# Patient Record
Sex: FEMALE | Race: WHITE | NOT HISPANIC OR LATINO | Employment: OTHER | URBAN - METROPOLITAN AREA
[De-identification: names, ages, dates, MRNs, and addresses within clinical notes are randomized per-mention and may not be internally consistent; named-entity substitution may affect disease eponyms.]

---

## 2017-01-16 DIAGNOSIS — J30.9 ALLERGIC RHINITIS: ICD-10-CM

## 2017-01-16 RX ORDER — MONTELUKAST SODIUM 10 MG/1
TABLET ORAL
Qty: 30 TABLET | Refills: 6 | Status: SHIPPED | OUTPATIENT
Start: 2017-01-16 | End: 2017-08-12 | Stop reason: SDUPTHER

## 2017-01-20 RX ORDER — BACLOFEN 20 MG/1
TABLET ORAL
Qty: 90 TABLET | Refills: 1 | Status: SHIPPED | OUTPATIENT
Start: 2017-01-20 | End: 2017-03-14 | Stop reason: SDUPTHER

## 2017-01-27 DIAGNOSIS — F32.A DEPRESSION, UNSPECIFIED DEPRESSION TYPE: Primary | ICD-10-CM

## 2017-01-27 DIAGNOSIS — G89.4 CHRONIC PAIN DISORDER: ICD-10-CM

## 2017-01-27 DIAGNOSIS — F41.9 ANXIETY: ICD-10-CM

## 2017-01-27 RX ORDER — TRAZODONE HYDROCHLORIDE 100 MG/1
100 TABLET ORAL DAILY
Qty: 90 TABLET | Refills: 0 | Status: SHIPPED | OUTPATIENT
Start: 2017-01-27 | End: 2017-07-03 | Stop reason: SDUPTHER

## 2017-01-27 RX ORDER — PREGABALIN 75 MG/1
75 CAPSULE ORAL 2 TIMES DAILY
Qty: 60 CAPSULE | Refills: 0 | OUTPATIENT
Start: 2017-01-27 | End: 2017-02-24

## 2017-01-27 RX ORDER — LORAZEPAM 1 MG/1
1 TABLET ORAL EVERY 8 HOURS PRN
Qty: 90 TABLET | Refills: 2 | OUTPATIENT
Start: 2017-01-27 | End: 2017-04-25 | Stop reason: SDUPTHER

## 2017-01-27 NOTE — TELEPHONE ENCOUNTER
----- Message from Alphonso Artis sent at 1/26/2017  1:05 PM EST -----  Contact: pt  Pt calling and would like a refill on RX sent into Pharmacy. Please advise.     traZODone (DESYREL) 100 MG tablet    LORazepam (ATIVAN) 1 MG tablet    pregabalin (LYRICA) 75 MG capsule    Send to :  [facility] [address] - [phone] [facility] [phone] FX    Pt# [phone]

## 2017-02-01 NOTE — TELEPHONE ENCOUNTER
Rx was phoned in for both the Ativan 1 mg and Lyrica 75 mg was phoned into Ms. [facility]'s phamracy. The rx refill request was given to pharmacist Lynette

## 2017-02-14 DIAGNOSIS — I10 ESSENTIAL HYPERTENSION: ICD-10-CM

## 2017-02-14 DIAGNOSIS — F39 MOOD DISORDER (HCC): ICD-10-CM

## 2017-02-14 DIAGNOSIS — E03.9 HYPOTHYROIDISM: ICD-10-CM

## 2017-02-15 RX ORDER — LOSARTAN POTASSIUM 100 MG/1
TABLET ORAL
Qty: 30 TABLET | Refills: 5 | Status: SHIPPED | OUTPATIENT
Start: 2017-02-15 | End: 2017-08-12 | Stop reason: SDUPTHER

## 2017-02-15 RX ORDER — LEVOTHYROXINE SODIUM 0.05 MG/1
TABLET ORAL
Qty: 30 TABLET | Refills: 3 | Status: SHIPPED | OUTPATIENT
Start: 2017-02-15 | End: 2017-06-13 | Stop reason: SDUPTHER

## 2017-02-15 RX ORDER — BUPROPION HYDROCHLORIDE 300 MG/1
TABLET ORAL
Qty: 30 TABLET | Refills: 2 | Status: SHIPPED | OUTPATIENT
Start: 2017-02-15 | End: 2017-07-17 | Stop reason: SDUPTHER

## 2017-02-21 ENCOUNTER — TELEPHONE (OUTPATIENT)
Dept: INTERNAL MEDICINE | Facility: CLINIC | Age: 57
End: 2017-02-21

## 2017-02-21 NOTE — TELEPHONE ENCOUNTER
----- Message from Doreen Barone sent at 2/20/2017 11:43 AM EST -----  Contact: Patient  Patient called, and would like to know if Dr. Manning can increase the dosage on     pregabalin (LYRICA) 75 MG capsule    She states she thinks this is beginning to help.     #2:  Would Dr. Manning consider giving her a script for Contrave?  Patient states she has not been on this diet pill before. Please advise.     Patient:  [phone]    Pharmacy:  [facility] [place] [address] [address] [facility] [address] - [phone]  - [phone] FX

## 2017-02-24 DIAGNOSIS — G89.4 CHRONIC PAIN DISORDER: ICD-10-CM

## 2017-02-24 RX ORDER — PREGABALIN 100 MG/1
100 CAPSULE ORAL 2 TIMES DAILY
Qty: 60 CAPSULE | Refills: 4 | OUTPATIENT
Start: 2017-02-24 | End: 2017-08-07 | Stop reason: SDUPTHER

## 2017-02-24 NOTE — TELEPHONE ENCOUNTER
pls call in lyrica 100mg bid.  If needed, then we can increase to 150mg bid in 1-2 mos.  Need appt.  Will consider contrave at next appt.

## 2017-03-14 RX ORDER — BACLOFEN 20 MG/1
TABLET ORAL
Qty: 90 TABLET | Refills: 1 | Status: SHIPPED | OUTPATIENT
Start: 2017-03-14 | End: 2017-05-14 | Stop reason: SDUPTHER

## 2017-03-17 DIAGNOSIS — K58.0 IRRITABLE BOWEL SYNDROME WITH DIARRHEA: ICD-10-CM

## 2017-03-20 RX ORDER — DICYCLOMINE HCL 20 MG
TABLET ORAL
Qty: 120 TABLET | Refills: 1 | Status: SHIPPED | OUTPATIENT
Start: 2017-03-20 | End: 2017-11-14 | Stop reason: SDUPTHER

## 2017-04-03 ENCOUNTER — OFFICE VISIT (OUTPATIENT)
Dept: INTERNAL MEDICINE | Facility: CLINIC | Age: 57
End: 2017-04-03

## 2017-04-03 VITALS
WEIGHT: 204.2 LBS | BODY MASS INDEX: 34.02 KG/M2 | SYSTOLIC BLOOD PRESSURE: 152 MMHG | HEIGHT: 65 IN | DIASTOLIC BLOOD PRESSURE: 86 MMHG

## 2017-04-03 DIAGNOSIS — R53.81 MALAISE AND FATIGUE: ICD-10-CM

## 2017-04-03 DIAGNOSIS — M25.562 ACUTE PAIN OF LEFT KNEE: ICD-10-CM

## 2017-04-03 DIAGNOSIS — F41.9 ANXIETY: ICD-10-CM

## 2017-04-03 DIAGNOSIS — E53.8 VITAMIN B 12 DEFICIENCY: Primary | ICD-10-CM

## 2017-04-03 DIAGNOSIS — M54.50 CHRONIC LOW BACK PAIN WITHOUT SCIATICA, UNSPECIFIED BACK PAIN LATERALITY: ICD-10-CM

## 2017-04-03 DIAGNOSIS — F39 MOOD DISORDER (HCC): ICD-10-CM

## 2017-04-03 DIAGNOSIS — M79.7 FIBROMYALGIA: ICD-10-CM

## 2017-04-03 DIAGNOSIS — I10 ESSENTIAL HYPERTENSION: ICD-10-CM

## 2017-04-03 DIAGNOSIS — Z72.0 TOBACCO ABUSE: ICD-10-CM

## 2017-04-03 DIAGNOSIS — R53.83 MALAISE AND FATIGUE: ICD-10-CM

## 2017-04-03 DIAGNOSIS — G89.29 CHRONIC LOW BACK PAIN WITHOUT SCIATICA, UNSPECIFIED BACK PAIN LATERALITY: ICD-10-CM

## 2017-04-03 DIAGNOSIS — E03.9 ACQUIRED HYPOTHYROIDISM: ICD-10-CM

## 2017-04-03 DIAGNOSIS — G89.4 CHRONIC PAIN DISORDER: ICD-10-CM

## 2017-04-03 PROCEDURE — 96372 THER/PROPH/DIAG INJ SC/IM: CPT | Performed by: INTERNAL MEDICINE

## 2017-04-03 PROCEDURE — 99214 OFFICE O/P EST MOD 30 MIN: CPT | Performed by: INTERNAL MEDICINE

## 2017-04-03 RX ORDER — CYANOCOBALAMIN 1000 UG/ML
1000 INJECTION, SOLUTION INTRAMUSCULAR; SUBCUTANEOUS
Status: DISCONTINUED | OUTPATIENT
Start: 2017-04-03 | End: 2017-04-03

## 2017-04-03 RX ORDER — CYANOCOBALAMIN 1000 UG/ML
1000 INJECTION, SOLUTION INTRAMUSCULAR; SUBCUTANEOUS ONCE
Status: COMPLETED | OUTPATIENT
Start: 2017-04-03 | End: 2017-04-03

## 2017-04-03 RX ORDER — HYDROCODONE BITARTRATE AND ACETAMINOPHEN 10; 325 MG/1; MG/1
1 TABLET ORAL EVERY 6 HOURS PRN
Qty: 90 TABLET | Refills: 0 | Status: SHIPPED | OUTPATIENT
Start: 2017-04-03 | End: 2017-06-01 | Stop reason: SDUPTHER

## 2017-04-03 RX ORDER — LIDOCAINE 50 MG/G
1 PATCH TOPICAL EVERY 24 HOURS
Qty: 90 PATCH | Refills: 5 | Status: SHIPPED | OUTPATIENT
Start: 2017-04-03 | End: 2017-12-28

## 2017-04-03 RX ADMIN — CYANOCOBALAMIN 1000 MCG: 1000 INJECTION, SOLUTION INTRAMUSCULAR; SUBCUTANEOUS at 14:35

## 2017-04-03 NOTE — PROGRESS NOTES
"Subjective   Derek Keller is a 56 y.o. female here for   Chief Complaint   Patient presents with   • Knee Pain     Left knee pain x 1.5 months   • Back Pain   • Hypertension   • Hyperlipidemia   .    Vitals:    04/03/17 1408   BP: 152/86   BP Location: Left arm   Patient Position: Sitting   Cuff Size: Adult   Weight: 204 lb 3.2 oz (92.6 kg)   Height: 65.25\" (165.7 cm)       Knee Pain    The incident occurred more than 1 week ago. The incident occurred at home. The injury mechanism was a fall. The pain is present in the left knee. The quality of the pain is described as aching. The pain is severe. The pain has been intermittent since onset. Associated symptoms include tingling. Pertinent negatives include no inability to bear weight or numbness.   Back Pain   This is a chronic problem. The current episode started more than 1 year ago. The problem occurs constantly. The pain is present in the lumbar spine. The pain is moderate. Associated symptoms include tingling. Pertinent negatives include no chest pain, fever or numbness.   Hypertension   This is a chronic problem. The current episode started more than 1 year ago. The problem is unchanged. The problem is controlled. Associated symptoms include palpitations. Pertinent negatives include no chest pain or shortness of breath.        Encounter Diagnoses   Name Primary?   • Acute pain of left knee Yes   • Chronic low back pain without sciatica, unspecified back pain laterality    • Essential hypertension    • Acquired hypothyroidism    • Chronic pain disorder    • Mood disorder    • Tobacco abuse    • Vitamin B 12 deficiency    • Malaise and fatigue    • Fibromyalgia    • Anxiety        The following portions of the patient's history were reviewed and updated as appropriate: allergies, current medications, past social history and problem list.    Review of Systems   Constitutional: Positive for fatigue. Negative for chills and fever.   Respiratory: Negative for cough, " shortness of breath and wheezing.    Cardiovascular: Positive for palpitations. Negative for chest pain and leg swelling.   Musculoskeletal: Positive for arthralgias and back pain.   Neurological: Positive for tingling. Negative for numbness.   Psychiatric/Behavioral: Positive for dysphoric mood and sleep disturbance (better). The patient is nervous/anxious.        Objective   Physical Exam   Constitutional: She appears well-developed and well-nourished. No distress.   Cardiovascular: Normal rate, regular rhythm and normal heart sounds.    Pulmonary/Chest: No respiratory distress. She has no wheezes. She has no rales. She exhibits no tenderness.   Musculoskeletal: She exhibits no edema.   Psychiatric: She has a normal mood and affect. Her behavior is normal.   Nursing note and vitals reviewed.    Left knee with no deformity or tenderness.  +crepitus bilat.    Assessment/Plan   Problem List Items Addressed This Visit        Unprioritized    Hypertension    Hypothyroidism    Lumbago    Relevant Medications    HYDROcodone-acetaminophen (NORCO)  MG per tablet    lidocaine (LIDODERM) 5 %    Chronic pain disorder    Anxiety    Mood disorder    Malaise and fatigue    Tobacco abuse    Fibromyalgia    Vitamin B 12 deficiency     MMA high - need B12 injections forever         Relevant Medications    cyanocobalamin injection 1,000 mcg (Start on 4/3/2017  3:15 PM)      Other Visit Diagnoses     Acute pain of left knee    -  Primary    Relevant Medications    lidocaine (LIDODERM) 5 %    Other Relevant Orders    Ambulatory Referral to Orthopedic Surgery       HTN - high bp today (from left knee pain?) - need daily bp chk & need to call if bp over 140/90.   Knee pain (medial left knee) after injury (tripped at home 6 wks ago) - refer to ortho.   Chronic low back pain - she requests higher dose hydrocodone - will increase dose from 7.5mg to 10mg - take 1/2 pill bid prn.  No more than 1/day.  She will need to see pain  management again if needing more than 1 pill daily.      Pernicious anemia - Need B12 shots weekly for 1 mo, then monthly.     Tobacco abuse - need to stop!

## 2017-04-10 ENCOUNTER — CLINICAL SUPPORT (OUTPATIENT)
Dept: INTERNAL MEDICINE | Facility: CLINIC | Age: 57
End: 2017-04-10

## 2017-04-10 DIAGNOSIS — E03.9 ACQUIRED HYPOTHYROIDISM: Primary | ICD-10-CM

## 2017-04-10 DIAGNOSIS — E53.8 VITAMIN B12 DEFICIENCY: Primary | ICD-10-CM

## 2017-04-10 PROCEDURE — 96372 THER/PROPH/DIAG INJ SC/IM: CPT | Performed by: INTERNAL MEDICINE

## 2017-04-10 RX ORDER — CYANOCOBALAMIN 1000 UG/ML
1000 INJECTION, SOLUTION INTRAMUSCULAR; SUBCUTANEOUS ONCE
Status: COMPLETED | OUTPATIENT
Start: 2017-04-10 | End: 2017-04-10

## 2017-04-10 RX ADMIN — CYANOCOBALAMIN 1000 MCG: 1000 INJECTION, SOLUTION INTRAMUSCULAR; SUBCUTANEOUS at 15:48

## 2017-04-10 NOTE — PROGRESS NOTES
Patient came in the office for her B12 injection she was administered 1 mL cyanocobalamin in her left deltoid. She tolerated the injection well.     NDC: 6201-4278-29  LOT: [id number]  EXP: 07/31/2018

## 2017-04-19 ENCOUNTER — OFFICE VISIT (OUTPATIENT)
Dept: ORTHOPEDIC SURGERY | Facility: CLINIC | Age: 57
End: 2017-04-19

## 2017-04-19 VITALS — HEIGHT: 65 IN | TEMPERATURE: 98.6 F | BODY MASS INDEX: 33.89 KG/M2 | WEIGHT: 203.4 LBS

## 2017-04-19 DIAGNOSIS — M17.12 PRIMARY OSTEOARTHRITIS OF LEFT KNEE: ICD-10-CM

## 2017-04-19 DIAGNOSIS — M17.11 OSTEOARTHROSIS, LOCALIZED, PRIMARY, KNEE, RIGHT: ICD-10-CM

## 2017-04-19 DIAGNOSIS — M25.562 ACUTE PAIN OF LEFT KNEE: Primary | ICD-10-CM

## 2017-04-19 PROBLEM — M17.10 OSTEOARTHROSIS, LOCALIZED, PRIMARY, KNEE: Status: ACTIVE | Noted: 2017-04-19

## 2017-04-19 PROCEDURE — 99203 OFFICE O/P NEW LOW 30 MIN: CPT | Performed by: ORTHOPAEDIC SURGERY

## 2017-04-19 PROCEDURE — 99406 BEHAV CHNG SMOKING 3-10 MIN: CPT | Performed by: ORTHOPAEDIC SURGERY

## 2017-04-19 PROCEDURE — 20610 DRAIN/INJ JOINT/BURSA W/O US: CPT | Performed by: ORTHOPAEDIC SURGERY

## 2017-04-19 PROCEDURE — 73564 X-RAY EXAM KNEE 4 OR MORE: CPT | Performed by: ORTHOPAEDIC SURGERY

## 2017-04-19 RX ADMIN — LIDOCAINE HYDROCHLORIDE 3 ML: 10 INJECTION, SOLUTION INFILTRATION; PERINEURAL at 15:21

## 2017-04-19 RX ADMIN — METHYLPREDNISOLONE ACETATE 80 MG: 80 INJECTION, SUSPENSION INTRA-ARTICULAR; INTRALESIONAL; INTRAMUSCULAR; SOFT TISSUE at 15:21

## 2017-04-19 RX ADMIN — BUPIVACAINE HYDROCHLORIDE 3 ML: 5 INJECTION, SOLUTION PERINEURAL at 15:21

## 2017-04-19 NOTE — PROGRESS NOTES
Patient:  Derek Keller is a 56 y.o. female    Chief Complaint/ Reason for Visit:    Chief Complaint   Patient presents with   • Left Knee - Establish Care, Pain       HPI:  The patient presents today complaining of pain primarily in her left knee.  It is along the medial aspect.  It is moderate and aching even at rest, and episodically severe with walking and standing.  It is causing her limp, and has gotten worse over the past month to 6 weeks.  She says she slipped and fell about 6 weeks ago and that really made her pain escalate.  Prior to that, she has had a one year history of intermittent, mild to moderate aching pain in the left knee.  She occasionally will feel a burning sensation in the left knee with weightbearing since her fall.  She is not having any instability.  She does have some mild swelling.  She also has had some popping in the left knee.    The patient tells me she is on disability due to multilevel spine disease.    The patient's pain is only alleviated by rest and elevation, but it even aches and sometimes wakes her up at night if she rolls over the wrong way, she notes.    The pain is nonradiating.  She does not have any acute weakness.  She has no planes with her right knee at this time though does note that it bothers her to a mild degree off and on.      PMH:    Past Medical History:   Diagnosis Date   • Allergic rhinitis    • Anemia    • Anxiety    • Arthritis    • Asthma    • Bowen's disease    • Chronic pain disorder    • Depression    • History of colon polyps    • History of IBS    • History of kidney stones    • Hyperlipidemia    • Hypertension    • Hypothyroidism    • Insomnia    • Lumbago    • Malaise and fatigue    • Mood disorder    • Neck pain        PSH:    Past Surgical History:   Procedure Laterality Date   • CHOLECYSTECTOMY     • COLONOSCOPY  11/08/2006   • GANGLION CYST EXCISION     • HYSTERECTOMY  05/2005   • LAPAROSCOPIC GASTRIC BANDING         Social Hx:    Social  History     Social History   • Marital status: Unknown     Spouse name: N/A   • Number of children: N/A   • Years of education: N/A     Occupational History   • Not on file.     Social History Main Topics   • Smoking status: Current Every Day Smoker   • Smokeless tobacco: Never Used   • Alcohol use No   • Drug use: Yes     Special: Hydrocodone, Benzodiazepines   • Sexual activity: Yes     Partners: Male     Other Topics Concern   • Not on file     Social History Narrative       Family Hx:    Family History   Problem Relation Age of Onset   • Hypertension Mother    • Rheum arthritis Mother    • Heart disease Mother    • Diabetes Father    • Diabetes Other    • Fibromyalgia Other        Meds:    Current Outpatient Prescriptions:   •  ADVAIR DISKUS 100-50 MCG/DOSE DISKUS, INHALE 1 PUFF TWICE DAILY *RINSE AND SPIT AFTER EACH USE*, Disp: 3 each, Rfl: 4  •  ARIPiprazole (ABILIFY) 15 MG tablet, TAKE ONE TABLET BY MOUTH EVERY DAY, Disp: 30 tablet, Rfl: 4  •  atorvastatin (LIPITOR) 20 MG tablet, TAKE 1 TABLET BY MOUTH DAILY., Disp: 30 tablet, Rfl: 6  •  baclofen (LIORESAL) 20 MG tablet, TAKE ONE TABLET BY MOUTH THREE TIMES DAILY, Disp: 90 tablet, Rfl: 1  •  buPROPion XL (WELLBUTRIN XL) 300 MG 24 hr tablet, TAKE 1 TABLET BY MOUTH DAILY., Disp: 30 tablet, Rfl: 2  •  Cyanocobalamin 1000 MCG/ML kit, Inject 1 mL as directed Every 30 (Thirty) Days., Disp: 1 kit, Rfl: 12  •  escitalopram (LEXAPRO) 20 MG tablet, TAKE ONE TABLET BY MOUTH EVERY DAY, Disp: 30 tablet, Rfl: 6  •  HYDROcodone-acetaminophen (NORCO)  MG per tablet, Take 1 tablet by mouth Every 6 (Six) Hours As Needed for Moderate Pain (4-6)., Disp: 90 tablet, Rfl: 0  •  ibuprofen (ADVIL,MOTRIN) 600 MG tablet, Take 1 tablet (600 mg total) by mouth every 8 (eight) hours as needed (as needed), Disp: 90 tablet, Rfl: 3  •  levothyroxine (SYNTHROID, LEVOTHROID) 50 MCG tablet, TAKE 1 TABLET BY MOUTH DAILY., Disp: 30 tablet, Rfl: 3  •  lidocaine (LIDODERM) 5 %, Place 1 patch  on the skin Daily. Remove & Discard patch within 12 hours or as directed by MD, Disp: 90 patch, Rfl: 5  •  LORazepam (ATIVAN) 1 MG tablet, Take 1 tablet by mouth Every 8 (Eight) Hours As Needed for anxiety., Disp: 90 tablet, Rfl: 2  •  losartan (COZAAR) 100 MG tablet, TAKE 1/2 TO 1 TABLET BY MOUTH EVERY DAY *STOP LISINOPRIL/HCTZ*, Disp: 30 tablet, Rfl: 5  •  montelukast (SINGULAIR) 10 MG tablet, TAKE ONE TABLET BY MOUTH EVERY DAY, Disp: 30 tablet, Rfl: 6  •  pregabalin (LYRICA) 100 MG capsule, Take 1 capsule by mouth 2 (Two) Times a Day., Disp: 60 capsule, Rfl: 4  •  traZODone (DESYREL) 100 MG tablet, Take 1 tablet by mouth Daily., Disp: 90 tablet, Rfl: 0  •  valACYclovir (VALTREX) 1000 MG tablet, TAKE ONE TABLET BY MOUTH DAILY, Disp: 30 tablet, Rfl: 11  •  dicyclomine (BENTYL) 20 MG tablet, TAKE ONE TABLET BY MOUTH EVERY SIX HOURS, Disp: 120 tablet, Rfl: 1    Allergies:    Allergies   Allergen Reactions   • No Known Drug Allergy        ROS:  Review of Systems   Constitutional: Negative for chills, fever and unexpected weight change.   HENT: Negative for trouble swallowing and voice change.    Eyes: Negative for visual disturbance.   Respiratory: Positive for wheezing. Negative for cough and shortness of breath.    Cardiovascular: Negative for chest pain and leg swelling.   Gastrointestinal: Positive for diarrhea. Negative for abdominal pain, nausea and vomiting.   Endocrine: Negative for cold intolerance and heat intolerance.   Genitourinary: Negative for difficulty urinating, frequency and urgency.   Musculoskeletal: Positive for arthralgias and myalgias.   Skin: Negative for rash and wound.   Allergic/Immunologic: Negative for immunocompromised state.   Neurological: Positive for numbness. Negative for weakness.   Hematological: Does not bruise/bleed easily.   Psychiatric/Behavioral: Negative for dysphoric mood. Sleep disturbance: difficulty. The patient is nervous/anxious.        Vitals:    04/19/17 1436   Temp:  "98.6 °F (37 °C)   TempSrc: Temporal Artery    Weight: 203 lb 6.4 oz (92.3 kg)   Height: 65\" (165.1 cm)   Body mass index is 33.85 kg/(m^2).      Physical Exam    The patient is awake, alert, and oriented ×3.  The patient is in no acute distress.  Breathing is regular and unlabored with a respiratory rate of 12/m.  Extraocular movements and pupillary responses are symmetrically intact. Sclerae are anicteric.   Hearing is within normal limits.  Speech is within normal limits.  There is no jugular venous distention.    The patient has a lumbering gait.  She has antalgia from the left.  Range of motion of the left knee is 3° to 125° flexion.  Range of motion the right knee is 10° to 123° of flexion.  Left knee bounce test is negative.  The patient has fairly exquisite tenderness along the medial joint line.  She does have some mild opening medially on valgus stressing.  She has crepitus on patella grind.  Apprehension test is negative.  She has no acute skin changes.  Her left calf is soft and nontender with no venous cord.  She has no popliteal lymphadenopathy.  She has 1+ pedal pulses distally in the left lower extremity with a current, regular heart rate of about 84 bpm.  Sensory exam is intact light touch.  Motor strength is 5 over 5.        Radiology:X-rays: A 4 view arthritis series of the left knee, with incidental views of the right knee, was ordered and reviewed today to assess the patient's complaints of 6 weeks of left knee pain after a fall.  Comparison images were not available.  These images reveal the patient has mild to moderate degenerative change in both knees, fairly symmetrically, primarily noted in the form of medial joint line narrowing.  However, the patient does have subtle osteophyte formation around all 3 compartments.  I see no evidence of acute fracture.          Assessment:  1. Acute pain of left knee    - XR Knee 4+ View Left  - Ambulatory Referral to Physical Therapy Evaluate and treat, " "Ortho    2. Primary osteoarthritis of left knee    - Large Joint Arthrocentesis  - Ambulatory Referral to Physical Therapy Evaluate and treat, Ortho    3. Osteoarthrosis, localized, primary, knee, right    - Ambulatory Referral to Physical Therapy Evaluate and treat, Ortho          Plan:  The patient and I discussed everything at length.  I personally reviewed her x-rays images with her.  I explained their significance, in correlated to her current history and exam to the findings as well.  We discussed the options.  She requested a \"cortisone shot\" in her left knee for the acute pain.  I recommended physical therapy and soft bracing as well, and she agreed.  She was fitted with an appropriate brace by [facility].  She was given a prescription for physical therapy that she will attend in her home county [facility].  The patient and I discussed when she should wear the brace and when she will should not.  She voiced understanding.    We discussed the importance of physical therapy.    Postinjection precautions and instructions reviewed.    Additionally, the patient was provided with literature regarding viscose supplement injection.    We also discussed other important factors including healthy exercise, weight loss, and smoking cessation.    I counseled the patient on the deleterious effects of chronic smoking on her musculoskeletal health, and on her cardiovascular health and fitness, and her health and healing abilities in general.  She voiced understanding.  I spent more than 3 minutes in this discussion.  We also reviewed the financial impact of continued smoking and the benefits of cessation from that standpoint as well.  Again, she voiced understanding.        Orders Placed This Encounter   Procedures   • Large Joint Arthrocentesis     This order was created via procedure documentation   • XR Knee 4+ View Left     Order Specific Question:   Reason for Exam:     Answer:   left knee pain     Order " Specific Question:   Patient Pregnant     Answer:   No   • Ambulatory Referral to Physical Therapy Evaluate and treat, Ortho     Referral Priority:   Routine     Referral Type:   Therapy     Referral Reason:   Specialty Services Required     Referral Location:   [facility]     Requested Specialty:   Physical Therapy     Number of Visits Requested:   1   Large Joint Arthrocentesis  Date/Time: 4/19/2017 3:21 PM  Consent given by: patient  Timeout: Immediately prior to procedure a time out was called to verify the correct patient, procedure, equipment, support staff and site/side marked as required   Supporting Documentation  Indications: pain   Procedure Details  Location: knee - L knee  Needle size: 22 G  Approach: anteromedial  Medications administered: 80 mg methylPREDNISolone acetate 80 MG/ML; 3 mL bupivacaine; 3 mL lidocaine 1 %  Patient tolerance: patient tolerated the procedure well with no immediate complications

## 2017-04-20 RX ORDER — BUPIVACAINE HYDROCHLORIDE 5 MG/ML
3 INJECTION, SOLUTION PERINEURAL
Status: COMPLETED | OUTPATIENT
Start: 2017-04-19 | End: 2017-04-19

## 2017-04-20 RX ORDER — METHYLPREDNISOLONE ACETATE 80 MG/ML
80 INJECTION, SUSPENSION INTRA-ARTICULAR; INTRALESIONAL; INTRAMUSCULAR; SOFT TISSUE
Status: COMPLETED | OUTPATIENT
Start: 2017-04-19 | End: 2017-04-19

## 2017-04-20 RX ORDER — LIDOCAINE HYDROCHLORIDE 10 MG/ML
3 INJECTION, SOLUTION INFILTRATION; PERINEURAL
Status: COMPLETED | OUTPATIENT
Start: 2017-04-19 | End: 2017-04-19

## 2017-04-25 DIAGNOSIS — F41.9 ANXIETY: ICD-10-CM

## 2017-04-26 RX ORDER — LORAZEPAM 1 MG/1
TABLET ORAL
Qty: 90 TABLET | Refills: 2 | OUTPATIENT
Start: 2017-04-26 | End: 2017-08-31 | Stop reason: SDUPTHER

## 2017-05-14 DIAGNOSIS — F41.9 ANXIETY: ICD-10-CM

## 2017-05-15 RX ORDER — BACLOFEN 20 MG/1
TABLET ORAL
Qty: 90 TABLET | Refills: 1 | Status: SHIPPED | OUTPATIENT
Start: 2017-05-15 | End: 2017-07-17 | Stop reason: SDUPTHER

## 2017-05-15 RX ORDER — ESCITALOPRAM OXALATE 20 MG/1
TABLET ORAL
Qty: 30 TABLET | Refills: 6 | Status: SHIPPED | OUTPATIENT
Start: 2017-05-15 | End: 2017-12-12 | Stop reason: SDUPTHER

## 2017-05-15 RX ORDER — ARIPIPRAZOLE 15 MG/1
TABLET ORAL
Qty: 30 TABLET | Refills: 4 | Status: SHIPPED | OUTPATIENT
Start: 2017-05-15 | End: 2017-10-14 | Stop reason: SDUPTHER

## 2017-05-31 DIAGNOSIS — M54.50 CHRONIC LOW BACK PAIN WITHOUT SCIATICA, UNSPECIFIED BACK PAIN LATERALITY: ICD-10-CM

## 2017-05-31 DIAGNOSIS — G89.29 CHRONIC LOW BACK PAIN WITHOUT SCIATICA, UNSPECIFIED BACK PAIN LATERALITY: ICD-10-CM

## 2017-05-31 RX ORDER — HYDROCODONE BITARTRATE AND ACETAMINOPHEN 10; 325 MG/1; MG/1
TABLET ORAL
Qty: 90 TABLET | Refills: 0 | Status: CANCELLED | OUTPATIENT
Start: 2017-05-31

## 2017-06-01 DIAGNOSIS — M54.50 CHRONIC LOW BACK PAIN WITHOUT SCIATICA, UNSPECIFIED BACK PAIN LATERALITY: ICD-10-CM

## 2017-06-01 DIAGNOSIS — G89.29 CHRONIC LOW BACK PAIN WITHOUT SCIATICA, UNSPECIFIED BACK PAIN LATERALITY: ICD-10-CM

## 2017-06-01 RX ORDER — HYDROCODONE BITARTRATE AND ACETAMINOPHEN 10; 325 MG/1; MG/1
1 TABLET ORAL EVERY 6 HOURS PRN
Qty: 90 TABLET | Refills: 0 | Status: SHIPPED | OUTPATIENT
Start: 2017-06-01 | End: 2017-07-09 | Stop reason: SDUPTHER

## 2017-06-01 NOTE — TELEPHONE ENCOUNTER
Please review refill request:    Last OV: 4/3/17    Last Refill: 4/3/17    ALEXEI: Good until 7/26/17    Thank you,    Von

## 2017-06-01 NOTE — TELEPHONE ENCOUNTER
----- Message from Gretta Harrell sent at 6/1/2017  3:28 PM EDT -----  Contact: pt - Dr Manning's pt - RE: script  Pt calling and would like a refill on Rx      HYDROcodone-acetaminophen (NORCO)  MG per tablet 90 tablet      Sig - Route: Take 1 tablet by mouth Every 6 (Six) Hours As Needed for Moderate Pain (4-6). - Oral      Pt # [phone]

## 2017-06-02 ENCOUNTER — CLINICAL SUPPORT (OUTPATIENT)
Dept: INTERNAL MEDICINE | Facility: CLINIC | Age: 57
End: 2017-06-02

## 2017-06-02 DIAGNOSIS — E53.8 B12 DEFICIENCY: Primary | ICD-10-CM

## 2017-06-02 DIAGNOSIS — E53.8 B12 DEFICIENCY: ICD-10-CM

## 2017-06-02 PROCEDURE — 96372 THER/PROPH/DIAG INJ SC/IM: CPT | Performed by: INTERNAL MEDICINE

## 2017-06-02 RX ORDER — CYANOCOBALAMIN 1000 UG/ML
1000 INJECTION, SOLUTION INTRAMUSCULAR; SUBCUTANEOUS
Status: DISCONTINUED | OUTPATIENT
Start: 2017-06-02 | End: 2018-04-06 | Stop reason: SDUPTHER

## 2017-06-02 RX ADMIN — CYANOCOBALAMIN 1000 MCG: 1000 INJECTION, SOLUTION INTRAMUSCULAR; SUBCUTANEOUS at 12:48

## 2017-06-02 NOTE — PROGRESS NOTES
Pt came in and received Vitamin B 12 injection of 1 ml in left deltoid, pt tolerated well with no complications.

## 2017-06-13 DIAGNOSIS — E03.9 HYPOTHYROIDISM: ICD-10-CM

## 2017-06-13 DIAGNOSIS — M54.50 LOW BACK PAIN WITHOUT SCIATICA: ICD-10-CM

## 2017-06-13 RX ORDER — GABAPENTIN 800 MG/1
TABLET ORAL
Qty: 360 TABLET | Refills: 1 | Status: SHIPPED | OUTPATIENT
Start: 2017-06-13 | End: 2017-12-28

## 2017-06-13 RX ORDER — LEVOTHYROXINE SODIUM 0.05 MG/1
TABLET ORAL
Qty: 30 TABLET | Refills: 3 | Status: SHIPPED | OUTPATIENT
Start: 2017-06-13 | End: 2017-10-14 | Stop reason: SDUPTHER

## 2017-07-03 ENCOUNTER — TELEPHONE (OUTPATIENT)
Dept: INTERNAL MEDICINE | Facility: CLINIC | Age: 57
End: 2017-07-03

## 2017-07-03 DIAGNOSIS — F32.A DEPRESSION, UNSPECIFIED DEPRESSION TYPE: ICD-10-CM

## 2017-07-03 RX ORDER — TRAZODONE HYDROCHLORIDE 150 MG/1
150 TABLET ORAL NIGHTLY
Qty: 90 TABLET | Refills: 2 | Status: SHIPPED | OUTPATIENT
Start: 2017-07-03 | End: 2018-02-15 | Stop reason: SDUPTHER

## 2017-07-03 NOTE — TELEPHONE ENCOUNTER
I sent in the higher dose (150 mg trazodone nightly).  She may use up the old 100 mg tablets up by taking 1.5 pill nightly.

## 2017-07-03 NOTE — TELEPHONE ENCOUNTER
----- Message from rGetta Harrell sent at 7/3/2017  1:10 PM EDT -----  Contact: pt - Dr Manning's pt - RE: Rx refill  Pt calling and would like a new Rx. Pt informs that Rx for sleeping is only keeping pt asleep for 3 hours and would like a higher dose, as pt informs pt's Rx is no longer working for pt. Could you please call in higher dosage. Please advise. Thanks      traZODone (DESYREL) 100 MG tablet 90 tablet       Sig - Route: Take 1 tablet by mouth Daily. - Oral    [facility] - [place], [place] - [address] - [phone] PH - [phone] (Phone)  [phone] (Fax)

## 2017-07-09 DIAGNOSIS — M54.50 CHRONIC LOW BACK PAIN WITHOUT SCIATICA, UNSPECIFIED BACK PAIN LATERALITY: ICD-10-CM

## 2017-07-09 DIAGNOSIS — G89.29 CHRONIC LOW BACK PAIN WITHOUT SCIATICA, UNSPECIFIED BACK PAIN LATERALITY: ICD-10-CM

## 2017-07-10 RX ORDER — HYDROCODONE BITARTRATE AND ACETAMINOPHEN 10; 325 MG/1; MG/1
TABLET ORAL
Qty: 90 TABLET | Refills: 0 | Status: SHIPPED | OUTPATIENT
Start: 2017-07-10 | End: 2017-08-07 | Stop reason: SDUPTHER

## 2017-07-17 DIAGNOSIS — E78.5 HYPERLIPIDEMIA: ICD-10-CM

## 2017-07-17 DIAGNOSIS — F39 MOOD DISORDER (HCC): ICD-10-CM

## 2017-07-17 RX ORDER — BUPROPION HYDROCHLORIDE 300 MG/1
TABLET ORAL
Qty: 30 TABLET | Refills: 2 | Status: SHIPPED | OUTPATIENT
Start: 2017-07-17 | End: 2017-10-14 | Stop reason: SDUPTHER

## 2017-07-17 RX ORDER — BACLOFEN 20 MG/1
TABLET ORAL
Qty: 90 TABLET | Refills: 1 | Status: SHIPPED | OUTPATIENT
Start: 2017-07-17 | End: 2017-09-12 | Stop reason: SDUPTHER

## 2017-07-17 RX ORDER — ATORVASTATIN CALCIUM 20 MG/1
TABLET, FILM COATED ORAL
Qty: 30 TABLET | Refills: 6 | Status: SHIPPED | OUTPATIENT
Start: 2017-07-17 | End: 2018-02-15 | Stop reason: SDUPTHER

## 2017-08-07 DIAGNOSIS — G89.4 CHRONIC PAIN DISORDER: ICD-10-CM

## 2017-08-07 DIAGNOSIS — M54.50 CHRONIC LOW BACK PAIN WITHOUT SCIATICA, UNSPECIFIED BACK PAIN LATERALITY: ICD-10-CM

## 2017-08-07 DIAGNOSIS — G89.29 CHRONIC LOW BACK PAIN WITHOUT SCIATICA, UNSPECIFIED BACK PAIN LATERALITY: ICD-10-CM

## 2017-08-08 RX ORDER — HYDROCODONE BITARTRATE AND ACETAMINOPHEN 10; 325 MG/1; MG/1
TABLET ORAL
Qty: 90 TABLET | Refills: 0 | Status: SHIPPED | OUTPATIENT
Start: 2017-08-08 | End: 2017-08-31 | Stop reason: SDUPTHER

## 2017-08-08 NOTE — TELEPHONE ENCOUNTER
Please review refill request:    Last OV: 4/3/17    Last Prescribed: Lyrica ( 2/24/17) NORCO  (7/10/17)    ALEXEI: Ordered    Thank you

## 2017-08-09 NOTE — TELEPHONE ENCOUNTER
Rx for Lyrica phone into pharmacy.     Patient called and informed Rx for Norco  MG is ready for .

## 2017-08-10 DIAGNOSIS — R52 PAIN: ICD-10-CM

## 2017-08-11 RX ORDER — IBUPROFEN 600 MG/1
TABLET ORAL
Qty: 90 TABLET | Refills: 3 | Status: SHIPPED | OUTPATIENT
Start: 2017-08-11 | End: 2018-12-13 | Stop reason: SDUPTHER

## 2017-08-12 DIAGNOSIS — I10 ESSENTIAL HYPERTENSION: ICD-10-CM

## 2017-08-12 DIAGNOSIS — A60.09 GENITAL HERPES IN WOMEN: ICD-10-CM

## 2017-08-12 DIAGNOSIS — J30.9 ALLERGIC RHINITIS: ICD-10-CM

## 2017-08-14 RX ORDER — VALACYCLOVIR HYDROCHLORIDE 1 G/1
TABLET, FILM COATED ORAL
Qty: 30 TABLET | Refills: 11 | Status: SHIPPED | OUTPATIENT
Start: 2017-08-14 | End: 2018-08-14 | Stop reason: SDUPTHER

## 2017-08-14 RX ORDER — MONTELUKAST SODIUM 10 MG/1
TABLET ORAL
Qty: 30 TABLET | Refills: 11 | Status: SHIPPED | OUTPATIENT
Start: 2017-08-14 | End: 2018-08-14 | Stop reason: SDUPTHER

## 2017-08-14 RX ORDER — LOSARTAN POTASSIUM 100 MG/1
TABLET ORAL
Qty: 30 TABLET | Refills: 5 | Status: SHIPPED | OUTPATIENT
Start: 2017-08-14 | End: 2018-02-15 | Stop reason: SDUPTHER

## 2017-08-25 DIAGNOSIS — F41.9 ANXIETY: ICD-10-CM

## 2017-08-28 ENCOUNTER — TELEPHONE (OUTPATIENT)
Dept: INTERNAL MEDICINE | Facility: CLINIC | Age: 57
End: 2017-08-28

## 2017-08-28 NOTE — TELEPHONE ENCOUNTER
----- Message from Gretta Harrell sent at 8/28/2017 10:59 AM EDT -----  Contact: pt - Dr Manning's pt - RE: Rx / appt  Spoke w/ pt informed that pt has a AWV scheduled for 09/29/2017.

## 2017-08-29 RX ORDER — LORAZEPAM 1 MG/1
TABLET ORAL
Qty: 90 TABLET | Refills: 2 | OUTPATIENT
Start: 2017-08-29

## 2017-08-29 RX ORDER — LORAZEPAM 1 MG/1
1 TABLET ORAL EVERY 8 HOURS PRN
Qty: 90 TABLET | Refills: 2 | OUTPATIENT
Start: 2017-08-29

## 2017-08-31 ENCOUNTER — OFFICE VISIT (OUTPATIENT)
Dept: INTERNAL MEDICINE | Facility: CLINIC | Age: 57
End: 2017-08-31

## 2017-08-31 VITALS
WEIGHT: 192 LBS | OXYGEN SATURATION: 91 % | DIASTOLIC BLOOD PRESSURE: 80 MMHG | HEIGHT: 65 IN | SYSTOLIC BLOOD PRESSURE: 130 MMHG | HEART RATE: 50 BPM | BODY MASS INDEX: 31.99 KG/M2

## 2017-08-31 DIAGNOSIS — G89.29 CHRONIC LOW BACK PAIN WITHOUT SCIATICA, UNSPECIFIED BACK PAIN LATERALITY: ICD-10-CM

## 2017-08-31 DIAGNOSIS — M54.50 CHRONIC LOW BACK PAIN WITHOUT SCIATICA, UNSPECIFIED BACK PAIN LATERALITY: ICD-10-CM

## 2017-08-31 DIAGNOSIS — G89.4 CHRONIC PAIN DISORDER: Primary | ICD-10-CM

## 2017-08-31 DIAGNOSIS — M79.7 FIBROMYALGIA: ICD-10-CM

## 2017-08-31 DIAGNOSIS — F41.9 ANXIETY: ICD-10-CM

## 2017-08-31 DIAGNOSIS — R53.81 MALAISE AND FATIGUE: ICD-10-CM

## 2017-08-31 DIAGNOSIS — R53.83 MALAISE AND FATIGUE: ICD-10-CM

## 2017-08-31 DIAGNOSIS — Z72.0 TOBACCO ABUSE: ICD-10-CM

## 2017-08-31 DIAGNOSIS — Z87.19 HISTORY OF IBS: ICD-10-CM

## 2017-08-31 DIAGNOSIS — M54.2 NECK PAIN: ICD-10-CM

## 2017-08-31 DIAGNOSIS — F39 MOOD DISORDER (HCC): ICD-10-CM

## 2017-08-31 DIAGNOSIS — I10 ESSENTIAL HYPERTENSION: ICD-10-CM

## 2017-08-31 DIAGNOSIS — E78.49 OTHER HYPERLIPIDEMIA: ICD-10-CM

## 2017-08-31 DIAGNOSIS — J45.20 MILD INTERMITTENT ASTHMA WITHOUT COMPLICATION: ICD-10-CM

## 2017-08-31 DIAGNOSIS — M19.90 ARTHRITIS: ICD-10-CM

## 2017-08-31 DIAGNOSIS — E03.9 ACQUIRED HYPOTHYROIDISM: ICD-10-CM

## 2017-08-31 PROCEDURE — 99214 OFFICE O/P EST MOD 30 MIN: CPT | Performed by: INTERNAL MEDICINE

## 2017-08-31 RX ORDER — PREGABALIN 100 MG/1
100 CAPSULE ORAL 3 TIMES DAILY
Qty: 90 CAPSULE | Refills: 3 | OUTPATIENT
Start: 2017-08-31 | End: 2017-10-05 | Stop reason: SDUPTHER

## 2017-08-31 RX ORDER — LORAZEPAM 1 MG/1
1 TABLET ORAL EVERY 8 HOURS PRN
Qty: 90 TABLET | Refills: 2 | OUTPATIENT
Start: 2017-08-31 | End: 2017-11-27 | Stop reason: SDUPTHER

## 2017-08-31 RX ORDER — HYDROCODONE BITARTRATE AND ACETAMINOPHEN 10; 325 MG/1; MG/1
1 TABLET ORAL EVERY 6 HOURS PRN
Qty: 90 TABLET | Refills: 0 | Status: SHIPPED | OUTPATIENT
Start: 2017-08-31 | End: 2017-10-02 | Stop reason: SDUPTHER

## 2017-09-01 RX ORDER — LORAZEPAM 1 MG/1
TABLET ORAL
Qty: 90 TABLET | Refills: 2 | OUTPATIENT
Start: 2017-09-01

## 2017-09-12 RX ORDER — BACLOFEN 20 MG/1
TABLET ORAL
Qty: 90 TABLET | Refills: 1 | Status: SHIPPED | OUTPATIENT
Start: 2017-09-12 | End: 2017-11-14 | Stop reason: SDUPTHER

## 2017-10-02 DIAGNOSIS — G89.29 CHRONIC LOW BACK PAIN WITHOUT SCIATICA, UNSPECIFIED BACK PAIN LATERALITY: ICD-10-CM

## 2017-10-02 DIAGNOSIS — M54.50 CHRONIC LOW BACK PAIN WITHOUT SCIATICA, UNSPECIFIED BACK PAIN LATERALITY: ICD-10-CM

## 2017-10-02 NOTE — TELEPHONE ENCOUNTER
----- Message from Doreen Barone sent at 10/2/2017 11:56 AM EDT -----  Contact: Patient  Patient called requesting refill on     HYDROcodone-acetaminophen (NORCO)  MG per tablet    Please call when ready to be picked up.      Patient:  [phone]

## 2017-10-02 NOTE — TELEPHONE ENCOUNTER
Please review refill request:    Last OV: 8/31/17    Last Prescribed: 8/31/17    ALEXEI: Good until 11/8/17    Thank you,    Von

## 2017-10-03 RX ORDER — HYDROCODONE BITARTRATE AND ACETAMINOPHEN 10; 325 MG/1; MG/1
1 TABLET ORAL EVERY 6 HOURS PRN
Qty: 90 TABLET | Refills: 0 | Status: SHIPPED | OUTPATIENT
Start: 2017-10-03 | End: 2017-10-25 | Stop reason: SDUPTHER

## 2017-10-05 DIAGNOSIS — G89.4 CHRONIC PAIN DISORDER: ICD-10-CM

## 2017-10-05 RX ORDER — PREGABALIN 100 MG/1
100 CAPSULE ORAL 3 TIMES DAILY
Qty: 90 CAPSULE | Refills: 3 | OUTPATIENT
Start: 2017-10-05 | End: 2018-04-16 | Stop reason: SDUPTHER

## 2017-10-14 DIAGNOSIS — F39 MOOD DISORDER (HCC): ICD-10-CM

## 2017-10-14 DIAGNOSIS — F41.9 ANXIETY: ICD-10-CM

## 2017-10-14 DIAGNOSIS — E03.9 HYPOTHYROIDISM: ICD-10-CM

## 2017-10-16 RX ORDER — BUPROPION HYDROCHLORIDE 300 MG/1
TABLET ORAL
Qty: 30 TABLET | Refills: 2 | Status: SHIPPED | OUTPATIENT
Start: 2017-10-16 | End: 2018-01-13 | Stop reason: SDUPTHER

## 2017-10-16 RX ORDER — ARIPIPRAZOLE 15 MG/1
TABLET ORAL
Qty: 30 TABLET | Refills: 4 | Status: SHIPPED | OUTPATIENT
Start: 2017-10-16 | End: 2018-03-14 | Stop reason: SDUPTHER

## 2017-10-16 RX ORDER — LEVOTHYROXINE SODIUM 0.05 MG/1
TABLET ORAL
Qty: 30 TABLET | Refills: 3 | Status: SHIPPED | OUTPATIENT
Start: 2017-10-16 | End: 2018-02-15 | Stop reason: SDUPTHER

## 2017-10-25 DIAGNOSIS — M54.50 CHRONIC LOW BACK PAIN WITHOUT SCIATICA, UNSPECIFIED BACK PAIN LATERALITY: ICD-10-CM

## 2017-10-25 DIAGNOSIS — G89.29 CHRONIC LOW BACK PAIN WITHOUT SCIATICA, UNSPECIFIED BACK PAIN LATERALITY: ICD-10-CM

## 2017-10-25 RX ORDER — HYDROCODONE BITARTRATE AND ACETAMINOPHEN 10; 325 MG/1; MG/1
1 TABLET ORAL EVERY 6 HOURS PRN
Qty: 90 TABLET | Refills: 0 | Status: SHIPPED | OUTPATIENT
Start: 2017-10-25 | End: 2017-11-15 | Stop reason: SDUPTHER

## 2017-10-25 NOTE — TELEPHONE ENCOUNTER
----- Message from Doreen Barone sent at 10/25/2017  8:10 AM EDT -----  Contact: Patient  Patient called requesting refill on     HYDROcodone-acetaminophen (NORCO)  MG per tablet    Please call when ready to be picked up.      Patient:  [phone]

## 2017-10-25 NOTE — TELEPHONE ENCOUNTER
Please review refill request:    Last OV:8/31/17    Last Prescribed: 10/3/17    ALEXEI: Good until 11/8/17    Thank you ,    Von

## 2017-11-14 DIAGNOSIS — K58.0 IRRITABLE BOWEL SYNDROME WITH DIARRHEA: ICD-10-CM

## 2017-11-14 RX ORDER — DICYCLOMINE HCL 20 MG
TABLET ORAL
Qty: 120 TABLET | Refills: 1 | Status: SHIPPED | OUTPATIENT
Start: 2017-11-14 | End: 2018-03-28 | Stop reason: ALTCHOICE

## 2017-11-14 RX ORDER — BACLOFEN 20 MG/1
TABLET ORAL
Qty: 90 TABLET | Refills: 1 | Status: SHIPPED | OUTPATIENT
Start: 2017-11-14 | End: 2018-01-13 | Stop reason: SDUPTHER

## 2017-11-15 DIAGNOSIS — M54.50 CHRONIC LOW BACK PAIN WITHOUT SCIATICA, UNSPECIFIED BACK PAIN LATERALITY: ICD-10-CM

## 2017-11-15 DIAGNOSIS — G89.29 CHRONIC LOW BACK PAIN WITHOUT SCIATICA, UNSPECIFIED BACK PAIN LATERALITY: ICD-10-CM

## 2017-11-15 RX ORDER — HYDROCODONE BITARTRATE AND ACETAMINOPHEN 10; 325 MG/1; MG/1
1 TABLET ORAL EVERY 6 HOURS PRN
Qty: 90 TABLET | Refills: 0 | Status: SHIPPED | OUTPATIENT
Start: 2017-11-15 | End: 2017-12-19 | Stop reason: SDUPTHER

## 2017-11-15 NOTE — TELEPHONE ENCOUNTER
----- Message from Frieda Bajwa sent at 11/15/2017  8:02 AM EST -----  Contact: Patient  Patient called in requesting refill and would like to  today if possible. Patient lives out of town and will be in [place].     HYDROcodone-acetaminophen (NORCO)  MG per tablet    Please call when ready.    Pt# [phone]

## 2017-11-15 NOTE — TELEPHONE ENCOUNTER
Please review refill request:    Last OV: 8/31/17 Next OV: 12/28/17    Last Prescribed: 10/25/17    ALEXEI: Ordered    Thank you,    Von

## 2017-11-27 DIAGNOSIS — F41.9 ANXIETY: ICD-10-CM

## 2017-11-28 RX ORDER — LORAZEPAM 1 MG/1
TABLET ORAL
Qty: 90 TABLET | Refills: 0 | OUTPATIENT
Start: 2017-11-28 | End: 2017-12-29 | Stop reason: SDUPTHER

## 2017-11-28 NOTE — TELEPHONE ENCOUNTER
Please review refill request:    Last OV: 8/31/17 next OV 12/28/17    Last Prescribed: 8/31/17 # 90 w/ 2 refills    ALEXEI: Good until 2/15/18    Thank you,    Von

## 2017-12-12 DIAGNOSIS — F41.9 ANXIETY: ICD-10-CM

## 2017-12-12 RX ORDER — ESCITALOPRAM OXALATE 20 MG/1
TABLET ORAL
Qty: 30 TABLET | Refills: 5 | Status: SHIPPED | OUTPATIENT
Start: 2017-12-12 | End: 2018-03-23 | Stop reason: ALTCHOICE

## 2017-12-19 DIAGNOSIS — M54.50 CHRONIC LOW BACK PAIN WITHOUT SCIATICA, UNSPECIFIED BACK PAIN LATERALITY: ICD-10-CM

## 2017-12-19 DIAGNOSIS — G89.29 CHRONIC LOW BACK PAIN WITHOUT SCIATICA, UNSPECIFIED BACK PAIN LATERALITY: ICD-10-CM

## 2017-12-19 RX ORDER — HYDROCODONE BITARTRATE AND ACETAMINOPHEN 10; 325 MG/1; MG/1
1 TABLET ORAL EVERY 6 HOURS PRN
Qty: 90 TABLET | Refills: 0 | Status: SHIPPED | OUTPATIENT
Start: 2017-12-19 | End: 2018-01-22 | Stop reason: SDUPTHER

## 2017-12-19 NOTE — TELEPHONE ENCOUNTER
Please review refill request:    Last OV: 8/31/17    Last Prescribed: 11/15/17    ALEXEI: Good until 2/15/18    Thank you,    Von

## 2017-12-19 NOTE — TELEPHONE ENCOUNTER
----- Message from Frieda Lorenz MA sent at 12/19/2017  9:56 AM EST -----  Pt calling for refill    Hydrocodone 10/325mg    lov 8/31  Next appt 12/28      Pt [phone]

## 2017-12-28 ENCOUNTER — OFFICE VISIT (OUTPATIENT)
Dept: INTERNAL MEDICINE | Facility: CLINIC | Age: 57
End: 2017-12-28

## 2017-12-28 VITALS
RESPIRATION RATE: 16 BRPM | SYSTOLIC BLOOD PRESSURE: 140 MMHG | DIASTOLIC BLOOD PRESSURE: 90 MMHG | OXYGEN SATURATION: 96 % | HEART RATE: 66 BPM | TEMPERATURE: 98.8 F | HEIGHT: 66 IN | WEIGHT: 194.6 LBS | BODY MASS INDEX: 31.27 KG/M2

## 2017-12-28 DIAGNOSIS — E78.49 OTHER HYPERLIPIDEMIA: ICD-10-CM

## 2017-12-28 DIAGNOSIS — Z00.00 MEDICARE ANNUAL WELLNESS VISIT, SUBSEQUENT: ICD-10-CM

## 2017-12-28 DIAGNOSIS — Z72.0 TOBACCO ABUSE: ICD-10-CM

## 2017-12-28 DIAGNOSIS — Z87.19 HISTORY OF IBS: ICD-10-CM

## 2017-12-28 DIAGNOSIS — Z23 NEED FOR VACCINATION: ICD-10-CM

## 2017-12-28 DIAGNOSIS — F39 MOOD DISORDER (HCC): ICD-10-CM

## 2017-12-28 DIAGNOSIS — G89.4 CHRONIC PAIN DISORDER: ICD-10-CM

## 2017-12-28 DIAGNOSIS — E53.8 VITAMIN B 12 DEFICIENCY: ICD-10-CM

## 2017-12-28 DIAGNOSIS — G89.29 CHRONIC LOW BACK PAIN WITHOUT SCIATICA, UNSPECIFIED BACK PAIN LATERALITY: ICD-10-CM

## 2017-12-28 DIAGNOSIS — Z12.11 COLON CANCER SCREENING: ICD-10-CM

## 2017-12-28 DIAGNOSIS — E03.9 ACQUIRED HYPOTHYROIDISM: ICD-10-CM

## 2017-12-28 DIAGNOSIS — M54.50 CHRONIC LOW BACK PAIN WITHOUT SCIATICA, UNSPECIFIED BACK PAIN LATERALITY: ICD-10-CM

## 2017-12-28 DIAGNOSIS — R53.83 MALAISE AND FATIGUE: ICD-10-CM

## 2017-12-28 DIAGNOSIS — M79.7 FIBROMYALGIA: ICD-10-CM

## 2017-12-28 DIAGNOSIS — M54.2 NECK PAIN: ICD-10-CM

## 2017-12-28 DIAGNOSIS — R53.81 MALAISE AND FATIGUE: ICD-10-CM

## 2017-12-28 DIAGNOSIS — I10 ESSENTIAL HYPERTENSION: Primary | ICD-10-CM

## 2017-12-28 DIAGNOSIS — Z11.59 SCREENING FOR VIRAL DISEASE: ICD-10-CM

## 2017-12-28 PROCEDURE — G0439 PPPS, SUBSEQ VISIT: HCPCS | Performed by: INTERNAL MEDICINE

## 2017-12-28 PROCEDURE — G0008 ADMIN INFLUENZA VIRUS VAC: HCPCS | Performed by: INTERNAL MEDICINE

## 2017-12-28 PROCEDURE — 90686 IIV4 VACC NO PRSV 0.5 ML IM: CPT | Performed by: INTERNAL MEDICINE

## 2017-12-28 PROCEDURE — 99214 OFFICE O/P EST MOD 30 MIN: CPT | Performed by: INTERNAL MEDICINE

## 2017-12-28 PROCEDURE — 96160 PT-FOCUSED HLTH RISK ASSMT: CPT | Performed by: INTERNAL MEDICINE

## 2017-12-28 NOTE — PROGRESS NOTES
QUICK REFERENCE INFORMATION:  The ABCs of the Annual Wellness Visit    Subsequent Medicare Wellness Visit    HEALTH RISK ASSESSMENT    1960    Recent Hospitalizations:  No hospitalization(s) within the last year..        Current Medical Providers:  Patient Care Team:  Yudelka Manning MD as PCP - General (Internal Medicine)  Julien Cardona DO as Consulting Physician (Pain Medicine)  Joel Castillo MD as Consulting Physician (Gastroenterology)  Remington Russell MD as Surgeon (General Surgery)        Smoking Status:  History   Smoking Status   • Current Every Day Smoker   • Types: Cigarettes   Smokeless Tobacco   • Never Used       Alcohol Consumption:  History   Alcohol Use No       Depression Screen:   PHQ-2/PHQ-9 Depression Screening 8/31/2017   Little interest or pleasure in doing things 1   Feeling down, depressed, or hopeless 1   Trouble falling or staying asleep, or sleeping too much 0   Feeling tired or having little energy 3   Poor appetite or overeating 3   Feeling bad about yourself - or that you are a failure or have let yourself or your family down 0   Trouble concentrating on things, such as reading the newspaper or watching television 3   Moving or speaking so slowly that other people could have noticed. Or the opposite - being so fidgety or restless that you have been moving around a lot more than usual 0   Thoughts that you would be better off dead, or of hurting yourself in some way 0   Total Score 11   If you checked off any problems, how difficult have these problems made it for you to do your work, take care of things at home, or get along with other people? Somewhat difficult       Health Habits and Functional and Cognitive Screening:  No flowsheet data found.        Does the patient have evidence of cognitive impairment? No    Aspirin use counseling: Does not need ASA (and currently is not on it)      Recent Lab Results:  CMP:  Lab Results   Component Value Date    BUN 18 10/28/2016     CREATININE 1.06 (H) 10/28/2016    EGFRIFNONA 53 (L) 10/28/2016    BCR 17.0 10/28/2016     10/28/2016    K 4.6 10/28/2016    CO2 27.0 10/28/2016    CALCIUM 9.7 10/28/2016    ALBUMIN 3.85 10/28/2016    LABIL2 1.1 10/28/2016    BILITOT 0.4 10/28/2016    ALKPHOS 106 10/28/2016    AST 40 (H) 10/28/2016    ALT 36 10/28/2016     Lipid Panel:  Lab Results   Component Value Date    CHOL 208 (H) 10/28/2016    TRIG 131 10/28/2016    HDL 77 10/28/2016    VLDL 26.2 10/28/2016    LDLCALC 105 (H) 10/28/2016    LDLHDL 1.36 10/28/2016     HbA1c:       Visual Acuity:  No exam data present    Age-appropriate Screening Schedule:  Refer to the list below for future screening recommendations based on patient's age, sex and/or medical conditions. Orders for these recommended tests are listed in the plan section. The patient has been provided with a written plan.    Health Maintenance   Topic Date Due   • PNEUMOCOCCAL VACCINE (19-64 MEDIUM RISK) (1 of 1 - PPSV23) 09/25/1978   • PAP SMEAR  04/08/2016   • COLONOSCOPY  04/08/2016   • INFLUENZA VACCINE  08/01/2017   • LIPID PANEL  10/28/2017   • TDAP/TD VACCINES (2 - Td) 08/04/2018   • MAMMOGRAM  10/28/2018        Subjective   History of Present Illness    Derek Keller is a 57 y.o. female who presents for an Subsequent Wellness Visit.    The following portions of the patient's history were reviewed and updated as appropriate: allergies, current medications, past family history, past medical history, past social history, past surgical history and problem list.    Outpatient Medications Prior to Visit   Medication Sig Dispense Refill   • ARIPiprazole (ABILIFY) 15 MG tablet TAKE ONE TABLET BY MOUTH EVERY DAY 30 tablet 4   • atorvastatin (LIPITOR) 20 MG tablet TAKE ONE TABLET BY MOUTH EVERY DAY 30 tablet 6   • baclofen (LIORESAL) 20 MG tablet TAKE ONE TABLET BY MOUTH THREE TIMES DAILY 90 tablet 1   • buPROPion XL (WELLBUTRIN XL) 300 MG 24 hr tablet TAKE 1 TABLET BY MOUTH DAILY. 30  tablet 2   • dicyclomine (BENTYL) 20 MG tablet TAKE ONE TABLET BY MOUTH EVERY SIX HOURS 120 tablet 1   • escitalopram (LEXAPRO) 20 MG tablet TAKE ONE TABLET BY MOUTH EVERY DAY 30 tablet 5   • HYDROcodone-acetaminophen (NORCO)  MG per tablet Take 1 tablet by mouth Every 6 (Six) Hours As Needed for Moderate Pain . 90 tablet 0   • ibuprofen (ADVIL,MOTRIN) 600 MG tablet TAKE ONE TABLET BY MOUTH EVERY EIGHT HOURS AS NEEDED 90 tablet 3   • levothyroxine (SYNTHROID, LEVOTHROID) 50 MCG tablet TAKE 1 TABLET BY MOUTH DAILY. 30 tablet 3   • LORazepam (ATIVAN) 1 MG tablet TAKE ONE TABLET BY MOUTH EVERY EIGHT HOURS AS NEEDED FOR ANXIETY 90 tablet 0   • losartan (COZAAR) 100 MG tablet TAKE 1/2 TO 1 TABLET BY MOUTH EVERY DAY *STOP LISINOPRIL/HCTZ* 30 tablet 5   • montelukast (SINGULAIR) 10 MG tablet TAKE ONE TABLET BY MOUTH EVERY DAY 30 tablet 11   • pregabalin (LYRICA) 100 MG capsule Take 1 capsule by mouth 3 (Three) Times a Day. 90 capsule 3   • traZODone (DESYREL) 150 MG tablet Take 1 tablet by mouth Every Night. 90 tablet 2   • valACYclovir (VALTREX) 1000 MG tablet TAKE ONE TABLET BY MOUTH DAILY 30 tablet 11   • Cyanocobalamin 1000 MCG/ML kit Inject 1 mL as directed Every 30 (Thirty) Days. 1 kit 12   • Fluticasone Furoate-Vilanterol (BREO ELLIPTA) 100-25 MCG/INH aerosol powder  Inhale 1 puff Daily. 2 each 0   • gabapentin (NEURONTIN) 800 MG tablet TAKE THREE TABLETS BY MOUTH EVERY  tablet 1   • lidocaine (LIDODERM) 5 % Place 1 patch on the skin Daily. Remove & Discard patch within 12 hours or as directed by MD Zamora patch 5     Facility-Administered Medications Prior to Visit   Medication Dose Route Frequency Provider Last Rate Last Dose   • cyanocobalamin injection 1,000 mcg  1,000 mcg Intramuscular Q28 Days Pari Manning MD   1,000 mcg at 06/02/17 1248       Patient Active Problem List   Diagnosis   • Hypertension   • Hyperlipidemia   • Allergic rhinitis   • Hypothyroidism   • Neck pain   • Lumbago   • Arthritis  "  • Chronic pain disorder   • History of IBS   • Anxiety   • Mood disorder   • Malaise and fatigue   • Tobacco abuse   • Fibromyalgia   • Vitamin B 12 deficiency   • Acute pain of left knee   • Primary osteoarthritis of left knee   • Osteoarthrosis, localized, primary, knee       Advance Care Planning:  has NO advance directive - information provided to the patient today    Identification of Risk Factors:  Risk factors include: weight , unhealthy diet, cardiovascular risk, inactivity, tobacco use, chronic pain and incontinence.    Review of Systems    Compared to one year ago, the patient feels her physical health is better.  Compared to one year ago, the patient feels her mental health is better.    Objective     Physical Exam    Vitals:    12/28/17 1448   BP: 120/90   BP Location: Left arm   Patient Position: Sitting   Cuff Size: Adult   Pulse: 66   Resp: 16   Temp: 98.8 °F (37.1 °C)   TempSrc: Temporal Artery    SpO2: 96%   Weight: 88.3 kg (194 lb 9.6 oz)   Height: 166.4 cm (65.5\")   PainSc:   6   PainLoc: Back       Body mass index is 31.89 kg/(m^2).  Discussed the patient's BMI with her. BMI is above normal parameters. Follow-up plan includes:  exercise counseling and nutrition counseling.    Assessment/Plan   Patient Self-Management and Personalized Health Advice  The patient has been provided with information about: diet, exercise, weight management, prevention of cardiac or vascular disease, the relationship between weight and GERD, tobacco cessation, fall prevention and designing advance directives and preventive services including:   · Advance directive, Colorectal cancer screening, colonoscopy referral placed, Counseling for cardiovascular disease risk reduction, Diabetes screening, see lab orders, Exercise counseling provided, Fall Risk assessment done, Fall Risk plan of care done, Influenza vaccine, Nutrition counseling provided, Pneumococcal vaccine , Screening for AAA, referral for ultrasound placed, " Smoking cessation counseling completed, Urinary Incontinence assessment done, Zostavax vaccine (Herpes Zoster).    Visit Diagnoses:  No diagnosis found.    No orders of the defined types were placed in this encounter.      Outpatient Encounter Prescriptions as of 12/28/2017   Medication Sig Dispense Refill   • ARIPiprazole (ABILIFY) 15 MG tablet TAKE ONE TABLET BY MOUTH EVERY DAY 30 tablet 4   • atorvastatin (LIPITOR) 20 MG tablet TAKE ONE TABLET BY MOUTH EVERY DAY 30 tablet 6   • baclofen (LIORESAL) 20 MG tablet TAKE ONE TABLET BY MOUTH THREE TIMES DAILY 90 tablet 1   • buPROPion XL (WELLBUTRIN XL) 300 MG 24 hr tablet TAKE 1 TABLET BY MOUTH DAILY. 30 tablet 2   • dicyclomine (BENTYL) 20 MG tablet TAKE ONE TABLET BY MOUTH EVERY SIX HOURS 120 tablet 1   • escitalopram (LEXAPRO) 20 MG tablet TAKE ONE TABLET BY MOUTH EVERY DAY 30 tablet 5   • HYDROcodone-acetaminophen (NORCO)  MG per tablet Take 1 tablet by mouth Every 6 (Six) Hours As Needed for Moderate Pain . 90 tablet 0   • ibuprofen (ADVIL,MOTRIN) 600 MG tablet TAKE ONE TABLET BY MOUTH EVERY EIGHT HOURS AS NEEDED 90 tablet 3   • levothyroxine (SYNTHROID, LEVOTHROID) 50 MCG tablet TAKE 1 TABLET BY MOUTH DAILY. 30 tablet 3   • LORazepam (ATIVAN) 1 MG tablet TAKE ONE TABLET BY MOUTH EVERY EIGHT HOURS AS NEEDED FOR ANXIETY 90 tablet 0   • losartan (COZAAR) 100 MG tablet TAKE 1/2 TO 1 TABLET BY MOUTH EVERY DAY *STOP LISINOPRIL/HCTZ* 30 tablet 5   • montelukast (SINGULAIR) 10 MG tablet TAKE ONE TABLET BY MOUTH EVERY DAY 30 tablet 11   • pregabalin (LYRICA) 100 MG capsule Take 1 capsule by mouth 3 (Three) Times a Day. 90 capsule 3   • traZODone (DESYREL) 150 MG tablet Take 1 tablet by mouth Every Night. 90 tablet 2   • valACYclovir (VALTREX) 1000 MG tablet TAKE ONE TABLET BY MOUTH DAILY 30 tablet 11   • [DISCONTINUED] Cyanocobalamin 1000 MCG/ML kit Inject 1 mL as directed Every 30 (Thirty) Days. 1 kit 12   • [DISCONTINUED] Fluticasone Furoate-Vilanterol (BREO  ELLIPTA) 100-25 MCG/INH aerosol powder  Inhale 1 puff Daily. 2 each 0   • [DISCONTINUED] gabapentin (NEURONTIN) 800 MG tablet TAKE THREE TABLETS BY MOUTH EVERY  tablet 1   • [DISCONTINUED] lidocaine (LIDODERM) 5 % Place 1 patch on the skin Daily. Remove & Discard patch within 12 hours or as directed by MD 90 patch 5     Facility-Administered Encounter Medications as of 12/28/2017   Medication Dose Route Frequency Provider Last Rate Last Dose   • cyanocobalamin injection 1,000 mcg  1,000 mcg Intramuscular Q28 Days Pari Manning MD   1,000 mcg at 06/02/17 1248       Reviewed use of high risk medication in the elderly: yes  Reviewed for potential of harmful drug interactions in the elderly: yes    Follow Up:  No Follow-up on file.     An After Visit Summary and PPPS with all of these plans were given to the patient.

## 2017-12-28 NOTE — PATIENT INSTRUCTIONS
Medicare Wellness  Personal Prevention Plan of Service     Date of Office Visit:  2017  Encounter Provider:  Yudelka Manning MD  Place of Service:  [facility]  Patient Name: Derek Keller  :  1960    As part of the Medicare Wellness portion of your visit today, we are providing you with this personalized preventive plan of services (PPPS). This plan is based upon recommendations of the [place] Preventive Services Task Force (USPSTF) and the Advisory Committee on Immunization Practices (ACIP).    This lists the preventive care services that should be considered, and provides dates of when you are due. Items listed as completed are up-to-date and do not require any further intervention.    Health Maintenance   Topic Date Due   • PNEUMOCOCCAL VACCINE (19-64 MEDIUM RISK) (1 of 1 - PPSV23) 1978   • HEPATITIS C SCREENING  2016   • PAP SMEAR  2016   • COLONOSCOPY  2016   • INFLUENZA VACCINE  2017   • LIPID PANEL  10/28/2017   • TDAP/TD VACCINES (2 - Td) 2018   • MAMMOGRAM  10/28/2018   • MEDICARE ANNUAL WELLNESS  2018       No orders of the defined types were placed in this encounter.      No Follow-up on file.

## 2017-12-28 NOTE — PROGRESS NOTES
"Subjective   Derek Keller is a 57 y.o. female here for   Chief Complaint   Patient presents with   • Annual Exam     Subsequent Medicare Wellness   • Hypertension   • Hyperlipidemia   .    Vitals:    12/28/17 1448 12/28/17 1610   BP: 120/90 140/90   BP Location: Left arm    Patient Position: Sitting    Cuff Size: Adult    Pulse: 66    Resp: 16    Temp: 98.8 °F (37.1 °C)    TempSrc: Temporal Artery     SpO2: 96%    Weight: 88.3 kg (194 lb 9.6 oz)    Height: 166.4 cm (65.5\")        Body mass index is 31.89 kg/(m^2).    Hypertension   This is a chronic problem. The current episode started more than 1 year ago. The problem is unchanged. The problem is controlled. Associated symptoms include chest pain (off/on for yrs - not worse), malaise/fatigue, neck pain and shortness of breath. Pertinent negatives include no headaches, palpitations or peripheral edema. There is no history of chronic renal disease.   Hyperlipidemia   This is a chronic problem. The current episode started more than 1 year ago. The problem is controlled. Recent lipid tests were reviewed and are normal. Exacerbating diseases include hypothyroidism. She has no history of chronic renal disease, diabetes or liver disease. Associated symptoms include chest pain (off/on for yrs - not worse) and shortness of breath. Pertinent negatives include no myalgias.        The following portions of the patient's history were reviewed and updated as appropriate: allergies, current medications, past social history and problem list.    Review of Systems   Constitutional: Positive for fatigue and malaise/fatigue. Negative for appetite change, chills, fever and unexpected weight change.   HENT: Positive for congestion, postnasal drip, rhinorrhea, sneezing and voice change (no change). Negative for ear pain, mouth sores, sinus pain, sore throat, tinnitus and trouble swallowing.    Eyes: Negative for pain and visual disturbance.   Respiratory: Positive for cough, shortness " of breath and wheezing. Negative for choking.    Cardiovascular: Positive for chest pain (off/on for yrs - not worse). Negative for palpitations and leg swelling.   Gastrointestinal: Positive for abdominal pain and diarrhea (better with bentyl). Negative for blood in stool, constipation, nausea and vomiting.   Endocrine: Negative for cold intolerance, heat intolerance, polydipsia and polyuria.   Genitourinary: Positive for urgency. Negative for difficulty urinating, dysuria, enuresis, flank pain, frequency, hematuria and vaginal bleeding.   Musculoskeletal: Positive for arthralgias (hips bilat), back pain, neck pain and neck stiffness. Negative for gait problem, joint swelling and myalgias.   Skin: Negative for color change and rash.   Allergic/Immunologic: Positive for environmental allergies. Negative for food allergies and immunocompromised state.   Neurological: Negative for dizziness, tremors, seizures, syncope, speech difficulty, weakness, numbness and headaches.   Hematological: Negative for adenopathy. Does not bruise/bleed easily.   Psychiatric/Behavioral: Positive for dysphoric mood and sleep disturbance. Negative for agitation, confusion, decreased concentration and suicidal ideas. The patient is nervous/anxious.        Objective   Physical Exam   Constitutional: She appears well-developed and well-nourished.   HENT:   Right Ear: Hearing, tympanic membrane, external ear and ear canal normal.   Left Ear: Hearing, tympanic membrane, external ear and ear canal normal.   Nose: Right sinus exhibits no maxillary sinus tenderness and no frontal sinus tenderness. Left sinus exhibits no maxillary sinus tenderness and no frontal sinus tenderness.   Eyes: Conjunctivae, EOM and lids are normal. Pupils are equal, round, and reactive to light.   Neck: Trachea normal. Neck supple. No JVD present. Carotid bruit is not present. No tracheal deviation present. No thyroid mass and no thyromegaly present.   Cardiovascular:  Normal rate, regular rhythm, S1 normal and S2 normal.  Exam reveals no gallop and no friction rub.    No murmur heard.  Pulses:       Carotid pulses are 2+ on the right side, and 2+ on the left side.       Radial pulses are 2+ on the right side, and 2+ on the left side.        Dorsalis pedis pulses are 2+ on the right side, and 2+ on the left side.        Posterior tibial pulses are 2+ on the right side, and 2+ on the left side.   Pulmonary/Chest: Effort normal and breath sounds normal. Chest wall is not dull to percussion. Right breast exhibits no inverted nipple, no mass, no nipple discharge, no skin change and no tenderness. Left breast exhibits no inverted nipple, no mass, no nipple discharge, no skin change and no tenderness.   Abdominal: Soft. Normal aorta and bowel sounds are normal. She exhibits no abdominal bruit. There is no hepatosplenomegaly. There is no tenderness. There is no rebound and no guarding. No hernia.   Musculoskeletal: Normal range of motion. She exhibits no edema.   Lymphadenopathy:     She has no cervical adenopathy.     She has no axillary adenopathy.        Right: No supraclavicular adenopathy present.        Left: No supraclavicular adenopathy present.   Neurological: She is alert. She has normal strength. No cranial nerve deficit or sensory deficit. She displays a negative Romberg sign.   Reflex Scores:       Patellar reflexes are 2+ on the right side and 2+ on the left side.  Skin: Skin is warm and dry.   Nursing note and vitals reviewed.      Assessment/Plan   Diagnoses and all orders for this visit:    Essential hypertension  Comments:  need weekly chk & call if bp over 140/90  Orders:  -     CBC Auto Differential; Future  -     Comprehensive Metabolic Panel; Future  -     Lipid Panel; Future  -     TSH; Future  -     Urinalysis With / Microscopic If Indicated - Urine, Clean Catch; Future    Other hyperlipidemia  Comments:  need diet & exercise    Vitamin B 12  deficiency  Comments:  not taking vit b for now - need to restart    Acquired hypothyroidism  Comments:  need rechk thyroid lab  Orders:  -     T4, Free; Future    Chronic low back pain without sciatica, unspecified back pain laterality  Comments:  for yrs - need daily exercise - call if worse    Neck pain  Comments:  need gentle stretches, heat, massage,correct posture, etc    Chronic pain disorder  Comments:  need to wean off narcotic - this causes depression, does not eliminate pain & is addictive - she states she understands    Fibromyalgia  Comments:  stable - call if worse    History of IBS  Comments:  ok with bentyl    Malaise and fatigue  Comments:  need daily exercise - wean off narcotic    Mood disorder  Comments:  stable - she declines change in med    Tobacco abuse  Comments:  need to stop!!      Medicare annual wellness visit, subsequent    Need for vaccination  -     Flu Vaccine Quad PF >18YR    Colon cancer screening  -     Ambulatory Referral to Gastroenterology    Screening for viral disease  -     Hepatitis C antibody; Future     CPE today - no pap needed b/c hysterectomy yrs ago.  She declined rectal.     Wellness today.   Need screening for AAA & carotid disease.  Information given today.   Need daily strengthening & balance exercises (shown today).      Need c-scope.  Need pneu vaccine at pharmacy.     Mild-moderate stress incontinence - no better with meds.  She declines referral to urologist for now.   She declines medication to quit tobacco.

## 2017-12-29 ENCOUNTER — TELEPHONE (OUTPATIENT)
Dept: INTERNAL MEDICINE | Facility: CLINIC | Age: 57
End: 2017-12-29

## 2017-12-29 DIAGNOSIS — F41.9 ANXIETY: ICD-10-CM

## 2017-12-29 RX ORDER — LORAZEPAM 1 MG/1
1 TABLET ORAL EVERY 8 HOURS PRN
Qty: 90 TABLET | Refills: 1 | OUTPATIENT
Start: 2017-12-29 | End: 2018-03-23 | Stop reason: SDUPTHER

## 2017-12-29 NOTE — TELEPHONE ENCOUNTER
Rx was phoned into pharmacy w/ new sig instructions as indicated per Dr. Manning.    V/M left on Ms. Keller's phone letting her know that per Dr. Manning she will have to decrease this medication to 1/2-1 pill daily ONLY as needed - not to be used routinely.

## 2017-12-29 NOTE — TELEPHONE ENCOUNTER
I called and spoke to [facility] pharmacist at patients pharmacy. She stated that they may taken the wrong Sig instructions down when taking the messages off of their v/m when Rx was phoned in back on 11/29/17 as they have 1 tab po tid prn anxiety in their system.    Ms. Keller will need a new refill on her Lorazepam:    Last OV: 12/28/17    Last Prescribed: 11/28/17    ALEXEI: Good until 2/15/18    Thank you,    VOn

## 2017-12-29 NOTE — TELEPHONE ENCOUNTER
"----- Message from Frieda Lorenz MA sent at 12/29/2017  2:55 PM EST -----  Pt calling and says that pharmacy received a voice msg last month for Lorazepam    1 po QD #90    Chart and pt history says Lorazepam  1 po TID PRN #[id number]    Pt requests refill and clarifiying with pharmacy as not \"too early to fill\"    [facility] # [phone]  "

## 2018-01-04 ENCOUNTER — TELEPHONE (OUTPATIENT)
Dept: INTERNAL MEDICINE | Facility: CLINIC | Age: 58
End: 2018-01-04

## 2018-01-04 NOTE — TELEPHONE ENCOUNTER
Patient was informed of Dr. Manning's message on how to take her Ativan. She voiced she understood and thank you

## 2018-01-04 NOTE — TELEPHONE ENCOUNTER
----- Message from Frieda Lorenz MA sent at 1/4/2018  3:27 PM EST -----  Pt requests a return call to discuss the weaning of Ativan. Pt says to decrease to 1 daily is to drastic but would like for you to consider 2 daily for a short term before going to one daily      Pt#[phone]

## 2018-01-04 NOTE — TELEPHONE ENCOUNTER
pls let her know that she may cut them in half & use 1/2 qid for 1 wk, then 1/2 tid for 2 wks, then 1/2 bid ONLY if needed!

## 2018-01-13 DIAGNOSIS — F39 MOOD DISORDER (HCC): ICD-10-CM

## 2018-01-15 RX ORDER — BACLOFEN 20 MG/1
TABLET ORAL
Qty: 90 TABLET | Refills: 1 | Status: SHIPPED | OUTPATIENT
Start: 2018-01-15 | End: 2018-03-14 | Stop reason: SDUPTHER

## 2018-01-15 RX ORDER — BUPROPION HYDROCHLORIDE 300 MG/1
TABLET ORAL
Qty: 30 TABLET | Refills: 4 | Status: SHIPPED | OUTPATIENT
Start: 2018-01-15 | End: 2018-06-13 | Stop reason: SDUPTHER

## 2018-01-22 DIAGNOSIS — M54.50 CHRONIC LOW BACK PAIN WITHOUT SCIATICA, UNSPECIFIED BACK PAIN LATERALITY: ICD-10-CM

## 2018-01-22 DIAGNOSIS — G89.29 CHRONIC LOW BACK PAIN WITHOUT SCIATICA, UNSPECIFIED BACK PAIN LATERALITY: ICD-10-CM

## 2018-01-22 RX ORDER — HYDROCODONE BITARTRATE AND ACETAMINOPHEN 10; 325 MG/1; MG/1
1 TABLET ORAL EVERY 6 HOURS PRN
Qty: 90 TABLET | Refills: 0 | Status: SHIPPED | OUTPATIENT
Start: 2018-01-22 | End: 2018-02-21 | Stop reason: SDUPTHER

## 2018-01-22 NOTE — TELEPHONE ENCOUNTER
Please review refill request:    Last OV: 12/28/17    Last Prescribed: 12/19/17    ALEXEI: Good until 2/15/18    Thank you,    Von

## 2018-01-22 NOTE — TELEPHONE ENCOUNTER
----- Message from Melanie Rivera sent at 1/22/2018  8:17 AM EST -----  Contact: pt  Pt calling would like refill on     RX: HYDROcodone-acetaminophen (NORCO)  MG per tablet    Please call when ready for pickup      Pt#[phone]    Advised 2-3 days before refill is ready and WE WILL CALL    Last    OV-12/28/17  Next    OV-5/1/18  Last Refill-12/19/17

## 2018-02-15 DIAGNOSIS — E03.9 HYPOTHYROIDISM: ICD-10-CM

## 2018-02-15 DIAGNOSIS — E78.5 HYPERLIPIDEMIA: ICD-10-CM

## 2018-02-15 DIAGNOSIS — F32.A DEPRESSION, UNSPECIFIED DEPRESSION TYPE: ICD-10-CM

## 2018-02-15 DIAGNOSIS — I10 ESSENTIAL HYPERTENSION: ICD-10-CM

## 2018-02-15 RX ORDER — LOSARTAN POTASSIUM 100 MG/1
TABLET ORAL
Qty: 30 TABLET | Refills: 5 | Status: SHIPPED | OUTPATIENT
Start: 2018-02-15 | End: 2018-08-14 | Stop reason: SDUPTHER

## 2018-02-15 RX ORDER — LEVOTHYROXINE SODIUM 0.05 MG/1
TABLET ORAL
Qty: 30 TABLET | Refills: 3 | Status: SHIPPED | OUTPATIENT
Start: 2018-02-15 | End: 2018-06-13 | Stop reason: SDUPTHER

## 2018-02-15 RX ORDER — TRAZODONE HYDROCHLORIDE 150 MG/1
TABLET ORAL
Qty: 90 TABLET | Refills: 2 | Status: SHIPPED | OUTPATIENT
Start: 2018-02-15 | End: 2018-09-20 | Stop reason: SDUPTHER

## 2018-02-15 RX ORDER — ATORVASTATIN CALCIUM 20 MG/1
TABLET, FILM COATED ORAL
Qty: 30 TABLET | Refills: 6 | Status: SHIPPED | OUTPATIENT
Start: 2018-02-15 | End: 2018-09-20 | Stop reason: SDUPTHER

## 2018-02-20 DIAGNOSIS — M54.50 CHRONIC LOW BACK PAIN WITHOUT SCIATICA, UNSPECIFIED BACK PAIN LATERALITY: ICD-10-CM

## 2018-02-20 DIAGNOSIS — G89.29 CHRONIC LOW BACK PAIN WITHOUT SCIATICA, UNSPECIFIED BACK PAIN LATERALITY: ICD-10-CM

## 2018-02-20 RX ORDER — HYDROCODONE BITARTRATE AND ACETAMINOPHEN 10; 325 MG/1; MG/1
TABLET ORAL
Qty: 90 TABLET | Refills: 0 | Status: CANCELLED | OUTPATIENT
Start: 2018-02-20

## 2018-02-21 DIAGNOSIS — M54.50 CHRONIC LOW BACK PAIN WITHOUT SCIATICA, UNSPECIFIED BACK PAIN LATERALITY: ICD-10-CM

## 2018-02-21 DIAGNOSIS — G89.29 CHRONIC LOW BACK PAIN WITHOUT SCIATICA, UNSPECIFIED BACK PAIN LATERALITY: ICD-10-CM

## 2018-02-21 RX ORDER — HYDROCODONE BITARTRATE AND ACETAMINOPHEN 10; 325 MG/1; MG/1
1 TABLET ORAL EVERY 6 HOURS PRN
Qty: 90 TABLET | Refills: 0 | Status: SHIPPED | OUTPATIENT
Start: 2018-02-21 | End: 2018-03-29 | Stop reason: SDUPTHER

## 2018-02-21 NOTE — TELEPHONE ENCOUNTER
Please review refill request:    Last OV: 12/28/17    Last Prescribed: 1/22/18    ALEXEI: Ordered    Thank you,    Von

## 2018-02-21 NOTE — TELEPHONE ENCOUNTER
----- Message from Gretta Harrell sent at 2/21/2018 10:26 AM EST -----  Contact: pt - Dr Manning's pt - RE: script  Pt calling and would like a refill on Rx      HYDROcodone-acetaminophen (NORCO)  MG per tablet 90 tablet     Sig - Route: Take 1 tablet by mouth Every 6 (Six) Hours As Needed for Moderate Pain . - Oral        Pt # [phone]

## 2018-03-14 DIAGNOSIS — F41.9 ANXIETY: ICD-10-CM

## 2018-03-14 RX ORDER — BACLOFEN 20 MG/1
TABLET ORAL
Qty: 90 TABLET | Refills: 1 | Status: SHIPPED | OUTPATIENT
Start: 2018-03-14 | End: 2018-05-15 | Stop reason: SDUPTHER

## 2018-03-14 RX ORDER — ARIPIPRAZOLE 15 MG/1
TABLET ORAL
Qty: 30 TABLET | Refills: 4 | Status: SHIPPED | OUTPATIENT
Start: 2018-03-14 | End: 2018-08-27

## 2018-03-23 ENCOUNTER — APPOINTMENT (OUTPATIENT)
Dept: WOMENS IMAGING | Facility: HOSPITAL | Age: 58
End: 2018-03-23

## 2018-03-23 ENCOUNTER — OFFICE VISIT (OUTPATIENT)
Dept: INTERNAL MEDICINE | Facility: CLINIC | Age: 58
End: 2018-03-23

## 2018-03-23 VITALS
BODY MASS INDEX: 30.53 KG/M2 | DIASTOLIC BLOOD PRESSURE: 88 MMHG | HEART RATE: 65 BPM | TEMPERATURE: 98.7 F | WEIGHT: 190 LBS | OXYGEN SATURATION: 98 % | SYSTOLIC BLOOD PRESSURE: 120 MMHG | HEIGHT: 66 IN

## 2018-03-23 DIAGNOSIS — F39 MOOD DISORDER (HCC): ICD-10-CM

## 2018-03-23 DIAGNOSIS — R53.81 MALAISE AND FATIGUE: ICD-10-CM

## 2018-03-23 DIAGNOSIS — E03.9 ACQUIRED HYPOTHYROIDISM: ICD-10-CM

## 2018-03-23 DIAGNOSIS — Z72.0 TOBACCO ABUSE: ICD-10-CM

## 2018-03-23 DIAGNOSIS — E78.49 OTHER HYPERLIPIDEMIA: ICD-10-CM

## 2018-03-23 DIAGNOSIS — E53.8 VITAMIN B 12 DEFICIENCY: ICD-10-CM

## 2018-03-23 DIAGNOSIS — R53.83 MALAISE AND FATIGUE: ICD-10-CM

## 2018-03-23 DIAGNOSIS — F41.9 ANXIETY: ICD-10-CM

## 2018-03-23 DIAGNOSIS — R07.2 PRECORDIAL PAIN: Primary | ICD-10-CM

## 2018-03-23 DIAGNOSIS — I10 ESSENTIAL HYPERTENSION: ICD-10-CM

## 2018-03-23 DIAGNOSIS — R10.84 GENERALIZED ABDOMINAL PAIN: ICD-10-CM

## 2018-03-23 PROCEDURE — 99214 OFFICE O/P EST MOD 30 MIN: CPT | Performed by: INTERNAL MEDICINE

## 2018-03-23 PROCEDURE — 96372 THER/PROPH/DIAG INJ SC/IM: CPT | Performed by: INTERNAL MEDICINE

## 2018-03-23 PROCEDURE — 93000 ELECTROCARDIOGRAM COMPLETE: CPT | Performed by: INTERNAL MEDICINE

## 2018-03-23 PROCEDURE — 71046 X-RAY EXAM CHEST 2 VIEWS: CPT | Performed by: RADIOLOGY

## 2018-03-23 PROCEDURE — 71046 X-RAY EXAM CHEST 2 VIEWS: CPT | Performed by: INTERNAL MEDICINE

## 2018-03-23 RX ORDER — LORAZEPAM 1 MG/1
1 TABLET ORAL EVERY 8 HOURS PRN
Qty: 90 TABLET | Refills: 0 | OUTPATIENT
Start: 2018-03-23 | End: 2018-03-29 | Stop reason: SDUPTHER

## 2018-03-23 RX ORDER — ESCITALOPRAM OXALATE 20 MG/1
TABLET ORAL
Qty: 135 TABLET | Refills: 1 | Status: SHIPPED | OUTPATIENT
Start: 2018-03-23 | End: 2018-04-24 | Stop reason: SDUPTHER

## 2018-03-23 RX ORDER — CYANOCOBALAMIN 1000 UG/ML
1000 INJECTION, SOLUTION INTRAMUSCULAR; SUBCUTANEOUS ONCE
Status: COMPLETED | OUTPATIENT
Start: 2018-03-23 | End: 2018-03-23

## 2018-03-23 RX ADMIN — CYANOCOBALAMIN 1000 MCG: 1000 INJECTION, SOLUTION INTRAMUSCULAR; SUBCUTANEOUS at 16:25

## 2018-03-23 NOTE — PROGRESS NOTES
Patient was administered 1mL cyanocobalamin in her left deltoid. Sh tolerated the injection well.     NDC: 0143-621-01  LOT: [id number]  Exp: 02/2019

## 2018-03-23 NOTE — PROGRESS NOTES
"Subjective   Derek Keller is a 57 y.o. female here for   Chief Complaint   Patient presents with   • Chest Pain   • Abdominal Pain   .    Vitals:    03/23/18 1501   BP: 120/88   BP Location: Left arm   Patient Position: Sitting   Cuff Size: Adult   Pulse: 65   Temp: 98.7 °F (37.1 °C)   SpO2: 98%   Weight: 86.2 kg (190 lb)   Height: 166.4 cm (65.5\")       Body mass index is 31.14 kg/m².    Chest Pain    This is a new problem. The current episode started more than 1 month ago. The onset quality is sudden. The problem occurs intermittently. The problem has been waxing and waning. The pain is present in the substernal region. The pain is moderate. The quality of the pain is described as dull and heavy. The pain does not radiate. Associated symptoms include abdominal pain (epigastric - shoots to right), a cough, palpitations and shortness of breath. Pertinent negatives include no fever, nausea or vomiting.   Abdominal Pain   This is a recurrent problem. The current episode started more than 1 year ago. The problem occurs intermittently. The problem has been waxing and waning. The pain is located in the generalized abdominal region. The pain is moderate. The quality of the pain is cramping. The abdominal pain does not radiate. Associated symptoms include diarrhea. Pertinent negatives include no constipation, fever, nausea or vomiting.        The following portions of the patient's history were reviewed and updated as appropriate: allergies, current medications, past social history and problem list.    Review of Systems   Constitutional: Positive for fatigue. Negative for chills and fever.   Respiratory: Positive for cough, shortness of breath and wheezing.    Cardiovascular: Positive for chest pain and palpitations. Negative for leg swelling.   Gastrointestinal: Positive for abdominal pain (epigastric - shoots to right) and diarrhea. Negative for blood in stool, constipation, nausea and vomiting. "   Psychiatric/Behavioral: Positive for dysphoric mood and sleep disturbance. The patient is nervous/anxious.        Objective   Physical Exam   Constitutional: She appears well-developed and well-nourished. No distress.   Cardiovascular: Normal rate, regular rhythm and normal heart sounds.    Pulmonary/Chest: No respiratory distress. She has no wheezes. She has no rales. She exhibits tenderness.   Abdominal: Soft. Bowel sounds are normal. She exhibits no distension and no mass. There is no tenderness. There is no rebound and no guarding. No hernia.   Musculoskeletal: She exhibits no edema.   Psychiatric: She has a normal mood and affect. Her behavior is normal.   Nursing note and vitals reviewed.      ECG 12 Lead  Date/Time: 3/23/2018 6:36 PM  Performed by: DAPHNE MUNOZ  Authorized by: DAPHNE MUNOZ   Interpreted by ED physician  Comparison: compared with previous ECG   Comparison to previous ECG: No change since 8/2014.  Rhythm: sinus rhythm  Rate: normal  Conduction: conduction normal  T depression: V3, V4, V5 and V6  T flattening: all  QRS axis: normal  Clinical impression: non-specific ECG            Assessment/Plan   Diagnoses and all orders for this visit:    Chest pain  Comments:  at rest (off/on x6mos)- refer to cardiologist (normal cardiac cath 2014 per chart, but she states she never had this) - need to go to ER if any increase in c/p   Orders:  -     Ambulatory Referral to Cardiology  -     XR Chest 2 View    Generalized abdominal pain  Comments:  off/on x yrs (IBS?) - need re-eval if sx persist    Mood disorder  Comments:  worse b/c boyfriend out of town - trial of increased lexapro (from 20mg to 30mg/d) - ok to take ativan -no more than 1/d    Tobacco abuse  Comments:  discussed incr risk of heart attack, stroke,cancer - need to stop NOW    Essential hypertension  Comments:  stable    Other hyperlipidemia  Comments:  need healthy diet    Acquired hypothyroidism  Comments:  need rechk - she is  very overdue for labs (ordered mos ago) - need to return for fasting labs NEXT WK!    Malaise and fatigue  Comments:  need labs!    Vitamin B 12 deficiency  Comments:  none for 3 mos - I stressed the importance of monthly B12 shots!!!  Orders:  -     cyanocobalamin injection 1,000 mcg; Inject 1 mL into the shoulder, thigh, or buttocks 1 (One) Time.    Anxiety  -     LORazepam (ATIVAN) 1 MG tablet; Take 1 tablet by mouth Every 8 (Eight) Hours As Needed for Anxiety. Need to decrease to 1/2 -1 pill ONLY PRN - not to be used routinely!    Other orders  -     escitalopram (LEXAPRO) 20 MG tablet; 1.5 pill daily  -     ECG 12 Lead     I had advised her to get vascular screening last year, but she has not done this yet.   She has tender ribs -costochondritis?- need warm compresses.

## 2018-03-28 ENCOUNTER — LAB (OUTPATIENT)
Dept: INTERNAL MEDICINE | Facility: CLINIC | Age: 58
End: 2018-03-28

## 2018-03-28 ENCOUNTER — OFFICE VISIT (OUTPATIENT)
Dept: CARDIOLOGY | Facility: CLINIC | Age: 58
End: 2018-03-28

## 2018-03-28 VITALS
WEIGHT: 193 LBS | BODY MASS INDEX: 31.02 KG/M2 | HEART RATE: 60 BPM | SYSTOLIC BLOOD PRESSURE: 140 MMHG | HEIGHT: 66 IN | DIASTOLIC BLOOD PRESSURE: 88 MMHG

## 2018-03-28 DIAGNOSIS — I10 ESSENTIAL HYPERTENSION: ICD-10-CM

## 2018-03-28 DIAGNOSIS — E03.9 ACQUIRED HYPOTHYROIDISM: ICD-10-CM

## 2018-03-28 DIAGNOSIS — I25.119 CORONARY ARTERY DISEASE INVOLVING NATIVE CORONARY ARTERY OF NATIVE HEART WITH ANGINA PECTORIS (HCC): Primary | ICD-10-CM

## 2018-03-28 DIAGNOSIS — Z11.59 SCREENING FOR VIRAL DISEASE: ICD-10-CM

## 2018-03-28 LAB
ALBUMIN SERPL-MCNC: 4.2 G/DL (ref 3.5–5.2)
ALBUMIN/GLOB SERPL: 1.4 G/DL
ALP SERPL-CCNC: 73 U/L (ref 39–117)
ALT SERPL W P-5'-P-CCNC: 10 U/L (ref 1–33)
ANION GAP SERPL CALCULATED.3IONS-SCNC: 12.5 MMOL/L
AST SERPL-CCNC: 11 U/L (ref 1–32)
BASOPHILS # BLD AUTO: 0.02 10*3/MM3 (ref 0–0.2)
BASOPHILS NFR BLD AUTO: 0.2 % (ref 0–2)
BILIRUB SERPL-MCNC: 0.4 MG/DL (ref 0.1–1.2)
BILIRUB UR QL STRIP: NEGATIVE
BUN BLD-MCNC: 11 MG/DL (ref 6–20)
BUN/CREAT SERPL: 12.8 (ref 7–25)
CALCIUM SPEC-SCNC: 9.2 MG/DL (ref 8.6–10.5)
CHLORIDE SERPL-SCNC: 105 MMOL/L (ref 98–107)
CHOLEST SERPL-MCNC: 147 MG/DL (ref 0–200)
CLARITY UR: ABNORMAL
CO2 SERPL-SCNC: 28.5 MMOL/L (ref 22–29)
COLOR UR: YELLOW
CREAT BLD-MCNC: 0.86 MG/DL (ref 0.57–1)
DEPRECATED RDW RBC AUTO: 47.6 FL (ref 37–54)
EOSINOPHIL # BLD AUTO: 0.08 10*3/MM3 (ref 0–0.7)
EOSINOPHIL NFR BLD AUTO: 0.9 % (ref 0–5)
ERYTHROCYTE [DISTWIDTH] IN BLOOD BY AUTOMATED COUNT: 13.8 % (ref 11.5–15)
GFR SERPL CREATININE-BSD FRML MDRD: 68 ML/MIN/1.73
GLOBULIN UR ELPH-MCNC: 2.9 GM/DL
GLUCOSE BLD-MCNC: 89 MG/DL (ref 65–99)
GLUCOSE UR STRIP-MCNC: NEGATIVE MG/DL
HCT VFR BLD AUTO: 43.3 % (ref 34.1–44.9)
HCV AB SER DONR QL: NORMAL
HDLC SERPL-MCNC: 46 MG/DL (ref 40–60)
HGB BLD-MCNC: 13.9 G/DL (ref 11.2–15.7)
HGB UR QL STRIP.AUTO: NEGATIVE
KETONES UR QL STRIP: ABNORMAL
LDLC SERPL CALC-MCNC: 75 MG/DL (ref 0–100)
LDLC/HDLC SERPL: 1.63 {RATIO}
LEUKOCYTE ESTERASE UR QL STRIP.AUTO: NEGATIVE
LYMPHOCYTES # BLD AUTO: 2.83 10*3/MM3 (ref 0.8–7)
LYMPHOCYTES NFR BLD AUTO: 30.4 % (ref 10–60)
MCH RBC QN AUTO: 31 PG (ref 26–34)
MCHC RBC AUTO-ENTMCNC: 32.1 G/DL (ref 31–37)
MCV RBC AUTO: 96.7 FL (ref 80–100)
MONOCYTES # BLD AUTO: 0.55 10*3/MM3 (ref 0–1)
MONOCYTES NFR BLD AUTO: 5.9 % (ref 0–13)
NEUTROPHILS # BLD AUTO: 5.83 10*3/MM3 (ref 1–11)
NEUTROPHILS NFR BLD AUTO: 62.6 % (ref 30–85)
NITRITE UR QL STRIP: NEGATIVE
PH UR STRIP.AUTO: 6.5 [PH] (ref 5–8)
PLATELET # BLD AUTO: 209 10*3/MM3 (ref 150–450)
PMV BLD AUTO: 11.8 FL (ref 6–12)
POTASSIUM BLD-SCNC: 3.7 MMOL/L (ref 3.5–5.2)
PROT SERPL-MCNC: 7.1 G/DL (ref 6–8.5)
PROT UR QL STRIP: NEGATIVE
RBC # BLD AUTO: 4.48 10*6/MM3 (ref 3.93–5.22)
SODIUM BLD-SCNC: 146 MMOL/L (ref 136–145)
SP GR UR STRIP: 1.02 (ref 1–1.03)
T4 FREE SERPL-MCNC: 1.21 NG/DL (ref 0.93–1.7)
TRIGL SERPL-MCNC: 130 MG/DL (ref 0–150)
TSH SERPL DL<=0.05 MIU/L-ACNC: 1.09 MIU/ML (ref 0.27–4.2)
UROBILINOGEN UR QL STRIP: ABNORMAL
VLDLC SERPL-MCNC: 26 MG/DL (ref 5–40)
WBC NRBC COR # BLD: 9.31 10*3/MM3 (ref 5–10)

## 2018-03-28 PROCEDURE — 99204 OFFICE O/P NEW MOD 45 MIN: CPT | Performed by: INTERNAL MEDICINE

## 2018-03-28 PROCEDURE — 36415 COLL VENOUS BLD VENIPUNCTURE: CPT | Performed by: INTERNAL MEDICINE

## 2018-03-28 PROCEDURE — 80061 LIPID PANEL: CPT | Performed by: INTERNAL MEDICINE

## 2018-03-28 PROCEDURE — 84443 ASSAY THYROID STIM HORMONE: CPT | Performed by: INTERNAL MEDICINE

## 2018-03-28 PROCEDURE — 93000 ELECTROCARDIOGRAM COMPLETE: CPT | Performed by: INTERNAL MEDICINE

## 2018-03-28 PROCEDURE — 85025 COMPLETE CBC W/AUTO DIFF WBC: CPT | Performed by: INTERNAL MEDICINE

## 2018-03-28 PROCEDURE — 80053 COMPREHEN METABOLIC PANEL: CPT | Performed by: INTERNAL MEDICINE

## 2018-03-28 PROCEDURE — 86803 HEPATITIS C AB TEST: CPT | Performed by: INTERNAL MEDICINE

## 2018-03-28 PROCEDURE — 81003 URINALYSIS AUTO W/O SCOPE: CPT | Performed by: INTERNAL MEDICINE

## 2018-03-28 PROCEDURE — 84439 ASSAY OF FREE THYROXINE: CPT | Performed by: INTERNAL MEDICINE

## 2018-03-28 NOTE — PROGRESS NOTES
Subjective:     Encounter Date:03/28/2018      Patient ID: Derek Keller is a 57 y.o. female.    Chief Complaint:  Chest Pain    This is a recurrent problem. The current episode started more than 1 month ago. The onset quality is gradual. The problem occurs daily. The problem has been gradually worsening. The pain is present in the substernal region. The pain is at a severity of 8/10. The pain is severe. The quality of the pain is described as pressure. The pain does not radiate. Associated symptoms include malaise/fatigue.       This is a 57-year-old female who I have not seen in several years.  Patient presents today complaining of chest discomfort.  She said it started about 6 weeks ago and occurs intermittently.  She describes it as occurring randomly.  However she does not consistently exert herself for prolonged distances.  She said the pain was about a 7-8 out of 10 can last 20-30 minutes.  She is also noticing more fatigue and shortness of breath.  However the symptoms do not always correlate.  She had a cardiac catheterization about 4 years ago and had a 40% lesion of her LAD.  She continues to smoke a pack a day.  She was seen today for evaluation due to the chest discomfort.    Review of Systems   Constitution: Positive for malaise/fatigue.   Cardiovascular: Positive for chest pain.   Musculoskeletal: Positive for joint pain.   Psychiatric/Behavioral: Positive for depression.   All other systems reviewed and are negative.        ECG 12 Lead  Date/Time: 3/28/2018 1:58 PM  Performed by: DELORES MARES  Authorized by: DELORES MARES   Comparison: compared with previous ECG from 10/14/2014  Similar to previous ECG  Rhythm: sinus rhythm  T depression: V4, V5 and V6                 Objective:     Physical Exam   Constitutional: She is oriented to person, place, and time. She appears well-developed.   HENT:   Head: Normocephalic.   Eyes: Conjunctivae are normal.   Neck: Normal range of motion.    Cardiovascular: Normal rate, regular rhythm and normal heart sounds.    Pulmonary/Chest: She has wheezes.   Abdominal: Soft. Bowel sounds are normal.   Musculoskeletal: Normal range of motion. She exhibits no edema.   Neurological: She is alert and oriented to person, place, and time.   Skin: Skin is warm and dry.   Psychiatric: She has a normal mood and affect. Her behavior is normal.   Vitals reviewed.      Lab Review:       Assessment:          Diagnosis Plan   1. Coronary artery disease involving native coronary artery of native heart with angina pectoris  Case Request Cath Lab: Coronary angiography, Left heart cath, Left ventriculography          Plan:       1.  Recurrent chest discomfort.  This is obviously a concern with her known LAD lesion.  She was wheezing very extensively bilaterally.  After further consideration I think the most appropriate approach is to go directly to heart catheterization.  She really doesn't exert herself very much if we did a stress test it would be a chemical-induced stress test and with her extensive wheezing I am a little bit concerned.  Also if her stress test is normal and not quite sure that would be adequate with the symptoms that she describing aspiration several lasting 20-30 minutes and are really starting to worry her extensively.  I set her up for a heart catheter risk and benefits were explained    Coronary Artery Disease  Assessment  • The patient has CCS class I - angina only during strenuous or prolonged physical activity  • The patient is having symptoms consistent with unstable angina     Plan  • The patient is being referred to interventional cardiology

## 2018-03-29 DIAGNOSIS — M54.50 CHRONIC LOW BACK PAIN WITHOUT SCIATICA, UNSPECIFIED BACK PAIN LATERALITY: ICD-10-CM

## 2018-03-29 DIAGNOSIS — F41.9 ANXIETY: ICD-10-CM

## 2018-03-29 DIAGNOSIS — G89.29 CHRONIC LOW BACK PAIN WITHOUT SCIATICA, UNSPECIFIED BACK PAIN LATERALITY: ICD-10-CM

## 2018-03-29 RX ORDER — LORAZEPAM 1 MG/1
1 TABLET ORAL EVERY 8 HOURS PRN
Qty: 90 TABLET | Refills: 0 | OUTPATIENT
Start: 2018-03-29 | End: 2018-06-25 | Stop reason: SDUPTHER

## 2018-03-29 RX ORDER — HYDROCODONE BITARTRATE AND ACETAMINOPHEN 10; 325 MG/1; MG/1
1 TABLET ORAL EVERY 6 HOURS PRN
Qty: 90 TABLET | Refills: 0 | Status: SHIPPED | OUTPATIENT
Start: 2018-03-29 | End: 2018-04-20 | Stop reason: SDUPTHER

## 2018-03-29 NOTE — TELEPHONE ENCOUNTER
Left message to advise pt script at front for Hydrocodone.  Also informed that Lorazepam has 1 refill left on previous order.

## 2018-03-29 NOTE — TELEPHONE ENCOUNTER
Called [facility].  Pt still has a refill on the Lorazepam.    Will let pt know when calling to tell her Hydrocodone ready.

## 2018-03-29 NOTE — TELEPHONE ENCOUNTER
----- Message from Binta Fay sent at 3/28/2018 12:33 PM EDT -----  Ms. Keller is requesting a refill on her Hydrocodone and her Ativan.      Call back number:  [phone]    Thank you,    Binta

## 2018-03-30 PROBLEM — I25.119 CORONARY ARTERY DISEASE INVOLVING NATIVE CORONARY ARTERY OF NATIVE HEART WITH ANGINA PECTORIS (HCC): Status: ACTIVE | Noted: 2018-03-30

## 2018-04-02 ENCOUNTER — TRANSCRIBE ORDERS (OUTPATIENT)
Dept: LAB | Facility: HOSPITAL | Age: 58
End: 2018-04-02

## 2018-04-02 ENCOUNTER — LAB (OUTPATIENT)
Dept: LAB | Facility: HOSPITAL | Age: 58
End: 2018-04-02
Attending: INTERNAL MEDICINE

## 2018-04-02 DIAGNOSIS — Z01.810 PRE-OPERATIVE CARDIOVASCULAR EXAMINATION: Primary | ICD-10-CM

## 2018-04-02 DIAGNOSIS — Z13.6 SCREENING FOR ISCHEMIC HEART DISEASE: ICD-10-CM

## 2018-04-02 DIAGNOSIS — Z01.810 PRE-OPERATIVE CARDIOVASCULAR EXAMINATION: ICD-10-CM

## 2018-04-02 LAB
ANION GAP SERPL CALCULATED.3IONS-SCNC: 12 MMOL/L
BASOPHILS # BLD AUTO: 0.02 10*3/MM3 (ref 0–0.2)
BASOPHILS NFR BLD AUTO: 0.2 % (ref 0–1.5)
BUN BLD-MCNC: 10 MG/DL (ref 6–20)
BUN/CREAT SERPL: 11.6 (ref 7–25)
CALCIUM SPEC-SCNC: 9.5 MG/DL (ref 8.6–10.5)
CHLORIDE SERPL-SCNC: 105 MMOL/L (ref 98–107)
CO2 SERPL-SCNC: 30 MMOL/L (ref 22–29)
CREAT BLD-MCNC: 0.86 MG/DL (ref 0.57–1)
DEPRECATED RDW RBC AUTO: 49.7 FL (ref 37–54)
EOSINOPHIL # BLD AUTO: 0.04 10*3/MM3 (ref 0–0.7)
EOSINOPHIL NFR BLD AUTO: 0.4 % (ref 0.3–6.2)
ERYTHROCYTE [DISTWIDTH] IN BLOOD BY AUTOMATED COUNT: 14 % (ref 11.7–13)
GFR SERPL CREATININE-BSD FRML MDRD: 68 ML/MIN/1.73
GLUCOSE BLD-MCNC: 91 MG/DL (ref 65–99)
HCT VFR BLD AUTO: 44.3 % (ref 35.6–45.5)
HGB BLD-MCNC: 14.3 G/DL (ref 11.9–15.5)
IMM GRANULOCYTES # BLD: 0.02 10*3/MM3 (ref 0–0.03)
IMM GRANULOCYTES NFR BLD: 0.2 % (ref 0–0.5)
LYMPHOCYTES # BLD AUTO: 3.3 10*3/MM3 (ref 0.9–4.8)
LYMPHOCYTES NFR BLD AUTO: 29.7 % (ref 19.6–45.3)
MCH RBC QN AUTO: 31.3 PG (ref 26.9–32)
MCHC RBC AUTO-ENTMCNC: 32.3 G/DL (ref 32.4–36.3)
MCV RBC AUTO: 96.9 FL (ref 80.5–98.2)
MONOCYTES # BLD AUTO: 0.51 10*3/MM3 (ref 0.2–1.2)
MONOCYTES NFR BLD AUTO: 4.6 % (ref 5–12)
NEUTROPHILS # BLD AUTO: 7.23 10*3/MM3 (ref 1.9–8.1)
NEUTROPHILS NFR BLD AUTO: 64.9 % (ref 42.7–76)
PLATELET # BLD AUTO: 202 10*3/MM3 (ref 140–500)
PMV BLD AUTO: 11.2 FL (ref 6–12)
POTASSIUM BLD-SCNC: 3.4 MMOL/L (ref 3.5–5.2)
RBC # BLD AUTO: 4.57 10*6/MM3 (ref 3.9–5.2)
SODIUM BLD-SCNC: 147 MMOL/L (ref 136–145)
WBC NRBC COR # BLD: 11.12 10*3/MM3 (ref 4.5–10.7)

## 2018-04-02 PROCEDURE — 80048 BASIC METABOLIC PNL TOTAL CA: CPT

## 2018-04-02 PROCEDURE — 85025 COMPLETE CBC W/AUTO DIFF WBC: CPT

## 2018-04-02 PROCEDURE — 36415 COLL VENOUS BLD VENIPUNCTURE: CPT

## 2018-04-06 ENCOUNTER — HOSPITAL ENCOUNTER (OUTPATIENT)
Facility: HOSPITAL | Age: 58
Setting detail: HOSPITAL OUTPATIENT SURGERY
Discharge: HOME | End: 2018-04-06
Attending: INTERNAL MEDICINE | Admitting: INTERNAL MEDICINE

## 2018-04-06 VITALS
DIASTOLIC BLOOD PRESSURE: 77 MMHG | WEIGHT: 190 LBS | TEMPERATURE: 97.1 F | OXYGEN SATURATION: 96 % | RESPIRATION RATE: 16 BRPM | BODY MASS INDEX: 30.53 KG/M2 | SYSTOLIC BLOOD PRESSURE: 158 MMHG | HEIGHT: 66 IN | HEART RATE: 60 BPM

## 2018-04-06 DIAGNOSIS — I25.119 CORONARY ARTERY DISEASE INVOLVING NATIVE CORONARY ARTERY OF NATIVE HEART WITH ANGINA PECTORIS (HCC): ICD-10-CM

## 2018-04-06 PROCEDURE — C1894 INTRO/SHEATH, NON-LASER: HCPCS | Performed by: INTERNAL MEDICINE

## 2018-04-06 PROCEDURE — 93458 L HRT ARTERY/VENTRICLE ANGIO: CPT | Performed by: INTERNAL MEDICINE

## 2018-04-06 PROCEDURE — 0 IOPAMIDOL PER 1 ML: Performed by: INTERNAL MEDICINE

## 2018-04-06 PROCEDURE — 25010000002 MIDAZOLAM PER 1 MG: Performed by: INTERNAL MEDICINE

## 2018-04-06 PROCEDURE — 25010000002 FENTANYL CITRATE (PF) 100 MCG/2ML SOLUTION: Performed by: INTERNAL MEDICINE

## 2018-04-06 PROCEDURE — 99153 MOD SED SAME PHYS/QHP EA: CPT | Performed by: INTERNAL MEDICINE

## 2018-04-06 PROCEDURE — 99152 MOD SED SAME PHYS/QHP 5/>YRS: CPT | Performed by: INTERNAL MEDICINE

## 2018-04-06 PROCEDURE — C1769 GUIDE WIRE: HCPCS | Performed by: INTERNAL MEDICINE

## 2018-04-06 RX ORDER — MIDAZOLAM HYDROCHLORIDE 1 MG/ML
INJECTION INTRAMUSCULAR; INTRAVENOUS AS NEEDED
Status: DISCONTINUED | OUTPATIENT
Start: 2018-04-06 | End: 2018-04-06 | Stop reason: HOSPADM

## 2018-04-06 RX ORDER — SODIUM CHLORIDE 9 MG/ML
250 INJECTION, SOLUTION INTRAVENOUS ONCE AS NEEDED
Status: DISCONTINUED | OUTPATIENT
Start: 2018-04-06 | End: 2018-04-06 | Stop reason: HOSPADM

## 2018-04-06 RX ORDER — LIDOCAINE HYDROCHLORIDE 20 MG/ML
INJECTION, SOLUTION INFILTRATION; PERINEURAL AS NEEDED
Status: DISCONTINUED | OUTPATIENT
Start: 2018-04-06 | End: 2018-04-06 | Stop reason: HOSPADM

## 2018-04-06 RX ORDER — SODIUM CHLORIDE 0.9 % (FLUSH) 0.9 %
1-10 SYRINGE (ML) INJECTION AS NEEDED
Status: DISCONTINUED | OUTPATIENT
Start: 2018-04-06 | End: 2018-04-06 | Stop reason: HOSPADM

## 2018-04-06 RX ORDER — SODIUM CHLORIDE 9 MG/ML
75 INJECTION, SOLUTION INTRAVENOUS CONTINUOUS
Status: DISCONTINUED | OUTPATIENT
Start: 2018-04-06 | End: 2018-04-06 | Stop reason: HOSPADM

## 2018-04-06 RX ORDER — LANOLIN ALCOHOL/MO/W.PET/CERES
1000 CREAM (GRAM) TOPICAL
COMMUNITY
End: 2019-05-07

## 2018-04-06 RX ORDER — FENTANYL CITRATE 50 UG/ML
INJECTION, SOLUTION INTRAMUSCULAR; INTRAVENOUS AS NEEDED
Status: DISCONTINUED | OUTPATIENT
Start: 2018-04-06 | End: 2018-04-06 | Stop reason: HOSPADM

## 2018-04-06 RX ORDER — LIDOCAINE HYDROCHLORIDE 10 MG/ML
0.1 INJECTION, SOLUTION EPIDURAL; INFILTRATION; INTRACAUDAL; PERINEURAL ONCE AS NEEDED
Status: DISCONTINUED | OUTPATIENT
Start: 2018-04-06 | End: 2018-04-06 | Stop reason: HOSPADM

## 2018-04-06 RX ADMIN — SODIUM CHLORIDE 75 ML/HR: 9 INJECTION, SOLUTION INTRAVENOUS at 07:10

## 2018-04-06 NOTE — PERIOPERATIVE NURSING NOTE
Ivelisse Hernandez RN documented under the name of Halle Conteh RN.  All assessments and care were provided by Ivelisse Hernandez RN from patient's arrival at 0911 to hand off report to Madyson Waller RN at 1100.

## 2018-04-06 NOTE — DISCHARGE INSTRUCTIONS
[facility]  [address] [place] [address]      Coronary Angiogram (Femoral  Approach) After Care     Refer to this sheet in the next few weeks. These instructions provide you with information on caring for yourself after your procedure. Your health care provider may also give you more specific instructions. Your treatment has been planned according to current medical practices, but problems sometimes occur. Call your health care provider if you have any problems or questions after your procedure.      What to Expect After the Procedure:  After your procedure, it is typical to have the following sensations:  · Minor discomfort or tenderness and a small bump at the catheter insertion site. The bump should usually decrease in size and tenderness within 1 to 2 weeks.  · Any bruising will usually fade within 2 to 4 weeks.  Home Care Instructions:  · Do not apply powder or lotion to the site.  · Do not take baths, swim, or use a hot tub until your health care provider approves and the site is completely healed.  · Do not bend, squat, or lift anything over 20 lb (9 kg) or as directed by your health care provider. However, we recommend lifting nothing heavier than a gallon of milk.    · You may shower 24 hours after the procedure. Remove the bandage (dressing) and gently wash the site with plain soap and water. Gently pat the site dry. You may apply a band aid daily for 2 days if desired.    · Inspect the site at least twice daily.  · Increase your fluid intake for the next 2 days.    · Limit your activity for the first 48 hours. .    · Avoid strenuous activity for 1 week or as advised by your physician.    · Follow instructions about when you can drive or return to work as directed by your physician.    · Hold direct pressure over the site when you cough, sneeze, laugh or change positions.  Do this for the next 2 days.    · Do not operate machinery or power tools for 24 hours.  · A responsible  adult should be with you for the first 24 hours after you arrive home. Do not make any important legal decisions or sign legal papers for 24 hours.  Do not drink alcohol for 24 hours.  · Metformin or any medications containing Metformin should not be taken for 48 hours after your procedure.    Call Your Doctor If:  · You have drainage (other than a small amount of blood on the dressing).  · You have chills or a fever > 101.  · You have redness, warmth, swelling(larger than a walnut), or pain at the insertion site  · .You have heavy bleeding from the site. If this happens, hold pressure on the site and call 911.  · You develop chest pain or shortness of breath, feel faint, or pass out.  · You develop pain, discoloration, coldness, numbness, tingling, or severe bruising in the leg that held the catheter.  · You develop bleeding from any other place, such as the bowels.    Make Sure You:  · Understand these instructions.  · Will watch your condition.  · Will get help right away if you are not doing well or get worse.      [facility]  [address] [place] [address]    Coronary Angiogram (Radial/Ulnar Approach) After Care    Refer to this sheet in the next few weeks. These instructions provide you with information on caring for yourself after your procedure. Your caregiver may also give you more specific instructions. Your treatment has been planned according to current medical practices, but problems sometimes occur. Call your caregiver if you have any problems or questions after your procedure.    Home Care Instructions:  · You may shower the day after the procedure. Remove the bandage (dressing) and gently wash the site with plain soap and water. Gently pat the site dry. You may apply a band aid daily for 2 days if desired.    · Do not apply powder or lotion to the site.  · Do not submerge the affected site in water for 3 to 5 days or until the site is completely healed.   · Do not lift, push or  pull anything over 10 pounds for 2 days after your procedure.  · Inspect the site at least twice daily. You may notice some bruising at the site and it may be tender for 1 to 2 weeks.     · Increase your fluid intake for the next 2 days.    · Keep arm elevated for 24 hours. For the remainder of the day, keep your arm in “Pledge of Allegiance” position when up and about.     · You may drive 24 hours after the procedure unless otherwise instructed by your caregiver.  · Do not operate machinery or power tools for 24 hours.  · A responsible adult should be with you for the first 24 hours after you arrive home. Do not make any important legal decisions or sign legal papers for 24 hours.  Do not drink alcohol for 24 hours.    · Metformin or any medications containing Metformin should not be taken for 48 hours after your procedure.      Call Your Doctor if:   · You have unusual pain at the radial/ulnar (wrist) site.  · You have redness, warmth, swelling, or pain at the radial/ulnar (wrist) site.  · You have drainage (other than a small amount of blood on the dressing).  · You have chills or a fever > 101.  · Your arm becomes pale or dark, cool, tingly, or numb.  · You have heavy bleeding from the site, hold pressure on the site for 20 minutes.  If the bleeding stops, apply a fresh bandage and call your cardiologist.  However, if you continue to have bleeding, call 911.

## 2018-04-09 ENCOUNTER — TELEPHONE (OUTPATIENT)
Dept: INTERNAL MEDICINE | Facility: CLINIC | Age: 58
End: 2018-04-09

## 2018-04-09 NOTE — TELEPHONE ENCOUNTER
Discussed benign cardiac cath - she still gets palpitations - she states she has increased stress - she agrees to seeing counselor - also need psychiatrist - need change in medications per psychiatrist

## 2018-04-09 NOTE — TELEPHONE ENCOUNTER
----- Message from Kami Pinto sent at 4/9/2018  8:46 AM EDT -----  Contact: Patient  Patient would like a call back on her heart cath on 4/6. Please advise    Patient:[phone]

## 2018-04-16 DIAGNOSIS — G89.4 CHRONIC PAIN DISORDER: ICD-10-CM

## 2018-04-17 NOTE — TELEPHONE ENCOUNTER
Please review refill request:    Last OV: 12/28/17    Last Prescribed: 10/5/17    ALEXEI: Ordered    Thank you,    Von

## 2018-04-20 DIAGNOSIS — G89.29 CHRONIC LOW BACK PAIN WITHOUT SCIATICA, UNSPECIFIED BACK PAIN LATERALITY: ICD-10-CM

## 2018-04-20 DIAGNOSIS — M54.50 CHRONIC LOW BACK PAIN WITHOUT SCIATICA, UNSPECIFIED BACK PAIN LATERALITY: ICD-10-CM

## 2018-04-20 RX ORDER — HYDROCODONE BITARTRATE AND ACETAMINOPHEN 10; 325 MG/1; MG/1
1 TABLET ORAL EVERY 6 HOURS PRN
Qty: 90 TABLET | Refills: 0 | Status: SHIPPED | OUTPATIENT
Start: 2018-04-20 | End: 2018-05-21 | Stop reason: SDUPTHER

## 2018-04-20 NOTE — TELEPHONE ENCOUNTER
----- Message from Gretta Harrell sent at 4/20/2018  1:37 PM EDT -----  Contact: pt - Dr Manning's pt - RE: script  Pt calling and would like a refill on Rx      HYDROcodone-acetaminophen (NORCO)  MG per tablet 90 tablet    Sig - Route: Take 1 tablet by mouth Every 6 (Six) Hours As Needed for Moderate Pain . Need to try 1/2 pills instead of whole pills - need to wean down! - Oral       Pt # [phone]

## 2018-04-24 ENCOUNTER — OFFICE VISIT (OUTPATIENT)
Dept: INTERNAL MEDICINE | Facility: CLINIC | Age: 58
End: 2018-04-24

## 2018-04-24 VITALS
BODY MASS INDEX: 30.05 KG/M2 | WEIGHT: 187 LBS | DIASTOLIC BLOOD PRESSURE: 86 MMHG | HEART RATE: 75 BPM | OXYGEN SATURATION: 96 % | HEIGHT: 66 IN | SYSTOLIC BLOOD PRESSURE: 126 MMHG

## 2018-04-24 DIAGNOSIS — E78.49 OTHER HYPERLIPIDEMIA: ICD-10-CM

## 2018-04-24 DIAGNOSIS — G89.29 CHRONIC LOW BACK PAIN WITHOUT SCIATICA, UNSPECIFIED BACK PAIN LATERALITY: ICD-10-CM

## 2018-04-24 DIAGNOSIS — G89.4 CHRONIC PAIN DISORDER: ICD-10-CM

## 2018-04-24 DIAGNOSIS — Z72.0 TOBACCO ABUSE: ICD-10-CM

## 2018-04-24 DIAGNOSIS — I10 ESSENTIAL HYPERTENSION: Primary | ICD-10-CM

## 2018-04-24 DIAGNOSIS — R53.81 MALAISE AND FATIGUE: ICD-10-CM

## 2018-04-24 DIAGNOSIS — M79.7 FIBROMYALGIA: ICD-10-CM

## 2018-04-24 DIAGNOSIS — R53.83 MALAISE AND FATIGUE: ICD-10-CM

## 2018-04-24 DIAGNOSIS — M54.50 CHRONIC LOW BACK PAIN WITHOUT SCIATICA, UNSPECIFIED BACK PAIN LATERALITY: ICD-10-CM

## 2018-04-24 DIAGNOSIS — F41.9 ANXIETY: ICD-10-CM

## 2018-04-24 DIAGNOSIS — I25.119 CORONARY ARTERY DISEASE INVOLVING NATIVE CORONARY ARTERY OF NATIVE HEART WITH ANGINA PECTORIS (HCC): ICD-10-CM

## 2018-04-24 DIAGNOSIS — F39 MOOD DISORDER (HCC): ICD-10-CM

## 2018-04-24 PROCEDURE — 99213 OFFICE O/P EST LOW 20 MIN: CPT | Performed by: INTERNAL MEDICINE

## 2018-04-24 RX ORDER — ESCITALOPRAM OXALATE 20 MG/1
TABLET ORAL
Qty: 90 TABLET | Refills: 3 | Status: SHIPPED | OUTPATIENT
Start: 2018-04-24 | End: 2018-06-26

## 2018-04-24 NOTE — PROGRESS NOTES
"Subjective   Derek Keller is a 57 y.o. female here for   Chief Complaint   Patient presents with   • Hypertension   • Coronary Artery Disease   • Hypothyroidism   • Back Pain   • Chest Pain   • Panic Attack   .    Vitals:    04/24/18 1325   BP: 126/86   BP Location: Left arm   Patient Position: Sitting   Cuff Size: Adult   Pulse: 75   SpO2: 96%   Weight: 84.8 kg (187 lb)   Height: 167.6 cm (66\")       Body mass index is 30.18 kg/m².    Hypertension   This is a chronic problem. The current episode started more than 1 year ago. The problem is unchanged. The problem is controlled. Associated symptoms include chest pain (but cardiac cath 2 wks ago), malaise/fatigue and palpitations. Pertinent negatives include no peripheral edema or shortness of breath.   Coronary Artery Disease   Presents for follow-up visit. Symptoms include chest pain (but cardiac cath 2 wks ago) and palpitations. Pertinent negatives include no leg swelling or shortness of breath.   Hypothyroidism   This is a chronic problem. The current episode started more than 1 year ago. The problem has been unchanged. Associated symptoms include chest pain (but cardiac cath 2 wks ago) and fatigue. Pertinent negatives include no chills, coughing or fever.   Back Pain   This is a chronic problem. The current episode started more than 1 year ago. The problem occurs constantly. The problem is unchanged. The pain is moderate. Associated symptoms include chest pain (but cardiac cath 2 wks ago). Pertinent negatives include no fever.   Chest Pain    This is a chronic problem. The current episode started more than 1 year ago. The problem occurs constantly. The problem has been unchanged. The pain is moderate. Associated symptoms include back pain, malaise/fatigue and palpitations. Pertinent negatives include no cough, fever or shortness of breath.   Her past medical history is significant for anxiety/panic attacks and CAD.   Panic Attack   This is a recurrent problem. " The current episode started more than 1 year ago. The problem occurs intermittently. The problem has been gradually worsening. Associated symptoms include chest pain (but cardiac cath 2 wks ago) and fatigue. Pertinent negatives include no chills, coughing or fever.        The following portions of the patient's history were reviewed and updated as appropriate: allergies, current medications, past social history and problem list.    Review of Systems   Constitutional: Positive for fatigue and malaise/fatigue. Negative for chills and fever.   Respiratory: Negative for cough, shortness of breath and wheezing.    Cardiovascular: Positive for chest pain (but cardiac cath 2 wks ago) and palpitations. Negative for leg swelling.   Musculoskeletal: Positive for back pain.   Psychiatric/Behavioral: Positive for dysphoric mood and sleep disturbance. The patient is nervous/anxious.      She requests sample of advair for occ wheeze, cough.    Objective   Physical Exam   Constitutional: She appears well-developed and well-nourished. No distress.   Cardiovascular: Normal rate, regular rhythm and normal heart sounds.    Pulmonary/Chest: No respiratory distress. She has no wheezes. She has no rales. She exhibits no tenderness.   Musculoskeletal: She exhibits no edema.   Psychiatric: She has a normal mood and affect. Her behavior is normal.   Nursing note and vitals reviewed.      Assessment/Plan   Diagnoses and all orders for this visit:    Essential hypertension  Comments:  stable - call if bp over 140/90    Other hyperlipidemia  Comments:  need diet/ex    Coronary artery disease involving native coronary artery of native heart with angina pectoris  Comments:  cardiac cath 3/2018 (no incr blockage)    Chronic low back pain without sciatica, unspecified back pain laterality  Comments:  call if worse    Chronic pain disorder  Comments:  ok with hydrocodone prn    Anxiety    Fibromyalgia    Malaise and fatigue  Comments:  call if  worse    Mood disorder  Comments:  continue lexapro, abilify, prn ativan - keep appt with counselor 5/1/2018 -need to see psychiatrist!  Orders:  -     escitalopram (LEXAPRO) 20 MG tablet; 1 pill daily    Tobacco abuse  Comments:  need to stop!! - discussed incr risk of heart attack, stroke,cancer,copd,etc - also discussed that she is spending over $30 monthly    Other orders  -     fluticasone-salmeterol (ADVAIR DISKUS) 100-50 MCG/DOSE DISKUS; Inhale 1 puff 2 (Two) Times a Day.     She states she cannot afford lexapro ($18 monthly) - she will call for less expensive medication.  She also needs lung cancer screen & c-scope.

## 2018-05-15 RX ORDER — BACLOFEN 20 MG/1
TABLET ORAL
Qty: 90 TABLET | Refills: 1 | Status: SHIPPED | OUTPATIENT
Start: 2018-05-15 | End: 2018-07-17 | Stop reason: SDUPTHER

## 2018-05-21 DIAGNOSIS — M54.50 CHRONIC LOW BACK PAIN WITHOUT SCIATICA, UNSPECIFIED BACK PAIN LATERALITY: ICD-10-CM

## 2018-05-21 DIAGNOSIS — G89.29 CHRONIC LOW BACK PAIN WITHOUT SCIATICA, UNSPECIFIED BACK PAIN LATERALITY: ICD-10-CM

## 2018-05-21 RX ORDER — HYDROCODONE BITARTRATE AND ACETAMINOPHEN 10; 325 MG/1; MG/1
1 TABLET ORAL EVERY 6 HOURS PRN
Qty: 90 TABLET | Refills: 0 | Status: SHIPPED | OUTPATIENT
Start: 2018-05-21 | End: 2018-06-26 | Stop reason: SDUPTHER

## 2018-05-21 NOTE — TELEPHONE ENCOUNTER
----- Message from Gretta Harrell sent at 5/21/2018  2:23 PM EDT -----  Contact: pt - Dr Manning's pt - RE: script  Pt calling and would like a refill on Rx      HYDROcodone-acetaminophen (NORCO)  MG per tablet 90 tablet    Sig - Route: Take 1 tablet by mouth Every 6 (Six) Hours As Needed for Moderate Pain . Need to try 1/2 pills instead of whole pills - need to wean down! - Oral     Pt # [phone]      Pt would like to  on Tuesday 05/22/2018

## 2018-06-13 DIAGNOSIS — E03.9 HYPOTHYROIDISM: ICD-10-CM

## 2018-06-13 DIAGNOSIS — F39 MOOD DISORDER (HCC): ICD-10-CM

## 2018-06-13 RX ORDER — BUPROPION HYDROCHLORIDE 300 MG/1
TABLET ORAL
Qty: 30 TABLET | Refills: 4 | Status: SHIPPED | OUTPATIENT
Start: 2018-06-13 | End: 2018-11-27 | Stop reason: SDUPTHER

## 2018-06-13 RX ORDER — LEVOTHYROXINE SODIUM 0.05 MG/1
TABLET ORAL
Qty: 30 TABLET | Refills: 3 | Status: SHIPPED | OUTPATIENT
Start: 2018-06-13 | End: 2018-10-01 | Stop reason: SDUPTHER

## 2018-06-25 DIAGNOSIS — F41.9 ANXIETY: ICD-10-CM

## 2018-06-25 RX ORDER — LORAZEPAM 1 MG/1
TABLET ORAL
Qty: 90 TABLET | Refills: 1 | OUTPATIENT
Start: 2018-06-25 | End: 2018-06-25 | Stop reason: SDUPTHER

## 2018-06-26 DIAGNOSIS — M54.50 CHRONIC LOW BACK PAIN WITHOUT SCIATICA, UNSPECIFIED BACK PAIN LATERALITY: ICD-10-CM

## 2018-06-26 DIAGNOSIS — G89.29 CHRONIC LOW BACK PAIN WITHOUT SCIATICA, UNSPECIFIED BACK PAIN LATERALITY: ICD-10-CM

## 2018-06-26 RX ORDER — HYDROCODONE BITARTRATE AND ACETAMINOPHEN 10; 325 MG/1; MG/1
1 TABLET ORAL EVERY 6 HOURS PRN
Qty: 90 TABLET | Refills: 0 | Status: SHIPPED | OUTPATIENT
Start: 2018-06-26 | End: 2018-07-30 | Stop reason: SDUPTHER

## 2018-06-26 RX ORDER — LORAZEPAM 1 MG/1
TABLET ORAL
Qty: 90 TABLET | Refills: 1 | OUTPATIENT
Start: 2018-06-26 | End: 2018-12-18 | Stop reason: SDUPTHER

## 2018-06-26 NOTE — TELEPHONE ENCOUNTER
----- Message from Doreen Barone sent at 6/25/2018  1:32 PM EDT -----  Contact: Patient   Patient called requesting refill on     HYDROcodone-acetaminophen (NORCO)  MG per tablet    Patient:  [phone]    Pharmacy:  [place] [address] - [phone]  - [phone] FX

## 2018-06-26 NOTE — TELEPHONE ENCOUNTER
Please review refill request:    Last OV: 4/24/18    Last Prescribed:  5/21/18    ALEXEI: Good until 7/20/18    Thank you,    Von

## 2018-06-27 RX ORDER — LAMOTRIGINE 25 MG/1
25 TABLET ORAL 2 TIMES DAILY
COMMUNITY
End: 2018-08-27 | Stop reason: DRUGHIGH

## 2018-07-16 ENCOUNTER — TELEPHONE (OUTPATIENT)
Dept: INTERNAL MEDICINE | Facility: CLINIC | Age: 58
End: 2018-07-16

## 2018-07-16 NOTE — TELEPHONE ENCOUNTER
"----- Message from Gretta Harrell sent at 7/16/2018  9:54 AM EDT -----  Contact: pt - Dr Manning's pt - RE: Rx  Pt calling and would like a refill on Rx. Pt would like to know if dosage could be increased as pt states \"this has became a real problem\". Could you please call Rx to pt's pharmacy? Please advise. Thanks      Vesicare 5 mg -- Pt was given samples -- Pt would like higher dosage    [facility] - [place], [place] - [address] - [phone]  - [phone]     Pt # [phone]  "

## 2018-07-17 RX ORDER — BACLOFEN 20 MG/1
TABLET ORAL
Qty: 90 TABLET | Refills: 1 | Status: SHIPPED | OUTPATIENT
Start: 2018-07-17 | End: 2018-08-23 | Stop reason: SDUPTHER

## 2018-07-17 RX ORDER — SOLIFENACIN SUCCINATE 10 MG/1
10 TABLET, FILM COATED ORAL DAILY
Qty: 30 TABLET | Refills: 12 | Status: SHIPPED | OUTPATIENT
Start: 2018-07-17 | End: 2019-07-19 | Stop reason: SDUPTHER

## 2018-07-26 ENCOUNTER — TELEPHONE (OUTPATIENT)
Dept: INTERNAL MEDICINE | Facility: CLINIC | Age: 58
End: 2018-07-26

## 2018-07-26 PROBLEM — A60.04 HERPES SIMPLEX VULVOVAGINITIS: Status: ACTIVE | Noted: 2018-07-26

## 2018-07-26 RX ORDER — ACYCLOVIR 50 MG/G
OINTMENT TOPICAL
Qty: 30 G | Refills: 1 | Status: SHIPPED | OUTPATIENT
Start: 2018-07-26 | End: 2019-05-07

## 2018-07-26 NOTE — TELEPHONE ENCOUNTER
Ms. Keller stated she is already taking 1000 mg of Valcyclovir daily but it's not working.     Please advise,    Thank you,    Von

## 2018-07-26 NOTE — TELEPHONE ENCOUNTER
----- Message from Gretta Harrell sent at 7/26/2018  9:39 AM EDT -----  Contact: pt - Dr Manning's pt - RE: new Rx  Pt calling and would like a Rx for herpes called to pt's pharmacy. Could you please call Rx to pt's pharmacy? Please advise. Thanks    Pt would like the Cream, NOT the pills.    [facility] - [place] [address] - [phone] [facility] [phone] FX    Pt # [phone]

## 2018-07-26 NOTE — TELEPHONE ENCOUNTER
Cream had been called to her [facility] pharmacy, but oral meds are more effective.

## 2018-07-26 NOTE — TELEPHONE ENCOUNTER
Call made to patient to see what type of herpes outbreak she is having.     She stated she is having a vaginal outbreak and would like to have a cream sent in to her pharmacy rather than pills.    Thank you,    Rodolfo

## 2018-07-30 DIAGNOSIS — M54.50 CHRONIC LOW BACK PAIN WITHOUT SCIATICA, UNSPECIFIED BACK PAIN LATERALITY: ICD-10-CM

## 2018-07-30 DIAGNOSIS — G89.29 CHRONIC LOW BACK PAIN WITHOUT SCIATICA, UNSPECIFIED BACK PAIN LATERALITY: ICD-10-CM

## 2018-07-30 RX ORDER — HYDROCODONE BITARTRATE AND ACETAMINOPHEN 10; 325 MG/1; MG/1
1 TABLET ORAL EVERY 6 HOURS PRN
Qty: 90 TABLET | Refills: 0 | Status: SHIPPED | OUTPATIENT
Start: 2018-07-30 | End: 2018-08-27 | Stop reason: SDUPTHER

## 2018-07-30 RX ORDER — ARIPIPRAZOLE 10 MG/1
10 TABLET ORAL DAILY
Refills: 1 | COMMUNITY
Start: 2018-07-23 | End: 2018-08-27

## 2018-07-30 RX ORDER — LAMOTRIGINE 200 MG/1
200 TABLET ORAL DAILY
Refills: 1 | COMMUNITY
Start: 2018-07-23 | End: 2019-01-10

## 2018-07-30 NOTE — TELEPHONE ENCOUNTER
Please review refill request:    Last OV: 6/26/18    Last Prescribed: 6/26/18    ALEXEI: Good until 9/25/18    Thank you,    Von

## 2018-07-30 NOTE — TELEPHONE ENCOUNTER
----- Message from Kami Pinto sent at 7/30/2018  9:37 AM EDT -----  Contact: Patient  Patient requesting refill on    HYDROcodone-acetaminophen (NORCO)  MG per tablet    Patient:[phone]

## 2018-08-14 DIAGNOSIS — A60.09 GENITAL HERPES IN WOMEN: ICD-10-CM

## 2018-08-14 DIAGNOSIS — J30.9 ALLERGIC RHINITIS: ICD-10-CM

## 2018-08-14 DIAGNOSIS — I10 ESSENTIAL HYPERTENSION: ICD-10-CM

## 2018-08-14 RX ORDER — LOSARTAN POTASSIUM 100 MG/1
TABLET ORAL
Qty: 30 TABLET | Refills: 5 | Status: SHIPPED | OUTPATIENT
Start: 2018-08-14 | End: 2019-01-26 | Stop reason: SDUPTHER

## 2018-08-14 RX ORDER — VALACYCLOVIR HYDROCHLORIDE 1 G/1
TABLET, FILM COATED ORAL
Qty: 30 TABLET | Refills: 11 | Status: SHIPPED | OUTPATIENT
Start: 2018-08-14 | End: 2019-04-12

## 2018-08-14 RX ORDER — MONTELUKAST SODIUM 10 MG/1
TABLET ORAL
Qty: 30 TABLET | Refills: 11 | Status: SHIPPED | OUTPATIENT
Start: 2018-08-14 | End: 2019-09-27 | Stop reason: SDUPTHER

## 2018-08-23 RX ORDER — BACLOFEN 20 MG/1
TABLET ORAL
Qty: 90 TABLET | Refills: 1 | Status: SHIPPED | OUTPATIENT
Start: 2018-08-23 | End: 2018-10-31 | Stop reason: SDUPTHER

## 2018-08-27 ENCOUNTER — OFFICE VISIT (OUTPATIENT)
Dept: INTERNAL MEDICINE | Facility: CLINIC | Age: 58
End: 2018-08-27

## 2018-08-27 ENCOUNTER — APPOINTMENT (OUTPATIENT)
Dept: WOMENS IMAGING | Facility: HOSPITAL | Age: 58
End: 2018-08-27

## 2018-08-27 VITALS
WEIGHT: 182 LBS | DIASTOLIC BLOOD PRESSURE: 100 MMHG | OXYGEN SATURATION: 97 % | SYSTOLIC BLOOD PRESSURE: 140 MMHG | BODY MASS INDEX: 29.25 KG/M2 | HEIGHT: 66 IN | HEART RATE: 66 BPM

## 2018-08-27 DIAGNOSIS — Z12.31 ENCOUNTER FOR SCREENING MAMMOGRAM FOR BREAST CANCER: ICD-10-CM

## 2018-08-27 DIAGNOSIS — I10 ESSENTIAL HYPERTENSION: Primary | ICD-10-CM

## 2018-08-27 DIAGNOSIS — R42 LIGHTHEADEDNESS: ICD-10-CM

## 2018-08-27 DIAGNOSIS — G89.4 CHRONIC PAIN DISORDER: ICD-10-CM

## 2018-08-27 DIAGNOSIS — Z72.0 TOBACCO ABUSE: ICD-10-CM

## 2018-08-27 DIAGNOSIS — G89.29 CHRONIC LOW BACK PAIN WITHOUT SCIATICA, UNSPECIFIED BACK PAIN LATERALITY: ICD-10-CM

## 2018-08-27 DIAGNOSIS — F39 MOOD DISORDER (HCC): ICD-10-CM

## 2018-08-27 DIAGNOSIS — Z12.31 ENCOUNTER FOR SCREENING MAMMOGRAM FOR BREAST CANCER: Primary | ICD-10-CM

## 2018-08-27 DIAGNOSIS — M54.50 CHRONIC LOW BACK PAIN WITHOUT SCIATICA, UNSPECIFIED BACK PAIN LATERALITY: ICD-10-CM

## 2018-08-27 DIAGNOSIS — R53.81 MALAISE AND FATIGUE: ICD-10-CM

## 2018-08-27 DIAGNOSIS — R53.83 MALAISE AND FATIGUE: ICD-10-CM

## 2018-08-27 DIAGNOSIS — E78.49 OTHER HYPERLIPIDEMIA: ICD-10-CM

## 2018-08-27 PROBLEM — I25.119 CORONARY ARTERY DISEASE INVOLVING NATIVE CORONARY ARTERY OF NATIVE HEART WITH ANGINA PECTORIS: Status: RESOLVED | Noted: 2018-03-30 | Resolved: 2018-08-27

## 2018-08-27 PROCEDURE — 99214 OFFICE O/P EST MOD 30 MIN: CPT | Performed by: INTERNAL MEDICINE

## 2018-08-27 PROCEDURE — 77067 SCR MAMMO BI INCL CAD: CPT | Performed by: INTERNAL MEDICINE

## 2018-08-27 PROCEDURE — 77067 SCR MAMMO BI INCL CAD: CPT | Performed by: RADIOLOGY

## 2018-08-27 RX ORDER — AZELASTINE 1 MG/ML
2 SPRAY, METERED NASAL 2 TIMES DAILY
Qty: 1 EACH | Refills: 12 | Status: SHIPPED | OUTPATIENT
Start: 2018-08-27 | End: 2021-08-24

## 2018-08-27 RX ORDER — HYDROCODONE BITARTRATE AND ACETAMINOPHEN 10; 325 MG/1; MG/1
1 TABLET ORAL EVERY 6 HOURS PRN
Qty: 90 TABLET | Refills: 0 | Status: SHIPPED | OUTPATIENT
Start: 2018-08-27 | End: 2018-09-17 | Stop reason: SDUPTHER

## 2018-08-27 RX ORDER — FLUTICASONE PROPIONATE 50 MCG
2 SPRAY, SUSPENSION (ML) NASAL DAILY
Qty: 1 BOTTLE | Refills: 12 | Status: SHIPPED | OUTPATIENT
Start: 2018-08-27 | End: 2018-09-26

## 2018-08-27 NOTE — PROGRESS NOTES
"Subjective   Derek Keller is a 57 y.o. female here for   Chief Complaint   Patient presents with   • Hypertension     follow up    • Hyperlipidemia   • Dizziness   .    Vitals:    08/27/18 1309   BP: 140/100   BP Location: Left arm   Patient Position: Sitting   Pulse: 66   SpO2: 97%   Weight: 82.6 kg (182 lb)   Height: 167.6 cm (65.98\")       Body mass index is 29.39 kg/m².    Hypertension   This is a chronic problem. The current episode started more than 1 year ago. The problem is unchanged. The problem is controlled. Associated symptoms include chest pain and palpitations. Pertinent negatives include no shortness of breath.   Hyperlipidemia   This is a chronic problem. The current episode started more than 1 year ago. The problem is controlled. Recent lipid tests were reviewed and are normal. She has no history of diabetes. Associated symptoms include chest pain. Pertinent negatives include no shortness of breath.   Dizziness   This is a new problem. The current episode started 1 to 4 weeks ago. The problem occurs intermittently. The problem has been gradually worsening. Associated symptoms include chest pain, coughing and fatigue. Pertinent negatives include no chills or fever.        The following portions of the patient's history were reviewed and updated as appropriate: allergies, current medications, past social history and problem list.    Review of Systems   Constitutional: Positive for fatigue. Negative for chills and fever.   Respiratory: Positive for cough. Negative for shortness of breath and wheezing.    Cardiovascular: Positive for chest pain and palpitations. Negative for leg swelling.   Neurological: Positive for dizziness.   Psychiatric/Behavioral: Positive for dysphoric mood and sleep disturbance (off & on). The patient is nervous/anxious.        Objective   Physical Exam   Constitutional: She appears well-developed and well-nourished. No distress.   HENT:   Right Ear: Tympanic membrane is " bulging. Tympanic membrane is not injected.   Left Ear: Tympanic membrane is bulging. Tympanic membrane is not injected.   Cardiovascular: Normal rate, regular rhythm and normal heart sounds.    Pulmonary/Chest: No respiratory distress. She has no wheezes. She has no rales. She exhibits no tenderness.   Musculoskeletal: She exhibits no edema.   Neurological: She is alert. She displays normal reflexes. No cranial nerve deficit or sensory deficit. She exhibits normal muscle tone. Coordination normal.   Skin: Skin is warm and dry.   Psychiatric: She has a normal mood and affect. Her behavior is normal.   Nursing note and vitals reviewed.      Assessment/Plan   Diagnoses and all orders for this visit:    Essential hypertension  Comments:  borderline high today - need diet/ex - call if bp over 130/80 routinely    Other hyperlipidemia  Comments:  need diet/ex    Tobacco abuse  Comments:  need to stop!    Chronic pain disorder  Comments:  stable -needing hydrocodone 2-3/d - call if worse    Mood disorder (CMS/HCC)  Comments:  anxiety better - down to one ativan daily - f/u with therapist & psychiatrist    Malaise and fatigue  Comments:  call if worse    Lightheadedness  Comments:  but normal neuro exam today - trial of nasal spray for head congestion - call if sx persist or worsen  Orders:  -     azelastine (ASTELIN) 0.1 % nasal spray; 2 sprays into the nostril(s) as directed by provider 2 (Two) Times a Day. Use in each nostril as directed  -     fluticasone (FLONASE) 50 MCG/ACT nasal spray; 2 sprays into the nostril(s) as directed by provider Daily for 30 days.    Chronic low back pain without sciatica, unspecified back pain laterality  -     HYDROcodone-acetaminophen (NORCO)  MG per tablet; Take 1 tablet by mouth Every 6 (Six) Hours As Needed for Moderate Pain . Need to try 1/2 pills instead of whole pills - need to wean down!       Finger to nose test ok. Gait normal. No nystagmus.      She was started on lamictal  (& taken off lyrica & abilify) - need to discuss sx with psychiatry if dizziness persists.

## 2018-09-17 DIAGNOSIS — G89.29 CHRONIC LOW BACK PAIN WITHOUT SCIATICA, UNSPECIFIED BACK PAIN LATERALITY: ICD-10-CM

## 2018-09-17 DIAGNOSIS — M54.50 CHRONIC LOW BACK PAIN WITHOUT SCIATICA, UNSPECIFIED BACK PAIN LATERALITY: ICD-10-CM

## 2018-09-17 RX ORDER — HYDROCODONE BITARTRATE AND ACETAMINOPHEN 10; 325 MG/1; MG/1
1 TABLET ORAL EVERY 6 HOURS PRN
Qty: 90 TABLET | Refills: 0 | Status: SHIPPED | OUTPATIENT
Start: 2018-09-17 | End: 2018-10-23 | Stop reason: SDUPTHER

## 2018-09-17 NOTE — TELEPHONE ENCOUNTER
----- Message from Gretta Harrell sent at 9/17/2018 11:46 AM EDT -----  Contact: pt - Dr Manning's pt - RE: script  Pt - Calling and would like a refill on Rx      HYDROcodone-acetaminophen (NORCO)  MG per tablet 90 tablet     Sig - Route: Take 1 tablet by mouth Every 6 (Six) Hours As Needed for Moderate Pain . Need to try 1/2 pills instead of whole pills - need to wean down! - Oral       Pt # [phone]

## 2018-09-17 NOTE — TELEPHONE ENCOUNTER
Please review refill request:    Last OV: 8/27/18    Last Prescribed: 8/27/18    ALEXEI: Good until 9/25/18    Thank you,    Von

## 2018-09-18 ENCOUNTER — TELEPHONE (OUTPATIENT)
Dept: INTERNAL MEDICINE | Facility: CLINIC | Age: 58
End: 2018-09-18

## 2018-09-20 DIAGNOSIS — E78.5 HYPERLIPIDEMIA: ICD-10-CM

## 2018-09-20 DIAGNOSIS — F32.A DEPRESSION, UNSPECIFIED DEPRESSION TYPE: ICD-10-CM

## 2018-09-20 RX ORDER — ATORVASTATIN CALCIUM 20 MG/1
TABLET, FILM COATED ORAL
Qty: 30 TABLET | Refills: 6 | Status: SHIPPED | OUTPATIENT
Start: 2018-09-20 | End: 2019-04-05 | Stop reason: SDUPTHER

## 2018-09-20 RX ORDER — TRAZODONE HYDROCHLORIDE 150 MG/1
TABLET ORAL
Qty: 90 TABLET | Refills: 2 | Status: SHIPPED | OUTPATIENT
Start: 2018-09-20 | End: 2019-07-06 | Stop reason: SDUPTHER

## 2018-10-01 DIAGNOSIS — E03.9 HYPOTHYROIDISM: ICD-10-CM

## 2018-10-01 RX ORDER — LEVOTHYROXINE SODIUM 0.05 MG/1
TABLET ORAL
Qty: 30 TABLET | Refills: 3 | Status: SHIPPED | OUTPATIENT
Start: 2018-10-01 | End: 2019-01-26 | Stop reason: SDUPTHER

## 2018-10-05 ENCOUNTER — TELEPHONE (OUTPATIENT)
Dept: INTERNAL MEDICINE | Facility: CLINIC | Age: 58
End: 2018-10-05

## 2018-10-05 NOTE — TELEPHONE ENCOUNTER
----- Message from Gretta Harrell sent at 10/5/2018  8:27 AM EDT -----  Contact: pt - Dr Manning's pt - RE: Rx increase  Pt calling and would like a return call regarding increasing her Rx - Trazodone. She informs that she is still waking up every 2 (two) hours. Could you please call pt to discuss? Please advise. Thanks      Pt # [phone] -- Pls call pt when Rx call to pharmacy

## 2018-10-23 DIAGNOSIS — G89.29 CHRONIC LOW BACK PAIN WITHOUT SCIATICA, UNSPECIFIED BACK PAIN LATERALITY: ICD-10-CM

## 2018-10-23 DIAGNOSIS — M54.50 CHRONIC LOW BACK PAIN WITHOUT SCIATICA, UNSPECIFIED BACK PAIN LATERALITY: ICD-10-CM

## 2018-10-23 NOTE — TELEPHONE ENCOUNTER
----- Message from Gretta Harrell sent at 10/23/2018 12:44 PM EDT -----  Contact: pt - Dr Manning's pt - RE: script  Pt calling and would like a refill on Rx        HYDROcodone-acetaminophen (NORCO)  MG per tablet 90 tablet    Sig - Route: Take 1 tablet by mouth Every 6 (Six) Hours As Needed for Moderate Pain . Need to try 1/2 pills instead of whole pills - need to wean down! - Oral       Pt # [phone]

## 2018-10-23 NOTE — TELEPHONE ENCOUNTER
Please review refill request:    Last OV: 8/27/18    Last Prescribed: 9/17/18    ALEXEI: Ordered    Thank you,    Von

## 2018-10-24 RX ORDER — HYDROCODONE BITARTRATE AND ACETAMINOPHEN 10; 325 MG/1; MG/1
1 TABLET ORAL EVERY 6 HOURS PRN
Qty: 90 TABLET | Refills: 0 | Status: SHIPPED | OUTPATIENT
Start: 2018-10-24 | End: 2018-11-27 | Stop reason: SDUPTHER

## 2018-10-31 RX ORDER — BACLOFEN 20 MG/1
TABLET ORAL
Qty: 90 TABLET | Refills: 1 | Status: SHIPPED | OUTPATIENT
Start: 2018-10-31 | End: 2018-12-18 | Stop reason: SDUPTHER

## 2018-11-27 DIAGNOSIS — F39 MOOD DISORDER (HCC): ICD-10-CM

## 2018-11-27 DIAGNOSIS — M54.50 CHRONIC LOW BACK PAIN WITHOUT SCIATICA, UNSPECIFIED BACK PAIN LATERALITY: ICD-10-CM

## 2018-11-27 DIAGNOSIS — G89.29 CHRONIC LOW BACK PAIN WITHOUT SCIATICA, UNSPECIFIED BACK PAIN LATERALITY: ICD-10-CM

## 2018-11-27 RX ORDER — HYDROCODONE BITARTRATE AND ACETAMINOPHEN 10; 325 MG/1; MG/1
TABLET ORAL
Qty: 90 TABLET | Refills: 0 | Status: SHIPPED | OUTPATIENT
Start: 2018-11-27 | End: 2019-01-10 | Stop reason: SDUPTHER

## 2018-11-28 RX ORDER — BUPROPION HYDROCHLORIDE 300 MG/1
TABLET ORAL
Qty: 30 TABLET | Refills: 4 | Status: SHIPPED | OUTPATIENT
Start: 2018-11-28 | End: 2019-04-05 | Stop reason: SDUPTHER

## 2018-12-13 DIAGNOSIS — R52 PAIN: ICD-10-CM

## 2018-12-14 RX ORDER — IBUPROFEN 600 MG/1
TABLET ORAL
Qty: 90 TABLET | Refills: 3 | Status: SHIPPED | OUTPATIENT
Start: 2018-12-14 | End: 2019-04-17 | Stop reason: SDUPTHER

## 2018-12-18 DIAGNOSIS — F41.9 ANXIETY: ICD-10-CM

## 2018-12-18 RX ORDER — LORAZEPAM 1 MG/1
TABLET ORAL
Qty: 90 TABLET | Refills: 0 | OUTPATIENT
Start: 2018-12-18 | End: 2019-03-06 | Stop reason: SDUPTHER

## 2018-12-18 RX ORDER — BACLOFEN 20 MG/1
TABLET ORAL
Qty: 90 TABLET | Refills: 1 | Status: SHIPPED | OUTPATIENT
Start: 2018-12-18 | End: 2019-03-07 | Stop reason: SDUPTHER

## 2019-01-10 ENCOUNTER — OFFICE VISIT (OUTPATIENT)
Dept: INTERNAL MEDICINE | Facility: CLINIC | Age: 59
End: 2019-01-10

## 2019-01-10 VITALS
DIASTOLIC BLOOD PRESSURE: 90 MMHG | SYSTOLIC BLOOD PRESSURE: 134 MMHG | WEIGHT: 174.2 LBS | BODY MASS INDEX: 28 KG/M2 | HEIGHT: 66 IN

## 2019-01-10 DIAGNOSIS — Z72.0 TOBACCO ABUSE: ICD-10-CM

## 2019-01-10 DIAGNOSIS — G89.29 CHRONIC LOW BACK PAIN WITHOUT SCIATICA, UNSPECIFIED BACK PAIN LATERALITY: ICD-10-CM

## 2019-01-10 DIAGNOSIS — I25.119 CORONARY ARTERY DISEASE INVOLVING NATIVE CORONARY ARTERY OF NATIVE HEART WITH ANGINA PECTORIS (HCC): ICD-10-CM

## 2019-01-10 DIAGNOSIS — L29.9 ITCHING: ICD-10-CM

## 2019-01-10 DIAGNOSIS — E78.49 OTHER HYPERLIPIDEMIA: ICD-10-CM

## 2019-01-10 DIAGNOSIS — E03.9 ACQUIRED HYPOTHYROIDISM: ICD-10-CM

## 2019-01-10 DIAGNOSIS — I10 ESSENTIAL HYPERTENSION: Primary | ICD-10-CM

## 2019-01-10 DIAGNOSIS — J30.2 SEASONAL ALLERGIC RHINITIS, UNSPECIFIED TRIGGER: ICD-10-CM

## 2019-01-10 DIAGNOSIS — R10.84 GENERALIZED ABDOMINAL PAIN: ICD-10-CM

## 2019-01-10 DIAGNOSIS — M54.50 CHRONIC LOW BACK PAIN WITHOUT SCIATICA, UNSPECIFIED BACK PAIN LATERALITY: ICD-10-CM

## 2019-01-10 DIAGNOSIS — G89.4 CHRONIC PAIN DISORDER: ICD-10-CM

## 2019-01-10 PROCEDURE — 99214 OFFICE O/P EST MOD 30 MIN: CPT | Performed by: INTERNAL MEDICINE

## 2019-01-10 RX ORDER — HYDROCODONE BITARTRATE AND ACETAMINOPHEN 10; 325 MG/1; MG/1
1 TABLET ORAL EVERY 8 HOURS PRN
Qty: 90 TABLET | Refills: 0 | Status: SHIPPED | OUTPATIENT
Start: 2019-01-10 | End: 2019-02-04 | Stop reason: SDUPTHER

## 2019-01-10 RX ORDER — ARIPIPRAZOLE 5 MG/1
5 TABLET ORAL DAILY
COMMUNITY
End: 2019-07-02 | Stop reason: DRUGHIGH

## 2019-01-10 NOTE — PROGRESS NOTES
"Subjective   Derek Keller is a 58 y.o. female here for   Chief Complaint   Patient presents with   • Anxiety     4 month follow-up   • Hyperlipidemia   • Hypertension   • Hypothyroidism   • Pain   • Depression     Increase of depression   • Itching     dry skin   .    Vitals:    01/10/19 1605 01/10/19 1758   BP: 144/90 134/90   BP Location: Left arm    Patient Position: Sitting    Cuff Size: Adult    Weight: 79 kg (174 lb 3.2 oz)    Height: 166.4 cm (65.5\")        Body mass index is 28.55 kg/m².    Anxiety   Presents for follow-up visit. Symptoms include depressed mood and nervous/anxious behavior. Patient reports no chest pain, palpitations, panic or shortness of breath. Symptoms occur constantly. The severity of symptoms is severe.     Her past medical history is significant for depression.   Hyperlipidemia   This is a chronic problem. The current episode started more than 1 year ago. The problem is controlled. Recent lipid tests were reviewed and are normal. Exacerbating diseases include hypothyroidism. Pertinent negatives include no chest pain or shortness of breath.   Hypertension   This is a chronic problem. The current episode started more than 1 year ago. The problem is unchanged. The problem is controlled. Associated symptoms include anxiety. Pertinent negatives include no chest pain, palpitations or shortness of breath.   Hypothyroidism   This is a chronic problem. The current episode started more than 1 year ago. The problem occurs constantly. Associated symptoms include coughing and fatigue. Pertinent negatives include no chest pain, chills, fever or rash.   Pain   This is a chronic problem. The current episode started more than 1 year ago. The problem occurs constantly. The problem has been unchanged. Associated symptoms include coughing and fatigue. Pertinent negatives include no chest pain, chills, fever or rash.   Depression   Visit Type: follow-up  Patient presents with the following symptoms: " depressed mood, fatigue, feelings of hopelessness and nervousness/anxiety.  Patient is not experiencing: palpitations, panic and shortness of breath.  Frequency of symptoms: constantly     Itching   This is a chronic problem. The current episode started more than 1 month ago. The problem occurs constantly. The problem has been unchanged. Associated symptoms include coughing and fatigue. Pertinent negatives include no chest pain, chills, fever or rash.        The following portions of the patient's history were reviewed and updated as appropriate: allergies, current medications, past social history and problem list.    Review of Systems   Constitutional: Positive for fatigue. Negative for chills and fever.   Respiratory: Positive for cough. Negative for shortness of breath and wheezing.    Cardiovascular: Negative for chest pain, palpitations and leg swelling.   Skin: Positive for itching. Negative for rash.   Psychiatric/Behavioral: Positive for dysphoric mood and sleep disturbance. The patient is nervous/anxious.      Lamictal stopped b/c cost - abilify restarted this wk per psychiatrist.  She feels more depressed b/c boyfriend problems.    Objective   Physical Exam   Constitutional: She appears well-developed and well-nourished. No distress.   Cardiovascular: Normal rate, regular rhythm and normal heart sounds.   Pulmonary/Chest: No respiratory distress. She has no wheezes. She has no rales. She exhibits no tenderness.   Musculoskeletal: She exhibits no edema.   Psychiatric: She has a normal mood and affect. Her behavior is normal.   Nursing note and vitals reviewed.      Assessment/Plan   Diagnoses and all orders for this visit:    Essential hypertension  Comments:  controlled - need weekly chk & call if bp over 140/90    Coronary artery disease involving native coronary artery of native heart with angina pectoris (CMS/[facility])  Comments:  need f/u with cardiol    Other hyperlipidemia  Comments:  need healthy diet &  daily ex    Seasonal allergic rhinitis, unspecified trigger    Acquired hypothyroidism  Comments:  stable - need rechk    Generalized abdominal pain  Comments:  for yrs - better with decr fat intake - call if worse    Chronic low back pain without sciatica, unspecified back pain laterality  -     HYDROcodone-acetaminophen (NORCO)  MG per tablet; Take 1 tablet by mouth Every 8 (Eight) Hours As Needed for Moderate Pain .    Chronic pain disorder  Comments:  no change - advised her to wean down hydrocodone -this could be depressing her!!!    Tobacco abuse  Comments:  she smokes 3/4 ppd - she was warned again - need to STOP NOW to decr heart attack/stroke/CA,etc- I offered medication to help & she will consider    Itching  Comments:  b/c dry skin? - will need eval if sx persist (no rash seen today)    Other orders  -     ARIPiprazole (ABILIFY) 5 MG tablet; Take 5 mg by mouth Daily.

## 2019-01-21 ENCOUNTER — TELEPHONE (OUTPATIENT)
Dept: INTERNAL MEDICINE | Facility: CLINIC | Age: 59
End: 2019-01-21

## 2019-01-21 RX ORDER — DICYCLOMINE HCL 20 MG
20 TABLET ORAL EVERY 6 HOURS
Qty: 120 TABLET | Refills: 6 | Status: SHIPPED | OUTPATIENT
Start: 2019-01-21 | End: 2020-01-13

## 2019-01-21 NOTE — TELEPHONE ENCOUNTER
"----- Message from Melanie Sanchezntbeba sent at 1/21/2019 10:24 AM EST -----  Contact: Pt  Left big toe has a fungus, \"white covering over toe nail with pain with pressure.\"  No redness no streaking.      Pt# [phone]     [facility]- [facility] [place], [place] - [address] - [phone] [facility] [phone] FX    "

## 2019-01-21 NOTE — TELEPHONE ENCOUNTER
Ms. Keller was informed that she would have to be seen or referred out to podiatry. She stated she would come in to the acute clinic. I provided her the AM and PM hours.     She also asked for a refill be sent in for an inhaler as she states she in congested and she would would like a new of Bentyl sent in  As she states. stomach has been giving her problems.

## 2019-01-21 NOTE — TELEPHONE ENCOUNTER
----- Message from Gretta Harrell sent at 1/21/2019 11:59 AM EST -----  Contact: pt - Dr Manning's pt - RE: Rx refills          Advair 500 - pt informs usually gets a sample when in office. No Rx was ever sent to pharmacy    dicyclomine (BENTYL) 20 MG tablet [[id number]] DISCONTINUED   Order Details   Dose, Route, Frequency: As Directed          [facility]- [place], [place] - [address] - [phone] [facility] [phone] FX    Pt #[phone]

## 2019-01-26 DIAGNOSIS — I10 ESSENTIAL HYPERTENSION: ICD-10-CM

## 2019-01-26 DIAGNOSIS — E03.9 HYPOTHYROIDISM: ICD-10-CM

## 2019-01-28 RX ORDER — LOSARTAN POTASSIUM 100 MG/1
TABLET ORAL
Qty: 30 TABLET | Refills: 5 | Status: SHIPPED | OUTPATIENT
Start: 2019-01-28 | End: 2019-09-27 | Stop reason: SDUPTHER

## 2019-01-28 RX ORDER — LEVOTHYROXINE SODIUM 0.05 MG/1
TABLET ORAL
Qty: 90 TABLET | Refills: 1 | Status: SHIPPED | OUTPATIENT
Start: 2019-01-28 | End: 2019-07-19 | Stop reason: SDUPTHER

## 2019-02-04 DIAGNOSIS — M54.50 CHRONIC LOW BACK PAIN WITHOUT SCIATICA, UNSPECIFIED BACK PAIN LATERALITY: ICD-10-CM

## 2019-02-04 DIAGNOSIS — G89.29 CHRONIC LOW BACK PAIN WITHOUT SCIATICA, UNSPECIFIED BACK PAIN LATERALITY: ICD-10-CM

## 2019-02-04 RX ORDER — HYDROCODONE BITARTRATE AND ACETAMINOPHEN 10; 325 MG/1; MG/1
1 TABLET ORAL EVERY 8 HOURS PRN
Qty: 90 TABLET | Refills: 0 | Status: SHIPPED | OUTPATIENT
Start: 2019-02-04 | End: 2019-03-11 | Stop reason: SDUPTHER

## 2019-02-04 NOTE — TELEPHONE ENCOUNTER
----- Message from Melanie Rivera sent at 2/4/2019  8:05 AM EST -----  Contact: pt  Pt calling would like refill on     RX: HYDROcodone-acetaminophen (NORCO)  MG per tablet    Please call when ready for pickup      Pt#  [phone]     Advised 2-3 days before refill is ready and WE WILL CALL

## 2019-02-04 NOTE — TELEPHONE ENCOUNTER
Please review refill request:    Last OV: 1/10/19    Last Prescribed: 1/10/19    ALEXEI: Good until  2/27/19    Thank you,    Von

## 2019-02-04 NOTE — TELEPHONE ENCOUNTER
I will ok this, but pls tell her that she needs to use ONLY if in severe pain - it will stop working if she continue TID!!

## 2019-02-20 ENCOUNTER — TELEPHONE (OUTPATIENT)
Dept: INTERNAL MEDICINE | Facility: CLINIC | Age: 59
End: 2019-02-20

## 2019-02-20 DIAGNOSIS — R05.9 COUGH: Primary | ICD-10-CM

## 2019-02-20 RX ORDER — GUAIFENESIN 600 MG/1
1200 TABLET, EXTENDED RELEASE ORAL 2 TIMES DAILY
Qty: 120 TABLET | Refills: 12 | Status: SHIPPED | OUTPATIENT
Start: 2019-02-20 | End: 2019-07-02

## 2019-02-20 RX ORDER — DEXTROMETHORPHAN POLISTIREX 30 MG/5ML
60 SUSPENSION ORAL EVERY 12 HOURS SCHEDULED
Qty: 280 ML | Refills: 2 | Status: SHIPPED | OUTPATIENT
Start: 2019-02-20 | End: 2019-05-07

## 2019-02-20 NOTE — TELEPHONE ENCOUNTER
----- Message from Gretta Harrell sent at 2/20/2019 10:28 AM EST -----  Contact: pt - Dr Manning's pt - RE: new Rx's      Pt calling and would like Rx's called to pharmacy as she has a cold w/ severe cough. Could you please call Rx's to pharmacy? Please advise. Thanks    Muxenex - in Rx as pt can get more for less.    Cough Rx - Would like something to take before bed to keep from coughing all night.      [facility]- [facility] [place], [place] - [address] - [phone] [facility] [phone] FX      Pt # [phone]

## 2019-03-06 DIAGNOSIS — F41.9 ANXIETY: ICD-10-CM

## 2019-03-06 RX ORDER — LORAZEPAM 1 MG/1
1 TABLET ORAL DAILY PRN
Qty: 90 TABLET | Refills: 0 | OUTPATIENT
Start: 2019-03-06 | End: 2019-06-17 | Stop reason: SDUPTHER

## 2019-03-07 RX ORDER — BACLOFEN 20 MG/1
TABLET ORAL
Qty: 90 TABLET | Refills: 1 | Status: SHIPPED | OUTPATIENT
Start: 2019-03-07 | End: 2019-04-21 | Stop reason: SDUPTHER

## 2019-03-11 DIAGNOSIS — G89.29 CHRONIC LOW BACK PAIN WITHOUT SCIATICA, UNSPECIFIED BACK PAIN LATERALITY: ICD-10-CM

## 2019-03-11 DIAGNOSIS — M54.50 CHRONIC LOW BACK PAIN WITHOUT SCIATICA, UNSPECIFIED BACK PAIN LATERALITY: ICD-10-CM

## 2019-03-11 NOTE — TELEPHONE ENCOUNTER
----- Message from Melanie Rivera sent at 3/11/2019  8:14 AM EDT -----  Contact: Pt   Pt calling would like refill on     RX: HYDROcodone-acetaminophen (NORCO)  MG per tablet    Please call when ready for pickup      Pt#[phone]    Advised 2-3 days before refill is ready and WE WILL CALL

## 2019-03-12 RX ORDER — HYDROCODONE BITARTRATE AND ACETAMINOPHEN 10; 325 MG/1; MG/1
1 TABLET ORAL EVERY 8 HOURS PRN
Qty: 90 TABLET | Refills: 0 | Status: SHIPPED | OUTPATIENT
Start: 2019-03-12 | End: 2019-04-12 | Stop reason: SDUPTHER

## 2019-04-05 DIAGNOSIS — F39 MOOD DISORDER (HCC): ICD-10-CM

## 2019-04-05 DIAGNOSIS — E78.5 HYPERLIPIDEMIA: ICD-10-CM

## 2019-04-05 RX ORDER — BUPROPION HYDROCHLORIDE 300 MG/1
TABLET ORAL
Qty: 30 TABLET | Refills: 4 | Status: SHIPPED | OUTPATIENT
Start: 2019-04-05 | End: 2019-09-27 | Stop reason: SDUPTHER

## 2019-04-05 RX ORDER — ATORVASTATIN CALCIUM 20 MG/1
TABLET, FILM COATED ORAL
Qty: 30 TABLET | Refills: 6 | Status: SHIPPED | OUTPATIENT
Start: 2019-04-05 | End: 2019-09-27 | Stop reason: SDUPTHER

## 2019-04-12 ENCOUNTER — OFFICE VISIT (OUTPATIENT)
Dept: INTERNAL MEDICINE | Facility: CLINIC | Age: 59
End: 2019-04-12

## 2019-04-12 ENCOUNTER — APPOINTMENT (OUTPATIENT)
Dept: WOMENS IMAGING | Facility: HOSPITAL | Age: 59
End: 2019-04-12

## 2019-04-12 VITALS
SYSTOLIC BLOOD PRESSURE: 120 MMHG | DIASTOLIC BLOOD PRESSURE: 78 MMHG | OXYGEN SATURATION: 98 % | HEART RATE: 53 BPM | WEIGHT: 175 LBS | HEIGHT: 66 IN | BODY MASS INDEX: 28.12 KG/M2

## 2019-04-12 DIAGNOSIS — G89.29 CHRONIC LOW BACK PAIN WITHOUT SCIATICA, UNSPECIFIED BACK PAIN LATERALITY: ICD-10-CM

## 2019-04-12 DIAGNOSIS — M54.50 CHRONIC LOW BACK PAIN WITHOUT SCIATICA, UNSPECIFIED BACK PAIN LATERALITY: ICD-10-CM

## 2019-04-12 DIAGNOSIS — F33.9 MONOPOLAR DEPRESSION (HCC): ICD-10-CM

## 2019-04-12 DIAGNOSIS — R07.89 OTHER CHEST PAIN: ICD-10-CM

## 2019-04-12 DIAGNOSIS — R07.2 PRECORDIAL PAIN: ICD-10-CM

## 2019-04-12 DIAGNOSIS — F41.9 ANXIETY: ICD-10-CM

## 2019-04-12 DIAGNOSIS — J30.2 SEASONAL ALLERGIC RHINITIS, UNSPECIFIED TRIGGER: ICD-10-CM

## 2019-04-12 DIAGNOSIS — I10 ESSENTIAL HYPERTENSION: Primary | ICD-10-CM

## 2019-04-12 DIAGNOSIS — R09.02 HYPOXEMIA: ICD-10-CM

## 2019-04-12 DIAGNOSIS — E53.8 VITAMIN B 12 DEFICIENCY: ICD-10-CM

## 2019-04-12 DIAGNOSIS — E03.9 ACQUIRED HYPOTHYROIDISM: ICD-10-CM

## 2019-04-12 DIAGNOSIS — R53.81 MALAISE AND FATIGUE: ICD-10-CM

## 2019-04-12 DIAGNOSIS — R05.9 COUGH: ICD-10-CM

## 2019-04-12 DIAGNOSIS — R53.83 MALAISE AND FATIGUE: ICD-10-CM

## 2019-04-12 DIAGNOSIS — E78.49 OTHER HYPERLIPIDEMIA: ICD-10-CM

## 2019-04-12 DIAGNOSIS — Z72.0 TOBACCO ABUSE: ICD-10-CM

## 2019-04-12 LAB
BACTERIA UR QL AUTO: ABNORMAL /HPF
BILIRUB UR QL STRIP: NEGATIVE
CLARITY UR: CLEAR
COLOR UR: YELLOW
GLUCOSE UR STRIP-MCNC: NEGATIVE MG/DL
HGB UR QL STRIP.AUTO: ABNORMAL
HYALINE CASTS UR QL AUTO: ABNORMAL /LPF
KETONES UR QL STRIP: NEGATIVE
LEUKOCYTE ESTERASE UR QL STRIP.AUTO: NEGATIVE
MUCOUS THREADS URNS QL MICRO: ABNORMAL /HPF
NITRITE UR QL STRIP: NEGATIVE
PH UR STRIP.AUTO: 6.5 [PH] (ref 5–8)
PROT UR QL STRIP: NEGATIVE
RBC # UR: ABNORMAL /HPF
REF LAB TEST METHOD: ABNORMAL
SP GR UR STRIP: 1.02 (ref 1–1.03)
SQUAMOUS #/AREA URNS HPF: ABNORMAL /HPF
UROBILINOGEN UR QL STRIP: ABNORMAL
WBC UR QL AUTO: ABNORMAL /HPF

## 2019-04-12 PROCEDURE — 99214 OFFICE O/P EST MOD 30 MIN: CPT | Performed by: INTERNAL MEDICINE

## 2019-04-12 PROCEDURE — 71046 X-RAY EXAM CHEST 2 VIEWS: CPT | Performed by: INTERNAL MEDICINE

## 2019-04-12 PROCEDURE — 71046 X-RAY EXAM CHEST 2 VIEWS: CPT | Performed by: RADIOLOGY

## 2019-04-12 PROCEDURE — 93000 ELECTROCARDIOGRAM COMPLETE: CPT | Performed by: INTERNAL MEDICINE

## 2019-04-12 PROCEDURE — 81001 URINALYSIS AUTO W/SCOPE: CPT | Performed by: INTERNAL MEDICINE

## 2019-04-12 RX ORDER — FLUTICASONE PROPIONATE 50 MCG
2 SPRAY, SUSPENSION (ML) NASAL DAILY
Refills: 12 | COMMUNITY
Start: 2019-04-05 | End: 2022-10-24

## 2019-04-12 RX ORDER — HYDROCODONE BITARTRATE AND ACETAMINOPHEN 10; 325 MG/1; MG/1
1 TABLET ORAL EVERY 8 HOURS PRN
Qty: 90 TABLET | Refills: 0 | Status: SHIPPED | OUTPATIENT
Start: 2019-04-12 | End: 2019-05-17 | Stop reason: DRUGHIGH

## 2019-04-12 NOTE — PROGRESS NOTES
"Subjective   Derek Keller is a 58 y.o. female here for   Chief Complaint   Patient presents with   • Anxiety     3 month follow-up   • Depression   • Hyperlipidemia   • Hypertension   • Hypothyroidism   .    Vitals:    04/12/19 0946   BP: 120/78   BP Location: Left arm   Patient Position: Sitting   Cuff Size: Adult   Pulse: 53   SpO2: 98%   Weight: 79.4 kg (175 lb)   Height: 166.4 cm (65.5\")       Body mass index is 28.68 kg/m².    Anxiety   Presents for follow-up visit. Symptoms include chest pain (off/on for sev mos - stress related?-not with exercise), depressed mood and nervous/anxious behavior. Patient reports no nausea, palpitations or shortness of breath. Symptoms occur constantly. The severity of symptoms is severe.     Her past medical history is significant for depression.   Depression   Visit Type: follow-up  Patient presents with the following symptoms: chest pain, depressed mood, fatigue and nervousness/anxiety.  Patient is not experiencing: palpitations and shortness of breath.  Frequency of symptoms: occasionally     Hyperlipidemia   This is a chronic problem. The current episode started more than 1 year ago. The problem is controlled. Recent lipid tests were reviewed and are normal. Exacerbating diseases include hypothyroidism. She has no history of diabetes. Associated symptoms include chest pain (off/on for sev mos - stress related?-not with exercise). Pertinent negatives include no shortness of breath.   Hypertension   This is a chronic problem. The current episode started more than 1 year ago. The problem is unchanged. The problem is controlled. Associated symptoms include anxiety and chest pain (off/on for sev mos - stress related?-not with exercise). Pertinent negatives include no palpitations or shortness of breath.   Hypothyroidism   This is a chronic problem. The current episode started more than 1 year ago. The problem occurs constantly. The problem has been unchanged. Associated symptoms " include chest pain (off/on for sev mos - stress related?-not with exercise), coughing and fatigue. Pertinent negatives include no chills, fever, nausea or vomiting.   Chest Pain    This is a new problem. The current episode started more than 1 month ago. The onset quality is sudden. The problem occurs intermittently. The problem has been unchanged. The pain is present in the substernal region and lateral region. The pain is moderate. The quality of the pain is described as sharp. The pain does not radiate. Associated symptoms include a cough. Pertinent negatives include no exertional chest pressure, fever, nausea, palpitations, shortness of breath, syncope or vomiting. The cough has no precipitants.   Her past medical history is significant for hyperlipidemia.   Pertinent negatives for past medical history include no diabetes.   Cough   This is a chronic problem. The current episode started more than 1 month ago. The problem has been gradually worsening. The cough is non-productive. Associated symptoms include chest pain (off/on for sev mos - stress related?-not with exercise). Pertinent negatives include no chills, fever, shortness of breath or wheezing. Nothing aggravates the symptoms. She has tried OTC cough suppressant for the symptoms.        The following portions of the patient's history were reviewed and updated as appropriate: allergies, current medications, past social history and problem list.    Review of Systems   Constitutional: Positive for fatigue. Negative for chills and fever.   Respiratory: Positive for cough. Negative for shortness of breath and wheezing.    Cardiovascular: Positive for chest pain (off/on for sev mos - stress related?-not with exercise). Negative for palpitations, leg swelling and syncope.   Gastrointestinal: Negative for nausea and vomiting.   Psychiatric/Behavioral: Positive for dysphoric mood and sleep disturbance. The patient is nervous/anxious.        Objective   Physical  Exam   Constitutional: She appears well-developed and well-nourished. No distress.   Cardiovascular: Normal rate, regular rhythm and normal heart sounds.   Pulmonary/Chest: No respiratory distress. She has wheezes. She has no rales. She exhibits no tenderness.   Musculoskeletal: She exhibits no edema.   Psychiatric: She has a normal mood and affect. Her behavior is normal.   Nursing note and vitals reviewed.      ECG 12 Lead  Date/Time: 4/12/2019 12:05 PM  Performed by: Yudelka Manning MD  Authorized by: Yudelka Manning MD   Comparison: compared with previous ECG from 3/28/2018  Similar to previous ECG  Rhythm: sinus rhythm  Rate: normal  Conduction: conduction normal  ST Segments: ST segments normal  T Waves: T waves normal  QRS axis: normal  Other: no other findings    Clinical impression: normal ECG        PFT -FEV1=88%, FEV1/FVC=97%, PEFR=50%.  Exercise oximetry -oxygen down to 80% with ambulation.  CXR done - sent for over-read.    Assessment/Plan   Diagnoses and all orders for this visit:    Essential hypertension  Comments:  controlled - call if bp over 140/90  Orders:  -     Comprehensive Metabolic Panel; Future  -     Lipid Panel; Future  -     CBC Auto Differential; Future  -     Urinalysis With Microscopic If Indicated (No Culture) - Urine, Clean Catch; Future  -     Comprehensive Metabolic Panel  -     Lipid Panel  -     CBC Auto Differential  -     Urinalysis With Microscopic If Indicated (No Culture) - Urine, Clean Catch  -     Urinalysis, Microscopic Only - Urine, Clean Catch; Future  -     Urinalysis, Microscopic Only - Urine, Clean Catch    Other hyperlipidemia  Comments:  need diet/ex  Orders:  -     Comprehensive Metabolic Panel; Future  -     Lipid Panel; Future  -     Comprehensive Metabolic Panel  -     Lipid Panel    Seasonal allergic rhinitis, unspecified trigger    Vitamin B 12 deficiency  Comments:  need rechk  Orders:  -     Vitamin B12 & Folate; Future  -     Methylmalonic Acid,  Serum; Future  -     Vitamin B12 & Folate  -     Methylmalonic Acid, Serum    Acquired hypothyroidism  Comments:  need rechk  Orders:  -     TSH Rfx On Abnormal To Free T4; Future  -     TSH Rfx On Abnormal To Free T4    Precordial pain  Comments:  off &on for yrs - will see cardiol again if incr sx (but normal cardiac cath x2 in last 5 yrs)    Anxiety  Comments:  ok with 1 lorazepam daily - no driving    Malaise and fatigue    Tobacco abuse  Comments:  1ppd- need to stop NOW - discussed again incr risk of heart attack, stroke, emphysema, cancer,etc    Other chest pain  Comments:  off &on for 4 mos    Cough  Comments:  for 6 mos - CXR sent for over-read - refer to pulmonary  Orders:  -     Spirometry Without Bronchodilator  -     Walking Oximetry; Future  -     XR Chest 2 View    Hypoxemia  Comments:  oxygen down to 80% with walking - refer to pulmonary  Orders:  -     Ambulatory Referral to Pulmonology    Chronic low back pain without sciatica, unspecified back pain laterality  Comments:  she still needs hydrocodone to function- need as low dose as possible! - discussed addictive nature of hydrocodone again  Orders:  -     HYDROcodone-acetaminophen (NORCO)  MG per tablet; Take 1 tablet by mouth Every 8 (Eight) Hours As Needed for Moderate Pain .    Monopolar depression (CMS/HCC)  Comments:  psychiatrist gave lamictal but too expensive - need to see psychiatrist again & f/u with therapist    Other orders  -     fluticasone (FLONASE) 50 MCG/ACT nasal spray; 2 sprays by Each Nare route Daily.  -     Cancel: Inject Trigger Points, > 3  -     ECG 12 Lead     Cardiac cath 2018 & 2014, echo 2014.

## 2019-04-17 DIAGNOSIS — R52 PAIN: ICD-10-CM

## 2019-04-17 RX ORDER — IBUPROFEN 600 MG/1
TABLET ORAL
Qty: 90 TABLET | Refills: 3 | Status: SHIPPED | OUTPATIENT
Start: 2019-04-17 | End: 2019-11-12 | Stop reason: ALTCHOICE

## 2019-04-22 RX ORDER — BACLOFEN 20 MG/1
TABLET ORAL
Qty: 90 TABLET | Refills: 1 | Status: SHIPPED | OUTPATIENT
Start: 2019-04-22 | End: 2019-06-19 | Stop reason: SDUPTHER

## 2019-04-23 LAB
ALBUMIN SERPL-MCNC: 4.7 G/DL (ref 3.5–5.2)
ALBUMIN/GLOB SERPL: 1.5 G/DL
ALP SERPL-CCNC: 82 U/L (ref 39–117)
ALT SERPL W P-5'-P-CCNC: 10 U/L (ref 1–33)
ANION GAP SERPL CALCULATED.3IONS-SCNC: 15 MMOL/L
AST SERPL-CCNC: 10 U/L (ref 1–32)
BASOPHILS # BLD AUTO: 0.02 10*3/MM3 (ref 0–0.2)
BASOPHILS NFR BLD AUTO: 0.2 % (ref 0–1.5)
BILIRUB SERPL-MCNC: 0.4 MG/DL (ref 0.2–1.2)
BUN BLD-MCNC: 16 MG/DL (ref 6–20)
BUN/CREAT SERPL: 17.6 (ref 7–25)
CALCIUM SPEC-SCNC: 9.8 MG/DL (ref 8.6–10.5)
CHLORIDE SERPL-SCNC: 101 MMOL/L (ref 98–107)
CHOLEST SERPL-MCNC: 143 MG/DL (ref 0–200)
CO2 SERPL-SCNC: 26 MMOL/L (ref 22–29)
CREAT BLD-MCNC: 0.91 MG/DL (ref 0.57–1)
DEPRECATED RDW RBC AUTO: 46.1 FL (ref 37–54)
EOSINOPHIL # BLD AUTO: 0.07 10*3/MM3 (ref 0–0.4)
EOSINOPHIL NFR BLD AUTO: 0.6 % (ref 0.3–6.2)
ERYTHROCYTE [DISTWIDTH] IN BLOOD BY AUTOMATED COUNT: 13.1 % (ref 12.3–15.4)
FOLATE SERPL-MCNC: 9.93 NG/ML (ref 4.78–24.2)
GFR SERPL CREATININE-BSD FRML MDRD: 63 ML/MIN/1.73
GLOBULIN UR ELPH-MCNC: 3.2 GM/DL
GLUCOSE BLD-MCNC: 91 MG/DL (ref 65–99)
HCT VFR BLD AUTO: 47.9 % (ref 34–46.6)
HDLC SERPL-MCNC: 43 MG/DL (ref 40–60)
HGB BLD-MCNC: 15.2 G/DL (ref 12–15.9)
LDLC SERPL CALC-MCNC: 72 MG/DL (ref 0–100)
LDLC/HDLC SERPL: 1.68 {RATIO}
LYMPHOCYTES # BLD AUTO: 3.82 10*3/MM3 (ref 0.7–3.1)
LYMPHOCYTES NFR BLD AUTO: 31.4 % (ref 19.6–45.3)
MCH RBC QN AUTO: 31 PG (ref 26.6–33)
MCHC RBC AUTO-ENTMCNC: 31.7 G/DL (ref 31.5–35.7)
MCV RBC AUTO: 97.8 FL (ref 79–97)
MONOCYTES # BLD AUTO: 0.53 10*3/MM3 (ref 0.1–0.9)
MONOCYTES NFR BLD AUTO: 4.4 % (ref 5–12)
NEUTROPHILS # BLD AUTO: 7.72 10*3/MM3 (ref 1.7–7)
NEUTROPHILS NFR BLD AUTO: 63.4 % (ref 42.7–76)
PLATELET # BLD AUTO: 219 10*3/MM3 (ref 140–450)
PMV BLD AUTO: 11.3 FL (ref 6–12)
POTASSIUM BLD-SCNC: 3.8 MMOL/L (ref 3.5–5.2)
PROT SERPL-MCNC: 7.9 G/DL (ref 6–8.5)
RBC # BLD AUTO: 4.9 10*6/MM3 (ref 3.77–5.28)
SODIUM BLD-SCNC: 142 MMOL/L (ref 136–145)
TRIGL SERPL-MCNC: 139 MG/DL (ref 0–150)
TSH SERPL DL<=0.05 MIU/L-ACNC: 2.2 MIU/ML (ref 0.27–4.2)
VIT B12 BLD-MCNC: 390 PG/ML (ref 211–946)
VLDLC SERPL-MCNC: 27.8 MG/DL (ref 5–40)
WBC NRBC COR # BLD: 12.16 10*3/MM3 (ref 3.4–10.8)

## 2019-04-23 PROCEDURE — 80061 LIPID PANEL: CPT | Performed by: INTERNAL MEDICINE

## 2019-04-23 PROCEDURE — 36415 COLL VENOUS BLD VENIPUNCTURE: CPT | Performed by: INTERNAL MEDICINE

## 2019-04-23 PROCEDURE — 84443 ASSAY THYROID STIM HORMONE: CPT | Performed by: INTERNAL MEDICINE

## 2019-04-23 PROCEDURE — 82607 VITAMIN B-12: CPT | Performed by: INTERNAL MEDICINE

## 2019-04-23 PROCEDURE — 80053 COMPREHEN METABOLIC PANEL: CPT | Performed by: INTERNAL MEDICINE

## 2019-04-23 PROCEDURE — 85025 COMPLETE CBC W/AUTO DIFF WBC: CPT | Performed by: INTERNAL MEDICINE

## 2019-04-23 PROCEDURE — 82746 ASSAY OF FOLIC ACID SERUM: CPT | Performed by: INTERNAL MEDICINE

## 2019-04-23 RX ORDER — BACLOFEN 20 MG/1
TABLET ORAL
Qty: 90 TABLET | Refills: 1 | OUTPATIENT
Start: 2019-04-23

## 2019-04-26 LAB
Lab: NORMAL
METHYLMALONATE SERPL-SCNC: 299 NMOL/L (ref 0–378)

## 2019-05-07 ENCOUNTER — OFFICE VISIT (OUTPATIENT)
Dept: INTERNAL MEDICINE | Facility: CLINIC | Age: 59
End: 2019-05-07

## 2019-05-07 VITALS
WEIGHT: 164 LBS | OXYGEN SATURATION: 97 % | HEIGHT: 66 IN | SYSTOLIC BLOOD PRESSURE: 122 MMHG | BODY MASS INDEX: 26.36 KG/M2 | HEART RATE: 90 BPM | DIASTOLIC BLOOD PRESSURE: 80 MMHG

## 2019-05-07 DIAGNOSIS — M54.16 LUMBAR RADICULOPATHY: Primary | ICD-10-CM

## 2019-05-07 PROCEDURE — 99213 OFFICE O/P EST LOW 20 MIN: CPT | Performed by: NURSE PRACTITIONER

## 2019-05-07 RX ORDER — CYCLOBENZAPRINE HCL 10 MG
10 TABLET ORAL 3 TIMES DAILY PRN
COMMUNITY
End: 2019-07-02

## 2019-05-07 RX ORDER — PREDNISONE 10 MG/1
4 TABLET ORAL DAILY
COMMUNITY
End: 2019-07-02

## 2019-05-07 RX ORDER — LIDOCAINE 50 MG/G
1 PATCH TOPICAL EVERY 24 HOURS
COMMUNITY
End: 2019-07-02

## 2019-05-08 NOTE — PROGRESS NOTES
Subjective   Derek Keller is a 58 y.o. female who presents due to low back pain.    She woke up Thursday morning with severe pain in her left lumbar area. She denies acute injury or trauma but was lifting boxes last week. She presented to [facility] due to severity of pain, CT abd/pelvis showed a small stone in the right kidney but no kidney stones in the left. She was treated for a lumbar strain with Diclofenac, Flexeril and Medrol (has not yet started Jonathan). She states pain is worse after prolonged sitting/standing and improves with changing positions. Denies change in bowel/bladder function.      Back Pain   This is a new problem. The current episode started in the past 7 days. The problem occurs daily. The problem has been gradually improving since onset. The pain is present in the lumbar spine. The quality of the pain is described as aching. The pain radiates to the left thigh. The pain is severe. The symptoms are aggravated by position, sitting, standing and lying down. Pertinent negatives include no abdominal pain (no change in bowel function), bladder incontinence, bowel incontinence, chest pain, fever, headaches, numbness, tingling or weakness. She has tried NSAIDs and analgesics for the symptoms. The treatment provided mild relief.        The following portions of the patient's history were reviewed and updated as appropriate: allergies, current medications, past social history and problem list.    Past Medical History:   Diagnosis Date   • Abdominal pain    • Allergic rhinitis    • Anemia    • Anxiety    • Arteriosclerosis     Coronary   • Arthritis    • Asthma    • Bowen's disease    • Chest pain    • Chronic pain disorder    • Cough    • Depression    • Dysphoric mood    • Fatigue    • Frequent urination    • History of colon polyps    • History of IBS    • History of kidney stones    • Hyperlipidemia    • Hypertension    • Hypothyroidism    • Insomnia    • Lumbago    • Malaise and fatigue    •  Mood disorder (CMS/HCC)    • Neck pain    • Palpitations    • Precordial pain    • Sleep disturbance    • SOB (shortness of breath)    • Tricuspid regurgitation     Trace         Current Outpatient Medications:   •  ARIPiprazole (ABILIFY) 5 MG tablet, Take 5 mg by mouth Daily., Disp: , Rfl:   •  atorvastatin (LIPITOR) 20 MG tablet, TAKE ONE TABLET BY MOUTH EVERY DAY, Disp: 30 tablet, Rfl: 6  •  azelastine (ASTELIN) 0.1 % nasal spray, 2 sprays into the nostril(s) as directed by provider 2 (Two) Times a Day. Use in each nostril as directed, Disp: 1 each, Rfl: 12  •  baclofen (LIORESAL) 20 MG tablet, TAKE 1 TABLET BY MOUTH THREE TIMES DAILY, Disp: 90 tablet, Rfl: 1  •  buPROPion XL (WELLBUTRIN XL) 300 MG 24 hr tablet, TAKE 1 TABLET BY MOUTH DAILY., Disp: 30 tablet, Rfl: 4  •  cyclobenzaprine (FLEXERIL) 10 MG tablet, Take 10 mg by mouth 3 (Three) Times a Day As Needed for Muscle Spasms., Disp: , Rfl:   •  diclofenac (VOLTAREN) 50 MG EC tablet, Take 50 mg by mouth 2 (Two) Times a Day As Needed., Disp: , Rfl:   •  dicyclomine (BENTYL) 20 MG tablet, Take 1 tablet by mouth Every 6 (Six) Hours. Prn abd discomfort, Disp: 120 tablet, Rfl: 6  •  fluticasone (FLONASE) 50 MCG/ACT nasal spray, 2 sprays by Each Nare route Daily., Disp: , Rfl: 12  •  fluticasone-salmeterol (ADVAIR DISKUS) 100-50 MCG/DOSE DISKUS, Inhale 1 puff 2 (Two) Times a Day., Disp: 1 each, Rfl: 0  •  guaiFENesin (MUCINEX) 600 MG 12 hr tablet, Take 2 tablets by mouth 2 (Two) Times a Day., Disp: 120 tablet, Rfl: 12  •  HYDROcodone-acetaminophen (NORCO)  MG per tablet, Take 1 tablet by mouth Every 8 (Eight) Hours As Needed for Moderate Pain ., Disp: 90 tablet, Rfl: 0  •  ibuprofen (ADVIL,MOTRIN) 600 MG tablet, TAKE 1 TABLET BY MOUTH EVERY 8 HOURS AS NEEDED, Disp: 90 tablet, Rfl: 3  •  levothyroxine (SYNTHROID, LEVOTHROID) 50 MCG tablet, TAKE ONE TABLET BY MOUTH EVERY DAY, Disp: 90 tablet, Rfl: 1  •  lidocaine (LIDODERM) 5 %, Place 1 patch on the skin as  directed by provider Daily. Remove & Discard patch within 12 hours or as directed by MD, Disp: , Rfl:   •  LORazepam (ATIVAN) 1 MG tablet, Take 1 tablet by mouth Daily As Needed for Anxiety., Disp: 90 tablet, Rfl: 0  •  losartan (COZAAR) 100 MG tablet, TAKE ONE-HALF TO ONE TABLET BY MOUTH EVERY DAY **STOP LISINOPRIL/HCTZ**, Disp: 30 tablet, Rfl: 5  •  montelukast (SINGULAIR) 10 MG tablet, TAKE ONE TABLET BY MOUTH EVERY DAY, Disp: 30 tablet, Rfl: 11  •  predniSONE (DELTASONE) 10 MG (21) tablet pack, Take 4 mL by mouth Daily. 4 mg, Use as directed on package, Disp: , Rfl:   •  solifenacin (VESICARE) 10 MG tablet, Take 1 tablet by mouth Daily., Disp: 30 tablet, Rfl: 12  •  traZODone (DESYREL) 150 MG tablet, TAKE ONE TABLET BY MOUTH ONCE NIGHTLY, Disp: 90 tablet, Rfl: 2    Allergies   Allergen Reactions   • No Known Drug Allergy        Review of Systems   Constitutional: Positive for activity change. Negative for appetite change, chills, fever and unexpected weight change.   HENT: Negative for ear pain, sinus pressure and sore throat.    Eyes: Negative for visual disturbance.   Respiratory: Negative for cough, shortness of breath and wheezing.    Cardiovascular: Negative for chest pain, palpitations and leg swelling.   Gastrointestinal: Negative for abdominal pain (no change in bowel function), blood in stool, bowel incontinence, constipation, diarrhea, nausea and vomiting.   Genitourinary: Negative for bladder incontinence, decreased urine volume, difficulty urinating (no change in bladder function), frequency, genital sores, hematuria and urgency.   Musculoskeletal: Positive for back pain and gait problem.   Skin: Negative for rash.   Neurological: Negative for dizziness, tingling, syncope, weakness, light-headedness, numbness and headaches.   Psychiatric/Behavioral: Negative for dysphoric mood.       Objective   Vitals:    05/07/19 1323   BP: 122/80   BP Location: Left arm   Patient Position: Sitting   Cuff Size:  "Adult   Pulse: 90   SpO2: 97%   Weight: 74.4 kg (164 lb)   Height: 166.4 cm (65.5\")     Physical Exam   Constitutional: She appears well-developed and well-nourished. She is cooperative. She does not have a sickly appearance. She does not appear ill.   HENT:   Head: Normocephalic.   Right Ear: Hearing, tympanic membrane and external ear normal.   Left Ear: Hearing, tympanic membrane and external ear normal.   Nose: Nose normal. No mucosal edema, rhinorrhea, sinus tenderness or nasal deformity. Right sinus exhibits no maxillary sinus tenderness and no frontal sinus tenderness. Left sinus exhibits no maxillary sinus tenderness and no frontal sinus tenderness.   Mouth/Throat: Oropharynx is clear and moist and mucous membranes are normal. Normal dentition.   Eyes: Conjunctivae and lids are normal. Pupils are equal, round, and reactive to light. Right eye exhibits no discharge and no exudate. Left eye exhibits no discharge and no exudate.   Neck: Trachea normal and normal range of motion. Carotid bruit is not present. No edema present. No thyroid mass and no thyromegaly present.   Cardiovascular: Regular rhythm, normal heart sounds and normal pulses.   No murmur heard.  Pulmonary/Chest: Breath sounds normal. No respiratory distress. She has no decreased breath sounds. She has no wheezes. She has no rhonchi. She has no rales.   Musculoskeletal:        Lumbar back: She exhibits tenderness, pain and spasm.   Lymphadenopathy:        Head (right side): No submental, no submandibular, no tonsillar, no preauricular, no posterior auricular and no occipital adenopathy present.        Head (left side): No submental, no submandibular, no tonsillar, no preauricular, no posterior auricular and no occipital adenopathy present.   Neurological: She is alert. She has normal strength. No sensory deficit.   Skin: Skin is warm, dry and intact. No cyanosis. Nails show no clubbing.       Assessment/Plan   Derek was seen today for back " pain.    Diagnoses and all orders for this visit:    Lumbar radiculopathy    Reviewed MRI of lumbar spine from 2014 which showed DDD and bulging discs, sx most likely due to exacerbation of underlying DDD. She is encouraged to start Medrol for increased inflammation and continue Flexeril at night as needed. She has a prescription for Hydrocodone which is given by Dr. Manning but is not due until 5/12/19 (she was also given several days in the ER)-she will return for refill next week.

## 2019-05-13 ENCOUNTER — TELEPHONE (OUTPATIENT)
Dept: INTERNAL MEDICINE | Facility: CLINIC | Age: 59
End: 2019-05-13

## 2019-05-13 DIAGNOSIS — M54.50 CHRONIC LOW BACK PAIN WITHOUT SCIATICA, UNSPECIFIED BACK PAIN LATERALITY: ICD-10-CM

## 2019-05-13 DIAGNOSIS — G89.29 CHRONIC LOW BACK PAIN WITHOUT SCIATICA, UNSPECIFIED BACK PAIN LATERALITY: ICD-10-CM

## 2019-05-13 NOTE — TELEPHONE ENCOUNTER
----- Message from Doreen Barone sent at 5/13/2019  8:30 AM EDT -----  Contact: Patient  Patient called requesting refill on    HYDROcodone-acetaminophen (NORCO)  MG per tablet    Will  Wednesday.    Patient:  [phone]

## 2019-05-13 NOTE — TELEPHONE ENCOUNTER
Please review refill request:    Last OV: 4/12/19    Last Prescribed: 4/12/19    ALEXEI: Ordered    Thank you,    Von

## 2019-05-16 RX ORDER — HYDROCODONE BITARTRATE AND ACETAMINOPHEN 10; 325 MG/1; MG/1
1 TABLET ORAL EVERY 8 HOURS PRN
Qty: 90 TABLET | Refills: 0 | Status: CANCELLED | OUTPATIENT
Start: 2019-05-16

## 2019-05-17 ENCOUNTER — TELEPHONE (OUTPATIENT)
Dept: INTERNAL MEDICINE | Facility: CLINIC | Age: 59
End: 2019-05-17

## 2019-05-17 DIAGNOSIS — G89.29 CHRONIC LOW BACK PAIN WITHOUT SCIATICA, UNSPECIFIED BACK PAIN LATERALITY: ICD-10-CM

## 2019-05-17 DIAGNOSIS — M54.50 CHRONIC LOW BACK PAIN WITHOUT SCIATICA, UNSPECIFIED BACK PAIN LATERALITY: ICD-10-CM

## 2019-05-17 DIAGNOSIS — G89.4 CHRONIC PAIN SYNDROME: Primary | ICD-10-CM

## 2019-05-17 RX ORDER — HYDROCODONE BITARTRATE AND ACETAMINOPHEN 5; 325 MG/1; MG/1
1 TABLET ORAL EVERY 6 HOURS PRN
Qty: 90 TABLET | Refills: 0 | Status: SHIPPED | OUTPATIENT
Start: 2019-05-17 | End: 2019-07-02

## 2019-05-17 NOTE — TELEPHONE ENCOUNTER
Medication refill was sent to Dr. Manning.     And it has been approved for NORCO 5-325 mg # 90 tablets.     Patient has had a dose adjustment and has been referred out to pain management by Dr. Manning.

## 2019-05-17 NOTE — TELEPHONE ENCOUNTER
Patient came in the office this morning to  her rx for NORCO her dosage has willy changed from  mg to 5-325 mg.     Patient was also advised she was being referred out to pain management.     She stated she understood. She stated there is a pain management in [place] close to her home but didn't know the name.     She was going to call back once she knew the name.

## 2019-05-17 NOTE — TELEPHONE ENCOUNTER
----- Message from Johana Arshad sent at 5/17/2019  8:27 AM EDT -----  Pt following up on request from 5/13/2019 for Hydrocodone.  Pt will be in the building today at 10:30 for an appt with Pulmonary and would like to  RX.

## 2019-05-22 RX ORDER — BACLOFEN 20 MG/1
TABLET ORAL
Qty: 90 TABLET | Refills: 1 | Status: SHIPPED | OUTPATIENT
Start: 2019-05-22 | End: 2019-07-02 | Stop reason: SDUPTHER

## 2019-05-29 ENCOUNTER — HOSPITAL ENCOUNTER (OUTPATIENT)
Dept: GENERAL RADIOLOGY | Facility: HOSPITAL | Age: 59
Discharge: HOME | End: 2019-05-29

## 2019-06-17 DIAGNOSIS — F41.9 ANXIETY: ICD-10-CM

## 2019-06-17 RX ORDER — LORAZEPAM 1 MG/1
1 TABLET ORAL DAILY PRN
Qty: 90 TABLET | Refills: 0 | OUTPATIENT
Start: 2019-06-17 | End: 2019-09-09 | Stop reason: SDUPTHER

## 2019-06-17 NOTE — TELEPHONE ENCOUNTER
----- Message from Doreen Barone sent at 6/17/2019 10:03 AM EDT -----  Contact: Patient  Patient called requesting refill on    LORazepam (ATIVAN) 1 MG tablet.  FYI: patient has appt 07/02/2019.    Patient:  [phone]    [facility]- [facility], [place] - [address] - [phone]  - [phone] FX

## 2019-06-17 NOTE — TELEPHONE ENCOUNTER
Please review refill request:    Last OV: 5/7/19    Last Prescribed: 3/6/19    ALEXEI: Good until 8/13/19    Thank you,    Von

## 2019-06-18 DIAGNOSIS — F41.9 ANXIETY: ICD-10-CM

## 2019-06-18 RX ORDER — LORAZEPAM 1 MG/1
TABLET ORAL
Qty: 90 TABLET | OUTPATIENT
Start: 2019-06-18

## 2019-06-20 RX ORDER — BACLOFEN 20 MG/1
TABLET ORAL
Qty: 90 TABLET | Refills: 1 | Status: SHIPPED | OUTPATIENT
Start: 2019-06-20 | End: 2019-10-01

## 2019-07-02 ENCOUNTER — OFFICE VISIT (OUTPATIENT)
Dept: INTERNAL MEDICINE | Facility: CLINIC | Age: 59
End: 2019-07-02

## 2019-07-02 VITALS
RESPIRATION RATE: 15 BRPM | WEIGHT: 164.2 LBS | HEART RATE: 67 BPM | OXYGEN SATURATION: 99 % | DIASTOLIC BLOOD PRESSURE: 86 MMHG | HEIGHT: 66 IN | SYSTOLIC BLOOD PRESSURE: 122 MMHG | TEMPERATURE: 98 F | BODY MASS INDEX: 26.39 KG/M2

## 2019-07-02 DIAGNOSIS — M79.7 FIBROMYALGIA: ICD-10-CM

## 2019-07-02 DIAGNOSIS — E53.8 VITAMIN B 12 DEFICIENCY: ICD-10-CM

## 2019-07-02 DIAGNOSIS — G89.29 CHRONIC LOW BACK PAIN WITHOUT SCIATICA, UNSPECIFIED BACK PAIN LATERALITY: ICD-10-CM

## 2019-07-02 DIAGNOSIS — F41.9 ANXIETY: ICD-10-CM

## 2019-07-02 DIAGNOSIS — E03.9 ACQUIRED HYPOTHYROIDISM: ICD-10-CM

## 2019-07-02 DIAGNOSIS — Z78.0 MENOPAUSE: ICD-10-CM

## 2019-07-02 DIAGNOSIS — Z12.11 COLON CANCER SCREENING: ICD-10-CM

## 2019-07-02 DIAGNOSIS — R42 LIGHTHEADEDNESS: ICD-10-CM

## 2019-07-02 DIAGNOSIS — G89.4 CHRONIC PAIN DISORDER: ICD-10-CM

## 2019-07-02 DIAGNOSIS — Z12.72 SCREENING FOR VAGINAL CANCER: ICD-10-CM

## 2019-07-02 DIAGNOSIS — Z00.00 MEDICARE ANNUAL WELLNESS VISIT, SUBSEQUENT: ICD-10-CM

## 2019-07-02 DIAGNOSIS — Z12.4 CERVICAL CANCER SCREENING: ICD-10-CM

## 2019-07-02 DIAGNOSIS — E78.49 OTHER HYPERLIPIDEMIA: ICD-10-CM

## 2019-07-02 DIAGNOSIS — M54.2 NECK PAIN: ICD-10-CM

## 2019-07-02 DIAGNOSIS — R10.2 PELVIC PAIN: ICD-10-CM

## 2019-07-02 DIAGNOSIS — Z87.19 HISTORY OF IBS: ICD-10-CM

## 2019-07-02 DIAGNOSIS — J30.2 SEASONAL ALLERGIC RHINITIS, UNSPECIFIED TRIGGER: ICD-10-CM

## 2019-07-02 DIAGNOSIS — M54.50 CHRONIC LOW BACK PAIN WITHOUT SCIATICA, UNSPECIFIED BACK PAIN LATERALITY: ICD-10-CM

## 2019-07-02 DIAGNOSIS — I10 ESSENTIAL HYPERTENSION: ICD-10-CM

## 2019-07-02 DIAGNOSIS — Z00.00 ANNUAL PHYSICAL EXAM: Primary | ICD-10-CM

## 2019-07-02 DIAGNOSIS — Z72.0 TOBACCO ABUSE: ICD-10-CM

## 2019-07-02 PROCEDURE — 99396 PREV VISIT EST AGE 40-64: CPT | Performed by: INTERNAL MEDICINE

## 2019-07-02 PROCEDURE — 82274 ASSAY TEST FOR BLOOD FECAL: CPT | Performed by: INTERNAL MEDICINE

## 2019-07-02 PROCEDURE — G0439 PPPS, SUBSEQ VISIT: HCPCS | Performed by: INTERNAL MEDICINE

## 2019-07-02 RX ORDER — ARIPIPRAZOLE 10 MG/1
10 TABLET ORAL DAILY
Refills: 1 | COMMUNITY
Start: 2019-05-21 | End: 2021-08-24

## 2019-07-02 NOTE — PROGRESS NOTES
"Subjective   Derek Keller is a 58 y.o. female here for   Chief Complaint   Patient presents with   • Medicare Wellness-subsequent   • Anxiety   • Depression   • Hyperlipidemia   • Hypertension   • Hypothyroidism   • Hip Pain     right hip x 2 months   • Pelvic Pain     right pelvic with numbness x 2 month   .    Vitals:    07/02/19 0954   BP: 122/86   BP Location: Left arm   Patient Position: Sitting   Cuff Size: Adult   Pulse: 67   Resp: 15   Temp: 98 °F (36.7 °C)   TempSrc: Temporal   SpO2: 99%   Weight: 74.5 kg (164 lb 3.2 oz)   Height: 166.4 cm (65.5\")       Body mass index is 26.91 kg/m².    Anxiety   Presents for follow-up visit. Symptoms include nervous/anxious behavior. Patient reports no chest pain, confusion, decreased concentration, dizziness, nausea, palpitations, shortness of breath or suicidal ideas.     Her past medical history is significant for depression.   Depression   Visit Type: follow-up  Patient presents with the following symptoms: nervousness/anxiety.  Patient is not experiencing: choking sensation, confusion, decreased concentration, palpitations, shortness of breath and suicidal ideas.    Hyperlipidemia   This is a chronic problem. The current episode started more than 1 year ago. The problem is controlled. Recent lipid tests were reviewed and are normal. Exacerbating diseases include hypothyroidism. She has no history of diabetes. Pertinent negatives include no chest pain, myalgias or shortness of breath.   Hypertension   This is a chronic problem. The current episode started more than 1 year ago. The problem is unchanged. The problem is controlled. Associated symptoms include anxiety and neck pain. Pertinent negatives include no chest pain, headaches, palpitations or shortness of breath.   Hypothyroidism   This is a chronic problem. The current episode started more than 1 year ago. The problem occurs constantly. The problem has been unchanged. Associated symptoms include abdominal pain " (occ pain - resolves with BM), arthralgias (right hip pain), fatigue, neck pain and numbness (over right pubic bone for 2 mos). Pertinent negatives include no chest pain, chills, congestion, coughing, fever, headaches, joint swelling, myalgias, nausea, rash, sore throat, vomiting or weakness.   Hip Pain    The incident occurred more than 1 week ago. There was no injury mechanism. The pain is present in the right hip. The pain has been improving since onset. Associated symptoms include numbness (over right pubic bone for 2 mos).   Pelvic Pain   The patient's primary symptoms include pelvic pain. The patient's pertinent negatives include no genital lesions, genital odor, genital rash, vaginal bleeding or vaginal discharge. This is a recurrent problem. The current episode started more than 1 month ago. The problem occurs intermittently. The problem has been unchanged. The pain is mild. Associated symptoms include abdominal pain (occ pain - resolves with BM), back pain, constipation (occ), diarrhea (occ) and urgency. Pertinent negatives include no chills, dysuria, fever, flank pain, frequency, headaches, hematuria, nausea, rash, sore throat or vomiting. Nothing aggravates the symptoms. She has tried heating pads for the symptoms. The treatment provided mild relief. She uses nothing for contraception. She is postmenopausal.        The following portions of the patient's history were reviewed and updated as appropriate: allergies, current medications, past social history and problem list.    Review of Systems   Constitutional: Positive for fatigue. Negative for appetite change, chills, fever and unexpected weight change.   HENT: Negative for congestion, ear pain, mouth sores, sinus pressure, sore throat, tinnitus, trouble swallowing and voice change.    Eyes: Positive for visual disturbance (occ blurriness -to see eye doc). Negative for pain.   Respiratory: Negative for cough, choking, shortness of breath and wheezing.     Cardiovascular: Negative for chest pain, palpitations and leg swelling.   Gastrointestinal: Positive for abdominal pain (occ pain - resolves with BM), constipation (occ) and diarrhea (occ). Negative for blood in stool, nausea and vomiting.   Endocrine: Positive for polydipsia. Negative for cold intolerance, heat intolerance and polyuria.   Genitourinary: Positive for enuresis (2-3x), pelvic pain and urgency. Negative for difficulty urinating, dysuria, flank pain, frequency, hematuria, vaginal bleeding and vaginal discharge.   Musculoskeletal: Positive for arthralgias (right hip pain), back pain, neck pain and neck stiffness. Negative for gait problem, joint swelling and myalgias.   Skin: Negative for color change and rash.   Allergic/Immunologic: Positive for environmental allergies. Negative for food allergies and immunocompromised state.   Neurological: Positive for light-headedness and numbness (over right pubic bone for 2 mos). Negative for dizziness, tremors, seizures, syncope, speech difficulty, weakness and headaches.   Hematological: Negative for adenopathy. Does not bruise/bleed easily.   Psychiatric/Behavioral: Positive for dysphoric mood and sleep disturbance (taking trazodone). Negative for agitation, confusion, decreased concentration and suicidal ideas. The patient is nervous/anxious.        Objective   Physical Exam   Constitutional: She appears well-developed and well-nourished.   HENT:   Right Ear: Hearing, tympanic membrane, external ear and ear canal normal.   Left Ear: Hearing, tympanic membrane, external ear and ear canal normal.   Nose: Right sinus exhibits no maxillary sinus tenderness and no frontal sinus tenderness. Left sinus exhibits no maxillary sinus tenderness and no frontal sinus tenderness.   Eyes: Conjunctivae, EOM and lids are normal. Pupils are equal, round, and reactive to light.   Neck: Trachea normal. Neck supple. No JVD present. Carotid bruit is not present. No tracheal  deviation present. No thyroid mass and no thyromegaly present.   Cardiovascular: Normal rate, regular rhythm, S1 normal and S2 normal. Exam reveals no gallop and no friction rub.   No murmur heard.  Pulses:       Carotid pulses are 2+ on the right side, and 2+ on the left side.       Radial pulses are 2+ on the right side, and 2+ on the left side.        Dorsalis pedis pulses are 2+ on the right side, and 2+ on the left side.        Posterior tibial pulses are 2+ on the right side, and 2+ on the left side.   Pulmonary/Chest: Effort normal and breath sounds normal. Chest wall is not dull to percussion. Right breast exhibits no inverted nipple, no mass, no nipple discharge, no skin change and no tenderness. Left breast exhibits no inverted nipple, no mass, no nipple discharge, no skin change and no tenderness.   Abdominal: Soft. Normal aorta and bowel sounds are normal. She exhibits no abdominal bruit. There is no hepatosplenomegaly. There is no tenderness. There is no rebound and no guarding. No hernia. Hernia confirmed negative in the right inguinal area and confirmed negative in the left inguinal area.   Genitourinary: Rectum normal and vagina normal. Rectal exam shows no mass and guaiac negative stool. There is no rash, tenderness, lesion or injury on the right labia. There is no rash, tenderness, lesion or injury on the left labia. Right adnexum displays no mass, no tenderness and no fullness. Left adnexum displays no mass, no tenderness and no fullness.   Musculoskeletal: Normal range of motion. She exhibits no edema.   Lymphadenopathy:     She has no cervical adenopathy.     She has no axillary adenopathy. No inguinal adenopathy noted on the right or left side.        Right: No supraclavicular adenopathy present.        Left: No supraclavicular adenopathy present.   Neurological: She is alert. She has normal strength. No cranial nerve deficit or sensory deficit. She displays a negative Romberg sign.   Reflex  Scores:       Patellar reflexes are 2+ on the right side and 2+ on the left side.  Skin: Skin is warm and dry.   Nursing note and vitals reviewed.    Vaginal cuff normal (s/p hysterectomy)    Assessment/Plan   Diagnoses and all orders for this visit:    Annual physical exam    Medicare annual wellness visit, subsequent    Essential hypertension  Comments:  controlled - need weekly chk & call if bp over 140/90    Other hyperlipidemia  Comments:  need diet/ex    Seasonal allergic rhinitis, unspecified trigger    Vitamin B 12 deficiency  Comments:  need daily vit B    Acquired hypothyroidism  Comments:  need rechk    Neck pain  Comments:  need daily stretches    Chronic pain disorder  Comments:  not taking narcotics now    Chronic low back pain without sciatica, unspecified back pain laterality  Comments:  need daily exercise - return for LS spine xray - consider PT    Fibromyalgia  Comments:  controlled - call if worse    Lightheadedness  Comments:  off &on o- call if worse    Tobacco abuse  Comments:  still smoking 1 ppd - she had normal exam per pulmonary 2018    Anxiety  Comments:  slt better with abilify  Orders:  -     ARIPiprazole (ABILIFY) 10 MG tablet; Take 10 mg by mouth Daily.    History of IBS  Comments:  need daily fiber    Menopause  -     DEXA Bone Density Axial; Future    Colon cancer screening  -     POC FECAL OCCULT BLOOD BY IMMUNOASSAY    Cervical cancer screening  -     Cancel: Pap IG, HPV-hr; Future  -     Pap IG, HPV-hr    Pelvic pain  Comments:  mild, off & on for 2 mos - normal exam today - need to call if incr sx     Screening for vaginal cancer  -     Liquid-based Pap Smear, Screening; Future    . Wellness today.   Need daily strengthening & balance exercises (shown today).   Need screening for AAA & carotid disease.  Information given today.     Need Tdap, pneu vaccine, c-scope, bone d.   She declines lung cancer screen.

## 2019-07-02 NOTE — PATIENT INSTRUCTIONS
Health Maintenance, Female  Adopting a healthy lifestyle and getting preventive care can go a long way to promote health and wellness. Talk with your health care provider about what schedule of regular examinations is right for you. This is a good chance for you to check in with your provider about disease prevention and staying healthy.  In between checkups, there are plenty of things you can do on your own. Experts have done a lot of research about which lifestyle changes and preventive measures are most likely to keep you healthy. Ask your health care provider for more information.  Weight and diet  Eat a healthy diet  · Be sure to include plenty of vegetables, fruits, low-fat dairy products, and lean protein.  · Do not eat a lot of foods high in solid fats, added sugars, or salt.  · Get regular exercise. This is one of the most important things you can do for your health.  ? Most adults should exercise for at least 150 minutes each week. The exercise should increase your heart rate and make you sweat (moderate-intensity exercise).  ? Most adults should also do strengthening exercises at least twice a week. This is in addition to the moderate-intensity exercise.    Maintain a healthy weight  · Body mass index (BMI) is a measurement that can be used to identify possible weight problems. It estimates body fat based on height and weight. Your health care provider can help determine your BMI and help you achieve or maintain a healthy weight.  · For females 20 years of age and older:  ? A BMI below 18.5 is considered underweight.  ? A BMI of 18.5 to 24.9 is normal.  ? A BMI of 25 to 29.9 is considered overweight.  ? A BMI of 30 and above is considered obese.    Watch levels of cholesterol and blood lipids  · You should start having your blood tested for lipids and cholesterol at 20 years of age, then have this test every 5 years.  · You may need to have your cholesterol levels checked more often if:  ? Your lipid or  cholesterol levels are high.  ? You are older than 50 years of age.  ? You are at high risk for heart disease.    Cancer screening  Lung Cancer  · Lung cancer screening is recommended for adults 55-80 years old who are at high risk for lung cancer because of a history of smoking.  · A yearly low-dose CT scan of the lungs is recommended for people who:  ? Currently smoke.  ? Have quit within the past 15 years.  ? Have at least a 30-pack-year history of smoking. A pack year is smoking an average of one pack of cigarettes a day for 1 year.  · Yearly screening should continue until it has been 15 years since you quit.  · Yearly screening should stop if you develop a health problem that would prevent you from having lung cancer treatment.    Breast Cancer  · Practice breast self-awareness. This means understanding how your breasts normally appear and feel.  · It also means doing regular breast self-exams. Let your health care provider know about any changes, no matter how small.  · If you are in your 20s or 30s, you should have a clinical breast exam (CBE) by a health care provider every 1-3 years as part of a regular health exam.  · If you are 40 or older, have a CBE every year. Also consider having a breast X-ray (mammogram) every year.  · If you have a family history of breast cancer, talk to your health care provider about genetic screening.  · If you are at high risk for breast cancer, talk to your health care provider about having an MRI and a mammogram every year.  · Breast cancer gene (BRCA) assessment is recommended for women who have family members with BRCA-related cancers. BRCA-related cancers include:  ? Breast.  ? Ovarian.  ? Tubal.  ? Peritoneal cancers.  · Results of the assessment will determine the need for genetic counseling and BRCA1 and BRCA2 testing.    Cervical Cancer  Your health care provider may recommend that you be screened regularly for cancer of the pelvic organs (ovaries, uterus, and  vagina). This screening involves a pelvic examination, including checking for microscopic changes to the surface of your cervix (Pap test). You may be encouraged to have this screening done every 3 years, beginning at age 21.  · For women ages 30-65, health care providers may recommend pelvic exams and Pap testing every 3 years, or they may recommend the Pap and pelvic exam, combined with testing for human papilloma virus (HPV), every 5 years. Some types of HPV increase your risk of cervical cancer. Testing for HPV may also be done on women of any age with unclear Pap test results.  · Other health care providers may not recommend any screening for nonpregnant women who are considered low risk for pelvic cancer and who do not have symptoms. Ask your health care provider if a screening pelvic exam is right for you.  · If you have had past treatment for cervical cancer or a condition that could lead to cancer, you need Pap tests and screening for cancer for at least 20 years after your treatment. If Pap tests have been discontinued, your risk factors (such as having a new sexual partner) need to be reassessed to determine if screening should resume. Some women have medical problems that increase the chance of getting cervical cancer. In these cases, your health care provider may recommend more frequent screening and Pap tests.    Colorectal Cancer  · This type of cancer can be detected and often prevented.  · Routine colorectal cancer screening usually begins at 50 years of age and continues through 75 years of age.  · Your health care provider may recommend screening at an earlier age if you have risk factors for colon cancer.  · Your health care provider may also recommend using home test kits to check for hidden blood in the stool.  · A small camera at the end of a tube can be used to examine your colon directly (sigmoidoscopy or colonoscopy). This is done to check for the earliest forms of colorectal  cancer.  · Routine screening usually begins at age 50.  · Direct examination of the colon should be repeated every 5-10 years through 75 years of age. However, you may need to be screened more often if early forms of precancerous polyps or small growths are found.    Skin Cancer  · Check your skin from head to toe regularly.  · Tell your health care provider about any new moles or changes in moles, especially if there is a change in a mole's shape or color.  · Also tell your health care provider if you have a mole that is larger than the size of a pencil eraser.  · Always use sunscreen. Apply sunscreen liberally and repeatedly throughout the day.  · Protect yourself by wearing long sleeves, pants, a wide-brimmed hat, and sunglasses whenever you are outside.    Heart disease, diabetes, and high blood pressure  · High blood pressure causes heart disease and increases the risk of stroke. High blood pressure is more likely to develop in:  ? People who have blood pressure in the high end of the normal range (130-139/85-89 mm Hg).  ? People who are overweight or obese.  ? People who are .  · If you are 18-39 years of age, have your blood pressure checked every 3-5 years. If you are 40 years of age or older, have your blood pressure checked every year. You should have your blood pressure measured twice--once when you are at a hospital or clinic, and once when you are not at a hospital or clinic. Record the average of the two measurements. To check your blood pressure when you are not at a hospital or clinic, you can use:  ? An automated blood pressure machine at a pharmacy.  ? A home blood pressure monitor.  · If you are between 55 years and 79 years old, ask your health care provider if you should take aspirin to prevent strokes.  · Have regular diabetes screenings. This involves taking a blood sample to check your fasting blood sugar level.  ? If you are at a normal weight and have a low risk for  diabetes, have this test once every three years after 45 years of age.  ? If you are overweight and have a high risk for diabetes, consider being tested at a younger age or more often.  Preventing infection  Hepatitis B  · If you have a higher risk for hepatitis B, you should be screened for this virus. You are considered at high risk for hepatitis B if:  ? You were born in a country where hepatitis B is common. Ask your health care provider which countries are considered high risk.  ? Your parents were born in a high-risk country, and you have not been immunized against hepatitis B (hepatitis B vaccine).  ? You have HIV or AIDS.  ? You use needles to inject street drugs.  ? You live with someone who has hepatitis B.  ? You have had sex with someone who has hepatitis B.  ? You get hemodialysis treatment.  ? You take certain medicines for conditions, including cancer, organ transplantation, and autoimmune conditions.    Hepatitis C  · Blood testing is recommended for:  ? Everyone born from 1945 through 1965.  ? Anyone with known risk factors for hepatitis C.    Sexually transmitted infections (STIs)  · You should be screened for sexually transmitted infections (STIs) including gonorrhea and chlamydia if:  ? You are sexually active and are younger than 24 years of age.  ? You are older than 24 years of age and your health care provider tells you that you are at risk for this type of infection.  ? Your sexual activity has changed since you were last screened and you are at an increased risk for chlamydia or gonorrhea. Ask your health care provider if you are at risk.  · If you do not have HIV, but are at risk, it may be recommended that you take a prescription medicine daily to prevent HIV infection. This is called pre-exposure prophylaxis (PrEP). You are considered at risk if:  ? You are sexually active and do not regularly use condoms or know the HIV status of your partner(s).  ? You take drugs by injection.  ? You  are sexually active with a partner who has HIV.    Talk with your health care provider about whether you are at high risk of being infected with HIV. If you choose to begin PrEP, you should first be tested for HIV. You should then be tested every 3 months for as long as you are taking PrEP.  Pregnancy  · If you are premenopausal and you may become pregnant, ask your health care provider about preconception counseling.  · If you may become pregnant, take 400 to 800 micrograms (mcg) of folic acid every day.  · If you want to prevent pregnancy, talk to your health care provider about birth control (contraception).  Osteoporosis and menopause  · Osteoporosis is a disease in which the bones lose minerals and strength with aging. This can result in serious bone fractures. Your risk for osteoporosis can be identified using a bone density scan.  · If you are 65 years of age or older, or if you are at risk for osteoporosis and fractures, ask your health care provider if you should be screened.  · Ask your health care provider whether you should take a calcium or vitamin D supplement to lower your risk for osteoporosis.  · Menopause may have certain physical symptoms and risks.  · Hormone replacement therapy may reduce some of these symptoms and risks.  Talk to your health care provider about whether hormone replacement therapy is right for you.  Follow these instructions at home:  · Schedule regular health, dental, and eye exams.  · Stay current with your immunizations.  · Do not use any tobacco products including cigarettes, chewing tobacco, or electronic cigarettes.  · If you are pregnant, do not drink alcohol.  · If you are breastfeeding, limit how much and how often you drink alcohol.  · Limit alcohol intake to no more than 1 drink per day for nonpregnant women. One drink equals 12 ounces of beer, 5 ounces of wine, or 1½ ounces of hard liquor.  · Do not use street drugs.  · Do not share needles.  · Ask your health care  provider for help if you need support or information about quitting drugs.  · Tell your health care provider if you often feel depressed.  · Tell your health care provider if you have ever been abused or do not feel safe at home.  This information is not intended to replace advice given to you by your health care provider. Make sure you discuss any questions you have with your health care provider.  Document Released: 2012 Document Revised: 2017 Document Reviewed: 2016  [employer] Interactive Patient Education © 2019 [employer]      Medicare Wellness  Personal Prevention Plan of Service     Date of Office Visit:  2019  Encounter Provider:  Yudelka Manning MD  Place of Service:  [facility] INTERNAL MEDICINE  Patient Name: Derek Keller  :  1960    As part of the Medicare Wellness portion of your visit today, we are providing you with this personalized preventive plan of services (PPPS). This plan is based upon recommendations of the [place] Preventive Services Task Force (USPSTF) and the Advisory Committee on Immunization Practices (ACIP).    This lists the preventive care services that should be considered, and provides dates of when you are due. Items listed as completed are up-to-date and do not require any further intervention.    Health Maintenance   Topic Date Due   • PNEUMOCOCCAL VACCINE (19-64 MEDIUM RISK) (1 of 1 - PPSV23) 1978   • COLONOSCOPY  2016   • TDAP/TD VACCINES (2 - Td) 2018   • DXA SCAN  10/28/2018   • LUNG CANCER SCREENING  2020 (Originally 2014)   • ZOSTER VACCINE (1 of 2) 07/10/2020 (Originally 2009)   • INFLUENZA VACCINE  2019   • LIPID PANEL  2020   • MEDICARE ANNUAL WELLNESS  2020   • ANNUAL GYN EXAM  2020   • MAMMOGRAM  2020   • HEPATITIS C SCREENING  Completed   • PAP SMEAR  Discontinued       Orders Placed This Encounter   Procedures   • DEXA Bone Density Axial      Standing Status:   Future     Standing Expiration Date:   7/1/2020     Order Specific Question:   Reason for Exam:     Answer:   menopause       No Follow-up on file.

## 2019-07-02 NOTE — PROGRESS NOTES
QUICK REFERENCE INFORMATION:  The ABCs of the Annual Wellness Visit    Subsequent Medicare Wellness Visit     HEALTH RISK ASSESSMENT    : 1960    Recent Hospitalizations:  No hospitalization(s) within the last year..  ccc      Current Medical Providers:  Patient Care Team:  Yudelka Manning MD as PCP - General (Internal Medicine)  Julien Cardona DO as Consulting Physician (Pain Medicine)  Joel Castillo MD as Consulting Physician (Gastroenterology)  Remington Russell MD as Surgeon (General Surgery)  Ahsan Salas MD as Consulting Physician (Sports Medicine)        Smoking Status:  Social History     Tobacco Use   Smoking Status Current Every Day Smoker   • Packs/day: 1.00   • Years: 40.00   • Pack years: 40.00   • Types: Cigarettes   Smokeless Tobacco Never Used   Tobacco Comment    caffeine use 3 mt dews daily       Alcohol Consumption:  Social History     Substance and Sexual Activity   Alcohol Use No       Depression Screen:   PHQ-2/PHQ-9 Depression Screening 2019   Little interest or pleasure in doing things 0   Feeling down, depressed, or hopeless 0   Trouble falling or staying asleep, or sleeping too much 0   Feeling tired or having little energy 0   Poor appetite or overeating 0   Feeling bad about yourself - or that you are a failure or have let yourself or your family down 0   Trouble concentrating on things, such as reading the newspaper or watching television 0   Moving or speaking so slowly that other people could have noticed. Or the opposite - being so fidgety or restless that you have been moving around a lot more than usual 0   Thoughts that you would be better off dead, or of hurting yourself in some way 0   Total Score 0   If you checked off any problems, how difficult have these problems made it for you to do your work, take care of things at home, or get along with other people? -       Health Habits and Functional and Cognitive Screening:  Functional & Cognitive Status  7/2/2019   Do you have difficulty preparing food and eating? No   Do you have difficulty bathing yourself, getting dressed or grooming yourself? No   Do you have difficulty using the toilet? No   Do you have difficulty moving around from place to place? No   Do you have trouble with steps or getting out of a bed or a chair? No   In the past year have you fallen or experienced a near fall? No   Current Diet Frequent Junk Food   Dental Exam Up to date   Eye Exam Not up to date   Exercise (times per week) 0 times per week   Current Exercise Activities Include None   Do you need help using the phone?  No   Are you deaf or do you have serious difficulty hearing?  No   Do you need help with transportation? No   Do you need help shopping? No   Do you need help preparing meals?  No   Do you need help with housework?  No   Do you need help with laundry? No   Do you need help taking your medications? No   Do you need help managing money? No   Do you ever drive or ride in a car without wearing a seat belt? No   Have you felt unusual stress, anger or loneliness in the last month? Yes   Who do you live with? Other   If you need help, do you have trouble finding someone available to you? No   Have you been bothered in the last four weeks by sexual problems? No   Do you have difficulty concentrating, remembering or making decisions? Yes           Does the patient have evidence of cognitive impairment? No    Asiprin use counseling: Does not need ASA (and currently is not on it)      Recent Lab Results:          Lab Results   Component Value Date    CHOL 143 04/23/2019    TRIG 139 04/23/2019    HDL 43 04/23/2019    VLDL 27.8 04/23/2019    LDLHDL 1.68 04/23/2019           Age-appropriate Screening Schedule:  Refer to the list below for future screening recommendations based on patient's age, sex and/or medical conditions. Orders for these recommended tests are listed in the plan section. The patient has been provided with a written  plan.    Health Maintenance   Topic Date Due   • PNEUMOCOCCAL VACCINE (19-64 MEDIUM RISK) (1 of 1 - PPSV23) 09/25/1978   • COLONOSCOPY  04/08/2016   • TDAP/TD VACCINES (2 - Td) 08/04/2018   • DXA SCAN  10/28/2018   • ZOSTER VACCINE (1 of 2) 07/10/2020 (Originally 9/25/2009)   • INFLUENZA VACCINE  08/01/2019   • LIPID PANEL  04/23/2020   • MAMMOGRAM  08/27/2020   • PAP SMEAR  Discontinued        Subjective   History of Present Illness    Derek Keller is a 58 y.o. female who presents for an Annual Wellness Visit.    The following portions of the patient's history were reviewed and updated as appropriate: allergies, current medications, past family history, past medical history, past social history, past surgical history and problem list.    Outpatient Medications Prior to Visit   Medication Sig Dispense Refill   • ARIPiprazole (ABILIFY) 10 MG tablet Take 10 mg by mouth Daily.  1   • atorvastatin (LIPITOR) 20 MG tablet TAKE ONE TABLET BY MOUTH EVERY DAY 30 tablet 6   • azelastine (ASTELIN) 0.1 % nasal spray 2 sprays into the nostril(s) as directed by provider 2 (Two) Times a Day. Use in each nostril as directed 1 each 12   • baclofen (LIORESAL) 20 MG tablet TAKE 1 TABLET BY MOUTH THREE TIMES DAILY 90 tablet 1   • buPROPion XL (WELLBUTRIN XL) 300 MG 24 hr tablet TAKE 1 TABLET BY MOUTH DAILY. 30 tablet 4   • dicyclomine (BENTYL) 20 MG tablet Take 1 tablet by mouth Every 6 (Six) Hours. Prn abd discomfort 120 tablet 6   • fluticasone (FLONASE) 50 MCG/ACT nasal spray 2 sprays by Each Nare route Daily.  12   • ibuprofen (ADVIL,MOTRIN) 600 MG tablet TAKE 1 TABLET BY MOUTH EVERY 8 HOURS AS NEEDED 90 tablet 3   • levothyroxine (SYNTHROID, LEVOTHROID) 50 MCG tablet TAKE ONE TABLET BY MOUTH EVERY DAY 90 tablet 1   • LORazepam (ATIVAN) 1 MG tablet Take 1 tablet by mouth Daily As Needed for Anxiety. Only if really needed & not on same day as hydrocodone. 90 tablet 0   • losartan (COZAAR) 100 MG tablet TAKE ONE-HALF TO ONE TABLET  BY MOUTH EVERY DAY **STOP LISINOPRIL/HCTZ** 30 tablet 5   • montelukast (SINGULAIR) 10 MG tablet TAKE ONE TABLET BY MOUTH EVERY DAY 30 tablet 11   • solifenacin (VESICARE) 10 MG tablet Take 1 tablet by mouth Daily. 30 tablet 12   • traZODone (DESYREL) 150 MG tablet TAKE ONE TABLET BY MOUTH ONCE NIGHTLY 90 tablet 2   • ARIPiprazole (ABILIFY) 5 MG tablet Take 5 mg by mouth Daily.     • baclofen (LIORESAL) 20 MG tablet TAKE 1 TABLET BY MOUTH THREE TIMES DAILY 90 tablet 1   • cyclobenzaprine (FLEXERIL) 10 MG tablet Take 10 mg by mouth 3 (Three) Times a Day As Needed for Muscle Spasms.     • diclofenac (VOLTAREN) 50 MG EC tablet Take 50 mg by mouth 2 (Two) Times a Day As Needed.     • fluticasone-salmeterol (ADVAIR DISKUS) 100-50 MCG/DOSE DISKUS Inhale 1 puff 2 (Two) Times a Day. 1 each 0   • guaiFENesin (MUCINEX) 600 MG 12 hr tablet Take 2 tablets by mouth 2 (Two) Times a Day. 120 tablet 12   • HYDROcodone-acetaminophen (NORCO) 5-325 MG per tablet Take 1 tablet by mouth Every 6 (Six) Hours As Needed for Moderate Pain . 90 tablet 0   • lidocaine (LIDODERM) 5 % Place 1 patch on the skin as directed by provider Daily. Remove & Discard patch within 12 hours or as directed by MD     • predniSONE (DELTASONE) 10 MG (21) tablet pack Take 4 mL by mouth Daily. 4 mg, Use as directed on package       No facility-administered medications prior to visit.        Patient Active Problem List   Diagnosis   • Other chest pain   • Hypertension   • Hyperlipidemia   • Allergic rhinitis   • Hypothyroidism   • Neck pain   • Lumbago   • Arthritis   • Chronic pain disorder   • History of IBS   • Anxiety   • Malaise and fatigue   • Tobacco abuse   • Fibromyalgia   • Vitamin B 12 deficiency   • Acute pain of left knee   • Primary osteoarthritis of left knee   • Osteoarthrosis, localized, primary, knee   • Generalized abdominal pain   • Herpes simplex vulvovaginitis   • Lightheadedness   • Cough   • Hypoxemia       Advance Care Planning:  Patient  "does not have an advance directive - information provided to the patient today    Identification of Risk Factors:  Risk factors include: weight , unhealthy diet, cardiovascular risk, inactivity, tobacco use, increased fall risk, chronic pain, depression and incontinence.    Review of Systems    Compared to one year ago, the patient feels her physical health is better.  Compared to one year ago, the patient feels her mental health is better.    Objective     Physical Exam    Vitals:    07/02/19 0954   BP: 122/86   BP Location: Left arm   Patient Position: Sitting   Cuff Size: Adult   Pulse: 67   Resp: 15   Temp: 98 °F (36.7 °C)   TempSrc: Temporal   SpO2: 99%   Weight: 74.5 kg (164 lb 3.2 oz)   Height: 166.4 cm (65.5\")   PainSc: 0-No pain       Patient's Body mass index is 26.91 kg/m². BMI is above normal parameters. Recommendations include: exercise counseling and nutrition counseling.      Assessment/Plan   Patient Self-Management and Personalized Health Advice  The patient has been provided with information about: diet, exercise, weight management, prevention of cardiac or vascular disease, the relationship between weight and GERD, tobacco cessation, fall prevention and designing advance directives and preventive services including:   · Advance directive, Bone densitometry screening, Colorectal cancer screening, colonoscopy referral placed, Counseling for cardiovascular disease risk reduction, Diabetes screening, see lab orders, Exercise counseling provided, Fall Risk assessment done, Fall Risk plan of care done, Glaucoma screening recommended, Nutrition counseling provided, Pneumococcal vaccine , Screening for AAA, referral for ultrasound placed, Screening Pap smear and pelvic exam , Smoking cessation counseling completed, TdaP vaccine, Urinary Incontinence assessment done, Zostavax vaccine (Herpes Zoster).    Visit Diagnoses:    ICD-10-CM ICD-9-CM   1. Annual physical exam Z00.00 V70.0   2. Medicare annual " wellness visit, subsequent Z00.00 V70.0   3. Essential hypertension I10 401.9   4. Other hyperlipidemia E78.49 272.4   5. Seasonal allergic rhinitis, unspecified trigger J30.2 477.9   6. Vitamin B 12 deficiency E53.8 266.2   7. Acquired hypothyroidism E03.9 244.9   8. Neck pain M54.2 723.1   9. Chronic pain disorder G89.4 338.4   10. Chronic low back pain without sciatica, unspecified back pain laterality M54.5 724.2    G89.29 338.29   11. Fibromyalgia M79.7 729.1   12. Lightheadedness R42 780.4   13. Tobacco abuse Z72.0 305.1   14. Anxiety F41.9 300.00   15. History of IBS Z87.19 V12.79   16. Menopause Z78.0 627.2   17. Colon cancer screening Z12.11 V76.51   18. Cervical cancer screening Z12.4 V76.2       Orders Placed This Encounter   Procedures   • DEXA Bone Density Axial     Standing Status:   Future     Standing Expiration Date:   7/1/2020     Order Specific Question:   Reason for Exam:     Answer:   menopause   • POC FECAL OCCULT BLOOD BY IMMUNOASSAY       Outpatient Encounter Medications as of 7/2/2019   Medication Sig Dispense Refill   • ARIPiprazole (ABILIFY) 10 MG tablet Take 10 mg by mouth Daily.  1   • atorvastatin (LIPITOR) 20 MG tablet TAKE ONE TABLET BY MOUTH EVERY DAY 30 tablet 6   • azelastine (ASTELIN) 0.1 % nasal spray 2 sprays into the nostril(s) as directed by provider 2 (Two) Times a Day. Use in each nostril as directed 1 each 12   • baclofen (LIORESAL) 20 MG tablet TAKE 1 TABLET BY MOUTH THREE TIMES DAILY 90 tablet 1   • buPROPion XL (WELLBUTRIN XL) 300 MG 24 hr tablet TAKE 1 TABLET BY MOUTH DAILY. 30 tablet 4   • dicyclomine (BENTYL) 20 MG tablet Take 1 tablet by mouth Every 6 (Six) Hours. Prn abd discomfort 120 tablet 6   • fluticasone (FLONASE) 50 MCG/ACT nasal spray 2 sprays by Each Nare route Daily.  12   • ibuprofen (ADVIL,MOTRIN) 600 MG tablet TAKE 1 TABLET BY MOUTH EVERY 8 HOURS AS NEEDED 90 tablet 3   • levothyroxine (SYNTHROID, LEVOTHROID) 50 MCG tablet TAKE ONE TABLET BY MOUTH EVERY  DAY 90 tablet 1   • LORazepam (ATIVAN) 1 MG tablet Take 1 tablet by mouth Daily As Needed for Anxiety. Only if really needed & not on same day as hydrocodone. 90 tablet 0   • losartan (COZAAR) 100 MG tablet TAKE ONE-HALF TO ONE TABLET BY MOUTH EVERY DAY **STOP LISINOPRIL/HCTZ** 30 tablet 5   • montelukast (SINGULAIR) 10 MG tablet TAKE ONE TABLET BY MOUTH EVERY DAY 30 tablet 11   • solifenacin (VESICARE) 10 MG tablet Take 1 tablet by mouth Daily. 30 tablet 12   • traZODone (DESYREL) 150 MG tablet TAKE ONE TABLET BY MOUTH ONCE NIGHTLY 90 tablet 2   • [DISCONTINUED] ARIPiprazole (ABILIFY) 5 MG tablet Take 5 mg by mouth Daily.     • [DISCONTINUED] baclofen (LIORESAL) 20 MG tablet TAKE 1 TABLET BY MOUTH THREE TIMES DAILY 90 tablet 1   • [DISCONTINUED] cyclobenzaprine (FLEXERIL) 10 MG tablet Take 10 mg by mouth 3 (Three) Times a Day As Needed for Muscle Spasms.     • [DISCONTINUED] diclofenac (VOLTAREN) 50 MG EC tablet Take 50 mg by mouth 2 (Two) Times a Day As Needed.     • [DISCONTINUED] fluticasone-salmeterol (ADVAIR DISKUS) 100-50 MCG/DOSE DISKUS Inhale 1 puff 2 (Two) Times a Day. 1 each 0   • [DISCONTINUED] guaiFENesin (MUCINEX) 600 MG 12 hr tablet Take 2 tablets by mouth 2 (Two) Times a Day. 120 tablet 12   • [DISCONTINUED] HYDROcodone-acetaminophen (NORCO) 5-325 MG per tablet Take 1 tablet by mouth Every 6 (Six) Hours As Needed for Moderate Pain . 90 tablet 0   • [DISCONTINUED] lidocaine (LIDODERM) 5 % Place 1 patch on the skin as directed by provider Daily. Remove & Discard patch within 12 hours or as directed by MD     • [DISCONTINUED] predniSONE (DELTASONE) 10 MG (21) tablet pack Take 4 mL by mouth Daily. 4 mg, Use as directed on package       No facility-administered encounter medications on file as of 7/2/2019.        Reviewed use of high risk medication in the elderly: yes  Reviewed for potential of harmful drug interactions in the elderly: yes    Follow Up:  Return in about 4 months (around 11/2/2019) for  Recheck.     An After Visit Summary and PPPS with all of these plans were given to the patient.        Will continue ativan 1/d - may decrease further if able.

## 2019-07-03 LAB — HEMOCCULT STL QL IA: NEGATIVE

## 2019-07-06 DIAGNOSIS — F32.A DEPRESSION, UNSPECIFIED DEPRESSION TYPE: ICD-10-CM

## 2019-07-08 LAB
CHROM ANALY OVERALL INTERP-IMP: NORMAL
CONV .: NORMAL
CONV .: NORMAL
CONV PERFORMED BY:: NORMAL
DX ICD CODE: NORMAL
HIV 1 & 2 AB SER-IMP: NORMAL
REF LAB TEST METHOD: NORMAL
STAT OF ADQ CVX/VAG CYTO-IMP: NORMAL

## 2019-07-08 RX ORDER — TRAZODONE HYDROCHLORIDE 150 MG/1
TABLET ORAL
Qty: 90 TABLET | Refills: 2 | Status: SHIPPED | OUTPATIENT
Start: 2019-07-08 | End: 2019-11-11 | Stop reason: SDUPTHER

## 2019-07-19 DIAGNOSIS — E03.9 HYPOTHYROIDISM: ICD-10-CM

## 2019-07-19 RX ORDER — LEVOTHYROXINE SODIUM 0.05 MG/1
TABLET ORAL
Qty: 90 TABLET | Refills: 1 | Status: SHIPPED | OUTPATIENT
Start: 2019-07-19

## 2019-07-19 RX ORDER — SOLIFENACIN SUCCINATE 10 MG/1
10 TABLET, FILM COATED ORAL DAILY
Qty: 30 TABLET | Refills: 12 | Status: SHIPPED | OUTPATIENT
Start: 2019-07-19 | End: 2022-08-10

## 2019-09-09 DIAGNOSIS — F41.9 ANXIETY: ICD-10-CM

## 2019-09-11 RX ORDER — LORAZEPAM 1 MG/1
TABLET ORAL
Qty: 90 TABLET | Refills: 0 | OUTPATIENT
Start: 2019-09-11 | End: 2019-11-12 | Stop reason: SDUPTHER

## 2019-09-11 NOTE — TELEPHONE ENCOUNTER
Please review refill request:    Last OV: 7/2/19    Last Prescribed: 6/17/19    ALEXEI: Ordered    Thank you,    Von

## 2019-09-16 ENCOUNTER — TELEPHONE (OUTPATIENT)
Dept: INTERNAL MEDICINE | Facility: CLINIC | Age: 59
End: 2019-09-16

## 2019-09-16 NOTE — TELEPHONE ENCOUNTER
----- Message from Johana MART Jeancarlos sent at 9/16/2019  9:57 AM EDT -----  Pt calling to see see if you can  Give her something different for sleep.  She takes    ONE TABLET BY MOUTH ONCE NIGHTLY of  trazodone (DESYREL) 150 MG tablet   She stays asleep for about 2 hours and is then up and down all night.  Please advise.    Pt# [phone]           Pharmacy- [facility] [address] [place] [address] 60 - [phone] [facility] [phone] FX

## 2019-09-27 DIAGNOSIS — M62.838 MUSCLE SPASM: Primary | ICD-10-CM

## 2019-09-27 DIAGNOSIS — F32.A DEPRESSION, UNSPECIFIED DEPRESSION TYPE: ICD-10-CM

## 2019-09-27 DIAGNOSIS — E78.5 HYPERLIPIDEMIA: ICD-10-CM

## 2019-09-27 DIAGNOSIS — I10 ESSENTIAL HYPERTENSION: ICD-10-CM

## 2019-09-27 DIAGNOSIS — F41.9 ANXIETY: ICD-10-CM

## 2019-09-27 DIAGNOSIS — F39 MOOD DISORDER (HCC): ICD-10-CM

## 2019-09-27 DIAGNOSIS — J30.9 ALLERGIC RHINITIS: ICD-10-CM

## 2019-09-30 RX ORDER — LOSARTAN POTASSIUM 100 MG/1
TABLET ORAL
Qty: 30 TABLET | Refills: 5 | Status: SHIPPED | OUTPATIENT
Start: 2019-09-30 | End: 2022-07-13 | Stop reason: SDUPTHER

## 2019-09-30 RX ORDER — BUPROPION HYDROCHLORIDE 300 MG/1
TABLET ORAL
Qty: 30 TABLET | Refills: 5 | Status: SHIPPED | OUTPATIENT
Start: 2019-09-30 | End: 2022-07-13 | Stop reason: SDUPTHER

## 2019-10-01 RX ORDER — MONTELUKAST SODIUM 10 MG/1
TABLET ORAL
Qty: 30 TABLET | Refills: 11 | Status: SHIPPED | OUTPATIENT
Start: 2019-10-01 | End: 2022-07-13 | Stop reason: SDUPTHER

## 2019-10-01 RX ORDER — ATORVASTATIN CALCIUM 20 MG/1
TABLET, FILM COATED ORAL
Qty: 30 TABLET | Refills: 6 | Status: SHIPPED | OUTPATIENT
Start: 2019-10-01

## 2019-10-01 RX ORDER — BACLOFEN 20 MG/1
TABLET ORAL
Qty: 90 TABLET | Refills: 1 | Status: SHIPPED | OUTPATIENT
Start: 2019-10-01 | End: 2019-12-06 | Stop reason: SDUPTHER

## 2019-10-01 RX ORDER — LORAZEPAM 1 MG/1
TABLET ORAL
Qty: 90 TABLET | Refills: 0 | OUTPATIENT
Start: 2019-10-01

## 2019-10-01 RX ORDER — BACLOFEN 20 MG/1
TABLET ORAL
Qty: 90 TABLET | Refills: 1 | OUTPATIENT
Start: 2019-10-01

## 2019-10-01 RX ORDER — TRAZODONE HYDROCHLORIDE 150 MG/1
TABLET ORAL
Qty: 90 TABLET | Refills: 2 | OUTPATIENT
Start: 2019-10-01

## 2019-10-01 NOTE — TELEPHONE ENCOUNTER
Refill for Lorazepam was denied as per the pharmacist Ms. Keller picked this rx up on 9/14/19 for # 90 tablets.

## 2019-10-01 NOTE — TELEPHONE ENCOUNTER
I reviewed the chart and see where Baclofen, Singulair, and Atorvastatin need to be refilled.     Thank you,    Von

## 2019-11-11 DIAGNOSIS — F32.A DEPRESSION, UNSPECIFIED DEPRESSION TYPE: ICD-10-CM

## 2019-11-12 ENCOUNTER — OFFICE VISIT (OUTPATIENT)
Dept: INTERNAL MEDICINE | Facility: CLINIC | Age: 59
End: 2019-11-12

## 2019-11-12 VITALS
DIASTOLIC BLOOD PRESSURE: 80 MMHG | BODY MASS INDEX: 26.81 KG/M2 | SYSTOLIC BLOOD PRESSURE: 122 MMHG | TEMPERATURE: 97.6 F | HEIGHT: 66 IN | WEIGHT: 166.8 LBS

## 2019-11-12 DIAGNOSIS — F51.01 PRIMARY INSOMNIA: ICD-10-CM

## 2019-11-12 DIAGNOSIS — Z23 NEED FOR IMMUNIZATION AGAINST INFLUENZA: ICD-10-CM

## 2019-11-12 DIAGNOSIS — E78.49 OTHER HYPERLIPIDEMIA: ICD-10-CM

## 2019-11-12 DIAGNOSIS — F41.9 ANXIETY: ICD-10-CM

## 2019-11-12 DIAGNOSIS — I10 ESSENTIAL HYPERTENSION: Primary | ICD-10-CM

## 2019-11-12 DIAGNOSIS — R53.82 CHRONIC FATIGUE: ICD-10-CM

## 2019-11-12 DIAGNOSIS — G25.81 RESTLESS LEG SYNDROME: ICD-10-CM

## 2019-11-12 DIAGNOSIS — L29.9 ITCH OF SKIN: ICD-10-CM

## 2019-11-12 DIAGNOSIS — E03.9 ACQUIRED HYPOTHYROIDISM: ICD-10-CM

## 2019-11-12 DIAGNOSIS — F33.9 MONOPOLAR DEPRESSION (HCC): ICD-10-CM

## 2019-11-12 DIAGNOSIS — G89.4 CHRONIC PAIN DISORDER: ICD-10-CM

## 2019-11-12 LAB
ALBUMIN SERPL-MCNC: 4.3 G/DL (ref 3.5–5.2)
ALBUMIN/GLOB SERPL: 1.5 G/DL
ALP SERPL-CCNC: 70 U/L (ref 39–117)
ALT SERPL W P-5'-P-CCNC: 8 U/L (ref 1–33)
ANION GAP SERPL CALCULATED.3IONS-SCNC: 11.9 MMOL/L (ref 5–15)
AST SERPL-CCNC: 7 U/L (ref 1–32)
BASOPHILS # BLD AUTO: 0.02 10*3/MM3 (ref 0–0.2)
BASOPHILS NFR BLD AUTO: 0.2 % (ref 0–1.5)
BILIRUB SERPL-MCNC: 0.3 MG/DL (ref 0.2–1.2)
BUN BLD-MCNC: 12 MG/DL (ref 8–23)
BUN/CREAT SERPL: 15.4 (ref 7–25)
CALCIUM SPEC-SCNC: 9.5 MG/DL (ref 8.6–10.5)
CHLORIDE SERPL-SCNC: 103 MMOL/L (ref 98–107)
CHOLEST SERPL-MCNC: 125 MG/DL (ref 0–200)
CO2 SERPL-SCNC: 31.1 MMOL/L (ref 22–29)
CREAT BLD-MCNC: 0.78 MG/DL (ref 0.57–1)
DEPRECATED RDW RBC AUTO: 45.1 FL (ref 37–54)
EOSINOPHIL # BLD AUTO: 0.04 10*3/MM3 (ref 0–0.4)
EOSINOPHIL NFR BLD AUTO: 0.4 % (ref 0.3–6.2)
ERYTHROCYTE [DISTWIDTH] IN BLOOD BY AUTOMATED COUNT: 13.3 % (ref 12.3–15.4)
GFR SERPL CREATININE-BSD FRML MDRD: 75 ML/MIN/1.73
GLOBULIN UR ELPH-MCNC: 2.9 GM/DL
GLUCOSE BLD-MCNC: 79 MG/DL (ref 65–99)
HCT VFR BLD AUTO: 43.6 % (ref 34–46.6)
HDLC SERPL-MCNC: 49 MG/DL (ref 40–60)
HGB BLD-MCNC: 13.9 G/DL (ref 12–15.9)
LDLC SERPL CALC-MCNC: 62 MG/DL (ref 0–100)
LDLC/HDLC SERPL: 1.26 {RATIO}
LYMPHOCYTES # BLD AUTO: 3.18 10*3/MM3 (ref 0.7–3.1)
LYMPHOCYTES NFR BLD AUTO: 29.5 % (ref 19.6–45.3)
MCH RBC QN AUTO: 30.3 PG (ref 26.6–33)
MCHC RBC AUTO-ENTMCNC: 31.9 G/DL (ref 31.5–35.7)
MCV RBC AUTO: 95 FL (ref 79–97)
MONOCYTES # BLD AUTO: 0.5 10*3/MM3 (ref 0.1–0.9)
MONOCYTES NFR BLD AUTO: 4.6 % (ref 5–12)
NEUTROPHILS # BLD AUTO: 7.05 10*3/MM3 (ref 1.7–7)
NEUTROPHILS NFR BLD AUTO: 65.3 % (ref 42.7–76)
PLATELET # BLD AUTO: 224 10*3/MM3 (ref 140–450)
PMV BLD AUTO: 11.3 FL (ref 6–12)
POTASSIUM BLD-SCNC: 4.1 MMOL/L (ref 3.5–5.2)
PROT SERPL-MCNC: 7.2 G/DL (ref 6–8.5)
RBC # BLD AUTO: 4.59 10*6/MM3 (ref 3.77–5.28)
SODIUM BLD-SCNC: 146 MMOL/L (ref 136–145)
TRIGL SERPL-MCNC: 71 MG/DL (ref 0–150)
VLDLC SERPL-MCNC: 14.2 MG/DL (ref 5–40)
WBC NRBC COR # BLD: 10.79 10*3/MM3 (ref 3.4–10.8)

## 2019-11-12 PROCEDURE — 80053 COMPREHEN METABOLIC PANEL: CPT | Performed by: INTERNAL MEDICINE

## 2019-11-12 PROCEDURE — 80061 LIPID PANEL: CPT | Performed by: INTERNAL MEDICINE

## 2019-11-12 PROCEDURE — 99214 OFFICE O/P EST MOD 30 MIN: CPT | Performed by: INTERNAL MEDICINE

## 2019-11-12 PROCEDURE — 85025 COMPLETE CBC W/AUTO DIFF WBC: CPT | Performed by: INTERNAL MEDICINE

## 2019-11-12 PROCEDURE — G0008 ADMIN INFLUENZA VIRUS VAC: HCPCS | Performed by: INTERNAL MEDICINE

## 2019-11-12 PROCEDURE — 90674 CCIIV4 VAC NO PRSV 0.5 ML IM: CPT | Performed by: INTERNAL MEDICINE

## 2019-11-12 RX ORDER — LORAZEPAM 1 MG/1
1 TABLET ORAL DAILY PRN
Qty: 90 TABLET | Refills: 0 | OUTPATIENT
Start: 2019-11-12 | End: 2022-10-13 | Stop reason: SDUPTHER

## 2019-11-12 RX ORDER — TRAZODONE HYDROCHLORIDE 150 MG/1
TABLET ORAL
Qty: 90 TABLET | Refills: 2 | Status: SHIPPED | OUTPATIENT
Start: 2019-11-12

## 2019-11-12 RX ORDER — GABAPENTIN 600 MG/1
TABLET ORAL
Qty: 90 TABLET | Refills: 1 | OUTPATIENT
Start: 2019-11-12 | End: 2020-07-30

## 2019-11-12 RX ORDER — SERTRALINE HYDROCHLORIDE 100 MG/1
100 TABLET, FILM COATED ORAL DAILY
Qty: 90 TABLET | Refills: 1 | Status: SHIPPED | OUTPATIENT
Start: 2019-11-12 | End: 2022-07-13 | Stop reason: SDUPTHER

## 2019-11-12 NOTE — PROGRESS NOTES
"Subjective   Derek Keller is a 59 y.o. female here for   Chief Complaint   Patient presents with   • Anxiety     4 month follow-up   • Depression   • Hyperlipidemia   • Hypertension   • Hypothyroidism   • Insomnia   .    Vitals:    11/12/19 1315   BP: 122/80   BP Location: Left arm   Patient Position: Sitting   Cuff Size: Adult   Temp: 97.6 °F (36.4 °C)   TempSrc: Temporal   Weight: 75.7 kg (166 lb 12.8 oz)   Height: 166.4 cm (65.5\")       Body mass index is 27.33 kg/m².    Anxiety   Presents for follow-up visit. Symptoms include insomnia and nervous/anxious behavior. Patient reports no chest pain, palpitations or shortness of breath. Symptoms occur most days. The quality of sleep is poor.     Her past medical history is significant for depression.   Depression   Visit Type: follow-up  Patient presents with the following symptoms: insomnia and nervousness/anxiety.  Patient is not experiencing: palpitations and shortness of breath.    Hyperlipidemia   This is a chronic problem. The current episode started more than 1 year ago. The problem is controlled. Exacerbating diseases include hypothyroidism. She has no history of diabetes. Pertinent negatives include no chest pain or shortness of breath.   Hypertension   This is a chronic problem. The current episode started more than 1 year ago. The problem is unchanged. The problem is controlled. Associated symptoms include anxiety. Pertinent negatives include no chest pain, palpitations or shortness of breath.   Hypothyroidism   This is a chronic problem. The current episode started more than 1 year ago. The problem occurs constantly. The problem has been unchanged. Associated symptoms include coughing and fatigue. Pertinent negatives include no chest pain, chills or fever.   Insomnia   This is a chronic problem. The current episode started more than 1 year ago. The problem occurs constantly. The problem has been unchanged. Associated symptoms include coughing and " fatigue. Pertinent negatives include no chest pain, chills or fever.        The following portions of the patient's history were reviewed and updated as appropriate: allergies, current medications, past social history and problem list.    Review of Systems   Constitutional: Positive for fatigue. Negative for chills and fever.   Respiratory: Positive for cough. Negative for shortness of breath and wheezing.    Cardiovascular: Negative for chest pain, palpitations and leg swelling.   Psychiatric/Behavioral: Positive for dysphoric mood and sleep disturbance. The patient is nervous/anxious and has insomnia.      She feels itchy all over, but no rash.  She has increased stress b/c boyfriend problems.    Objective   Physical Exam   Constitutional: She appears well-developed and well-nourished. No distress.   Cardiovascular: Normal rate, regular rhythm and normal heart sounds.   Pulmonary/Chest: No respiratory distress. She has no wheezes. She has no rales. She exhibits no tenderness.   Musculoskeletal: She exhibits no edema.   Psychiatric: She has a normal mood and affect. Her behavior is normal.   Nursing note and vitals reviewed.      Assessment/Plan   Diagnoses and all orders for this visit:    Essential hypertension  Comments:  need diet/ex  Orders:  -     CBC Auto Differential; Future  -     Comprehensive Metabolic Panel; Future  -     Lipid Panel; Future  -     CBC Auto Differential  -     Comprehensive Metabolic Panel  -     Lipid Panel    Need for immunization against influenza  -     Flucelvax Quad=>4Years ([id number])    Other hyperlipidemia  Comments:  need diet/ex  Orders:  -     Comprehensive Metabolic Panel; Future  -     Lipid Panel; Future  -     Comprehensive Metabolic Panel  -     Lipid Panel    Acquired hypothyroidism  -     TSH Rfx On Abnormal To Free T4; Future  -     TSH Rfx On Abnormal To Free T4    Chronic pain disorder  Comments:  low back pain - trial of gabapentin  Orders:  -     gabapentin  (NEURONTIN) 600 MG tablet; 1qhs    Restless leg syndrome  Comments:  trial of gabapentin  Orders:  -     gabapentin (NEURONTIN) 600 MG tablet; 1qhs    Primary insomnia  Comments:  trial of gabapentin - to see [facility]  Orders:  -     gabapentin (NEURONTIN) 600 MG tablet; 1qhs    Itch of skin  Comments:  labs today  Orders:  -     gabapentin (NEURONTIN) 600 MG tablet; 1qhs    Chronic fatigue  -     CBC Auto Differential; Future  -     TSH Rfx On Abnormal To Free T4; Future  -     CBC Auto Differential  -     TSH Rfx On Abnormal To Free T4    Anxiety  Comments:  worse - she wants to restart zoloft (helped in past- she needed high dose) - need to see counselors at [facility]  Orders:  -     sertraline (ZOLOFT) 100 MG tablet; Take 1 tablet by mouth Daily. Start with 1/4 pill daily for 1wk then 1/2/d x1wk, [id number] if tolerated  -     LORazepam (ATIVAN) 1 MG tablet; Take 1 tablet by mouth Daily As Needed for Anxiety.    Monopolar depression (CMS/HCC)  Comments:  she stopped abilify & wants zoloft instead - need to see counselors at [facility]  Orders:  -     sertraline (ZOLOFT) 100 MG tablet; Take 1 tablet by mouth Daily. Start with 1/4 pill daily for 1wk then 1/2/d x1wk, [id number] if tolerated     Need c-scope or cologuard.  Need dexa scan & Tdap.

## 2019-11-13 ENCOUNTER — TELEPHONE (OUTPATIENT)
Dept: INTERNAL MEDICINE | Facility: CLINIC | Age: 59
End: 2019-11-13

## 2019-11-13 LAB — TSH SERPL-ACNC: 1.47 UIU/ML (ref 0.27–4.2)

## 2019-11-13 NOTE — TELEPHONE ENCOUNTER
Pt went to the [facility] to  gabapentin 600 mg but the pharmacy stated that nothing was sent over or called in. Please call pt when prescription has been sent or called into pharmacy.

## 2019-11-19 ENCOUNTER — TELEPHONE (OUTPATIENT)
Dept: INTERNAL MEDICINE | Facility: CLINIC | Age: 59
End: 2019-11-19

## 2019-11-19 NOTE — TELEPHONE ENCOUNTER
Bone Density has been scheduled for 3/13/20 after she see Dr. Manning for her 1 PM appointment.    Ms. Keller stated she didn't want to do the cologuard as her insurance may not cover it.     She prefer to have a colonoscopy. From [employer] it looks like her last colonoscopy was 11/8/2006, and she has family history of colon cancer.    She was requesting to have this done at [facility] and I told her that we would have to know what gastroenterologist go there. She was unsure.    Please further advise.    Thank you

## 2019-11-19 NOTE — TELEPHONE ENCOUNTER
----- Message from Yudelka Manning MD sent at 11/12/2019  2:42 PM EST -----  Regarding: pls call - she is due for bone d & colonoscopy  She may get cologuard instead of c-scope if she prefers

## 2019-11-19 NOTE — TELEPHONE ENCOUNTER
She needs to find GI or general surgeon who does c-scope at this hospital if she wants us to refer

## 2019-11-20 NOTE — TELEPHONE ENCOUNTER
I told Ms. Keller to find a gastroenterologist or general surgeon she can see who performs colonoscopies at [facility]. She stated she would and call our office back.

## 2019-12-06 DIAGNOSIS — M62.838 MUSCLE SPASM: ICD-10-CM

## 2019-12-06 RX ORDER — BACLOFEN 20 MG/1
TABLET ORAL
Qty: 90 TABLET | Refills: 1 | Status: SHIPPED | OUTPATIENT
Start: 2019-12-06

## 2020-01-12 DIAGNOSIS — R52 PAIN: ICD-10-CM

## 2020-01-13 RX ORDER — DICYCLOMINE HCL 20 MG
TABLET ORAL
Qty: 120 TABLET | Refills: 6 | Status: SHIPPED | OUTPATIENT
Start: 2020-01-13 | End: 2022-10-13 | Stop reason: SDUPTHER

## 2020-01-13 RX ORDER — IBUPROFEN 600 MG/1
TABLET ORAL
Qty: 90 TABLET | Refills: 3 | Status: SHIPPED | OUTPATIENT
Start: 2020-01-13 | End: 2022-07-13 | Stop reason: SDUPTHER

## 2020-02-19 ENCOUNTER — OFFICE VISIT CONVERTED (OUTPATIENT)
Dept: NEUROSURGERY | Facility: CLINIC | Age: 60
End: 2020-02-19
Attending: NEUROLOGICAL SURGERY

## 2020-05-07 ENCOUNTER — OFFICE VISIT CONVERTED (OUTPATIENT)
Dept: OTOLARYNGOLOGY | Facility: CLINIC | Age: 60
End: 2020-05-07
Attending: OTOLARYNGOLOGY

## 2020-05-07 ENCOUNTER — CONVERSION ENCOUNTER (OUTPATIENT)
Dept: OTOLARYNGOLOGY | Facility: CLINIC | Age: 60
End: 2020-05-07

## 2020-06-08 ENCOUNTER — CONVERSION ENCOUNTER (OUTPATIENT)
Dept: GASTROENTEROLOGY | Facility: CLINIC | Age: 60
End: 2020-06-08
Attending: INTERNAL MEDICINE

## 2020-07-16 ENCOUNTER — HOSPITAL ENCOUNTER (OUTPATIENT)
Dept: PREADMISSION TESTING | Facility: HOSPITAL | Age: 60
Discharge: HOME | End: 2020-07-16
Attending: INTERNAL MEDICINE

## 2020-07-17 LAB — SARS-COV-2 RNA SPEC QL NAA+PROBE: NOT DETECTED

## 2020-07-20 ENCOUNTER — CONVERSION ENCOUNTER (OUTPATIENT)
Dept: GASTROENTEROLOGY | Facility: HOSPITAL | Age: 60
End: 2020-07-20

## 2020-07-20 ENCOUNTER — HOSPITAL ENCOUNTER (OUTPATIENT)
Dept: GASTROENTEROLOGY | Facility: HOSPITAL | Age: 60
Setting detail: HOSPITAL OUTPATIENT SURGERY
Discharge: HOME | End: 2020-07-20
Attending: INTERNAL MEDICINE

## 2020-07-30 DIAGNOSIS — L29.9 ITCH OF SKIN: ICD-10-CM

## 2020-07-30 DIAGNOSIS — G89.4 CHRONIC PAIN DISORDER: ICD-10-CM

## 2020-07-30 DIAGNOSIS — G25.81 RESTLESS LEG SYNDROME: ICD-10-CM

## 2020-07-30 DIAGNOSIS — F51.01 PRIMARY INSOMNIA: ICD-10-CM

## 2020-07-30 RX ORDER — GABAPENTIN 600 MG/1
TABLET ORAL
Qty: 90 TABLET | Refills: 0 | Status: SHIPPED | OUTPATIENT
Start: 2020-07-30

## 2020-08-26 ENCOUNTER — OFFICE VISIT CONVERTED (OUTPATIENT)
Dept: GASTROENTEROLOGY | Facility: CLINIC | Age: 60
End: 2020-08-26
Attending: NURSE PRACTITIONER

## 2020-10-27 ENCOUNTER — OFFICE VISIT CONVERTED (OUTPATIENT)
Dept: CARDIOLOGY | Facility: CLINIC | Age: 60
End: 2020-10-27
Attending: SPECIALIST

## 2020-10-30 DIAGNOSIS — G25.81 RESTLESS LEG SYNDROME: ICD-10-CM

## 2020-10-30 DIAGNOSIS — F51.01 PRIMARY INSOMNIA: ICD-10-CM

## 2020-10-30 DIAGNOSIS — G89.4 CHRONIC PAIN DISORDER: ICD-10-CM

## 2020-10-30 DIAGNOSIS — L29.9 ITCH OF SKIN: ICD-10-CM

## 2020-10-30 RX ORDER — GABAPENTIN 600 MG/1
TABLET ORAL
Qty: 90 TABLET | Refills: 0 | OUTPATIENT
Start: 2020-10-30

## 2020-11-10 ENCOUNTER — OFFICE VISIT CONVERTED (OUTPATIENT)
Dept: CARDIOLOGY | Facility: CLINIC | Age: 60
End: 2020-11-10
Attending: SPECIALIST

## 2020-11-23 ENCOUNTER — OFFICE VISIT CONVERTED (OUTPATIENT)
Dept: GASTROENTEROLOGY | Facility: CLINIC | Age: 60
End: 2020-11-23
Attending: NURSE PRACTITIONER

## 2020-11-23 ENCOUNTER — CONVERSION ENCOUNTER (OUTPATIENT)
Dept: GASTROENTEROLOGY | Facility: CLINIC | Age: 60
End: 2020-11-23

## 2020-11-25 ENCOUNTER — HOSPITAL ENCOUNTER (OUTPATIENT)
Dept: NUCLEAR MEDICINE | Facility: HOSPITAL | Age: 60
Discharge: HOME | End: 2020-11-25
Attending: SPECIALIST

## 2021-01-12 ENCOUNTER — OFFICE VISIT CONVERTED (OUTPATIENT)
Dept: CARDIOLOGY | Facility: CLINIC | Age: 61
End: 2021-01-12
Attending: SPECIALIST

## 2021-01-15 ENCOUNTER — HOSPITAL ENCOUNTER (OUTPATIENT)
Dept: PREADMISSION TESTING | Facility: HOSPITAL | Age: 61
Discharge: HOME | End: 2021-01-15
Attending: INTERNAL MEDICINE

## 2021-01-16 LAB — SARS-COV-2 RNA SPEC QL NAA+PROBE: NOT DETECTED

## 2021-01-20 ENCOUNTER — HOSPITAL ENCOUNTER (OUTPATIENT)
Dept: GASTROENTEROLOGY | Facility: HOSPITAL | Age: 61
Setting detail: HOSPITAL OUTPATIENT SURGERY
Discharge: HOME | End: 2021-01-20
Attending: INTERNAL MEDICINE

## 2021-01-25 ENCOUNTER — HOSPITAL ENCOUNTER (OUTPATIENT)
Dept: CT IMAGING | Facility: HOSPITAL | Age: 61
Discharge: HOME | End: 2021-01-25
Attending: INTERNAL MEDICINE

## 2021-01-25 LAB
CREAT BLD-MCNC: 0.8 MG/DL (ref 0.6–1.4)
GFR SERPLBLD BASED ON 1.73 SQ M-ARVRAT: >60 ML/MIN/{1.73_M2}

## 2021-02-12 ENCOUNTER — HOSPITAL ENCOUNTER (OUTPATIENT)
Dept: MRI IMAGING | Facility: HOSPITAL | Age: 61
Discharge: HOME | End: 2021-02-12
Attending: NURSE PRACTITIONER

## 2021-05-10 NOTE — H&P
History and Physical      Patient Name: Derek Keller   Patient ID: [id number]   Sex: Female   YOB: 1960    Primary Care Provider: Mikhail Monique MD   Referring Provider: Mikhail Monique MD    Visit Date: October 27, 2020    Provider: Grant Núñez MD   Location: [facility]   Location Address: [address] [place] [address], [facility]   [place], [place]  [id number]   Location Phone: [phone]          Chief Complaint  · Chest pain       History Of Present Illness  Consult requested by: Mikhail Monique MD   Derek Keller is a 60 year old female with a history of chest pain on and off for the last 6 months, substernal, mild in intensity, sharp to aching, lasts for 15 to 30 minutes. No aggravating or relieving factors. No significant nausea or vomiting. No diaphoresis. Relieved spontaneously. She has shortness of breath on exertion.   PAST MEDICAL HISTORY: Hypertension; hyperlipidemia.   FAMILY HISTORY: Positive for diabetes and hypertension. Negative for heart disease.   PSYCHOSOCIAL HISTORY: Positive for mood changes and depression. She never used alcohol. She smokes 1-1/2 pack of cigarettes per day.   CURRENT MEDICATIONS: include Atorvastatin 20 mg daily; Lorazepam 0.5 mg t.i.d.; Levothyroxine 50 mcg daily; Baclofen 20 mg t.i.d.; Naproxen 500 mg daily; Dicyclomine 20 mg t.i.d.; Cetirizine 10 mg daily; Montelukast 10 mg daily; Gabapentin 600 mg b.i.d.; Venlafaxine; Amitiza. . The dosage and frequency of the medications were reviewed with the patient.   ALLERGIES: No known drug allergies.   CHOLESTEROL STATUS: Unknown.       Review of Systems  · Constitutional  o Admits  o : fatigue  o Denies  o : good general health lately, recent weight changes   · Eyes  o Denies  o : double vision  · HENT  o Admits  o : hearing loss or ringing  o Denies  o : chronic sinus problem, swollen glands in neck  · Cardiovascular  o Admits  o : chest pain, shortness of breath while walking or lying  "flat  o Denies  o : palpitations (fast, fluttering, or skipping beats), swelling (feet, ankles, hands)  · Respiratory  o Admits  o : chronic or frequent cough, asthma or wheezing, COPD  · Gastrointestinal  o Denies  o : ulcers, nausea or vomiting  · Neurologic  o Admits  o : lightheaded or dizzy  o Denies  o : stroke, headaches  · Musculoskeletal  o Admits  o : back pain  o Denies  o : joint pain  · Endocrine  o Admits  o : heat or cold intolerance, excessive thirst or urination  o Denies  o : thyroid disease, diabetes  · Heme-Lymph  o Denies  o : bleeding or bruising tendency, anemia      Vitals  Date Time BP Position Site L\R Cuff Size HR RR TEMP (F) WT  HT  BMI kg/m2 BSA m2 O2 Sat FR L/min FiO2 HC       10/27/2020 10:51 /64 Sitting    60 - R   166lbs 16oz 5'  6\" 26.95 1.88       10/27/2020 10:51 /68 Sitting    58 - R                   Physical Examination  · Constitutional  o Appearance  o : Awake, alert, cooperative, pleasant.  · Eyes  o Pupils and Irises  o : Pupils equal and reacting to light and accommodation.  · Ears, Nose, Mouth and Throat  o Ears  o : Tympanic membranes are normal.  o Nose  o : Clear with no maxillary tenderness.  o Oral Cavity  o : Clear.  · Neck  o Inspection/Palpation  o : No JVD, bruits, thyromegaly, or adenopathy. Trachea midline. No axillary or cervical lymphadenopathy.  o Thyroid  o : No thyroid enlargement.   · Respiratory  o Inspection of Chest  o : No chest wall deformities, moving equal.  o Auscultation of Lungs  o : Good air entry with vesicular breath sounds.  o Percussion of Chest  o : Resonant bilaterally.   o Palpation of Chest  o : Equal movements bilaterally.  · Cardiovascular  o Heart  o :   § Auscultation of Heart  § : PMI is in the 5th intercostal space. S1 and S2 regular. No S3. No S4. No murmurs.  o Peripheral Vascular System  o :   § Extremities  § : No pedal edema. Peripheral pulses well felt. No cyanosis.  · Gastrointestinal  o Abdominal " Examination  o : No organomegaly, masses, tenderness, bruits, or abnormal pulsations. Bowel sounds are normal.  · Musculoskeletal  o General  o : Muscle strength is normal with normal tone.  · Skin and Subcutaneous Tissue  o General Inspection  o : No rash, petechiae, or suspicious skin lesions. Skin turgor is normal.     EKG was reviewed, which shows sinus rhythm with no acute changes.           Assessment     ASSESSMENT AND PLAN:    1.  Precordial chest pain, positive risk factors:  Will do a treadmill stress test to rule out any significant        ischemia.  2.  Shortness of breath:  Will do an echocardiogram to evaluate left ventricular systolic function.  3.  Positive for nicotine use:  Smoking-cessation instructions were discussed with the patient.  4.  Hyperlipidemia:  Continue current dose of Lipitor, managed by her PMD.    Grant Núñez MD, [facility]  JEFE/jesu           This note was transcribed by Marija Adams.  jesu/JEFE  The above service was transcribed by Marija Adams, and I attest to the accuracy of the note.  JEFE               Electronically Signed by: Marija Adams-, -Author on October 30, 2020 10:13:32 AM  Electronically Co-signed by: Grant Núñez MD -Reviewer on November 2, 2020 12:47:52 PM   4

## 2021-05-10 NOTE — H&P
"   History and Physical      Patient Name: Derek Keller   Patient ID: [id number]   Sex: Female   YOB: 1960    Primary Care Provider: Mikhail Monique MD   Referring Provider: Haile Monique MD    Visit Date: May 7, 2020    Provider: Tl Sullivan MD   Location: [facility]   [facility] [place] [place]  [id number]          Chief Complaint     \"I am having trouble hearing out of my right ear.\"       History Of Present Illness  Derek Keller is a 59 year old female with past medical history significant for tobacco abuse, hypertension, hyperlipidemia, and anxiety who presents to the office today as a consult from Haile Monique MD for evaluation of her ears. She tells me that she has felt like her hearing is worse in her right ear than her left for quite some time. She was seen by Dr. Monique diagnosed with a cerumen impaction. They attempted to flush the ear multiple times without improvement. She even tried using Debrox but has not noticed any difference. It is not associated with any otalgia, otorrhea, tinnitus, or vertigo. She does use Q-tips occasionally but has not used any since being diagnosed with cerumen impaction. She denies any personal history of noise exposure, family history of hearing loss, history of otologic trauma, or history of otologic surgery. She has never been treated with any potential ototoxic medications. She also mentions some issues with rhinorrhea and nasal congestion for which she is currently on Singulair.       Past Medical History  Anxiety; Arthritis; Cerumen Impaction; Fibromyalgia; Hyperlipidemia; Hypertension; Hypothyroid; Lumbago; Major depressive disorder         Past Surgical History  Carpal Tunnel Release; Cholecystecomy; Colonoscopy; Hysterectomy         Medication List  Ativan 1 mg oral tablet; atorvastatin 20 mg oral tablet; baclofen 10 mg oral tablet; bupropion HCl 300 mg oral tablet extended " "release 24 hr; dicyclomine 20 mg oral tablet; ibuprofen 600 mg oral tablet; levothyroxine 50 mcg oral tablet; losartan 100 mg oral tablet; Neurontin 800 mg oral tablet; Singulair 10 mg oral tablet; trazodone 150 mg oral tablet; Vesicare 10 mg oral tablet         Allergy List  NO KNOWN DRUG ALLERGIES         Family Medical History  Family history of colon cancer; Family history of breast cancer; Family history of Arthritis; Family history of diabetes mellitus         Social History  Alcohol (Never); ; lives with spouse; Retired; Tobacco (Current every day)         Review of Systems  · Constitutional  o Admits  o : night sweats  o Denies  o : fever, weight loss  · Eyes  o Admits  o : discharge from eye  o Denies  o : impaired vision  · HENT  o Admits  o : *See HPI  · Cardiovascular  o Denies  o : chest pain, irregular heart beats  · Respiratory  o Denies  o : shortness of breath, wheezing, coughing up blood  · Gastrointestinal  o Denies  o : heartburn, reflux, vomiting blood  · Genitourinary  o Denies  o : frequency  · Integument  o Denies  o : rash, skin dryness  · Neurologic  o Admits  o : loss of balance, dizziness  o Denies  o : seizures, loss of consciousness  · Endocrine  o Denies  o : cold intolerance, heat intolerance  · Heme-Lymph  o Denies  o : easy bleeding, anemia      Vitals  Date Time BP Position Site L\R Cuff Size HR RR TEMP (F) WT  HT  BMI kg/m2 BSA m2 O2 Sat        05/07/2020 10:10 AM      78 - R 16 98.4 161lbs 4oz 5'  6\" 26.03 1.85 91 %          Physical Examination  · Constitutional  o Appearance  o : well developed, well-nourished, alert and in no acute distress, voice clear and strong  · Head and Face  o Head  o :   § Inspection  § : no deformities or lesions  o Face  o :   § Inspection  § : No facial lesions; House-Brackmann I/VI bilaterally  § Palpation  § : No TMJ crepitus nor  muscle tenderness bilaterally  · Eyes  o Vision  o :   § Visual Fields  § : Extraocular movements " are intact. No spontaneous or gaze-induced nystagmus.  o Conjunctivae  o : clear  o Sclerae  o : clear  o Pupils and Irises  o : pupils equal, round, and reactive to light.   · Ears, Nose, Mouth and Throat  o Ears  o :   § External Ears  § : appearance within normal limits, no lesions present  § Otoscopic Examination  § : Left external auditory canal and tympanic membrane are normal in appearance. Right external auditory canal is completely impacted with cerumen. This was removed using the working otoscope and a curette revealing a normal-appearing external auditory canal and tympanic membrane.  § Hearing  § : intact to conversational voice both ears  o Nose  o :   § External Nose  § : appearance normal  § Intranasal Exam  § : mucosa within normal limits, vestibules normal, no intranasal lesions present, septum midline, sinuses non tender to percussion  o Oral Cavity  o :   § Oral Mucosa  § : oral mucosa normal without pallor or cyanosis  § Lips  § : lip appearance normal  § Teeth  § : Partial, poor dentition for age  § Gums  § : gums pink, non-swollen, no bleeding present  § Tongue  § : tongue appearance normal; normal mobility  § Palate  § : hard palate normal, soft palate appearance normal with symmetric mobility  o Throat  o :   § Oropharynx  § : no inflammation or lesions present, tonsils surgically absent  § Hypopharynx  § : Deferred secondary gag reflex  § Larynx  § : Deferred secondary gag reflex  · Neck  o Inspection/Palpation  o : normal appearance, no masses or tenderness, trachea midline; thyroid size normal, nontender, no nodules or masses present on palpation  · Respiratory  o Respiratory Effort  o : breathing unlabored  o Inspection of Chest  o : normal appearance, no retractions  · Cardiovascular  o Heart  o : regular rate and rhythm  · Lymphatic  o Neck  o : no lymphadenopathy present  o Supraclavicular Nodes  o : no lymphadenopathy present  o Preauricular Nodes  o : no lymphadenopathy  present  · Skin and Subcutaneous Tissue  o General Inspection  o : Regarding face and neck - there are no rashes present, no lesions present, and no areas of discoloration  · Neurologic  o Cranial Nerves  o : cranial nerves II-XII are grossly intact bilaterally  o Gait and Station  o : normal gait, able to stand without diffculty  · Psychiatric  o Judgement and Insight  o : judgment and insight intact  o Mood and Affect  o : mood normal, affect appropriate          Assessment  · Hearing loss     389.9/H91.90  · Impacted cerumen, right ear     380.4/H61.21  · Chronic rhinitis     472.0/J31.0    Problems Reconciled  Plan  · Orders  o Audiometry, comprehensive, threshold evaluation and speech recognition [facility] ([id number]) - 380.4/H61.21, 389.9/H91.90 - 05/07/2020  o Tympanogram (Impedance Testing) [facility] ([id number]) - 380.4/H61.21, 389.9/H91.90 - 05/07/2020  o Cerumen Removal by Provider, Unilateral [facility] ([id number]) - 380.4/H61.21, 389.9/H91.90 - 05/07/2020  · Medications  o cetirizine 10 mg oral tablet   SIG: take 1 tablet (10 mg) by oral route once daily for 30 days   DISP: (30) tablets with 11 refills  Prescribed on 05/07/2020     o Medications have been Reconciled  o Transition of Care or Provider Policy  · Instructions  o Impressions and findings were discussed Mrs. Keller at great length. Currently, she is seen for evaluation of her ears. She reports right greater than left hearing loss which she attributes to a cerumen impaction. Examination today reveals a complete cerumen impaction of the right external auditory canal which was removed. We discussed pursuing further work-up with an audiogram and tympanogram but she would like to see if her hearing has returned to normal first. She will be tried on cetirizine for her chronic rhinitis and will follow-up after audiogram or sooner if needed.  · Correspondence  o ENT Letter to Referring MD (Haile Monique MD) - 05/07/2020            Electronically Signed by: Tl Sullivan MD  -Author on May 7, 2020 10:37:42 AM

## 2021-05-10 NOTE — H&P
History and Physical      Patient Name: Derek Keller   Patient ID: [id number]   Sex: Female   YOB: 1960    Primary Care Provider: Mikhail Monique MD   Referring Provider: Haile Monique MD    Visit Date: June 8, 2020    Provider: Panda Lofton MD   Location: [facility]   Location Address: [address] [place] [address]  [id number]   Location Phone: [phone]          Chief Complaint  · Surgical History and Physical  · Screening Colonoscopy      History Of Present Illness  NON-INPATIENT HISTORY AND PHYSICAL  Allergies: NO KNOWN DRUG ALLERGIES   Chief Complaint/History of Present Illness: Screening Colonoscopy, Family History of Colon Cancer, Personal History of Colon Polyps   Colon Recall: No   Failed Outpatient Treatment/Contraindications: N/A   Current Medications: Ativan 1 mg oral tablet, atorvastatin 20 mg oral tablet, baclofen 10 mg oral tablet, bupropion HCl 300 mg oral tablet extended release 24 hr, cetirizine 10 mg oral tablet, dicyclomine 20 mg oral tablet, ibuprofen 600 mg oral tablet, levothyroxine 50 mcg oral tablet, losartan 100 mg oral tablet, naproxen 500 mg oral tablet, Neurontin 800 mg oral tablet, NuLYTELY with Flavor Packs 420 gram oral recon soln, Singulair 10 mg oral tablet, trazodone 150 mg oral tablet, and Vesicare 10 mg oral tablet   Significant Past Medical History: Anxiety, Arthritis, Cerumen Impaction, Fibromyalgia, Hyperlipidemia, Hypertension, Hypothyroid, Lumbago, and Major depressive disorder   Significant Family Medical History: Family History of Colon Cancer: MGM   Significant Past Surgical History: Carpal Tunnel Release, Cholecystecomy, Colonoscopy, EGD, and Hysterectomy   Previous Colonoscopy: Yes YEAR: Unknown By Whom: Unknown   Previous EGD: Yes YEAR: Unknown By Whom: Unknown   PHYSICAL EXAM:  Heart: Regular Rate and Rhythm   Lungs: Breathing Unlabored           Assessment  · Preoperative examination     V72.84/Z01.818  · Screening for colon  cancer     V76.51/Z12.11  · Family history of colon cancer     V16.0/Z80.0  · History of colon polyps     V12.72/Z86.010      Plan  · Orders  o Consent for Colonoscopy Screening -Possible risk/complications, benefits, and alternatives to surgical or invasive procedure have been explained to patient and/or legal gaurdian. -Patient has been evaluated and can tolerate anethesia and/or sedation. Risk, benefits, and alternatives to anesthesia and sedation have been explained to patient or legal gaurdian. () - V72.84/Z01.818, V76.51/Z12.11, V16.0/Z80.0, V12.72/Z86.010 - 07/20/2020  · Medications  o Medications have been Reconciled  o Transition of Care or Provider Policy  · Instructions  o ****Surgical Orders****  o ***************  o Outpatient  o ***************  o RISK AND BENEFITS:  o Possible risks/complications, benefits and alternatives to surgical or invasive procedure have been explained to the patient and/or legal guardian.  o Patient has been evaluated and can tolerate anesthesia and/or sedation. Risks, benefits, and alternatives to anesthesia and sedation have been explained to the patient and/or legal guardian.  o ***************  o PREP: Per protocol  o IV: Per Anesthesia  o The above History and Physical Examination has been completed within 30 days of admission.  o This note has been transcribed by AURORA Morgan MA. I have read and agree with the findings in this note.  o Electronically Identified Patient Education Materials Provided Electronically  · Disposition  o Call or Return if symptoms worsen or persist.            Electronically Signed by: Jermaine Morgan, -Author on June 8, 2020 09:23:20 AM

## 2021-05-10 NOTE — PROCEDURES
Procedure Note      Patient Name: Derek Keller   Patient ID: [id number]   Sex: Female   YOB: 1960    Primary Care Provider: Mikhail Monique MD   Referring Provider: Mikhail Monique MD    Visit Date: November 10, 2020    Provider: Grant Núñez MD   Location: [facility]   Location Address: [address] [place] [address], [facility]   [place], [place]  [id number]   Location Phone: [phone]          FINAL REPORT   TRANSTHORACIC ECHOCARDIOGRAM REPORT    Diagnosis: Chest pain, precordial   Height: 5'6' Weight: 167 B/P: 144/64 BSA: 1.9   Tech: JTW   MEASUREMENTS:  RVID (Diastole) : RVID. (NORMAL: 0.7 to 2.4 cm max)   LVID (Systole): 3.1 cm (Diastole): 4.5 cm . (NORMAL: 3.7 - 5.4 cm)   Posterior Wall Thickness (Diastole): 0.8 cm. (NORMAL: 0.8 - 1.1 cm)   Septal Thickness (Diastole): 0.8 cm. (NORMAL: 0.7 - 1.2 cm)   LAID (Systole): 3.4 cm. (NORMAL: 1.9 - 3.8 cm)   Aortic Root Diameter (Diastole): 2.5 cm. (NORMAL: 2.0 - 3.7 cm)   DOPPLER:  E/A ratio 1.2 (NORMAL 0.8-2.0)   DT: 236 msec (NORMAL 140-240 msec.)   IVRT 85 m/sec (NORMAL  m/sec.)   E/E': 11 (NORMAL <8 avg.)   COMMENTS:  The patient underwent 2-D, M-Mode, and Doppler examination, including pulse-wave, continuous-wave, and color-flow analysis; the study is technically adequate.   FINDINGS:  AORTIC VALVE: Normal. Tricuspid in appearance with normal central closure.   MITRAL VALVE: Normal. Bicuspid in appearance.   TRICUSPID VALVE: Normal.   PULMONIC VALVE: Not well visualized.   LEFT ATRIUM: Normal. No intracavitary masses or clots seen. LA volume index is 25 mL/m2.   AORTIC ROOT: Normal in size with adequate motion.   LEFT VENTRICLE: Normal left ventricular systolic function. Ejection fraction 64%.   RIGHT ATRIUM: Normal.   RIGHT VENTRICLE: Normal size and function.   PERICARDIUM: Unremarkable. No evidence of effusion.   INFERIOR VENA CAVA: Diameter is 1.6 cm.   DOPPLER: Doppler examination of the aortic, mitral, tricuspid, and  pulmonary valves was performed. Normal pulmonary artery systolic pressure by Doppler. Doppler showed trace mitral regurgitation.   Faxed: 11/11/2020      CONCLUSION:     1.  Normal left ventricular systolic function.    2.  Trace mitral regurgitation.        Grant Núñez MD, FACC  JEFE:vm                 Electronically Signed by: Sarah Clarke-, Other -Author on November 11, 2020 06:18:40 AM  Electronically Co-signed by: Grant Núñez MD -Reviewer on November 11, 2020 09:08:29 AM

## 2021-05-13 NOTE — PROGRESS NOTES
Progress Note      Patient Name: Derek Keller   Patient ID: [id number]   Sex: Female   YOB: 1960    Primary Care Provider: Mikhail Monique MD   Referring Provider: Mikhail Monique MD    Visit Date: August 26, 2020    Provider: HIEU Rios   Location: [facility]   Location Address: [address] [place] [address]  [id number]   Location Phone: [phone]          Chief Complaint  · Follow up of Colonoscopy      History Of Present Illness     59-year-old female returns for follow-up after having a colonoscopy on 07/20/2020 by Dr. Lofton.  Colonoscopy revealed grade 1 internal hemorrhoids and a single sessile 5 mm tubular adenoma polyp in the sigmoid.  She reports she has been constipated for approximately 6 months.  She occasionally will take a Dulcolax which produces a bowel movement approximately 3 days/week.  She reports that if she does not take the Dulcolax, she will go longer than a week between bowel movements.  She reports that she feels like she sits around for a long time on the toilet and will not have a bowel movement.  She has tried Linzess in the past, but it was not effective.  She takes dicyclomine 20 mg as needed for abdominal pain.       Past Medical History  Anxiety; Arthritis; Cerumen Impaction; Chronic rhinitis; Fibromyalgia; Hearing loss; Hyperlipidemia; Hypertension; Hypothyroid; Impacted cerumen of right ear; Lumbago; Major depressive disorder         Past Surgical History  Carpal Tunnel Release; Cholecystecomy; Colonoscopy; EGD; Hysterectomy         Medication List  Ativan 1 mg oral tablet; atorvastatin 20 mg oral tablet; baclofen 10 mg oral tablet; bupropion HCl 300 mg oral tablet extended release 24 hr; cetirizine 10 mg oral tablet; dicyclomine 20 mg oral tablet; ibuprofen 600 mg oral tablet; levothyroxine 50 mcg oral tablet; naproxen 500 mg oral tablet; Neurontin 800 mg oral tablet; Singulair 10 mg oral tablet  "        Allergy List  NO KNOWN DRUG ALLERGIES         Family Medical History  Family history of colon cancer; Family history of breast cancer; Family history of Arthritis; Family history of diabetes mellitus         Social History  Alcohol (Never); ; lives with spouse; Retired; Tobacco (Former)         Review of Systems  · Constitutional  o Denies  o : chills, fever  · Cardiovascular  o Denies  o : chest pain  · Respiratory  o Denies  o : shortness of breath  · Gastrointestinal  o Denies  o : nausea, vomiting, dysphagia  · Endocrine  o Denies  o : weight gain, weight loss      Vitals  Date Time BP Position Site L\R Cuff Size HR RR TEMP (F) WT  HT  BMI kg/m2 BSA m2 O2 Sat HC       08/26/2020 09:29 /71 Sitting    62 - R 16  168lbs 0oz 5'  6\" 27.12 1.88 97 %          Physical Examination  · Constitutional  o Appearance  o : Healthy-appearing, awake and alert in no acute distress  · Head and Face  o Head  o : Normocephalic with no worriesome skin lesions  · Eyes  o Vision  o :   § Visual Fields  § : eyes move symmetrical in all directions  o Sclerae  o : sclerae anicteric  · Neck  o Inspection/Palpation  o : Trachea is midline, no adenopathy  · Respiratory  o Respiratory Effort  o : Breathing is unlabored.  o Inspection of Chest  o : normal appearance  o Auscultation of Lungs  o : Chest is clear to auscultation bilaterally.  · Cardiovascular  o Heart  o :   § Auscultation of Heart  § : no murmurs, rubs, or gallops, RRR  o Peripheral Vascular System  o :   § Extremities  § : no cyanosis, clubbing or edema;   · Gastrointestinal  o Abdominal Examination  o : Abdomen is soft, nontender to palpation, with normal active bowel sounds, no appreciable hepatosplenomegaly.          Assessment  · Colonic Polyps, Personal History of     V12.72  · Constipation     564.00/K59.00  · Hemorrhoids     455.6/K64.9      Plan  · Medications  o Trulance 3 mg oral tablet   SIG: take 1 tablet (3 mg) by oral route once daily for 90 " days   DISP: (90) tablets with 1 refills  Prescribed on 08/26/2020     o Medications have been Reconciled  o Transition of Care or Provider Policy  · Instructions  o 59-year-old female returning after having I colonoscopy on 07/20/2020 by Dr. Lofton. I have discussed the results of her colonoscopy with the patient. The patient reports she has been constipated for approximately 6 months. She has failed Linzess in the past, so I sent in a prescription of Trulance to her pharmacy. We have also discussed using Metamucil to help improve her bowel movements. She will be leaving for [place] soon and will be there for approximately a month. I will have her follow-up in 3 months time to reevaluate her constipation.            Electronically Signed by: HIEU Rios -Author on August 26, 2020 09:43:28 AM  Electronically Co-signed by: Panda Lofton MD -Reviewer on September 4, 2020 11:17:59 AM

## 2021-05-13 NOTE — PROGRESS NOTES
Progress Note      Patient Name: Derek Keller   Patient ID: [id number]   Sex: Female   YOB: 1960    Primary Care Provider: Mikhail Monique MD   Referring Provider: Mikhail Monique MD    Visit Date: November 23, 2020    Provider: HIEU Rios   [place] [facility]   [place] Address[place] [address] [place] [address]  [id number]   Location Phone: [phone]          Chief Complaint  · Constipation      History Of Present Illness     60-year-old female with a history of colon polyps and constipation returns for follow-up.  She was switched to Trulance 3 mg, and was instructed to try MiraLAX daily to see if that would help improve her bowel movements.  The patient was a little confused on what medication to take, so she did not try the Trulance.  She is currently taking Amitiza 24 mcg twice daily, which produces a bowel movement 4-5 times per week.  She takes dicyclomine 3 times daily.  She does report she has upper and lower abdominal pain.  The upper abdominal pain is described as cramping that is intermittent that can last all morning.  Having a bowel movement helps alleviate the pain, but there is nothing that aggravates the pain.  She reports she has been having some chest pain for a while, and she is scheduled to have a stress test done in 2 days.  Review of her colonoscopy 07/2020 by Dr. Lofton revealed grade 1 internal hemorrhoids and a 5 mm tubular adenoma removed from the sigmoid.  Recommend repeat colonoscopy 2025.         Past Medical History  Anxiety; Arthritis; Cerumen Impaction; Chronic rhinitis; Fibromyalgia; Hearing loss; Hyperlipidemia; Hypertension; Hypothyroid; Impacted cerumen of right ear; Lumbago; Major depressive disorder         Past Surgical History  Carpal Tunnel Release; Cholecystecomy; Colonoscopy; EGD; Hysterectomy         Medication List  Amitiza 24 mcg oral capsule; Ativan 1 mg oral tablet; atorvastatin 20 mg oral  "tablet; baclofen 10 mg oral tablet; bupropion HCl 300 mg oral tablet extended release 24 hr; cetirizine 10 mg oral tablet; dicyclomine 20 mg oral tablet; ibuprofen 600 mg oral tablet; levothyroxine 50 mcg oral tablet; losartan 100 mg oral tablet; naproxen 500 mg oral tablet; Neurontin 800 mg oral tablet; Singulair 10 mg oral tablet; trazodone 150 mg oral tablet; venlafaxine 75 mg oral capsule,extended release 24hr         Allergy List  NO KNOWN DRUG ALLERGIES       Allergies Reconciled  Family Medical History  Family history of colon cancer; Family history of breast cancer; Family history of Arthritis; Family history of diabetes mellitus         Social History  Alcohol (Never); ; lives with spouse; Retired; Tobacco (Current some day)         Review of Systems  · Constitutional  o Denies  o : chills, fever  · Cardiovascular  o Admits  o : chest pain  · Respiratory  o Denies  o : shortness of breath  · Gastrointestinal  o Denies  o : nausea, vomiting, dysphagia  · Endocrine  o Denies  o : weight gain, weight loss      Vitals  Date Time BP Position Site L\R Cuff Size HR RR TEMP (F) WT  HT  BMI kg/m2 BSA m2 O2 Sat FR L/min FiO2        11/23/2020 01:03 /70 Sitting    64 - R 16  171lbs 0oz 5'  6\" 27.6 1.9 98 %            Physical Examination  · Constitutional  o Appearance  o : Healthy-appearing, awake and alert in no acute distress  · Head and Face  o Head  o : Normocephalic with no worriesome skin lesions  · Eyes  o Vision  o :   § Visual Fields  § : eyes move symmetrical in all directions  o Sclerae  o : sclerae anicteric  · Neck  o Inspection/Palpation  o : Trachea is midline, no adenopathy  · Respiratory  o Respiratory Effort  o : Breathing is unlabored.  o Inspection of Chest  o : normal appearance  o Auscultation of Lungs  o : Chest is clear to auscultation bilaterally.  · Cardiovascular  o Heart  o :   § Auscultation of Heart  § : no murmurs, rubs, or gallops, RRR  o Peripheral Vascular System  o : "   § Extremities  § : no cyanosis, clubbing or edema;   · Gastrointestinal  o Abdominal Examination  o : Abdomen is soft, nontender to palpation, with normal active bowel sounds, no appreciable hepatosplenomegaly.              Assessment  · Colonic Polyps, Personal History of     V12.72  · Constipation     564.00/K59.00      Plan  · Medications  o Medications have been Reconciled  o Transition of Care or Provider Policy  · Instructions  o 60-year-old female presenting today for follow-up on constipation. She is to continue to take Amitiza 24 mcg twice daily. I recommended that she only take dicyclomine only as needed for abdominal pain, as it can exacerbate constipation. I also recommended that she can supplement MiraLAX if needed. I have instructed the patient that if her cardiac work-up is negative, we can place her on reflux medication and schedule her for an upper scope. I will have her follow-up on an annual basis or sooner for any concerns.             Electronically Signed by: HIEU Rios -Author on November 23, 2020 01:20:39 PM  Electronically Co-signed by: Panda Lofton MD -Reviewer on December 16, 2020 04:28:11 PM

## 2021-05-14 VITALS
WEIGHT: 167 LBS | DIASTOLIC BLOOD PRESSURE: 64 MMHG | SYSTOLIC BLOOD PRESSURE: 144 MMHG | HEIGHT: 66 IN | BODY MASS INDEX: 26.84 KG/M2 | HEART RATE: 60 BPM

## 2021-05-14 VITALS
OXYGEN SATURATION: 98 % | HEART RATE: 64 BPM | WEIGHT: 171 LBS | DIASTOLIC BLOOD PRESSURE: 70 MMHG | SYSTOLIC BLOOD PRESSURE: 147 MMHG | BODY MASS INDEX: 27.48 KG/M2 | RESPIRATION RATE: 16 BRPM | HEIGHT: 66 IN

## 2021-05-14 VITALS
HEART RATE: 62 BPM | HEIGHT: 66 IN | RESPIRATION RATE: 16 BRPM | OXYGEN SATURATION: 97 % | DIASTOLIC BLOOD PRESSURE: 71 MMHG | WEIGHT: 168 LBS | BODY MASS INDEX: 27 KG/M2 | SYSTOLIC BLOOD PRESSURE: 136 MMHG

## 2021-05-14 VITALS
DIASTOLIC BLOOD PRESSURE: 76 MMHG | HEART RATE: 68 BPM | WEIGHT: 171 LBS | HEIGHT: 66 IN | SYSTOLIC BLOOD PRESSURE: 120 MMHG | BODY MASS INDEX: 27.48 KG/M2

## 2021-05-14 NOTE — PROGRESS NOTES
"   Progress Note      Patient Name: Derek Keller   Patient ID: [id number]   Sex: Female   YOB: 1960    Primary Care Provider: Mikhail Monique MD   Referring Provider: Mikhail Monique MD    Visit Date: January 12, 2021    Provider: Grant Núñez MD   Location: [facility]   Location Address: [address] [place] [address], [facility]   [place], [place]  [id number]   Location Phone: [phone]          Chief Complaint     Chronic chest pain.       History Of Present Illness  Derek Keller is a 60 year old female with a history of chest pain on and off, substernal, aching, nonexertional. Continues to smoke. Recent sestamibi stress test negative.   CURRENT MEDICATIONS: Atorvastatin 20 mg daily; baclofen 20 mg t.i.d.; cetirizine 10 mg daily; levothyroxine 50 mcg daily; losartan 100 mg daily; montelukast 10 mg daily; naproxen 500 mg b.i.d.; solifenacin 10 mg daily; gabapentin 600 mg daily; trazodone 150 mg daily; Amitiza 24 mcg b.i.d.; dicyclomine 20 mg t.i.d.; lorazepam 0.5 mg t.i.d.; venlafaxine 37.5 mg daily.   PAST MEDICAL HISTORY: Hyperlipidemia; Hypertension.   PSYCHOSOCIAL HISTORY: Current smoker of 1.5 packs per day. Rarely consumes alcohol.      ALLERGIES:  No known drug allergies.       Review of Systems  · Cardiovascular  o Admits  o : palpitations (fast, fluttering, or skipping beats), shortness of breath while walking or lying flat, chest pain or angina pectoris   o Denies  o : swelling (feet, ankles, hands)  · Respiratory  o Admits  o : chronic or frequent cough      Vitals  Date Time BP Position Site L\R Cuff Size HR RR TEMP (F) WT  HT  BMI kg/m2 BSA m2 O2 Sat FR L/min FiO2 HC       01/12/2021 12:16 /76 Sitting    68 - R   171lbs 0oz 5'  6\" 27.6 1.9             Physical Examination  · Constitutional  o Appearance  o : Awake, alert, cooperative, pleasant.  · Respiratory  o Inspection of Chest  o : No chest wall deformities, moving equal.  o Auscultation of Lungs  o : Good air " entry with vesicular breath sounds.  · Cardiovascular  o Heart  o :   § Auscultation of Heart  § : S1 and S2 regular. No S3. No S4.   o Peripheral Vascular System  o :   § Extremities  § : Peripheral pulses were well felt. No edema. No cyanosis.  · Gastrointestinal  o Abdominal Examination  o : No masses or organomegaly noted.          Assessment     ASSESSMENT & PLAN:    1.  Precordial chronic atypical chest pain.  Negative sestamibi stress test.  Echocardiogram was within normal        limits.  Discussed the results of the echocardiogram with the patient.  She is supposed to get a GI workup        done.  If GI workup is negative, we will consider CT angiography of the coronary arteries.  2.  See me back in 6 months.             Electronically Signed by: Sarah Clarke-, Other -Author on January 24, 2021 07:21:12 PM  Electronically Co-signed by: Grant Núñez MD -Reviewer on January 26, 2021 01:14:41 PM

## 2021-05-15 VITALS
WEIGHT: 161.25 LBS | OXYGEN SATURATION: 91 % | RESPIRATION RATE: 16 BRPM | BODY MASS INDEX: 25.91 KG/M2 | TEMPERATURE: 98.4 F | HEART RATE: 78 BPM | HEIGHT: 66 IN

## 2021-05-15 VITALS
DIASTOLIC BLOOD PRESSURE: 60 MMHG | SYSTOLIC BLOOD PRESSURE: 119 MMHG | BODY MASS INDEX: 26.87 KG/M2 | WEIGHT: 167.19 LBS | HEIGHT: 66 IN

## 2021-08-24 ENCOUNTER — OFFICE VISIT (OUTPATIENT)
Dept: CARDIOLOGY | Facility: CLINIC | Age: 61
End: 2021-08-24

## 2021-08-24 VITALS
HEIGHT: 66 IN | BODY MASS INDEX: 27 KG/M2 | SYSTOLIC BLOOD PRESSURE: 136 MMHG | WEIGHT: 168 LBS | DIASTOLIC BLOOD PRESSURE: 66 MMHG | HEART RATE: 57 BPM

## 2021-08-24 DIAGNOSIS — Z72.0 NICOTINE USE: ICD-10-CM

## 2021-08-24 DIAGNOSIS — R06.02 SHORTNESS OF BREATH: ICD-10-CM

## 2021-08-24 DIAGNOSIS — E78.2 HYPERLIPEMIA, MIXED: ICD-10-CM

## 2021-08-24 DIAGNOSIS — R07.9 CHEST PAIN, UNSPECIFIED TYPE: Primary | ICD-10-CM

## 2021-08-24 PROCEDURE — 99214 OFFICE O/P EST MOD 30 MIN: CPT | Performed by: SPECIALIST

## 2021-08-24 NOTE — PROGRESS NOTES
[facility]  Cardiology progress Note    Patient Name: Derek Keller  : 1960    CHIEF COMPLAINT  Chest pain, shortness of breath.      Subjective   Subjective     HISTORY OF PRESENT ILLNESS    Derek Keller is a 60 y.o. female history of chest pain and shortness of breath.  No further chest pain.  Shortness of breath stable.    Review of Systems:   Constitutional no fever,  no weight loss   Skin no rash   Otolaryngeal no difficulty swallowing   Cardiovascular See HPI   Pulmonary no cough, no sputum production   Gastrointestinal no constipation, no diarrhea   Genitourinary no dysuria, no hematuria   Hematologic no easy bruisability, no abnormal bleeding   Musculoskeletal no muscle pain   Neurologic no dizziness, no falls         Personal History     Social History:  reports that she has been smoking cigarettes. She has a 40.00 pack-year smoking history. She has never used smokeless tobacco. She reports current drug use. Drugs: Hydrocodone and Benzodiazepines. She reports that she does not drink alcohol.    Home Medications:  Current Outpatient Medications on File Prior to Visit   Medication Sig   • ARIPiprazole (ABILIFY) 10 MG tablet Take 10 mg by mouth Daily.   • atorvastatin (LIPITOR) 20 MG tablet TAKE 1 TABLET BY MOUTH EVERY DAY   • azelastine (ASTELIN) 0.1 % nasal spray 2 sprays into the nostril(s) as directed by provider 2 (Two) Times a Day. Use in each nostril as directed   • baclofen (LIORESAL) 20 MG tablet TAKE 1 TABLET BY MOUTH THREE TIMES DAILY   • buPROPion XL (WELLBUTRIN XL) 300 MG 24 hr tablet TAKE 1 TABLET BY MOUTH DAILY.   • dicyclomine (BENTYL) 20 MG tablet TAKE 1 TABLET BY MOUTH EVERY 6hrs AS NEEDED for FOR ABDOMINAL discomfort   • fluticasone (FLONASE) 50 MCG/ACT nasal spray 2 sprays by Each Nare route Daily.   • gabapentin (NEURONTIN) 600 MG tablet TAKE 1 TABLET BY MOUTH AT BEDTIME   • ibuprofen (ADVIL,MOTRIN) 600 MG tablet TAKE 1 TABLET BY MOUTH EVERY 8 HOURS AS NEEDED   •  levothyroxine (SYNTHROID, LEVOTHROID) 50 MCG tablet TAKE 1 TABLET BY MOUTH EVERY DAY   • LORazepam (ATIVAN) 1 MG tablet Take 1 tablet by mouth Daily As Needed for Anxiety.   • losartan (COZAAR) 100 MG tablet TAKE ONE-HALF TO ONE TABLET BY MOUTH EVERY DAY **STOP LISINOPRIL/HCTZ**   • montelukast (SINGULAIR) 10 MG tablet TAKE ONE TABLET BY MOUTH EVERY DAY   • sertraline (ZOLOFT) 100 MG tablet Take 1 tablet by mouth Daily. Start with 1/4 pill daily for 1wk then 1/2/d x1wk, [id number] if tolerated   • solifenacin (VESICARE) 10 MG tablet TAKE 1 TABLET BY MOUTH DAILY.   • traZODone (DESYREL) 150 MG tablet TAKE 1 TABLET BY MOUTH ONCE nightly     No current facility-administered medications on file prior to visit.     Allergies:  Allergies   Allergen Reactions   • No Known Drug Allergy        Objective    Objective       Vitals:   BP: ()/()   Arterial Line BP: ()/()   There is no height or weight on file to calculate BMI.     Physical Exam:   Constitutional: Awake, alert, No acute distress    Eyes: PERRLA, sclerae anicteric, no conjunctival injection   HENT: NCAT, mucous membranes moist   Neck: Supple, no thyromegaly, no lymphadenopathy, trachea midline   Respiratory: Clear to auscultation bilaterally, nonlabored respirations    Cardiovascular: RRR, no murmurs or rubs. Palpable pedal pulses bilaterally   Gastrointestinal: Positive bowel sounds, soft, nontender, nondistended   Musculoskeletal: No bilateral ankle edema, no cyanosis to extremities   Psychiatric: Appropriate affect, cooperative   Neurologic: Oriented x 3, strength symmetric in all extremities, Cranial Nerves grossly intact to confrontation, speech clear   Skin: No rashes.    Result Review    Result Review:  I have personally reviewed the available results from  [x]  Laboratory  [x]  EKG  [x]  Cardiology  [x]  Medications  [x]  Old records  []  Other:   Procedures  Lab Results   Component Value Date    CHOL 125 11/12/2019    CHOL 143 04/23/2019    CHOL 147  03/28/2018     Lab Results   Component Value Date    TRIG 71 11/12/2019    TRIG 139 04/23/2019    TRIG 130 03/28/2018     Lab Results   Component Value Date    HDL 49 11/12/2019    HDL 43 04/23/2019    HDL 46 03/28/2018     Lab Results   Component Value Date    LDL 62 11/12/2019    LDL 72 04/23/2019    LDL 75 03/28/2018     Lab Results   Component Value Date    VLDL 14.2 11/12/2019    VLDL 27.8 04/23/2019    VLDL 26 03/28/2018         Impression/Plan:  1.  Precordial atypical chest pain: Negative stress test.  Normal cardiac cath in 2018.  2.  Shortness of breath stable: Normal left ventricular systolic function.  3.  Hyperlipidemia: Continue Lipitor.  4.  Hypothyroidism: Continue thyroid replacement.  5.  Positive for smoking: Smoking cessation discussed the patient.        Grant Núñez MD   08/23/21   21:16 EDT

## 2022-07-13 ENCOUNTER — OFFICE VISIT (OUTPATIENT)
Dept: UROLOGY | Facility: CLINIC | Age: 62
End: 2022-07-13

## 2022-07-13 VITALS — HEIGHT: 66 IN | WEIGHT: 175.8 LBS | BODY MASS INDEX: 28.25 KG/M2

## 2022-07-13 DIAGNOSIS — N28.89 RENAL MASS: Primary | ICD-10-CM

## 2022-07-13 DIAGNOSIS — R31.1 BENIGN ESSENTIAL MICROSCOPIC HEMATURIA: ICD-10-CM

## 2022-07-13 LAB
BILIRUB BLD-MCNC: NEGATIVE MG/DL
CLARITY, POC: CLEAR
COLOR UR: ABNORMAL
EXPIRATION DATE: 723
GLUCOSE UR STRIP-MCNC: NEGATIVE MG/DL
KETONES UR QL: NEGATIVE
LEUKOCYTE EST, POC: NEGATIVE
Lab: ABNORMAL
NITRITE UR-MCNC: NEGATIVE MG/ML
PH UR: 5.5 [PH] (ref 5–8)
PROT UR STRIP-MCNC: ABNORMAL MG/DL
RBC # UR STRIP: ABNORMAL /UL
SP GR UR: 1.03 (ref 1–1.03)
UROBILINOGEN UR QL: NORMAL

## 2022-07-13 PROCEDURE — 99203 OFFICE O/P NEW LOW 30 MIN: CPT | Performed by: UROLOGY

## 2022-07-13 PROCEDURE — 81003 URINALYSIS AUTO W/O SCOPE: CPT | Performed by: UROLOGY

## 2022-07-13 RX ORDER — NAPROXEN 500 MG/1
500 TABLET ORAL 2 TIMES DAILY WITH MEALS
COMMUNITY

## 2022-07-13 RX ORDER — LISINOPRIL AND HYDROCHLOROTHIAZIDE 12.5; 1 MG/1; MG/1
TABLET ORAL
COMMUNITY
End: 2022-10-29 | Stop reason: ALTCHOICE

## 2022-07-13 RX ORDER — ROPINIROLE 3 MG/1
3 TABLET, FILM COATED ORAL 2 TIMES DAILY
COMMUNITY
Start: 2022-06-29

## 2022-07-13 RX ORDER — ATORVASTATIN CALCIUM 20 MG/1
TABLET, FILM COATED ORAL
COMMUNITY
End: 2022-07-13 | Stop reason: SDUPTHER

## 2022-07-13 RX ORDER — LEVOTHYROXINE SODIUM 0.05 MG/1
TABLET ORAL
COMMUNITY
End: 2022-10-13 | Stop reason: SDUPTHER

## 2022-07-13 RX ORDER — LOSARTAN POTASSIUM 100 MG/1
100 TABLET ORAL DAILY
COMMUNITY

## 2022-07-13 RX ORDER — MONTELUKAST SODIUM 10 MG/1
10 TABLET ORAL DAILY
COMMUNITY
Start: 2022-01-17

## 2022-07-13 RX ORDER — VENLAFAXINE HYDROCHLORIDE 75 MG/1
75 CAPSULE, EXTENDED RELEASE ORAL DAILY
COMMUNITY
Start: 2022-06-29 | End: 2022-11-21 | Stop reason: SDUPTHER

## 2022-07-13 RX ORDER — GABAPENTIN 800 MG/1
TABLET ORAL
COMMUNITY
End: 2022-10-13 | Stop reason: SDUPTHER

## 2022-07-13 RX ORDER — LORAZEPAM 0.5 MG/1
0.5 TABLET ORAL EVERY 6 HOURS PRN
COMMUNITY

## 2022-07-13 RX ORDER — BACLOFEN 10 MG/1
TABLET ORAL
COMMUNITY
End: 2022-07-13 | Stop reason: SDUPTHER

## 2022-07-13 NOTE — PROGRESS NOTES
"Chief Complaint  No chief complaint on file.    Subjective          Derek Keller presents to [facility]  History of Present Illness  Ms. Keller had a CT scan of her abdomen and pelvis with IV contrast [facility] on 06/06/2022 that showed an indeterminate lesion of her lower right kidney, probably a proteinaceous or hemorrhagic cyst, and they recommended further characterization. The patient actually had an MRI of the abdomen in 02/2021. That MRI showed 3 right renal lesions that appeared to reflect simple cysts, the largest one measuring up to 1.2 cm.    The patient reports that she is doing well. She states that she was told that there was a small kidney stone. The patient states that this all began because she had hematuria.     Review of Systems:  A review of systems was completed and positive findings are noted in the HPI.    Objective   Vital Signs:   Ht 167.6 cm (66\")   Wt 79.7 kg (175 lb 12.8 oz)   BMI 28.37 kg/m²       Physical Exam  Vitals and nursing note reviewed.   Constitutional:       Appearance: Normal appearance. She is well-developed.   Pulmonary:      Effort: Pulmonary effort is normal.      Breath sounds: Normal air entry.   Neurological:      Mental Status: She is alert and oriented to person, place, and time.      Motor: Motor function is intact.   Psychiatric:         Mood and Affect: Mood normal.         Behavior: Behavior normal.          Result Review :   The following data was reviewed by: Destinee Myers MD on 07/13/2022:    Results for orders placed or performed in visit on 07/13/22   POC Urinalysis Dipstick, Automated    Specimen: Urine   Result Value Ref Range    Color Dark Yellow Yellow, Straw, Dark Yellow, Melanie    Clarity, UA Clear Clear    Specific Gravity  1.030 1.005 - 1.030    pH, Urine 5.5 5.0 - 8.0    Leukocytes Negative Negative    Nitrite, UA Negative Negative    Protein, POC Trace (A) Negative mg/dL    Glucose, " UA Negative Negative mg/dL    Ketones, UA Negative Negative    Urobilinogen, UA Normal Normal    Bilirubin Negative Negative    Blood, UA Small (A) Negative    Lot Number [id number]     Expiration Date 723        Data reviewed: Radiologic studies Recent CT scan at [facility] and Plan    Diagnoses and all orders for this visit:    1. Renal mass (Primary)  -     POC Urinalysis Dipstick, Automated  -     CT Abdomen Pelvis With & Without Contrast; Future        -     We will complete her work-up for hematuria by getting a CT scan with and without IV contrast that will show us her kidney lesion. We will then do a cystoscopy in the office. I will see her back at that time.    2. Benign essential microscopic hematuria  -     CT Abdomen Pelvis With & Without Contrast; Future  -     We will complete her work-up for hematuria by getting a CT scan with and without IV contrast that will show us her kidney lesion. We will then do a cystoscopy in the office. I will see her back at that time.            Follow Up       No follow-ups on file.  Patient was given instructions and counseling regarding her condition or for health maintenance advice. Please see specific information pulled into the AVS if appropriate.     Transcribed from ambient dictation for Destinee Myers MD by Yulia Walker.  07/13/22   15:27 EDT    Patient verbalized consent to the visit recording.  I have personally performed the services described in this document as transcribed by the above individual, and it is both accurate and complete.  Destinee Myers MD  7/14/2022  23:56 EDT

## 2022-07-14 ENCOUNTER — PATIENT ROUNDING (BHMG ONLY) (OUTPATIENT)
Dept: UROLOGY | Facility: CLINIC | Age: 62
End: 2022-07-14

## 2022-07-14 NOTE — PROGRESS NOTES
July 14, 2022    Hello, may I speak with Derek Keller?    My name is CLARE Minaya      I am  with [facility]  [address]  [place] [address] [phone].    Before we get started may I verify your date of birth? 1960    I am calling to officially welcome you to our practice and ask about your recent visit. Is this a good time to talk? yes    Tell me about your visit with us. What things went well?  Patient stated that her visit with Dr. Myers was fine. She stated everything went well and she had no complaints. She was pleased.         We're always looking for ways to make our patients' experiences even better. Do you have recommendations on ways we may improve?  no    Overall were you satisfied with your first visit to our practice? yes       I appreciate you taking the time to speak with me today. Is there anything else I can do for you? Yes, pt did have a question about being called for her imaging. I explained to her that scheduling will call her to set up the CT scan Dr. Myers ordered but if she didn't hear from them by mid next week to call us back but assured her that the process works well and there should be no problem with her hearing from them. She voiced understanding.       Thank you, and have a great day.

## 2022-08-04 ENCOUNTER — TELEPHONE (OUTPATIENT)
Dept: UROLOGY | Facility: CLINIC | Age: 62
End: 2022-08-04

## 2022-08-04 NOTE — TELEPHONE ENCOUNTER
Patient scheduled for CT abdomen/pelvis with and without contrast on 08/05/22. She said she has another problem, and she isn't sure if the CT will check for it.      She has started wearing Depends.  When she has the first twinge to urinate, by the time she gets up and walks about 15 feet to the bathroom, she has filled the Depends with urine.  She cannot stop the flow.  Last night, she filled 3 Depends.  She said it is an urgency issue. It has been an issue, but is getting worse.     She has f/u appointment with Dr. Myers 08/10/22.  She doesn't know if Dr. Myers wants to add something to imaging order.  She lives in [place], and it will save her a trip if she can have any imaging done together.

## 2022-08-04 NOTE — TELEPHONE ENCOUNTER
Spoke with patient informing her that no other imaging will be needed and that Dr. Myers [facility]

## 2022-08-05 ENCOUNTER — HOSPITAL ENCOUNTER (OUTPATIENT)
Dept: CT IMAGING | Facility: HOSPITAL | Age: 62
Discharge: HOME | End: 2022-08-05
Admitting: UROLOGY

## 2022-08-05 DIAGNOSIS — R31.1 BENIGN ESSENTIAL MICROSCOPIC HEMATURIA: ICD-10-CM

## 2022-08-05 DIAGNOSIS — N28.89 RENAL MASS: ICD-10-CM

## 2022-08-05 LAB
CREAT BLDA-MCNC: 0.8 MG/DL
EGFRCR SERPLBLD CKD-EPI 2021: 83.4 ML/MIN/1.73

## 2022-08-05 PROCEDURE — 0 IOPAMIDOL PER 1 ML: Performed by: UROLOGY

## 2022-08-05 PROCEDURE — 82565 ASSAY OF CREATININE: CPT

## 2022-08-05 PROCEDURE — 74178 CT ABD&PLV WO CNTR FLWD CNTR: CPT

## 2022-08-05 RX ADMIN — IOPAMIDOL 100 ML: 755 INJECTION, SOLUTION INTRAVENOUS at 12:00

## 2022-08-10 ENCOUNTER — PROCEDURE VISIT (OUTPATIENT)
Dept: UROLOGY | Facility: CLINIC | Age: 62
End: 2022-08-10

## 2022-08-10 VITALS — WEIGHT: 176 LBS | BODY MASS INDEX: 28.28 KG/M2 | HEIGHT: 66 IN

## 2022-08-10 DIAGNOSIS — M89.9 LESION OF BONE OF LUMBOSACRAL SPINE: ICD-10-CM

## 2022-08-10 DIAGNOSIS — N32.81 OAB (OVERACTIVE BLADDER): ICD-10-CM

## 2022-08-10 DIAGNOSIS — R31.1 BENIGN ESSENTIAL MICROSCOPIC HEMATURIA: Primary | ICD-10-CM

## 2022-08-10 LAB
BILIRUB BLD-MCNC: NEGATIVE MG/DL
CLARITY, POC: CLEAR
COLOR UR: YELLOW
EXPIRATION DATE: 823
GLUCOSE UR STRIP-MCNC: NEGATIVE MG/DL
KETONES UR QL: NEGATIVE
LEUKOCYTE EST, POC: NEGATIVE
Lab: ABNORMAL
NITRITE UR-MCNC: NEGATIVE MG/ML
PH UR: 6 [PH] (ref 5–8)
PROT UR STRIP-MCNC: NEGATIVE MG/DL
RBC # UR STRIP: ABNORMAL /UL
SP GR UR: 1.01 (ref 1–1.03)
UROBILINOGEN UR QL: NORMAL

## 2022-08-10 PROCEDURE — 81003 URINALYSIS AUTO W/O SCOPE: CPT | Performed by: UROLOGY

## 2022-08-10 PROCEDURE — 52000 CYSTOURETHROSCOPY: CPT | Performed by: UROLOGY

## 2022-08-10 RX ORDER — OXYBUTYNIN CHLORIDE 10 MG/1
10 TABLET, EXTENDED RELEASE ORAL DAILY
Qty: 30 TABLET | Refills: 11 | Status: SHIPPED | OUTPATIENT
Start: 2022-08-10 | End: 2022-09-08 | Stop reason: DRUGHIGH

## 2022-08-10 RX ORDER — DONEPEZIL HYDROCHLORIDE 5 MG/1
TABLET, FILM COATED ORAL
COMMUNITY
End: 2022-11-04

## 2022-08-10 NOTE — PROGRESS NOTES
Cystoscopy    Date/Time: 8/10/2022 2:22 PM  Performed by: Destinee Myers MD  Authorized by: Destinee Myers MD   Preparation: Patient was prepped and draped in the usual sterile fashion.  Local anesthesia used: no    Anesthesia:  Local anesthesia used: no    Sedation:  Patient sedated: no    Patient tolerance: patient tolerated the procedure well with no immediate complications  Comments: Cytoscopy Procedure:     Procedure: Flexible cytoscope was passed per urethra into the bladder without difficulty after proper consent. The bladder was inspected in a systematic meridian fashion. There were no tumors, lesions, stones, or other abnormalities noted within the bladder. Of note, there was no increased vascularity as well. Both ureteral orifices were identified and were normal in appearance. The flexible cytoscope was removed. The patient tolerated the procedure well.     FOLLOW UP OFFICE NOTE: The CT scan of the Abdomen/Pelvis was as follows:    Study Result    Narrative & Impression  PROCEDURE:  CT ABDOMEN PELVIS W WO CONTRAST     COMPARISON:  [facility], MR, ABDOMEN W/WO CONTRAST, 2/12/2021, 8:44.  [facility], CT, ABDOMEN/PELVIS WITH CONTRAST, 1/25/2021, 9:24.  INDICATIONS:  MICROSCOPIC HEMATURIA, URINE URGENCY, GFR 83 CRE 0.8 8/5/22     TECHNIQUE:    After obtaining the patient's consent, CT images of the abdomen were created without and   with non-ionic intravenous contrast material, and CT images of the pelvis were obtained with   non-ionic intravenous contrast material.       PROTOCOL:     Urology imaging protocol performed                 RADIATION:      DLP: 1823.7mGy*cm               Automated exposure control was utilized to minimize radiation dose.   CONTRAST:      100cc Isovue 370 I.V.  LABS:   eGFR: 83ml/min/1.73m2     FINDINGS:          Genitourinary system:    There is a nonobstructing 3 mm stone within the upper pole of the right kidney (axial image 20 of   series  201).  There are no ureteral or bladder stones.  There is normal symmetric enhancement of   the kidneys.  There is no hydronephrosis or hydroureter.  There are small hypodense lesions within   the right kidney which likely represent small cysts.  These were previously evaluated on MRI from   2/12/2021.  There is no abnormal bladder wall thickening.  There is normal opacification of the   bilateral renal collecting system without evidence of urothelial thickening or filling defect.     Remainder of the abdomen and pelvis:    The heart size is normal.  There is no pericardial effusion.  The lung bases are clear.     The liver is normal in size and contour.  There is a small low-density lesion within the right   hepatic lobe adjacent to the gallbladder fossa measuring 8 mm with imaging characteristics of a   benign cyst.  The gallbladder is surgically absent.  There is stable mild dilation of the   extrahepatic bile duct.  The spleen, adrenal glands, and pancreas appear within normal limits.     The uterus is surgically absent.  There are no adnexal masses.  There is a simple 1.6 cm right   ovarian cyst.     The gastrointestinal tract appears normal in caliber without evidence of obstruction or   inflammation.  There is no evidence of acute colitis or diverticulitis.  There is no free fluid or   free air.  There is a small amount of inflammatory stranding within the left anterior abdominal   wall likely related to prior gastric lap band hub removal.  The aorta is normal in caliber without   evidence of aneurysm formation.  There is atherosclerotic calcification of the abdominal aorta.    There is no mesenteric, retroperitoneal, or pelvic lymphadenopathy by size criteria. There is   degenerative disc disease at L4-5 and L5-S1.  There are multiple scattered sclerotic foci within   the vertebral body which appear new from prior CT dated 1/25/2021.     IMPRESSION:                 1. Small 3 mm nonobstructing stone within  the upper pole of the right kidney.  2. No abnormal urothelial thickening or filling defect within the upper collecting systems.  3. Right-sided simple renal cysts.  4. Multiple new sclerotic foci within the vertebral bodies of the lumbar spine and pelvis compared   to CT dated 1/25/2021.  These are nonspecific however given the interval change, recommend further   evaluation with nuclear medicine whole-body bone scan to visualize the distribution.  Correlate   clinically for any known history of malignancy.  5. Additional ancillary findings as detailed above.              ANSELMO DICKERSON MD         Electronically Signed and Approved By: ANSELMO DICKERSON MD on 8/05/2022 at 14:25

## 2022-08-16 ENCOUNTER — TELEPHONE (OUTPATIENT)
Dept: UROLOGY | Facility: CLINIC | Age: 62
End: 2022-08-16

## 2022-08-16 NOTE — TELEPHONE ENCOUNTER
RECEIVED A CALL FROM PT. SHE CALLED BECAUSE SHE NEEDS LAST PROGRESS NOTES AND CT IMAGING RESULTS TO BE FAXED TO  DR. LON VALDOVINOS AND DR. ZACHARY VALDOVINOS  [phone]. FAX# [phone].

## 2022-09-02 ENCOUNTER — HOSPITAL ENCOUNTER (OUTPATIENT)
Dept: NUCLEAR MEDICINE | Facility: HOSPITAL | Age: 62
Discharge: HOME | End: 2022-09-02

## 2022-09-02 DIAGNOSIS — M89.9 LESION OF BONE OF LUMBOSACRAL SPINE: ICD-10-CM

## 2022-09-02 PROCEDURE — A9503 TC99M MEDRONATE: HCPCS | Performed by: UROLOGY

## 2022-09-02 PROCEDURE — 78306 BONE IMAGING WHOLE BODY: CPT

## 2022-09-02 PROCEDURE — 0 TECHNETIUM MEDRONATE KIT: Performed by: UROLOGY

## 2022-09-02 RX ORDER — TC 99M MEDRONATE 20 MG/10ML
21.2 INJECTION, POWDER, LYOPHILIZED, FOR SOLUTION INTRAVENOUS
Status: COMPLETED | OUTPATIENT
Start: 2022-09-02 | End: 2022-09-02

## 2022-09-02 RX ADMIN — TC 99M MEDRONATE 21.2 MILLICURIE: 20 INJECTION, POWDER, LYOPHILIZED, FOR SOLUTION INTRAVENOUS at 13:26

## 2022-09-07 ENCOUNTER — TELEPHONE (OUTPATIENT)
Dept: UROLOGY | Facility: CLINIC | Age: 62
End: 2022-09-07

## 2022-09-08 ENCOUNTER — TELEPHONE (OUTPATIENT)
Dept: UROLOGY | Facility: CLINIC | Age: 62
End: 2022-09-08

## 2022-09-08 DIAGNOSIS — N32.81 OAB (OVERACTIVE BLADDER): Primary | ICD-10-CM

## 2022-09-08 RX ORDER — OXYBUTYNIN CHLORIDE 15 MG/1
15 TABLET, EXTENDED RELEASE ORAL DAILY
Qty: 30 TABLET | Refills: 11 | Status: SHIPPED | OUTPATIENT
Start: 2022-09-08 | End: 2022-10-28

## 2022-09-08 NOTE — TELEPHONE ENCOUNTER
Spoke with PT and told her about the message I had left earlier for her and that Dr. Myers had said to cancel her appointment with us next week since she was following up with her PCP. She asked if her oxybutynin could be increased as she had discussed it with Dr. Myers. I told her I would let Dr. Myers now and get back with her.

## 2022-09-08 NOTE — TELEPHONE ENCOUNTER
Dr. Myers called PT yesterday and discussed results. I called PT and left message this morning stating that we had sent her results from her scan to Dr. Monique in [place] and they had them and are going to call to schedule an appt. I told her that if she has not heard from them by Tuesday to call us back or if she feels the appt is too far off in the future to call us and I would let Dr. Myers know.

## 2022-09-09 NOTE — PROGRESS NOTES
Dr. Monique is seeing patient today.  I spoke to him and he will complete workup for her bone lesions.

## 2022-09-23 ENCOUNTER — TRANSCRIBE ORDERS (OUTPATIENT)
Dept: ADMINISTRATIVE | Facility: HOSPITAL | Age: 62
End: 2022-09-23

## 2022-09-23 DIAGNOSIS — C79.51 SECONDARY MALIGNANT NEOPLASM OF BONE AND BONE MARROW: Primary | ICD-10-CM

## 2022-09-23 DIAGNOSIS — C79.52 SECONDARY MALIGNANT NEOPLASM OF BONE AND BONE MARROW: Primary | ICD-10-CM

## 2022-09-26 ENCOUNTER — HOSPITAL ENCOUNTER (OUTPATIENT)
Dept: PET IMAGING | Facility: HOSPITAL | Age: 62
Discharge: HOME | End: 2022-09-26

## 2022-09-26 DIAGNOSIS — C79.51 SECONDARY MALIGNANT NEOPLASM OF BONE AND BONE MARROW: ICD-10-CM

## 2022-09-26 DIAGNOSIS — C79.52 SECONDARY MALIGNANT NEOPLASM OF BONE AND BONE MARROW: ICD-10-CM

## 2022-09-26 PROCEDURE — 0 FLUDEOXYGLUCOSE F18 SOLUTION: Performed by: FAMILY MEDICINE

## 2022-09-26 PROCEDURE — 78815 PET IMAGE W/CT SKULL-THIGH: CPT

## 2022-09-26 PROCEDURE — A9552 F18 FDG: HCPCS | Performed by: FAMILY MEDICINE

## 2022-09-26 RX ADMIN — FLUDEOXYGLUCOSE F18 1 DOSE: 300 INJECTION INTRAVENOUS at 11:19

## 2022-09-27 ENCOUNTER — TRANSCRIBE ORDERS (OUTPATIENT)
Dept: ADMINISTRATIVE | Facility: HOSPITAL | Age: 62
End: 2022-09-27

## 2022-09-27 DIAGNOSIS — D48.9 NEOPLASM OF UNCERTAIN BEHAVIOR: Primary | ICD-10-CM

## 2022-09-28 ENCOUNTER — HOSPITAL ENCOUNTER (OUTPATIENT)
Dept: ULTRASOUND IMAGING | Facility: HOSPITAL | Age: 62
Discharge: HOME | End: 2022-09-28

## 2022-09-28 ENCOUNTER — TRANSCRIBE ORDERS (OUTPATIENT)
Dept: ADMINISTRATIVE | Facility: HOSPITAL | Age: 62
End: 2022-09-28

## 2022-09-28 ENCOUNTER — HOSPITAL ENCOUNTER (OUTPATIENT)
Dept: MAMMOGRAPHY | Facility: HOSPITAL | Age: 62
Discharge: HOME | End: 2022-09-28

## 2022-09-28 DIAGNOSIS — D48.9 NEOPLASM OF UNCERTAIN BEHAVIOR: ICD-10-CM

## 2022-09-28 DIAGNOSIS — N63.20 MASS OF LEFT BREAST, UNSPECIFIED QUADRANT: Primary | ICD-10-CM

## 2022-09-28 PROCEDURE — 77066 DX MAMMO INCL CAD BI: CPT

## 2022-09-28 PROCEDURE — 76642 ULTRASOUND BREAST LIMITED: CPT

## 2022-09-28 PROCEDURE — G0279 TOMOSYNTHESIS, MAMMO: HCPCS

## 2022-10-06 ENCOUNTER — HOSPITAL ENCOUNTER (OUTPATIENT)
Dept: MAMMOGRAPHY | Facility: HOSPITAL | Age: 62
Discharge: HOME | End: 2022-10-06

## 2022-10-06 ENCOUNTER — DOCUMENTATION (OUTPATIENT)
Dept: MAMMOGRAPHY | Facility: HOSPITAL | Age: 62
End: 2022-10-06

## 2022-10-06 DIAGNOSIS — N63.20 MASS OF LEFT BREAST, UNSPECIFIED QUADRANT: ICD-10-CM

## 2022-10-06 PROCEDURE — 88341 IMHCHEM/IMCYTCHM EA ADD ANTB: CPT | Performed by: FAMILY MEDICINE

## 2022-10-06 PROCEDURE — A4648 IMPLANTABLE TISSUE MARKER: HCPCS

## 2022-10-06 PROCEDURE — 88360 TUMOR IMMUNOHISTOCHEM/MANUAL: CPT | Performed by: FAMILY MEDICINE

## 2022-10-06 PROCEDURE — 88305 TISSUE EXAM BY PATHOLOGIST: CPT | Performed by: FAMILY MEDICINE

## 2022-10-06 PROCEDURE — 0 LIDOCAINE 1 % SOLUTION: Performed by: FAMILY MEDICINE

## 2022-10-06 PROCEDURE — 88342 IMHCHEM/IMCYTCHM 1ST ANTB: CPT | Performed by: FAMILY MEDICINE

## 2022-10-06 RX ORDER — LIDOCAINE HYDROCHLORIDE 10 MG/ML
10 INJECTION, SOLUTION INFILTRATION; PERINEURAL ONCE
Status: COMPLETED | OUTPATIENT
Start: 2022-10-06 | End: 2022-10-06

## 2022-10-06 RX ORDER — LIDOCAINE HYDROCHLORIDE AND EPINEPHRINE 10; 10 MG/ML; UG/ML
10 INJECTION, SOLUTION INFILTRATION; PERINEURAL ONCE
Status: COMPLETED | OUTPATIENT
Start: 2022-10-06 | End: 2022-10-06

## 2022-10-06 RX ADMIN — LIDOCAINE HYDROCHLORIDE 10 ML: 10 INJECTION, SOLUTION INFILTRATION; PERINEURAL at 08:55

## 2022-10-06 RX ADMIN — LIDOCAINE HYDROCHLORIDE 10 ML: 10 INJECTION, SOLUTION INFILTRATION; PERINEURAL at 08:51

## 2022-10-06 RX ADMIN — LIDOCAINE HYDROCHLORIDE,EPINEPHRINE BITARTRATE 10 ML: 10; .01 INJECTION, SOLUTION INFILTRATION; PERINEURAL at 08:51

## 2022-10-06 RX ADMIN — LIDOCAINE HYDROCHLORIDE,EPINEPHRINE BITARTRATE 10 ML: 10; .01 INJECTION, SOLUTION INFILTRATION; PERINEURAL at 08:55

## 2022-10-07 ENCOUNTER — TELEPHONE (OUTPATIENT)
Dept: MAMMOGRAPHY | Facility: HOSPITAL | Age: 62
End: 2022-10-07

## 2022-10-07 NOTE — TELEPHONE ENCOUNTER
RETURNED PT'S CALL AND SHE HAD ALREADY TALKED TO ONE OF THE TECHS AND THANKED ME FOR RETURNING HER CALL

## 2022-10-10 ENCOUNTER — DOCUMENTATION (OUTPATIENT)
Dept: MAMMOGRAPHY | Facility: HOSPITAL | Age: 62
End: 2022-10-10

## 2022-10-10 ENCOUNTER — TELEPHONE (OUTPATIENT)
Dept: SURGERY | Facility: CLINIC | Age: 62
End: 2022-10-10

## 2022-10-10 NOTE — TELEPHONE ENCOUNTER
Provider: DR ROMANO  Caller: CONCHITA DODD  Relationship to Patient: REFERRING PROVIDER    Phone Number: [phone]    Reason for Call: CONCHITA ATKINS/ ARUNA DODD CALLED TO FOLLOW UP ON URGENT REFERRAL. SHE ADVISED THE PATHOLOGY CAME BACK POSITIVE FOR CANCER. THE BIOPSY IS IN THE PATIENT'S CHART.    CONCHITA REQUESTED THE PATIENT BE SEEN AS SOON AS POSSIBLE.

## 2022-10-12 PROBLEM — M51.36 DEGENERATION OF LUMBAR INTERVERTEBRAL DISC: Status: ACTIVE | Noted: 2022-05-05

## 2022-10-12 PROBLEM — H61.21 IMPACTED CERUMEN OF RIGHT EAR: Status: ACTIVE | Noted: 2022-10-12

## 2022-10-12 PROBLEM — M47.812 CERVICAL SPONDYLOSIS: Status: ACTIVE | Noted: 2022-05-05

## 2022-10-12 PROBLEM — N20.0 RENAL STONE: Status: ACTIVE | Noted: 2019-06-04

## 2022-10-12 PROBLEM — M51.369 DEGENERATION OF LUMBAR INTERVERTEBRAL DISC: Status: ACTIVE | Noted: 2022-05-05

## 2022-10-12 PROBLEM — K58.9 IRRITABLE BOWEL SYNDROME: Status: ACTIVE | Noted: 2017-03-13

## 2022-10-12 PROBLEM — Z72.4 INAPPROPRIATE DIET AND EATING HABITS: Status: ACTIVE | Noted: 2017-03-14

## 2022-10-12 PROBLEM — E66.9 OBESITY WITH BODY MASS INDEX 30 OR GREATER: Status: ACTIVE | Noted: 2017-03-14

## 2022-10-12 PROBLEM — B96.89 DISORDER ASSOCIATED WITH HELICOBACTER SPECIES: Status: ACTIVE | Noted: 2022-10-12

## 2022-10-12 PROBLEM — J45.909 ASTHMA: Status: ACTIVE | Noted: 2017-03-13

## 2022-10-12 PROBLEM — R10.30 INGUINAL PAIN: Status: ACTIVE | Noted: 2019-06-29

## 2022-10-12 PROBLEM — H91.90 HEARING LOSS: Status: ACTIVE | Noted: 2022-10-12

## 2022-10-12 PROBLEM — K59.00 CONSTIPATION: Status: ACTIVE | Noted: 2021-10-27

## 2022-10-12 RX ORDER — SUMATRIPTAN 100 MG/1
100 TABLET, FILM COATED ORAL ONCE AS NEEDED
COMMUNITY
Start: 2022-10-07

## 2022-10-13 ENCOUNTER — DOCUMENTATION (OUTPATIENT)
Dept: ONCOLOGY | Facility: HOSPITAL | Age: 62
End: 2022-10-13

## 2022-10-13 ENCOUNTER — LAB (OUTPATIENT)
Dept: ONCOLOGY | Facility: HOSPITAL | Age: 62
End: 2022-10-13

## 2022-10-13 ENCOUNTER — DOCUMENTATION (OUTPATIENT)
Dept: SURGERY | Facility: CLINIC | Age: 62
End: 2022-10-13

## 2022-10-13 ENCOUNTER — CONSULT (OUTPATIENT)
Dept: ONCOLOGY | Facility: HOSPITAL | Age: 62
End: 2022-10-13

## 2022-10-13 VITALS
SYSTOLIC BLOOD PRESSURE: 139 MMHG | WEIGHT: 182.98 LBS | DIASTOLIC BLOOD PRESSURE: 68 MMHG | OXYGEN SATURATION: 100 % | HEIGHT: 66 IN | RESPIRATION RATE: 16 BRPM | HEART RATE: 52 BPM | TEMPERATURE: 98.4 F | BODY MASS INDEX: 29.41 KG/M2

## 2022-10-13 DIAGNOSIS — G89.3 CANCER RELATED PAIN: ICD-10-CM

## 2022-10-13 DIAGNOSIS — Z17.0 MALIGNANT NEOPLASM OF LEFT BREAST IN FEMALE, ESTROGEN RECEPTOR POSITIVE, UNSPECIFIED SITE OF BREAST: Primary | ICD-10-CM

## 2022-10-13 DIAGNOSIS — C79.51 BONE METASTASES: ICD-10-CM

## 2022-10-13 DIAGNOSIS — C50.912 MALIGNANT NEOPLASM OF LEFT BREAST IN FEMALE, ESTROGEN RECEPTOR POSITIVE, UNSPECIFIED SITE OF BREAST: Primary | ICD-10-CM

## 2022-10-13 DIAGNOSIS — Z17.0 MALIGNANT NEOPLASM OF LEFT BREAST IN FEMALE, ESTROGEN RECEPTOR POSITIVE, UNSPECIFIED SITE OF BREAST: ICD-10-CM

## 2022-10-13 DIAGNOSIS — C50.912 MALIGNANT NEOPLASM OF LEFT BREAST IN FEMALE, ESTROGEN RECEPTOR POSITIVE, UNSPECIFIED SITE OF BREAST: ICD-10-CM

## 2022-10-13 LAB
ALBUMIN SERPL-MCNC: 4.5 G/DL (ref 3.5–5.2)
ALBUMIN/GLOB SERPL: 1.7 G/DL
ALP SERPL-CCNC: 90 U/L (ref 39–117)
ALT SERPL W P-5'-P-CCNC: 10 U/L (ref 1–33)
ANION GAP SERPL CALCULATED.3IONS-SCNC: 8.2 MMOL/L (ref 5–15)
AST SERPL-CCNC: 14 U/L (ref 1–32)
BASOPHILS # BLD AUTO: 0.07 10*3/MM3 (ref 0–0.2)
BASOPHILS NFR BLD AUTO: 0.8 % (ref 0–1.5)
BILIRUB SERPL-MCNC: 0.4 MG/DL (ref 0–1.2)
BUN SERPL-MCNC: 13 MG/DL (ref 8–23)
BUN/CREAT SERPL: 15.5 (ref 7–25)
CALCIUM SPEC-SCNC: 9.7 MG/DL (ref 8.6–10.5)
CHLORIDE SERPL-SCNC: 105 MMOL/L (ref 98–107)
CO2 SERPL-SCNC: 30.8 MMOL/L (ref 22–29)
CREAT SERPL-MCNC: 0.84 MG/DL (ref 0.57–1)
DEPRECATED RDW RBC AUTO: 45.1 FL (ref 37–54)
EGFRCR SERPLBLD CKD-EPI 2021: 78.2 ML/MIN/1.73
EOSINOPHIL # BLD AUTO: 0.2 10*3/MM3 (ref 0–0.4)
EOSINOPHIL NFR BLD AUTO: 2.4 % (ref 0.3–6.2)
ERYTHROCYTE [DISTWIDTH] IN BLOOD BY AUTOMATED COUNT: 13.2 % (ref 12.3–15.4)
GLOBULIN UR ELPH-MCNC: 2.6 GM/DL
GLUCOSE SERPL-MCNC: 83 MG/DL (ref 65–99)
HCT VFR BLD AUTO: 39.4 % (ref 34–46.6)
HGB BLD-MCNC: 12.5 G/DL (ref 12–15.9)
IMM GRANULOCYTES # BLD AUTO: 0.02 10*3/MM3 (ref 0–0.05)
IMM GRANULOCYTES NFR BLD AUTO: 0.2 % (ref 0–0.5)
LYMPHOCYTES # BLD AUTO: 3.24 10*3/MM3 (ref 0.7–3.1)
LYMPHOCYTES NFR BLD AUTO: 38.5 % (ref 19.6–45.3)
MAGNESIUM SERPL-MCNC: 2 MG/DL (ref 1.6–2.4)
MCH RBC QN AUTO: 29.6 PG (ref 26.6–33)
MCHC RBC AUTO-ENTMCNC: 31.7 G/DL (ref 31.5–35.7)
MCV RBC AUTO: 93.4 FL (ref 79–97)
MONOCYTES # BLD AUTO: 0.49 10*3/MM3 (ref 0.1–0.9)
MONOCYTES NFR BLD AUTO: 5.8 % (ref 5–12)
NEUTROPHILS NFR BLD AUTO: 4.39 10*3/MM3 (ref 1.7–7)
NEUTROPHILS NFR BLD AUTO: 52.3 % (ref 42.7–76)
NRBC BLD AUTO-RTO: 0 /100 WBC (ref 0–0.2)
PLATELET # BLD AUTO: 225 10*3/MM3 (ref 140–450)
PMV BLD AUTO: 10.9 FL (ref 6–12)
POTASSIUM SERPL-SCNC: 3.9 MMOL/L (ref 3.5–5.2)
PROT SERPL-MCNC: 7.1 G/DL (ref 6–8.5)
RBC # BLD AUTO: 4.22 10*6/MM3 (ref 3.77–5.28)
SODIUM SERPL-SCNC: 144 MMOL/L (ref 136–145)
WBC NRBC COR # BLD: 8.41 10*3/MM3 (ref 3.4–10.8)

## 2022-10-13 PROCEDURE — 80053 COMPREHEN METABOLIC PANEL: CPT

## 2022-10-13 PROCEDURE — 99205 OFFICE O/P NEW HI 60 MIN: CPT | Performed by: INTERNAL MEDICINE

## 2022-10-13 PROCEDURE — 85025 COMPLETE CBC W/AUTO DIFF WBC: CPT

## 2022-10-13 PROCEDURE — G0463 HOSPITAL OUTPT CLINIC VISIT: HCPCS

## 2022-10-13 PROCEDURE — 83735 ASSAY OF MAGNESIUM: CPT

## 2022-10-13 PROCEDURE — 36415 COLL VENOUS BLD VENIPUNCTURE: CPT

## 2022-10-13 PROCEDURE — G0463 HOSPITAL OUTPT CLINIC VISIT: HCPCS | Performed by: INTERNAL MEDICINE

## 2022-10-13 RX ORDER — LETROZOLE 2.5 MG/1
2.5 TABLET, FILM COATED ORAL DAILY
Qty: 30 TABLET | Refills: 11 | Status: SHIPPED | OUTPATIENT
Start: 2022-10-13 | End: 2022-11-30 | Stop reason: SDUPTHER

## 2022-10-13 RX ORDER — ONDANSETRON HYDROCHLORIDE 8 MG/1
8 TABLET, FILM COATED ORAL 3 TIMES DAILY PRN
Qty: 30 TABLET | Refills: 5 | Status: SHIPPED | OUTPATIENT
Start: 2022-10-13 | End: 2023-03-08

## 2022-10-13 NOTE — PROGRESS NOTES
Chief Complaint  Breast Cancer    Miles Monique MD Patel, Saagar, MD    Subjective          Derek Barrazaeder presents to [facility] for evaluation of recently diagnosed breast cancer.  Patient states that this initially began when she had gross hematuria earlier in the summer.  This prompted evaluation with urology and included CT scan of the pelvis.  The CT scan was notable for lesions in the spine concerning for metastatic disease.  A search for primary was then initiated.  This included nuclear medicine bone scan those images reviewed.  She does have some arthritic changes but there are multiple lesions suggestive of metastatic disease including in spine and pelvis.  PET scan was obtained that demonstrated abnormality in the left breast and axilla as well as redemonstrating the bony involvement.  Biopsy of the left breast and axilla both positive for lobular carcinoma ER positive, WA positive, HER2 negative.  Patient reports chronic arthritic pain especially in the back.  She sees pain management and is in the midst of injection therapy.  She notes that the low back and pelvis pain has been worse the last couple months.  She is taking over-the-counter Tylenol with little relief.  She notes she is having more pain from the right occipital area of her head but denies an obvious mass.  She is due for an injection there with her pain management doctors tomorrow.  She reports good appetite.  She denies any other obvious masses or adenopathy.  She presents today for discussion of treatment options.    Oncology/Hematology History   Malignant neoplasm of left breast in female, estrogen receptor positive (HCC)   10/13/2022 Initial Diagnosis    Malignant neoplasm of left breast in female, estrogen receptor positive (HCC)     10/14/2022 -  Chemotherapy    OP BREAST Letrozole / Ribociclib     10/14/2022 Cancer Staged    Staging form: Breast, AJCC 8th Edition  - Clinical: Stage IV  (cT1c, cN1, cM1, ER+, WI+, HER2-) - Signed by Donald Barker MD on 10/14/2022         Review of Systems   Constitutional: Positive for fatigue. Negative for appetite change, diaphoresis, fever, unexpected weight gain and unexpected weight loss.   HENT: Negative for hearing loss, mouth sores, sore throat, swollen glands, trouble swallowing and voice change.    Eyes: Negative for blurred vision.   Respiratory: Negative for cough, shortness of breath and wheezing.    Cardiovascular: Negative for chest pain and palpitations.   Gastrointestinal: Negative for abdominal pain, blood in stool, constipation, diarrhea, nausea and vomiting.   Endocrine: Negative for cold intolerance and heat intolerance.   Genitourinary: Negative for difficulty urinating, dysuria, frequency, hematuria and urinary incontinence.   Musculoskeletal: Positive for back pain. Negative for arthralgias and myalgias.   Skin: Negative for rash, skin lesions and wound.   Neurological: Positive for dizziness and headache. Negative for seizures, weakness and numbness.   Hematological: Does not bruise/bleed easily.   Psychiatric/Behavioral: Positive for depressed mood. The patient is nervous/anxious.      Current Outpatient Medications on File Prior to Visit   Medication Sig Dispense Refill   • atorvastatin (LIPITOR) 20 MG tablet TAKE 1 TABLET BY MOUTH EVERY DAY 30 tablet 6   • baclofen (LIORESAL) 20 MG tablet TAKE 1 TABLET BY MOUTH THREE TIMES DAILY 90 tablet 1   • donepezil (ARICEPT) 5 MG tablet donepezil 5 mg tablet   TAKE 1 TABLET BY MOUTH DAILY     • gabapentin (NEURONTIN) 600 MG tablet TAKE 1 TABLET BY MOUTH AT BEDTIME 90 tablet 0   • levothyroxine (SYNTHROID, LEVOTHROID) 50 MCG tablet TAKE 1 TABLET BY MOUTH EVERY DAY 90 tablet 1   • lisinopril-hydrochlorothiazide (PRINZIDE,ZESTORETIC) 10-12.5 MG per tablet lisinopril-hydrochlorothiazide 10-12.5 mg oral tablet take 1 tablet by oral route once daily   Suspended     • LORazepam (ATIVAN) 0.5 MG tablet  lorazepam 0.5 mg tablet   TAKE 1 TABLET BY MOUTH FOUR TIMES DAILY AS NEEDED FOR ANXIETY     • losartan (COZAAR) 100 MG tablet losartan 100 mg oral tablet take 1 tablet (100 mg) by oral route once daily   Active     • montelukast (SINGULAIR) 10 MG tablet Take 10 mg by mouth Daily.     • naproxen (NAPROSYN) 500 MG tablet naproxen 500 mg tablet   TAKE 1 TABLET BY MOUTH TWICE DAILY     • oxybutynin XL (DITROPAN XL) 15 MG 24 hr tablet Take 1 tablet by mouth Daily. 30 tablet 11   • rOPINIRole (REQUIP) 3 MG tablet Take 3 mg by mouth Every Evening.     • SUMAtriptan (IMITREX) 100 MG tablet TAKE 1 TABLET BY MOUTH AT ONSET OF HEADACHE. IF NO RESPONSE IN 2 HOURS MAY REPEAT DOSE FOR 1 DOSE. MAX 2/24 HRS     • traZODone (DESYREL) 150 MG tablet TAKE 1 TABLET BY MOUTH ONCE nightly 90 tablet 2   • venlafaxine XR (EFFEXOR-XR) 75 MG 24 hr capsule Take 75 mg by mouth Daily.     • fluticasone (FLONASE) 50 MCG/ACT nasal spray 2 sprays by Each Nare route Daily.  12     No current facility-administered medications on file prior to visit.       Allergies   Allergen Reactions   • Azithromycin Itching     Past Medical History:   Diagnosis Date   • Abdominal pain    • Allergic rhinitis    • Anemia    • Anxiety    • Arteriosclerosis     Coronary   • Arthritis    • Bone metastases (HCC) 10/14/2022   • Bowen's disease    • Cancer related pain 10/13/2022   • Chest pain    • Chronic pain disorder    • Cough    • Depression    • Dysphoric mood    • Fatigue    • Frequent urination    • History of colon polyps    • History of IBS    • History of kidney stones    • Hyperlipidemia    • Hypertension    • Hypothyroidism    • Insomnia    • Lumbago    • Malaise and fatigue    • Mood disorder (HCC)    • Neck pain    • Palpitations    • Precordial pain    • Sleep disturbance    • SOB (shortness of breath)      Past Surgical History:   Procedure Laterality Date   • CARDIAC CATHETERIZATION Left 1959   • CARDIAC CATHETERIZATION N/A 4/6/2018    Procedure:  Coronary angiography;  Surgeon: Art Licea MD;  Location:  [facility];  Service: Cardiovascular   • CARDIAC CATHETERIZATION N/A 4/6/2018    Procedure: Left heart cath;  Surgeon: Art Licea MD;  Location:  [facility];  Service: Cardiovascular   • CARDIAC CATHETERIZATION N/A 4/6/2018    Procedure: Left ventriculography;  Surgeon: Art Licea MD;  Location:  [facility];  Service: Cardiovascular   • CHOLECYSTECTOMY     • COLONOSCOPY  11/08/2006   • GANGLION CYST EXCISION     • HYSTERECTOMY  05/2005   • LAPAROSCOPIC GASTRIC BANDING       Social History     Socioeconomic History   • Marital status:    Tobacco Use   • Smoking status: Every Day     Packs/day: 1.00     Years: 40.00     Pack years: 40.00     Types: Cigarettes   • Smokeless tobacco: Never   • Tobacco comments:     caffeine use 3 mt dews daily   Vaping Use   • Vaping Use: Never used   Substance and Sexual Activity   • Alcohol use: No   • Drug use: Never     Comment: Prescribed Lorazepam   • Sexual activity: Yes     Partners: Male     Birth control/protection: None     Family History   Problem Relation Age of Onset   • Hypertension Mother    • Rheum arthritis Mother    • Heart disease Mother    • Diabetes Father    • Diabetes Other    • Fibromyalgia Other    • Cancer Maternal Grandmother         colon   • Aneurysm Paternal Grandfather        Objective   Physical Exam  Vitals reviewed. Exam conducted with a chaperone present.   Constitutional:       General: She is not in acute distress.     Appearance: Normal appearance.   HENT:      Head: Normocephalic.      Comments: Tenderness to palpation along the right occipital ridge without obvious mass  Eyes:      Conjunctiva/sclera: Conjunctivae normal.      Pupils: Pupils are equal, round, and reactive to light.   Cardiovascular:      Rate and Rhythm: Normal rate and regular rhythm.      Heart sounds: No murmur heard.    No gallop.  "  Pulmonary:      Effort: Pulmonary effort is normal.      Breath sounds: Normal breath sounds.   Abdominal:      General: Abdomen is flat. Bowel sounds are normal.      Palpations: Abdomen is soft.   Musculoskeletal:      Cervical back: Neck supple.      Right lower leg: No edema.      Left lower leg: No edema.   Lymphadenopathy:      Cervical: No cervical adenopathy.   Neurological:      Mental Status: She is alert and oriented to person, place, and time.   Psychiatric:         Mood and Affect: Mood normal.         Behavior: Behavior normal.         Vitals:    10/13/22 1329   BP: 139/68   Pulse: 52   Resp: 16   Temp: 98.4 °F (36.9 °C)   SpO2: 100%   Weight: 83 kg (182 lb 15.7 oz)   Height: 167.6 cm (65.98\")   PainSc:   8   PainLoc: Back     ECOG score: 0         PHQ-9 Total Score:                    Result Review :   The following data was reviewed by: Donald Barker MD on 10/13/2022:  Lab Results   Component Value Date    HGB 12.5 10/13/2022    HCT 39.4 10/13/2022    MCV 93.4 10/13/2022     10/13/2022    WBC 8.41 10/13/2022    NEUTROABS 4.39 10/13/2022    LYMPHSABS 3.24 (H) 10/13/2022    MONOSABS 0.49 10/13/2022    EOSABS 0.20 10/13/2022    BASOSABS 0.07 10/13/2022     Lab Results   Component Value Date    GLUCOSE 83 10/13/2022    BUN 13 10/13/2022    CREATININE 0.84 10/13/2022     10/13/2022    K 3.9 10/13/2022     10/13/2022    CO2 30.8 (H) 10/13/2022    CALCIUM 9.7 10/13/2022    PROTEINTOT 7.1 10/13/2022    ALBUMIN 4.50 10/13/2022    BILITOT 0.4 10/13/2022    ALKPHOS 90 10/13/2022    AST 14 10/13/2022    ALT 10 10/13/2022     Lab Results   Component Value Date    MG 2.0 10/13/2022    FREET4 1.01 01/17/2022    TSH 1.470 11/12/2019       Data reviewed: Palpation primary care records and urology records reviewed.  CT scan, bone scan, PET scan images and report reviewed demonstrating multiple areas of lucency consistent with bone involvement by metastatic disease.  Abnormality in the left " breast and left axilla.      Assessment and Plan    Diagnoses and all orders for this visit:    1. Malignant neoplasm of left breast in female, estrogen receptor positive, unspecified site of breast (HCC) (Primary)  Assessment & Plan:  Patient has biopsy-proven invasive lobular carcinoma involving the breast and axilla.  Bony metastatic disease identified on CT, PET, bone scan.  We discussed that this is unfortunately not curable but there are multiple treatment options for hormone receptor positive breast cancer.  I would recommend starting with antihormone therapy in the form of aromatase inhibitor with CK4/6 inhibitor.  Specifically letrozole 2.5 mg daily and ribociclib days 1-20 1 out of 28 taken until progression or intolerance.  Side effects discussed including vasomotor symptoms, arthralgias, myalgias, decreases in bone density, nausea, vomiting, fatigue, liver dysfunction, kidney dysfunction, low blood counts, diarrhea, irregular heartbeat.  She will have an EKG ordered today.  Baseline lab work including CBC and CMP to ensure adequate endorgan functions and blood counts.  Patient agreeable to therapy as outlined.  Written teaching information provided.  Prescription for Zofran as needed for nausea.  I will have her start as soon as her medications come in.  She will need blood work every 2 weeks as she begins her ribociclib therapy.  I will see her back in a month after starting for cycle 2-day 1 with lab work prior to monitor for toxicities.    Orders:  -     CBC & Differential; Future  -     Comprehensive Metabolic Panel; Future  -     MRI Brain With & Without Contrast; Future  -     Magnesium; Future  -     CBC & Differential; Future  -     Comprehensive Metabolic Panel; Future  -     ECG 12 Lead; Future  -     letrozole (FEMARA) 2.5 MG tablet; Take 1 tablet by mouth Daily.  Dispense: 30 tablet; Refill: 11  -     ondansetron (ZOFRAN) 8 MG tablet; Take 1 tablet by mouth 3 (Three) Times a Day As Needed for  Nausea or Vomiting.  Dispense: 30 tablet; Refill: 5  -     Provider Communication  -     Provider Communication  -     Provider Communication    2. Cancer related pain  Assessment & Plan:  Patient reports continued pain in the low back and posterior skull likely related to disease.  I will give her a small number of Norco to use as needed for more severe pain.  She will follow-up with pain management for injection as scheduled.    Orders:  -     HYDROcodone-acetaminophen (NORCO) 5-325 MG per tablet; Take 1 tablet by mouth Every 6 (Six) Hours As Needed (pain).  Dispense: 60 tablet; Refill: 0    3. Bone metastases (HCC)  Assessment & Plan:  Patient has multiple areas of bony involvement including the spine and pelvis.  This is demonstrated on CT, bone scan and PET scan.  We discussed the use of Xgeva 120 mg subcu monthly as an adjunct to cancer treatment to help minimize the risk of skeletal events such as fractures.  Side effects discussed including injection site reactions, allergic reactions, osteonecrosis of the jaw, low blood electrolytes including calcium, phosphorus, magnesium.  Patient agreeable to therapy as outlined.  Written teaching information provided.  She will begin with her initial cycle.        I spent 67 minutes caring for Derek on this date of service. This time includes time spent by me in the following activities:preparing for the visit, reviewing tests, obtaining and/or reviewing a separately obtained history, performing a medically appropriate examination and/or evaluation , counseling and educating the patient/family/caregiver, ordering medications, tests, or procedures, referring and communicating with other health care professionals , documenting information in the medical record, independently interpreting results and communicating that information with the patient/family/caregiver and care coordination    Patient Follow Up: 4 to 6 weeks    Patient was given instructions and counseling  regarding her condition or for health maintenance advice. Please see specific information pulled into the AVS if appropriate.     Donald Barker MD    10/14/2022

## 2022-10-13 NOTE — PROGRESS NOTES
S/W PATIENT DURING BREAST CLINIC AND PROVIDED EDUCATIONAL MATERIALS AND ENCOURAGED PT TO CALL IF NEEDED AND SHE V/U

## 2022-10-13 NOTE — PROGRESS NOTES
Distress Screening Follow-Up    Date of Distress Screening: Breast cancer    Location of Distress Screening: Med onc    Distress Level: 9    Problems Indicated: Pain, fatigue, tobacco use, memory or concentration, changes in eating, worry or anxiety, sadness or depression, fear, anger, tx decisions    Diagnosis: Metastatic breast cancer    Current Tx Plan: Oral chemotherapy, letrozole and Xgeva injections    Most Recent PHQ -2/PHQ-9 Score: 0    C-SSRS: Denies SI and screened negative today    Mood: Pt presented in a very pleasant mood and was easily engaged in conversation with OSW today. Pt reports she feels she is coping well with her dx and is headstrong like her mother was. Pt was forward thinking and was not interested in seeking support services at this time.    Current or Past Mental Health Tx: Pt reports she is prescribed Ativan by PCP for anxiety.    Support System: Pt receives support from her sister that is present with her today. Pt also has two brothers, one that resides close by and the other near [place]. Pt has some very good friends and a sister-in-law that is a breast cancer survivor. Pt reports her  mother underwent breast cancer treatment about 15 years ago. Pt's mother passed away from other health conditions.    Hobbies: Pt has a one and half year old puppy that keeps her busy throughout the day. Pt also enjoys going out to eat and go to yard sales.    Residential status/Who lives in the home: Pt resides independently in [place] and pays rent for her residence.    Home Care Needs: None identified    Insurance: [employer] Medicare    Finances: Pt receives social security disability income. Pt pays rent and utilities. OSW assisted pt in completing application through [employer] today and contacted [employer] to provide referral, per her permission, to aid with basic financial needs. Spoke with Panda on 10/17/22 and confirmed [employer] will mail pt a check  today.    Transportation: Pt provides her own transportation, however, sister is available to assist if needed. Pt expressed interest in getting connected to resources to aid with travel/gas expenses.    Resources/Referrals: Transportation and financial resources - [employer], [employer]; Emotional support    Additional Comment: OSW met with pt and sister in med onc multi-D breast clinic to f/u in regards to high distress, introduce OSW role and further assess psychosocial needs. Assisted pt in getting connected to financial resources today to aid with travel expenses and other basic financial needs. Provided pt with my business card, encouraging OSW support remains available. Pt expressed gratitude.

## 2022-10-14 ENCOUNTER — TELEPHONE (OUTPATIENT)
Dept: ONCOLOGY | Facility: HOSPITAL | Age: 62
End: 2022-10-14

## 2022-10-14 ENCOUNTER — LAB (OUTPATIENT)
Dept: LAB | Facility: HOSPITAL | Age: 62
End: 2022-10-14

## 2022-10-14 ENCOUNTER — HOSPITAL ENCOUNTER (OUTPATIENT)
Dept: MRI IMAGING | Facility: HOSPITAL | Age: 62
Discharge: HOME | End: 2022-10-14

## 2022-10-14 ENCOUNTER — HOSPITAL ENCOUNTER (OUTPATIENT)
Dept: CARDIOLOGY | Facility: HOSPITAL | Age: 62
Discharge: HOME | End: 2022-10-14

## 2022-10-14 DIAGNOSIS — Z17.0 MALIGNANT NEOPLASM OF LEFT BREAST IN FEMALE, ESTROGEN RECEPTOR POSITIVE, UNSPECIFIED SITE OF BREAST: ICD-10-CM

## 2022-10-14 DIAGNOSIS — C50.912 MALIGNANT NEOPLASM OF LEFT BREAST IN FEMALE, ESTROGEN RECEPTOR POSITIVE, UNSPECIFIED SITE OF BREAST: ICD-10-CM

## 2022-10-14 PROBLEM — C79.51 BONE METASTASES: Status: ACTIVE | Noted: 2022-10-14

## 2022-10-14 LAB
ALBUMIN SERPL-MCNC: 4.1 G/DL (ref 3.5–5.2)
ALBUMIN/GLOB SERPL: 1.9 G/DL
ALP SERPL-CCNC: 83 U/L (ref 39–117)
ALT SERPL W P-5'-P-CCNC: 10 U/L (ref 1–33)
ANION GAP SERPL CALCULATED.3IONS-SCNC: 6.4 MMOL/L (ref 5–15)
AST SERPL-CCNC: 14 U/L (ref 1–32)
BILIRUB SERPL-MCNC: 0.3 MG/DL (ref 0–1.2)
BUN SERPL-MCNC: 11 MG/DL (ref 8–23)
BUN/CREAT SERPL: 13.1 (ref 7–25)
CALCIUM SPEC-SCNC: 9.4 MG/DL (ref 8.6–10.5)
CHLORIDE SERPL-SCNC: 106 MMOL/L (ref 98–107)
CO2 SERPL-SCNC: 32.6 MMOL/L (ref 22–29)
CREAT SERPL-MCNC: 0.84 MG/DL (ref 0.57–1)
EGFRCR SERPLBLD CKD-EPI 2021: 78.2 ML/MIN/1.73
GLOBULIN UR ELPH-MCNC: 2.2 GM/DL
GLUCOSE SERPL-MCNC: 106 MG/DL (ref 65–99)
POTASSIUM SERPL-SCNC: 3.4 MMOL/L (ref 3.5–5.2)
PROT SERPL-MCNC: 6.3 G/DL (ref 6–8.5)
SODIUM SERPL-SCNC: 145 MMOL/L (ref 136–145)

## 2022-10-14 PROCEDURE — 93005 ELECTROCARDIOGRAM TRACING: CPT | Performed by: INTERNAL MEDICINE

## 2022-10-14 PROCEDURE — 0 GADOBENATE DIMEGLUMINE 529 MG/ML SOLUTION: Performed by: INTERNAL MEDICINE

## 2022-10-14 PROCEDURE — 85025 COMPLETE CBC W/AUTO DIFF WBC: CPT

## 2022-10-14 PROCEDURE — 80053 COMPREHEN METABOLIC PANEL: CPT | Performed by: INTERNAL MEDICINE

## 2022-10-14 PROCEDURE — A9577 INJ MULTIHANCE: HCPCS | Performed by: INTERNAL MEDICINE

## 2022-10-14 PROCEDURE — 36415 COLL VENOUS BLD VENIPUNCTURE: CPT

## 2022-10-14 PROCEDURE — 70553 MRI BRAIN STEM W/O & W/DYE: CPT

## 2022-10-14 PROCEDURE — 93010 ELECTROCARDIOGRAM REPORT: CPT | Performed by: INTERNAL MEDICINE

## 2022-10-14 RX ORDER — HYDROCODONE BITARTRATE AND ACETAMINOPHEN 5; 325 MG/1; MG/1
1 TABLET ORAL EVERY 6 HOURS PRN
Qty: 60 TABLET | Refills: 0 | Status: SHIPPED | OUTPATIENT
Start: 2022-10-14 | End: 2022-10-28 | Stop reason: SDUPTHER

## 2022-10-14 RX ADMIN — GADOBENATE DIMEGLUMINE 15 ML: 529 INJECTION, SOLUTION INTRAVENOUS at 15:29

## 2022-10-14 NOTE — ASSESSMENT & PLAN NOTE
Patient has multiple areas of bony involvement including the spine and pelvis.  This is demonstrated on CT, bone scan and PET scan.  We discussed the use of Xgeva 120 mg subcu monthly as an adjunct to cancer treatment to help minimize the risk of skeletal events such as fractures.  Side effects discussed including injection site reactions, allergic reactions, osteonecrosis of the jaw, low blood electrolytes including calcium, phosphorus, magnesium.  Patient agreeable to therapy as outlined.  Written teaching information provided.  She will begin with her initial cycle.

## 2022-10-14 NOTE — ASSESSMENT & PLAN NOTE
Patient has biopsy-proven invasive lobular carcinoma involving the breast and axilla.  Bony metastatic disease identified on CT, PET, bone scan.  We discussed that this is unfortunately not curable but there are multiple treatment options for hormone receptor positive breast cancer.  I would recommend starting with antihormone therapy in the form of aromatase inhibitor with CK4/6 inhibitor.  Specifically letrozole 2.5 mg daily and ribociclib days 1-20 1 out of 28 taken until progression or intolerance.  Side effects discussed including vasomotor symptoms, arthralgias, myalgias, decreases in bone density, nausea, vomiting, fatigue, liver dysfunction, kidney dysfunction, low blood counts, diarrhea, irregular heartbeat.  She will have an EKG ordered today.  Baseline lab work including CBC and CMP to ensure adequate endorgan functions and blood counts.  Patient agreeable to therapy as outlined.  Written teaching information provided.  Prescription for Zofran as needed for nausea.  I will have her start as soon as her medications come in.  She will need blood work every 2 weeks as she begins her ribociclib therapy.  I will see her back in a month after starting for cycle 2-day 1 with lab work prior to monitor for toxicities.

## 2022-10-14 NOTE — TELEPHONE ENCOUNTER
Diagnosis: Breast cancer    Reason for Referral: Care coordination    Content of Visit: OSW received phone call from pt this morning regarding her MRI appointment this afternoon and prescription medications. Addressed pt's questions today and educated she will receive a phone call from our pharmacist, Yakelin, regarding oral chemo medication once PA is approved. Pt v/u. Pt reports it was her understanding that the medical oncologist was to also prescribe her a pain medication. OSW relayed this to oncologist to please advise. OSW support remains available.    Resources/Referrals Provided: Care coordination    Update 10/14/22: Medical oncologist sent prescription for [place] to pt's pharmacy this afternoon. Left pt a VM relaying this information and encouraging OSW support remains available.

## 2022-10-14 NOTE — ASSESSMENT & PLAN NOTE
Patient reports continued pain in the low back and posterior skull likely related to disease.  I will give her a small number of Norco to use as needed for more severe pain.  She will follow-up with pain management for injection as scheduled.

## 2022-10-15 LAB
BASOPHILS # BLD AUTO: 0.06 10*3/MM3 (ref 0–0.2)
BASOPHILS NFR BLD AUTO: 0.8 % (ref 0–1.5)
DEPRECATED RDW RBC AUTO: 40.2 FL (ref 37–54)
EOSINOPHIL # BLD AUTO: 0.16 10*3/MM3 (ref 0–0.4)
EOSINOPHIL NFR BLD AUTO: 2.1 % (ref 0.3–6.2)
ERYTHROCYTE [DISTWIDTH] IN BLOOD BY AUTOMATED COUNT: 12.2 % (ref 12.3–15.4)
HCT VFR BLD AUTO: 33.3 % (ref 34–46.6)
HGB BLD-MCNC: 11 G/DL (ref 12–15.9)
IMM GRANULOCYTES # BLD AUTO: 0.03 10*3/MM3 (ref 0–0.05)
IMM GRANULOCYTES NFR BLD AUTO: 0.4 % (ref 0–0.5)
LYMPHOCYTES # BLD AUTO: 2.84 10*3/MM3 (ref 0.7–3.1)
LYMPHOCYTES NFR BLD AUTO: 37.3 % (ref 19.6–45.3)
MCH RBC QN AUTO: 29.3 PG (ref 26.6–33)
MCHC RBC AUTO-ENTMCNC: 33 G/DL (ref 31.5–35.7)
MCV RBC AUTO: 88.6 FL (ref 79–97)
MONOCYTES # BLD AUTO: 0.43 10*3/MM3 (ref 0.1–0.9)
MONOCYTES NFR BLD AUTO: 5.7 % (ref 5–12)
NEUTROPHILS NFR BLD AUTO: 4.09 10*3/MM3 (ref 1.7–7)
NEUTROPHILS NFR BLD AUTO: 53.7 % (ref 42.7–76)
NRBC BLD AUTO-RTO: 0 /100 WBC (ref 0–0.2)
PLATELET # BLD AUTO: 220 10*3/MM3 (ref 140–450)
PMV BLD AUTO: 11.1 FL (ref 6–12)
RBC # BLD AUTO: 3.76 10*6/MM3 (ref 3.77–5.28)
WBC NRBC COR # BLD: 7.61 10*3/MM3 (ref 3.4–10.8)

## 2022-10-17 ENCOUNTER — TELEPHONE (OUTPATIENT)
Dept: ONCOLOGY | Facility: HOSPITAL | Age: 62
End: 2022-10-17

## 2022-10-17 NOTE — TELEPHONE ENCOUNTER
Caller: Derek Keller    Relationship: Self    Best call back number: [phone]      What was the call regarding: PATIENT WANTED TO DISCUSS HER MRI RESULTS    Do you require a callback: YES

## 2022-10-17 NOTE — PROGRESS NOTES
S/W PATIENT AFTER HER BIOPSY ON 10/6/22 AND DISCUSSED DISCHARGE INSTRUCTIONS AND PT V/U. PT REPORTED THAT SHE JUST HAD A US AND THEN THEY DID OTHER IMAGES. PT WAS 24 WITH FIRST CHILD AND 12 WITH FIRST PERIOD AND SHE HAD A BENIGN PARTIAL HYSTERECTOMY WITH NO HORMONE THERAPY. MOTHER HAD BREAST CANCER IN HER 60S, MATERNAL GRANDMOTHER AHD COLON CANCER AND PATERNAL AUNT HAD LUNG CA. WE DISCUSSED GENETICS. I GAVE PT MY CARD AND ENCOURAGED HER TO CALL IF NEEDED AND SHE V/U.

## 2022-10-18 LAB — QT INTERVAL: 459 MS

## 2022-10-19 ENCOUNTER — SPECIALTY PHARMACY (OUTPATIENT)
Dept: PHARMACY | Facility: HOSPITAL | Age: 62
End: 2022-10-19

## 2022-10-19 DIAGNOSIS — C50.912 MALIGNANT NEOPLASM OF LEFT BREAST IN FEMALE, ESTROGEN RECEPTOR POSITIVE, UNSPECIFIED SITE OF BREAST: Primary | ICD-10-CM

## 2022-10-19 DIAGNOSIS — Z17.0 MALIGNANT NEOPLASM OF LEFT BREAST IN FEMALE, ESTROGEN RECEPTOR POSITIVE, UNSPECIFIED SITE OF BREAST: Primary | ICD-10-CM

## 2022-10-19 NOTE — PROGRESS NOTES
Oral Chemotherapy - New Referral    Received a referral from Dr. Barker    Treatment Plan: Kisqali (ribociclib)  Start date of treatment planned for: As soon as oral specialty medication is available.  Indication: Breast cancer  Relevant past treatments: newly diagnosed  Is the therapy appropriate based on treatment guidelines and FDA labeling?: yes  Therapeutic Goals: Continue treatment until progression or intolerable toxicity  Patient can self-administer oral medications: Yes    • Drug-Drug Interactions: The current medication list was reviewed and there are some relevant drug-drug interactions with the oral specialty medication, including possibly increasing the concentration of atorvastatin, Norco, and trazodone.  This will be discussed during education.  • Medication Allergies: The patient has no relevant allergies as it relates to their oral specialty medication  • Review of Labs/Dose Adjustments: The patient's most recent labs were reviewed and are appropriate to start treatment at this dose    A prescription was released to [facility] specialty pharmacy for   Drug: Kisqali (ribociclib)  Strength: 200 mg  Directions: Take 3 tablets by mouth daily ON days 1-21, OFF for 7 days of each 28 day cycle  Quantity: 63  Refills: 5    Pharmacy education is scheduled for 10/21/22. and CCA and consent will be signed at that time.    Yakelin Bajwa, PharmD, [facility]  Oncology Clinical Pharmacist  10/19/2022  13:24 EDT

## 2022-10-21 ENCOUNTER — SPECIALTY PHARMACY (OUTPATIENT)
Dept: PHARMACY | Facility: HOSPITAL | Age: 62
End: 2022-10-21

## 2022-10-21 ENCOUNTER — APPOINTMENT (OUTPATIENT)
Dept: PHARMACY | Facility: HOSPITAL | Age: 62
End: 2022-10-21

## 2022-10-24 ENCOUNTER — TELEPHONE (OUTPATIENT)
Dept: UROLOGY | Facility: CLINIC | Age: 62
End: 2022-10-24

## 2022-10-24 NOTE — PROGRESS NOTES
Drug: ribociclib  Strength: 200mg  Directions: Take 3 tablets PO daily for 21 days then off 7 days on a 28-day cycle  QTY: 63  RF:5    Released to pharmacy: [facility]    Patient will be taking ribociclib + letrozole.    Completed independent double check on medication order/RX.

## 2022-10-24 NOTE — PROGRESS NOTES
Specialty Pharmacy Patient Management Program  Oncology Initial Assessment       Derek Keller is a 62 y.o. female with breast cancer seen by an Oncology provider and enrolled in the Oncology Patient Management program offered by [facility].  An initial outreach was conducted, including assessment of therapy appropriateness and specialty medication education for Kisqali (ribociclib). The patient was introduced to services offered by [facility], including: regular assessments, refill coordination, curbside pick-up or mail order delivery options, prior authorization maintenance, and financial assistance programs as applicable. The patient was also provided with contact information for the pharmacy team.     Regimen: Kisqali (ribociclib) 600mg daily on days 1-21, then off for 7 days on 28 day cycle    Start date of oral specialty medication: As soon as oral specialty medication is available.    Relevant Past Medical History, Comorbidities, and Vaccines  Relevant medical history and concomitant health conditions were discussed with the patient. The patient's chart has been reviewed for relevant past medical history and comorbid health conditions and updated as necessary.  Vaccines are coordinated by the patient's oncologist and primary care provider.  Past Medical History:   Diagnosis Date   • Abdominal pain    • Allergic rhinitis    • Anemia    • Anxiety    • Arteriosclerosis     Coronary   • Arthritis    • Bone metastases (HCC) 10/14/2022   • Bowen's disease    • Cancer related pain 10/13/2022   • Chest pain    • Chronic pain disorder    • Cough    • Depression    • Dysphoric mood    • Fatigue    • Frequent urination    • History of colon polyps    • History of IBS    • History of kidney stones    • Hyperlipidemia    • Hypertension    • Hypothyroidism    • Insomnia    • Lumbago    • Malaise and fatigue    • Mood disorder (HCC)    • Neck pain    • Palpitations    •  Precordial pain    • Sleep disturbance    • SOB (shortness of breath)      Social History     Socioeconomic History   • Marital status:    Tobacco Use   • Smoking status: Every Day     Packs/day: 1.00     Years: 40.00     Pack years: 40.00     Types: Cigarettes   • Smokeless tobacco: Never   • Tobacco comments:     caffeine use 3 mt dews daily   Vaping Use   • Vaping Use: Never used   Substance and Sexual Activity   • Alcohol use: No   • Drug use: Never     Comment: Prescribed Lorazepam   • Sexual activity: Yes     Partners: Male     Birth control/protection: None       Allergies  Known allergies and reactions were discussed with the patient. The patient's chart has been reviewed for allergy information and updated as necessary.   Azithromycin    Current Medication List  This medication list has been reviewed with the patient and evaluated for any interactions or necessary modifications/recommendations, and updated to include all prescription medications, OTC medications, and supplements the patient is currently taking.  This list reflects what is contained in the patient's profile, which has also been marked as reviewed to communicate to other providers it is the most up to date version of the patient's current medication therapy.   Prior to Admission medications    Medication Sig Start Date End Date Taking? Authorizing Provider   atorvastatin (LIPITOR) 20 MG tablet TAKE 1 TABLET BY MOUTH EVERY DAY 10/1/19   Yudelka Manning MD   baclofen (LIORESAL) 20 MG tablet TAKE 1 TABLET BY MOUTH THREE TIMES DAILY 12/6/19   Yudelka Manning MD   donepezil (ARICEPT) 5 MG tablet donepezil 5 mg tablet   TAKE 1 TABLET BY MOUTH DAILY    Provider, MD Bessie   gabapentin (NEURONTIN) 600 MG tablet TAKE 1 TABLET BY MOUTH AT BEDTIME 7/30/20   Yudelka Manning MD   HYDROcodone-acetaminophen (NORCO) 5-325 MG per tablet Take 1 tablet by mouth Every 6 (Six) Hours As Needed (pain). 10/14/22   Donald Barker MD    letrozole (FEMARA) 2.5 MG tablet Take 1 tablet by mouth Daily. 10/13/22   Donald Barker MD   levothyroxine (SYNTHROID, LEVOTHROID) 50 MCG tablet TAKE 1 TABLET BY MOUTH EVERY DAY 7/19/19   Yudelka Manning MD   lisinopril-hydrochlorothiazide (PRINZIDE,ZESTORETIC) 10-12.5 MG per tablet lisinopril-hydrochlorothiazide 10-12.5 mg oral tablet take 1 tablet by oral route once daily   Suspended    Bessie Lopez MD   LORazepam (ATIVAN) 0.5 MG tablet lorazepam 0.5 mg tablet   TAKE 1 TABLET BY MOUTH FOUR TIMES DAILY AS NEEDED FOR ANXIETY    Bessie Lopez MD   losartan (COZAAR) 100 MG tablet losartan 100 mg oral tablet take 1 tablet (100 mg) by oral route once daily   Active    Bessie Lopez MD   montelukast (SINGULAIR) 10 MG tablet Take 10 mg by mouth Daily. 1/17/22   Bessie Lopez MD   naproxen (NAPROSYN) 500 MG tablet naproxen 500 mg tablet   TAKE 1 TABLET BY MOUTH TWICE DAILY    Bessie Lopez MD   ondansetron (ZOFRAN) 8 MG tablet Take 1 tablet by mouth 3 (Three) Times a Day As Needed for Nausea or Vomiting. 10/13/22   Donald Barker MD   oxybutynin XL (DITROPAN XL) 15 MG 24 hr tablet Take 1 tablet by mouth Daily. 9/8/22   Destinee Myers MD   ribociclib succinate 200 MG tablet therapy pack tablet Take 3 tablets by mouth Take As Directed. Take 3 tablets by mouth daily for 21 days then off 7 days on a 28 day cycle. 10/19/22   Donald Barker MD   rOPINIRole (REQUIP) 3 MG tablet Take 3 mg by mouth Every Evening. 6/29/22   Bessie Lopez MD   SUMAtriptan (IMITREX) 100 MG tablet TAKE 1 TABLET BY MOUTH AT ONSET OF HEADACHE. IF NO RESPONSE IN 2 HOURS MAY REPEAT DOSE FOR 1 DOSE. MAX 2/24 HRS 10/7/22   Bessie Lopez MD   traZODone (DESYREL) 150 MG tablet TAKE 1 TABLET BY MOUTH ONCE nightly 11/12/19   Yudelka Manning MD   venlafaxine XR (EFFEXOR-XR) 75 MG 24 hr capsule Take 75 mg by mouth Daily. 6/29/22   Provider, MD Bessie   dicyclomine (BENTYL) 20 MG  tablet TAKE 1 TABLET BY MOUTH EVERY 6hrs AS NEEDED for FOR ABDOMINAL discomfort 1/13/20 10/13/22  Yudelka Manning MD   fluticasone (FLONASE) 50 MCG/ACT nasal spray 2 sprays by Each Nare route Daily. 4/5/19 10/24/22  Yudelka Manning MD   gabapentin (NEURONTIN) 800 MG tablet Neurontin 800 mg oral tablet take 1 tablet (800 mg) by oral route 3 times per day   Active  10/13/22  Bessie Lopez MD   levothyroxine (SYNTHROID, LEVOTHROID) 50 MCG tablet levothyroxine 50 mcg oral tablet take 1 tablet (50 mcg) by oral route once daily   Suspended  10/13/22  Bessie Lopez MD   LORazepam (ATIVAN) 1 MG tablet Take 1 tablet by mouth Daily As Needed for Anxiety. 11/12/19 10/13/22  Yudelka Manning MD   ribociclib succinate 200 MG tablet therapy pack tablet Take 3 tablets by mouth Take As Directed. Take 3 tablets by mouth daily for 21 days then off 7 days on a 28 day cycle. 10/19/22 10/19/22  Donald Barker MD       Drug Interactions  • Reviewed concomitant medications, allergies, labs, comorbidities/medical history, quality of life, and immunization history.   • Drug-drug interactions noted and discussed during education: no significant drug interactions noted. . Reminded the patient to let us know before making any changes or starting any new prescription or OTC medications so we can first assess drug interactions.  • Drug-food interactions noted and discussed during education: Patient was instructed to avoid eating grapefruit and drinking grapefruit juice and eating pomegranate and drinking pomegranate juice    Recommended Medications Assessment  • Bone Health (such as calcium/vitamin D, bisphosphonate, RANKL inhibitor) - Currently Taking The patient was started/is already on Prolia or Xgeva (denosumab) every 1 months    Relevant Laboratory Values  Lab Results   Component Value Date    GLUCOSE 106 (H) 10/14/2022    CALCIUM 9.4 10/14/2022     10/14/2022    K 3.4 (L) 10/14/2022    CO2 32.6 (H)  10/14/2022     10/14/2022    BUN 11 10/14/2022    CREATININE 0.84 10/14/2022    EGFRIFNONA 75 11/12/2019    BCR 13.1 10/14/2022    ANIONGAP 6.4 10/14/2022     Lab Results   Component Value Date    WBC 7.61 10/14/2022    RBC 3.76 (L) 10/14/2022    HGB 11.0 (L) 10/14/2022    HCT 33.3 (L) 10/14/2022    MCV 88.6 10/14/2022    MCH 29.3 10/14/2022    MCHC 33.0 10/14/2022    RDW 12.2 (L) 10/14/2022    RDWSD 40.2 10/14/2022    MPV 11.1 10/14/2022     10/14/2022    NEUTRORELPCT 53.7 10/14/2022    LYMPHORELPCT 37.3 10/14/2022    MONORELPCT 5.7 10/14/2022    EOSRELPCT 2.1 10/14/2022    BASORELPCT 0.8 10/14/2022    AUTOIGPER 0.4 10/14/2022    NEUTROABS 4.09 10/14/2022    LYMPHSABS 2.84 10/14/2022    MONOSABS 0.43 10/14/2022    EOSABS 0.16 10/14/2022    BASOSABS 0.06 10/14/2022    AUTOIGNUM 0.03 10/14/2022    NRBC 0.0 10/14/2022       Initial Education Provided for Specialty Medication  The patient has been provided with the following education. All questions and concerns have been addressed prior to the patient receiving the medication, and the patient has verbalized understanding of the education and any materials provided.  Additional patient education shall be provided and documented upon request by the patient, provider or payer.      Provided patient with:   Chemo calendar to help improve adherence., Education sheets about the medication, 24-hour clinic phone number and my contact information and instructions to call should additional questions arise.     Medication Education Sheets Provided: (select all that apply)  • Oral Specialty Medication: Kisqali (ribociclib)    Other Education Sheets Provided: (select all that apply)  CINV and Diarrhea    TOPICS COMMENTS   Storage and Handling of Oral Specialty Medication Store in the original container, in a dry location out of direct sunlight, and out of reach of children or pets. and Store at room temperature.  Discussed safe handling and what to do with any unused  medication.   Administration of Oral Specialty Medication Take with or without food at the same time(s) each day. and Do not crush or chew tablets.   Adherence to Oral Specialty Regimen and Handling Missed Doses Patient is likely to have good treatment adherence; reinforced the importance of adherence. Reviewed how to address missed doses and to let us know of any missed doses.   Anemia: role of RBC, cause, s/s, ways to manage, role of transfusion Reviewed the role of RBC and the use of transfusions if hemoglobin decreases too much.  Patient to notify us if they experience shortness of breath, dizziness, or palpitations.  Also let patient know they could feel more tired than usual and to try to stay active, but rest if they need to.    Thrombocytopenia: role of platelet, cause, s/s, ways to prevent bleeding, things to avoid, when to seek help Reviewed the role of platelets in blood clotting and when to call clinic (bloody nose that bleeds for 5 mins despite pressure, a cut that won't stop bleeding despite pressure, gums that bleed excessively with brushing or flossing). Recommended using an electric razor, soft bristle toothbrush, and blowing your nose gently.    Neutropenia: role of WBC, cause, infection precautions, s/s of infection, when to call MD Reviewed the role of WBC, good infection prevention practices, and when to call the clinic (temperature 100.4F, sore throat, burning urination, etc)  • COVID Vaccines: 2 doses plus 1 booster  • Flu Vaccine: Planning to receive 2022 flu vaccine   Nutrition and Appetite Changes:  importance of maintaining healthy diet & weight, ways to manage to improve intake, dietary consult, exercise regimen Discussed risk of decreased appetite, reviewed plan for small more frequent meals. Discussed risk of decreased appetite. Recommended eating smaller, more frequent meals. Instructed the patient to contact clinic if they were losing weight or having difficulty eating enough to  maintain their energy level. and Informed patient of potential metallic taste and smell. Recommended flavoring water if it tastes metallic, using plastic utensils instead of metal utensils, and avoiding drinking right out of a metal can or cup to help mitigate this adverse effect.   Diarrhea: causes, s/s of dehydration, ways to manage, dietary changes, when to call MD Chemotherapy - Discussed risk of diarrhea. Instructed patient that they can use OTC loperamide at first presentation of diarrhea, but call MD if 4-6 episodes in 24 hours not relieved by OTC loperamide.   Constipation: causes, ways to manage, dietary changes, when to call MD Provided supplementary handout with instructions for use of docusate and other OTC therapies to manage constipation.  Instructed to call us if medications aren't working.   Nausea/Vomiting: cause, use of antiemetics, dietary changes, when to call MD • Emetic risk: Low-Minimal  • PRN home meds: Ondansetron  • Pharmacy home meds sent to: [facility]    Instructed the patient to take a dose of the PRN medication at the first onset of nausea and if it's not working to call us for additional medications.  Also provided non-drug measures to mitigate nausea.   Mouth Sores: causes, oral care, ways to manage Mouth sores can be prevented by making a mouth wash mixture of salt, baking soda, and water. The patient was instructed to swish and spit four times daily after meals and before bedtime.  Use of a soft bristle toothbrush was recommended.  The patient was instructed to avoid alcohol-containing OTC mouthwashes.    Alopecia: cause, ways to manage, resources Discussed the possibility of hair loss with the patient. Informed patient that they could request a prescription for a wig if desired and most of the cost is usually covered by insurance.   Infertility and Sexuality:  causes, fertility preservation options, sexuality changes, ways to manage, importance of birth control The patient  is not of childbearing potential.   Pain: causes, ways to manage Chemo - Discussed muscle and joint aches/pains with chemotherapy, and recommended the use of OTC pain relief with acetaminophen if needed.   Organ Toxicities: cause, s/s, need for diagnostic tests, labs, when to notify MD Discussed potential effects on organ systems, monitoring, diagnostic tests, labs, and when to notify their MD. Discussed the signs/symptoms of the following: cardiotoxicity, lung changes and skin changes   Home Care: how to manage bodily fluids Counseled on management of soiled linens and proper flush technique.  Discussed how to manage all the side effects at home and advised when to contact the MD office     Adherence and Self-Administration  • Patient demonstrates ability to self-administer medication. No barriers to adherence identified. .  • Methods for Supporting Patient Adherence and/or Self-Administration: dosing calendar  • Expected duration of therapy: Until disease progression or intolerable toxicity    Goals of Therapy  • Patient Goals of Therapy:   o Consistently take medications as prescribed  o Patient will adhere to medication regimen  o Patient will report any medication side effects to healthcare provider  • Clinical Goals:   o Support patient understanding of medication regimen  o Ensure patient knows the pharmacy contact information  o Schedule regular follow-up to monitor the treatment serious adverse events  o Schedule regular follow-up to confirm medication adherence  o Schedule regular follow-up to monitor disease progression or stabilty    Quality of Life Assessment   The patient's current (pre-therapy) quality of life was discussed with the patient. The QOL segment of this outreach has been reviewed and updated.   • Quality of Life Score: 5/10    Reassessment Plan & Follow-Up  1. Pharmacist to perform regular reassessments no more than (6) months from the previous assessment.  2. Care Coordinator to set up  future refill outreaches, coordinate prescription delivery, and escalate clinical questions to pharmacist.   3. Discussed aforementioned material with patient in person, face-to-face, in clinic.   4. Medication availability: patient already has medication on hand  5. Chemo consents/CCA were signed at today's visit.     Additional Plans, Therapy Recommendations or Therapy Problems to Be Addressed: none at this time     Attestation  I attest that the initiated specialty medication(s) are appropriate for the patient based on my assessment.  If the prescribed therapy is at any point deemed not appropriate based on the current or future assessments, a consultation will be initiated with the patient's specialty care provider to determine the best course of action. The revised plan of therapy will be documented along with any additional patient education provided.       Yakelin aBjwa, PharmD, [facility]  Oncology Clinical Pharmacist  10/24/2022  11:35 EDT

## 2022-10-24 NOTE — TELEPHONE ENCOUNTER
Patient said the oxybutyin is not helping, and she asked if Dr. Myers will change the prescription, and she said it needs to be something strong.

## 2022-10-25 ENCOUNTER — SPECIALTY PHARMACY (OUTPATIENT)
Dept: PHARMACY | Facility: HOSPITAL | Age: 62
End: 2022-10-25

## 2022-10-25 ENCOUNTER — TELEPHONE (OUTPATIENT)
Dept: ONCOLOGY | Facility: HOSPITAL | Age: 62
End: 2022-10-25

## 2022-10-25 DIAGNOSIS — N32.81 OAB (OVERACTIVE BLADDER): Primary | ICD-10-CM

## 2022-10-25 LAB
CYTO UR: NORMAL
LAB AP CASE REPORT: NORMAL
LAB AP CLINICAL INFORMATION: NORMAL
LAB AP SPECIAL STAINS: NORMAL
PATH REPORT.ADDENDUM SPEC: NORMAL
PATH REPORT.FINAL DX SPEC: NORMAL
PATH REPORT.GROSS SPEC: NORMAL

## 2022-10-25 RX ORDER — SOLIFENACIN SUCCINATE 10 MG/1
10 TABLET, FILM COATED ORAL DAILY
Qty: 90 TABLET | Refills: 3 | Status: SHIPPED | OUTPATIENT
Start: 2022-10-25 | End: 2023-10-20

## 2022-10-25 NOTE — TELEPHONE ENCOUNTER
Caller: Derek Keller    Relationship: Self    Best call back number: [phone]    Who are you requesting to speak with (clinical staff, provider,  specific staff member):CLINICAL    What was the call regarding: PATIENT UNDERSTOOD THAT SHE WAS TO BEGIN AN  INJECTION BUT HAS NOT HEARD BACK REGARDING INFORMATION    Do you require a callback: YES

## 2022-10-25 NOTE — TELEPHONE ENCOUNTER
She is on the strongest dose we have of oxybutynin.  I will send in a different one but she needs to stop the oxybutynin before taking the new one.

## 2022-10-25 NOTE — TELEPHONE ENCOUNTER
Spoke with patient informing her to stop the oxybutynin and then start the vesicare. She said that she has already taken her meds today and will stop the oxybutynin and start the vesicare tomorrow.

## 2022-10-25 NOTE — TELEPHONE ENCOUNTER
SPOKE TO PATIENT, SHE IS SCHEDULED FOR 10/27/2022 AT 1:15PM, ASK HER TO ARRIVE AT 1:00PM FOR REGISTRATION. SHE IS AWARE OF APPOINTMENT DATE/TIME.

## 2022-10-27 ENCOUNTER — LAB (OUTPATIENT)
Dept: ONCOLOGY | Facility: HOSPITAL | Age: 62
End: 2022-10-27

## 2022-10-27 ENCOUNTER — HOSPITAL ENCOUNTER (OUTPATIENT)
Dept: ONCOLOGY | Facility: HOSPITAL | Age: 62
Setting detail: INFUSION SERIES
End: 2022-10-27

## 2022-10-27 ENCOUNTER — LAB (OUTPATIENT)
Dept: LAB | Facility: HOSPITAL | Age: 62
End: 2022-10-27

## 2022-10-27 DIAGNOSIS — Z17.0 MALIGNANT NEOPLASM OF LEFT BREAST IN FEMALE, ESTROGEN RECEPTOR POSITIVE, UNSPECIFIED SITE OF BREAST: ICD-10-CM

## 2022-10-27 DIAGNOSIS — C79.51 BONE METASTASES: Primary | ICD-10-CM

## 2022-10-27 DIAGNOSIS — C50.912 MALIGNANT NEOPLASM OF LEFT BREAST IN FEMALE, ESTROGEN RECEPTOR POSITIVE, UNSPECIFIED SITE OF BREAST: ICD-10-CM

## 2022-10-27 LAB
ALBUMIN SERPL-MCNC: 4.2 G/DL (ref 3.5–5.2)
ALBUMIN/GLOB SERPL: 1.8 G/DL
ALP SERPL-CCNC: 82 U/L (ref 39–117)
ALT SERPL W P-5'-P-CCNC: 6 U/L (ref 1–33)
ANION GAP SERPL CALCULATED.3IONS-SCNC: 7.6 MMOL/L (ref 5–15)
AST SERPL-CCNC: 14 U/L (ref 1–32)
BASOPHILS # BLD AUTO: 0.04 10*3/MM3 (ref 0–0.2)
BASOPHILS NFR BLD AUTO: 0.7 % (ref 0–1.5)
BILIRUB SERPL-MCNC: 0.4 MG/DL (ref 0–1.2)
BUN SERPL-MCNC: 20 MG/DL (ref 8–23)
BUN/CREAT SERPL: 22.5 (ref 7–25)
CALCIUM SPEC-SCNC: 9.3 MG/DL (ref 8.6–10.5)
CHLORIDE SERPL-SCNC: 105 MMOL/L (ref 98–107)
CO2 SERPL-SCNC: 31.4 MMOL/L (ref 22–29)
CREAT SERPL-MCNC: 0.89 MG/DL (ref 0.57–1)
DEPRECATED RDW RBC AUTO: 47.9 FL (ref 37–54)
EGFRCR SERPLBLD CKD-EPI 2021: 73 ML/MIN/1.73
EOSINOPHIL # BLD AUTO: 0.11 10*3/MM3 (ref 0–0.4)
EOSINOPHIL NFR BLD AUTO: 2 % (ref 0.3–6.2)
ERYTHROCYTE [DISTWIDTH] IN BLOOD BY AUTOMATED COUNT: 13.6 % (ref 12.3–15.4)
GLOBULIN UR ELPH-MCNC: 2.3 GM/DL
GLUCOSE SERPL-MCNC: 86 MG/DL (ref 65–99)
HCT VFR BLD AUTO: 35.3 % (ref 34–46.6)
HGB BLD-MCNC: 11.1 G/DL (ref 12–15.9)
IMM GRANULOCYTES # BLD AUTO: 0.01 10*3/MM3 (ref 0–0.05)
IMM GRANULOCYTES NFR BLD AUTO: 0.2 % (ref 0–0.5)
LYMPHOCYTES # BLD AUTO: 1.69 10*3/MM3 (ref 0.7–3.1)
LYMPHOCYTES NFR BLD AUTO: 30 % (ref 19.6–45.3)
MAGNESIUM SERPL-MCNC: 1.8 MG/DL (ref 1.6–2.4)
MCH RBC QN AUTO: 30.2 PG (ref 26.6–33)
MCHC RBC AUTO-ENTMCNC: 31.4 G/DL (ref 31.5–35.7)
MCV RBC AUTO: 95.9 FL (ref 79–97)
MONOCYTES # BLD AUTO: 0.16 10*3/MM3 (ref 0.1–0.9)
MONOCYTES NFR BLD AUTO: 2.8 % (ref 5–12)
NEUTROPHILS NFR BLD AUTO: 3.62 10*3/MM3 (ref 1.7–7)
NEUTROPHILS NFR BLD AUTO: 64.3 % (ref 42.7–76)
NRBC BLD AUTO-RTO: 0 /100 WBC (ref 0–0.2)
PHOSPHATE SERPL-MCNC: 4.2 MG/DL (ref 2.5–4.5)
PLATELET # BLD AUTO: 187 10*3/MM3 (ref 140–450)
PMV BLD AUTO: 10.9 FL (ref 6–12)
POTASSIUM SERPL-SCNC: 4.7 MMOL/L (ref 3.5–5.2)
PROT SERPL-MCNC: 6.5 G/DL (ref 6–8.5)
RBC # BLD AUTO: 3.68 10*6/MM3 (ref 3.77–5.28)
SODIUM SERPL-SCNC: 144 MMOL/L (ref 136–145)
WBC NRBC COR # BLD: 5.63 10*3/MM3 (ref 3.4–10.8)

## 2022-10-27 PROCEDURE — 80053 COMPREHEN METABOLIC PANEL: CPT

## 2022-10-27 PROCEDURE — 83735 ASSAY OF MAGNESIUM: CPT

## 2022-10-27 PROCEDURE — 85025 COMPLETE CBC W/AUTO DIFF WBC: CPT

## 2022-10-27 PROCEDURE — 84100 ASSAY OF PHOSPHORUS: CPT

## 2022-10-27 PROCEDURE — 36415 COLL VENOUS BLD VENIPUNCTURE: CPT

## 2022-10-28 ENCOUNTER — HOSPITAL ENCOUNTER (OUTPATIENT)
Dept: ONCOLOGY | Facility: HOSPITAL | Age: 62
Setting detail: INFUSION SERIES
Discharge: HOME | End: 2022-10-28

## 2022-10-28 VITALS
SYSTOLIC BLOOD PRESSURE: 136 MMHG | RESPIRATION RATE: 17 BRPM | OXYGEN SATURATION: 100 % | TEMPERATURE: 98.4 F | HEART RATE: 62 BPM | DIASTOLIC BLOOD PRESSURE: 62 MMHG

## 2022-10-28 DIAGNOSIS — C50.912 MALIGNANT NEOPLASM OF LEFT BREAST IN FEMALE, ESTROGEN RECEPTOR POSITIVE, UNSPECIFIED SITE OF BREAST: ICD-10-CM

## 2022-10-28 DIAGNOSIS — G89.3 CANCER RELATED PAIN: ICD-10-CM

## 2022-10-28 DIAGNOSIS — C79.51 BONE METASTASES: Primary | ICD-10-CM

## 2022-10-28 DIAGNOSIS — Z17.0 MALIGNANT NEOPLASM OF LEFT BREAST IN FEMALE, ESTROGEN RECEPTOR POSITIVE, UNSPECIFIED SITE OF BREAST: ICD-10-CM

## 2022-10-28 PROCEDURE — 96372 THER/PROPH/DIAG INJ SC/IM: CPT

## 2022-10-28 PROCEDURE — 25010000002 DENOSUMAB 120 MG/1.7ML SOLUTION: Performed by: NURSE PRACTITIONER

## 2022-10-28 RX ADMIN — DENOSUMAB 120 MG: 120 INJECTION SUBCUTANEOUS at 12:48

## 2022-10-28 NOTE — TELEPHONE ENCOUNTER
Caller: Derek Keller    Relationship: Self    Best call back number: [phone]    Requested Prescriptions:   Requested Prescriptions     Pending Prescriptions Disp Refills   • HYDROcodone-acetaminophen (NORCO) 5-325 MG per tablet 60 tablet 0     Sig: Take 1 tablet by mouth Every 6 (Six) Hours As Needed (pain).        Pharmacy where request should be sent: [facility], [facility], & [facility] [place] [facility] [address] - [phone] [facility] [phone] FX     Does the patient have less than a 3 day supply:  [x] Yes  [] No    Abby Fu Rep   10/28/22 13:21 EDT

## 2022-10-29 ENCOUNTER — DOCUMENTATION (OUTPATIENT)
Dept: ONCOLOGY | Facility: HOSPITAL | Age: 62
End: 2022-10-29

## 2022-10-29 RX ORDER — HYDROCHLOROTHIAZIDE 12.5 MG/1
12.5 TABLET ORAL DAILY
Qty: 30 TABLET | Refills: 0 | Status: SHIPPED | OUTPATIENT
Start: 2022-10-29 | End: 2022-11-21 | Stop reason: SDUPTHER

## 2022-10-29 RX ORDER — HYDROCODONE BITARTRATE AND ACETAMINOPHEN 5; 325 MG/1; MG/1
1 TABLET ORAL EVERY 6 HOURS PRN
Qty: 60 TABLET | Refills: 0 | Status: SHIPPED | OUTPATIENT
Start: 2022-10-29 | End: 2022-11-10 | Stop reason: SDUPTHER

## 2022-10-31 ENCOUNTER — SPECIALTY PHARMACY (OUTPATIENT)
Dept: PHARMACY | Facility: HOSPITAL | Age: 62
End: 2022-10-31

## 2022-10-31 NOTE — PROGRESS NOTES
Specialty Pharmacy Note: 1 Week Toxicity Check     Derek FINN Krishna reports starting Kisqali (ribociclib) on 10/22/21. Thus far, patient is experiencing side effects significant fatigue, peripheral edema, muscle and general aches and pains. Dr Barker started the patient on HCTZ prn for swelling, patient was educated to drink plenty of fluids and to take as needed for swelling. Informed patient that provider will follow up on swelling at next appointment. Pt also reports having constipation despite taking docusate, patient was educated to make sure she is drinking 8-10 ounces of fluid each day and that she can take Miralax in addition to docusate. Thus far, no missed doses and has started taking Turmeric and HCTZ. Advised pt to call office if any changes. Patient expressed understanding and had no additional questions at this time.      Yakelin Bajwa, PharmD, [facility]  Oncology Clinical Pharmacist  10/31/2022  11:55 EDT

## 2022-11-01 ENCOUNTER — TELEPHONE (OUTPATIENT)
Dept: ONCOLOGY | Facility: HOSPITAL | Age: 62
End: 2022-11-01

## 2022-11-01 NOTE — TELEPHONE ENCOUNTER
Caller: JERONIMO    Relationship to patient: SELF    Best call back number: [phone]    Patient is needing: TO REQUEST CALL FROM RN. PT HAS QUESTION: CAN SHE GO HAVE HER NAILS DONE?

## 2022-11-02 ENCOUNTER — TELEPHONE (OUTPATIENT)
Dept: ONCOLOGY | Facility: HOSPITAL | Age: 62
End: 2022-11-02

## 2022-11-02 DIAGNOSIS — Z17.0 MALIGNANT NEOPLASM OF LEFT BREAST IN FEMALE, ESTROGEN RECEPTOR POSITIVE, UNSPECIFIED SITE OF BREAST: Primary | ICD-10-CM

## 2022-11-02 DIAGNOSIS — C50.912 MALIGNANT NEOPLASM OF LEFT BREAST IN FEMALE, ESTROGEN RECEPTOR POSITIVE, UNSPECIFIED SITE OF BREAST: Primary | ICD-10-CM

## 2022-11-02 NOTE — TELEPHONE ENCOUNTER
Caller: Derek Keller    Relationship: Self    Best call back number: [phone]    What medications are you currently taking:   Current Outpatient Medications on File Prior to Visit   Medication Sig Dispense Refill   • atorvastatin (LIPITOR) 20 MG tablet TAKE 1 TABLET BY MOUTH EVERY DAY 30 tablet 6   • baclofen (LIORESAL) 20 MG tablet TAKE 1 TABLET BY MOUTH THREE TIMES DAILY 90 tablet 1   • donepezil (ARICEPT) 5 MG tablet donepezil 5 mg tablet   TAKE 1 TABLET BY MOUTH DAILY     • gabapentin (NEURONTIN) 600 MG tablet TAKE 1 TABLET BY MOUTH AT BEDTIME 90 tablet 0   • hydroCHLOROthiazide (HYDRODIURIL) 12.5 MG tablet Take 1 tablet by mouth Daily for 30 days. As needed for swelling 30 tablet 0   • HYDROcodone-acetaminophen (NORCO) 5-325 MG per tablet Take 1 tablet by mouth Every 6 (Six) Hours As Needed (pain). 60 tablet 0   • letrozole (FEMARA) 2.5 MG tablet Take 1 tablet by mouth Daily. 30 tablet 11   • levothyroxine (SYNTHROID, LEVOTHROID) 50 MCG tablet TAKE 1 TABLET BY MOUTH EVERY DAY 90 tablet 1   • LORazepam (ATIVAN) 0.5 MG tablet lorazepam 0.5 mg tablet   TAKE 1 TABLET BY MOUTH FOUR TIMES DAILY AS NEEDED FOR ANXIETY     • losartan (COZAAR) 100 MG tablet losartan 100 mg oral tablet take 1 tablet (100 mg) by oral route once daily   Active     • montelukast (SINGULAIR) 10 MG tablet Take 10 mg by mouth Daily.     • naproxen (NAPROSYN) 500 MG tablet naproxen 500 mg tablet   TAKE 1 TABLET BY MOUTH TWICE DAILY     • ondansetron (ZOFRAN) 8 MG tablet Take 1 tablet by mouth 3 (Three) Times a Day As Needed for Nausea or Vomiting. 30 tablet 5   • ribociclib succinate 200 MG tablet therapy pack tablet Take 3 tablets by mouth Take As Directed. Take 3 tablets by mouth daily for 21 days then off 7 days on a 28 day cycle. 63 tablet 5   • rOPINIRole (REQUIP) 3 MG tablet Take 3 mg by mouth Every Evening.     • solifenacin (VESICARE) 10 MG tablet Take 1 tablet by mouth Daily for 360 days. 90 tablet 3   • SUMAtriptan (IMITREX) 100  MG tablet TAKE 1 TABLET BY MOUTH AT ONSET OF HEADACHE. IF NO RESPONSE IN 2 HOURS MAY REPEAT DOSE FOR 1 DOSE. MAX 2/24 HRS     • traZODone (DESYREL) 150 MG tablet TAKE 1 TABLET BY MOUTH ONCE nightly 90 tablet 2   • venlafaxine XR (EFFEXOR-XR) 75 MG 24 hr capsule Take 75 mg by mouth Daily.       No current facility-administered medications on file prior to visit.        Which medication are you concerned about: MARY    Who prescribed you this medication: DR DIANE'S NURSE    What are your concerns: INQUIRING WHY THIS MEDICATION WAS DENIED & OTHER QUESTIONS THAT SHE STATED SHE WOULD LIKE TO DISCUSS WITH YOU.

## 2022-11-02 NOTE — TELEPHONE ENCOUNTER
"pt called and states that she has several questions and a couple complaints. pt states that she is taking her Hydrocodone every 6 hours as directed and her pain is still an 8/10. When questioned wher her pain is the pt voices her back, neck, shoulders, left arm, bilateral legs, and then states \"just all over\".pt also states that her feet are still swollen and she has not seen much improvement, pt states that there may be a minuet amount of improvement. pt reports that she is drinking plenty of fluids. pt states that she is drinking 2 pitchers of Koll Aid a day plus some other fluids as well. pt is also asking if it is ok for her to cataract sx next week. pt also states that she wants to know what Dr Barker's prognosis is for her.   "

## 2022-11-04 ENCOUNTER — CONSULT (OUTPATIENT)
Dept: RADIATION ONCOLOGY | Facility: HOSPITAL | Age: 62
End: 2022-11-04

## 2022-11-04 ENCOUNTER — DOCUMENTATION (OUTPATIENT)
Dept: ONCOLOGY | Facility: HOSPITAL | Age: 62
End: 2022-11-04

## 2022-11-04 VITALS
TEMPERATURE: 97.8 F | RESPIRATION RATE: 16 BRPM | DIASTOLIC BLOOD PRESSURE: 63 MMHG | WEIGHT: 187.61 LBS | SYSTOLIC BLOOD PRESSURE: 131 MMHG | HEART RATE: 53 BPM | BODY MASS INDEX: 30.3 KG/M2 | OXYGEN SATURATION: 100 %

## 2022-11-04 DIAGNOSIS — Z17.0 MALIGNANT NEOPLASM OF UPPER-OUTER QUADRANT OF LEFT BREAST IN FEMALE, ESTROGEN RECEPTOR POSITIVE: Primary | ICD-10-CM

## 2022-11-04 DIAGNOSIS — C50.412 MALIGNANT NEOPLASM OF UPPER-OUTER QUADRANT OF LEFT BREAST IN FEMALE, ESTROGEN RECEPTOR POSITIVE: Primary | ICD-10-CM

## 2022-11-04 DIAGNOSIS — C50.919 MALIGNANT NEOPLASM OF FEMALE BREAST, UNSPECIFIED ESTROGEN RECEPTOR STATUS, UNSPECIFIED LATERALITY, UNSPECIFIED SITE OF BREAST: Primary | ICD-10-CM

## 2022-11-04 PROCEDURE — G0463 HOSPITAL OUTPT CLINIC VISIT: HCPCS | Performed by: RADIOLOGY

## 2022-11-04 PROCEDURE — 99204 OFFICE O/P NEW MOD 45 MIN: CPT | Performed by: RADIOLOGY

## 2022-11-04 RX ORDER — DONEPEZIL HYDROCHLORIDE 10 MG/1
10 TABLET, FILM COATED ORAL DAILY
COMMUNITY
Start: 2022-10-28

## 2022-11-04 RX ORDER — OXYBUTYNIN CHLORIDE 15 MG/1
TABLET, EXTENDED RELEASE ORAL
COMMUNITY
Start: 2022-10-28 | End: 2022-11-04

## 2022-11-04 NOTE — PROGRESS NOTES
New Patient Office Visit      Encounter Date: 11/04/2022   Patient Name: Derek Keller  YOB: 1960   Medical Record Number: [id number]   Primary Diagnosis: Malignant neoplasm of upper-outer quadrant of left breast in female, estrogen receptor positive (HCC) [C50.412, Z17.0]   Cancer Staging: Cancer Staging   Malignant neoplasm of left breast in female, estrogen receptor positive (HCC)  Staging form: Breast, AJCC 8th Edition  - Clinical: Stage IV (cT1c, cN1, cM1, ER+, TN+, HER2-) - Signed by oDnald Barker MD on 10/14/2022      Chief Complaint:    Chief Complaint   Patient presents with   • Consult   • Breast Cancer       History of Present Illness: Derek Keller is a 62-year-old female with metastatic breast cancer who is seen in consultation regarding radiotherapy as palliation for painful bony metastatic disease.  She developed gross hematuria in the summer 2022.  This prompted an evaluation with urology and included CT scan of the pelvis revealing multiple lesions throughout the spine concerning for metastatic disease.  Subsequent PET scan was performed revealing multiple sclerotic lesions with some hypermetabolic, consistent with metastatic disease.  Biopsy of the left breast and axilla revealed lobular carcinoma, ER positive/TN positive, HER2/joe negative.  Ms. Keller reports severe lower back pain as well as occipital pain.  She is now taking Norco with incomplete relief.  She denies focal weakness, incontinence or perineal anesthesia.      Subjective          Review of Systems: Review of Systems   Constitutional: Positive for fatigue (7-8/10). Negative for appetite change and unexpected weight change.   HENT: Positive for voice change (Deeper  , hoarseness  ). Negative for hearing loss, sore throat, tinnitus and trouble swallowing.    Eyes: Positive for visual disturbance (Has cataracts, ongoing).   Respiratory: Positive for cough (Ongoing, non productive) and shortness of breath  "(Occasionally, ongoing).    Cardiovascular: Positive for chest pain (Upper mid chest, ongoing x1 year, occasional ache), palpitations (r/t anxiety, ongoing) and leg swelling (x1week, on hctz.).   Gastrointestinal: Positive for constipation (Takes stool softener without relief, eductated about miralax.). Negative for blood in stool, diarrhea, nausea and vomiting.   Endocrine: Positive for cold intolerance (Has chills, randomly). Negative for heat intolerance.   Genitourinary: Positive for frequency (ongoing), hematuria (per \"test\", no visable blood to patient) and urgency (ongoing). Negative for difficulty urinating and dysuria.   Musculoskeletal: Positive for arthralgias (Left arm/shoulder pain noted after bx.) and back pain (9/10, primarily lower back and neck. Sees the pain center.). Negative for gait problem and joint swelling.        Leg cramps     Skin: Negative for color change and rash.   Neurological: Positive for dizziness (Started approximately July/August), weakness (Legs feel weak.), numbness (Fingertips) and headaches (x1 year, almost daily now, has imitrex as needed.). Negative for tremors and speech difficulty.   Psychiatric/Behavioral: Positive for sleep disturbance (Wakes throughout the night due to nocturia). Negative for confusion, self-injury and suicidal ideas. The patient is nervous/anxious.        Past Medical History:   Past Medical History:   Diagnosis Date   • Abdominal pain    • Allergic rhinitis    • Anemia    • Anxiety    • Arteriosclerosis     Coronary   • Arthritis    • Bone metastases (HCC) 10/14/2022   • Bowen's disease    • Breast cancer (HCC)    • Cancer related pain 10/13/2022   • Chest pain    • Chronic pain disorder    • Cough    • Depression    • Dysphoric mood    • Fatigue    • Frequent urination    • History of colon polyps    • History of IBS    • History of kidney stones    • Hyperlipidemia    • Hypertension    • Hypothyroidism    • Insomnia    • Lumbago    • Malaise and " fatigue    • Mood disorder (HCC)    • Neck pain    • Palpitations    • Precordial pain    • Sleep disturbance    • SOB (shortness of breath)        Past Surgical History:   Past Surgical History:   Procedure Laterality Date   • BREAST BIOPSY     • CARDIAC CATHETERIZATION Left 1960   • CARDIAC CATHETERIZATION N/A 04/06/2018    Procedure: Coronary angiography;  Surgeon: Art Licea MD;  Location:  [facility];  Service: Cardiovascular   • CARDIAC CATHETERIZATION N/A 04/06/2018    Procedure: Left heart cath;  Surgeon: Art Licea MD;  Location:  [facility];  Service: Cardiovascular   • CARDIAC CATHETERIZATION N/A 04/06/2018    Procedure: Left ventriculography;  Surgeon: Art Licea MD;  Location:  [facility];  Service: Cardiovascular   • CHOLECYSTECTOMY     • COLONOSCOPY  11/08/2006   • GANGLION CYST EXCISION     • HYSTERECTOMY  05/2005   • LAPAROSCOPIC GASTRIC BANDING     • TONSILLECTOMY         Family History:   Family History   Problem Relation Age of Onset   • Hypertension Mother    • Rheum arthritis Mother    • Heart disease Mother    • Breast cancer Mother    • Diabetes Father    • Cancer Maternal Grandmother         colon   • Colon cancer Maternal Grandmother    • Aneurysm Paternal Grandfather    • Diabetes Other    • Fibromyalgia Other        Social History:   Social History     Socioeconomic History   • Marital status:    Tobacco Use   • Smoking status: Every Day     Packs/day: 1.00     Years: 40.00     Pack years: 40.00     Types: Cigarettes     Start date: 1974   • Smokeless tobacco: Never   • Tobacco comments:     caffeine use 3 mt dews daily   Vaping Use   • Vaping Use: Never used   Substance and Sexual Activity   • Alcohol use: No   • Drug use: Never     Comment: Prescribed Lorazepam   • Sexual activity: Yes     Partners: Male     Birth control/protection: None       Medications:     Current Outpatient Medications:    •  atorvastatin (LIPITOR) 20 MG tablet, TAKE 1 TABLET BY MOUTH EVERY DAY, Disp: 30 tablet, Rfl: 6  •  baclofen (LIORESAL) 20 MG tablet, TAKE 1 TABLET BY MOUTH THREE TIMES DAILY, Disp: 90 tablet, Rfl: 1  •  donepezil (ARICEPT) 10 MG tablet, TAKE 1 TABLET BY MOUTH 1 time DAILY FOR MEMORY, Disp: , Rfl:   •  gabapentin (NEURONTIN) 600 MG tablet, TAKE 1 TABLET BY MOUTH AT BEDTIME, Disp: 90 tablet, Rfl: 0  •  hydroCHLOROthiazide (HYDRODIURIL) 12.5 MG tablet, Take 1 tablet by mouth Daily for 30 days. As needed for swelling, Disp: 30 tablet, Rfl: 0  •  HYDROcodone-acetaminophen (NORCO) 5-325 MG per tablet, Take 1 tablet by mouth Every 6 (Six) Hours As Needed (pain)., Disp: 60 tablet, Rfl: 0  •  letrozole (FEMARA) 2.5 MG tablet, Take 1 tablet by mouth Daily., Disp: 30 tablet, Rfl: 11  •  levothyroxine (SYNTHROID, LEVOTHROID) 50 MCG tablet, TAKE 1 TABLET BY MOUTH EVERY DAY, Disp: 90 tablet, Rfl: 1  •  LORazepam (ATIVAN) 0.5 MG tablet, lorazepam 0.5 mg tablet  TAKE 1 TABLET BY MOUTH FOUR TIMES DAILY AS NEEDED FOR ANXIETY, Disp: , Rfl:   •  losartan (COZAAR) 100 MG tablet, losartan 100 mg oral tablet take 1 tablet (100 mg) by oral route once daily   Active, Disp: , Rfl:   •  montelukast (SINGULAIR) 10 MG tablet, Take 10 mg by mouth Daily., Disp: , Rfl:   •  naproxen (NAPROSYN) 500 MG tablet, naproxen 500 mg tablet  TAKE 1 TABLET BY MOUTH TWICE DAILY, Disp: , Rfl:   •  ondansetron (ZOFRAN) 8 MG tablet, Take 1 tablet by mouth 3 (Three) Times a Day As Needed for Nausea or Vomiting., Disp: 30 tablet, Rfl: 5  •  ribociclib succinate 200 MG tablet therapy pack tablet, Take 3 tablets by mouth Take As Directed. Take 3 tablets by mouth daily for 21 days then off 7 days on a 28 day cycle., Disp: 63 tablet, Rfl: 5  •  rOPINIRole (REQUIP) 3 MG tablet, Take 3 mg by mouth Every Evening., Disp: , Rfl:   •  solifenacin (VESICARE) 10 MG tablet, Take 1 tablet by mouth Daily for 360 days., Disp: 90 tablet, Rfl: 3  •  SUMAtriptan (IMITREX) 100  MG tablet, TAKE 1 TABLET BY MOUTH AT ONSET OF HEADACHE. IF NO RESPONSE IN 2 HOURS MAY REPEAT DOSE FOR 1 DOSE. MAX 2/24 HRS, Disp: , Rfl:   •  traZODone (DESYREL) 150 MG tablet, TAKE 1 TABLET BY MOUTH ONCE nightly, Disp: 90 tablet, Rfl: 2  •  venlafaxine XR (EFFEXOR-XR) 75 MG 24 hr capsule, Take 75 mg by mouth Daily., Disp: , Rfl:     Allergies:   Allergies   Allergen Reactions   • Azithromycin Itching       Pain: (on a scale of 0-10)   Pain Score    11/04/22 0924   PainSc:   8   PainLoc: Back       Advanced Care Plan: N   Quality of Life: 80 - Restricted Physical Activity    Objective     Physical Exam:   Vital Signs:   Vitals:    11/04/22 0924   BP: 131/63   Pulse: 53   Resp: 16   Temp: 97.8 °F (36.6 °C)   TempSrc: Temporal   SpO2: 100%   Weight: 85.1 kg (187 lb 9.8 oz)   PainSc:   8   PainLoc: Back     Body mass index is 30.3 kg/m².     Physical Exam  Constitutional:       General: She is not in acute distress.     Appearance: She is normal weight. She is not ill-appearing, toxic-appearing or diaphoretic.   HENT:      Head: Normocephalic and atraumatic.   Pulmonary:      Effort: Pulmonary effort is normal. No respiratory distress.   Abdominal:      General: Abdomen is flat. There is no distension.   Skin:     General: Skin is warm and dry.   Neurological:      General: No focal deficit present.      Mental Status: She is alert and oriented to person, place, and time.      Cranial Nerves: No cranial nerve deficit.      Gait: Gait normal.   Psychiatric:         Mood and Affect: Mood normal.         Behavior: Behavior normal.         Judgment: Judgment normal.         Results:   Radiographs: MRI Brain With & Without Contrast    Result Date: 10/14/2022    1. No acute intracranial abnormality.  No evidence of intracranial metastatic disease. 2. Enhancing sclerotic lesions present in the upper cervical spine consistent with metastatic disease.  3. Mild paranasal sinus mucosal disease and small mastoid effusions.       ADRIANA VALLES MD       Electronically Signed and Approved By: ADRIANA VALLES MD on 10/14/2022 at 20:02             Mammo Post Clip Placement Left    Addendum Date: 10/11/2022    ADDENDUM:  Final pathology results for ultrasound-guided core biopsy of the left breast 0100 hours 3 cm from the nipple performed by Dr. Dee consistent with invasive lobular carcinoma and atypical lobular hyperplasia.  Concordant.  Final pathology for left axillary lymph node biopsy performed by Dr. Dee compatible with macro metastatic carcinoma.  Concordant.  The above positive malignant diagnosis was called to Emani Monique is a office on 10/10/2022 at 1246 hours by pathology staff.  Recommend surgical and oncologic consultation.    Mary Virk M.D.       Electronically Signed and Approved By: Mary Virk M.D. on 10/11/2022 at 12:33             Result Date: 10/11/2022   Successful ultrasound guided core biopsy of the subtle left breast mass and left axillary lymph node. The patient tolerated the procedure well without immediate complication. Specimens have been sent to pathology for further analysis.  Addendum will be dictated upon receiving final pathology for concordance and recommendations.     STEPHANIE STORY MD       Electronically Signed and Approved By: STEPHANIE STORY MD on 10/06/2022 at 9:34             Mammo Lymph Node Biopsy    Addendum Date: 10/11/2022    ADDENDUM:  Final pathology results for ultrasound-guided core biopsy of the left breast 0100 hours 3 cm from the nipple performed by Dr. Dee consistent with invasive lobular carcinoma and atypical lobular hyperplasia.  Concordant.  Final pathology for left axillary lymph node biopsy performed by Dr. Dee compatible with macro metastatic carcinoma.  Concordant.  The above positive malignant diagnosis was called to Emani Monique is a office on 10/10/2022 at 1246 hours by pathology staff.  Recommend surgical and oncologic  consultation.    Mary Virk M.D.       Electronically Signed and Approved By: Mary Virk M.D. on 10/11/2022 at 12:33             Result Date: 10/11/2022   Successful ultrasound guided core biopsy of the subtle left breast mass and left axillary lymph node. The patient tolerated the procedure well without immediate complication. Specimens have been sent to pathology for further analysis.  Addendum will be dictated upon receiving final pathology for concordance and recommendations.     STEPHANIE STORY MD       Electronically Signed and Approved By: STEPHANIE STORY MD on 10/06/2022 at 9:34             US Guided Breast Biopsy With & Without Device initial    Addendum Date: 10/11/2022    ADDENDUM:  Final pathology results for ultrasound-guided core biopsy of the left breast 0100 hours 3 cm from the nipple performed by Dr. Dee consistent with invasive lobular carcinoma and atypical lobular hyperplasia.  Concordant.  Final pathology for left axillary lymph node biopsy performed by Dr. Dee compatible with macro metastatic carcinoma.  Concordant.  The above positive malignant diagnosis was called to Emani in Dr. Monique is a office on 10/10/2022 at 1246 hours by pathology staff.  Recommend surgical and oncologic consultation.    Mary Virk M.D.       Electronically Signed and Approved By: Mary Virk M.D. on 10/11/2022 at 12:33             Result Date: 10/11/2022   Successful ultrasound guided core biopsy of the subtle left breast mass and left axillary lymph node. The patient tolerated the procedure well without immediate complication. Specimens have been sent to pathology for further analysis.  Addendum will be dictated upon receiving final pathology for concordance and recommendations.     STEPHANIE STORY MD       Electronically Signed and Approved By: STEPHANIE STORY MD on 10/06/2022 at 9:34           I personally reviewed the MRI of the brain with and without contrast performed on  10/14/2022.  The pertinent findings are as above.  I personally reviewed the PET/CT performed on 9/26/2022.  This reveals sclerotic osseous lesions some of which are hypermetabolic and suspicious for metastatic disease.    Pathology: I personally reviewed the pathology report from the procedure performed on 10/6/2022.  This reveals invasive lobular carcinoma, grade 2, ER positive/AL positive, HER2/joe negative with Ki-67 of 15% involving the left breast biopsy.  Additionally, there was a macro metastatic lymph node that was biopsied.    Labs:   WBC   Date Value Ref Range Status   10/27/2022 5.63 3.40 - 10.80 10*3/mm3 Final     Hemoglobin   Date Value Ref Range Status   10/27/2022 11.1 (L) 12.0 - 15.9 g/dL Final     Hematocrit   Date Value Ref Range Status   10/27/2022 35.3 34.0 - 46.6 % Final     Platelets   Date Value Ref Range Status   10/27/2022 187 140 - 450 10*3/mm3 Final       Assessment / Plan        Assessment/Plan:   Derek Keller is a 62-year-old female with metastatic lobular carcinoma of the breast.  She has multiple osseous metastatic lesions including the lumbar spine.  She has no focal neurologic deficits.  ECOG 2    I discussed the clinical, radiographic and pathologic findings today with Ms. Keller.  I explained the role of radiotherapy in the palliation of painful bony metastatic disease.  I recommended external beam radiotherapy directed to the lumbar spine, outlining the risks, benefits and alternatives of this approach.  Ms. Keller is agreeable to my recommendation and is scheduled for CT simulation for treatment planning purposes.        aRudel Grande MD  Radiation Oncology  [facility]    This document has been signed by Raudel Grande MD on November 4, 2022 13:57 EDT

## 2022-11-04 NOTE — PROGRESS NOTES
Patient seen for consult today, requested some information on finical assistance and transportation needs.  Patient sent to see , Kenzie.  While in Kenzie's office, patient started complaining of chest pain.  When I assessed patient, she was complaining of pain under her left axilla/side area.  When speaking to patient, she stated that she felt like it was anxiety related.  She denied any soa, dizziness or radiation down that arm or up jaw.  Vitals were 176/63, hr 55, o2 97% and respiratory rate of 20/minute.  I had patient take some good deep breaths and just spoke to patient to help calm her nerves.  After a couple of minutes, the discomfort had subsided.  I checked vitals again on patient before she left, she denied any chest pain or soa.  Vitals were 153/62, hr 52 and o2 98%.

## 2022-11-04 NOTE — PROGRESS NOTES
Diagnosis: Breast Cancer    Reason for Referral: Rounding with patients with distress    Content of Visit: OSW was requested by clinic nurse to see patient due to needing resources for financial assistance. Patient reported she had received one check from a local [employer] and had not heard from any grants. OSW requested permission and was granted by the patient to apply for [employer]. OSW offered a gas card but patient declined due to her father stating he could provides today's transportation cost. Patient was encouraged that if she arrived for treatment and short on gas there were gas cards available for occasional use due to her traveling 60 miles round trip to radiation therapy. Patient was very anxious today and was experiencing some chest pain under her left arm. Nurse was consulted to check her vitals and evaluate for further assistance. OSW provided the web link to apply at noon on Nov. 16th for [employer] application. OSW instructed patient to let her know when completed so she could complete the 's referral link. Patient expressed appreciation today for OSW taking time to meet with the patient and review additional resources.    Resources/Referrals Provided: [employer], made aware of gas cards, and [employer] application process.

## 2022-11-08 PROCEDURE — 77263 THER RADIOLOGY TX PLNG CPLX: CPT | Performed by: RADIOLOGY

## 2022-11-10 DIAGNOSIS — G89.3 CANCER RELATED PAIN: ICD-10-CM

## 2022-11-10 RX ORDER — HYDROCODONE BITARTRATE AND ACETAMINOPHEN 5; 325 MG/1; MG/1
1 TABLET ORAL EVERY 6 HOURS PRN
Qty: 60 TABLET | Refills: 0 | Status: SHIPPED | OUTPATIENT
Start: 2022-11-10 | End: 2022-11-28

## 2022-11-10 NOTE — TELEPHONE ENCOUNTER
Caller: Derek Keller    Relationship: Self    Best call back number: [phone]    Requested Prescriptions:   Requested Prescriptions     Pending Prescriptions Disp Refills   • HYDROcodone-acetaminophen (NORCO) 5-325 MG per tablet 60 tablet 0     Sig: Take 1 tablet by mouth Every 6 (Six) Hours As Needed (pain).        Pharmacy where request should be sent: [facility], [facility], [facility]  [place], [place] - [address] - [phone] [facility] [phone] FX     Additional details provided by patient:     Does the patient have less than a 3 day supply:  [x] Yes  [] No

## 2022-11-11 ENCOUNTER — TELEPHONE (OUTPATIENT)
Dept: ONCOLOGY | Facility: HOSPITAL | Age: 62
End: 2022-11-11

## 2022-11-11 NOTE — TELEPHONE ENCOUNTER
Caller: Derek Keller    Relationship: Self    Best call back number:   [phone]      What is the best time to reach you: NOT  3-4     Who are you requesting to speak with (clinical staff, provider,  specific staff member): CLINICAL    What was the call regarding: WOULD LIKE TO DISCUSS THE NEEDLES THAT ARE BEING USED ON HER.     SHE STATED THAT SHE WAS TOLD THERE ARE NO BUTTERFLY NEEDLES AT THE CANCER CENTER.    SHE IS A HARD STICK AND NEEDS A BUTTERFLY NEEDLE.    SHE WAS TOLD SHE NEEDED TO GO SOMEWHERE ELSE TO GET HER LABS AND SHE DOESN'T UNDERSTAND WHY THEY CAN NOT BE ORDERED FOR HER INSTEAD OF HAVING TO GO TO ANOTHER FACILITY      Do you require a callback: YES

## 2022-11-14 ENCOUNTER — SPECIALTY PHARMACY (OUTPATIENT)
Dept: PHARMACY | Facility: HOSPITAL | Age: 62
End: 2022-11-14

## 2022-11-14 ENCOUNTER — HOSPITAL ENCOUNTER (OUTPATIENT)
Dept: RADIATION ONCOLOGY | Facility: HOSPITAL | Age: 62
Discharge: HOME | End: 2022-11-14

## 2022-11-14 ENCOUNTER — HOSPITAL ENCOUNTER (OUTPATIENT)
Dept: RADIATION ONCOLOGY | Facility: HOSPITAL | Age: 62
Setting detail: RADIATION/ONCOLOGY SERIES
End: 2022-11-14

## 2022-11-14 DIAGNOSIS — C79.51 SECONDARY MALIGNANT NEOPLASM OF BONE: ICD-10-CM

## 2022-11-14 PROCEDURE — 77290 THER RAD SIMULAJ FIELD CPLX: CPT | Performed by: RADIOLOGY

## 2022-11-14 NOTE — PROGRESS NOTES
Specialty Pharmacy Patient Management Program  Oncology Refill Outreach      Derek is a 62 y.o. female contacted today regarding refills of her medication(s).    Specialty medication(s) and dose(s) confirmed: ribociclib succinate 200 MG tablet therapy pack. Sent to [facility]. Patient stated she will  today.     Other medications being refilled: N/A    Refill Questions    Flowsheet Row Most Recent Value   Changes to allergies? No   Changes to medications? No   New conditions since last clinic visit No   Unplanned office visit, urgent care, ED, or hospital admission in the last 4 weeks  No   How does patient/caregiver feel medication is working? Very good   Financial problems or insurance changes  No   Since the previous refill, were any specialty medication doses or scheduled injections missed or delayed?  No   Does this patient require a clinical escalation to a pharmacist? No          Delivery Questions    Flowsheet Row Most Recent Value   Delivery method  at Pharmacy   Delivery address correct? Yes   Delivery phone number [phone]   Number of medications in delivery 1   Medication being filled and delivered ribociclib succinate 200 MG tablet therapy pack   Doses left of specialty medications This is her off week   Is there any medication that is due not being filled? No   Supplies needed? No supplies needed   Cooler needed? No   Do any medications need mixed or dated? No   Copay form of payment Pay at pickup   Additional comments Zero copay   Questions or concerns for the pharmacist? No   Explain any questions or concerns for the pharmacist N/A   Are any medications first time fills? No                 Follow-up: 21 days.     Maria Luz Frazier  Care Coordinator, [facility]  11/14/2022  13:36 EST

## 2022-11-16 PROCEDURE — 77334 RADIATION TREATMENT AID(S): CPT | Performed by: RADIOLOGY

## 2022-11-16 PROCEDURE — 77300 RADIATION THERAPY DOSE PLAN: CPT | Performed by: RADIOLOGY

## 2022-11-16 PROCEDURE — 77295 3-D RADIOTHERAPY PLAN: CPT | Performed by: RADIOLOGY

## 2022-11-17 ENCOUNTER — TELEPHONE (OUTPATIENT)
Dept: ONCOLOGY | Facility: HOSPITAL | Age: 62
End: 2022-11-17

## 2022-11-17 NOTE — TELEPHONE ENCOUNTER
Instructed to increase her effexor starting tomorrow, she repeated back the name of the medication and instructions. She declined a referral for psychiatry. She reported she would see how she is feeling on Monday at her clinic visit after she increases the medication. She reported she had enough of the medication to get her through to Monday. Instructed if her mood has improved dr morton would send in a new rx. Verbalized understanding.

## 2022-11-17 NOTE — TELEPHONE ENCOUNTER
Patient called and is very tearful and stating that she is having a really bad day. She reported she recently had cataract surgery and has some sores in her nose that are painful. She asked if she should see her pcp or let dr morton evaluate at her clinic visit on Monday. Instructed that she can see her pcp if she can get in prior to Monday but dr morton would assess her nose at her clinic visit. She continued to cry, inquired if she is taking her Effexor ER 75 mg and she reported she was. Is there anything we can add to the effexor or can we increase her effexor for her mood?

## 2022-11-21 ENCOUNTER — HOSPITAL ENCOUNTER (OUTPATIENT)
Dept: RADIATION ONCOLOGY | Facility: HOSPITAL | Age: 62
Discharge: HOME | End: 2022-11-21

## 2022-11-21 ENCOUNTER — DOCUMENTATION (OUTPATIENT)
Dept: RADIATION ONCOLOGY | Facility: HOSPITAL | Age: 62
End: 2022-11-21

## 2022-11-21 ENCOUNTER — OFFICE VISIT (OUTPATIENT)
Dept: ONCOLOGY | Facility: HOSPITAL | Age: 62
End: 2022-11-21

## 2022-11-21 ENCOUNTER — HOSPITAL ENCOUNTER (OUTPATIENT)
Dept: ONCOLOGY | Facility: HOSPITAL | Age: 62
Setting detail: INFUSION SERIES
Discharge: HOME | End: 2022-11-21

## 2022-11-21 VITALS
SYSTOLIC BLOOD PRESSURE: 141 MMHG | HEIGHT: 66 IN | DIASTOLIC BLOOD PRESSURE: 50 MMHG | RESPIRATION RATE: 16 BRPM | WEIGHT: 182.54 LBS | BODY MASS INDEX: 29.34 KG/M2 | TEMPERATURE: 97.6 F | OXYGEN SATURATION: 100 % | HEART RATE: 54 BPM

## 2022-11-21 DIAGNOSIS — Z17.0 MALIGNANT NEOPLASM OF UPPER-OUTER QUADRANT OF LEFT BREAST IN FEMALE, ESTROGEN RECEPTOR POSITIVE: Primary | ICD-10-CM

## 2022-11-21 DIAGNOSIS — C79.51 BONE METASTASES: ICD-10-CM

## 2022-11-21 DIAGNOSIS — F41.9 ANXIETY: ICD-10-CM

## 2022-11-21 DIAGNOSIS — R60.9 PERIPHERAL EDEMA: ICD-10-CM

## 2022-11-21 DIAGNOSIS — C79.51 BONE METASTASES: Primary | ICD-10-CM

## 2022-11-21 DIAGNOSIS — C50.912 MALIGNANT NEOPLASM OF LEFT BREAST IN FEMALE, ESTROGEN RECEPTOR POSITIVE, UNSPECIFIED SITE OF BREAST: ICD-10-CM

## 2022-11-21 DIAGNOSIS — C50.412 MALIGNANT NEOPLASM OF UPPER-OUTER QUADRANT OF LEFT BREAST IN FEMALE, ESTROGEN RECEPTOR POSITIVE: Primary | ICD-10-CM

## 2022-11-21 DIAGNOSIS — Z17.0 MALIGNANT NEOPLASM OF LEFT BREAST IN FEMALE, ESTROGEN RECEPTOR POSITIVE, UNSPECIFIED SITE OF BREAST: ICD-10-CM

## 2022-11-21 PROBLEM — R60.0 PERIPHERAL EDEMA: Status: ACTIVE | Noted: 2022-11-21

## 2022-11-21 LAB
ALBUMIN SERPL-MCNC: 4.81 G/DL (ref 3.5–5.2)
ALBUMIN/GLOB SERPL: 1.9 G/DL
ALP SERPL-CCNC: 84 U/L (ref 39–117)
ALT SERPL W P-5'-P-CCNC: 12 U/L (ref 1–33)
ANION GAP SERPL CALCULATED.3IONS-SCNC: 8.2 MMOL/L (ref 5–15)
AST SERPL-CCNC: 14 U/L (ref 1–32)
BASOPHILS # BLD AUTO: 0.04 10*3/MM3 (ref 0–0.2)
BASOPHILS NFR BLD AUTO: 0.5 % (ref 0–1.5)
BILIRUB SERPL-MCNC: 0.4 MG/DL (ref 0–1.2)
BUN SERPL-MCNC: 20 MG/DL (ref 8–23)
BUN/CREAT SERPL: 22.2 (ref 7–25)
CALCIUM SPEC-SCNC: 9.5 MG/DL (ref 8.6–10.5)
CHLORIDE SERPL-SCNC: 101 MMOL/L (ref 98–107)
CO2 SERPL-SCNC: 29.8 MMOL/L (ref 22–29)
CREAT SERPL-MCNC: 0.9 MG/DL (ref 0.57–1)
DEPRECATED RDW RBC AUTO: 56.7 FL (ref 37–54)
EGFRCR SERPLBLD CKD-EPI 2021: 72 ML/MIN/1.73
EOSINOPHIL # BLD AUTO: 0.03 10*3/MM3 (ref 0–0.4)
EOSINOPHIL NFR BLD AUTO: 0.4 % (ref 0.3–6.2)
ERYTHROCYTE [DISTWIDTH] IN BLOOD BY AUTOMATED COUNT: 16.3 % (ref 12.3–15.4)
GLOBULIN UR ELPH-MCNC: 2.5 GM/DL
GLUCOSE SERPL-MCNC: 92 MG/DL (ref 65–99)
HCT VFR BLD AUTO: 37.9 % (ref 34–46.6)
HGB BLD-MCNC: 12 G/DL (ref 12–15.9)
IMM GRANULOCYTES # BLD AUTO: 0.01 10*3/MM3 (ref 0–0.05)
IMM GRANULOCYTES NFR BLD AUTO: 0.1 % (ref 0–0.5)
LYMPHOCYTES # BLD AUTO: 2.98 10*3/MM3 (ref 0.7–3.1)
LYMPHOCYTES NFR BLD AUTO: 40.5 % (ref 19.6–45.3)
MAGNESIUM SERPL-MCNC: 2.2 MG/DL (ref 1.6–2.4)
MCH RBC QN AUTO: 30.4 PG (ref 26.6–33)
MCHC RBC AUTO-ENTMCNC: 31.7 G/DL (ref 31.5–35.7)
MCV RBC AUTO: 95.9 FL (ref 79–97)
MONOCYTES # BLD AUTO: 0.48 10*3/MM3 (ref 0.1–0.9)
MONOCYTES NFR BLD AUTO: 6.5 % (ref 5–12)
NEUTROPHILS NFR BLD AUTO: 3.81 10*3/MM3 (ref 1.7–7)
NEUTROPHILS NFR BLD AUTO: 52 % (ref 42.7–76)
PHOSPHATE SERPL-MCNC: 3.5 MG/DL (ref 2.5–4.5)
PLATELET # BLD AUTO: 302 10*3/MM3 (ref 140–450)
PMV BLD AUTO: 8.9 FL (ref 6–12)
POTASSIUM SERPL-SCNC: 3.9 MMOL/L (ref 3.5–5.2)
PROT SERPL-MCNC: 7.3 G/DL (ref 6–8.5)
RAD ONC ARIA COURSE ID: NORMAL
RAD ONC ARIA COURSE INTENT: NORMAL
RAD ONC ARIA COURSE LAST TREATMENT DATE: NORMAL
RAD ONC ARIA COURSE START DATE: NORMAL
RAD ONC ARIA COURSE TREATMENT ELAPSED DAYS: 0
RAD ONC ARIA FIRST TREATMENT DATE: NORMAL
RAD ONC ARIA PLAN FRACTIONS TREATED TO DATE: 1
RAD ONC ARIA PLAN ID: NORMAL
RAD ONC ARIA PLAN PRESCRIBED DOSE PER FRACTION: 3 GY
RAD ONC ARIA PLAN PRIMARY REFERENCE POINT: NORMAL
RAD ONC ARIA PLAN TOTAL FRACTIONS PRESCRIBED: 10
RAD ONC ARIA PLAN TOTAL PRESCRIBED DOSE: 3000 CGY
RAD ONC ARIA REFERENCE POINT DOSAGE GIVEN TO DATE: 3 GY
RAD ONC ARIA REFERENCE POINT ID: NORMAL
RAD ONC ARIA REFERENCE POINT SESSION DOSAGE GIVEN: 3 GY
RBC # BLD AUTO: 3.95 10*6/MM3 (ref 3.77–5.28)
SODIUM SERPL-SCNC: 139 MMOL/L (ref 136–145)
WBC NRBC COR # BLD: 7.35 10*3/MM3 (ref 3.4–10.8)

## 2022-11-21 PROCEDURE — 80053 COMPREHEN METABOLIC PANEL: CPT | Performed by: INTERNAL MEDICINE

## 2022-11-21 PROCEDURE — 99214 OFFICE O/P EST MOD 30 MIN: CPT | Performed by: INTERNAL MEDICINE

## 2022-11-21 PROCEDURE — 77387 GUIDANCE FOR RADJ TX DLVR: CPT | Performed by: RADIOLOGY

## 2022-11-21 PROCEDURE — 36415 COLL VENOUS BLD VENIPUNCTURE: CPT

## 2022-11-21 PROCEDURE — 77280 THER RAD SIMULAJ FIELD SMPL: CPT | Performed by: RADIOLOGY

## 2022-11-21 PROCEDURE — 83735 ASSAY OF MAGNESIUM: CPT | Performed by: INTERNAL MEDICINE

## 2022-11-21 PROCEDURE — G0463 HOSPITAL OUTPT CLINIC VISIT: HCPCS

## 2022-11-21 PROCEDURE — 85025 COMPLETE CBC W/AUTO DIFF WBC: CPT | Performed by: INTERNAL MEDICINE

## 2022-11-21 PROCEDURE — 77412 RADIATION TX DELIVERY LVL 3: CPT | Performed by: RADIOLOGY

## 2022-11-21 PROCEDURE — 84100 ASSAY OF PHOSPHORUS: CPT | Performed by: INTERNAL MEDICINE

## 2022-11-21 RX ORDER — VENLAFAXINE HYDROCHLORIDE 150 MG/1
150 CAPSULE, EXTENDED RELEASE ORAL DAILY
Qty: 90 CAPSULE | Refills: 1 | Status: SHIPPED | OUTPATIENT
Start: 2022-11-21

## 2022-11-21 RX ORDER — HYDROCHLOROTHIAZIDE 12.5 MG/1
12.5 TABLET ORAL DAILY
Qty: 30 TABLET | Refills: 0 | Status: SHIPPED | OUTPATIENT
Start: 2022-11-21 | End: 2022-12-16 | Stop reason: HOSPADM

## 2022-11-21 RX ORDER — HYDROCHLOROTHIAZIDE 12.5 MG/1
TABLET ORAL
Qty: 30 TABLET | Refills: 0 | OUTPATIENT
Start: 2022-11-21

## 2022-11-21 NOTE — ASSESSMENT & PLAN NOTE
Patient is on monthly Xgeva.  Tolerating well.  No dental or jaw pain.  Electrolytes have been normal.  Xgeva today monthly.

## 2022-11-21 NOTE — ASSESSMENT & PLAN NOTE
Metastatic.  Patient recently started on therapy with letrozole plus ribociclib.  She is tolerating both well.  Lab work today looks good including blood counts.  Continue letrozole daily.  Continue ribociclib on days 1-21 of 28.  I will see her back in 1 month for continued treatment.  I will plan restaging scans after 3 months of therapy.

## 2022-11-21 NOTE — PROGRESS NOTES
Diagnosis: Metastatic breast cancer    Reason for Referral: Gas assistance    Content of Visit: Pt and brother presented to OSW office this afternoon to request gas assistance. Pt's brother is in town from [place] assisting with her care and transportation. Provided pt with a $15 gas card today. Pt confirms she has received assistance from both [facility] [employer] and [employer]. Pt also received and depleted the $25 gas card from [employer]. OSW will re-refer pt to [employer] to receive a second card. Educated pt she may receive up to two gas cards per calendar year while undergoing cancer tx. Pt v/u. Discussed additional resources to aid with travel and financial expenses, including [employer] and [employer] Breast Cancer Assistance [employer]. Reviewed the required documents to complete these applications and provided pt with a list for her review. Pt plans to provide these documents to OSW next Monday. Provided pt and brother with OSWs business cards again today, encouraging OSW support remains available. Pt expressed gratitude.    Update 11/21: Received confirmation from the [employer] that they will mail pt an additional gas card.    Resources/Referrals Provided: Transportation and financial resources - $15 gas card, [employer], [employer], and [employer] Breast Cancer Assistance Fund

## 2022-11-21 NOTE — ASSESSMENT & PLAN NOTE
None on today's examination but she notes it is fairly frequent.  She does take hydrochlorothiazide as needed.  This appears to adequate control her symptoms.  We discussed the need to make sure that she is hydrating well if she is taking a diuretic.  Refill of hydrochlorothiazide today.

## 2022-11-21 NOTE — ASSESSMENT & PLAN NOTE
Patient reports ongoing symptoms.  Effexor increased to 150 mg daily.  New prescription sent to pharmacy.  Reassess next visit.

## 2022-11-21 NOTE — PROGRESS NOTES
Chief Complaint  Breast Cancer    Haile Monique MD    Subjective          Derek Keller presents to [facility] for ongoing treatment of her metastatic breast cancer.  She is on letrozole plus ribociclib along with Xgeva for bony involvement.  She states she is tolerating her treatments well.  She does note increased anxiety and depressive symptoms since her diagnosis.  She is taking Effexor which helps some but incomplete relief.  She denies any masses or adenopathy.  She was evaluated by radiation oncology recently-those records reviewed.  She will began palliative radiation to the lumbar spine later this afternoon.  She denies any dental or jaw pain.  She feels like she is eating and drinking adequately.  She does note some swelling on her feet which happens most days.  She takes hydrochlorothiazide which helps.    Oncology/Hematology History   Malignant neoplasm of left breast in female, estrogen receptor positive (HCC)   10/13/2022 Initial Diagnosis    Malignant neoplasm of left breast in female, estrogen receptor positive (HCC)     10/14/2022 -  Chemotherapy    OP BREAST Letrozole / Ribociclib     10/14/2022 Cancer Staged    Staging form: Breast, AJCC 8th Edition  - Clinical: Stage IV (cT1c, cN1, cM1, ER+, MI+, HER2-) - Signed by Donald Barker MD on 10/14/2022     10/28/2022 -  Chemotherapy    OP SUPPORTIVE Denosumab (Xgeva) Q28D     Bone metastases (HCC)   10/14/2022 Initial Diagnosis    Bone metastases (HCC)     10/28/2022 -  Chemotherapy    OP SUPPORTIVE Denosumab (Xgeva) Q28D     Secondary malignant neoplasm of bone (HCC)   11/14/2022 Initial Diagnosis    Secondary malignant neoplasm of bone (HCC)     11/17/2022 -  Radiation    RADIATION THERAPY Treatment Details (Noted on 11/14/2022)  Site: Spine - Lumbar  Technique: 3D CRT  Goal: No goal specified  Planned Treatment Start Date: 11/17/2022         Review of Systems   Constitutional: Positive for fatigue (7/10).  Negative for appetite change, diaphoresis, fever, unexpected weight gain and unexpected weight loss.   HENT: Negative for hearing loss, mouth sores, sore throat, swollen glands, trouble swallowing and voice change.    Eyes: Negative for blurred vision.   Respiratory: Negative for cough, shortness of breath and wheezing.    Cardiovascular: Negative for chest pain and palpitations.   Gastrointestinal: Negative for abdominal pain, blood in stool, constipation, diarrhea, nausea and vomiting.   Endocrine: Negative for cold intolerance and heat intolerance.   Genitourinary: Negative for difficulty urinating, dysuria, frequency, hematuria and urinary incontinence.   Musculoskeletal: Positive for back pain (8/10) and myalgias (ROSITA thighs). Negative for arthralgias.   Skin: Negative for rash, skin lesions and wound.   Neurological: Negative for dizziness, seizures, weakness, numbness and headache.   Hematological: Does not bruise/bleed easily.   Psychiatric/Behavioral: Negative for depressed mood. The patient is not nervous/anxious.      Current Outpatient Medications on File Prior to Visit   Medication Sig Dispense Refill   • atorvastatin (LIPITOR) 20 MG tablet TAKE 1 TABLET BY MOUTH EVERY DAY 30 tablet 6   • baclofen (LIORESAL) 20 MG tablet TAKE 1 TABLET BY MOUTH THREE TIMES DAILY 90 tablet 1   • donepezil (ARICEPT) 10 MG tablet TAKE 1 TABLET BY MOUTH 1 time DAILY FOR MEMORY     • gabapentin (NEURONTIN) 600 MG tablet TAKE 1 TABLET BY MOUTH AT BEDTIME 90 tablet 0   • HYDROcodone-acetaminophen (NORCO) 5-325 MG per tablet Take 1 tablet by mouth Every 6 (Six) Hours As Needed (pain). 60 tablet 0   • letrozole (FEMARA) 2.5 MG tablet Take 1 tablet by mouth Daily. 30 tablet 11   • levothyroxine (SYNTHROID, LEVOTHROID) 50 MCG tablet TAKE 1 TABLET BY MOUTH EVERY DAY 90 tablet 1   • LORazepam (ATIVAN) 0.5 MG tablet lorazepam 0.5 mg tablet   TAKE 1 TABLET BY MOUTH FOUR TIMES DAILY AS NEEDED FOR ANXIETY     • losartan (COZAAR) 100 MG  tablet losartan 100 mg oral tablet take 1 tablet (100 mg) by oral route once daily   Active     • montelukast (SINGULAIR) 10 MG tablet Take 10 mg by mouth Daily.     • naproxen (NAPROSYN) 500 MG tablet naproxen 500 mg tablet   TAKE 1 TABLET BY MOUTH TWICE DAILY     • ondansetron (ZOFRAN) 8 MG tablet Take 1 tablet by mouth 3 (Three) Times a Day As Needed for Nausea or Vomiting. 30 tablet 5   • ribociclib succinate 200 MG tablet therapy pack tablet Take 3 tablets by mouth Take As Directed. Take 3 tablets by mouth daily for 21 days then off 7 days on a 28 day cycle. 63 tablet 5   • rOPINIRole (REQUIP) 3 MG tablet Take 3 mg by mouth Every Evening.     • solifenacin (VESICARE) 10 MG tablet Take 1 tablet by mouth Daily for 360 days. 90 tablet 3   • SUMAtriptan (IMITREX) 100 MG tablet TAKE 1 TABLET BY MOUTH AT ONSET OF HEADACHE. IF NO RESPONSE IN 2 HOURS MAY REPEAT DOSE FOR 1 DOSE. MAX 2/24 HRS     • traZODone (DESYREL) 150 MG tablet TAKE 1 TABLET BY MOUTH ONCE nightly 90 tablet 2   • [DISCONTINUED] hydroCHLOROthiazide (HYDRODIURIL) 12.5 MG tablet Take 1 tablet by mouth Daily for 30 days. As needed for swelling 30 tablet 0   • [DISCONTINUED] venlafaxine XR (EFFEXOR-XR) 75 MG 24 hr capsule Take 75 mg by mouth Daily.       No current facility-administered medications on file prior to visit.       Allergies   Allergen Reactions   • Azithromycin Itching     Past Medical History:   Diagnosis Date   • Abdominal pain    • Allergic rhinitis    • Anemia    • Anxiety    • Arteriosclerosis     Coronary   • Arthritis    • Bone metastases (HCC) 10/14/2022   • Bowen's disease    • Breast cancer (HCC)    • Cancer related pain 10/13/2022   • Chest pain    • Chronic pain disorder    • Cough    • Depression    • Dysphoric mood    • Fatigue    • Frequent urination    • History of colon polyps    • History of IBS    • History of kidney stones    • Hyperlipidemia    • Hypertension    • Hypothyroidism    • Insomnia    • Lumbago    • Malaise and  fatigue    • Mood disorder (HCC)    • Neck pain    • Palpitations    • Peripheral edema 11/21/2022   • Precordial pain    • Sleep disturbance    • SOB (shortness of breath)      Past Surgical History:   Procedure Laterality Date   • BREAST BIOPSY     • CARDIAC CATHETERIZATION Left 1959   • CARDIAC CATHETERIZATION N/A 04/06/2018    Procedure: Coronary angiography;  Surgeon: Art Licea MD;  Location:  [facility];  Service: Cardiovascular   • CARDIAC CATHETERIZATION N/A 04/06/2018    Procedure: Left heart cath;  Surgeon: Art Licea MD;  Location:  [facility];  Service: Cardiovascular   • CARDIAC CATHETERIZATION N/A 04/06/2018    Procedure: Left ventriculography;  Surgeon: Art Licea MD;  [facility];  Service: Cardiovascular   • CHOLECYSTECTOMY     • COLONOSCOPY  11/08/2006   • GANGLION CYST EXCISION     • HYSTERECTOMY  05/2005   • LAPAROSCOPIC GASTRIC BANDING     • TONSILLECTOMY       Social History     Socioeconomic History   • Marital status:    Tobacco Use   • Smoking status: Every Day     Packs/day: 1.00     Years: 40.00     Pack years: 40.00     Types: Cigarettes     Start date: 1974   • Smokeless tobacco: Never   • Tobacco comments:     caffeine use 3 [place] [address] daily   Vaping Use   • Vaping Use: Never used   Substance and Sexual Activity   • Alcohol use: No   • Drug use: Never     Comment: Prescribed Lorazepam   • Sexual activity: Yes     Partners: Male     Birth control/protection: None     Family History   Problem Relation Age of Onset   • Hypertension Mother    • Rheum arthritis Mother    • Heart disease Mother    • Breast cancer Mother    • Diabetes Father    • Cancer Maternal Grandmother         colon   • Colon cancer Maternal Grandmother    • Aneurysm Paternal Grandfather    • Diabetes Other    • Fibromyalgia Other        Objective   Physical Exam  Vitals reviewed. Exam conducted with a chaperone present.  "  Constitutional:       General: She is not in acute distress.     Appearance: Normal appearance.   Cardiovascular:      Rate and Rhythm: Normal rate and regular rhythm.      Heart sounds: Normal heart sounds. No murmur heard.    No gallop.   Pulmonary:      Effort: Pulmonary effort is normal.      Breath sounds: Normal breath sounds.   Abdominal:      General: Abdomen is flat. Bowel sounds are normal.      Palpations: Abdomen is soft.   Musculoskeletal:      Cervical back: Neck supple.      Right lower leg: No edema.      Left lower leg: No edema.      Comments: No tenderness to palpation of the spine   Lymphadenopathy:      Cervical: No cervical adenopathy.   Neurological:      Mental Status: She is alert and oriented to person, place, and time.   Psychiatric:         Mood and Affect: Mood normal.         Behavior: Behavior normal.         Vitals:    11/21/22 1052   BP: 141/50   Pulse: 54   Resp: 16   Temp: 97.6 °F (36.4 °C)   SpO2: 100%   Weight: 82.8 kg (182 lb 8.7 oz)   Height: 167.6 cm (65.98\")   PainSc:   8   PainLoc: Back     ECOG score: 0         PHQ-9 Total Score:                    Result Review :   The following data was reviewed by: Donald Barker MD on 11/21/2022:  Lab Results   Component Value Date    HGB 12.0 11/21/2022    HCT 37.9 11/21/2022    MCV 95.9 11/21/2022     11/21/2022    WBC 7.35 11/21/2022    NEUTROABS 3.81 11/21/2022    LYMPHSABS 2.98 11/21/2022    MONOSABS 0.48 11/21/2022    EOSABS 0.03 11/21/2022    BASOSABS 0.04 11/21/2022     Lab Results   Component Value Date    GLUCOSE 92 11/21/2022    BUN 20 11/21/2022    CREATININE 0.90 11/21/2022     11/21/2022    K 3.9 11/21/2022     11/21/2022    CO2 29.8 (H) 11/21/2022    CALCIUM 9.5 11/21/2022    PROTEINTOT 7.3 11/21/2022    ALBUMIN 4.81 11/21/2022    BILITOT 0.4 11/21/2022    ALKPHOS 84 11/21/2022    AST 14 11/21/2022    ALT 12 11/21/2022     Lab Results   Component Value Date    MG 2.2 11/21/2022    PHOS 3.5 " 11/21/2022    FREET4 1.01 01/17/2022    TSH 1.470 11/12/2019             Assessment and Plan    Diagnoses and all orders for this visit:    1. Malignant neoplasm of upper-outer quadrant of left breast in female, estrogen receptor positive (HCC) (Primary)  Assessment & Plan:  Metastatic.  Patient recently started on therapy with letrozole plus ribociclib.  She is tolerating both well.  Lab work today looks good including blood counts.  Continue letrozole daily.  Continue ribociclib on days 1-21 of 28.  I will see her back in 1 month for continued treatment.  I will plan restaging scans after 3 months of therapy.      2. Bone metastases (HCC)  Assessment & Plan:  Patient is on monthly Xgeva.  Tolerating well.  No dental or jaw pain.  Electrolytes have been normal.  Xgeva today monthly.      3. Anxiety  Assessment & Plan:  Patient reports ongoing symptoms.  Effexor increased to 150 mg daily.  New prescription sent to pharmacy.  Reassess next visit.    Orders:  -     venlafaxine XR (EFFEXOR-XR) 150 MG 24 hr capsule; Take 1 capsule by mouth Daily.  Dispense: 90 capsule; Refill: 1    4. Peripheral edema  Assessment & Plan:  None on today's examination but she notes it is fairly frequent.  She does take hydrochlorothiazide as needed.  This appears to adequate control her symptoms.  We discussed the need to make sure that she is hydrating well if she is taking a diuretic.  Refill of hydrochlorothiazide today.      Other orders  -     hydroCHLOROthiazide (HYDRODIURIL) 12.5 MG tablet; Take 1 tablet by mouth Daily for 30 days. As needed for swelling  Dispense: 30 tablet; Refill: 0          Patient Follow Up: 1 month    Patient was given instructions and counseling regarding her condition or for health maintenance advice. Please see specific information pulled into the AVS if appropriate.     Donald Barker MD    11/21/2022

## 2022-11-22 ENCOUNTER — HOSPITAL ENCOUNTER (OUTPATIENT)
Dept: RADIATION ONCOLOGY | Facility: HOSPITAL | Age: 62
Discharge: HOME | End: 2022-11-22

## 2022-11-22 ENCOUNTER — SPECIALTY PHARMACY (OUTPATIENT)
Dept: PHARMACY | Facility: HOSPITAL | Age: 62
End: 2022-11-22

## 2022-11-22 VITALS
SYSTOLIC BLOOD PRESSURE: 155 MMHG | WEIGHT: 184.3 LBS | DIASTOLIC BLOOD PRESSURE: 68 MMHG | TEMPERATURE: 98.6 F | OXYGEN SATURATION: 99 % | BODY MASS INDEX: 29.76 KG/M2 | RESPIRATION RATE: 16 BRPM | HEART RATE: 58 BPM

## 2022-11-22 DIAGNOSIS — Z17.0 MALIGNANT NEOPLASM OF UPPER-OUTER QUADRANT OF LEFT BREAST IN FEMALE, ESTROGEN RECEPTOR POSITIVE: Primary | ICD-10-CM

## 2022-11-22 DIAGNOSIS — C50.412 MALIGNANT NEOPLASM OF UPPER-OUTER QUADRANT OF LEFT BREAST IN FEMALE, ESTROGEN RECEPTOR POSITIVE: Primary | ICD-10-CM

## 2022-11-22 LAB
RAD ONC ARIA COURSE ID: NORMAL
RAD ONC ARIA COURSE INTENT: NORMAL
RAD ONC ARIA COURSE LAST TREATMENT DATE: NORMAL
RAD ONC ARIA COURSE START DATE: NORMAL
RAD ONC ARIA COURSE TREATMENT ELAPSED DAYS: 1
RAD ONC ARIA FIRST TREATMENT DATE: NORMAL
RAD ONC ARIA PLAN FRACTIONS TREATED TO DATE: 2
RAD ONC ARIA PLAN ID: NORMAL
RAD ONC ARIA PLAN PRESCRIBED DOSE PER FRACTION: 3 GY
RAD ONC ARIA PLAN PRIMARY REFERENCE POINT: NORMAL
RAD ONC ARIA PLAN TOTAL FRACTIONS PRESCRIBED: 10
RAD ONC ARIA PLAN TOTAL PRESCRIBED DOSE: 3000 CGY
RAD ONC ARIA REFERENCE POINT DOSAGE GIVEN TO DATE: 6 GY
RAD ONC ARIA REFERENCE POINT ID: NORMAL
RAD ONC ARIA REFERENCE POINT SESSION DOSAGE GIVEN: 3 GY

## 2022-11-22 PROCEDURE — 77412 RADIATION TX DELIVERY LVL 3: CPT | Performed by: RADIOLOGY

## 2022-11-22 PROCEDURE — 77427 RADIATION TX MANAGEMENT X5: CPT | Performed by: RADIOLOGY

## 2022-11-22 PROCEDURE — 77387 GUIDANCE FOR RADJ TX DLVR: CPT | Performed by: RADIOLOGY

## 2022-11-22 NOTE — PROGRESS NOTES
On Treatment Visit       Patient: Derek Keller   YOB: 1960   Medical Record Number: [id number]     Date of Visit  November 22, 2022   Primary Diagnosis:Malignant neoplasm of upper-outer quadrant of left breast in female, estrogen receptor positive (HCC) [C50.412, Z17.0]  Cancer Staging: Cancer Staging   Malignant neoplasm of left breast in female, estrogen receptor positive (HCC)  Staging form: Breast, AJCC 8th Edition  - Clinical: Stage IV (cT1c, cN1, cM1, ER+, TX+, HER2-) - Signed by Donald Barker MD on 10/14/2022         was seen today for an on treatment visit.  She is receiving radiation therapy to the lumbar spine. She  has received 600 cGy in 2 fractions out of a planned dose of 3000 cGy in 10 fractions.     No improvement in pain and some worsening lower back pain with radiation of pain at posterior aspect of thighs bilaterally.  Heat sensation at lower back after radiotherapy                                        Review of Systems:   Review of Systems   Constitutional: Positive for fatigue (9/10). Negative for appetite change.   Respiratory: Negative for cough and shortness of breath.    Cardiovascular: Positive for chest pain (generalized feels like a heaviness in the chest- ongoing for the last few weeks).   Gastrointestinal: Positive for constipation (taking Miralax with little relief). Negative for diarrhea and nausea.   Genitourinary: Negative for difficulty urinating, dysuria, frequency and urgency.   Musculoskeletal: Positive for back pain. Negative for arthralgias.        Pain from top of thigh to knee    Skin: Negative for rash.   Neurological: Positive for dizziness (with postition changes) and weakness (BLE ). Negative for headaches.   Psychiatric/Behavioral: Positive for sleep disturbance (wakes throughout the night ).       Vitals:     Vitals:    11/22/22 0957   BP: 155/68   Pulse: 58   Resp: 16   Temp: 98.6 °F (37 °C)   SpO2: 99%       Weight:   Wt Readings from  Last 3 Encounters:   11/22/22 83.6 kg (184 lb 4.9 oz)   11/21/22 82.8 kg (182 lb 8.7 oz)   11/04/22 85.1 kg (187 lb 9.8 oz)      Pain:    Pain Score    11/22/22 0957   PainSc:   9   PainLoc: Leg  Comment: back of legs         Physical Exam:  Gen: WD/WN; NAD  HEENT: MMM  Trachea: midline  Chest: symmetric  Resp: normal respiratory effort  Extr: warm, well-perfused  Neuro: awake and alert; no aphasia or neglect    Plan: I have reviewed treatment setup notes, checked and approved the daily guidance images.  I reviewed dose delivery, treatment parameters and deemed them appropriate. We plan to continue radiation therapy as prescribed.    Counseled regarding expectations of radiotherapy      Radiation Oncology    Electronically signed by Raudel Grande MD 11/22/2022  11:44 EST

## 2022-11-23 ENCOUNTER — HOSPITAL ENCOUNTER (OUTPATIENT)
Dept: RADIATION ONCOLOGY | Facility: HOSPITAL | Age: 62
Discharge: HOME | End: 2022-11-23

## 2022-11-23 ENCOUNTER — HOSPITAL ENCOUNTER (EMERGENCY)
Facility: HOSPITAL | Age: 62
Discharge: HOME | End: 2022-11-23
Attending: STUDENT IN AN ORGANIZED HEALTH CARE EDUCATION/TRAINING PROGRAM | Admitting: STUDENT IN AN ORGANIZED HEALTH CARE EDUCATION/TRAINING PROGRAM

## 2022-11-23 VITALS
BODY MASS INDEX: 29.9 KG/M2 | DIASTOLIC BLOOD PRESSURE: 51 MMHG | SYSTOLIC BLOOD PRESSURE: 131 MMHG | TEMPERATURE: 97.7 F | HEIGHT: 66 IN | RESPIRATION RATE: 16 BRPM | HEART RATE: 60 BPM | WEIGHT: 186.07 LBS | OXYGEN SATURATION: 99 %

## 2022-11-23 DIAGNOSIS — R20.0 BACK PAIN ASSOCIATED WITH PERIPHERAL NUMBNESS: Primary | ICD-10-CM

## 2022-11-23 DIAGNOSIS — M54.9 BACK PAIN ASSOCIATED WITH PERIPHERAL NUMBNESS: Primary | ICD-10-CM

## 2022-11-23 DIAGNOSIS — R51.9 NONINTRACTABLE HEADACHE, UNSPECIFIED CHRONICITY PATTERN, UNSPECIFIED HEADACHE TYPE: ICD-10-CM

## 2022-11-23 LAB
ALBUMIN SERPL-MCNC: 4.5 G/DL (ref 3.5–5.2)
ALBUMIN/GLOB SERPL: 1.9 G/DL
ALP SERPL-CCNC: 71 U/L (ref 39–117)
ALT SERPL W P-5'-P-CCNC: 12 U/L (ref 1–33)
ANION GAP SERPL CALCULATED.3IONS-SCNC: 7.7 MMOL/L (ref 5–15)
AST SERPL-CCNC: 14 U/L (ref 1–32)
BASOPHILS # BLD AUTO: 0.05 10*3/MM3 (ref 0–0.2)
BASOPHILS NFR BLD AUTO: 0.7 % (ref 0–1.5)
BILIRUB SERPL-MCNC: 0.3 MG/DL (ref 0–1.2)
BUN SERPL-MCNC: 26 MG/DL (ref 8–23)
BUN/CREAT SERPL: 29.2 (ref 7–25)
CALCIUM SPEC-SCNC: 9.2 MG/DL (ref 8.6–10.5)
CHLORIDE SERPL-SCNC: 101 MMOL/L (ref 98–107)
CO2 SERPL-SCNC: 31.3 MMOL/L (ref 22–29)
CREAT SERPL-MCNC: 0.89 MG/DL (ref 0.57–1)
DEPRECATED RDW RBC AUTO: 57.6 FL (ref 37–54)
EGFRCR SERPLBLD CKD-EPI 2021: 73 ML/MIN/1.73
EOSINOPHIL # BLD AUTO: 0.03 10*3/MM3 (ref 0–0.4)
EOSINOPHIL NFR BLD AUTO: 0.4 % (ref 0.3–6.2)
ERYTHROCYTE [DISTWIDTH] IN BLOOD BY AUTOMATED COUNT: 16.6 % (ref 12.3–15.4)
GLOBULIN UR ELPH-MCNC: 2.4 GM/DL
GLUCOSE SERPL-MCNC: 87 MG/DL (ref 65–99)
HCT VFR BLD AUTO: 34.8 % (ref 34–46.6)
HGB BLD-MCNC: 11.2 G/DL (ref 12–15.9)
IMM GRANULOCYTES # BLD AUTO: 0.02 10*3/MM3 (ref 0–0.05)
IMM GRANULOCYTES NFR BLD AUTO: 0.3 % (ref 0–0.5)
LYMPHOCYTES # BLD AUTO: 2.16 10*3/MM3 (ref 0.7–3.1)
LYMPHOCYTES NFR BLD AUTO: 31.5 % (ref 19.6–45.3)
MCH RBC QN AUTO: 30.9 PG (ref 26.6–33)
MCHC RBC AUTO-ENTMCNC: 32.2 G/DL (ref 31.5–35.7)
MCV RBC AUTO: 96.1 FL (ref 79–97)
MONOCYTES # BLD AUTO: 0.23 10*3/MM3 (ref 0.1–0.9)
MONOCYTES NFR BLD AUTO: 3.4 % (ref 5–12)
NEUTROPHILS NFR BLD AUTO: 4.37 10*3/MM3 (ref 1.7–7)
NEUTROPHILS NFR BLD AUTO: 63.7 % (ref 42.7–76)
NRBC BLD AUTO-RTO: 0 /100 WBC (ref 0–0.2)
PLATELET # BLD AUTO: 311 10*3/MM3 (ref 140–450)
PMV BLD AUTO: 9.3 FL (ref 6–12)
POTASSIUM SERPL-SCNC: 4.1 MMOL/L (ref 3.5–5.2)
PROT SERPL-MCNC: 6.9 G/DL (ref 6–8.5)
RAD ONC ARIA COURSE ID: NORMAL
RAD ONC ARIA COURSE INTENT: NORMAL
RAD ONC ARIA COURSE LAST TREATMENT DATE: NORMAL
RAD ONC ARIA COURSE START DATE: NORMAL
RAD ONC ARIA COURSE TREATMENT ELAPSED DAYS: 2
RAD ONC ARIA FIRST TREATMENT DATE: NORMAL
RAD ONC ARIA PLAN FRACTIONS TREATED TO DATE: 3
RAD ONC ARIA PLAN ID: NORMAL
RAD ONC ARIA PLAN PRESCRIBED DOSE PER FRACTION: 3 GY
RAD ONC ARIA PLAN PRIMARY REFERENCE POINT: NORMAL
RAD ONC ARIA PLAN TOTAL FRACTIONS PRESCRIBED: 10
RAD ONC ARIA PLAN TOTAL PRESCRIBED DOSE: 3000 CGY
RAD ONC ARIA REFERENCE POINT DOSAGE GIVEN TO DATE: 9 GY
RAD ONC ARIA REFERENCE POINT ID: NORMAL
RAD ONC ARIA REFERENCE POINT SESSION DOSAGE GIVEN: 3 GY
RBC # BLD AUTO: 3.62 10*6/MM3 (ref 3.77–5.28)
SODIUM SERPL-SCNC: 140 MMOL/L (ref 136–145)
WBC NRBC COR # BLD: 6.86 10*3/MM3 (ref 3.4–10.8)

## 2022-11-23 PROCEDURE — 77412 RADIATION TX DELIVERY LVL 3: CPT | Performed by: RADIOLOGY

## 2022-11-23 PROCEDURE — 85025 COMPLETE CBC W/AUTO DIFF WBC: CPT | Performed by: STUDENT IN AN ORGANIZED HEALTH CARE EDUCATION/TRAINING PROGRAM

## 2022-11-23 PROCEDURE — 77387 GUIDANCE FOR RADJ TX DLVR: CPT | Performed by: RADIOLOGY

## 2022-11-23 PROCEDURE — 80053 COMPREHEN METABOLIC PANEL: CPT | Performed by: STUDENT IN AN ORGANIZED HEALTH CARE EDUCATION/TRAINING PROGRAM

## 2022-11-23 PROCEDURE — 99282 EMERGENCY DEPT VISIT SF MDM: CPT

## 2022-11-23 PROCEDURE — 36415 COLL VENOUS BLD VENIPUNCTURE: CPT | Performed by: STUDENT IN AN ORGANIZED HEALTH CARE EDUCATION/TRAINING PROGRAM

## 2022-11-23 NOTE — ED PROVIDER NOTES
"Time: 3:38 PM EST    Chief Complaint: \" knot\" \"numbness\"  History provided by: patient  History is limited by: N/A     History of Present Illness:  Patient is a 62 y.o. year old female who presents to the emergency department with concerns.  Patient was getting her radiation in her lumbar spine due to her metastatic breast cancer.  Patient noted to the physician on there that she had a \"head bump\" and \"numbness\".  On my discussion with the patient, she states that the numbness is over the thoracic and lumbar spine and over the skin area.  She has no weakness or numbness in her arms or legs.  She states the \"bump\" on her head comes and goes and is at the low occipital area of her head.  Patient sees Dr. Barker.  Patient has had MRIs for head \"bump\".  MRI last done was 10/13 which showed no intracranial abnormality no metastatic disease in the brain. She does have sclerotic lesions in the upper cervical spine  The note from radiation states that the nurse made the physician aware that the patient was having \"numbness down her spine\".    Patient Care Team  Primary Care Provider: Haile Monique MD    Past Medical History:     Allergies   Allergen Reactions   • Azithromycin Itching     Past Medical History:   Diagnosis Date   • Abdominal pain    • Allergic rhinitis    • Anemia    • Anxiety    • Arteriosclerosis     Coronary   • Arthritis    • Bone metastases (HCC) 10/14/2022   • Bowen's disease    • Breast cancer (HCC)    • Cancer related pain 10/13/2022   • Chest pain    • Chronic pain disorder    • Cough    • Depression    • Dysphoric mood    • Fatigue    • Frequent urination    • History of colon polyps    • History of IBS    • History of kidney stones    • Hyperlipidemia    • Hypertension    • Hypothyroidism    • Insomnia    • Lumbago    • Malaise and fatigue    • Mood disorder (HCC)    • Neck pain    • Palpitations    • Peripheral edema 11/21/2022   • Precordial pain    • Sleep disturbance    • SOB (shortness of " breath)      Past Surgical History:   Procedure Laterality Date   • BREAST BIOPSY     • CARDIAC CATHETERIZATION Left 1959   • CARDIAC CATHETERIZATION N/A 04/06/2018    Procedure: Coronary angiography;  Surgeon: Art Licea MD;  Location:  [facility];  Service: Cardiovascular   • CARDIAC CATHETERIZATION N/A 04/06/2018    Procedure: Left heart cath;  Surgeon: Art Licea MD;  Location:  [facility];  Service: Cardiovascular   • CARDIAC CATHETERIZATION N/A 04/06/2018    Procedure: Left ventriculography;  Surgeon: Art Licea MD;  Location:  [facility];  Service: Cardiovascular   • CHOLECYSTECTOMY     • COLONOSCOPY  11/08/2006   • GANGLION CYST EXCISION     • HYSTERECTOMY  05/2005   • LAPAROSCOPIC GASTRIC BANDING     • TONSILLECTOMY       Family History   Problem Relation Age of Onset   • Hypertension Mother    • Rheum arthritis Mother    • Heart disease Mother    • Breast cancer Mother    • Diabetes Father    • Cancer Maternal Grandmother         colon   • Colon cancer Maternal Grandmother    • Aneurysm Paternal Grandfather    • Diabetes Other    • Fibromyalgia Other        Home Medications:  Prior to Admission medications    Medication Sig Start Date End Date Taking? Authorizing Provider   atorvastatin (LIPITOR) 20 MG tablet TAKE 1 TABLET BY MOUTH EVERY DAY 10/1/19   Yudelka Manning MD   baclofen (LIORESAL) 20 MG tablet TAKE 1 TABLET BY MOUTH THREE TIMES DAILY 12/6/19   Yudelka Manning MD   donepezil (ARICEPT) 10 MG tablet TAKE 1 TABLET BY MOUTH 1 time DAILY FOR MEMORY 10/28/22   Provider, MD Bessie   gabapentin (NEURONTIN) 600 MG tablet TAKE 1 TABLET BY MOUTH AT BEDTIME 7/30/20   Yudelka Manning MD   hydroCHLOROthiazide (HYDRODIURIL) 12.5 MG tablet Take 1 tablet by mouth Daily for 30 days. As needed for swelling 11/21/22 12/21/22  Donald Barker MD   HYDROcodone-acetaminophen (NORCO) 5-325 MG per tablet Take 1  tablet by mouth Every 6 (Six) Hours As Needed (pain). 11/10/22   Donald Barker MD   letrozole (FEMARA) 2.5 MG tablet Take 1 tablet by mouth Daily. 10/13/22   Donald Barker MD   levothyroxine (SYNTHROID, LEVOTHROID) 50 MCG tablet TAKE 1 TABLET BY MOUTH EVERY DAY 7/19/19   Yudelka Manning MD   LORazepam (ATIVAN) 0.5 MG tablet lorazepam 0.5 mg tablet   TAKE 1 TABLET BY MOUTH FOUR TIMES DAILY AS NEEDED FOR ANXIETY    ProviderBessie MD   losartan (COZAAR) 100 MG tablet losartan 100 mg oral tablet take 1 tablet (100 mg) by oral route once daily   Active    ProviderBessie MD   montelukast (SINGULAIR) 10 MG tablet Take 10 mg by mouth Daily. 1/17/22   Bessie Lopez MD   naproxen (NAPROSYN) 500 MG tablet naproxen 500 mg tablet   TAKE 1 TABLET BY MOUTH TWICE DAILY    Bessie Lopez MD   ondansetron (ZOFRAN) 8 MG tablet Take 1 tablet by mouth 3 (Three) Times a Day As Needed for Nausea or Vomiting. 10/13/22   Donald Barker MD   ribociclib succinate 200 MG tablet therapy pack tablet Take 3 tablets by mouth Take As Directed. Take 3 tablets by mouth daily for 21 days then off 7 days on a 28 day cycle. 10/19/22   Donald Barker MD   rOPINIRole (REQUIP) 3 MG tablet Take 3 mg by mouth Every Evening. 6/29/22   Bessie Lopez MD   solifenacin (VESICARE) 10 MG tablet Take 1 tablet by mouth Daily for 360 days. 10/25/22 10/20/23  Destinee Myers MD   SUMAtriptan (IMITREX) 100 MG tablet TAKE 1 TABLET BY MOUTH AT ONSET OF HEADACHE. IF NO RESPONSE IN 2 HOURS MAY REPEAT DOSE FOR 1 DOSE. MAX 2/24 HRS 10/7/22   Bessie Lopez MD   traZODone (DESYREL) 150 MG tablet TAKE 1 TABLET BY MOUTH ONCE nightly 11/12/19   Yudelka Manning MD   venlafaxine XR (EFFEXOR-XR) 150 MG 24 hr capsule Take 1 capsule by mouth Daily. 11/21/22   Donald Barker MD        Social History:   Social History     Tobacco Use   • Smoking status: Every Day     Packs/day: 1.00     Years: 40.00     Pack years:  "40.00     Types: Cigarettes     Start date: 1974   • Smokeless tobacco: Never   • Tobacco comments:     caffeine use 3 mt dews daily   Vaping Use   • Vaping Use: Never used   Substance Use Topics   • Alcohol use: No   • Drug use: Never     Comment: Prescribed Lorazepam         Review of Systems:  Review of Systems   Constitutional: Negative for chills and fever.   HENT: Negative for congestion, rhinorrhea and sore throat.    Eyes: Negative for pain and visual disturbance.   Respiratory: Negative for apnea, cough, chest tightness and shortness of breath.    Cardiovascular: Negative for chest pain and palpitations.   Gastrointestinal: Negative for abdominal pain, diarrhea, nausea and vomiting.   Genitourinary: Negative for difficulty urinating and dysuria.   Musculoskeletal: Negative for joint swelling and myalgias.   Skin: Negative for color change.   Neurological: Positive for numbness. Negative for seizures and headaches.   Psychiatric/Behavioral: Negative.    All other systems reviewed and are negative.       Physical Exam:  /51 (BP Location: Left arm, Patient Position: Sitting)   Pulse 60   Temp 97.7 °F (36.5 °C) (Oral)   Resp 16   Ht 167.6 cm (66\")   Wt 84.4 kg (186 lb 1.1 oz)   LMP  (LMP Unknown)   SpO2 99%   BMI 30.03 kg/m²     Physical Exam  HENT:      Head: Normocephalic.   Eyes:      Extraocular Movements: Extraocular movements intact.      Pupils: Pupils are equal, round, and reactive to light.   Cardiovascular:      Rate and Rhythm: Normal rate.      Heart sounds: No murmur heard.  Pulmonary:      Effort: Pulmonary effort is normal. No respiratory distress.      Breath sounds: No stridor. No wheezing.   Abdominal:      General: Abdomen is flat. There is no distension.      Palpations: There is no mass.      Tenderness: There is no abdominal tenderness.   Musculoskeletal:         General: Normal range of motion.   Skin:     General: Skin is warm.   Neurological:      General: No focal deficit " "present.      Mental Status: She is alert and oriented to person, place, and time. Mental status is at baseline.      Cranial Nerves: No cranial nerve deficit.      Sensory: Sensory deficit present.      Motor: No weakness.      Coordination: Coordination normal.      Gait: Gait normal.      Comments: Patient's numbness is in a focal area to her skin near her lumbar/ thoracic spine   Psychiatric:         Mood and Affect: Mood normal.                Medications in the Emergency Department:  Medications - No data to display     Labs  Lab Results (last 24 hours)     ** No results found for the last 24 hours. **           Imaging:  No Radiology Exams Resulted Within Past 24 Hours    Procedures:  Procedures    Progress  ED Course as of 11/26/22 2251   Wed Nov 23, 2022   1517   --- PROVIDER IN TRIAGE NOTE ---    Patient was seen and evaluated in triage by me HIEU Martini.  Orders were written and the patient is currently awaiting disposition.   [MS]   1525 Dr. Jacobson to assume care of patient. [MS]   1538 I spoke with Dr. Barker discussed the case.  The \"bump on the patient's head has been well evaluated with a recent MRI.  She has issues with chronic pain which Dr. Porras has been addressing.  On exam patient is ambulatory she has no loss of bowel or bladder.  She has no numbness in her extremities nor weakness.  Nothing at this time to indicate the need for acute imaging for cord compression.  The \"numbness\" that she feels over the skin on her back would not correlate with cord compression. [LQ]      ED Course User Index  [LQ] Yoko Jacobson MD  [MS] Carri Smiley APRN                            Medical Decision Making:  MDM  Number of Diagnoses or Management Options  Back pain associated with peripheral numbness  Nonintractable headache, unspecified chronicity pattern, unspecified headache type  Diagnosis management comments: My evaluation of the patient's \"bump on her head feels more like a right " occipital protuberance.  Previous MRIs have not identified a lesion there.  She is ambulatory and neuro intact on examination.  The numbness that she is describing is over the skin of her back likely where or near she is receiving radiation.  She has no symptoms to be concerned for cord compression at this time.  I spoke with her oncologist as well who agrees with the plan of care with continued follow-up.  I did inform her of symptoms to be aware of for cord compression.  Patient voiced understanding.       Amount and/or Complexity of Data Reviewed  Clinical lab tests: reviewed  Tests in the radiology section of CPT®: reviewed  Tests in the medicine section of CPT®: reviewed  Review and summarize past medical records: yes  Discuss the patient with other providers: yes         Final diagnoses:   Back pain associated with peripheral numbness   Nonintractable headache, unspecified chronicity pattern, unspecified headache type        Disposition:  ED Disposition     ED Disposition   Discharge    Condition   Stable    Comment   --             This medical record created using voice recognition software.           Yoko Jacobson MD  11/23/22 5317       Yoko Jacobson MD  11/26/22 1686

## 2022-11-23 NOTE — DISCHARGE INSTRUCTIONS
Watch for signs of compression of your spinal cord which would include urinating or stooling on yourself, inability to walk, weakness or numbness in the extremities.  Also monitor the area on your back where you are feeling numbness to see if you develop a rash over the next few days which could indicate shingles.  Please follow-up with Dr. Barker

## 2022-11-23 NOTE — ED PROVIDER NOTES
"Time: 3:17 PM EST  Chief Complaint:   Chief Complaint   Patient presents with   • Numbness   • Back Pain           History of Present Illness:  Patient is a 62 y.o. year old female who presents to the emergency department after being referred here form [facility]'s radiology department where she was seen radiation for metastatic breast cancer.  She expressed concerns to staff in that department over pain in her head related to a \" head bump\" and numbness to her thoracic and lumbar spine area, specifically to her skin.  She reports she has had the head bump for 6 months but the numbness and tingling to her mid back is new.  She has had multiple MRIs with the last one being 10/13 2022.  There are no obvious neurodeficits at this time.  ER attending Dr. Jacobson was consulted about MRI imaging and obtaining general lab. Pt was told that she needed to report to the ER \"now\" to have the scan completed. The possibility of out pt scans were discussed with PT and ER physician. ER attending Dr. Jacobson to triage area to evaluate pt. Please refer to her notes for additional findings, evaluation, disposition, and plan of care for patient.  (Jagdeep, HIEU - PIT Provider).      [facility]         Patient Care Team  Primary Care Provider: Haile Monique MD    Past Medical History:     Allergies   Allergen Reactions   • Azithromycin Itching     Past Medical History:   Diagnosis Date   • Abdominal pain    • Allergic rhinitis    • Anemia    • Anxiety    • Arteriosclerosis     Coronary   • Arthritis    • Bone metastases (HCC) 10/14/2022   • Bowen's disease    • Breast cancer (HCC)    • Cancer related pain 10/13/2022   • Chest pain    • Chronic pain disorder    • Cough    • Depression    • Dysphoric mood    • Fatigue    • Frequent urination    • History of colon polyps    • History of IBS    • History of kidney stones    • Hyperlipidemia    • Hypertension    • Hypothyroidism    • Insomnia    • Lumbago    • Malaise and fatigue    • Mood disorder " ([facility])    • Neck pain    • Palpitations    • Peripheral edema 11/21/2022   • Precordial pain    • Sleep disturbance    • SOB (shortness of breath)      Past Surgical History:   Procedure Laterality Date   • BREAST BIOPSY     • CARDIAC CATHETERIZATION Left 1959   • CARDIAC CATHETERIZATION N/A 04/06/2018    Procedure: Coronary angiography;  Surgeon: Art Licea MD;  Location:  [facility];  Service: Cardiovascular   • CARDIAC CATHETERIZATION N/A 04/06/2018    Procedure: Left heart cath;  Surgeon: Art Licea MD;  Location:  [facility];  Service: Cardiovascular   • CARDIAC CATHETERIZATION N/A 04/06/2018    Procedure: Left ventriculography;  Surgeon: Art Licea MD;  Location:  [facility];  Service: Cardiovascular   • CHOLECYSTECTOMY     • COLONOSCOPY  11/08/2006   • GANGLION CYST EXCISION     • HYSTERECTOMY  05/2005   • LAPAROSCOPIC GASTRIC BANDING     • TONSILLECTOMY       Family History   Problem Relation Age of Onset   • Hypertension Mother    • Rheum arthritis Mother    • Heart disease Mother    • Breast cancer Mother    • Diabetes Father    • Cancer Maternal Grandmother         colon   • Colon cancer Maternal Grandmother    • Aneurysm Paternal Grandfather    • Diabetes Other    • Fibromyalgia Other        Home Medications:  Prior to Admission medications    Medication Sig Start Date End Date Taking? Authorizing Provider   atorvastatin (LIPITOR) 20 MG tablet TAKE 1 TABLET BY MOUTH EVERY DAY 10/1/19   Yudelka Manning MD   baclofen (LIORESAL) 20 MG tablet TAKE 1 TABLET BY MOUTH THREE TIMES DAILY 12/6/19   Yudelka Manning MD   donepezil (ARICEPT) 10 MG tablet TAKE 1 TABLET BY MOUTH 1 time DAILY FOR MEMORY 10/28/22   Provider, MD Bessie   gabapentin (NEURONTIN) 600 MG tablet TAKE 1 TABLET BY MOUTH AT BEDTIME 7/30/20   Yudelka Manning MD   hydroCHLOROthiazide (HYDRODIURIL) 12.5 MG tablet Take 1 tablet by mouth Daily for 30  days. As needed for swelling 11/21/22 12/21/22  Donald Barker MD   HYDROcodone-acetaminophen (NORCO) 5-325 MG per tablet Take 1 tablet by mouth Every 6 (Six) Hours As Needed (pain). 11/10/22   Donald Barker MD   letrozole (FEMARA) 2.5 MG tablet Take 1 tablet by mouth Daily. 10/13/22   Donald Barker MD   levothyroxine (SYNTHROID, LEVOTHROID) 50 MCG tablet TAKE 1 TABLET BY MOUTH EVERY DAY 7/19/19   Yudelka Manning MD   LORazepam (ATIVAN) 0.5 MG tablet lorazepam 0.5 mg tablet   TAKE 1 TABLET BY MOUTH FOUR TIMES DAILY AS NEEDED FOR ANXIETY    Bessie Lopez MD   losartan (COZAAR) 100 MG tablet losartan 100 mg oral tablet take 1 tablet (100 mg) by oral route once daily   Active    Bessie Lopez MD   montelukast (SINGULAIR) 10 MG tablet Take 10 mg by mouth Daily. 1/17/22   Bessie Lopez MD   naproxen (NAPROSYN) 500 MG tablet naproxen 500 mg tablet   TAKE 1 TABLET BY MOUTH TWICE DAILY    Bessie Lopez MD   ondansetron (ZOFRAN) 8 MG tablet Take 1 tablet by mouth 3 (Three) Times a Day As Needed for Nausea or Vomiting. 10/13/22   Donald Barker MD   ribociclib succinate 200 MG tablet therapy pack tablet Take 3 tablets by mouth Take As Directed. Take 3 tablets by mouth daily for 21 days then off 7 days on a 28 day cycle. 10/19/22   Donald Barker MD   rOPINIRole (REQUIP) 3 MG tablet Take 3 mg by mouth Every Evening. 6/29/22   Bessie Lopez MD   solifenacin (VESICARE) 10 MG tablet Take 1 tablet by mouth Daily for 360 days. 10/25/22 10/20/23  Destinee Myers MD   SUMAtriptan (IMITREX) 100 MG tablet TAKE 1 TABLET BY MOUTH AT ONSET OF HEADACHE. IF NO RESPONSE IN 2 HOURS MAY REPEAT DOSE FOR 1 DOSE. MAX 2/24 HRS 10/7/22   Bessie Lopez MD   traZODone (DESYREL) 150 MG tablet TAKE 1 TABLET BY MOUTH ONCE nightly 11/12/19   Yudelka Manning MD   venlafaxine XR (EFFEXOR-XR) 150 MG 24 hr capsule Take 1 capsule by mouth Daily. 11/21/22   Donald Barker MD     "    Social History:   Social History     Tobacco Use   • Smoking status: Every Day     Packs/day: 1.00     Years: 40.00     Pack years: 40.00     Types: Cigarettes     Start date: 1974   • Smokeless tobacco: Never   • Tobacco comments:     caffeine use 3 mt dews daily   Vaping Use   • Vaping Use: Never used   Substance Use Topics   • Alcohol use: No   • Drug use: Never     Comment: Prescribed Lorazepam         Review of Systems:  Review of Systems   Constitutional: Negative for chills and fever.   HENT: Negative for congestion, ear pain and sore throat.    Eyes: Negative for pain.   Respiratory: Negative for cough, chest tightness and shortness of breath.    Cardiovascular: Negative for chest pain.   Gastrointestinal: Negative for abdominal pain, diarrhea, nausea and vomiting.   Genitourinary: Negative for flank pain and hematuria.   Musculoskeletal: Negative for joint swelling.   Skin: Negative for pallor.   Neurological: Negative for tremors, seizures, facial asymmetry, speech difficulty and headaches (head pain to base of skull related to \"head bump\").   All other systems reviewed and are negative.       Physical Exam:  /51 (BP Location: Left arm, Patient Position: Sitting)   Pulse 60   Temp 97.7 °F (36.5 °C) (Oral)   Resp 16   Ht 167.6 cm (66\")   Wt 84.4 kg (186 lb 1.1 oz)   LMP  (LMP Unknown)   SpO2 99%   BMI 30.03 kg/m²     Physical Exam  Vitals and nursing note reviewed.   Constitutional:       General: She is not in acute distress.     Appearance: Normal appearance. She is not toxic-appearing.   HENT:      Head: Normocephalic and atraumatic.      Mouth/Throat:      Mouth: Mucous membranes are moist.   Eyes:      General: No scleral icterus.     Pupils: Pupils are equal, round, and reactive to light.   Cardiovascular:      Rate and Rhythm: Normal rate and regular rhythm.      Pulses: Normal pulses.      Heart sounds: Normal heart sounds.   Pulmonary:      Effort: Pulmonary effort is normal. No " respiratory distress.      Breath sounds: Normal breath sounds.   Abdominal:      General: Abdomen is flat.      Palpations: Abdomen is soft.      Tenderness: There is no abdominal tenderness.   Musculoskeletal:         General: Normal range of motion.      Cervical back: Normal range of motion and neck supple.   Skin:     General: Skin is warm and dry.      Capillary Refill: Capillary refill takes less than 2 seconds.   Neurological:      Mental Status: She is alert and oriented to person, place, and time. Mental status is at baseline.      Sensory: No sensory deficit.   Psychiatric:         Mood and Affect: Mood normal.         Thought Content: Thought content normal.         Judgment: Judgment normal.                Medications in the Emergency Department:  Medications - No data to display     Labs  Lab Results (last 24 hours)     Procedure Component Value Units Date/Time    CBC & Differential [[id number]]  (Abnormal) Collected: 11/23/22 1545    Specimen: Blood Updated: 11/23/22 1559    Narrative:      The following orders were created for panel order CBC & Differential.  Procedure                               Abnormality         Status                     ---------                               -----------         ------                     CBC Auto Differential[[id number]]        Abnormal            Final result                 Please view results for these tests on the individual orders.    Comprehensive Metabolic Panel [[id number]]  (Abnormal) Collected: 11/23/22 1545    Specimen: Blood Updated: 11/23/22 1617     Glucose 87 mg/dL      BUN 26 mg/dL      Creatinine 0.89 mg/dL      Sodium 140 mmol/L      Potassium 4.1 mmol/L      Comment: Slight hemolysis detected by analyzer. Results may be affected.        Chloride 101 mmol/L      CO2 31.3 mmol/L      Calcium 9.2 mg/dL      Total Protein 6.9 g/dL      Albumin 4.50 g/dL      ALT (SGPT) 12 U/L      AST (SGOT) 14 U/L      Alkaline Phosphatase 71 U/L      Total  "Bilirubin 0.3 mg/dL      Globulin 2.4 gm/dL      A/G Ratio 1.9 g/dL      BUN/Creatinine Ratio 29.2     Anion Gap 7.7 mmol/L      eGFR 73.0 mL/min/1.73      Comment: National Kidney Foundation and American Society of Nephrology (ASN) Task Force recommended calculation based on the Chronic Kidney Disease Epidemiology Collaboration (CKD-EPI) equation refit without adjustment for race.       Narrative:      GFR Normal >60  Chronic Kidney Disease <60  Kidney Failure <15      CBC Auto Differential [[id number]]  (Abnormal) Collected: 11/23/22 1545    Specimen: Blood Updated: 11/23/22 1559     WBC 6.86 10*3/mm3      RBC 3.62 10*6/mm3      Hemoglobin 11.2 g/dL      Hematocrit 34.8 %      MCV 96.1 fL      MCH 30.9 pg      MCHC 32.2 g/dL      RDW 16.6 %      RDW-SD 57.6 fl      MPV 9.3 fL      Platelets 311 10*3/mm3      Neutrophil % 63.7 %      Lymphocyte % 31.5 %      Monocyte % 3.4 %      Eosinophil % 0.4 %      Basophil % 0.7 %      Immature Grans % 0.3 %      Neutrophils, Absolute 4.37 10*3/mm3      Lymphocytes, Absolute 2.16 10*3/mm3      Monocytes, Absolute 0.23 10*3/mm3      Eosinophils, Absolute 0.03 10*3/mm3      Basophils, Absolute 0.05 10*3/mm3      Immature Grans, Absolute 0.02 10*3/mm3      nRBC 0.0 /100 WBC            Imaging:  No Radiology Exams Resulted Within Past 24 Hours    Procedures:  Procedures    Progress  ED Course as of 11/24/22 1516   Wed Nov 23, 2022   1517   --- PROVIDER IN TRIAGE NOTE ---    Patient was seen and evaluated in triage by me HIEU Martini.  Orders were written and the patient is currently awaiting disposition.   [MS]   1525 Dr. Jacobson to assume care of patient. [MS]   1538 I spoke with Dr. Barker discussed the case.  The \"bump on the patient's head has been well evaluated with a recent MRI.  She has issues with chronic pain which Dr. Porras has been addressing.  On exam patient is ambulatory she has no loss of bowel or bladder.  She has no numbness in her extremities nor " "weakness.  Nothing at this time to indicate the need for acute imaging for cord compression.  The \"numbness\" that she feels over the skin on her back would not correlate with cord compression. [LQ]      ED Course User Index  [LQ] Yoko Jacobson MD  [MS] Carri Smiley APRN                            The patient was initially evaluated in the triage area where orders were placed. The patient was later dispositioned by HIEU Woodward.      The patient was advised to stay for completion of workup which includes but is not limited to communication of labs and radiological results, reassessment and plan. The patient was advised that leaving prior to disposition by a provider could result in critical findings that are not communicated to the patient.     Medical Decision Making:  MDM  Number of Diagnoses or Management Options  Back pain associated with peripheral numbness: new and does not require workup  Nonintractable headache, unspecified chronicity pattern, unspecified headache type: new and does not require workup     Amount and/or Complexity of Data Reviewed  Clinical lab tests: ordered  Review and summarize past medical records: yes (I have personally reviewed patient's previous medical encounters.  )  Discuss the patient with other providers: yes (I consulted with ER attending Dr. Jacobson who assumed care of patient.)    Risk of Complications, Morbidity, and/or Mortality  Presenting problems: low  Diagnostic procedures: low  Management options: low    Patient Progress  Patient progress: stable           The following orders were placed after triage and evaluation:  Orders Placed This Encounter   Procedures   • Comprehensive Metabolic Panel   • CBC Auto Differential   • CBC & Differential       Final diagnoses:   Back pain associated with peripheral numbness   Nonintractable headache, unspecified chronicity pattern, unspecified headache type          Disposition:  ED Disposition     ED Disposition "   Discharge    Condition   Stable    Comment   --             This medical record created using voice recognition software.           Carri Smiley, APRN  11/24/22 [id number]

## 2022-11-23 NOTE — PROGRESS NOTES
Patient requested to be seen today after radiation treatment with some concerns.  She stated that she is having pain at the base of her head on the right side, states that this has been going on for 6 months.  States she is having pain in her left arm and the back of legs, she stated that she made Dr. Grande aware of this yesterday.  She also states that she is having numbness that starts at her neck that goes all the way down her spine.  She states that this started this morning.  Her vitals were  b/p 115/50's, heart rate was 59 and temporal temp of 99.9.  I updated Dr. Ortiz with vitals and patients concerns and she suggested patient to go to ER for further evaluation.  I transported patient to ER per wheelchair and report was given to triage nurse, Génesis.

## 2022-11-25 DIAGNOSIS — G89.3 CANCER RELATED PAIN: ICD-10-CM

## 2022-11-28 ENCOUNTER — DOCUMENTATION (OUTPATIENT)
Dept: RADIATION ONCOLOGY | Facility: HOSPITAL | Age: 62
End: 2022-11-28

## 2022-11-28 ENCOUNTER — HOSPITAL ENCOUNTER (OUTPATIENT)
Dept: ONCOLOGY | Facility: HOSPITAL | Age: 62
Setting detail: INFUSION SERIES
Discharge: HOME | End: 2022-11-28

## 2022-11-28 ENCOUNTER — APPOINTMENT (OUTPATIENT)
Dept: ONCOLOGY | Facility: HOSPITAL | Age: 62
End: 2022-11-28

## 2022-11-28 ENCOUNTER — HOSPITAL ENCOUNTER (OUTPATIENT)
Dept: RADIATION ONCOLOGY | Facility: HOSPITAL | Age: 62
Discharge: HOME | End: 2022-11-28

## 2022-11-28 DIAGNOSIS — C79.51 BONE METASTASES: Primary | ICD-10-CM

## 2022-11-28 DIAGNOSIS — C50.912 MALIGNANT NEOPLASM OF LEFT BREAST IN FEMALE, ESTROGEN RECEPTOR POSITIVE, UNSPECIFIED SITE OF BREAST: ICD-10-CM

## 2022-11-28 DIAGNOSIS — Z17.0 MALIGNANT NEOPLASM OF LEFT BREAST IN FEMALE, ESTROGEN RECEPTOR POSITIVE, UNSPECIFIED SITE OF BREAST: ICD-10-CM

## 2022-11-28 LAB
RAD ONC ARIA COURSE ID: NORMAL
RAD ONC ARIA COURSE INTENT: NORMAL
RAD ONC ARIA COURSE LAST TREATMENT DATE: NORMAL
RAD ONC ARIA COURSE START DATE: NORMAL
RAD ONC ARIA COURSE TREATMENT ELAPSED DAYS: 7
RAD ONC ARIA FIRST TREATMENT DATE: NORMAL
RAD ONC ARIA PLAN FRACTIONS TREATED TO DATE: 4
RAD ONC ARIA PLAN ID: NORMAL
RAD ONC ARIA PLAN PRESCRIBED DOSE PER FRACTION: 3 GY
RAD ONC ARIA PLAN PRIMARY REFERENCE POINT: NORMAL
RAD ONC ARIA PLAN TOTAL FRACTIONS PRESCRIBED: 10
RAD ONC ARIA PLAN TOTAL PRESCRIBED DOSE: 3000 CGY
RAD ONC ARIA REFERENCE POINT DOSAGE GIVEN TO DATE: 12 GY
RAD ONC ARIA REFERENCE POINT ID: NORMAL
RAD ONC ARIA REFERENCE POINT SESSION DOSAGE GIVEN: 3 GY

## 2022-11-28 PROCEDURE — 25010000002 DENOSUMAB 120 MG/1.7ML SOLUTION: Performed by: INTERNAL MEDICINE

## 2022-11-28 PROCEDURE — 96372 THER/PROPH/DIAG INJ SC/IM: CPT

## 2022-11-28 PROCEDURE — 77412 RADIATION TX DELIVERY LVL 3: CPT | Performed by: RADIOLOGY

## 2022-11-28 RX ORDER — HYDROCODONE BITARTRATE AND ACETAMINOPHEN 5; 325 MG/1; MG/1
TABLET ORAL
Qty: 60 TABLET | Refills: 0 | Status: SHIPPED | OUTPATIENT
Start: 2022-11-28 | End: 2022-12-19

## 2022-11-28 RX ORDER — HYDROCHLOROTHIAZIDE 12.5 MG/1
TABLET ORAL
Qty: 30 TABLET | Refills: 0 | OUTPATIENT
Start: 2022-11-28

## 2022-11-28 RX ADMIN — DENOSUMAB 120 MG: 120 INJECTION SUBCUTANEOUS at 14:28

## 2022-11-28 NOTE — PROGRESS NOTES
Diagnosis: Breast cancer    Reason for Referral: Financial assistance    Content of Visit: OSW met with pt in rad onc to f/u in regards to previous conversation regarding applying for financial and  through the Breast Cancer Assistance Fund and [facility] [employer]. Pt reports she has not obtained her bank statements and proof of income yet due to not feeling well since last week. Pt plans to provide these documents along with copy of bills she'd like assistance with to OSW next week. Will assist pt in completing these applications once required documents are obtained. Pt has received a medical bill from [facility]. Discussed applying for [facility]'s financial assistance program (FAP) as well to aid with medical expenses. Will assist pt in completing this application once required documents are received. Provided pt with a $15 gas card today per her request. Encouraged OSW support remains available.    Resources/Referrals Provided: $15 gas card

## 2022-11-29 ENCOUNTER — HOSPITAL ENCOUNTER (OUTPATIENT)
Dept: RADIATION ONCOLOGY | Facility: HOSPITAL | Age: 62
Discharge: HOME | End: 2022-11-29

## 2022-11-29 VITALS
RESPIRATION RATE: 16 BRPM | OXYGEN SATURATION: 92 % | HEART RATE: 70 BPM | DIASTOLIC BLOOD PRESSURE: 68 MMHG | BODY MASS INDEX: 29.96 KG/M2 | WEIGHT: 185.63 LBS | TEMPERATURE: 97.5 F | SYSTOLIC BLOOD PRESSURE: 116 MMHG

## 2022-11-29 DIAGNOSIS — C50.412 MALIGNANT NEOPLASM OF UPPER-OUTER QUADRANT OF LEFT BREAST IN FEMALE, ESTROGEN RECEPTOR POSITIVE: Primary | ICD-10-CM

## 2022-11-29 DIAGNOSIS — C79.51 SECONDARY MALIGNANT NEOPLASM OF BONE: Primary | ICD-10-CM

## 2022-11-29 DIAGNOSIS — Z17.0 MALIGNANT NEOPLASM OF UPPER-OUTER QUADRANT OF LEFT BREAST IN FEMALE, ESTROGEN RECEPTOR POSITIVE: Primary | ICD-10-CM

## 2022-11-29 LAB
RAD ONC ARIA COURSE ID: NORMAL
RAD ONC ARIA COURSE INTENT: NORMAL
RAD ONC ARIA COURSE LAST TREATMENT DATE: NORMAL
RAD ONC ARIA COURSE START DATE: NORMAL
RAD ONC ARIA COURSE TREATMENT ELAPSED DAYS: 8
RAD ONC ARIA FIRST TREATMENT DATE: NORMAL
RAD ONC ARIA PLAN FRACTIONS TREATED TO DATE: 5
RAD ONC ARIA PLAN ID: NORMAL
RAD ONC ARIA PLAN PRESCRIBED DOSE PER FRACTION: 3 GY
RAD ONC ARIA PLAN PRIMARY REFERENCE POINT: NORMAL
RAD ONC ARIA PLAN TOTAL FRACTIONS PRESCRIBED: 10
RAD ONC ARIA PLAN TOTAL PRESCRIBED DOSE: 3000 CGY
RAD ONC ARIA REFERENCE POINT DOSAGE GIVEN TO DATE: 15 GY
RAD ONC ARIA REFERENCE POINT ID: NORMAL
RAD ONC ARIA REFERENCE POINT SESSION DOSAGE GIVEN: 3 GY

## 2022-11-29 PROCEDURE — 77336 RADIATION PHYSICS CONSULT: CPT | Performed by: RADIOLOGY

## 2022-11-29 PROCEDURE — 77412 RADIATION TX DELIVERY LVL 3: CPT | Performed by: RADIOLOGY

## 2022-11-30 DIAGNOSIS — Z17.0 MALIGNANT NEOPLASM OF LEFT BREAST IN FEMALE, ESTROGEN RECEPTOR POSITIVE, UNSPECIFIED SITE OF BREAST: ICD-10-CM

## 2022-11-30 DIAGNOSIS — C50.912 MALIGNANT NEOPLASM OF LEFT BREAST IN FEMALE, ESTROGEN RECEPTOR POSITIVE, UNSPECIFIED SITE OF BREAST: ICD-10-CM

## 2022-11-30 RX ORDER — LETROZOLE 2.5 MG/1
2.5 TABLET, FILM COATED ORAL DAILY
Qty: 90 TABLET | Refills: 1 | Status: SHIPPED | OUTPATIENT
Start: 2022-11-30

## 2022-12-01 ENCOUNTER — TELEPHONE (OUTPATIENT)
Dept: ONCOLOGY | Facility: HOSPITAL | Age: 62
End: 2022-12-01

## 2022-12-01 ENCOUNTER — HOSPITAL ENCOUNTER (OUTPATIENT)
Dept: RADIATION ONCOLOGY | Facility: HOSPITAL | Age: 62
Discharge: HOME | End: 2022-12-01

## 2022-12-01 ENCOUNTER — HOSPITAL ENCOUNTER (OUTPATIENT)
Dept: MRI IMAGING | Facility: HOSPITAL | Age: 62
Discharge: HOME | End: 2022-12-01

## 2022-12-01 ENCOUNTER — HOSPITAL ENCOUNTER (OUTPATIENT)
Dept: RADIATION ONCOLOGY | Facility: HOSPITAL | Age: 62
Setting detail: RADIATION/ONCOLOGY SERIES
End: 2022-12-01
Payer: MEDICARE

## 2022-12-01 DIAGNOSIS — C50.412 MALIGNANT NEOPLASM OF UPPER-OUTER QUADRANT OF LEFT BREAST IN FEMALE, ESTROGEN RECEPTOR POSITIVE: ICD-10-CM

## 2022-12-01 DIAGNOSIS — Z17.0 MALIGNANT NEOPLASM OF UPPER-OUTER QUADRANT OF LEFT BREAST IN FEMALE, ESTROGEN RECEPTOR POSITIVE: ICD-10-CM

## 2022-12-01 LAB
RAD ONC ARIA COURSE ID: NORMAL
RAD ONC ARIA COURSE INTENT: NORMAL
RAD ONC ARIA COURSE LAST TREATMENT DATE: NORMAL
RAD ONC ARIA COURSE START DATE: NORMAL
RAD ONC ARIA COURSE TREATMENT ELAPSED DAYS: 10
RAD ONC ARIA FIRST TREATMENT DATE: NORMAL
RAD ONC ARIA PLAN FRACTIONS TREATED TO DATE: 6
RAD ONC ARIA PLAN ID: NORMAL
RAD ONC ARIA PLAN PRESCRIBED DOSE PER FRACTION: 3 GY
RAD ONC ARIA PLAN PRIMARY REFERENCE POINT: NORMAL
RAD ONC ARIA PLAN TOTAL FRACTIONS PRESCRIBED: 10
RAD ONC ARIA PLAN TOTAL PRESCRIBED DOSE: 3000 CGY
RAD ONC ARIA REFERENCE POINT DOSAGE GIVEN TO DATE: 18 GY
RAD ONC ARIA REFERENCE POINT ID: NORMAL
RAD ONC ARIA REFERENCE POINT SESSION DOSAGE GIVEN: 3 GY

## 2022-12-01 PROCEDURE — 72158 MRI LUMBAR SPINE W/O & W/DYE: CPT

## 2022-12-01 PROCEDURE — 77412 RADIATION TX DELIVERY LVL 3: CPT | Performed by: RADIOLOGY

## 2022-12-01 PROCEDURE — 72197 MRI PELVIS W/O & W/DYE: CPT

## 2022-12-01 PROCEDURE — 0 GADOBENATE DIMEGLUMINE 529 MG/ML SOLUTION: Performed by: RADIOLOGY

## 2022-12-01 PROCEDURE — A9577 INJ MULTIHANCE: HCPCS | Performed by: RADIOLOGY

## 2022-12-01 PROCEDURE — 77417 THER RADIOLOGY PORT IMAGE(S): CPT | Performed by: RADIOLOGY

## 2022-12-01 RX ADMIN — GADOBENATE DIMEGLUMINE 15 ML: 529 INJECTION, SOLUTION INTRAVENOUS at 13:26

## 2022-12-01 NOTE — TELEPHONE ENCOUNTER
Diagnosis: Metastatic breast cancer    Content of Visit: OSW contacted pt via telephone to educate her on a new free, local resource in [place] that OSW was made aware of called the [facility] Oncology Collaborative Cancer Support Services Program partnered through [facility] Cancer Support [facility]. Pt in agreement to being referred to this program to receive additional practical and emotional support. Submitted referral per pt's request this afternoon. OSW support remains available.    Update 12/2: Pt is scheduled for orientation to the cancer support program with SHAVONNE Pritchard on 12/8/22 at 0900 CST.    Resources/Referrals Provided: [facility] Oncology Collaborative Cancer Support Services Program

## 2022-12-02 ENCOUNTER — HOSPITAL ENCOUNTER (OUTPATIENT)
Dept: RADIATION ONCOLOGY | Facility: HOSPITAL | Age: 62
Discharge: HOME | End: 2022-12-02

## 2022-12-02 ENCOUNTER — DOCUMENTATION (OUTPATIENT)
Dept: RADIATION ONCOLOGY | Facility: HOSPITAL | Age: 62
End: 2022-12-02

## 2022-12-02 DIAGNOSIS — C79.51 SECONDARY MALIGNANT NEOPLASM OF BONE: Primary | ICD-10-CM

## 2022-12-02 LAB
RAD ONC ARIA COURSE ID: NORMAL
RAD ONC ARIA COURSE INTENT: NORMAL
RAD ONC ARIA COURSE LAST TREATMENT DATE: NORMAL
RAD ONC ARIA COURSE START DATE: NORMAL
RAD ONC ARIA COURSE TREATMENT ELAPSED DAYS: 11
RAD ONC ARIA FIRST TREATMENT DATE: NORMAL
RAD ONC ARIA PLAN FRACTIONS TREATED TO DATE: 7
RAD ONC ARIA PLAN ID: NORMAL
RAD ONC ARIA PLAN PRESCRIBED DOSE PER FRACTION: 3 GY
RAD ONC ARIA PLAN PRIMARY REFERENCE POINT: NORMAL
RAD ONC ARIA PLAN TOTAL FRACTIONS PRESCRIBED: 10
RAD ONC ARIA PLAN TOTAL PRESCRIBED DOSE: 3000 CGY
RAD ONC ARIA REFERENCE POINT DOSAGE GIVEN TO DATE: 21 GY
RAD ONC ARIA REFERENCE POINT ID: NORMAL
RAD ONC ARIA REFERENCE POINT SESSION DOSAGE GIVEN: 3 GY

## 2022-12-02 PROCEDURE — 77412 RADIATION TX DELIVERY LVL 3: CPT | Performed by: RADIOLOGY

## 2022-12-02 RX ORDER — MORPHINE SULFATE 15 MG/1
15 TABLET ORAL EVERY 4 HOURS PRN
Qty: 60 TABLET | Refills: 0 | Status: ON HOLD | OUTPATIENT
Start: 2022-12-02 | End: 2022-12-13

## 2022-12-02 NOTE — PROGRESS NOTES
Diagnosis:Breast Cancer with Bone Mestastases    Reason for Referral: Patient request    Content of Visit: Patient came in to see OSW to request assistance with transportation. OSW provided patient with a gas card. Patient stated she did not received good news today. When asked to explain, she stated she had a scan completed and the results were showing numerous lesions on her spine. OSW reviewed how she was coping. Patient stated she is using prayer and accepting that the outcome is based on her doing the best she knows to do and then it is out of her control. Patient had reviewed MyChart before seeing the provider so she was prepared to have this discussion. She stated they were changing one of her pain meds but was staying the course of treatment with additional 5 radiation treatments to the base of her neck due to her pain.    Resources/Referrals Provided: OSW provided a gas card.

## 2022-12-05 ENCOUNTER — DOCUMENTATION (OUTPATIENT)
Dept: RADIATION ONCOLOGY | Facility: HOSPITAL | Age: 62
End: 2022-12-05

## 2022-12-05 ENCOUNTER — HOSPITAL ENCOUNTER (OUTPATIENT)
Dept: RADIATION ONCOLOGY | Facility: HOSPITAL | Age: 62
Discharge: HOME | End: 2022-12-05

## 2022-12-05 LAB
RAD ONC ARIA COURSE ID: NORMAL
RAD ONC ARIA COURSE INTENT: NORMAL
RAD ONC ARIA COURSE LAST TREATMENT DATE: NORMAL
RAD ONC ARIA COURSE START DATE: NORMAL
RAD ONC ARIA COURSE TREATMENT ELAPSED DAYS: 14
RAD ONC ARIA FIRST TREATMENT DATE: NORMAL
RAD ONC ARIA PLAN FRACTIONS TREATED TO DATE: 8
RAD ONC ARIA PLAN ID: NORMAL
RAD ONC ARIA PLAN PRESCRIBED DOSE PER FRACTION: 3 GY
RAD ONC ARIA PLAN PRIMARY REFERENCE POINT: NORMAL
RAD ONC ARIA PLAN TOTAL FRACTIONS PRESCRIBED: 10
RAD ONC ARIA PLAN TOTAL PRESCRIBED DOSE: 3000 CGY
RAD ONC ARIA REFERENCE POINT DOSAGE GIVEN TO DATE: 24 GY
RAD ONC ARIA REFERENCE POINT ID: NORMAL
RAD ONC ARIA REFERENCE POINT SESSION DOSAGE GIVEN: 3 GY

## 2022-12-05 PROCEDURE — 77412 RADIATION TX DELIVERY LVL 3: CPT | Performed by: RADIOLOGY

## 2022-12-05 PROCEDURE — 77387 GUIDANCE FOR RADJ TX DLVR: CPT | Performed by: RADIOLOGY

## 2022-12-05 PROCEDURE — 77336 RADIATION PHYSICS CONSULT: CPT | Performed by: RADIOLOGY

## 2022-12-05 NOTE — PROGRESS NOTES
Diagnosis: Breast cancer    Reason for Referral: Financial assistance    Content of Visit: OSW met with pt and brother in rad onc during tx this afternoon to assist pt in completing and submitting applications for financial assistance (medical expenses, basic needs, and travel). Pt provided documents required to finalize these applications. Submitted pt's [facility] financial assistance program application to the financial counselors to review. Financial counselor confirmed receipt. Also provided pt with the contact information to the financial counselors department for future reference. Pt reports she saw a $1600 medical debt on [employer] and inquired if she can be provided with an itemized statement. OSW relayed this to the financial counselors department and requested they mail the requested itemized statement(s) to her home. Financial counselor confirmed they will complete this request. Also assisted pt in completing an application through the [employer] Breast Cancer Assistance Fund and submitted via fax this afternoon. Fax was successful. Submitted pt's application to [employer] online for gas assistance - reference #[id number]. Pt requested an additional gas card today and was provided with a $15 card to assist with completing radiation treatment this week. Pt confirms she received a phone call from the [facility] Oncology Collaborative Cancer Support Services Program and has a one-on-one appointment with them on Thursday. No other needs identified at this time. Encouraged OSW support remains available.    Resources/Referrals Provided: Financial and gas assistance - [facility] financial assistance program/financial counselor, [employer] Breast Cancer Assistance Fund, [employer], $15 gas card

## 2022-12-06 ENCOUNTER — TELEPHONE (OUTPATIENT)
Dept: ONCOLOGY | Facility: HOSPITAL | Age: 62
End: 2022-12-06

## 2022-12-06 ENCOUNTER — APPOINTMENT (OUTPATIENT)
Dept: CT IMAGING | Facility: HOSPITAL | Age: 62
End: 2022-12-06

## 2022-12-06 ENCOUNTER — ANESTHESIA EVENT (OUTPATIENT)
Dept: PERIOP | Facility: HOSPITAL | Age: 62
End: 2022-12-06

## 2022-12-06 ENCOUNTER — ANESTHESIA (OUTPATIENT)
Dept: PERIOP | Facility: HOSPITAL | Age: 62
End: 2022-12-06

## 2022-12-06 ENCOUNTER — HOSPITAL ENCOUNTER (INPATIENT)
Facility: HOSPITAL | Age: 62
LOS: 7 days | Discharge: HOME HEALTH | End: 2022-12-13
Attending: EMERGENCY MEDICINE | Admitting: INTERNAL MEDICINE

## 2022-12-06 DIAGNOSIS — C79.51 BONE METASTASES: ICD-10-CM

## 2022-12-06 DIAGNOSIS — Z98.890 S/P EXPLORATORY LAPAROTOMY: ICD-10-CM

## 2022-12-06 DIAGNOSIS — C79.51 SECONDARY MALIGNANT NEOPLASM OF BONE: ICD-10-CM

## 2022-12-06 DIAGNOSIS — R26.2 DIFFICULTY WALKING: ICD-10-CM

## 2022-12-06 DIAGNOSIS — Z78.9 DECREASED ACTIVITIES OF DAILY LIVING (ADL): ICD-10-CM

## 2022-12-06 DIAGNOSIS — R09.02 HYPOXEMIA: ICD-10-CM

## 2022-12-06 DIAGNOSIS — Z17.0 MALIGNANT NEOPLASM OF UPPER-OUTER QUADRANT OF LEFT BREAST IN FEMALE, ESTROGEN RECEPTOR POSITIVE: ICD-10-CM

## 2022-12-06 DIAGNOSIS — K65.9 PERITONITIS: Primary | ICD-10-CM

## 2022-12-06 DIAGNOSIS — C50.412 MALIGNANT NEOPLASM OF UPPER-OUTER QUADRANT OF LEFT BREAST IN FEMALE, ESTROGEN RECEPTOR POSITIVE: ICD-10-CM

## 2022-12-06 DIAGNOSIS — R53.82 CHRONIC FATIGUE: ICD-10-CM

## 2022-12-06 LAB
ALBUMIN SERPL-MCNC: 3.9 G/DL (ref 3.5–5.2)
ALBUMIN/GLOB SERPL: 1.4 G/DL
ALP SERPL-CCNC: 96 U/L (ref 39–117)
ALT SERPL W P-5'-P-CCNC: 36 U/L (ref 1–33)
ANION GAP SERPL CALCULATED.3IONS-SCNC: 8.2 MMOL/L (ref 5–15)
AST SERPL-CCNC: 29 U/L (ref 1–32)
BACTERIA UR QL AUTO: ABNORMAL /HPF
BASOPHILS # BLD MANUAL: 0.02 10*3/MM3 (ref 0–0.2)
BASOPHILS NFR BLD MANUAL: 2 % (ref 0–1.5)
BILIRUB SERPL-MCNC: 0.4 MG/DL (ref 0–1.2)
BILIRUB UR QL STRIP: NEGATIVE
BUN SERPL-MCNC: 17 MG/DL (ref 8–23)
BUN/CREAT SERPL: 25.8 (ref 7–25)
CALCIUM SPEC-SCNC: 8.7 MG/DL (ref 8.6–10.5)
CHLORIDE SERPL-SCNC: 104 MMOL/L (ref 98–107)
CLARITY UR: ABNORMAL
CO2 SERPL-SCNC: 24.8 MMOL/L (ref 22–29)
COLOR UR: YELLOW
CREAT SERPL-MCNC: 0.66 MG/DL (ref 0.57–1)
D-LACTATE SERPL-SCNC: 1 MMOL/L (ref 0.5–2)
DEPRECATED RDW RBC AUTO: 61.1 FL (ref 37–54)
EGFRCR SERPLBLD CKD-EPI 2021: 98.7 ML/MIN/1.73
ERYTHROCYTE [DISTWIDTH] IN BLOOD BY AUTOMATED COUNT: 17.3 % (ref 12.3–15.4)
GLOBULIN UR ELPH-MCNC: 2.8 GM/DL
GLUCOSE SERPL-MCNC: 127 MG/DL (ref 65–99)
GLUCOSE UR STRIP-MCNC: NEGATIVE MG/DL
HCT VFR BLD AUTO: 37.8 % (ref 34–46.6)
HGB BLD-MCNC: 12.2 G/DL (ref 12–15.9)
HGB UR QL STRIP.AUTO: ABNORMAL
HOLD SPECIMEN: NORMAL
HOLD SPECIMEN: NORMAL
HYALINE CASTS UR QL AUTO: ABNORMAL /LPF
HYPOCHROMIA BLD QL: ABNORMAL
KETONES UR QL STRIP: NEGATIVE
LEUKOCYTE ESTERASE UR QL STRIP.AUTO: NEGATIVE
LIPASE SERPL-CCNC: 32 U/L (ref 13–60)
LYMPHOCYTES # BLD MANUAL: 0.24 10*3/MM3 (ref 0.7–3.1)
LYMPHOCYTES NFR BLD MANUAL: 1 % (ref 5–12)
MCH RBC QN AUTO: 31.4 PG (ref 26.6–33)
MCHC RBC AUTO-ENTMCNC: 32.3 G/DL (ref 31.5–35.7)
MCV RBC AUTO: 97.4 FL (ref 79–97)
MONOCYTES # BLD: 0.01 10*3/MM3 (ref 0.1–0.9)
NEUTROPHILS # BLD AUTO: 0.85 10*3/MM3 (ref 1.7–7)
NEUTROPHILS NFR BLD MANUAL: 68 % (ref 42.7–76)
NEUTS BAND NFR BLD MANUAL: 8 % (ref 0–5)
NITRITE UR QL STRIP: NEGATIVE
PH UR STRIP.AUTO: 5.5 [PH] (ref 5–8)
PLATELET # BLD AUTO: 160 10*3/MM3 (ref 140–450)
PMV BLD AUTO: 9.9 FL (ref 6–12)
POTASSIUM SERPL-SCNC: 4.8 MMOL/L (ref 3.5–5.2)
PROT SERPL-MCNC: 6.7 G/DL (ref 6–8.5)
PROT UR QL STRIP: ABNORMAL
RBC # BLD AUTO: 3.88 10*6/MM3 (ref 3.77–5.28)
RBC # UR STRIP: ABNORMAL /HPF
REF LAB TEST METHOD: ABNORMAL
SCAN SLIDE: NORMAL
SMALL PLATELETS BLD QL SMEAR: ABNORMAL
SODIUM SERPL-SCNC: 137 MMOL/L (ref 136–145)
SP GR UR STRIP: 1.02 (ref 1–1.03)
SQUAMOUS #/AREA URNS HPF: ABNORMAL /HPF
UROBILINOGEN UR QL STRIP: ABNORMAL
VARIANT LYMPHS NFR BLD MANUAL: 21 % (ref 19.6–45.3)
WBC # UR STRIP: ABNORMAL /HPF
WBC MORPH BLD: NORMAL
WBC NRBC COR # BLD: 1.12 10*3/MM3 (ref 3.4–10.8)
WHOLE BLOOD HOLD COAG: NORMAL
WHOLE BLOOD HOLD SPECIMEN: NORMAL

## 2022-12-06 PROCEDURE — 85007 BL SMEAR W/DIFF WBC COUNT: CPT | Performed by: EMERGENCY MEDICINE

## 2022-12-06 PROCEDURE — 0D1N0Z4 BYPASS SIGMOID COLON TO CUTANEOUS, OPEN APPROACH: ICD-10-PCS | Performed by: SURGERY

## 2022-12-06 PROCEDURE — 25010000002 DEXAMETHASONE SODIUM PHOSPHATE 100 MG/10ML SOLUTION: Performed by: SURGERY

## 2022-12-06 PROCEDURE — 0DTN0ZZ RESECTION OF SIGMOID COLON, OPEN APPROACH: ICD-10-PCS | Performed by: SURGERY

## 2022-12-06 PROCEDURE — P9041 ALBUMIN (HUMAN),5%, 50ML: HCPCS | Performed by: NURSE ANESTHETIST, CERTIFIED REGISTERED

## 2022-12-06 PROCEDURE — 99284 EMERGENCY DEPT VISIT MOD MDM: CPT

## 2022-12-06 PROCEDURE — 0 IOPAMIDOL PER 1 ML: Performed by: EMERGENCY MEDICINE

## 2022-12-06 PROCEDURE — 88360 TUMOR IMMUNOHISTOCHEM/MANUAL: CPT | Performed by: SURGERY

## 2022-12-06 PROCEDURE — 87075 CULTR BACTERIA EXCEPT BLOOD: CPT | Performed by: SURGERY

## 2022-12-06 PROCEDURE — 25010000002 ONDANSETRON PER 1 MG: Performed by: EMERGENCY MEDICINE

## 2022-12-06 PROCEDURE — 85025 COMPLETE CBC W/AUTO DIFF WBC: CPT | Performed by: EMERGENCY MEDICINE

## 2022-12-06 PROCEDURE — 74177 CT ABD & PELVIS W/CONTRAST: CPT

## 2022-12-06 PROCEDURE — 93005 ELECTROCARDIOGRAM TRACING: CPT

## 2022-12-06 PROCEDURE — 25010000002 HYDROMORPHONE 1 MG/ML SOLUTION: Performed by: EMERGENCY MEDICINE

## 2022-12-06 PROCEDURE — 25010000002 DEXAMETHASONE PER 1 MG: Performed by: NURSE ANESTHETIST, CERTIFIED REGISTERED

## 2022-12-06 PROCEDURE — 88341 IMHCHEM/IMCYTCHM EA ADD ANTB: CPT | Performed by: SURGERY

## 2022-12-06 PROCEDURE — 44143 PARTIAL REMOVAL OF COLON: CPT | Performed by: SURGERY

## 2022-12-06 PROCEDURE — 87015 SPECIMEN INFECT AGNT CONCNTJ: CPT | Performed by: SURGERY

## 2022-12-06 PROCEDURE — 25010000002 ONDANSETRON PER 1 MG: Performed by: NURSE ANESTHETIST, CERTIFIED REGISTERED

## 2022-12-06 PROCEDURE — 88307 TISSUE EXAM BY PATHOLOGIST: CPT | Performed by: SURGERY

## 2022-12-06 PROCEDURE — 88342 IMHCHEM/IMCYTCHM 1ST ANTB: CPT | Performed by: SURGERY

## 2022-12-06 PROCEDURE — 25010000002 PIPERACILLIN SOD-TAZOBACTAM PER 1 G: Performed by: EMERGENCY MEDICINE

## 2022-12-06 PROCEDURE — 25010000002 PROPOFOL 10 MG/ML EMULSION: Performed by: NURSE ANESTHETIST, CERTIFIED REGISTERED

## 2022-12-06 PROCEDURE — 93005 ELECTROCARDIOGRAM TRACING: CPT | Performed by: EMERGENCY MEDICINE

## 2022-12-06 PROCEDURE — 36415 COLL VENOUS BLD VENIPUNCTURE: CPT

## 2022-12-06 PROCEDURE — 99223 1ST HOSP IP/OBS HIGH 75: CPT | Performed by: INTERNAL MEDICINE

## 2022-12-06 PROCEDURE — 25010000002 PIPERACILLIN SOD-TAZOBACTAM PER 1 G: Performed by: NURSE ANESTHETIST, CERTIFIED REGISTERED

## 2022-12-06 PROCEDURE — 83690 ASSAY OF LIPASE: CPT | Performed by: EMERGENCY MEDICINE

## 2022-12-06 PROCEDURE — 94799 UNLISTED PULMONARY SVC/PX: CPT

## 2022-12-06 PROCEDURE — 25010000002 BUPRENORPHINE PER 0.1 MG: Performed by: SURGERY

## 2022-12-06 PROCEDURE — 93010 ELECTROCARDIOGRAM REPORT: CPT | Performed by: INTERNAL MEDICINE

## 2022-12-06 PROCEDURE — 44143 PARTIAL REMOVAL OF COLON: CPT

## 2022-12-06 PROCEDURE — 83605 ASSAY OF LACTIC ACID: CPT | Performed by: EMERGENCY MEDICINE

## 2022-12-06 PROCEDURE — 99222 1ST HOSP IP/OBS MODERATE 55: CPT | Performed by: SURGERY

## 2022-12-06 PROCEDURE — 25010000002 FENTANYL CITRATE (PF) 50 MCG/ML SOLUTION: Performed by: NURSE ANESTHETIST, CERTIFIED REGISTERED

## 2022-12-06 PROCEDURE — 25010000002 ALBUMIN HUMAN 5% PER 50 ML: Performed by: NURSE ANESTHETIST, CERTIFIED REGISTERED

## 2022-12-06 PROCEDURE — 81001 URINALYSIS AUTO W/SCOPE: CPT | Performed by: EMERGENCY MEDICINE

## 2022-12-06 PROCEDURE — 87070 CULTURE OTHR SPECIMN AEROBIC: CPT | Performed by: SURGERY

## 2022-12-06 PROCEDURE — 87205 SMEAR GRAM STAIN: CPT | Performed by: SURGERY

## 2022-12-06 PROCEDURE — 80053 COMPREHEN METABOLIC PANEL: CPT | Performed by: EMERGENCY MEDICINE

## 2022-12-06 DEVICE — PROXIMATE LINEAR CUTTER RELOAD, BLUE, 75MM
Type: IMPLANTABLE DEVICE | Site: ABDOMEN | Status: FUNCTIONAL
Brand: PROXIMATE

## 2022-12-06 DEVICE — PROXIMATE RELOADABLE LINEAR CUTTER WITH SAFETY LOCK-OUT.  75MM LINEAR CUTTER.
Type: IMPLANTABLE DEVICE | Site: ABDOMEN | Status: FUNCTIONAL
Brand: PROXIMATE

## 2022-12-06 DEVICE — ECHELON CONTOUR W/ GREEN RELOAD
Type: IMPLANTABLE DEVICE | Site: ABDOMEN | Status: FUNCTIONAL
Brand: ECHELON

## 2022-12-06 RX ORDER — ALBUMIN, HUMAN INJ 5% 5 %
SOLUTION INTRAVENOUS CONTINUOUS PRN
Status: DISCONTINUED | OUTPATIENT
Start: 2022-12-06 | End: 2022-12-06 | Stop reason: SURG

## 2022-12-06 RX ORDER — NALOXONE HCL 0.4 MG/ML
0.4 VIAL (ML) INJECTION
Status: DISCONTINUED | OUTPATIENT
Start: 2022-12-06 | End: 2022-12-07

## 2022-12-06 RX ORDER — ACETAMINOPHEN 325 MG/1
650 TABLET ORAL EVERY 4 HOURS PRN
Status: DISCONTINUED | OUTPATIENT
Start: 2022-12-06 | End: 2022-12-07

## 2022-12-06 RX ORDER — ALBUTEROL SULFATE 2.5 MG/3ML
SOLUTION RESPIRATORY (INHALATION) AS NEEDED
Status: DISCONTINUED | OUTPATIENT
Start: 2022-12-06 | End: 2022-12-06 | Stop reason: SURG

## 2022-12-06 RX ORDER — PROMETHAZINE HYDROCHLORIDE 25 MG/1
25 SUPPOSITORY RECTAL ONCE AS NEEDED
Status: DISCONTINUED | OUTPATIENT
Start: 2022-12-06 | End: 2022-12-06 | Stop reason: HOSPADM

## 2022-12-06 RX ORDER — PHENYLEPHRINE HCL IN 0.9% NACL 1 MG/10 ML
SYRINGE (ML) INTRAVENOUS AS NEEDED
Status: DISCONTINUED | OUTPATIENT
Start: 2022-12-06 | End: 2022-12-06 | Stop reason: SURG

## 2022-12-06 RX ORDER — MAGNESIUM HYDROXIDE 1200 MG/15ML
LIQUID ORAL AS NEEDED
Status: DISCONTINUED | OUTPATIENT
Start: 2022-12-06 | End: 2022-12-06 | Stop reason: HOSPADM

## 2022-12-06 RX ORDER — FENTANYL CITRATE 50 UG/ML
INJECTION, SOLUTION INTRAMUSCULAR; INTRAVENOUS AS NEEDED
Status: DISCONTINUED | OUTPATIENT
Start: 2022-12-06 | End: 2022-12-06 | Stop reason: SURG

## 2022-12-06 RX ORDER — SUCCINYLCHOLINE/SOD CL,ISO/PF 100 MG/5ML
SYRINGE (ML) INTRAVENOUS AS NEEDED
Status: DISCONTINUED | OUTPATIENT
Start: 2022-12-06 | End: 2022-12-06 | Stop reason: SURG

## 2022-12-06 RX ORDER — PROPOFOL 10 MG/ML
VIAL (ML) INTRAVENOUS AS NEEDED
Status: DISCONTINUED | OUTPATIENT
Start: 2022-12-06 | End: 2022-12-06 | Stop reason: SURG

## 2022-12-06 RX ORDER — ROCURONIUM BROMIDE 10 MG/ML
INJECTION, SOLUTION INTRAVENOUS AS NEEDED
Status: DISCONTINUED | OUTPATIENT
Start: 2022-12-06 | End: 2022-12-06 | Stop reason: SURG

## 2022-12-06 RX ORDER — ONDANSETRON 2 MG/ML
4 INJECTION INTRAMUSCULAR; INTRAVENOUS ONCE
Status: COMPLETED | OUTPATIENT
Start: 2022-12-06 | End: 2022-12-06

## 2022-12-06 RX ORDER — NITROGLYCERIN 0.4 MG/1
0.4 TABLET SUBLINGUAL
Status: DISCONTINUED | OUTPATIENT
Start: 2022-12-06 | End: 2022-12-13 | Stop reason: HOSPADM

## 2022-12-06 RX ORDER — LIDOCAINE HYDROCHLORIDE 20 MG/ML
INJECTION, SOLUTION EPIDURAL; INFILTRATION; INTRACAUDAL; PERINEURAL AS NEEDED
Status: DISCONTINUED | OUTPATIENT
Start: 2022-12-06 | End: 2022-12-06 | Stop reason: SURG

## 2022-12-06 RX ORDER — OXYCODONE HYDROCHLORIDE 5 MG/1
5 TABLET ORAL
Status: DISCONTINUED | OUTPATIENT
Start: 2022-12-06 | End: 2022-12-06 | Stop reason: HOSPADM

## 2022-12-06 RX ORDER — ONDANSETRON 2 MG/ML
INJECTION INTRAMUSCULAR; INTRAVENOUS AS NEEDED
Status: DISCONTINUED | OUTPATIENT
Start: 2022-12-06 | End: 2022-12-06 | Stop reason: SURG

## 2022-12-06 RX ORDER — SODIUM CHLORIDE 0.9 % (FLUSH) 0.9 %
10 SYRINGE (ML) INJECTION EVERY 12 HOURS SCHEDULED
Status: DISCONTINUED | OUTPATIENT
Start: 2022-12-06 | End: 2022-12-13 | Stop reason: HOSPADM

## 2022-12-06 RX ORDER — MEPERIDINE HYDROCHLORIDE 25 MG/ML
12.5 INJECTION INTRAMUSCULAR; INTRAVENOUS; SUBCUTANEOUS
Status: DISCONTINUED | OUTPATIENT
Start: 2022-12-06 | End: 2022-12-06 | Stop reason: HOSPADM

## 2022-12-06 RX ORDER — SODIUM CHLORIDE 0.9 % (FLUSH) 0.9 %
10 SYRINGE (ML) INJECTION AS NEEDED
Status: DISCONTINUED | OUTPATIENT
Start: 2022-12-06 | End: 2022-12-13 | Stop reason: HOSPADM

## 2022-12-06 RX ORDER — ONDANSETRON 2 MG/ML
4 INJECTION INTRAMUSCULAR; INTRAVENOUS ONCE AS NEEDED
Status: DISCONTINUED | OUTPATIENT
Start: 2022-12-06 | End: 2022-12-06 | Stop reason: HOSPADM

## 2022-12-06 RX ORDER — KETAMINE HCL IN NACL, ISO-OSM 100MG/10ML
SYRINGE (ML) INJECTION AS NEEDED
Status: DISCONTINUED | OUTPATIENT
Start: 2022-12-06 | End: 2022-12-06 | Stop reason: SURG

## 2022-12-06 RX ORDER — SODIUM CHLORIDE, SODIUM LACTATE, POTASSIUM CHLORIDE, CALCIUM CHLORIDE 600; 310; 30; 20 MG/100ML; MG/100ML; MG/100ML; MG/100ML
50 INJECTION, SOLUTION INTRAVENOUS CONTINUOUS
Status: DISCONTINUED | OUTPATIENT
Start: 2022-12-06 | End: 2022-12-12

## 2022-12-06 RX ORDER — SODIUM CHLORIDE 9 MG/ML
40 INJECTION, SOLUTION INTRAVENOUS AS NEEDED
Status: DISCONTINUED | OUTPATIENT
Start: 2022-12-06 | End: 2022-12-13 | Stop reason: HOSPADM

## 2022-12-06 RX ORDER — ACETAMINOPHEN 10 MG/ML
1000 INJECTION, SOLUTION INTRAVENOUS ONCE
Status: COMPLETED | OUTPATIENT
Start: 2022-12-06 | End: 2022-12-06

## 2022-12-06 RX ORDER — ACETAMINOPHEN 160 MG/5ML
650 SOLUTION ORAL EVERY 4 HOURS PRN
Status: DISCONTINUED | OUTPATIENT
Start: 2022-12-06 | End: 2022-12-07

## 2022-12-06 RX ORDER — ACETAMINOPHEN 650 MG/1
650 SUPPOSITORY RECTAL EVERY 4 HOURS PRN
Status: DISCONTINUED | OUTPATIENT
Start: 2022-12-06 | End: 2022-12-07

## 2022-12-06 RX ORDER — ONDANSETRON 2 MG/ML
4 INJECTION INTRAMUSCULAR; INTRAVENOUS EVERY 6 HOURS PRN
Status: DISCONTINUED | OUTPATIENT
Start: 2022-12-06 | End: 2022-12-13 | Stop reason: HOSPADM

## 2022-12-06 RX ORDER — PROMETHAZINE HYDROCHLORIDE 12.5 MG/1
25 TABLET ORAL ONCE AS NEEDED
Status: DISCONTINUED | OUTPATIENT
Start: 2022-12-06 | End: 2022-12-06 | Stop reason: HOSPADM

## 2022-12-06 RX ORDER — DEXAMETHASONE SODIUM PHOSPHATE 4 MG/ML
INJECTION, SOLUTION INTRA-ARTICULAR; INTRALESIONAL; INTRAMUSCULAR; INTRAVENOUS; SOFT TISSUE AS NEEDED
Status: DISCONTINUED | OUTPATIENT
Start: 2022-12-06 | End: 2022-12-06 | Stop reason: SURG

## 2022-12-06 RX ADMIN — Medication 100 MG: at 17:46

## 2022-12-06 RX ADMIN — DEXAMETHASONE SODIUM PHOSPHATE 8 MG: 4 INJECTION, SOLUTION INTRA-ARTICULAR; INTRALESIONAL; INTRAMUSCULAR; INTRAVENOUS; SOFT TISSUE at 17:47

## 2022-12-06 RX ADMIN — Medication 100 MCG: at 17:57

## 2022-12-06 RX ADMIN — LIDOCAINE HYDROCHLORIDE 50 MG: 20 INJECTION, SOLUTION EPIDURAL; INFILTRATION; INTRACAUDAL; PERINEURAL at 17:43

## 2022-12-06 RX ADMIN — Medication 200 MCG: at 18:59

## 2022-12-06 RX ADMIN — Medication 100 MCG: at 18:00

## 2022-12-06 RX ADMIN — Medication 200 MCG: at 19:07

## 2022-12-06 RX ADMIN — Medication 200 MCG: at 18:12

## 2022-12-06 RX ADMIN — ROCURONIUM BROMIDE 20 MG: 10 INJECTION INTRAVENOUS at 18:27

## 2022-12-06 RX ADMIN — FENTANYL CITRATE 50 MCG: 50 INJECTION, SOLUTION INTRAMUSCULAR; INTRAVENOUS at 17:42

## 2022-12-06 RX ADMIN — PROPOFOL 150 MG: 10 INJECTION, EMULSION INTRAVENOUS at 17:45

## 2022-12-06 RX ADMIN — ACETAMINOPHEN 1000 MG: 10 INJECTION INTRAVENOUS at 18:23

## 2022-12-06 RX ADMIN — LIDOCAINE HYDROCHLORIDE 50 MG: 20 INJECTION, SOLUTION EPIDURAL; INFILTRATION; INTRACAUDAL; PERINEURAL at 17:44

## 2022-12-06 RX ADMIN — Medication 200 MCG: at 19:10

## 2022-12-06 RX ADMIN — Medication 100 MCG: at 18:30

## 2022-12-06 RX ADMIN — ONDANSETRON 4 MG: 2 INJECTION INTRAMUSCULAR; INTRAVENOUS at 19:02

## 2022-12-06 RX ADMIN — Medication 100 MCG: at 17:54

## 2022-12-06 RX ADMIN — IOPAMIDOL 100 ML: 755 INJECTION, SOLUTION INTRAVENOUS at 15:26

## 2022-12-06 RX ADMIN — HYDROMORPHONE HYDROCHLORIDE 1 MG: 1 INJECTION, SOLUTION INTRAMUSCULAR; INTRAVENOUS; SUBCUTANEOUS at 16:36

## 2022-12-06 RX ADMIN — SODIUM CHLORIDE, POTASSIUM CHLORIDE, SODIUM LACTATE AND CALCIUM CHLORIDE 50 ML/HR: 600; 310; 30; 20 INJECTION, SOLUTION INTRAVENOUS at 17:12

## 2022-12-06 RX ADMIN — Medication 10 MG: at 18:24

## 2022-12-06 RX ADMIN — Medication 5 MG: at 18:52

## 2022-12-06 RX ADMIN — ROCURONIUM BROMIDE 5 MG: 10 INJECTION INTRAVENOUS at 18:53

## 2022-12-06 RX ADMIN — Medication 200 MCG: at 19:18

## 2022-12-06 RX ADMIN — ALBUTEROL SULFATE 2.5 MG: 2.5 SOLUTION RESPIRATORY (INHALATION) at 18:36

## 2022-12-06 RX ADMIN — PROPOFOL 20 MG: 10 INJECTION, EMULSION INTRAVENOUS at 18:05

## 2022-12-06 RX ADMIN — PHENOL 1 SPRAY: 1.5 LIQUID ORAL at 21:49

## 2022-12-06 RX ADMIN — HYDROMORPHONE HYDROCHLORIDE 1 MG: 1 INJECTION, SOLUTION INTRAMUSCULAR; INTRAVENOUS; SUBCUTANEOUS at 14:48

## 2022-12-06 RX ADMIN — SODIUM CHLORIDE 1000 ML: 9 INJECTION, SOLUTION INTRAVENOUS at 14:47

## 2022-12-06 RX ADMIN — TAZOBACTAM SODIUM AND PIPERACILLIN SODIUM 3.38 G: 375; 3 INJECTION, SOLUTION INTRAVENOUS at 16:36

## 2022-12-06 RX ADMIN — ALBUMIN (HUMAN): 12.5 INJECTION, SOLUTION INTRAVENOUS at 18:13

## 2022-12-06 RX ADMIN — ROCURONIUM BROMIDE 45 MG: 10 INJECTION INTRAVENOUS at 18:10

## 2022-12-06 RX ADMIN — ROCURONIUM BROMIDE 5 MG: 10 INJECTION INTRAVENOUS at 17:45

## 2022-12-06 RX ADMIN — SODIUM CHLORIDE, POTASSIUM CHLORIDE, SODIUM LACTATE AND CALCIUM CHLORIDE: 600; 310; 30; 20 INJECTION, SOLUTION INTRAVENOUS at 18:38

## 2022-12-06 RX ADMIN — SUGAMMADEX 200 MG: 100 INJECTION, SOLUTION INTRAVENOUS at 19:20

## 2022-12-06 RX ADMIN — TAZOBACTAM SODIUM AND PIPERACILLIN SODIUM 3.38 G: 375; 3 INJECTION, SOLUTION INTRAVENOUS at 18:36

## 2022-12-06 RX ADMIN — PROPOFOL 50 MG: 10 INJECTION, EMULSION INTRAVENOUS at 17:46

## 2022-12-06 RX ADMIN — Medication 100 MCG: at 18:35

## 2022-12-06 RX ADMIN — Medication 10 ML: at 21:22

## 2022-12-06 RX ADMIN — ONDANSETRON 4 MG: 2 INJECTION INTRAMUSCULAR; INTRAVENOUS at 14:48

## 2022-12-06 NOTE — H&P
[facility] HISTORY AND PHYSICAL  Date: 2022   Patient Name: Derek Keller  : 1960  MRN: [id number]  Primary Care Physician:  Haile Monique MD  Date of admission: 2022    Subjective   Subjective     Chief Complaint: Abdominal    HPI:    Derek Keller is a 62 y.o. female past medical history of metastatic breast cancer that presents to the emergency department earlier today for evaluation of sudden onset abdominal pain.  She described as sharp, left lower quadrant rating to the right lower quadrant, constant, no known aggravating alleviating factors.  She had associated nausea but no vomiting.  She denies any fevers, chills, sweats, chest pain or shortness of breath, palpitations, diarrhea constipation, dysuria, weakness, rash.  In the emergency department patient underwent CT scan which showed enteritis versus possible bowel ischemia and some intraperitoneal free air.  Surgery was called and patient was taken to the operating room.  She underwent sigmoid colon resection and colostomy formation.  She has been placed on Zosyn and admitted for ongoing monitoring and management.      Personal History     Past Medical History:  Past Medical History:   Diagnosis Date   • Abdominal pain    • Allergic rhinitis    • Anemia    • Anxiety    • Arteriosclerosis     Coronary   • Arthritis    • Bone metastases (HCC) 10/14/2022   • Bowen's disease    • Breast cancer (HCC)    • Cancer related pain 10/13/2022   • Chest pain    • Chronic pain disorder    • Cough    • Depression    • Dysphoric mood    • Fatigue    • Frequent urination    • History of colon polyps    • History of IBS    • History of kidney stones    • Hyperlipidemia    • Hypertension    • Hypothyroidism    • Insomnia    • Lumbago    • Malaise and fatigue    • Mood disorder (HCC)    • Neck pain    • Palpitations    • Peripheral edema 2022   • Precordial pain    • Sleep disturbance    • SOB (shortness of breath)          Past  Surgical History:  Past Surgical History:   Procedure Laterality Date   • BREAST BIOPSY     • CARDIAC CATHETERIZATION Left 1960   • CARDIAC CATHETERIZATION N/A 04/06/2018    Procedure: Coronary angiography;  Surgeon: Art Licea MD;  Location:  [facility];  Service: Cardiovascular   • CARDIAC CATHETERIZATION N/A 04/06/2018    Procedure: Left heart cath;  Surgeon: Art Licea MD;  Location:  [facility];  Service: Cardiovascular   • CARDIAC CATHETERIZATION N/A 04/06/2018    Procedure: Left ventriculography;  Surgeon: Art Licea MD;  Location:  [facility];  Service: Cardiovascular   • CHOLECYSTECTOMY     • COLONOSCOPY  11/08/2006   • GANGLION CYST EXCISION     • HYSTERECTOMY  05/2005   • LAPAROSCOPIC GASTRIC BANDING     • TONSILLECTOMY           Family History:   Family History   Problem Relation Age of Onset   • Hypertension Mother    • Rheum arthritis Mother    • Heart disease Mother    • Breast cancer Mother    • Diabetes Father    • Cancer Maternal Grandmother         colon   • Colon cancer Maternal Grandmother    • Aneurysm Paternal Grandfather    • Diabetes Other    • Fibromyalgia Other          Social History:   Social History     Tobacco Use   • Smoking status: Every Day     Packs/day: 1.00     Years: 40.00     Pack years: 40.00     Types: Cigarettes     Start date: 1974   • Smokeless tobacco: Never   • Tobacco comments:     caffeine use 3 [place] daily   Vaping Use   • Vaping Use: Never used   Substance Use Topics   • Alcohol use: No   • Drug use: Never     Comment: Prescribed Lorazepam         Home Medications:  HYDROcodone-acetaminophen, LORazepam, Morphine, SUMAtriptan, atorvastatin, baclofen, donepezil, gabapentin, hydroCHLOROthiazide, letrozole, levothyroxine, losartan, montelukast, naproxen, ondansetron, rOPINIRole, ribociclib succinate, solifenacin, traZODone, and venlafaxine XR    Allergies:  Allergies   Allergen  Reactions   • Azithromycin Itching       Review of Systems   All systems were reviewed and negative except for: Abdominal pain, nausea    Objective   Objective     Vitals:   Temp:  [98.1 °F (36.7 °C)] 98.1 °F (36.7 °C)  Heart Rate:  [70] 70  Resp:  [16] 16  BP: (125)/(52) 125/52    Physical Exam    Constitutional: Awake, alert, no acute distress   Eyes: Pupils equal, sclerae anicteric, no conjunctival injection   HENT: NCAT, mucous membranes moist   Neck: Supple, no thyromegaly, no lymphadenopathy, trachea midline   Respiratory: Clear to auscultation bilaterally, nonlabored respirations    Cardiovascular: RRR, no murmurs, rubs, or gallops, palpable pedal pulses bilaterally   Gastrointestinal: Soft, tender to palpation diffusely   Musculoskeletal: No bilateral ankle edema, no clubbing or cyanosis to extremities   Psychiatric: Appropriate affect, cooperative   Neurologic: Oriented x 3, strength symmetric in all extremities, Cranial Nerves grossly intact to confrontation, speech clear   Skin: No rashes     Result Review    Result Review:  I have personally reviewed the results from the time of this admission to 12/6/2022 17:25 EST and agree with these findings:  [x]  Laboratory  [x]  Microbiology  [x]  Radiology  []  EKG/Telemetry   []  Cardiology/Vascular   []  Pathology  []  Old records  []  Other:      Assessment & Plan   Assessment / Plan     Assessment/Plan:   Peritonitis/stercoral perforation: Status post exploratory laparotomy with sigmoid colon resection and colostomy formation: Appreciate surgery recommendations moving forward.  NG tube in place to suction.  Supportive care and pain control.  Close monitoring.  Continue antibiotics with Zosyn.  Follow cultures.  Metastatic breast cancer: Supportive care, outpatient follow-up.  Will resume home medications as appropriate  Leukopenia: Suspect secondary to chemotherapy.  We will monitor serial labs.  If continues to decrease would consider hematology  consultation.  Hypertension: We will add back home medications as appropriate  Hypothyroidism: Add back home medications when taking p.o.      DVT prophylaxis:  Mechanical DVT prophylaxis orders are present.    CODE STATUS:    Code Status (Patient has no pulse and is not breathing): CPR (Attempt to Resuscitate)  Medical Interventions (Patient has pulse or is breathing): Full Support      Admission Status:  I believe this patient meets inpatient status.    Electronically signed by Danny Reina Jr, MD, 12/06/22, 5:25 PM EST.

## 2022-12-06 NOTE — ANESTHESIA PREPROCEDURE EVALUATION
Anesthesia Evaluation     NPO Solid Status: > 8 hours  NPO Liquid Status: > 2 hours           Airway   Mallampati: II  TM distance: >3 FB  Neck ROM: full  No difficulty expected  Dental    (+) lower dentures and upper dentures    Pulmonary     breath sounds clear to auscultation  (+) asthma,shortness of breath,   Cardiovascular     Rhythm: regular  Rate: normal    (+) hypertension, hyperlipidemia,       Neuro/Psych  (+) psychiatric history Anxiety and Depression,    GI/Hepatic/Renal/Endo    (+)   renal disease stones, thyroid problem hypothyroidism    Musculoskeletal     (+) back pain, chronic pain, neck pain,   Abdominal    Substance History      OB/GYN          Other   arthritis,    history of cancer active                    Anesthesia Plan    ASA 4 - emergent     general   Rapid sequence  intravenous induction     Anesthetic plan, risks, benefits, and alternatives have been provided, discussed and informed consent has been obtained with: patient.    Use of blood products discussed with patient  Consented to blood products.   Plan discussed with CRNA and attending.        CODE STATUS:    Code Status (Patient has no pulse and is not breathing): CPR (Attempt to Resuscitate)  Medical Interventions (Patient has pulse or is breathing): Full Support

## 2022-12-06 NOTE — CONSULTS
General Surgery/Colorectal Surgery Note    Patient Name:  Derek Keller  YOB: 1960  [id number]    Referring Provider: No ref. provider found      Patient Care Team:  Haile Monique MD as PCP - General (Family Medicine)  Julien Cardona DO as Consulting Physician (Pain Medicine)  Joel Castillo MD as Consulting Physician (Gastroenterology)  Remington Russell MD as Surgeon (General Surgery)  Ahsan Salas MD as Consulting Physician (Sports Medicine)  Donald Barker MD as Consulting Physician (Hematology and Oncology)  Sandy Ricci MD as Consulting Physician (General Surgery)    Chief complaint abdominal pain    Subjective .     History of present illness:    History of metastatic breast cancer to her spine undergoing chemotherapy by Dr. Porras with letrozole plus ribociclib along with Xgeva.  Also recent radiation therapy by Dr. Grande to her spine.  Previous laparoscopic gastric band, total abdominal hysterectomy, laparoscopic cholecystectomy.  History of peptic ulcer disease.  No blood thinner use.  Started with abdominal pain this morning throughout her abdomen.  No history of the same.  Pain progressively worsened and she came to emergency department for further evaluation.  Pain worse with movement.  No alleviating factors.  No nausea vomiting.  No fever.  Work-up in emergency department with WBC 1.1, hemoglobin 12, platelets 160, 8% bands, neutrophils 68% CT abdomen pelvis with wall thickening enhancement of distal small bowel in the right lower quadrant with small amount of interloop fluid, small amount of free fluid, nonspecific enteritis, possible bowel ischemia, few foci of free intraperitoneal air adjacent to distal small bowel loops.  Pain is failed to improve since arrival.      History:  Past Medical History:   Diagnosis Date   • Abdominal pain    • Allergic rhinitis    • Anemia    • Anxiety    • Arteriosclerosis     Coronary   • Arthritis    • Bone metastases (HCC)  10/14/2022   • Bowen's disease    • Breast cancer (HCC)    • Cancer related pain 10/13/2022   • Chest pain    • Chronic pain disorder    • Cough    • Depression    • Dysphoric mood    • Fatigue    • Frequent urination    • History of colon polyps    • History of IBS    • History of kidney stones    • Hyperlipidemia    • Hypertension    • Hypothyroidism    • Insomnia    • Lumbago    • Malaise and fatigue    • Mood disorder (HCC)    • Neck pain    • Palpitations    • Peripheral edema 11/21/2022   • Precordial pain    • Sleep disturbance    • SOB (shortness of breath)        Past Surgical History:   Procedure Laterality Date   • BREAST BIOPSY     • CARDIAC CATHETERIZATION Left 1960   • CARDIAC CATHETERIZATION N/A 04/06/2018    Procedure: Coronary angiography;  Surgeon: Art Licea MD;  Location:  [facility];  Service: Cardiovascular   • CARDIAC CATHETERIZATION N/A 04/06/2018    Procedure: Left heart cath;  Surgeon: Art Licea MD;  Location:  [facility];  Service: Cardiovascular   • CARDIAC CATHETERIZATION N/A 04/06/2018    Procedure: Left ventriculography;  Surgeon: Art Licea MD;  Location:  [facility];  Service: Cardiovascular   • CHOLECYSTECTOMY     • COLONOSCOPY  11/08/2006   • GANGLION CYST EXCISION     • HYSTERECTOMY  05/2005   • LAPAROSCOPIC GASTRIC BANDING     • TONSILLECTOMY         Family History   Problem Relation Age of Onset   • Hypertension Mother    • Rheum arthritis Mother    • Heart disease Mother    • Breast cancer Mother    • Diabetes Father    • Cancer Maternal Grandmother         colon   • Colon cancer Maternal Grandmother    • Aneurysm Paternal Grandfather    • Diabetes Other    • Fibromyalgia Other        Social History     Tobacco Use   • Smoking status: Every Day     Packs/day: 1.00     Years: 40.00     Pack years: 40.00     Types: Cigarettes     Start date: 1974   • Smokeless tobacco: Never   • Tobacco  comments:     caffeine use 3 mt dews daily   Vaping Use   • Vaping Use: Never used   Substance Use Topics   • Alcohol use: No   • Drug use: Never     Comment: Prescribed Lorazepam       Review of Systems  All systems were reviewed and negative except for:   Review of Systems   Constitutional: Negative for chills, fever and unexpected weight loss.   HENT: Negative for congestion, nosebleeds and voice change.    Eyes: Negative for blurred vision, double vision and discharge.   Respiratory: Negative for apnea, chest tightness and shortness of breath.    Cardiovascular: Negative for chest pain and leg swelling.   Gastrointestinal:        See HPI   Endocrine: Negative for cold intolerance and heat intolerance.   Genitourinary: Negative for dysuria, hematuria and urgency.   Musculoskeletal: Negative for back pain, joint swelling and neck pain.   Skin: Negative for color change and dry skin.   Neurological: Negative for dizziness and confusion.   Hematological: Negative for adenopathy.   Psychiatric/Behavioral: Negative for agitation and behavioral problems.     MEDS:  Prior to Admission medications    Medication Sig Start Date End Date Taking? Authorizing Provider   HYDROcodone-acetaminophen (NORCO) 5-325 MG per tablet TAKE 1 TABLET BY MOUTH EVERY 6 HOURS AS NEEDED FOR PAIN 11/28/22   Donald Barker MD   atorvastatin (LIPITOR) 20 MG tablet TAKE 1 TABLET BY MOUTH EVERY DAY 10/1/19   Yudelka Manning MD   baclofen (LIORESAL) 20 MG tablet TAKE 1 TABLET BY MOUTH THREE TIMES DAILY 12/6/19   Yudelka Manning MD   donepezil (ARICEPT) 10 MG tablet Take 10 mg by mouth Daily. 10/28/22   ProviderBessie MD   gabapentin (NEURONTIN) 600 MG tablet TAKE 1 TABLET BY MOUTH AT BEDTIME 7/30/20   Yudelka Manning MD   hydroCHLOROthiazide (HYDRODIURIL) 12.5 MG tablet Take 1 tablet by mouth Daily for 30 days. As needed for swelling 11/21/22 12/21/22  Donald Barker MD   letrozole (FEMARA) 2.5 MG tablet Take 1 tablet by  mouth Daily. 11/30/22   Donald Barker MD   levothyroxine (SYNTHROID, LEVOTHROID) 50 MCG tablet TAKE 1 TABLET BY MOUTH EVERY DAY 7/19/19   Yudelka Manning MD   LORazepam (ATIVAN) 0.5 MG tablet Take 0.5 mg by mouth Every 6 (Six) Hours As Needed.    Bessie Lopez MD   losartan (COZAAR) 100 MG tablet losartan 100 mg oral tablet take 1 tablet (100 mg) by oral route once daily   Active    Bessie Lopez MD   montelukast (SINGULAIR) 10 MG tablet Take 10 mg by mouth Daily. 1/17/22   Bessie Lopez MD   Morphine (MSIR) 15 MG tablet Take 1 tablet by mouth Every 4 (Four) Hours As Needed for Severe Pain. 12/2/22   Raudel Grande MD   naproxen (NAPROSYN) 500 MG tablet Take 500 mg by mouth 2 (Two) Times a Day With Meals.    Bessie Lopez MD   ondansetron (ZOFRAN) 8 MG tablet Take 1 tablet by mouth 3 (Three) Times a Day As Needed for Nausea or Vomiting. 10/13/22   Donald Barker MD   ribociclib succinate 200 MG tablet therapy pack tablet Take 3 tablets by mouth Take As Directed. Take 3 tablets by mouth daily for 21 days then off 7 days on a 28 day cycle. 10/19/22   Donald Barker MD   rOPINIRole (REQUIP) 3 MG tablet Take 3 mg by mouth Every Evening. 6/29/22   Bessie Lopez MD   solifenacin (VESICARE) 10 MG tablet Take 1 tablet by mouth Daily for 360 days. 10/25/22 10/20/23  Destinee Myers MD   SUMAtriptan (IMITREX) 100 MG tablet Take 100 mg by mouth 1 (One) Time As Needed. 10/7/22   Bessie Lopez MD   traZODone (DESYREL) 150 MG tablet TAKE 1 TABLET BY MOUTH ONCE nightly 11/12/19   Yudelka Manning MD   venlafaxine XR (EFFEXOR-XR) 150 MG 24 hr capsule Take 1 capsule by mouth Daily. 11/21/22   Donald Barker MD        HYDROmorphone, 1 mg, Intravenous, Once  piperacillin-tazobactam, 3.375 g, Intravenous, Once               Allergies:  Azithromycin    Objective     Vital Signs   Temp:  [98.1 °F (36.7 °C)] 98.1 °F (36.7 °C)  Heart Rate:  [70] 70  Resp:  [16]  16  BP: (125)/(52) 125/52    Physical Exam:     General Appearance:    Alert, cooperative, in no acute distress   Head:    Normocephalic, without obvious abnormality, atraumatic   Eyes:          Conjunctivae and sclerae normal, no icterus,     Ears:    Ears appear intact with no abnormalities noted   Throat:   No oral lesions, no thrush, oral mucosa moist   Neck:   No adenopathy, supple, trachea midline, no thyromegaly   Back:     No kyphosis present, no scoliosis present, no skin lesions,      erythema or scars, no tenderness to percussion or                   palpation,   range of motion normal   Lungs:     Clear to auscultation,respirations regular, even and                  unlabored    Heart:    Regular rhythm and normal rate, normal S1 and S2, no            murmur, no gallop, no rub, no click   Chest Wall:    No abnormalities observed   Abdomen:     Normal bowel sounds, no masses, no organomegaly, soft        mild distention with severe generalized tenderness to palpation with rebound and guarding consistent with peritonitis   Rectal:        Extremities:   Moves all extremities well, no edema, no cyanosis, no             redness   Pulses:   Pulses palpable and equal bilaterally   Skin:   No bleeding, bruising or rash   Lymph nodes:   No palpable adenopathy   Neurologic:   A/o x 4 with no deficits       Results Review: I have reviewed the patient's labs and imaging    LABS/IMAGING:    Imaging Results (Last 72 Hours)     Procedure Component Value Units Date/Time    CT Abdomen Pelvis With Contrast [[id number]] Collected: 12/06/22 1613     Updated: 12/06/22 1616    Narrative:      PROCEDURE: CT ABDOMEN PELVIS W CONTRAST     COMPARISON: [facility], CT, CT ABDOMEN PELVIS W WO CONTRAST, 8/05/2022,   11:48.     INDICATIONS: abdominal pain, metastatic breast cancer     TECHNIQUE: After obtaining the patient's consent, CT images were created with non-ionic intravenous   contrast material.        PROTOCOL:   Standard imaging protocol performed      RADIATION:   DLP: 979.8mGy*cm    Automated exposure control was utilized to minimize radiation dose.   CONTRAST: 100cc Isovue 370 I.V.  LABS:   eGFR: 93ml/min/1.73m2     FINDINGS:   Mostly linear opacities in the dependent lower lobes are likely due to subsegmental atelectasis.    Gallbladder is surgically absent.  Intrahepatic/extrahepatic biliary ductal dilatation is mildly   increased compared to 8/5/2022 CT.  Extrahepatic common bile duct measures up to 1.5 cm, previously   1.2 cm.  No definite cause of biliary obstruction is visualized on CT.  There is mild dilatation of   the main pancreatic duct.  Bilateral renal cysts are stable.  Adrenal glands and spleen appear   unremarkable.     No abnormal bowel distention is seen, but there is mild wall thickening of distal small bowel in   the right lower quadrant with small amount of interloop fluid noted.  There is a small amount of   free pelvic fluid, as well as fluid in the right paracolic gutter and perihepatic fluid.  There are   few foci of free intraperitoneal air (anterior to liver on axial image 18 and 20, anterior abdomen   on axial image 46), as well as foci of extraluminal air adjacent to distal small bowel loops in the   pelvis.  No pneumatosis or mesenteric/portal venous gas is seen.  Appendix is nondilated, and there   is no significant periappendiceal inflammation.  There is stool throughout the colon.     No urinary bladder wall thickening is seen.  No adenopathy is identified in the abdomen or pelvis.    There are atherosclerotic vascular calcifications.  Numerous sclerotic lesions are redemonstrated,   consistent with osseous metastatic disease.  No pathologic fractures are seen.       Impression:         1. Wall thickening/enhancement of distal small bowel in the right lower quadrant with a small   amount of interloop fluid, as well as a small amount of free pelvic fluid, right paracolic  gutter   fluid, and perihepatic fluid, concerning for nonspecific enteritis or possible bowel ischemia.  2. Few foci of free intraperitoneal air, which may be from perforation of abnormal distal small   bowel, given the location of several foci of extraluminal air adjacent to distal small bowel loops.    Recommend surgical consultation.  3. Mildly increased intrahepatic/extrahepatic biliary ductal dilatation without obvious cause of   obstruction.  4. Diffuse osseous metastatic disease.        This report was communicated by telephone to Dr. Lozada at the dictation time shown below.        STEPHANIE STORY MD         Electronically Signed and Approved By: STEPHANIE STORY MD on 12/06/2022 at 16:13                            Lab Results (last 72 hours)     Procedure Component Value Units Date/Time    Manual Differential [[id number]]  (Abnormal) Collected: 12/06/22 1448    Specimen: Blood Updated: 12/06/22 1539     Neutrophil % 68.0 %      Lymphocyte % 21.0 %      Monocyte % 1.0 %      Basophil % 2.0 %      Bands %  8.0 %      Neutrophils Absolute 0.85 10*3/mm3      Lymphocytes Absolute 0.24 10*3/mm3      Monocytes Absolute 0.01 10*3/mm3      Basophils Absolute 0.02 10*3/mm3      Hypochromia Slight/1+     WBC Morphology Normal     Platelet Estimate Decreased    CBC & Differential [[id number]]  (Abnormal) Collected: 12/06/22 1448    Specimen: Blood Updated: 12/06/22 1539    Narrative:      The following orders were created for panel order CBC & Differential.  Procedure                               Abnormality         Status                     ---------                               -----------         ------                     CBC Auto Differential[[id number]]        Abnormal            Final result               Scan Slide[[id number]]                                       Final result                 Please view results for these tests on the individual orders.    CBC Auto Differential [[id number]]  (Abnormal)  Collected: 12/06/22 1448    Specimen: Blood Updated: 12/06/22 1539     WBC 1.12 10*3/mm3      RBC 3.88 10*6/mm3      Hemoglobin 12.2 g/dL      Hematocrit 37.8 %      MCV 97.4 fL      MCH 31.4 pg      MCHC 32.3 g/dL      RDW 17.3 %      RDW-SD 61.1 fl      MPV 9.9 fL      Platelets 160 10*3/mm3     Narrative:      Modified report. Previous result was Hemogram on 12/6/2022 at 1518 EST.  The previously reported component NRBC is no longer being reported. Previous result was 0.0 /100 WBC (Reference Range: 0.0-0.2 /100 WBC) on 12/6/2022 at 1518 EST.    Scan Slide [[id number]] Collected: 12/06/22 1448    Specimen: Blood Updated: 12/06/22 1539     Scan Slide --     Comment: See Manual Differential Results       [place] Draw [[id number]] Collected: 12/06/22 1313    Specimen: Blood Updated: 12/06/22 1508    Narrative:      The following orders were created for panel order [place] Draw.  Procedure                               Abnormality         Status                     ---------                               -----------         ------                     Green Top (Gel)[[id number]]                                  Final result               Lavender Top[[id number]]                                     Final result               Gold Top - SST[[id number]]                                   Final result               Light Blue Top[[id number]]                                   Final result                 Please view results for these tests on the individual orders.    Light Blue Top [[id number]] Collected: 12/06/22 1448    Specimen: Blood Updated: 12/06/22 1508     Extra Tube Hold for add-ons.     Comment: Auto resulted       Urinalysis With Microscopic If Indicated (No Culture) - Urine, Clean Catch [[id number]]  (Abnormal) Collected: 12/06/22 1330    Specimen: Urine, Clean Catch Updated: 12/06/22 1348     Color, UA Yellow     Appearance, UA Cloudy     pH, UA 5.5     Specific Gravity, UA 1.020     Glucose, UA Negative     Ketones,  UA Negative     Bilirubin, UA Negative     Blood, UA Small (1+)     Protein, UA Trace     Leuk Esterase, UA Negative     Nitrite, UA Negative     Urobilinogen, UA 1.0 E.U./dL    Urinalysis, Microscopic Only - Urine, Clean Catch [[id number]]  (Abnormal) Collected: 12/06/22 1330    Specimen: Urine, Clean Catch Updated: 12/06/22 1348     RBC, UA 6-12 /HPF      WBC, UA 3-5 /HPF      Bacteria, UA 1+ /HPF      Squamous Epithelial Cells, UA 3-6 /HPF      Hyaline Casts, UA 7-12 /LPF      Methodology Automated Microscopy    Comprehensive Metabolic Panel [[id number]]  (Abnormal) Collected: 12/06/22 1313    Specimen: Blood Updated: 12/06/22 1340     Glucose 127 mg/dL      BUN 17 mg/dL      Creatinine 0.66 mg/dL      Sodium 137 mmol/L      Potassium 4.8 mmol/L      Comment: Slight hemolysis detected by analyzer. Results may be affected.        Chloride 104 mmol/L      CO2 24.8 mmol/L      Calcium 8.7 mg/dL      Total Protein 6.7 g/dL      Albumin 3.90 g/dL      ALT (SGPT) 36 U/L      AST (SGOT) 29 U/L      Alkaline Phosphatase 96 U/L      Total Bilirubin 0.4 mg/dL      Globulin 2.8 gm/dL      A/G Ratio 1.4 g/dL      BUN/Creatinine Ratio 25.8     Anion Gap 8.2 mmol/L      eGFR 98.7 mL/min/1.73      Comment: National Kidney Foundation and American Society of Nephrology (ASN) Task Force recommended calculation based on the Chronic Kidney Disease Epidemiology Collaboration (CKD-EPI) equation refit without adjustment for race.       Narrative:      GFR Normal >60  Chronic Kidney Disease <60  Kidney Failure <15      Lipase [[id number]]  (Normal) Collected: 12/06/22 1313    Specimen: Blood Updated: 12/06/22 1340     Lipase 32 U/L     Lactic Acid, Plasma [[id number]]  (Normal) Collected: 12/06/22 1313    Specimen: Blood Updated: 12/06/22 1336     Lactate 1.0 mmol/L     Lavender Top [[id number]] Collected: 12/06/22 1313    Specimen: Blood Updated: 12/06/22 1324     Extra Tube hold for add-on     Comment: Auto resulted       Gold Top - SST  [[id number]] Collected: 12/06/22 1313    Specimen: Blood Updated: 12/06/22 1324     Extra Tube Hold for add-ons.     Comment: Auto resulted.       Green Top (Gel) [[id number]] Collected: 12/06/22 1313    Specimen: Blood Updated: 12/06/22 1324     Extra Tube Hold for add-ons.     Comment: Auto resulted.                Result Review :     Assessment & Plan     * No active hospital problems. *     Peritonitis  Leukopenia with history of current chemotherapy  History of metastatic breast cancer undergoing radiation treatments to spine    I have reviewed the patient's work-up with results mentioned above.  With her peritonitis on exam along with pain, I recommended exploratory laparotomy with any indicated procedures, possible ileostomy, colostomy.  I explained the procedure and recovery.  Benefits and alternatives discussed.  Risk procedure including risk of anesthesia, bleeding, infection, worsening of her condition, need for more surgery, anastomotic leak, heart attack, stroke, blood clot, pneumonia discussed.  Discussed case with Dr. Barker as well.  All questions answered.  She agrees with the plan.  Zosyn.  Admission to hospitalist.  NPO.          Alfred Castañeda MD  12/06/22 16:36 EST

## 2022-12-06 NOTE — ED PROVIDER NOTES
Time: 1:39 PM EST    Chief Complaint: abdominal pain  History provided by: pt  History is limited by: N/A     History of Present Illness:  Patient is a 62 y.o. year old female who presents to the emergency department with abdominal pain.    Pt has been having severe, predominantly Right-sided, abdominal pain that radiates to her chest since this morning. She reports the pain feels like cramping and feels sharp. She notes nausea without emesis. She denies any dysuria. She reports she had a loose BM this morning.      Pt has metastatic disease to her bone, spine, lymphnodes, and breast. She note having treatments of radiation recently. She notes she was diagnosed with breast cancer on October 1st.       History provided by:  Patient   used: No        Similar Symptoms Previously: No  Recently seen: No      Patient Care Team  Primary Care Provider: Haile Monique MD    Past Medical History:     Allergies   Allergen Reactions   • Azithromycin Itching     Past Medical History:   Diagnosis Date   • Abdominal pain    • Allergic rhinitis    • Anemia    • Anxiety    • Arteriosclerosis     Coronary   • Arthritis    • Bone metastases (HCC) 10/14/2022   • Bowen's disease    • Breast cancer (HCC)    • Cancer related pain 10/13/2022   • Chest pain    • Chronic pain disorder    • Cough    • Depression    • Dysphoric mood    • Fatigue    • Frequent urination    • History of colon polyps    • History of IBS    • History of kidney stones    • Hyperlipidemia    • Hypertension    • Hypothyroidism    • Insomnia    • Lumbago    • Malaise and fatigue    • Mood disorder (HCC)    • Neck pain    • Palpitations    • Peripheral edema 11/21/2022   • Precordial pain    • Sleep disturbance    • SOB (shortness of breath)      Past Surgical History:   Procedure Laterality Date   • BREAST BIOPSY     • CARDIAC CATHETERIZATION Left 1959   • CARDIAC CATHETERIZATION N/A 04/06/2018    Procedure: Coronary angiography;  Surgeon:  Art Licea MD;  Location:  [facility];  Service: Cardiovascular   • CARDIAC CATHETERIZATION N/A 04/06/2018    Procedure: Left heart cath;  Surgeon: Art Licea MD;  Location:  [facility];  Service: Cardiovascular   • CARDIAC CATHETERIZATION N/A 04/06/2018    Procedure: Left ventriculography;  Surgeon: Art Licea MD;  Location:  [facility];  Service: Cardiovascular   • CHOLECYSTECTOMY     • COLONOSCOPY  11/08/2006   • GANGLION CYST EXCISION     • HYSTERECTOMY  05/2005   • LAPAROSCOPIC GASTRIC BANDING     • TONSILLECTOMY       Family History   Problem Relation Age of Onset   • Hypertension Mother    • Rheum arthritis Mother    • Heart disease Mother    • Breast cancer Mother    • Diabetes Father    • Cancer Maternal Grandmother         colon   • Colon cancer Maternal Grandmother    • Aneurysm Paternal Grandfather    • Diabetes Other    • Fibromyalgia Other        Home Medications:  Prior to Admission medications    Medication Sig Start Date End Date Taking? Authorizing Provider   HYDROcodone-acetaminophen (NORCO) 5-325 MG per tablet TAKE 1 TABLET BY MOUTH EVERY 6 HOURS AS NEEDED FOR PAIN 11/28/22   Donald Barker MD   atorvastatin (LIPITOR) 20 MG tablet TAKE 1 TABLET BY MOUTH EVERY DAY 10/1/19   Yudelka Manning MD   baclofen (LIORESAL) 20 MG tablet TAKE 1 TABLET BY MOUTH THREE TIMES DAILY 12/6/19   Yudelka Manning MD   donepezil (ARICEPT) 10 MG tablet TAKE 1 TABLET BY MOUTH 1 time DAILY FOR MEMORY 10/28/22   Provider, MD Bessie   gabapentin (NEURONTIN) 600 MG tablet TAKE 1 TABLET BY MOUTH AT BEDTIME 7/30/20   Yudelka Manning MD   hydroCHLOROthiazide (HYDRODIURIL) 12.5 MG tablet Take 1 tablet by mouth Daily for 30 days. As needed for swelling 11/21/22 12/21/22  Donald Barker MD   letrozole (FEMARA) 2.5 MG tablet Take 1 tablet by mouth Daily. 11/30/22   Donald Barker MD   levothyroxine (SYNTHROID, LEVOTHROID) 50  MCG tablet TAKE 1 TABLET BY MOUTH EVERY DAY 7/19/19   Yudelka Manning MD   LORazepam (ATIVAN) 0.5 MG tablet lorazepam 0.5 mg tablet   TAKE 1 TABLET BY MOUTH FOUR TIMES DAILY AS NEEDED FOR ANXIETY    Bessie Lopez MD   losartan (COZAAR) 100 MG tablet losartan 100 mg oral tablet take 1 tablet (100 mg) by oral route once daily   Active    Bessie Lopez MD   montelukast (SINGULAIR) 10 MG tablet Take 10 mg by mouth Daily. 1/17/22   Bessie Lopez MD   Morphine (MSIR) 15 MG tablet Take 1 tablet by mouth Every 4 (Four) Hours As Needed for Severe Pain. 12/2/22   Raudel Grande MD   naproxen (NAPROSYN) 500 MG tablet naproxen 500 mg tablet   TAKE 1 TABLET BY MOUTH TWICE DAILY    Bessie Lopez MD   ondansetron (ZOFRAN) 8 MG tablet Take 1 tablet by mouth 3 (Three) Times a Day As Needed for Nausea or Vomiting. 10/13/22   Donald Barker MD   ribociclib succinate 200 MG tablet therapy pack tablet Take 3 tablets by mouth Take As Directed. Take 3 tablets by mouth daily for 21 days then off 7 days on a 28 day cycle. 10/19/22   Donald Barker MD   rOPINIRole (REQUIP) 3 MG tablet Take 3 mg by mouth Every Evening. 6/29/22   Bessie Lopez MD   solifenacin (VESICARE) 10 MG tablet Take 1 tablet by mouth Daily for 360 days. 10/25/22 10/20/23  Destinee Myers MD   SUMAtriptan (IMITREX) 100 MG tablet TAKE 1 TABLET BY MOUTH AT ONSET OF HEADACHE. IF NO RESPONSE IN 2 HOURS MAY REPEAT DOSE FOR 1 DOSE. MAX 2/24 HRS 10/7/22   Bessie Lopez MD   traZODone (DESYREL) 150 MG tablet TAKE 1 TABLET BY MOUTH ONCE nightly 11/12/19   Yudelka Manning MD   venlafaxine XR (EFFEXOR-XR) 150 MG 24 hr capsule Take 1 capsule by mouth Daily. 11/21/22   Donald Barker MD        Social History:   Social History     Tobacco Use   • Smoking status: Every Day     Packs/day: 1.00     Years: 40.00     Pack years: 40.00     Types: Cigarettes     Start date: 1974   • Smokeless tobacco: Never   • Tobacco  "comments:     caffeine use 3 mt dews daily   Vaping Use   • Vaping Use: Never used   Substance Use Topics   • Alcohol use: No   • Drug use: Never     Comment: Prescribed Lorazepam         Review of Systems:  Review of Systems   Constitutional: Negative for chills and fever.   HENT: Negative for sore throat.    Eyes: Negative for photophobia.   Respiratory: Negative for shortness of breath.    Cardiovascular: Negative for chest pain.   Gastrointestinal: Positive for abdominal pain, diarrhea and nausea. Negative for vomiting.   Genitourinary: Negative for dysuria.   Musculoskeletal: Negative for neck pain.   Skin: Negative for wound.   Neurological: Negative for headaches.   All other systems reviewed and are negative.       Physical Exam:  /52 (BP Location: Left arm, Patient Position: Lying)   Pulse 70   Temp 98.1 °F (36.7 °C) (Oral)   Resp 16   Ht 167.6 cm (66\")   Wt 84.2 kg (185 lb 10 oz)   LMP  (LMP Unknown)   SpO2 93%   BMI 29.96 kg/m²     Physical Exam  Vitals and nursing note reviewed.   Constitutional:       General: She is in acute distress.   HENT:      Head: Normocephalic and atraumatic.   Eyes:      Extraocular Movements: Extraocular movements intact.   Cardiovascular:      Rate and Rhythm: Normal rate and regular rhythm.   Pulmonary:      Effort: Pulmonary effort is normal. No respiratory distress.      Breath sounds: Normal breath sounds.   Abdominal:      General: Abdomen is flat. Bowel sounds are decreased.      Palpations: Abdomen is soft.      Tenderness: There is generalized abdominal tenderness and tenderness in the right upper quadrant and right lower quadrant.      Comments: Diffuse abdominal pain, more so on the Right abdominal area   Musculoskeletal:         General: Normal range of motion.      Cervical back: Normal range of motion and neck supple.   Skin:     General: Skin is warm and dry.      Capillary Refill: Capillary refill takes less than 2 seconds.   Neurological:      " Mental Status: She is alert and oriented to person, place, and time. Mental status is at baseline.                Medications in the Emergency Department:  Medications   sodium chloride 0.9 % flush 10 mL (has no administration in time range)   piperacillin-tazobactam (ZOSYN) 3.375 g in iso-osmotic dextrose 50 ml (premix) (3.375 g Intravenous New Bag 12/6/22 1636)   lactated ringers infusion (has no administration in time range)   Nerve Block solution 30.7 mL (has no administration in time range)   ondansetron (ZOFRAN) injection 4 mg (4 mg Intravenous Given 12/6/22 1448)   sodium chloride 0.9 % bolus 1,000 mL (0 mL Intravenous Stopped 12/6/22 1515)   HYDROmorphone (DILAUDID) injection 1 mg (1 mg Intravenous Given 12/6/22 1448)   iopamidol (ISOVUE-370) 76 % injection 100 mL (100 mL Intravenous Given 12/6/22 1526)   HYDROmorphone (DILAUDID) injection 1 mg (1 mg Intravenous Given 12/6/22 1636)        Labs  Lab Results (last 24 hours)     Procedure Component Value Units Date/Time    Comprehensive Metabolic Panel [[id number]]  (Abnormal) Collected: 12/06/22 1313    Specimen: Blood Updated: 12/06/22 1340     Glucose 127 mg/dL      BUN 17 mg/dL      Creatinine 0.66 mg/dL      Sodium 137 mmol/L      Potassium 4.8 mmol/L      Comment: Slight hemolysis detected by analyzer. Results may be affected.        Chloride 104 mmol/L      CO2 24.8 mmol/L      Calcium 8.7 mg/dL      Total Protein 6.7 g/dL      Albumin 3.90 g/dL      ALT (SGPT) 36 U/L      AST (SGOT) 29 U/L      Alkaline Phosphatase 96 U/L      Total Bilirubin 0.4 mg/dL      Globulin 2.8 gm/dL      A/G Ratio 1.4 g/dL      BUN/Creatinine Ratio 25.8     Anion Gap 8.2 mmol/L      eGFR 98.7 mL/min/1.73      Comment: National Kidney Foundation and American Society of Nephrology (ASN) Task Force recommended calculation based on the Chronic Kidney Disease Epidemiology Collaboration (CKD-EPI) equation refit without adjustment for race.       Narrative:      GFR Normal  >60  Chronic Kidney Disease <60  Kidney Failure <15      Lipase [[id number]]  (Normal) Collected: 12/06/22 1313    Specimen: Blood Updated: 12/06/22 1340     Lipase 32 U/L     Lactic Acid, Plasma [[id number]]  (Normal) Collected: 12/06/22 1313    Specimen: Blood Updated: 12/06/22 1336     Lactate 1.0 mmol/L     Urinalysis With Microscopic If Indicated (No Culture) - Urine, Clean Catch [[id number]]  (Abnormal) Collected: 12/06/22 1330    Specimen: Urine, Clean Catch Updated: 12/06/22 1348     Color, UA Yellow     Appearance, UA Cloudy     pH, UA 5.5     Specific Gravity, UA 1.020     Glucose, UA Negative     Ketones, UA Negative     Bilirubin, UA Negative     Blood, UA Small (1+)     Protein, UA Trace     Leuk Esterase, UA Negative     Nitrite, UA Negative     Urobilinogen, UA 1.0 E.U./dL    Urinalysis, Microscopic Only - Urine, Clean Catch [[id number]]  (Abnormal) Collected: 12/06/22 1330    Specimen: Urine, Clean Catch Updated: 12/06/22 1348     RBC, UA 6-12 /HPF      WBC, UA 3-5 /HPF      Bacteria, UA 1+ /HPF      Squamous Epithelial Cells, UA 3-6 /HPF      Hyaline Casts, UA 7-12 /LPF      Methodology Automated Microscopy    CBC & Differential [[id number]]  (Abnormal) Collected: 12/06/22 1448    Specimen: Blood Updated: 12/06/22 1539    Narrative:      The following orders were created for panel order CBC & Differential.  Procedure                               Abnormality         Status                     ---------                               -----------         ------                     CBC Auto Differential[[id number]]        Abnormal            Final result               Scan Slide[[id number]]                                       Final result                 Please view results for these tests on the individual orders.    CBC Auto Differential [[id number]]  (Abnormal) Collected: 12/06/22 1448    Specimen: Blood Updated: 12/06/22 1539     WBC 1.12 10*3/mm3      RBC 3.88 10*6/mm3      Hemoglobin 12.2 g/dL       Hematocrit 37.8 %      MCV 97.4 fL      MCH 31.4 pg      MCHC 32.3 g/dL      RDW 17.3 %      RDW-SD 61.1 fl      MPV 9.9 fL      Platelets 160 10*3/mm3     Narrative:      Modified report. Previous result was Hemogram on 12/6/2022 at 1518 EST.  The previously reported component NRBC is no longer being reported. Previous result was 0.0 /100 WBC (Reference Range: 0.0-0.2 /100 WBC) on 12/6/2022 at 1518 EST.    Scan Slide [[id number]] Collected: 12/06/22 1448    Specimen: Blood Updated: 12/06/22 1539     Scan Slide --     Comment: See Manual Differential Results       Manual Differential [[id number]]  (Abnormal) Collected: 12/06/22 1448    Specimen: Blood Updated: 12/06/22 1539     Neutrophil % 68.0 %      Lymphocyte % 21.0 %      Monocyte % 1.0 %      Basophil % 2.0 %      Bands %  8.0 %      Neutrophils Absolute 0.85 10*3/mm3      Lymphocytes Absolute 0.24 10*3/mm3      Monocytes Absolute 0.01 10*3/mm3      Basophils Absolute 0.02 10*3/mm3      Hypochromia Slight/1+     WBC Morphology Normal     Platelet Estimate Decreased           Imaging:  CT Abdomen Pelvis With Contrast    Result Date: 12/6/2022  PROCEDURE: CT ABDOMEN PELVIS W CONTRAST  COMPARISON: [facility], CT, CT ABDOMEN PELVIS W WO CONTRAST, 8/05/2022, 11:48.  INDICATIONS: abdominal pain, metastatic breast cancer  TECHNIQUE: After obtaining the patient's consent, CT images were created with non-ionic intravenous contrast material.   PROTOCOL:   Standard imaging protocol performed    RADIATION:   DLP: 979.8mGy*cm   Automated exposure control was utilized to minimize radiation dose. CONTRAST: 100cc Isovue 370 I.V. LABS:   eGFR: 93ml/min/1.73m2  FINDINGS:  Mostly linear opacities in the dependent lower lobes are likely due to subsegmental atelectasis.  Gallbladder is surgically absent.  Intrahepatic/extrahepatic biliary ductal dilatation is mildly increased compared to 8/5/2022 CT.  Extrahepatic common bile duct measures up to 1.5 cm,  previously 1.2 cm.  No definite cause of biliary obstruction is visualized on CT.  There is mild dilatation of the main pancreatic duct.  Bilateral renal cysts are stable.  Adrenal glands and spleen appear unremarkable.  No abnormal bowel distention is seen, but there is mild wall thickening of distal small bowel in the right lower quadrant with small amount of interloop fluid noted.  There is a small amount of free pelvic fluid, as well as fluid in the right paracolic gutter and perihepatic fluid.  There are few foci of free intraperitoneal air (anterior to liver on axial image 18 and 20, anterior abdomen on axial image 46), as well as foci of extraluminal air adjacent to distal small bowel loops in the pelvis.  No pneumatosis or mesenteric/portal venous gas is seen.  Appendix is nondilated, and there is no significant periappendiceal inflammation.  There is stool throughout the colon.  No urinary bladder wall thickening is seen.  No adenopathy is identified in the abdomen or pelvis.  There are atherosclerotic vascular calcifications.  Numerous sclerotic lesions are redemonstrated, consistent with osseous metastatic disease.  No pathologic fractures are seen.        1. Wall thickening/enhancement of distal small bowel in the right lower quadrant with a small amount of interloop fluid, as well as a small amount of free pelvic fluid, right paracolic gutter fluid, and perihepatic fluid, concerning for nonspecific enteritis or possible bowel ischemia. 2. Few foci of free intraperitoneal air, which may be from perforation of abnormal distal small bowel, given the location of several foci of extraluminal air adjacent to distal small bowel loops.  Recommend surgical consultation. 3. Mildly increased intrahepatic/extrahepatic biliary ductal dilatation without obvious cause of obstruction. 4. Diffuse osseous metastatic disease.   This report was communicated by telephone to Dr. Lozada at the dictation time shown below.     STEPHANIE STORY MD       Electronically Signed and Approved By: STEPHANIE STORY MD on 12/06/2022 at 16:13               Procedures:  Procedures    Progress                            Medical Decision Making:  MDM  Number of Diagnoses or Management Options  Diagnosis management comments: 16:12 EST Updated patient on results and plan for potential surgery. Patient expressed understanding and agreement. All questions answered at this time.      62-year-old female patient with metastatic breast cancer that is currently being treated with radiation presented with new onset abdominal pain that started this morning.  Patient on CT evaluation has evidence for a bowel perforation, likely a small bowel perforation per radiology.  Patient was seen by Dr. Castañeda, general surgery, in the emergency department and he will take her to the OR.  Patient received IV antibiotics and her pain was improved with pain medication.  Patient will be admitted to the hospital service.          Final diagnoses:   Peritonitis (HCC)        Disposition:  ED Disposition     ED Disposition   Decision to Admit    Condition   --    Comment   Level of Care: Progressive Care [20]   Diagnosis: Peritonitis (HCC) [987311]   Certification: I Certify That Inpatient Hospital Services Are Medically Necessary For Greater Than 2 Midnights               This medical record created using voice recognition software.      Documentation assistance provided by Jhonathan Kohler acting as scribe for Yeison Lozada DO. Information recorded by the scribe was done at my direction and has been verified and validated by me.        Jhonathan Kohler  12/06/22 1407       Jhonathan Kohler  12/06/22 1618       Yeison Lozada DO  12/06/22 1701

## 2022-12-06 NOTE — H&P (VIEW-ONLY)
General Surgery/Colorectal Surgery Note    Patient Name:  Derek Keller  YOB: 1960  [id number]    Referring Provider: No ref. provider found      Patient Care Team:  Haile Monique MD as PCP - General (Family Medicine)  Julien Cardona DO as Consulting Physician (Pain Medicine)  Joel Castillo MD as Consulting Physician (Gastroenterology)  Remington Russell MD as Surgeon (General Surgery)  hAsan Salas MD as Consulting Physician (Sports Medicine)  Donald Barker MD as Consulting Physician (Hematology and Oncology)  Sandy Ricci MD as Consulting Physician (General Surgery)    Chief complaint abdominal pain    Subjective .     History of present illness:    History of metastatic breast cancer to her spine undergoing chemotherapy by Dr. Porras with letrozole plus ribociclib along with Xgeva.  Also recent radiation therapy by Dr. Grande to her spine.  Previous laparoscopic gastric band, total abdominal hysterectomy, laparoscopic cholecystectomy.  History of peptic ulcer disease.  No blood thinner use.  Started with abdominal pain this morning throughout her abdomen.  No history of the same.  Pain progressively worsened and she came to emergency department for further evaluation.  Pain worse with movement.  No alleviating factors.  No nausea vomiting.  No fever.  Work-up in emergency department with WBC 1.1, hemoglobin 12, platelets 160, 8% bands, neutrophils 68% CT abdomen pelvis with wall thickening enhancement of distal small bowel in the right lower quadrant with small amount of interloop fluid, small amount of free fluid, nonspecific enteritis, possible bowel ischemia, few foci of free intraperitoneal air adjacent to distal small bowel loops.  Pain is failed to improve since arrival.      History:  Past Medical History:   Diagnosis Date   • Abdominal pain    • Allergic rhinitis    • Anemia    • Anxiety    • Arteriosclerosis     Coronary   • Arthritis    • Bone metastases (HCC)  10/14/2022   • Bowen's disease    • Breast cancer (HCC)    • Cancer related pain 10/13/2022   • Chest pain    • Chronic pain disorder    • Cough    • Depression    • Dysphoric mood    • Fatigue    • Frequent urination    • History of colon polyps    • History of IBS    • History of kidney stones    • Hyperlipidemia    • Hypertension    • Hypothyroidism    • Insomnia    • Lumbago    • Malaise and fatigue    • Mood disorder (HCC)    • Neck pain    • Palpitations    • Peripheral edema 11/21/2022   • Precordial pain    • Sleep disturbance    • SOB (shortness of breath)        Past Surgical History:   Procedure Laterality Date   • BREAST BIOPSY     • CARDIAC CATHETERIZATION Left 1960   • CARDIAC CATHETERIZATION N/A 04/06/2018    Procedure: Coronary angiography;  Surgeon: Art Licea MD;  Location:  [facility];  Service: Cardiovascular   • CARDIAC CATHETERIZATION N/A 04/06/2018    Procedure: Left heart cath;  Surgeon: Art Licea MD;  Location:  [facility];  Service: Cardiovascular   • CARDIAC CATHETERIZATION N/A 04/06/2018    Procedure: Left ventriculography;  Surgeon: Art Licea MD;  Location:  [facility];  Service: Cardiovascular   • CHOLECYSTECTOMY     • COLONOSCOPY  11/08/2006   • GANGLION CYST EXCISION     • HYSTERECTOMY  05/2005   • LAPAROSCOPIC GASTRIC BANDING     • TONSILLECTOMY         Family History   Problem Relation Age of Onset   • Hypertension Mother    • Rheum arthritis Mother    • Heart disease Mother    • Breast cancer Mother    • Diabetes Father    • Cancer Maternal Grandmother         colon   • Colon cancer Maternal Grandmother    • Aneurysm Paternal Grandfather    • Diabetes Other    • Fibromyalgia Other        Social History     Tobacco Use   • Smoking status: Every Day     Packs/day: 1.00     Years: 40.00     Pack years: 40.00     Types: Cigarettes     Start date: 1974   • Smokeless tobacco: Never   • Tobacco  comments:     caffeine use 3 [facility] [place] daily   Vaping Use   • Vaping Use: Never used   Substance Use Topics   • Alcohol use: No   • Drug use: Never     Comment: Prescribed Lorazepam       Review of Systems  All systems were reviewed and negative except for:   Review of Systems   Constitutional: Negative for chills, fever and unexpected weight loss.   HENT: Negative for congestion, nosebleeds and voice change.    Eyes: Negative for blurred vision, double vision and discharge.   Respiratory: Negative for apnea, chest tightness and shortness of breath.    Cardiovascular: Negative for chest pain and leg swelling.   Gastrointestinal:        See HPI   Endocrine: Negative for cold intolerance and heat intolerance.   Genitourinary: Negative for dysuria, hematuria and urgency.   Musculoskeletal: Negative for back pain, joint swelling and neck pain.   Skin: Negative for color change and dry skin.   Neurological: Negative for dizziness and confusion.   Hematological: Negative for adenopathy.   Psychiatric/Behavioral: Negative for agitation and behavioral problems.     MEDS:  Prior to Admission medications    Medication Sig Start Date End Date Taking? Authorizing Provider   HYDROcodone-acetaminophen (NORCO) 5-325 MG per tablet TAKE 1 TABLET BY MOUTH EVERY 6 HOURS AS NEEDED FOR PAIN 11/28/22   Donald Barker MD   atorvastatin (LIPITOR) 20 MG tablet TAKE 1 TABLET BY MOUTH EVERY DAY 10/1/19   Yudelka Manning MD   baclofen (LIORESAL) 20 MG tablet TAKE 1 TABLET BY MOUTH THREE TIMES DAILY 12/6/19   Yudelka Manning MD   donepezil (ARICEPT) 10 MG tablet Take 10 mg by mouth Daily. 10/28/22   ProviderBessie MD   gabapentin (NEURONTIN) 600 MG tablet TAKE 1 TABLET BY MOUTH AT BEDTIME 7/30/20   Yudelka Manning MD   hydroCHLOROthiazide (HYDRODIURIL) 12.5 MG tablet Take 1 tablet by mouth Daily for 30 days. As needed for swelling 11/21/22 12/21/22  Donald Barker MD   letrozole (FEMARA) 2.5 MG tablet Take 1 tablet by  mouth Daily. 11/30/22   Donald Barker MD   levothyroxine (SYNTHROID, LEVOTHROID) 50 MCG tablet TAKE 1 TABLET BY MOUTH EVERY DAY 7/19/19   Yudelka Manning MD   LORazepam (ATIVAN) 0.5 MG tablet Take 0.5 mg by mouth Every 6 (Six) Hours As Needed.    Bessie Lopez MD   losartan (COZAAR) 100 MG tablet losartan 100 mg oral tablet take 1 tablet (100 mg) by oral route once daily   Active    Bessie Lopez MD   montelukast (SINGULAIR) 10 MG tablet Take 10 mg by mouth Daily. 1/17/22   Bessie Lopez MD   Morphine (MSIR) 15 MG tablet Take 1 tablet by mouth Every 4 (Four) Hours As Needed for Severe Pain. 12/2/22   Raudel Grande MD   naproxen (NAPROSYN) 500 MG tablet Take 500 mg by mouth 2 (Two) Times a Day With Meals.    Bessie Lopez MD   ondansetron (ZOFRAN) 8 MG tablet Take 1 tablet by mouth 3 (Three) Times a Day As Needed for Nausea or Vomiting. 10/13/22   Donald Barker MD   ribociclib succinate 200 MG tablet therapy pack tablet Take 3 tablets by mouth Take As Directed. Take 3 tablets by mouth daily for 21 days then off 7 days on a 28 day cycle. 10/19/22   Donald Barker MD   rOPINIRole (REQUIP) 3 MG tablet Take 3 mg by mouth Every Evening. 6/29/22   Bessie Lopez MD   solifenacin (VESICARE) 10 MG tablet Take 1 tablet by mouth Daily for 360 days. 10/25/22 10/20/23  Destinee Myers MD   SUMAtriptan (IMITREX) 100 MG tablet Take 100 mg by mouth 1 (One) Time As Needed. 10/7/22   Bessie Lopez MD   traZODone (DESYREL) 150 MG tablet TAKE 1 TABLET BY MOUTH ONCE nightly 11/12/19   Yudelka Manning MD   venlafaxine XR (EFFEXOR-XR) 150 MG 24 hr capsule Take 1 capsule by mouth Daily. 11/21/22   Donald Barker MD        HYDROmorphone, 1 mg, Intravenous, Once  piperacillin-tazobactam, 3.375 g, Intravenous, Once               Allergies:  Azithromycin    Objective     Vital Signs   Temp:  [98.1 °F (36.7 °C)] 98.1 °F (36.7 °C)  Heart Rate:  [70] 70  Resp:  [16]  16  BP: (125)/(52) 125/52    Physical Exam:     General Appearance:    Alert, cooperative, in no acute distress   Head:    Normocephalic, without obvious abnormality, atraumatic   Eyes:          Conjunctivae and sclerae normal, no icterus,     Ears:    Ears appear intact with no abnormalities noted   Throat:   No oral lesions, no thrush, oral mucosa moist   Neck:   No adenopathy, supple, trachea midline, no thyromegaly   Back:     No kyphosis present, no scoliosis present, no skin lesions,      erythema or scars, no tenderness to percussion or                   palpation,   range of motion normal   Lungs:     Clear to auscultation,respirations regular, even and                  unlabored    Heart:    Regular rhythm and normal rate, normal S1 and S2, no            murmur, no gallop, no rub, no click   Chest Wall:    No abnormalities observed   Abdomen:     Normal bowel sounds, no masses, no organomegaly, soft        mild distention with severe generalized tenderness to palpation with rebound and guarding consistent with peritonitis   Rectal:        Extremities:   Moves all extremities well, no edema, no cyanosis, no             redness   Pulses:   Pulses palpable and equal bilaterally   Skin:   No bleeding, bruising or rash   Lymph nodes:   No palpable adenopathy   Neurologic:   A/o x 4 with no deficits       Results Review: I have reviewed the patient's labs and imaging    LABS/IMAGING:    Imaging Results (Last 72 Hours)     Procedure Component Value Units Date/Time    CT Abdomen Pelvis With Contrast [[id number]] Collected: 12/06/22 1613     Updated: 12/06/22 1616    Narrative:      PROCEDURE: CT ABDOMEN PELVIS W CONTRAST     COMPARISON: [facility], CT, CT ABDOMEN PELVIS W WO CONTRAST, 8/05/2022,   11:48.     INDICATIONS: abdominal pain, metastatic breast cancer     TECHNIQUE: After obtaining the patient's consent, CT images were created with non-ionic intravenous   contrast material.        PROTOCOL:   Standard imaging protocol performed      RADIATION:   DLP: 979.8mGy*cm    Automated exposure control was utilized to minimize radiation dose.   CONTRAST: 100cc Isovue 370 I.V.  LABS:   eGFR: 93ml/min/1.73m2     FINDINGS:   Mostly linear opacities in the dependent lower lobes are likely due to subsegmental atelectasis.    Gallbladder is surgically absent.  Intrahepatic/extrahepatic biliary ductal dilatation is mildly   increased compared to 8/5/2022 CT.  Extrahepatic common bile duct measures up to 1.5 cm, previously   1.2 cm.  No definite cause of biliary obstruction is visualized on CT.  There is mild dilatation of   the main pancreatic duct.  Bilateral renal cysts are stable.  Adrenal glands and spleen appear   unremarkable.     No abnormal bowel distention is seen, but there is mild wall thickening of distal small bowel in   the right lower quadrant with small amount of interloop fluid noted.  There is a small amount of   free pelvic fluid, as well as fluid in the right paracolic gutter and perihepatic fluid.  There are   few foci of free intraperitoneal air (anterior to liver on axial image 18 and 20, anterior abdomen   on axial image 46), as well as foci of extraluminal air adjacent to distal small bowel loops in the   pelvis.  No pneumatosis or mesenteric/portal venous gas is seen.  Appendix is nondilated, and there   is no significant periappendiceal inflammation.  There is stool throughout the colon.     No urinary bladder wall thickening is seen.  No adenopathy is identified in the abdomen or pelvis.    There are atherosclerotic vascular calcifications.  Numerous sclerotic lesions are redemonstrated,   consistent with osseous metastatic disease.  No pathologic fractures are seen.       Impression:         1. Wall thickening/enhancement of distal small bowel in the right lower quadrant with a small   amount of interloop fluid, as well as a small amount of free pelvic fluid, right paracolic  gutter   fluid, and perihepatic fluid, concerning for nonspecific enteritis or possible bowel ischemia.  2. Few foci of free intraperitoneal air, which may be from perforation of abnormal distal small   bowel, given the location of several foci of extraluminal air adjacent to distal small bowel loops.    Recommend surgical consultation.  3. Mildly increased intrahepatic/extrahepatic biliary ductal dilatation without obvious cause of   obstruction.  4. Diffuse osseous metastatic disease.        This report was communicated by telephone to Dr. Lozada at the dictation time shown below.        STEPHANIE STORY MD         Electronically Signed and Approved By: STEPHANIE STORY MD on 12/06/2022 at 16:13                            Lab Results (last 72 hours)     Procedure Component Value Units Date/Time    Manual Differential [[id number]]  (Abnormal) Collected: 12/06/22 1448    Specimen: Blood Updated: 12/06/22 1539     Neutrophil % 68.0 %      Lymphocyte % 21.0 %      Monocyte % 1.0 %      Basophil % 2.0 %      Bands %  8.0 %      Neutrophils Absolute 0.85 10*3/mm3      Lymphocytes Absolute 0.24 10*3/mm3      Monocytes Absolute 0.01 10*3/mm3      Basophils Absolute 0.02 10*3/mm3      Hypochromia Slight/1+     WBC Morphology Normal     Platelet Estimate Decreased    CBC & Differential [[id number]]  (Abnormal) Collected: 12/06/22 1448    Specimen: Blood Updated: 12/06/22 1539    Narrative:      The following orders were created for panel order CBC & Differential.  Procedure                               Abnormality         Status                     ---------                               -----------         ------                     CBC Auto Differential[[id number]]        Abnormal            Final result               Scan Slide[[id number]]                                       Final result                 Please view results for these tests on the individual orders.    CBC Auto Differential [[id number]]  (Abnormal)  Collected: 12/06/22 1448    Specimen: Blood Updated: 12/06/22 1539     WBC 1.12 10*3/mm3      RBC 3.88 10*6/mm3      Hemoglobin 12.2 g/dL      Hematocrit 37.8 %      MCV 97.4 fL      MCH 31.4 pg      MCHC 32.3 g/dL      RDW 17.3 %      RDW-SD 61.1 fl      MPV 9.9 fL      Platelets 160 10*3/mm3     Narrative:      Modified report. Previous result was Hemogram on 12/6/2022 at 1518 EST.  The previously reported component NRBC is no longer being reported. Previous result was 0.0 /100 WBC (Reference Range: 0.0-0.2 /100 WBC) on 12/6/2022 at 1518 EST.    Scan Slide [[id number]] Collected: 12/06/22 1448    Specimen: Blood Updated: 12/06/22 1539     Scan Slide --     Comment: See Manual Differential Results       [facility] [[id number]] Collected: 12/06/22 1313    Specimen: Blood Updated: 12/06/22 1508    Narrative:      The following orders were created for panel order [facility].  Procedure                               Abnormality         Status                     ---------                               -----------         ------                     Green Top (Gel)[[id number]]                                  Final result               Lavender Top[[id number]]                                     Final result               Gold Top - SST[[id number]]                                   Final result               Light Blue Top[[id number]]                                   Final result                 Please view results for these tests on the individual orders.    Light Blue Top [[id number]] Collected: 12/06/22 1448    Specimen: Blood Updated: 12/06/22 1508     Extra Tube Hold for add-ons.     Comment: Auto resulted       Urinalysis With Microscopic If Indicated (No Culture) - Urine, Clean Catch [[id number]]  (Abnormal) Collected: 12/06/22 1330    Specimen: Urine, Clean Catch Updated: 12/06/22 1348     Color, UA Yellow     Appearance, UA Cloudy     pH, UA 5.5     Specific Gravity, UA 1.020     Glucose, UA Negative     Ketones,  UA Negative     Bilirubin, UA Negative     Blood, UA Small (1+)     Protein, UA Trace     Leuk Esterase, UA Negative     Nitrite, UA Negative     Urobilinogen, UA 1.0 E.U./dL    Urinalysis, Microscopic Only - Urine, Clean Catch [[id number]]  (Abnormal) Collected: 12/06/22 1330    Specimen: Urine, Clean Catch Updated: 12/06/22 1348     RBC, UA 6-12 /HPF      WBC, UA 3-5 /HPF      Bacteria, UA 1+ /HPF      Squamous Epithelial Cells, UA 3-6 /HPF      Hyaline Casts, UA 7-12 /LPF      Methodology Automated Microscopy    Comprehensive Metabolic Panel [[id number]]  (Abnormal) Collected: 12/06/22 1313    Specimen: Blood Updated: 12/06/22 1340     Glucose 127 mg/dL      BUN 17 mg/dL      Creatinine 0.66 mg/dL      Sodium 137 mmol/L      Potassium 4.8 mmol/L      Comment: Slight hemolysis detected by analyzer. Results may be affected.        Chloride 104 mmol/L      CO2 24.8 mmol/L      Calcium 8.7 mg/dL      Total Protein 6.7 g/dL      Albumin 3.90 g/dL      ALT (SGPT) 36 U/L      AST (SGOT) 29 U/L      Alkaline Phosphatase 96 U/L      Total Bilirubin 0.4 mg/dL      Globulin 2.8 gm/dL      A/G Ratio 1.4 g/dL      BUN/Creatinine Ratio 25.8     Anion Gap 8.2 mmol/L      eGFR 98.7 mL/min/1.73      Comment: National Kidney Foundation and American Society of Nephrology (ASN) Task Force recommended calculation based on the Chronic Kidney Disease Epidemiology Collaboration (CKD-EPI) equation refit without adjustment for race.       Narrative:      GFR Normal >60  Chronic Kidney Disease <60  Kidney Failure <15      Lipase [[id number]]  (Normal) Collected: 12/06/22 1313    Specimen: Blood Updated: 12/06/22 1340     Lipase 32 U/L     Lactic Acid, Plasma [[id number]]  (Normal) Collected: 12/06/22 1313    Specimen: Blood Updated: 12/06/22 1336     Lactate 1.0 mmol/L     Lavender Top [[id number]] Collected: 12/06/22 1313    Specimen: Blood Updated: 12/06/22 1324     Extra Tube hold for add-on     Comment: Auto resulted       Gold Top - SST  [[id number]] Collected: 12/06/22 1313    Specimen: Blood Updated: 12/06/22 1324     Extra Tube Hold for add-ons.     Comment: Auto resulted.       Green Top (Gel) [[id number]] Collected: 12/06/22 1313    Specimen: Blood Updated: 12/06/22 1324     Extra Tube Hold for add-ons.     Comment: Auto resulted.                Result Review :     Assessment & Plan     * No active hospital problems. *     Peritonitis  Leukopenia with history of current chemotherapy  History of metastatic breast cancer undergoing radiation treatments to spine    I have reviewed the patient's work-up with results mentioned above.  With her peritonitis on exam along with pain, I recommended exploratory laparotomy with any indicated procedures, possible ileostomy, colostomy.  I explained the procedure and recovery.  Benefits and alternatives discussed.  Risk procedure including risk of anesthesia, bleeding, infection, worsening of her condition, need for more surgery, anastomotic leak, heart attack, stroke, blood clot, pneumonia discussed.  Discussed case with Dr. Barker as well.  All questions answered.  She agrees with the plan.  Zosyn.  Admission to hospitalist.  NPO.          Alfred Castañeda MD  12/06/22 16:36 EST

## 2022-12-06 NOTE — TELEPHONE ENCOUNTER
"The pt called and c/o \"all three of my holes are burning\". When questioned the pt c/o vaginal pain/burning, rectal pain/burning, oliguria, and constipation. This nurse instructed the pt to start taking stool softener BID, Miralax QD PRN, and to add fiber to her daily diet to help with constipation. This nurse instructed the pt to call Rad ONC about s/s due to pt currently undergoing Rad TX to her lower back which possibly be causing her s/s. pt voices understanding.   "

## 2022-12-07 LAB
ALBUMIN SERPL-MCNC: 3.5 G/DL (ref 3.5–5.2)
ALBUMIN/GLOB SERPL: 1.5 G/DL
ALP SERPL-CCNC: 65 U/L (ref 39–117)
ALT SERPL W P-5'-P-CCNC: 59 U/L (ref 1–33)
ANION GAP SERPL CALCULATED.3IONS-SCNC: 10.8 MMOL/L (ref 5–15)
AST SERPL-CCNC: 42 U/L (ref 1–32)
BASOPHILS # BLD AUTO: 0.02 10*3/MM3 (ref 0–0.2)
BASOPHILS NFR BLD AUTO: 1.3 % (ref 0–1.5)
BILIRUB SERPL-MCNC: 0.6 MG/DL (ref 0–1.2)
BUN SERPL-MCNC: 14 MG/DL (ref 8–23)
BUN/CREAT SERPL: 20.6 (ref 7–25)
CALCIUM SPEC-SCNC: 7.6 MG/DL (ref 8.6–10.5)
CHLORIDE SERPL-SCNC: 105 MMOL/L (ref 98–107)
CO2 SERPL-SCNC: 23.2 MMOL/L (ref 22–29)
CREAT SERPL-MCNC: 0.68 MG/DL (ref 0.57–1)
DEPRECATED RDW RBC AUTO: 61.5 FL (ref 37–54)
EGFRCR SERPLBLD CKD-EPI 2021: 98 ML/MIN/1.73
EOSINOPHIL # BLD AUTO: 0 10*3/MM3 (ref 0–0.4)
EOSINOPHIL NFR BLD AUTO: 0 % (ref 0.3–6.2)
ERYTHROCYTE [DISTWIDTH] IN BLOOD BY AUTOMATED COUNT: 17.4 % (ref 12.3–15.4)
GLOBULIN UR ELPH-MCNC: 2.4 GM/DL
GLUCOSE SERPL-MCNC: 129 MG/DL (ref 65–99)
HCT VFR BLD AUTO: 32.7 % (ref 34–46.6)
HGB BLD-MCNC: 10.5 G/DL (ref 12–15.9)
IMM GRANULOCYTES # BLD AUTO: 0 10*3/MM3 (ref 0–0.05)
IMM GRANULOCYTES NFR BLD AUTO: 0 % (ref 0–0.5)
LYMPHOCYTES # BLD AUTO: 0.14 10*3/MM3 (ref 0.7–3.1)
LYMPHOCYTES NFR BLD AUTO: 9.1 % (ref 19.6–45.3)
MCH RBC QN AUTO: 31 PG (ref 26.6–33)
MCHC RBC AUTO-ENTMCNC: 32.1 G/DL (ref 31.5–35.7)
MCV RBC AUTO: 96.5 FL (ref 79–97)
MONOCYTES # BLD AUTO: 0.07 10*3/MM3 (ref 0.1–0.9)
MONOCYTES NFR BLD AUTO: 4.5 % (ref 5–12)
NEUTROPHILS NFR BLD AUTO: 1.31 10*3/MM3 (ref 1.7–7)
NEUTROPHILS NFR BLD AUTO: 85.1 % (ref 42.7–76)
NRBC BLD AUTO-RTO: 0 /100 WBC (ref 0–0.2)
PLATELET # BLD AUTO: 140 10*3/MM3 (ref 140–450)
PMV BLD AUTO: 10 FL (ref 6–12)
POTASSIUM SERPL-SCNC: 3.9 MMOL/L (ref 3.5–5.2)
PROT SERPL-MCNC: 5.9 G/DL (ref 6–8.5)
RBC # BLD AUTO: 3.39 10*6/MM3 (ref 3.77–5.28)
SODIUM SERPL-SCNC: 139 MMOL/L (ref 136–145)
WBC NRBC COR # BLD: 1.54 10*3/MM3 (ref 3.4–10.8)

## 2022-12-07 PROCEDURE — 25010000002 HYDROMORPHONE 1 MG/ML SOLUTION: Performed by: INTERNAL MEDICINE

## 2022-12-07 PROCEDURE — 94799 UNLISTED PULMONARY SVC/PX: CPT

## 2022-12-07 PROCEDURE — 97165 OT EVAL LOW COMPLEX 30 MIN: CPT

## 2022-12-07 PROCEDURE — 80053 COMPREHEN METABOLIC PANEL: CPT | Performed by: INTERNAL MEDICINE

## 2022-12-07 PROCEDURE — 85025 COMPLETE CBC W/AUTO DIFF WBC: CPT | Performed by: INTERNAL MEDICINE

## 2022-12-07 PROCEDURE — 99024 POSTOP FOLLOW-UP VISIT: CPT | Performed by: SURGERY

## 2022-12-07 PROCEDURE — 25010000002 PIPERACILLIN SOD-TAZOBACTAM PER 1 G: Performed by: INTERNAL MEDICINE

## 2022-12-07 PROCEDURE — 99233 SBSQ HOSP IP/OBS HIGH 50: CPT | Performed by: INTERNAL MEDICINE

## 2022-12-07 PROCEDURE — 25010000002 LORAZEPAM PER 2 MG: Performed by: INTERNAL MEDICINE

## 2022-12-07 PROCEDURE — 97161 PT EVAL LOW COMPLEX 20 MIN: CPT

## 2022-12-07 PROCEDURE — 94761 N-INVAS EAR/PLS OXIMETRY MLT: CPT

## 2022-12-07 RX ORDER — NICOTINE 21 MG/24HR
1 PATCH, TRANSDERMAL 24 HOURS TRANSDERMAL
Status: DISCONTINUED | OUTPATIENT
Start: 2022-12-07 | End: 2022-12-13 | Stop reason: HOSPADM

## 2022-12-07 RX ORDER — LORAZEPAM 2 MG/ML
0.5 INJECTION INTRAMUSCULAR EVERY 6 HOURS
Status: DISCONTINUED | OUTPATIENT
Start: 2022-12-07 | End: 2022-12-08

## 2022-12-07 RX ORDER — ACETAMINOPHEN 325 MG/1
650 TABLET ORAL EVERY 4 HOURS PRN
Status: DISCONTINUED | OUTPATIENT
Start: 2022-12-07 | End: 2022-12-12

## 2022-12-07 RX ORDER — LORAZEPAM 2 MG/ML
0.5 INJECTION INTRAMUSCULAR ONCE
Status: COMPLETED | OUTPATIENT
Start: 2022-12-07 | End: 2022-12-08

## 2022-12-07 RX ORDER — ACETAMINOPHEN 160 MG/5ML
650 SOLUTION ORAL EVERY 4 HOURS PRN
Status: DISCONTINUED | OUTPATIENT
Start: 2022-12-07 | End: 2022-12-12

## 2022-12-07 RX ORDER — ACETAMINOPHEN 650 MG/1
650 SUPPOSITORY RECTAL EVERY 4 HOURS PRN
Status: DISCONTINUED | OUTPATIENT
Start: 2022-12-07 | End: 2022-12-12

## 2022-12-07 RX ORDER — NALOXONE HCL 0.4 MG/ML
0.4 VIAL (ML) INJECTION
Status: DISCONTINUED | OUTPATIENT
Start: 2022-12-07 | End: 2022-12-08

## 2022-12-07 RX ORDER — FAMOTIDINE 10 MG/ML
20 INJECTION, SOLUTION INTRAVENOUS EVERY 12 HOURS SCHEDULED
Status: DISCONTINUED | OUTPATIENT
Start: 2022-12-07 | End: 2022-12-13 | Stop reason: HOSPADM

## 2022-12-07 RX ADMIN — HYDROMORPHONE HYDROCHLORIDE 1 MG: 1 INJECTION, SOLUTION INTRAMUSCULAR; INTRAVENOUS; SUBCUTANEOUS at 17:27

## 2022-12-07 RX ADMIN — HYDROMORPHONE HYDROCHLORIDE 0.5 MG: 1 INJECTION, SOLUTION INTRAMUSCULAR; INTRAVENOUS; SUBCUTANEOUS at 04:24

## 2022-12-07 RX ADMIN — TAZOBACTAM SODIUM AND PIPERACILLIN SODIUM 3.38 G: 375; 3 INJECTION, SOLUTION INTRAVENOUS at 01:44

## 2022-12-07 RX ADMIN — SODIUM CHLORIDE, POTASSIUM CHLORIDE, SODIUM LACTATE AND CALCIUM CHLORIDE 50 ML/HR: 600; 310; 30; 20 INJECTION, SOLUTION INTRAVENOUS at 16:39

## 2022-12-07 RX ADMIN — HYDROMORPHONE HYDROCHLORIDE 0.5 MG: 1 INJECTION, SOLUTION INTRAMUSCULAR; INTRAVENOUS; SUBCUTANEOUS at 11:12

## 2022-12-07 RX ADMIN — HYDROMORPHONE HYDROCHLORIDE 1 MG: 1 INJECTION, SOLUTION INTRAMUSCULAR; INTRAVENOUS; SUBCUTANEOUS at 20:54

## 2022-12-07 RX ADMIN — NICOTINE 1 PATCH: 21 PATCH, EXTENDED RELEASE TRANSDERMAL at 14:10

## 2022-12-07 RX ADMIN — HYDROMORPHONE HYDROCHLORIDE 1 MG: 1 INJECTION, SOLUTION INTRAMUSCULAR; INTRAVENOUS; SUBCUTANEOUS at 23:25

## 2022-12-07 RX ADMIN — HYDROMORPHONE HYDROCHLORIDE 0.5 MG: 1 INJECTION, SOLUTION INTRAMUSCULAR; INTRAVENOUS; SUBCUTANEOUS at 02:23

## 2022-12-07 RX ADMIN — FAMOTIDINE 20 MG: 10 INJECTION INTRAVENOUS at 08:46

## 2022-12-07 RX ADMIN — TAZOBACTAM SODIUM AND PIPERACILLIN SODIUM 3.38 G: 375; 3 INJECTION, SOLUTION INTRAVENOUS at 20:54

## 2022-12-07 RX ADMIN — HYDROMORPHONE HYDROCHLORIDE 1 MG: 1 INJECTION, SOLUTION INTRAMUSCULAR; INTRAVENOUS; SUBCUTANEOUS at 13:20

## 2022-12-07 RX ADMIN — TAZOBACTAM SODIUM AND PIPERACILLIN SODIUM 3.38 G: 375; 3 INJECTION, SOLUTION INTRAVENOUS at 13:23

## 2022-12-07 RX ADMIN — HYDROMORPHONE HYDROCHLORIDE 0.5 MG: 1 INJECTION, SOLUTION INTRAMUSCULAR; INTRAVENOUS; SUBCUTANEOUS at 00:16

## 2022-12-07 RX ADMIN — FAMOTIDINE 20 MG: 10 INJECTION INTRAVENOUS at 20:48

## 2022-12-07 RX ADMIN — Medication 10 ML: at 20:55

## 2022-12-07 RX ADMIN — LORAZEPAM 0.5 MG: 2 INJECTION INTRAMUSCULAR; INTRAVENOUS at 18:29

## 2022-12-07 RX ADMIN — HYDROMORPHONE HYDROCHLORIDE 1 MG: 1 INJECTION, SOLUTION INTRAMUSCULAR; INTRAVENOUS; SUBCUTANEOUS at 15:24

## 2022-12-07 RX ADMIN — HYDROMORPHONE HYDROCHLORIDE 0.5 MG: 1 INJECTION, SOLUTION INTRAMUSCULAR; INTRAVENOUS; SUBCUTANEOUS at 09:09

## 2022-12-07 RX ADMIN — TAZOBACTAM SODIUM AND PIPERACILLIN SODIUM 3.38 G: 375; 3 INJECTION, SOLUTION INTRAVENOUS at 08:46

## 2022-12-07 RX ADMIN — Medication 10 ML: at 08:46

## 2022-12-07 RX ADMIN — LORAZEPAM 0.5 MG: 2 INJECTION INTRAMUSCULAR; INTRAVENOUS at 12:13

## 2022-12-07 RX ADMIN — HYDROMORPHONE HYDROCHLORIDE 0.5 MG: 1 INJECTION, SOLUTION INTRAMUSCULAR; INTRAVENOUS; SUBCUTANEOUS at 07:09

## 2022-12-07 NOTE — PLAN OF CARE
Goal Outcome Evaluation:  Plan of Care Reviewed With: patient, sibling        Progress: no change  Outcome Evaluation: Patient presents with deficits in balance, endurance, transfers, and ambulation. Patient will benefit from skilled PT services to address these mobility deficits and decrease risk of falls. Skilled nursing facility is the primary discharge recommendation at this time. Will continue to monitor progress and patient may be able to discharge home with home health and assist.

## 2022-12-07 NOTE — THERAPY EVALUATION
Patient Name: Derek Keller  : 1960    MRN: [id number]                              Today's Date: 2022       Admit Date: 2022    Visit Dx:     ICD-10-CM ICD-9-CM   1. Peritonitis (HCC)  K65.9 567.9   2. Decreased activities of daily living (ADL)  Z78.9 V49.89   3. Bone metastases (HCC)  C79.51 198.5   4. Secondary malignant neoplasm of bone (HCC)  C79.51 198.5   5. Malignant neoplasm of upper-outer quadrant of left breast in female, estrogen receptor positive (HCC)  C50.412 174.4    Z17.0 V86.0   6. Chronic fatigue  R53.82 780.79   7. Hypoxemia  R09.02 799.02     Patient Active Problem List   Diagnosis   • Other chest pain   • Hypertension   • Hyperlipidemia   • Allergic rhinitis   • Hypothyroidism   • Neck pain   • Lumbago   • Arthritis   • Chronic pain disorder   • History of IBS   • Anxiety   • Monopolar depression (HCC)   • Malaise and fatigue   • Tobacco abuse   • Fibromyalgia   • Vitamin B 12 deficiency   • Acute pain of left knee   • Primary osteoarthritis of left knee   • Osteoarthrosis, localized, primary, knee   • Generalized abdominal pain   • Herpes simplex vulvovaginitis   • Lightheadedness   • Cough   • Hypoxemia   • Restless leg syndrome   • Itch of skin   • Primary insomnia   • Chronic fatigue   • Asthma   • Body mass index (BMI) 26.0-26.9, adult   • Constipation   • Degeneration of lumbar intervertebral disc   • Disorder associated with Helicobacter species   • Hearing loss   • Impacted cerumen of right ear   • Inappropriate diet and eating habits   • Inguinal pain   • Irritable bowel syndrome   • Cervical spondylosis   • Obesity with body mass index 30 or greater   • Renal stone   • Malignant neoplasm of left breast in female, estrogen receptor positive (HCC)   • Cancer related pain   • Bone metastases (HCC)   • Secondary malignant neoplasm of bone (HCC)   • Peripheral edema   • Peritonitis (HCC)     Past Medical History:   Diagnosis Date   • Abdominal pain    • Allergic rhinitis     • Anemia    • Anxiety    • Arteriosclerosis     Coronary   • Arthritis    • Bone metastases (HCC) 10/14/2022   • Bowen's disease    • Breast cancer (HCC)    • Cancer related pain 10/13/2022   • Chest pain    • Chronic pain disorder    • Cough    • Depression    • Dysphoric mood    • Fatigue    • Frequent urination    • History of colon polyps    • History of IBS    • History of kidney stones    • Hyperlipidemia    • Hypertension    • Hypothyroidism    • Insomnia    • Lumbago    • Malaise and fatigue    • Mood disorder (HCC)    • Neck pain    • Palpitations    • Peripheral edema 11/21/2022   • Precordial pain    • Sleep disturbance    • SOB (shortness of breath)      Past Surgical History:   Procedure Laterality Date   • BREAST BIOPSY     • CARDIAC CATHETERIZATION Left 1960   • CARDIAC CATHETERIZATION N/A 04/06/2018    Procedure: Coronary angiography;  Surgeon: Art Licea MD;  Location:  INVASIVE LOCATION;  Service: Cardiovascular   • CARDIAC CATHETERIZATION N/A 04/06/2018    Procedure: Left heart cath;  Surgeon: Art Licea MD;  Location:  INVASIVE LOCATION;  Service: Cardiovascular   • CARDIAC CATHETERIZATION N/A 04/06/2018    Procedure: Left ventriculography;  Surgeon: Art Licea MD;  Location:  INVASIVE LOCATION;  Service: Cardiovascular   • CHOLECYSTECTOMY     • COLONOSCOPY  11/08/2006   • EXPLORATORY LAPAROTOMY N/A 12/6/2022    Procedure: LAPAROTOMY EXPLORATORY sigmoid resection hartmans procedure colostomy;  Surgeon: Alfred Castañeda MD;  Location: [facility] OR;  Service: General;  Laterality: N/A;   • GANGLION CYST EXCISION     • HYSTERECTOMY  05/2005   • LAPAROSCOPIC GASTRIC BANDING     • TONSILLECTOMY        General Information     Row Name 12/07/22 1319          OT Time and Intention    Document Type evaluation  -LF     Mode of Treatment individual therapy;occupational therapy  -LF     Row Name 12/07/22 1319          General  Information    Patient Profile Reviewed yes  -     Prior Level of Function --  (I) with ADLs, ambulated with a rollator and also has a RW, has a step over tub where he stands to showr with shower chair if needed, has a standard commode, stands to groom, drives, and no home O2.  -     Existing Precautions/Restrictions no known precautions/restrictions  -     Barriers to Rehab none identified  -     Row Name 12/07/22 1319          Occupational Profile    Reason for Services/Referral (Occupational Profile) Patient is a 62 year old female admitted to [facility] for abdominal pain and is currently status post exploratory laparotomy with sigmoid colon resection, Lynn's closure of rectal stump, and end colostomy on December 6th, 2022. Occupational therapy consulted due to recent decline in ADLs/functional transfers. No previous occupational therapy services for current condition.  -     Row Name 12/07/22 1319          Living Environment    People in Home alone  Patient reports that her sister assists as needed  -[facility] Name 12/07/22 1319          Home Main Entrance    Number of Stairs, Main Entrance two  -     Stair Railings, Main Entrance railings safe and in good condition  -     Row Name 12/07/22 1319          Cognition    Orientation Status (Cognition) oriented x 4  -     Row Name 12/07/22 1319          Safety Issues, Functional Mobility    Impairments Affecting Function (Mobility) balance;endurance/activity tolerance;pain;strength  -           User Key  (r) = Recorded By, (t) = Taken By, (c) = Cosigned By    Initials Name Provider Type     Raquel Charles OT Occupational Therapist                 Mobility/ADL's     Row Name 12/07/22 1322          Bed Mobility    Bed Mobility supine-sit  -     Supine-Sit [place] (Bed Mobility) minimum assist (75% patient effort)  -     Row Name 12/07/22 1322          Transfers    Transfers sit-stand transfer;stand-sit transfer  -      Row Name 12/07/22 [facility]          Sit-Stand Transfer    Sit-Stand [place] (Transfers) contact guard;minimum assist (75% patient effort)  -     Assistive Device (Sit-Stand Transfers) walker, front-wheeled  -LF     Row Name 12/07/22 1322          Stand-Sit Transfer    Stand-Sit [place] (Transfers) contact guard;minimum assist (75% patient effort)  -     Assistive Device (Stand-Sit Transfers) walker, front-wheeled  -LF     Row Name 12/07/22 1322          Functional Mobility    Functional Mobility- Ind. Level contact guard assist;minimum assist (75% patient effort)  -     Functional Mobility- Device walker, front-wheeled  -LF     Row Name 12/07/22 [facility]          Activities of Daily Living    BADL Assessment/Intervention bathing;upper body dressing;lower body dressing;grooming;feeding;toileting  -     Row Name 12/07/22 [facility]          Bathing Assessment/Intervention    [place] Level (Bathing) bathing skills;upper body;standby assist;lower body;maximum assist (25% patient effort);dependent (less than 25% patient effort)  -     Row Name 12/07/22 [facility]          Upper Body Dressing Assessment/Training    [place] Level (Upper Body Dressing) upper body dressing skills;standby assist  -     Row Name 12/07/22 [facility]          Lower Body Dressing Assessment/Training    [place] Level (Lower Body Dressing) lower body dressing skills;maximum assist (25% patient effort);dependent (less than 25% patient effort)  -     Row Name 12/07/22 [facility]          Grooming Assessment/Training    [place] Level (Grooming) grooming skills;set up  -     Row Name 12/07/22 [facility]          Self-Feeding Assessment/Training    [place] Level (Feeding) feeding skills;set up  -     Row Name 12/07/22 [facility]          Toileting Assessment/Training    [place] Level (Toileting) toileting skills;dependent (less than 25% patient effort)  -     Comment, (Toileting) Rojas and colostomy currently in place.  -            User Key  (r) = Recorded By, (t) = Taken By, (c) = Cosigned By    Initials Name Provider Type     Raquel Charles OT Occupational Therapist               Obj/Interventions     Row Name 12/07/22 1323          Sensory Assessment (Somatosensory)    Sensory Assessment (Somatosensory) UE sensation intact  -LF     Row Name 12/07/22 1323          Vision Assessment/Intervention    Visual Impairment/Limitations WFL  -LF     Row Name 12/07/22 1323          Range of Motion Comprehensive    General Range of Motion bilateral upper extremity ROM WFL  -LF     Row Name 12/07/22 1323          Strength Comprehensive (MMT)    Comment, General Manual Muscle Testing (MMT) Assessment 4/5 BUEs  -LF     Row Name 12/07/22 1323          Motor Skills    Motor Skills coordination;functional endurance  -LF     Coordination WFL  -     Functional Endurance Fair-/fair  -LF     Row Name 12/07/22 1323          Balance    Balance Assessment sitting dynamic balance;standing dynamic balance  -LF     Dynamic Sitting Balance supervision  -LF     Position, Sitting Balance unsupported;sitting edge of bed  -LF     Dynamic Standing Balance contact guard;minimal assist  -LF     Position/Device Used, Standing Balance supported;walker, front-wheeled  -LF           User Key  (r) = Recorded By, (t) = Taken By, (c) = Cosigned By    Initials Name Provider Type    LF Raquel Charles OT Occupational Therapist               Goals/Plan     Row Name 12/07/22 1328          Bed Mobility Goal 1 (OT)    Activity/Assistive Device (Bed Mobility Goal 1, OT) bed mobility activities, all  -LF     [place] Level/Cues Needed (Bed Mobility Goal 1, OT) modified independence  -LF     Time Frame (Bed Mobility Goal 1, OT) long term goal (LTG);10 days  -LF     Row Name 12/07/22 1328          Transfer Goal 1 (OT)    Activity/Assistive Device (Transfer Goal 1, OT) transfers, all;walker, rolling  -LF     [place] Level/Cues Needed (Transfer Goal 1, OT) modified independence   -LF     Time Frame (Transfer Goal 1, OT) long term goal (LTG);10 days  -     Row Name 12/07/22 1328          Bathing Goal 1 (OT)    Activity/Device (Bathing Goal 1, OT) bathing skills, all  -LF     [place] Level/Cues Needed (Bathing Goal 1, OT) modified independence  -LF     Time Frame (Bathing Goal 1, OT) long term goal (LTG);10 days  -     Row Name 12/07/22 1328          Dressing Goal 1 (OT)    Activity/Device (Dressing Goal 1, OT) dressing skills, all  -LF     [place]/Cues Needed (Dressing Goal 1, OT) modified independence  -LF     Time Frame (Dressing Goal 1, OT) long term goal (LTG);10 days  -     Row Name 12/07/22 1328          Toileting Goal 1 (OT)    Activity/Device (Toileting Goal 1, OT) toileting skills, all  -LF     [place] Level/Cues Needed (Toileting Goal 1, OT) modified independence  -LF     Time Frame (Toileting Goal 1, OT) long term goal (LTG);10 days  -     Row Name 12/07/22 1328          Problem Specific Goal 1 (OT)    Problem Specific Goal 1 (OT) Patient will demonstrate fair+ endurance to support ADLs/functional transfers.  -LF     Time Frame (Problem Specific Goal 1, OT) long term goal (LTG);10 days  -     Row Name 12/07/22 1328          Therapy Assessment/Plan (OT)    Planned Therapy Interventions (OT) activity tolerance training;patient/caregiver education/training;BADL retraining;functional balance retraining;occupation/activity based interventions;transfer/mobility retraining  -           User Key  (r) = Recorded By, (t) = Taken By, (c) = Cosigned By    Initials Name Provider Type     Raquel Charles OT Occupational Therapist               Clinical Impression     Row Name 12/07/22 1324          Pain Assessment    Pain Intervention(s) Nursing Notified  Pain medication administered prior to functional transfers.  -     Additional Documentation Pain Scale: FACES Pre/Post-Treatment (Group)  -     Row Name 12/07/22 1324          Pain Scale: FACES  Pre/Post-Treatment    Pain: FACES Scale, Pretreatment 4-->hurts little more  -LF     Posttreatment Pain Rating 10-->hurts worst  -LF     Pain Location - abdomen  -LF     Row Name 12/07/22 1324          Plan of Care Review    Plan of Care Reviewed With patient;sibling  -LF     Progress no change  -LF     Outcome Evaluation Patient presents with limitations in self-care, functional transfers, balance, and endurance. She would benefit from continued skilled occupational therapy services to maximize independence with ADLs/functional transfers. Skilled nursing facility and 3 in 1 commode recommended upon discharge from hospital. May consider home with home health therapy services and assist as she progresses.  -     Row Name 12/07/22 1324          Therapy Assessment/Plan (OT)    Patient/Family Therapy Goal Statement (OT) None reported  -     Rehab Potential (OT) good, to achieve stated therapy goals  -     Criteria for Skilled Therapeutic Interventions Met (OT) yes;meets criteria;skilled treatment is necessary  -     Therapy Frequency (OT) 5 times/wk  -     Row Name 12/07/22 1324          Therapy Plan Review/Discharge Plan (OT)    Equipment Needs Upon Discharge (OT) commode chair  -     Anticipated Discharge Disposition (OT) skilled nursing facility;home with home health;home with assist  -     Row Name 12/07/22 1326          Vital Signs    O2 Delivery Pre Treatment nasal cannula  -LF     O2 Delivery Intra Treatment nasal cannula  -LF     O2 Delivery Post Treatment nasal cannula  -LF           User Key  (r) = Recorded By, (t) = Taken By, (c) = Cosigned By    Initials Name Provider Type     Raquel Charles, OT Occupational Therapist               Outcome Measures     Row Name 12/07/22 1302          How much help from another is currently needed...    Putting on and taking off regular lower body clothing? 2  -LF     Bathing (including washing, rinsing, and drying) 2  -LF     Toileting (which includes using  toilet bed pan or urinal) 1  -LF     Putting on and taking off regular upper body clothing 3  -LF     Taking care of personal grooming (such as brushing teeth) 3  -LF     Eating meals 4  -LF     AM-PAC 6 Clicks Score (OT) 15  -LF     Row Name 12/07/22 1329          Functional Assessment    Outcome Measure Options AM-PAC 6 Clicks Daily Activity (OT);Optimal Instrument  -LF     Row Name 12/07/22 1329          Optimal Instrument    Optimal Instrument Optimal - 3  -LF     Bending/Stooping 5  -LF     Standing 2  -LF     Reaching 1  -LF     From the list, choose the 3 activities you would most like to be able to do without any difficulty Bending/stooping;Standing;Reaching  -LF     Total Score Optimal - 3 8  -LF           User Key  (r) = Recorded By, (t) = Taken By, (c) = Cosigned By    Initials Name Provider Type    Raquel Goodrich OT Occupational Therapist                Occupational Therapy Education     Title: PT OT SLP Therapies (Done)     Topic: Occupational Therapy (Done)     Point: ADL training (Done)     Description:   Instruct learner(s) on proper safety adaptation and remediation techniques during self care or transfers.   Instruct in proper use of assistive devices.              Learning Progress Summary           Patient Acceptance, E,TB, VU by  at 12/7/2022 1338                   Point: Precautions (Done)     Description:   Instruct learner(s) on prescribed precautions during self-care and functional transfers.              Learning Progress Summary           Patient Acceptance, E,TB, VU by  at 12/7/2022 1338                   Point: Body mechanics (Done)     Description:   Instruct learner(s) on proper positioning and spine alignment during self-care, functional mobility activities and/or exercises.              Learning Progress Summary           Patient Acceptance, E,TB, VU by  at 12/7/2022 1338                               User Key     Initials Effective Dates Name Provider Type Discipline     LF 06/16/21 -  Raquel Charles OT Occupational Therapist OT              OT Recommendation and Plan  Planned Therapy Interventions (OT): activity tolerance training, patient/caregiver education/training, BADL retraining, functional balance retraining, occupation/activity based interventions, transfer/mobility retraining  Therapy Frequency (OT): 5 times/wk  Plan of Care Review  Plan of Care Reviewed With: patient, sibling  Progress: no change  Outcome Evaluation: Patient presents with limitations in self-care, functional transfers, balance, and endurance. She would benefit from continued skilled occupational therapy services to maximize independence with ADLs/functional transfers. Skilled nursing facility and 3 in 1 commode recommended upon discharge from hospital. May consider home with home health therapy services and assist as she progresses.     Time Calculation:    Time Calculation- OT     Row Name 12/07/22 1337             Time Calculation- OT    OT Received On 12/07/22  -LF      OT Goal Re-Cert Due Date 12/16/22  -         Untimed Charges    OT Eval/Re-eval Minutes 50  -LF         Total Minutes    Untimed Charges Total Minutes 50  -LF       Total Minutes 50  -LF            User Key  (r) = Recorded By, (t) = Taken By, (c) = Cosigned By    Initials Name Provider Type     Raquel Charles OT Occupational Therapist              Therapy Charges for Today     Code Description Service Date Service Provider Modifiers Qty    [id number] HC OT EVAL LOW COMPLEXITY 4 12/7/2022 Raquel Charles OT GO 1               Raquel Charles OT  12/7/2022

## 2022-12-07 NOTE — CONSULTS
Reason for consult: Request made by patient on admission.    Assessment: Pt is on 5STU post-op, her brother Saji visiting at bedside. Introductions made and role explained. Pt made request for prayer. She shared about her metastatic cancer. She has solid family support. Prayer provided according to her tradition. She agreed to follow up visit for spiritual/moral support during admission.

## 2022-12-07 NOTE — PLAN OF CARE
Goal Outcome Evaluation:  Plan of Care Reviewed With: patient, sibling        Progress: no change  Outcome Evaluation: Patient presents with limitations in self-care, functional transfers, balance, and endurance. She would benefit from continued skilled occupational therapy services to maximize independence with ADLs/functional transfers. Skilled nursing facility and 3 in 1 commode recommended upon discharge from hospital. May consider home with home health therapy services and assist as she progresses.

## 2022-12-07 NOTE — PLAN OF CARE
Goal Outcome Evaluation:   Patient reports abdominal discomfort throughout shift. PRN medication given as appropriate. NG tube to ILWS, with approx. 300 mL of output noted. Rojas in place with plans to remove tomorrow. Ostomy site appears WNL. Dressing to midline abdominal incision changed per order. BANDAR drain with serosanguinous fluid. Nicotine patch ordered per patient request. Patient was up in chair this AM.

## 2022-12-07 NOTE — SIGNIFICANT NOTE
12/07/22 1030   Plan   Plan Met with patient and sister, Jamila at bedside.  Patient has a new colostomy during this admission.  NG tube remains in place.  Patient reports lives alone but has good family support.  Family member will stay with patient when she returns home.  Patient is able to provide ADL's at this time.  Patient request hospital bed at discharge.  Order completed and [facility] to [facility].  Patient is unsure of all discharge needs at this time.  Facesheet verified.  Will continue to follow.

## 2022-12-07 NOTE — SIGNIFICANT NOTE
Wound Eval / Progress Noted    JOSE Langston     Patient Name: Derek Keller  : 1960  MRN: [id number]  Today's Date: 2022                 Admit Date: 2022    Visit Dx:    ICD-10-CM ICD-9-CM   1. Peritonitis (HCC)  K65.9 567.9   2. Decreased activities of daily living (ADL)  Z78.9 V49.89   3. Bone metastases (HCC)  C79.51 198.5   4. Secondary malignant neoplasm of bone (HCC)  C79.51 198.5   5. Malignant neoplasm of upper-outer quadrant of left breast in female, estrogen receptor positive (HCC)  C50.412 174.4    Z17.0 V86.0   6. Chronic fatigue  R53.82 780.79   7. Hypoxemia  R09.02 799.02   8. Difficulty walking  R26.2 719.7       Patient Active Problem List   Diagnosis   • Other chest pain   • Hypertension   • Hyperlipidemia   • Allergic rhinitis   • Hypothyroidism   • Neck pain   • Lumbago   • Arthritis   • Chronic pain disorder   • History of IBS   • Anxiety   • Monopolar depression (HCC)   • Malaise and fatigue   • Tobacco abuse   • Fibromyalgia   • Vitamin B 12 deficiency   • Acute pain of left knee   • Primary osteoarthritis of left knee   • Osteoarthrosis, localized, primary, knee   • Generalized abdominal pain   • Herpes simplex vulvovaginitis   • Lightheadedness   • Cough   • Hypoxemia   • Restless leg syndrome   • Itch of skin   • Primary insomnia   • Chronic fatigue   • Asthma   • Body mass index (BMI) 26.0-26.9, adult   • Constipation   • Degeneration of lumbar intervertebral disc   • Disorder associated with Helicobacter species   • Hearing loss   • Impacted cerumen of right ear   • Inappropriate diet and eating habits   • Inguinal pain   • Irritable bowel syndrome   • Cervical spondylosis   • Obesity with body mass index 30 or greater   • Renal stone   • Malignant neoplasm of left breast in female, estrogen receptor positive (HCC)   • Cancer related pain   • Bone metastases (HCC)   • Secondary malignant neoplasm of bone (HCC)   • Peripheral edema   • Peritonitis (HCC)        Past Medical  History:   Diagnosis Date   • Abdominal pain    • Allergic rhinitis    • Anemia    • Anxiety    • Arteriosclerosis     Coronary   • Arthritis    • Bone metastases (HCC) 10/14/2022   • Bowen's disease    • Breast cancer (HCC)    • Cancer related pain 10/13/2022   • Chest pain    • Chronic pain disorder    • Cough    • Depression    • Dysphoric mood    • Fatigue    • Frequent urination    • History of colon polyps    • History of IBS    • History of kidney stones    • Hyperlipidemia    • Hypertension    • Hypothyroidism    • Insomnia    • Lumbago    • Malaise and fatigue    • Mood disorder (HCC)    • Neck pain    • Palpitations    • Peripheral edema 11/21/2022   • Precordial pain    • Sleep disturbance    • SOB (shortness of breath)         Past Surgical History:   Procedure Laterality Date   • BREAST BIOPSY     • CARDIAC CATHETERIZATION Left 1960   • CARDIAC CATHETERIZATION N/A 04/06/2018    Procedure: Coronary angiography;  Surgeon: Art Licea MD;  Location: [facility] INVASIVE LOCATION;  Service: Cardiovascular   • CARDIAC CATHETERIZATION N/A 04/06/2018    Procedure: Left heart cath;  Surgeon: Art Lieca MD;  Location: [facility] INVASIVE LOCATION;  Service: Cardiovascular   • CARDIAC CATHETERIZATION N/A 04/06/2018    Procedure: Left ventriculography;  Surgeon: Art Licea MD;  Location[facility] CATH INVASIVE LOCATION;  Service: Cardiovascular   • CHOLECYSTECTOMY     • COLONOSCOPY  11/08/2006   • EXPLORATORY LAPAROTOMY N/A 12/6/2022    Procedure: LAPAROTOMY EXPLORATORY sigmoid resection hartmans procedure colostomy;  Surgeon: Alfred Castañeda MD;  Location: [facility];  Service: General;  Laterality: N/A;   • GANGLION CYST EXCISION     • HYSTERECTOMY  05/2005   • LAPAROSCOPIC GASTRIC BANDING     • TONSILLECTOMY        12/07/22 1109   Colostomy LLQ   Placement Date/Time: 12/06/22 1900   Inserted by: DR. CASTAÑEDA  Hand Hygiene Completed: Yes  Colostomy Type:  Descending/sigmoid;End  Location: LLQ   Wound Image    Stomal Appliance Clean;Dry;Intact;1 piece;Changed   Stoma Appearance pink;round;moist;red   Peristomal Assessment Clean;Intact   Accessories/Skin Care cleansed with water;skin sealant   Stoma Function serosanguineous;no stool   Treatment Bag change;Site care     Wound Check / Follow-up:  Patient seen today for consult regarding new colostomy. Patient underwent surgery 12/6/22 and had new colostomy placed. Patient up in chair upon entering room. NG tube in place to low wall suction. Rojas catheter in place for bladder management. Assisted patient back to bed with use of walker. Patient required minimal assistance. Explained to patient the role of wound/ostomy nurse in the care team and that nurse will follow up with her 5x per week for pouch changes and ongoing ostomy education. Patient verbalized understanding. Patient reports that she does not have a support system at home and will be managing her ostomy independently. No stool or flatus noted to pouch. Minimal amount of serosanguineous drainage noted. Removed pouch, cleansed stoma and peristomal tissue with water and washcloth. Prepared peristomal tissue with skin barrier wipe and placed new pouch using a one piece flat pouch. Seal obtained, no signs of lifting or leaking. Explained to patient each step of ostomy pouch change and rationale behind those steps. Discussed with patient that educational reading materials will be left in her room and wound/ostomy nurse will return tomorrow for ongoing education. Patient verbalized understanding. Patient expresses complaints of pain at this time. Primary RN notified for PRN pain medication administration. Patient verbalizes no additional needs or concerns at this time.      Impression: New colostomy.      Short term goals:  Colostomy management, skin protection.      Analisa Alvarado RN    12/7/2022    15:49 EST

## 2022-12-07 NOTE — PROGRESS NOTES
[facility]   Hospitalist Progress Note  Date: 2022  Patient Name: Derek Keller  : 1960  MRN: [id number]  Date of admission: 2022    Subjective   Subjective     Chief Complaint:   Abdominal pain    Summary:   Derek Keller is a 62 y.o. female past medical history of metastatic breast cancer that presents to the emergency department earlier today for evaluation of sudden onset abdominal pain.  She described as sharp, left lower quadrant rating to the right lower quadrant, constant, no known aggravating alleviating factors.  She had associated nausea but no vomiting.  She denies any fevers, chills, sweats, chest pain or shortness of breath, palpitations, diarrhea constipation, dysuria, weakness, rash.  In the emergency department patient underwent CT scan which showed enteritis versus possible bowel ischemia and some intraperitoneal free air.  Surgery was called and patient was taken to the operating room.  She underwent sigmoid colon resection and colostomy formation.  She has been placed on Zosyn and admitted for ongoing monitoring and management.    Interval Followup:   Overnight patient underwent bowel resection, ostomy formation.  Patient complains of abdominal pain this a.m., she has an NG tube to low wall suction, she complains of abdominal distention, nausea    Review of Systems  Positive nausea, no vomiting  Positive abdominal pain  No fever no chills    Objective   Objective     Vitals:   Temp:  [98 °F (36.7 °C)-99.9 °F (37.7 °C)] 98 °F (36.7 °C)  Heart Rate:  [62-86] 62  Resp:  [14-18] 18  BP: (105-145)/(52-68) 118/53  Flow (L/min):  [2-4] 2    Physical Exam   General appearance: NAD, conversant   Eyes: anicteric sclerae, moist conjunctivae; no lid-lag; PERRLA  HENT: Atraumatic; oropharynx clear with moist mucous membranes and no mucosal ulcerations; normal hard and soft palate  Neck: Trachea midline; FROM, supple, no thyromegaly or lymphadenopathy  Lungs: CTA, with normal  respiratory effort and no intercostal retractions  CV: Regular Rate and Rhythm, no Murmurs, Rubs, or Gallops   Abdomen: Soft, non-tender; no masses or hepatosplenomegaly, postsurgical changes of the abdomen noted  Extremities: No peripheral edema or extremity lymphadenopathy  Skin: Normal temperature, turgor and texture; no rash, ulcers or subcutaneous nodules  Psych: Appropriate affect, alert and oriented to person, place and time  Neuro: CN II - XII intact no motor deficits, no sensory deficits    Result Review    Result Review:  I have personally reviewed the results as below  [x]  Laboratory  CBC    CBC 11/23/22 12/6/22 12/7/22   WBC 6.86 1.12 (A) 1.54 (A)   RBC 3.62 (A) 3.88 3.39 (A)   Hemoglobin 11.2 (A) 12.2 10.5 (A)   Hematocrit 34.8 37.8 32.7 (A)   MCV 96.1 97.4 (A) 96.5   MCH 30.9 31.4 31.0   MCHC 32.2 32.3 32.1   RDW 16.6 (A) 17.3 (A) 17.4 (A)   Platelets 311 160 140   (A) Abnormal value            CMP    CMP 11/23/22 12/6/22 12/7/22   Glucose 87 127 (A) 129 (A)   BUN 26 (A) 17 14   Creatinine 0.89 0.66 0.68   Sodium 140 137 139   Potassium 4.1 4.8 3.9   Chloride 101 104 105   Calcium 9.2 8.7 7.6 (A)   Albumin 4.50 3.90 3.50   Total Bilirubin 0.3 0.4 0.6   Alkaline Phosphatase 71 96 65   AST (SGOT) 14 29 42 (A)   ALT (SGPT) 12 36 (A) 59 (A)   (A) Abnormal value       Comments are available for some flowsheets but are not being displayed.           []  Microbiology  []  Radiology  []  EKG/Telemetry   []  Cardiology/Vascular   []  Pathology  []  Old records  []  Other:    Assessment & Plan   Assessment / Plan     Assessment:  Peritonitis, stercoral perforation  Metastatic breast cancer  Leukopenia  Hypertension  Hypothyroidism  Abdominal pain  Ileus    Plan:  Patient admitted to the hospital for further work-up and management of above  Patient postop day 1  Continue broad-spectrum antibiotics with Zosyn  Resume breast cancer medication once tolerating p.o.  Continue n.p.o. status for now  Change home Ativan  to IV  Increase Dilaudid to 1 mg better pain control  Hold home antihypertensives, resume as needed  Resume home hypothyroid medications once taking p.o.  Up to chair  Clinical course will dictate further management     Reviewed patients labs and imaging, and discussed with patient and nurse at bedside.    DVT prophylaxis:  Mechanical DVT prophylaxis orders are present.    CODE STATUS:   Code Status (Patient has no pulse and is not breathing): CPR (Attempt to Resuscitate)  Medical Interventions (Patient has pulse or is breathing): Full Support        Electronically signed by Augusto Ladd MD, 12/07/22, 11:41 AM EST.

## 2022-12-07 NOTE — THERAPY EVALUATION
Acute Care - Physical Therapy Initial Evaluation   [facility]     Patient Name: Derek Keller  : 1960  MRN: [id number]  Today's Date: 2022      Visit Dx:     ICD-10-CM ICD-9-CM   1. Peritonitis (HCC)  K65.9 567.9   2. Decreased activities of daily living (ADL)  Z78.9 V49.89   3. Bone metastases (HCC)  C79.51 198.5   4. Secondary malignant neoplasm of bone (HCC)  C79.51 198.5   5. Malignant neoplasm of upper-outer quadrant of left breast in female, estrogen receptor positive (HCC)  C50.412 174.4    Z17.0 V86.0   6. Chronic fatigue  R53.82 780.79   7. Hypoxemia  R09.02 799.02   8. Difficulty walking  R26.2 719.7     Patient Active Problem List   Diagnosis   • Other chest pain   • Hypertension   • Hyperlipidemia   • Allergic rhinitis   • Hypothyroidism   • Neck pain   • Lumbago   • Arthritis   • Chronic pain disorder   • History of IBS   • Anxiety   • Monopolar depression (HCC)   • Malaise and fatigue   • Tobacco abuse   • Fibromyalgia   • Vitamin B 12 deficiency   • Acute pain of left knee   • Primary osteoarthritis of left knee   • Osteoarthrosis, localized, primary, knee   • Generalized abdominal pain   • Herpes simplex vulvovaginitis   • Lightheadedness   • Cough   • Hypoxemia   • Restless leg syndrome   • Itch of skin   • Primary insomnia   • Chronic fatigue   • Asthma   • Body mass index (BMI) 26.0-26.9, adult   • Constipation   • Degeneration of lumbar intervertebral disc   • Disorder associated with Helicobacter species   • Hearing loss   • Impacted cerumen of right ear   • Inappropriate diet and eating habits   • Inguinal pain   • Irritable bowel syndrome   • Cervical spondylosis   • Obesity with body mass index 30 or greater   • Renal stone   • Malignant neoplasm of left breast in female, estrogen receptor positive (HCC)   • Cancer related pain   • Bone metastases (HCC)   • Secondary malignant neoplasm of bone (HCC)   • Peripheral edema   • Peritonitis (HCC)     Past Medical History:   Diagnosis  Date   • Abdominal pain    • Allergic rhinitis    • Anemia    • Anxiety    • Arteriosclerosis     Coronary   • Arthritis    • Bone metastases (HCC) 10/14/2022   • Bowen's disease    • Breast cancer (HCC)    • Cancer related pain 10/13/2022   • Chest pain    • Chronic pain disorder    • Cough    • Depression    • Dysphoric mood    • Fatigue    • Frequent urination    • History of colon polyps    • History of IBS    • History of kidney stones    • Hyperlipidemia    • Hypertension    • Hypothyroidism    • Insomnia    • Lumbago    • Malaise and fatigue    • Mood disorder (HCC)    • Neck pain    • Palpitations    • Peripheral edema 11/21/2022   • Precordial pain    • Sleep disturbance    • SOB (shortness of breath)      Past Surgical History:   Procedure Laterality Date   • BREAST BIOPSY     • CARDIAC CATHETERIZATION Left 1960   • CARDIAC CATHETERIZATION N/A 04/06/2018    Procedure: Coronary angiography;  Surgeon: Art Licea MD;  Location: [facility] INVASIVE LOCATION;  Service: Cardiovascular   • CARDIAC CATHETERIZATION N/A 04/06/2018    Procedure: Left heart cath;  Surgeon: Art Licea MD;  Location: [facility] INVASIVE LOCATION;  Service: Cardiovascular   • CARDIAC CATHETERIZATION N/A 04/06/2018    Procedure: Left ventriculography;  Surgeon: Art Licea MD;  Location: [facility] CATH INVASIVE LOCATION;  Service: Cardiovascular   • CHOLECYSTECTOMY     • COLONOSCOPY  11/08/2006   • EXPLORATORY LAPAROTOMY N/A 12/6/2022    Procedure: LAPAROTOMY EXPLORATORY sigmoid resection hartmans procedure colostomy;  Surgeon: Alfred Castañeda MD;  Location: [facility];  Service: General;  Laterality: N/A;   • GANGLION CYST EXCISION     • HYSTERECTOMY  05/2005   • LAPAROSCOPIC GASTRIC BANDING     • TONSILLECTOMY       PT Assessment (last 12 hours)     PT Evaluation and Treatment     Row Name 12/07/22 1324          Physical Therapy Time and Intention    Document Type evaluation  -AV     Mode of  Treatment individual therapy;physical therapy  -AV     Comment Patient educated on bracing technique when coughing/sneezing.  -AV     Row Name 12/07/22 1324          General Information    Patient Profile Reviewed yes  -AV     Prior Level of Function independent:;all household mobility;gait;transfer;ADL's  Ambulated with rollator. No home O2.  -AV     Existing Precautions/Restrictions fall;other (see comments)  BANDAR drain  -AV     Row Name 12/07/22 1324          Living Environment    Current Living Arrangements home  -AV     Home Accessibility stairs to enter home  -AV     People in Home alone  Reports sister can assist if needed  -AV     Row Name 12/07/22 1324          Home Main Entrance    Number of Stairs, Main Entrance two  -AV     Stair Railings, Main Entrance railings on both sides of stairs  -AV     Row Name 12/07/22 1324          Range of Motion (ROM)    Range of Motion bilateral lower extremities;ROM is WFL  -AV     Row Name 12/07/22 1324          Strength (Manual Muscle Testing)    Strength (Manual Muscle Testing) bilateral lower extremities;strength is WFL  -AV     Row Name 12/07/22 1324          Bed Mobility    Bed Mobility supine-sit  -AV     Supine-Sit [place] (Bed Mobility) minimum assist (75% patient effort)  -AV     Comment, (Bed Mobility) Patient educated on log roll technique for supine-sit transfer to minimize abdominal pain and stress on incision  -AV     Row Name 12/07/22 1324          Transfers    Transfers sit-stand transfer;stand-sit transfer  -AV     Row Name 12/07/22 1324          Sit-Stand Transfer    Sit-Stand [place] (Transfers) contact guard;minimum assist (75% patient effort)  -AV     Assistive Device (Sit-Stand Transfers) walker, front-wheeled  -AV     Row Name 12/07/22 1324          Stand-Sit Transfer    Stand-Sit [place] (Transfers) contact guard;minimum assist (75% patient effort)  -AV     Assistive Device (Stand-Sit Transfers) walker, front-wheeled  -AV     Row Name  12/07/22 1324          Gait/Stairs (Locomotion)    Gait/Stairs Locomotion gait/ambulation independence;gait/ambulation assistive device;distance ambulated  -AV     [place] Level (Gait) contact guard;minimum assist (75% patient effort)  -AV     Assistive Device (Gait) walker, front-wheeled  -AV     Distance in Feet (Gait) 3  bed to recliner  -AV     Row Name 12/07/22 1324          Safety Issues, Functional Mobility    Impairments Affecting Function (Mobility) balance;endurance/activity tolerance;pain  -AV     Row Name 12/07/22 1324          Balance    Balance Assessment standing dynamic balance  -AV     Dynamic Standing Balance contact guard;minimal assist  -AV     Position/Device Used, Standing Balance supported;walker, front-wheeled  -AV     Row Name             Wound 12/06/22 1906 midline abdomen Incision    Wound - Properties Group Placement Date: 12/06/22  -CK Placement Time: 1906  -CK Present on Hospital Admission: N  -CK Orientation: midline  -CK Location: abdomen  -CK Primary Wound Type: Incision  -CK    Retired Wound - Properties Group Placement Date: 12/06/22  -CK Placement Time: 1906  -CK Present on Hospital Admission: N  -CK Orientation: midline  -CK Location: abdomen  -CK Primary Wound Type: Incision  -CK    Retired Wound - Properties Group Date first assessed: 12/06/22  -CK Time first assessed: 1906  -CK Present on Hospital Admission: N  -CK Location: abdomen  -CK Primary Wound Type: Incision  -CK    Row Name 12/07/22 1345          Plan of Care Review    Plan of Care Reviewed With patient;sibling  -AV     Progress no change  -AV     Outcome Evaluation Patient presents with deficits in balance, endurance, transfers, and ambulation. Patient will benefit from skilled PT services to address these mobility deficits and decrease risk of falls. Skilled nursing facility is the primary discharge recommendation at this time. Will continue to monitor progress and patient may be able to discharge home with  home health and assist.  -AV     Row Name 12/07/22 1346          Therapy Assessment/Plan (PT)    Rehab Potential (PT) good, to achieve stated therapy goals  -AV     Criteria for Skilled Interventions Met (PT) yes;skilled treatment is necessary  -AV     Therapy Frequency (PT) daily  -AV     Problem List (PT) problems related to;balance;mobility;pain  -AV     Activity Limitations Related to Problem List (PT) unable to transfer safely;unable to ambulate safely  -AV     Row Name 12/07/22 1342          PT Evaluation Complexity    History, PT Evaluation Complexity no personal factors and/or comorbidities  -AV     Examination of Body Systems (PT Eval Complexity) total of 4 or more elements  -AV     Clinical Presentation (PT Evaluation Complexity) stable  -AV     Clinical Decision Making (PT Evaluation Complexity) low complexity  -AV     Overall Complexity (PT Evaluation Complexity) low complexity  -AV     Row Name 12/07/22 6163          Therapy Plan Review/Discharge Plan (PT)    Therapy Plan Review (PT) evaluation/treatment results reviewed;patient  -AV     Row Name 12/07/22 9679          Physical Therapy Goals    Bed Mobility Goal Selection (PT) bed mobility, PT goal 1  -AV     Transfer Goal Selection (PT) transfer, PT goal 1  -AV     Gait Training Goal Selection (PT) gait training, PT goal 1  -AV     Row Name 12/07/22 8947          Bed Mobility Goal 1 (PT)    Activity/Assistive Device (Bed Mobility Goal 1, PT) bed mobility activities, all  -AV     [place] Level/Cues Needed (Bed Mobility Goal 1, PT) modified independence  -AV     Time Frame (Bed Mobility Goal 1, PT) 10 days  -AV     Row Name 12/07/22 0030          Transfer Goal 1 (PT)    Activity/Assistive Device (Transfer Goal 1, PT) transfers, all;walker, rolling  -AV     [place] Level/Cues Needed (Transfer Goal 1, PT) modified independence  -AV     Time Frame (Transfer Goal 1, PT) 10 days  -AV     Row Name 12/07/22 2685          Gait Training Goal 1 (PT)     Activity/Assistive Device (Gait Training Goal 1, PT) gait (walking locomotion);assistive device use;walker, rolling  -AV     [place] Level (Gait Training Goal 1, PT) modified independence  -AV     Distance (Gait Training Goal 1, PT) 200  -AV     Time Frame (Gait Training Goal 1, PT) 10 days  -AV           User Key  (r) = Recorded By, (t) = Taken By, (c) = Cosigned By    Initials Name Provider Type    CK Odette Cruz, RN Registered Nurse    AV Jose Flores, PT Physical Therapist                Physical Therapy Education     Title: PT OT SLP Therapies (In Progress)     Topic: Physical Therapy (In Progress)     Point: Mobility training (Done)     Learning Progress Summary           Patient Acceptance, E,TB, VU by AV at 12/7/2022 1352                   Point: Home exercise program (Not Started)     Learner Progress:  Not documented in this visit.          Point: Body mechanics (Done)     Learning Progress Summary           Patient Acceptance, E,TB, VU by AV at 12/7/2022 1352                   Point: Precautions (Done)     Learning Progress Summary           Patient Acceptance, E,TB, VU by AV at 12/7/2022 1352                               User Key     Initials Effective Dates Name Provider Type Discipline    AV 06/11/21 -  Jose Flores, RAS Physical Therapist PT              PT Recommendation and Plan  Anticipated Discharge Disposition (PT): skilled nursing facility, home with home health  Planned Therapy Interventions (PT): balance training, bed mobility training, gait training, neuromuscular re-education, strengthening, transfer training  Therapy Frequency (PT): daily  Plan of Care Reviewed With: patient, sibling  Progress: no change  Outcome Evaluation: Patient presents with deficits in balance, endurance, transfers, and ambulation. Patient will benefit from skilled PT services to address these mobility deficits and decrease risk of falls. Skilled nursing facility is the primary discharge  recommendation at this time. Will continue to monitor progress and patient may be able to discharge home with home health and assist.   Outcome Measures     Row Name 12/07/22 1350             How much help from another person do you currently need...    Turning from your back to your side while in flat bed without using bedrails? 3  -AV      Moving from lying on back to sitting on the side of a flat bed without bedrails? 3  -AV      Moving to and from a bed to a chair (including a wheelchair)? 3  -AV      Standing up from a chair using your arms (e.g., wheelchair, bedside chair)? 3  -AV      Climbing 3-5 steps with a railing? 2  -AV      To walk in hospital room? 3  -AV      AM-PAC 6 Clicks Score (PT) 17  -AV         Functional Assessment    Outcome Measure Options AM-PAC 6 Clicks Basic Mobility (PT)  -AV            User Key  (r) = Recorded By, (t) = Taken By, (c) = Cosigned By    Initials Name Provider Type    AV Jose Flores, PT Physical Therapist                 Time Calculation:    PT Charges     Row Name 12/07/22 1350             Time Calculation    PT Received On 12/07/22  -AV      PT Goal Re-Cert Due Date 12/16/22  -AV         Untimed Charges    PT Eval/Re-eval Minutes 40  -AV         Total Minutes    Untimed Charges Total Minutes 40  -AV       Total Minutes 40  -AV            User Key  (r) = Recorded By, (t) = Taken By, (c) = Cosigned By    Initials Name Provider Type    Jose Montalvo, PT Physical Therapist              Therapy Charges for Today     Code Description Service Date Service Provider Modifiers Qty    [id number] HC PT EVAL LOW COMPLEXITY 3 12/7/2022 Jose Flores, PT GP 1          PT G-Codes  Outcome Measure Options: AM-PAC 6 Clicks Basic Mobility (PT)  AM-PAC 6 Clicks Score (PT): 17  AM-PAC 6 Clicks Score (OT): 15    Jose Flores PT  12/7/2022

## 2022-12-07 NOTE — OP NOTE
OP NOTE  LAPAROTOMY EXPLORATORY  Procedure Report    Patient Name:  Derek Keller  YOB: 1960  [id number]    Date of Surgery:  12/6/2022     Indications: See last clinic note for indications, discussion of risk benefits alternative    Pre-op Diagnosis:   Peritonitis (HCC) [K65.9]       Post-Op Diagnosis Codes:     * Peritonitis (HCC) [K65.9]    Procedure/CPT® Codes:      Procedure(s): Exploratory laparotomy with sigmoid colon resection, Lynn's closure of rectal stump, end colostomy    Staff:  Surgeon(s):  Alfred Castañeda MD    Assistant: Jada Koroma CSA    Anesthesia: General, Local    Estimated Blood Loss: 25 mL    Implants:    Implant Name Type Inv. Item Serial No.  Lot No. LRB No. Used Action   STPLR CUT ECHELONCONTOUR CRV 40MM GRN - [id number] Implant STPLR CUT ECHELONCONTOUR CRV 40MM GRN  [employer]  DIV OF J AND J [id number] N/A 1 Implanted   RELOAD STPLR LNR CUT PROX 75MM RAYA TCR75 - [id number] Implant RELOAD STPLR LNR CUT PROX 75MM RAYA TCR75  [employer]  DIV OF J AND J [id number] N/A 1 Implanted   STPLR LNR CUT PROX THK 75MM GRN TCT75 - [id number] Implant STPLR LNR CUT PROX THK 75MM GRN TCT75  [employer]  DIV OF J AND J [id number] N/A 1 Implanted       Specimen:          Specimens     ID Source Type Tests Collected By Collected At Frozen?    1 Abdominal Wall Body Fluid · ANAEROBIC CULTURE  · BODY FLUID CULTURE   Alfred Castañeda MD 12/6/22 1816     Description: ASCITES CULTURE AND SENSITIVITY    A Large Intestine, Sigmoid Colon Tissue · TISSUE PATHOLOGY EXAM   Alfred Castañeda MD 12/6/22 1844     Description: sigmoid colon            Findings: Stercoral perforation rectosigmoid.  Sigmoid resection with Lynn's closure of rectal stump with end colostomy.  BANDAR drain left in the pelvis.    Complications: None    Description of Procedure:   After all questions were answered, consent was verified.  She was brought to the operating room  per stretcher placed in supine position arms out all extremities padded.  Bilateral lower extremity SCDs placed.  General tracheal anesthesia induced.  Preoperative IV antibiotics given.  Rojas catheter placed.  Patient's abdomen prepped with ChloraPrep.  We waited 3 minutes.  Draped in usual sterile fashion.  Ioban applied.  Critical timeout taken.  Began the procedure with a midline incision.  I entered the abdomen sharply under direct vision without injury to viscera below.  Feculent ascites noted in the right lower quadrant.  A culture was taken.  I then examined the stomach with no evidence of perforation.  I then ran the small bowel from the ligament of Treitz to the cecum with no abnormality noted.  I then ran the colon from the cecum to the rectum.  A stercoral perforation was noted rectosigmoid.  Loose stool was in the pelvis.  This was removed.  I then made an opening under the lumen of the sigmoid colon proximal to the perforation and divided this with a LAI stapler blue load.  I then divided distal to the perforation with a contour stapler.  I then divided the mesentery under the lumen of the bowel with the LigaSure device.  Division hemostatic.  I oversewed the rectal staple line with interrupted Vicryl sutures.  I then mobilized the sigmoid colon lateral to medial to obtain length for the end colostomy.  Left ureter seen and preserved.  I then irrigated the pelvis with sterile water.  I then placed a BANDAR drain in the pelvis bringing this out through the right lower quadrant.  It was secured with a nylon suture.  He was placed to  bulb suction at the end of the case.  I then made a opening for the colostomy in the left mid abdomen using muscle-splitting technique through the rectus muscle dilated 2 finger breaths.  The sigmoid colon was brought out without twisting the mesentery.  We then changed gloves and handed off dirty instruments.  I infiltrated with local anesthesia bilateral upper quadrants in  a tap block fashion.  I closed the midline fascia with looped PDS x2.  I irrigated the subcu space with sterile water.  I closed the skin incision with staples leaving several openings packed with Nu Gauze to allow drainage.  We covered the midline incision.  I then matured the colostomy in a Leanne fashion with interrupted Vicryl sutures.  Colostomy appliance placed.  At the end the procedure all counts were correct.  I was present for the procedure.    Assistant: Jada Koroma CSA was responsible for performing the following activities: Retraction, Suction, Irrigation, Closing and Placing Dressing and their skilled assistance was necessary for the success of this case.    Alfred Castañeda MD     Date: 12/6/2022  Time: 19:37 EST

## 2022-12-07 NOTE — PROGRESS NOTES
"POST OP PROGRESS NOTE     Patient Name:  Derek Keller  YOB: 1960  [id number]   LOS: 1 day   1 Day Post-Op  Patient Care Team:  Haile Monique MD as PCP - General (Family Medicine)  Julien Cardona DO as Consulting Physician (Pain Medicine)  Joel Castillo MD as Consulting Physician (Gastroenterology)  Remington Russell MD as Surgeon (General Surgery)  Ahsan Salas MD as Consulting Physician (Sports Medicine)  Donald Barker MD as Consulting Physician (Hematology and Oncology)  Sandy Ricci MD as Consulting Physician (General Surgery)          Subjective     Interval History:  VSS, afebrile, sitting up in chair at bedside, reports still having a lot of abd pain, no output from colostomy      Review of Systems:    All complete review of systems was performed and all are negative except what is documented in the HPI.       Objective     Constitutuional:  well nourished, no acute distress, appear stated age /52   Pulse 76   Temp 99.8 °F (37.7 °C)   Resp 18   Ht 167.6 cm (65.98\")   Wt 80.5 kg (177 lb 7.5 oz)   LMP  (LMP Unknown)   SpO2 97%   BMI 28.66 kg/m²    Eyes:  anicteric sclerae, moist conjunctivae, no lid lag, PERRLA  ENMT:  oropharynx clear, moist mucous membranes, good dentition  Neck:   full ROM, trachea midline, no thyromegaly  Cardiovascular: RRR, S1 and S2 present, no MRG's, heart rate 76, no pedal edema  Respiratory: lungs CTA, respirations even and unlabored  GI:  Abdomen soft, appropriately TTP, incision with dressing intact,  Colostomy in place, nondistended, no HSM     Skin:  warm and dry, normal turgor, no rashes  Psychiatric:  alert and oriented x3, intact judgement and insight, cooperative          Results Review:       I reviewed the patient's new clinical results including  CBC and BMp.     WBC   Date Value Ref Range Status   12/07/2022 1.54 (C) 3.40 - 10.80 10*3/mm3 Final     RBC   Date Value Ref Range Status   12/07/2022 3.39 (L) 3.77 - 5.28 " 10*6/mm3 Final     Hemoglobin   Date Value Ref Range Status   12/07/2022 10.5 (L) 12.0 - 15.9 g/dL Final     Hematocrit   Date Value Ref Range Status   12/07/2022 32.7 (L) 34.0 - 46.6 % Final     MCV   Date Value Ref Range Status   12/07/2022 96.5 79.0 - 97.0 fL Final     MCH   Date Value Ref Range Status   12/07/2022 31.0 26.6 - 33.0 pg Final     MCHC   Date Value Ref Range Status   12/07/2022 32.1 31.5 - 35.7 g/dL Final     RDW   Date Value Ref Range Status   12/07/2022 17.4 (H) 12.3 - 15.4 % Final     RDW-SD   Date Value Ref Range Status   12/07/2022 61.5 (H) 37.0 - 54.0 fl Final     MPV   Date Value Ref Range Status   12/07/2022 10.0 6.0 - 12.0 fL Final     Platelets   Date Value Ref Range Status   12/07/2022 140 140 - 450 10*3/mm3 Final     Neutrophil %   Date Value Ref Range Status   12/07/2022 85.1 (H) 42.7 - 76.0 % Final     Lymphocyte %   Date Value Ref Range Status   12/07/2022 9.1 (L) 19.6 - 45.3 % Final     Monocyte %   Date Value Ref Range Status   12/07/2022 4.5 (L) 5.0 - 12.0 % Final     Eosinophil %   Date Value Ref Range Status   12/07/2022 0.0 (L) 0.3 - 6.2 % Final     Basophil %   Date Value Ref Range Status   12/07/2022 1.3 0.0 - 1.5 % Final     Immature Grans %   Date Value Ref Range Status   12/07/2022 0.0 0.0 - 0.5 % Final     Neutrophils, Absolute   Date Value Ref Range Status   12/07/2022 1.31 (L) 1.70 - 7.00 10*3/mm3 Final     Lymphocytes, Absolute   Date Value Ref Range Status   12/07/2022 0.14 (L) 0.70 - 3.10 10*3/mm3 Final     Monocytes, Absolute   Date Value Ref Range Status   12/07/2022 0.07 (L) 0.10 - 0.90 10*3/mm3 Final     Eosinophils, Absolute   Date Value Ref Range Status   12/07/2022 0.00 0.00 - 0.40 10*3/mm3 Final     Basophils, Absolute   Date Value Ref Range Status   12/07/2022 0.02 0.00 - 0.20 10*3/mm3 Final     Immature Grans, Absolute   Date Value Ref Range Status   12/07/2022 0.00 0.00 - 0.05 10*3/mm3 Final     nRBC   Date Value Ref Range Status   12/07/2022 0.0 0.0 - 0.2  /100 WBC Final         Basic Metabolic Panel    Sodium Sodium   Date Value Ref Range Status   12/07/2022 139 136 - 145 mmol/L Final   12/06/2022 137 136 - 145 mmol/L Final      Potassium Potassium   Date Value Ref Range Status   12/07/2022 3.9 3.5 - 5.2 mmol/L Final   12/06/2022 4.8 3.5 - 5.2 mmol/L Final     Comment:     Slight hemolysis detected by analyzer. Results may be affected.      Chloride Chloride   Date Value Ref Range Status   12/07/2022 105 98 - 107 mmol/L Final   12/06/2022 104 98 - 107 mmol/L Final      Bicarbonate No results found for: PLASMABICARB   BUN BUN   Date Value Ref Range Status   12/07/2022 14 8 - 23 mg/dL Final   12/06/2022 17 8 - 23 mg/dL Final      Creatinine Creatinine   Date Value Ref Range Status   12/07/2022 0.68 0.57 - 1.00 mg/dL Final   12/06/2022 0.66 0.57 - 1.00 mg/dL Final      Calcium Calcium   Date Value Ref Range Status   12/07/2022 7.6 (L) 8.6 - 10.5 mg/dL Final   12/06/2022 8.7 8.6 - 10.5 mg/dL Final      Glucose      No components found for: GLUCOSE.*       Lab Results   Component Value Date    GLUCOSE 129 (H) 12/07/2022    BUN 14 12/07/2022    CREATININE 0.68 12/07/2022    EGFRIFNONA 75 11/12/2019    BCR 20.6 12/07/2022    K 3.9 12/07/2022    CO2 23.2 12/07/2022    CALCIUM 7.6 (L) 12/07/2022    ALBUMIN 3.50 12/07/2022    AST 42 (H) 12/07/2022    ALT 59 (H) 12/07/2022       IMAGING:  Imaging Results (Last 72 Hours)     Procedure Component Value Units Date/Time    CT Abdomen Pelvis With Contrast [[id number]] Collected: 12/06/22 1613     Updated: 12/06/22 1616    Narrative:      PROCEDURE: CT ABDOMEN PELVIS W CONTRAST     COMPARISON: [facility], CT, CT ABDOMEN PELVIS W WO CONTRAST, 8/05/2022,   11:48.     INDICATIONS: abdominal pain, metastatic breast cancer     TECHNIQUE: After obtaining the patient's consent, CT images were created with non-ionic intravenous   contrast material.       PROTOCOL:   Standard imaging protocol performed      RADIATION:    DLP: 979.8mGy*cm    Automated exposure control was utilized to minimize radiation dose.   CONTRAST: 100cc Isovue 370 I.V.  LABS:   eGFR: 93ml/min/1.73m2     FINDINGS:   Mostly linear opacities in the dependent lower lobes are likely due to subsegmental atelectasis.    Gallbladder is surgically absent.  Intrahepatic/extrahepatic biliary ductal dilatation is mildly   increased compared to 8/5/2022 CT.  Extrahepatic common bile duct measures up to 1.5 cm, previously   1.2 cm.  No definite cause of biliary obstruction is visualized on CT.  There is mild dilatation of   the main pancreatic duct.  Bilateral renal cysts are stable.  Adrenal glands and spleen appear   unremarkable.     No abnormal bowel distention is seen, but there is mild wall thickening of distal small bowel in   the right lower quadrant with small amount of interloop fluid noted.  There is a small amount of   free pelvic fluid, as well as fluid in the right paracolic gutter and perihepatic fluid.  There are   few foci of free intraperitoneal air (anterior to liver on axial image 18 and 20, anterior abdomen   on axial image 46), as well as foci of extraluminal air adjacent to distal small bowel loops in the   pelvis.  No pneumatosis or mesenteric/portal venous gas is seen.  Appendix is nondilated, and there   is no significant periappendiceal inflammation.  There is stool throughout the colon.     No urinary bladder wall thickening is seen.  No adenopathy is identified in the abdomen or pelvis.    There are atherosclerotic vascular calcifications.  Numerous sclerotic lesions are redemonstrated,   consistent with osseous metastatic disease.  No pathologic fractures are seen.       Impression:         1. Wall thickening/enhancement of distal small bowel in the right lower quadrant with a small   amount of interloop fluid, as well as a small amount of free pelvic fluid, right paracolic gutter   fluid, and perihepatic fluid, concerning for nonspecific  enteritis or possible bowel ischemia.  2. Few foci of free intraperitoneal air, which may be from perforation of abnormal distal small   bowel, given the location of several foci of extraluminal air adjacent to distal small bowel loops.    Recommend surgical consultation.  3. Mildly increased intrahepatic/extrahepatic biliary ductal dilatation without obvious cause of   obstruction.  4. Diffuse osseous metastatic disease.        This report was communicated by telephone to Dr. Lozada at the dictation time shown below.        STEPHANIE STORY MD         Electronically Signed and Approved By: STEPHANIE STORY MD on 12/06/2022 at 16:13                           Medications:    Current Facility-Administered Medications:   •  acetaminophen (TYLENOL) tablet 650 mg, 650 mg, Nasogastric, Q4H PRN **OR** acetaminophen (TYLENOL) 160 MG/5ML solution 650 mg, 650 mg, Nasogastric, Q4H PRN **OR** acetaminophen (TYLENOL) suppository 650 mg, 650 mg, Rectal, Q4H PRN, Augusto Ladd MD  •  famotidine (PEPCID) injection 20 mg, 20 mg, Intravenous, Q12H, Alfred Castañeda MD, 20 mg at 12/07/22 0846  •  HYDROmorphone (DILAUDID) injection 0.5 mg, 0.5 mg, Intravenous, Q2H PRN, 0.5 mg at 12/07/22 0909 **AND** naloxone (NARCAN) injection 0.4 mg, 0.4 mg, Intravenous, Q5 Min PRN, Danny Reina Jr., MD  •  lactated ringers infusion, 50 mL/hr, Intravenous, Continuous, Danny Reina Jr., MD, Last Rate: 50 mL/hr at 12/06/22 1712, New Bag at 12/06/22 1838  •  nitroglycerin (NITROSTAT) SL tablet 0.4 mg, 0.4 mg, Sublingual, Q5 Min PRN, Danny Reina Jr., MD  •  ondansetron (ZOFRAN) injection 4 mg, 4 mg, Intravenous, Q6H PRN, Danny Reina Jr., MD  •  Pharmacy to Dose Zosyn, , Does not apply, Continuous PRN, Danny Reina Jr., MD  •  phenol (CHLORASEPTIC) 1.4 % liquid 1 spray, 1 spray, Mouth/Throat, Q2H PRN, Danny Reina Jr., MD, 1 spray at 12/06/22 0532  •  piperacillin-tazobactam (ZOSYN) 3.375 g in iso-osmotic dextrose 50 ml  (premix), 3.375 g, Intravenous, Q6H, Danny Reina Jr., MD, 3.375 g at 12/07/22 0846  •  sodium chloride 0.9 % flush 10 mL, 10 mL, Intravenous, PRN, Danny Reina Jr., MD  •  sodium chloride 0.9 % flush 10 mL, 10 mL, Intravenous, PRN, Danny Reina Jr., MD  •  sodium chloride 0.9 % flush 10 mL, 10 mL, Intravenous, Q12H, Danny Reina Jr., MD, 10 mL at 12/07/22 0846  •  sodium chloride 0.9 % infusion 40 mL, 40 mL, Intravenous, PRMATEUS, Danny Reina Jr., MD    Assessment & Plan       Peritonitis (HCC)    S/P Exploratory laparotomy with sigmoid colon resection, Lynn's closure of rectal stump, end colostomy   -ambulate   -CBC and BMP in AM   -keep NG tube until bowel function returns          Electronically signed by Christa Harrell, HIEU, 12/07/22, 10:02 AM EST.

## 2022-12-07 NOTE — ANESTHESIA POSTPROCEDURE EVALUATION
Patient: Derek Keller    Procedure Summary     Date: 12/06/22 Room / Location: [facility] OR 08 / [facility] MAIN OR    Anesthesia Start: 1740 Anesthesia Stop: 1945    Procedure: LAPAROTOMY EXPLORATORY sigmoid resection hartmans procedure colostomy (Abdomen) Diagnosis:       Peritonitis (HCC)      (Peritonitis (HCC) [K65.9])    Surgeons: Alferd Castañeda MD Provider: Frieda Zuñiga CRNA    Anesthesia Type: general ASA Status: 4 - Emergent          Anesthesia Type: general    Vitals  Vitals Value Taken Time   /58 12/06/22 2039   Temp 37.1 °C (98.8 °F) 12/06/22 2018   Pulse 78 12/06/22 2047   Resp 18 12/06/22 2037   SpO2 98 % 12/06/22 2047           Post Anesthesia Care and Evaluation    Patient location during evaluation: bedside  Patient participation: complete - patient participated  Level of consciousness: awake    Airway patency: patent  PONV Status: none  Cardiovascular status: acceptable  Respiratory status: acceptable  Hydration status: acceptable

## 2022-12-07 NOTE — CASE MANAGEMENT/SOCIAL WORK
Patient requires frequent changes in body positioning in order to alleviate pain. This cannot be accomplished with ordinary bed. Therefore, patient will need hospital bed for home use as she has an immediate need for frequent changes in body positioning

## 2022-12-08 ENCOUNTER — APPOINTMENT (OUTPATIENT)
Dept: RADIATION ONCOLOGY | Facility: HOSPITAL | Age: 62
End: 2022-12-08
Payer: MEDICARE

## 2022-12-08 ENCOUNTER — SPECIALTY PHARMACY (OUTPATIENT)
Dept: PHARMACY | Facility: HOSPITAL | Age: 62
End: 2022-12-08

## 2022-12-08 ENCOUNTER — DOCUMENTATION (OUTPATIENT)
Dept: RADIATION ONCOLOGY | Facility: HOSPITAL | Age: 62
End: 2022-12-08

## 2022-12-08 PROBLEM — Z98.890 S/P EXPLORATORY LAPAROTOMY: Status: ACTIVE | Noted: 2022-12-08

## 2022-12-08 PROBLEM — D72.819 LEUKOPENIA: Status: ACTIVE | Noted: 2022-12-08

## 2022-12-08 PROBLEM — C50.919 METASTATIC BREAST CANCER: Status: ACTIVE | Noted: 2022-12-08

## 2022-12-08 LAB
ALBUMIN SERPL-MCNC: 3.8 G/DL (ref 3.5–5.2)
ALBUMIN/GLOB SERPL: 1.4 G/DL
ALP SERPL-CCNC: 71 U/L (ref 39–117)
ALT SERPL W P-5'-P-CCNC: 36 U/L (ref 1–33)
ANION GAP SERPL CALCULATED.3IONS-SCNC: 10.4 MMOL/L (ref 5–15)
ANISOCYTOSIS BLD QL: NORMAL
AST SERPL-CCNC: 23 U/L (ref 1–32)
BASOPHILS # BLD AUTO: 0.04 10*3/MM3 (ref 0–0.2)
BASOPHILS NFR BLD AUTO: 1 % (ref 0–1.5)
BILIRUB SERPL-MCNC: 0.5 MG/DL (ref 0–1.2)
BUN SERPL-MCNC: 14 MG/DL (ref 8–23)
BUN/CREAT SERPL: 19.4 (ref 7–25)
CALCIUM SPEC-SCNC: 8.3 MG/DL (ref 8.6–10.5)
CHLORIDE SERPL-SCNC: 104 MMOL/L (ref 98–107)
CO2 SERPL-SCNC: 23.6 MMOL/L (ref 22–29)
CREAT SERPL-MCNC: 0.72 MG/DL (ref 0.57–1)
D-LACTATE SERPL-SCNC: 1.3 MMOL/L (ref 0.5–2)
DEPRECATED RDW RBC AUTO: 62.1 FL (ref 37–54)
EGFRCR SERPLBLD CKD-EPI 2021: 94.1 ML/MIN/1.73
EOSINOPHIL # BLD AUTO: 0.01 10*3/MM3 (ref 0–0.4)
EOSINOPHIL NFR BLD AUTO: 0.2 % (ref 0.3–6.2)
ERYTHROCYTE [DISTWIDTH] IN BLOOD BY AUTOMATED COUNT: 17.9 % (ref 12.3–15.4)
GLOBULIN UR ELPH-MCNC: 2.8 GM/DL
GLUCOSE SERPL-MCNC: 96 MG/DL (ref 65–99)
HCT VFR BLD AUTO: 33.3 % (ref 34–46.6)
HGB BLD-MCNC: 10.9 G/DL (ref 12–15.9)
IMM GRANULOCYTES # BLD AUTO: 0.03 10*3/MM3 (ref 0–0.05)
IMM GRANULOCYTES NFR BLD AUTO: 0.7 % (ref 0–0.5)
LYMPHOCYTES # BLD AUTO: 0.53 10*3/MM3 (ref 0.7–3.1)
LYMPHOCYTES NFR BLD AUTO: 12.7 % (ref 19.6–45.3)
MAGNESIUM SERPL-MCNC: 2.2 MG/DL (ref 1.6–2.4)
MCH RBC QN AUTO: 31.3 PG (ref 26.6–33)
MCHC RBC AUTO-ENTMCNC: 32.7 G/DL (ref 31.5–35.7)
MCV RBC AUTO: 95.7 FL (ref 79–97)
MONOCYTES # BLD AUTO: 0.13 10*3/MM3 (ref 0.1–0.9)
MONOCYTES NFR BLD AUTO: 3.1 % (ref 5–12)
NEUTROPHILS NFR BLD AUTO: 3.42 10*3/MM3 (ref 1.7–7)
NEUTROPHILS NFR BLD AUTO: 82.3 % (ref 42.7–76)
NRBC BLD AUTO-RTO: 0 /100 WBC (ref 0–0.2)
PHOSPHATE SERPL-MCNC: 1.2 MG/DL (ref 2.5–4.5)
PLATELET # BLD AUTO: 151 10*3/MM3 (ref 140–450)
PMV BLD AUTO: 10.2 FL (ref 6–12)
POTASSIUM SERPL-SCNC: 3.6 MMOL/L (ref 3.5–5.2)
PROT SERPL-MCNC: 6.6 G/DL (ref 6–8.5)
QT INTERVAL: 428 MS
RBC # BLD AUTO: 3.48 10*6/MM3 (ref 3.77–5.28)
SMALL PLATELETS BLD QL SMEAR: NORMAL
SODIUM SERPL-SCNC: 138 MMOL/L (ref 136–145)
WBC MORPH BLD: NORMAL
WBC NRBC COR # BLD: 4.16 10*3/MM3 (ref 3.4–10.8)

## 2022-12-08 PROCEDURE — 25010000002 PIPERACILLIN SOD-TAZOBACTAM PER 1 G: Performed by: INTERNAL MEDICINE

## 2022-12-08 PROCEDURE — 25010000002 LORAZEPAM PER 2 MG: Performed by: INTERNAL MEDICINE

## 2022-12-08 PROCEDURE — 85025 COMPLETE CBC W/AUTO DIFF WBC: CPT | Performed by: PHYSICIAN ASSISTANT

## 2022-12-08 PROCEDURE — 99233 SBSQ HOSP IP/OBS HIGH 50: CPT | Performed by: INTERNAL MEDICINE

## 2022-12-08 PROCEDURE — 83735 ASSAY OF MAGNESIUM: CPT | Performed by: INTERNAL MEDICINE

## 2022-12-08 PROCEDURE — 99024 POSTOP FOLLOW-UP VISIT: CPT | Performed by: SURGERY

## 2022-12-08 PROCEDURE — 94761 N-INVAS EAR/PLS OXIMETRY MLT: CPT

## 2022-12-08 PROCEDURE — 83605 ASSAY OF LACTIC ACID: CPT | Performed by: PHYSICIAN ASSISTANT

## 2022-12-08 PROCEDURE — 25010000002 HALOPERIDOL LACTATE PER 5 MG: Performed by: PHYSICIAN ASSISTANT

## 2022-12-08 PROCEDURE — 25010000002 HYDROMORPHONE 1 MG/ML SOLUTION: Performed by: NURSE PRACTITIONER

## 2022-12-08 PROCEDURE — 97110 THERAPEUTIC EXERCISES: CPT

## 2022-12-08 PROCEDURE — 94799 UNLISTED PULMONARY SVC/PX: CPT

## 2022-12-08 PROCEDURE — 84100 ASSAY OF PHOSPHORUS: CPT | Performed by: INTERNAL MEDICINE

## 2022-12-08 PROCEDURE — 25010000002 HYDROMORPHONE 1 MG/ML SOLUTION: Performed by: INTERNAL MEDICINE

## 2022-12-08 PROCEDURE — 80053 COMPREHEN METABOLIC PANEL: CPT | Performed by: PHYSICIAN ASSISTANT

## 2022-12-08 PROCEDURE — 85007 BL SMEAR W/DIFF WBC COUNT: CPT | Performed by: PHYSICIAN ASSISTANT

## 2022-12-08 PROCEDURE — 25010000002 LORAZEPAM PER 2 MG: Performed by: HOSPITALIST

## 2022-12-08 RX ORDER — SODIUM PHOSPHATE IN D5W 15MMOL/250
15 PLASTIC BAG, INJECTION (ML) INTRAVENOUS ONCE
Status: COMPLETED | OUTPATIENT
Start: 2022-12-08 | End: 2022-12-08

## 2022-12-08 RX ORDER — NALOXONE HCL 0.4 MG/ML
0.4 VIAL (ML) INJECTION
Status: DISCONTINUED | OUTPATIENT
Start: 2022-12-08 | End: 2022-12-12

## 2022-12-08 RX ORDER — HALOPERIDOL 5 MG/ML
1 INJECTION INTRAMUSCULAR ONCE
Status: COMPLETED | OUTPATIENT
Start: 2022-12-08 | End: 2022-12-08

## 2022-12-08 RX ORDER — LORAZEPAM 2 MG/ML
1 INJECTION INTRAMUSCULAR EVERY 6 HOURS
Status: DISCONTINUED | OUTPATIENT
Start: 2022-12-08 | End: 2022-12-12

## 2022-12-08 RX ADMIN — LORAZEPAM 0.5 MG: 2 INJECTION INTRAMUSCULAR; INTRAVENOUS at 00:44

## 2022-12-08 RX ADMIN — HYDROMORPHONE HYDROCHLORIDE 1 MG: 1 INJECTION, SOLUTION INTRAMUSCULAR; INTRAVENOUS; SUBCUTANEOUS at 07:05

## 2022-12-08 RX ADMIN — SODIUM PHOSPHATE, MONOBASIC, MONOHYDRATE AND SODIUM PHOSPHATE, DIBASIC, ANHYDROUS 15 MMOL: 276; 142 INJECTION, SOLUTION INTRAVENOUS at 17:16

## 2022-12-08 RX ADMIN — FAMOTIDINE 20 MG: 10 INJECTION INTRAVENOUS at 21:18

## 2022-12-08 RX ADMIN — LORAZEPAM 0.5 MG: 2 INJECTION INTRAMUSCULAR; INTRAVENOUS at 07:39

## 2022-12-08 RX ADMIN — TAZOBACTAM SODIUM AND PIPERACILLIN SODIUM 3.38 G: 375; 3 INJECTION, SOLUTION INTRAVENOUS at 07:39

## 2022-12-08 RX ADMIN — LORAZEPAM 1 MG: 2 INJECTION INTRAMUSCULAR; INTRAVENOUS at 19:19

## 2022-12-08 RX ADMIN — TAZOBACTAM SODIUM AND PIPERACILLIN SODIUM 3.38 G: 375; 3 INJECTION, SOLUTION INTRAVENOUS at 03:11

## 2022-12-08 RX ADMIN — NICOTINE 1 PATCH: 21 PATCH, EXTENDED RELEASE TRANSDERMAL at 08:03

## 2022-12-08 RX ADMIN — TAZOBACTAM SODIUM AND PIPERACILLIN SODIUM 3.38 G: 375; 3 INJECTION, SOLUTION INTRAVENOUS at 21:18

## 2022-12-08 RX ADMIN — Medication 10 ML: at 08:04

## 2022-12-08 RX ADMIN — HYDROMORPHONE HYDROCHLORIDE 1 MG: 1 INJECTION, SOLUTION INTRAMUSCULAR; INTRAVENOUS; SUBCUTANEOUS at 03:10

## 2022-12-08 RX ADMIN — HYDROMORPHONE HYDROCHLORIDE 1 MG: 1 INJECTION, SOLUTION INTRAMUSCULAR; INTRAVENOUS; SUBCUTANEOUS at 20:53

## 2022-12-08 RX ADMIN — LORAZEPAM 1 MG: 2 INJECTION INTRAMUSCULAR; INTRAVENOUS at 12:44

## 2022-12-08 RX ADMIN — HALOPERIDOL LACTATE 1 MG: 5 INJECTION, SOLUTION INTRAMUSCULAR at 04:24

## 2022-12-08 RX ADMIN — HYDROMORPHONE HYDROCHLORIDE 1 MG: 1 INJECTION, SOLUTION INTRAMUSCULAR; INTRAVENOUS; SUBCUTANEOUS at 18:20

## 2022-12-08 RX ADMIN — HYDROMORPHONE HYDROCHLORIDE 1 MG: 1 INJECTION, SOLUTION INTRAMUSCULAR; INTRAVENOUS; SUBCUTANEOUS at 14:24

## 2022-12-08 RX ADMIN — FAMOTIDINE 20 MG: 10 INJECTION INTRAVENOUS at 08:04

## 2022-12-08 RX ADMIN — HYDROMORPHONE HYDROCHLORIDE 1 MG: 1 INJECTION, SOLUTION INTRAMUSCULAR; INTRAVENOUS; SUBCUTANEOUS at 12:00

## 2022-12-08 RX ADMIN — LORAZEPAM 0.5 MG: 2 INJECTION INTRAMUSCULAR; INTRAVENOUS at 01:37

## 2022-12-08 RX ADMIN — Medication 10 ML: at 21:18

## 2022-12-08 RX ADMIN — TAZOBACTAM SODIUM AND PIPERACILLIN SODIUM 3.38 G: 375; 3 INJECTION, SOLUTION INTRAVENOUS at 13:46

## 2022-12-08 NOTE — PROGRESS NOTES
Specialty Pharmacy Patient Management Program  Oncology Refill Outreach      Derek is a 62 y.o. female contacted today regarding refills of her medication(s).    Specialty medication(s) and dose(s) confirmed: Kisqali 600mg. Sent to [facility].Patient stated she will .    Other medications being refilled: N/A    Refill Questions    Flowsheet Row Most Recent Value   Changes to allergies? No   Changes to medications? No   New conditions since last clinic visit Yes   Unplanned office visit, urgent care, ED, or hospital admission in the last 4 weeks  Yes   How does patient/caregiver feel medication is working? Good   Financial problems or insurance changes  No   Since the previous refill, were any specialty medication doses or scheduled injections missed or delayed?  Yes   If yes, please provide the amount 4 days   Why were doses missed? She is inpatient   Does this patient require a clinical escalation to a pharmacist? Yes          Delivery Questions    Flowsheet Row Most Recent Value   Delivery method  at Pharmacy   Delivery address correct? Yes   Delivery phone number [phone]   Number of medications in delivery 1   Medication being filled and delivered Kisqali (600 MG Dose)   Doses left of specialty medications 4 days   Is there any medication that is due not being filled? No   Supplies needed? No supplies needed   Cooler needed? No   Do any medications need mixed or dated? No   Copay form of payment Pay at pickup   Additional comments Zero copay   Questions or concerns for the pharmacist? Yes   Explain any questions or concerns for the pharmacist Review inpatient stay   Are any medications first time fills? No                 Follow-up: 21 days     Maria Luz Frazier  Care Coordinator, [facility]  12/8/2022  15:38 EST

## 2022-12-08 NOTE — PROGRESS NOTES
[facility]   Hospitalist Progress Note  Date: 2022  Patient Name: Derek Keller  : 1960  MRN: [id number]  Date of admission: 2022    Subjective   Subjective     Chief Complaint:   Abdominal pain    Summary:   Derek Keller is a 62 y.o. female past medical history of metastatic breast cancer that presents to the emergency department earlier today for evaluation of sudden onset abdominal pain.  She described as sharp, left lower quadrant rating to the right lower quadrant, constant, no known aggravating alleviating factors.  She had associated nausea but no vomiting.  She denies any fevers, chills, sweats, chest pain or shortness of breath, palpitations, diarrhea constipation, dysuria, weakness, rash.  In the emergency department patient underwent CT scan which showed enteritis versus possible bowel ischemia and some intraperitoneal free air.  Surgery was called and patient was taken to the operating room.  She underwent sigmoid colon resection and colostomy formation.  She has been placed on Zosyn and admitted for ongoing monitoring and management.    Interval Followup:   No acute events overnight, patient still complains of abdominal pain, patient states that pain medicine does take the edge off at times.  Patient states that she usually takes 1.5 mg of Ativan at home in the morning and 0.5 mg at night, she states that she is still anxious and the current dose that we have her on is not helping much.    Review of Systems  Positive nausea, no vomiting  Positive abdominal pain  No fever no chills    Objective   Objective     Vitals:   Temp:  [98 °F (36.7 °C)-99.7 °F (37.6 °C)] 99 °F (37.2 °C)  Heart Rate:  [62-78] 76  Resp:  [16-18] 16  BP: (118-157)/(53-91) 151/63  Flow (L/min):  [2] 2    Physical Exam   General appearance: NAD, conversant   Eyes: anicteric sclerae, moist conjunctivae; no lid-lag; PERRLA  HENT: Atraumatic; oropharynx clear with moist mucous membranes and no mucosal  ulcerations; normal hard and soft palate  Neck: Trachea midline; FROM, supple, no thyromegaly or lymphadenopathy  Lungs: CTA, with normal respiratory effort and no intercostal retractions  CV: Regular Rate and Rhythm, no Murmurs, Rubs, or Gallops   Abdomen: Soft, non-tender; no masses or hepatosplenomegaly, postsurgical changes of the abdomen noted  Extremities: No peripheral edema or extremity lymphadenopathy  Skin: Normal temperature, turgor and texture; no rash, ulcers or subcutaneous nodules  Psych: Appropriate affect, alert and oriented to person, place and time  Neuro: CN II - XII intact no motor deficits, no sensory deficits    Result Review    Result Review:  I have personally reviewed the results as below  [x]  Laboratory  CBC    CBC 11/23/22 12/6/22 12/7/22   WBC 6.86 1.12 (A) 1.54 (A)   RBC 3.62 (A) 3.88 3.39 (A)   Hemoglobin 11.2 (A) 12.2 10.5 (A)   Hematocrit 34.8 37.8 32.7 (A)   MCV 96.1 97.4 (A) 96.5   MCH 30.9 31.4 31.0   MCHC 32.2 32.3 32.1   RDW 16.6 (A) 17.3 (A) 17.4 (A)   Platelets 311 160 140   (A) Abnormal value            CMP    CMP 11/23/22 12/6/22 12/7/22   Glucose 87 127 (A) 129 (A)   BUN 26 (A) 17 14   Creatinine 0.89 0.66 0.68   Sodium 140 137 139   Potassium 4.1 4.8 3.9   Chloride 101 104 105   Calcium 9.2 8.7 7.6 (A)   Albumin 4.50 3.90 3.50   Total Bilirubin 0.3 0.4 0.6   Alkaline Phosphatase 71 96 65   AST (SGOT) 14 29 42 (A)   ALT (SGPT) 12 36 (A) 59 (A)   (A) Abnormal value       Comments are available for some flowsheets but are not being displayed.           []  Microbiology  []  Radiology  []  EKG/Telemetry   []  Cardiology/Vascular   []  Pathology  []  Old records  []  Other:    Assessment & Plan   Assessment / Plan     Assessment:  Peritonitis, stercoral perforation  Metastatic breast cancer  Leukopenia  Hypertension  Hypothyroidism  Abdominal pain  Ileus    Plan:  • Patient admitted to the hospital for further work-up and management of above  • Patient postop day 2  • Continue  broad-spectrum antibiotics with Zosyn, follow-up cultures resume breast cancer medication once tolerating p.o.  • Continue n.p.o. status for now,  • Increase Ativan to 1 mg every 6 hours  • Continue Dilaudid to 1 mg better pain control  • Continue to hold home antihypertensives, resume as needed  • Resume home hypothyroid medications once taking p.o.  • Up to chair as able  • Clinical course will dictate further management     Reviewed patients labs and imaging, and discussed with patient and nurse at bedside.    DVT prophylaxis:  Mechanical DVT prophylaxis orders are present.    CODE STATUS:   Code Status (Patient has no pulse and is not breathing): CPR (Attempt to Resuscitate)  Medical Interventions (Patient has pulse or is breathing): Full Support        Electronically signed by Augusto Ladd MD, 12/08/22, 10:50 AM EST.

## 2022-12-08 NOTE — THERAPY TREATMENT NOTE
Acute Care - Physical Therapy Treatment Note   [place]     Patient Name: Derek Keller  : 1960  MRN: [id number]  Today's Date: 2022      Visit Dx:     ICD-10-CM ICD-9-CM   1. Peritonitis (HCC)  K65.9 567.9   2. Decreased activities of daily living (ADL)  Z78.9 V49.89   3. Bone metastases (HCC)  C79.51 198.5   4. Secondary malignant neoplasm of bone (HCC)  C79.51 198.5   5. Malignant neoplasm of upper-outer quadrant of left breast in female, estrogen receptor positive (HCC)  C50.412 174.4    Z17.0 V86.0   6. Chronic fatigue  R53.82 780.79   7. Hypoxemia  R09.02 799.02   8. Difficulty walking  R26.2 719.7     Patient Active Problem List   Diagnosis   • Other chest pain   • Hypertension   • Hyperlipidemia   • Allergic rhinitis   • Hypothyroidism   • Neck pain   • Lumbago   • Arthritis   • Chronic pain disorder   • History of IBS   • Anxiety   • Monopolar depression (HCC)   • Malaise and fatigue   • Tobacco abuse   • Fibromyalgia   • Vitamin B 12 deficiency   • Acute pain of left knee   • Primary osteoarthritis of left knee   • Osteoarthrosis, localized, primary, knee   • Generalized abdominal pain   • Herpes simplex vulvovaginitis   • Lightheadedness   • Cough   • Hypoxemia   • Restless leg syndrome   • Itch of skin   • Primary insomnia   • Chronic fatigue   • Asthma   • Body mass index (BMI) 26.0-26.9, adult   • Constipation   • Degeneration of lumbar intervertebral disc   • Disorder associated with Helicobacter species   • Hearing loss   • Impacted cerumen of right ear   • Inappropriate diet and eating habits   • Inguinal pain   • Irritable bowel syndrome   • Cervical spondylosis   • Obesity with body mass index 30 or greater   • Renal stone   • Malignant neoplasm of left breast in female, estrogen receptor positive (HCC)   • Cancer related pain   • Bone metastases (HCC)   • Secondary malignant neoplasm of bone (HCC)   • Peripheral edema   • Peritonitis (HCC)   • Leukopenia   • Metastatic breast cancer  (HCC)   • S/P ex lap with sigmoid resection, Lynn's procedure for stercoral perforation     Past Medical History:   Diagnosis Date   • Abdominal pain    • Allergic rhinitis    • Anemia    • Anxiety    • Arteriosclerosis     Coronary   • Arthritis    • Bone metastases (HCC) 10/14/2022   • Bowen's disease    • Breast cancer (HCC)    • Cancer related pain 10/13/2022   • Chest pain    • Chronic pain disorder    • Cough    • Depression    • Dysphoric mood    • Fatigue    • Frequent urination    • History of colon polyps    • History of IBS    • History of kidney stones    • Hyperlipidemia    • Hypertension    • Hypothyroidism    • Insomnia    • Lumbago    • Malaise and fatigue    • Mood disorder (HCC)    • Neck pain    • Palpitations    • Peripheral edema 11/21/2022   • Precordial pain    • Sleep disturbance    • SOB (shortness of breath)      Past Surgical History:   Procedure Laterality Date   • BREAST BIOPSY     • CARDIAC CATHETERIZATION Left 1960   • CARDIAC CATHETERIZATION N/A 04/06/2018    Procedure: Coronary angiography;  Surgeon: Art Licea MD;  Location: [facility] INVASIVE LOCATION;  Service: Cardiovascular   • CARDIAC CATHETERIZATION N/A 04/06/2018    Procedure: Left heart cath;  Surgeon: Art Licea MD;  Location: [facility] CATH INVASIVE LOCATION;  Service: Cardiovascular   • CARDIAC CATHETERIZATION N/A 04/06/2018    Procedure: Left ventriculography;  Surgeon: Art Licea MD;  [facility];  Service: Cardiovascular   • CHOLECYSTECTOMY     • COLONOSCOPY  11/08/2006   • EXPLORATORY LAPAROTOMY N/A 12/6/2022    Procedure: LAPAROTOMY EXPLORATORY sigmoid resection hartmans procedure colostomy;  Surgeon: Alfred Castañeda MD;  Location: [facility] OR;  Service: General;  Laterality: N/A;   • GANGLION CYST EXCISION     • HYSTERECTOMY  05/2005   • LAPAROSCOPIC GASTRIC BANDING     • TONSILLECTOMY       PT Assessment (last 12 hours)     PT Evaluation and  Treatment     Row Name 12/08/22 1200          Physical Therapy Time and Intention    Subjective Information complains of;fatigue;pain (P)   stomach pain  -AD     Document Type therapy note (daily note) (P)   -AD     Mode of Treatment individual therapy;physical therapy (P)   -AD     Patient Effort good (P)   -AD     Row Name 12/08/22 1200          Bed Mobility    Bed Mobility rolling right;supine-sit-supine (P)   -AD     Rolling Right [place] (Bed Mobility) minimum assist (75% patient effort) (P)   -AD     Supine-Sit-Supine [place] (Bed Mobility) minimum assist (75% patient effort) (P)   -AD     Assistive Device (Bed Mobility) head of bed elevated (P)   -AD     Row Name 12/08/22 1200          Transfers    Transfers sit-stand transfer;stand-sit transfer (P)   -AD     Row Name 12/08/22 1200          Sit-Stand Transfer    Sit-Stand [place] (Transfers) contact guard;minimum assist (75% patient effort) (P)   -AD     Assistive Device (Sit-Stand Transfers) walker, front-wheeled (P)   -AD     Row Name 12/08/22 1200          Stand-Sit Transfer    Stand-Sit [place] (Transfers) contact guard;minimum assist (75% patient effort) (P)   -AD     Assistive Device (Stand-Sit Transfers) walker, front-wheeled (P)   -AD     Row Name 12/08/22 1200          Gait/Stairs (Locomotion)    Gait/Stairs Locomotion gait/ambulation independence;gait/ambulation assistive device (P)   -AD     [place] Level (Gait) contact guard;minimum assist (75% patient effort) (P)   -AD     Distance in Feet (Gait) 3x10 standing marches (P)   -AD     Row Name 12/08/22 1200          Balance    Balance Assessment standing dynamic balance;sitting dynamic balance (P)   -AD     Dynamic Sitting Balance standby assist (P)   -AD     Position, Sitting Balance unsupported;sitting edge of bed (P)   -AD     Dynamic Standing Balance contact guard;minimal assist (P)   -AD     Position/Device Used, Standing Balance walker, front-wheeled (P)   -AD     Row  Name 12/08/22 1200          Motor Skills    Therapeutic Exercise ankle (P)   -AD     Row Name 12/08/22 1200          Ankle (Therapeutic Exercise)    Ankle (Therapeutic Exercise) AROM (active range of motion) (P)   -AD     Ankle AROM (Therapeutic Exercise) bilateral;dorsiflexion;plantarflexion (P)   -AD     Row Name             Wound 12/06/22 1906 midline abdomen Incision    Wound - Properties Group Placement Date: 12/06/22  -CK Placement Time: 1906  -CK Present on Hospital Admission: N  -CK Orientation: midline  -CK Location: abdomen  -CK Primary Wound Type: Incision  -CK    Retired Wound - Properties Group Placement Date: 12/06/22  -CK Placement Time: 1906  -CK Present on Hospital Admission: N  -CK Orientation: midline  -CK Location: abdomen  -CK Primary Wound Type: Incision  -CK    Retired Wound - Properties Group Date first assessed: 12/06/22  -CK Time first assessed: 1906  -CK Present on Hospital Admission: N  -CK Location: abdomen  -CK Primary Wound Type: Incision  -CK    Row Name 12/08/22 1200          Progress Summary (PT)    Progress Toward Functional Goals (PT) progress toward functional goals is good (P)   -AD     Daily Progress Summary (PT) Pt completed standing marches and theres during todays treatment with minimal increase in symptoms. Pt reported significant stomach pain at the beginning of treatment but symptom did not increase or change with standing. Pt still continues to present with ambulation and transfer deficits but is progressing well and very willing/motivated to work with PT to continue to improve. Continue current POC increasing ambulation training. (P)   -AD           User Key  (r) = Recorded By, (t) = Taken By, (c) = Cosigned By    Initials Name Provider Type    Odette Das, RN Registered Nurse    Adrian Moreno, PT Student PT Student                Physical Therapy Education     Title: PT OT SLP Therapies (In Progress)     Topic: Physical Therapy (In Progress)     Point:  Mobility training (Done)     Learning Progress Summary           Patient Acceptance, E,TB, VU by AV at 12/7/2022 1352                   Point: Home exercise program (Not Started)     Learner Progress:  Not documented in this visit.          Point: Body mechanics (Done)     Learning Progress Summary           Patient Acceptance, E,TB, VU by AV at 12/7/2022 1352                   Point: Precautions (Done)     Learning Progress Summary           Patient Acceptance, E,TB, VU by AV at 12/7/2022 1352                               User Key     Initials Effective Dates Name Provider Type Discipline     06/11/21 -  Jose Flores, PT Physical Therapist PT              PT Recommendation and Plan     Progress Summary (PT)  Progress Toward Functional Goals (PT): (P) progress toward functional goals is good  Daily Progress Summary (PT): (P) Pt completed standing marches and theres during todays treatment with minimal increase in symptoms. Pt reported significant stomach pain at the beginning of treatment but symptom did not increase or change with standing. Pt still continues to present with ambulation and transfer deficits but is progressing well and very willing/motivated to work with PT to continue to improve. Continue current POC increasing ambulation training.   Outcome Measures     Row Name 12/08/22 1200 12/07/22 1350          How much help from another person do you currently need...    Turning from your back to your side while in flat bed without using bedrails? 3 (P)   -AD 3  -AV     Moving from lying on back to sitting on the side of a flat bed without bedrails? 3 (P)   -AD 3  -AV     Moving to and from a bed to a chair (including a wheelchair)? 3 (P)   -AD 3  -AV     Standing up from a chair using your arms (e.g., wheelchair, bedside chair)? 3 (P)   -AD 3  -AV     Climbing 3-5 steps with a railing? 2 (P)   -AD 2  -AV     To walk in hospital room? 3 (P)   -AD 3  -AV     AM-PAC 6 Clicks Score (PT) 17 (P)   -AD 17   -AV        Functional Assessment    Outcome Measure Options AM-PAC 6 Clicks Basic Mobility (PT) (P)   -AD AM-PAC 6 Clicks Basic Mobility (PT)  -AV           User Key  (r) = Recorded By, (t) = Taken By, (c) = Cosigned By    Initials Name Provider Type    AV Jose Flores, PT Physical Therapist    Adrian Moreno, PT Student PT Student                 Time Calculation:    PT Charges     Row Name 12/08/22 1300             Time Calculation    PT Received On 12/08/22 (P)   -AD         Timed Charges    21023 - PT Therapeutic Exercise Minutes 10 (P)   -AD      52959 - PT Therapeutic Activity Minutes 5 (P)   -AD         Total Minutes    Timed Charges Total Minutes 15 (P)   -AD       Total Minutes 15 (P)   -AD            User Key  (r) = Recorded By, (t) = Taken By, (c) = Cosigned By    Initials Name Provider Type    Adrian Moreno, PT Student PT Student              Therapy Charges for Today     Code Description Service Date Service Provider Modifiers Qty    [id number] HC PT THER PROC EA 15 MIN 12/8/2022 Adrina Orta PT Student GP 1          PT G-Codes  Outcome Measure Options: (P) AM-PAC 6 Clicks Basic Mobility (PT)  AM-PAC 6 Clicks Score (PT): (P) 17  AM-PAC 6 Clicks Score (OT): 15    Adrian Orta PT Student  12/8/2022

## 2022-12-08 NOTE — PROGRESS NOTES
Patient with breast cancer metastatic to bone, has been undergoing palliative radiation to the lumbar spine.  She had received 2400 cGy in 8 fractions of a planned 3000 cGy in 10 fractions.  She had acute onset of pelvic pain on 12/6/2022 and presented to the ED.  She underwent emergency laparotomy and bowel resection for perforated sigmoid colon, secondary to fecal impaction.  The timing of the incident and the apparent location of the perforation indicate that this was unrelated to radiation.  Today the patient complains of diffuse pain and weakness, but otherwise appears stable.  She will be on indefinite hold from treatment as she recovers.

## 2022-12-08 NOTE — PROGRESS NOTES
"POST OP PROGRESS NOTE     Patient Name:  Derek Keller  YOB: 1960  [id number]   LOS: 2 days   2 Days Post-Op  Patient Care Team:  Haile Monique MD as PCP - General (Family Medicine)  Julien Cardona DO as Consulting Physician (Pain Medicine)  Joel Castillo MD as Consulting Physician (Gastroenterology)  Remington Russell MD as Surgeon (General Surgery)  Ahsan Salas MD as Consulting Physician (Sports Medicine)  Donald Barker MD as Consulting Physician (Hematology and Oncology)  Sadny Ricci MD as Consulting Physician (General Surgery)          Subjective     Interval History:  VSS, afebrile.  WBC 1.5.  No output from colostomy yet. Pain better controlled today.       Review of Systems:    All complete review of systems was performed and all are negative except what is documented in the HPI.       Objective     Constitutuional:  well nourished, no acute distress, appear stated age /62   Pulse 76   Temp 99 °F (37.2 °C)   Resp 16   Ht 167.6 cm (65.98\")   Wt 80.5 kg (177 lb 7.5 oz)   LMP  (LMP Unknown)   SpO2 95%   BMI 28.66 kg/m²    Eyes:  anicteric sclerae, moist conjunctivae, no lid lag, PERRLA  ENMT:  oropharynx clear, moist mucous membranes, good dentition  Neck:   full ROM, trachea midline, no thyromegaly  Cardiovascular: RRR, S1 and S2 present, no MRG's, heart rate 76, no pedal edema  Respiratory: lungs CTA, respirations even and unlabored  GI:  Abdomen soft, appropriately tender, nondistended, incision with dressing intact, colostomy with pink stoma,  no HSM     Skin:  warm and dry, normal turgor, no rashes  Psychiatric:  alert and oriented x3, intact judgement and insight, cooperative          Results Review:       I reviewed the patient's new clinical results including  CBC and BMP.     WBC   Date Value Ref Range Status   12/07/2022 1.54 (C) 3.40 - 10.80 10*3/mm3 Final     RBC   Date Value Ref Range Status   12/07/2022 3.39 (L) 3.77 - 5.28 10*6/mm3 Final "     Hemoglobin   Date Value Ref Range Status   12/07/2022 10.5 (L) 12.0 - 15.9 g/dL Final     Hematocrit   Date Value Ref Range Status   12/07/2022 32.7 (L) 34.0 - 46.6 % Final     MCV   Date Value Ref Range Status   12/07/2022 96.5 79.0 - 97.0 fL Final     MCH   Date Value Ref Range Status   12/07/2022 31.0 26.6 - 33.0 pg Final     MCHC   Date Value Ref Range Status   12/07/2022 32.1 31.5 - 35.7 g/dL Final     RDW   Date Value Ref Range Status   12/07/2022 17.4 (H) 12.3 - 15.4 % Final     RDW-SD   Date Value Ref Range Status   12/07/2022 61.5 (H) 37.0 - 54.0 fl Final     MPV   Date Value Ref Range Status   12/07/2022 10.0 6.0 - 12.0 fL Final     Platelets   Date Value Ref Range Status   12/07/2022 140 140 - 450 10*3/mm3 Final     Neutrophil %   Date Value Ref Range Status   12/07/2022 85.1 (H) 42.7 - 76.0 % Final     Lymphocyte %   Date Value Ref Range Status   12/07/2022 9.1 (L) 19.6 - 45.3 % Final     Monocyte %   Date Value Ref Range Status   12/07/2022 4.5 (L) 5.0 - 12.0 % Final     Eosinophil %   Date Value Ref Range Status   12/07/2022 0.0 (L) 0.3 - 6.2 % Final     Basophil %   Date Value Ref Range Status   12/07/2022 1.3 0.0 - 1.5 % Final     Immature Grans %   Date Value Ref Range Status   12/07/2022 0.0 0.0 - 0.5 % Final     Neutrophils, Absolute   Date Value Ref Range Status   12/07/2022 1.31 (L) 1.70 - 7.00 10*3/mm3 Final     Lymphocytes, Absolute   Date Value Ref Range Status   12/07/2022 0.14 (L) 0.70 - 3.10 10*3/mm3 Final     Monocytes, Absolute   Date Value Ref Range Status   12/07/2022 0.07 (L) 0.10 - 0.90 10*3/mm3 Final     Eosinophils, Absolute   Date Value Ref Range Status   12/07/2022 0.00 0.00 - 0.40 10*3/mm3 Final     Basophils, Absolute   Date Value Ref Range Status   12/07/2022 0.02 0.00 - 0.20 10*3/mm3 Final     Immature Grans, Absolute   Date Value Ref Range Status   12/07/2022 0.00 0.00 - 0.05 10*3/mm3 Final     nRBC   Date Value Ref Range Status   12/07/2022 0.0 0.0 - 0.2 /100 WBC Final          Basic Metabolic Panel    Sodium Sodium   Date Value Ref Range Status   12/07/2022 139 136 - 145 mmol/L Final   12/06/2022 137 136 - 145 mmol/L Final      Potassium Potassium   Date Value Ref Range Status   12/07/2022 3.9 3.5 - 5.2 mmol/L Final   12/06/2022 4.8 3.5 - 5.2 mmol/L Final     Comment:     Slight hemolysis detected by analyzer. Results may be affected.      Chloride Chloride   Date Value Ref Range Status   12/07/2022 105 98 - 107 mmol/L Final   12/06/2022 104 98 - 107 mmol/L Final      Bicarbonate No results found for: PLASMABICARB   BUN BUN   Date Value Ref Range Status   12/07/2022 14 8 - 23 mg/dL Final   12/06/2022 17 8 - 23 mg/dL Final      Creatinine Creatinine   Date Value Ref Range Status   12/07/2022 0.68 0.57 - 1.00 mg/dL Final   12/06/2022 0.66 0.57 - 1.00 mg/dL Final      Calcium Calcium   Date Value Ref Range Status   12/07/2022 7.6 (L) 8.6 - 10.5 mg/dL Final   12/06/2022 8.7 8.6 - 10.5 mg/dL Final      Glucose      No components found for: GLUCOSE.*       Lab Results   Component Value Date    GLUCOSE 129 (H) 12/07/2022    BUN 14 12/07/2022    CREATININE 0.68 12/07/2022    EGFRIFNONA 75 11/12/2019    BCR 20.6 12/07/2022    K 3.9 12/07/2022    CO2 23.2 12/07/2022    CALCIUM 7.6 (L) 12/07/2022    ALBUMIN 3.50 12/07/2022    AST 42 (H) 12/07/2022    ALT 59 (H) 12/07/2022       IMAGING:  Imaging Results (Last 72 Hours)     Procedure Component Value Units Date/Time    CT Abdomen Pelvis With Contrast [[id number]] Collected: 12/06/22 1613     Updated: 12/06/22 1616    Narrative:      PROCEDURE: CT ABDOMEN PELVIS W CONTRAST     COMPARISON: [facility], CT, CT ABDOMEN PELVIS W WO CONTRAST, 8/05/2022,   11:48.     INDICATIONS: abdominal pain, metastatic breast cancer     TECHNIQUE: After obtaining the patient's consent, CT images were created with non-ionic intravenous   contrast material.       PROTOCOL:   Standard imaging protocol performed      RADIATION:   DLP:  979.8mGy*cm    Automated exposure control was utilized to minimize radiation dose.   CONTRAST: 100cc Isovue 370 I.V.  LABS:   eGFR: 93ml/min/1.73m2     FINDINGS:   Mostly linear opacities in the dependent lower lobes are likely due to subsegmental atelectasis.    Gallbladder is surgically absent.  Intrahepatic/extrahepatic biliary ductal dilatation is mildly   increased compared to 8/5/2022 CT.  Extrahepatic common bile duct measures up to 1.5 cm, previously   1.2 cm.  No definite cause of biliary obstruction is visualized on CT.  There is mild dilatation of   the main pancreatic duct.  Bilateral renal cysts are stable.  Adrenal glands and spleen appear   unremarkable.     No abnormal bowel distention is seen, but there is mild wall thickening of distal small bowel in   the right lower quadrant with small amount of interloop fluid noted.  There is a small amount of   free pelvic fluid, as well as fluid in the right paracolic gutter and perihepatic fluid.  There are   few foci of free intraperitoneal air (anterior to liver on axial image 18 and 20, anterior abdomen   on axial image 46), as well as foci of extraluminal air adjacent to distal small bowel loops in the   pelvis.  No pneumatosis or mesenteric/portal venous gas is seen.  Appendix is nondilated, and there   is no significant periappendiceal inflammation.  There is stool throughout the colon.     No urinary bladder wall thickening is seen.  No adenopathy is identified in the abdomen or pelvis.    There are atherosclerotic vascular calcifications.  Numerous sclerotic lesions are redemonstrated,   consistent with osseous metastatic disease.  No pathologic fractures are seen.       Impression:         1. Wall thickening/enhancement of distal small bowel in the right lower quadrant with a small   amount of interloop fluid, as well as a small amount of free pelvic fluid, right paracolic gutter   fluid, and perihepatic fluid, concerning for nonspecific enteritis  or possible bowel ischemia.  2. Few foci of free intraperitoneal air, which may be from perforation of abnormal distal small   bowel, given the location of several foci of extraluminal air adjacent to distal small bowel loops.    Recommend surgical consultation.  3. Mildly increased intrahepatic/extrahepatic biliary ductal dilatation without obvious cause of   obstruction.  4. Diffuse osseous metastatic disease.        This report was communicated by telephone to Dr. Lozada at the dictation time shown below.        STEPHANIE STORY MD         Electronically Signed and Approved By: STEPHANIE STORY MD on 12/06/2022 at 16:13                           Medications:    Current Facility-Administered Medications:   •  acetaminophen (TYLENOL) tablet 650 mg, 650 mg, Nasogastric, Q4H PRN **OR** acetaminophen (TYLENOL) 160 MG/5ML solution 650 mg, 650 mg, Nasogastric, Q4H PRN **OR** acetaminophen (TYLENOL) suppository 650 mg, 650 mg, Rectal, Q4H PRN, Augusto Ladd MD  •  famotidine (PEPCID) injection 20 mg, 20 mg, Intravenous, Q12H, Alfred Castañeda MD, 20 mg at 12/08/22 0804  •  HYDROmorphone (DILAUDID) injection 1 mg, 1 mg, Intravenous, Q2H PRN, 1 mg at 12/08/22 0705 **AND** naloxone (NARCAN) injection 0.4 mg, 0.4 mg, Intravenous, Q5 Min PRN, Augusto Ladd MD  •  lactated ringers infusion, 50 mL/hr, Intravenous, Continuous, Danny Reina Jr., MD, Last Rate: 50 mL/hr at 12/07/22 1639, 50 mL/hr at 12/07/22 1639  •  LORazepam (ATIVAN) injection 0.5 mg, 0.5 mg, Intravenous, Q6H, Augusto Ladd MD, 0.5 mg at 12/08/22 0739  •  nicotine (NICODERM CQ) 21 MG/24HR patch 1 patch, 1 patch, Transdermal, Q24H, Augusto Ladd MD, 1 patch at 12/08/22 0803  •  nitroglycerin (NITROSTAT) SL tablet 0.4 mg, 0.4 mg, Sublingual, Q5 Min PRN, Danny Reina Jr., MD  •  ondansetron (ZOFRAN) injection 4 mg, 4 mg, Intravenous, Q6H PRNLatosha Joseph R Jr., MD  •  Pharmacy to Dose Zosyn, , Does not apply, Continuous PRNLatosha,  Danny HWANG Jr., MD  •  phenol (CHLORASEPTIC) 1.4 % liquid 1 spray, 1 spray, Mouth/Throat, Q2H PRN, Danny Reina Jr., MD, 1 spray at 12/06/22 2149  •  piperacillin-tazobactam (ZOSYN) 3.375 g in iso-osmotic dextrose 50 ml (premix), 3.375 g, Intravenous, Q6H, Danny Reina Jr., MD, 3.375 g at 12/08/22 0739  •  sodium chloride 0.9 % flush 10 mL, 10 mL, Intravenous, PRN, Danny Reina Jr., MD  •  sodium chloride 0.9 % flush 10 mL, 10 mL, Intravenous, PRN, Danny Reina Jr., MD  •  sodium chloride 0.9 % flush 10 mL, 10 mL, Intravenous, Q12H, Danny Reina Jr., MD, 10 mL at 12/08/22 0804  •  sodium chloride 0.9 % infusion 40 mL, 40 mL, Intravenous, PRN, Danny Reina Jr., MD    Assessment & Plan       Peritonitis (HCC)  S/P ex lap with sigmoid resection, Lynn's procedure for stercoral perforation  Leukopenia  Metastatic breast cancer   -cont daily dsg change   -DC smith   -cont NG until bowel function returns   -up to chair and ambulate in hallway        Electronically signed by Christa Harrell, HIEU, 12/08/22, 9:03 AM EST.

## 2022-12-08 NOTE — PLAN OF CARE
Goal Outcome Evaluation:   Rojas removed and patient has voided without difficulty. NG tube removed. No nausea reported at this time. Surgical incision appears WNL. BANDAR drain with serosanguinous fluid. Pain medication has been given as requested/appropriate. Pt has sat up in chair this shift. No complications noted.               Resident

## 2022-12-08 NOTE — SIGNIFICANT NOTE
Wound Eval / Progress Noted    JOSE Langston     Patient Name: Derek Keller  : 1960  MRN: [id number]  Today's Date: 2022                 Admit Date: 2022    Visit Dx:    ICD-10-CM ICD-9-CM   1. Peritonitis (HCC)  K65.9 567.9   2. Decreased activities of daily living (ADL)  Z78.9 V49.89   3. Bone metastases (HCC)  C79.51 198.5   4. Secondary malignant neoplasm of bone (HCC)  C79.51 198.5   5. Malignant neoplasm of upper-outer quadrant of left breast in female, estrogen receptor positive (HCC)  C50.412 174.4    Z17.0 V86.0   6. Chronic fatigue  R53.82 780.79   7. Hypoxemia  R09.02 799.02   8. Difficulty walking  R26.2 719.7       Patient Active Problem List   Diagnosis   • Other chest pain   • Hypertension   • Hyperlipidemia   • Allergic rhinitis   • Hypothyroidism   • Neck pain   • Lumbago   • Arthritis   • Chronic pain disorder   • History of IBS   • Anxiety   • Monopolar depression (HCC)   • Malaise and fatigue   • Tobacco abuse   • Fibromyalgia   • Vitamin B 12 deficiency   • Acute pain of left knee   • Primary osteoarthritis of left knee   • Osteoarthrosis, localized, primary, knee   • Generalized abdominal pain   • Herpes simplex vulvovaginitis   • Lightheadedness   • Cough   • Hypoxemia   • Restless leg syndrome   • Itch of skin   • Primary insomnia   • Chronic fatigue   • Asthma   • Body mass index (BMI) 26.0-26.9, adult   • Constipation   • Degeneration of lumbar intervertebral disc   • Disorder associated with Helicobacter species   • Hearing loss   • Impacted cerumen of right ear   • Inappropriate diet and eating habits   • Inguinal pain   • Irritable bowel syndrome   • Cervical spondylosis   • Obesity with body mass index 30 or greater   • Renal stone   • Malignant neoplasm of left breast in female, estrogen receptor positive (HCC)   • Cancer related pain   • Bone metastases (HCC)   • Secondary malignant neoplasm of bone (HCC)   • Peripheral edema   • Peritonitis (HCC)   • Leukopenia   •  Metastatic breast cancer (HCC)   • S/P ex lap with sigmoid resection, Lynn's procedure for stercoral perforation        Past Medical History:   Diagnosis Date   • Abdominal pain    • Allergic rhinitis    • Anemia    • Anxiety    • Arteriosclerosis     Coronary   • Arthritis    • Bone metastases (HCC) 10/14/2022   • Bowen's disease    • Breast cancer (HCC)    • Cancer related pain 10/13/2022   • Chest pain    • Chronic pain disorder    • Cough    • Depression    • Dysphoric mood    • Fatigue    • Frequent urination    • History of colon polyps    • History of IBS    • History of kidney stones    • Hyperlipidemia    • Hypertension    • Hypothyroidism    • Insomnia    • Lumbago    • Malaise and fatigue    • Mood disorder (HCC)    • Neck pain    • Palpitations    • Peripheral edema 11/21/2022   • Precordial pain    • Sleep disturbance    • SOB (shortness of breath)         Past Surgical History:   Procedure Laterality Date   • BREAST BIOPSY     • CARDIAC CATHETERIZATION Left 1960   • CARDIAC CATHETERIZATION N/A 04/06/2018    Procedure: Coronary angiography;  Surgeon: Art Licea MD;  Location: [facility] INVASIVE LOCATION;  Service: Cardiovascular   • CARDIAC CATHETERIZATION N/A 04/06/2018    Procedure: Left heart cath;  Surgeon: Art Licea MD;  Location: [facility] INVASIVE LOCATION;  Service: Cardiovascular   • CARDIAC CATHETERIZATION N/A 04/06/2018    Procedure: Left ventriculography;  Surgeon: Art Licea MD;  Location: [facility] CATH INVASIVE LOCATION;  Service: Cardiovascular   • CHOLECYSTECTOMY     • COLONOSCOPY  11/08/2006   • EXPLORATORY LAPAROTOMY N/A 12/6/2022    Procedure: LAPAROTOMY EXPLORATORY sigmoid resection hartmans procedure colostomy;  Surgeon: Alfred Castañeda MD;  Location: [facility] OR;  Service: General;  Laterality: N/A;   • GANGLION CYST EXCISION     • HYSTERECTOMY  05/2005   • LAPAROSCOPIC GASTRIC BANDING     • TONSILLECTOMY       Wound Check /  Follow-up:  Patient seen today for ostomy follow-up and continued ostomy teaching. Patient remains with NG tube to low wall suction. Rojas catheter has been removed, now has female external catheter in place for bladder management. Patient awake and alert, engaged in education session. Discussed ordering supplies, instructing patient on phone number to contact for supply ordering. Instructed patient that wound/ostomy nurse will asha all pages/products for recommended supplies. Discussed educational materials present in room. Also discussed dietary changes in the first 4-6 week postoperative including information found in written educational materials. Patient verbalized understanding. Plan for pouch change tomorrow 12/9/22 and continued education. Patient expresses no additional needs or concerns at this time.      Impression: New colostomy.      Short term goals:  Colostomy management, skin protection.      Analisa Alvarado RN    12/8/2022    14:18 EST

## 2022-12-09 LAB
ALBUMIN SERPL-MCNC: 3.5 G/DL (ref 3.5–5.2)
ALBUMIN/GLOB SERPL: 1.2 G/DL
ALP SERPL-CCNC: 65 U/L (ref 39–117)
ALT SERPL W P-5'-P-CCNC: 26 U/L (ref 1–33)
ANION GAP SERPL CALCULATED.3IONS-SCNC: 8.2 MMOL/L (ref 5–15)
AST SERPL-CCNC: 17 U/L (ref 1–32)
BACTERIA FLD CULT: NORMAL
BASOPHILS # BLD AUTO: 0.03 10*3/MM3 (ref 0–0.2)
BASOPHILS NFR BLD AUTO: 0.7 % (ref 0–1.5)
BILIRUB SERPL-MCNC: 0.6 MG/DL (ref 0–1.2)
BUN SERPL-MCNC: 13 MG/DL (ref 8–23)
BUN/CREAT SERPL: 20.3 (ref 7–25)
CALCIUM SPEC-SCNC: 8.1 MG/DL (ref 8.6–10.5)
CHLORIDE SERPL-SCNC: 107 MMOL/L (ref 98–107)
CO2 SERPL-SCNC: 24.8 MMOL/L (ref 22–29)
CREAT SERPL-MCNC: 0.64 MG/DL (ref 0.57–1)
DEPRECATED RDW RBC AUTO: 61.6 FL (ref 37–54)
EGFRCR SERPLBLD CKD-EPI 2021: 99.4 ML/MIN/1.73
EOSINOPHIL # BLD AUTO: 0.02 10*3/MM3 (ref 0–0.4)
EOSINOPHIL NFR BLD AUTO: 0.5 % (ref 0.3–6.2)
ERYTHROCYTE [DISTWIDTH] IN BLOOD BY AUTOMATED COUNT: 17.8 % (ref 12.3–15.4)
GLOBULIN UR ELPH-MCNC: 2.9 GM/DL
GLUCOSE SERPL-MCNC: 85 MG/DL (ref 65–99)
GRAM STN SPEC: NORMAL
GRAM STN SPEC: NORMAL
HCT VFR BLD AUTO: 33.5 % (ref 34–46.6)
HGB BLD-MCNC: 11.2 G/DL (ref 12–15.9)
IMM GRANULOCYTES # BLD AUTO: 0.02 10*3/MM3 (ref 0–0.05)
IMM GRANULOCYTES NFR BLD AUTO: 0.5 % (ref 0–0.5)
LYMPHOCYTES # BLD AUTO: 0.46 10*3/MM3 (ref 0.7–3.1)
LYMPHOCYTES NFR BLD AUTO: 10.6 % (ref 19.6–45.3)
MAGNESIUM SERPL-MCNC: 2.1 MG/DL (ref 1.6–2.4)
MCH RBC QN AUTO: 31.8 PG (ref 26.6–33)
MCHC RBC AUTO-ENTMCNC: 33.4 G/DL (ref 31.5–35.7)
MCV RBC AUTO: 95.2 FL (ref 79–97)
MONOCYTES # BLD AUTO: 0.18 10*3/MM3 (ref 0.1–0.9)
MONOCYTES NFR BLD AUTO: 4.1 % (ref 5–12)
NEUTROPHILS NFR BLD AUTO: 3.64 10*3/MM3 (ref 1.7–7)
NEUTROPHILS NFR BLD AUTO: 83.6 % (ref 42.7–76)
NRBC BLD AUTO-RTO: 0 /100 WBC (ref 0–0.2)
PHOSPHATE SERPL-MCNC: 1.6 MG/DL (ref 2.5–4.5)
PLATELET # BLD AUTO: 148 10*3/MM3 (ref 140–450)
PMV BLD AUTO: 9.4 FL (ref 6–12)
POTASSIUM SERPL-SCNC: 3.5 MMOL/L (ref 3.5–5.2)
PROT SERPL-MCNC: 6.4 G/DL (ref 6–8.5)
RBC # BLD AUTO: 3.52 10*6/MM3 (ref 3.77–5.28)
SODIUM SERPL-SCNC: 140 MMOL/L (ref 136–145)
WBC NRBC COR # BLD: 4.35 10*3/MM3 (ref 3.4–10.8)

## 2022-12-09 PROCEDURE — 83735 ASSAY OF MAGNESIUM: CPT | Performed by: INTERNAL MEDICINE

## 2022-12-09 PROCEDURE — 99232 SBSQ HOSP IP/OBS MODERATE 35: CPT | Performed by: INTERNAL MEDICINE

## 2022-12-09 PROCEDURE — 97110 THERAPEUTIC EXERCISES: CPT

## 2022-12-09 PROCEDURE — 97530 THERAPEUTIC ACTIVITIES: CPT

## 2022-12-09 PROCEDURE — 80053 COMPREHEN METABOLIC PANEL: CPT | Performed by: INTERNAL MEDICINE

## 2022-12-09 PROCEDURE — 25010000002 PIPERACILLIN SOD-TAZOBACTAM PER 1 G: Performed by: INTERNAL MEDICINE

## 2022-12-09 PROCEDURE — 94799 UNLISTED PULMONARY SVC/PX: CPT

## 2022-12-09 PROCEDURE — 25010000002 LORAZEPAM PER 2 MG: Performed by: INTERNAL MEDICINE

## 2022-12-09 PROCEDURE — 25010000002 HYDROMORPHONE 1 MG/ML SOLUTION: Performed by: NURSE PRACTITIONER

## 2022-12-09 PROCEDURE — 99024 POSTOP FOLLOW-UP VISIT: CPT | Performed by: SURGERY

## 2022-12-09 PROCEDURE — 84100 ASSAY OF PHOSPHORUS: CPT | Performed by: INTERNAL MEDICINE

## 2022-12-09 PROCEDURE — 85025 COMPLETE CBC W/AUTO DIFF WBC: CPT | Performed by: INTERNAL MEDICINE

## 2022-12-09 PROCEDURE — 25010000002 ONDANSETRON PER 1 MG: Performed by: INTERNAL MEDICINE

## 2022-12-09 RX ORDER — FENTANYL 50 UG/H
1 PATCH TRANSDERMAL
Status: DISCONTINUED | OUTPATIENT
Start: 2022-12-09 | End: 2022-12-13 | Stop reason: HOSPADM

## 2022-12-09 RX ORDER — POLYETHYLENE GLYCOL 3350 17 G/17G
17 POWDER, FOR SOLUTION ORAL DAILY
Status: DISCONTINUED | OUTPATIENT
Start: 2022-12-10 | End: 2022-12-13 | Stop reason: HOSPADM

## 2022-12-09 RX ORDER — POLYETHYLENE GLYCOL 3350 17 G/17G
17 POWDER, FOR SOLUTION ORAL DAILY
Status: DISCONTINUED | OUTPATIENT
Start: 2022-12-09 | End: 2022-12-09

## 2022-12-09 RX ADMIN — HYDROMORPHONE HYDROCHLORIDE 1 MG: 1 INJECTION, SOLUTION INTRAMUSCULAR; INTRAVENOUS; SUBCUTANEOUS at 14:07

## 2022-12-09 RX ADMIN — TAZOBACTAM SODIUM AND PIPERACILLIN SODIUM 3.38 G: 375; 3 INJECTION, SOLUTION INTRAVENOUS at 20:57

## 2022-12-09 RX ADMIN — FAMOTIDINE 20 MG: 10 INJECTION INTRAVENOUS at 08:38

## 2022-12-09 RX ADMIN — LORAZEPAM 1 MG: 2 INJECTION INTRAMUSCULAR; INTRAVENOUS at 02:55

## 2022-12-09 RX ADMIN — HYDROMORPHONE HYDROCHLORIDE 1 MG: 1 INJECTION, SOLUTION INTRAMUSCULAR; INTRAVENOUS; SUBCUTANEOUS at 21:02

## 2022-12-09 RX ADMIN — HYDROMORPHONE HYDROCHLORIDE 1 MG: 1 INJECTION, SOLUTION INTRAMUSCULAR; INTRAVENOUS; SUBCUTANEOUS at 16:09

## 2022-12-09 RX ADMIN — HYDROMORPHONE HYDROCHLORIDE 1 MG: 1 INJECTION, SOLUTION INTRAMUSCULAR; INTRAVENOUS; SUBCUTANEOUS at 22:00

## 2022-12-09 RX ADMIN — TAZOBACTAM SODIUM AND PIPERACILLIN SODIUM 3.38 G: 375; 3 INJECTION, SOLUTION INTRAVENOUS at 08:34

## 2022-12-09 RX ADMIN — LORAZEPAM 1 MG: 2 INJECTION INTRAMUSCULAR; INTRAVENOUS at 18:01

## 2022-12-09 RX ADMIN — ONDANSETRON 4 MG: 2 INJECTION INTRAMUSCULAR; INTRAVENOUS at 03:02

## 2022-12-09 RX ADMIN — HYDROMORPHONE HYDROCHLORIDE 1 MG: 1 INJECTION, SOLUTION INTRAMUSCULAR; INTRAVENOUS; SUBCUTANEOUS at 19:54

## 2022-12-09 RX ADMIN — HYDROMORPHONE HYDROCHLORIDE 1 MG: 1 INJECTION, SOLUTION INTRAMUSCULAR; INTRAVENOUS; SUBCUTANEOUS at 00:26

## 2022-12-09 RX ADMIN — FENTANYL TRANSDERMAL 1 PATCH: 50 PATCH, EXTENDED RELEASE TRANSDERMAL at 10:28

## 2022-12-09 RX ADMIN — HYDROMORPHONE HYDROCHLORIDE 1 MG: 1 INJECTION, SOLUTION INTRAMUSCULAR; INTRAVENOUS; SUBCUTANEOUS at 10:37

## 2022-12-09 RX ADMIN — PHENOL 1 SPRAY: 1.5 LIQUID ORAL at 03:01

## 2022-12-09 RX ADMIN — NICOTINE 1 PATCH: 21 PATCH, EXTENDED RELEASE TRANSDERMAL at 08:33

## 2022-12-09 RX ADMIN — POLYETHYLENE GLYCOL 3350 17 G: 17 POWDER, FOR SOLUTION ORAL at 10:29

## 2022-12-09 RX ADMIN — SODIUM CHLORIDE, POTASSIUM CHLORIDE, SODIUM LACTATE AND CALCIUM CHLORIDE 50 ML/HR: 600; 310; 30; 20 INJECTION, SOLUTION INTRAVENOUS at 20:58

## 2022-12-09 RX ADMIN — TAZOBACTAM SODIUM AND PIPERACILLIN SODIUM 3.38 G: 375; 3 INJECTION, SOLUTION INTRAVENOUS at 02:55

## 2022-12-09 RX ADMIN — Medication 10 ML: at 08:36

## 2022-12-09 RX ADMIN — LORAZEPAM 1 MG: 2 INJECTION INTRAMUSCULAR; INTRAVENOUS at 23:07

## 2022-12-09 RX ADMIN — TAZOBACTAM SODIUM AND PIPERACILLIN SODIUM 3.38 G: 375; 3 INJECTION, SOLUTION INTRAVENOUS at 14:07

## 2022-12-09 RX ADMIN — HYDROMORPHONE HYDROCHLORIDE 1 MG: 1 INJECTION, SOLUTION INTRAMUSCULAR; INTRAVENOUS; SUBCUTANEOUS at 08:33

## 2022-12-09 RX ADMIN — FAMOTIDINE 20 MG: 10 INJECTION INTRAVENOUS at 20:57

## 2022-12-09 RX ADMIN — HYDROMORPHONE HYDROCHLORIDE 1 MG: 1 INJECTION, SOLUTION INTRAMUSCULAR; INTRAVENOUS; SUBCUTANEOUS at 03:02

## 2022-12-09 RX ADMIN — Medication 500 MG: at 08:59

## 2022-12-09 RX ADMIN — LORAZEPAM 1 MG: 2 INJECTION INTRAMUSCULAR; INTRAVENOUS at 11:19

## 2022-12-09 RX ADMIN — Medication 500 MG: at 20:57

## 2022-12-09 RX ADMIN — Medication 10 ML: at 20:58

## 2022-12-09 NOTE — PLAN OF CARE
Goal Outcome Evaluation:           Progress: no change  Outcome Evaluation: No output from ostomy yet but she is having more bowel sounds and states she has gas pain. She was medicated frequently for pain and also received ativan for anxiety. She was resting this morning. Emani Huynh RN

## 2022-12-09 NOTE — PROGRESS NOTES
[facility] ER Progress Note      Patient Name:  Derek Keller   MRN:  [id number]   :  1960   Date of Admission:  2022   Date of Service:  2022   PMD:  Haile Monique MD     Hospital Course:    62 y.o. female past medical history of metastatic breast cancer that presents to the emergency department earlier today for evaluation of sudden onset abdominal pain.  She described as sharp, left lower quadrant rating to the right lower quadrant, constant, no known aggravating alleviating factors.  She had associated nausea but no vomiting.  She denies any fevers, chills, sweats, chest pain or shortness of breath, palpitations, diarrhea constipation, dysuria, weakness, rash.  In the emergency department patient underwent CT scan which showed enteritis versus possible bowel ischemia and some intraperitoneal free air.  Surgery was called and patient was taken to the operating room.  She underwent sigmoid colon resection and colostomy formation.  She has been placed on Zosyn and admitted for ongoing monitoring and management    Consultants:    Surgery surgery    Procedures:  CT abdomen    Anti-infectives:    Zosyn    2022    Chief Complaint:  Follow-up of patient status post peritonitis and surgery and laparoscopic sigmoid resection for Lynn's procedure for stercoral perforation    Subjective:   No flatus in the ostomy no nausea vomiting, still complains of abdominal pain, on IV Zosyn added fentanyl patch by surgery for pain control    Review Of Systems:  GENERAL:  No weakness , no fatigue, no time is denies any weight loss appetite is normal.  HEENT:  Denies any rhinitis, no sore throat, no diplopia , hearing is normal  Respiratory:  Denies any shortness of breath , sweating d, yspnea on exertion , cough or sputum.  CVS:  Denies any chest pain , palpitation or syncope.  Gi:  Complains of abdominal pain, no nausea, no vomiting, no diarrhea, no hematemesis , no melena , no rectal  bleeding  :  No dysuria frequency , hematuria, no retention:  Musculoskeletal:  Denies any arthritis, or calf pain    Hematological:  No bleeding , no petechiae.  Skin:  Denies rash , pruritus , jaundice  Endocrine:  No polyuria , polydipsia , polyphagia.  CNS:  Denies any confusion , headache , or seizure disorder  Psychiatry:  No anxiety , or depression, no suicide ideation      Objective:  Vitals:    12/09/22 0256 12/09/22 0700 12/09/22 0800 12/09/22 1202   BP: 146/65 150/68  138/66   BP Location: Right arm Left arm  Left arm   Patient Position: Lying Lying  Lying   Pulse: 78 83  79   Resp: 18 20  20   Temp: 98.2 °F (36.8 °C) 98.8 °F (37.1 °C)  98.9 °F (37.2 °C)   TempSrc: Oral Oral  Oral   SpO2: 97% 97% 98% 96%   Weight:       Height:              Physical Exam:  GENERAL APPEARANCE: Alert and oriented.  Appears comfortable.  No acute distress  HEENT: Atraumatic, normocephalic,  PERRLA, mucous membranes moist  NECK: supple; no JVD or thyromegaly noted  CARDIOVASCULAR: Regular rate and rhythm, no murmurs appreciated; no edema present in BLE   RESPIRATORY: good air entry bilaterally.  No wheezes, rhonchi, or rales appreciated  GASTROINTESTINAL:  Abdomen  soft; bowel sounds present, non-tender, non-distended, no organomegaly, no CVA tenderness  ostomy intact  EXTREMITIES: Pulses equal bilaterally   MUSCULOSKELETAL:  Good muscle strength noted no obvious deformity appreciat  PSYCH: Appropriate mood and affect  Neurologic:  Alert & oriented x 3.  Normal Mental status.  Normal Cranial Nervies.  EOMI.  XU.  Normal 5/5 muscular strength in both upper and lower extremities.  Normal sensation.  Normal and symmetric reflexes. Normal cerbellar function.  Normal Gait. Negative Babinski.    Labs Reviewed  CBC:    Lab Results   Component Value Date    WBC 4.35 12/09/2022    HGB 11.2 (L) 12/09/2022    HCT 33.5 (L) 12/09/2022    MCV 95.2 12/09/2022     CMP:    Lab Results   Component Value Date     12/09/2022    CO2  24.8 12/09/2022    GLUCOSE 85 12/09/2022    BUN 13 12/09/2022    CREATININE 0.64 12/09/2022    ALBUMIN 3.50 12/09/2022    CALCIUM 8.1 (L) 12/09/2022    AST 17 12/09/2022    ALT 26 12/09/2022     Lipis Panel:   Lab Results   Component Value Date    CHOL 125 11/12/2019    HDL 49 11/12/2019      INR:  No results found for: INR  Cardiac:  No results found for: CKMB, TROPONIN  No results found for: BNP  Lactate:    Lab Results   Component Value Date    LACTATE 1.3 12/08/2022    LACTATE 1.0 12/06/2022     Blood Culture:  @lastlabx(cult:2)@    Imaging:  MRI Pelvis With & Without Contrast    Result Date: 12/2/2022  Narrative: PROCEDURE: MRI PELVIS W WO CONTRAST  COMPARISON: [facility], PET, NM PET/CT SKULL BASE TO MID THIGH, 9/26/2022, 12:08.  [facility], CT, CT ABDOMEN PELVIS W WO CONTRAST, 8/05/2022, 11:48.  [facility], MR, MRI LUMBAR SPINE W WO CONTRAST, 12/01/2022, 12:35.  INDICATIONS: MRI SACRUM; METASTATIC BREAST CA. LOWER BACK PAIN INTO TAILBONE.      TECHNIQUE: Multiplanar multisequence images of the brain with and without intravenous gadolinium.  FINDINGS: There are innumerable osseous metastatic lesions throughout the lower lumbar spine, pelvis and sacrum.  The lesions have decreased T1 signal intensity and increased T2 signal intensity.  There is enhancement following the administration of gadolinium.  No discrete soft tissue masses are identified.  There is soft tissue edema and enhancement along dorsal aspect of the sacrococcygeal junction.  Prior hysterectomy.  No evidence of adenopathy.  IMPRESSION:  Widespread osseous metastatic disease.  TRINIDAD HERNANDEZ MD       Electronically Signed and Approved By: TRINIDAD HERNANDEZ MD on 12/02/2022 at 8:07             CT Abdomen Pelvis With Contrast    Result Date: 12/6/2022  Narrative: PROCEDURE: CT ABDOMEN PELVIS W CONTRAST  COMPARISON: [facility], CT, CT ABDOMEN PELVIS W WO CONTRAST, 8/05/2022,  11:48.  INDICATIONS: abdominal pain, metastatic breast cancer  TECHNIQUE: After obtaining the patient's consent, CT images were created with non-ionic intravenous contrast material.   PROTOCOL:   Standard imaging protocol performed    RADIATION:   DLP: 979.8mGy*cm   Automated exposure control was utilized to minimize radiation dose. CONTRAST: 100cc Isovue 370 I.V. LABS:   eGFR: 93ml/min/1.73m2  FINDINGS:  Mostly linear opacities in the dependent lower lobes are likely due to subsegmental atelectasis.  Gallbladder is surgically absent.  Intrahepatic/extrahepatic biliary ductal dilatation is mildly increased compared to 8/5/2022 CT.  Extrahepatic common bile duct measures up to 1.5 cm, previously 1.2 cm.  No definite cause of biliary obstruction is visualized on CT.  There is mild dilatation of the main pancreatic duct.  Bilateral renal cysts are stable.  Adrenal glands and spleen appear unremarkable.  No abnormal bowel distention is seen, but there is mild wall thickening of distal small bowel in the right lower quadrant with small amount of interloop fluid noted.  There is a small amount of free pelvic fluid, as well as fluid in the right paracolic gutter and perihepatic fluid.  There are few foci of free intraperitoneal air (anterior to liver on axial image 18 and 20, anterior abdomen on axial image 46), as well as foci of extraluminal air adjacent to distal small bowel loops in the pelvis.  No pneumatosis or mesenteric/portal venous gas is seen.  Appendix is nondilated, and there is no significant periappendiceal inflammation.  There is stool throughout the colon.  No urinary bladder wall thickening is seen.  No adenopathy is identified in the abdomen or pelvis.  There are atherosclerotic vascular calcifications.  Numerous sclerotic lesions are redemonstrated, consistent with osseous metastatic disease.  No pathologic fractures are seen.      Impression:   1. Wall thickening/enhancement of distal small bowel in  the right lower quadrant with a small amount of interloop fluid, as well as a small amount of free pelvic fluid, right paracolic gutter fluid, and perihepatic fluid, concerning for nonspecific enteritis or possible bowel ischemia. 2. Few foci of free intraperitoneal air, which may be from perforation of abnormal distal small bowel, given the location of several foci of extraluminal air adjacent to distal small bowel loops.  Recommend surgical consultation. 3. Mildly increased intrahepatic/extrahepatic biliary ductal dilatation without obvious cause of obstruction. 4. Diffuse osseous metastatic disease.   This report was communicated by telephone to Dr. Lozada at the dictation time shown below.    STEPHANIE STORY MD       Electronically Signed and Approved By: STEPHANIE STORY MD on 12/06/2022 at 16:13             CT Radiation Therapy Planning    Result Date: 11/14/2022  Narrative: This scan was performed as part of a plan for Radiation Therapy    MRI Lumbar Spine With & Without Contrast    Result Date: 12/1/2022  Narrative: PROCEDURE: MRI LUMBAR SPINE W WO CONTRAST  COMPARISON: Other, MR, LUMBAR SPINE WITHOUT CONTRAST, 2/06/2020, 14:12.  INDICATIONS: METASTATIC BREAST CA. LOWER BACK PAIN RADIATING INTO BOTH LEGS AND TAILBONE.  CONTRAST: 15ML  Multihance I.V.  TECHNIQUE: A comprehensive examination was performed utilizing a variety of imaging planes and imaging parameters to optimize visualization of suspected pathology.  Images were performed before and after the administration of intravenous gadolinium contrast.  FINDINGS:  Five lumbar vertebral bodies are identified and appear in anatomic alignment.  There is mild to moderate narrowing of the L1-L4 discs and mild narrowing of the L4-L5 and L5-S1 discs.  There is diffuse disc desiccation.  Vertebral bodies maintain normal height.  There are too numerous to count low T1 high T2 rounded lesions scattered throughout all of the visible bones of the thoracolumbar  spine, sacrum and pelvis.  There are more than 50 lesions.  One of the larger lesions occurs at the T11 vertebral body and measures up to 25 mm diameter.  A 2nd large lesion involves the right-side of the L5 vertebral body and measures up to 29 mm diameter.  There is no evidence of pathologic fracture.  The distal spinal cord and conus medullaris appear unremarkable terminating at the L1-L2 level. Limited view of the retroperitoneal structures is unremarkable. Paraspinal musculature is well preserved. No evidence of leptomeningeal or intramedullary/intradural metastatic disease.  L1-L2:  There is concentric disc bulge with a tiny central disc protrusion.  There is mild facet and ligamentum flavum hypertrophy.  There is moderate right and mild left neural foraminal narrowing without spinal canal stenosis.  L2-L3:  There is concentric disc bulge and marginal osteophyte formation with mild facet and ligamentum flavum hypertrophy.  There is mild bilateral neural foraminal narrowing and mild narrowing of the spinal canal.  L3-L4:  There is concentric disc bulge and marginal osteophyte formation with mild facet and ligamentum flavum hypertrophy.  There is moderate bilateral neural foraminal narrowing and moderate narrowing of the spinal canal.  L4-L5:  There is concentric disc bulge and marginal osteophyte formation with moderate bilateral facet and ligamentum flavum hypertrophy.  There is moderate to severe bilateral neural foraminal narrowing.  L5-S1:  There is concentric disc bulge and marginal osteophyte formation.  There is moderate to severe right and moderate left facet hypertrophy.  There is moderate to severe right and moderate left neural foraminal narrowing without spinal canal compromise.       Impression:   1. Too numerous to count osseous metastasis throughout the lumbar spine and pelvis.  No pathologic fracture or evidence of intradural metastasis. 2. Moderate lumbar spondylosis with varying degrees of  neural foraminal and spinal canal narrowing as described in detail above, progressive since 2020.      ADRIANA VALLES MD       Electronically Signed and Approved By: ADRIANA VALLES MD on 12/01/2022 at 16:38                Medications Reviewed:  famotidine, 20 mg, Intravenous, Q12H  fentaNYL, 1 patch, Transdermal, Q72H  LORazepam, 1 mg, Intravenous, Q6H  nicotine, 1 patch, Transdermal, Q24H  piperacillin-tazobactam, 3.375 g, Intravenous, Q6H  [START ON 12/10/2022] polyethylene glycol, 17 g, Nasogastric, Daily  potassium phosphate (monobasic), 500 mg, Nasogastric, BID  sodium chloride, 10 mL, Intravenous, Q12H         Assessment/Plan:    Patient Active Problem List    Diagnosis    • *Peritonitis (HCC) [K65.9]    • Leukopenia [D72.819]    • Metastatic breast cancer (HCC) [C50.919]    • S/P ex lap with sigmoid resection, Lynn's procedure for stercoral perforation [Z98.890]    • Peripheral edema [R60.9]    • Secondary malignant neoplasm of bone (HCC) [C79.51]    • Bone metastases (HCC) [C79.51]    • Malignant neoplasm of left breast in female, estrogen receptor positive (HCC) [C50.912, Z17.0]    • Cancer related pain [G89.3]    • Disorder associated with Helicobacter species [B96.89]    • Hearing loss [H91.90]    • Impacted cerumen of right ear [H61.21]    • Degeneration of lumbar intervertebral disc [M51.36]    • Cervical spondylosis [M47.812]    • Body mass index (BMI) 26.0-26.9, adult [Z68.26]    • Constipation [K59.00]    • Restless leg syndrome [G25.81]    • Itch of skin [L29.9]    • Primary insomnia [F51.01]    • Chronic fatigue [R53.82]    • Inguinal pain [R10.30]    • Renal stone [N20.0]    • Cough [R05.9]    • Hypoxemia [R09.02]    • Lightheadedness [R42]    • Herpes simplex vulvovaginitis [A60.04]    • Generalized abdominal pain [R10.84]    • Acute pain of left knee [M25.562]    • Primary osteoarthritis of left knee [M17.12]    • Osteoarthrosis, localized, primary, knee [M17.10]    • Vitamin B 12 deficiency  [E53.8]    • Inappropriate diet and eating habits [Z72.4]    • Obesity with body mass index 30 or greater [E66.9]    • Asthma [J45.909]    • Irritable bowel syndrome [K58.9]    • Fibromyalgia [M79.7]    • Tobacco abuse [Z72.0]    • Other chest pain [R07.89]    • Hypertension [I10]    • Hyperlipidemia [E78.5]    • Allergic rhinitis [J30.9]    • Hypothyroidism [E03.9]    • Neck pain [M54.2]    • Lumbago [M54.50]    • Arthritis [M19.90]    • Chronic pain disorder [G89.4]    • History of IBS [Z87.19]    • Anxiety [F41.9]    • Monopolar depression (HCC) [F33.9]    • Malaise and fatigue [R53.81, R53.83]       Medical decision making:    Peritonitis with status post sigmoid resection with Vale procedure for stercoral perforation:  Surgery input appreciated  On IV Zosyn and added fentanyl patch for pain control    Ca of breast metastatic:  Continue to monitor  Hematology oncology consult as outpatient    Leukopenia:  Continue monitor WBC  Improved to 4.35    Severe phosphorus deficiency:  Replaced    Hypertension:  Monitor blood pressure closely  Continue home antihypertensives    Major depression:  Continue SSRI    Hypothyroidism  Continue to place    DVT Prophylaxis:    SCUDs    CODE STATUS:  Full code  Reason for continued hospitalization  and medical necessity :  Patient with peritonitis and perforation requiring further management    Orders:  CBC  CMP   replace phosphorus  IV Zosyn    Time spent:  30+ minute not only  including face-to-face rounding putting in the orders writing the note and all the conversation reviewing the records    This transcription was electronically signed.         Disposition:  To be determined    Diet: NPO ice chips      Discussed with: Patient and RN in detail      This has been electronically signed by:  _______________________________    Siva Sutton MD.VEDA.CPE.FACP.[facility]     At [facility], we believe that sharing information builds trust and better relationships. You are receiving  this note because you recently visited [facility]. It is possible you will see health information before a provider has talked with you about it. This kind of information can be easy to misunderstand. To help you fully understand what it means for your health, we urge you to discuss this note with your provider.           Part of this note may be an electronic transcription/translation of spoken language to printed ,text using the Dragon Dictation System.

## 2022-12-09 NOTE — PROGRESS NOTES
SURGERY PROGRESS NOTE     Patient Name:  Derek Keller  YOB: 1960  [id number]   LOS: 3 days   3 Days Post-Op  Patient Care Team:  Haile Monique MD as PCP - General (Family Medicine)  Julien Cardona DO as Consulting Physician (Pain Medicine)  Joel Castillo MD as Consulting Physician (Gastroenterology)  Remington Russell MD as Surgeon (General Surgery)  Ahsan Salas MD as Consulting Physician (Sports Medicine)  Donald Barker MD as Consulting Physician (Hematology and Oncology)  Sandy Ricci MD as Consulting Physician (General Surgery)      Subjective     Interval History: Positive out of bed.  No flatus in her ostomy.  No nausea vomiting.  Her pain is still not controlled.  Afebrile vital signs stable.  WBC 4, hemoglobin 11, neutrophils 83%, creatinine 0.6, phosphorus 1.6      Objective     Vital Signs  Temp:  [98.2 °F (36.8 °C)-99 °F (37.2 °C)] 98.8 °F (37.1 °C)  Heart Rate:  [72-83] 83  Resp:  [18-20] 20  BP: (146-159)/(60-71) 150/68    Physical Exam:     General Appearance:   Alert, cooperative, in no acute distress   Head:   Normocephalic, without obvious abnormality, atraumatic   Eyes:            Lids and lashes normal, conjunctivae and sclerae normal, no      Icterus    Ears:   Ears appear intact with no abnormalities noted   Throat:  No oral lesions, no thrush, oral mucosa moist   Neck:  No adenopathy, supple, trachea midline, no thyromegaly, no     carotid bruit, no JVD   Back:    no tenderness to percussion or palpation,   range of motion       normal   Lungs:    Clear to auscultation,respirations regular, even and                     unlabored    Heart:   Regular rhythm and normal rate, no murmur, no gallop, no rub   Chest Wall:   No abnormalities observed   Abdomen:    Normal bowel sounds, no masses, no organomegaly, soft          non-tender, non-distended, no guarding, no rebound                  tenderness   Rectal:      Extremities:  Moves all extremities well, no  edema, no cyanosis, no                redness   Skin:  No bleeding, bruising or rash   Lymph nodes:  No palpable adenopathy   Neurologic:  A/Ox4 with no deficits        Results Review:     I reviewed the patient's new clinical results.    LABS:  Lab Results (last 72 hours)     Procedure Component Value Units Date/Time    Body Fluid Culture - Body Fluid, Abdominal Wall [[id number]] Collected: 12/06/22 1816    Specimen: Body Fluid from Abdominal Wall Updated: 12/09/22 0853     Body Fluid Culture No growth at 3 days     Gram Stain Rare (1+) Gram negative bacilli      Occasional Gram positive cocci    Anaerobic Culture - Body Fluid, Abdominal Wall [[id number]]  (Normal) Collected: 12/06/22 1816    Specimen: Body Fluid from Abdominal Wall Updated: 12/09/22 0750     Anaerobic Culture Screening for Anaerobes    Phosphorus [[id number]]  (Abnormal) Collected: 12/09/22 0622    Specimen: Blood Updated: 12/09/22 0705     Phosphorus 1.6 mg/dL     Comprehensive Metabolic Panel [[id number]]  (Abnormal) Collected: 12/09/22 0622    Specimen: Blood Updated: 12/09/22 0652     Glucose 85 mg/dL      BUN 13 mg/dL      Creatinine 0.64 mg/dL      Sodium 140 mmol/L      Potassium 3.5 mmol/L      Chloride 107 mmol/L      CO2 24.8 mmol/L      Calcium 8.1 mg/dL      Total Protein 6.4 g/dL      Albumin 3.50 g/dL      ALT (SGPT) 26 U/L      AST (SGOT) 17 U/L      Alkaline Phosphatase 65 U/L      Total Bilirubin 0.6 mg/dL      Globulin 2.9 gm/dL      A/G Ratio 1.2 g/dL      BUN/Creatinine Ratio 20.3     Anion Gap 8.2 mmol/L      eGFR 99.4 mL/min/1.73      Comment: National Kidney Foundation and American Society of Nephrology (ASN) Task Force recommended calculation based on the Chronic Kidney Disease Epidemiology Collaboration (CKD-EPI) equation refit without adjustment for race.       Narrative:      GFR Normal >60  Chronic Kidney Disease <60  Kidney Failure <15      Magnesium [[id number]]  (Normal) Collected: 12/09/22 0622    Specimen: Blood  Updated: 12/09/22 0652     Magnesium 2.1 mg/dL     CBC & Differential [[id number]]  (Abnormal) Collected: 12/09/22 0622    Specimen: Blood Updated: 12/09/22 0632    Narrative:      The following orders were created for panel order CBC & Differential.  Procedure                               Abnormality         Status                     ---------                               -----------         ------                     CBC Auto Differential[[id number]]        Abnormal            Final result               Scan Slide[[id number]]                                                                    Please view results for these tests on the individual orders.    CBC Auto Differential [[id number]]  (Abnormal) Collected: 12/09/22 0622    Specimen: Blood Updated: 12/09/22 0632     WBC 4.35 10*3/mm3      RBC 3.52 10*6/mm3      Hemoglobin 11.2 g/dL      Hematocrit 33.5 %      MCV 95.2 fL      MCH 31.8 pg      MCHC 33.4 g/dL      RDW 17.8 %      RDW-SD 61.6 fl      MPV 9.4 fL      Platelets 148 10*3/mm3      Neutrophil % 83.6 %      Lymphocyte % 10.6 %      Monocyte % 4.1 %      Eosinophil % 0.5 %      Basophil % 0.7 %      Immature Grans % 0.5 %      Neutrophils, Absolute 3.64 10*3/mm3      Lymphocytes, Absolute 0.46 10*3/mm3      Monocytes, Absolute 0.18 10*3/mm3      Eosinophils, Absolute 0.02 10*3/mm3      Basophils, Absolute 0.03 10*3/mm3      Immature Grans, Absolute 0.02 10*3/mm3      nRBC 0.0 /100 WBC     Phosphorus [[id number]]  (Abnormal) Collected: 12/08/22 1300    Specimen: Blood Updated: 12/08/22 1536     Phosphorus 1.2 mg/dL      Comment: Verified by repeat analysis.       CBC & Differential [[id number]]  (Abnormal) Collected: 12/08/22 1300    Specimen: Blood Updated: 12/08/22 1457    Narrative:      The following orders were created for panel order CBC & Differential.  Procedure                               Abnormality         Status                     ---------                               -----------          ------                     CBC Auto Differential[[id number]]        Abnormal            Final result               Scan Slide[[id number]]                                       Final result                 Please view results for these tests on the individual orders.    Scan Slide [[id number]] Collected: 12/08/22 1300    Specimen: Blood Updated: 12/08/22 1457     Anisocytosis Slight/1+     WBC Morphology Normal     Platelet Estimate Decreased    CBC Auto Differential [[id number]]  (Abnormal) Collected: 12/08/22 1300    Specimen: Blood Updated: 12/08/22 1457     WBC 4.16 10*3/mm3      RBC 3.48 10*6/mm3      Hemoglobin 10.9 g/dL      Hematocrit 33.3 %      MCV 95.7 fL      MCH 31.3 pg      MCHC 32.7 g/dL      RDW 17.9 %      RDW-SD 62.1 fl      MPV 10.2 fL      Platelets 151 10*3/mm3      Neutrophil % 82.3 %      Lymphocyte % 12.7 %      Monocyte % 3.1 %      Eosinophil % 0.2 %      Basophil % 1.0 %      Immature Grans % 0.7 %      Neutrophils, Absolute 3.42 10*3/mm3      Lymphocytes, Absolute 0.53 10*3/mm3      Monocytes, Absolute 0.13 10*3/mm3      Eosinophils, Absolute 0.01 10*3/mm3      Basophils, Absolute 0.04 10*3/mm3      Immature Grans, Absolute 0.03 10*3/mm3      nRBC 0.0 /100 WBC     Comprehensive Metabolic Panel [[id number]]  (Abnormal) Collected: 12/08/22 1300    Specimen: Blood Updated: 12/08/22 1454     Glucose 96 mg/dL      BUN 14 mg/dL      Creatinine 0.72 mg/dL      Sodium 138 mmol/L      Potassium 3.6 mmol/L      Chloride 104 mmol/L      CO2 23.6 mmol/L      Calcium 8.3 mg/dL      Total Protein 6.6 g/dL      Albumin 3.80 g/dL      ALT (SGPT) 36 U/L      AST (SGOT) 23 U/L      Alkaline Phosphatase 71 U/L      Total Bilirubin 0.5 mg/dL      Globulin 2.8 gm/dL      A/G Ratio 1.4 g/dL      BUN/Creatinine Ratio 19.4     Anion Gap 10.4 mmol/L      eGFR 94.1 mL/min/1.73      Comment: National Kidney Foundation and American Society of Nephrology (ASN) Task Force recommended calculation based on the Chronic Kidney  Disease Epidemiology Collaboration (CKD-EPI) equation refit without adjustment for race.       Narrative:      GFR Normal >60  Chronic Kidney Disease <60  Kidney Failure <15      Magnesium [[id number]]  (Normal) Collected: 12/08/22 1300    Specimen: Blood Updated: 12/08/22 1454     Magnesium 2.2 mg/dL     Lactic Acid, Plasma [[id number]]  (Normal) Collected: 12/08/22 1300    Specimen: Blood Updated: 12/08/22 1452     Lactate 1.3 mmol/L     Tissue Pathology Exam [[id number]] Collected: 12/06/22 1844    Specimen: Tissue from Large Intestine, Sigmoid Colon Updated: 12/08/22 0826    CBC Auto Differential [[id number]]  (Abnormal) Collected: 12/07/22 0523    Specimen: Blood Updated: 12/07/22 0612     WBC 1.54 10*3/mm3      RBC 3.39 10*6/mm3      Hemoglobin 10.5 g/dL      Hematocrit 32.7 %      MCV 96.5 fL      MCH 31.0 pg      MCHC 32.1 g/dL      RDW 17.4 %      RDW-SD 61.5 fl      MPV 10.0 fL      Platelets 140 10*3/mm3      Neutrophil % 85.1 %      Lymphocyte % 9.1 %      Monocyte % 4.5 %      Eosinophil % 0.0 %      Basophil % 1.3 %      Immature Grans % 0.0 %      Neutrophils, Absolute 1.31 10*3/mm3      Lymphocytes, Absolute 0.14 10*3/mm3      Monocytes, Absolute 0.07 10*3/mm3      Eosinophils, Absolute 0.00 10*3/mm3      Basophils, Absolute 0.02 10*3/mm3      Immature Grans, Absolute 0.00 10*3/mm3      nRBC 0.0 /100 WBC     Comprehensive Metabolic Panel [[id number]]  (Abnormal) Collected: 12/07/22 0523    Specimen: Blood Updated: 12/07/22 0601     Glucose 129 mg/dL      BUN 14 mg/dL      Creatinine 0.68 mg/dL      Sodium 139 mmol/L      Potassium 3.9 mmol/L      Chloride 105 mmol/L      CO2 23.2 mmol/L      Calcium 7.6 mg/dL      Total Protein 5.9 g/dL      Albumin 3.50 g/dL      ALT (SGPT) 59 U/L      AST (SGOT) 42 U/L      Alkaline Phosphatase 65 U/L      Total Bilirubin 0.6 mg/dL      Globulin 2.4 gm/dL      A/G Ratio 1.5 g/dL      BUN/Creatinine Ratio 20.6     Anion Gap 10.8 mmol/L      eGFR 98.0 mL/min/1.73       Comment: National Kidney Foundation and American Society of Nephrology (ASN) Task Force recommended calculation based on the Chronic Kidney Disease Epidemiology Collaboration (CKD-EPI) equation refit without adjustment for race.       Narrative:      GFR Normal >60  Chronic Kidney Disease <60  Kidney Failure <15      Manual Differential [[id number]]  (Abnormal) Collected: 12/06/22 1448    Specimen: Blood Updated: 12/06/22 1539     Neutrophil % 68.0 %      Lymphocyte % 21.0 %      Monocyte % 1.0 %      Basophil % 2.0 %      Bands %  8.0 %      Neutrophils Absolute 0.85 10*3/mm3      Lymphocytes Absolute 0.24 10*3/mm3      Monocytes Absolute 0.01 10*3/mm3      Basophils Absolute 0.02 10*3/mm3      Hypochromia Slight/1+     WBC Morphology Normal     Platelet Estimate Decreased    CBC & Differential [[id number]]  (Abnormal) Collected: 12/06/22 1448    Specimen: Blood Updated: 12/06/22 1539    Narrative:      The following orders were created for panel order CBC & Differential.  Procedure                               Abnormality         Status                     ---------                               -----------         ------                     CBC Auto Differential[[id number]]        Abnormal            Final result               Scan Slide[[id number]]                                       Final result                 Please view results for these tests on the individual orders.    CBC Auto Differential [[id number]]  (Abnormal) Collected: 12/06/22 1448    Specimen: Blood Updated: 12/06/22 1539     WBC 1.12 10*3/mm3      RBC 3.88 10*6/mm3      Hemoglobin 12.2 g/dL      Hematocrit 37.8 %      MCV 97.4 fL      MCH 31.4 pg      MCHC 32.3 g/dL      RDW 17.3 %      RDW-SD 61.1 fl      MPV 9.9 fL      Platelets 160 10*3/mm3     Narrative:      Modified report. Previous result was Hemogram on 12/6/2022 at 1518 EST.  The previously reported component NRBC is no longer being reported. Previous result was 0.0 /100 WBC (Reference  Range: 0.0-0.2 /100 WBC) on 12/6/2022 at 1518 EST.    Scan Slide [[id number]] Collected: 12/06/22 1448    Specimen: Blood Updated: 12/06/22 1539     Scan Slide --     Comment: See Manual Differential Results       [place] Draw [[id number]] Collected: 12/06/22 1313    Specimen: Blood Updated: 12/06/22 1508    Narrative:      The following orders were created for panel order [place] Draw.  Procedure                               Abnormality         Status                     ---------                               -----------         ------                     Green Top (Gel)[[id number]]                                  Final result               Lavender Top[[id number]]                                     Final result               Gold Top - SST[[id number]]                                   Final result               Light Blue Top[[id number]]                                   Final result                 Please view results for these tests on the individual orders.    Light Blue Top [[id number]] Collected: 12/06/22 1448    Specimen: Blood Updated: 12/06/22 1508     Extra Tube Hold for add-ons.     Comment: Auto resulted       Urinalysis With Microscopic If Indicated (No Culture) - Urine, Clean Catch [[id number]]  (Abnormal) Collected: 12/06/22 1330    Specimen: Urine, Clean Catch Updated: 12/06/22 1348     Color, UA Yellow     Appearance, UA Cloudy     pH, UA 5.5     Specific Gravity, UA 1.020     Glucose, UA Negative     Ketones, UA Negative     Bilirubin, UA Negative     Blood, UA Small (1+)     Protein, UA Trace     Leuk Esterase, UA Negative     Nitrite, UA Negative     Urobilinogen, UA 1.0 E.U./dL    Urinalysis, Microscopic Only - Urine, Clean Catch [[id number]]  (Abnormal) Collected: 12/06/22 1330    Specimen: Urine, Clean Catch Updated: 12/06/22 1348     RBC, UA 6-12 /HPF      WBC, UA 3-5 /HPF      Bacteria, UA 1+ /HPF      Squamous Epithelial Cells, UA 3-6 /HPF      Hyaline Casts, UA 7-12 /LPF      Methodology  Automated Microscopy    Comprehensive Metabolic Panel [[id number]]  (Abnormal) Collected: 12/06/22 1313    Specimen: Blood Updated: 12/06/22 1340     Glucose 127 mg/dL      BUN 17 mg/dL      Creatinine 0.66 mg/dL      Sodium 137 mmol/L      Potassium 4.8 mmol/L      Comment: Slight hemolysis detected by analyzer. Results may be affected.        Chloride 104 mmol/L      CO2 24.8 mmol/L      Calcium 8.7 mg/dL      Total Protein 6.7 g/dL      Albumin 3.90 g/dL      ALT (SGPT) 36 U/L      AST (SGOT) 29 U/L      Alkaline Phosphatase 96 U/L      Total Bilirubin 0.4 mg/dL      Globulin 2.8 gm/dL      A/G Ratio 1.4 g/dL      BUN/Creatinine Ratio 25.8     Anion Gap 8.2 mmol/L      eGFR 98.7 mL/min/1.73      Comment: National Kidney Foundation and American Society of Nephrology (ASN) Task Force recommended calculation based on the Chronic Kidney Disease Epidemiology Collaboration (CKD-EPI) equation refit without adjustment for race.       Narrative:      GFR Normal >60  Chronic Kidney Disease <60  Kidney Failure <15      Lipase [[id number]]  (Normal) Collected: 12/06/22 1313    Specimen: Blood Updated: 12/06/22 1340     Lipase 32 U/L     Lactic Acid, Plasma [[id number]]  (Normal) Collected: 12/06/22 1313    Specimen: Blood Updated: 12/06/22 1336     Lactate 1.0 mmol/L     Lavender Top [[id number]] Collected: 12/06/22 1313    Specimen: Blood Updated: 12/06/22 1324     Extra Tube hold for add-on     Comment: Auto resulted       Gold Top - SST [[id number]] Collected: 12/06/22 1313    Specimen: Blood Updated: 12/06/22 1324     Extra Tube Hold for add-ons.     Comment: Auto resulted.       Green Top (Gel) [[id number]] Collected: 12/06/22 1313    Specimen: Blood Updated: 12/06/22 1324     Extra Tube Hold for add-ons.     Comment: Auto resulted.             IMAGING:  Imaging Results (Last 72 Hours)     Procedure Component Value Units Date/Time    CT Abdomen Pelvis With Contrast [[id number]] Collected: 12/06/22 1613     Updated: 12/06/22  1616    Narrative:      PROCEDURE: CT ABDOMEN PELVIS W CONTRAST     COMPARISON: [facility], CT, CT ABDOMEN PELVIS W WO CONTRAST, 8/05/2022,   11:48.     INDICATIONS: abdominal pain, metastatic breast cancer     TECHNIQUE: After obtaining the patient's consent, CT images were created with non-ionic intravenous   contrast material.       PROTOCOL:   Standard imaging protocol performed      RADIATION:   DLP: 979.8mGy*cm    Automated exposure control was utilized to minimize radiation dose.   CONTRAST: 100cc Isovue 370 I.V.  LABS:   eGFR: 93ml/min/1.73m2     FINDINGS:   Mostly linear opacities in the dependent lower lobes are likely due to subsegmental atelectasis.    Gallbladder is surgically absent.  Intrahepatic/extrahepatic biliary ductal dilatation is mildly   increased compared to 8/5/2022 CT.  Extrahepatic common bile duct measures up to 1.5 cm, previously   1.2 cm.  No definite cause of biliary obstruction is visualized on CT.  There is mild dilatation of   the main pancreatic duct.  Bilateral renal cysts are stable.  Adrenal glands and spleen appear   unremarkable.     No abnormal bowel distention is seen, but there is mild wall thickening of distal small bowel in   the right lower quadrant with small amount of interloop fluid noted.  There is a small amount of   free pelvic fluid, as well as fluid in the right paracolic gutter and perihepatic fluid.  There are   few foci of free intraperitoneal air (anterior to liver on axial image 18 and 20, anterior abdomen   on axial image 46), as well as foci of extraluminal air adjacent to distal small bowel loops in the   pelvis.  No pneumatosis or mesenteric/portal venous gas is seen.  Appendix is nondilated, and there   is no significant periappendiceal inflammation.  There is stool throughout the colon.     No urinary bladder wall thickening is seen.  No adenopathy is identified in the abdomen or pelvis.    There are atherosclerotic vascular  calcifications.  Numerous sclerotic lesions are redemonstrated,   consistent with osseous metastatic disease.  No pathologic fractures are seen.       Impression:         1. Wall thickening/enhancement of distal small bowel in the right lower quadrant with a small   amount of interloop fluid, as well as a small amount of free pelvic fluid, right paracolic gutter   fluid, and perihepatic fluid, concerning for nonspecific enteritis or possible bowel ischemia.  2. Few foci of free intraperitoneal air, which may be from perforation of abnormal distal small   bowel, given the location of several foci of extraluminal air adjacent to distal small bowel loops.    Recommend surgical consultation.  3. Mildly increased intrahepatic/extrahepatic biliary ductal dilatation without obvious cause of   obstruction.  4. Diffuse osseous metastatic disease.        This report was communicated by telephone to Dr. Lozada at the dictation time shown below.        STEPHANIE STORY MD         Electronically Signed and Approved By: STEPHANIE STORY MD on 12/06/2022 at 16:13                           Medications:    Current Facility-Administered Medications:   •  acetaminophen (TYLENOL) tablet 650 mg, 650 mg, Nasogastric, Q4H PRN **OR** acetaminophen (TYLENOL) 160 MG/5ML solution 650 mg, 650 mg, Nasogastric, Q4H PRN **OR** acetaminophen (TYLENOL) suppository 650 mg, 650 mg, Rectal, Q4H PRN, Augusto Ladd MD  •  famotidine (PEPCID) injection 20 mg, 20 mg, Intravenous, Q12H, Alfred Castañeda MD, 20 mg at 12/09/22 0838  •  HYDROmorphone (DILAUDID) injection 1 mg, 1 mg, Intravenous, Q1H PRN, 1 mg at 12/09/22 0833 **AND** naloxone (NARCAN) injection 0.4 mg, 0.4 mg, Intravenous, Q5 Min PRN, Christa Harrell, HIEU  •  lactated ringers infusion, 50 mL/hr, Intravenous, Continuous, Danny Reina Jr., MD, Last Rate: 50 mL/hr at 12/07/22 1639, 50 mL/hr at 12/07/22 1639  •  LORazepam (ATIVAN) injection 1 mg, 1 mg, Intravenous, Q6H, Wilberto  Augusto LIVINGSTON MD, 1 mg at 12/09/22 0255  •  nicotine (NICODERM CQ) 21 MG/24HR patch 1 patch, 1 patch, Transdermal, Q24H, Augusto Ladd MD, 1 patch at 12/09/22 0833  •  nitroglycerin (NITROSTAT) SL tablet 0.4 mg, 0.4 mg, Sublingual, Q5 Min PRN, Danny Reina Jr., MD  •  ondansetron (ZOFRAN) injection 4 mg, 4 mg, Intravenous, Q6H PRN, Danny Reina Jr., MD, 4 mg at 12/09/22 0302  •  Pharmacy to Dose Zosyn, , Does not apply, Continuous PRN, Danny Reina Jr., MD  •  phenol (CHLORASEPTIC) 1.4 % liquid 1 spray, 1 spray, Mouth/Throat, Q2H PRN, Danny Reina Jr., MD, 1 spray at 12/09/22 0301  •  piperacillin-tazobactam (ZOSYN) 3.375 g in iso-osmotic dextrose 50 ml (premix), 3.375 g, Intravenous, Q6H, Danny Reina Jr., MD, 3.375 g at 12/09/22 0834  •  potassium phosphate (monobasic) (K-PHOS) tablet 500 mg, 500 mg, Oral, BID, Siva Park MD, 500 mg at 12/09/22 0859  •  sodium chloride 0.9 % flush 10 mL, 10 mL, Intravenous, PRN, Danny Reina Jr., MD  •  sodium chloride 0.9 % flush 10 mL, 10 mL, Intravenous, PRN, Danny Reina Jr., MD  •  sodium chloride 0.9 % flush 10 mL, 10 mL, Intravenous, Q12H, Danny Reina Jr., MD, 10 mL at 12/09/22 0836  •  sodium chloride 0.9 % infusion 40 mL, 40 mL, Intravenous, PRN, Danny Reina Jr., MD    Assessment & Plan       Peritonitis (HCC)    Leukopenia    Metastatic breast cancer (HCC)    S/P ex lap with sigmoid resection, Lynn's procedure for stercoral perforation    Status post Lynn's procedure for stercoral perforation    Recommend Zosyn for at least 4 days from the time of surgery.  Daily labs.  Out of bed.  Change Nu Gauze dry daily.  Ostomy teaching.  Okay for ice chips, popsicles, chewing gum.  I stressed the importance of out of bed and walking with the patient.  Discussed with the nurse as well.  Continue physical therapy.  Add fentanyl patch.  Phosphorus replacement per hospitalist.    Alfred Castañeda MD  12/09/22  09:49 EST

## 2022-12-09 NOTE — SIGNIFICANT NOTE
Wound Eval / Progress Noted    JOSE Langston     Patient Name: Derek Keller  : 1960  MRN: [id number]  Today's Date: 2022                 Admit Date: 2022    Visit Dx:    ICD-10-CM ICD-9-CM   1. Peritonitis (HCC)  K65.9 567.9   2. Decreased activities of daily living (ADL)  Z78.9 V49.89   3. Bone metastases (HCC)  C79.51 198.5   4. Secondary malignant neoplasm of bone (HCC)  C79.51 198.5   5. Malignant neoplasm of upper-outer quadrant of left breast in female, estrogen receptor positive (HCC)  C50.412 174.4    Z17.0 V86.0   6. Chronic fatigue  R53.82 780.79   7. Hypoxemia  R09.02 799.02   8. Difficulty walking  R26.2 719.7       Patient Active Problem List   Diagnosis    Other chest pain    Hypertension    Hyperlipidemia    Allergic rhinitis    Hypothyroidism    Neck pain    Lumbago    Arthritis    Chronic pain disorder    History of IBS    Anxiety    Monopolar depression (HCC)    Malaise and fatigue    Tobacco abuse    Fibromyalgia    Vitamin B 12 deficiency    Acute pain of left knee    Primary osteoarthritis of left knee    Osteoarthrosis, localized, primary, knee    Generalized abdominal pain    Herpes simplex vulvovaginitis    Lightheadedness    Cough    Hypoxemia    Restless leg syndrome    Itch of skin    Primary insomnia    Chronic fatigue    Asthma    Body mass index (BMI) 26.0-26.9, adult    Constipation    Degeneration of lumbar intervertebral disc    Disorder associated with Helicobacter species    Hearing loss    Impacted cerumen of right ear    Inappropriate diet and eating habits    Inguinal pain    Irritable bowel syndrome    Cervical spondylosis    Obesity with body mass index 30 or greater    Renal stone    Malignant neoplasm of left breast in female, estrogen receptor positive (HCC)    Cancer related pain    Bone metastases (HCC)    Secondary malignant neoplasm of bone (HCC)    Peripheral edema    Peritonitis (HCC)    Leukopenia    Metastatic breast cancer (HCC)    S/P ex lap with  sigmoid resection, Lynn's procedure for stercoral perforation        Past Medical History:   Diagnosis Date    Abdominal pain     Allergic rhinitis     Anemia     Anxiety     Arteriosclerosis     Coronary    Arthritis     Bone metastases (HCC) 10/14/2022    Bowen's disease     Breast cancer (HCC)     Cancer related pain 10/13/2022    Chest pain     Chronic pain disorder     Cough     Depression     Dysphoric mood     Fatigue     Frequent urination     History of colon polyps     History of IBS     History of kidney stones     Hyperlipidemia     Hypertension     Hypothyroidism     Insomnia     Lumbago     Malaise and fatigue     Mood disorder (HCC)     Neck pain     Palpitations     Peripheral edema 11/21/2022    Precordial pain     Sleep disturbance     SOB (shortness of breath)         Past Surgical History:   Procedure Laterality Date    BREAST BIOPSY      CARDIAC CATHETERIZATION Left 1960    CARDIAC CATHETERIZATION N/A 04/06/2018    Procedure: Coronary angiography;  Surgeon: Art Licea MD;  Location: [facility] INVASIVE LOCATION;  Service: Cardiovascular    CARDIAC CATHETERIZATION N/A 04/06/2018    Procedure: Left heart cath;  Surgeon: Art Licea MD;  Location: [facility] INVASIVE LOCATION;  Service: Cardiovascular    CARDIAC CATHETERIZATION N/A 04/06/2018    Procedure: Left ventriculography;  Surgeon: Art Licea MD;  Location: [facility] INVASIVE LOCATION;  Service: Cardiovascular    CHOLECYSTECTOMY      COLONOSCOPY  11/08/2006    EXPLORATORY LAPAROTOMY N/A 12/6/2022    Procedure: LAPAROTOMY EXPLORATORY sigmoid resection hartmans procedure colostomy;  Surgeon: Alfred Castañeda MD;  Location: [facility];  Service: General;  Laterality: N/A;    GANGLION CYST EXCISION      HYSTERECTOMY  05/2005    LAPAROSCOPIC GASTRIC BANDING      TONSILLECTOMY           Physical Assessment:     12/09/22 1445   Colostomy LLQ   Placement Date/Time: 12/06/22 1900   Inserted by:   JIMENA  Hand Hygiene Completed: Yes  Colostomy Type: Descending/sigmoid;End  Location: LLQ   Wound Image    Stomal Appliance Clean;Dry;Intact;1 piece;Changed   Stoma Appearance pink;red;moist   Peristomal Assessment Clean;Intact   Accessories/Skin Care cleansed with water;skin sealant   Stoma Function no flatus;no stool   Treatment Bag change;Site care       Wound Check / Follow-up:  Patient seen today for ostomy management and pouch change. Patient tearful during pouch change; unable to remember steps to changing ostomy pouch. No stool noted to pouch at this time. Stoma is red and moist, protruding above skin level. Applied 1 piece pouch. Discussed steps to changing the ostomy pouch with the patient. Demonstrated how to open and close pouch. Asked patient to review steps to changing the ostomy pouch and how to open and close the pouch over the weekend. Patient receptive to discussion and voiced no needs at this time.     Impression: colostomy     Short term goals:  colostomy management/education    Amanda Almaguer RN    12/9/2022    14:46 EST

## 2022-12-09 NOTE — NURSING NOTE
No output from ostomy yet. VSS. Pt ambulated in maxwell with walker and 2 person assist. Pain medication given frequently throughout shift along with ativan. Pt tolerating popsickles and ice chips well. Mary Jarrett RN.

## 2022-12-09 NOTE — THERAPY TREATMENT NOTE
Acute Care - Physical Therapy Progress Note  JOSE Langston     Patient Name: Derek Keller  : 1960  MRN: [id number]  Today's Date: 2022      Visit Dx:     ICD-10-CM ICD-9-CM   1. Peritonitis (HCC)  K65.9 567.9   2. Decreased activities of daily living (ADL)  Z78.9 V49.89   3. Bone metastases (HCC)  C79.51 198.5   4. Secondary malignant neoplasm of bone (HCC)  C79.51 198.5   5. Malignant neoplasm of upper-outer quadrant of left breast in female, estrogen receptor positive (HCC)  C50.412 174.4    Z17.0 V86.0   6. Chronic fatigue  R53.82 780.79   7. Hypoxemia  R09.02 799.02   8. Difficulty walking  R26.2 719.7     Patient Active Problem List   Diagnosis   • Other chest pain   • Hypertension   • Hyperlipidemia   • Allergic rhinitis   • Hypothyroidism   • Neck pain   • Lumbago   • Arthritis   • Chronic pain disorder   • History of IBS   • Anxiety   • Monopolar depression (HCC)   • Malaise and fatigue   • Tobacco abuse   • Fibromyalgia   • Vitamin B 12 deficiency   • Acute pain of left knee   • Primary osteoarthritis of left knee   • Osteoarthrosis, localized, primary, knee   • Generalized abdominal pain   • Herpes simplex vulvovaginitis   • Lightheadedness   • Cough   • Hypoxemia   • Restless leg syndrome   • Itch of skin   • Primary insomnia   • Chronic fatigue   • Asthma   • Body mass index (BMI) 26.0-26.9, adult   • Constipation   • Degeneration of lumbar intervertebral disc   • Disorder associated with Helicobacter species   • Hearing loss   • Impacted cerumen of right ear   • Inappropriate diet and eating habits   • Inguinal pain   • Irritable bowel syndrome   • Cervical spondylosis   • Obesity with body mass index 30 or greater   • Renal stone   • Malignant neoplasm of left breast in female, estrogen receptor positive (HCC)   • Cancer related pain   • Bone metastases (HCC)   • Secondary malignant neoplasm of bone (HCC)   • Peripheral edema   • Peritonitis (HCC)   • Leukopenia   • Metastatic breast cancer  (HCC)   • S/P ex lap with sigmoid resection, Lynn's procedure for stercoral perforation     Past Medical History:   Diagnosis Date   • Abdominal pain    • Allergic rhinitis    • Anemia    • Anxiety    • Arteriosclerosis     Coronary   • Arthritis    • Bone metastases (HCC) 10/14/2022   • Bowen's disease    • Breast cancer (HCC)    • Cancer related pain 10/13/2022   • Chest pain    • Chronic pain disorder    • Cough    • Depression    • Dysphoric mood    • Fatigue    • Frequent urination    • History of colon polyps    • History of IBS    • History of kidney stones    • Hyperlipidemia    • Hypertension    • Hypothyroidism    • Insomnia    • Lumbago    • Malaise and fatigue    • Mood disorder (HCC)    • Neck pain    • Palpitations    • Peripheral edema 11/21/2022   • Precordial pain    • Sleep disturbance    • SOB (shortness of breath)      Past Surgical History:   Procedure Laterality Date   • BREAST BIOPSY     • CARDIAC CATHETERIZATION Left 1959   • CARDIAC CATHETERIZATION N/A 04/06/2018    Procedure: Coronary angiography;  Surgeon: Art Licea MD;  Location: [facility] INVASIVE LOCATION;  Service: Cardiovascular   • CARDIAC CATHETERIZATION N/A 04/06/2018    Procedure: Left heart cath;  Surgeon: Art Licea MD;  Location: [facility] CATH INVASIVE LOCATION;  Service: Cardiovascular   • CARDIAC CATHETERIZATION N/A 04/06/2018    Procedure: Left ventriculography;  Surgeon: Art Licea MD;  Location: [facility] CATH INVASIVE LOCATION;  Service: Cardiovascular   • CHOLECYSTECTOMY     • COLONOSCOPY  11/08/2006   • EXPLORATORY LAPAROTOMY N/A 12/6/2022    Procedure: LAPAROTOMY EXPLORATORY sigmoid resection hartmans procedure colostomy;  Surgeon: Alfred Castañeda MD;  Location: [facility] OR;  Service: General;  Laterality: N/A;   • GANGLION CYST EXCISION     • HYSTERECTOMY  05/2005   • LAPAROSCOPIC GASTRIC BANDING     • TONSILLECTOMY       PT Assessment (last 12 hours)     PT Evaluation and  Treatment     Row Name 12/09/22 1300          Physical Therapy Time and Intention    Subjective Information no complaints  -CS     Document Type therapy note (daily note)  -CS     Mode of Treatment individual therapy;physical therapy  -CS     Patient Effort good  -CS     Symptoms Noted During/After Treatment none  -CS     Row Name 12/09/22 1300          Bed Mobility    Bed Mobility sit-supine  -CS     Sit-Supine [place] (Bed Mobility) minimum assist (75% patient effort);1 person assist;verbal cues  -CS     Row Name 12/09/22 1300          Stand-Sit Transfer    Stand-Sit [place] (Transfers) contact guard;1 person assist  -CS     Assistive Device (Stand-Sit Transfers) walker, front-wheeled  -CS     Row Name 12/09/22 1300          Gait/Stairs (Locomotion)    [place] Level (Gait) contact guard;1 person assist  -CS     Assistive Device (Gait) walker, front-wheeled  -CS     Distance in Feet (Gait) 225  -CS     Pattern (Gait) 4-point;step-through  -CS     Deviations/Abnormal Patterns (Gait) gait speed decreased;stride length decreased  -CS     Bilateral Gait Deviations forward flexed posture  -CS     Row Name             Wound 12/06/22 1906 midline abdomen Incision    Wound - Properties Group Placement Date: 12/06/22  -CK Placement Time: 1906  -CK Present on Hospital Admission: N  -CK Orientation: midline  -CK Location: abdomen  -CK Primary Wound Type: Incision  -CK    Retired Wound - Properties Group Placement Date: 12/06/22  -CK Placement Time: 1906  -CK Present on Hospital Admission: N  -CK Orientation: midline  -CK Location: abdomen  -CK Primary Wound Type: Incision  -CK    Retired Wound - Properties Group Date first assessed: 12/06/22  -CK Time first assessed: 1906  -CK Present on Hospital Admission: N  -CK Location: abdomen  -CK Primary Wound Type: Incision  -CK    Row Name 12/09/22 1300          Progress Summary (PT)    Progress Toward Functional Goals (PT) progress toward functional goals is good  -CS            User Key  (r) = Recorded By, (t) = Taken By, (c) = Cosigned By    Initials Name Provider Type    CK Odette Cruz, RN Registered Nurse    Mikahil Simmons PTA Physical Therapist Assistant                Physical Therapy Education     Title: PT OT SLP Therapies (In Progress)     Topic: Physical Therapy (In Progress)     Point: Mobility training (Done)     Learning Progress Summary           Patient Acceptance, E,TB, VU by AV at 12/7/2022 1352                   Point: Home exercise program (Not Started)     Learner Progress:  Not documented in this visit.          Point: Body mechanics (Done)     Learning Progress Summary           Patient Acceptance, E,TB, VU by AV at 12/7/2022 1352                   Point: Precautions (Done)     Learning Progress Summary           Patient Acceptance, E,TB, VU by AV at 12/7/2022 1352                               User Key     Initials Effective Dates Name Provider Type Discipline    AV 06/11/21 -  Jose Flores, PT Physical Therapist PT              PT Recommendation and Plan     Progress Summary (PT)  Progress Toward Functional Goals (PT): progress toward functional goals is good   Outcome Measures     Row Name 12/09/22 1300 12/08/22 1200 12/07/22 1350       How much help from another person do you currently need...    Turning from your back to your side while in flat bed without using bedrails? 3  -CS 3  -AV (r) AD (t) AV (c) 3  -AV    Moving from lying on back to sitting on the side of a flat bed without bedrails? 3  -CS 3  -AV (r) AD (t) AV (c) 3  -AV    Moving to and from a bed to a chair (including a wheelchair)? 3  -CS 3  -AV (r) AD (t) AV (c) 3  -AV    Standing up from a chair using your arms (e.g., wheelchair, bedside chair)? 3  -CS 3  -AV (r) AD (t) AV (c) 3  -AV    Climbing 3-5 steps with a railing? 2  -CS 2  -AV (r) AD (t) AV (c) 2  -AV    To walk in hospital room? 3  -CS 3  -AV (r) AD (t) AV (c) 3  -AV    AM-PAC 6 Clicks Score (PT) 17  -CS 17  -AV  (r) AD (t) 17  -AV       Functional Assessment    Outcome Measure Options AM-PAC 6 Clicks Basic Mobility (PT)  -CS AM-PAC 6 Clicks Basic Mobility (PT)  -AV (r) AD (t) AV (c) AM-PAC 6 Clicks Basic Mobility (PT)  -AV          User Key  (r) = Recorded By, (t) = Taken By, (c) = Cosigned By    Initials Name Provider Type    AV Jose Flores, PT Physical Therapist    CS Mikhail Perry PTA Physical Therapist Assistant    AD Adrian Orta, PT Student PT Student                 Time Calculation:    PT Charges     Row Name 12/09/22 1305             Time Calculation    Start Time 1140  -CS      PT Received On 12/09/22  -CS         Timed Charges    93975 - PT Therapeutic Exercise Minutes 18  -CS      22558 - PT Therapeutic Activity Minutes 5  -CS         Total Minutes    Timed Charges Total Minutes 23  -CS       Total Minutes 23  -CS            User Key  (r) = Recorded By, (t) = Taken By, (c) = Cosigned By    Initials Name Provider Type     Mikhail Perry PTA Physical Therapist Assistant              Therapy Charges for Today     Code Description Service Date Service Provider Modifiers Qty    37087084669 HC PT THER PROC EA 15 MIN 12/9/2022 Mikhail Perry PTA GP 1    20382676125 HC PT THERAPEUTIC ACT EA 15 MIN 12/9/2022 Mikhail Perry PTA GP 1          PT G-Codes  Outcome Measure Options: AM-PAC 6 Clicks Basic Mobility (PT)  AM-PAC 6 Clicks Score (PT): 17  AM-PAC 6 Clicks Score (OT): 15    Mikhail Perry PTA  12/9/2022

## 2022-12-10 LAB
ANION GAP SERPL CALCULATED.3IONS-SCNC: 14.9 MMOL/L (ref 5–15)
BASOPHILS # BLD AUTO: 0.07 10*3/MM3 (ref 0–0.2)
BASOPHILS NFR BLD AUTO: 1.5 % (ref 0–1.5)
BUN SERPL-MCNC: 13 MG/DL (ref 8–23)
BUN/CREAT SERPL: 21.7 (ref 7–25)
CALCIUM SPEC-SCNC: 8.2 MG/DL (ref 8.6–10.5)
CHLORIDE SERPL-SCNC: 103 MMOL/L (ref 98–107)
CO2 SERPL-SCNC: 22.1 MMOL/L (ref 22–29)
CREAT SERPL-MCNC: 0.6 MG/DL (ref 0.57–1)
DEPRECATED RDW RBC AUTO: 64.7 FL (ref 37–54)
EGFRCR SERPLBLD CKD-EPI 2021: 101 ML/MIN/1.73
EOSINOPHIL # BLD AUTO: 0.05 10*3/MM3 (ref 0–0.4)
EOSINOPHIL NFR BLD AUTO: 1 % (ref 0.3–6.2)
ERYTHROCYTE [DISTWIDTH] IN BLOOD BY AUTOMATED COUNT: 17.8 % (ref 12.3–15.4)
GLUCOSE SERPL-MCNC: 109 MG/DL (ref 65–99)
HCT VFR BLD AUTO: 44.7 % (ref 34–46.6)
HGB BLD-MCNC: 14.3 G/DL (ref 12–15.9)
IMM GRANULOCYTES # BLD AUTO: 0.04 10*3/MM3 (ref 0–0.05)
IMM GRANULOCYTES NFR BLD AUTO: 0.8 % (ref 0–0.5)
LYMPHOCYTES # BLD AUTO: 0.59 10*3/MM3 (ref 0.7–3.1)
LYMPHOCYTES NFR BLD AUTO: 12.2 % (ref 19.6–45.3)
MCH RBC QN AUTO: 31.6 PG (ref 26.6–33)
MCHC RBC AUTO-ENTMCNC: 32 G/DL (ref 31.5–35.7)
MCV RBC AUTO: 98.7 FL (ref 79–97)
MONOCYTES # BLD AUTO: 0.57 10*3/MM3 (ref 0.1–0.9)
MONOCYTES NFR BLD AUTO: 11.8 % (ref 5–12)
NEUTROPHILS NFR BLD AUTO: 3.5 10*3/MM3 (ref 1.7–7)
NEUTROPHILS NFR BLD AUTO: 72.7 % (ref 42.7–76)
NRBC BLD AUTO-RTO: 0 /100 WBC (ref 0–0.2)
PLATELET # BLD AUTO: 146 10*3/MM3 (ref 140–450)
PMV BLD AUTO: 10.4 FL (ref 6–12)
POTASSIUM SERPL-SCNC: 3.5 MMOL/L (ref 3.5–5.2)
RBC # BLD AUTO: 4.53 10*6/MM3 (ref 3.77–5.28)
SODIUM SERPL-SCNC: 140 MMOL/L (ref 136–145)
WBC NRBC COR # BLD: 4.82 10*3/MM3 (ref 3.4–10.8)

## 2022-12-10 PROCEDURE — 94761 N-INVAS EAR/PLS OXIMETRY MLT: CPT

## 2022-12-10 PROCEDURE — 94799 UNLISTED PULMONARY SVC/PX: CPT

## 2022-12-10 PROCEDURE — 99232 SBSQ HOSP IP/OBS MODERATE 35: CPT | Performed by: INTERNAL MEDICINE

## 2022-12-10 PROCEDURE — 99024 POSTOP FOLLOW-UP VISIT: CPT | Performed by: SURGERY

## 2022-12-10 PROCEDURE — 25010000002 PIPERACILLIN SOD-TAZOBACTAM PER 1 G: Performed by: INTERNAL MEDICINE

## 2022-12-10 PROCEDURE — 25010000002 HYDROMORPHONE 1 MG/ML SOLUTION: Performed by: NURSE PRACTITIONER

## 2022-12-10 PROCEDURE — 25010000002 LORAZEPAM PER 2 MG: Performed by: INTERNAL MEDICINE

## 2022-12-10 PROCEDURE — 80048 BASIC METABOLIC PNL TOTAL CA: CPT | Performed by: SURGERY

## 2022-12-10 PROCEDURE — 85025 COMPLETE CBC W/AUTO DIFF WBC: CPT | Performed by: SURGERY

## 2022-12-10 RX ADMIN — HYDROMORPHONE HYDROCHLORIDE 1 MG: 1 INJECTION, SOLUTION INTRAMUSCULAR; INTRAVENOUS; SUBCUTANEOUS at 01:58

## 2022-12-10 RX ADMIN — LORAZEPAM 1 MG: 2 INJECTION INTRAMUSCULAR; INTRAVENOUS at 13:58

## 2022-12-10 RX ADMIN — TAZOBACTAM SODIUM AND PIPERACILLIN SODIUM 3.38 G: 375; 3 INJECTION, SOLUTION INTRAVENOUS at 09:10

## 2022-12-10 RX ADMIN — LORAZEPAM 1 MG: 2 INJECTION INTRAMUSCULAR; INTRAVENOUS at 18:19

## 2022-12-10 RX ADMIN — NICOTINE 1 PATCH: 21 PATCH, EXTENDED RELEASE TRANSDERMAL at 09:11

## 2022-12-10 RX ADMIN — LORAZEPAM 1 MG: 2 INJECTION INTRAMUSCULAR; INTRAVENOUS at 23:28

## 2022-12-10 RX ADMIN — SODIUM CHLORIDE, POTASSIUM CHLORIDE, SODIUM LACTATE AND CALCIUM CHLORIDE 50 ML/HR: 600; 310; 30; 20 INJECTION, SOLUTION INTRAVENOUS at 18:01

## 2022-12-10 RX ADMIN — HYDROMORPHONE HYDROCHLORIDE 1 MG: 1 INJECTION, SOLUTION INTRAMUSCULAR; INTRAVENOUS; SUBCUTANEOUS at 00:16

## 2022-12-10 RX ADMIN — POLYETHYLENE GLYCOL 3350 17 G: 17 POWDER, FOR SOLUTION ORAL at 09:11

## 2022-12-10 RX ADMIN — Medication 10 ML: at 20:21

## 2022-12-10 RX ADMIN — FAMOTIDINE 20 MG: 10 INJECTION INTRAVENOUS at 20:21

## 2022-12-10 RX ADMIN — TAZOBACTAM SODIUM AND PIPERACILLIN SODIUM 3.38 G: 375; 3 INJECTION, SOLUTION INTRAVENOUS at 21:50

## 2022-12-10 RX ADMIN — Medication 10 ML: at 09:10

## 2022-12-10 RX ADMIN — HYDROMORPHONE HYDROCHLORIDE 1 MG: 1 INJECTION, SOLUTION INTRAMUSCULAR; INTRAVENOUS; SUBCUTANEOUS at 13:58

## 2022-12-10 RX ADMIN — TAZOBACTAM SODIUM AND PIPERACILLIN SODIUM 3.38 G: 375; 3 INJECTION, SOLUTION INTRAVENOUS at 01:58

## 2022-12-10 RX ADMIN — TAZOBACTAM SODIUM AND PIPERACILLIN SODIUM 3.38 G: 375; 3 INJECTION, SOLUTION INTRAVENOUS at 17:12

## 2022-12-10 RX ADMIN — Medication 500 MG: at 20:21

## 2022-12-10 RX ADMIN — HYDROMORPHONE HYDROCHLORIDE 1 MG: 1 INJECTION, SOLUTION INTRAMUSCULAR; INTRAVENOUS; SUBCUTANEOUS at 18:18

## 2022-12-10 RX ADMIN — HYDROMORPHONE HYDROCHLORIDE 1 MG: 1 INJECTION, SOLUTION INTRAMUSCULAR; INTRAVENOUS; SUBCUTANEOUS at 07:10

## 2022-12-10 RX ADMIN — Medication 500 MG: at 09:10

## 2022-12-10 RX ADMIN — HYDROMORPHONE HYDROCHLORIDE 1 MG: 1 INJECTION, SOLUTION INTRAMUSCULAR; INTRAVENOUS; SUBCUTANEOUS at 03:01

## 2022-12-10 RX ADMIN — LORAZEPAM 1 MG: 2 INJECTION INTRAMUSCULAR; INTRAVENOUS at 05:31

## 2022-12-10 RX ADMIN — FAMOTIDINE 20 MG: 10 INJECTION INTRAVENOUS at 09:10

## 2022-12-10 RX ADMIN — HYDROMORPHONE HYDROCHLORIDE 1 MG: 1 INJECTION, SOLUTION INTRAMUSCULAR; INTRAVENOUS; SUBCUTANEOUS at 20:21

## 2022-12-10 RX ADMIN — HYDROMORPHONE HYDROCHLORIDE 1 MG: 1 INJECTION, SOLUTION INTRAMUSCULAR; INTRAVENOUS; SUBCUTANEOUS at 11:15

## 2022-12-10 NOTE — PLAN OF CARE
Problem: Adult Inpatient Plan of Care  Goal: Plan of Care Review  Outcome: Ongoing, Progressing  Goal: Patient-Specific Goal (Individualized)  Outcome: Ongoing, Progressing  Goal: Absence of Hospital-Acquired Illness or Injury  Outcome: Ongoing, Progressing  Intervention: Identify and Manage Fall Risk  Recent Flowsheet Documentation  Taken 12/10/2022 1142 by Remington Nice RN  Safety Promotion/Fall Prevention: safety round/check completed  Intervention: Prevent Skin Injury  Recent Flowsheet Documentation  Taken 12/10/2022 1142 by Remington Nice RN  Body Position: position changed independently  Skin Protection: adhesive use limited  Taken 12/10/2022 0900 by Remington Nice RN  Skin Protection: adhesive use limited  Intervention: Prevent and Manage VTE (Venous Thromboembolism) Risk  Recent Flowsheet Documentation  Taken 12/10/2022 1142 by Remington Nice RN  Activity Management: up in chair  VTE Prevention/Management: sequential compression devices on  Range of Motion: active ROM (range of motion) encouraged  Goal: Optimal Comfort and Wellbeing  Outcome: Ongoing, Progressing  Intervention: Monitor Pain and Promote Comfort  Recent Flowsheet Documentation  Taken 12/10/2022 1142 by Remington Nice RN  Pain Management Interventions: see MAR  Intervention: Provide Person-Centered Care  Recent Flowsheet Documentation  Taken 12/10/2022 1142 by Remington Nice RN  Trust Relationship/Rapport:   care explained   choices provided  Taken 12/10/2022 0900 by Remington Nice RN  Trust Relationship/Rapport:   care explained   choices provided  Goal: Readiness for Transition of Care  Outcome: Ongoing, Progressing     Problem: Skin Injury Risk Increased  Goal: Skin Health and Integrity  Outcome: Ongoing, Progressing  Intervention: Optimize Skin Protection  Recent Flowsheet Documentation  Taken 12/10/2022 1142 by Remington Nice RN  Skin Protection: adhesive use limited  Taken 12/10/2022 0900 by  Kuchowicz, Remington, RN  Skin Protection: adhesive use limited     Problem: Fall Injury Risk  Goal: Absence of Fall and Fall-Related Injury  Outcome: Ongoing, Progressing  Intervention: Promote Injury-Free Environment  Recent Flowsheet Documentation  Taken 12/10/2022 1142 by Remington Nice RN  Safety Promotion/Fall Prevention: safety round/check completed     Problem: Hypertension Comorbidity  Goal: Blood Pressure in Desired Range  Outcome: Ongoing, Progressing     Problem: Pain Chronic (Persistent) (Comorbidity Management)  Goal: Acceptable Pain Control and Functional Ability  Outcome: Ongoing, Progressing  Intervention: Manage Persistent Pain  Recent Flowsheet Documentation  Taken 12/10/2022 1142 by Remington Nice RN  Sleep/Rest Enhancement: noise level reduced  Intervention: Develop Pain Management Plan  Recent Flowsheet Documentation  Taken 12/10/2022 1142 by Remington Nice RN  Pain Management Interventions: see MAR  Intervention: Optimize Psychosocial Wellbeing  Recent Flowsheet Documentation  Taken 12/10/2022 1142 by Remington Nice RN  Supportive Measures: active listening utilized  Diversional Activities: television  Family/Support System Care: support provided  Taken 12/10/2022 0900 by Remington Nice RN  Supportive Measures: active listening utilized   Goal Outcome Evaluation:

## 2022-12-10 NOTE — PLAN OF CARE
Goal Outcome Evaluation: Pt rested off and on overnight. Pt received pain medication every one to two hours overnight per MAR. Pt's VSS. Pt's BANDAR drain having small amount of serosanguinous output. Pt's midline dressing clean, dry, and intact. Pt's stoma pink and moist. Pt has had no additional changes so far. Halle Orozco RN

## 2022-12-10 NOTE — PROGRESS NOTES
LOS: 4 days   Patient Care Team:  Haile Monique MD as PCP - General (Family Medicine)  Julien Cardona DO as Consulting Physician (Pain Medicine)  Joel Castillo MD as Consulting Physician (Gastroenterology)  Remington Russell MD as Surgeon (General Surgery)  Ahsan Salas MD as Consulting Physician (Sports Medicine)  Donald Barker MD as Consulting Physician (Hematology and Oncology)  Sandy Ricci MD as Consulting Physician (General Surgery)      Subjective     Interval History:   Doing well but still in pain, no gi function yet, ostomy ok, no new issues    Objective     Vital Signs  Temp:  [97.8 °F (36.6 °C)-99.3 °F (37.4 °C)] 99.3 °F (37.4 °C)  Heart Rate:  [] 87  Resp:  [16-20] 20  BP: (149-178)/(70-93) 178/77    Physical Exam:  NAD  Abd soft, nd, inc ok, ostomy ok     Results Review:     I reviewed the patient's new clinical results.      Assessment & Plan       Peritonitis (HCC)    Leukopenia    Metastatic breast cancer (HCC)    S/P ex lap with sigmoid resection, Lynn's procedure for stercoral perforation        Plan:  Await gi function  Ok for popsicles  Pain control

## 2022-12-10 NOTE — PROGRESS NOTES
[facility] Progress Note      Patient Name:  Derek Keller   MRN:  [id number]   :  1960   Date of Admission:  2022   Date of Service:  12/10/2022   PMD:  Haile Monique MD     Hospital Course:    62 y.o. female past medical history of metastatic breast cancer that presents to the emergency department earlier today for evaluation of sudden onset abdominal pain.  She described as sharp, left lower quadrant rating to the right lower quadrant, constant, no known aggravating alleviating factors.  She had associated nausea but no vomiting.  She denies any fevers, chills, sweats, chest pain or shortness of breath, palpitations, diarrhea constipation, dysuria, weakness, rash.  In the emergency department patient underwent CT scan which showed enteritis versus possible bowel ischemia and some intraperitoneal free air.  Surgery was called and patient was taken to the operating room.  She underwent sigmoid colon resection and colostomy formation.  She has been placed on Zosyn and admitted for ongoing monitoring and management    Consultants:    Surgery     Procedures:  CT abdomen    Anti-infectives:    Zosyn    December 10, 2022:  CC: Follow-up of patient status post peritonitis and surgery and later was scopic sigmoid resection, procedure for stercoral perforation    Subjective: Feeling better on IV Zosyn sitting up in the chair denies any chills or fever    2022    Chief Complaint:  Follow-up of patient status post peritonitis and surgery and laparoscopic sigmoid resection for Lynn's procedure for stercoral perforation    Subjective:   No flatus in the ostomy no nausea vomiting, still complains of abdominal pain, on IV Zosyn added fentanyl patch by surgery for pain control    Review Of Systems:  GENERAL:  Complains of improving weakness , and fatigue, no time is denies any weight loss appetite is normal.  HEENT:  Denies any rhinitis, no sore throat, no diplopia , hearing is  normal  Respiratory:  Denies any shortness of breath , sweating d, yspnea on exertion , cough or sputum.  CVS:  Denies any chest pain , palpitation or syncope.  Gi:  Complains of improving abdominal pain, no nausea, no vomiting, no diarrhea, no hematemesis , no melena , no rectal bleeding  :  No dysuria frequency , hematuria, no retention:  Musculoskeletal:  Denies any arthritis, or calf pain    Hematological:  No bleeding , no petechiae.  Skin:  Denies rash , pruritus , jaundice  Endocrine:  No polyuria , polydipsia , polyphagia.  CNS:  Denies any confusion , headache , or seizure disorder  Psychiatry:  No anxiety , or depression, no suicide ideation      Objective:  Vitals:    12/10/22 0702 12/10/22 0826 12/10/22 1109 12/10/22 1402   BP:  154/75 159/93    BP Location:  Left arm Right arm    Patient Position:  Lying Lying    Pulse:  84 81    Resp:  16 18    Temp:  98.2 °F (36.8 °C) 97.9 °F (36.6 °C)    TempSrc:  Oral Oral Oral   SpO2: 97% 97% 98%    Weight:       Height:            Physical Exam:  GENERAL APPEARANCE: Alert and oriented.  Appears comfortable.  No acute distress  HEENT: Atraumatic, normocephalic,  PERRLA, mucous membranes moist  NECK: supple; no JVD or thyromegaly noted  CARDIOVASCULAR: Regular rate and rhythm, no murmurs appreciated; no edema present in BLE   RESPIRATORY: good air entry bilaterally.  No wheezes, rhonchi, or rales appreciated  GASTROINTESTINAL:  Abdomen  soft; bowel sounds present, non-tender, non-distended, no organomegaly, no CVA tenderness  ostomy intact  EXTREMITIES: Pulses equal bilaterally   MUSCULOSKELETAL:  Good muscle strength noted no obvious deformity appreciat  PSYCH: Appropriate mood and affect  Neurologic:  Alert & oriented x 3.  Normal Mental status.  Normal Cranial Nervies.  EOMI.  XU.  Normal 5/5 muscular strength in both upper and lower extremities.  Normal sensation.  Normal and symmetric reflexes. Normal cerbellar function.  Normal Gait. Negative  Babinski.    Labs Reviewed  CBC:    Lab Results   Component Value Date    WBC 4.82 12/10/2022    HGB 14.3 12/10/2022    HCT 44.7 12/10/2022    MCV 98.7 (H) 12/10/2022     CMP:    Lab Results   Component Value Date     12/10/2022    CO2 22.1 12/10/2022    GLUCOSE 109 (H) 12/10/2022    BUN 13 12/10/2022    CREATININE 0.60 12/10/2022    ALBUMIN 3.50 12/09/2022    CALCIUM 8.2 (L) 12/10/2022    AST 17 12/09/2022    ALT 26 12/09/2022     Lipis Panel:   Lab Results   Component Value Date    CHOL 125 11/12/2019    HDL 49 11/12/2019      INR:  No results found for: INR  Cardiac:  No results found for: CKMB, TROPONIN  No results found for: BNP  Lactate:    Lab Results   Component Value Date    LACTATE 1.3 12/08/2022    LACTATE 1.0 12/06/2022     Blood Culture:  @lastlabx(cult:2)@    Imaging:  MRI Pelvis With & Without Contrast    Result Date: 12/2/2022  Narrative: PROCEDURE: MRI PELVIS W WO CONTRAST  COMPARISON: [facility], PET, NM PET/CT SKULL BASE TO MID THIGH, 9/26/2022, 12:08.  [facility], CT, CT ABDOMEN PELVIS W WO CONTRAST, 8/05/2022, 11:48.  [facility], MR, MRI LUMBAR SPINE W WO CONTRAST, 12/01/2022, 12:35.  INDICATIONS: MRI SACRUM; METASTATIC BREAST CA. LOWER BACK PAIN INTO TAILBONE.      TECHNIQUE: Multiplanar multisequence images of the brain with and without intravenous gadolinium.  FINDINGS: There are innumerable osseous metastatic lesions throughout the lower lumbar spine, pelvis and sacrum.  The lesions have decreased T1 signal intensity and increased T2 signal intensity.  There is enhancement following the administration of gadolinium.  No discrete soft tissue masses are identified.  There is soft tissue edema and enhancement along dorsal aspect of the sacrococcygeal junction.  Prior hysterectomy.  No evidence of adenopathy.  IMPRESSION:  Widespread osseous metastatic disease.  TRINIDAD HERNANDEZ MD       Electronically Signed and Approved By: TRINIDAD ROGERS  MD DAVID on 12/02/2022 at 8:07             CT Abdomen Pelvis With Contrast    Result Date: 12/6/2022  Narrative: PROCEDURE: CT ABDOMEN PELVIS W CONTRAST  COMPARISON: [facility], CT, CT ABDOMEN PELVIS W WO CONTRAST, 8/05/2022, 11:48.  INDICATIONS: abdominal pain, metastatic breast cancer  TECHNIQUE: After obtaining the patient's consent, CT images were created with non-ionic intravenous contrast material.   PROTOCOL:   Standard imaging protocol performed    RADIATION:   DLP: 979.8mGy*cm   Automated exposure control was utilized to minimize radiation dose. CONTRAST: 100cc Isovue 370 I.V. LABS:   eGFR: 93ml/min/1.73m2  FINDINGS:  Mostly linear opacities in the dependent lower lobes are likely due to subsegmental atelectasis.  Gallbladder is surgically absent.  Intrahepatic/extrahepatic biliary ductal dilatation is mildly increased compared to 8/5/2022 CT.  Extrahepatic common bile duct measures up to 1.5 cm, previously 1.2 cm.  No definite cause of biliary obstruction is visualized on CT.  There is mild dilatation of the main pancreatic duct.  Bilateral renal cysts are stable.  Adrenal glands and spleen appear unremarkable.  No abnormal bowel distention is seen, but there is mild wall thickening of distal small bowel in the right lower quadrant with small amount of interloop fluid noted.  There is a small amount of free pelvic fluid, as well as fluid in the right paracolic gutter and perihepatic fluid.  There are few foci of free intraperitoneal air (anterior to liver on axial image 18 and 20, anterior abdomen on axial image 46), as well as foci of extraluminal air adjacent to distal small bowel loops in the pelvis.  No pneumatosis or mesenteric/portal venous gas is seen.  Appendix is nondilated, and there is no significant periappendiceal inflammation.  There is stool throughout the colon.  No urinary bladder wall thickening is seen.  No adenopathy is identified in the abdomen or pelvis.  There  are atherosclerotic vascular calcifications.  Numerous sclerotic lesions are redemonstrated, consistent with osseous metastatic disease.  No pathologic fractures are seen.      Impression:   1. Wall thickening/enhancement of distal small bowel in the right lower quadrant with a small amount of interloop fluid, as well as a small amount of free pelvic fluid, right paracolic gutter fluid, and perihepatic fluid, concerning for nonspecific enteritis or possible bowel ischemia. 2. Few foci of free intraperitoneal air, which may be from perforation of abnormal distal small bowel, given the location of several foci of extraluminal air adjacent to distal small bowel loops.  Recommend surgical consultation. 3. Mildly increased intrahepatic/extrahepatic biliary ductal dilatation without obvious cause of obstruction. 4. Diffuse osseous metastatic disease.   This report was communicated by telephone to Dr. Lozada at the dictation time shown below.    STEPHANIE STORY MD       Electronically Signed and Approved By: STEPHNAIE STORY MD on 12/06/2022 at 16:13             CT Radiation Therapy Planning    Result Date: 11/14/2022  Narrative: This scan was performed as part of a plan for Radiation Therapy    MRI Lumbar Spine With & Without Contrast    Result Date: 12/1/2022  Narrative: PROCEDURE: MRI LUMBAR SPINE W WO CONTRAST  COMPARISON: Other, MR, LUMBAR SPINE WITHOUT CONTRAST, 2/06/2020, 14:12.  INDICATIONS: METASTATIC BREAST CA. LOWER BACK PAIN RADIATING INTO BOTH LEGS AND TAILBONE.  CONTRAST: 15ML  Multihance I.V.  TECHNIQUE: A comprehensive examination was performed utilizing a variety of imaging planes and imaging parameters to optimize visualization of suspected pathology.  Images were performed before and after the administration of intravenous gadolinium contrast.  FINDINGS:  Five lumbar vertebral bodies are identified and appear in anatomic alignment.  There is mild to moderate narrowing of the L1-L4 discs and mild  narrowing of the L4-L5 and L5-S1 discs.  There is diffuse disc desiccation.  Vertebral bodies maintain normal height.  There are too numerous to count low T1 high T2 rounded lesions scattered throughout all of the visible bones of the thoracolumbar spine, sacrum and pelvis.  There are more than 50 lesions.  One of the larger lesions occurs at the T11 vertebral body and measures up to 25 mm diameter.  A 2nd large lesion involves the right-side of the L5 vertebral body and measures up to 29 mm diameter.  There is no evidence of pathologic fracture.  The distal spinal cord and conus medullaris appear unremarkable terminating at the L1-L2 level. Limited view of the retroperitoneal structures is unremarkable. Paraspinal musculature is well preserved. No evidence of leptomeningeal or intramedullary/intradural metastatic disease.  L1-L2:  There is concentric disc bulge with a tiny central disc protrusion.  There is mild facet and ligamentum flavum hypertrophy.  There is moderate right and mild left neural foraminal narrowing without spinal canal stenosis.  L2-L3:  There is concentric disc bulge and marginal osteophyte formation with mild facet and ligamentum flavum hypertrophy.  There is mild bilateral neural foraminal narrowing and mild narrowing of the spinal canal.  L3-L4:  There is concentric disc bulge and marginal osteophyte formation with mild facet and ligamentum flavum hypertrophy.  There is moderate bilateral neural foraminal narrowing and moderate narrowing of the spinal canal.  L4-L5:  There is concentric disc bulge and marginal osteophyte formation with moderate bilateral facet and ligamentum flavum hypertrophy.  There is moderate to severe bilateral neural foraminal narrowing.  L5-S1:  There is concentric disc bulge and marginal osteophyte formation.  There is moderate to severe right and moderate left facet hypertrophy.  There is moderate to severe right and moderate left neural foraminal narrowing without  spinal canal compromise.       Impression:   1. Too numerous to count osseous metastasis throughout the lumbar spine and pelvis.  No pathologic fracture or evidence of intradural metastasis. 2. Moderate lumbar spondylosis with varying degrees of neural foraminal and spinal canal narrowing as described in detail above, progressive since 2020.      ADRIANA VALLES MD       Electronically Signed and Approved By: ADRIANA VALLES MD on 12/01/2022 at 16:38                Medications Reviewed:  famotidine, 20 mg, Intravenous, Q12H  fentaNYL, 1 patch, Transdermal, Q72H  LORazepam, 1 mg, Intravenous, Q6H  nicotine, 1 patch, Transdermal, Q24H  piperacillin-tazobactam, 3.375 g, Intravenous, Q6H  polyethylene glycol, 17 g, Nasogastric, Daily  potassium phosphate (monobasic), 500 mg, Nasogastric, BID  sodium chloride, 10 mL, Intravenous, Q12H         Assessment/Plan:    Patient Active Problem List    Diagnosis    • *Peritonitis (HCC) [K65.9]    • Leukopenia [D72.819]    • Metastatic breast cancer (HCC) [C50.919]    • S/P ex lap with sigmoid resection, Lynn's procedure for stercoral perforation [Z98.890]    • Peripheral edema [R60.9]    • Secondary malignant neoplasm of bone (HCC) [C79.51]    • Bone metastases (HCC) [C79.51]    • Malignant neoplasm of left breast in female, estrogen receptor positive (HCC) [C50.912, Z17.0]    • Cancer related pain [G89.3]    • Disorder associated with Helicobacter species [B96.89]    • Hearing loss [H91.90]    • Impacted cerumen of right ear [H61.21]    • Degeneration of lumbar intervertebral disc [M51.36]    • Cervical spondylosis [M47.812]    • Body mass index (BMI) 26.0-26.9, adult [Z68.26]    • Constipation [K59.00]    • Restless leg syndrome [G25.81]    • Itch of skin [L29.9]    • Primary insomnia [F51.01]    • Chronic fatigue [R53.82]    • Inguinal pain [R10.30]    • Renal stone [N20.0]    • Cough [R05.9]    • Hypoxemia [R09.02]    • Lightheadedness [R42]    • Herpes simplex vulvovaginitis  [A60.04]    • Generalized abdominal pain [R10.84]    • Acute pain of left knee [M25.562]    • Primary osteoarthritis of left knee [M17.12]    • Osteoarthrosis, localized, primary, knee [M17.10]    • Vitamin B 12 deficiency [E53.8]    • Inappropriate diet and eating habits [Z72.4]    • Obesity with body mass index 30 or greater [E66.9]    • Asthma [J45.909]    • Irritable bowel syndrome [K58.9]    • Fibromyalgia [M79.7]    • Tobacco abuse [Z72.0]    • Other chest pain [R07.89]    • Hypertension [I10]    • Hyperlipidemia [E78.5]    • Allergic rhinitis [J30.9]    • Hypothyroidism [E03.9]    • Neck pain [M54.2]    • Lumbago [M54.50]    • Arthritis [M19.90]    • Chronic pain disorder [G89.4]    • History of IBS [Z87.19]    • Anxiety [F41.9]    • Monopolar depression (HCC) [F33.9]    • Malaise and fatigue [R53.81, R53.83]       Medical decision making:    Peritonitis with status post sigmoid resection with Vale procedure for stercoral perforation:  Surgery input appreciated  Continue on IV Zosyn and added fentanyl patch for pain control    Ca of breast metastatic:  Continue to monitor  Hematology oncology consult as outpatient.    Leukopenia:  Continue monitor WBC  Improved to 4.35/4.82    Severe phosphorus deficiency:  Replaced    Hypertension:  Monitor blood pressure closely  Continue home antihypertensives    Major depression:  Continue SSRI    Hypothyroidism  Continue to place    DVT Prophylaxis:    SCUDs    CODE STATUS:  Full code  Reason for continued hospitalization  and medical necessity :  Patient with peritonitis and perforation requiring further management    Orders:  CBC  CMP   replace phosphorus  IV Zosyn    Time spent:  25+ minute not only  including face-to-face rounding putting in the orders writing the note and all the conversation reviewing the records    This transcription was electronically signed.         Disposition:  To be determined    Diet: NPO ice chips      Discussed with: Patient and RN in  detail      This has been electronically signed by:  _______________________________    Siva Sutton [email or web address]     At [facility], we believe that sharing information builds trust and better relationships. You are receiving this note because you recently visited [facility]. It is possible you will see health information before a provider has talked with you about it. This kind of information can be easy to misunderstand. To help you fully understand what it means for your health, we urge you to discuss this note with your provider.           Part of this note may be an electronic transcription/translation of spoken language to printed ,text using the Dragon Dictation System.

## 2022-12-11 LAB
BACTERIA SPEC ANAEROBE CULT: ABNORMAL
PHOSPHATE SERPL-MCNC: 1.6 MG/DL (ref 2.5–4.5)

## 2022-12-11 PROCEDURE — 99232 SBSQ HOSP IP/OBS MODERATE 35: CPT | Performed by: INTERNAL MEDICINE

## 2022-12-11 PROCEDURE — 25010000002 LORAZEPAM PER 2 MG: Performed by: INTERNAL MEDICINE

## 2022-12-11 PROCEDURE — 97530 THERAPEUTIC ACTIVITIES: CPT

## 2022-12-11 PROCEDURE — 97116 GAIT TRAINING THERAPY: CPT

## 2022-12-11 PROCEDURE — 94761 N-INVAS EAR/PLS OXIMETRY MLT: CPT

## 2022-12-11 PROCEDURE — 25010000002 HYDROMORPHONE 1 MG/ML SOLUTION: Performed by: NURSE PRACTITIONER

## 2022-12-11 PROCEDURE — 94799 UNLISTED PULMONARY SVC/PX: CPT

## 2022-12-11 PROCEDURE — 25010000002 ONDANSETRON PER 1 MG: Performed by: INTERNAL MEDICINE

## 2022-12-11 PROCEDURE — 99024 POSTOP FOLLOW-UP VISIT: CPT | Performed by: SURGERY

## 2022-12-11 PROCEDURE — 84100 ASSAY OF PHOSPHORUS: CPT | Performed by: INTERNAL MEDICINE

## 2022-12-11 PROCEDURE — 25010000002 PIPERACILLIN SOD-TAZOBACTAM PER 1 G: Performed by: INTERNAL MEDICINE

## 2022-12-11 RX ORDER — FENTANYL/ROPIVACAINE/NS/PF 2-625MCG/1
15 PLASTIC BAG, INJECTION (ML) EPIDURAL EVERY 4 HOURS
Status: COMPLETED | OUTPATIENT
Start: 2022-12-11 | End: 2022-12-11

## 2022-12-11 RX ADMIN — HYDROMORPHONE HYDROCHLORIDE 1 MG: 1 INJECTION, SOLUTION INTRAMUSCULAR; INTRAVENOUS; SUBCUTANEOUS at 14:45

## 2022-12-11 RX ADMIN — LORAZEPAM 1 MG: 2 INJECTION INTRAMUSCULAR; INTRAVENOUS at 17:37

## 2022-12-11 RX ADMIN — NICOTINE 1 PATCH: 21 PATCH, EXTENDED RELEASE TRANSDERMAL at 10:23

## 2022-12-11 RX ADMIN — HYDROMORPHONE HYDROCHLORIDE 1 MG: 1 INJECTION, SOLUTION INTRAMUSCULAR; INTRAVENOUS; SUBCUTANEOUS at 02:07

## 2022-12-11 RX ADMIN — POTASSIUM PHOSPHATE, MONOBASIC AND POTASSIUM PHOSPHATE, DIBASIC 15 MMOL: 224; 236 INJECTION, SOLUTION, CONCENTRATE INTRAVENOUS at 17:48

## 2022-12-11 RX ADMIN — TAZOBACTAM SODIUM AND PIPERACILLIN SODIUM 3.38 G: 375; 3 INJECTION, SOLUTION INTRAVENOUS at 17:37

## 2022-12-11 RX ADMIN — Medication 500 MG: at 21:15

## 2022-12-11 RX ADMIN — HYDROMORPHONE HYDROCHLORIDE 1 MG: 1 INJECTION, SOLUTION INTRAMUSCULAR; INTRAVENOUS; SUBCUTANEOUS at 17:38

## 2022-12-11 RX ADMIN — POLYETHYLENE GLYCOL 3350 17 G: 17 POWDER, FOR SOLUTION ORAL at 10:24

## 2022-12-11 RX ADMIN — FAMOTIDINE 20 MG: 10 INJECTION INTRAVENOUS at 10:24

## 2022-12-11 RX ADMIN — Medication 500 MG: at 10:22

## 2022-12-11 RX ADMIN — LORAZEPAM 1 MG: 2 INJECTION INTRAMUSCULAR; INTRAVENOUS at 05:22

## 2022-12-11 RX ADMIN — LORAZEPAM 1 MG: 2 INJECTION INTRAMUSCULAR; INTRAVENOUS at 12:25

## 2022-12-11 RX ADMIN — Medication 10 ML: at 10:25

## 2022-12-11 RX ADMIN — ONDANSETRON 4 MG: 2 INJECTION INTRAMUSCULAR; INTRAVENOUS at 23:19

## 2022-12-11 RX ADMIN — TAZOBACTAM SODIUM AND PIPERACILLIN SODIUM 3.38 G: 375; 3 INJECTION, SOLUTION INTRAVENOUS at 12:25

## 2022-12-11 RX ADMIN — HYDROMORPHONE HYDROCHLORIDE 1 MG: 1 INJECTION, SOLUTION INTRAMUSCULAR; INTRAVENOUS; SUBCUTANEOUS at 07:48

## 2022-12-11 RX ADMIN — SODIUM CHLORIDE, POTASSIUM CHLORIDE, SODIUM LACTATE AND CALCIUM CHLORIDE 50 ML/HR: 600; 310; 30; 20 INJECTION, SOLUTION INTRAVENOUS at 12:25

## 2022-12-11 RX ADMIN — TAZOBACTAM SODIUM AND PIPERACILLIN SODIUM 3.38 G: 375; 3 INJECTION, SOLUTION INTRAVENOUS at 23:24

## 2022-12-11 RX ADMIN — HYDROMORPHONE HYDROCHLORIDE 1 MG: 1 INJECTION, SOLUTION INTRAMUSCULAR; INTRAVENOUS; SUBCUTANEOUS at 12:25

## 2022-12-11 RX ADMIN — POTASSIUM PHOSPHATE, MONOBASIC AND POTASSIUM PHOSPHATE, DIBASIC 15 MMOL: 224; 236 INJECTION, SOLUTION, CONCENTRATE INTRAVENOUS at 21:15

## 2022-12-11 RX ADMIN — HYDROMORPHONE HYDROCHLORIDE 1 MG: 1 INJECTION, SOLUTION INTRAMUSCULAR; INTRAVENOUS; SUBCUTANEOUS at 22:58

## 2022-12-11 RX ADMIN — Medication 10 ML: at 21:15

## 2022-12-11 RX ADMIN — FAMOTIDINE 20 MG: 10 INJECTION INTRAVENOUS at 21:15

## 2022-12-11 RX ADMIN — HYDROMORPHONE HYDROCHLORIDE 1 MG: 1 INJECTION, SOLUTION INTRAMUSCULAR; INTRAVENOUS; SUBCUTANEOUS at 10:25

## 2022-12-11 RX ADMIN — LORAZEPAM 1 MG: 2 INJECTION INTRAMUSCULAR; INTRAVENOUS at 23:19

## 2022-12-11 NOTE — PLAN OF CARE
Goal Outcome Evaluation: Pt compliant with care. Pt rested off and on overnight. Pain treated per MAR. Pt's VSS. Pt has had no additional changes or complaints so far. Halle Orozco RN

## 2022-12-11 NOTE — PROGRESS NOTES
[facility] Progress Note      Patient Name:  Derek Keller   MRN:  [id number]   :  1960   Date of Admission:  2022   Date of Service:  2022   PMD:  Haile Monique MD     Hospital Course:    62 y.o. female past medical history of metastatic breast cancer that presents to the emergency department earlier today for evaluation of sudden onset abdominal pain.  She described as sharp, left lower quadrant rating to the right lower quadrant, constant, no known aggravating alleviating factors.  She had associated nausea but no vomiting.  She denies any fevers, chills, sweats, chest pain or shortness of breath, palpitations, diarrhea constipation, dysuria, weakness, rash.  In the emergency department patient underwent CT scan which showed enteritis versus possible bowel ischemia and some intraperitoneal free air.  Surgery was called and patient was taken to the operating room.  She underwent sigmoid colon resection and colostomy formation.  She has been placed on Zosyn and admitted for ongoing monitoring and management    Consultants:    Surgery     Procedures:  CT abdomen  Exploratory laparotomy with sigmoid colon resection, Lynn's closure of rectal stump, end colostomy-    Anti-infectives:    Zosyn    2022:  CC: Follow-up of patient with peritonitis status post surgery    Patient reports that she is feeling better.  She states her abdominal pain is improved with current pain management regimen.  She denies any nausea or vomiting.  States she is tolerating ice chips and is being transitioned to clears on today per surgery.    Review Of Systems:  GENERAL:  Reports improving weakness , and fatigue, no time is denies any weight loss appetite is normal.  HEENT:  Denies any rhinitis, no sore throat, no diplopia , hearing is normal  Respiratory:  Denies any shortness of breath , sweating d, yspnea on exertion , cough or sputum.  CVS:  Denies any chest pain , palpitation  or syncope.  Gi:  Reports improving abdominal pain, no nausea, no vomiting, no diarrhea, no hematemesis , no melena , no rectal bleeding  :  No dysuria frequency , hematuria, no retention:  Musculoskeletal:  Denies any arthritis, or calf pain    Hematological:  No bleeding , no petechiae.  Skin:  Denies rash , pruritus , jaundice  Endocrine:  No polyuria , polydipsia , polyphagia.  CNS:  Denies any confusion , headache , or seizure disorder  Psychiatry:  No anxiety , or depression, no suicide ideation      Objective:  Vitals:    12/10/22 2253 12/11/22 0236 12/11/22 0732 12/11/22 0918   BP: 161/93 151/66 154/100    BP Location: Right arm Right arm Left arm    Patient Position: Lying Lying Lying    Pulse: 95 88 86    Resp: 20 18 16    Temp: 98.2 °F (36.8 °C) 99.1 °F (37.3 °C) 98.8 °F (37.1 °C)    TempSrc: Oral Oral Oral    SpO2: 97% 96% 96% 97%   Weight:       Height:            Physical Exam:  GENERAL APPEARANCE: Alert and oriented.  Appears comfortable.  No acute distress  HEENT: Atraumatic, normocephalic,  PER, mucous membranes moist  NECK: supple; no JVD or thyromegaly noted  CARDIOVASCULAR: Regular rate and rhythm, no murmurs appreciated; no edema present in BLE   RESPIRATORY: good air entry bilaterally.  No wheezes, rhonchi, or rales appreciated  GASTROINTESTINAL:  Abdomen  soft; bowel sounds present, appropriately tender to palpation.  Stool is noted in the ostomy bag, ostomy intact  EXTREMITIES: Pulses equal bilaterally   MUSCULOSKELETAL:  Muscle bulk and tone consistent with patient age  PSYCH: Appropriate mood and affect  Neurologic:  Alert & oriented x 3.  Normal Mental status.   Renal nerves II through XII grossly intact.  Moving all extremities equally no focal weakness appreciated.    Labs Reviewed  CBC:    Lab Results   Component Value Date    WBC 4.82 12/10/2022    HGB 14.3 12/10/2022    HCT 44.7 12/10/2022    MCV 98.7 (H) 12/10/2022     CMP:    Lab Results   Component Value Date     12/10/2022     CO2 22.1 12/10/2022    GLUCOSE 109 (H) 12/10/2022    BUN 13 12/10/2022    CREATININE 0.60 12/10/2022    ALBUMIN 3.50 12/09/2022    CALCIUM 8.2 (L) 12/10/2022    AST 17 12/09/2022    ALT 26 12/09/2022     Lipis Panel:   Lab Results   Component Value Date    CHOL 125 11/12/2019    HDL 49 11/12/2019      INR:  No results found for: INR  Cardiac:  No results found for: CKMB, TROPONIN  No results found for: BNP  Lactate:    Lab Results   Component Value Date    LACTATE 1.3 12/08/2022    LACTATE 1.0 12/06/2022       Imaging:  MRI Pelvis With & Without Contrast    Result Date: 12/2/2022  Narrative: PROCEDURE: MRI PELVIS W WO CONTRAST  COMPARISON: [facility], PET, NM PET/CT SKULL BASE TO MID THIGH, 9/26/2022, 12:08.  [facility], CT, CT ABDOMEN PELVIS W WO CONTRAST, 8/05/2022, 11:48.  [facility], MR, MRI LUMBAR SPINE W WO CONTRAST, 12/01/2022, 12:35.  INDICATIONS: MRI SACRUM; METASTATIC BREAST CA. LOWER BACK PAIN INTO TAILBONE.      TECHNIQUE: Multiplanar multisequence images of the brain with and without intravenous gadolinium.  FINDINGS: There are innumerable osseous metastatic lesions throughout the lower lumbar spine, pelvis and sacrum.  The lesions have decreased T1 signal intensity and increased T2 signal intensity.  There is enhancement following the administration of gadolinium.  No discrete soft tissue masses are identified.  There is soft tissue edema and enhancement along dorsal aspect of the sacrococcygeal junction.  Prior hysterectomy.  No evidence of adenopathy.  IMPRESSION:  Widespread osseous metastatic disease.  TRINIDAD HERNANDEZ MD       Electronically Signed and Approved By: TRINIDAD HERNANDEZ MD on 12/02/2022 at 8:07             CT Abdomen Pelvis With Contrast    Result Date: 12/6/2022  Narrative: PROCEDURE: CT ABDOMEN PELVIS W CONTRAST  COMPARISON: [facility], CT, CT ABDOMEN PELVIS W WO CONTRAST, 8/05/2022, 11:48.   INDICATIONS: abdominal pain, metastatic breast cancer  TECHNIQUE: After obtaining the patient's consent, CT images were created with non-ionic intravenous contrast material.   PROTOCOL:   Standard imaging protocol performed    RADIATION:   DLP: 979.8mGy*cm   Automated exposure control was utilized to minimize radiation dose. CONTRAST: 100cc Isovue 370 I.V. LABS:   eGFR: 93ml/min/1.73m2  FINDINGS:  Mostly linear opacities in the dependent lower lobes are likely due to subsegmental atelectasis.  Gallbladder is surgically absent.  Intrahepatic/extrahepatic biliary ductal dilatation is mildly increased compared to 8/5/2022 CT.  Extrahepatic common bile duct measures up to 1.5 cm, previously 1.2 cm.  No definite cause of biliary obstruction is visualized on CT.  There is mild dilatation of the main pancreatic duct.  Bilateral renal cysts are stable.  Adrenal glands and spleen appear unremarkable.  No abnormal bowel distention is seen, but there is mild wall thickening of distal small bowel in the right lower quadrant with small amount of interloop fluid noted.  There is a small amount of free pelvic fluid, as well as fluid in the right paracolic gutter and perihepatic fluid.  There are few foci of free intraperitoneal air (anterior to liver on axial image 18 and 20, anterior abdomen on axial image 46), as well as foci of extraluminal air adjacent to distal small bowel loops in the pelvis.  No pneumatosis or mesenteric/portal venous gas is seen.  Appendix is nondilated, and there is no significant periappendiceal inflammation.  There is stool throughout the colon.  No urinary bladder wall thickening is seen.  No adenopathy is identified in the abdomen or pelvis.  There are atherosclerotic vascular calcifications.  Numerous sclerotic lesions are redemonstrated, consistent with osseous metastatic disease.  No pathologic fractures are seen.      Impression:   1. Wall thickening/enhancement of distal small bowel in the right  lower quadrant with a small amount of interloop fluid, as well as a small amount of free pelvic fluid, right paracolic gutter fluid, and perihepatic fluid, concerning for nonspecific enteritis or possible bowel ischemia. 2. Few foci of free intraperitoneal air, which may be from perforation of abnormal distal small bowel, given the location of several foci of extraluminal air adjacent to distal small bowel loops.  Recommend surgical consultation. 3. Mildly increased intrahepatic/extrahepatic biliary ductal dilatation without obvious cause of obstruction. 4. Diffuse osseous metastatic disease.   This report was communicated by telephone to Dr. Lozada at the dictation time shown below.    STEPHANIE STORY MD       Electronically Signed and Approved By: STEPHANIE STORY MD on 12/06/2022 at 16:13             CT Radiation Therapy Planning    Result Date: 11/14/2022  Narrative: This scan was performed as part of a plan for Radiation Therapy    MRI Lumbar Spine With & Without Contrast    Result Date: 12/1/2022  Narrative: PROCEDURE: MRI LUMBAR SPINE W WO CONTRAST  COMPARISON: Other, MR, LUMBAR SPINE WITHOUT CONTRAST, 2/06/2020, 14:12.  INDICATIONS: METASTATIC BREAST CA. LOWER BACK PAIN RADIATING INTO BOTH LEGS AND TAILBONE.  CONTRAST: 15ML  Multihance I.V.  TECHNIQUE: A comprehensive examination was performed utilizing a variety of imaging planes and imaging parameters to optimize visualization of suspected pathology.  Images were performed before and after the administration of intravenous gadolinium contrast.  FINDINGS:  Five lumbar vertebral bodies are identified and appear in anatomic alignment.  There is mild to moderate narrowing of the L1-L4 discs and mild narrowing of the L4-L5 and L5-S1 discs.  There is diffuse disc desiccation.  Vertebral bodies maintain normal height.  There are too numerous to count low T1 high T2 rounded lesions scattered throughout all of the visible bones of the thoracolumbar spine, sacrum  and pelvis.  There are more than 50 lesions.  One of the larger lesions occurs at the T11 vertebral body and measures up to 25 mm diameter.  A 2nd large lesion involves the right-side of the L5 vertebral body and measures up to 29 mm diameter.  There is no evidence of pathologic fracture.  The distal spinal cord and conus medullaris appear unremarkable terminating at the L1-L2 level. Limited view of the retroperitoneal structures is unremarkable. Paraspinal musculature is well preserved. No evidence of leptomeningeal or intramedullary/intradural metastatic disease.  L1-L2:  There is concentric disc bulge with a tiny central disc protrusion.  There is mild facet and ligamentum flavum hypertrophy.  There is moderate right and mild left neural foraminal narrowing without spinal canal stenosis.  L2-L3:  There is concentric disc bulge and marginal osteophyte formation with mild facet and ligamentum flavum hypertrophy.  There is mild bilateral neural foraminal narrowing and mild narrowing of the spinal canal.  L3-L4:  There is concentric disc bulge and marginal osteophyte formation with mild facet and ligamentum flavum hypertrophy.  There is moderate bilateral neural foraminal narrowing and moderate narrowing of the spinal canal.  L4-L5:  There is concentric disc bulge and marginal osteophyte formation with moderate bilateral facet and ligamentum flavum hypertrophy.  There is moderate to severe bilateral neural foraminal narrowing.  L5-S1:  There is concentric disc bulge and marginal osteophyte formation.  There is moderate to severe right and moderate left facet hypertrophy.  There is moderate to severe right and moderate left neural foraminal narrowing without spinal canal compromise.       Impression:   1. Too numerous to count osseous metastasis throughout the lumbar spine and pelvis.  No pathologic fracture or evidence of intradural metastasis. 2. Moderate lumbar spondylosis with varying degrees of neural foraminal  and spinal canal narrowing as described in detail above, progressive since 2020.      ADRIANA VALLES MD       Electronically Signed and Approved By: ADRIANA VALLES MD on 12/01/2022 at 16:38                Medications Reviewed:  famotidine, 20 mg, Intravenous, Q12H  fentaNYL, 1 patch, Transdermal, Q72H  LORazepam, 1 mg, Intravenous, Q6H  nicotine, 1 patch, Transdermal, Q24H  polyethylene glycol, 17 g, Nasogastric, Daily  potassium phosphate (monobasic), 500 mg, Nasogastric, BID  sodium chloride, 10 mL, Intravenous, Q12H         Assessment/Plan:    Patient Active Problem List    Diagnosis    • *Peritonitis (HCC) [K65.9]    • Leukopenia [D72.819]    • Metastatic breast cancer (HCC) [C50.919]    • S/P ex lap with sigmoid resection, Lynn's procedure for stercoral perforation [Z98.890]    • Peripheral edema [R60.9]    • Secondary malignant neoplasm of bone (HCC) [C79.51]    • Bone metastases (HCC) [C79.51]    • Malignant neoplasm of left breast in female, estrogen receptor positive (HCC) [C50.912, Z17.0]    • Cancer related pain [G89.3]    • Disorder associated with Helicobacter species [B96.89]    • Hearing loss [H91.90]    • Impacted cerumen of right ear [H61.21]    • Degeneration of lumbar intervertebral disc [M51.36]    • Cervical spondylosis [M47.812]    • Body mass index (BMI) 26.0-26.9, adult [Z68.26]    • Constipation [K59.00]    • Restless leg syndrome [G25.81]    • Itch of skin [L29.9]    • Primary insomnia [F51.01]    • Chronic fatigue [R53.82]    • Inguinal pain [R10.30]    • Renal stone [N20.0]    • Cough [R05.9]    • Hypoxemia [R09.02]    • Lightheadedness [R42]    • Herpes simplex vulvovaginitis [A60.04]    • Generalized abdominal pain [R10.84]    • Acute pain of left knee [M25.562]    • Primary osteoarthritis of left knee [M17.12]    • Osteoarthrosis, localized, primary, knee [M17.10]    • Vitamin B 12 deficiency [E53.8]    • Inappropriate diet and eating habits [Z72.4]    • Obesity with body mass index  30 or greater [E66.9]    • Asthma [J45.909]    • Irritable bowel syndrome [K58.9]    • Fibromyalgia [M79.7]    • Tobacco abuse [Z72.0]    • Other chest pain [R07.89]    • Hypertension [I10]    • Hyperlipidemia [E78.5]    • Allergic rhinitis [J30.9]    • Hypothyroidism [E03.9]    • Neck pain [M54.2]    • Lumbago [M54.50]    • Arthritis [M19.90]    • Chronic pain disorder [G89.4]    • History of IBS [Z87.19]    • Anxiety [F41.9]    • Monopolar depression (HCC) [F33.9]    • Malaise and fatigue [R53.81, R53.83]       Medical decision making:    Peritonitis with status post sigmoid resection with Vale procedure for stercoral perforation:  Surgery input appreciated  Continue on IV Zosyn and added fentanyl patch for pain control    Ca of breast metastatic:  Continue to monitor  Hematology oncology consult as outpatient.    Leukopenia:  Resolved.    Severe phosphorus deficiency:  Continue to replete via oral and IV supplementation.    Hypertension:  Monitor blood pressure closely  Continue home antihypertensives    Major depression:  Continue SSRI    Hypothyroidism  Continue to place    DVT Prophylaxis:    SCDs    CODE STATUS:  Full code  Reason for continued hospitalization  and medical necessity :  Patient with peritonitis and perforation requiring further management      Time spent:  25+ minute not only  including face-to-face rounding putting in the orders writing the note and all the conversation reviewing the records      Disposition:  To be determined    Diet: Clear liquid diet      Discussed with: Patient      Electronically signed by Sal Madden MD, 12/11/22, 10:06 AM EST.      At [facility], we believe that sharing information builds trust and better relationships. You are receiving this note because you recently visited [facility]. It is possible you will see health information before a provider has talked with you about it. This kind of information can be easy to misunderstand. To help you  fully understand what it means for your health, we urge you to discuss this note with your provider.           Part of this note may be an electronic transcription/translation of spoken language to printed ,text using the Dragon Dictation System.

## 2022-12-11 NOTE — THERAPY TREATMENT NOTE
Acute Care - Physical Therapy Treatment Note   [facility]     Patient Name: Derek Keller  : 1960  MRN: [id number]  Today's Date: 2022      Visit Dx:     ICD-10-CM ICD-9-CM   1. Peritonitis (HCC)  K65.9 567.9   2. Decreased activities of daily living (ADL)  Z78.9 V49.89   3. Bone metastases (HCC)  C79.51 198.5   4. Secondary malignant neoplasm of bone (HCC)  C79.51 198.5   5. Malignant neoplasm of upper-outer quadrant of left breast in female, estrogen receptor positive (HCC)  C50.412 174.4    Z17.0 V86.0   6. Chronic fatigue  R53.82 780.79   7. Hypoxemia  R09.02 799.02   8. Difficulty walking  R26.2 719.7     Patient Active Problem List   Diagnosis   • Other chest pain   • Hypertension   • Hyperlipidemia   • Allergic rhinitis   • Hypothyroidism   • Neck pain   • Lumbago   • Arthritis   • Chronic pain disorder   • History of IBS   • Anxiety   • Monopolar depression (HCC)   • Malaise and fatigue   • Tobacco abuse   • Fibromyalgia   • Vitamin B 12 deficiency   • Acute pain of left knee   • Primary osteoarthritis of left knee   • Osteoarthrosis, localized, primary, knee   • Generalized abdominal pain   • Herpes simplex vulvovaginitis   • Lightheadedness   • Cough   • Hypoxemia   • Restless leg syndrome   • Itch of skin   • Primary insomnia   • Chronic fatigue   • Asthma   • Body mass index (BMI) 26.0-26.9, adult   • Constipation   • Degeneration of lumbar intervertebral disc   • Disorder associated with Helicobacter species   • Hearing loss   • Impacted cerumen of right ear   • Inappropriate diet and eating habits   • Inguinal pain   • Irritable bowel syndrome   • Cervical spondylosis   • Obesity with body mass index 30 or greater   • Renal stone   • Malignant neoplasm of left breast in female, estrogen receptor positive (HCC)   • Cancer related pain   • Bone metastases (HCC)   • Secondary malignant neoplasm of bone (HCC)   • Peripheral edema   • Peritonitis (HCC)   • Leukopenia   • Metastatic breast  cancer (HCC)   • S/P ex lap with sigmoid resection, Lynn's procedure for stercoral perforation     Past Medical History:   Diagnosis Date   • Abdominal pain    • Allergic rhinitis    • Anemia    • Anxiety    • Arteriosclerosis     Coronary   • Arthritis    • Bone metastases (HCC) 10/14/2022   • Bowen's disease    • Breast cancer (HCC)    • Cancer related pain 10/13/2022   • Chest pain    • Chronic pain disorder    • Cough    • Depression    • Dysphoric mood    • Fatigue    • Frequent urination    • History of colon polyps    • History of IBS    • History of kidney stones    • Hyperlipidemia    • Hypertension    • Hypothyroidism    • Insomnia    • Lumbago    • Malaise and fatigue    • Mood disorder (HCC)    • Neck pain    • Palpitations    • Peripheral edema 11/21/2022   • Precordial pain    • Sleep disturbance    • SOB (shortness of breath)      Past Surgical History:   Procedure Laterality Date   • BREAST BIOPSY     • CARDIAC CATHETERIZATION Left 1959   • CARDIAC CATHETERIZATION N/A 04/06/2018    Procedure: Coronary angiography;  Surgeon: Art Licea MD;  Location: [facility] INVASIVE LOCATION;  Service: Cardiovascular   • CARDIAC CATHETERIZATION N/A 04/06/2018    Procedure: Left heart cath;  Surgeon: Art Licea MD;  Location: [facility] INVASIVE LOCATION;  Service: Cardiovascular   • CARDIAC CATHETERIZATION N/A 04/06/2018    Procedure: Left ventriculography;  Surgeon: Art Licea MD;  Location: [facility] CATH INVASIVE LOCATION;  Service: Cardiovascular   • CHOLECYSTECTOMY     • COLONOSCOPY  11/08/2006   • EXPLORATORY LAPAROTOMY N/A 12/6/2022    Procedure: LAPAROTOMY EXPLORATORY sigmoid resection hartmans procedure colostomy;  Surgeon: Alfred Castañeda MD;  Location: [facility] OR;  Service: General;  Laterality: N/A;   • GANGLION CYST EXCISION     • HYSTERECTOMY  05/2005   • LAPAROSCOPIC GASTRIC BANDING     • TONSILLECTOMY       PT Assessment (last 12 hours)     PT  Evaluation and Treatment     Row Name 12/11/22 1056          Physical Therapy Time and Intention    Subjective Information complains of;weakness;fatigue;pain  -DK     Document Type therapy note (daily note)  -DK     Mode of Treatment individual therapy;physical therapy  -DK     Patient Effort good  -DK     Symptoms Noted During/After Treatment fatigue;increased pain  -DK     Comment Pt moves extremely slowly.  Cues given to ambulate with a more normal gait.  Pt did experience one minor LOB toward the right during gait.  -     Row Name 12/11/22 1056          Pain    Pretreatment Pain Rating 5/10  -DK     Posttreatment Pain Rating 5/10  -DK     Pain Location generalized  -DK     Pain Location - abdomen  -DK     Pain Intervention(s) Repositioned;Ambulation/increased activity;Distraction;Therapeutic presence  -     Row Name 12/11/22 1056          Cognition    Affect/Mental Status (Cognition) WFL  -DK     Orientation Status (Cognition) oriented x 4  -DK     Follows Commands (Cognition) WFL  -DK     Cognitive Function WFL  -DK     Personal Safety Interventions gait belt;nonskid shoes/slippers when out of bed;supervised activity  -DK     Row Name 12/11/22 1056          Bed Mobility    Bed Mobility supine-sit-supine;scooting/bridging  -DK     Rolling Right [place] (Bed Mobility) standby assist  -DK     Scooting/Bridging [place] (Bed Mobility) standby assist  -DK     Supine-Sit [place] (Bed Mobility) standby assist  -DK     Sit-Supine [place] (Bed Mobility) standby assist  -DK     Supine-Sit-Supine [place] (Bed Mobility) standby assist  -DK     Assistive Device (Bed Mobility) bed rails  -DK     Row Name 12/11/22 1056          Transfers    Transfers sit-stand transfer;stand-sit transfer  -     Row Name 12/11/22 1056          Sit-Stand Transfer    Sit-Stand [place] (Transfers) standby assist;contact guard;1 person assist  -DK     Assistive Device (Sit-Stand Transfers) walker, front-wheeled   -DK     Row Name 12/11/22 1056          Stand-Sit Transfer    Stand-Sit [place] (Transfers) standby assist;contact guard;1 person assist  -DK     Assistive Device (Stand-Sit Transfers) walker, front-wheeled  -DK     Row Name 12/11/22 1056          Gait/Stairs (Locomotion)    Gait/Stairs Locomotion gait/ambulation independence;gait/ambulation assistive device;distance ambulated;gait pattern  -DK     [place] Level (Gait) standby assist;contact guard;1 person assist  -DK     Assistive Device (Gait) walker, front-wheeled  -DK     Distance in Feet (Gait) 300  -DK     Pattern (Gait) step-through  -DK     Deviations/Abnormal Patterns (Gait) anant decreased;gait speed decreased;festinating/shuffling;stride length decreased  -DK     Bilateral Gait Deviations forward flexed posture  -DK     Comment, (Gait/Stairs) Pt ambulated on room air with a rolling walker.  She did experience one minor LOB episoode toward the right during gait.  She was given cues to relax and ambulate at her normal pace, but continued to ambulate very slowly.  She returned to bed on alert post treatment.  -     Row Name 12/11/22 1056          Safety Issues, Functional Mobility    Safety Issues Affecting Function (Mobility) awareness of need for assistance;judgment;safety precaution awareness  -DK     Impairments Affecting Function (Mobility) balance;endurance/activity tolerance;pain;strength  -     Row Name 12/11/22 1056          Balance    Balance Assessment sitting static balance;sitting dynamic balance;standing static balance;standing dynamic balance  -DK     Static Sitting Balance standby assist  -DK     Dynamic Sitting Balance standby assist  -DK     Position, Sitting Balance unsupported;sitting edge of bed  -DK     Static Standing Balance standby assist;contact guard;1-person assist  -DK     Dynamic Standing Balance standby assist;contact guard;1-person assist  -DK     Position/Device Used, Standing Balance walker, front-wheeled   -DK     Balance Interventions standing;dynamic;tandem gait  -DK     Row Name             Wound 12/06/22 1906 midline abdomen Incision    Wound - Properties Group Placement Date: 12/06/22  -CK Placement Time: 1906  -CK Present on Hospital Admission: N  -CK Orientation: midline  -CK Location: abdomen  -CK Primary Wound Type: Incision  -CK    Retired Wound - Properties Group Placement Date: 12/06/22  -CK Placement Time: 1906  -CK Present on Hospital Admission: N  -CK Orientation: midline  -CK Location: abdomen  -CK Primary Wound Type: Incision  -CK    Retired Wound - Properties Group Date first assessed: 12/06/22  -CK Time first assessed: 1906  -CK Present on Hospital Admission: N  -CK Location: abdomen  -CK Primary Wound Type: Incision  -CK    Row Name 12/11/22 1056          Plan of Care Review    Plan of Care Reviewed With patient  -DK     Progress improving  -DK     Row Name 12/11/22 1056          Positioning and Restraints    Pre-Treatment Position in bed  -DK     Post Treatment Position bed  -DK     In Bed supine;call light within reach;encouraged to call for assist;exit alarm on;side rails up x2;legs elevated;heels elevated  -DK     Row Name 12/11/22 1056          Therapy Assessment/Plan (PT)    Rehab Potential (PT) good, to achieve stated therapy goals  -DK     Criteria for Skilled Interventions Met (PT) skilled treatment is necessary  -DK     Therapy Frequency (PT) daily  -DK     Problem List (PT) problems related to;balance;mobility;strength;pain;hearing  -DK     Activity Limitations Related to Problem List (PT) unable to ambulate safely;unable to transfer safely  -DK     Row Name 12/11/22 1056          Progress Summary (PT)    Progress Toward Functional Goals (PT) progress toward functional goals is good  -DK           User Key  (r) = Recorded By, (t) = Taken By, (c) = Cosigned By    Initials Name Provider Type    CK Odette Cruz, RN Registered Nurse    Trina Rodrigues PTA Physical Therapist Assistant                 Physical Therapy Education     Title: PT OT SLP Therapies (Done)     Topic: Physical Therapy (Done)     Point: Mobility training (Done)     Learning Progress Summary           Patient Acceptance, E, VU by  at 12/10/2022 1834    Acceptance, E,TB, VU by CHAPO at 12/7/2022 1352                   Point: Home exercise program (Done)     Learning Progress Summary           Patient Acceptance, E, VU by BREN at 12/10/2022 1834                   Point: Body mechanics (Done)     Learning Progress Summary           Patient Acceptance, E, VU by RK at 12/10/2022 1834    Acceptance, E,TB, VU by CHAPO at 12/7/2022 1352                   Point: Precautions (Done)     Learning Progress Summary           Patient Acceptance, E, VU by RK at 12/10/2022 1834    Acceptance, E,TB, VU by CHAPO at 12/7/2022 1352                               User Key     Initials Effective Dates Name Provider Type Discipline    BREN 06/16/21 -  Remington Nice RN Registered Nurse Nurse    CHAPO 06/11/21 -  Jose Flores, RAS Physical Therapist PT              PT Recommendation and Plan  Planned Therapy Interventions (PT): balance training, bed mobility training, gait training, strengthening, transfer training  Therapy Frequency (PT): daily  Progress Summary (PT)  Progress Toward Functional Goals (PT): progress toward functional goals is good  Plan of Care Reviewed With: patient  Progress: improving   Outcome Measures     Row Name 12/11/22 1056 12/09/22 1300 12/08/22 1200       How much help from another person do you currently need...    Turning from your back to your side while in flat bed without using bedrails? 3  -DK 3  -CS 3  -AV (r) AD (t) AV (c)    Moving from lying on back to sitting on the side of a flat bed without bedrails? 3  -DK 3  -CS 3  -AV (r) AD (t) AV (c)    Moving to and from a bed to a chair (including a wheelchair)? 3  -DK 3  -CS 3  -AV (r) AD (t) AV (c)    Standing up from a chair using your arms (e.g., wheelchair, bedside  chair)? 3  -DK 3  -CS 3  -AV (r) AD (t) AV (c)    Climbing 3-5 steps with a railing? 3  -DK 2  -CS 2  -AV (r) AD (t) AV (c)    To walk in hospital room? 3  -DK 3  -CS 3  -AV (r) AD (t) AV (c)    AM-PAC 6 Clicks Score (PT) 18  -DK 17  -CS 17  -AV (r) AD (t)       Functional Assessment    Outcome Measure Options AM-PAC 6 Clicks Basic Mobility (PT)  -DK AM-PAC 6 Clicks Basic Mobility (PT)  -CS AM-PAC 6 Clicks Basic Mobility (PT)  -AV (r) AD (t) AV (c)          User Key  (r) = Recorded By, (t) = Taken By, (c) = Cosigned By    Initials Name Provider Type    Trina Rodrigues PTA Physical Therapist Assistant    Jose Montalvo, PT Physical Therapist    CS Mikhail Perry, PTA Physical Therapist Assistant    AD Adrian Orta, PT Student PT Student                 Time Calculation:    PT Charges     Row Name 12/11/22 1102             Time Calculation    PT Received On 12/11/22  -DK      PT Goal Re-Cert Due Date 12/16/22  -DK         Timed Charges    31083 - Gait Training Minutes  20  -DK      97857 - PT Therapeutic Activity Minutes 18  -DK         Total Minutes    Timed Charges Total Minutes 38  -DK       Total Minutes 38  -DK            User Key  (r) = Recorded By, (t) = Taken By, (c) = Cosigned By    Initials Name Provider Type    Trina Rodrigues PTA Physical Therapist Assistant              Therapy Charges for Today     Code Description Service Date Service Provider Modifiers Qty    53335996725 HC GAIT TRAINING EA 15 MIN 12/11/2022 Trina Coon PTA GP 2    39138018103 HC PT THERAPEUTIC ACT EA 15 MIN 12/11/2022 Trina Coon PTA GP 1          PT G-Codes  Outcome Measure Options: AM-PAC 6 Clicks Basic Mobility (PT)  AM-PAC 6 Clicks Score (PT): 18  AM-PAC 6 Clicks Score (OT): 15    Trina Coon PTA  12/11/2022

## 2022-12-11 NOTE — PROGRESS NOTES
LOS: 5 days   Patient Care Team:  Haile Monique MD as PCP - General (Family Medicine)  Julien Cardona DO as Consulting Physician (Pain Medicine)  Joel Castillo MD as Consulting Physician (Gastroenterology)  Remington Russell MD as Surgeon (General Surgery)  Ahsan Salas MD as Consulting Physician (Sports Medicine)  Donald Barker MD as Consulting Physician (Hematology and Oncology)  Sandy Ricci MD as Consulting Physician (General Surgery)      Subjective     Interval History:   Doing well, no new issues, started having ostomy output, wants to try some clears    Objective     Vital Signs  Temp:  [98.2 °F (36.8 °C)-99.1 °F (37.3 °C)] 99 °F (37.2 °C)  Heart Rate:  [81-95] 91  Resp:  [16-20] 18  BP: (125-161)/() 147/65    Physical Exam:  NAD  Abd soft, nd, inc ok, ostomy ok     Results Review:     I reviewed the patient's new clinical results.      Assessment & Plan       Peritonitis (HCC)    Leukopenia    Metastatic breast cancer (HCC)    S/P ex lap with sigmoid resection, Lynn's procedure for stercoral perforation        Plan:  Will start clears  Increase activity

## 2022-12-12 LAB
ANION GAP SERPL CALCULATED.3IONS-SCNC: 9.2 MMOL/L (ref 5–15)
BUN SERPL-MCNC: 9 MG/DL (ref 8–23)
BUN/CREAT SERPL: 16.7 (ref 7–25)
CALCIUM SPEC-SCNC: 7.3 MG/DL (ref 8.6–10.5)
CHLORIDE SERPL-SCNC: 105 MMOL/L (ref 98–107)
CO2 SERPL-SCNC: 21.8 MMOL/L (ref 22–29)
CREAT SERPL-MCNC: 0.54 MG/DL (ref 0.57–1)
EGFRCR SERPLBLD CKD-EPI 2021: 103.6 ML/MIN/1.73
GLUCOSE SERPL-MCNC: 95 MG/DL (ref 65–99)
MAGNESIUM SERPL-MCNC: 2.2 MG/DL (ref 1.6–2.4)
PHOSPHATE SERPL-MCNC: 2 MG/DL (ref 2.5–4.5)
POTASSIUM SERPL-SCNC: 4.2 MMOL/L (ref 3.5–5.2)
SODIUM SERPL-SCNC: 136 MMOL/L (ref 136–145)

## 2022-12-12 PROCEDURE — 25010000002 HYDROMORPHONE 1 MG/ML SOLUTION: Performed by: NURSE PRACTITIONER

## 2022-12-12 PROCEDURE — 94799 UNLISTED PULMONARY SVC/PX: CPT

## 2022-12-12 PROCEDURE — 25010000002 PIPERACILLIN SOD-TAZOBACTAM PER 1 G: Performed by: INTERNAL MEDICINE

## 2022-12-12 PROCEDURE — 94761 N-INVAS EAR/PLS OXIMETRY MLT: CPT

## 2022-12-12 PROCEDURE — 80048 BASIC METABOLIC PNL TOTAL CA: CPT | Performed by: INTERNAL MEDICINE

## 2022-12-12 PROCEDURE — 99232 SBSQ HOSP IP/OBS MODERATE 35: CPT | Performed by: INTERNAL MEDICINE

## 2022-12-12 PROCEDURE — 83735 ASSAY OF MAGNESIUM: CPT | Performed by: INTERNAL MEDICINE

## 2022-12-12 PROCEDURE — 84100 ASSAY OF PHOSPHORUS: CPT | Performed by: INTERNAL MEDICINE

## 2022-12-12 PROCEDURE — 25010000002 LORAZEPAM PER 2 MG: Performed by: INTERNAL MEDICINE

## 2022-12-12 PROCEDURE — 99024 POSTOP FOLLOW-UP VISIT: CPT | Performed by: SURGERY

## 2022-12-12 RX ORDER — VENLAFAXINE HYDROCHLORIDE 150 MG/1
150 CAPSULE, EXTENDED RELEASE ORAL DAILY
Status: DISCONTINUED | OUTPATIENT
Start: 2022-12-12 | End: 2022-12-13 | Stop reason: HOSPADM

## 2022-12-12 RX ORDER — TRAZODONE HYDROCHLORIDE 50 MG/1
150 TABLET ORAL NIGHTLY
Status: DISCONTINUED | OUTPATIENT
Start: 2022-12-12 | End: 2022-12-13 | Stop reason: HOSPADM

## 2022-12-12 RX ORDER — ROPINIROLE 1 MG/1
3 TABLET, FILM COATED ORAL EVERY EVENING
Status: DISCONTINUED | OUTPATIENT
Start: 2022-12-12 | End: 2022-12-13 | Stop reason: HOSPADM

## 2022-12-12 RX ORDER — FENTANYL/ROPIVACAINE/NS/PF 2-625MCG/1
15 PLASTIC BAG, INJECTION (ML) EPIDURAL ONCE
Status: COMPLETED | OUTPATIENT
Start: 2022-12-12 | End: 2022-12-12

## 2022-12-12 RX ORDER — GABAPENTIN 300 MG/1
600 CAPSULE ORAL NIGHTLY
Status: DISCONTINUED | OUTPATIENT
Start: 2022-12-12 | End: 2022-12-13 | Stop reason: HOSPADM

## 2022-12-12 RX ORDER — LOSARTAN POTASSIUM 50 MG/1
100 TABLET ORAL DAILY
Status: DISCONTINUED | OUTPATIENT
Start: 2022-12-12 | End: 2022-12-13 | Stop reason: HOSPADM

## 2022-12-12 RX ORDER — OXYCODONE AND ACETAMINOPHEN 10; 325 MG/1; MG/1
1 TABLET ORAL EVERY 6 HOURS PRN
Status: DISCONTINUED | OUTPATIENT
Start: 2022-12-12 | End: 2022-12-13 | Stop reason: HOSPADM

## 2022-12-12 RX ORDER — LORAZEPAM 0.5 MG/1
0.5 TABLET ORAL EVERY 6 HOURS PRN
Status: DISCONTINUED | OUTPATIENT
Start: 2022-12-12 | End: 2022-12-13 | Stop reason: HOSPADM

## 2022-12-12 RX ORDER — DONEPEZIL HYDROCHLORIDE 10 MG/1
10 TABLET, FILM COATED ORAL DAILY
Status: DISCONTINUED | OUTPATIENT
Start: 2022-12-12 | End: 2022-12-13 | Stop reason: HOSPADM

## 2022-12-12 RX ORDER — MONTELUKAST SODIUM 10 MG/1
10 TABLET ORAL DAILY
Status: DISCONTINUED | OUTPATIENT
Start: 2022-12-12 | End: 2022-12-13 | Stop reason: HOSPADM

## 2022-12-12 RX ORDER — LETROZOLE 2.5 MG/1
2.5 TABLET, FILM COATED ORAL DAILY
Status: DISCONTINUED | OUTPATIENT
Start: 2022-12-12 | End: 2022-12-13 | Stop reason: HOSPADM

## 2022-12-12 RX ORDER — LEVOTHYROXINE SODIUM 0.05 MG/1
50 TABLET ORAL EVERY MORNING
Status: DISCONTINUED | OUTPATIENT
Start: 2022-12-12 | End: 2022-12-13 | Stop reason: HOSPADM

## 2022-12-12 RX ADMIN — LETROZOLE 2.5 MG: 2.5 TABLET ORAL at 12:59

## 2022-12-12 RX ADMIN — POTASSIUM PHOSPHATE, MONOBASIC 500 MG: 500 TABLET, SOLUBLE ORAL at 20:49

## 2022-12-12 RX ADMIN — DONEPEZIL HYDROCHLORIDE 10 MG: 10 TABLET, FILM COATED ORAL at 12:58

## 2022-12-12 RX ADMIN — TAZOBACTAM SODIUM AND PIPERACILLIN SODIUM 3.38 G: 375; 3 INJECTION, SOLUTION INTRAVENOUS at 11:11

## 2022-12-12 RX ADMIN — Medication 500 MG: at 08:25

## 2022-12-12 RX ADMIN — POLYETHYLENE GLYCOL 3350 17 G: 17 POWDER, FOR SOLUTION ORAL at 08:25

## 2022-12-12 RX ADMIN — OXYCODONE AND ACETAMINOPHEN 1 TABLET: 10; 325 TABLET ORAL at 13:07

## 2022-12-12 RX ADMIN — MONTELUKAST 10 MG: 10 TABLET, FILM COATED ORAL at 12:58

## 2022-12-12 RX ADMIN — HYDROMORPHONE HYDROCHLORIDE 1 MG: 1 INJECTION, SOLUTION INTRAMUSCULAR; INTRAVENOUS; SUBCUTANEOUS at 08:35

## 2022-12-12 RX ADMIN — VENLAFAXINE HYDROCHLORIDE 150 MG: 150 CAPSULE, EXTENDED RELEASE ORAL at 12:58

## 2022-12-12 RX ADMIN — FENTANYL TRANSDERMAL 1 PATCH: 50 PATCH, EXTENDED RELEASE TRANSDERMAL at 11:13

## 2022-12-12 RX ADMIN — LOSARTAN POTASSIUM 100 MG: 50 TABLET, FILM COATED ORAL at 12:57

## 2022-12-12 RX ADMIN — LORAZEPAM 1 MG: 2 INJECTION INTRAMUSCULAR; INTRAVENOUS at 08:25

## 2022-12-12 RX ADMIN — FAMOTIDINE 20 MG: 10 INJECTION INTRAVENOUS at 08:25

## 2022-12-12 RX ADMIN — HYDROMORPHONE HYDROCHLORIDE 1 MG: 1 INJECTION, SOLUTION INTRAMUSCULAR; INTRAVENOUS; SUBCUTANEOUS at 01:01

## 2022-12-12 RX ADMIN — ROPINIROLE HYDROCHLORIDE 3 MG: 1 TABLET, FILM COATED ORAL at 17:48

## 2022-12-12 RX ADMIN — TAZOBACTAM SODIUM AND PIPERACILLIN SODIUM 3.38 G: 375; 3 INJECTION, SOLUTION INTRAVENOUS at 04:57

## 2022-12-12 RX ADMIN — NICOTINE 1 PATCH: 21 PATCH, EXTENDED RELEASE TRANSDERMAL at 08:26

## 2022-12-12 RX ADMIN — OXYCODONE AND ACETAMINOPHEN 1 TABLET: 10; 325 TABLET ORAL at 20:51

## 2022-12-12 RX ADMIN — Medication 10 ML: at 20:49

## 2022-12-12 RX ADMIN — TAZOBACTAM SODIUM AND PIPERACILLIN SODIUM 3.38 G: 375; 3 INJECTION, SOLUTION INTRAVENOUS at 23:25

## 2022-12-12 RX ADMIN — HYDROMORPHONE HYDROCHLORIDE 1 MG: 1 INJECTION, SOLUTION INTRAMUSCULAR; INTRAVENOUS; SUBCUTANEOUS at 02:28

## 2022-12-12 RX ADMIN — GABAPENTIN 600 MG: 300 CAPSULE ORAL at 20:51

## 2022-12-12 RX ADMIN — TRAZODONE HYDROCHLORIDE 150 MG: 50 TABLET ORAL at 20:50

## 2022-12-12 RX ADMIN — Medication 10 ML: at 08:26

## 2022-12-12 RX ADMIN — LEVOTHYROXINE SODIUM 50 MCG: 50 TABLET ORAL at 12:58

## 2022-12-12 RX ADMIN — POTASSIUM PHOSPHATE, MONOBASIC AND POTASSIUM PHOSPHATE, DIBASIC 15 MMOL: 224; 236 INJECTION, SOLUTION, CONCENTRATE INTRAVENOUS at 11:11

## 2022-12-12 RX ADMIN — FAMOTIDINE 20 MG: 10 INJECTION INTRAVENOUS at 20:49

## 2022-12-12 RX ADMIN — TAZOBACTAM SODIUM AND PIPERACILLIN SODIUM 3.38 G: 375; 3 INJECTION, SOLUTION INTRAVENOUS at 17:47

## 2022-12-12 NOTE — SIGNIFICANT NOTE
Wound Eval / Progress Noted    JOSE Langston     Patient Name: Derek Keller  : 1960  MRN: [id number]  Today's Date: 2022                 Admit Date: 2022    Visit Dx:    ICD-10-CM ICD-9-CM   1. Peritonitis (HCC)  K65.9 567.9   2. Decreased activities of daily living (ADL)  Z78.9 V49.89   3. Bone metastases (HCC)  C79.51 198.5   4. Secondary malignant neoplasm of bone (HCC)  C79.51 198.5   5. Malignant neoplasm of upper-outer quadrant of left breast in female, estrogen receptor positive (HCC)  C50.412 174.4    Z17.0 V86.0   6. Chronic fatigue  R53.82 780.79   7. Hypoxemia  R09.02 799.02   8. Difficulty walking  R26.2 719.7       Patient Active Problem List   Diagnosis    Other chest pain    Hypertension    Hyperlipidemia    Allergic rhinitis    Hypothyroidism    Neck pain    Lumbago    Arthritis    Chronic pain disorder    History of IBS    Anxiety    Monopolar depression (HCC)    Malaise and fatigue    Tobacco abuse    Fibromyalgia    Vitamin B 12 deficiency    Acute pain of left knee    Primary osteoarthritis of left knee    Osteoarthrosis, localized, primary, knee    Generalized abdominal pain    Herpes simplex vulvovaginitis    Lightheadedness    Cough    Hypoxemia    Restless leg syndrome    Itch of skin    Primary insomnia    Chronic fatigue    Asthma    Body mass index (BMI) 26.0-26.9, adult    Constipation    Degeneration of lumbar intervertebral disc    Disorder associated with Helicobacter species    Hearing loss    Impacted cerumen of right ear    Inappropriate diet and eating habits    Inguinal pain    Irritable bowel syndrome    Cervical spondylosis    Obesity with body mass index 30 or greater    Renal stone    Malignant neoplasm of left breast in female, estrogen receptor positive (HCC)    Cancer related pain    Bone metastases (HCC)    Secondary malignant neoplasm of bone (HCC)    Peripheral edema    Peritonitis (HCC)    Leukopenia    Metastatic breast cancer (HCC)    S/P ex lap with  sigmoid resection, Lynn's procedure for stercoral perforation        Past Medical History:   Diagnosis Date    Abdominal pain     Allergic rhinitis     Anemia     Anxiety     Arteriosclerosis     Coronary    Arthritis     Bone metastases (HCC) 10/14/2022    Bowen's disease     Breast cancer (HCC)     Cancer related pain 10/13/2022    Chest pain     Chronic pain disorder     Cough     Depression     Dysphoric mood     Fatigue     Frequent urination     History of colon polyps     History of IBS     History of kidney stones     Hyperlipidemia     Hypertension     Hypothyroidism     Insomnia     Lumbago     Malaise and fatigue     Mood disorder (HCC)     Neck pain     Palpitations     Peripheral edema 11/21/2022    Precordial pain     Sleep disturbance     SOB (shortness of breath)         Past Surgical History:   Procedure Laterality Date    BREAST BIOPSY      CARDIAC CATHETERIZATION Left 1959    CARDIAC CATHETERIZATION N/A 04/06/2018    Procedure: Coronary angiography;  Surgeon: Art Licea MD;  Location: [facility] INVASIVE LOCATION;  Service: Cardiovascular    CARDIAC CATHETERIZATION N/A 04/06/2018    Procedure: Left heart cath;  Surgeon: Art Licea MD;  Location: [facility] INVASIVE LOCATION;  Service: Cardiovascular    CARDIAC CATHETERIZATION N/A 04/06/2018    Procedure: Left ventriculography;  Surgeon: Art Licea MD;  Location: [facility] INVASIVE LOCATION;  Service: Cardiovascular    CHOLECYSTECTOMY      COLONOSCOPY  11/08/2006    EXPLORATORY LAPAROTOMY N/A 12/6/2022    Procedure: LAPAROTOMY EXPLORATORY sigmoid resection hartmans procedure colostomy;  Surgeon: Alfred Castañeda MD;  Location: [facility];  Service: General;  Laterality: N/A;    GANGLION CYST EXCISION      HYSTERECTOMY  05/2005    LAPAROSCOPIC GASTRIC BANDING      TONSILLECTOMY           Physical Assessment:   12/12/22 1107   Wound 12/06/22 1906 midline abdomen Incision   Placement Date/Time:  12/06/22 1906   Present on Hospital Admission: No  Orientation: midline  Location: abdomen  Primary Wound Type: Incision   Wound Image    Dressing Appearance dry;intact   Closure Staples;None   Base red;moist;pink   Periwound dry;intact;pink   Periwound Temperature warm   Periwound Skin Turgor soft   Edges open;rolled/closed   Drainage Characteristics/Odor serosanguineous   Drainage Amount small   Care, Wound cleansed with;sterile normal saline   Dressing Care dressing changed;abdominal pad   Periwound Care absorptive dressing applied   Colostomy LLQ   Placement Date/Time: 12/06/22 1900   Inserted by: DR. HESS  Hand Hygiene Completed: Yes  Colostomy Type: Descending/sigmoid;End  Location: LLQ   Wound Image    Stomal Appliance Clean;Dry;Intact;Changed   Stoma Appearance pink;round;moist;red   Peristomal Assessment Clean;Intact   Accessories/Skin Care cleansed with water;skin sealant   Stoma Function stool   Stool Color brown, light   Stool Consistency loose;soft   Treatment Bag change;Site care     Wound Check / Follow-up:  Patient seen today for ostomy pouch change and continued teaching. Loose, light brown stool present in pouch. Pouch removed. Peristomal tissue intact with no signs of breakdown. Cleansed stoma and peristomal tissue with warm water and washcloth. Patient held mirror during visit for better visualization. Patient alert but drowsy during visit, she reports she recently received pain medication. Patient able to verbally recall a few of the steps of the pouch change process. Pouch changed with one piece flat pouch. Surgeon came to bedside during visit. Midline abdominal incision dressing removed. MD states packing is no longer needed, to cleanse and cover with dry dressing. Cleansed with normal saline and gauze. Covered with abdominal pad. Advised patient to review written materials in room and to use stoma model for practice pouch change. Patient verbalized understanding. Spoke with primary RN who  states she will encourage patient to do both of these. Spoke with case management regarding home health.  RN placing referrals at this time for home health. Patient tentatively discharging home today or tomorrow. Continued ostomy education to resume tomorrow if patient still in hospital. Spoke with attending physician as well.      Impression: New colostomy, midline abdominal surgical incision.      Short term goals:  Regain skin integrity, colostomy management, skin protection.      Analisa Alvarado RN    12/12/2022    14:07 EST

## 2022-12-12 NOTE — PROGRESS NOTES
[facility] Progress Note      Patient Name:  Derek Keller   MRN:  [id number]   :  1960   Date of Admission:  2022   Date of Service:  2022   PMD:  Haile Monique MD     Hospital Course:    62 y.o. female past medical history of metastatic breast cancer that presents to the emergency department earlier today for evaluation of sudden onset abdominal pain.  She described as sharp, left lower quadrant rating to the right lower quadrant, constant, no known aggravating alleviating factors.  She had associated nausea but no vomiting.  She denies any fevers, chills, sweats, chest pain or shortness of breath, palpitations, diarrhea constipation, dysuria, weakness, rash.  In the emergency department patient underwent CT scan which showed enteritis versus possible bowel ischemia and some intraperitoneal free air.  Surgery was called and patient was taken to the operating room.  She underwent sigmoid colon resection and colostomy formation.  She has been placed on Zosyn and admitted for ongoing monitoring and management    Consultants:    Surgery     Procedures:  CT abdomen  Exploratory laparotomy with sigmoid colon resection, Lynn's closure of rectal stump, end colostomy-    Anti-infectives:    Zosyn    2022:  CC: Follow-up of patient with peritonitis status post surgery    Patient reports that she is feeling better.  She states her abdominal pain is improved with current pain management regimen.  She denies any nausea or vomiting.  States she is tolerating clear liquid diet.  Staff reports patient has been drowsy and are concerned that she has not been alert enough to grasp the full concept of care of her ostomy    Review Of Systems:  GENERAL:  Reports improving weakness , and fatigue, no time is denies any weight loss appetite is normal.  HEENT:  Denies any rhinitis, no sore throat, no diplopia , hearing is normal  Respiratory:  Denies any shortness of breath ,  sweating d, yspnea on exertion , cough or sputum.  CVS:  Denies any chest pain , palpitation or syncope.  Gi:  Reports improving abdominal pain, no nausea, no vomiting, no diarrhea, no hematemesis , no melena , no rectal bleeding  :  No dysuria frequency , hematuria, no retention:  Musculoskeletal:  Denies any arthritis, or calf pain    Hematological:  No bleeding , no petechiae.  Skin:  Denies rash , pruritus , jaundice  Endocrine:  No polyuria , polydipsia , polyphagia.  CNS:  Denies any confusion , headache , or seizure disorder  Psychiatry:  No anxiety , or depression, no suicide ideation      Objective:  Vitals:    12/11/22 2233 12/12/22 0248 12/12/22 0703 12/12/22 0816   BP: 141/66 120/56 116/61    BP Location:  Right arm Right arm    Patient Position:  Lying Lying    Pulse: 97 86 86 95   Resp:  16  16   Temp: 99 °F (37.2 °C) 97.8 °F (36.6 °C) 98.8 °F (37.1 °C)    TempSrc:  Oral Oral    SpO2: 96% 98% 96% 95%   Weight:       Height:            Physical Exam:  GENERAL APPEARANCE: Drowsy but propria in her conversation.  Appears comfortable.  No acute distress  HEENT: Atraumatic, normocephalic,  PER, mucous membranes moist  NECK: supple; no JVD or thyromegaly noted  CARDIOVASCULAR: Regular rate and rhythm, no murmurs appreciated; no edema present in BLE   RESPIRATORY: good air entry bilaterally.  No wheezes, rhonchi, or rales appreciated  GASTROINTESTINAL:  Abdomen  soft; bowel sounds present, appropriately tender to palpation.  Stool is noted in the ostomy bag, ostomy intact  EXTREMITIES: Pulses equal bilaterally   MUSCULOSKELETAL:  Muscle bulk and tone consistent with patient age  PSYCH: Appropriate mood and affect  Neurologic:  Alert & oriented x 3.  Normal Mental status.   Cranial nerves II through XII grossly intact.  Moving all extremities equally no focal weakness appreciated.    Labs Reviewed  CBC:    Lab Results   Component Value Date    WBC 4.82 12/10/2022    HGB 14.3 12/10/2022    HCT 44.7 12/10/2022     MCV 98.7 (H) 12/10/2022     CMP:    Lab Results   Component Value Date     12/12/2022    CO2 21.8 (L) 12/12/2022    GLUCOSE 95 12/12/2022    BUN 9 12/12/2022    CREATININE 0.54 (L) 12/12/2022    ALBUMIN 3.50 12/09/2022    CALCIUM 7.3 (L) 12/12/2022    AST 17 12/09/2022    ALT 26 12/09/2022     Lipis Panel:   Lab Results   Component Value Date    CHOL 125 11/12/2019    HDL 49 11/12/2019      INR:  No results found for: INR  Cardiac:  No results found for: CKMB, TROPONIN  No results found for: BNP  Lactate:    Lab Results   Component Value Date    LACTATE 1.3 12/08/2022    LACTATE 1.0 12/06/2022       Imaging:  MRI Pelvis With & Without Contrast    Result Date: 12/2/2022  Narrative: PROCEDURE: MRI PELVIS W WO CONTRAST  COMPARISON: [facility], PET, NM PET/CT SKULL BASE TO MID THIGH, 9/26/2022, 12:08.  [facility], CT, CT ABDOMEN PELVIS W WO CONTRAST, 8/05/2022, 11:48.  [facility], MR, MRI LUMBAR SPINE W WO CONTRAST, 12/01/2022, 12:35.  INDICATIONS: MRI SACRUM; METASTATIC BREAST CA. LOWER BACK PAIN INTO TAILBONE.      TECHNIQUE: Multiplanar multisequence images of the brain with and without intravenous gadolinium.  FINDINGS: There are innumerable osseous metastatic lesions throughout the lower lumbar spine, pelvis and sacrum.  The lesions have decreased T1 signal intensity and increased T2 signal intensity.  There is enhancement following the administration of gadolinium.  No discrete soft tissue masses are identified.  There is soft tissue edema and enhancement along dorsal aspect of the sacrococcygeal junction.  Prior hysterectomy.  No evidence of adenopathy.  IMPRESSION:  Widespread osseous metastatic disease.  TRINIDAD HERNANDEZ MD       Electronically Signed and Approved By: TRINIDAD HERNANDEZ MD on 12/02/2022 at 8:07             CT Abdomen Pelvis With Contrast    Result Date: 12/6/2022  Narrative: PROCEDURE: CT ABDOMEN PELVIS W CONTRAST   COMPARISON: [facility], CT, CT ABDOMEN PELVIS W WO CONTRAST, 8/05/2022, 11:48.  INDICATIONS: abdominal pain, metastatic breast cancer  TECHNIQUE: After obtaining the patient's consent, CT images were created with non-ionic intravenous contrast material.   PROTOCOL:   Standard imaging protocol performed    RADIATION:   DLP: 979.8mGy*cm   Automated exposure control was utilized to minimize radiation dose. CONTRAST: 100cc Isovue 370 I.V. LABS:   eGFR: 93ml/min/1.73m2  FINDINGS:  Mostly linear opacities in the dependent lower lobes are likely due to subsegmental atelectasis.  Gallbladder is surgically absent.  Intrahepatic/extrahepatic biliary ductal dilatation is mildly increased compared to 8/5/2022 CT.  Extrahepatic common bile duct measures up to 1.5 cm, previously 1.2 cm.  No definite cause of biliary obstruction is visualized on CT.  There is mild dilatation of the main pancreatic duct.  Bilateral renal cysts are stable.  Adrenal glands and spleen appear unremarkable.  No abnormal bowel distention is seen, but there is mild wall thickening of distal small bowel in the right lower quadrant with small amount of interloop fluid noted.  There is a small amount of free pelvic fluid, as well as fluid in the right paracolic gutter and perihepatic fluid.  There are few foci of free intraperitoneal air (anterior to liver on axial image 18 and 20, anterior abdomen on axial image 46), as well as foci of extraluminal air adjacent to distal small bowel loops in the pelvis.  No pneumatosis or mesenteric/portal venous gas is seen.  Appendix is nondilated, and there is no significant periappendiceal inflammation.  There is stool throughout the colon.  No urinary bladder wall thickening is seen.  No adenopathy is identified in the abdomen or pelvis.  There are atherosclerotic vascular calcifications.  Numerous sclerotic lesions are redemonstrated, consistent with osseous metastatic disease.  No pathologic  fractures are seen.      Impression:   1. Wall thickening/enhancement of distal small bowel in the right lower quadrant with a small amount of interloop fluid, as well as a small amount of free pelvic fluid, right paracolic gutter fluid, and perihepatic fluid, concerning for nonspecific enteritis or possible bowel ischemia. 2. Few foci of free intraperitoneal air, which may be from perforation of abnormal distal small bowel, given the location of several foci of extraluminal air adjacent to distal small bowel loops.  Recommend surgical consultation. 3. Mildly increased intrahepatic/extrahepatic biliary ductal dilatation without obvious cause of obstruction. 4. Diffuse osseous metastatic disease.   This report was communicated by telephone to Dr. Lozada at the dictation time shown below.    STEPHANIE STORY MD       Electronically Signed and Approved By: STEPHANIE STORY MD on 12/06/2022 at 16:13             CT Radiation Therapy Planning    Result Date: 11/14/2022  Narrative: This scan was performed as part of a plan for Radiation Therapy    MRI Lumbar Spine With & Without Contrast    Result Date: 12/1/2022  Narrative: PROCEDURE: MRI LUMBAR SPINE W WO CONTRAST  COMPARISON: Other, MR, LUMBAR SPINE WITHOUT CONTRAST, 2/06/2020, 14:12.  INDICATIONS: METASTATIC BREAST CA. LOWER BACK PAIN RADIATING INTO BOTH LEGS AND TAILBONE.  CONTRAST: 15ML  Multihance I.V.  TECHNIQUE: A comprehensive examination was performed utilizing a variety of imaging planes and imaging parameters to optimize visualization of suspected pathology.  Images were performed before and after the administration of intravenous gadolinium contrast.  FINDINGS:  Five lumbar vertebral bodies are identified and appear in anatomic alignment.  There is mild to moderate narrowing of the L1-L4 discs and mild narrowing of the L4-L5 and L5-S1 discs.  There is diffuse disc desiccation.  Vertebral bodies maintain normal height.  There are too numerous to count low T1  high T2 rounded lesions scattered throughout all of the visible bones of the thoracolumbar spine, sacrum and pelvis.  There are more than 50 lesions.  One of the larger lesions occurs at the T11 vertebral body and measures up to 25 mm diameter.  A 2nd large lesion involves the right-side of the L5 vertebral body and measures up to 29 mm diameter.  There is no evidence of pathologic fracture.  The distal spinal cord and conus medullaris appear unremarkable terminating at the L1-L2 level. Limited view of the retroperitoneal structures is unremarkable. Paraspinal musculature is well preserved. No evidence of leptomeningeal or intramedullary/intradural metastatic disease.  L1-L2:  There is concentric disc bulge with a tiny central disc protrusion.  There is mild facet and ligamentum flavum hypertrophy.  There is moderate right and mild left neural foraminal narrowing without spinal canal stenosis.  L2-L3:  There is concentric disc bulge and marginal osteophyte formation with mild facet and ligamentum flavum hypertrophy.  There is mild bilateral neural foraminal narrowing and mild narrowing of the spinal canal.  L3-L4:  There is concentric disc bulge and marginal osteophyte formation with mild facet and ligamentum flavum hypertrophy.  There is moderate bilateral neural foraminal narrowing and moderate narrowing of the spinal canal.  L4-L5:  There is concentric disc bulge and marginal osteophyte formation with moderate bilateral facet and ligamentum flavum hypertrophy.  There is moderate to severe bilateral neural foraminal narrowing.  L5-S1:  There is concentric disc bulge and marginal osteophyte formation.  There is moderate to severe right and moderate left facet hypertrophy.  There is moderate to severe right and moderate left neural foraminal narrowing without spinal canal compromise.       Impression:   1. Too numerous to count osseous metastasis throughout the lumbar spine and pelvis.  No pathologic fracture or  evidence of intradural metastasis. 2. Moderate lumbar spondylosis with varying degrees of neural foraminal and spinal canal narrowing as described in detail above, progressive since 2020.      ADRIANA VALLES MD       Electronically Signed and Approved By: ADRIANA VALLES MD on 12/01/2022 at 16:38                Medications Reviewed:  famotidine, 20 mg, Intravenous, Q12H  fentaNYL, 1 patch, Transdermal, Q72H  LORazepam, 1 mg, Intravenous, Q6H  nicotine, 1 patch, Transdermal, Q24H  piperacillin-tazobactam, 3.375 g, Intravenous, Q6H  polyethylene glycol, 17 g, Nasogastric, Daily  potassium phosphate (monobasic), 500 mg, Nasogastric, BID  sodium chloride, 10 mL, Intravenous, Q12H         Assessment/Plan:    Patient Active Problem List    Diagnosis    • *Peritonitis (HCC) [K65.9]    • Leukopenia [D72.819]    • Metastatic breast cancer (HCC) [C50.919]    • S/P ex lap with sigmoid resection, Lynn's procedure for stercoral perforation [Z98.890]    • Peripheral edema [R60.9]    • Secondary malignant neoplasm of bone (HCC) [C79.51]    • Bone metastases (HCC) [C79.51]    • Malignant neoplasm of left breast in female, estrogen receptor positive (HCC) [C50.912, Z17.0]    • Cancer related pain [G89.3]    • Disorder associated with Helicobacter species [B96.89]    • Hearing loss [H91.90]    • Impacted cerumen of right ear [H61.21]    • Degeneration of lumbar intervertebral disc [M51.36]    • Cervical spondylosis [M47.812]    • Body mass index (BMI) 26.0-26.9, adult [Z68.26]    • Constipation [K59.00]    • Restless leg syndrome [G25.81]    • Itch of skin [L29.9]    • Primary insomnia [F51.01]    • Chronic fatigue [R53.82]    • Inguinal pain [R10.30]    • Renal stone [N20.0]    • Cough [R05.9]    • Hypoxemia [R09.02]    • Lightheadedness [R42]    • Herpes simplex vulvovaginitis [A60.04]    • Generalized abdominal pain [R10.84]    • Acute pain of left knee [M25.562]    • Primary osteoarthritis of left knee [M17.12]    •  Osteoarthrosis, localized, primary, knee [M17.10]    • Vitamin B 12 deficiency [E53.8]    • Inappropriate diet and eating habits [Z72.4]    • Obesity with body mass index 30 or greater [E66.9]    • Asthma [J45.909]    • Irritable bowel syndrome [K58.9]    • Fibromyalgia [M79.7]    • Tobacco abuse [Z72.0]    • Other chest pain [R07.89]    • Hypertension [I10]    • Hyperlipidemia [E78.5]    • Allergic rhinitis [J30.9]    • Hypothyroidism [E03.9]    • Neck pain [M54.2]    • Lumbago [M54.50]    • Arthritis [M19.90]    • Chronic pain disorder [G89.4]    • History of IBS [Z87.19]    • Anxiety [F41.9]    • Monopolar depression (HCC) [F33.9]    • Malaise and fatigue [R53.81, R53.83]       Medical decision making:    Peritonitis with status post sigmoid resection with Vale procedure for stercoral perforation:  Surgery input appreciated  Continue on IV Zosyn and added fentanyl patch for pain control    Ca of breast metastatic:  Continue to monitor  Hematology oncology consult as outpatient.    Leukopenia:  Resolved.    Severe phosphorus deficiency:  Continue to replete via oral and IV supplementation.    Hypertension:  Monitor blood pressure closely  Continue home antihypertensives    Major depression:  Continue SSRI    Hypothyroidism  Continue to place    DVT Prophylaxis:    SCDs    CODE STATUS:  Full code  Reason for continued hospitalization  and medical necessity :  Patient with peritonitis and perforation requiring further management      Disposition:  Discharge home most likely in the a.m. once home health arrangements are made.    Diet: GI diet    Discussed with: Patient, nursing staff, wound care nurse, case management      Electronically signed by Sal Madden MD, 12/12/22, 9:45 AM EST.        At [facility], we believe that sharing information builds trust and better relationships. You are receiving this note because you recently visited [facility]. It is possible you will see health information  before a provider has talked with you about it. This kind of information can be easy to misunderstand. To help you fully understand what it means for your health, we urge you to discuss this note with your provider.           Part of this note may be an electronic transcription/translation of spoken language to printed ,text using the Dragon Dictation System.

## 2022-12-12 NOTE — CONSULTS
"Nutrition Services    Patient Name: Derek Keller  YOB: 1960  MRN: [id number]  Admission date: 12/6/2022      CLINICAL NUTRITION ASSESSMENT      Reason for Assessment  Identified at risk by screening criteria, NPO/Clear liquid     H&P:    Past Medical History:   Diagnosis Date   • Abdominal pain    • Allergic rhinitis    • Anemia    • Anxiety    • Arteriosclerosis     Coronary   • Arthritis    • Bone metastases (HCC) 10/14/2022   • Bowen's disease    • Breast cancer (HCC)    • Cancer related pain 10/13/2022   • Chest pain    • Chronic pain disorder    • Cough    • Depression    • Dysphoric mood    • Fatigue    • Frequent urination    • History of colon polyps    • History of IBS    • History of kidney stones    • Hyperlipidemia    • Hypertension    • Hypothyroidism    • Insomnia    • Lumbago    • Malaise and fatigue    • Mood disorder (HCC)    • Neck pain    • Palpitations    • Peripheral edema 11/21/2022   • Precordial pain    • Sleep disturbance    • SOB (shortness of breath)         Current Problems:   Active Hospital Problems    Diagnosis    • **Peritonitis (HCC)    • Leukopenia    • Metastatic breast cancer (HCC)    • S/P ex lap with sigmoid resection, Lynn's procedure for stercoral perforation         Nutrition/Diet History         Narrative     RD following r/t pt NPO/CLD x 6 days. Pt presented w/sudden onset abd pn; s/p ex lap w/Lynn procedure d/t stercoral perforation - pt had sigmoid colon resection and end colostomy formation. Pt noted w/colostomy output. Phos labs have been low for 4 days and being replaced. Currently w/LR @ 50 mL/hr which provides 75 g dextrose and 255 kcal/d. PO intake noted w/one meal at 25%. Pt sleeping when visited. Will CTM per policy or sooner.        Anthropometrics        Current Height, Weight Height: 167.6 cm (65.98\")  Weight: 80.5 kg (177 lb 7.5 oz)   Current BMI Body mass index is 28.66 kg/m².       Weight Hx  Wt Readings from Last 30 Encounters: "   12/06/22 2140 80.5 kg (177 lb 7.5 oz)   12/06/22 1327 84.2 kg (185 lb 10 oz)   11/29/22 1320 84.2 kg (185 lb 10 oz)   11/23/22 1430 84.4 kg (186 lb 1.1 oz)   11/22/22 0957 83.6 kg (184 lb 4.9 oz)   11/21/22 1052 82.8 kg (182 lb 8.7 oz)   11/04/22 0924 85.1 kg (187 lb 9.8 oz)   10/13/22 1329 83 kg (182 lb 15.7 oz)   10/13/22 1338 82.6 kg (182 lb)   08/10/22 1421 79.8 kg (176 lb)   07/13/22 1419 79.7 kg (175 lb 12.8 oz)   08/24/21 1207 76.2 kg (168 lb)   01/12/21 0000 77.6 kg (171 lb)   11/23/20 0000 77.6 kg (171 lb)   10/27/20 0000 75.7 kg (167 lb)   08/26/20 0000 76.2 kg (168 lb)   05/07/20 0000 73.1 kg (161 lb 4 oz)   02/19/20 0000 75.8 kg (167 lb 3 oz)   11/12/19 1315 75.7 kg (166 lb 12.8 oz)   07/02/19 0954 74.5 kg (164 lb 3.2 oz)   05/07/19 1323 74.4 kg (164 lb)   04/12/19 0946 79.4 kg (175 lb)   01/10/19 1605 79 kg (174 lb 3.2 oz)   08/27/18 1309 82.6 kg (182 lb)   04/24/18 1325 84.8 kg (187 lb)   04/06/18 0646 86.2 kg (190 lb)   03/28/18 1138 87.5 kg (193 lb)   03/23/18 1501 86.2 kg (190 lb)   12/28/17 1448 88.3 kg (194 lb 9.6 oz)   08/31/17 0932 87.1 kg (192 lb)   04/19/17 1436 92.3 kg (203 lb 6.4 oz)            Wt Change Observation Difficult to determine r/t significant wt variance on 12/6. No new wts since admission -requested w/nursing staff.      Estimated/Assessed Needs       Energy Requirements 25 - 35 kcal/kg IBW   EST Needs (kcal/day) 1483 - 2076       Protein Requirements 1.25 - 1.5 g/kg IBW   EST Daily Needs (g/day) 74 - 89       Fluid Requirements 30 - 35 mL/kg IBW    Estimated Needs (mL/day) 1779 - 2076     Labs/Medications         Pertinent Labs Reviewed.   Results from last 7 days   Lab Units 12/12/22  0525 12/10/22  1022 12/09/22  0622 12/08/22  1300 12/07/22  0523   SODIUM mmol/L 136 140 140 138 139   POTASSIUM mmol/L 4.2 3.5 3.5 3.6 3.9   CHLORIDE mmol/L 105 103 107 104 105   CO2 mmol/L 21.8* 22.1 24.8 23.6 23.2   BUN mg/dL 9 13 13 14 14   CREATININE mg/dL 0.54* 0.60 0.64 0.72 0.68    CALCIUM mg/dL 7.3* 8.2* 8.1* 8.3* 7.6*   BILIRUBIN mg/dL  --   --  0.6 0.5 0.6   ALK PHOS U/L  --   --  65 71 65   ALT (SGPT) U/L  --   --  26 36* 59*   AST (SGOT) U/L  --   --  17 23 42*   GLUCOSE mg/dL 95 109* 85 96 129*     Results from last 7 days   Lab Units 12/12/22  0525 12/11/22  1155 12/10/22  0435 12/09/22  0622 12/08/22  1300   MAGNESIUM mg/dL 2.2  --   --  2.1 2.2   PHOSPHORUS mg/dL 2.0*   < >  --  1.6* 1.2*   HEMOGLOBIN g/dL  --   --  14.3 11.2* 10.9*   HEMATOCRIT %  --   --  44.7 33.5* 33.3*    < > = values in this interval not displayed.     SARS-CoV-2, SAMARIA   Date Value Ref Range Status   01/17/2022 NEGATIVE Negative Final     Comment:     The 2019-CoV rRT-PCR Assay is only for use under a Food and Drug Administration Emergency Use Authorization. The performance characteristics of the assay were verified by the Clinical Laboratory at [facility]. Results should be used in   conjunction with the patient's clinical symptoms, medical history, and other clinical/laboratory findings to determine an overall clinical diagnosis. Negative results do not preclude infection with SARS-CoV-2 (COVID-19).    Test parameters have not been validated for screening asymptomatic patients.     No results found for: HGBA1C      Pertinent Medications Reviewed.     Current Nutrition Orders & Evaluation of Intake       Oral Nutrition     Current PO Diet Diet: Liquid Diets; Clear Liquid; Texture: Regular Texture (IDDSI 7); Fluid Consistency: Thin (IDDSI 0)   Supplement No active supplement orders       Malnutrition Severity Assessment                Nutrition Diagnosis         Nutrition Dx Problem 1 Inadequate energy Intake related to GI dysfunction as evidenced by NPO and clear liquid diet.       Nutrition Intervention         *Advance diet as medically appropriate - if unable to advance, recommend to consider nutrition support    *Recommend monitoring wts a minimum of weekly and confirming current wt r/t  discrepancy on admission    *Consider adding Thiamine daily r/t continued low phos and need for replacement. Monitor for s/s of RFS.     Medical Nutrition Therapy/Nutrition Education          Learner     Readiness N/A  Education not appropriate at this time     Method     Response N/A  N/A     Monitor/Evaluation        Monitor I&O, PO intake, Supplement intake, Pertinent labs, Weight, Skin status, GI status, Symptoms, RFS, Diet advancement       Nutrition Discharge Plan         To be determined       Electronically signed by:  Breana Ring RD  12/12/22 07:33 EST

## 2022-12-12 NOTE — PROGRESS NOTES
SURGERY PROGRESS NOTE     Patient Name:  Derek Keller  YOB: 1960  [id number]   LOS: 6 days   6 Days Post-Op  Patient Care Team:  Haile Monique MD as PCP - General (Family Medicine)  Julien Cardona DO as Consulting Physician (Pain Medicine)  Joel Castillo MD as Consulting Physician (Gastroenterology)  Remington Russell MD as Surgeon (General Surgery)  Ahsan Salas MD as Consulting Physician (Sports Medicine)  Donald Barker MD as Consulting Physician (Hematology and Oncology)  Sandy Ricci MD as Consulting Physician (General Surgery)      Subjective     Interval History: Afebrile, vital signs stable.  Phosphorus 2.0.  Tolerating diet without nausea vomiting.  Ostomy teaching performed.  Positive out of bed.  Pain controlled      Objective     Vital Signs  Temp:  [97.8 °F (36.6 °C)-99 °F (37.2 °C)] 98.8 °F (37.1 °C)  Heart Rate:  [81-97] 86  Resp:  [16-18] 16  BP: (116-157)/(56-75) 116/61    Physical Exam: Incision without evidence of infection, BANDAR drain serosanguineous, ostomy viable, abdomen soft appropriately tender, no hernia         Results Review:     I reviewed the patient's new clinical results.    LABS:  Lab Results (last 72 hours)     Procedure Component Value Units Date/Time    Basic Metabolic Panel [[id number]]  (Abnormal) Collected: 12/12/22 0525    Specimen: Blood Updated: 12/12/22 0703     Glucose 95 mg/dL      BUN 9 mg/dL      Creatinine 0.54 mg/dL      Sodium 136 mmol/L      Potassium 4.2 mmol/L      Comment: Specimen hemolyzed.  Results may be affected.        Chloride 105 mmol/L      CO2 21.8 mmol/L      Calcium 7.3 mg/dL      BUN/Creatinine Ratio 16.7     Anion Gap 9.2 mmol/L      eGFR 103.6 mL/min/1.73      Comment: National Kidney Foundation and American Society of Nephrology (ASN) Task Force recommended calculation based on the Chronic Kidney Disease Epidemiology Collaboration (CKD-EPI) equation refit without adjustment for race.       Narrative:      GFR  Normal >60  Chronic Kidney Disease <60  Kidney Failure <15      Phosphorus [[id number]]  (Abnormal) Collected: 12/12/22 0525    Specimen: Blood Updated: 12/12/22 0703     Phosphorus 2.0 mg/dL     Magnesium [[id number]]  (Normal) Collected: 12/12/22 0525    Specimen: Blood Updated: 12/12/22 0652     Magnesium 2.2 mg/dL     Phosphorus [[id number]]  (Abnormal) Collected: 12/11/22 1155    Specimen: Blood Updated: 12/11/22 1243     Phosphorus 1.6 mg/dL     Anaerobic Culture - Body Fluid, Abdominal Wall [[id number]]  (Abnormal) Collected: 12/06/22 1816    Specimen: Body Fluid from Abdominal Wall Updated: 12/11/22 0615     Anaerobic Culture Anaerobe (Organism type)     Comment: Mixed anaerobes isolated.       Basic Metabolic Panel [[id number]]  (Abnormal) Collected: 12/10/22 1022    Specimen: Blood Updated: 12/10/22 1203     Glucose 109 mg/dL      BUN 13 mg/dL      Creatinine 0.60 mg/dL      Sodium 140 mmol/L      Potassium 3.5 mmol/L      Chloride 103 mmol/L      CO2 22.1 mmol/L      Calcium 8.2 mg/dL      BUN/Creatinine Ratio 21.7     Anion Gap 14.9 mmol/L      eGFR 101.0 mL/min/1.73      Comment: National Kidney Foundation and American Society of Nephrology (ASN) Task Force recommended calculation based on the Chronic Kidney Disease Epidemiology Collaboration (CKD-EPI) equation refit without adjustment for race.       Narrative:      GFR Normal >60  Chronic Kidney Disease <60  Kidney Failure <15      CBC & Differential [[id number]]  (Abnormal) Collected: 12/10/22 0435    Specimen: Blood Updated: 12/10/22 0905    Narrative:      The following orders were created for panel order CBC & Differential.  Procedure                               Abnormality         Status                     ---------                               -----------         ------                     CBC Auto Differential[[id number]]        Abnormal            Final result                 Please view results for these tests on the individual orders.    CBC  Auto Differential [[id number]]  (Abnormal) Collected: 12/10/22 0435    Specimen: Blood Updated: 12/10/22 0905     WBC 4.82 10*3/mm3      RBC 4.53 10*6/mm3      Hemoglobin 14.3 g/dL      Hematocrit 44.7 %      MCV 98.7 fL      MCH 31.6 pg      MCHC 32.0 g/dL      RDW 17.8 %      RDW-SD 64.7 fl      MPV 10.4 fL      Platelets 146 10*3/mm3      Neutrophil % 72.7 %      Lymphocyte % 12.2 %      Monocyte % 11.8 %      Eosinophil % 1.0 %      Basophil % 1.5 %      Immature Grans % 0.8 %      Neutrophils, Absolute 3.50 10*3/mm3      Lymphocytes, Absolute 0.59 10*3/mm3      Monocytes, Absolute 0.57 10*3/mm3      Eosinophils, Absolute 0.05 10*3/mm3      Basophils, Absolute 0.07 10*3/mm3      Immature Grans, Absolute 0.04 10*3/mm3      nRBC 0.0 /100 WBC     Body Fluid Culture - Body Fluid, Abdominal Wall [[id number]] Collected: 12/06/22 1816    Specimen: Body Fluid from Abdominal Wall Updated: 12/09/22 0853     Body Fluid Culture No growth at 3 days     Gram Stain Rare (1+) Gram negative bacilli      Occasional Gram positive cocci          IMAGING:  Imaging Results (Last 72 Hours)     ** No results found for the last 72 hours. **          Medications:    Current Facility-Administered Medications:   •  acetaminophen (TYLENOL) tablet 650 mg, 650 mg, Nasogastric, Q4H PRN **OR** acetaminophen (TYLENOL) 160 MG/5ML solution 650 mg, 650 mg, Nasogastric, Q4H PRN **OR** acetaminophen (TYLENOL) suppository 650 mg, 650 mg, Rectal, Q4H PRN, Augusto Ladd MD  •  famotidine (PEPCID) injection 20 mg, 20 mg, Intravenous, Q12H, Alfred Castañeda MD, 20 mg at 12/11/22 2115  •  fentaNYL (DURAGESIC) 50 MCG/HR patch 1 patch, 1 patch, Transdermal, Q72H, Alfred Castañeda MD, 1 patch at 12/09/22 1028  •  HYDROmorphone (DILAUDID) injection 1 mg, 1 mg, Intravenous, Q1H PRN, 1 mg at 12/12/22 0228 **AND** naloxone (NARCAN) injection 0.4 mg, 0.4 mg, Intravenous, Q5 Min PRN, Christa Harrell, HIEU  •  lactated ringers infusion, 50 mL/hr,  Intravenous, Continuous, Danny Reina Jr., MD, Last Rate: 50 mL/hr at 12/11/22 1225, 50 mL/hr at 12/11/22 1225  •  LORazepam (ATIVAN) injection 1 mg, 1 mg, Intravenous, Q6H, Augusto Ladd MD, 1 mg at 12/11/22 2319  •  nicotine (NICODERM CQ) 21 MG/24HR patch 1 patch, 1 patch, Transdermal, Q24H, Augusto Ladd MD, 1 patch at 12/11/22 1023  •  nitroglycerin (NITROSTAT) SL tablet 0.4 mg, 0.4 mg, Sublingual, Q5 Min PRN, Danny Reina Jr., MD  •  ondansetron (ZOFRAN) injection 4 mg, 4 mg, Intravenous, Q6H PRN, Danny Reina Jr., MD, 4 mg at 12/11/22 2319  •  Pharmacy to Dose Zosyn, , Does not apply, Continuous PRN, Danny Reina Jr., MD  •  phenol (CHLORASEPTIC) 1.4 % liquid 1 spray, 1 spray, Mouth/Throat, Q2H PRN, Danny Reina Jr., MD, 1 spray at 12/09/22 0301  •  piperacillin-tazobactam (ZOSYN) 3.375 g in iso-osmotic dextrose 50 ml (premix), 3.375 g, Intravenous, Q6H, Danny Reina Jr., MD, 3.375 g at 12/12/22 0457  •  polyethylene glycol (MIRALAX) packet 17 g, 17 g, Nasogastric, Daily, Chad Tracey, 17 g at 12/11/22 1024  •  potassium phosphate (monobasic) (K-PHOS) tablet 500 mg, 500 mg, Nasogastric, BID, Chad, Tracey, 500 mg at 12/11/22 2115  •  sodium chloride 0.9 % flush 10 mL, 10 mL, Intravenous, PRN, Danny Reina Jr., MD  •  sodium chloride 0.9 % flush 10 mL, 10 mL, Intravenous, PRN, Danny Reina Jr., MD  •  sodium chloride 0.9 % flush 10 mL, 10 mL, Intravenous, Q12H, Danny Reina Jr., MD, 10 mL at 12/11/22 2115  •  sodium chloride 0.9 % infusion 40 mL, 40 mL, Intravenous, PRN, Danny Reina Jr., MD    Assessment & Plan       Peritonitis (HCC)    Leukopenia    Metastatic breast cancer (HCC)    S/P ex lap with sigmoid resection, Lynn's procedure for stercoral perforation    Diet as tolerated.  Out of bed.  Ostomy teaching done.  Okay for discharge home.  Discharge instructions given.  Follow-up with me in 2 weeks.  No packing of midline incision needed at home.  Expect  some drainage.  Call for fever or worsening redness.  No heavy lifting for 6 weeks from the time of surgery.  No working or driving on pain medication.  We will DC all IV pain medication and start patient on Percocet 10.  Continue MiraLAX.  Ostomy teaching performed.  Patient will need to follow-up with ostomy nurse 1 week after discharge.  Patient will need home health for her colostomy.  All of the patient's questions were answered.      Alfred Castañdea MD  12/12/22  07:51 EST

## 2022-12-13 ENCOUNTER — DOCUMENTATION (OUTPATIENT)
Dept: RADIATION ONCOLOGY | Facility: HOSPITAL | Age: 62
End: 2022-12-13

## 2022-12-13 ENCOUNTER — READMISSION MANAGEMENT (OUTPATIENT)
Dept: CALL CENTER | Facility: HOSPITAL | Age: 62
End: 2022-12-13

## 2022-12-13 VITALS
TEMPERATURE: 98 F | WEIGHT: 177.47 LBS | HEIGHT: 66 IN | BODY MASS INDEX: 28.52 KG/M2 | HEART RATE: 79 BPM | DIASTOLIC BLOOD PRESSURE: 45 MMHG | SYSTOLIC BLOOD PRESSURE: 90 MMHG | OXYGEN SATURATION: 98 % | RESPIRATION RATE: 16 BRPM

## 2022-12-13 DIAGNOSIS — C79.51 SECONDARY MALIGNANT NEOPLASM OF BONE: Primary | ICD-10-CM

## 2022-12-13 LAB
RAD ONC ARIA COURSE ID: NORMAL
RAD ONC ARIA COURSE INTENT: NORMAL
RAD ONC ARIA COURSE LAST TREATMENT DATE: NORMAL
RAD ONC ARIA COURSE START DATE: NORMAL
RAD ONC ARIA COURSE TREATMENT ELAPSED DAYS: 22
RAD ONC ARIA FIRST TREATMENT DATE: NORMAL
RAD ONC ARIA PLAN FRACTIONS TREATED TO DATE: 9
RAD ONC ARIA PLAN ID: NORMAL
RAD ONC ARIA PLAN PRESCRIBED DOSE PER FRACTION: 3 GY
RAD ONC ARIA PLAN PRIMARY REFERENCE POINT: NORMAL
RAD ONC ARIA PLAN TOTAL FRACTIONS PRESCRIBED: 10
RAD ONC ARIA PLAN TOTAL PRESCRIBED DOSE: 3000 CGY
RAD ONC ARIA REFERENCE POINT DOSAGE GIVEN TO DATE: 27 GY
RAD ONC ARIA REFERENCE POINT ID: NORMAL
RAD ONC ARIA REFERENCE POINT SESSION DOSAGE GIVEN: 3 GY

## 2022-12-13 PROCEDURE — 99239 HOSP IP/OBS DSCHRG MGMT >30: CPT | Performed by: INTERNAL MEDICINE

## 2022-12-13 PROCEDURE — 25010000002 PIPERACILLIN SOD-TAZOBACTAM PER 1 G: Performed by: INTERNAL MEDICINE

## 2022-12-13 PROCEDURE — 77412 RADIATION TX DELIVERY LVL 3: CPT | Performed by: RADIOLOGY

## 2022-12-13 RX ORDER — OXYCODONE AND ACETAMINOPHEN 10; 325 MG/1; MG/1
1 TABLET ORAL EVERY 6 HOURS PRN
Qty: 20 TABLET | Refills: 0 | Status: SHIPPED | OUTPATIENT
Start: 2022-12-13 | End: 2022-12-27

## 2022-12-13 RX ORDER — NICOTINE 21 MG/24HR
1 PATCH, TRANSDERMAL 24 HOURS TRANSDERMAL
Qty: 7 PATCH | Refills: 0 | Status: SHIPPED | OUTPATIENT
Start: 2022-12-14 | End: 2022-12-21

## 2022-12-13 RX ORDER — POLYETHYLENE GLYCOL 3350 17 G/17G
17 POWDER, FOR SOLUTION ORAL DAILY
Qty: 5 PACKET | Refills: 0 | Status: SHIPPED | OUTPATIENT
Start: 2022-12-13 | End: 2023-02-01

## 2022-12-13 RX ORDER — MORPHINE SULFATE 15 MG/1
15 TABLET, FILM COATED, EXTENDED RELEASE ORAL 2 TIMES DAILY
Qty: 60 TABLET | Refills: 0 | Status: SHIPPED | OUTPATIENT
Start: 2022-12-13 | End: 2022-12-27

## 2022-12-13 RX ADMIN — OXYCODONE AND ACETAMINOPHEN 1 TABLET: 10; 325 TABLET ORAL at 16:28

## 2022-12-13 RX ADMIN — MONTELUKAST 10 MG: 10 TABLET, FILM COATED ORAL at 08:27

## 2022-12-13 RX ADMIN — POTASSIUM PHOSPHATE, MONOBASIC 500 MG: 500 TABLET, SOLUBLE ORAL at 08:27

## 2022-12-13 RX ADMIN — POLYETHYLENE GLYCOL 3350 17 G: 17 POWDER, FOR SOLUTION ORAL at 08:28

## 2022-12-13 RX ADMIN — FAMOTIDINE 20 MG: 10 INJECTION INTRAVENOUS at 08:29

## 2022-12-13 RX ADMIN — LOSARTAN POTASSIUM 100 MG: 50 TABLET, FILM COATED ORAL at 08:27

## 2022-12-13 RX ADMIN — Medication 10 ML: at 08:29

## 2022-12-13 RX ADMIN — NICOTINE 1 PATCH: 21 PATCH, EXTENDED RELEASE TRANSDERMAL at 08:30

## 2022-12-13 RX ADMIN — TAZOBACTAM SODIUM AND PIPERACILLIN SODIUM 3.38 G: 375; 3 INJECTION, SOLUTION INTRAVENOUS at 06:01

## 2022-12-13 RX ADMIN — LORAZEPAM 0.5 MG: 0.5 TABLET ORAL at 16:30

## 2022-12-13 RX ADMIN — VENLAFAXINE HYDROCHLORIDE 150 MG: 150 CAPSULE, EXTENDED RELEASE ORAL at 08:27

## 2022-12-13 RX ADMIN — OXYCODONE AND ACETAMINOPHEN 1 TABLET: 10; 325 TABLET ORAL at 09:48

## 2022-12-13 RX ADMIN — LEVOTHYROXINE SODIUM 50 MCG: 50 TABLET ORAL at 06:01

## 2022-12-13 RX ADMIN — OXYCODONE AND ACETAMINOPHEN 1 TABLET: 10; 325 TABLET ORAL at 03:40

## 2022-12-13 RX ADMIN — ROPINIROLE HYDROCHLORIDE 3 MG: 1 TABLET, FILM COATED ORAL at 16:28

## 2022-12-13 RX ADMIN — LORAZEPAM 0.5 MG: 0.5 TABLET ORAL at 08:28

## 2022-12-13 RX ADMIN — DONEPEZIL HYDROCHLORIDE 10 MG: 10 TABLET, FILM COATED ORAL at 08:27

## 2022-12-13 RX ADMIN — LETROZOLE 2.5 MG: 2.5 TABLET ORAL at 08:27

## 2022-12-13 NOTE — PROGRESS NOTES
Home pain medications changed per Dr. Grande.  Typed out instructions and verbal instructions given to patient and brother, Saji.  Both patient and Saji verbalized understanding.  Our main office number and nursing office number given to family.  Instructed to call with any questions or concerns.  Again, family verbalized understanding.    (Copy of typed instructions to be scanned in under media tab.)    *Noted that Dr. Castañeda has put in a new script for pain medication, Percocet 10/325.  Spoke to Dr. Grande and patients discharge nurse, Figueroa.  Figueroa is aware that patient was already been prescribed hydrocodone and has already informed patient that she cannot be taking the norco and percocets together.  She stated that she will go over this issue thoroughly with the patient and her family prior to discharge and will give written documents as well.

## 2022-12-13 NOTE — DISCHARGE INSTR - ACTIVITY
Call the office or go to the ER if:   Fever over 101  Flu-like symptoms  Redness, swelling, foul odor, or pus-like drainage from incision  Uncontrolled pain or bleeding  Not having stool in ostomy

## 2022-12-13 NOTE — PLAN OF CARE
Goal Outcome Evaluation:      Patient's pain managed with PO medications throughout the evening. Patient's home medications were restarted and patient is resting comfortably. IV antibiotics continuing. No significant changes throughout the evening.

## 2022-12-13 NOTE — PROGRESS NOTES
SURGERY PROGRESS NOTE     Patient Name:  Derek Keller  YOB: 1960  [id number]   LOS: 7 days   7 Days Post-Op  Patient Care Team:  Haile Monique MD as PCP - General (Family Medicine)  Julien Cardona DO as Consulting Physician (Pain Medicine)  Joel Castillo MD as Consulting Physician (Gastroenterology)  Remington Russell MD as Surgeon (General Surgery)  Ahsan Salas MD as Consulting Physician (Sports Medicine)  Donald Barker MD as Consulting Physician (Hematology and Oncology)  Sandy Ricci MD as Consulting Physician (General Surgery)      Subjective     Interval History: Afebrile, vital signs stable.  Patient currently in radiation treatment      Objective     Vital Signs  Temp:  [97.3 °F (36.3 °C)-98.2 °F (36.8 °C)] 98.2 °F (36.8 °C)  Heart Rate:  [72-86] 73  Resp:  [16] 16  BP: (106-122)/(51-61) 108/54    Physical Exam: Patient not seen         Results Review:     I reviewed the patient's new clinical results.    LABS:  Lab Results (last 72 hours)     Procedure Component Value Units Date/Time    Basic Metabolic Panel [[id number]]  (Abnormal) Collected: 12/12/22 0525    Specimen: Blood Updated: 12/12/22 0703     Glucose 95 mg/dL      BUN 9 mg/dL      Creatinine 0.54 mg/dL      Sodium 136 mmol/L      Potassium 4.2 mmol/L      Comment: Specimen hemolyzed.  Results may be affected.        Chloride 105 mmol/L      CO2 21.8 mmol/L      Calcium 7.3 mg/dL      BUN/Creatinine Ratio 16.7     Anion Gap 9.2 mmol/L      eGFR 103.6 mL/min/1.73      Comment: National Kidney Foundation and American Society of Nephrology (ASN) Task Force recommended calculation based on the Chronic Kidney Disease Epidemiology Collaboration (CKD-EPI) equation refit without adjustment for race.       Narrative:      GFR Normal >60  Chronic Kidney Disease <60  Kidney Failure <15      Phosphorus [[id number]]  (Abnormal) Collected: 12/12/22 0525    Specimen: Blood Updated: 12/12/22 0703     Phosphorus 2.0 mg/dL      Magnesium [[id number]]  (Normal) Collected: 12/12/22 0525    Specimen: Blood Updated: 12/12/22 0652     Magnesium 2.2 mg/dL     Phosphorus [[id number]]  (Abnormal) Collected: 12/11/22 1155    Specimen: Blood Updated: 12/11/22 1243     Phosphorus 1.6 mg/dL     Anaerobic Culture - Body Fluid, Abdominal Wall [[id number]]  (Abnormal) Collected: 12/06/22 1816    Specimen: Body Fluid from Abdominal Wall Updated: 12/11/22 0615     Anaerobic Culture Anaerobe (Organism type)     Comment: Mixed anaerobes isolated.       Basic Metabolic Panel [[id number]]  (Abnormal) Collected: 12/10/22 1022    Specimen: Blood Updated: 12/10/22 1203     Glucose 109 mg/dL      BUN 13 mg/dL      Creatinine 0.60 mg/dL      Sodium 140 mmol/L      Potassium 3.5 mmol/L      Chloride 103 mmol/L      CO2 22.1 mmol/L      Calcium 8.2 mg/dL      BUN/Creatinine Ratio 21.7     Anion Gap 14.9 mmol/L      eGFR 101.0 mL/min/1.73      Comment: National Kidney Foundation and American Society of Nephrology (ASN) Task Force recommended calculation based on the Chronic Kidney Disease Epidemiology Collaboration (CKD-EPI) equation refit without adjustment for race.       Narrative:      GFR Normal >60  Chronic Kidney Disease <60  Kidney Failure <15      CBC & Differential [[id number]]  (Abnormal) Collected: 12/10/22 0435    Specimen: Blood Updated: 12/10/22 0905    Narrative:      The following orders were created for panel order CBC & Differential.  Procedure                               Abnormality         Status                     ---------                               -----------         ------                     CBC Auto Differential[[id number]]        Abnormal            Final result                 Please view results for these tests on the individual orders.    CBC Auto Differential [[id number]]  (Abnormal) Collected: 12/10/22 0435    Specimen: Blood Updated: 12/10/22 0905     WBC 4.82 10*3/mm3      RBC 4.53 10*6/mm3      Hemoglobin 14.3 g/dL      Hematocrit  44.7 %      MCV 98.7 fL      MCH 31.6 pg      MCHC 32.0 g/dL      RDW 17.8 %      RDW-SD 64.7 fl      MPV 10.4 fL      Platelets 146 10*3/mm3      Neutrophil % 72.7 %      Lymphocyte % 12.2 %      Monocyte % 11.8 %      Eosinophil % 1.0 %      Basophil % 1.5 %      Immature Grans % 0.8 %      Neutrophils, Absolute 3.50 10*3/mm3      Lymphocytes, Absolute 0.59 10*3/mm3      Monocytes, Absolute 0.57 10*3/mm3      Eosinophils, Absolute 0.05 10*3/mm3      Basophils, Absolute 0.07 10*3/mm3      Immature Grans, Absolute 0.04 10*3/mm3      nRBC 0.0 /100 WBC           IMAGING:  Imaging Results (Last 72 Hours)     ** No results found for the last 72 hours. **          Medications:    Current Facility-Administered Medications:   •  donepezil (ARICEPT) tablet 10 mg, 10 mg, Oral, Daily, Sal aMdden MD, 10 mg at 12/12/22 1258  •  famotidine (PEPCID) injection 20 mg, 20 mg, Intravenous, Q12H, Alfred Castañeda MD, 20 mg at 12/12/22 2049  •  fentaNYL (DURAGESIC) 50 MCG/HR patch 1 patch, 1 patch, Transdermal, Q72H, Alfred Castañeda MD, 1 patch at 12/12/22 1113  •  gabapentin (NEURONTIN) capsule 600 mg, 600 mg, Oral, Nightly, Sal Madden MD, 600 mg at 12/12/22 2051  •  letrozole (FEMARA) tablet 2.5 mg, 2.5 mg, Oral, Daily, Sal Madden MD, 2.5 mg at 12/12/22 1259  •  levothyroxine (SYNTHROID, LEVOTHROID) tablet 50 mcg, 50 mcg, Oral, QAM, Sal Madden MD, 50 mcg at 12/13/22 0601  •  LORazepam (ATIVAN) tablet 0.5 mg, 0.5 mg, Oral, Q6H PRN, Sal Madden MD  •  losartan (COZAAR) tablet 100 mg, 100 mg, Oral, Daily, Sal Madden MD, 100 mg at 12/12/22 1257  •  montelukast (SINGULAIR) tablet 10 mg, 10 mg, Oral, Daily, Sal Madden MD, 10 mg at 12/12/22 1258  •  nicotine (NICODERM CQ) 21 MG/24HR patch 1 patch, 1 patch, Transdermal, Q24H, Augusto Ladd MD, 1 patch at 12/12/22 0845  •  nitroglycerin (NITROSTAT) SL tablet 0.4 mg, 0.4 mg, Sublingual,  Q5 Min PRN, Danny Reina Jr., MD  •  ondansetron (ZOFRAN) injection 4 mg, 4 mg, Intravenous, Q6H PRN, Danny Reina Jr., MD, 4 mg at 12/11/22 2319  •  oxyCODONE-acetaminophen (PERCOCET)  MG per tablet 1 tablet, 1 tablet, Oral, Q6H PRN, Alfred Castañeda MD, 1 tablet at 12/13/22 0340  •  Pharmacy to Dose Zosyn, , Does not apply, Continuous PRN, Danny Reina Jr., MD  •  phenol (CHLORASEPTIC) 1.4 % liquid 1 spray, 1 spray, Mouth/Throat, Q2H PRN, Danny Reina Jr., MD, 1 spray at 12/09/22 0301  •  polyethylene glycol (MIRALAX) packet 17 g, 17 g, Nasogastric, Daily, Tracey Bonilla, 17 g at 12/12/22 0825  •  potassium phosphate (monobasic) (K-PHOS) tablet 500 mg, 500 mg, Oral, BID, Sal Madden MD, 500 mg at 12/12/22 2049  •  rOPINIRole (REQUIP) tablet 3 mg, 3 mg, Oral, Q PM, Sal Madden MD, 3 mg at 12/12/22 1748  •  sodium chloride 0.9 % flush 10 mL, 10 mL, Intravenous, PRN, Danny Reina Jr., MD  •  sodium chloride 0.9 % flush 10 mL, 10 mL, Intravenous, PRN, Danny Reina Jr., MD  •  sodium chloride 0.9 % flush 10 mL, 10 mL, Intravenous, Q12H, Danny Reina Jr., MD, 10 mL at 12/12/22 2049  •  sodium chloride 0.9 % infusion 40 mL, 40 mL, Intravenous, PRN, Danny Reina Jr., MD  •  traZODone (DESYREL) tablet 150 mg, 150 mg, Oral, Nightly, Sal Madden MD, 150 mg at 12/12/22 2050  •  venlafaxine XR (EFFEXOR-XR) 24 hr capsule 150 mg, 150 mg, Oral, Daily, Sal Madden MD, 150 mg at 12/12/22 1258    Assessment & Plan       Peritonitis (HCC)    Leukopenia    Metastatic breast cancer (HCC)    S/P ex lap with sigmoid resection, Lynn's procedure for stercoral perforation    Patient not seen.  Discharge instructions reviewed with the patient yesterday with all of her questions answered.  Okay for discharge home.  Follow-up with me in 2 weeks from time of.  Follow-up with ostomy nurse in 1 week.  Okay to shower.  Home health for ostomy help and supplies.   No heavy lifting for 6 weeks.  No working or driving on pain medication.  Call for fever or concern for infection      Alfred Castañeda MD  12/13/22  08:23 EST

## 2022-12-13 NOTE — PROGRESS NOTES
On Treatment Visit       Patient: Derek Keller   YOB: 1960   Medical Record Number: [id number]     Date of Visit  December 13, 2022   Primary Diagnosis: Secondary malignant neoplasm of bone  Cancer Staging: Cancer Staging   Malignant neoplasm of left breast in female, estrogen receptor positive (HCC)  Staging form: Breast, AJCC 8th Edition  - Clinical: Stage IV (cT1c, cN1, cM1, ER+, MT+, HER2-) - Signed by Donald Barker MD on 10/14/2022         was seen today for an on treatment visit.  She is receiving radiation therapy to the lumbar spine. She  has received 2700 cGy in 9 fractions out of a planned dose of 3000 cGy in 10 fractions.    Currently admitted for bowel perforation status post sigmoid resection and Lynn's procedure.  Has experienced some interval improvement in lumbar spine pain                                         Review of Systems:   Review of Systems  As above    Vitals:     Vitals:    12/13/22 1101   BP: 90/45   Pulse: 79   Resp: 16   Temp: 98 °F (36.7 °C)   SpO2: 98%       Weight:   Wt Readings from Last 3 Encounters:   12/06/22 80.5 kg (177 lb 7.5 oz)   11/29/22 84.2 kg (185 lb 10 oz)   11/23/22 84.4 kg (186 lb 1.1 oz)      Pain: 8 out of 10 in intensity    Physical Exam:  Gen: WD/WN; NAD  HEENT: MMM  Trachea: midline  Chest: symmetric  Resp: normal respiratory effort  Extr: warm, well-perfused  Neuro: awake and alert; no aphasia or neglect    Plan: I have reviewed treatment setup notes, checked and approved the daily guidance images.  I reviewed dose delivery, treatment parameters and deemed them appropriate. We plan to continue radiation therapy as prescribed.    Discussion regarding her opioid analgesic medication dosage and timing.  Written instructions provided today.  Will evaluate 2 weeks after completion of external beam radiotherapy.  Plans to be discharged today from inpatient      Radiation Oncology    Electronically signed by Raudel Grande MD  12/6/2022  16:32 EST

## 2022-12-13 NOTE — PLAN OF CARE
Goal Outcome Evaluation:      VSS. UOP. Pain controlled per patient; see MAR. Up with x1 assist. Discharge home today with home health. Ghazala Elder, RNA        Progress: improving

## 2022-12-13 NOTE — SIGNIFICANT NOTE
Wound Eval / Progress Noted    [facility]     Patient Name: Derek Keller  : 1960  MRN: [id number]  Today's Date: 2022                 Admit Date: 2022    Visit Dx:    ICD-10-CM ICD-9-CM   1. Peritonitis (HCC)  K65.9 567.9   2. Decreased activities of daily living (ADL)  Z78.9 V49.89   3. Bone metastases (HCC)  C79.51 198.5   4. Secondary malignant neoplasm of bone (HCC)  C79.51 198.5   5. Malignant neoplasm of upper-outer quadrant of left breast in female, estrogen receptor positive (HCC)  C50.412 174.4    Z17.0 V86.0   6. Chronic fatigue  R53.82 780.79   7. Hypoxemia  R09.02 799.02   8. Difficulty walking  R26.2 719.7   9. S/P ex lap with sigmoid resection, Lynn's procedure for stercoral perforation  Z98.890 V45.89       Patient Active Problem List   Diagnosis    Other chest pain    Hypertension    Hyperlipidemia    Allergic rhinitis    Hypothyroidism    Neck pain    Lumbago    Arthritis    Chronic pain disorder    History of IBS    Anxiety    Monopolar depression (HCC)    Malaise and fatigue    Tobacco abuse    Fibromyalgia    Vitamin B 12 deficiency    Acute pain of left knee    Primary osteoarthritis of left knee    Osteoarthrosis, localized, primary, knee    Generalized abdominal pain    Herpes simplex vulvovaginitis    Lightheadedness    Cough    Hypoxemia    Restless leg syndrome    Itch of skin    Primary insomnia    Chronic fatigue    Asthma    Body mass index (BMI) 26.0-26.9, adult    Constipation    Degeneration of lumbar intervertebral disc    Disorder associated with Helicobacter species    Hearing loss    Impacted cerumen of right ear    Inappropriate diet and eating habits    Inguinal pain    Irritable bowel syndrome    Cervical spondylosis    Obesity with body mass index 30 or greater    Renal stone    Malignant neoplasm of left breast in female, estrogen receptor positive (HCC)    Cancer related pain    Bone metastases (HCC)    Secondary malignant neoplasm of bone (HCC)     Peripheral edema    Peritonitis (HCC)    Leukopenia    Metastatic breast cancer (HCC)    S/P ex lap with sigmoid resection, Lynn's procedure for stercoral perforation        Past Medical History:   Diagnosis Date    Abdominal pain     Allergic rhinitis     Anemia     Anxiety     Arteriosclerosis     Coronary    Arthritis     Bone metastases (HCC) 10/14/2022    Bowen's disease     Breast cancer (HCC)     Cancer related pain 10/13/2022    Chest pain     Chronic pain disorder     Cough     Depression     Dysphoric mood     Fatigue     Frequent urination     History of colon polyps     History of IBS     History of kidney stones     Hyperlipidemia     Hypertension     Hypothyroidism     Insomnia     Lumbago     Malaise and fatigue     Mood disorder (HCC)     Neck pain     Palpitations     Peripheral edema 11/21/2022    Precordial pain     Sleep disturbance     SOB (shortness of breath)         Past Surgical History:   Procedure Laterality Date    BREAST BIOPSY      CARDIAC CATHETERIZATION Left 1960    CARDIAC CATHETERIZATION N/A 04/06/2018    Procedure: Coronary angiography;  Surgeon: Art Licea MD;  Location: [facility] INVASIVE LOCATION;  Service: Cardiovascular    CARDIAC CATHETERIZATION N/A 04/06/2018    Procedure: Left heart cath;  Surgeon: Art Licea MD;  Location: [facility] INVASIVE LOCATION;  Service: Cardiovascular    CARDIAC CATHETERIZATION N/A 04/06/2018    Procedure: Left ventriculography;  Surgeon: Art Licea MD;  Location: [facility] INVASIVE LOCATION;  Service: Cardiovascular    CHOLECYSTECTOMY      COLONOSCOPY  11/08/2006    EXPLORATORY LAPAROTOMY N/A 12/6/2022    Procedure: LAPAROTOMY EXPLORATORY sigmoid resection hartmans procedure colostomy;  Surgeon: Alfred Castañeda MD;  Location: [facility];  Service: General;  Laterality: N/A;    GANGLION CYST EXCISION      HYSTERECTOMY  05/2005    LAPAROSCOPIC GASTRIC BANDING      TONSILLECTOMY           Physical  Assessment:     12/13/22 1630   Colostomy LLQ   Placement Date/Time: 12/06/22 1900   Inserted by: DR. HESS  Hand Hygiene Completed: Yes  Colostomy Type: Descending/sigmoid;End  Location: LLQ   Stomal Appliance Clean;Dry;Intact;1 piece   Stoma Appearance pink;round;moist;red   Peristomal Assessment JULITO   Stoma Function stool   Stool Color brown, light;green     Wound Check / Follow-up:  Patient seen today for continued education. Patient performed practice pouch change on stoma model with little direction. Patient has written materials for her assistance and will also be assisted by home health. Discussed ordering supplies and when to change ostomy pouch. No other needs at this time.     Impression: colostomy    Short term goals:  provide education and support with transition home    Amanda Almaguer RN    12/13/2022    16:38 EST

## 2022-12-13 NOTE — DISCHARGE SUMMARY
[facility]         HOSPITALIST  DISCHARGE SUMMARY    Patient Name: Derek Keller  : 1960  MRN: [id number]    Date of Admission: 2022  Date of Discharge:  2022  Primary Care Physician: Haile Monique MD    Consults     Date and Time Order Name Status Description    2022  4:42 PM Inpatient Hospitalist Consult      2022  4:21 PM Inpatient Hospitalist Consult            Active and Resolved Hospital Problems:  Peritonitis with status post sigmoid resection with Lynn procedure for stercoral perforation  Metastatic breast cancer  Leukopenia  Phosphorus deficiency  Hypertension  Major depression  Hypothyroidism  Cancer-related pain    Hospital Course     Hospital Course:  62 y.o. female past medical history of metastatic breast cancer that presents to the emergency department earlier today for evaluation of sudden onset abdominal pain.  She described as sharp, left lower quadrant rating to the right lower quadrant, constant, no known aggravating alleviating factors.  She had associated nausea but no vomiting.  She denies any fevers, chills, sweats, chest pain or shortness of breath, palpitations, diarrhea constipation, dysuria, weakness, rash.  In the emergency department patient underwent CT scan which showed enteritis versus possible bowel ischemia and some intraperitoneal free air.  Surgery was called and patient was taken to the operating room.  She underwent sigmoid colon resection and colostomy formation.  She has been placed on Zosyn and admitted for ongoing monitoring and management.  Patient tolerated treatment, her pain was controlled on day of discharge.  Patient completed antibiotics.  Patient was cleared for discharge by general surgery.  Patient to follow-up with oncology, general surgery as scheduled.  Patient is being discharged with home health services.  Patient should hold her Kisqali for 2 weeks after surgery this was discussed with the cancer care  center, she is okay to continue her letrozole.  She is discharged home today in stable condition.    Day of Discharge     Vital Signs:  Temp:  [97.3 °F (36.3 °C)-98.2 °F (36.8 °C)] 98 °F (36.7 °C)  Heart Rate:  [72-82] 79  Resp:  [16] 16  BP: ()/(45-60) 90/45    Physical Exam:   General appearance: NAD, conversant   Eyes: anicteric sclerae, moist conjunctivae; no lid-lag; PERRLA  HENT: Atraumatic; oropharynx clear with moist mucous membranes and no mucosal ulcerations; normal hard and soft palate  Neck: Trachea midline; FROM, supple, no thyromegaly or lymphadenopathy  Lungs: CTA, with normal respiratory effort and no intercostal retractions  CV: Regular Rate and Rhythm, no Murmurs, Rubs, or Gallops   Abdomen: Soft, non-tender; no masses or hepatosplenomegaly, postsurgical changes of the abdomen noted  Extremities: No peripheral edema or extremity lymphadenopathy  Skin: Normal temperature, turgor and texture; no rash, ulcers or subcutaneous nodules  Psych: Appropriate affect, alert and oriented to person, place and time  Neuro: CN II - XII intact no motor deficits, no sensory deficits    Discharge Details        Discharge Medications      New Medications      Instructions Start Date   nicotine 21 MG/24HR patch  Commonly known as: NICODERM CQ   1 patch, Transdermal, Every 24 Hours Scheduled   Start Date: December 14, 2022        Continue These Medications      Instructions Start Date   atorvastatin 20 MG tablet  Commonly known as: LIPITOR   TAKE 1 TABLET BY MOUTH EVERY DAY      baclofen 20 MG tablet  Commonly known as: LIORESAL   TAKE 1 TABLET BY MOUTH THREE TIMES DAILY      donepezil 10 MG tablet  Commonly known as: ARICEPT   10 mg, Oral, Daily      gabapentin 600 MG tablet  Commonly known as: NEURONTIN   TAKE 1 TABLET BY MOUTH AT BEDTIME      hydroCHLOROthiazide 12.5 MG tablet  Commonly known as: HYDRODIURIL   12.5 mg, Oral, Daily, As needed for swelling      HYDROcodone-acetaminophen 5-325 MG per  tablet  Commonly known as: NORCO   TAKE 1 TABLET BY MOUTH EVERY 6 HOURS AS NEEDED FOR PAIN      Kisqali (600 MG Dose) 200 MG tablet therapy pack tablet  Generic drug: ribociclib succinate   600 mg, Oral, Take As Directed, Take 3 tablets by mouth daily for 21 days then off 7 days on a 28 day cycle.      letrozole 2.5 MG tablet  Commonly known as: FEMARA   2.5 mg, Oral, Daily      levothyroxine 50 MCG tablet  Commonly known as: SYNTHROID, LEVOTHROID   TAKE 1 TABLET BY MOUTH EVERY DAY      LORazepam 0.5 MG tablet  Commonly known as: ATIVAN   0.5 mg, Oral, Every 6 Hours PRN      losartan 100 MG tablet  Commonly known as: COZAAR   100 mg, Oral, Daily      montelukast 10 MG tablet  Commonly known as: SINGULAIR   10 mg, Oral, Daily      Morphine 15 MG tablet  Commonly known as: MSIR   15 mg, Oral, Every 4 Hours PRN      naproxen 500 MG tablet  Commonly known as: NAPROSYN   500 mg, Oral, 2 Times Daily With Meals      ondansetron 8 MG tablet  Commonly known as: ZOFRAN   8 mg, Oral, 3 Times Daily PRN      rOPINIRole 3 MG tablet  Commonly known as: REQUIP   3 mg, Oral, Every Evening      solifenacin 10 MG tablet  Commonly known as: VESICARE   10 mg, Oral, Daily      SUMAtriptan 100 MG tablet  Commonly known as: IMITREX   100 mg, Oral, Once As Needed      traZODone 150 MG tablet  Commonly known as: DESYREL   TAKE 1 TABLET BY MOUTH ONCE nightly      venlafaxine  MG 24 hr capsule  Commonly known as: EFFEXOR-XR   150 mg, Oral, Daily             Allergies   Allergen Reactions   • Azithromycin Itching       Discharge Disposition:  Home-Health Care [facility]    Diet:  Hospital:  Diet Order   Procedures   • Diet: Gastrointestinal Diets; Fiber-Restricted; Texture: Soft to Chew (NDD 3); Soft to Chew: Chopped Meat; Fluid Consistency: Thin (IDDSI 0)       Discharge Activity:       CODE STATUS:  Code Status and Medical Interventions:   Ordered at: 12/06/22 5686     Code Status (Patient has no pulse and is not breathing):    CPR (Attempt to  Resuscitate)     Medical Interventions (Patient has pulse or is breathing):    Full Support       Future Appointments   Date Time Provider Department Center   12/13/2022  3:30 PM Raudel Grande MD [facility]   12/27/2022 12:45 PM NURSE/MA ONC [facility] ONC [address]   12/27/2022  1:00 PM Donald Barker MD [facility] [address]   12/27/2022  1:15 PM INJ ROOM 01 [facility] OP INFU [facility] OPIF [facility]       Additional Instructions for the Follow-ups that You Need to Schedule     Discharge Follow-up with PCP   As directed       Currently Documented PCP:    Haile Monique MD    PCP Phone Number:    [phone]     Follow Up Details: 3 to 7 days         Discharge Follow-up with Specified Provider: oncology   As directed      To: oncology         Discharge Follow-up with Specified Provider: surgery; 2 Weeks   As directed      To: surgery    Follow Up: 2 Weeks               Pertinent  and/or Most Recent Results     IMAGING:  MRI Pelvis With & Without Contrast    Result Date: 12/2/2022  PROCEDURE: MRI PELVIS W WO CONTRAST  COMPARISON: [facility], PET, NM PET/CT SKULL BASE TO MID THIGH, 9/26/2022, 12:08.  [facility], CT, CT ABDOMEN PELVIS W WO CONTRAST, 8/05/2022, 11:48.  [facility], MR, MRI LUMBAR SPINE W WO CONTRAST, 12/01/2022, 12:35.  INDICATIONS: MRI SACRUM; METASTATIC BREAST CA. LOWER BACK PAIN INTO TAILBONE.      TECHNIQUE: Multiplanar multisequence images of the brain with and without intravenous gadolinium.  FINDINGS: There are innumerable osseous metastatic lesions throughout the lower lumbar spine, pelvis and sacrum.  The lesions have decreased T1 signal intensity and increased T2 signal intensity.  There is enhancement following the administration of gadolinium.  No discrete soft tissue masses are identified.  There is soft tissue edema and enhancement along dorsal aspect of the sacrococcygeal junction.  Prior hysterectomy.  No evidence of adenopathy.  IMPRESSION:   Widespread osseous metastatic disease.  TRINIDAD HERNANDEZ MD       Electronically Signed and Approved By: TRINIDAD HERNANDEZ MD on 12/02/2022 at 8:07             CT Abdomen Pelvis With Contrast    Result Date: 12/6/2022  PROCEDURE: CT ABDOMEN PELVIS W CONTRAST  COMPARISON: [facility], CT, CT ABDOMEN PELVIS W WO CONTRAST, 8/05/2022, 11:48.  INDICATIONS: abdominal pain, metastatic breast cancer  TECHNIQUE: After obtaining the patient's consent, CT images were created with non-ionic intravenous contrast material.   PROTOCOL:   Standard imaging protocol performed    RADIATION:   DLP: 979.8mGy*cm   Automated exposure control was utilized to minimize radiation dose. CONTRAST: 100cc Isovue 370 I.V. LABS:   eGFR: 93ml/min/1.73m2  FINDINGS:  Mostly linear opacities in the dependent lower lobes are likely due to subsegmental atelectasis.  Gallbladder is surgically absent.  Intrahepatic/extrahepatic biliary ductal dilatation is mildly increased compared to 8/5/2022 CT.  Extrahepatic common bile duct measures up to 1.5 cm, previously 1.2 cm.  No definite cause of biliary obstruction is visualized on CT.  There is mild dilatation of the main pancreatic duct.  Bilateral renal cysts are stable.  Adrenal glands and spleen appear unremarkable.  No abnormal bowel distention is seen, but there is mild wall thickening of distal small bowel in the right lower quadrant with small amount of interloop fluid noted.  There is a small amount of free pelvic fluid, as well as fluid in the right paracolic gutter and perihepatic fluid.  There are few foci of free intraperitoneal air (anterior to liver on axial image 18 and 20, anterior abdomen on axial image 46), as well as foci of extraluminal air adjacent to distal small bowel loops in the pelvis.  No pneumatosis or mesenteric/portal venous gas is seen.  Appendix is nondilated, and there is no significant periappendiceal inflammation.  There is stool throughout the colon.  No  urinary bladder wall thickening is seen.  No adenopathy is identified in the abdomen or pelvis.  There are atherosclerotic vascular calcifications.  Numerous sclerotic lesions are redemonstrated, consistent with osseous metastatic disease.  No pathologic fractures are seen.        1. Wall thickening/enhancement of distal small bowel in the right lower quadrant with a small amount of interloop fluid, as well as a small amount of free pelvic fluid, right paracolic gutter fluid, and perihepatic fluid, concerning for nonspecific enteritis or possible bowel ischemia. 2. Few foci of free intraperitoneal air, which may be from perforation of abnormal distal small bowel, given the location of several foci of extraluminal air adjacent to distal small bowel loops.  Recommend surgical consultation. 3. Mildly increased intrahepatic/extrahepatic biliary ductal dilatation without obvious cause of obstruction. 4. Diffuse osseous metastatic disease.   This report was communicated by telephone to Dr. Lozada at the dictation time shown below.    STEPHANIE STORY MD       Electronically Signed and Approved By: STEPHANIE STORY MD on 12/06/2022 at 16:13             CT Radiation Therapy Planning    Result Date: 11/14/2022  This scan was performed as part of a plan for Radiation Therapy    MRI Lumbar Spine With & Without Contrast    Result Date: 12/1/2022  PROCEDURE: MRI LUMBAR SPINE W WO CONTRAST  COMPARISON: Other, MR, LUMBAR SPINE WITHOUT CONTRAST, 2/06/2020, 14:12.  INDICATIONS: METASTATIC BREAST CA. LOWER BACK PAIN RADIATING INTO BOTH LEGS AND TAILBONE.  CONTRAST: 15ML  Multihance I.V.  TECHNIQUE: A comprehensive examination was performed utilizing a variety of imaging planes and imaging parameters to optimize visualization of suspected pathology.  Images were performed before and after the administration of intravenous gadolinium contrast.  FINDINGS:  Five lumbar vertebral bodies are identified and appear in anatomic alignment.   There is mild to moderate narrowing of the L1-L4 discs and mild narrowing of the L4-L5 and L5-S1 discs.  There is diffuse disc desiccation.  Vertebral bodies maintain normal height.  There are too numerous to count low T1 high T2 rounded lesions scattered throughout all of the visible bones of the thoracolumbar spine, sacrum and pelvis.  There are more than 50 lesions.  One of the larger lesions occurs at the T11 vertebral body and measures up to 25 mm diameter.  A 2nd large lesion involves the right-side of the L5 vertebral body and measures up to 29 mm diameter.  There is no evidence of pathologic fracture.  The distal spinal cord and conus medullaris appear unremarkable terminating at the L1-L2 level. Limited view of the retroperitoneal structures is unremarkable. Paraspinal musculature is well preserved. No evidence of leptomeningeal or intramedullary/intradural metastatic disease.  L1-L2:  There is concentric disc bulge with a tiny central disc protrusion.  There is mild facet and ligamentum flavum hypertrophy.  There is moderate right and mild left neural foraminal narrowing without spinal canal stenosis.  L2-L3:  There is concentric disc bulge and marginal osteophyte formation with mild facet and ligamentum flavum hypertrophy.  There is mild bilateral neural foraminal narrowing and mild narrowing of the spinal canal.  L3-L4:  There is concentric disc bulge and marginal osteophyte formation with mild facet and ligamentum flavum hypertrophy.  There is moderate bilateral neural foraminal narrowing and moderate narrowing of the spinal canal.  L4-L5:  There is concentric disc bulge and marginal osteophyte formation with moderate bilateral facet and ligamentum flavum hypertrophy.  There is moderate to severe bilateral neural foraminal narrowing.  L5-S1:  There is concentric disc bulge and marginal osteophyte formation.  There is moderate to severe right and moderate left facet hypertrophy.  There is moderate to  severe right and moderate left neural foraminal narrowing without spinal canal compromise.         1. Too numerous to count osseous metastasis throughout the lumbar spine and pelvis.  No pathologic fracture or evidence of intradural metastasis. 2. Moderate lumbar spondylosis with varying degrees of neural foraminal and spinal canal narrowing as described in detail above, progressive since 2020.      ADRIANA VALLES MD       Electronically Signed and Approved By: ADRIANA VALLES MD on 12/01/2022 at 16:38               LAB RESULTS:      Lab 12/10/22  0435 12/09/22  0622 12/08/22  1300 12/07/22  0523 12/06/22  1448 12/06/22  1313   WBC 4.82 4.35 4.16 1.54* 1.12*  --    HEMOGLOBIN 14.3 11.2* 10.9* 10.5* 12.2  --    HEMATOCRIT 44.7 33.5* 33.3* 32.7* 37.8  --    PLATELETS 146 148 151 140 160  --    NEUTROS ABS 3.50 3.64 3.42 1.31* 0.85*  --    IMMATURE GRANS (ABS) 0.04 0.02 0.03 0.00  --   --    LYMPHS ABS 0.59* 0.46* 0.53* 0.14*  --   --    MONOS ABS 0.57 0.18 0.13 0.07*  --   --    EOS ABS 0.05 0.02 0.01 0.00  --   --    MCV 98.7* 95.2 95.7 96.5 97.4*  --    LACTATE  --   --  1.3  --   --  1.0         Lab 12/12/22  0525 12/11/22  1155 12/10/22  1022 12/09/22  0622 12/08/22  1300 12/07/22  0523   SODIUM 136  --  140 140 138 139   POTASSIUM 4.2  --  3.5 3.5 3.6 3.9   CHLORIDE 105  --  103 107 104 105   CO2 21.8*  --  22.1 24.8 23.6 23.2   ANION GAP 9.2  --  14.9 8.2 10.4 10.8   BUN 9  --  13 13 14 14   CREATININE 0.54*  --  0.60 0.64 0.72 0.68   EGFR 103.6  --  101.0 99.4 94.1 98.0   GLUCOSE 95  --  109* 85 96 129*   CALCIUM 7.3*  --  8.2* 8.1* 8.3* 7.6*   MAGNESIUM 2.2  --   --  2.1 2.2  --    PHOSPHORUS 2.0* 1.6*  --  1.6* 1.2*  --          Lab 12/09/22  0622 12/08/22  1300 12/07/22  0523 12/06/22  1313   TOTAL PROTEIN 6.4 6.6 5.9* 6.7   ALBUMIN 3.50 3.80 3.50 3.90   GLOBULIN 2.9 2.8 2.4 2.8   ALT (SGPT) 26 36* 59* 36*   AST (SGOT) 17 23 42* 29   BILIRUBIN 0.6 0.5 0.6 0.4   ALK PHOS 65 71 65 96   LIPASE  --   --   --  32                      Brief Urine Lab Results  (Last result in the past 365 days)      Color   Clarity   Blood   Leuk Est   Nitrite   Protein   CREAT   Urine HCG        12/06/22 1330 Yellow   Cloudy   Small (1+)   Negative   Negative   Trace               Microbiology Results (last 10 days)     Procedure Component Value - Date/Time    Anaerobic Culture - Body Fluid, Abdominal Wall [[id number]]  (Abnormal) Collected: 12/06/22 1816    Lab Status: Final result Specimen: Body Fluid from Abdominal Wall Updated: 12/11/22 0615     Anaerobic Culture Anaerobe (Organism type)     Comment: Mixed anaerobes isolated.       Body Fluid Culture - Body Fluid, Abdominal Wall [[id number]] Collected: 12/06/22 1816    Lab Status: Final result Specimen: Body Fluid from Abdominal Wall Updated: 12/09/22 0853     Body Fluid Culture No growth at 3 days     Gram Stain Rare (1+) Gram negative bacilli      Occasional Gram positive cocci            Results for orders placed in visit on 08/28/14    SCANNED - ECHOCARDIOGRAM      Time spent on Discharge including face to face service: Greater than 30 minutes      Electronically signed by Augusto Ladd MD, 12/13/22, 12:01 PM EST.

## 2022-12-14 ENCOUNTER — TELEPHONE (OUTPATIENT)
Dept: SURGERY | Facility: CLINIC | Age: 62
End: 2022-12-14

## 2022-12-14 LAB
CYTO UR: NORMAL
LAB AP CASE REPORT: NORMAL
LAB AP CLINICAL INFORMATION: NORMAL
LAB AP SPECIAL STAINS: NORMAL
PATH REPORT.FINAL DX SPEC: NORMAL
PATH REPORT.GROSS SPEC: NORMAL

## 2022-12-14 NOTE — TELEPHONE ENCOUNTER
Betty called from pathology.  Sigmoid colon resection:  Metastatic lobular breast carcinoma with perforation and acute peritonitis.

## 2022-12-14 NOTE — OUTREACH NOTE
Prep Survey    Flowsheet Row Responses   [facility]? [facility]   Is LACE score < 7 ? No   Eligibility Readm Mgmt   Discharge diagnosis Exploratory laparotomy with sigmoid colon resection, Lynn's closure of rectal stump, end colostomy   Does the patient have one of the following disease processes/diagnoses(primary or secondary)? General Surgery   Does the patient have Home health ordered? Yes   What is the Home health agency?   [facility]    Is there a DME ordered? Yes   What DME was ordered?  [employer]  for hospital bed   Prep survey completed? Yes          DAI LOWERY - Registered Nurse

## 2022-12-15 ENCOUNTER — OFFICE VISIT (OUTPATIENT)
Dept: SURGERY | Facility: CLINIC | Age: 62
End: 2022-12-15

## 2022-12-15 ENCOUNTER — HOSPITAL ENCOUNTER (OUTPATIENT)
Facility: HOSPITAL | Age: 62
Setting detail: OBSERVATION
Discharge: HOME | End: 2022-12-16
Attending: EMERGENCY MEDICINE | Admitting: FAMILY MEDICINE

## 2022-12-15 VITALS — BODY MASS INDEX: 28.12 KG/M2 | WEIGHT: 175 LBS | RESPIRATION RATE: 14 BRPM | HEIGHT: 66 IN

## 2022-12-15 DIAGNOSIS — G89.18 POST-OPERATIVE PAIN: Primary | ICD-10-CM

## 2022-12-15 DIAGNOSIS — L30.9 STOMA DERMATITIS: ICD-10-CM

## 2022-12-15 DIAGNOSIS — R10.9 ABDOMINAL PAIN, UNSPECIFIED ABDOMINAL LOCATION: Primary | ICD-10-CM

## 2022-12-15 LAB
ALBUMIN SERPL-MCNC: 3.4 G/DL (ref 3.5–5.2)
ALBUMIN/GLOB SERPL: 0.9 G/DL
ALP SERPL-CCNC: 101 U/L (ref 39–117)
ALT SERPL W P-5'-P-CCNC: 17 U/L (ref 1–33)
ANION GAP SERPL CALCULATED.3IONS-SCNC: 9.5 MMOL/L (ref 5–15)
AST SERPL-CCNC: 18 U/L (ref 1–32)
BACTERIA UR QL AUTO: ABNORMAL /HPF
BASOPHILS # BLD AUTO: 0.03 10*3/MM3 (ref 0–0.2)
BASOPHILS NFR BLD AUTO: 0.3 % (ref 0–1.5)
BILIRUB SERPL-MCNC: 0.3 MG/DL (ref 0–1.2)
BILIRUB UR QL STRIP: NEGATIVE
BUN SERPL-MCNC: 9 MG/DL (ref 8–23)
BUN/CREAT SERPL: 13.4 (ref 7–25)
CALCIUM SPEC-SCNC: 8.3 MG/DL (ref 8.6–10.5)
CHLORIDE SERPL-SCNC: 101 MMOL/L (ref 98–107)
CLARITY UR: CLEAR
CO2 SERPL-SCNC: 29.5 MMOL/L (ref 22–29)
COLOR UR: YELLOW
CREAT SERPL-MCNC: 0.67 MG/DL (ref 0.57–1)
D-LACTATE SERPL-SCNC: 1.1 MMOL/L (ref 0.5–2)
DEPRECATED RDW RBC AUTO: 62.2 FL (ref 37–54)
EGFRCR SERPLBLD CKD-EPI 2021: 98.4 ML/MIN/1.73
EOSINOPHIL # BLD AUTO: 0.11 10*3/MM3 (ref 0–0.4)
EOSINOPHIL NFR BLD AUTO: 1 % (ref 0.3–6.2)
ERYTHROCYTE [DISTWIDTH] IN BLOOD BY AUTOMATED COUNT: 17.6 % (ref 12.3–15.4)
GLOBULIN UR ELPH-MCNC: 3.6 GM/DL
GLUCOSE SERPL-MCNC: 122 MG/DL (ref 65–99)
GLUCOSE UR STRIP-MCNC: NEGATIVE MG/DL
HCT VFR BLD AUTO: 33.1 % (ref 34–46.6)
HGB BLD-MCNC: 10.7 G/DL (ref 12–15.9)
HGB UR QL STRIP.AUTO: ABNORMAL
HOLD SPECIMEN: NORMAL
HOLD SPECIMEN: NORMAL
HYALINE CASTS UR QL AUTO: ABNORMAL /LPF
IMM GRANULOCYTES # BLD AUTO: 0.08 10*3/MM3 (ref 0–0.05)
IMM GRANULOCYTES NFR BLD AUTO: 0.8 % (ref 0–0.5)
KETONES UR QL STRIP: NEGATIVE
LEUKOCYTE ESTERASE UR QL STRIP.AUTO: ABNORMAL
LIPASE SERPL-CCNC: 24 U/L (ref 13–60)
LYMPHOCYTES # BLD AUTO: 1.03 10*3/MM3 (ref 0.7–3.1)
LYMPHOCYTES NFR BLD AUTO: 9.7 % (ref 19.6–45.3)
MCH RBC QN AUTO: 31.1 PG (ref 26.6–33)
MCHC RBC AUTO-ENTMCNC: 32.3 G/DL (ref 31.5–35.7)
MCV RBC AUTO: 96.2 FL (ref 79–97)
MONOCYTES # BLD AUTO: 0.71 10*3/MM3 (ref 0.1–0.9)
MONOCYTES NFR BLD AUTO: 6.7 % (ref 5–12)
NEUTROPHILS NFR BLD AUTO: 8.62 10*3/MM3 (ref 1.7–7)
NEUTROPHILS NFR BLD AUTO: 81.5 % (ref 42.7–76)
NITRITE UR QL STRIP: NEGATIVE
NRBC BLD AUTO-RTO: 0 /100 WBC (ref 0–0.2)
PH UR STRIP.AUTO: 6.5 [PH] (ref 5–8)
PLATELET # BLD AUTO: 363 10*3/MM3 (ref 140–450)
PMV BLD AUTO: 10.4 FL (ref 6–12)
POTASSIUM SERPL-SCNC: 3.5 MMOL/L (ref 3.5–5.2)
PROT SERPL-MCNC: 7 G/DL (ref 6–8.5)
PROT UR QL STRIP: NEGATIVE
RBC # BLD AUTO: 3.44 10*6/MM3 (ref 3.77–5.28)
RBC # UR STRIP: ABNORMAL /HPF
REF LAB TEST METHOD: ABNORMAL
SODIUM SERPL-SCNC: 140 MMOL/L (ref 136–145)
SP GR UR STRIP: 1.01 (ref 1–1.03)
SQUAMOUS #/AREA URNS HPF: ABNORMAL /HPF
UROBILINOGEN UR QL STRIP: ABNORMAL
WBC # UR STRIP: ABNORMAL /HPF
WBC NRBC COR # BLD: 10.58 10*3/MM3 (ref 3.4–10.8)
WHOLE BLOOD HOLD COAG: NORMAL
WHOLE BLOOD HOLD SPECIMEN: NORMAL
YEAST URNS QL MICRO: ABNORMAL /HPF

## 2022-12-15 PROCEDURE — 36415 COLL VENOUS BLD VENIPUNCTURE: CPT

## 2022-12-15 PROCEDURE — 99220 PR INITIAL OBSERVATION CARE/DAY 70 MINUTES: CPT | Performed by: INTERNAL MEDICINE

## 2022-12-15 PROCEDURE — G0378 HOSPITAL OBSERVATION PER HR: HCPCS

## 2022-12-15 PROCEDURE — 99284 EMERGENCY DEPT VISIT MOD MDM: CPT

## 2022-12-15 PROCEDURE — 25010000002 HYDROMORPHONE 1 MG/ML SOLUTION: Performed by: INTERNAL MEDICINE

## 2022-12-15 PROCEDURE — 96374 THER/PROPH/DIAG INJ IV PUSH: CPT

## 2022-12-15 PROCEDURE — 99024 POSTOP FOLLOW-UP VISIT: CPT | Performed by: SURGERY

## 2022-12-15 PROCEDURE — 80053 COMPREHEN METABOLIC PANEL: CPT | Performed by: EMERGENCY MEDICINE

## 2022-12-15 PROCEDURE — 25010000002 HYDROMORPHONE 1 MG/ML SOLUTION: Performed by: EMERGENCY MEDICINE

## 2022-12-15 PROCEDURE — 85025 COMPLETE CBC W/AUTO DIFF WBC: CPT | Performed by: EMERGENCY MEDICINE

## 2022-12-15 PROCEDURE — 83690 ASSAY OF LIPASE: CPT | Performed by: EMERGENCY MEDICINE

## 2022-12-15 PROCEDURE — 83605 ASSAY OF LACTIC ACID: CPT | Performed by: EMERGENCY MEDICINE

## 2022-12-15 PROCEDURE — 81001 URINALYSIS AUTO W/SCOPE: CPT | Performed by: EMERGENCY MEDICINE

## 2022-12-15 PROCEDURE — 96376 TX/PRO/DX INJ SAME DRUG ADON: CPT

## 2022-12-15 RX ORDER — NALOXONE HCL 0.4 MG/ML
0.4 VIAL (ML) INJECTION
Status: DISCONTINUED | OUTPATIENT
Start: 2022-12-15 | End: 2022-12-16

## 2022-12-15 RX ORDER — SODIUM CHLORIDE 0.9 % (FLUSH) 0.9 %
10 SYRINGE (ML) INJECTION AS NEEDED
Status: DISCONTINUED | OUTPATIENT
Start: 2022-12-15 | End: 2022-12-16 | Stop reason: HOSPADM

## 2022-12-15 RX ADMIN — SODIUM CHLORIDE, POTASSIUM CHLORIDE, SODIUM LACTATE AND CALCIUM CHLORIDE 1000 ML: 600; 310; 30; 20 INJECTION, SOLUTION INTRAVENOUS at 18:28

## 2022-12-15 RX ADMIN — HYDROMORPHONE HYDROCHLORIDE 1 MG: 1 INJECTION, SOLUTION INTRAMUSCULAR; INTRAVENOUS; SUBCUTANEOUS at 18:07

## 2022-12-15 RX ADMIN — HYDROMORPHONE HYDROCHLORIDE 1 MG: 1 INJECTION, SOLUTION INTRAMUSCULAR; INTRAVENOUS; SUBCUTANEOUS at 21:44

## 2022-12-15 NOTE — ED PROVIDER NOTES
Time: 4:22 PM EST  Chief Complaint:   Chief Complaint   Patient presents with   • Post-op Problem     Colostomy done by Maddy on 12/6, pt seen in office today, sent to ER by Dr. Castañeda for observation and fluids.           History of Present Illness:  Patient is a 62 y.o. year old female who presents to the emergency department with abnormal stoma color.  Patient had surgery 10 days ago with colostomy placement.  Patient and spouse report that they talk to Dr. Castañeda and were advised to present to ED for further evaluation and treatment, to receive fluids and to be admitted for observation.  Patient denies nausea/vomiting, fever/chills.  Complains of worsening weakness.            History provided by:  Patient          Patient Care Team  Primary Care Provider: Haile Monique MD    Past Medical History:     Allergies   Allergen Reactions   • Azithromycin Itching     Past Medical History:   Diagnosis Date   • Abdominal pain    • Allergic rhinitis    • Anemia    • Anxiety    • Arteriosclerosis     Coronary   • Arthritis    • Bone metastases (HCC) 10/14/2022   • Bowen's disease    • Breast cancer (HCC)    • Cancer related pain 10/13/2022   • Chest pain    • Chronic pain disorder    • Cough    • Depression    • Dysphoric mood    • Fatigue    • Frequent urination    • History of colon polyps    • History of IBS    • History of kidney stones    • Hyperlipidemia    • Hypertension    • Hypothyroidism    • Insomnia    • Lumbago    • Malaise and fatigue    • Mood disorder (HCC)    • Neck pain    • Palpitations    • Peripheral edema 11/21/2022   • Precordial pain    • Sleep disturbance    • SOB (shortness of breath)      Past Surgical History:   Procedure Laterality Date   • BREAST BIOPSY     • CARDIAC CATHETERIZATION Left 1960   • CARDIAC CATHETERIZATION N/A 04/06/2018    Procedure: Coronary angiography;  Surgeon: Art Licea MD;  Location: [facility] INVASIVE LOCATION;  Service: Cardiovascular   • CARDIAC  CATHETERIZATION N/A 04/06/2018    Procedure: Left heart cath;  Surgeon: Art Licea MD;  Location:  [facility] CATH INVASIVE LOCATION;  Service: Cardiovascular   • CARDIAC CATHETERIZATION N/A 04/06/2018    Procedure: Left ventriculography;  Surgeon: Art Licea MD;  Location:  [facility] CATH INVASIVE LOCATION;  Service: Cardiovascular   • CHOLECYSTECTOMY     • COLONOSCOPY  11/08/2006   • EXPLORATORY LAPAROTOMY N/A 12/6/2022    Procedure: LAPAROTOMY EXPLORATORY sigmoid resection hartmans procedure colostomy;  Surgeon: Alfred Castañeda MD;  Location: [facility] MAIN OR;  Service: General;  Laterality: N/A;   • GANGLION CYST EXCISION     • HYSTERECTOMY  05/2005   • LAPAROSCOPIC GASTRIC BANDING     • TONSILLECTOMY       Family History   Problem Relation Age of Onset   • Hypertension Mother    • Rheum arthritis Mother    • Heart disease Mother    • Breast cancer Mother    • Diabetes Father    • Cancer Maternal Grandmother         colon   • Colon cancer Maternal Grandmother    • Aneurysm Paternal Grandfather    • Diabetes Other    • Fibromyalgia Other        Home Medications:  Prior to Admission medications    Medication Sig Start Date End Date Taking? Authorizing Provider   atorvastatin (LIPITOR) 20 MG tablet TAKE 1 TABLET BY MOUTH EVERY DAY 10/1/19   Yudelka Manning MD   baclofen (LIORESAL) 20 MG tablet TAKE 1 TABLET BY MOUTH THREE TIMES DAILY 12/6/19   Yudelka Manning MD   donepezil (ARICEPT) 10 MG tablet Take 10 mg by mouth Daily. 10/28/22   Provider, MD Bessie   gabapentin (NEURONTIN) 600 MG tablet TAKE 1 TABLET BY MOUTH AT BEDTIME 7/30/20   Yudelka Manning MD   hydroCHLOROthiazide (HYDRODIURIL) 12.5 MG tablet Take 1 tablet by mouth Daily for 30 days. As needed for swelling 11/21/22 12/21/22  Donald Barker MD   HYDROcodone-acetaminophen (NORCO) 5-325 MG per tablet TAKE 1 TABLET BY MOUTH EVERY 6 HOURS AS NEEDED FOR PAIN 11/28/22   Donald Barker MD   letrozole (FEMARA) 2.5 MG tablet  Take 1 tablet by mouth Daily. 11/30/22   Donald Barker MD   levothyroxine (SYNTHROID, LEVOTHROID) 50 MCG tablet TAKE 1 TABLET BY MOUTH EVERY DAY 7/19/19   Yudelka Manning MD   LORazepam (ATIVAN) 0.5 MG tablet Take 0.5 mg by mouth Every 6 (Six) Hours As Needed.    Bessie Lopez MD   losartan (COZAAR) 100 MG tablet Take 100 mg by mouth Daily.    Bessie Lopez MD   montelukast (SINGULAIR) 10 MG tablet Take 10 mg by mouth Daily. 1/17/22   Bessie Lopez MD   Morphine (MS CONTIN) 15 MG 12 hr tablet Take 1 tablet by mouth 2 (Two) Times a Day. 12/13/22   Raudel Grande MD   naproxen (NAPROSYN) 500 MG tablet Take 500 mg by mouth 2 (Two) Times a Day With Meals.    Bessie Lopez MD   nicotine (NICODERM CQ) 21 MG/24HR patch Place 1 patch on the skin as directed by provider Daily for 7 days. 12/14/22 12/21/22  Augusto Ladd MD   ondansetron (ZOFRAN) 8 MG tablet Take 1 tablet by mouth 3 (Three) Times a Day As Needed for Nausea or Vomiting. 10/13/22   Donald Barker MD   oxyCODONE-acetaminophen (Percocet)  MG per tablet Take 1 tablet by mouth Every 6 (Six) Hours As Needed for Moderate Pain. 12/13/22 12/13/23  Alfred Castañeda MD   polyethylene glycol (MIRALAX) 17 g packet Take 17 g by mouth Daily. 12/13/22   Alfred Castañeda MD   ribociclib succinate 200 MG tablet therapy pack tablet Take 3 tablets by mouth Take As Directed. Take 3 tablets by mouth daily for 21 days then off 7 days on a 28 day cycle. 10/19/22   Donald Barker MD   rOPINIRole (REQUIP) 3 MG tablet Take 3 mg by mouth Every Evening. 6/29/22   Bessie Lopez MD   solifenacin (VESICARE) 10 MG tablet Take 1 tablet by mouth Daily for 360 days. 10/25/22 10/20/23  Destinee Myers MD   SUMAtriptan (IMITREX) 100 MG tablet Take 100 mg by mouth 1 (One) Time As Needed. 10/7/22   Provider, MD Bessie   traZODone (DESYREL) 150 MG tablet TAKE 1 TABLET BY MOUTH ONCE nightly 11/12/19   Nigel  "Yudelka BURCH MD   venlafaxine XR (EFFEXOR-XR) 150 MG 24 hr capsule Take 1 capsule by mouth Daily. 11/21/22   Donald Barker MD        Social History:   Social History     Tobacco Use   • Smoking status: Every Day     Packs/day: 1.00     Years: 40.00     Pack years: 40.00     Types: Cigarettes     Start date: 1974   • Smokeless tobacco: Never   • Tobacco comments:     caffeine use 3 mt dews daily   Vaping Use   • Vaping Use: Never used   Substance Use Topics   • Alcohol use: No   • Drug use: Never     Comment: Prescribed Lorazepam         Review of Systems:  Review of Systems   Constitutional: Negative for chills and fever.   HENT: Negative for congestion, ear pain and sore throat.    Eyes: Negative for pain.   Respiratory: Negative for cough, chest tightness and shortness of breath.    Cardiovascular: Negative for chest pain.   Gastrointestinal: Negative for abdominal pain, diarrhea, nausea and vomiting.        Abnormal stoma color   Genitourinary: Negative for flank pain and hematuria.   Musculoskeletal: Negative for joint swelling.   Skin: Positive for color change. Negative for pallor.   Neurological: Positive for weakness. Negative for seizures and headaches.   All other systems reviewed and are negative.       Physical Exam:  /64   Pulse 80   Temp 98 °F (36.7 °C)   Resp 18   Ht 167.6 cm (66\")   Wt 79 kg (174 lb 2.6 oz)   LMP  (LMP Unknown)   SpO2 100%   BMI 28.11 kg/m²     Physical Exam  Vitals and nursing note reviewed.   Constitutional:       General: She is not in acute distress.     Appearance: Normal appearance. She is ill-appearing. She is not toxic-appearing.   HENT:      Head: Normocephalic and atraumatic.      Mouth/Throat:      Mouth: Mucous membranes are moist.   Eyes:      General: No scleral icterus.     Pupils: Pupils are equal, round, and reactive to light.   Cardiovascular:      Rate and Rhythm: Normal rate and regular rhythm.      Pulses: Normal pulses.      Heart sounds: Normal " heart sounds.   Pulmonary:      Effort: Pulmonary effort is normal. No respiratory distress.      Breath sounds: Normal breath sounds.   Abdominal:      General: Abdomen is flat. There is no distension.      Palpations: Abdomen is soft.      Tenderness: There is no abdominal tenderness.       Musculoskeletal:         General: Normal range of motion.      Cervical back: Normal range of motion and neck supple.   Skin:     General: Skin is warm and dry.   Neurological:      General: No focal deficit present.      Mental Status: She is alert and oriented to person, place, and time. Mental status is at baseline.   Psychiatric:         Mood and Affect: Mood normal.         Behavior: Behavior normal.                Medications in the Emergency Department:  Medications   sodium chloride 0.9 % flush 10 mL (has no administration in time range)   HYDROmorphone (DILAUDID) injection 1 mg (1 mg Intravenous Given 12/15/22 2144)     And   naloxone (NARCAN) injection 0.4 mg (has no administration in time range)   HYDROmorphone (DILAUDID) injection 1 mg (1 mg Intravenous Given 12/15/22 1807)   lactated ringers bolus 1,000 mL (0 mL Intravenous Stopped 12/15/22 2135)        Labs  Lab Results (last 24 hours)     Procedure Component Value Units Date/Time    CBC & Differential [[id number]]  (Abnormal) Collected: 12/15/22 1741    Specimen: Blood Updated: 12/15/22 1752    Narrative:      The following orders were created for panel order CBC & Differential.  Procedure                               Abnormality         Status                     ---------                               -----------         ------                     CBC Auto Differential[[id number]]        Abnormal            Final result                 Please view results for these tests on the individual orders.    Comprehensive Metabolic Panel [[id number]]  (Abnormal) Collected: 12/15/22 1741    Specimen: Blood Updated: 12/15/22 1815     Glucose 122 mg/dL      BUN 9 mg/dL       Creatinine 0.67 mg/dL      Sodium 140 mmol/L      Potassium 3.5 mmol/L      Chloride 101 mmol/L      CO2 29.5 mmol/L      Calcium 8.3 mg/dL      Total Protein 7.0 g/dL      Albumin 3.40 g/dL      ALT (SGPT) 17 U/L      AST (SGOT) 18 U/L      Alkaline Phosphatase 101 U/L      Total Bilirubin 0.3 mg/dL      Globulin 3.6 gm/dL      A/G Ratio 0.9 g/dL      BUN/Creatinine Ratio 13.4     Anion Gap 9.5 mmol/L      eGFR 98.4 mL/min/1.73      Comment: National Kidney Foundation and American Society of Nephrology (ASN) Task Force recommended calculation based on the Chronic Kidney Disease Epidemiology Collaboration (CKD-EPI) equation refit without adjustment for race.       Narrative:      GFR Normal >60  Chronic Kidney Disease <60  Kidney Failure <15      Lipase [[id number]]  (Normal) Collected: 12/15/22 1741    Specimen: Blood Updated: 12/15/22 1814     Lipase 24 U/L     Lactic Acid, Plasma [[id number]]  (Normal) Collected: 12/15/22 1741    Specimen: Blood Updated: 12/15/22 1812     Lactate 1.1 mmol/L     CBC Auto Differential [[id number]]  (Abnormal) Collected: 12/15/22 1741    Specimen: Blood Updated: 12/15/22 1752     WBC 10.58 10*3/mm3      RBC 3.44 10*6/mm3      Hemoglobin 10.7 g/dL      Hematocrit 33.1 %      MCV 96.2 fL      MCH 31.1 pg      MCHC 32.3 g/dL      RDW 17.6 %      RDW-SD 62.2 fl      MPV 10.4 fL      Platelets 363 10*3/mm3      Neutrophil % 81.5 %      Lymphocyte % 9.7 %      Monocyte % 6.7 %      Eosinophil % 1.0 %      Basophil % 0.3 %      Immature Grans % 0.8 %      Neutrophils, Absolute 8.62 10*3/mm3      Lymphocytes, Absolute 1.03 10*3/mm3      Monocytes, Absolute 0.71 10*3/mm3      Eosinophils, Absolute 0.11 10*3/mm3      Basophils, Absolute 0.03 10*3/mm3      Immature Grans, Absolute 0.08 10*3/mm3      nRBC 0.0 /100 WBC     Urinalysis With Microscopic If Indicated (No Culture) - Urine, Clean Catch [[id number]]  (Abnormal) Collected: 12/15/22 1834    Specimen: Urine, Clean Catch Updated: 12/15/22 1915      Color, UA Yellow     Appearance, UA Clear     pH, UA 6.5     Specific Gravity, UA 1.007     Glucose, UA Negative     Ketones, UA Negative     Bilirubin, UA Negative     Blood, UA Trace     Protein, UA Negative     Leuk Esterase, UA Small (1+)     Nitrite, UA Negative     Urobilinogen, UA 0.2 E.U./dL    Urinalysis, Microscopic Only - Urine, Clean Catch [[id number]]  (Abnormal) Collected: 12/15/22 1834    Specimen: Urine, Clean Catch Updated: 12/15/22 2018     RBC, UA None Seen /HPF      WBC, UA 3-5 /HPF      Bacteria, UA None Seen /HPF      Squamous Epithelial Cells, UA 7-12 /HPF      Yeast, UA Small/1+ Budding Yeast /HPF      Hyaline Casts, UA 0-2 /LPF      Methodology Manual Light Microscopy           Imaging:  No Radiology Exams Resulted Within Past 24 Hours    Procedures:  Procedures    Progress                            The patient was initially evaluated in the triage area where orders were placed. The patient was later dispositioned by HIEU Easton.      The patient was advised to stay for completion of workup which includes but is not limited to communication of labs and radiological results, reassessment and plan. The patient was advised that leaving prior to disposition by a provider could result in critical findings that are not communicated to the patient.     Medical Decision Making:  MDM  Number of Diagnoses or Management Options  Post-operative pain: new and requires workup     Amount and/or Complexity of Data Reviewed  Clinical lab tests: reviewed  Decide to obtain previous medical records or to obtain history from someone other than the patient: yes  Discuss the patient with other providers: yes (I discussed the patient with Dr. Castañeda, the patient's surgeon.  He request the patient be rehydrated and admitted to medicine for observation.  He will consult and evaluate stoma tomorrow.  Request patient remain n.p.o. after midnight.  FRANCI Reina-hospitalist-will admit)    Risk of  Complications, Morbidity, and/or Mortality  Presenting problems: moderate  Diagnostic procedures: low  Management options: moderate    Patient Progress  Patient progress: stable           The following orders were placed after triage and evaluation:  Orders Placed This Encounter   Procedures   • [place] Draw   • Comprehensive Metabolic Panel   • Lipase   • Urinalysis With Microscopic If Indicated (No Culture) - Urine, Clean Catch   • Lactic Acid, Plasma   • CBC Auto Differential   • Urinalysis, Microscopic Only - Urine, Clean Catch   • NPO Diet NPO Type: Strict NPO   • Undress & Gown   • Cardiac Monitoring   • Code Status and Medical Interventions:   • Surgery (on-call MD unless specified)   • Hospitalist (on-call MD unless specified)   • Insert Peripheral IV   • Initiate Observation Status   • CBC & Differential   • Green Top (Gel)   • Lavender Top   • Gold Top - SST   • Light Blue Top       Final diagnoses:   Post-operative pain   Stoma dermatitis          Disposition:  ED Disposition     ED Disposition   Decision to Admit    Condition   --    Comment   Level of Care: Telemetry [5]   Diagnosis: Abdominal pain [[id number]]               This medical record created using voice recognition software.           Grzegorz John, APRN  12/15/22 2109

## 2022-12-15 NOTE — H&P
[facility] HISTORY AND PHYSICAL  Date: 12/15/2022   Patient Name: Derek Keller  : 1960  MRN: [id number]  Primary Care Physician:  Haile Monique MD  Date of admission: 12/15/2022    Subjective   Subjective     Chief Complaint: Abdominal pain, ostomy discoloration and bleeding    HPI:    Derek Keller is a 62 y.o. female past medical history of breast cancer with metastatic disease the presents to the emergency department for evaluation of abdominal pain and ostomy discoloration with bleeding.  Patient underwent sigmoid resection with ostomy placement on .  At that time diagnosed with peritonitis and completed a course of IV antibiotics.  Patient states she was seen by home health nursing today who saw that and her ostomy appeared discolored and had her call her surgeon.  She was then evaluated at the surgical office and told to go to the emergency room for admission.  Patient was discharged on .  She does endorse abdominal pain but states that that has been an ongoing issue for months.  Today the home health nurse noticed the discoloration but apparently the blood in the ostomy bag developed afterwards.  She denies any other fevers, chills, sweats, nausea, vomiting, chest pain or shortness of breath, palpitations, dysuria, weakness, rash.  Work-up in the emergency department was otherwise unrevealing.  Surgery was called from the emergency department recommend admission IV hydration and they will evaluate in a.m.      Personal History     Past Medical History:  Past Medical History:   Diagnosis Date   • Abdominal pain    • Allergic rhinitis    • Anemia    • Anxiety    • Arteriosclerosis     Coronary   • Arthritis    • Bone metastases (HCC) 10/14/2022   • Bowen's disease    • Breast cancer (HCC)    • Cancer related pain 10/13/2022   • Chest pain    • Chronic pain disorder    • Cough    • Depression    • Dysphoric mood    • Fatigue    • Frequent urination    • History  of colon polyps    • History of IBS    • History of kidney stones    • Hyperlipidemia    • Hypertension    • Hypothyroidism    • Insomnia    • Lumbago    • Malaise and fatigue    • Mood disorder (HCC)    • Neck pain    • Palpitations    • Peripheral edema 11/21/2022   • Precordial pain    • Sleep disturbance    • SOB (shortness of breath)          Past Surgical History:  Past Surgical History:   Procedure Laterality Date   • BREAST BIOPSY     • CARDIAC CATHETERIZATION Left 1960   • CARDIAC CATHETERIZATION N/A 04/06/2018    Procedure: Coronary angiography;  Surgeon: Art Licea MD;  Location: [facility] CATH INVASIVE LOCATION;  Service: Cardiovascular   • CARDIAC CATHETERIZATION N/A 04/06/2018    Procedure: Left heart cath;  Surgeon: Art Licea MD;  Location: [facility] CATH INVASIVE LOCATION;  Service: Cardiovascular   • CARDIAC CATHETERIZATION N/A 04/06/2018    Procedure: Left ventriculography;  Surgeon: Art Licea MD;  [facility];  Service: Cardiovascular   • CHOLECYSTECTOMY     • COLONOSCOPY  11/08/2006   • EXPLORATORY LAPAROTOMY N/A 12/6/2022    Procedure: LAPAROTOMY EXPLORATORY sigmoid resection hartmans procedure colostomy;  Surgeon: Alfred Castañeda MD;  Location: [facility] OR;  Service: General;  Laterality: N/A;   • GANGLION CYST EXCISION     • HYSTERECTOMY  05/2005   • LAPAROSCOPIC GASTRIC BANDING     • TONSILLECTOMY           Family History:   Family History   Problem Relation Age of Onset   • Hypertension Mother    • Rheum arthritis Mother    • Heart disease Mother    • Breast cancer Mother    • Diabetes Father    • Cancer Maternal Grandmother         colon   • Colon cancer Maternal Grandmother    • Aneurysm Paternal Grandfather    • Diabetes Other    • Fibromyalgia Other          Social History:   Social History     Tobacco Use   • Smoking status: Every Day     Packs/day: 1.00     Years: 40.00     Pack years: 40.00     Types: Cigarettes      Start date: 1974   • Smokeless tobacco: Never   • Tobacco comments:     caffeine use 3 mt dews daily   Vaping Use   • Vaping Use: Never used   Substance Use Topics   • Alcohol use: No   • Drug use: Never     Comment: Prescribed Lorazepam         Home Medications:  HYDROcodone-acetaminophen, LORazepam, Morphine, SUMAtriptan, atorvastatin, baclofen, donepezil, gabapentin, hydroCHLOROthiazide, letrozole, levothyroxine, losartan, montelukast, naproxen, nicotine, ondansetron, oxyCODONE-acetaminophen, polyethylene glycol, rOPINIRole, ribociclib succinate, solifenacin, traZODone, and venlafaxine XR    Allergies:  Allergies   Allergen Reactions   • Azithromycin Itching       Review of Systems   All systems were reviewed and negative except for: Abdominal pain, ostomy bleeding    Objective   Objective     Vitals:   Temp:  [98 °F (36.7 °C)] 98 °F (36.7 °C)  Heart Rate:  [70-80] 80  Resp:  [14-18] 18  BP: (109)/(64) 109/64    Physical Exam    Constitutional: Awake, alert, no acute distress   Eyes: Pupils equal, sclerae anicteric, no conjunctival injection   HENT: NCAT, mucous membranes moist   Neck: Supple, no thyromegaly, no lymphadenopathy, trachea midline   Respiratory: Clear to auscultation bilaterally, nonlabored respirations    Cardiovascular: RRR, no murmurs, rubs, or gallops, palpable pedal pulses bilaterally   Gastrointestinal: Positive bowel sounds, soft, diffusely tender to light palpation, moderately distended, ostomy noted with blood in ostomy bag   Musculoskeletal: No bilateral ankle edema, no clubbing or cyanosis to extremities   Psychiatric: Appropriate affect, cooperative   Neurologic: Oriented x 3, strength symmetric in all extremities, Cranial Nerves grossly intact to confrontation, speech clear   Skin: No rashes     Result Review    Result Review:  I have personally reviewed the results from the time of this admission to 12/15/2022 19:00 EST and agree with these findings:  []  Laboratory  []   Microbiology  []  Radiology  []  EKG/Telemetry   []  Cardiology/Vascular   []  Pathology  []  Old records  []  Other:      Assessment & Plan   Assessment / Plan     Assessment/Plan:   • Postoperative bleeding: Patient noted to have blood in her ostomy at this time.  Previous Sunday noted to have discoloration of her ostomy.  Surgery recommended admission IV hydration.  We will also place on Protonix.  Type and screen.  Monitor H&H and transfuse for hemoglobin less than 7 or active signs of bleeding.  We will monitor on telemetry overnight.  Patient be kept n.p.o. midnight and surgery to evaluate in a.m., appreciate recommendations.  • Metastatic breast cancer: Supportive care, outpatient follow-up  • Hypertension: Add back home medications as appropriate  • Hypothyroidism: Add back on medications when taking p.o.      DVT prophylaxis:  SCD    CODE STATUS:    Code Status (Patient has no pulse and is not breathing): CPR (Attempt to Resuscitate)  Medical Interventions (Patient has pulse or is breathing): Full Support      Admission Status:  I believe this patient meets observation status.    Electronically signed by Danny Reina Jr, MD, 12/15/22, 7:00 PM EST.

## 2022-12-16 ENCOUNTER — READMISSION MANAGEMENT (OUTPATIENT)
Dept: CALL CENTER | Facility: HOSPITAL | Age: 62
End: 2022-12-16

## 2022-12-16 VITALS
DIASTOLIC BLOOD PRESSURE: 50 MMHG | SYSTOLIC BLOOD PRESSURE: 111 MMHG | HEIGHT: 66 IN | RESPIRATION RATE: 20 BRPM | WEIGHT: 174.16 LBS | TEMPERATURE: 99.1 F | OXYGEN SATURATION: 96 % | HEART RATE: 73 BPM | BODY MASS INDEX: 27.99 KG/M2

## 2022-12-16 PROBLEM — G89.18 POST-OPERATIVE PAIN: Status: RESOLVED | Noted: 2022-12-15 | Resolved: 2022-12-16

## 2022-12-16 PROBLEM — R10.9 ABDOMINAL PAIN: Status: RESOLVED | Noted: 2022-12-15 | Resolved: 2022-12-16

## 2022-12-16 LAB
ABO GROUP BLD: NORMAL
ABO GROUP BLD: NORMAL
ALBUMIN SERPL-MCNC: 2.7 G/DL (ref 3.5–5.2)
ALBUMIN/GLOB SERPL: 0.9 G/DL
ALP SERPL-CCNC: 110 U/L (ref 39–117)
ALT SERPL W P-5'-P-CCNC: 15 U/L (ref 1–33)
ANION GAP SERPL CALCULATED.3IONS-SCNC: 8 MMOL/L (ref 5–15)
AST SERPL-CCNC: 20 U/L (ref 1–32)
BASOPHILS # BLD AUTO: 0.05 10*3/MM3 (ref 0–0.2)
BASOPHILS NFR BLD AUTO: 0.6 % (ref 0–1.5)
BILIRUB SERPL-MCNC: 0.2 MG/DL (ref 0–1.2)
BLD GP AB SCN SERPL QL: NEGATIVE
BUN SERPL-MCNC: 6 MG/DL (ref 8–23)
BUN/CREAT SERPL: 11.5 (ref 7–25)
CALCIUM SPEC-SCNC: 7.7 MG/DL (ref 8.6–10.5)
CHLORIDE SERPL-SCNC: 106 MMOL/L (ref 98–107)
CO2 SERPL-SCNC: 28 MMOL/L (ref 22–29)
CREAT SERPL-MCNC: 0.52 MG/DL (ref 0.57–1)
DEPRECATED RDW RBC AUTO: 62.6 FL (ref 37–54)
EGFRCR SERPLBLD CKD-EPI 2021: 104.5 ML/MIN/1.73
EOSINOPHIL # BLD AUTO: 0.15 10*3/MM3 (ref 0–0.4)
EOSINOPHIL NFR BLD AUTO: 1.7 % (ref 0.3–6.2)
ERYTHROCYTE [DISTWIDTH] IN BLOOD BY AUTOMATED COUNT: 17.5 % (ref 12.3–15.4)
GLOBULIN UR ELPH-MCNC: 3.1 GM/DL
GLUCOSE SERPL-MCNC: 83 MG/DL (ref 65–99)
HCT VFR BLD AUTO: 30.8 % (ref 34–46.6)
HCT VFR BLD AUTO: 31.6 % (ref 34–46.6)
HGB BLD-MCNC: 9.9 G/DL (ref 12–15.9)
HGB BLD-MCNC: 9.9 G/DL (ref 12–15.9)
IMM GRANULOCYTES # BLD AUTO: 0.04 10*3/MM3 (ref 0–0.05)
IMM GRANULOCYTES NFR BLD AUTO: 0.5 % (ref 0–0.5)
LYMPHOCYTES # BLD AUTO: 0.94 10*3/MM3 (ref 0.7–3.1)
LYMPHOCYTES NFR BLD AUTO: 10.6 % (ref 19.6–45.3)
MCH RBC QN AUTO: 30.7 PG (ref 26.6–33)
MCHC RBC AUTO-ENTMCNC: 31.3 G/DL (ref 31.5–35.7)
MCV RBC AUTO: 97.8 FL (ref 79–97)
MONOCYTES # BLD AUTO: 0.72 10*3/MM3 (ref 0.1–0.9)
MONOCYTES NFR BLD AUTO: 8.2 % (ref 5–12)
NEUTROPHILS NFR BLD AUTO: 6.93 10*3/MM3 (ref 1.7–7)
NEUTROPHILS NFR BLD AUTO: 78.4 % (ref 42.7–76)
NRBC BLD AUTO-RTO: 0 /100 WBC (ref 0–0.2)
PLATELET # BLD AUTO: 350 10*3/MM3 (ref 140–450)
PMV BLD AUTO: 10.3 FL (ref 6–12)
POTASSIUM SERPL-SCNC: 3.7 MMOL/L (ref 3.5–5.2)
PROT SERPL-MCNC: 5.8 G/DL (ref 6–8.5)
RBC # BLD AUTO: 3.23 10*6/MM3 (ref 3.77–5.28)
RH BLD: NEGATIVE
RH BLD: NEGATIVE
SODIUM SERPL-SCNC: 142 MMOL/L (ref 136–145)
T&S EXPIRATION DATE: NORMAL
WBC NRBC COR # BLD: 8.83 10*3/MM3 (ref 3.4–10.8)

## 2022-12-16 PROCEDURE — 96375 TX/PRO/DX INJ NEW DRUG ADDON: CPT

## 2022-12-16 PROCEDURE — 86900 BLOOD TYPING SEROLOGIC ABO: CPT | Performed by: INTERNAL MEDICINE

## 2022-12-16 PROCEDURE — 99024 POSTOP FOLLOW-UP VISIT: CPT | Performed by: SURGERY

## 2022-12-16 PROCEDURE — 96361 HYDRATE IV INFUSION ADD-ON: CPT

## 2022-12-16 PROCEDURE — 86901 BLOOD TYPING SEROLOGIC RH(D): CPT | Performed by: INTERNAL MEDICINE

## 2022-12-16 PROCEDURE — 96376 TX/PRO/DX INJ SAME DRUG ADON: CPT

## 2022-12-16 PROCEDURE — 86901 BLOOD TYPING SEROLOGIC RH(D): CPT

## 2022-12-16 PROCEDURE — 25010000002 ONDANSETRON PER 1 MG: Performed by: INTERNAL MEDICINE

## 2022-12-16 PROCEDURE — 80053 COMPREHEN METABOLIC PANEL: CPT | Performed by: INTERNAL MEDICINE

## 2022-12-16 PROCEDURE — 86850 RBC ANTIBODY SCREEN: CPT | Performed by: INTERNAL MEDICINE

## 2022-12-16 PROCEDURE — 25010000002 HYDROMORPHONE 1 MG/ML SOLUTION: Performed by: INTERNAL MEDICINE

## 2022-12-16 PROCEDURE — 86900 BLOOD TYPING SEROLOGIC ABO: CPT

## 2022-12-16 PROCEDURE — 99217 PR OBSERVATION CARE DISCHARGE MANAGEMENT: CPT | Performed by: FAMILY MEDICINE

## 2022-12-16 PROCEDURE — G0378 HOSPITAL OBSERVATION PER HR: HCPCS

## 2022-12-16 PROCEDURE — 85025 COMPLETE CBC W/AUTO DIFF WBC: CPT | Performed by: INTERNAL MEDICINE

## 2022-12-16 PROCEDURE — 94799 UNLISTED PULMONARY SVC/PX: CPT

## 2022-12-16 PROCEDURE — 85014 HEMATOCRIT: CPT | Performed by: INTERNAL MEDICINE

## 2022-12-16 PROCEDURE — 85018 HEMOGLOBIN: CPT | Performed by: INTERNAL MEDICINE

## 2022-12-16 RX ORDER — GABAPENTIN 300 MG/1
600 CAPSULE ORAL NIGHTLY
Status: DISCONTINUED | OUTPATIENT
Start: 2022-12-16 | End: 2022-12-16 | Stop reason: HOSPADM

## 2022-12-16 RX ORDER — CHOLECALCIFEROL (VITAMIN D3) 125 MCG
5 CAPSULE ORAL NIGHTLY PRN
Status: DISCONTINUED | OUTPATIENT
Start: 2022-12-16 | End: 2022-12-16 | Stop reason: HOSPADM

## 2022-12-16 RX ORDER — ACETAMINOPHEN 650 MG/1
650 SUPPOSITORY RECTAL EVERY 4 HOURS PRN
Status: DISCONTINUED | OUTPATIENT
Start: 2022-12-16 | End: 2022-12-16 | Stop reason: HOSPADM

## 2022-12-16 RX ORDER — ROPINIROLE 1 MG/1
3 TABLET, FILM COATED ORAL EVERY EVENING
Status: DISCONTINUED | OUTPATIENT
Start: 2022-12-16 | End: 2022-12-16 | Stop reason: HOSPADM

## 2022-12-16 RX ORDER — ONDANSETRON 2 MG/ML
4 INJECTION INTRAMUSCULAR; INTRAVENOUS EVERY 6 HOURS PRN
Status: DISCONTINUED | OUTPATIENT
Start: 2022-12-16 | End: 2022-12-16 | Stop reason: HOSPADM

## 2022-12-16 RX ORDER — VENLAFAXINE HYDROCHLORIDE 150 MG/1
150 CAPSULE, EXTENDED RELEASE ORAL DAILY
Status: DISCONTINUED | OUTPATIENT
Start: 2022-12-16 | End: 2022-12-16 | Stop reason: HOSPADM

## 2022-12-16 RX ORDER — LORAZEPAM 0.5 MG/1
0.5 TABLET ORAL EVERY 6 HOURS PRN
Status: DISCONTINUED | OUTPATIENT
Start: 2022-12-16 | End: 2022-12-16 | Stop reason: HOSPADM

## 2022-12-16 RX ORDER — MORPHINE SULFATE 15 MG/1
15 TABLET, FILM COATED, EXTENDED RELEASE ORAL 2 TIMES DAILY
Status: DISCONTINUED | OUTPATIENT
Start: 2022-12-16 | End: 2022-12-16 | Stop reason: HOSPADM

## 2022-12-16 RX ORDER — BACLOFEN 10 MG/1
20 TABLET ORAL 3 TIMES DAILY
Status: DISCONTINUED | OUTPATIENT
Start: 2022-12-16 | End: 2022-12-16 | Stop reason: HOSPADM

## 2022-12-16 RX ORDER — MONTELUKAST SODIUM 10 MG/1
10 TABLET ORAL DAILY
Status: DISCONTINUED | OUTPATIENT
Start: 2022-12-16 | End: 2022-12-16 | Stop reason: HOSPADM

## 2022-12-16 RX ORDER — LETROZOLE 2.5 MG/1
2.5 TABLET, FILM COATED ORAL DAILY
Status: DISCONTINUED | OUTPATIENT
Start: 2022-12-16 | End: 2022-12-16 | Stop reason: HOSPADM

## 2022-12-16 RX ORDER — SODIUM CHLORIDE, SODIUM LACTATE, POTASSIUM CHLORIDE, CALCIUM CHLORIDE 600; 310; 30; 20 MG/100ML; MG/100ML; MG/100ML; MG/100ML
100 INJECTION, SOLUTION INTRAVENOUS CONTINUOUS
Status: DISCONTINUED | OUTPATIENT
Start: 2022-12-16 | End: 2022-12-16 | Stop reason: HOSPADM

## 2022-12-16 RX ORDER — NALOXONE HCL 0.4 MG/ML
0.4 VIAL (ML) INJECTION
Status: DISCONTINUED | OUTPATIENT
Start: 2022-12-16 | End: 2022-12-16

## 2022-12-16 RX ORDER — ACETAMINOPHEN 325 MG/1
650 TABLET ORAL EVERY 4 HOURS PRN
Status: DISCONTINUED | OUTPATIENT
Start: 2022-12-16 | End: 2022-12-16 | Stop reason: HOSPADM

## 2022-12-16 RX ORDER — SODIUM CHLORIDE 0.9 % (FLUSH) 0.9 %
10 SYRINGE (ML) INJECTION EVERY 12 HOURS SCHEDULED
Status: DISCONTINUED | OUTPATIENT
Start: 2022-12-16 | End: 2022-12-16 | Stop reason: HOSPADM

## 2022-12-16 RX ORDER — ACETAMINOPHEN 160 MG/5ML
650 SOLUTION ORAL EVERY 4 HOURS PRN
Status: DISCONTINUED | OUTPATIENT
Start: 2022-12-16 | End: 2022-12-16 | Stop reason: HOSPADM

## 2022-12-16 RX ORDER — PANTOPRAZOLE SODIUM 40 MG/10ML
40 INJECTION, POWDER, LYOPHILIZED, FOR SOLUTION INTRAVENOUS EVERY 12 HOURS SCHEDULED
Status: DISCONTINUED | OUTPATIENT
Start: 2022-12-16 | End: 2022-12-16 | Stop reason: HOSPADM

## 2022-12-16 RX ORDER — OXYCODONE AND ACETAMINOPHEN 10; 325 MG/1; MG/1
1 TABLET ORAL EVERY 6 HOURS PRN
Status: DISCONTINUED | OUTPATIENT
Start: 2022-12-16 | End: 2022-12-16 | Stop reason: HOSPADM

## 2022-12-16 RX ORDER — OXYCODONE HYDROCHLORIDE AND ACETAMINOPHEN 5; 325 MG/1; MG/1
1 TABLET ORAL EVERY 6 HOURS PRN
Status: DISCONTINUED | OUTPATIENT
Start: 2022-12-16 | End: 2022-12-16 | Stop reason: HOSPADM

## 2022-12-16 RX ORDER — ATORVASTATIN CALCIUM 20 MG/1
20 TABLET, FILM COATED ORAL NIGHTLY
Status: DISCONTINUED | OUTPATIENT
Start: 2022-12-16 | End: 2022-12-16 | Stop reason: HOSPADM

## 2022-12-16 RX ORDER — NICOTINE 21 MG/24HR
1 PATCH, TRANSDERMAL 24 HOURS TRANSDERMAL
Status: DISCONTINUED | OUTPATIENT
Start: 2022-12-16 | End: 2022-12-16 | Stop reason: HOSPADM

## 2022-12-16 RX ORDER — LEVOTHYROXINE SODIUM 0.05 MG/1
50 TABLET ORAL
Status: DISCONTINUED | OUTPATIENT
Start: 2022-12-16 | End: 2022-12-16 | Stop reason: HOSPADM

## 2022-12-16 RX ORDER — DONEPEZIL HYDROCHLORIDE 10 MG/1
10 TABLET, FILM COATED ORAL DAILY
Status: DISCONTINUED | OUTPATIENT
Start: 2022-12-16 | End: 2022-12-16 | Stop reason: HOSPADM

## 2022-12-16 RX ORDER — SODIUM CHLORIDE 0.9 % (FLUSH) 0.9 %
10 SYRINGE (ML) INJECTION AS NEEDED
Status: DISCONTINUED | OUTPATIENT
Start: 2022-12-16 | End: 2022-12-16 | Stop reason: HOSPADM

## 2022-12-16 RX ORDER — SODIUM CHLORIDE 9 MG/ML
40 INJECTION, SOLUTION INTRAVENOUS AS NEEDED
Status: DISCONTINUED | OUTPATIENT
Start: 2022-12-16 | End: 2022-12-16 | Stop reason: HOSPADM

## 2022-12-16 RX ADMIN — SODIUM CHLORIDE, POTASSIUM CHLORIDE, SODIUM LACTATE AND CALCIUM CHLORIDE 100 ML/HR: 600; 310; 30; 20 INJECTION, SOLUTION INTRAVENOUS at 06:22

## 2022-12-16 RX ADMIN — ONDANSETRON 4 MG: 2 INJECTION INTRAMUSCULAR; INTRAVENOUS at 11:40

## 2022-12-16 RX ADMIN — DONEPEZIL HYDROCHLORIDE 10 MG: 10 TABLET, FILM COATED ORAL at 11:41

## 2022-12-16 RX ADMIN — HYDROMORPHONE HYDROCHLORIDE 1 MG: 1 INJECTION, SOLUTION INTRAMUSCULAR; INTRAVENOUS; SUBCUTANEOUS at 06:21

## 2022-12-16 RX ADMIN — LORAZEPAM 0.5 MG: 0.5 TABLET ORAL at 08:34

## 2022-12-16 RX ADMIN — VENLAFAXINE HYDROCHLORIDE 150 MG: 150 CAPSULE, EXTENDED RELEASE ORAL at 11:40

## 2022-12-16 RX ADMIN — OXYCODONE AND ACETAMINOPHEN 1 TABLET: 10; 325 TABLET ORAL at 11:39

## 2022-12-16 RX ADMIN — MONTELUKAST 10 MG: 10 TABLET, FILM COATED ORAL at 11:39

## 2022-12-16 RX ADMIN — BACLOFEN 20 MG: 10 TABLET ORAL at 11:41

## 2022-12-16 RX ADMIN — NICOTINE 1 PATCH: 21 PATCH, EXTENDED RELEASE TRANSDERMAL at 03:48

## 2022-12-16 RX ADMIN — MORPHINE SULFATE 15 MG: 15 TABLET, FILM COATED, EXTENDED RELEASE ORAL at 08:34

## 2022-12-16 RX ADMIN — LEVOTHYROXINE SODIUM 50 MCG: 50 TABLET ORAL at 06:21

## 2022-12-16 RX ADMIN — HYDROMORPHONE HYDROCHLORIDE 1 MG: 1 INJECTION, SOLUTION INTRAMUSCULAR; INTRAVENOUS; SUBCUTANEOUS at 03:47

## 2022-12-16 RX ADMIN — LETROZOLE 2.5 MG: 2.5 TABLET ORAL at 11:40

## 2022-12-16 RX ADMIN — Medication 10 ML: at 02:19

## 2022-12-16 RX ADMIN — PANTOPRAZOLE SODIUM 40 MG: 40 INJECTION, POWDER, FOR SOLUTION INTRAVENOUS at 08:34

## 2022-12-16 RX ADMIN — PANTOPRAZOLE SODIUM 40 MG: 40 INJECTION, POWDER, FOR SOLUTION INTRAVENOUS at 02:18

## 2022-12-16 RX ADMIN — Medication 10 ML: at 03:48

## 2022-12-16 RX ADMIN — SODIUM CHLORIDE, POTASSIUM CHLORIDE, SODIUM LACTATE AND CALCIUM CHLORIDE 100 ML/HR: 600; 310; 30; 20 INJECTION, SOLUTION INTRAVENOUS at 02:19

## 2022-12-16 NOTE — CONSULTS
General Surgery/Colorectal Surgery Note    Patient Name:  Derek Keller  YOB: 1960  [id number]    Referring Provider: No ref. provider found      Patient Care Team:  Haile Monique MD as PCP - General (Family Medicine)  Julien Cardona DO as Consulting Physician (Pain Medicine)  Joel Castillo MD as Consulting Physician (Gastroenterology)  Remington Russell MD as Surgeon (General Surgery)  Ahsan Salas MD as Consulting Physician (Sports Medicine)  Donald Barker MD as Consulting Physician (Hematology and Oncology)  Sandy Ricci MD as Consulting Physician (General Surgery)    Chief complaint abdominal pain    Subjective .     History of present illness:    Status post open Lynn's procedure 12/6/2022 for perforated sigmoid colon.  Pathology with metastatic lobular breast carcinoma.    She came in for follow-up to the clinic yesterday.  She said that she acutely felt worse over the past day or so with increased abdominal pain, nausea.  Decreased p.o. intake.  She also had noted that her stoma has become more dusky over the past day.    Sent to the emergency department for further evaluation.  Afebrile.  Work-up with WBC 10, hemoglobin 10, platelets 363, lactic acid 1.1, lipase 24, total bilirubin 0.3, creatinine 0.6    Feels better since arrival.      History:  Past Medical History:   Diagnosis Date   • Abdominal pain    • Allergic rhinitis    • Anemia    • Anxiety    • Arteriosclerosis     Coronary   • Arthritis    • Bone metastases (HCC) 10/14/2022   • Bowen's disease    • Breast cancer (HCC)    • Cancer related pain 10/13/2022   • Chest pain    • Chronic pain disorder    • Cough    • Depression    • Dysphoric mood    • Fatigue    • Frequent urination    • History of colon polyps    • History of IBS    • History of kidney stones    • Hyperlipidemia    • Hypertension    • Hypothyroidism    • Insomnia    • Lumbago    • Malaise and fatigue    • Mood disorder (HCC)    • Neck pain     • Palpitations    • Peripheral edema 11/21/2022   • Precordial pain    • Sleep disturbance    • SOB (shortness of breath)        Past Surgical History:   Procedure Laterality Date   • BREAST BIOPSY     • CARDIAC CATHETERIZATION Left 1960   • CARDIAC CATHETERIZATION N/A 04/06/2018    Procedure: Coronary angiography;  Surgeon: Art Licea MD;  Location:  [facility];  Service: Cardiovascular   • CARDIAC CATHETERIZATION N/A 04/06/2018    Procedure: Left heart cath;  Surgeon: Art Licea MD;  Location:  [facility];  Service: Cardiovascular   • CARDIAC CATHETERIZATION N/A 04/06/2018    Procedure: Left ventriculography;  Surgeon: Art Licea MD;  Location:  [facility];  Service: Cardiovascular   • CHOLECYSTECTOMY     • COLONOSCOPY  11/08/2006   • EXPLORATORY LAPAROTOMY N/A 12/6/2022    Procedure: LAPAROTOMY EXPLORATORY sigmoid resection hartmans procedure colostomy;  Surgeon: Alfred Castañeda MD;  Location: [facility] OR;  Service: General;  Laterality: N/A;   • GANGLION CYST EXCISION     • HYSTERECTOMY  05/2005   • LAPAROSCOPIC GASTRIC BANDING     • TONSILLECTOMY         Family History   Problem Relation Age of Onset   • Hypertension Mother    • Rheum arthritis Mother    • Heart disease Mother    • Breast cancer Mother    • Diabetes Father    • Cancer Maternal Grandmother         colon   • Colon cancer Maternal Grandmother    • Aneurysm Paternal Grandfather    • Diabetes Other    • Fibromyalgia Other        Social History     Tobacco Use   • Smoking status: Every Day     Packs/day: 1.50     Years: 40.00     Pack years: 60.00     Types: Cigarettes     Start date: 1974   • Smokeless tobacco: Never   • Tobacco comments:     caffeine use 3 mt dews daily   Vaping Use   • Vaping Use: Never used   Substance Use Topics   • Alcohol use: No   • Drug use: Yes     Types: Marijuana     Comment: Prescribed Lorazepam       Review of Systems  All  systems were reviewed and negative except for:   Review of Systems   Constitutional: Negative for chills, fever and unexpected weight loss.   HENT: Negative for congestion, nosebleeds and voice change.    Eyes: Negative for blurred vision, double vision and discharge.   Respiratory: Negative for apnea, chest tightness and shortness of breath.    Cardiovascular: Negative for chest pain and leg swelling.   Gastrointestinal:        See HPI   Endocrine: Negative for cold intolerance and heat intolerance.   Genitourinary: Negative for dysuria, hematuria and urgency.   Musculoskeletal: Negative for back pain, joint swelling and neck pain.   Skin: Negative for color change and dry skin.   Neurological: Negative for dizziness and confusion.   Hematological: Negative for adenopathy.   Psychiatric/Behavioral: Negative for agitation and behavioral problems.     MEDS:  Prior to Admission medications    Medication Sig Start Date End Date Taking? Authorizing Provider   atorvastatin (LIPITOR) 20 MG tablet TAKE 1 TABLET BY MOUTH EVERY DAY 10/1/19  Yes Yudelka Manning MD   baclofen (LIORESAL) 20 MG tablet TAKE 1 TABLET BY MOUTH THREE TIMES DAILY 12/6/19  Yes Yudelka Manning MD   donepezil (ARICEPT) 10 MG tablet Take 10 mg by mouth Daily. 10/28/22  Yes ProviderBessie MD   gabapentin (NEURONTIN) 600 MG tablet TAKE 1 TABLET BY MOUTH AT BEDTIME 7/30/20  Yes Yudelka Manning MD   hydroCHLOROthiazide (HYDRODIURIL) 12.5 MG tablet Take 1 tablet by mouth Daily for 30 days. As needed for swelling 11/21/22 12/21/22 Yes Donald Barker MD   HYDROcodone-acetaminophen (NORCO) 5-325 MG per tablet TAKE 1 TABLET BY MOUTH EVERY 6 HOURS AS NEEDED FOR PAIN 11/28/22  Yes Donald Barker MD   letrozole (FEMARA) 2.5 MG tablet Take 1 tablet by mouth Daily. 11/30/22  Yes Donald Barker MD   levothyroxine (SYNTHROID, LEVOTHROID) 50 MCG tablet TAKE 1 TABLET BY MOUTH EVERY DAY 7/19/19  Yes Yudelka Manning MD   LORazepam (ATIVAN)  0.5 MG tablet Take 0.5 mg by mouth Every 6 (Six) Hours As Needed.   Yes Bessie Lopez MD   losartan (COZAAR) 100 MG tablet Take 100 mg by mouth Daily.   Yes Bessie Lopez MD   montelukast (SINGULAIR) 10 MG tablet Take 10 mg by mouth Daily. 1/17/22  Yes Bessie Lopez MD   Morphine (MS CONTIN) 15 MG 12 hr tablet Take 1 tablet by mouth 2 (Two) Times a Day. 12/13/22  Yes Raudel Grande MD   naproxen (NAPROSYN) 500 MG tablet Take 500 mg by mouth 2 (Two) Times a Day With Meals.   Yes Bessie Lopez MD   nicotine (NICODERM CQ) 21 MG/24HR patch Place 1 patch on the skin as directed by provider Daily for 7 days. 12/14/22 12/21/22 Yes Augusto Ladd MD   ondansetron (ZOFRAN) 8 MG tablet Take 1 tablet by mouth 3 (Three) Times a Day As Needed for Nausea or Vomiting. 10/13/22  Yes Donald Barker MD   oxyCODONE-acetaminophen (Percocet)  MG per tablet Take 1 tablet by mouth Every 6 (Six) Hours As Needed for Moderate Pain. 12/13/22 12/13/23 Yes Alfred Castañeda MD   polyethylene glycol (MIRALAX) 17 g packet Take 17 g by mouth Daily. 12/13/22  Yes Alfred Castañeda MD   rOPINIRole (REQUIP) 3 MG tablet Take 3 mg by mouth Every Evening. 6/29/22  Yes Bessie Lopez MD   solifenacin (VESICARE) 10 MG tablet Take 1 tablet by mouth Daily for 360 days. 10/25/22 10/20/23 Yes Destinee Myers MD   traZODone (DESYREL) 150 MG tablet TAKE 1 TABLET BY MOUTH ONCE nightly 11/12/19  Yes Yudelka Manning MD   venlafaxine XR (EFFEXOR-XR) 150 MG 24 hr capsule Take 1 capsule by mouth Daily. 11/21/22  Yes Donald Barker MD   ribociclib succinate 200 MG tablet therapy pack tablet Take 3 tablets by mouth Take As Directed. Take 3 tablets by mouth daily for 21 days then off 7 days on a 28 day cycle. 10/19/22   Donald Barker MD   SUMAtriptan (IMITREX) 100 MG tablet Take 100 mg by mouth 1 (One) Time As Needed. 10/7/22   Provider, MD Bessie        atorvastatin, 20 mg, Oral,  Nightly  baclofen, 20 mg, Oral, TID  donepezil, 10 mg, Oral, Daily  gabapentin, 600 mg, Oral, Nightly  letrozole, 2.5 mg, Oral, Daily  levothyroxine, 50 mcg, Oral, Q AM  montelukast, 10 mg, Oral, Daily  Morphine, 15 mg, Oral, BID  nicotine, 1 patch, Transdermal, Q24H  pantoprazole, 40 mg, Intravenous, Q12H  rOPINIRole, 3 mg, Oral, Q PM  sodium chloride, 10 mL, Intravenous, Q12H  venlafaxine XR, 150 mg, Oral, Daily         lactated ringers, 100 mL/hr, Last Rate: 100 mL/hr (12/16/22 0622)         Allergies:  Azithromycin    Objective     Vital Signs   Temp:  [98 °F (36.7 °C)-98.8 °F (37.1 °C)] 98.8 °F (37.1 °C)  Heart Rate:  [65-80] 74  Resp:  [14-18] 16  BP: (102-126)/(48-64) 102/48    Physical Exam:     General Appearance:    Alert, cooperative, in no acute distress   Head:    Normocephalic, without obvious abnormality, atraumatic   Eyes:          Conjunctivae and sclerae normal, no icterus,     Ears:    Ears appear intact with no abnormalities noted   Throat:   No oral lesions, no thrush, oral mucosa moist   Neck:   No adenopathy, supple, trachea midline, no thyromegaly   Back:     No kyphosis present, no scoliosis present, no skin lesions,      erythema or scars, no tenderness to percussion or                   palpation,   range of motion normal   Lungs:     Clear to auscultation,respirations regular, even and                  unlabored    Heart:    Regular rhythm and normal rate, normal S1 and S2, no            murmur, no gallop, no rub, no click   Chest Wall:    No abnormalities observed   Abdomen:     Normal bowel sounds, no masses, no organomegaly, soft        non-tender, non-distended, no guarding, no rebound                tenderness, midline incision without evidence of infection, some ischemic changes to the stoma with no evidence of necrosis below the fascia   Rectal:        Extremities:   Moves all extremities well, no edema, no cyanosis, no             redness   Pulses:   Pulses palpable and equal  bilaterally   Skin:   No bleeding, bruising or rash   Lymph nodes:   No palpable adenopathy   Neurologic:   A/o x 4 with no deficits       Results Review: I have reviewed the patient's labs and imaging    LABS/IMAGING:    Imaging Results (Last 72 Hours)     ** No results found for the last 72 hours. **           Lab Results (last 72 hours)     Procedure Component Value Units Date/Time    Comprehensive Metabolic Panel [[id number]]  (Abnormal) Collected: 12/16/22 0434    Specimen: Blood Updated: 12/16/22 0557     Glucose 83 mg/dL      BUN 6 mg/dL      Creatinine 0.52 mg/dL      Sodium 142 mmol/L      Potassium 3.7 mmol/L      Chloride 106 mmol/L      CO2 28.0 mmol/L      Calcium 7.7 mg/dL      Total Protein 5.8 g/dL      Albumin 2.70 g/dL      ALT (SGPT) 15 U/L      AST (SGOT) 20 U/L      Alkaline Phosphatase 110 U/L      Total Bilirubin 0.2 mg/dL      Globulin 3.1 gm/dL      A/G Ratio 0.9 g/dL      BUN/Creatinine Ratio 11.5     Anion Gap 8.0 mmol/L      eGFR 104.5 mL/min/1.73      Comment: National Kidney Foundation and American Society of Nephrology (ASN) Task Force recommended calculation based on the Chronic Kidney Disease Epidemiology Collaboration (CKD-EPI) equation refit without adjustment for race.       Narrative:      GFR Normal >60  Chronic Kidney Disease <60  Kidney Failure <15      CBC Auto Differential [[id number]]  (Abnormal) Collected: 12/16/22 0434    Specimen: Blood Updated: 12/16/22 0533     WBC 8.83 10*3/mm3      RBC 3.23 10*6/mm3      Hemoglobin 9.9 g/dL      Hematocrit 31.6 %      MCV 97.8 fL      MCH 30.7 pg      MCHC 31.3 g/dL      RDW 17.5 %      RDW-SD 62.6 fl      MPV 10.3 fL      Platelets 350 10*3/mm3      Neutrophil % 78.4 %      Lymphocyte % 10.6 %      Monocyte % 8.2 %      Eosinophil % 1.7 %      Basophil % 0.6 %      Immature Grans % 0.5 %      Neutrophils, Absolute 6.93 10*3/mm3      Lymphocytes, Absolute 0.94 10*3/mm3      Monocytes, Absolute 0.72 10*3/mm3      Eosinophils, Absolute  0.15 10*3/mm3      Basophils, Absolute 0.05 10*3/mm3      Immature Grans, Absolute 0.04 10*3/mm3      nRBC 0.0 /100 WBC     Urinalysis, Microscopic Only - Urine, Clean Catch [[id number]]  (Abnormal) Collected: 12/15/22 1834    Specimen: Urine, Clean Catch Updated: 12/15/22 2018     RBC, UA None Seen /HPF      WBC, UA 3-5 /HPF      Bacteria, UA None Seen /HPF      Squamous Epithelial Cells, UA 7-12 /HPF      Yeast, UA Small/1+ Budding Yeast /HPF      Hyaline Casts, UA 0-2 /LPF      Methodology Manual Light Microscopy    Urinalysis With Microscopic If Indicated (No Culture) - Urine, Clean Catch [[id number]]  (Abnormal) Collected: 12/15/22 1834    Specimen: Urine, Clean Catch Updated: 12/15/22 1915     Color, UA Yellow     Appearance, UA Clear     pH, UA 6.5     Specific Gravity, UA 1.007     Glucose, UA Negative     Ketones, UA Negative     Bilirubin, UA Negative     Blood, UA Trace     Protein, UA Negative     Leuk Esterase, UA Small (1+)     Nitrite, UA Negative     Urobilinogen, UA 0.2 E.U./dL    Comprehensive Metabolic Panel [[id number]]  (Abnormal) Collected: 12/15/22 1741    Specimen: Blood Updated: 12/15/22 1815     Glucose 122 mg/dL      BUN 9 mg/dL      Creatinine 0.67 mg/dL      Sodium 140 mmol/L      Potassium 3.5 mmol/L      Chloride 101 mmol/L      CO2 29.5 mmol/L      Calcium 8.3 mg/dL      Total Protein 7.0 g/dL      Albumin 3.40 g/dL      ALT (SGPT) 17 U/L      AST (SGOT) 18 U/L      Alkaline Phosphatase 101 U/L      Total Bilirubin 0.3 mg/dL      Globulin 3.6 gm/dL      A/G Ratio 0.9 g/dL      BUN/Creatinine Ratio 13.4     Anion Gap 9.5 mmol/L      eGFR 98.4 mL/min/1.73      Comment: National Kidney Foundation and American Society of Nephrology (ASN) Task Force recommended calculation based on the Chronic Kidney Disease Epidemiology Collaboration (CKD-EPI) equation refit without adjustment for race.       Narrative:      GFR Normal >60  Chronic Kidney Disease <60  Kidney Failure <15      [place] Draw  [[id number]] Collected: 12/15/22 1741    Specimen: Blood Updated: 12/15/22 1814    Narrative:      The following orders were created for panel order [place] Draw.  Procedure                               Abnormality         Status                     ---------                               -----------         ------                     Green Top (Gel)[[id number]]                                  Final result               Lavender Top[[id number]]                                     Final result               Gold Top - SST[[id number]]                                   Final result               Light Blue Top[[id number]]                                   Final result                 Please view results for these tests on the individual orders.    Green Top (Gel) [[id number]] Collected: 12/15/22 1741    Specimen: Blood Updated: 12/15/22 1814     Extra Tube Hold for add-ons.     Comment: Auto resulted.       Lipase [[id number]]  (Normal) Collected: 12/15/22 1741    Specimen: Blood Updated: 12/15/22 1814     Lipase 24 U/L     Lactic Acid, Plasma [[id number]]  (Normal) Collected: 12/15/22 1741    Specimen: Blood Updated: 12/15/22 1812     Lactate 1.1 mmol/L     CBC & Differential [[id number]]  (Abnormal) Collected: 12/15/22 1741    Specimen: Blood Updated: 12/15/22 1752    Narrative:      The following orders were created for panel order CBC & Differential.  Procedure                               Abnormality         Status                     ---------                               -----------         ------                     CBC Auto Differential[[id number]]        Abnormal            Final result                 Please view results for these tests on the individual orders.    CBC Auto Differential [[id number]]  (Abnormal) Collected: 12/15/22 1741    Specimen: Blood Updated: 12/15/22 1752     WBC 10.58 10*3/mm3      RBC 3.44 10*6/mm3      Hemoglobin 10.7 g/dL      Hematocrit 33.1 %      MCV 96.2 fL      MCH 31.1 pg      MCHC  32.3 g/dL      RDW 17.6 %      RDW-SD 62.2 fl      MPV 10.4 fL      Platelets 363 10*3/mm3      Neutrophil % 81.5 %      Lymphocyte % 9.7 %      Monocyte % 6.7 %      Eosinophil % 1.0 %      Basophil % 0.3 %      Immature Grans % 0.8 %      Neutrophils, Absolute 8.62 10*3/mm3      Lymphocytes, Absolute 1.03 10*3/mm3      Monocytes, Absolute 0.71 10*3/mm3      Eosinophils, Absolute 0.11 10*3/mm3      Basophils, Absolute 0.03 10*3/mm3      Immature Grans, Absolute 0.08 10*3/mm3      nRBC 0.0 /100 WBC     Lavender Top [[id number]] Collected: 12/15/22 1741    Specimen: Blood Updated: 12/15/22 1749     Extra Tube hold for add-on     Comment: Auto resulted       Gold Top - SST [[id number]] Collected: 12/15/22 1741    Specimen: Blood Updated: 12/15/22 1749     Extra Tube Hold for add-ons.     Comment: Auto resulted.       Light Blue Top [[id number]] Collected: 12/15/22 1741    Specimen: Blood Updated: 12/15/22 1749     Extra Tube Hold for add-ons.     Comment: Auto resulted                Result Review :     Assessment & Plan     Post-operative pain    Abdominal pain  Ischemia to stoma    I have reviewed her work-up.  I discussed the findings with the patient.  I examined her stoma at the bedside with no evidence of necrosis extending below the fascia.  I explained to her some of the stoma lining will slough off over the next week or so.  I reviewed stoma care.  Follow-up with me next Tuesday.  Okay for diet as tolerated.  Okay for home meds.  Okay for discharge today.  All questions answered.  She agrees with the plan.          Alfred Castañeda MD  12/16/22 08:42 EST

## 2022-12-16 NOTE — DISCHARGE SUMMARY
[facility]         HOSPITALIST  DISCHARGE SUMMARY    Patient Name: Derek Keller  : 1960  MRN: [id number]    Date of Admission: 12/15/2022  Date of Discharge:  2022    Primary Care Physician: Haile Monique MD    Consults     Date and Time Order Name Status Description    2022 12:42 AM Inpatient General Surgery Consult Completed     12/15/2022  6:34 PM Hospitalist (on-call MD unless specified)      12/15/2022  4:30 PM Surgery (on-call MD unless specified) Completed           Active and Resolved Hospital Problems:  • Postoperative bleeding  Patient Active Problem List   Diagnosis       • Hypertension   • Hyperlipidemia   • Allergic rhinitis   • Hypothyroidism       • Lumbago       • Chronic pain disorder   • History of IBS   • Anxiety   • Monopolar depression (HCC)       • Tobacco abuse   • Fibromyalgia   • Vitamin B 12 deficiency       • Primary osteoarthritis of left knee   • Osteoarthrosis, localized, primary, knee   • Generalized abdominal pain   •        • Cough       • Restless leg syndrome       • Primary insomnia   • Chronic fatigue   • Asthma   •    • Constipation   • Degeneration of lumbar intervertebral disc   •    • Hearing loss           • Inguinal pain   • Irritable bowel syndrome   • Cervical spondylosis           • Malignant neoplasm of left breast in female, estrogen receptor positive (HCC)   • Cancer related pain   • Bone metastases (HCC)   • Secondary malignant neoplasm of bone (HCC)   • Peripheral edema   • Peritonitis (HCC)   • Leukopenia   • Metastatic breast cancer (HCC)   • S/P ex lap with sigmoid resection, Lynn's procedure for stercoral perforation       Active Hospital Problems   No active problems to display.      Resolved Hospital Problems    Diagnosis POA   • **Post-operative pain [G89.18] Yes   • Abdominal pain [R10.9] Yes       Hospital Course     Hospital Course:  62 y.o. female past medical history of breast cancer with metastatic disease  the presents to the emergency department for evaluation of abdominal pain and ostomy discoloration with bleeding.  She underwent sigmoid resection with ostomy placement on December 6 2022.  At that time diagnosed with peritonitis and completed a course of IV antibiotics. Patient was discharged on December 13 2022.   Patient stated she was seen by home health nursing on 12/15/2022 who saw that and her ostomy appeared discolored and had her call her surgeon.  She was then evaluated at the surgical office and told to go to the emergency room for admission.  She did endorse abdominal pain but states that that has been an ongoing issue for months.  On 12/15/2022 the home health nurse noticed the discoloration but apparently the blood in the ostomy bag developed afterwards.  She denied any other fevers, chills, sweats, nausea, vomiting, chest pain or shortness of breath, palpitations, dysuria, weakness, rash.  Work-up in the emergency department was otherwise unrevealing.  Surgery was called from the emergency department recommend admission IV hydration and subsequent evaluation the following morning.  The patient was evaluated the following morning and stoma's lining was noted as sloughing off, and there is no signs of necrosis extending below the fascia.  Cleared by surgery for discharge and follow-up as outpatient.  Patient seen and examined on day of discharge, no acute distress, no acute major night events, patient stoma had mild blood-tinged output but appeared to be better perfused.  Patient denied fevers, chills, sweats.  Discussed with general surgery disposition Home plan.  Plans to follow-up next week in general surgery's office on 12/20/2022.      Day of Discharge     Vital Signs:  Temp:  [98 °F (36.7 °C)-98.8 °F (37.1 °C)] 98.8 °F (37.1 °C)  Heart Rate:  [65-80] 74  Resp:  [14-18] 16  BP: (102-126)/(48-64) 102/48  Review of systems:  All systems reviewed and negative except for generalized fatigue    Physical  exam:     Constitutional: Awake, alert, no acute distress   Eyes: Pupils equal, sclerae anicteric, no conjunctival injection   HENT: NCAT, mucous membranes moist   Neck: Supple, no thyromegaly, no lymphadenopathy, trachea midline   Respiratory: Clear to auscultation bilaterally, nonlabored respirations    Cardiovascular: RRR, no murmurs, rubs, or gallops, palpable pedal pulses bilaterally   Gastrointestinal: Positive bowel sounds, soft, nontender, nondistended, midline staples intact, stoma pink with mild sloughing of the tissue with blood in bag but no active bleeding   Musculoskeletal: No bilateral ankle edema, no clubbing or cyanosis to extremities   Psychiatric: Appropriate affect, cooperative   Neurologic: Oriented x 3, strength symmetric in all extremities, Cranial Nerves grossly intact to confrontation, speech clear   Skin: No rashes visible on exposed skin      Discharge Details        Discharge Medications      Continue These Medications      Instructions Start Date   atorvastatin 20 MG tablet  Commonly known as: LIPITOR   TAKE 1 TABLET BY MOUTH EVERY DAY      baclofen 20 MG tablet  Commonly known as: LIORESAL   TAKE 1 TABLET BY MOUTH THREE TIMES DAILY      donepezil 10 MG tablet  Commonly known as: ARICEPT   10 mg, Oral, Daily      gabapentin 600 MG tablet  Commonly known as: NEURONTIN   TAKE 1 TABLET BY MOUTH AT BEDTIME      HYDROcodone-acetaminophen 5-325 MG per tablet  Commonly known as: NORCO   TAKE 1 TABLET BY MOUTH EVERY 6 HOURS AS NEEDED FOR PAIN      Kisqali (600 MG Dose) 200 MG tablet therapy pack tablet  Generic drug: ribociclib succinate   600 mg, Oral, Take As Directed, Take 3 tablets by mouth daily for 21 days then off 7 days on a 28 day cycle.      letrozole 2.5 MG tablet  Commonly known as: FEMARA   2.5 mg, Oral, Daily      levothyroxine 50 MCG tablet  Commonly known as: SYNTHROID, LEVOTHROID   TAKE 1 TABLET BY MOUTH EVERY DAY      LORazepam 0.5 MG tablet  Commonly known as: ATIVAN   0.5 mg,  Oral, Every 6 Hours PRN      losartan 100 MG tablet  Commonly known as: COZAAR   100 mg, Oral, Daily      montelukast 10 MG tablet  Commonly known as: SINGULAIR   10 mg, Oral, Daily      Morphine 15 MG 12 hr tablet  Commonly known as: MS CONTIN   15 mg, Oral, 2 Times Daily      naproxen 500 MG tablet  Commonly known as: NAPROSYN   500 mg, Oral, 2 Times Daily With Meals      nicotine 21 MG/24HR patch  Commonly known as: NICODERM CQ   1 patch, Transdermal, Every 24 Hours Scheduled      ondansetron 8 MG tablet  Commonly known as: ZOFRAN   8 mg, Oral, 3 Times Daily PRN      oxyCODONE-acetaminophen  MG per tablet  Commonly known as: Percocet   1 tablet, Oral, Every 6 Hours PRN      polyethylene glycol 17 g packet  Commonly known as: MIRALAX   17 g, Oral, Daily      rOPINIRole 3 MG tablet  Commonly known as: REQUIP   3 mg, Oral, Every Evening      solifenacin 10 MG tablet  Commonly known as: VESICARE   10 mg, Oral, Daily      SUMAtriptan 100 MG tablet  Commonly known as: IMITREX   100 mg, Oral, Once As Needed      traZODone 150 MG tablet  Commonly known as: DESYREL   TAKE 1 TABLET BY MOUTH ONCE nightly      venlafaxine  MG 24 hr capsule  Commonly known as: EFFEXOR-XR   150 mg, Oral, Daily         Stop These Medications    hydroCHLOROthiazide 12.5 MG tablet  Commonly known as: HYDRODIURIL            Allergies   Allergen Reactions   • Azithromycin Itching       Discharge Disposition:  Home or Self Care    Diet:  Hospital:  Diet Order   Procedures   • NPO Diet NPO Type: Strict NPO       Discharge Activity: as tolerates      CODE STATUS:  Code Status and Medical Interventions:   Ordered at: 12/15/22 1844     Code Status (Patient has no pulse and is not breathing):    CPR (Attempt to Resuscitate)     Medical Interventions (Patient has pulse or is breathing):    Full Support         Future Appointments   Date Time Provider Department Center   12/21/2022  1:40 PM [facility]    12/27/2022 12:45 PM [place] [facility] [place] [facility]   12/27/2022  1:00 PM [place] [facility] [place] [facility]   12/27/2022  1:15 PM INJ ROOM 01 [facility] OP INFU [facility] OPIF [facility]       Additional Instructions for the Follow-ups that You Need to Schedule     Discharge Follow-up with PCP   As directed       Currently Documented PCP:    Haile Monique MD    PCP Phone Number:    [phone]     Follow Up Details: 3 to 7 days         Discharge Follow-up with Specified Provider: Dr Castañeda in 1 week   As directed      To: Dr Castañeda in 1 week               Pertinent  and/or Most Recent Results     PROCEDURES:   MRI Pelvis With & Without Contrast    Result Date: 12/2/2022  PROCEDURE: MRI PELVIS W WO CONTRAST  COMPARISON: [facility], PET, NM PET/CT SKULL BASE TO MID THIGH, 9/26/2022, 12:08.  [facility], CT, CT ABDOMEN PELVIS W WO CONTRAST, 8/05/2022, 11:48.  [facility], MR, MRI LUMBAR SPINE W WO CONTRAST, 12/01/2022, 12:35.  INDICATIONS: MRI SACRUM; METASTATIC BREAST CA. LOWER BACK PAIN INTO TAILBONE.      TECHNIQUE: Multiplanar multisequence images of the brain with and without intravenous gadolinium.  FINDINGS: There are innumerable osseous metastatic lesions throughout the lower lumbar spine, pelvis and sacrum.  The lesions have decreased T1 signal intensity and increased T2 signal intensity.  There is enhancement following the administration of gadolinium.  No discrete soft tissue masses are identified.  There is soft tissue edema and enhancement along dorsal aspect of the sacrococcygeal junction.  Prior hysterectomy.  No evidence of adenopathy.  IMPRESSION:  Widespread osseous metastatic disease.  TRINIDAD HERNANDEZ MD       Electronically Signed and Approved By: TRINIDAD HERNANDEZ MD on 12/02/2022 at 8:07             CT Abdomen Pelvis With Contrast    Result Date: 12/6/2022  PROCEDURE: CT ABDOMEN PELVIS W CONTRAST  COMPARISON: [facility], CT, CT  ABDOMEN PELVIS W WO CONTRAST, 8/05/2022, 11:48.  INDICATIONS: abdominal pain, metastatic breast cancer  TECHNIQUE: After obtaining the patient's consent, CT images were created with non-ionic intravenous contrast material.   PROTOCOL:   Standard imaging protocol performed    RADIATION:   DLP: 979.8mGy*cm   Automated exposure control was utilized to minimize radiation dose. CONTRAST: 100cc Isovue 370 I.V. LABS:   eGFR: 93ml/min/1.73m2  FINDINGS:  Mostly linear opacities in the dependent lower lobes are likely due to subsegmental atelectasis.  Gallbladder is surgically absent.  Intrahepatic/extrahepatic biliary ductal dilatation is mildly increased compared to 8/5/2022 CT.  Extrahepatic common bile duct measures up to 1.5 cm, previously 1.2 cm.  No definite cause of biliary obstruction is visualized on CT.  There is mild dilatation of the main pancreatic duct.  Bilateral renal cysts are stable.  Adrenal glands and spleen appear unremarkable.  No abnormal bowel distention is seen, but there is mild wall thickening of distal small bowel in the right lower quadrant with small amount of interloop fluid noted.  There is a small amount of free pelvic fluid, as well as fluid in the right paracolic gutter and perihepatic fluid.  There are few foci of free intraperitoneal air (anterior to liver on axial image 18 and 20, anterior abdomen on axial image 46), as well as foci of extraluminal air adjacent to distal small bowel loops in the pelvis.  No pneumatosis or mesenteric/portal venous gas is seen.  Appendix is nondilated, and there is no significant periappendiceal inflammation.  There is stool throughout the colon.  No urinary bladder wall thickening is seen.  No adenopathy is identified in the abdomen or pelvis.  There are atherosclerotic vascular calcifications.  Numerous sclerotic lesions are redemonstrated, consistent with osseous metastatic disease.  No pathologic fractures are seen.        1. Wall  thickening/enhancement of distal small bowel in the right lower quadrant with a small amount of interloop fluid, as well as a small amount of free pelvic fluid, right paracolic gutter fluid, and perihepatic fluid, concerning for nonspecific enteritis or possible bowel ischemia. 2. Few foci of free intraperitoneal air, which may be from perforation of abnormal distal small bowel, given the location of several foci of extraluminal air adjacent to distal small bowel loops.  Recommend surgical consultation. 3. Mildly increased intrahepatic/extrahepatic biliary ductal dilatation without obvious cause of obstruction. 4. Diffuse osseous metastatic disease.   This report was communicated by telephone to Dr. Lozada at the dictation time shown below.    STEPHANIE STORY MD       Electronically Signed and Approved By: STEPHANIE STORY MD on 12/06/2022 at 16:13             MRI Lumbar Spine With & Without Contrast    Result Date: 12/1/2022  PROCEDURE: MRI LUMBAR SPINE W WO CONTRAST  COMPARISON: Other, MR, LUMBAR SPINE WITHOUT CONTRAST, 2/06/2020, 14:12.  INDICATIONS: METASTATIC BREAST CA. LOWER BACK PAIN RADIATING INTO BOTH LEGS AND TAILBONE.  CONTRAST: 15ML  Multihance I.V.  TECHNIQUE: A comprehensive examination was performed utilizing a variety of imaging planes and imaging parameters to optimize visualization of suspected pathology.  Images were performed before and after the administration of intravenous gadolinium contrast.  FINDINGS:  Five lumbar vertebral bodies are identified and appear in anatomic alignment.  There is mild to moderate narrowing of the L1-L4 discs and mild narrowing of the L4-L5 and L5-S1 discs.  There is diffuse disc desiccation.  Vertebral bodies maintain normal height.  There are too numerous to count low T1 high T2 rounded lesions scattered throughout all of the visible bones of the thoracolumbar spine, sacrum and pelvis.  There are more than 50 lesions.  One of the larger lesions occurs at the T11  vertebral body and measures up to 25 mm diameter.  A 2nd large lesion involves the right-side of the L5 vertebral body and measures up to 29 mm diameter.  There is no evidence of pathologic fracture.  The distal spinal cord and conus medullaris appear unremarkable terminating at the L1-L2 level. Limited view of the retroperitoneal structures is unremarkable. Paraspinal musculature is well preserved. No evidence of leptomeningeal or intramedullary/intradural metastatic disease.  L1-L2:  There is concentric disc bulge with a tiny central disc protrusion.  There is mild facet and ligamentum flavum hypertrophy.  There is moderate right and mild left neural foraminal narrowing without spinal canal stenosis.  L2-L3:  There is concentric disc bulge and marginal osteophyte formation with mild facet and ligamentum flavum hypertrophy.  There is mild bilateral neural foraminal narrowing and mild narrowing of the spinal canal.  L3-L4:  There is concentric disc bulge and marginal osteophyte formation with mild facet and ligamentum flavum hypertrophy.  There is moderate bilateral neural foraminal narrowing and moderate narrowing of the spinal canal.  L4-L5:  There is concentric disc bulge and marginal osteophyte formation with moderate bilateral facet and ligamentum flavum hypertrophy.  There is moderate to severe bilateral neural foraminal narrowing.  L5-S1:  There is concentric disc bulge and marginal osteophyte formation.  There is moderate to severe right and moderate left facet hypertrophy.  There is moderate to severe right and moderate left neural foraminal narrowing without spinal canal compromise.         1. Too numerous to count osseous metastasis throughout the lumbar spine and pelvis.  No pathologic fracture or evidence of intradural metastasis. 2. Moderate lumbar spondylosis with varying degrees of neural foraminal and spinal canal narrowing as described in detail above, progressive since 2020.      ADRIANA VALLES MD        Electronically Signed and Approved By: ADRIANA VALLES MD on 12/01/2022 at 16:38               LAB RESULTS:      Lab 12/16/22  0915 12/16/22  0434 12/15/22  1741 12/10/22  0435   WBC  --  8.83 10.58 4.82   HEMOGLOBIN 9.9* 9.9* 10.7* 14.3   HEMATOCRIT 30.8* 31.6* 33.1* 44.7   PLATELETS  --  350 363 146   NEUTROS ABS  --  6.93 8.62* 3.50   IMMATURE GRANS (ABS)  --  0.04 0.08* 0.04   LYMPHS ABS  --  0.94 1.03 0.59*   MONOS ABS  --  0.72 0.71 0.57   EOS ABS  --  0.15 0.11 0.05   MCV  --  97.8* 96.2 98.7*   LACTATE  --   --  1.1  --          Lab 12/16/22  0434 12/15/22  1741 12/12/22  0525 12/11/22  1155 12/10/22  1022   SODIUM 142 140 136  --  140   POTASSIUM 3.7 3.5 4.2  --  3.5   CHLORIDE 106 101 105  --  103   CO2 28.0 29.5* 21.8*  --  22.1   ANION GAP 8.0 9.5 9.2  --  14.9   BUN 6* 9 9  --  13   CREATININE 0.52* 0.67 0.54*  --  0.60   EGFR 104.5 98.4 103.6  --  101.0   GLUCOSE 83 122* 95  --  109*   CALCIUM 7.7* 8.3* 7.3*  --  8.2*   MAGNESIUM  --   --  2.2  --   --    PHOSPHORUS  --   --  2.0* 1.6*  --          Lab 12/16/22 0434 12/15/22  1741   TOTAL PROTEIN 5.8* 7.0   ALBUMIN 2.70* 3.40*   GLOBULIN 3.1 3.6   ALT (SGPT) 15 17   AST (SGOT) 20 18   BILIRUBIN 0.2 0.3   ALK PHOS 110 101   LIPASE  --  24                 Lab 12/16/22  0123   ABO TYPING O   RH TYPING Negative   ANTIBODY SCREEN Negative         Brief Urine Lab Results  (Last result in the past 365 days)      Color   Clarity   Blood   Leuk Est   Nitrite   Protein   CREAT   Urine HCG        12/15/22 1834 Yellow   Clear   Trace   Small (1+)   Negative   Negative               Microbiology Results (last 10 days)     Procedure Component Value - Date/Time    Anaerobic Culture - Body Fluid, Abdominal Wall [[id number]]  (Abnormal) Collected: 12/06/22 1816    Lab Status: Final result Specimen: Body Fluid from Abdominal Wall Updated: 12/11/22 0615     Anaerobic Culture Anaerobe (Organism type)     Comment: Mixed anaerobes isolated.       Body Fluid Culture -  Body Fluid, Abdominal Wall [[id number]] Collected: 12/06/22 1816    Lab Status: Final result Specimen: Body Fluid from Abdominal Wall Updated: 12/09/22 0853     Body Fluid Culture No growth at 3 days     Gram Stain Rare (1+) Gram negative bacilli      Occasional Gram positive cocci          CT Abdomen Pelvis With Contrast    Result Date: 12/6/2022  Impression:   1. Wall thickening/enhancement of distal small bowel in the right lower quadrant with a small amount of interloop fluid, as well as a small amount of free pelvic fluid, right paracolic gutter fluid, and perihepatic fluid, concerning for nonspecific enteritis or possible bowel ischemia. 2. Few foci of free intraperitoneal air, which may be from perforation of abnormal distal small bowel, given the location of several foci of extraluminal air adjacent to distal small bowel loops.  Recommend surgical consultation. 3. Mildly increased intrahepatic/extrahepatic biliary ductal dilatation without obvious cause of obstruction. 4. Diffuse osseous metastatic disease.   This report was communicated by telephone to Dr. Lozada at the dictation time shown below.    STEPHANIE STORY MD       Electronically Signed and Approved By: STEPHANIE STORY MD on 12/06/2022 at 16:13             MRI Lumbar Spine With & Without Contrast    Result Date: 12/1/2022  Impression:   1. Too numerous to count osseous metastasis throughout the lumbar spine and pelvis.  No pathologic fracture or evidence of intradural metastasis. 2. Moderate lumbar spondylosis with varying degrees of neural foraminal and spinal canal narrowing as described in detail above, progressive since 2020.      ADRIANA VALLES MD       Electronically Signed and Approved By: ADRIANA VALLES MD on 12/01/2022 at 16:38               Results for orders placed in visit on 08/28/14    SCANNED - ECHOCARDIOGRAM      Labs Pending at Discharge: None        Time spent on Discharge including face to face service:  35  minutes    Electronically signed by Fabrice Alan MD, 12/16/22, 11:37 AM EST.    Portions of this documentation were transcribed electronically from a voice recognition software.  I confirm all data accurately represents the service(s) I performed at today's visit.

## 2022-12-16 NOTE — PROGRESS NOTES
General Surgery/Colorectal Surgery Note    Patient Name:  Derek Keller  YOB: 1960  [id number]    Referring Provider: No ref. provider found      Patient Care Team:  Haile Monique MD as PCP - General (Family Medicine)  Julien Cardona DO as Consulting Physician (Pain Medicine)  Joel Castillo MD as Consulting Physician (Gastroenterology)  Remington Russell MD as Surgeon (General Surgery)  Ahsan Salas MD as Consulting Physician (Sports Medicine)  Donald Barker MD as Consulting Physician (Hematology and Oncology)  Sandy Ricci MD as Consulting Physician (General Surgery)    Chief complaint follow-up for    Subjective .     History of present illness:    History of metastatic breast cancer  Status post ex lap with Lynn's procedure for perforated sigmoid colon 12/6/2022.  Pathology with metastatic lobular breast carcinoma    She comes in for follow-up.  She was doing well up until the past few days where she has had increasing abdominal pain, nausea.  She is also noted that her stoma has become more dusky.  Decreased p.o. intake.  No fever      History:  Past Medical History:   Diagnosis Date   • Abdominal pain    • Allergic rhinitis    • Anemia    • Anxiety    • Arteriosclerosis     Coronary   • Arthritis    • Bone metastases (HCC) 10/14/2022   • Bowen's disease    • Breast cancer (HCC)    • Cancer related pain 10/13/2022   • Chest pain    • Chronic pain disorder    • Cough    • Depression    • Dysphoric mood    • Fatigue    • Frequent urination    • History of colon polyps    • History of IBS    • History of kidney stones    • Hyperlipidemia    • Hypertension    • Hypothyroidism    • Insomnia    • Lumbago    • Malaise and fatigue    • Mood disorder (HCC)    • Neck pain    • Palpitations    • Peripheral edema 11/21/2022   • Precordial pain    • Sleep disturbance    • SOB (shortness of breath)        Past Surgical History:   Procedure Laterality Date   • BREAST BIOPSY     •  CARDIAC CATHETERIZATION Left 1960   • CARDIAC CATHETERIZATION N/A 04/06/2018    Procedure: Coronary angiography;  Surgeon: Art Licea MD;  Location:  [facility];  Service: Cardiovascular   • CARDIAC CATHETERIZATION N/A 04/06/2018    Procedure: Left heart cath;  Surgeon: Art Licea MD;  Location:  [facility];  Service: Cardiovascular   • CARDIAC CATHETERIZATION N/A 04/06/2018    Procedure: Left ventriculography;  Surgeon: Art Licea MD;  Location:  [facility];  Service: Cardiovascular   • CHOLECYSTECTOMY     • COLONOSCOPY  11/08/2006   • EXPLORATORY LAPAROTOMY N/A 12/6/2022    Procedure: LAPAROTOMY EXPLORATORY sigmoid resection hartmans procedure colostomy;  Surgeon: Alfred Castañeda MD;  Location: [facility] MAIN OR;  Service: General;  Laterality: N/A;   • GANGLION CYST EXCISION     • HYSTERECTOMY  05/2005   • LAPAROSCOPIC GASTRIC BANDING     • TONSILLECTOMY         Family History   Problem Relation Age of Onset   • Hypertension Mother    • Rheum arthritis Mother    • Heart disease Mother    • Breast cancer Mother    • Diabetes Father    • Cancer Maternal Grandmother         colon   • Colon cancer Maternal Grandmother    • Aneurysm Paternal Grandfather    • Diabetes Other    • Fibromyalgia Other        Social History     Tobacco Use   • Smoking status: Every Day     Packs/day: 1.50     Years: 40.00     Pack years: 60.00     Types: Cigarettes     Start date: 1974   • Smokeless tobacco: Never   • Tobacco comments:     caffeine use 3 mt dews daily   Vaping Use   • Vaping Use: Never used   Substance Use Topics   • Alcohol use: No   • Drug use: Yes     Types: Marijuana     Comment: Prescribed Lorazepam       Review of Systems  All systems were reviewed and negative except for:   Review of Systems   Constitutional: Negative for chills, fever and unexpected weight loss.   HENT: Negative for congestion, nosebleeds and voice change.     Eyes: Negative for blurred vision, double vision and discharge.   Respiratory: Negative for apnea, chest tightness and shortness of breath.    Cardiovascular: Negative for chest pain and leg swelling.   Gastrointestinal:        See HPI   Endocrine: Negative for cold intolerance and heat intolerance.   Genitourinary: Negative for dysuria, hematuria and urgency.   Musculoskeletal: Negative for back pain, joint swelling and neck pain.   Skin: Negative for color change and dry skin.   Neurological: Negative for dizziness and confusion.   Hematological: Negative for adenopathy.   Psychiatric/Behavioral: Negative for agitation and behavioral problems.     MEDS:  Prior to Admission medications    Medication Sig Start Date End Date Taking? Authorizing Provider   atorvastatin (LIPITOR) 20 MG tablet TAKE 1 TABLET BY MOUTH EVERY DAY 10/1/19  Yes Yudelka Manning MD   baclofen (LIORESAL) 20 MG tablet TAKE 1 TABLET BY MOUTH THREE TIMES DAILY 12/6/19  Yes Yudelka Manning MD   donepezil (ARICEPT) 10 MG tablet Take 10 mg by mouth Daily. 10/28/22  Yes ProviderBessie MD   gabapentin (NEURONTIN) 600 MG tablet TAKE 1 TABLET BY MOUTH AT BEDTIME 7/30/20  Yes Yudelka Manning MD   hydroCHLOROthiazide (HYDRODIURIL) 12.5 MG tablet Take 1 tablet by mouth Daily for 30 days. As needed for swelling 11/21/22 12/21/22 Yes Donald Barker MD   HYDROcodone-acetaminophen (NORCO) 5-325 MG per tablet TAKE 1 TABLET BY MOUTH EVERY 6 HOURS AS NEEDED FOR PAIN 11/28/22  Yes Donald Barker MD   letrozole (FEMARA) 2.5 MG tablet Take 1 tablet by mouth Daily. 11/30/22  Yes Donald Barker MD   levothyroxine (SYNTHROID, LEVOTHROID) 50 MCG tablet TAKE 1 TABLET BY MOUTH EVERY DAY 7/19/19  Yes Yudelka Manning MD   LORazepam (ATIVAN) 0.5 MG tablet Take 0.5 mg by mouth Every 6 (Six) Hours As Needed.   Yes ProviderBessie MD   losartan (COZAAR) 100 MG tablet Take 100 mg by mouth Daily.   Yes Bessie Lopez MD   montelukast  (SINGULAIR) 10 MG tablet Take 10 mg by mouth Daily. 1/17/22  Yes Bessie Lopez MD   Morphine (MS CONTIN) 15 MG 12 hr tablet Take 1 tablet by mouth 2 (Two) Times a Day. 12/13/22  Yes Raudel Grande MD   naproxen (NAPROSYN) 500 MG tablet Take 500 mg by mouth 2 (Two) Times a Day With Meals.   Yes Bessie Lopez MD   ondansetron (ZOFRAN) 8 MG tablet Take 1 tablet by mouth 3 (Three) Times a Day As Needed for Nausea or Vomiting. 10/13/22  Yes Donald Barker MD   oxyCODONE-acetaminophen (Percocet)  MG per tablet Take 1 tablet by mouth Every 6 (Six) Hours As Needed for Moderate Pain. 12/13/22 12/13/23 Yes Alfred Castañeda MD   polyethylene glycol (MIRALAX) 17 g packet Take 17 g by mouth Daily. 12/13/22  Yes Alfred Castañeda MD   ribociclib succinate 200 MG tablet therapy pack tablet Take 3 tablets by mouth Take As Directed. Take 3 tablets by mouth daily for 21 days then off 7 days on a 28 day cycle. 10/19/22  Yes Donald Barker MD   rOPINIRole (REQUIP) 3 MG tablet Take 3 mg by mouth Every Evening. 6/29/22  Yes Bessie Lopez MD   solifenacin (VESICARE) 10 MG tablet Take 1 tablet by mouth Daily for 360 days. 10/25/22 10/20/23 Yes Destinee Myers MD   SUMAtriptan (IMITREX) 100 MG tablet Take 100 mg by mouth 1 (One) Time As Needed. 10/7/22  Yes Bessie Lopez MD   traZODone (DESYREL) 150 MG tablet TAKE 1 TABLET BY MOUTH ONCE nightly 11/12/19  Yes Yudelka Manning MD   venlafaxine XR (EFFEXOR-XR) 150 MG 24 hr capsule Take 1 capsule by mouth Daily. 11/21/22  Yes Donald Barker MD   nicotine (NICODERM CQ) 21 MG/24HR patch Place 1 patch on the skin as directed by provider Daily for 7 days. 12/14/22 12/21/22  Augusot Ladd MD        Allergies:  Azithromycin    Objective     Vital Signs   Temp:  [98 °F (36.7 °C)-98.8 °F (37.1 °C)] 98.8 °F (37.1 °C)  Heart Rate:  [65-80] 74  Resp:  [14-18] 16  BP: (102-126)/(48-64) 102/48    Physical Exam: Incision without  "evidence of infection, abdomen soft, mild generalized tenderness to palpation, no hernia, stoma dusky      Results Review:   {Results Review:34616::\"I reviewed the patient's new clinical results.\"    LABS/IMAGING:  Results for orders placed or performed during the hospital encounter of 12/06/22   Anaerobic Culture - Body Fluid, Abdominal Wall    Specimen: Abdominal Wall; Body Fluid   Result Value Ref Range    Anaerobic Culture Anaerobe (Organism type) (A)    Body Fluid Culture - Body Fluid, Abdominal Wall    Specimen: Abdominal Wall; Body Fluid   Result Value Ref Range    Body Fluid Culture No growth at 3 days     Gram Stain Rare (1+) Gram negative bacilli     Gram Stain Occasional Gram positive cocci    Comprehensive Metabolic Panel    Specimen: Blood   Result Value Ref Range    Glucose 127 (H) 65 - 99 mg/dL    BUN 17 8 - 23 mg/dL    Creatinine 0.66 0.57 - 1.00 mg/dL    Sodium 137 136 - 145 mmol/L    Potassium 4.8 3.5 - 5.2 mmol/L    Chloride 104 98 - 107 mmol/L    CO2 24.8 22.0 - 29.0 mmol/L    Calcium 8.7 8.6 - 10.5 mg/dL    Total Protein 6.7 6.0 - 8.5 g/dL    Albumin 3.90 3.50 - 5.20 g/dL    ALT (SGPT) 36 (H) 1 - 33 U/L    AST (SGOT) 29 1 - 32 U/L    Alkaline Phosphatase 96 39 - 117 U/L    Total Bilirubin 0.4 0.0 - 1.2 mg/dL    Globulin 2.8 gm/dL    A/G Ratio 1.4 g/dL    BUN/Creatinine Ratio 25.8 (H) 7.0 - 25.0    Anion Gap 8.2 5.0 - 15.0 mmol/L    eGFR 98.7 >60.0 mL/min/1.73   Lipase    Specimen: Blood   Result Value Ref Range    Lipase 32 13 - 60 U/L   Urinalysis With Microscopic If Indicated (No Culture) - Urine, Clean Catch    Specimen: Urine, Clean Catch   Result Value Ref Range    Color, UA Yellow Yellow, Straw    Appearance, UA Cloudy (A) Clear    pH, UA 5.5 5.0 - 8.0    Specific Gravity, UA 1.020 1.005 - 1.030    Glucose, UA Negative Negative    Ketones, UA Negative Negative    Bilirubin, UA Negative Negative    Blood, UA Small (1+) (A) Negative    Protein, UA Trace (A) Negative    Leuk Esterase, UA " Negative Negative    Nitrite, UA Negative Negative    Urobilinogen, UA 1.0 E.U./dL 0.2 - 1.0 E.U./dL   Lactic Acid, Plasma    Specimen: Blood   Result Value Ref Range    Lactate 1.0 0.5 - 2.0 mmol/L   CBC Auto Differential    Specimen: Blood   Result Value Ref Range    WBC 1.12 (C) 3.40 - 10.80 10*3/mm3    RBC 3.88 3.77 - 5.28 10*6/mm3    Hemoglobin 12.2 12.0 - 15.9 g/dL    Hematocrit 37.8 34.0 - 46.6 %    MCV 97.4 (H) 79.0 - 97.0 fL    MCH 31.4 26.6 - 33.0 pg    MCHC 32.3 31.5 - 35.7 g/dL    RDW 17.3 (H) 12.3 - 15.4 %    RDW-SD 61.1 (H) 37.0 - 54.0 fl    MPV 9.9 6.0 - 12.0 fL    Platelets 160 140 - 450 10*3/mm3   Scan Slide    Specimen: Blood   Result Value Ref Range    Scan Slide     Urinalysis, Microscopic Only - Urine, Clean Catch    Specimen: Urine, Clean Catch   Result Value Ref Range    RBC, UA 6-12 (A) None Seen /HPF    WBC, UA 3-5 (A) None Seen /HPF    Bacteria, UA 1+ (A) None Seen /HPF    Squamous Epithelial Cells, UA 3-6 (A) None Seen, 0-2 /HPF    Hyaline Casts, UA 7-12 None Seen /LPF    Methodology Automated Microscopy    Manual Differential    Specimen: Blood   Result Value Ref Range    Neutrophil % 68.0 42.7 - 76.0 %    Lymphocyte % 21.0 19.6 - 45.3 %    Monocyte % 1.0 (L) 5.0 - 12.0 %    Basophil % 2.0 (H) 0.0 - 1.5 %    Bands %  8.0 (H) 0.0 - 5.0 %    Neutrophils Absolute 0.85 (L) 1.70 - 7.00 10*3/mm3    Lymphocytes Absolute 0.24 (L) 0.70 - 3.10 10*3/mm3    Monocytes Absolute 0.01 (L) 0.10 - 0.90 10*3/mm3    Basophils Absolute 0.02 0.00 - 0.20 10*3/mm3    Hypochromia Slight/1+ None Seen    WBC Morphology Normal Normal    Platelet Estimate Decreased Normal   Comprehensive Metabolic Panel    Specimen: Blood   Result Value Ref Range    Glucose 129 (H) 65 - 99 mg/dL    BUN 14 8 - 23 mg/dL    Creatinine 0.68 0.57 - 1.00 mg/dL    Sodium 139 136 - 145 mmol/L    Potassium 3.9 3.5 - 5.2 mmol/L    Chloride 105 98 - 107 mmol/L    CO2 23.2 22.0 - 29.0 mmol/L    Calcium 7.6 (L) 8.6 - 10.5 mg/dL    Total Protein  5.9 (L) 6.0 - 8.5 g/dL    Albumin 3.50 3.50 - 5.20 g/dL    ALT (SGPT) 59 (H) 1 - 33 U/L    AST (SGOT) 42 (H) 1 - 32 U/L    Alkaline Phosphatase 65 39 - 117 U/L    Total Bilirubin 0.6 0.0 - 1.2 mg/dL    Globulin 2.4 gm/dL    A/G Ratio 1.5 g/dL    BUN/Creatinine Ratio 20.6 7.0 - 25.0    Anion Gap 10.8 5.0 - 15.0 mmol/L    eGFR 98.0 >60.0 mL/min/1.73   CBC Auto Differential    Specimen: Blood   Result Value Ref Range    WBC 1.54 (C) 3.40 - 10.80 10*3/mm3    RBC 3.39 (L) 3.77 - 5.28 10*6/mm3    Hemoglobin 10.5 (L) 12.0 - 15.9 g/dL    Hematocrit 32.7 (L) 34.0 - 46.6 %    MCV 96.5 79.0 - 97.0 fL    MCH 31.0 26.6 - 33.0 pg    MCHC 32.1 31.5 - 35.7 g/dL    RDW 17.4 (H) 12.3 - 15.4 %    RDW-SD 61.5 (H) 37.0 - 54.0 fl    MPV 10.0 6.0 - 12.0 fL    Platelets 140 140 - 450 10*3/mm3    Neutrophil % 85.1 (H) 42.7 - 76.0 %    Lymphocyte % 9.1 (L) 19.6 - 45.3 %    Monocyte % 4.5 (L) 5.0 - 12.0 %    Eosinophil % 0.0 (L) 0.3 - 6.2 %    Basophil % 1.3 0.0 - 1.5 %    Immature Grans % 0.0 0.0 - 0.5 %    Neutrophils, Absolute 1.31 (L) 1.70 - 7.00 10*3/mm3    Lymphocytes, Absolute 0.14 (L) 0.70 - 3.10 10*3/mm3    Monocytes, Absolute 0.07 (L) 0.10 - 0.90 10*3/mm3    Eosinophils, Absolute 0.00 0.00 - 0.40 10*3/mm3    Basophils, Absolute 0.02 0.00 - 0.20 10*3/mm3    Immature Grans, Absolute 0.00 0.00 - 0.05 10*3/mm3    nRBC 0.0 0.0 - 0.2 /100 WBC   Phosphorus    Specimen: Blood   Result Value Ref Range    Phosphorus 1.2 (C) 2.5 - 4.5 mg/dL   Magnesium    Specimen: Blood   Result Value Ref Range    Magnesium 2.2 1.6 - 2.4 mg/dL   Lactic Acid, Plasma    Specimen: Blood   Result Value Ref Range    Lactate 1.3 0.5 - 2.0 mmol/L   Comprehensive Metabolic Panel    Specimen: Blood   Result Value Ref Range    Glucose 96 65 - 99 mg/dL    BUN 14 8 - 23 mg/dL    Creatinine 0.72 0.57 - 1.00 mg/dL    Sodium 138 136 - 145 mmol/L    Potassium 3.6 3.5 - 5.2 mmol/L    Chloride 104 98 - 107 mmol/L    CO2 23.6 22.0 - 29.0 mmol/L    Calcium 8.3 (L) 8.6 - 10.5  mg/dL    Total Protein 6.6 6.0 - 8.5 g/dL    Albumin 3.80 3.50 - 5.20 g/dL    ALT (SGPT) 36 (H) 1 - 33 U/L    AST (SGOT) 23 1 - 32 U/L    Alkaline Phosphatase 71 39 - 117 U/L    Total Bilirubin 0.5 0.0 - 1.2 mg/dL    Globulin 2.8 gm/dL    A/G Ratio 1.4 g/dL    BUN/Creatinine Ratio 19.4 7.0 - 25.0    Anion Gap 10.4 5.0 - 15.0 mmol/L    eGFR 94.1 >60.0 mL/min/1.73   CBC Auto Differential    Specimen: Blood   Result Value Ref Range    WBC 4.16 3.40 - 10.80 10*3/mm3    RBC 3.48 (L) 3.77 - 5.28 10*6/mm3    Hemoglobin 10.9 (L) 12.0 - 15.9 g/dL    Hematocrit 33.3 (L) 34.0 - 46.6 %    MCV 95.7 79.0 - 97.0 fL    MCH 31.3 26.6 - 33.0 pg    MCHC 32.7 31.5 - 35.7 g/dL    RDW 17.9 (H) 12.3 - 15.4 %    RDW-SD 62.1 (H) 37.0 - 54.0 fl    MPV 10.2 6.0 - 12.0 fL    Platelets 151 140 - 450 10*3/mm3    Neutrophil % 82.3 (H) 42.7 - 76.0 %    Lymphocyte % 12.7 (L) 19.6 - 45.3 %    Monocyte % 3.1 (L) 5.0 - 12.0 %    Eosinophil % 0.2 (L) 0.3 - 6.2 %    Basophil % 1.0 0.0 - 1.5 %    Immature Grans % 0.7 (H) 0.0 - 0.5 %    Neutrophils, Absolute 3.42 1.70 - 7.00 10*3/mm3    Lymphocytes, Absolute 0.53 (L) 0.70 - 3.10 10*3/mm3    Monocytes, Absolute 0.13 0.10 - 0.90 10*3/mm3    Eosinophils, Absolute 0.01 0.00 - 0.40 10*3/mm3    Basophils, Absolute 0.04 0.00 - 0.20 10*3/mm3    Immature Grans, Absolute 0.03 0.00 - 0.05 10*3/mm3    nRBC 0.0 0.0 - 0.2 /100 WBC   Scan Slide    Specimen: Blood   Result Value Ref Range    Anisocytosis Slight/1+ None Seen    WBC Morphology Normal Normal    Platelet Estimate Decreased Normal   Phosphorus    Specimen: Blood   Result Value Ref Range    Phosphorus 1.6 (C) 2.5 - 4.5 mg/dL   Comprehensive Metabolic Panel    Specimen: Blood   Result Value Ref Range    Glucose 85 65 - 99 mg/dL    BUN 13 8 - 23 mg/dL    Creatinine 0.64 0.57 - 1.00 mg/dL    Sodium 140 136 - 145 mmol/L    Potassium 3.5 3.5 - 5.2 mmol/L    Chloride 107 98 - 107 mmol/L    CO2 24.8 22.0 - 29.0 mmol/L    Calcium 8.1 (L) 8.6 - 10.5 mg/dL    Total  Protein 6.4 6.0 - 8.5 g/dL    Albumin 3.50 3.50 - 5.20 g/dL    ALT (SGPT) 26 1 - 33 U/L    AST (SGOT) 17 1 - 32 U/L    Alkaline Phosphatase 65 39 - 117 U/L    Total Bilirubin 0.6 0.0 - 1.2 mg/dL    Globulin 2.9 gm/dL    A/G Ratio 1.2 g/dL    BUN/Creatinine Ratio 20.3 7.0 - 25.0    Anion Gap 8.2 5.0 - 15.0 mmol/L    eGFR 99.4 >60.0 mL/min/1.73   Magnesium    Specimen: Blood   Result Value Ref Range    Magnesium 2.1 1.6 - 2.4 mg/dL   CBC Auto Differential    Specimen: Blood   Result Value Ref Range    WBC 4.35 3.40 - 10.80 10*3/mm3    RBC 3.52 (L) 3.77 - 5.28 10*6/mm3    Hemoglobin 11.2 (L) 12.0 - 15.9 g/dL    Hematocrit 33.5 (L) 34.0 - 46.6 %    MCV 95.2 79.0 - 97.0 fL    MCH 31.8 26.6 - 33.0 pg    MCHC 33.4 31.5 - 35.7 g/dL    RDW 17.8 (H) 12.3 - 15.4 %    RDW-SD 61.6 (H) 37.0 - 54.0 fl    MPV 9.4 6.0 - 12.0 fL    Platelets 148 140 - 450 10*3/mm3    Neutrophil % 83.6 (H) 42.7 - 76.0 %    Lymphocyte % 10.6 (L) 19.6 - 45.3 %    Monocyte % 4.1 (L) 5.0 - 12.0 %    Eosinophil % 0.5 0.3 - 6.2 %    Basophil % 0.7 0.0 - 1.5 %    Immature Grans % 0.5 0.0 - 0.5 %    Neutrophils, Absolute 3.64 1.70 - 7.00 10*3/mm3    Lymphocytes, Absolute 0.46 (L) 0.70 - 3.10 10*3/mm3    Monocytes, Absolute 0.18 0.10 - 0.90 10*3/mm3    Eosinophils, Absolute 0.02 0.00 - 0.40 10*3/mm3    Basophils, Absolute 0.03 0.00 - 0.20 10*3/mm3    Immature Grans, Absolute 0.02 0.00 - 0.05 10*3/mm3    nRBC 0.0 0.0 - 0.2 /100 WBC   Basic Metabolic Panel    Specimen: Blood   Result Value Ref Range    Glucose 109 (H) 65 - 99 mg/dL    BUN 13 8 - 23 mg/dL    Creatinine 0.60 0.57 - 1.00 mg/dL    Sodium 140 136 - 145 mmol/L    Potassium 3.5 3.5 - 5.2 mmol/L    Chloride 103 98 - 107 mmol/L    CO2 22.1 22.0 - 29.0 mmol/L    Calcium 8.2 (L) 8.6 - 10.5 mg/dL    BUN/Creatinine Ratio 21.7 7.0 - 25.0    Anion Gap 14.9 5.0 - 15.0 mmol/L    eGFR 101.0 >60.0 mL/min/1.73   CBC Auto Differential    Specimen: Blood   Result Value Ref Range    WBC 4.82 3.40 - 10.80 10*3/mm3     RBC 4.53 3.77 - 5.28 10*6/mm3    Hemoglobin 14.3 12.0 - 15.9 g/dL    Hematocrit 44.7 34.0 - 46.6 %    MCV 98.7 (H) 79.0 - 97.0 fL    MCH 31.6 26.6 - 33.0 pg    MCHC 32.0 31.5 - 35.7 g/dL    RDW 17.8 (H) 12.3 - 15.4 %    RDW-SD 64.7 (H) 37.0 - 54.0 fl    MPV 10.4 6.0 - 12.0 fL    Platelets 146 140 - 450 10*3/mm3    Neutrophil % 72.7 42.7 - 76.0 %    Lymphocyte % 12.2 (L) 19.6 - 45.3 %    Monocyte % 11.8 5.0 - 12.0 %    Eosinophil % 1.0 0.3 - 6.2 %    Basophil % 1.5 0.0 - 1.5 %    Immature Grans % 0.8 (H) 0.0 - 0.5 %    Neutrophils, Absolute 3.50 1.70 - 7.00 10*3/mm3    Lymphocytes, Absolute 0.59 (L) 0.70 - 3.10 10*3/mm3    Monocytes, Absolute 0.57 0.10 - 0.90 10*3/mm3    Eosinophils, Absolute 0.05 0.00 - 0.40 10*3/mm3    Basophils, Absolute 0.07 0.00 - 0.20 10*3/mm3    Immature Grans, Absolute 0.04 0.00 - 0.05 10*3/mm3    nRBC 0.0 0.0 - 0.2 /100 WBC   Phosphorus    Specimen: Blood   Result Value Ref Range    Phosphorus 1.6 (C) 2.5 - 4.5 mg/dL   Phosphorus    Specimen: Blood   Result Value Ref Range    Phosphorus 2.0 (L) 2.5 - 4.5 mg/dL   Magnesium    Specimen: Blood   Result Value Ref Range    Magnesium 2.2 1.6 - 2.4 mg/dL   Basic Metabolic Panel    Specimen: Blood   Result Value Ref Range    Glucose 95 65 - 99 mg/dL    BUN 9 8 - 23 mg/dL    Creatinine 0.54 (L) 0.57 - 1.00 mg/dL    Sodium 136 136 - 145 mmol/L    Potassium 4.2 3.5 - 5.2 mmol/L    Chloride 105 98 - 107 mmol/L    CO2 21.8 (L) 22.0 - 29.0 mmol/L    Calcium 7.3 (L) 8.6 - 10.5 mg/dL    BUN/Creatinine Ratio 16.7 7.0 - 25.0    Anion Gap 9.2 5.0 - 15.0 mmol/L    eGFR 103.6 >60.0 mL/min/1.73   ECG 12 Lead Chest Pain   Result Value Ref Range    QT Interval 428 ms   Tissue Pathology Exam    Specimen: Large Intestine, Sigmoid Colon; Tissue   Result Value Ref Range    Case Report       Surgical Pathology Report                         Case: [id number]                                  Authorizing Provider:  Alfred Castañeda MD  Collected:           12/06/2022  "06:44 PM          Ordering Location:     [facility] MAIN Received:            12/08/2022 08:26 AM                                 OR                                                                           Pathologist:           Rob Pedroza MD                                                            Specimen:    Large Intestine, Sigmoid Colon, sigmoid colon                                              Clinical Information       Peritonitis (HCC)      Final Diagnosis       Sigmoid colon, resection:   - Metastatic lobular breast carcinoma with perforation and acute peritonitis   - Breast biomarker testing:    - Estrogen Receptor (ER):  Positive (95%, strong)    - Progesterone Receptor (PgR):  Positive (10%, weak)    - HER2 (by immunohistochemistry):  Negative (Score 1+)   - One lymph node negative for carcinoma    REMARKS: The above positive (malignant) diagnosis was called to Amy in Dr. Castañeda's office at 12:26 EST on 12/14/2022 by meredith.         Gross Description       1. Large Intestine, Sigmoid Colon.  The specimen is received in 1 formalin filled container labeled \"sigmoid colon\" consists of a 5.5 cm in length by 3.0 cm in average diameter unoriented segment of sigmoid colon received with opposing stapled margins and up to 3.0 cm of attached pericolic adipose tissue.  The tan-pink, rubbery serosa displays a 2.0 x 1.0 cm full-thickness perforation located 2.0 cm from the nearest stapled margin.  Opening reveals the bowel is packed with solid fecal material.  The tan, diffusely folded mucosa is slightly dusky around the perforation and displays multiple diverticula packed with fecaliths.  The luminal circumference averages 6.0 cm, and the average wall thickness is 0.7 cm.  Sectioning the attached adipose tissue reveals a lobulated cut surface with 2 possible lymph nodes, averaging less than 0.2 cm.  Representative sections are submitted in 2 cassettes, to include sections of the opposing margins, " en face, in 1A and the perforation in 1B.  Additional sections of the perforation are submitted in cassettes 1C-1F, to include 2 possible lymph nodes in 1C.  12/12/2022 NARESH        Special Stains       An immunohistochemical stain for CK7 is performed to further characterize atypical cells which are positive, consistent with the above diagnosis. All immunohistochemical/cytochemical stains (IHC) are performed on separate slides per different antibody unless otherwise specified in the documentation that a cocktail (multiple stain) was performed.  Controls are appropriate.        Microscopic Description     Green Top (Gel)   Result Value Ref Range    Extra Tube Hold for add-ons.    Lavender Top   Result Value Ref Range    Extra Tube hold for add-on    Gold Top - SST   Result Value Ref Range    Extra Tube Hold for add-ons.    Light Blue Top   Result Value Ref Range    Extra Tube Hold for add-ons.         Result Review :     Assessment & Plan     History of metastatic breast cancer  Status post ex lap with Lynn's procedure for perforated sigmoid colon 12/6/2022.  Pathology with metastatic lobular breast carcinoma    I reviewed the pathology with the patient.  I recommended her go to the emergency department for evaluation, hydration, admission.  We will evaluate her stoma as an inpatient.  I explained her there is a possibility of need for more surgery if her stoma has become necrotic.  All questions answered.  She agrees with the plan.  Thank you for the consult.              This document has been electronically signed by Alfred Castañeda MD  December 16, 2022 09:03 EST

## 2022-12-16 NOTE — OUTREACH NOTE
General Surgery Week 1 Survey    Flowsheet Row Responses   [facility] facility patient discharged from? [facility]   Does the patient have one of the following disease processes/diagnoses(primary or secondary)? General Surgery   Week 1 attempt successful? No   Unsuccessful attempts Attempt 1   Revoke Readmitted          CARLA BURCH - Registered Nurse

## 2022-12-17 DIAGNOSIS — G89.3 CANCER RELATED PAIN: ICD-10-CM

## 2022-12-17 NOTE — OUTREACH NOTE
Prep Survey    Flowsheet Row Responses   [facility] facility patient discharged from? [facility]   Is LACE score < 7 ? No   Eligibility Readm Mgmt   Discharge diagnosis Postoperative bleeding   Does the patient have one of the following disease processes/diagnoses(primary or secondary)? Other   Does the patient have Home health ordered? Yes   What is the Home health agency?  [facility]    Is there a DME ordered? No   Prep survey completed? Yes          HELLEN MAJOR - Registered Nurse

## 2022-12-19 ENCOUNTER — TELEPHONE (OUTPATIENT)
Dept: SURGERY | Facility: CLINIC | Age: 62
End: 2022-12-19

## 2022-12-19 ENCOUNTER — HOSPITAL ENCOUNTER (EMERGENCY)
Facility: HOSPITAL | Age: 62
Discharge: HOME | End: 2022-12-19
Attending: EMERGENCY MEDICINE | Admitting: EMERGENCY MEDICINE

## 2022-12-19 VITALS
BODY MASS INDEX: 29.23 KG/M2 | RESPIRATION RATE: 20 BRPM | WEIGHT: 181.88 LBS | SYSTOLIC BLOOD PRESSURE: 152 MMHG | HEART RATE: 70 BPM | OXYGEN SATURATION: 99 % | DIASTOLIC BLOOD PRESSURE: 61 MMHG | TEMPERATURE: 99 F | HEIGHT: 66 IN

## 2022-12-19 DIAGNOSIS — K94.00 COLOSTOMY COMPLICATION, UNSPECIFIED: Primary | ICD-10-CM

## 2022-12-19 LAB
ALBUMIN SERPL-MCNC: 3.4 G/DL (ref 3.5–5.2)
ALBUMIN/GLOB SERPL: 0.9 G/DL
ALP SERPL-CCNC: 110 U/L (ref 39–117)
ALT SERPL W P-5'-P-CCNC: 15 U/L (ref 1–33)
ANION GAP SERPL CALCULATED.3IONS-SCNC: 11.1 MMOL/L (ref 5–15)
AST SERPL-CCNC: 16 U/L (ref 1–32)
BASOPHILS # BLD AUTO: 0.07 10*3/MM3 (ref 0–0.2)
BASOPHILS NFR BLD AUTO: 0.8 % (ref 0–1.5)
BILIRUB SERPL-MCNC: 0.2 MG/DL (ref 0–1.2)
BILIRUB UR QL STRIP: NEGATIVE
BUN SERPL-MCNC: 12 MG/DL (ref 8–23)
BUN/CREAT SERPL: 17.9 (ref 7–25)
CALCIUM SPEC-SCNC: 9 MG/DL (ref 8.6–10.5)
CHLORIDE SERPL-SCNC: 105 MMOL/L (ref 98–107)
CLARITY UR: CLEAR
CO2 SERPL-SCNC: 25.9 MMOL/L (ref 22–29)
COLOR UR: YELLOW
CREAT SERPL-MCNC: 0.67 MG/DL (ref 0.57–1)
D-LACTATE SERPL-SCNC: 1 MMOL/L (ref 0.5–2)
DEPRECATED RDW RBC AUTO: 63.1 FL (ref 37–54)
EGFRCR SERPLBLD CKD-EPI 2021: 98.4 ML/MIN/1.73
EOSINOPHIL # BLD AUTO: 0.2 10*3/MM3 (ref 0–0.4)
EOSINOPHIL NFR BLD AUTO: 2.2 % (ref 0.3–6.2)
ERYTHROCYTE [DISTWIDTH] IN BLOOD BY AUTOMATED COUNT: 17.8 % (ref 12.3–15.4)
GLOBULIN UR ELPH-MCNC: 3.7 GM/DL
GLUCOSE SERPL-MCNC: 75 MG/DL (ref 65–99)
GLUCOSE UR STRIP-MCNC: NEGATIVE MG/DL
HCT VFR BLD AUTO: 31.8 % (ref 34–46.6)
HGB BLD-MCNC: 10.1 G/DL (ref 12–15.9)
HGB UR QL STRIP.AUTO: NEGATIVE
HOLD SPECIMEN: NORMAL
HOLD SPECIMEN: NORMAL
IMM GRANULOCYTES # BLD AUTO: 0.05 10*3/MM3 (ref 0–0.05)
IMM GRANULOCYTES NFR BLD AUTO: 0.5 % (ref 0–0.5)
KETONES UR QL STRIP: NEGATIVE
LEUKOCYTE ESTERASE UR QL STRIP.AUTO: NEGATIVE
LIPASE SERPL-CCNC: 29 U/L (ref 13–60)
LYMPHOCYTES # BLD AUTO: 1.28 10*3/MM3 (ref 0.7–3.1)
LYMPHOCYTES NFR BLD AUTO: 13.9 % (ref 19.6–45.3)
MCH RBC QN AUTO: 30.9 PG (ref 26.6–33)
MCHC RBC AUTO-ENTMCNC: 31.8 G/DL (ref 31.5–35.7)
MCV RBC AUTO: 97.2 FL (ref 79–97)
MONOCYTES # BLD AUTO: 0.67 10*3/MM3 (ref 0.1–0.9)
MONOCYTES NFR BLD AUTO: 7.3 % (ref 5–12)
NEUTROPHILS NFR BLD AUTO: 6.91 10*3/MM3 (ref 1.7–7)
NEUTROPHILS NFR BLD AUTO: 75.3 % (ref 42.7–76)
NITRITE UR QL STRIP: NEGATIVE
NRBC BLD AUTO-RTO: 0 /100 WBC (ref 0–0.2)
PH UR STRIP.AUTO: 6.5 [PH] (ref 5–8)
PLATELET # BLD AUTO: 525 10*3/MM3 (ref 140–450)
PMV BLD AUTO: 9.6 FL (ref 6–12)
POTASSIUM SERPL-SCNC: 3.6 MMOL/L (ref 3.5–5.2)
PROT SERPL-MCNC: 7.1 G/DL (ref 6–8.5)
PROT UR QL STRIP: NEGATIVE
RBC # BLD AUTO: 3.27 10*6/MM3 (ref 3.77–5.28)
SODIUM SERPL-SCNC: 142 MMOL/L (ref 136–145)
SP GR UR STRIP: 1.01 (ref 1–1.03)
UROBILINOGEN UR QL STRIP: NORMAL
WBC NRBC COR # BLD: 9.18 10*3/MM3 (ref 3.4–10.8)
WHOLE BLOOD HOLD COAG: NORMAL
WHOLE BLOOD HOLD SPECIMEN: NORMAL

## 2022-12-19 PROCEDURE — 80053 COMPREHEN METABOLIC PANEL: CPT | Performed by: EMERGENCY MEDICINE

## 2022-12-19 PROCEDURE — 36415 COLL VENOUS BLD VENIPUNCTURE: CPT | Performed by: EMERGENCY MEDICINE

## 2022-12-19 PROCEDURE — 83690 ASSAY OF LIPASE: CPT | Performed by: EMERGENCY MEDICINE

## 2022-12-19 PROCEDURE — 81003 URINALYSIS AUTO W/O SCOPE: CPT | Performed by: EMERGENCY MEDICINE

## 2022-12-19 PROCEDURE — 83605 ASSAY OF LACTIC ACID: CPT | Performed by: EMERGENCY MEDICINE

## 2022-12-19 PROCEDURE — 99283 EMERGENCY DEPT VISIT LOW MDM: CPT | Performed by: SURGERY

## 2022-12-19 PROCEDURE — 99283 EMERGENCY DEPT VISIT LOW MDM: CPT

## 2022-12-19 PROCEDURE — 85025 COMPLETE CBC W/AUTO DIFF WBC: CPT | Performed by: EMERGENCY MEDICINE

## 2022-12-19 RX ORDER — HYDROCODONE BITARTRATE AND ACETAMINOPHEN 5; 325 MG/1; MG/1
TABLET ORAL
Qty: 60 TABLET | Refills: 0 | Status: SHIPPED | OUTPATIENT
Start: 2022-12-19 | End: 2022-12-27 | Stop reason: ALTCHOICE

## 2022-12-19 RX ORDER — OXYCODONE HYDROCHLORIDE AND ACETAMINOPHEN 5; 325 MG/1; MG/1
1 TABLET ORAL ONCE
Status: COMPLETED | OUTPATIENT
Start: 2022-12-19 | End: 2022-12-19

## 2022-12-19 RX ORDER — SODIUM CHLORIDE 0.9 % (FLUSH) 0.9 %
10 SYRINGE (ML) INJECTION AS NEEDED
Status: DISCONTINUED | OUTPATIENT
Start: 2022-12-19 | End: 2022-12-19 | Stop reason: HOSPADM

## 2022-12-19 RX ADMIN — OXYCODONE AND ACETAMINOPHEN 1 TABLET: 5; 325 TABLET ORAL at 15:03

## 2022-12-19 NOTE — TELEPHONE ENCOUNTER
"Procedure(s): Exploratory laparotomy with sigmoid colon resection, Lynn's closure of rectal stump, end colostomy on 12/06/22.    Patient said she has horrible cramping at the bottom her incision, which is about 12\" long.  Any time she moves, it is shooting pains through her.  She said she is on a lot of pain medication, so she isn't asking for pain medication.  Pain medication is not helping her pain.  She takes morphine twice a day, and hydrocodone 5 or percocet 10 mg.    Should she put anything on the incision?    She wants to know if something is wrong.      "

## 2022-12-19 NOTE — CONSULTS
General Surgery/Colorectal Surgery Note    Patient Name:  Derek Keller  YOB: 1960  [id number]    Referring Provider: No ref. provider found      Patient Care Team:  Haile Monique MD as PCP - General (Family Medicine)  Julien Cardona DO as Consulting Physician (Pain Medicine)  Joel Castillo MD as Consulting Physician (Gastroenterology)  Remington Russell MD as Surgeon (General Surgery)  Ahsan Salas MD as Consulting Physician (Sports Medicine)  Donald Barker MD as Consulting Physician (Hematology and Oncology)  Sandy Ricci MD as Consulting Physician (General Surgery)    Chief complaint ischemic stoma    Subjective .     History of present illness:    Status post ex lap with Lynn's procedure for perforated sigmoid.  Pathology with metastatic breast cancer.    Recently developed some ischemia to her stoma.    Sent in by home health for evaluation of ischemic changes to her stoma.  No fever.  She is having flatus and output from her stoma.  Tolerating diet.  I was asked to see the patient by the ER physician.      History:  Past Medical History:   Diagnosis Date   • Abdominal pain    • Allergic rhinitis    • Anemia    • Anxiety    • Arteriosclerosis     Coronary   • Arthritis    • Bone metastases (HCC) 10/14/2022   • Bowen's disease    • Breast cancer (HCC)    • Cancer related pain 10/13/2022   • Chest pain    • Chronic pain disorder    • Cough    • Depression    • Dysphoric mood    • Fatigue    • Frequent urination    • History of colon polyps    • History of IBS    • History of kidney stones    • Hyperlipidemia    • Hypertension    • Hypothyroidism    • Insomnia    • Lumbago    • Malaise and fatigue    • Mood disorder (HCC)    • Neck pain    • Palpitations    • Peripheral edema 11/21/2022   • Precordial pain    • Sleep disturbance    • SOB (shortness of breath)        Past Surgical History:   Procedure Laterality Date   • BREAST BIOPSY     • CARDIAC CATHETERIZATION Left  1960   • CARDIAC CATHETERIZATION N/A 04/06/2018    Procedure: Coronary angiography;  Surgeon: Art Licea MD;  Location:  [facility];  Service: Cardiovascular   • CARDIAC CATHETERIZATION N/A 04/06/2018    Procedure: Left heart cath;  Surgeon: Art Licea MD;  Location:  [facility];  Service: Cardiovascular   • CARDIAC CATHETERIZATION N/A 04/06/2018    Procedure: Left ventriculography;  Surgeon: Art Licea MD;  Location:  [facility];  Service: Cardiovascular   • CHOLECYSTECTOMY     • COLONOSCOPY  11/08/2006   • EXPLORATORY LAPAROTOMY N/A 12/6/2022    Procedure: LAPAROTOMY EXPLORATORY sigmoid resection hartmans procedure colostomy;  Surgeon: Alfred Castañeda MD;  Location: [facility] MAIN OR;  Service: General;  Laterality: N/A;   • GANGLION CYST EXCISION     • HYSTERECTOMY  05/2005   • LAPAROSCOPIC GASTRIC BANDING     • TONSILLECTOMY         Family History   Problem Relation Age of Onset   • Hypertension Mother    • Rheum arthritis Mother    • Heart disease Mother    • Breast cancer Mother    • Diabetes Father    • Cancer Maternal Grandmother         colon   • Colon cancer Maternal Grandmother    • Aneurysm Paternal Grandfather    • Diabetes Other    • Fibromyalgia Other        Social History     Tobacco Use   • Smoking status: Every Day     Packs/day: 1.50     Years: 40.00     Pack years: 60.00     Types: Cigarettes     Start date: 1974   • Smokeless tobacco: Never   • Tobacco comments:     caffeine use 3 mt dews daily   Vaping Use   • Vaping Use: Never used   Substance Use Topics   • Alcohol use: No   • Drug use: Yes     Types: Marijuana     Comment: Prescribed Lorazepam       Review of Systems  All systems were reviewed and negative except for:   Review of Systems   Constitutional: Negative for chills, fever and unexpected weight loss.   HENT: Negative for congestion, nosebleeds and voice change.    Eyes: Negative for blurred  vision, double vision and discharge.   Respiratory: Negative for apnea, chest tightness and shortness of breath.    Cardiovascular: Negative for chest pain and leg swelling.   Gastrointestinal:        See HPI   Endocrine: Negative for cold intolerance and heat intolerance.   Genitourinary: Negative for dysuria, hematuria and urgency.   Musculoskeletal: Negative for back pain, joint swelling and neck pain.   Skin: Negative for color change and dry skin.   Neurological: Negative for dizziness and confusion.   Hematological: Negative for adenopathy.   Psychiatric/Behavioral: Negative for agitation and behavioral problems.     MEDS:  Prior to Admission medications    Medication Sig Start Date End Date Taking? Authorizing Provider   atorvastatin (LIPITOR) 20 MG tablet TAKE 1 TABLET BY MOUTH EVERY DAY 10/1/19   Yudelka Manning MD   baclofen (LIORESAL) 20 MG tablet TAKE 1 TABLET BY MOUTH THREE TIMES DAILY 12/6/19   Yudelka Manning MD   donepezil (ARICEPT) 10 MG tablet Take 10 mg by mouth Daily. 10/28/22   Bessie Lopez MD   gabapentin (NEURONTIN) 600 MG tablet TAKE 1 TABLET BY MOUTH AT BEDTIME 7/30/20   Yudelka Manning MD   HYDROcodone-acetaminophen (NORCO) 5-325 MG per tablet TAKE 1 TABLET BY MOUTH EVERY 6 HOURS AS NEEDED FOR PAIN 12/19/22   Donald Barker MD   letrozole (FEMARA) 2.5 MG tablet Take 1 tablet by mouth Daily. 11/30/22   Donald Barker MD   levothyroxine (SYNTHROID, LEVOTHROID) 50 MCG tablet TAKE 1 TABLET BY MOUTH EVERY DAY 7/19/19   Yudelka Manning MD   LORazepam (ATIVAN) 0.5 MG tablet Take 0.5 mg by mouth Every 6 (Six) Hours As Needed.    Bessie Lopez MD   losartan (COZAAR) 100 MG tablet Take 100 mg by mouth Daily.    Bessie Lopez MD   montelukast (SINGULAIR) 10 MG tablet Take 10 mg by mouth Daily. 1/17/22   Bessie Lopez MD   Morphine (MS CONTIN) 15 MG 12 hr tablet Take 1 tablet by mouth 2 (Two) Times a Day. 12/13/22   Raudel Grande MD   naproxen  (NAPROSYN) 500 MG tablet Take 500 mg by mouth 2 (Two) Times a Day With Meals.    Bessie Lopez MD   nicotine (NICODERM CQ) 21 MG/24HR patch Place 1 patch on the skin as directed by provider Daily for 7 days. 12/14/22 12/21/22  Augusto Ladd MD   ondansetron (ZOFRAN) 8 MG tablet Take 1 tablet by mouth 3 (Three) Times a Day As Needed for Nausea or Vomiting. 10/13/22   Donald Barker MD   oxyCODONE-acetaminophen (Percocet)  MG per tablet Take 1 tablet by mouth Every 6 (Six) Hours As Needed for Moderate Pain. 12/13/22 12/13/23  Alfred Castañeda MD   polyethylene glycol (MIRALAX) 17 g packet Take 17 g by mouth Daily. 12/13/22   Alfred Castañeda MD   ribociclib succinate 200 MG tablet therapy pack tablet Take 3 tablets by mouth Take As Directed. Take 3 tablets by mouth daily for 21 days then off 7 days on a 28 day cycle. 10/19/22   Donald Barker MD   rOPINIRole (REQUIP) 3 MG tablet Take 3 mg by mouth Every Evening. 6/29/22   Bessie Lopez MD   solifenacin (VESICARE) 10 MG tablet Take 1 tablet by mouth Daily for 360 days. 10/25/22 10/20/23  Destinee Myers MD   SUMAtriptan (IMITREX) 100 MG tablet Take 100 mg by mouth 1 (One) Time As Needed. 10/7/22   Bessie Lopez MD   traZODone (DESYREL) 150 MG tablet TAKE 1 TABLET BY MOUTH ONCE nightly 11/12/19   Yudelka Manning MD   venlafaxine XR (EFFEXOR-XR) 150 MG 24 hr capsule Take 1 capsule by mouth Daily. 11/21/22   Donald Barker MD   HYDROcodone-acetaminophen (NORCO) 5-325 MG per tablet TAKE 1 TABLET BY MOUTH EVERY 6 HOURS AS NEEDED FOR PAIN 11/28/22 12/19/22  Donald Barker MD                    Allergies:  Azithromycin    Objective     Vital Signs   Temp:  [99 °F (37.2 °C)] 99 °F (37.2 °C)  Heart Rate:  [75] 75  Resp:  [20] 20  BP: (149)/(68) 149/68    Physical Exam:     General Appearance:    Alert, cooperative, in no acute distress   Head:    Normocephalic, without obvious abnormality, atraumatic   Eyes:           Conjunctivae and sclerae normal, no icterus,     Ears:    Ears appear intact with no abnormalities noted   Throat:   No oral lesions, no thrush, oral mucosa moist   Neck:   No adenopathy, supple, trachea midline, no thyromegaly   Back:     No kyphosis present, no scoliosis present, no skin lesions,      erythema or scars, no tenderness to percussion or                   palpation,   range of motion normal   Lungs:     Clear to auscultation,respirations regular, even and                  unlabored    Heart:    Regular rhythm and normal rate, normal S1 and S2, no            murmur, no gallop, no rub, no click   Chest Wall:    No abnormalities observed   Abdomen:     Normal bowel sounds, no masses, no organomegaly, soft        non-tender, non-distended, no guarding, no rebound                tenderness, midline incision without evidence of infection, ostomy with evidence of ischemia although viable anterior to the fascia   Rectal:        Extremities:   Moves all extremities well, no edema, no cyanosis, no             redness   Pulses:   Pulses palpable and equal bilaterally   Skin:   No bleeding, bruising or rash   Lymph nodes:   No palpable adenopathy   Neurologic:   A/o x 4 with no deficits       Results Review: I have reviewed the patient's labs and imaging    LABS/IMAGING:    Imaging Results (Last 72 Hours)     ** No results found for the last 72 hours. **           Lab Results (last 72 hours)     Procedure Component Value Units Date/Time    [place] Draw [[id number]] Collected: 12/19/22 1332    Specimen: Blood Updated: 12/19/22 1426    Narrative:      The following orders were created for panel order [place] Draw.  Procedure                               Abnormality         Status                     ---------                               -----------         ------                     Green Top (Gel)[[id number]]                                                             Lavender Top[[id number]]                                      Final result               Gold Top - SST[[id number]]                                   Final result               Light Blue Top[[id number]]                                   Final result                 Please view results for these tests on the individual orders.    Comprehensive Metabolic Panel [[id number]] Collected: 12/19/22 1426    Specimen: Blood Updated: 12/19/22 1426    Lipase [[id number]] Collected: 12/19/22 1426    Specimen: Blood Updated: 12/19/22 1426    Green Top (Gel) [[id number]] Collected: 12/19/22 1426    Specimen: Blood Updated: 12/19/22 1426    Light Blue Top [[id number]] Collected: 12/19/22 1332    Specimen: Blood Updated: 12/19/22 1404     Extra Tube Hold for add-ons.     Comment: Auto resulted       Lavender Top [[id number]] Collected: 12/19/22 1332    Specimen: Blood Updated: 12/19/22 1403     Extra Tube hold for add-on     Comment: Auto resulted       Gold Top - SST [[id number]] Collected: 12/19/22 1332    Specimen: Blood Updated: 12/19/22 1403     Extra Tube Hold for add-ons.     Comment: Auto resulted.       CBC & Differential [[id number]]  (Abnormal) Collected: 12/19/22 1332    Specimen: Blood Updated: 12/19/22 1340    Narrative:      The following orders were created for panel order CBC & Differential.  Procedure                               Abnormality         Status                     ---------                               -----------         ------                     CBC Auto Differential[[id number]]        Abnormal            Final result                 Please view results for these tests on the individual orders.    CBC Auto Differential [[id number]]  (Abnormal) Collected: 12/19/22 1332    Specimen: Blood Updated: 12/19/22 1340     WBC 9.18 10*3/mm3      RBC 3.27 10*6/mm3      Hemoglobin 10.1 g/dL      Hematocrit 31.8 %      MCV 97.2 fL      MCH 30.9 pg      MCHC 31.8 g/dL      RDW 17.8 %      RDW-SD 63.1 fl      MPV 9.6 fL      Platelets 525 10*3/mm3      Neutrophil %  75.3 %      Lymphocyte % 13.9 %      Monocyte % 7.3 %      Eosinophil % 2.2 %      Basophil % 0.8 %      Immature Grans % 0.5 %      Neutrophils, Absolute 6.91 10*3/mm3      Lymphocytes, Absolute 1.28 10*3/mm3      Monocytes, Absolute 0.67 10*3/mm3      Eosinophils, Absolute 0.20 10*3/mm3      Basophils, Absolute 0.07 10*3/mm3      Immature Grans, Absolute 0.05 10*3/mm3      nRBC 0.0 /100 WBC              Result Review :     Assessment & Plan     * No active hospital problems. *     Ischemia to colostomy    I examined the colostomy at the bedside.  There is ischemia although it is viable above the fascia.  No further intervention at this time.  Ostomy care discussed with patient.  She is following up with me Wednesday.  She was instructed to bring in next bag with her so I can examine her ostomy.  Discussed with the ER physician.  Okay to discharge home.          Alfred Castañeda MD  12/19/22 14:29 EST

## 2022-12-21 ENCOUNTER — OFFICE VISIT (OUTPATIENT)
Dept: SURGERY | Facility: CLINIC | Age: 62
End: 2022-12-21

## 2022-12-21 ENCOUNTER — READMISSION MANAGEMENT (OUTPATIENT)
Dept: CALL CENTER | Facility: HOSPITAL | Age: 62
End: 2022-12-21

## 2022-12-21 VITALS — WEIGHT: 181 LBS | BODY MASS INDEX: 29.09 KG/M2 | RESPIRATION RATE: 12 BRPM | HEIGHT: 66 IN

## 2022-12-21 DIAGNOSIS — R07.9 CHEST PAIN, UNSPECIFIED TYPE: ICD-10-CM

## 2022-12-21 DIAGNOSIS — R10.9 ABDOMINAL PAIN, UNSPECIFIED ABDOMINAL LOCATION: ICD-10-CM

## 2022-12-21 DIAGNOSIS — Z93.3 COLOSTOMY IN PLACE: Primary | ICD-10-CM

## 2022-12-21 PROCEDURE — 99024 POSTOP FOLLOW-UP VISIT: CPT | Performed by: SURGERY

## 2022-12-21 RX ORDER — OXYCODONE AND ACETAMINOPHEN 10; 325 MG/1; MG/1
1 TABLET ORAL EVERY 6 HOURS PRN
Qty: 20 TABLET | Refills: 0 | Status: SHIPPED | OUTPATIENT
Start: 2022-12-21 | End: 2022-12-27

## 2022-12-21 NOTE — OUTREACH NOTE
Medical Week 1 Survey    Flowsheet Row Responses   [facility] facility patient discharged from? [facility]   Does the patient have one of the following disease processes/diagnoses(primary or secondary)? Other   Week 1 attempt successful? No   Unsuccessful attempts Attempt 1          YAW ATKINS - Registered Nurse

## 2022-12-22 ENCOUNTER — TELEPHONE (OUTPATIENT)
Dept: SURGERY | Facility: CLINIC | Age: 62
End: 2022-12-22

## 2022-12-22 ENCOUNTER — TELEPHONE (OUTPATIENT)
Dept: ONCOLOGY | Facility: HOSPITAL | Age: 62
End: 2022-12-22

## 2022-12-22 DIAGNOSIS — Z71.89 OSTOMY NURSE CONSULTATION: Primary | ICD-10-CM

## 2022-12-22 NOTE — TELEPHONE ENCOUNTER
pt called and states that her surgeon instructed her to call Dr Barker before her apt on Tuesday to make him aware of some changes so that Dr Barker will have a heads up before hand. The pt states that her colon ruptured and she has been dx with colon cancer now. The pt was seen by Dr Castañeda. The pt states that Dr Castañeda stopped the Hydrocodone and filled enough Percocet to do the pt until she see's Dr Barker again. When questioned the pt states that she is still taking Morphine. When questioned on her pain level the pt voices that her abd pain is 8-9/10 when taking her pain meds as prescribed. This nurse reviewed and verified the pt's apt for Tuesday with the pt. The pt states that she will be at her apt.

## 2022-12-22 NOTE — TELEPHONE ENCOUNTER
Patient is scheduled to see Ostomy nurse 12/27/22. Referral is in but they also need a Amb Referral to Wound Ostomy. I have pended that to you. Please sign order. Patient is aware of her appointment.

## 2022-12-22 NOTE — ADDENDUM NOTE
"Chief Complaint   Patient presents with     Musculoskeletal Problem       Initial /80  Ht 5' 8\" (1.727 m)  Wt 198 lb (89.8 kg)  BMI 30.11 kg/m2 Estimated body mass index is 30.11 kg/(m^2) as calculated from the following:    Height as of this encounter: 5' 8\" (1.727 m).    Weight as of this encounter: 198 lb (89.8 kg).  Medication Reconciliation: complete     Jorge Galindo ATC    " Addended by: EVERARDO CHEN on: 12/22/2022 04:53 PM     Modules accepted: Orders

## 2022-12-22 NOTE — TELEPHONE ENCOUNTER
Patient called today requesting antibiotic for her stoma. She states that it is very red and inflamed. I called and spoke with Dr. Castañeda regarding this patient's concerns. He states that the patient does not need an antibiotic but she does need to see the Ostomy nurse either tomorrow or next week and to keep her stoma clean.

## 2022-12-27 ENCOUNTER — LAB (OUTPATIENT)
Dept: ONCOLOGY | Facility: HOSPITAL | Age: 62
End: 2022-12-27

## 2022-12-27 ENCOUNTER — HOSPITAL ENCOUNTER (OUTPATIENT)
Dept: ONCOLOGY | Facility: HOSPITAL | Age: 62
Setting detail: INFUSION SERIES
Discharge: HOME | End: 2022-12-27

## 2022-12-27 ENCOUNTER — OFFICE VISIT (OUTPATIENT)
Dept: ONCOLOGY | Facility: HOSPITAL | Age: 62
End: 2022-12-27

## 2022-12-27 ENCOUNTER — HOSPITAL ENCOUNTER (OUTPATIENT)
Dept: INFUSION THERAPY | Facility: HOSPITAL | Age: 62
Setting detail: INFUSION SERIES
Discharge: HOME | End: 2022-12-27

## 2022-12-27 VITALS
HEIGHT: 66 IN | HEART RATE: 86 BPM | SYSTOLIC BLOOD PRESSURE: 146 MMHG | BODY MASS INDEX: 27.64 KG/M2 | OXYGEN SATURATION: 98 % | WEIGHT: 171.96 LBS | RESPIRATION RATE: 18 BRPM | TEMPERATURE: 98 F | DIASTOLIC BLOOD PRESSURE: 70 MMHG

## 2022-12-27 VITALS
OXYGEN SATURATION: 98 % | SYSTOLIC BLOOD PRESSURE: 146 MMHG | DIASTOLIC BLOOD PRESSURE: 70 MMHG | BODY MASS INDEX: 27.75 KG/M2 | WEIGHT: 171.96 LBS | RESPIRATION RATE: 16 BRPM | TEMPERATURE: 98 F | HEART RATE: 86 BPM

## 2022-12-27 DIAGNOSIS — C79.51 BONE METASTASES: ICD-10-CM

## 2022-12-27 DIAGNOSIS — Z17.0 MALIGNANT NEOPLASM OF UPPER-OUTER QUADRANT OF LEFT BREAST IN FEMALE, ESTROGEN RECEPTOR POSITIVE: Primary | ICD-10-CM

## 2022-12-27 DIAGNOSIS — Z17.0 MALIGNANT NEOPLASM OF LEFT BREAST IN FEMALE, ESTROGEN RECEPTOR POSITIVE, UNSPECIFIED SITE OF BREAST: ICD-10-CM

## 2022-12-27 DIAGNOSIS — C50.412 MALIGNANT NEOPLASM OF UPPER-OUTER QUADRANT OF LEFT BREAST IN FEMALE, ESTROGEN RECEPTOR POSITIVE: Primary | ICD-10-CM

## 2022-12-27 DIAGNOSIS — C79.51 BONE METASTASES: Primary | ICD-10-CM

## 2022-12-27 DIAGNOSIS — G89.3 CANCER RELATED PAIN: ICD-10-CM

## 2022-12-27 DIAGNOSIS — Z98.890 S/P EXPLORATORY LAPAROTOMY: Primary | ICD-10-CM

## 2022-12-27 DIAGNOSIS — C50.912 MALIGNANT NEOPLASM OF LEFT BREAST IN FEMALE, ESTROGEN RECEPTOR POSITIVE, UNSPECIFIED SITE OF BREAST: ICD-10-CM

## 2022-12-27 LAB
ALBUMIN SERPL-MCNC: 4 G/DL (ref 3.5–5.2)
ALBUMIN/GLOB SERPL: 1.1 G/DL
ALP SERPL-CCNC: 111 U/L (ref 39–117)
ALT SERPL W P-5'-P-CCNC: 10 U/L (ref 1–33)
ANION GAP SERPL CALCULATED.3IONS-SCNC: 9.3 MMOL/L (ref 5–15)
AST SERPL-CCNC: 12 U/L (ref 1–32)
BASOPHILS # BLD AUTO: 0.06 10*3/MM3 (ref 0–0.2)
BASOPHILS NFR BLD AUTO: 0.6 % (ref 0–1.5)
BILIRUB SERPL-MCNC: 0.2 MG/DL (ref 0–1.2)
BUN SERPL-MCNC: 15 MG/DL (ref 8–23)
BUN/CREAT SERPL: 20 (ref 7–25)
CALCIUM SPEC-SCNC: 9.7 MG/DL (ref 8.6–10.5)
CHLORIDE SERPL-SCNC: 99 MMOL/L (ref 98–107)
CO2 SERPL-SCNC: 27.7 MMOL/L (ref 22–29)
CREAT SERPL-MCNC: 0.75 MG/DL (ref 0.57–1)
DEPRECATED RDW RBC AUTO: 61.3 FL (ref 37–54)
EGFRCR SERPLBLD CKD-EPI 2021: 89.6 ML/MIN/1.73
EOSINOPHIL # BLD AUTO: 0.19 10*3/MM3 (ref 0–0.4)
EOSINOPHIL NFR BLD AUTO: 2 % (ref 0.3–6.2)
ERYTHROCYTE [DISTWIDTH] IN BLOOD BY AUTOMATED COUNT: 16.9 % (ref 12.3–15.4)
GLOBULIN UR ELPH-MCNC: 3.6 GM/DL
GLUCOSE SERPL-MCNC: 108 MG/DL (ref 65–99)
HCT VFR BLD AUTO: 37.2 % (ref 34–46.6)
HGB BLD-MCNC: 11.5 G/DL (ref 12–15.9)
IMM GRANULOCYTES # BLD AUTO: 0.02 10*3/MM3 (ref 0–0.05)
IMM GRANULOCYTES NFR BLD AUTO: 0.2 % (ref 0–0.5)
LYMPHOCYTES # BLD AUTO: 1.22 10*3/MM3 (ref 0.7–3.1)
LYMPHOCYTES NFR BLD AUTO: 12.8 % (ref 19.6–45.3)
MAGNESIUM SERPL-MCNC: 1.9 MG/DL (ref 1.6–2.4)
MCH RBC QN AUTO: 30 PG (ref 26.6–33)
MCHC RBC AUTO-ENTMCNC: 30.9 G/DL (ref 31.5–35.7)
MCV RBC AUTO: 97.1 FL (ref 79–97)
MONOCYTES # BLD AUTO: 0.33 10*3/MM3 (ref 0.1–0.9)
MONOCYTES NFR BLD AUTO: 3.5 % (ref 5–12)
NEUTROPHILS NFR BLD AUTO: 7.71 10*3/MM3 (ref 1.7–7)
NEUTROPHILS NFR BLD AUTO: 80.9 % (ref 42.7–76)
PHOSPHATE SERPL-MCNC: 3.9 MG/DL (ref 2.5–4.5)
PLATELET # BLD AUTO: 512 10*3/MM3 (ref 140–450)
PMV BLD AUTO: 9.6 FL (ref 6–12)
POTASSIUM SERPL-SCNC: 3.9 MMOL/L (ref 3.5–5.2)
PROT SERPL-MCNC: 7.6 G/DL (ref 6–8.5)
RBC # BLD AUTO: 3.83 10*6/MM3 (ref 3.77–5.28)
SODIUM SERPL-SCNC: 136 MMOL/L (ref 136–145)
WBC NRBC COR # BLD: 9.53 10*3/MM3 (ref 3.4–10.8)

## 2022-12-27 PROCEDURE — 85025 COMPLETE CBC W/AUTO DIFF WBC: CPT

## 2022-12-27 PROCEDURE — 84100 ASSAY OF PHOSPHORUS: CPT

## 2022-12-27 PROCEDURE — G0463 HOSPITAL OUTPT CLINIC VISIT: HCPCS | Performed by: INTERNAL MEDICINE

## 2022-12-27 PROCEDURE — 96372 THER/PROPH/DIAG INJ SC/IM: CPT

## 2022-12-27 PROCEDURE — 99214 OFFICE O/P EST MOD 30 MIN: CPT | Performed by: INTERNAL MEDICINE

## 2022-12-27 PROCEDURE — 25010000002 DENOSUMAB 120 MG/1.7ML SOLUTION: Performed by: INTERNAL MEDICINE

## 2022-12-27 PROCEDURE — G0463 HOSPITAL OUTPT CLINIC VISIT: HCPCS

## 2022-12-27 PROCEDURE — 80053 COMPREHEN METABOLIC PANEL: CPT

## 2022-12-27 PROCEDURE — 36415 COLL VENOUS BLD VENIPUNCTURE: CPT

## 2022-12-27 PROCEDURE — 83735 ASSAY OF MAGNESIUM: CPT

## 2022-12-27 RX ORDER — OXYCODONE AND ACETAMINOPHEN 10; 325 MG/1; MG/1
1 TABLET ORAL EVERY 4 HOURS PRN
Qty: 60 TABLET | Refills: 0 | Status: SHIPPED | OUTPATIENT
Start: 2022-12-27 | End: 2023-01-23 | Stop reason: SDUPTHER

## 2022-12-27 RX ORDER — HYDROCHLOROTHIAZIDE 12.5 MG/1
12.5 TABLET ORAL DAILY PRN
COMMUNITY
Start: 2022-12-22 | End: 2023-02-01

## 2022-12-27 RX ORDER — MORPHINE SULFATE 30 MG/1
30 TABLET, FILM COATED, EXTENDED RELEASE ORAL 2 TIMES DAILY
Qty: 60 TABLET | Refills: 0 | Status: SHIPPED | OUTPATIENT
Start: 2022-12-27 | End: 2023-02-21 | Stop reason: SDUPTHER

## 2022-12-27 RX ADMIN — DENOSUMAB 120 MG: 120 INJECTION SUBCUTANEOUS at 13:53

## 2022-12-27 NOTE — SIGNIFICANT NOTE
Wound Eval / Progress Noted    [facility]     Patient Name: Derek Keller  : 1960  MRN: [id number]  Today's Date: 2022                 Admit Date: 2022    Visit Dx:    ICD-10-CM ICD-9-CM   1. S/P ex lap with sigmoid resection, Lynn's procedure for stercoral perforation  Z98.890 V45.89       Patient Active Problem List   Diagnosis    Other chest pain    Hypertension    Hyperlipidemia    Allergic rhinitis    Hypothyroidism    Neck pain    Lumbago    Arthritis    Chronic pain disorder    History of IBS    Anxiety    Monopolar depression (HCC)    Malaise and fatigue    Tobacco abuse    Fibromyalgia    Vitamin B 12 deficiency    Acute pain of left knee    Primary osteoarthritis of left knee    Osteoarthrosis, localized, primary, knee    Generalized abdominal pain    Herpes simplex vulvovaginitis    Lightheadedness    Cough    Hypoxemia    Restless leg syndrome    Itch of skin    Primary insomnia    Chronic fatigue    Asthma    Body mass index (BMI) 26.0-26.9, adult    Constipation    Degeneration of lumbar intervertebral disc    Disorder associated with Helicobacter species    Hearing loss    Impacted cerumen of right ear    Inappropriate diet and eating habits    Inguinal pain    Irritable bowel syndrome    Cervical spondylosis    Obesity with body mass index 30 or greater    Renal stone    Malignant neoplasm of left breast in female, estrogen receptor positive (HCC)    Cancer related pain    Bone metastases (HCC)    Secondary malignant neoplasm of bone (HCC)    Peripheral edema    Peritonitis (HCC)    Leukopenia    Metastatic breast cancer (HCC)    S/P ex lap with sigmoid resection, Lynn's procedure for stercoral perforation        Past Medical History:   Diagnosis Date    Abdominal pain     Allergic rhinitis     Anemia     Anxiety     Arteriosclerosis     Coronary    Arthritis     Bone metastases (HCC) 10/14/2022    Bowen's disease     Breast cancer (HCC)     Cancer related pain 10/13/2022     Chest pain     Chronic pain disorder     Cough     Depression     Dysphoric mood     Fatigue     Frequent urination     History of colon polyps     History of IBS     History of kidney stones     Hyperlipidemia     Hypertension     Hypothyroidism     Insomnia     Lumbago     Malaise and fatigue     Mood disorder (HCC)     Neck pain     Palpitations     Peripheral edema 11/21/2022    Precordial pain     Sleep disturbance     SOB (shortness of breath)         Past Surgical History:   Procedure Laterality Date    BREAST BIOPSY      CARDIAC CATHETERIZATION Left 1960    CARDIAC CATHETERIZATION N/A 04/06/2018    Procedure: Coronary angiography;  Surgeon: Art Licea MD;  Location: [facility] CATH INVASIVE LOCATION;  Service: Cardiovascular    CARDIAC CATHETERIZATION N/A 04/06/2018    Procedure: Left heart cath;  Surgeon: Art Licea MD;  Location[facility] CATH INVASIVE LOC[facility];  Service: Cardiovascular    CARDIAC CATHETERIZATION N/A 04/06/2018    Procedure: Left ventriculography;  Surgeon: Art Licea MD;  Location: [facility] CATH INVASIVE LOCATION;  Service: Cardiovascular    CHOLECYSTECTOMY      COLONOSCOPY  11/08/2006    EXPLORATORY LAPAROTOMY N/A 12/6/2022    Procedure: LAPAROTOMY EXPLORATORY sigmoid resection hartmans procedure colostomy;  Surgeon: Alfred Castañeda MD;  Location: [facility] MAIN OR;  Service: General;  Laterality: N/A;    GANGLION CYST EXCISION      HYSTERECTOMY  05/2005    LAPAROSCOPIC GASTRIC BANDING      TONSILLECTOMY        12/27/22 1140   Wound 12/06/22 1906 midline abdomen Incision   Placement Date/Time: 12/06/22 1906   Present on Hospital Admission: No  Orientation: midline  Location: abdomen  Primary Wound Type: Incision   Wound Image     Dressing Appearance open to air;moist drainage   Closure None   Base yellow;pink;moist;red   Periwound redness;pink;intact   Periwound Temperature warm   Periwound Skin Turgor soft   Edges open   Drainage Characteristics/Odor  purulent;serous   Drainage Amount small   Care, Wound cleansed with;sterile normal saline   Dressing Care dressing applied;hydrofiber;silver impregnated;hydrocolloid   Periwound Care hydrocolloid barrier applied;absorptive dressing applied   Colostomy LLQ   Placement Date/Time: 12/06/22 1900   Inserted by: DR. HESS  Hand Hygiene Completed: Yes  Colostomy Type: Descending/sigmoid;End  Location: LLQ   Wound Image    Stomal Appliance 1 piece;Leaking;Changed   Stoma Appearance flush with skin;retracted below skin level;red;pink;moist   Peristomal Assessment Excoriation;Red;Pink   Accessories/Skin Care cleansed with water;skin sealant;barrier substance over peristomal skin   Stoma Function stool   Stool Color brown, light   Stool Consistency loose   Treatment Bag change;Site care     Wound Check / Follow-up:  Patient seen today in outpatient nursing services after being referred by general surgeon. Patient with colostomy to left lower quadrant. Patient reports she is having issues with leaking and pouch not adhering to skin. Upon questioning, patient reports she uses no rinse cleansing foam and adhesive remover wipe to clean stoma and peristomal tissue. Educated patient on stoma and peristomal tissue cleansing using only warm water and washcloth. Education provided regarding films left behind by soaps and adhesive remover wipes interfering with pouch adherence. Also instructed patient on how to cut pouch to fit stoma size and shape and that if pouch is cut too large, peristomal tissue will be exposed to stool and increase risk of skin irritation and breakdown. Peristomal tissue noted to be excoriated, red, and moist at this time. Cleansed stoma and peristomal tissue with warm water and washcloth. Applied marathon skin barrier to open, moist tissue and instructed patient to leave this barrier in place until it comes off naturally. Patient reports concerns regarding midline abdominal incision. Three small open areas  noted to lower portion of midline abdominal incision. Upper open area noted to have purulent drainage and upon palpation to indurated tissue surrounding open area, purulent drainage expressed into wound bed. Cleansed with normal saline and gauze. Applied silver impregnated hydrofiber and secured with hydrocolloid dressing. Instructed patient to leave this dressing in place until Friday when home health nurse comes to assist with colostomy pouch change. Instructed patient to then remove dressing, cleanse, and apply dry dressing to absorb drainage unless surgeon has other recommendations. Patient and patient's brother at bedside verbalized understanding to all education. Patient expresses no additional needs or concerns at this time.     Message sent to surgeon regarding assessment findings and interventions today. Surgeon agrees with treatment plan at this time.    Called and spoke with AMANDA Chavez with [facility] regarding assessment findings and interventions today. RN states she is to see patient this Friday.      Impression: Colostomy, peristomal irritation, midline abdominal incision with delayed healing and drainage.      Short term goals:  Regain skin integrity, colostomy management, skin protection.      Analisa Alvarado RN    12/27/2022    12:48 EST

## 2022-12-27 NOTE — PROGRESS NOTES
Chief Complaint  Breast Cancer    Haile Monique MD Patel, Saagar, MD    Subjective          Derek Keller presents to [facility] for ongoing treatment of her metastatic breast cancer.  Since her last visit, she was hospitalized for a bowel perforation requiring exploratory laparotomy and colostomy.  Pathology specimen from that surgery demonstrated metastatic breast cancer involving the bowel.  She notes that the colostomy is functioning normally and she saw wound care earlier today for ostomy teaching.  Those records reviewed.  She notes a little bit of breakdown of her midline incision but will be seeing her surgeon soon.  She is taking her letrozole daily.  She is taking her ribociclib as prescribed.  She is due for Xgeva today.  She has dental or jaw pain.  She continues to have significant pain in her abdomen and back.  She had radiation to the back but has not seen improvement yet in her symptoms.  She is taking MS Contin with oxycodone as needed for breakthrough.  She does not feel that this is controlling her pain.    Oncology/Hematology History   Malignant neoplasm of left breast in female, estrogen receptor positive (HCC)   10/13/2022 Initial Diagnosis    Malignant neoplasm of left breast in female, estrogen receptor positive (HCC)     10/14/2022 -  Chemotherapy    OP BREAST Letrozole / Ribociclib     10/14/2022 Cancer Staged    Staging form: Breast, AJCC 8th Edition  - Clinical: Stage IV (cT1c, cN1, cM1, ER+, TN+, HER2-) - Signed by Donald Barker MD on 10/14/2022     10/28/2022 -  Chemotherapy    OP SUPPORTIVE Denosumab (Xgeva) Q28D     Bone metastases (HCC)   10/14/2022 Initial Diagnosis    Bone metastases (HCC)     10/28/2022 -  Chemotherapy    OP SUPPORTIVE Denosumab (Xgeva) Q28D     Secondary malignant neoplasm of bone (HCC)   11/14/2022 Initial Diagnosis    Secondary malignant neoplasm of bone (HCC)     11/17/2022 -  Radiation    RADIATION THERAPY  Treatment Details (Noted on 11/14/2022)  Site: Spine - Lumbar  Technique: 3D CRT  Goal: No goal specified  Planned Treatment Start Date: 11/17/2022         Review of Systems   Constitutional: Positive for fatigue. Negative for appetite change, diaphoresis, fever, unexpected weight gain and unexpected weight loss.   HENT: Negative for hearing loss, mouth sores, sore throat, swollen glands, trouble swallowing and voice change.    Eyes: Negative for blurred vision.   Respiratory: Negative for cough, shortness of breath and wheezing.    Cardiovascular: Negative for chest pain and palpitations.   Gastrointestinal: Positive for abdominal pain (post colon surgery). Negative for blood in stool, constipation, diarrhea, nausea and vomiting.   Endocrine: Negative for cold intolerance and heat intolerance.   Genitourinary: Negative for difficulty urinating, dysuria, frequency, hematuria and urinary incontinence.   Musculoskeletal: Negative for arthralgias, back pain and myalgias.   Skin: Negative for rash, skin lesions and wound.   Neurological: Negative for dizziness, seizures, weakness, numbness and headache.   Hematological: Does not bruise/bleed easily.   Psychiatric/Behavioral: Negative for depressed mood. The patient is not nervous/anxious.      Current Outpatient Medications on File Prior to Visit   Medication Sig Dispense Refill   • atorvastatin (LIPITOR) 20 MG tablet TAKE 1 TABLET BY MOUTH EVERY DAY 30 tablet 6   • baclofen (LIORESAL) 20 MG tablet TAKE 1 TABLET BY MOUTH THREE TIMES DAILY 90 tablet 1   • donepezil (ARICEPT) 10 MG tablet Take 10 mg by mouth Daily.     • gabapentin (NEURONTIN) 600 MG tablet TAKE 1 TABLET BY MOUTH AT BEDTIME 90 tablet 0   • hydroCHLOROthiazide (HYDRODIURIL) 12.5 MG tablet      • letrozole (FEMARA) 2.5 MG tablet Take 1 tablet by mouth Daily. 90 tablet 1   • levothyroxine (SYNTHROID, LEVOTHROID) 50 MCG tablet TAKE 1 TABLET BY MOUTH EVERY DAY 90 tablet 1   • LORazepam (ATIVAN) 0.5 MG tablet  Take 0.5 mg by mouth Every 6 (Six) Hours As Needed.     • losartan (COZAAR) 100 MG tablet Take 100 mg by mouth Daily.     • montelukast (SINGULAIR) 10 MG tablet Take 10 mg by mouth Daily.     • naproxen (NAPROSYN) 500 MG tablet Take 500 mg by mouth 2 (Two) Times a Day With Meals.     • ondansetron (ZOFRAN) 8 MG tablet Take 1 tablet by mouth 3 (Three) Times a Day As Needed for Nausea or Vomiting. 30 tablet 5   • polyethylene glycol (MIRALAX) 17 g packet Take 17 g by mouth Daily. 5 packet 0   • ribociclib succinate 200 MG tablet therapy pack tablet Take 3 tablets by mouth Take As Directed. Take 3 tablets by mouth daily for 21 days then off 7 days on a 28 day cycle. 63 tablet 5   • rOPINIRole (REQUIP) 3 MG tablet Take 3 mg by mouth Every Evening.     • solifenacin (VESICARE) 10 MG tablet Take 1 tablet by mouth Daily for 360 days. 90 tablet 3   • SUMAtriptan (IMITREX) 100 MG tablet Take 100 mg by mouth 1 (One) Time As Needed.     • traZODone (DESYREL) 150 MG tablet TAKE 1 TABLET BY MOUTH ONCE nightly 90 tablet 2   • venlafaxine XR (EFFEXOR-XR) 150 MG 24 hr capsule Take 1 capsule by mouth Daily. 90 capsule 1     Current Facility-Administered Medications on File Prior to Visit   Medication Dose Route Frequency Provider Last Rate Last Admin   • [COMPLETED] denosumab (XGEVA) injection 120 mg  120 mg Subcutaneous Once Donald Barker MD   120 mg at 12/27/22 1353       Allergies   Allergen Reactions   • Azithromycin Itching     Past Medical History:   Diagnosis Date   • Abdominal pain    • Allergic rhinitis    • Anemia    • Anxiety    • Arteriosclerosis     Coronary   • Arthritis    • Bone metastases (HCC) 10/14/2022   • Bowen's disease    • Breast cancer (HCC)    • Cancer related pain 10/13/2022   • Chest pain    • Chronic pain disorder    • Cough    • Depression    • Dysphoric mood    • Fatigue    • Frequent urination    • History of colon polyps    • History of IBS    • History of kidney stones    • Hyperlipidemia    •  Hypertension    • Hypothyroidism    • Insomnia    • Lumbago    • Malaise and fatigue    • Mood disorder (HCC)    • Neck pain    • Palpitations    • Peripheral edema 11/21/2022   • Precordial pain    • Sleep disturbance    • SOB (shortness of breath)      Past Surgical History:   Procedure Laterality Date   • BREAST BIOPSY     • CARDIAC CATHETERIZATION Left 1959   • CARDIAC CATHETERIZATION N/A 04/06/2018    Procedure: Coronary angiography;  Surgeon: Art Licea MD;  Location: [facility] CATH INVASIVE LOCATION;  Service: Cardiovascular   • CARDIAC CATHETERIZATION N/A 04/06/2018    Procedure: Left heart cath;  Surgeon: Art Licea MD;  Location:  [facility] CATH INVASIVE LOCATION;  Service: Cardiovascular   • CARDIAC CATHETERIZATION N/A 04/06/2018    Procedure: Left ventriculography;  Surgeon: Art Licea MD;  [facility];  Service: Cardiovascular   • CHOLECYSTECTOMY     • COLON SURGERY      colostomy bag   • COLONOSCOPY  11/08/2006   • EXPLORATORY LAPAROTOMY N/A 12/06/2022    Procedure: LAPAROTOMY EXPLORATORY sigmoid resection hartmans procedure colostomy;  Surgeon: Alfred Castañeda MD;  Location: [facility] OR;  Service: General;  Laterality: N/A;   • GANGLION CYST EXCISION     • HYSTERECTOMY  05/2005   • LAPAROSCOPIC GASTRIC BANDING     • TONSILLECTOMY       Social History     Socioeconomic History   • Marital status:    Tobacco Use   • Smoking status: Every Day     Packs/day: 1.50     Years: 40.00     Pack years: 60.00     Types: Cigarettes     Start date: 1974   • Smokeless tobacco: Never   • Tobacco comments:     caffeine use 3 mt dews daily   Vaping Use   • Vaping Use: Never used   Substance and Sexual Activity   • Alcohol use: No   • Drug use: Yes     Types: Marijuana     Comment: Prescribed Lorazepam   • Sexual activity: Yes     Partners: Male     Birth control/protection: None     Family History   Problem Relation Age of Onset   • Hypertension  Mother    • Rheum arthritis Mother    • Heart disease Mother    • Breast cancer Mother    • Diabetes Father    • Cancer Maternal Grandmother         colon   • Colon cancer Maternal Grandmother    • Aneurysm Paternal Grandfather    • Diabetes Other    • Fibromyalgia Other        Objective   Physical Exam  Vitals reviewed. Exam conducted with a chaperone present.   Constitutional:       General: She is not in acute distress.     Appearance: Normal appearance.   Cardiovascular:      Rate and Rhythm: Normal rate and regular rhythm.      Heart sounds: Normal heart sounds. No murmur heard.    No gallop.   Pulmonary:      Effort: Pulmonary effort is normal.      Breath sounds: Normal breath sounds.   Abdominal:      General: Abdomen is flat. Bowel sounds are normal.      Palpations: Abdomen is soft.      Comments: Well-healed midline incision.  Left lower quadrant ostomy intact.   Musculoskeletal:      Cervical back: Neck supple.      Right lower leg: No edema.      Left lower leg: No edema.   Lymphadenopathy:      Cervical: No cervical adenopathy.   Neurological:      Mental Status: She is alert and oriented to person, place, and time.   Psychiatric:         Mood and Affect: Mood normal.         Behavior: Behavior normal.         Vitals:    12/27/22 1253   BP: 146/70   Pulse: 86   Resp: 16   Temp: 98 °F (36.7 °C)   SpO2: 98%   Weight: 78 kg (171 lb 15.3 oz)   PainSc:   8   PainLoc: Abdomen     ECOG score: 0         PHQ-9 Total Score:                    Result Review :   The following data was reviewed by: Donald Barker MD on 12/27/2022:  Lab Results   Component Value Date    HGB 11.5 (L) 12/27/2022    HCT 37.2 12/27/2022    MCV 97.1 (H) 12/27/2022     (H) 12/27/2022    WBC 9.53 12/27/2022    NEUTROABS 7.71 (H) 12/27/2022    LYMPHSABS 1.22 12/27/2022    MONOSABS 0.33 12/27/2022    EOSABS 0.19 12/27/2022    BASOSABS 0.06 12/27/2022     Lab Results   Component Value Date    GLUCOSE 108 (H) 12/27/2022    BUN 15  12/27/2022    CREATININE 0.75 12/27/2022     12/27/2022    K 3.9 12/27/2022    CL 99 12/27/2022    CO2 27.7 12/27/2022    CALCIUM 9.7 12/27/2022    PROTEINTOT 7.6 12/27/2022    ALBUMIN 4.0 12/27/2022    BILITOT 0.2 12/27/2022    ALKPHOS 111 12/27/2022    AST 12 12/27/2022    ALT 10 12/27/2022     Lab Results   Component Value Date    MG 1.9 12/27/2022    PHOS 3.9 12/27/2022    FREET4 1.01 01/17/2022    TSH 1.470 11/12/2019             Assessment and Plan    Diagnoses and all orders for this visit:    1. Malignant neoplasm of upper-outer quadrant of left breast in female, estrogen receptor positive (HCC) (Primary)  Assessment & Plan:  Metastatic.  Patient is on treatment with letrozole plus ribociclib along with Xgeva for bony involvement.  Ribociclib held secondary to recent exploratory laparotomy but she is now healing well.  Continue letrozole daily.  Continue ribociclib 3 weeks out of 4.  Continue monthly Xgeva.  I will see her back in 1 month for ongoing treatment lab work prior to monitor for toxicities with restaging scans to assess response to therapy    Orders:  -     Morphine (MS CONTIN) 30 MG 12 hr tablet; Take 1 tablet by mouth 2 (Two) Times a Day.  Dispense: 60 tablet; Refill: 0  -     oxyCODONE-acetaminophen (Percocet)  MG per tablet; Take 1 tablet by mouth Every 4 (Four) Hours As Needed for Moderate Pain.  Dispense: 60 tablet; Refill: 0  -     CT Chest With Contrast; Future  -     CT Abdomen Pelvis With Contrast; Future    2. Bone metastases (HCC)  Assessment & Plan:  Patient is on monthly Xgeva.  Tolerating well.  No dental or jaw pain.  Electrolytes are adequate.  Xgeva today monthly.    Orders:  -     Cancel: denosumab (XGEVA) injection 120 mg    3. Cancer related pain  Assessment & Plan:  Inadequate pain control with her current regimen.  I will increase MS Contin to 30 mg twice daily.  Refill oxycodone every 4 hours as needed for breakthrough.  Wyatt reviewed and no discrepancies.   Reassess next visit.    Orders:  -     Morphine (MS CONTIN) 30 MG 12 hr tablet; Take 1 tablet by mouth 2 (Two) Times a Day.  Dispense: 60 tablet; Refill: 0  -     oxyCODONE-acetaminophen (Percocet)  MG per tablet; Take 1 tablet by mouth Every 4 (Four) Hours As Needed for Moderate Pain.  Dispense: 60 tablet; Refill: 0          Patient Follow Up: 1 month    Patient was given instructions and counseling regarding her condition or for health maintenance advice. Please see specific information pulled into the AVS if appropriate.     Donald Barker MD    12/28/2022

## 2022-12-27 NOTE — PROGRESS NOTES
General Surgery/Colorectal Surgery Note    Patient Name:  Derek Keller  YOB: 1960  [id number]    Referring Provider: No ref. provider found      Patient Care Team:  Haile Monique MD as PCP - General (Family Medicine)  Julien Cardona DO as Consulting Physician (Pain Medicine)  Joel Castillo MD as Consulting Physician (Gastroenterology)  Remington Russell MD as Surgeon (General Surgery)  Ahsan Salas MD as Consulting Physician (Sports Medicine)  Donald Barker MD as Consulting Physician (Hematology and Oncology)  Sandy Ricci MD as Consulting Physician (General Surgery)    Chief complaint follow-up surgery    Subjective .     History of present illness:    History of metastatic breast cancer  Status post ex lap with Lynn's procedure for perforated sigmoid colon 12/6/2022.  Pathology with metastatic lobular breast carcinoma    She comes in for follow-up.  No leakage from her ostomy.  Some recent chest pain.  No recent cardiac evaluation.  Still complains of abdominal pain although improved.      History:  Past Medical History:   Diagnosis Date   • Abdominal pain    • Allergic rhinitis    • Anemia    • Anxiety    • Arteriosclerosis     Coronary   • Arthritis    • Bone metastases (HCC) 10/14/2022   • Bowen's disease    • Breast cancer (HCC)    • Cancer related pain 10/13/2022   • Chest pain    • Chronic pain disorder    • Cough    • Depression    • Dysphoric mood    • Fatigue    • Frequent urination    • History of colon polyps    • History of IBS    • History of kidney stones    • Hyperlipidemia    • Hypertension    • Hypothyroidism    • Insomnia    • Lumbago    • Malaise and fatigue    • Mood disorder (HCC)    • Neck pain    • Palpitations    • Peripheral edema 11/21/2022   • Precordial pain    • Sleep disturbance    • SOB (shortness of breath)        Past Surgical History:   Procedure Laterality Date   • BREAST BIOPSY     • CARDIAC CATHETERIZATION Left 1959   • CARDIAC  CATHETERIZATION N/A 04/06/2018    Procedure: Coronary angiography;  Surgeon: Art Licea MD;  Location:  [facility] CATH INVASIVE LOCATION;  Service: Cardiovascular   • CARDIAC CATHETERIZATION N/A 04/06/2018    Procedure: Left heart cath;  Surgeon: Art Licea MD;  Location:  [facility] CATH INVASIVE LOCATION;  Service: Cardiovascular   • CARDIAC CATHETERIZATION N/A 04/06/2018    Procedure: Left ventriculography;  Surgeon: Art Licea MD;  Location:  [facility] CATH INVASIVE LOCATION;  Service: Cardiovascular   • CHOLECYSTECTOMY     • COLON SURGERY      colostomy bag   • COLONOSCOPY  11/08/2006   • EXPLORATORY LAPAROTOMY N/A 12/06/2022    Procedure: LAPAROTOMY EXPLORATORY sigmoid resection hartmans procedure colostomy;  Surgeon: Alfred Castañeda MD;  Location: [facility] OR;  Service: General;  Laterality: N/A;   • GANGLION CYST EXCISION     • HYSTERECTOMY  05/2005   • LAPAROSCOPIC GASTRIC BANDING     • TONSILLECTOMY         Family History   Problem Relation Age of Onset   • Hypertension Mother    • Rheum arthritis Mother    • Heart disease Mother    • Breast cancer Mother    • Diabetes Father    • Cancer Maternal Grandmother         colon   • Colon cancer Maternal Grandmother    • Aneurysm Paternal Grandfather    • Diabetes Other    • Fibromyalgia Other        Social History     Tobacco Use   • Smoking status: Every Day     Packs/day: 1.50     Years: 40.00     Pack years: 60.00     Types: Cigarettes     Start date: 1974   • Smokeless tobacco: Never   • Tobacco comments:     caffeine use 3 mt dews daily   Vaping Use   • Vaping Use: Never used   Substance Use Topics   • Alcohol use: No   • Drug use: Yes     Types: Marijuana     Comment: Prescribed Lorazepam       Review of Systems  All systems were reviewed and negative except for:   Review of Systems   Constitutional: Negative for chills, fever and unexpected weight loss.   HENT: Negative for congestion, nosebleeds and voice change.    Eyes: Negative  for blurred vision, double vision and discharge.   Respiratory: Negative for apnea, chest tightness and shortness of breath.    Cardiovascular: Negative for chest pain and leg swelling.   Gastrointestinal:        See HPI   Endocrine: Negative for cold intolerance and heat intolerance.   Genitourinary: Negative for dysuria, hematuria and urgency.   Musculoskeletal: Negative for back pain, joint swelling and neck pain.   Skin: Negative for color change and dry skin.   Neurological: Negative for dizziness and confusion.   Hematological: Negative for adenopathy.   Psychiatric/Behavioral: Negative for agitation and behavioral problems.     MEDS:  Prior to Admission medications    Medication Sig Start Date End Date Taking? Authorizing Provider   atorvastatin (LIPITOR) 20 MG tablet TAKE 1 TABLET BY MOUTH EVERY DAY 10/1/19  Yes Yudelka Manning MD   baclofen (LIORESAL) 20 MG tablet TAKE 1 TABLET BY MOUTH THREE TIMES DAILY 12/6/19  Yes Yudelka Manning MD   donepezil (ARICEPT) 10 MG tablet Take 10 mg by mouth Daily. 10/28/22  Yes ProviderBessie MD   gabapentin (NEURONTIN) 600 MG tablet TAKE 1 TABLET BY MOUTH AT BEDTIME 7/30/20  Yes Yudelka Manning MD   HYDROcodone-acetaminophen (NORCO) 5-325 MG per tablet TAKE 1 TABLET BY MOUTH EVERY 6 HOURS AS NEEDED FOR PAIN 12/19/22  Yes Donald Barker MD   letrozole (FEMARA) 2.5 MG tablet Take 1 tablet by mouth Daily. 11/30/22  Yes Donald Barker MD   levothyroxine (SYNTHROID, LEVOTHROID) 50 MCG tablet TAKE 1 TABLET BY MOUTH EVERY DAY 7/19/19  Yes Yudelka Manning MD   LORazepam (ATIVAN) 0.5 MG tablet Take 0.5 mg by mouth Every 6 (Six) Hours As Needed.   Yes Provider, MD Bessie   losartan (COZAAR) 100 MG tablet Take 100 mg by mouth Daily.   Yes Provider, MD Bessie   montelukast (SINGULAIR) 10 MG tablet Take 10 mg by mouth Daily. 1/17/22  Yes ProviderBessie MD   Morphine (MS CONTIN) 15 MG 12 hr tablet Take 1 tablet by mouth 2 (Two) Times a Day.  12/13/22  Yes Raudel Grande MD   naproxen (NAPROSYN) 500 MG tablet Take 500 mg by mouth 2 (Two) Times a Day With Meals.   Yes Bessie Lopez MD   ondansetron (ZOFRAN) 8 MG tablet Take 1 tablet by mouth 3 (Three) Times a Day As Needed for Nausea or Vomiting. 10/13/22  Yes Donald Barker MD   oxyCODONE-acetaminophen (Percocet)  MG per tablet Take 1 tablet by mouth Every 6 (Six) Hours As Needed for Moderate Pain. 12/13/22 12/13/23 Yes Alfred Castañeda MD   polyethylene glycol (MIRALAX) 17 g packet Take 17 g by mouth Daily. 12/13/22  Yes Alfred Castañeda MD   ribociclib succinate 200 MG tablet therapy pack tablet Take 3 tablets by mouth Take As Directed. Take 3 tablets by mouth daily for 21 days then off 7 days on a 28 day cycle. 10/19/22  Yes Donald Barker MD   rOPINIRole (REQUIP) 3 MG tablet Take 3 mg by mouth Every Evening. 6/29/22  Yes Bessie Lopez MD   solifenacin (VESICARE) 10 MG tablet Take 1 tablet by mouth Daily for 360 days. 10/25/22 10/20/23 Yes Destinee Myers MD   SUMAtriptan (IMITREX) 100 MG tablet Take 100 mg by mouth 1 (One) Time As Needed. 10/7/22  Yes Bessie Lopez MD   traZODone (DESYREL) 150 MG tablet TAKE 1 TABLET BY MOUTH ONCE nightly 11/12/19  Yes Yudelka Manning MD   venlafaxine XR (EFFEXOR-XR) 150 MG 24 hr capsule Take 1 capsule by mouth Daily. 11/21/22  Yes Donald Barker MD   hydroCHLOROthiazide (HYDRODIURIL) 12.5 MG tablet  12/22/22   Bessie Lopez MD   oxyCODONE-acetaminophen (Percocet)  MG per tablet Take 1 tablet by mouth Every 6 (Six) Hours As Needed for Moderate Pain. 12/21/22 12/21/23  Alfred Castañeda MD        Allergies:  Azithromycin    Objective     Vital Signs   Temp:  [98 °F (36.7 °C)] 98 °F (36.7 °C)  Heart Rate:  [86] 86  Resp:  [16-18] 16  BP: (146)/(70) 146/70    Physical Exam: Some irritation around the stoma opening, abdomen soft and appropriately tender, no hernia        Results  "Review:   {Results Review:61745::\"I reviewed the patient's new clinical results.\"    LABS/IMAGING:  Results for orders placed or performed during the hospital encounter of 12/19/22   Comprehensive Metabolic Panel    Specimen: Blood   Result Value Ref Range    Glucose 75 65 - 99 mg/dL    BUN 12 8 - 23 mg/dL    Creatinine 0.67 0.57 - 1.00 mg/dL    Sodium 142 136 - 145 mmol/L    Potassium 3.6 3.5 - 5.2 mmol/L    Chloride 105 98 - 107 mmol/L    CO2 25.9 22.0 - 29.0 mmol/L    Calcium 9.0 8.6 - 10.5 mg/dL    Total Protein 7.1 6.0 - 8.5 g/dL    Albumin 3.40 (L) 3.50 - 5.20 g/dL    ALT (SGPT) 15 1 - 33 U/L    AST (SGOT) 16 1 - 32 U/L    Alkaline Phosphatase 110 39 - 117 U/L    Total Bilirubin 0.2 0.0 - 1.2 mg/dL    Globulin 3.7 gm/dL    A/G Ratio 0.9 g/dL    BUN/Creatinine Ratio 17.9 7.0 - 25.0    Anion Gap 11.1 5.0 - 15.0 mmol/L    eGFR 98.4 >60.0 mL/min/1.73   Lipase    Specimen: Blood   Result Value Ref Range    Lipase 29 13 - 60 U/L   Urinalysis With Microscopic If Indicated (No Culture) - Urine, Clean Catch    Specimen: Urine, Clean Catch   Result Value Ref Range    Color, UA Yellow Yellow, Straw    Appearance, UA Clear Clear    pH, UA 6.5 5.0 - 8.0    Specific Gravity, UA 1.008 1.005 - 1.030    Glucose, UA Negative Negative    Ketones, UA Negative Negative    Bilirubin, UA Negative Negative    Blood, UA Negative Negative    Protein, UA Negative Negative    Leuk Esterase, UA Negative Negative    Nitrite, UA Negative Negative    Urobilinogen, UA 0.2 E.U./dL 0.2 - 1.0 E.U./dL   Lactic Acid, Plasma    Specimen: Blood   Result Value Ref Range    Lactate 1.0 0.5 - 2.0 mmol/L   CBC Auto Differential    Specimen: Blood   Result Value Ref Range    WBC 9.18 3.40 - 10.80 10*3/mm3    RBC 3.27 (L) 3.77 - 5.28 10*6/mm3    Hemoglobin 10.1 (L) 12.0 - 15.9 g/dL    Hematocrit 31.8 (L) 34.0 - 46.6 %    MCV 97.2 (H) 79.0 - 97.0 fL    MCH 30.9 26.6 - 33.0 pg    MCHC 31.8 31.5 - 35.7 g/dL    RDW 17.8 (H) 12.3 - 15.4 %    RDW-SD 63.1 (H) " 37.0 - 54.0 fl    MPV 9.6 6.0 - 12.0 fL    Platelets 525 (H) 140 - 450 10*3/mm3    Neutrophil % 75.3 42.7 - 76.0 %    Lymphocyte % 13.9 (L) 19.6 - 45.3 %    Monocyte % 7.3 5.0 - 12.0 %    Eosinophil % 2.2 0.3 - 6.2 %    Basophil % 0.8 0.0 - 1.5 %    Immature Grans % 0.5 0.0 - 0.5 %    Neutrophils, Absolute 6.91 1.70 - 7.00 10*3/mm3    Lymphocytes, Absolute 1.28 0.70 - 3.10 10*3/mm3    Monocytes, Absolute 0.67 0.10 - 0.90 10*3/mm3    Eosinophils, Absolute 0.20 0.00 - 0.40 10*3/mm3    Basophils, Absolute 0.07 0.00 - 0.20 10*3/mm3    Immature Grans, Absolute 0.05 0.00 - 0.05 10*3/mm3    nRBC 0.0 0.0 - 0.2 /100 WBC   Green Top (Gel)   Result Value Ref Range    Extra Tube Hold for add-ons.    Lavender Top   Result Value Ref Range    Extra Tube hold for add-on    Gold Top - SST   Result Value Ref Range    Extra Tube Hold for add-ons.    Light Blue Top   Result Value Ref Range    Extra Tube Hold for add-ons.         Result Review :     Assessment & Plan     History of metastatic breast cancer  Status post ex lap with Lynn's procedure for perforated sigmoid colon 12/6/2022.  Pathology with metastatic lobular breast carcinoma  Recent chest pain    I had a discussion with the patient.  She was given a prescription for Percocet 10 dispense 20.  I explained her oncology or pain management will need to manage her pain from here on out.  Referral made to pain management.  Referral made to ostomy nurse for evaluation and treatment.  She was instructed to cut the opening the smallest possible to fit over the stoma.  Referral made to cardiology for evaluation of her chest pain.  Follow-up with me in 2 to 3 weeks.  All questions answered.  She agrees with the plan.  Thank for the consult.              This document has been electronically signed by Alfred Castañeda MD  December 27, 2022 13:17 EST   needs to be updated done

## 2022-12-27 NOTE — ADDENDUM NOTE
Encounter addended by: Analisa Alvarado RN on: 12/27/2022 1:02 PM   Actions taken: Clinical Note Signed, Flowsheet accepted

## 2022-12-28 ENCOUNTER — READMISSION MANAGEMENT (OUTPATIENT)
Dept: CALL CENTER | Facility: HOSPITAL | Age: 62
End: 2022-12-28

## 2022-12-28 ENCOUNTER — PATIENT ROUNDING (BHMG ONLY) (OUTPATIENT)
Dept: RADIATION ONCOLOGY | Facility: HOSPITAL | Age: 62
End: 2022-12-28

## 2022-12-28 PROBLEM — C50.919 METASTATIC BREAST CANCER: Status: RESOLVED | Noted: 2022-12-08 | Resolved: 2022-12-28

## 2022-12-28 NOTE — PROGRESS NOTES
"Patient completed radiation treatment for metastatic breast cancer on 12/13/2022. Following up with patient regarding any concerns the patient may have at this time and receiving feedback in regards to patient over all care while under treatment.     Patient asked about symptoms and side effects that continue to be bothersome. Patient states she's had several complications since her last treatment. Patient never fully completed treatment. Patient states she now has a colostomy. Patient declines any side effects from radiation. Patient states that she did not have any amount of pain relief after having radiation to her spine.     Patient states that Pain is at 6 on scale of 0/10. Patient states medications have changed. Morphine increased to 30 mg Q 12 hours and Percocet  mg for breakthrough pain ordered for every 4 hours PRN. Patient states even with the increase pain never fully goes away. Patient states Dr. Barker is in charge of pain medication.    Patient was asked if there was anything that could've made their experience better at [facility] while they were under treatment. Patient states: \"I have never felt more comfortable with a group of people then I did when I had my radiation treatments.\"    Patient encouraged to call the office if any concerns arise. Patient does not have follow up appointment at this time. Will contact patient with appointment when scheduled.   "

## 2022-12-28 NOTE — OUTREACH NOTE
Medical Week 2 Survey    Flowsheet Row Responses   [facility] patient discharged from? [facility]   Does the patient have one of the following disease processes/diagnoses(primary or secondary)? Other   Week 2 attempt successful? Yes   Discharge diagnosis Postoperative bleeding   Call end time 0847   Person spoke with today (if not patient) and relationship Patient   Meds reviewed with patient/caregiver? Yes   Prescription comments Patient pain is getting better.   Is the patient taking all medications as directed (includes completed medication regime)? Yes   Comments regarding PCP Following up with Dr. Castañeda and Cancer MD   Has the patient kept scheduled appointments due by today? Yes   What is the Home health agency?  [facility]    Has home health visited the patient within 72 hours of discharge? Yes   Home health comments Will be at the home Friday   What DME was ordered?  [employer]  for hospital bed   Has all DME been delivered? Yes   Psychosocial issues? No   Did the patient receive a copy of their discharge instructions? Yes   Nursing interventions Reviewed instructions with patient   What is the patient's perception of their health status since discharge? Improving   Is the patient/caregiver able to teach back signs and symptoms related to disease process for when to call PCP? Yes   Is the patient/caregiver able to teach back signs and symptoms related to disease process for when to call 911? Yes   Is the patient/caregiver able to teach back the hierarchy of who to call/visit for symptoms/problems? PCP, Specialist, Home health nurse, Urgent Care, ED, 911 Yes   Additional teach back comments Stoma is pink. Had some gaping where stitches were removed. ostomy nurse packed it and said it will heal. discussed what to monitor for and when to report to Dr. Castañeda   Week 2 Call Completed? Yes   Wrap up additional comments At this time patient is doing okay she is being followed by several  specialties and HH is coming to the home, and following with wound care nurse. No concerns or questions at this time.          JARED BURCH - Registered Nurse

## 2022-12-28 NOTE — ASSESSMENT & PLAN NOTE
Metastatic.  Patient is on treatment with letrozole plus ribociclib along with Xgeva for bony involvement.  Ribociclib held secondary to recent exploratory laparotomy but she is now healing well.  Continue letrozole daily.  Continue ribociclib 3 weeks out of 4.  Continue monthly Xgeva.  I will see her back in 1 month for ongoing treatment lab work prior to monitor for toxicities with restaging scans to assess response to therapy

## 2022-12-28 NOTE — ASSESSMENT & PLAN NOTE
Patient is on monthly Xgeva.  Tolerating well.  No dental or jaw pain.  Electrolytes are adequate.  Xgeva today monthly.

## 2022-12-28 NOTE — ASSESSMENT & PLAN NOTE
Inadequate pain control with her current regimen.  I will increase MS Contin to 30 mg twice daily.  Refill oxycodone every 4 hours as needed for breakthrough.  Wyatt reviewed and no discrepancies.  Reassess next visit.

## 2022-12-29 ENCOUNTER — SPECIALTY PHARMACY (OUTPATIENT)
Dept: PHARMACY | Facility: HOSPITAL | Age: 62
End: 2022-12-29

## 2023-01-04 ENCOUNTER — PREP FOR SURGERY (OUTPATIENT)
Dept: OTHER | Facility: HOSPITAL | Age: 63
End: 2023-01-04
Payer: MEDICARE

## 2023-01-04 ENCOUNTER — OFFICE VISIT (OUTPATIENT)
Dept: SURGERY | Facility: CLINIC | Age: 63
End: 2023-01-04
Payer: MEDICARE

## 2023-01-04 VITALS — HEIGHT: 66 IN | WEIGHT: 171 LBS | RESPIRATION RATE: 16 BRPM | BODY MASS INDEX: 27.48 KG/M2

## 2023-01-04 DIAGNOSIS — Z43.3 COLOSTOMY CARE: Primary | ICD-10-CM

## 2023-01-04 DIAGNOSIS — C50.919 MALIGNANT NEOPLASM OF FEMALE BREAST, UNSPECIFIED ESTROGEN RECEPTOR STATUS, UNSPECIFIED LATERALITY, UNSPECIFIED SITE OF BREAST: Primary | ICD-10-CM

## 2023-01-04 DIAGNOSIS — Z87.898 HISTORY OF DIFFICULT VENOUS ACCESS: ICD-10-CM

## 2023-01-04 PROCEDURE — 99212 OFFICE O/P EST SF 10 MIN: CPT | Performed by: SURGERY

## 2023-01-04 RX ORDER — CEFAZOLIN SODIUM 2 G/100ML
2 INJECTION, SOLUTION INTRAVENOUS ONCE
Status: CANCELLED | OUTPATIENT
Start: 2023-01-04 | End: 2023-01-04

## 2023-01-04 RX ORDER — SODIUM CHLORIDE, SODIUM LACTATE, POTASSIUM CHLORIDE, CALCIUM CHLORIDE 600; 310; 30; 20 MG/100ML; MG/100ML; MG/100ML; MG/100ML
50 INJECTION, SOLUTION INTRAVENOUS CONTINUOUS
Status: CANCELLED | OUTPATIENT
Start: 2023-01-04

## 2023-01-04 NOTE — LETTER
January 6, 2023     Haile Monique MD  [address]  [place] [address]    Patient: Derek Keller   YOB: 1960   Date of Visit: 1/4/2023       Dear Dr. Kayden MD:    Thank you for referring Derek Keller to me for evaluation. Below are the relevant portions of my assessment and plan of care.    If you have questions, please do not hesitate to call me. I look forward to following Derek along with you.         Sincerely,        Alfred Castañeda MD        CC: MD Maddy Quezada, Alfred Vazquez MD  01/06/23 0852  Signed  General Surgery/Colorectal Surgery Note    Patient Name:  Derek Keller  YOB: 1960  [id number]    Referring Provider: No ref. provider found      Patient Care Team:  Haile Monique MD as PCP - General (Family Medicine)  Julien Cardona DO as Consulting Physician (Pain Medicine)  Joel Castillo MD as Consulting Physician (Gastroenterology)  Remington Russell MD as Surgeon (General Surgery)  Ahsan Salas MD as Consulting Physician (Sports Medicine)  Donald Barker MD as Consulting Physician (Hematology and Oncology)  Sandy Ricci MD as Consulting Physician (General Surgery)    Chief complaint follow-up, new concern with difficult venous access    Subjective .     History of present illness:    History of metastatic breast cancer  Status post ex lap with Lynn's procedure for perforated sigmoid colon 12/6/2022.  Pathology with metastatic lobular breast carcinoma    She comes in for further evaluation of her colostomy.  She also comes in to discuss a new concern.  History of difficult venous access.  Sent in to discuss port placement.  She has been seen by ostomy nurses still having leakage from her colostomy.  She has home health at home.  No fever.  Overall she thinks she is improving.      History:  Past Medical History:   Diagnosis Date   • Abdominal pain     OSTOMY SITE   • Allergic rhinitis    • Anemia     NO S/S   • Anxiety    •  Arteriosclerosis     Coronary   • Arthritis    • Bone metastases (HCC) 10/14/2022   • Bowen's disease     SKIN CANCER   • Breast cancer (HCC)     NO SURGERY WAS DONE DUE TO METS TO BONE/COLON/LYMPHNODE   • Cancer related pain 10/13/2022   • Depression    • Dysphoric mood    • Fatigue    • Frequent urination     NO S/S INFECTION   • History of colon polyps    • History of IBS    • History of kidney stones    • Hyperlipidemia    • Hypertension    • Hypothyroidism    • Insomnia    • Lumbago    • Mood disorder (HCC)    • Neck pain     R/T CANCER   • Palpitations     ASYMPTOMATIC,  NO CARDIOLOGIST   • Precordial pain     R/T SPINE CANCER   • Sleep disturbance        Past Surgical History:   Procedure Laterality Date   • BREAST BIOPSY Left    • CARDIAC CATHETERIZATION Left 1960   • CARDIAC CATHETERIZATION N/A 04/06/2018    Procedure: Coronary angiography;  Surgeon: Art Licea MD;  Location: [facility] INVASIVE LOCATION;  Service: Cardiovascular   • CARDIAC CATHETERIZATION N/A 04/06/2018    Procedure: Left heart cath;  Surgeon: Art Licea MD;  Location: [facility] INVASIVE LOCATION;  Service: Cardiovascular   • CARDIAC CATHETERIZATION N/A 04/06/2018    Procedure: Left ventriculography;  Surgeon: Art Licea MD;  Location: [facility] INVASIVE LOCATION;  Service: Cardiovascular   • CHOLECYSTECTOMY     • COLON SURGERY      colostomy bag   • COLONOSCOPY  11/08/2006   • EXPLORATORY LAPAROTOMY N/A 12/06/2022    Procedure: LAPAROTOMY EXPLORATORY sigmoid resection hartmans procedure colostomy;  Surgeon: Alfred Castañeda MD;  Location: [facility];  Service: General;  Laterality: N/A;   • GANGLION CYST EXCISION     • HYSTERECTOMY  05/2005   • LAPAROSCOPIC GASTRIC BANDING      BAND REMOVED 2020   • TONSILLECTOMY         Family History   Problem Relation Age of Onset   • Hypertension Mother    • Rheum arthritis Mother    • Heart disease Mother    • Breast cancer Mother    • Diabetes Father     • Cancer Maternal Grandmother         colon   • Colon cancer Maternal Grandmother    • Aneurysm Paternal Grandfather    • Diabetes Other    • Fibromyalgia Other    • Malig Hyperthermia Neg Hx        Social History     Tobacco Use   • Smoking status: Every Day     Packs/day: 1.50     Years: 40.00     Pack years: 60.00     Types: Cigarettes     Start date: 1974   • Smokeless tobacco: Never   • Tobacco comments:     caffeine use 3 mt dews daily     INST PER ANESTHESIA PROTOCOL   Vaping Use   • Vaping Use: Never used   Substance Use Topics   • Alcohol use: No   • Drug use: Yes     Types: Marijuana     Comment: Prescribed Lorazepam/OCC MARIJUANA       Review of Systems  All systems were reviewed and negative except for:   Review of Systems   Constitutional: Negative for chills, fever and unexpected weight loss.   HENT: Negative for congestion, nosebleeds and voice change.    Eyes: Negative for blurred vision, double vision and discharge.   Respiratory: Negative for apnea, chest tightness and shortness of breath.    Cardiovascular: Negative for chest pain and leg swelling.   Gastrointestinal:        See HPI   Endocrine: Negative for cold intolerance and heat intolerance.   Genitourinary: Negative for dysuria, hematuria and urgency.   Musculoskeletal: Negative for back pain, joint swelling and neck pain.   Skin: Negative for color change and dry skin.   Neurological: Negative for dizziness and confusion.   Hematological: Negative for adenopathy.   Psychiatric/Behavioral: Negative for agitation and behavioral problems.     MEDS:  Prior to Admission medications    Medication Sig Start Date End Date Taking? Authorizing Provider   atorvastatin (LIPITOR) 20 MG tablet TAKE 1 TABLET BY MOUTH EVERY DAY  Patient taking differently: Take 20 mg by mouth Daily. 10/1/19  Yes Yudelka Manning MD   baclofen (LIORESAL) 20 MG tablet TAKE 1 TABLET BY MOUTH THREE TIMES DAILY  Patient taking differently: Take 10 mg by mouth 3 (Three)  Times a Day As Needed. 12/6/19  Yes Yudelka Manning MD   donepezil (ARICEPT) 10 MG tablet Take 10 mg by mouth Daily. 10/28/22  Yes Bessie Lopez MD   gabapentin (NEURONTIN) 600 MG tablet TAKE 1 TABLET BY MOUTH AT BEDTIME  Patient taking differently: 300 mg 2 (Two) Times a Day As Needed. TAKES1/2  MG TABLET 7/30/20  Yes Yudelka Manning MD   hydroCHLOROthiazide (HYDRODIURIL) 12.5 MG tablet Take 12.5 mg by mouth Daily As Needed (FOR SWELLING). 12/22/22  Yes Bessie Lopez MD   letrozole (FEMARA) 2.5 MG tablet Take 1 tablet by mouth Daily. 11/30/22  Yes Donald Barker MD   levothyroxine (SYNTHROID, LEVOTHROID) 50 MCG tablet TAKE 1 TABLET BY MOUTH EVERY DAY 7/19/19  Yes Yudelka Manning MD   LORazepam (ATIVAN) 0.5 MG tablet Take 0.5 mg by mouth Every 6 (Six) Hours As Needed.   Yes ProviderBessie MD   losartan (COZAAR) 100 MG tablet Take 100 mg by mouth Daily. INST PER ANESTHESIA PROTOCOL   Yes Bessie Lopez MD   montelukast (SINGULAIR) 10 MG tablet Take 10 mg by mouth Daily. 1/17/22  Yes Bessie Lopez MD   Morphine (MS CONTIN) 30 MG 12 hr tablet Take 1 tablet by mouth 2 (Two) Times a Day. 12/27/22  Yes Donald Barker MD   naproxen (NAPROSYN) 500 MG tablet Take 500 mg by mouth 2 (Two) Times a Day With Meals. LAST DOSE 1/5/23   Yes Bessie Lopez MD   ondansetron (ZOFRAN) 8 MG tablet Take 1 tablet by mouth 3 (Three) Times a Day As Needed for Nausea or Vomiting. 10/13/22  Yes Donald Barker MD   oxyCODONE-acetaminophen (Percocet)  MG per tablet Take 1 tablet by mouth Every 4 (Four) Hours As Needed for Moderate Pain. 12/27/22 12/27/23 Yes Donald Barker MD   polyethylene glycol (MIRALAX) 17 g packet Take 17 g by mouth Daily. 12/13/22  Yes Alfred Castañeda MD   ribociclib succinate 200 MG tablet therapy pack tablet Take 3 tablets by mouth Take As Directed. Take 3 tablets by mouth daily for 21 days then off 7 days on a 28 day cycle. 10/19/22   Yes Donald Barker MD   rOPINIRole (REQUIP) 3 MG tablet Take 3 mg by mouth 2 (Two) Times a Day. 6/29/22  Yes Bessie Lopez MD   solifenacin (VESICARE) 10 MG tablet Take 1 tablet by mouth Daily for 360 days. 10/25/22 10/20/23 Yes Destinee Myers MD   SUMAtriptan (IMITREX) 100 MG tablet Take 100 mg by mouth 1 (One) Time As Needed. 10/7/22  Yes Bessie Lopez MD   traZODone (DESYREL) 150 MG tablet TAKE 1 TABLET BY MOUTH ONCE nightly 11/12/19  Yes Yudelka Manning MD   venlafaxine XR (EFFEXOR-XR) 150 MG 24 hr capsule Take 1 capsule by mouth Daily. 11/21/22  Yes Donald Barker MD        Allergies:  Azithromycin    Objective     Vital Signs        Physical Exam:     General Appearance:    Alert, cooperative, in no acute distress   Head:    Normocephalic, without obvious abnormality, atraumatic   Eyes:          Conjunctivae and sclerae normal, no icterus,     Ears:    Ears appear intact with no abnormalities noted   Throat:   No oral lesions, no thrush, oral mucosa moist   Neck:   No adenopathy, supple, trachea midline, no thyromegaly   Back:     No kyphosis present, no scoliosis present, no skin lesions,      erythema or scars, no tenderness to percussion or                   palpation,   range of motion normal   Lungs:     Clear to auscultation,respirations regular, even and                  unlabored    Heart:    Regular rhythm and normal rate, normal S1 and S2, no            murmur, no gallop, no rub, no click   Chest Wall:    No abnormalities observed   Abdomen:     Normal bowel sounds, no masses, no organomegaly, soft        non-tender, non-distended, no guarding, no rebound                tenderness, midline openings with no evidence of infection, these were left open at the time of surgery and packed with Nu Gauze to allow drainage to prevent infection.  She has an excoriation of the skin around the ostomy.  Her ostomy has an improved appearance from previously seen ischemic changes.    Rectal:        Extremities:   Moves all extremities well, no edema, no cyanosis, no             redness   Pulses:   Pulses palpable and equal bilaterally   Skin:   No bleeding, bruising or rash   Lymph nodes:   No palpable adenopathy   Neurologic:   A/o x 4 with no deficits       Results Review:   {Results Review:[id number]::\"I reviewed the patient's new clinical results.\"    LABS/IMAGING:  Results for orders placed or performed in visit on 12/27/22   Comprehensive metabolic panel    Specimen: Arm, Left; Blood   Result Value Ref Range    Glucose 108 (H) 65 - 99 mg/dL    BUN 15 8 - 23 mg/dL    Creatinine 0.75 0.57 - 1.00 mg/dL    Sodium 136 136 - 145 mmol/L    Potassium 3.9 3.5 - 5.2 mmol/L    Chloride 99 98 - 107 mmol/L    CO2 27.7 22.0 - 29.0 mmol/L    Calcium 9.7 8.6 - 10.5 mg/dL    Total Protein 7.6 6.0 - 8.5 g/dL    Albumin 4.0 3.5 - 5.2 g/dL    ALT (SGPT) 10 1 - 33 U/L    AST (SGOT) 12 1 - 32 U/L    Alkaline Phosphatase 111 39 - 117 U/L    Total Bilirubin 0.2 0.0 - 1.2 mg/dL    Globulin 3.6 gm/dL    A/G Ratio 1.1 g/dL    BUN/Creatinine Ratio 20.0 7.0 - 25.0    Anion Gap 9.3 5.0 - 15.0 mmol/L    eGFR 89.6 >60.0 mL/min/1.73   Magnesium    Specimen: Arm, Left; Blood   Result Value Ref Range    Magnesium 1.9 1.6 - 2.4 mg/dL   Phosphorus    Specimen: Arm, Left; Blood   Result Value Ref Range    Phosphorus 3.9 2.5 - 4.5 mg/dL   CBC Auto Differential    Specimen: Arm, Left; Blood   Result Value Ref Range    WBC 9.53 3.40 - 10.80 10*3/mm3    RBC 3.83 3.77 - 5.28 10*6/mm3    Hemoglobin 11.5 (L) 12.0 - 15.9 g/dL    Hematocrit 37.2 34.0 - 46.6 %    MCV 97.1 (H) 79.0 - 97.0 fL    MCH 30.0 26.6 - 33.0 pg    MCHC 30.9 (L) 31.5 - 35.7 g/dL    RDW 16.9 (H) 12.3 - 15.4 %    RDW-SD 61.3 (H) 37.0 - 54.0 fl    MPV 9.6 6.0 - 12.0 fL    Platelets 512 (H) 140 - 450 10*3/mm3    Neutrophil % 80.9 (H) 42.7 - 76.0 %    Lymphocyte % 12.8 (L) 19.6 - 45.3 %    Monocyte % 3.5 (L) 5.0 - 12.0 %    Eosinophil % 2.0 0.3 - 6.2 %    Basophil % 0.6  0.0 - 1.5 %    Immature Grans % 0.2 0.0 - 0.5 %    Neutrophils, Absolute 7.71 (H) 1.70 - 7.00 10*3/mm3    Lymphocytes, Absolute 1.22 0.70 - 3.10 10*3/mm3    Monocytes, Absolute 0.33 0.10 - 0.90 10*3/mm3    Eosinophils, Absolute 0.19 0.00 - 0.40 10*3/mm3    Basophils, Absolute 0.06 0.00 - 0.20 10*3/mm3    Immature Grans, Absolute 0.02 0.00 - 0.05 10*3/mm3        Result Review :     Assessment & Plan     History of metastatic breast cancer  Status post ex lap with Lynn's procedure for perforated sigmoid colon 12/6/2022.  Pathology with metastatic lobular breast carcinoma    Colostomy leakage    Difficult venous access    I changed her colostomy bag and instructed her how to perform this.  I will cancel her home health so that she can see the ostomy nurses regularly.  I think she would benefit from stoma paste as well as a convex appliance with downward pressure appliance.  All of her questions were answered.  Consult for ostomy nurse placed.  Cancel home health.  I recommended port placement.  I explained the procedure and recovery.  Benefits and alternatives discussed.  Risk procedure including risk of anesthesia, bleeding, infection, pneumothorax, port failure, heart attack, stroke, blood clot, pneumonia discussed.  All questions answered.  She agrees with the plan.  Thank you for the consult.  Orders placed.  She was instructed to use chlorhexidine the night before surgery.              This document has been electronically signed by Alfred Castañeda MD  January 6, 2023 08:48 EST

## 2023-01-05 NOTE — PRE-PROCEDURE INSTRUCTIONS
PRE-OP INSTRUCTIONS REVIEWED WITH PATIENT: FASTING/BATHING/ARRIVAL PROCEDURES.  INSTRUCTED TO TAKE A.M. DAY OF SURGERY: MS CONTIN, MONTELUKAST, ATIVIAN ( PRN), LEVOTHYROXINE, LETROZOLE, GABAPENTIN, ATORVASTATIN, DONEPEZIL, RIBOCICLIB REQUIP, VESICARE, EFFEXOR.  UNDERSTANDING VERBALIZED.

## 2023-01-06 ENCOUNTER — READMISSION MANAGEMENT (OUTPATIENT)
Dept: CALL CENTER | Facility: HOSPITAL | Age: 63
End: 2023-01-06
Payer: MEDICAID

## 2023-01-06 ENCOUNTER — PREP FOR SURGERY (OUTPATIENT)
Dept: OTHER | Facility: HOSPITAL | Age: 63
End: 2023-01-06
Payer: MEDICAID

## 2023-01-06 NOTE — OUTREACH NOTE
Medical Week 3 Survey    Flowsheet Row Responses   [facility] patient discharged from? [facility]   Does the patient have one of the following disease processes/diagnoses(primary or secondary)? Other   Week 3 attempt successful? Yes   Call start time 0824   Call end time 0833   Meds reviewed with patient/caregiver? Yes   Is the patient having any side effects they believe may be caused by any medication additions or changes? No   Does the patient have all medications ordered at discharge? N/A   Is the patient taking all medications as directed (includes completed medication regime)? Yes   Comments regarding appointments Has seen her PCP and oncologist in past week.   Does the patient have a primary care provider?  Yes   Has the patient kept scheduled appointments due by today? Yes   Has home health visited the patient within 72 hours of discharge? N/A   Home health comments  has discharged patient-will be seeing ostomy nurse outpatient.   Has all DME been delivered? Yes   Psychosocial issues? No   Did the patient receive a copy of their discharge instructions? Yes   Nursing interventions Reviewed instructions with patient   What is the patient's perception of their health status since discharge? Improving   Is the patient/caregiver able to teach back signs and symptoms related to disease process for when to call PCP? Yes   Is the patient/caregiver able to teach back signs and symptoms related to disease process for when to call 911? Yes   Is the patient/caregiver able to teach back the hierarchy of who to call/visit for symptoms/problems? PCP, Specialist, Home health nurse, Urgent Care, ED, 911 Yes   Week 3 Call Completed? Yes   Is the patient interested in additional calls from an ambulatory ?  NOTE:  applies to high risk patients requiring additional follow-up. No   Wrap up additional comments Patient states is stable, stoma is improving. States will be f/u with a stoma nurse outpatient. Denies any  blood in ostomy bag. Denies any needs today.          KORY INFANTE - Registered Nurse

## 2023-01-06 NOTE — H&P (VIEW-ONLY)
General Surgery/Colorectal Surgery Note    Patient Name:  Derek Keller  YOB: 1960  [id number]    Referring Provider: No ref. provider found      Patient Care Team:  Haile Monique MD as PCP - General (Family Medicine)  Julien Cardona DO as Consulting Physician (Pain Medicine)  Joel Castillo MD as Consulting Physician (Gastroenterology)  Remington Russell MD as Surgeon (General Surgery)  Ahsan Salas MD as Consulting Physician (Sports Medicine)  Donald Barker MD as Consulting Physician (Hematology and Oncology)  Sandy Ricci MD as Consulting Physician (General Surgery)    Chief complaint follow-up, new concern with difficult venous access    Subjective .     History of present illness:    History of metastatic breast cancer  Status post ex lap with Lynn's procedure for perforated sigmoid colon 12/6/2022.  Pathology with metastatic lobular breast carcinoma    She comes in for further evaluation of her colostomy.  She also comes in to discuss a new concern.  History of difficult venous access.  Sent in to discuss port placement.  She has been seen by ostomy nurses still having leakage from her colostomy.  She has home health at home.  No fever.  Overall she thinks she is improving.      History:  Past Medical History:   Diagnosis Date   • Abdominal pain     OSTOMY SITE   • Allergic rhinitis    • Anemia     NO S/S   • Anxiety    • Arteriosclerosis     Coronary   • Arthritis    • Bone metastases (HCC) 10/14/2022   • Bowen's disease     SKIN CANCER   • Breast cancer (HCC)     NO SURGERY WAS DONE DUE TO METS TO BONE/COLON/LYMPHNODE   • Cancer related pain 10/13/2022   • Depression    • Dysphoric mood    • Fatigue    • Frequent urination     NO S/S INFECTION   • History of colon polyps    • History of IBS    • History of kidney stones    • Hyperlipidemia    • Hypertension    • Hypothyroidism    • Insomnia    • Lumbago    • Mood disorder (HCC)    • Neck pain     R/T CANCER   •  Palpitations     ASYMPTOMATIC,  NO CARDIOLOGIST   • Precordial pain     R/T SPINE CANCER   • Sleep disturbance        Past Surgical History:   Procedure Laterality Date   • BREAST BIOPSY Left    • CARDIAC CATHETERIZATION Left 1960   • CARDIAC CATHETERIZATION N/A 04/06/2018    Procedure: Coronary angiography;  Surgeon: Art Licea MD;  Location:  [facility];  Service: Cardiovascular   • CARDIAC CATHETERIZATION N/A 04/06/2018    Procedure: Left heart cath;  Surgeon: Art Licea MD;  Location:  [facility];  Service: Cardiovascular   • CARDIAC CATHETERIZATION N/A 04/06/2018    Procedure: Left ventriculography;  Surgeon: Art Licea MD;  Location:  [facility];  Service: Cardiovascular   • CHOLECYSTECTOMY     • COLON SURGERY      colostomy bag   • COLONOSCOPY  11/08/2006   • EXPLORATORY LAPAROTOMY N/A 12/06/2022    Procedure: LAPAROTOMY EXPLORATORY sigmoid resection hartmans procedure colostomy;  Surgeon: Alfred Castañeda MD;  [facility];  Service: General;  Laterality: N/A;   • GANGLION CYST EXCISION     • HYSTERECTOMY  05/2005   • LAPAROSCOPIC GASTRIC BANDING      BAND REMOVED 2020   • TONSILLECTOMY         Family History   Problem Relation Age of Onset   • Hypertension Mother    • Rheum arthritis Mother    • Heart disease Mother    • Breast cancer Mother    • Diabetes Father    • Cancer Maternal Grandmother         colon   • Colon cancer Maternal Grandmother    • Aneurysm Paternal Grandfather    • Diabetes Other    • Fibromyalgia Other    • Malig Hyperthermia Neg Hx        Social History     Tobacco Use   • Smoking status: Every Day     Packs/day: 1.50     Years: 40.00     Pack years: 60.00     Types: Cigarettes     Start date: 1974   • Smokeless tobacco: Never   • Tobacco comments:     caffeine use 3 mt dews daily     INST PER ANESTHESIA PROTOCOL   Vaping Use   • Vaping Use: Never used   Substance Use Topics   •  Alcohol use: No   • Drug use: Yes     Types: Marijuana     Comment: Prescribed Lorazepam/OCC MARIJUANA       Review of Systems  All systems were reviewed and negative except for:   Review of Systems   Constitutional: Negative for chills, fever and unexpected weight loss.   HENT: Negative for congestion, nosebleeds and voice change.    Eyes: Negative for blurred vision, double vision and discharge.   Respiratory: Negative for apnea, chest tightness and shortness of breath.    Cardiovascular: Negative for chest pain and leg swelling.   Gastrointestinal:        See HPI   Endocrine: Negative for cold intolerance and heat intolerance.   Genitourinary: Negative for dysuria, hematuria and urgency.   Musculoskeletal: Negative for back pain, joint swelling and neck pain.   Skin: Negative for color change and dry skin.   Neurological: Negative for dizziness and confusion.   Hematological: Negative for adenopathy.   Psychiatric/Behavioral: Negative for agitation and behavioral problems.     MEDS:  Prior to Admission medications    Medication Sig Start Date End Date Taking? Authorizing Provider   atorvastatin (LIPITOR) 20 MG tablet TAKE 1 TABLET BY MOUTH EVERY DAY  Patient taking differently: Take 20 mg by mouth Daily. 10/1/19  Yes Yudelka Manning MD   baclofen (LIORESAL) 20 MG tablet TAKE 1 TABLET BY MOUTH THREE TIMES DAILY  Patient taking differently: Take 10 mg by mouth 3 (Three) Times a Day As Needed. 12/6/19  Yes Yudelka Manning MD   donepezil (ARICEPT) 10 MG tablet Take 10 mg by mouth Daily. 10/28/22  Yes ProviderBessie MD   gabapentin (NEURONTIN) 600 MG tablet TAKE 1 TABLET BY MOUTH AT BEDTIME  Patient taking differently: 300 mg 2 (Two) Times a Day As Needed. TAKES1/2  MG TABLET 7/30/20  Yes Yudelka Manning MD   hydroCHLOROthiazide (HYDRODIURIL) 12.5 MG tablet Take 12.5 mg by mouth Daily As Needed (FOR SWELLING). 12/22/22  Yes ProviderBessie MD   letrozole (FEMARA) 2.5 MG tablet Take 1  tablet by mouth Daily. 11/30/22  Yes Donald Barker MD   levothyroxine (SYNTHROID, LEVOTHROID) 50 MCG tablet TAKE 1 TABLET BY MOUTH EVERY DAY 7/19/19  Yes Yudelka Manning MD   LORazepam (ATIVAN) 0.5 MG tablet Take 0.5 mg by mouth Every 6 (Six) Hours As Needed.   Yes Bessie Lopez MD   losartan (COZAAR) 100 MG tablet Take 100 mg by mouth Daily. INST PER ANESTHESIA PROTOCOL   Yes Bessie Lopez MD   montelukast (SINGULAIR) 10 MG tablet Take 10 mg by mouth Daily. 1/17/22  Yes Bessie Lopez MD   Morphine (MS CONTIN) 30 MG 12 hr tablet Take 1 tablet by mouth 2 (Two) Times a Day. 12/27/22  Yes Donald Barker MD   naproxen (NAPROSYN) 500 MG tablet Take 500 mg by mouth 2 (Two) Times a Day With Meals. LAST DOSE 1/5/23   Yes Bessie Lopez MD   ondansetron (ZOFRAN) 8 MG tablet Take 1 tablet by mouth 3 (Three) Times a Day As Needed for Nausea or Vomiting. 10/13/22  Yes Donald Barker MD   oxyCODONE-acetaminophen (Percocet)  MG per tablet Take 1 tablet by mouth Every 4 (Four) Hours As Needed for Moderate Pain. 12/27/22 12/27/23 Yes Donald Barker MD   polyethylene glycol (MIRALAX) 17 g packet Take 17 g by mouth Daily. 12/13/22  Yes Alfred Castañeda MD   ribociclib succinate 200 MG tablet therapy pack tablet Take 3 tablets by mouth Take As Directed. Take 3 tablets by mouth daily for 21 days then off 7 days on a 28 day cycle. 10/19/22  Yes Donald Barker MD   rOPINIRole (REQUIP) 3 MG tablet Take 3 mg by mouth 2 (Two) Times a Day. 6/29/22  Yes Bessie Lopez MD   solifenacin (VESICARE) 10 MG tablet Take 1 tablet by mouth Daily for 360 days. 10/25/22 10/20/23 Yes Destinee Myers MD   SUMAtriptan (IMITREX) 100 MG tablet Take 100 mg by mouth 1 (One) Time As Needed. 10/7/22  Yes Bessie Lopez, MD   traZODone (DESYREL) 150 MG tablet TAKE 1 TABLET BY MOUTH ONCE nightly 11/12/19  Yes Yudelka Manning MD   venlafaxine XR (EFFEXOR-XR) 150 MG 24 hr capsule Take  "1 capsule by mouth Daily. 11/21/22  Yes Donald Barker MD        Allergies:  Azithromycin    Objective     Vital Signs        Physical Exam:     General Appearance:    Alert, cooperative, in no acute distress   Head:    Normocephalic, without obvious abnormality, atraumatic   Eyes:          Conjunctivae and sclerae normal, no icterus,     Ears:    Ears appear intact with no abnormalities noted   Throat:   No oral lesions, no thrush, oral mucosa moist   Neck:   No adenopathy, supple, trachea midline, no thyromegaly   Back:     No kyphosis present, no scoliosis present, no skin lesions,      erythema or scars, no tenderness to percussion or                   palpation,   range of motion normal   Lungs:     Clear to auscultation,respirations regular, even and                  unlabored    Heart:    Regular rhythm and normal rate, normal S1 and S2, no            murmur, no gallop, no rub, no click   Chest Wall:    No abnormalities observed   Abdomen:     Normal bowel sounds, no masses, no organomegaly, soft        non-tender, non-distended, no guarding, no rebound                tenderness, midline openings with no evidence of infection, these were left open at the time of surgery and packed with Nu Gauze to allow drainage to prevent infection.  She has an excoriation of the skin around the ostomy.  Her ostomy has an improved appearance from previously seen ischemic changes.   Rectal:        Extremities:   Moves all extremities well, no edema, no cyanosis, no             redness   Pulses:   Pulses palpable and equal bilaterally   Skin:   No bleeding, bruising or rash   Lymph nodes:   No palpable adenopathy   Neurologic:   A/o x 4 with no deficits       Results Review:   {Results Review:57732::\"I reviewed the patient's new clinical results.\"    LABS/IMAGING:  Results for orders placed or performed in visit on 12/27/22   Comprehensive metabolic panel    Specimen: Arm, Left; Blood   Result Value Ref Range    Glucose 108 " (H) 65 - 99 mg/dL    BUN 15 8 - 23 mg/dL    Creatinine 0.75 0.57 - 1.00 mg/dL    Sodium 136 136 - 145 mmol/L    Potassium 3.9 3.5 - 5.2 mmol/L    Chloride 99 98 - 107 mmol/L    CO2 27.7 22.0 - 29.0 mmol/L    Calcium 9.7 8.6 - 10.5 mg/dL    Total Protein 7.6 6.0 - 8.5 g/dL    Albumin 4.0 3.5 - 5.2 g/dL    ALT (SGPT) 10 1 - 33 U/L    AST (SGOT) 12 1 - 32 U/L    Alkaline Phosphatase 111 39 - 117 U/L    Total Bilirubin 0.2 0.0 - 1.2 mg/dL    Globulin 3.6 gm/dL    A/G Ratio 1.1 g/dL    BUN/Creatinine Ratio 20.0 7.0 - 25.0    Anion Gap 9.3 5.0 - 15.0 mmol/L    eGFR 89.6 >60.0 mL/min/1.73   Magnesium    Specimen: Arm, Left; Blood   Result Value Ref Range    Magnesium 1.9 1.6 - 2.4 mg/dL   Phosphorus    Specimen: Arm, Left; Blood   Result Value Ref Range    Phosphorus 3.9 2.5 - 4.5 mg/dL   CBC Auto Differential    Specimen: Arm, Left; Blood   Result Value Ref Range    WBC 9.53 3.40 - 10.80 10*3/mm3    RBC 3.83 3.77 - 5.28 10*6/mm3    Hemoglobin 11.5 (L) 12.0 - 15.9 g/dL    Hematocrit 37.2 34.0 - 46.6 %    MCV 97.1 (H) 79.0 - 97.0 fL    MCH 30.0 26.6 - 33.0 pg    MCHC 30.9 (L) 31.5 - 35.7 g/dL    RDW 16.9 (H) 12.3 - 15.4 %    RDW-SD 61.3 (H) 37.0 - 54.0 fl    MPV 9.6 6.0 - 12.0 fL    Platelets 512 (H) 140 - 450 10*3/mm3    Neutrophil % 80.9 (H) 42.7 - 76.0 %    Lymphocyte % 12.8 (L) 19.6 - 45.3 %    Monocyte % 3.5 (L) 5.0 - 12.0 %    Eosinophil % 2.0 0.3 - 6.2 %    Basophil % 0.6 0.0 - 1.5 %    Immature Grans % 0.2 0.0 - 0.5 %    Neutrophils, Absolute 7.71 (H) 1.70 - 7.00 10*3/mm3    Lymphocytes, Absolute 1.22 0.70 - 3.10 10*3/mm3    Monocytes, Absolute 0.33 0.10 - 0.90 10*3/mm3    Eosinophils, Absolute 0.19 0.00 - 0.40 10*3/mm3    Basophils, Absolute 0.06 0.00 - 0.20 10*3/mm3    Immature Grans, Absolute 0.02 0.00 - 0.05 10*3/mm3        Result Review :     Assessment & Plan     History of metastatic breast cancer  Status post ex lap with Lynn's procedure for perforated sigmoid colon 12/6/2022.  Pathology with metastatic  lobular breast carcinoma    Colostomy leakage    Difficult venous access    I changed her colostomy bag and instructed her how to perform this.  I will cancel her home health so that she can see the ostomy nurses regularly.  I think she would benefit from stoma paste as well as a convex appliance with downward pressure appliance.  All of her questions were answered.  Consult for ostomy nurse placed.  Cancel home health.  I recommended port placement.  I explained the procedure and recovery.  Benefits and alternatives discussed.  Risk procedure including risk of anesthesia, bleeding, infection, pneumothorax, port failure, heart attack, stroke, blood clot, pneumonia discussed.  All questions answered.  She agrees with the plan.  Thank you for the consult.  Orders placed.  She was instructed to use chlorhexidine the night before surgery.              This document has been electronically signed by Alfred Castañeda MD  January 6, 2023 08:48 EST

## 2023-01-06 NOTE — PROGRESS NOTES
General Surgery/Colorectal Surgery Note    Patient Name:  Derek Keller  YOB: 1960  [id number]    Referring Provider: No ref. provider found      Patient Care Team:  Haile Monique MD as PCP - General (Family Medicine)  Julien Cardona DO as Consulting Physician (Pain Medicine)  Joel Castillo MD as Consulting Physician (Gastroenterology)  Remington Russell MD as Surgeon (General Surgery)  Ahsan Salas MD as Consulting Physician (Sports Medicine)  Donald Barker MD as Consulting Physician (Hematology and Oncology)  Sandy Ricci MD as Consulting Physician (General Surgery)    Chief complaint follow-up, new concern with difficult venous access    Subjective .     History of present illness:    History of metastatic breast cancer  Status post ex lap with Lynn's procedure for perforated sigmoid colon 12/6/2022.  Pathology with metastatic lobular breast carcinoma    She comes in for further evaluation of her colostomy.  She also comes in to discuss a new concern.  History of difficult venous access.  Sent in to discuss port placement.  She has been seen by ostomy nurses still having leakage from her colostomy.  She has home health at home.  No fever.  Overall she thinks she is improving.      History:  Past Medical History:   Diagnosis Date   • Abdominal pain     OSTOMY SITE   • Allergic rhinitis    • Anemia     NO S/S   • Anxiety    • Arteriosclerosis     Coronary   • Arthritis    • Bone metastases (HCC) 10/14/2022   • Bowen's disease     SKIN CANCER   • Breast cancer (HCC)     NO SURGERY WAS DONE DUE TO METS TO BONE/COLON/LYMPHNODE   • Cancer related pain 10/13/2022   • Depression    • Dysphoric mood    • Fatigue    • Frequent urination     NO S/S INFECTION   • History of colon polyps    • History of IBS    • History of kidney stones    • Hyperlipidemia    • Hypertension    • Hypothyroidism    • Insomnia    • Lumbago    • Mood disorder (HCC)    • Neck pain     R/T CANCER   •  Palpitations     ASYMPTOMATIC,  NO CARDIOLOGIST   • Precordial pain     R/T SPINE CANCER   • Sleep disturbance        Past Surgical History:   Procedure Laterality Date   • BREAST BIOPSY Left    • CARDIAC CATHETERIZATION Left 1960   • CARDIAC CATHETERIZATION N/A 04/06/2018    Procedure: Coronary angiography;  Surgeon: Art Licea MD;  Location:  [facility];  Service: Cardiovascular   • CARDIAC CATHETERIZATION N/A 04/06/2018    Procedure: Left heart cath;  Surgeon: Art Licea MD;  Location:  [facility];  Service: Cardiovascular   • CARDIAC CATHETERIZATION N/A 04/06/2018    Procedure: Left ventriculography;  Surgeon: Art Licea MD;  Location:  [facility];  Service: Cardiovascular   • CHOLECYSTECTOMY     • COLON SURGERY      colostomy bag   • COLONOSCOPY  11/08/2006   • EXPLORATORY LAPAROTOMY N/A 12/06/2022    Procedure: LAPAROTOMY EXPLORATORY sigmoid resection hartmans procedure colostomy;  Surgeon: Alfred Castañeda MD;  Location: [facility] OR;  Service: General;  Laterality: N/A;   • GANGLION CYST EXCISION     • HYSTERECTOMY  05/2005   • LAPAROSCOPIC GASTRIC BANDING      BAND REMOVED 2020   • TONSILLECTOMY         Family History   Problem Relation Age of Onset   • Hypertension Mother    • Rheum arthritis Mother    • Heart disease Mother    • Breast cancer Mother    • Diabetes Father    • Cancer Maternal Grandmother         colon   • Colon cancer Maternal Grandmother    • Aneurysm Paternal Grandfather    • Diabetes Other    • Fibromyalgia Other    • Malig Hyperthermia Neg Hx        Social History     Tobacco Use   • Smoking status: Every Day     Packs/day: 1.50     Years: 40.00     Pack years: 60.00     Types: Cigarettes     Start date: 1974   • Smokeless tobacco: Never   • Tobacco comments:     caffeine use 3 mt dews daily     INST PER ANESTHESIA PROTOCOL   Vaping Use   • Vaping Use: Never used   Substance Use Topics   •  Alcohol use: No   • Drug use: Yes     Types: Marijuana     Comment: Prescribed Lorazepam/OCC MARIJUANA       Review of Systems  All systems were reviewed and negative except for:   Review of Systems   Constitutional: Negative for chills, fever and unexpected weight loss.   HENT: Negative for congestion, nosebleeds and voice change.    Eyes: Negative for blurred vision, double vision and discharge.   Respiratory: Negative for apnea, chest tightness and shortness of breath.    Cardiovascular: Negative for chest pain and leg swelling.   Gastrointestinal:        See HPI   Endocrine: Negative for cold intolerance and heat intolerance.   Genitourinary: Negative for dysuria, hematuria and urgency.   Musculoskeletal: Negative for back pain, joint swelling and neck pain.   Skin: Negative for color change and dry skin.   Neurological: Negative for dizziness and confusion.   Hematological: Negative for adenopathy.   Psychiatric/Behavioral: Negative for agitation and behavioral problems.     MEDS:  Prior to Admission medications    Medication Sig Start Date End Date Taking? Authorizing Provider   atorvastatin (LIPITOR) 20 MG tablet TAKE 1 TABLET BY MOUTH EVERY DAY  Patient taking differently: Take 20 mg by mouth Daily. 10/1/19  Yes Yudelka Manning MD   baclofen (LIORESAL) 20 MG tablet TAKE 1 TABLET BY MOUTH THREE TIMES DAILY  Patient taking differently: Take 10 mg by mouth 3 (Three) Times a Day As Needed. 12/6/19  Yes Yudelka Manning MD   donepezil (ARICEPT) 10 MG tablet Take 10 mg by mouth Daily. 10/28/22  Yes ProviderBessie MD   gabapentin (NEURONTIN) 600 MG tablet TAKE 1 TABLET BY MOUTH AT BEDTIME  Patient taking differently: 300 mg 2 (Two) Times a Day As Needed. TAKES1/2  MG TABLET 7/30/20  Yes Yudelka Manning MD   hydroCHLOROthiazide (HYDRODIURIL) 12.5 MG tablet Take 12.5 mg by mouth Daily As Needed (FOR SWELLING). 12/22/22  Yes ProviderBessie MD   letrozole (FEMARA) 2.5 MG tablet Take 1  tablet by mouth Daily. 11/30/22  Yes Donald Barker MD   levothyroxine (SYNTHROID, LEVOTHROID) 50 MCG tablet TAKE 1 TABLET BY MOUTH EVERY DAY 7/19/19  Yes Yudelka Manning MD   LORazepam (ATIVAN) 0.5 MG tablet Take 0.5 mg by mouth Every 6 (Six) Hours As Needed.   Yes Bessie Lopez MD   losartan (COZAAR) 100 MG tablet Take 100 mg by mouth Daily. INST PER ANESTHESIA PROTOCOL   Yes Bessie Lopez MD   montelukast (SINGULAIR) 10 MG tablet Take 10 mg by mouth Daily. 1/17/22  Yes Bessie Lopez MD   Morphine (MS CONTIN) 30 MG 12 hr tablet Take 1 tablet by mouth 2 (Two) Times a Day. 12/27/22  Yes Donald Barker MD   naproxen (NAPROSYN) 500 MG tablet Take 500 mg by mouth 2 (Two) Times a Day With Meals. LAST DOSE 1/5/23   Yes Bessie Lopez MD   ondansetron (ZOFRAN) 8 MG tablet Take 1 tablet by mouth 3 (Three) Times a Day As Needed for Nausea or Vomiting. 10/13/22  Yes Donald Barker MD   oxyCODONE-acetaminophen (Percocet)  MG per tablet Take 1 tablet by mouth Every 4 (Four) Hours As Needed for Moderate Pain. 12/27/22 12/27/23 Yes Donald Barker MD   polyethylene glycol (MIRALAX) 17 g packet Take 17 g by mouth Daily. 12/13/22  Yes Alfred Castañeda MD   ribociclib succinate 200 MG tablet therapy pack tablet Take 3 tablets by mouth Take As Directed. Take 3 tablets by mouth daily for 21 days then off 7 days on a 28 day cycle. 10/19/22  Yes Donald Barker MD   rOPINIRole (REQUIP) 3 MG tablet Take 3 mg by mouth 2 (Two) Times a Day. 6/29/22  Yes Bessie Lopez MD   solifenacin (VESICARE) 10 MG tablet Take 1 tablet by mouth Daily for 360 days. 10/25/22 10/20/23 Yes Destinee Myers MD   SUMAtriptan (IMITREX) 100 MG tablet Take 100 mg by mouth 1 (One) Time As Needed. 10/7/22  Yes Bessie Lopez, MD   traZODone (DESYREL) 150 MG tablet TAKE 1 TABLET BY MOUTH ONCE nightly 11/12/19  Yes Yudelka Manning MD   venlafaxine XR (EFFEXOR-XR) 150 MG 24 hr capsule Take  1 capsule by mouth Daily. 11/21/22  Yes Donald Barker MD        Allergies:  Azithromycin    Objective     Vital Signs        Physical Exam:     General Appearance:    Alert, cooperative, in no acute distress   Head:    Normocephalic, without obvious abnormality, atraumatic   Eyes:          Conjunctivae and sclerae normal, no icterus,     Ears:    Ears appear intact with no abnormalities noted   Throat:   No oral lesions, no thrush, oral mucosa moist   Neck:   No adenopathy, supple, trachea midline, no thyromegaly   Back:     No kyphosis present, no scoliosis present, no skin lesions,      erythema or scars, no tenderness to percussion or                   palpation,   range of motion normal   Lungs:     Clear to auscultation,respirations regular, even and                  unlabored    Heart:    Regular rhythm and normal rate, normal S1 and S2, no            murmur, no gallop, no rub, no click   Chest Wall:    No abnormalities observed   Abdomen:     Normal bowel sounds, no masses, no organomegaly, soft        non-tender, non-distended, no guarding, no rebound                tenderness, midline openings with no evidence of infection, these were left open at the time of surgery and packed with Nu Gauze to allow drainage to prevent infection.  She has an excoriation of the skin around the ostomy.  Her ostomy has an improved appearance from previously seen ischemic changes.   Rectal:        Extremities:   Moves all extremities well, no edema, no cyanosis, no             redness   Pulses:   Pulses palpable and equal bilaterally   Skin:   No bleeding, bruising or rash   Lymph nodes:   No palpable adenopathy   Neurologic:   A/o x 4 with no deficits       Results Review:   {Results Review:38817::\"I reviewed the patient's new clinical results.\"    LABS/IMAGING:  Results for orders placed or performed in visit on 12/27/22   Comprehensive metabolic panel    Specimen: Arm, Left; Blood   Result Value Ref Range    Glucose 108  (H) 65 - 99 mg/dL    BUN 15 8 - 23 mg/dL    Creatinine 0.75 0.57 - 1.00 mg/dL    Sodium 136 136 - 145 mmol/L    Potassium 3.9 3.5 - 5.2 mmol/L    Chloride 99 98 - 107 mmol/L    CO2 27.7 22.0 - 29.0 mmol/L    Calcium 9.7 8.6 - 10.5 mg/dL    Total Protein 7.6 6.0 - 8.5 g/dL    Albumin 4.0 3.5 - 5.2 g/dL    ALT (SGPT) 10 1 - 33 U/L    AST (SGOT) 12 1 - 32 U/L    Alkaline Phosphatase 111 39 - 117 U/L    Total Bilirubin 0.2 0.0 - 1.2 mg/dL    Globulin 3.6 gm/dL    A/G Ratio 1.1 g/dL    BUN/Creatinine Ratio 20.0 7.0 - 25.0    Anion Gap 9.3 5.0 - 15.0 mmol/L    eGFR 89.6 >60.0 mL/min/1.73   Magnesium    Specimen: Arm, Left; Blood   Result Value Ref Range    Magnesium 1.9 1.6 - 2.4 mg/dL   Phosphorus    Specimen: Arm, Left; Blood   Result Value Ref Range    Phosphorus 3.9 2.5 - 4.5 mg/dL   CBC Auto Differential    Specimen: Arm, Left; Blood   Result Value Ref Range    WBC 9.53 3.40 - 10.80 10*3/mm3    RBC 3.83 3.77 - 5.28 10*6/mm3    Hemoglobin 11.5 (L) 12.0 - 15.9 g/dL    Hematocrit 37.2 34.0 - 46.6 %    MCV 97.1 (H) 79.0 - 97.0 fL    MCH 30.0 26.6 - 33.0 pg    MCHC 30.9 (L) 31.5 - 35.7 g/dL    RDW 16.9 (H) 12.3 - 15.4 %    RDW-SD 61.3 (H) 37.0 - 54.0 fl    MPV 9.6 6.0 - 12.0 fL    Platelets 512 (H) 140 - 450 10*3/mm3    Neutrophil % 80.9 (H) 42.7 - 76.0 %    Lymphocyte % 12.8 (L) 19.6 - 45.3 %    Monocyte % 3.5 (L) 5.0 - 12.0 %    Eosinophil % 2.0 0.3 - 6.2 %    Basophil % 0.6 0.0 - 1.5 %    Immature Grans % 0.2 0.0 - 0.5 %    Neutrophils, Absolute 7.71 (H) 1.70 - 7.00 10*3/mm3    Lymphocytes, Absolute 1.22 0.70 - 3.10 10*3/mm3    Monocytes, Absolute 0.33 0.10 - 0.90 10*3/mm3    Eosinophils, Absolute 0.19 0.00 - 0.40 10*3/mm3    Basophils, Absolute 0.06 0.00 - 0.20 10*3/mm3    Immature Grans, Absolute 0.02 0.00 - 0.05 10*3/mm3        Result Review :     Assessment & Plan     History of metastatic breast cancer  Status post ex lap with Lynn's procedure for perforated sigmoid colon 12/6/2022.  Pathology with metastatic  lobular breast carcinoma    Colostomy leakage    Difficult venous access    I changed her colostomy bag and instructed her how to perform this.  I will cancel her home health so that she can see the ostomy nurses regularly.  I think she would benefit from stoma paste as well as a convex appliance with downward pressure appliance.  All of her questions were answered.  Consult for ostomy nurse placed.  Cancel home health.  I recommended port placement.  I explained the procedure and recovery.  Benefits and alternatives discussed.  Risk procedure including risk of anesthesia, bleeding, infection, pneumothorax, port failure, heart attack, stroke, blood clot, pneumonia discussed.  All questions answered.  She agrees with the plan.  Thank you for the consult.  Orders placed.  She was instructed to use chlorhexidine the night before surgery.              This document has been electronically signed by Alfred Castañeda MD  January 6, 2023 08:48 EST

## 2023-01-09 ENCOUNTER — ANESTHESIA EVENT (OUTPATIENT)
Dept: PERIOP | Facility: HOSPITAL | Age: 63
End: 2023-01-09
Payer: MEDICAID

## 2023-01-09 ENCOUNTER — APPOINTMENT (OUTPATIENT)
Dept: GENERAL RADIOLOGY | Facility: HOSPITAL | Age: 63
End: 2023-01-09
Payer: MEDICAID

## 2023-01-09 ENCOUNTER — READMISSION MANAGEMENT (OUTPATIENT)
Dept: CALL CENTER | Facility: HOSPITAL | Age: 63
End: 2023-01-09
Payer: MEDICAID

## 2023-01-09 ENCOUNTER — ANESTHESIA (OUTPATIENT)
Dept: PERIOP | Facility: HOSPITAL | Age: 63
End: 2023-01-09
Payer: MEDICAID

## 2023-01-09 ENCOUNTER — HOSPITAL ENCOUNTER (OUTPATIENT)
Facility: HOSPITAL | Age: 63
Setting detail: HOSPITAL OUTPATIENT SURGERY
Discharge: HOME | End: 2023-01-09
Attending: SURGERY | Admitting: SURGERY
Payer: MEDICAID

## 2023-01-09 VITALS
BODY MASS INDEX: 27.46 KG/M2 | SYSTOLIC BLOOD PRESSURE: 132 MMHG | OXYGEN SATURATION: 95 % | HEART RATE: 66 BPM | DIASTOLIC BLOOD PRESSURE: 64 MMHG | HEIGHT: 66 IN | WEIGHT: 170.86 LBS | RESPIRATION RATE: 16 BRPM | TEMPERATURE: 98 F

## 2023-01-09 DIAGNOSIS — Z87.898 HISTORY OF DIFFICULT VENOUS ACCESS: Primary | ICD-10-CM

## 2023-01-09 DIAGNOSIS — C50.919 MALIGNANT NEOPLASM OF FEMALE BREAST, UNSPECIFIED ESTROGEN RECEPTOR STATUS, UNSPECIFIED LATERALITY, UNSPECIFIED SITE OF BREAST: ICD-10-CM

## 2023-01-09 PROCEDURE — 25010000002 HYDROMORPHONE 1 MG/ML SOLUTION: Performed by: NURSE ANESTHETIST, CERTIFIED REGISTERED

## 2023-01-09 PROCEDURE — 25010000002 MIDAZOLAM PER 1 MG: Performed by: ANESTHESIOLOGY

## 2023-01-09 PROCEDURE — 36561 INSERT TUNNELED CV CATH: CPT

## 2023-01-09 PROCEDURE — 71045 X-RAY EXAM CHEST 1 VIEW: CPT

## 2023-01-09 PROCEDURE — C1788 PORT, INDWELLING, IMP: HCPCS | Performed by: SURGERY

## 2023-01-09 PROCEDURE — 76000 FLUOROSCOPY <1 HR PHYS/QHP: CPT

## 2023-01-09 PROCEDURE — 25010000002 FENTANYL CITRATE (PF) 50 MCG/ML SOLUTION: Performed by: NURSE ANESTHETIST, CERTIFIED REGISTERED

## 2023-01-09 PROCEDURE — 25010000002 CEFAZOLIN IN DEXTROSE 2-4 GM/100ML-% SOLUTION: Performed by: SURGERY

## 2023-01-09 PROCEDURE — 25010000002 PROPOFOL 10 MG/ML EMULSION: Performed by: NURSE ANESTHETIST, CERTIFIED REGISTERED

## 2023-01-09 PROCEDURE — 25010000002 HEPARIN (PORCINE) PER 1000 UNITS: Performed by: SURGERY

## 2023-01-09 PROCEDURE — 25010000002 BUPRENORPHINE PER 0.1 MG: Performed by: SURGERY

## 2023-01-09 PROCEDURE — 25010000002 DEXAMETHASONE SODIUM PHOSPHATE 100 MG/10ML SOLUTION: Performed by: SURGERY

## 2023-01-09 PROCEDURE — 77001 FLUOROGUIDE FOR VEIN DEVICE: CPT | Performed by: SURGERY

## 2023-01-09 PROCEDURE — 36561 INSERT TUNNELED CV CATH: CPT | Performed by: SURGERY

## 2023-01-09 PROCEDURE — 76937 US GUIDE VASCULAR ACCESS: CPT | Performed by: SURGERY

## 2023-01-09 PROCEDURE — 25010000002 ONDANSETRON PER 1 MG: Performed by: NURSE ANESTHETIST, CERTIFIED REGISTERED

## 2023-01-09 DEVICE — PRT INTRO VASC/INTERV VORTEX FILL/HL DETACH/POLYURET/CATH 8F: Type: IMPLANTABLE DEVICE | Site: CHEST | Status: FUNCTIONAL

## 2023-01-09 RX ORDER — ONDANSETRON 2 MG/ML
INJECTION INTRAMUSCULAR; INTRAVENOUS AS NEEDED
Status: DISCONTINUED | OUTPATIENT
Start: 2023-01-09 | End: 2023-01-09 | Stop reason: SURG

## 2023-01-09 RX ORDER — SODIUM CHLORIDE, SODIUM LACTATE, POTASSIUM CHLORIDE, CALCIUM CHLORIDE 600; 310; 30; 20 MG/100ML; MG/100ML; MG/100ML; MG/100ML
50 INJECTION, SOLUTION INTRAVENOUS CONTINUOUS
Status: DISCONTINUED | OUTPATIENT
Start: 2023-01-09 | End: 2023-01-09 | Stop reason: HOSPADM

## 2023-01-09 RX ORDER — OXYCODONE AND ACETAMINOPHEN 10; 325 MG/1; MG/1
1 TABLET ORAL EVERY 6 HOURS PRN
Qty: 6 TABLET | Refills: 0 | Status: SHIPPED | OUTPATIENT
Start: 2023-01-09 | End: 2023-02-09 | Stop reason: SDUPTHER

## 2023-01-09 RX ORDER — DEXMEDETOMIDINE HYDROCHLORIDE 100 UG/ML
INJECTION, SOLUTION INTRAVENOUS AS NEEDED
Status: DISCONTINUED | OUTPATIENT
Start: 2023-01-09 | End: 2023-01-09 | Stop reason: SURG

## 2023-01-09 RX ORDER — ONDANSETRON 2 MG/ML
4 INJECTION INTRAMUSCULAR; INTRAVENOUS ONCE AS NEEDED
Status: DISCONTINUED | OUTPATIENT
Start: 2023-01-09 | End: 2023-01-09 | Stop reason: HOSPADM

## 2023-01-09 RX ORDER — FENTANYL CITRATE 50 UG/ML
INJECTION, SOLUTION INTRAMUSCULAR; INTRAVENOUS AS NEEDED
Status: DISCONTINUED | OUTPATIENT
Start: 2023-01-09 | End: 2023-01-09

## 2023-01-09 RX ORDER — PROPOFOL 10 MG/ML
VIAL (ML) INTRAVENOUS AS NEEDED
Status: DISCONTINUED | OUTPATIENT
Start: 2023-01-09 | End: 2023-01-09 | Stop reason: SURG

## 2023-01-09 RX ORDER — PHENYLEPHRINE HCL IN 0.9% NACL 1 MG/10 ML
SYRINGE (ML) INTRAVENOUS AS NEEDED
Status: DISCONTINUED | OUTPATIENT
Start: 2023-01-09 | End: 2023-01-09 | Stop reason: SURG

## 2023-01-09 RX ORDER — SODIUM CHLORIDE, SODIUM LACTATE, POTASSIUM CHLORIDE, CALCIUM CHLORIDE 600; 310; 30; 20 MG/100ML; MG/100ML; MG/100ML; MG/100ML
9 INJECTION, SOLUTION INTRAVENOUS CONTINUOUS PRN
Status: DISCONTINUED | OUTPATIENT
Start: 2023-01-09 | End: 2023-01-09 | Stop reason: HOSPADM

## 2023-01-09 RX ORDER — LIDOCAINE HYDROCHLORIDE 20 MG/ML
INJECTION, SOLUTION EPIDURAL; INFILTRATION; INTRACAUDAL; PERINEURAL AS NEEDED
Status: DISCONTINUED | OUTPATIENT
Start: 2023-01-09 | End: 2023-01-09 | Stop reason: SURG

## 2023-01-09 RX ORDER — ACETAMINOPHEN 500 MG
1000 TABLET ORAL ONCE
Status: COMPLETED | OUTPATIENT
Start: 2023-01-09 | End: 2023-01-09

## 2023-01-09 RX ORDER — EPHEDRINE SULFATE 50 MG/ML
INJECTION, SOLUTION INTRAVENOUS AS NEEDED
Status: DISCONTINUED | OUTPATIENT
Start: 2023-01-09 | End: 2023-01-09 | Stop reason: SURG

## 2023-01-09 RX ORDER — PROMETHAZINE HYDROCHLORIDE 12.5 MG/1
25 TABLET ORAL ONCE AS NEEDED
Status: DISCONTINUED | OUTPATIENT
Start: 2023-01-09 | End: 2023-01-09 | Stop reason: HOSPADM

## 2023-01-09 RX ORDER — PROMETHAZINE HYDROCHLORIDE 25 MG/1
25 SUPPOSITORY RECTAL ONCE AS NEEDED
Status: DISCONTINUED | OUTPATIENT
Start: 2023-01-09 | End: 2023-01-09 | Stop reason: HOSPADM

## 2023-01-09 RX ORDER — MIDAZOLAM HYDROCHLORIDE 1 MG/ML
2 INJECTION INTRAMUSCULAR; INTRAVENOUS ONCE
Status: COMPLETED | OUTPATIENT
Start: 2023-01-09 | End: 2023-01-09

## 2023-01-09 RX ORDER — OXYCODONE HYDROCHLORIDE 5 MG/1
5 TABLET ORAL
Status: DISCONTINUED | OUTPATIENT
Start: 2023-01-09 | End: 2023-01-09 | Stop reason: HOSPADM

## 2023-01-09 RX ORDER — CEFAZOLIN SODIUM 2 G/100ML
2 INJECTION, SOLUTION INTRAVENOUS ONCE
Status: COMPLETED | OUTPATIENT
Start: 2023-01-09 | End: 2023-01-09

## 2023-01-09 RX ORDER — POLYETHYLENE GLYCOL 3350 17 G/17G
17 POWDER, FOR SOLUTION ORAL DAILY
Qty: 5 PACKET | Refills: 0 | Status: SHIPPED | OUTPATIENT
Start: 2023-01-09

## 2023-01-09 RX ORDER — MAGNESIUM HYDROXIDE 1200 MG/15ML
LIQUID ORAL AS NEEDED
Status: DISCONTINUED | OUTPATIENT
Start: 2023-01-09 | End: 2023-01-09 | Stop reason: HOSPADM

## 2023-01-09 RX ADMIN — FENTANYL CITRATE 50 MCG: 50 INJECTION, SOLUTION INTRAMUSCULAR; INTRAVENOUS at 09:29

## 2023-01-09 RX ADMIN — SODIUM CHLORIDE, POTASSIUM CHLORIDE, SODIUM LACTATE AND CALCIUM CHLORIDE 9 ML/HR: 600; 310; 30; 20 INJECTION, SOLUTION INTRAVENOUS at 08:18

## 2023-01-09 RX ADMIN — ONDANSETRON 4 MG: 2 INJECTION INTRAMUSCULAR; INTRAVENOUS at 09:38

## 2023-01-09 RX ADMIN — Medication 50 MCG: at 09:44

## 2023-01-09 RX ADMIN — LIDOCAINE HYDROCHLORIDE 100 MG: 20 INJECTION, SOLUTION EPIDURAL; INFILTRATION; INTRACAUDAL; PERINEURAL at 09:31

## 2023-01-09 RX ADMIN — FENTANYL CITRATE 25 MCG: 50 INJECTION, SOLUTION INTRAMUSCULAR; INTRAVENOUS at 10:04

## 2023-01-09 RX ADMIN — MIDAZOLAM HYDROCHLORIDE 2 MG: 2 INJECTION, SOLUTION INTRAMUSCULAR; INTRAVENOUS at 09:01

## 2023-01-09 RX ADMIN — CEFAZOLIN SODIUM 2 G: 2 INJECTION, SOLUTION INTRAVENOUS at 09:29

## 2023-01-09 RX ADMIN — EPHEDRINE SULFATE 10 MG: 50 INJECTION INTRAVENOUS at 09:51

## 2023-01-09 RX ADMIN — EPHEDRINE SULFATE 10 MG: 50 INJECTION INTRAVENOUS at 09:37

## 2023-01-09 RX ADMIN — PROPOFOL 120 MG: 10 INJECTION, EMULSION INTRAVENOUS at 09:31

## 2023-01-09 RX ADMIN — ACETAMINOPHEN 500 MG: 500 TABLET ORAL at 08:16

## 2023-01-09 RX ADMIN — OXYCODONE HYDROCHLORIDE 5 MG: 5 TABLET ORAL at 10:37

## 2023-01-09 RX ADMIN — Medication 50 MCG: at 09:50

## 2023-01-09 RX ADMIN — HYDROMORPHONE HYDROCHLORIDE 0.25 MG: 1 INJECTION, SOLUTION INTRAMUSCULAR; INTRAVENOUS; SUBCUTANEOUS at 10:21

## 2023-01-09 RX ADMIN — FENTANYL CITRATE 25 MCG: 50 INJECTION, SOLUTION INTRAMUSCULAR; INTRAVENOUS at 10:02

## 2023-01-09 RX ADMIN — DEXMEDETOMIDINE HYDROCHLORIDE 10 MCG: 100 INJECTION, SOLUTION, CONCENTRATE INTRAVENOUS at 10:03

## 2023-01-09 RX ADMIN — DEXMEDETOMIDINE HYDROCHLORIDE 10 MCG: 100 INJECTION, SOLUTION, CONCENTRATE INTRAVENOUS at 09:43

## 2023-01-09 NOTE — SIGNIFICANT NOTE
Wound Eval / Progress Noted    JOSE Langston     Patient Name: Derek Keller  : 1960  MRN: [id number]  Today's Date: 2023                 Admit Date: 2023    Visit Dx:    ICD-10-CM ICD-9-CM   1. History of difficult venous access  Z87.898 V13.89   2. Malignant neoplasm of female breast, unspecified estrogen receptor status, unspecified laterality, unspecified site of breast (HCC)  C50.919 174.9       Patient Active Problem List   Diagnosis    Other chest pain    Hypertension    Hyperlipidemia    Allergic rhinitis    Hypothyroidism    Neck pain    Lumbago    Arthritis    Chronic pain disorder    History of IBS    Anxiety    Monopolar depression (HCC)    Malaise and fatigue    Tobacco abuse    Fibromyalgia    Vitamin B 12 deficiency    Acute pain of left knee    Primary osteoarthritis of left knee    Osteoarthrosis, localized, primary, knee    Generalized abdominal pain    Herpes simplex vulvovaginitis    Lightheadedness    Cough    Hypoxemia    Restless leg syndrome    Itch of skin    Primary insomnia    Chronic fatigue    Asthma    Body mass index (BMI) 26.0-26.9, adult    Constipation    Degeneration of lumbar intervertebral disc    Disorder associated with Helicobacter species    Hearing loss    Impacted cerumen of right ear    Inappropriate diet and eating habits    Inguinal pain    Irritable bowel syndrome    Cervical spondylosis    Obesity with body mass index 30 or greater    Renal stone    Malignant neoplasm of left breast in female, estrogen receptor positive (HCC)    Cancer related pain    Bone metastases (HCC)    Secondary malignant neoplasm of bone (HCC)    Peripheral edema    Peritonitis (HCC)    Leukopenia    S/P ex lap with sigmoid resection, Lynn's procedure for stercoral perforation        Past Medical History:   Diagnosis Date    Abdominal pain     OSTOMY SITE    Allergic rhinitis     Anemia     NO S/S    Anxiety     Arteriosclerosis     Coronary    Arthritis     Bone metastases  (HCC) 10/14/2022    Bowen's disease     SKIN CANCER    Breast cancer (HCC)     NO SURGERY WAS DONE DUE TO METS TO BONE/COLON/LYMPHNODE    Cancer related pain 10/13/2022    Depression     Dysphoric mood     Fatigue     Frequent urination     NO S/S INFECTION    History of colon polyps     History of IBS     History of kidney stones     Hyperlipidemia     Hypertension     Hypothyroidism     Insomnia     Lumbago     Mood disorder (HCC)     Neck pain     R/T CANCER    Palpitations     ASYMPTOMATIC,  NO CARDIOLOGIST    Precordial pain     R/T SPINE CANCER    Sleep disturbance         Past Surgical History:   Procedure Laterality Date    BREAST BIOPSY Left     CARDIAC CATHETERIZATION Left 1960    CARDIAC CATHETERIZATION N/A 04/06/2018    Procedure: Coronary angiography;  Surgeon: Art Licea MD;  Location: [facility] CATH INVASIVE LOCATION;  Service: Cardiovascular    CARDIAC CATHETERIZATION N/A 04/06/2018    Procedure: Left heart cath;  Surgeon: Art Licea MD;  [facility];  Service: Cardiovascular    CARDIAC CATHETERIZATION N/A 04/06/2018    Procedure: Left ventriculography;  Surgeon: Art Licea MD;  [facility];  Service: Cardiovascular    CHOLECYSTECTOMY      COLON SURGERY      colostomy bag    COLONOSCOPY  11/08/2006    EXPLORATORY LAPAROTOMY N/A 12/06/2022    Procedure: LAPAROTOMY EXPLORATORY sigmoid resection hartmans procedure colostomy;  Surgeon: Alfred Castañeda MD;  Location: [facility];  Service: General;  Laterality: N/A;    GANGLION CYST EXCISION      HYSTERECTOMY  05/2005    LAPAROSCOPIC GASTRIC BANDING      BAND REMOVED 2020    TONSILLECTOMY           Physical Assessment:     01/09/23 1255   Colostomy LLQ   Placement Date/Time: 12/06/22 1900   Inserted by: DR. CASTAÑEDA  Hand Hygiene Completed: Yes  Colostomy Type: Descending/sigmoid;End  Location: LLQ   Stomal Appliance 1 piece;Drainable;Leaking   Stoma Appearance  irregular;moist;red;retracted below skin level   Peristomal Assessment Maceration;Other (Comment)  (peristomal irritation, most significant 4 - 8 o'clock)   Accessories/Skin Care convex wafer;cleansed with water;skin barrier ring;skin sealant;antifungal powder   Stoma Function stool   Stool Color brown, light   Stool Consistency loose;soft   Treatment Bag change;Site care       Wound Check / Follow-up:  patient seen today at request of Surgeon to assist with Ostomy care. Upon arrival to [facility], patient with some demonstrated leaking from stoma pouch at 10 o'clock. Upon removal of pouch leaking noted circumferentially. Patient has been applying barrier strips and paper tape to secure pouch with the hope of preventing leaks. Explained to patient that barrier strips and paper tape will not prevent leaking but will delay identifying leaks and can cause additional skin breakdown. Patient was also wearing a brief that was holding pouch down. Pancaking of stool noted over stoma with removal. Discussed with patient that her stool must be able to flow freely into pouch and cannot be held down with brief.   To peristomal irritation noted, silver powder applied and then sealed in with marathon. Due to significant retraction of stom, convex pouching system applied. Eakins ring also applied to wafer to provide additional seal around stoma and protect skin. Patient is very sensitive to pouch application. Brother believes that she is not sealing pouch on properly due to tenderness.   All steps and products discussed with patient. Patient to follow-up for additional services in [facility] end of this week or next week.   Patient was currently in SDS have a port placed to right chest.   Patient additional has some open areas to her midline incision that continue to drain. She states Dr. Wharton put something black in them at her most recent office visit. Would recommend continued site care to those areas to prevent drainage oozing on skin.      Impression: Recent Ostomy placement with leaking and peristomal irritation, Midline incision with open areas that are draining.    Short term goals:  Ostomy management    Emily Karimi RN    1/9/2023    12:58 EST

## 2023-01-09 NOTE — OP NOTE
OP NOTE  INSERTION VENOUS ACCESS DEVICE  Procedure Report    Patient Name:  Derek Keller  YOB: 1960  [id number]    Date of Surgery:  1/9/2023     Indications: See last clinic note for indications, discussion of risk benefits and alternative    Pre-op Diagnosis:   Malignant neoplasm of female breast, unspecified estrogen receptor status, unspecified laterality, unspecified site of breast (HCC) [C50.919]       Post-Op Diagnosis Codes:     * Malignant neoplasm of female breast, unspecified estrogen receptor status, unspecified laterality, unspecified site of breast (HCC) [C50.919]    Procedure/CPT® Codes:      Procedure(s): Port placement to right internal jugular vein with venous guided ultrasound access, interpretation of fluoroscopy    Staff:  Surgeon(s):  Alfred Castañeda MD    Assistant: Jada Koroma CSA    Anesthesia: General, Local    Estimated Blood Loss: 2 mL    Implants:    Implant Name Type Inv. Item Serial No.  Lot No. LRB No. Used Action   PRT INTRO VASC/INTERV VORTEX FILL/HL DETACH/POLYURET/CATH 8F - [id number] Implant PRT INTRO VASC/INTERV VORTEX FILL/HL DETACH/POLYURET/CATH 8F  [employer] [id number] N/A 1 Implanted       Specimen:          None      Findings: Port placement to right internal jugular vein with venous guided ultrasound access, interpretation of fluoroscopy    Complications: None    Description of Procedure:   After all questions were answered, consent was verified.  She was brought the operative room per stretcher placed in supine position arms out all extremities padded.  Bilateral lower extremity SCDs placed.  Anesthesia induced.  Preoperative IV antibiotics administered.  Bilateral upper extremities padded and tucked.  Patient's head turned to the left.  Patient's bilateral neck and upper chest prepped with ChloraPrep.  We waited 3 minutes.  Draped in usual sterile fashion.  Ioban applied.  Critical timeout taken.  Began by placing the  patient in Trendelenburg.  Identify the right internal jugular vein with venous guided ultrasound.  I accessed the right internal jugular vein with venous guided ultrasound access.  Venous blood return noted.  I placed a guidewire in a Seldinger fashion.  Appropriate position confirmed with ultrasound and fluoroscopy.  I then made a transverse incision in the right upper chest creating a pocket for the port.  I then made a stab incision at the neck where the guidewire exited the skin.  I then tunneled the catheter from the neck stab incision anterior to the clavicle to the chest wall pocket.  This was trimmed to an appropriate length and connected to the port in the usual fashion.  The port was flushed with heparin saline.  I then used fluoroscopy to place the dilator over the guidewire in a Seldinger fashion.  The internal obturator and guidewire were removed.  The port tubing was placed through the dilator sheath.  The dilator sheath was removed.  Appropriate positioning of the port confirmed with fluoroscopy.  I then aspirated the port with the Ricardo needle with venous blood return noted.  I flushed with heparin.  I secured the port in the chest wall pocket with Prolene suture x2 I then closed the incisions with Vicryl Monocryl and skin glue.  I infiltrated with local.  At the end of the procedure all counts were correct.  I was present for the procedure.    Assistant: Jada Koroma CSA was responsible for performing the following activities: Retraction, Closing, Placing Dressing and their skilled assistance was necessary for the success of this case.    Alfred Castañeda MD     Date: 1/9/2023  Time: 10:21 EST

## 2023-01-09 NOTE — DISCHARGE INSTRUCTIONS
DISCHARGE INSTRUCTIONS  INSERTION OF   PORT-A-CATH      For your surgery you had:  General anesthesia (you may have a sore throat for the first 24 hours)    You may experience dizziness, drowsiness, or light-headedness for several hours following surgery/procedure.  Do not stay alone today or tonight.  Limit your activity for 24 hours.  You should not drive, operate machinery, drink alcohol, or sign legally binding documents for 24 hours or while you are taking pain medication.  Resume your diet slowly.  Follow whatever special dietary instructions you may have been given by your doctor.    NOTIFY YOUR DOCTOR IF YOU EXPERIENCE ANY OF THE FOLLOWING:  Temperature greater than 101 degrees Fahrenheit  Shaking Chills  Redness or excessive drainage from incision  Nausea, vomiting and/opr pain that is not controlled by prescribed medications  Increase in bleeding or bleeding that is excessive  Unable to urinate in 6 hours after surgery  If unable to reach your doctor, please go to the closest Emergency Room INCISION CARE  You may remove your dressing on/in SKIN ADHESIVE WILL FLAKE OFF IN 1-14 DAYS .  Wash your hands and cleanse the incision daily with (after one week)  Soap and Water  You may shower fin one week.  Apply an ice pack intermittently for 20 minutes for a total of 24-48 hours.  Do not apply directly to the skin.       Port should be flushed/heparinized once a month (even when not being used).  Keep Port-A-Cath ID Card in your purse of wallet.  Medications per physician instructions as indicated on Discharge Medication Information Sheet.    Last dose of pain medication was given at:    .    SPECIAL INSTRUCTIONS:

## 2023-01-09 NOTE — OUTREACH NOTE
Medical Week 4 Survey    Flowsheet Row Responses   [facility] facility patient discharged from? [facility]   Does the patient have one of the following disease processes/diagnoses(primary or secondary)? Other   Week 4 attempt successful? No   Revoke Readmitted          NIKIA H - Registered Nurse

## 2023-01-09 NOTE — ANESTHESIA POSTPROCEDURE EVALUATION
Patient: Derek Keller    Procedure Summary     Date: 01/09/23 Room / Location: [facility] OR 01 / [facility] MAIN OR    Anesthesia Start: 0927 Anesthesia Stop: 1011    Procedure: INSERTION VENOUS ACCESS DEVICE Diagnosis:       Malignant neoplasm of female breast, unspecified estrogen receptor status, unspecified laterality, unspecified site of breast (HCC)      (Malignant neoplasm of female breast, unspecified estrogen receptor status, unspecified laterality, unspecified site of breast (HCC) [C50.919])    Surgeons: Alfred Castañeda MD Provider: Sylvia Durand MD    Anesthesia Type: general ASA Status: 2          Anesthesia Type: general    Vitals  Vitals Value Taken Time   /51 01/09/23 1027   Temp     Pulse 67 01/09/23 1030   Resp     SpO2 92 % 01/09/23 1030   Vitals shown include unvalidated device data.        Post Anesthesia Care and Evaluation    Patient location during evaluation: bedside  Patient participation: complete - patient participated  Level of consciousness: awake  Pain management: adequate    Airway patency: patent  Anesthetic complications: No anesthetic complications  PONV Status: none  Cardiovascular status: acceptable and stable  Respiratory status: acceptable  Hydration status: acceptable    Comments: An Anesthesiologist personally participated in the most demanding procedures (including induction and emergence if applicable) in the anesthesia plan, monitored the course of anesthesia administration at frequent intervals and remained physically present and available for immediate diagnosis and treatment of emergencies.

## 2023-01-09 NOTE — ANESTHESIA PREPROCEDURE EVALUATION
Anesthesia Evaluation     Patient summary reviewed and Nursing notes reviewed   no history of anesthetic complications:  NPO Solid Status: > 8 hours  NPO Liquid Status: > 2 hours           Airway   Mallampati: II  TM distance: >3 FB  Neck ROM: full  No difficulty expected  Dental    (+) upper dentures    Pulmonary - normal exam    breath sounds clear to auscultation  (+) asthma,  Cardiovascular - normal exam  Exercise tolerance: good (4-7 METS)    Rhythm: regular  Rate: normal    (+) hypertension, hyperlipidemia,       Neuro/Psych  (+) psychiatric history Depression,    GI/Hepatic/Renal/Endo    (+)   renal disease, thyroid problem hypothyroidism    Musculoskeletal     (+) neck pain,   Abdominal    Substance History - negative use     OB/GYN negative ob/gyn ROS         Other   arthritis,    history of cancer    ROS/Med Hx Other: PAT Nursing Notes unavailable.                   Anesthesia Plan    ASA 2     general     (Total IV Anesthesia    Patient understands anesthesia not responsible for dental damage.  )  intravenous induction     Anesthetic plan, risks, benefits, and alternatives have been provided, discussed and informed consent has been obtained with: patient.  Pre-procedure education provided  Plan discussed with CRNA.        CODE STATUS:

## 2023-01-10 ENCOUNTER — TELEPHONE (OUTPATIENT)
Dept: SURGERY | Facility: CLINIC | Age: 63
End: 2023-01-10
Payer: MEDICAID

## 2023-01-10 NOTE — TELEPHONE ENCOUNTER
Per Dr. Castañeda:  I gave her extra pain pills to go on top of her normal pain pills.  She does not take extra but she has more pain pills to take.  She is okay to sponge bath but keep the incisions dry for 1 week.     I called the patient and made her aware of this message, per Dr. Castañeda.    I told her that [facility] will call her tomorrow, as Dr. Castañeda sent her a message, to check on some of the other things for her.

## 2023-01-10 NOTE — TELEPHONE ENCOUNTER
Procedure(s): Port placement to right internal jugular vein with venous guided ultrasound access, interpretation of fluoroscopy on 01/09/23.    Patient said she has a prescription for Percocet 10 from Dr. Castañeda with instructions to take every 6 hours.    She said she has another prescription of Percocet 10 with instructions to take every 4 hour.  It is from Dr. Barker.     She wants to know if she is supposed to know how she is supposed to take these.  Does she take both prescriptions at the same time, or together?    Does she need to wash area where port is covered with the clear barrier with just water, and after 5 days, start cleaning with soap and water?  She is not clear on the AVS instructions.  Does she need to order anything for port care from [place]?    She wants to know if there is an order for her for the ostomy nurse.  Do they call her to schedule, or does she call them?    She is going to order ostomy supplies.  Does Dr. Castañeda want her to use the flat bags or curvature bags.

## 2023-01-11 ENCOUNTER — SPECIALTY PHARMACY (OUTPATIENT)
Dept: PHARMACY | Facility: HOSPITAL | Age: 63
End: 2023-01-11
Payer: MEDICARE

## 2023-01-11 NOTE — PROGRESS NOTES
Specialty Pharmacy Patient Management Program  Oncology Refill Outreach      Derek is a 62 y.o. female contacted today regarding refills of her medication(s).    Specialty medication(s) and dose(s) confirmed: Kisqali (600mg) 200 mg tablet.    Other medications being refilled: N/A    Refill Questions    Flowsheet Row Most Recent Value   Changes to allergies? No   Changes to medications? Yes   New conditions since last clinic visit No   Unplanned office visit, urgent care, ED, or hospital admission in the last 4 weeks  No   How does patient/caregiver feel medication is working? Very good   Financial problems or insurance changes  No   Since the previous refill, were any specialty medication doses or scheduled injections missed or delayed?  No   Does this patient require a clinical escalation to a pharmacist? No          Delivery Questions    Flowsheet Row Most Recent Value   Delivery method  at Pharmacy   Delivery address correct? Yes   Delivery phone number [phone]   Number of medications in delivery 1   Medication being filled and delivered Kisqali 600 mg/day (200 mg x 3) tablet   Doses left of specialty medications This is the week off   Is there any medication that is due not being filled? No   Supplies needed? No supplies needed   Cooler needed? No   Do any medications need mixed or dated? No   Copay form of payment Pay at pickup   Additional comments Zero copay   Questions or concerns for the pharmacist? No   Explain any questions or concerns for the pharmacist N/A   Are any medications first time fills? No                 Follow-up: 21 days     Maria Luz Frazier  Care Coordinator, [facility]  1/11/2023  16:37 EST

## 2023-01-11 NOTE — TELEPHONE ENCOUNTER
I called the patient and she the appointment still has not been set up to see wound ostomy nurse. The referral was closed out, I'm not sure why. I have opened it back up and I also called over to try and schedule the appointment myself but had to leave a message. I explained all of this to the patient and she was fine with it. I will follow this through out the day and if she isn't contacted or I receive a callback I will try calling them again.

## 2023-01-12 ENCOUNTER — TELEPHONE (OUTPATIENT)
Dept: ONCOLOGY | Facility: HOSPITAL | Age: 63
End: 2023-01-12

## 2023-01-12 NOTE — TELEPHONE ENCOUNTER
Caller: Derek [facility]    Relationship: Self    Best call back number: [phone]    What is the best time to reach you: ANYTIME    Who are you requesting to speak with (clinical staff, provider,  specific staff member): CLINICAL     What was the call regarding: PT IS POSSIBLE ABLE TO GET A NEW HOSPITAL BED THROUGH HER INSURANCE AND THEY NEED FOR YOU TO SEND A SCRIPT AND CHART NOTES TO [facility]   FAX [phone]    CALL PT TO LET HER KNOW THIS HAS BEEN COMPLETED     Do you require a callback: YES

## 2023-01-13 ENCOUNTER — HOSPITAL ENCOUNTER (OUTPATIENT)
Dept: INFUSION THERAPY | Facility: HOSPITAL | Age: 63
Discharge: HOME | End: 2023-01-13
Admitting: SURGERY
Payer: MEDICARE

## 2023-01-13 VITALS
TEMPERATURE: 98 F | SYSTOLIC BLOOD PRESSURE: 106 MMHG | OXYGEN SATURATION: 98 % | RESPIRATION RATE: 20 BRPM | HEART RATE: 90 BPM | DIASTOLIC BLOOD PRESSURE: 57 MMHG

## 2023-01-13 PROCEDURE — G0463 HOSPITAL OUTPT CLINIC VISIT: HCPCS

## 2023-01-13 NOTE — SIGNIFICANT NOTE
Wound Eval / Progress Noted    [facility]     Patient Name: Derek Keller  : 1960  MRN: [id number]  Today's Date: 2023                 Admit Date: 2023    Visit Dx:  No diagnosis found.    Patient Active Problem List   Diagnosis    Other chest pain    Hypertension    Hyperlipidemia    Allergic rhinitis    Hypothyroidism    Neck pain    Lumbago    Arthritis    Chronic pain disorder    History of IBS    Anxiety    Monopolar depression (HCC)    Malaise and fatigue    Tobacco abuse    Fibromyalgia    Vitamin B 12 deficiency    Acute pain of left knee    Primary osteoarthritis of left knee    Osteoarthrosis, localized, primary, knee    Generalized abdominal pain    Herpes simplex vulvovaginitis    Lightheadedness    Cough    Hypoxemia    Restless leg syndrome    Itch of skin    Primary insomnia    Chronic fatigue    Asthma    Body mass index (BMI) 26.0-26.9, adult    Constipation    Degeneration of lumbar intervertebral disc    Disorder associated with Helicobacter species    Hearing loss    Impacted cerumen of right ear    Inappropriate diet and eating habits    Inguinal pain    Irritable bowel syndrome    Cervical spondylosis    Obesity with body mass index 30 or greater    Renal stone    Malignant neoplasm of left breast in female, estrogen receptor positive (HCC)    Cancer related pain    Bone metastases (HCC)    Secondary malignant neoplasm of bone (HCC)    Peripheral edema    Peritonitis (HCC)    Leukopenia    S/P ex lap with sigmoid resection, Lynn's procedure for stercoral perforation        Past Medical History:   Diagnosis Date    Abdominal pain     OSTOMY SITE    Allergic rhinitis     Anemia     NO S/S    Anxiety     Arteriosclerosis     Coronary    Arthritis     Bone metastases (HCC) 10/14/2022    Bowen's disease     SKIN CANCER    Breast cancer (HCC)     NO SURGERY WAS DONE DUE TO METS TO BONE/COLON/LYMPHNODE    Cancer related pain 10/13/2022    Depression     Dysphoric mood     Fatigue      Frequent urination     NO S/S INFECTION    History of colon polyps     History of IBS     History of kidney stones     Hyperlipidemia     Hypertension     Hypothyroidism     Insomnia     Lumbago     Mood disorder (HCC)     Neck pain     R/T CANCER    Palpitations     ASYMPTOMATIC,  NO CARDIOLOGIST    Precordial pain     R/T SPINE CANCER    Sleep disturbance         Past Surgical History:   Procedure Laterality Date    BREAST BIOPSY Left     CARDIAC CATHETERIZATION Left 1959    CARDIAC CATHETERIZATION N/A 04/06/2018    Procedure: Coronary angiography;  Surgeon: Art Licea MD;  [facility];  Service: Cardiovascular    CARDIAC CATHETERIZATION N/A 04/06/2018    Procedure: Left heart cath;  Surgeon: Art Licea MD;  Location: [facility] CATH INVASIVE LOCATION;  Service: Cardiovascular    CARDIAC CATHETERIZATION N/A 04/06/2018    Procedure: Left ventriculography;  Surgeon: Art Licea MD;  Location: [facility] CATH INVASIVE LOCATION;  Service: Cardiovascular    CHOLECYSTECTOMY      COLON SURGERY      colostomy bag    COLONOSCOPY  11/08/2006    EXPLORATORY LAPAROTOMY N/A 12/06/2022    Procedure: LAPAROTOMY EXPLORATORY sigmoid resection hartmans procedure colostomy;  Surgeon: Alfred Castañeda MD;  Location: [facility]  Service: General;  Laterality: N/A;    GANGLION CYST EXCISION      HYSTERECTOMY  05/2005    LAPAROSCOPIC GASTRIC BANDING      BAND REMOVED 2020    TONSILLECTOMY      VENOUS ACCESS DEVICE (PORT) INSERTION N/A 1/9/2023    Procedure: INSERTION VENOUS ACCESS DEVICE;  Surgeon: Alfred Castañeda MD;  Location: [facility] OR;  Service: General;  Laterality: N/A;         Physical Assessment:       01/13/23 1410   Colostomy LLQ   Placement Date/Time: 12/06/22 1900   Inserted by: DR. CASTAÑEDA  Hand Hygiene Completed: Yes  Colostomy Type: Descending/sigmoid;End  Location: LLQ   Wound Image    Stomal Appliance 1 piece;Drainable;Intact   Stoma  Appearance irregular;retracted below skin level;moist;red   Peristomal Assessment Painful;Red   Accessories/Skin Care convex wafer;antifungal powder;cleansed with water;skin sealant   Stoma Function stool   Stool Color brown, light   Stool Consistency loose   Treatment Bag change;Site care         Wound Check / Follow-up:  Patient seen today for ostomy follow-up for concerns with leaking and tete-stomal dermatitis. Patient states she changed her pouch this morning after showering as it was beginning to leak. She also states she continues to change her pouch almost daily due to leaking. Patient removed with leaking noted to right side of stoma at 8 o'clock area. Patient with creasing to that area. Patient also with small wound along medial abdomen that continues to have drainage and would normally be covered by barrier.   Creasing also noted at 4 o'clock of stoma. Cleansed site with warm water. Tete-stomal irritation much improved from last visit with decreased redness, irritation and tenderness. Crusting provided around stom. Skin barrier ring applied around stoma, folding over and filling in creases at both 5 o'clock and 8 o'clock. Convex one piece pouch applied. Good seal obtained. Barrier cut to accommodate umbilicus. Also barrier trimmed to avoid midline abdominal wound that was draining and lifting barrier. Small dressing applied over midline consisting of silver impregnated hydrofiber and duoderm which also secures barrier back down to tissue.   Upon completion and with standing, no lifting of barrier noted. Good seal remains intact.   All steps completed today demonstrated and discussed with patient for home application. All supplies utilized today are available to patient.   Patient to follow-up as needed for support.    Impression: Colostomy to Left abdomen. Midline abdominal incision with three open wounds    Short term goals:  Colostomy management. Moisture / drainage management from wounds to prevent  pouch lifting.    Emily Karimi RN    1/13/2023    17:19 EST

## 2023-01-16 ENCOUNTER — TELEPHONE (OUTPATIENT)
Dept: SURGERY | Facility: CLINIC | Age: 63
End: 2023-01-16
Payer: MEDICARE

## 2023-01-16 NOTE — TELEPHONE ENCOUNTER
Patient said she has a colostomy bag.  She said 2 days ago, she had a lot of rectal pressure and cramped.  She passed stool rectally, that was enough to smear her underpants. She has had stool go into her bag since this happened.

## 2023-01-16 NOTE — TELEPHONE ENCOUNTER
I called the patient and told her that Dr. Castañeda said she may have some output from her rectum from time to time.    She wants to know if it is okay for her now to switch over from the morphine 30 mg to the morphine 15 mg.     She wants to know if there are any more ostomy nurse appointments set up for her, or to find out if she needs to call them.

## 2023-01-17 NOTE — TELEPHONE ENCOUNTER
I left a message for patient to call me.    Dr. Castañeda said it is okay to transition medication from his standpoint.    He wants her to see the ostomy nurse weekly until she has a reliable system in place with no leakage.    Per Cristiane, patient may call the ostomy nurse to schedule. The number is [phone].  If there is any issue, she may call back to let Cristiane know.

## 2023-01-17 NOTE — TELEPHONE ENCOUNTER
Patient called and relayed message, per Dr. Castañeda.      Patient will call the ostomy nurse to schedule an appointment, and if there is an issue, she will call back to let Cristiane know.    Patient said the ostomy nurse changed her bag and it didn't leak, but after 2 days, the bag was nasty inside.  Patient changed the bag 01/16/23.  She had a couple meals, but no stool went into the bag.  She thought she may have cut the hole too small.  She changed it again today.  Has not passed stool yet.

## 2023-01-21 DIAGNOSIS — Z87.898 HISTORY OF DIFFICULT VENOUS ACCESS: ICD-10-CM

## 2023-01-23 ENCOUNTER — TELEPHONE (OUTPATIENT)
Dept: SURGERY | Facility: CLINIC | Age: 63
End: 2023-01-23
Payer: MEDICARE

## 2023-01-23 DIAGNOSIS — Z17.0 MALIGNANT NEOPLASM OF UPPER-OUTER QUADRANT OF LEFT BREAST IN FEMALE, ESTROGEN RECEPTOR POSITIVE: ICD-10-CM

## 2023-01-23 DIAGNOSIS — C50.412 MALIGNANT NEOPLASM OF UPPER-OUTER QUADRANT OF LEFT BREAST IN FEMALE, ESTROGEN RECEPTOR POSITIVE: ICD-10-CM

## 2023-01-23 DIAGNOSIS — G89.3 CANCER RELATED PAIN: ICD-10-CM

## 2023-01-23 RX ORDER — OXYCODONE AND ACETAMINOPHEN 10; 325 MG/1; MG/1
TABLET ORAL
Qty: 6 TABLET | Refills: 0 | OUTPATIENT
Start: 2023-01-23

## 2023-01-23 RX ORDER — OXYCODONE AND ACETAMINOPHEN 10; 325 MG/1; MG/1
1 TABLET ORAL EVERY 4 HOURS PRN
Qty: 60 TABLET | Refills: 0 | Status: SHIPPED | OUTPATIENT
Start: 2023-01-23 | End: 2023-02-01

## 2023-01-23 NOTE — TELEPHONE ENCOUNTER
Patient called and her ostomy is continuing to leak at the area where the stitch did not heal, under her belly button.  It is keeping the bag from sticking.    She has not called the ostomy nurse to schedule an appointment yet.    She is going to call the ostomy nurse now, to see what to do.

## 2023-01-23 NOTE — TELEPHONE ENCOUNTER
Caller: Derek [facility]    Relationship: Self    Best call back number: [phone]    Requested Prescriptions:   Requested Prescriptions     Pending Prescriptions Disp Refills   • oxyCODONE-acetaminophen (Percocet)  MG per tablet 60 tablet 0     Sig: Take 1 tablet by mouth Every 4 (Four) Hours As Needed for Moderate Pain.        Pharmacy where request should be sent: [facility], [facility], & [address], [place] - [address] - [phone] [facility] [phone] FX     Additional details provided by patient: PT WANTS DR. DIANE TO KNOW SHE ISN'T  TAKING AS MUCH OF THE MORPHINE     Does the patient have less than a 3 day supply:    [x] Yes  [] No    Would you like a call back once the refill request has been completed: [x] Yes [] No    If the office needs to give you a call back, can they leave a voicemail: [x] Yes [] No

## 2023-01-23 NOTE — TELEPHONE ENCOUNTER
Spoke with pt in regards to the prescription refill she had requested for the percocet. Dr Castañeda denied this request and patient understand and apologized, said the request was supposed to go to Dr. Stack office anyway.

## 2023-01-24 ENCOUNTER — HOSPITAL ENCOUNTER (OUTPATIENT)
Dept: INFUSION THERAPY | Facility: HOSPITAL | Age: 63
Discharge: HOME | End: 2023-01-24
Admitting: SURGERY
Payer: MEDICARE

## 2023-01-24 ENCOUNTER — HOSPITAL ENCOUNTER (OUTPATIENT)
Dept: ONCOLOGY | Facility: HOSPITAL | Age: 63
Setting detail: INFUSION SERIES
Discharge: HOME | End: 2023-01-24
Payer: MEDICARE

## 2023-01-24 ENCOUNTER — LAB (OUTPATIENT)
Dept: ONCOLOGY | Facility: HOSPITAL | Age: 63
End: 2023-01-24
Payer: MEDICARE

## 2023-01-24 VITALS
SYSTOLIC BLOOD PRESSURE: 153 MMHG | OXYGEN SATURATION: 100 % | TEMPERATURE: 97.9 F | DIASTOLIC BLOOD PRESSURE: 74 MMHG | BODY MASS INDEX: 27.14 KG/M2 | HEART RATE: 73 BPM | WEIGHT: 168.87 LBS | HEIGHT: 66 IN | RESPIRATION RATE: 20 BRPM

## 2023-01-24 VITALS
TEMPERATURE: 99.4 F | RESPIRATION RATE: 18 BRPM | OXYGEN SATURATION: 96 % | SYSTOLIC BLOOD PRESSURE: 139 MMHG | HEART RATE: 74 BPM | DIASTOLIC BLOOD PRESSURE: 71 MMHG

## 2023-01-24 DIAGNOSIS — Z98.890 S/P EXPLORATORY LAPAROTOMY: Primary | ICD-10-CM

## 2023-01-24 DIAGNOSIS — C79.51 BONE METASTASES: Primary | ICD-10-CM

## 2023-01-24 DIAGNOSIS — Z45.2 ENCOUNTER FOR ADJUSTMENT OR MANAGEMENT OF VASCULAR ACCESS DEVICE: ICD-10-CM

## 2023-01-24 DIAGNOSIS — Z17.0 MALIGNANT NEOPLASM OF LEFT BREAST IN FEMALE, ESTROGEN RECEPTOR POSITIVE, UNSPECIFIED SITE OF BREAST: ICD-10-CM

## 2023-01-24 DIAGNOSIS — C50.912 MALIGNANT NEOPLASM OF LEFT BREAST IN FEMALE, ESTROGEN RECEPTOR POSITIVE, UNSPECIFIED SITE OF BREAST: ICD-10-CM

## 2023-01-24 LAB
ALBUMIN SERPL-MCNC: 4 G/DL (ref 3.5–5.2)
ALBUMIN/GLOB SERPL: 1.4 G/DL
ALP SERPL-CCNC: 96 U/L (ref 39–117)
ALT SERPL W P-5'-P-CCNC: 7 U/L (ref 1–33)
ANION GAP SERPL CALCULATED.3IONS-SCNC: 8 MMOL/L (ref 5–15)
AST SERPL-CCNC: 10 U/L (ref 1–32)
BASOPHILS # BLD AUTO: 0.08 10*3/MM3 (ref 0–0.2)
BASOPHILS NFR BLD AUTO: 1 % (ref 0–1.5)
BILIRUB SERPL-MCNC: 0.2 MG/DL (ref 0–1.2)
BUN SERPL-MCNC: 13 MG/DL (ref 8–23)
BUN/CREAT SERPL: 16.9 (ref 7–25)
CALCIUM SPEC-SCNC: 8.8 MG/DL (ref 8.6–10.5)
CHLORIDE SERPL-SCNC: 103 MMOL/L (ref 98–107)
CO2 SERPL-SCNC: 26 MMOL/L (ref 22–29)
CREAT SERPL-MCNC: 0.77 MG/DL (ref 0.57–1)
DEPRECATED RDW RBC AUTO: 66.3 FL (ref 37–54)
EGFRCR SERPLBLD CKD-EPI 2021: 86.8 ML/MIN/1.73
EOSINOPHIL # BLD AUTO: 0.06 10*3/MM3 (ref 0–0.4)
EOSINOPHIL NFR BLD AUTO: 0.7 % (ref 0.3–6.2)
ERYTHROCYTE [DISTWIDTH] IN BLOOD BY AUTOMATED COUNT: 18.1 % (ref 12.3–15.4)
GLOBULIN UR ELPH-MCNC: 2.9 GM/DL
GLUCOSE SERPL-MCNC: 110 MG/DL (ref 65–99)
HCT VFR BLD AUTO: 32.6 % (ref 34–46.6)
HGB BLD-MCNC: 10.5 G/DL (ref 12–15.9)
IMM GRANULOCYTES # BLD AUTO: 0.01 10*3/MM3 (ref 0–0.05)
IMM GRANULOCYTES NFR BLD AUTO: 0.1 % (ref 0–0.5)
LYMPHOCYTES # BLD AUTO: 1.59 10*3/MM3 (ref 0.7–3.1)
LYMPHOCYTES NFR BLD AUTO: 19.6 % (ref 19.6–45.3)
MAGNESIUM SERPL-MCNC: 1.9 MG/DL (ref 1.6–2.4)
MCH RBC QN AUTO: 32 PG (ref 26.6–33)
MCHC RBC AUTO-ENTMCNC: 32.2 G/DL (ref 31.5–35.7)
MCV RBC AUTO: 99.4 FL (ref 79–97)
MONOCYTES # BLD AUTO: 0.32 10*3/MM3 (ref 0.1–0.9)
MONOCYTES NFR BLD AUTO: 3.9 % (ref 5–12)
NEUTROPHILS NFR BLD AUTO: 6.06 10*3/MM3 (ref 1.7–7)
NEUTROPHILS NFR BLD AUTO: 74.7 % (ref 42.7–76)
PHOSPHATE SERPL-MCNC: 2.7 MG/DL (ref 2.5–4.5)
PLATELET # BLD AUTO: 355 10*3/MM3 (ref 140–450)
PMV BLD AUTO: 9.6 FL (ref 6–12)
POTASSIUM SERPL-SCNC: 3.5 MMOL/L (ref 3.5–5.2)
PROT SERPL-MCNC: 6.9 G/DL (ref 6–8.5)
RBC # BLD AUTO: 3.28 10*6/MM3 (ref 3.77–5.28)
SODIUM SERPL-SCNC: 137 MMOL/L (ref 136–145)
WBC NRBC COR # BLD: 8.12 10*3/MM3 (ref 3.4–10.8)

## 2023-01-24 PROCEDURE — 96372 THER/PROPH/DIAG INJ SC/IM: CPT

## 2023-01-24 PROCEDURE — 25010000002 HEPARIN LOCK FLUSH PER 10 UNITS: Performed by: INTERNAL MEDICINE

## 2023-01-24 PROCEDURE — G0463 HOSPITAL OUTPT CLINIC VISIT: HCPCS

## 2023-01-24 PROCEDURE — 80053 COMPREHEN METABOLIC PANEL: CPT

## 2023-01-24 PROCEDURE — 84100 ASSAY OF PHOSPHORUS: CPT

## 2023-01-24 PROCEDURE — 25010000002 DENOSUMAB 120 MG/1.7ML SOLUTION: Performed by: INTERNAL MEDICINE

## 2023-01-24 PROCEDURE — 83735 ASSAY OF MAGNESIUM: CPT

## 2023-01-24 PROCEDURE — 85025 COMPLETE CBC W/AUTO DIFF WBC: CPT

## 2023-01-24 RX ORDER — HEPARIN SODIUM (PORCINE) LOCK FLUSH IV SOLN 100 UNIT/ML 100 UNIT/ML
500 SOLUTION INTRAVENOUS AS NEEDED
Status: DISCONTINUED | OUTPATIENT
Start: 2023-01-24 | End: 2023-01-24 | Stop reason: HOSPADM

## 2023-01-24 RX ORDER — SODIUM CHLORIDE 0.9 % (FLUSH) 0.9 %
20 SYRINGE (ML) INJECTION AS NEEDED
Status: CANCELLED | OUTPATIENT
Start: 2023-01-24

## 2023-01-24 RX ORDER — HEPARIN SODIUM (PORCINE) LOCK FLUSH IV SOLN 100 UNIT/ML 100 UNIT/ML
500 SOLUTION INTRAVENOUS AS NEEDED
Status: CANCELLED | OUTPATIENT
Start: 2023-01-24

## 2023-01-24 RX ORDER — SODIUM CHLORIDE 0.9 % (FLUSH) 0.9 %
20 SYRINGE (ML) INJECTION AS NEEDED
Status: DISCONTINUED | OUTPATIENT
Start: 2023-01-24 | End: 2023-01-24 | Stop reason: HOSPADM

## 2023-01-24 RX ADMIN — HEPARIN SODIUM (PORCINE) LOCK FLUSH IV SOLN 100 UNIT/ML 500 UNITS: 100 SOLUTION at 11:23

## 2023-01-24 RX ADMIN — DENOSUMAB 120 MG: 120 INJECTION SUBCUTANEOUS at 12:16

## 2023-01-24 RX ADMIN — Medication 20 ML: at 11:23

## 2023-01-24 NOTE — TELEPHONE ENCOUNTER
Per Dr. Castañeda, he wants patient to see ostomy nurse asap.    I called the patient and she has an appointment with the ostomy nurse today at 1:30 PM.    I told her, per previous message, that Dr. Castañeda wants her to see ostomy nurse once a week until she has a reliable system in place with no leakage.     She voiced understanding.  I told her to call the office with any concerns.

## 2023-01-24 NOTE — SIGNIFICANT NOTE
Wound Eval / Progress Noted    [facility]     Patient Name: Derek Keller  : 1960  MRN: [id number]  Today's Date: 2023                 Admit Date: 2023    Visit Dx:  No diagnosis found.    Patient Active Problem List   Diagnosis    Other chest pain    Hypertension    Hyperlipidemia    Allergic rhinitis    Hypothyroidism    Neck pain    Lumbago    Arthritis    Chronic pain disorder    History of IBS    Anxiety    Monopolar depression (HCC)    Malaise and fatigue    Tobacco abuse    Fibromyalgia    Vitamin B 12 deficiency    Acute pain of left knee    Primary osteoarthritis of left knee    Osteoarthrosis, localized, primary, knee    Generalized abdominal pain    Herpes simplex vulvovaginitis    Lightheadedness    Cough    Hypoxemia    Restless leg syndrome    Itch of skin    Primary insomnia    Chronic fatigue    Asthma    Body mass index (BMI) 26.0-26.9, adult    Constipation    Degeneration of lumbar intervertebral disc    Disorder associated with Helicobacter species    Hearing loss    Impacted cerumen of right ear    Inappropriate diet and eating habits    Inguinal pain    Irritable bowel syndrome    Cervical spondylosis    Obesity with body mass index 30 or greater    Renal stone    Malignant neoplasm of left breast in female, estrogen receptor positive (HCC)    Cancer related pain    Bone metastases (HCC)    Secondary malignant neoplasm of bone (HCC)    Peripheral edema    Peritonitis (HCC)    Leukopenia    S/P ex lap with sigmoid resection, Lynn's procedure for stercoral perforation    Encounter for adjustment or management of vascular access device        Past Medical History:   Diagnosis Date    Abdominal pain     OSTOMY SITE    Allergic rhinitis     Anemia     NO S/S    Anxiety     Arteriosclerosis     Coronary    Arthritis     Bone metastases (HCC) 10/14/2022    Bowen's disease     SKIN CANCER    Breast cancer (HCC)     NO SURGERY WAS DONE DUE TO METS TO BONE/COLON/LYMPHNODE    Cancer  related pain 10/13/2022    Depression     Dysphoric mood     Fatigue     Frequent urination     NO S/S INFECTION    History of colon polyps     History of IBS     History of kidney stones     Hyperlipidemia     Hypertension     Hypothyroidism     Insomnia     Lumbago     Mood disorder (HCC)     Neck pain     R/T CANCER    Palpitations     ASYMPTOMATIC,  NO CARDIOLOGIST    Precordial pain     R/T SPINE CANCER    Sleep disturbance         Past Surgical History:   Procedure Laterality Date    BREAST BIOPSY Left     CARDIAC CATHETERIZATION Left 1959    CARDIAC CATHETERIZATION N/A 04/06/2018    Procedure: Coronary angiography;  Surgeon: Art Licea MD;  [facility];  Service: Cardiovascular    CARDIAC CATHETERIZATION N/A 04/06/2018    Procedure: Left heart cath;  Surgeon: Art Licea MD;  Location:  [facility] CATH INVASIVE LOCATION;  Service: Cardiovascular    CARDIAC CATHETERIZATION N/A 04/06/2018    Procedure: Left ventriculography;  Surgeon: Art Licea MD;  Location:  [facility] CATH INVASIVE LOCATION;  Service: Cardiovascular    CHOLECYSTECTOMY      COLON SURGERY      colostomy bag    COLONOSCOPY  11/08/2006    EXPLORATORY LAPAROTOMY N/A 12/06/2022    Procedure: LAPAROTOMY EXPLORATORY sigmoid resection hartmans procedure colostomy;  Surgeon: Alfred Castañeda MD;  Location: [facility];  Service: General;  Laterality: N/A;    GANGLION CYST EXCISION      HYSTERECTOMY  05/2005    LAPAROSCOPIC GASTRIC BANDING      BAND REMOVED 2020    TONSILLECTOMY      VENOUS ACCESS DEVICE (PORT) INSERTION N/A 1/9/2023    Procedure: INSERTION VENOUS ACCESS DEVICE;  Surgeon: Alfred Castañeda MD;  Location: [facility] OR;  Service: General;  Laterality: N/A;         Physical Assessment:  Wound 12/06/22 1906 midline abdomen Incision (Active)   Wound Image   01/24/23 1418   Dressing Appearance moist drainage;intact 01/24/23 1418   Closure None 01/24/23 1418   Base pink;moist  01/24/23 1418   Periwound intact;dry;pink 01/24/23 1418   Periwound Temperature warm 01/24/23 1418   Periwound Skin Turgor soft 01/24/23 1418   Edges open 01/24/23 1418   Drainage Characteristics/Odor yellow 01/24/23 1418   Drainage Amount scant 01/24/23 1418   Care, Wound cleansed with;sterile normal saline 01/24/23 1418   Dressing Care dressing applied;calcium alginate;hydrocolloid 01/24/23 1418   Periwound Care absorptive dressing applied 01/24/23 1418         01/24/23 1420   Colostomy LLQ   Placement Date/Time: 12/06/22 1900   Inserted by: DR. HESS  Hand Hygiene Completed: Yes  Colostomy Type: Descending/sigmoid;End  Location: LLQ   Wound Image    Stomal Appliance 1 piece;Leaking;2 piece;Clean;Dry;Intact;Changed   Stoma Appearance flush with skin;red   Peristomal Assessment Clean;Intact;Pink   Accessories/Skin Care convex wafer;cleansed with water   Stoma Function stool   Stool Color yellow;brown, light   Stool Consistency soft   Treatment Bag change;Site care     Wound Check / Follow-up: Patient seen today for a weekly ostomy check. Patient pouch leaking upon arrival; patient had cut a small opening to brief and placed half her colostomy pouch through the small opening. Discussed placement of pants and under garments on the ostomy pouch and how the pressure from the elastic could be contributing to the leaking. Leaking to flange at 5 o'clock.     Patient reports that a small incision at 6 o'clock to her umbilicus is draining. Small amount of thin yellow drainage noted. Cleansed with NS. Applied calcium alginate to incision and covered with a hydrocolloid dressing. Stoma is red and moist, flush with skin. Peristomal tissue has improved and dry and intact, small area of redness noted. Cleansed with warm water. Applied skin barrier to peristomal tissue and then applied a 2 piece convex pouch. Then applied moon strips to flange for added protection per patient request.     Discussed wound care for incision and  how to apply the 2 piece pouching system; demonstrated how to apply pouch to flange. Provided convex pouch for patient and wound care supplies to assist.    Impression: colostomy, leaking    Short term goals:  maintain skin integrity    Amanda Almaguer RN    1/24/2023    14:23 EST

## 2023-01-25 ENCOUNTER — OFFICE VISIT (OUTPATIENT)
Dept: SURGERY | Facility: CLINIC | Age: 63
End: 2023-01-25
Payer: MEDICAID

## 2023-01-25 VITALS — HEIGHT: 66 IN | WEIGHT: 169 LBS | RESPIRATION RATE: 16 BRPM | BODY MASS INDEX: 27.16 KG/M2

## 2023-01-25 DIAGNOSIS — Z95.828 PORT-A-CATH IN PLACE: Primary | ICD-10-CM

## 2023-01-25 PROCEDURE — 99024 POSTOP FOLLOW-UP VISIT: CPT | Performed by: SURGERY

## 2023-01-25 NOTE — LETTER
January 25, 2023     Haile Monique MD  [address]  [place] [address]    Patient: Derek Keller   YOB: 1960   Date of Visit: 1/25/2023       Dear Dr. Kayden MD:    Thank you for referring Derek Keller to me for evaluation. Below are the relevant portions of my assessment and plan of care.    If you have questions, please do not hesitate to call me. I look forward to following Derek along with you.         Sincerely,        Alfred Castañeda MD        CC: No Recipients  Alfred Castañeda MD  01/25/23 1817  Signed  General Surgery/Colorectal Surgery Note    Patient Name:  Derek Keller  YOB: 1960  [id number]    Referring Provider: No ref. provider found      Patient Care Team:  Haile Monique MD as PCP - General (Family Medicine)  Julien Cardona DO as Consulting Physician (Pain Medicine)  Joel Castillo MD as Consulting Physician (Gastroenterology)  Remington Russell MD as Surgeon (General Surgery)  Ahsan Salas MD as Consulting Physician (Sports Medicine)  Donald Barker MD as Consulting Physician (Hematology and Oncology)  Sandy Ricci MD as Consulting Physician (General Surgery)  Alfred Castañeda MD as Consulting Physician (General Surgery)    Chief complaint follow-up surgery    Subjective .     History of present illness:    History of metastatic breast cancer  Status post ex lap with Lynn's procedure for perforated sigmoid colon 12/6/2022.  Pathology with metastatic lobular breast carcinoma    Status post port placement 1/9/2023    She comes in for follow-up.  She says her port is functioning well.  No fever, erythema, drainage.  She is being followed by the ostomy nurses with some improvement in obtaining a seal with her ostomy.      History:  Past Medical History:   Diagnosis Date   • Abdominal pain     OSTOMY SITE   • Allergic rhinitis    • Anemia     NO S/S   • Anxiety    • Arteriosclerosis     Coronary   • Arthritis    • Bone metastases  (HCC) 10/14/2022   • Bowen's disease     SKIN CANCER   • Breast cancer (HCC)     NO SURGERY WAS DONE DUE TO METS TO BONE/COLON/LYMPHNODE   • Cancer related pain 10/13/2022   • Depression    • Dysphoric mood    • Fatigue    • Frequent urination     NO S/S INFECTION   • History of colon polyps    • History of IBS    • History of kidney stones    • Hyperlipidemia    • Hypertension    • Hypothyroidism    • Insomnia    • Lumbago    • Mood disorder (HCC)    • Neck pain     R/T CANCER   • Palpitations     ASYMPTOMATIC,  NO CARDIOLOGIST   • Precordial pain     R/T SPINE CANCER   • Sleep disturbance        Past Surgical History:   Procedure Laterality Date   • BREAST BIOPSY Left    • CARDIAC CATHETERIZATION Left 1959   • CARDIAC CATHETERIZATION N/A 04/06/2018    Procedure: Coronary angiography;  Surgeon: Art Licea MD;  Location: [facility] CATH INVASIVE LOCATION;  Service: Cardiovascular   • CARDIAC CATHETERIZATION N/A 04/06/2018    Procedure: Left heart cath;  Surgeon: Art Licea MD;  [facility] C[facility]VASIVE LOC[facility];  Service: Cardiovascular   • CARDIAC CATHETERIZATION N/A 04/06/2018    Procedure: Left ventriculography;  Surgeon: Art Licea MD;  [facility];  Service: Cardiovascular   • CHOLECYSTECTOMY     • COLON SURGERY      colostomy bag   • COLONOSCOPY  11/08/2006   • EXPLORATORY LAPAROTOMY N/A 12/06/2022    Procedure: LAPAROTOMY EXPLORATORY sigmoid resection hartmans procedure colostomy;  Surgeon: Alfred Castañeda MD;  [facility];  Service: General;  Laterality: N/A;   • GANGLION CYST EXCISION     • HYSTERECTOMY  05/2005   • LAPAROSCOPIC GASTRIC BANDING      BAND REMOVED 2020   • TONSILLECTOMY     • VENOUS ACCESS DEVICE (PORT) INSERTION N/A 1/9/2023    Procedure: INSERTION VENOUS ACCESS DEVICE;  Surgeon: Alfred Castañeda MD;  Location: [facility] MAIN OR;  Service: General;  Laterality: N/A;       Family History   Problem Relation  Age of Onset   • Hypertension Mother    • Rheum arthritis Mother    • Heart disease Mother    • Breast cancer Mother    • Diabetes Father    • Cancer Maternal Grandmother         colon   • Colon cancer Maternal Grandmother    • Aneurysm Paternal Grandfather    • Diabetes Other    • Fibromyalgia Other    • Malig Hyperthermia Neg Hx        Social History     Tobacco Use   • Smoking status: Every Day     Packs/day: 1.50     Years: 40.00     Pack years: 60.00     Types: Cigarettes     Start date: 1974   • Smokeless tobacco: Never   • Tobacco comments:     caffeine use 3 mt dews daily     INST PER ANESTHESIA PROTOCOL   Vaping Use   • Vaping Use: Never used   Substance Use Topics   • Alcohol use: No   • Drug use: Yes     Types: Marijuana     Comment: Prescribed Lorazepam/OCC MARIJUANA       Review of Systems  All systems were reviewed and negative except for:   Review of Systems   Constitutional: Negative for chills, fever and unexpected weight loss.   HENT: Negative for congestion, nosebleeds and voice change.    Eyes: Negative for blurred vision, double vision and discharge.   Respiratory: Negative for apnea, chest tightness and shortness of breath.    Cardiovascular: Negative for chest pain and leg swelling.   Gastrointestinal:        See HPI   Endocrine: Negative for cold intolerance and heat intolerance.   Genitourinary: Negative for dysuria, hematuria and urgency.   Musculoskeletal: Negative for back pain, joint swelling and neck pain.   Skin: Negative for color change and dry skin.   Neurological: Negative for dizziness and confusion.   Hematological: Negative for adenopathy.   Psychiatric/Behavioral: Negative for agitation and behavioral problems.     MEDS:  Prior to Admission medications    Medication Sig Start Date End Date Taking? Authorizing Provider   atorvastatin (LIPITOR) 20 MG tablet TAKE 1 TABLET BY MOUTH EVERY DAY  Patient taking differently: Take 20 mg by mouth Daily. 10/1/19  Yes Yudelka Manning MD    baclofen (LIORESAL) 20 MG tablet TAKE 1 TABLET BY MOUTH THREE TIMES DAILY  Patient taking differently: Take 10 mg by mouth 3 (Three) Times a Day As Needed. 12/6/19  Yes Yudelka Manning MD   donepezil (ARICEPT) 10 MG tablet Take 10 mg by mouth Daily. 10/28/22  Yes Bessie Lopez MD   gabapentin (NEURONTIN) 600 MG tablet TAKE 1 TABLET BY MOUTH AT BEDTIME  Patient taking differently: 300 mg 2 (Two) Times a Day As Needed. TAKES1/2  MG TABLET 7/30/20  Yes Yudelka Manning MD   hydroCHLOROthiazide (HYDRODIURIL) 12.5 MG tablet Take 12.5 mg by mouth Daily As Needed (FOR SWELLING). 12/22/22  Yes Bessie Lopez MD   letrozole (FEMARA) 2.5 MG tablet Take 1 tablet by mouth Daily. 11/30/22  Yes Donald Barker MD   levothyroxine (SYNTHROID, LEVOTHROID) 50 MCG tablet TAKE 1 TABLET BY MOUTH EVERY DAY 7/19/19  Yes Yudelka Manning MD   LORazepam (ATIVAN) 0.5 MG tablet Take 0.5 mg by mouth Every 6 (Six) Hours As Needed.   Yes ProviderBessie MD   losartan (COZAAR) 100 MG tablet Take 100 mg by mouth Daily. INST PER ANESTHESIA PROTOCOL   Yes Bessie Lopez MD   montelukast (SINGULAIR) 10 MG tablet Take 10 mg by mouth Daily. 1/17/22  Yes Bessie Lopez MD   Morphine (MS CONTIN) 30 MG 12 hr tablet Take 1 tablet by mouth 2 (Two) Times a Day. 12/27/22  Yes Donald Barker MD   naproxen (NAPROSYN) 500 MG tablet Take 500 mg by mouth 2 (Two) Times a Day With Meals. LAST DOSE 1/5/23   Yes Bessie Lopez MD   ondansetron (ZOFRAN) 8 MG tablet Take 1 tablet by mouth 3 (Three) Times a Day As Needed for Nausea or Vomiting. 10/13/22  Yes Donald Barker MD   oxyCODONE-acetaminophen (Percocet)  MG per tablet Take 1 tablet by mouth Every 6 (Six) Hours As Needed for Moderate Pain for up to 8 doses. 1/9/23  Yes Alfred Castañeda MD   oxyCODONE-acetaminophen (Percocet)  MG per tablet Take 1 tablet by mouth Every 4 (Four) Hours As Needed for Moderate Pain. 1/23/23 1/23/24  "Yes Donald Barker MD   polyethylene glycol (MIRALAX) 17 g packet Take 17 g by mouth Daily. 12/13/22  Yes Alfred Castañeda MD   polyethylene glycol (MIRALAX) 17 g packet Take 17 g by mouth Daily. 1/9/23  Yes Alfred Castañeda MD   ribociclib succinate 200 MG tablet therapy pack tablet Take 3 tablets by mouth Take As Directed. Take 3 tablets by mouth daily for 21 days then off 7 days on a 28 day cycle. 10/19/22  Yes Donald Barker MD   rOPINIRole (REQUIP) 3 MG tablet Take 3 mg by mouth 2 (Two) Times a Day. 6/29/22  Yes Bessie Lopez MD   solifenacin (VESICARE) 10 MG tablet Take 1 tablet by mouth Daily for 360 days. 10/25/22 10/20/23 Yes Destinee Myers MD   SUMAtriptan (IMITREX) 100 MG tablet Take 100 mg by mouth 1 (One) Time As Needed. 10/7/22  Yes ProviderBessie MD   traZODone (DESYREL) 150 MG tablet TAKE 1 TABLET BY MOUTH ONCE nightly 11/12/19  Yes Yudelka Manning MD   venlafaxine XR (EFFEXOR-XR) 150 MG 24 hr capsule Take 1 capsule by mouth Daily. 11/21/22  Yes Donald Barker MD        Allergies:  Azithromycin    Objective     Vital Signs   Resp:  [16] 16    Physical Exam: Incision without evidence of infection, abdomen soft, nontender, ostomy viable    Results Review:   {Results Review:58928::\"I reviewed the patient's new clinical results.\"    LABS/IMAGING:  Results for orders placed or performed in visit on 01/24/23   Comprehensive metabolic panel    Specimen: Blood   Result Value Ref Range    Glucose 110 (H) 65 - 99 mg/dL    BUN 13 8 - 23 mg/dL    Creatinine 0.77 0.57 - 1.00 mg/dL    Sodium 137 136 - 145 mmol/L    Potassium 3.5 3.5 - 5.2 mmol/L    Chloride 103 98 - 107 mmol/L    CO2 26.0 22.0 - 29.0 mmol/L    Calcium 8.8 8.6 - 10.5 mg/dL    Total Protein 6.9 6.0 - 8.5 g/dL    Albumin 4.0 3.5 - 5.2 g/dL    ALT (SGPT) 7 1 - 33 U/L    AST (SGOT) 10 1 - 32 U/L    Alkaline Phosphatase 96 39 - 117 U/L    Total Bilirubin 0.2 0.0 - 1.2 mg/dL    Globulin 2.9 gm/dL    A/G Ratio " 1.4 g/dL    BUN/Creatinine Ratio 16.9 7.0 - 25.0    Anion Gap 8.0 5.0 - 15.0 mmol/L    eGFR 86.8 >60.0 mL/min/1.73   Magnesium    Specimen: Blood   Result Value Ref Range    Magnesium 1.9 1.6 - 2.4 mg/dL   Phosphorus    Specimen: Blood   Result Value Ref Range    Phosphorus 2.7 2.5 - 4.5 mg/dL   CBC Auto Differential    Specimen: Blood   Result Value Ref Range    WBC 8.12 3.40 - 10.80 10*3/mm3    RBC 3.28 (L) 3.77 - 5.28 10*6/mm3    Hemoglobin 10.5 (L) 12.0 - 15.9 g/dL    Hematocrit 32.6 (L) 34.0 - 46.6 %    MCV 99.4 (H) 79.0 - 97.0 fL    MCH 32.0 26.6 - 33.0 pg    MCHC 32.2 31.5 - 35.7 g/dL    RDW 18.1 (H) 12.3 - 15.4 %    RDW-SD 66.3 (H) 37.0 - 54.0 fl    MPV 9.6 6.0 - 12.0 fL    Platelets 355 140 - 450 10*3/mm3    Neutrophil % 74.7 42.7 - 76.0 %    Lymphocyte % 19.6 19.6 - 45.3 %    Monocyte % 3.9 (L) 5.0 - 12.0 %    Eosinophil % 0.7 0.3 - 6.2 %    Basophil % 1.0 0.0 - 1.5 %    Immature Grans % 0.1 0.0 - 0.5 %    Neutrophils, Absolute 6.06 1.70 - 7.00 10*3/mm3    Lymphocytes, Absolute 1.59 0.70 - 3.10 10*3/mm3    Monocytes, Absolute 0.32 0.10 - 0.90 10*3/mm3    Eosinophils, Absolute 0.06 0.00 - 0.40 10*3/mm3    Basophils, Absolute 0.08 0.00 - 0.20 10*3/mm3    Immature Grans, Absolute 0.01 0.00 - 0.05 10*3/mm3        Result Review :     Assessment & Plan     History of metastatic breast cancer  Status post ex lap with Lynn's procedure for perforated sigmoid colon 12/6/2022.  Pathology with metastatic lobular breast carcinoma    Status post port placement 1/9/2023    Activity as tolerated.  Continue to see ostomy nurse weekly until we obtain a reliable system for pouching her ostomy.  I think she would benefit from stoma paste with a convex appliance with a belt.  Follow-up with me in 3 months.  She wished to discuss colostomy closure and I said we can entertain that 6 months from the time of her previous surgery depending on how her cancer is doing and input from her oncologist.  Thank you for the consult          This document has been electronically signed by Alfred Castañeda MD  January 25, 2023 18:15 EST

## 2023-01-25 NOTE — PROGRESS NOTES
General Surgery/Colorectal Surgery Note    Patient Name:  Derek Keller  YOB: 1960  [id number]    Referring Provider: No ref. provider found      Patient Care Team:  Haile Monique MD as PCP - General (Family Medicine)  Julien Cardona DO as Consulting Physician (Pain Medicine)  Joel Castillo MD as Consulting Physician (Gastroenterology)  Remington Russell MD as Surgeon (General Surgery)  Ahsan Salas MD as Consulting Physician (Sports Medicine)  Donald Barker MD as Consulting Physician (Hematology and Oncology)  Sandy Ricci MD as Consulting Physician (General Surgery)  Alfred Castañeda MD as Consulting Physician (General Surgery)    Chief complaint follow-up surgery    Subjective .     History of present illness:    History of metastatic breast cancer  Status post ex lap with Lynn's procedure for perforated sigmoid colon 12/6/2022.  Pathology with metastatic lobular breast carcinoma    Status post port placement 1/9/2023    She comes in for follow-up.  She says her port is functioning well.  No fever, erythema, drainage.  She is being followed by the ostomy nurses with some improvement in obtaining a seal with her ostomy.      History:  Past Medical History:   Diagnosis Date   • Abdominal pain     OSTOMY SITE   • Allergic rhinitis    • Anemia     NO S/S   • Anxiety    • Arteriosclerosis     Coronary   • Arthritis    • Bone metastases (HCC) 10/14/2022   • Bowen's disease     SKIN CANCER   • Breast cancer (HCC)     NO SURGERY WAS DONE DUE TO METS TO BONE/COLON/LYMPHNODE   • Cancer related pain 10/13/2022   • Depression    • Dysphoric mood    • Fatigue    • Frequent urination     NO S/S INFECTION   • History of colon polyps    • History of IBS    • History of kidney stones    • Hyperlipidemia    • Hypertension    • Hypothyroidism    • Insomnia    • Lumbago    • Mood disorder (HCC)    • Neck pain     R/T CANCER   • Palpitations     ASYMPTOMATIC,  NO CARDIOLOGIST   •  Precordial pain     R/T SPINE CANCER   • Sleep disturbance        Past Surgical History:   Procedure Laterality Date   • BREAST BIOPSY Left    • CARDIAC CATHETERIZATION Left 1960   • CARDIAC CATHETERIZATION N/A 04/06/2018    Procedure: Coronary angiography;  Surgeon: Art Licea MD;  Location: [facility] CATH INVASIVE LOCATION;  Service: Cardiovascular   • CARDIAC CATHETERIZATION N/A 04/06/2018    Procedure: Left heart cath;  Surgeon: Art Licea MD;  Location: [facility] CATH INVASIVE LOCATION;  Service: Cardiovascular   • CARDIAC CATHETERIZATION N/A 04/06/2018    Procedure: Left ventriculography;  Surgeon: Art Licea MD;  Location: [facility] CATH INVASIVE LOCATION;  Service: Cardiovascular   • CHOLECYSTECTOMY     • COLON SURGERY      colostomy bag   • COLONOSCOPY  11/08/2006   • EXPLORATORY LAPAROTOMY N/A 12/06/2022    Procedure: LAPAROTOMY EXPLORATORY sigmoid resection hartmans procedure colostomy;  Surgeon: Alfred Castañeda MD;  Location: [facility];  Service: General;  Laterality: N/A;   • GANGLION CYST EXCISION     • HYSTERECTOMY  05/2005   • LAPAROSCOPIC GASTRIC BANDING      BAND REMOVED 2020   • TONSILLECTOMY     • VENOUS ACCESS DEVICE (PORT) INSERTION N/A 1/9/2023    Procedure: INSERTION VENOUS ACCESS DEVICE;  Surgeon: Alfred Castañeda MD;  Location: [facility] OR;  Service: General;  Laterality: N/A;       Family History   Problem Relation Age of Onset   • Hypertension Mother    • Rheum arthritis Mother    • Heart disease Mother    • Breast cancer Mother    • Diabetes Father    • Cancer Maternal Grandmother         colon   • Colon cancer Maternal Grandmother    • Aneurysm Paternal Grandfather    • Diabetes Other    • Fibromyalgia Other    • Malig Hyperthermia Neg Hx        Social History     Tobacco Use   • Smoking status: Every Day     Packs/day: 1.50     Years: 40.00     Pack years: 60.00     Types: Cigarettes     Start date: 1974   • Smokeless tobacco: Never   •  Tobacco comments:     caffeine use 3 mt dews daily     INST PER ANESTHESIA PROTOCOL   Vaping Use   • Vaping Use: Never used   Substance Use Topics   • Alcohol use: No   • Drug use: Yes     Types: Marijuana     Comment: Prescribed Lorazepam/OCC MARIJUANA       Review of Systems  All systems were reviewed and negative except for:   Review of Systems   Constitutional: Negative for chills, fever and unexpected weight loss.   HENT: Negative for congestion, nosebleeds and voice change.    Eyes: Negative for blurred vision, double vision and discharge.   Respiratory: Negative for apnea, chest tightness and shortness of breath.    Cardiovascular: Negative for chest pain and leg swelling.   Gastrointestinal:        See HPI   Endocrine: Negative for cold intolerance and heat intolerance.   Genitourinary: Negative for dysuria, hematuria and urgency.   Musculoskeletal: Negative for back pain, joint swelling and neck pain.   Skin: Negative for color change and dry skin.   Neurological: Negative for dizziness and confusion.   Hematological: Negative for adenopathy.   Psychiatric/Behavioral: Negative for agitation and behavioral problems.     MEDS:  Prior to Admission medications    Medication Sig Start Date End Date Taking? Authorizing Provider   atorvastatin (LIPITOR) 20 MG tablet TAKE 1 TABLET BY MOUTH EVERY DAY  Patient taking differently: Take 20 mg by mouth Daily. 10/1/19  Yes Yudelka Manning MD   baclofen (LIORESAL) 20 MG tablet TAKE 1 TABLET BY MOUTH THREE TIMES DAILY  Patient taking differently: Take 10 mg by mouth 3 (Three) Times a Day As Needed. 12/6/19  Yes Yudelka Manning MD   donepezil (ARICEPT) 10 MG tablet Take 10 mg by mouth Daily. 10/28/22  Yes ProviderBessie MD   gabapentin (NEURONTIN) 600 MG tablet TAKE 1 TABLET BY MOUTH AT BEDTIME  Patient taking differently: 300 mg 2 (Two) Times a Day As Needed. TAKES1/2  MG TABLET 7/30/20  Yes Yudelka Manning MD   hydroCHLOROthiazide (HYDRODIURIL)  12.5 MG tablet Take 12.5 mg by mouth Daily As Needed (FOR SWELLING). 12/22/22  Yes Bessie Lopez MD   letrozole (FEMARA) 2.5 MG tablet Take 1 tablet by mouth Daily. 11/30/22  Yes Donald Barker MD   levothyroxine (SYNTHROID, LEVOTHROID) 50 MCG tablet TAKE 1 TABLET BY MOUTH EVERY DAY 7/19/19  Yes Yudelka Manning MD   LORazepam (ATIVAN) 0.5 MG tablet Take 0.5 mg by mouth Every 6 (Six) Hours As Needed.   Yes Bessie Lopez MD   losartan (COZAAR) 100 MG tablet Take 100 mg by mouth Daily. INST PER ANESTHESIA PROTOCOL   Yes Bessie Lopez MD   montelukast (SINGULAIR) 10 MG tablet Take 10 mg by mouth Daily. 1/17/22  Yes Bessie Lopez MD   Morphine (MS CONTIN) 30 MG 12 hr tablet Take 1 tablet by mouth 2 (Two) Times a Day. 12/27/22  Yes Donald Barker MD   naproxen (NAPROSYN) 500 MG tablet Take 500 mg by mouth 2 (Two) Times a Day With Meals. LAST DOSE 1/5/23   Yes Bessie Lopez MD   ondansetron (ZOFRAN) 8 MG tablet Take 1 tablet by mouth 3 (Three) Times a Day As Needed for Nausea or Vomiting. 10/13/22  Yes Donald Barker MD   oxyCODONE-acetaminophen (Percocet)  MG per tablet Take 1 tablet by mouth Every 6 (Six) Hours As Needed for Moderate Pain for up to 8 doses. 1/9/23  Yes Alfred Castañeda MD   oxyCODONE-acetaminophen (Percocet)  MG per tablet Take 1 tablet by mouth Every 4 (Four) Hours As Needed for Moderate Pain. 1/23/23 1/23/24 Yes Donald Barker MD   polyethylene glycol (MIRALAX) 17 g packet Take 17 g by mouth Daily. 12/13/22  Yes Alfred Castañeda MD   polyethylene glycol (MIRALAX) 17 g packet Take 17 g by mouth Daily. 1/9/23  Yes Alfred Castañeda MD   ribociclib succinate 200 MG tablet therapy pack tablet Take 3 tablets by mouth Take As Directed. Take 3 tablets by mouth daily for 21 days then off 7 days on a 28 day cycle. 10/19/22  Yes Donald Barker MD   rOPINIRole (REQUIP) 3 MG tablet Take 3 mg by mouth 2 (Two) Times a Day.  "6/29/22  Yes Bessie Lopez MD   solifenacin (VESICARE) 10 MG tablet Take 1 tablet by mouth Daily for 360 days. 10/25/22 10/20/23 Yes Destinee Myers MD   SUMAtriptan (IMITREX) 100 MG tablet Take 100 mg by mouth 1 (One) Time As Needed. 10/7/22  Yes Bessie Lopez MD   traZODone (DESYREL) 150 MG tablet TAKE 1 TABLET BY MOUTH ONCE nightly 11/12/19  Yes Yudelka Manning MD   venlafaxine XR (EFFEXOR-XR) 150 MG 24 hr capsule Take 1 capsule by mouth Daily. 11/21/22  Yes Donald Barker MD        Allergies:  Azithromycin    Objective     Vital Signs   Resp:  [16] 16    Physical Exam: Incision without evidence of infection, abdomen soft, nontender, ostomy viable    Results Review:   {Results Review:21550::\"I reviewed the patient's new clinical results.\"    LABS/IMAGING:  Results for orders placed or performed in visit on 01/24/23   Comprehensive metabolic panel    Specimen: Blood   Result Value Ref Range    Glucose 110 (H) 65 - 99 mg/dL    BUN 13 8 - 23 mg/dL    Creatinine 0.77 0.57 - 1.00 mg/dL    Sodium 137 136 - 145 mmol/L    Potassium 3.5 3.5 - 5.2 mmol/L    Chloride 103 98 - 107 mmol/L    CO2 26.0 22.0 - 29.0 mmol/L    Calcium 8.8 8.6 - 10.5 mg/dL    Total Protein 6.9 6.0 - 8.5 g/dL    Albumin 4.0 3.5 - 5.2 g/dL    ALT (SGPT) 7 1 - 33 U/L    AST (SGOT) 10 1 - 32 U/L    Alkaline Phosphatase 96 39 - 117 U/L    Total Bilirubin 0.2 0.0 - 1.2 mg/dL    Globulin 2.9 gm/dL    A/G Ratio 1.4 g/dL    BUN/Creatinine Ratio 16.9 7.0 - 25.0    Anion Gap 8.0 5.0 - 15.0 mmol/L    eGFR 86.8 >60.0 mL/min/1.73   Magnesium    Specimen: Blood   Result Value Ref Range    Magnesium 1.9 1.6 - 2.4 mg/dL   Phosphorus    Specimen: Blood   Result Value Ref Range    Phosphorus 2.7 2.5 - 4.5 mg/dL   CBC Auto Differential    Specimen: Blood   Result Value Ref Range    WBC 8.12 3.40 - 10.80 10*3/mm3    RBC 3.28 (L) 3.77 - 5.28 10*6/mm3    Hemoglobin 10.5 (L) 12.0 - 15.9 g/dL    Hematocrit 32.6 (L) 34.0 - 46.6 %    MCV 99.4 (H) " 79.0 - 97.0 fL    MCH 32.0 26.6 - 33.0 pg    MCHC 32.2 31.5 - 35.7 g/dL    RDW 18.1 (H) 12.3 - 15.4 %    RDW-SD 66.3 (H) 37.0 - 54.0 fl    MPV 9.6 6.0 - 12.0 fL    Platelets 355 140 - 450 10*3/mm3    Neutrophil % 74.7 42.7 - 76.0 %    Lymphocyte % 19.6 19.6 - 45.3 %    Monocyte % 3.9 (L) 5.0 - 12.0 %    Eosinophil % 0.7 0.3 - 6.2 %    Basophil % 1.0 0.0 - 1.5 %    Immature Grans % 0.1 0.0 - 0.5 %    Neutrophils, Absolute 6.06 1.70 - 7.00 10*3/mm3    Lymphocytes, Absolute 1.59 0.70 - 3.10 10*3/mm3    Monocytes, Absolute 0.32 0.10 - 0.90 10*3/mm3    Eosinophils, Absolute 0.06 0.00 - 0.40 10*3/mm3    Basophils, Absolute 0.08 0.00 - 0.20 10*3/mm3    Immature Grans, Absolute 0.01 0.00 - 0.05 10*3/mm3        Result Review :     Assessment & Plan     History of metastatic breast cancer  Status post ex lap with Lynn's procedure for perforated sigmoid colon 12/6/2022.  Pathology with metastatic lobular breast carcinoma    Status post port placement 1/9/2023    Activity as tolerated.  Continue to see ostomy nurse weekly until we obtain a reliable system for pouching her ostomy.  I think she would benefit from stoma paste with a convex appliance with a belt.  Follow-up with me in 3 months.  She wished to discuss colostomy closure and I said we can entertain that 6 months from the time of her previous surgery depending on how her cancer is doing and input from her oncologist.  Thank you for the consult         This document has been electronically signed by Alfred Castañeda MD  January 25, 2023 18:15 EST

## 2023-01-26 ENCOUNTER — TELEPHONE (OUTPATIENT)
Dept: ONCOLOGY | Facility: HOSPITAL | Age: 63
End: 2023-01-26
Payer: MEDICARE

## 2023-01-26 ENCOUNTER — HOSPITAL ENCOUNTER (OUTPATIENT)
Dept: CT IMAGING | Facility: HOSPITAL | Age: 63
Discharge: HOME | End: 2023-01-26
Admitting: INTERNAL MEDICINE
Payer: MEDICARE

## 2023-01-26 DIAGNOSIS — Z17.0 MALIGNANT NEOPLASM OF UPPER-OUTER QUADRANT OF LEFT BREAST IN FEMALE, ESTROGEN RECEPTOR POSITIVE: ICD-10-CM

## 2023-01-26 DIAGNOSIS — C50.412 MALIGNANT NEOPLASM OF UPPER-OUTER QUADRANT OF LEFT BREAST IN FEMALE, ESTROGEN RECEPTOR POSITIVE: ICD-10-CM

## 2023-01-26 PROCEDURE — 74177 CT ABD & PELVIS W/CONTRAST: CPT

## 2023-01-26 PROCEDURE — 71260 CT THORAX DX C+: CPT

## 2023-01-26 PROCEDURE — 0 IOPAMIDOL PER 1 ML: Performed by: INTERNAL MEDICINE

## 2023-01-26 RX ORDER — HEPARIN SODIUM (PORCINE) LOCK FLUSH IV SOLN 100 UNIT/ML 100 UNIT/ML
SOLUTION INTRAVENOUS
Status: DISCONTINUED
Start: 2023-01-26 | End: 2023-01-27 | Stop reason: HOSPADM

## 2023-01-26 RX ADMIN — IOPAMIDOL 100 ML: 755 INJECTION, SOLUTION INTRAVENOUS at 12:20

## 2023-01-26 NOTE — TELEPHONE ENCOUNTER
Caller: JERONIMO    Relationship to patient: SELF    Best call back number: [phone]    Patient is needing: TO R/S F/U ON 1-31-23 TO 2-1-2023 BEFORE OR AFTER APPT WITH CARDIOLOGIST.

## 2023-01-31 ENCOUNTER — HOSPITAL ENCOUNTER (OUTPATIENT)
Dept: INFUSION THERAPY | Facility: HOSPITAL | Age: 63
Discharge: HOME | End: 2023-01-31
Admitting: SURGERY
Payer: MEDICARE

## 2023-01-31 ENCOUNTER — OFFICE VISIT (OUTPATIENT)
Dept: ONCOLOGY | Facility: HOSPITAL | Age: 63
End: 2023-01-31
Payer: MEDICAID

## 2023-01-31 VITALS
DIASTOLIC BLOOD PRESSURE: 63 MMHG | OXYGEN SATURATION: 95 % | RESPIRATION RATE: 20 BRPM | SYSTOLIC BLOOD PRESSURE: 118 MMHG | HEIGHT: 66 IN | TEMPERATURE: 98.1 F | HEART RATE: 66 BPM | WEIGHT: 172.4 LBS | BODY MASS INDEX: 27.71 KG/M2

## 2023-01-31 VITALS
WEIGHT: 172.4 LBS | TEMPERATURE: 98.1 F | SYSTOLIC BLOOD PRESSURE: 118 MMHG | HEART RATE: 66 BPM | RESPIRATION RATE: 20 BRPM | BODY MASS INDEX: 27.83 KG/M2 | DIASTOLIC BLOOD PRESSURE: 63 MMHG | OXYGEN SATURATION: 95 %

## 2023-01-31 DIAGNOSIS — C50.412 MALIGNANT NEOPLASM OF UPPER-OUTER QUADRANT OF LEFT BREAST IN FEMALE, ESTROGEN RECEPTOR POSITIVE: Primary | ICD-10-CM

## 2023-01-31 DIAGNOSIS — Z17.0 MALIGNANT NEOPLASM OF UPPER-OUTER QUADRANT OF LEFT BREAST IN FEMALE, ESTROGEN RECEPTOR POSITIVE: Primary | ICD-10-CM

## 2023-01-31 DIAGNOSIS — Z98.890 S/P EXPLORATORY LAPAROTOMY: Primary | ICD-10-CM

## 2023-01-31 DIAGNOSIS — G89.3 CANCER RELATED PAIN: ICD-10-CM

## 2023-01-31 DIAGNOSIS — C79.51 BONE METASTASES: ICD-10-CM

## 2023-01-31 PROBLEM — J43.9 PULMONARY EMPHYSEMA: Status: ACTIVE | Noted: 2023-01-31

## 2023-01-31 PROCEDURE — G0463 HOSPITAL OUTPT CLINIC VISIT: HCPCS | Performed by: INTERNAL MEDICINE

## 2023-01-31 PROCEDURE — 99214 OFFICE O/P EST MOD 30 MIN: CPT | Performed by: INTERNAL MEDICINE

## 2023-01-31 PROCEDURE — G0463 HOSPITAL OUTPT CLINIC VISIT: HCPCS

## 2023-02-01 ENCOUNTER — OFFICE VISIT (OUTPATIENT)
Dept: CARDIOLOGY | Facility: CLINIC | Age: 63
End: 2023-02-01
Payer: MEDICARE

## 2023-02-01 VITALS
DIASTOLIC BLOOD PRESSURE: 62 MMHG | SYSTOLIC BLOOD PRESSURE: 139 MMHG | WEIGHT: 171 LBS | HEART RATE: 67 BPM | HEIGHT: 66 IN | BODY MASS INDEX: 27.48 KG/M2

## 2023-02-01 DIAGNOSIS — Z72.0 TOBACCO ABUSE: ICD-10-CM

## 2023-02-01 DIAGNOSIS — E78.2 MIXED HYPERLIPIDEMIA: ICD-10-CM

## 2023-02-01 DIAGNOSIS — I10 PRIMARY HYPERTENSION: Primary | ICD-10-CM

## 2023-02-01 PROBLEM — H61.21 IMPACTED CERUMEN OF RIGHT EAR: Status: RESOLVED | Noted: 2022-10-12 | Resolved: 2023-02-01

## 2023-02-01 PROBLEM — R60.9 PERIPHERAL EDEMA: Status: RESOLVED | Noted: 2022-11-21 | Resolved: 2023-02-01

## 2023-02-01 PROBLEM — K58.9 IRRITABLE BOWEL SYNDROME: Status: RESOLVED | Noted: 2017-03-13 | Resolved: 2023-02-01

## 2023-02-01 PROBLEM — A60.04 HERPES SIMPLEX VULVOVAGINITIS: Status: RESOLVED | Noted: 2018-07-26 | Resolved: 2023-02-01

## 2023-02-01 PROBLEM — R60.0 PERIPHERAL EDEMA: Status: RESOLVED | Noted: 2022-11-21 | Resolved: 2023-02-01

## 2023-02-01 PROBLEM — R42 LIGHTHEADEDNESS: Status: RESOLVED | Noted: 2018-08-27 | Resolved: 2023-02-01

## 2023-02-01 PROBLEM — R10.30 INGUINAL PAIN: Status: RESOLVED | Noted: 2019-06-29 | Resolved: 2023-02-01

## 2023-02-01 PROBLEM — B96.89 DISORDER ASSOCIATED WITH HELICOBACTER SPECIES: Status: RESOLVED | Noted: 2022-10-12 | Resolved: 2023-02-01

## 2023-02-01 PROBLEM — R05.9 COUGH: Status: RESOLVED | Noted: 2019-04-12 | Resolved: 2023-02-01

## 2023-02-01 PROBLEM — M25.562 ACUTE PAIN OF LEFT KNEE: Status: RESOLVED | Noted: 2017-04-19 | Resolved: 2023-02-01

## 2023-02-01 PROBLEM — R09.02 HYPOXEMIA: Status: RESOLVED | Noted: 2019-04-12 | Resolved: 2023-02-01

## 2023-02-01 PROBLEM — R10.84 GENERALIZED ABDOMINAL PAIN: Status: RESOLVED | Noted: 2018-03-23 | Resolved: 2023-02-01

## 2023-02-01 PROBLEM — L29.9 ITCH OF SKIN: Status: RESOLVED | Noted: 2019-11-12 | Resolved: 2023-02-01

## 2023-02-01 PROBLEM — K59.00 CONSTIPATION: Status: RESOLVED | Noted: 2021-10-27 | Resolved: 2023-02-01

## 2023-02-01 PROBLEM — M17.10 OSTEOARTHROSIS, LOCALIZED, PRIMARY, KNEE: Status: RESOLVED | Noted: 2017-04-19 | Resolved: 2023-02-01

## 2023-02-01 PROCEDURE — 99214 OFFICE O/P EST MOD 30 MIN: CPT | Performed by: NURSE PRACTITIONER

## 2023-02-01 NOTE — ASSESSMENT & PLAN NOTE
Patient is on monthly denosumab.  Tolerating infusions well.  No dental or jaw pain.  Electrolytes have been okay.  Continue same.  Imaging appears to demonstrate stable disease

## 2023-02-01 NOTE — PROGRESS NOTES
Chief Complaint  Follow-up and Hypertension    Subjective            History of Present Illness  Derek Keller is a 62-year-old white/ female patient who presents to the office today for follow-up.  She has hypertension, hyperlipidemia, and is a smoker.  She has left breast cancer with bone metastases which causes her a lot of generalized pain.  She reports occasional pain in her chest which she attributes to this type of cancer pain.  She explains that it is nonexertional in nature and only occurs intermittently.  She reports occasional lightheadedness with position changes and denies any syncopal episodes.  She denies any worsening shortness of breath, palpitations, or edema.  She reports compliance with medications.    PMH  Past Medical History:   Diagnosis Date   • Abdominal pain     OSTOMY SITE   • Allergic rhinitis    • Anemia     NO S/S   • Anxiety    • Arteriosclerosis     Coronary   • Arthritis    • Bone metastases (HCC) 10/14/2022   • Bowen's disease     SKIN CANCER   • Breast cancer (HCC)     NO SURGERY WAS DONE DUE TO METS TO BONE/COLON/LYMPHNODE   • Cancer related pain 10/13/2022   • Depression    • Disorder associated with Helicobacter species 10/12/2022   • Dysphoric mood    • Fatigue    • Frequent urination     NO S/S INFECTION   • Herpes simplex vulvovaginitis 7/26/2018   • History of colon polyps    • History of IBS    • History of kidney stones    • Hyperlipidemia    • Hypertension    • Hypothyroidism    • Insomnia    • Lumbago    • Mood disorder (HCC)    • Neck pain     R/T CANCER   • Palpitations     ASYMPTOMATIC,  NO CARDIOLOGIST   • Precordial pain     R/T SPINE CANCER   • Sleep disturbance          ALLERGY  Allergies   Allergen Reactions   • Azithromycin Itching          SURGICALHX  Past Surgical History:   Procedure Laterality Date   • BREAST BIOPSY Left    • CARDIAC CATHETERIZATION Left 1959   • CARDIAC CATHETERIZATION N/A 04/06/2018    Procedure: Coronary angiography;   Surgeon: Art Licea MD;  Location:  [facility];  Service: Cardiovascular   • CARDIAC CATHETERIZATION N/A 04/06/2018    Procedure: Left heart cath;  Surgeon: Art Licea MD;  Location:  [facility];  Service: Cardiovascular   • CARDIAC CATHETERIZATION N/A 04/06/2018    Procedure: Left ventriculography;  Surgeon: Art Licea MD;  Location:  [facility];  Service: Cardiovascular   • CHOLECYSTECTOMY     • COLON SURGERY      colostomy bag   • COLONOSCOPY  11/08/2006   • EXPLORATORY LAPAROTOMY N/A 12/06/2022    Procedure: LAPAROTOMY EXPLORATORY sigmoid resection hartmans procedure colostomy;  Surgeon: Alfred Castañeda MD;  Location: [facility] MAIN OR;  Service: General;  Laterality: N/A;   • GANGLION CYST EXCISION     • HYSTERECTOMY  05/2005   • LAPAROSCOPIC GASTRIC BANDING      BAND REMOVED 2020   • TONSILLECTOMY     • VENOUS ACCESS DEVICE (PORT) INSERTION N/A 1/9/2023    Procedure: INSERTION VENOUS ACCESS DEVICE;  Surgeon: Alfred Castañeda MD;  Location: [facility] MAIN OR;  Service: General;  Laterality: N/A;          SOC  Social History     Socioeconomic History   • Marital status:    Tobacco Use   • Smoking status: Every Day     Packs/day: 1.50     Years: 40.00     Pack years: 60.00     Types: Cigarettes     Start date: 1974   • Smokeless tobacco: Never   • Tobacco comments:     caffeine use 3 mt dews daily     INST PER ANESTHESIA PROTOCOL   Vaping Use   • Vaping Use: Never used   Substance and Sexual Activity   • Alcohol use: No   • Drug use: Yes     Types: Marijuana     Comment: Prescribed Lorazepam/OCC MARIJUANA   • Sexual activity: Defer     Partners: Male     Birth control/protection: None         FAMHX  Family History   Problem Relation Age of Onset   • Hypertension Mother    • Rheum arthritis Mother    • Heart disease Mother    • Breast cancer Mother    • Diabetes Father    • Cancer Maternal Grandmother         colon   •  Colon cancer Maternal Grandmother    • Aneurysm Paternal Grandfather    • Diabetes Other    • Fibromyalgia Other    • Malig Hyperthermia Neg Hx           MEDSIGONLY  Current Outpatient Medications on File Prior to Visit   Medication Sig   • atorvastatin (LIPITOR) 20 MG tablet TAKE 1 TABLET BY MOUTH EVERY DAY (Patient taking differently: Take 20 mg by mouth Daily.)   • baclofen (LIORESAL) 20 MG tablet TAKE 1 TABLET BY MOUTH THREE TIMES DAILY (Patient taking differently: Take 10 mg by mouth 3 (Three) Times a Day As Needed.)   • donepezil (ARICEPT) 10 MG tablet Take 10 mg by mouth Daily.   • gabapentin (NEURONTIN) 600 MG tablet TAKE 1 TABLET BY MOUTH AT BEDTIME (Patient taking differently: 300 mg 2 (Two) Times a Day As Needed. TAKES1/2  MG TABLET)   • letrozole (FEMARA) 2.5 MG tablet Take 1 tablet by mouth Daily.   • levothyroxine (SYNTHROID, LEVOTHROID) 50 MCG tablet TAKE 1 TABLET BY MOUTH EVERY DAY   • LORazepam (ATIVAN) 0.5 MG tablet Take 0.5 mg by mouth Every 6 (Six) Hours As Needed.   • losartan (COZAAR) 100 MG tablet Take 100 mg by mouth Daily. INST PER ANESTHESIA PROTOCOL   • montelukast (SINGULAIR) 10 MG tablet Take 10 mg by mouth Daily.   • Morphine (MS CONTIN) 30 MG 12 hr tablet Take 1 tablet by mouth 2 (Two) Times a Day.   • naproxen (NAPROSYN) 500 MG tablet Take 500 mg by mouth 2 (Two) Times a Day With Meals. LAST DOSE 1/5/23   • ondansetron (ZOFRAN) 8 MG tablet Take 1 tablet by mouth 3 (Three) Times a Day As Needed for Nausea or Vomiting.   • oxyCODONE-acetaminophen (Percocet)  MG per tablet Take 1 tablet by mouth Every 6 (Six) Hours As Needed for Moderate Pain for up to 8 doses.   • polyethylene glycol (MIRALAX) 17 g packet Take 17 g by mouth Daily.   • ribociclib succinate 200 MG tablet therapy pack tablet Take 3 tablets by mouth Take As Directed. Take 3 tablets by mouth daily for 21 days then off 7 days on a 28 day cycle.   • rOPINIRole (REQUIP) 3 MG tablet Take 3 mg by mouth 2 (Two) Times a  "Day.   • solifenacin (VESICARE) 10 MG tablet Take 1 tablet by mouth Daily for 360 days.   • SUMAtriptan (IMITREX) 100 MG tablet Take 100 mg by mouth 1 (One) Time As Needed.   • traZODone (DESYREL) 150 MG tablet TAKE 1 TABLET BY MOUTH ONCE nightly   • venlafaxine XR (EFFEXOR-XR) 150 MG 24 hr capsule Take 1 capsule by mouth Daily.   • [DISCONTINUED] hydroCHLOROthiazide (HYDRODIURIL) 12.5 MG tablet Take 12.5 mg by mouth Daily As Needed (FOR SWELLING).   • [DISCONTINUED] oxyCODONE-acetaminophen (Percocet)  MG per tablet Take 1 tablet by mouth Every 4 (Four) Hours As Needed for Moderate Pain.   • [DISCONTINUED] polyethylene glycol (MIRALAX) 17 g packet Take 17 g by mouth Daily.     No current facility-administered medications on file prior to visit.         Objective   /62   Pulse 67   Ht 167.6 cm (66\")   Wt 77.6 kg (171 lb)   BMI 27.60 kg/m²       Physical Exam  HENT:      Head: Normocephalic.   Neck:      Vascular: No carotid bruit.   Cardiovascular:      Rate and Rhythm: Normal rate and regular rhythm.      Pulses: Normal pulses.      Heart sounds: Normal heart sounds. No murmur heard.  Pulmonary:      Effort: Pulmonary effort is normal.      Breath sounds: Normal breath sounds.   Musculoskeletal:      Cervical back: Neck supple.      Right lower leg: No edema.      Left lower leg: No edema.   Skin:     General: Skin is dry.      Capillary Refill: Capillary refill takes less than 2 seconds.   Neurological:      Mental Status: She is alert and oriented to person, place, and time.   Psychiatric:         Behavior: Behavior normal.       Result Review :   The following data was reviewed by: HIEU Dunn on 02/01/2023:  No results found for: PROBNP  CMP    CMP 1/24/23   Glucose 110 (A)   BUN 13   Creatinine 0.77   eGFR 86.8   Sodium 137   Potassium 3.5   Chloride 103   Calcium 8.8   Total Protein 6.9   Albumin 4.0   Globulin 2.9   Total Bilirubin 0.2   Alkaline Phosphatase 96   AST (SGOT) 10   ALT " (SGPT) 7   Albumin/Globulin Ratio 1.4   BUN/Creatinine Ratio 16.9   Anion Gap 8.0   (A) Abnormal value       Comments are available for some flowsheets but are not being displayed.           CBC w/diff    CBC w/Diff 1/24/23   WBC 8.12   RBC 3.28 (A)   Hemoglobin 10.5 (A)   Hematocrit 32.6 (A)   MCV 99.4 (A)   MCH 32.0   MCHC 32.2   RDW 18.1 (A)   Platelets 355   Neutrophil Rel % 74.7   Immature Granulocyte Rel % 0.1   Lymphocyte Rel % 19.6   Monocyte Rel % 3.9 (A)   Eosinophil Rel % 0.7   Basophil Rel % 1.0   (A) Abnormal value             Lab Results   Component Value Date    TSH 1.470 11/12/2019      Lab Results   Component Value Date    FREET4 1.01 01/17/2022      No results found for: DDIMERQUANT  Magnesium   Date Value Ref Range Status   01/24/2023 1.9 1.6 - 2.4 mg/dL Final      No results found for: DIGOXIN   No results found for: TROPONINT             Assessment and Plan    Diagnoses and all orders for this visit:    1. Primary hypertension (Primary)  Currently controlled and without adverse effects from medication, continue losartan 100 mg daily.    2. Mixed hyperlipidemia  No recent lipid panel available for review, requesting most recent level from PCP.  If she has not had her lipid panel rechecked in the last year then we will need to obtain new fasting levels to make sure that her atorvastatin dose is being effective for her.    3. Tobacco abuse  Tobacco abuse cessation counseling provided to patient today but was unable to get patient to commit to a cessation plan.    We discussed possibility of stress testing but she feels chest pain is from her cancer and declines stress test at this time. If pain changes she will let us know.        Follow Up   Return in about 1 year (around 2/1/2024) for Follow up with Dr Núñez.    Patient was given instructions and counseling regarding her condition or for health maintenance advice. Please see specific information pulled into the AVS if appropriate.     Derek FINN  Krishna  reports that she has been smoking cigarettes. She started smoking about 49 years ago. She has a 60.00 pack-year smoking history. She has never used smokeless tobacco.. I have educated her on the risk of diseases from using tobacco products such as cancer, COPD and heart disease.     I advised her to quit and she is not willing to quit.    I spent 4 minutes counseling the patient.           Erica Bradford, HIEU  02/01/23  11:23 EST    Dictated Utilizing Dragon Dictation

## 2023-02-01 NOTE — ASSESSMENT & PLAN NOTE
Pain related to diffuse bony involvement.  Patient is on MS Contin with oxycodone as needed for breakthrough.  She also takes gabapentin.  Pain is reasonably well controlled at this time.  Wyatt reviewed and no discrepancies.  Continue current regimen.  Reassess next visit.

## 2023-02-01 NOTE — ASSESSMENT & PLAN NOTE
Metastatic.  Patient is on treatment with letrozole, ribociclib, denosumab.  Tolerating medication well.  Lab work today is notable for anemia with a hemoglobin 10.4 g/dL.  Likely related to her medication.  Not enough to require dose adjustment.  Reviewing her scans, she appears to have stable disease although diffuse involvement of the bones.  Continue letrozole daily.  Continue ribociclib 3 weeks out of 4.  Continue monthly denosumab.  I will see her back with her next injection.  We discussed exercise to the extent possible to help with her fatigue and weakness.

## 2023-02-02 ENCOUNTER — SPECIALTY PHARMACY (OUTPATIENT)
Dept: PHARMACY | Facility: HOSPITAL | Age: 63
End: 2023-02-02
Payer: MEDICARE

## 2023-02-03 ENCOUNTER — TELEPHONE (OUTPATIENT)
Dept: ONCOLOGY | Facility: HOSPITAL | Age: 63
End: 2023-02-03
Payer: MEDICARE

## 2023-02-03 NOTE — TELEPHONE ENCOUNTER
Caller: NUPUR     Relationship to patient: [facility] NAVIGATOR    Best call back number: [phone]    Patient is needing: TO CHECK ON PA FOR HOSPITAL BED.  THEY DO NOT HAVE ANYTHING ON FILE.

## 2023-02-06 ENCOUNTER — SPECIALTY PHARMACY (OUTPATIENT)
Dept: PHARMACY | Facility: HOSPITAL | Age: 63
End: 2023-02-06
Payer: MEDICARE

## 2023-02-06 DIAGNOSIS — C50.912 MALIGNANT NEOPLASM OF LEFT BREAST IN FEMALE, ESTROGEN RECEPTOR POSITIVE, UNSPECIFIED SITE OF BREAST: ICD-10-CM

## 2023-02-06 DIAGNOSIS — Z17.0 MALIGNANT NEOPLASM OF LEFT BREAST IN FEMALE, ESTROGEN RECEPTOR POSITIVE, UNSPECIFIED SITE OF BREAST: ICD-10-CM

## 2023-02-06 NOTE — PROGRESS NOTES
Re: Refills of Oral Specialty Medication - Kisqali (ribociclib)    • Drug-Drug Interactions: The current medication list was reviewed and there are no relevant drug-drug interactions.  • Medication Allergies: The patient has no relevant allergies as it relates to their oral specialty medication  • Review of Labs/Dose Adjustments: NO DOSE CHANGE - I reviewed the most recent note and labs and the patient will continue without any dose changes.  I sent refills as described below.    Drug: Kisqali (ribociclib)  Strength: 200 mg  Directions: Take 3 tablets by mouth daily ON days 1-21, OFF for 7 days of each 28 day cycle  Quantity: 63  Refills: 5  Pharmacy prescription sent to: [facility] (per patient request) Specialty Pharmacy      Yakelin Bajwa, PharmD, [facility]  Oncology Clinical Pharmacist  2/6/2023  15:41 EST

## 2023-02-07 ENCOUNTER — SPECIALTY PHARMACY (OUTPATIENT)
Dept: PHARMACY | Facility: HOSPITAL | Age: 63
End: 2023-02-07
Payer: MEDICARE

## 2023-02-07 NOTE — PROGRESS NOTES
Specialty Pharmacy Patient Management Program  Oncology Refill Outreach      Derek is a 62 y.o. female contacted today regarding refills of her medication(s).    Specialty medication(s) and dose(s) confirmed: ribociclib succinate 200 MG tablet therapy pack tablet. Sent to [facility].    Other medications being refilled: N/A    Refill Questions    Flowsheet Row Most Recent Value   Changes to allergies? No   Changes to medications? No   New conditions since last clinic visit No   Unplanned office visit, urgent care, ED, or hospital admission in the last 4 weeks  No   How does patient/caregiver feel medication is working? Very good   Financial problems or insurance changes  No   Since the previous refill, were any specialty medication doses or scheduled injections missed or delayed?  No   If yes, please provide the amount N/A   Why were doses missed? N/A   Does this patient require a clinical escalation to a pharmacist? No          Delivery Questions    Flowsheet Row Most Recent Value   Delivery method  at Pharmacy   Delivery address correct? Yes   Delivery phone number [phone]   Number of medications in delivery 1   Medication being filled and delivered ribociclib succinate 200 MG tablet therapy pack tablet   Doses left of specialty medications N/A-- she is on her 7 days off   Is there any medication that is due not being filled? No   Supplies needed? No supplies needed   Cooler needed? No   Do any medications need mixed or dated? No   Copay form of payment Pay at pickup   Additional comments Zero copay   Questions or concerns for the pharmacist? No   Explain any questions or concerns for the pharmacist N/A   Are any medications first time fills? No                 Follow-up: 21 days     Maria Luz Frazier  Care Coordinator, [facility]  2/7/2023  08:35 EST

## 2023-02-08 ENCOUNTER — TELEPHONE (OUTPATIENT)
Dept: ONCOLOGY | Facility: HOSPITAL | Age: 63
End: 2023-02-08
Payer: MEDICARE

## 2023-02-08 NOTE — TELEPHONE ENCOUNTER
Patient in need of a hospital bed. Patient has a medical condition which requires position of the body in ways not feasible with an ordinary bed in order to alleviate cancer related pain.    Patient requesting hospital bed with gel mattress.

## 2023-02-08 NOTE — TELEPHONE ENCOUNTER
Caller: ESE    Relationship: [facility] NAVIGATOR    Best call back number: [phone]    What is the best time to reach you: ASAP    Who are you requesting to speak with (clinical staff, provider,  specific staff member): PERSON WHO HANDLES PRE-AUTH FOR HOSPITAL BEDS    What was the call regarding: [facility] HAS NOT YET RECEIVED THE REQUIRED PRE-AUTH FOR THE HOSPITAL BED FOR THE PATIENT.  THE REASON/INFO IS REQUIRED, AND IF THIS IS NEEDED SOON, THE CALLER STATES THIS MAY NEED TO BE EXPEDITED.    FAX#- [phone]  OR WEBSITE THAT CAN BE USED FOR UPLOADING- [email or web address]    Do you require a callback: IF NEEDED

## 2023-02-08 NOTE — TELEPHONE ENCOUNTER
Caller: ESE    Relationship: [facility] NAVIGATROR    Best call back number: [phone]    What is the best time to reach you: ASAP    Who are you requesting to speak with (clinical staff, provider,  specific staff member): PRE AUTH    What was the call regarding: ESE CALLED BACK SAID TO FAX PRESCRIPTION FOR THE BED, INSURANCE INFORMATION AND DEMOGRAPHICS TO [facility]  [phone]  CONTACT NUMBER [phone]      Do you require a callback: YES

## 2023-02-09 ENCOUNTER — TELEPHONE (OUTPATIENT)
Dept: ONCOLOGY | Facility: HOSPITAL | Age: 63
End: 2023-02-09
Payer: MEDICARE

## 2023-02-09 DIAGNOSIS — Z87.898 HISTORY OF DIFFICULT VENOUS ACCESS: ICD-10-CM

## 2023-02-09 RX ORDER — OXYCODONE AND ACETAMINOPHEN 10; 325 MG/1; MG/1
1 TABLET ORAL EVERY 6 HOURS PRN
Qty: 6 TABLET | Refills: 0 | Status: SHIPPED | OUTPATIENT
Start: 2023-02-09 | End: 2023-02-10 | Stop reason: SDUPTHER

## 2023-02-09 NOTE — TELEPHONE ENCOUNTER
Caller: Derek Keller    Relationship: Self    Best call back number: [phone]    Requested Prescriptions:   Requested Prescriptions     Pending Prescriptions Disp Refills   • oxyCODONE-acetaminophen (Percocet)  MG per tablet 6 tablet 0     Sig: Take 1 tablet by mouth Every 6 (Six) Hours As Needed for Moderate Pain for up to 8 doses.        Pharmacy where request should be sent: [facility], [facility], & [facility] [place], [place] - [address] - [phone] [facility] [phone] FX     Additional details provided by patient:     Does the patient have less than a 3 day supply:  [x] Yes  [] No    Would you like a call back once the refill request has been completed: [] Yes [x] No    If the office needs to give you a call back, can they leave a voicemail: [] Yes [x] No

## 2023-02-09 NOTE — TELEPHONE ENCOUNTER
The pt called and states that she has a new Knot on her spine that she first noticed today. When questioned the pt states that it is the size of the end of her thumb. When questioned the pt states that there is no increase in pain since finding the Knot. When questioned about location on the spine from top to bottom the pt states that it is in the middle of her her spine. The pt states that she has an apt with Dr Barker on 2/21/23. The pt asked if Dr Barker wants her to keep that apt or come sooner.

## 2023-02-10 ENCOUNTER — HOSPITAL ENCOUNTER (OUTPATIENT)
Dept: INFUSION THERAPY | Facility: HOSPITAL | Age: 63
Discharge: HOME | End: 2023-02-10
Admitting: SURGERY
Payer: MEDICARE

## 2023-02-10 ENCOUNTER — TELEPHONE (OUTPATIENT)
Dept: ONCOLOGY | Facility: HOSPITAL | Age: 63
End: 2023-02-10
Payer: MEDICARE

## 2023-02-10 VITALS
TEMPERATURE: 98.1 F | RESPIRATION RATE: 20 BRPM | HEART RATE: 70 BPM | DIASTOLIC BLOOD PRESSURE: 71 MMHG | OXYGEN SATURATION: 97 % | SYSTOLIC BLOOD PRESSURE: 146 MMHG

## 2023-02-10 DIAGNOSIS — Z87.898 HISTORY OF DIFFICULT VENOUS ACCESS: ICD-10-CM

## 2023-02-10 DIAGNOSIS — Z98.890 S/P EXPLORATORY LAPAROTOMY: Primary | ICD-10-CM

## 2023-02-10 PROCEDURE — G0463 HOSPITAL OUTPT CLINIC VISIT: HCPCS

## 2023-02-10 RX ORDER — OXYCODONE AND ACETAMINOPHEN 10; 325 MG/1; MG/1
1 TABLET ORAL EVERY 6 HOURS PRN
Qty: 60 TABLET | Refills: 0 | Status: SHIPPED | OUTPATIENT
Start: 2023-02-10 | End: 2023-03-14

## 2023-02-10 NOTE — TELEPHONE ENCOUNTER
Caller: Jeronimo Keller    Relationship: Self    Best call back number: [phone]    What was the call regarding: JERONIMO CALLED REGARDING HER PAIN MEDICATION. SHE SAYS THERE WAS A CHANGE IN THE MEDICATION THAT SHE WAS NOT MADE AWARE OF, SO SHE NEEDS A CALLBACK TO DISCUSS.    Do you require a callback:YES

## 2023-02-10 NOTE — SIGNIFICANT NOTE
Wound Eval / Progress Noted    JOSE Langston     Patient Name: Derek Keller  : 1960  MRN: [id number]  Today's Date: 2/10/2023                 Admit Date: 2/10/2023    Visit Dx:    ICD-10-CM ICD-9-CM   1. S/P ex lap with sigmoid resection, Lynn's procedure for stercoral perforation  Z98.890 V45.89       Patient Active Problem List   Diagnosis    Hypertension    Hyperlipidemia    Allergic rhinitis    Hypothyroidism    Chronic pain disorder    Anxiety    Monopolar depression (HCC)    Malaise and fatigue    Tobacco abuse    Fibromyalgia    Vitamin B 12 deficiency    Primary osteoarthritis of left knee    Restless leg syndrome    Primary insomnia    Chronic fatigue    Asthma    Body mass index (BMI) 26.0-26.9, adult    Degeneration of lumbar intervertebral disc    Hearing loss    Inappropriate diet and eating habits    Cervical spondylosis    Obesity with body mass index 30 or greater    Renal stone    Malignant neoplasm of left breast in female, estrogen receptor positive (HCC)    Cancer related pain    Bone metastases (HCC)    Secondary malignant neoplasm of bone (HCC)    Peritonitis (HCC)    Leukopenia    S/P ex lap with sigmoid resection, Lynn's procedure for stercoral perforation    Encounter for adjustment or management of vascular access device    Pulmonary emphysema (HCC)        Past Medical History:   Diagnosis Date    Abdominal pain     OSTOMY SITE    Allergic rhinitis     Anemia     NO S/S    Anxiety     Arteriosclerosis     Coronary    Arthritis     Bone metastases (HCC) 10/14/2022    Bowen's disease     SKIN CANCER    Breast cancer (HCC)     NO SURGERY WAS DONE DUE TO METS TO BONE/COLON/LYMPHNODE    Cancer related pain 10/13/2022    Depression     Disorder associated with Helicobacter species 10/12/2022    Dysphoric mood     Fatigue     Frequent urination     NO S/S INFECTION    Herpes simplex vulvovaginitis 2018    History of colon polyps     History of IBS     History of kidney stones      Hyperlipidemia     Hypertension     Hypothyroidism     Insomnia     Lumbago     Mood disorder (HCC)     Neck pain     R/T CANCER    Palpitations     ASYMPTOMATIC,  NO CARDIOLOGIST    Precordial pain     R/T SPINE CANCER    Sleep disturbance         Past Surgical History:   Procedure Laterality Date    BREAST BIOPSY Left     CARDIAC CATHETERIZATION Left 1959    CARDIAC CATHETERIZATION N/A 04/06/2018    Procedure: Coronary angiography;  Surgeon: Art Licea MD;  Location:  [facility];  Service: Cardiovascular    CARDIAC CATHETERIZATION N/A 04/06/2018    Procedure: Left heart cath;  Surgeon: Art Licea MD;  Location:  [facility];  Service: Cardiovascular    CARDIAC CATHETERIZATION N/A 04/06/2018    Procedure: Left ventriculography;  Surgeon: Art Licea MD;  Location:  [facility];  Service: Cardiovascular    CHOLECYSTECTOMY      COLON SURGERY      colostomy bag    COLONOSCOPY  11/08/2006    EXPLORATORY LAPAROTOMY N/A 12/06/2022    Procedure: LAPAROTOMY EXPLORATORY sigmoid resection hartmans procedure colostomy;  Surgeon: Alfred Castañeda MD;  Location: [facility];  Service: General;  Laterality: N/A;    GANGLION CYST EXCISION      HYSTERECTOMY  05/2005    LAPAROSCOPIC GASTRIC BANDING      BAND REMOVED 2020    TONSILLECTOMY      VENOUS ACCESS DEVICE (PORT) INSERTION N/A 1/9/2023    Procedure: INSERTION VENOUS ACCESS DEVICE;  Surgeon: Alfred Castañeda MD;  Location: [facility];  Service: General;  Laterality: N/A;      02/10/23 1530   Colostomy LLQ   Placement Date/Time: 12/06/22 1900   Inserted by: DR. CASTAÑEDA  Hand Hygiene Completed: Yes  Colostomy Type: Descending/sigmoid;End  Location: LLQ   Wound Image    Stomal Appliance 1 piece;Clean;Dry;Intact;Changed;Drainable   Stoma Appearance flush with skin;red;moist   Peristomal Assessment Clean;Intact;Pink   Accessories/Skin Care convex wafer;cleansed with water;skin  sealant;skin barrier ring   Treatment Bag change;Site care     Wound Check / Follow-up:  Patient seen today for ostomy follow-up. Pouch is clean, dry, and intact with no signs of lifting or leaking and no stool present in pouch at this time. Patient reports leaking on edge near midline abdomen. There is a crease in skin noted in this area. Pouch removed with use of adhesive remover. Re-educated patient to use adhesive remover when removing pouching system. Patient verbalized understanding. Peristomal tissue is clean and pink with no signs of breakdown. Cleansed stoma and surrounding tissue with warm water and washcloth. Cut pouch to fit stoma using one piece convex pouch. Used Eakins ring to build abdominal crease to create flat surface where patient reports leaking issues. Instructed patient how to do this. Patient verbalized understanding. One piece convex pouch placed after preparing peristomal tissue with skin barrier wipe. Seal obtained, no signs of lifting or leaking. Overall, patient reports she is feeling better about managing pouching system. Instructed patient to follow up with us in the next 1-2 weeks.      Impression: Colostomy.      Short term goals:  Colostomy management, skin protection.      Analisa Alvarado RN    2/10/2023    16:14 EST

## 2023-02-10 NOTE — TELEPHONE ENCOUNTER
This nurse called the pt at 1050 on 2/9/23 and informed her of Dr Barker's note. The pt voiced understanding.

## 2023-02-21 ENCOUNTER — HOSPITAL ENCOUNTER (OUTPATIENT)
Dept: INFUSION THERAPY | Facility: HOSPITAL | Age: 63
Discharge: HOME | End: 2023-02-21
Admitting: SURGERY
Payer: MEDICARE

## 2023-02-21 ENCOUNTER — HOSPITAL ENCOUNTER (OUTPATIENT)
Dept: ONCOLOGY | Facility: HOSPITAL | Age: 63
Discharge: HOME | End: 2023-02-21
Payer: MEDICARE

## 2023-02-21 ENCOUNTER — OFFICE VISIT (OUTPATIENT)
Dept: ONCOLOGY | Facility: HOSPITAL | Age: 63
End: 2023-02-21
Payer: MEDICAID

## 2023-02-21 VITALS
RESPIRATION RATE: 16 BRPM | HEART RATE: 71 BPM | WEIGHT: 180.12 LBS | OXYGEN SATURATION: 98 % | DIASTOLIC BLOOD PRESSURE: 73 MMHG | SYSTOLIC BLOOD PRESSURE: 155 MMHG | TEMPERATURE: 98.2 F | BODY MASS INDEX: 29.07 KG/M2

## 2023-02-21 VITALS
SYSTOLIC BLOOD PRESSURE: 151 MMHG | DIASTOLIC BLOOD PRESSURE: 67 MMHG | HEART RATE: 62 BPM | RESPIRATION RATE: 20 BRPM | OXYGEN SATURATION: 98 % | TEMPERATURE: 98.2 F

## 2023-02-21 DIAGNOSIS — C50.912 MALIGNANT NEOPLASM OF LEFT BREAST IN FEMALE, ESTROGEN RECEPTOR POSITIVE, UNSPECIFIED SITE OF BREAST: ICD-10-CM

## 2023-02-21 DIAGNOSIS — Z17.0 MALIGNANT NEOPLASM OF LEFT BREAST IN FEMALE, ESTROGEN RECEPTOR POSITIVE, UNSPECIFIED SITE OF BREAST: ICD-10-CM

## 2023-02-21 DIAGNOSIS — C50.412 MALIGNANT NEOPLASM OF UPPER-OUTER QUADRANT OF LEFT BREAST IN FEMALE, ESTROGEN RECEPTOR POSITIVE: Primary | ICD-10-CM

## 2023-02-21 DIAGNOSIS — C79.51 BONE METASTASES: Primary | ICD-10-CM

## 2023-02-21 DIAGNOSIS — G89.3 CANCER RELATED PAIN: ICD-10-CM

## 2023-02-21 DIAGNOSIS — Z98.890 S/P EXPLORATORY LAPAROTOMY: Primary | ICD-10-CM

## 2023-02-21 DIAGNOSIS — C79.51 BONE METASTASES: ICD-10-CM

## 2023-02-21 DIAGNOSIS — Z45.2 ENCOUNTER FOR ADJUSTMENT OR MANAGEMENT OF VASCULAR ACCESS DEVICE: Primary | ICD-10-CM

## 2023-02-21 DIAGNOSIS — Z17.0 MALIGNANT NEOPLASM OF UPPER-OUTER QUADRANT OF LEFT BREAST IN FEMALE, ESTROGEN RECEPTOR POSITIVE: Primary | ICD-10-CM

## 2023-02-21 LAB
ALBUMIN SERPL-MCNC: 4 G/DL (ref 3.5–5.2)
ALBUMIN/GLOB SERPL: 1.5 G/DL
ALP SERPL-CCNC: 84 U/L (ref 39–117)
ALT SERPL W P-5'-P-CCNC: 11 U/L (ref 1–33)
ANION GAP SERPL CALCULATED.3IONS-SCNC: 6.6 MMOL/L (ref 5–15)
ANISOCYTOSIS BLD QL: ABNORMAL
AST SERPL-CCNC: 13 U/L (ref 1–32)
BASOPHILS # BLD AUTO: 0.02 10*3/MM3 (ref 0–0.2)
BASOPHILS NFR BLD AUTO: 0.4 % (ref 0–1.5)
BILIRUB SERPL-MCNC: 0.5 MG/DL (ref 0–1.2)
BUN SERPL-MCNC: 13 MG/DL (ref 8–23)
BUN/CREAT SERPL: 17.8 (ref 7–25)
CALCIUM SPEC-SCNC: 8.6 MG/DL (ref 8.6–10.5)
CHLORIDE SERPL-SCNC: 107 MMOL/L (ref 98–107)
CO2 SERPL-SCNC: 28.4 MMOL/L (ref 22–29)
CREAT SERPL-MCNC: 0.73 MG/DL (ref 0.57–1)
DEPRECATED RDW RBC AUTO: 67.4 FL (ref 37–54)
EGFRCR SERPLBLD CKD-EPI 2021: 92.5 ML/MIN/1.73
EOSINOPHIL # BLD AUTO: 0.04 10*3/MM3 (ref 0–0.4)
EOSINOPHIL NFR BLD AUTO: 0.8 % (ref 0.3–6.2)
ERYTHROCYTE [DISTWIDTH] IN BLOOD BY AUTOMATED COUNT: 17.5 % (ref 12.3–15.4)
GLOBULIN UR ELPH-MCNC: 2.6 GM/DL
GLUCOSE SERPL-MCNC: 95 MG/DL (ref 65–99)
HCT VFR BLD AUTO: 29.7 % (ref 34–46.6)
HGB BLD-MCNC: 9.5 G/DL (ref 12–15.9)
HYPOCHROMIA BLD QL: ABNORMAL
IMM GRANULOCYTES # BLD AUTO: 0.01 10*3/MM3 (ref 0–0.05)
IMM GRANULOCYTES NFR BLD AUTO: 0.2 % (ref 0–0.5)
LYMPHOCYTES # BLD AUTO: 1.29 10*3/MM3 (ref 0.7–3.1)
LYMPHOCYTES # BLD MANUAL: 0.63 10*3/MM3 (ref 0.7–3.1)
LYMPHOCYTES NFR BLD AUTO: 26.5 % (ref 19.6–45.3)
LYMPHOCYTES NFR BLD MANUAL: 1 % (ref 5–12)
MACROCYTES BLD QL SMEAR: ABNORMAL
MAGNESIUM SERPL-MCNC: 1.9 MG/DL (ref 1.6–2.4)
MCH RBC QN AUTO: 33 PG (ref 26.6–33)
MCHC RBC AUTO-ENTMCNC: 32 G/DL (ref 31.5–35.7)
MCV RBC AUTO: 103.1 FL (ref 79–97)
MONOCYTES # BLD AUTO: 0.24 10*3/MM3 (ref 0.1–0.9)
MONOCYTES # BLD: 0.05 10*3/MM3 (ref 0.1–0.9)
MONOCYTES NFR BLD AUTO: 4.9 % (ref 5–12)
NEUTROPHILS # BLD AUTO: 4.14 10*3/MM3 (ref 1.7–7)
NEUTROPHILS NFR BLD AUTO: 3.27 10*3/MM3 (ref 1.7–7)
NEUTROPHILS NFR BLD AUTO: 67.2 % (ref 42.7–76)
NEUTROPHILS NFR BLD MANUAL: 85 % (ref 42.7–76)
PHOSPHATE SERPL-MCNC: 3.2 MG/DL (ref 2.5–4.5)
PLATELET # BLD AUTO: 219 10*3/MM3 (ref 140–450)
PMV BLD AUTO: 10.2 FL (ref 6–12)
POTASSIUM SERPL-SCNC: 3.5 MMOL/L (ref 3.5–5.2)
PROMYELOCYTES NFR BLD MANUAL: 1 % (ref 0–0)
PROT SERPL-MCNC: 6.6 G/DL (ref 6–8.5)
RBC # BLD AUTO: 2.88 10*6/MM3 (ref 3.77–5.28)
SMALL PLATELETS BLD QL SMEAR: ADEQUATE
SODIUM SERPL-SCNC: 142 MMOL/L (ref 136–145)
VARIANT LYMPHS NFR BLD MANUAL: 13 % (ref 19.6–45.3)
WBC MORPH BLD: NORMAL
WBC NRBC COR # BLD: 4.87 10*3/MM3 (ref 3.4–10.8)

## 2023-02-21 PROCEDURE — 25010000002 HEPARIN LOCK FLUSH PER 10 UNITS: Performed by: INTERNAL MEDICINE

## 2023-02-21 PROCEDURE — 80053 COMPREHEN METABOLIC PANEL: CPT | Performed by: INTERNAL MEDICINE

## 2023-02-21 PROCEDURE — G0463 HOSPITAL OUTPT CLINIC VISIT: HCPCS

## 2023-02-21 PROCEDURE — 84100 ASSAY OF PHOSPHORUS: CPT | Performed by: INTERNAL MEDICINE

## 2023-02-21 PROCEDURE — 99214 OFFICE O/P EST MOD 30 MIN: CPT | Performed by: INTERNAL MEDICINE

## 2023-02-21 PROCEDURE — 96372 THER/PROPH/DIAG INJ SC/IM: CPT

## 2023-02-21 PROCEDURE — 25010000002 DENOSUMAB 120 MG/1.7ML SOLUTION: Performed by: NURSE PRACTITIONER

## 2023-02-21 PROCEDURE — 36591 DRAW BLOOD OFF VENOUS DEVICE: CPT

## 2023-02-21 PROCEDURE — 85007 BL SMEAR W/DIFF WBC COUNT: CPT | Performed by: INTERNAL MEDICINE

## 2023-02-21 PROCEDURE — G0463 HOSPITAL OUTPT CLINIC VISIT: HCPCS | Performed by: INTERNAL MEDICINE

## 2023-02-21 PROCEDURE — 83735 ASSAY OF MAGNESIUM: CPT | Performed by: INTERNAL MEDICINE

## 2023-02-21 PROCEDURE — 85025 COMPLETE CBC W/AUTO DIFF WBC: CPT | Performed by: INTERNAL MEDICINE

## 2023-02-21 RX ORDER — SODIUM CHLORIDE 0.9 % (FLUSH) 0.9 %
20 SYRINGE (ML) INJECTION AS NEEDED
Status: CANCELLED | OUTPATIENT
Start: 2023-02-21

## 2023-02-21 RX ORDER — SODIUM CHLORIDE 0.9 % (FLUSH) 0.9 %
20 SYRINGE (ML) INJECTION AS NEEDED
Status: DISCONTINUED | OUTPATIENT
Start: 2023-02-21 | End: 2023-02-22 | Stop reason: HOSPADM

## 2023-02-21 RX ORDER — MORPHINE SULFATE 30 MG/1
30 TABLET, FILM COATED, EXTENDED RELEASE ORAL 2 TIMES DAILY
Qty: 60 TABLET | Refills: 0 | Status: SHIPPED | OUTPATIENT
Start: 2023-02-21 | End: 2023-03-16

## 2023-02-21 RX ORDER — MORPHINE SULFATE 30 MG/1
30 TABLET, FILM COATED, EXTENDED RELEASE ORAL 2 TIMES DAILY
Qty: 60 TABLET | Refills: 0 | Status: CANCELLED | OUTPATIENT
Start: 2023-02-21

## 2023-02-21 RX ORDER — HEPARIN SODIUM (PORCINE) LOCK FLUSH IV SOLN 100 UNIT/ML 100 UNIT/ML
500 SOLUTION INTRAVENOUS AS NEEDED
Status: CANCELLED | OUTPATIENT
Start: 2023-02-21

## 2023-02-21 RX ORDER — HEPARIN SODIUM (PORCINE) LOCK FLUSH IV SOLN 100 UNIT/ML 100 UNIT/ML
500 SOLUTION INTRAVENOUS AS NEEDED
Status: DISCONTINUED | OUTPATIENT
Start: 2023-02-21 | End: 2023-02-22 | Stop reason: HOSPADM

## 2023-02-21 RX ADMIN — Medication 20 ML: at 11:05

## 2023-02-21 RX ADMIN — HEPARIN SODIUM (PORCINE) LOCK FLUSH IV SOLN 100 UNIT/ML 500 UNITS: 100 SOLUTION at 11:05

## 2023-02-21 RX ADMIN — DENOSUMAB 120 MG: 120 INJECTION SUBCUTANEOUS at 12:25

## 2023-02-21 NOTE — PROGRESS NOTES
Chief Complaint  Breast Cancer    Donald Barker MD Patel, Saagar, MD    Subjective     {Problem List  Visit Diagnosis   Encounters  Notes  Medications  Labs  Result Review Imaging  Media :23}     Derek Keller presents to [facility] for ***    Oncology/Hematology History   Malignant neoplasm of left breast in female, estrogen receptor positive (HCC)   10/13/2022 Initial Diagnosis    Malignant neoplasm of left breast in female, estrogen receptor positive (HCC)     10/14/2022 -  Chemotherapy    OP BREAST Letrozole / Ribociclib     10/14/2022 Cancer Staged    Staging form: Breast, AJCC 8th Edition  - Clinical: Stage IV (cT1c, cN1, cM1, ER+, AL+, HER2-) - Signed by Donald Barker MD on 10/14/2022     10/28/2022 -  Chemotherapy    OP SUPPORTIVE Denosumab (Xgeva) Q28D     Bone metastases (HCC)   10/14/2022 Initial Diagnosis    Bone metastases (HCC)     10/28/2022 -  Chemotherapy    OP SUPPORTIVE Denosumab (Xgeva) Q28D     Secondary malignant neoplasm of bone (HCC)   11/14/2022 Initial Diagnosis    Secondary malignant neoplasm of bone (HCC)     11/17/2022 -  Radiation    RADIATION THERAPY Treatment Details (Noted on 11/14/2022)  Site: Spine - Lumbar  Technique: 3D CRT  Goal: No goal specified  Planned Treatment Start Date: 11/17/2022         Review of Systems   Constitutional: Negative for appetite change, diaphoresis, fatigue, fever, unexpected weight gain and unexpected weight loss.   HENT: Negative for hearing loss, mouth sores, sore throat, swollen glands, trouble swallowing and voice change.    Eyes: Negative for blurred vision.   Respiratory: Negative for cough, shortness of breath and wheezing.    Cardiovascular: Negative for chest pain and palpitations.   Gastrointestinal: Negative for abdominal pain, blood in stool, constipation, diarrhea, nausea and vomiting.   Endocrine: Negative for cold intolerance and heat intolerance.   Genitourinary: Negative for  difficulty urinating, dysuria, frequency, hematuria and urinary incontinence.   Musculoskeletal: Negative for arthralgias, back pain and myalgias.   Skin: Negative for rash, skin lesions and wound.   Neurological: Negative for dizziness, seizures, weakness, numbness and headache.   Hematological: Does not bruise/bleed easily.   Psychiatric/Behavioral: Negative for depressed mood. The patient is not nervous/anxious.      Current Outpatient Medications on File Prior to Visit   Medication Sig Dispense Refill   • atorvastatin (LIPITOR) 20 MG tablet TAKE 1 TABLET BY MOUTH EVERY DAY (Patient taking differently: Take 20 mg by mouth Daily.) 30 tablet 6   • baclofen (LIORESAL) 20 MG tablet TAKE 1 TABLET BY MOUTH THREE TIMES DAILY (Patient taking differently: Take 10 mg by mouth 3 (Three) Times a Day As Needed.) 90 tablet 1   • donepezil (ARICEPT) 10 MG tablet Take 10 mg by mouth Daily.     • gabapentin (NEURONTIN) 600 MG tablet TAKE 1 TABLET BY MOUTH AT BEDTIME (Patient taking differently: 300 mg 2 (Two) Times a Day As Needed. TAKES1/2  MG TABLET) 90 tablet 0   • letrozole (FEMARA) 2.5 MG tablet Take 1 tablet by mouth Daily. 90 tablet 1   • levothyroxine (SYNTHROID, LEVOTHROID) 50 MCG tablet TAKE 1 TABLET BY MOUTH EVERY DAY 90 tablet 1   • LORazepam (ATIVAN) 0.5 MG tablet Take 0.5 mg by mouth Every 6 (Six) Hours As Needed.     • losartan (COZAAR) 100 MG tablet Take 100 mg by mouth Daily. INST PER ANESTHESIA PROTOCOL     • montelukast (SINGULAIR) 10 MG tablet Take 10 mg by mouth Daily.     • Morphine (MS CONTIN) 30 MG 12 hr tablet Take 1 tablet by mouth 2 (Two) Times a Day. 60 tablet 0   • naproxen (NAPROSYN) 500 MG tablet Take 500 mg by mouth 2 (Two) Times a Day With Meals. LAST DOSE 1/5/23     • ondansetron (ZOFRAN) 8 MG tablet Take 1 tablet by mouth 3 (Three) Times a Day As Needed for Nausea or Vomiting. 30 tablet 5   • oxyCODONE-acetaminophen (Percocet)  MG per tablet Take 1 tablet by mouth Every 6 (Six) Hours  As Needed for Moderate Pain for up to 8 doses. 60 tablet 0   • polyethylene glycol (MIRALAX) 17 g packet Take 17 g by mouth Daily. 5 packet 0   • ribociclib succinate 200 MG tablet therapy pack tablet Take 3 tablets by mouth Take As Directed. Take 3 tablets by mouth daily for 21 days then off 7 days on a 28 day cycle. 63 tablet 5   • rOPINIRole (REQUIP) 3 MG tablet Take 3 mg by mouth 2 (Two) Times a Day.     • solifenacin (VESICARE) 10 MG tablet Take 1 tablet by mouth Daily for 360 days. 90 tablet 3   • SUMAtriptan (IMITREX) 100 MG tablet Take 100 mg by mouth 1 (One) Time As Needed.     • traZODone (DESYREL) 150 MG tablet TAKE 1 TABLET BY MOUTH ONCE nightly 90 tablet 2   • venlafaxine XR (EFFEXOR-XR) 150 MG 24 hr capsule Take 1 capsule by mouth Daily. 90 capsule 1     No current facility-administered medications on file prior to visit.       Allergies   Allergen Reactions   • Azithromycin Itching     Past Medical History:   Diagnosis Date   • Abdominal pain     OSTOMY SITE   • Allergic rhinitis    • Anemia     NO S/S   • Anxiety    • Arteriosclerosis     Coronary   • Arthritis    • Bone metastases (HCC) 10/14/2022   • Bowen's disease     SKIN CANCER   • Breast cancer (HCC)     NO SURGERY WAS DONE DUE TO METS TO BONE/COLON/LYMPHNODE   • Cancer related pain 10/13/2022   • Depression    • Disorder associated with Helicobacter species 10/12/2022   • Dysphoric mood    • Fatigue    • Frequent urination     NO S/S INFECTION   • Herpes simplex vulvovaginitis 7/26/2018   • History of colon polyps    • History of IBS    • History of kidney stones    • Hyperlipidemia    • Hypertension    • Hypothyroidism    • Insomnia    • Lumbago    • Mood disorder (HCC)    • Neck pain     R/T CANCER   • Palpitations     ASYMPTOMATIC,  NO CARDIOLOGIST   • Precordial pain     R/T SPINE CANCER   • Sleep disturbance      Past Surgical History:   Procedure Laterality Date   • BREAST BIOPSY Left    • CARDIAC CATHETERIZATION Left 1959   •  CARDIAC CATHETERIZATION N/A 04/06/2018    Procedure: Coronary angiography;  Surgeon: Art Licea MD;  Location:  [facility];  Service: Cardiovascular   • CARDIAC CATHETERIZATION N/A 04/06/2018    Procedure: Left heart cath;  Surgeon: Art Licea MD;  Location:  [facility];  Service: Cardiovascular   • CARDIAC CATHETERIZATION N/A 04/06/2018    Procedure: Left ventriculography;  Surgeon: Art Licea MD;  Location:  [facility];  Service: Cardiovascular   • CHOLECYSTECTOMY     • COLON SURGERY      colostomy bag   • COLONOSCOPY  11/08/2006   • EXPLORATORY LAPAROTOMY N/A 12/06/2022    Procedure: LAPAROTOMY EXPLORATORY sigmoid resection hartmans procedure colostomy;  Surgeon: Alfred Castañeda MD;  Location: [facility];  Service: General;  Laterality: N/A;   • GANGLION CYST EXCISION     • HYSTERECTOMY  05/2005   • LAPAROSCOPIC GASTRIC BANDING      BAND REMOVED 2020   • TONSILLECTOMY     • VENOUS ACCESS DEVICE (PORT) INSERTION N/A 1/9/2023    Procedure: INSERTION VENOUS ACCESS DEVICE;  Surgeon: Alfred Castañeda MD;  Location: [facility];  Service: General;  Laterality: N/A;     Social History     Socioeconomic History   • Marital status:    Tobacco Use   • Smoking status: Every Day     Packs/day: 1.50     Years: 40.00     Pack years: 60.00     Types: Cigarettes     Start date: 1974   • Smokeless tobacco: Never   • Tobacco comments:     caffeine use 3 mt dews daily     INST PER ANESTHESIA PROTOCOL   Vaping Use   • Vaping Use: Never used   Substance and Sexual Activity   • Alcohol use: No   • Drug use: Yes     Types: Marijuana     Comment: Prescribed Lorazepam/OCC MARIJUANA   • Sexual activity: Defer     Partners: Male     Birth control/protection: None     Family History   Problem Relation Age of Onset   • Hypertension Mother    • Rheum arthritis Mother    • Heart disease Mother    • Breast cancer Mother    • Diabetes  Father    • Cancer Maternal Grandmother         colon   • Colon cancer Maternal Grandmother    • Aneurysm Paternal Grandfather    • Diabetes Other    • Fibromyalgia Other    • Malig Hyperthermia Neg Hx        Objective   Physical Exam    There were no vitals filed for this visit.            PHQ-9 Total Score:         {The patient is/is not experiencing fatigue. (Optional):86879}   {PT fx screen 1 (Optional):53715}  {Speech Functional Screening (Optional):10149}  {Rehab to be ordered (Optional):14743}    Result Review :{Labs  Result Review  Imaging  Med Tab  Media  Procedures :23}   The following data was reviewed by: Kim Campos MA on 02/21/2023:  Lab Results   Component Value Date    HGB 10.5 (L) 01/24/2023    HCT 32.6 (L) 01/24/2023    MCV 99.4 (H) 01/24/2023     01/24/2023    WBC 8.12 01/24/2023    NEUTROABS 6.06 01/24/2023    LYMPHSABS 1.59 01/24/2023    MONOSABS 0.32 01/24/2023    EOSABS 0.06 01/24/2023    BASOSABS 0.08 01/24/2023     Lab Results   Component Value Date    GLUCOSE 110 (H) 01/24/2023    BUN 13 01/24/2023    CREATININE 0.77 01/24/2023     01/24/2023    K 3.5 01/24/2023     01/24/2023    CO2 26.0 01/24/2023    CALCIUM 8.8 01/24/2023    PROTEINTOT 6.9 01/24/2023    ALBUMIN 4.0 01/24/2023    BILITOT 0.2 01/24/2023    ALKPHOS 96 01/24/2023    AST 10 01/24/2023    ALT 7 01/24/2023     Lab Results   Component Value Date    MG 1.9 01/24/2023    PHOS 2.7 01/24/2023    FREET4 1.01 01/17/2022    TSH 1.470 11/12/2019     No results found for: IRON, LABIRON, TRANSFERRIN, TIBC  Lab Results   Component Value Date    VXKYDKEX31 390 04/23/2019    FOLATE 9.93 04/23/2019     No results found for: PSA, CEA, AFP, ,     {Data reviewed (Optional):58412:::1}      Assessment and Plan {CC Problem List  Visit Diagnosis   ROS  Review (Popup)  Health Maintenance  Quality  BestPractice  Medications  SmartSets  SnapShot Encounters  Media :23}   Diagnoses and all orders for this  visit:    1. Bone metastases (HCC) (Primary)  -     CBC and Differential; Future  -     Comprehensive metabolic panel; Future  -     Magnesium; Future  -     Phosphorus; Future    2. Malignant neoplasm of left breast in female, estrogen receptor positive, unspecified site of breast (HCC)  -     CBC and Differential; Future  -     Comprehensive metabolic panel; Future  -     Magnesium; Future  -     Phosphorus; Future        {Time Spent (Optional):17902}    Patient Follow Up: ***  Patient was given instructions and counseling regarding her condition or for health maintenance advice. Please see specific information pulled into the AVS if appropriate.     Kim Campos MA    2/21/2023

## 2023-02-21 NOTE — SIGNIFICANT NOTE
Wound Eval / Progress Noted    JOSE Langston     Patient Name: Derek Keller  : 1960  MRN: [id number]  Today's Date: 2023                 Admit Date: 2023    Visit Dx:    ICD-10-CM ICD-9-CM   1. S/P ex lap with sigmoid resection, Lynn's procedure for stercoral perforation  Z98.890 V45.89       Patient Active Problem List   Diagnosis    Hypertension    Hyperlipidemia    Allergic rhinitis    Hypothyroidism    Chronic pain disorder    Anxiety    Monopolar depression (HCC)    Malaise and fatigue    Tobacco abuse    Fibromyalgia    Vitamin B 12 deficiency    Primary osteoarthritis of left knee    Restless leg syndrome    Primary insomnia    Chronic fatigue    Asthma    Body mass index (BMI) 26.0-26.9, adult    Degeneration of lumbar intervertebral disc    Hearing loss    Inappropriate diet and eating habits    Cervical spondylosis    Obesity with body mass index 30 or greater    Renal stone    Malignant neoplasm of left breast in female, estrogen receptor positive (HCC)    Cancer related pain    Bone metastases (HCC)    Secondary malignant neoplasm of bone (HCC)    Peritonitis (HCC)    Leukopenia    S/P ex lap with sigmoid resection, Lynn's procedure for stercoral perforation    Encounter for adjustment or management of vascular access device    Pulmonary emphysema (HCC)        Past Medical History:   Diagnosis Date    Abdominal pain     OSTOMY SITE    Allergic rhinitis     Anemia     NO S/S    Anxiety     Arteriosclerosis     Coronary    Arthritis     Bone metastases (HCC) 10/14/2022    Bowen's disease     SKIN CANCER    Breast cancer (HCC)     NO SURGERY WAS DONE DUE TO METS TO BONE/COLON/LYMPHNODE    Cancer related pain 10/13/2022    Depression     Disorder associated with Helicobacter species 10/12/2022    Dysphoric mood     Fatigue     Frequent urination     NO S/S INFECTION    Herpes simplex vulvovaginitis 2018    History of colon polyps     History of IBS     History of kidney stones      Hyperlipidemia     Hypertension     Hypothyroidism     Insomnia     Lumbago     Mood disorder (HCC)     Neck pain     R/T CANCER    Palpitations     ASYMPTOMATIC,  NO CARDIOLOGIST    Precordial pain     R/T SPINE CANCER    Sleep disturbance         Past Surgical History:   Procedure Laterality Date    BREAST BIOPSY Left     CARDIAC CATHETERIZATION Left 1959    CARDIAC CATHETERIZATION N/A 04/06/2018    Procedure: Coronary angiography;  Surgeon: Art Licea MD;  Location:  [facility];  Service: Cardiovascular    CARDIAC CATHETERIZATION N/A 04/06/2018    Procedure: Left heart cath;  Surgeon: Art Licea MD;  Location:  [facility];  Service: Cardiovascular    CARDIAC CATHETERIZATION N/A 04/06/2018    Procedure: Left ventriculography;  Surgeon: Art Licea MD;  [facility];  Service: Cardiovascular    CHOLECYSTECTOMY      COLON SURGERY      colostomy bag    COLONOSCOPY  11/08/2006    EXPLORATORY LAPAROTOMY N/A 12/06/2022    Procedure: LAPAROTOMY EXPLORATORY sigmoid resection hartmans procedure colostomy;  Surgeon: Alfred Castañeda MD;  Location: [facility];  Service: General;  Laterality: N/A;    GANGLION CYST EXCISION      HYSTERECTOMY  05/2005    LAPAROSCOPIC GASTRIC BANDING      BAND REMOVED 2020    TONSILLECTOMY      VENOUS ACCESS DEVICE (PORT) INSERTION N/A 1/9/2023    Procedure: INSERTION VENOUS ACCESS DEVICE;  Surgeon: Alfred Castañeda MD;  Location: [facility];  Service: General;  Laterality: N/A;         Physical Assessment:         02/21/23 1344   Colostomy LLQ   Placement Date/Time: 12/06/22 1900   Inserted by: DR. CASTAÑEDA  Hand Hygiene Completed: Yes  Colostomy Type: Descending/sigmoid;End  Location: LLQ   Wound Image    Stomal Appliance 1 piece;Clean;Leaking   Stoma Appearance flush with skin;red;moist   Peristomal Assessment Clean;Intact;Pink   Accessories/Skin Care convex wafer;cleansed with  water;skin sealant   Stoma Function stool   Stool Color brown, light   Stool Consistency soft   Treatment Bag change;Site care       Wound Check / Follow-up:  Patient seen today for an ostomy check and pouch change. Patient had issue with pouch leaking before appointment. Removed pouch with adhesive remover; significant leak detected under pouch.  Stoma is is flush with skin, red and moist. Periwound is pink intact small area with red moist tissue. Cleansed with warm water. Applied silver powder to reddened tissue and then skin barrier wipe. Applied a 2 piece henrik convex pouch.   Midline abdominal incision with a small area of dehiscence noted. Cleansed with NS. Wound base is red and moist. Applied calcium alginate to wound base. Patient is to return in two weeks for a follow up.    Amanda Almaguer RN    2/21/2023    13:45 EST

## 2023-02-21 NOTE — PROGRESS NOTES
Chief Complaint  Breast Cancer    Donald Barker MD Patel, Saagar, MD    Subjective     {Problem List  Visit Diagnosis   Encounters  Notes  Medications  Labs  Result Review Imaging  Media :23}     Derek Keller presents to [facility] HEMATOLOGY [facility]OGY for ***    Oncology/Hematology History   Malignant neoplasm of left breast in female, estrogen receptor positive (HCC)   10/13/2022 Initial Diagnosis    Malignant neoplasm of left breast in female, estrogen receptor positive (HCC)     10/14/2022 -  Chemotherapy    OP BREAST Letrozole / Ribociclib     10/14/2022 Cancer Staged    Staging form: Breast, AJCC 8th Edition  - Clinical: Stage IV (cT1c, cN1, cM1, ER+, MD+, HER2-) - Signed by Donald Barker MD on 10/14/2022     10/28/2022 -  Chemotherapy    OP SUPPORTIVE Denosumab (Xgeva) Q28D     Bone metastases (HCC)   10/14/2022 Initial Diagnosis    Bone metastases (HCC)     10/28/2022 -  Chemotherapy    OP SUPPORTIVE Denosumab (Xgeva) Q28D     Secondary malignant neoplasm of bone (HCC)   11/14/2022 Initial Diagnosis    Secondary malignant neoplasm of bone (HCC)     11/17/2022 -  Radiation    RADIATION THERAPY Treatment Details (Noted on 11/14/2022)  Site: Spine - Lumbar  Technique: 3D CRT  Goal: No goal specified  Planned Treatment Start Date: 11/17/2022         Review of Systems   Constitutional: Negative for appetite change, diaphoresis, fatigue, fever, unexpected weight gain and unexpected weight loss.   HENT: Negative for hearing loss, mouth sores, sore throat, swollen glands, trouble swallowing and voice change.    Eyes: Negative for blurred vision.   Respiratory: Negative for cough, shortness of breath and wheezing.    Cardiovascular: Negative for chest pain and palpitations.   Gastrointestinal: Positive for abdominal pain (right lower abd pain). Negative for blood in stool, constipation, diarrhea, nausea and vomiting.   Endocrine: Negative for cold intolerance and heat  intolerance.   Genitourinary: Negative for difficulty urinating, dysuria, frequency, hematuria and urinary incontinence.   Musculoskeletal: Negative for arthralgias, back pain and myalgias.   Skin: Negative for rash, skin lesions and wound.   Neurological: Negative for dizziness, seizures, weakness, numbness and headache.   Hematological: Does not bruise/bleed easily.   Psychiatric/Behavioral: Negative for depressed mood. The patient is not nervous/anxious.      Current Outpatient Medications on File Prior to Visit   Medication Sig Dispense Refill   • atorvastatin (LIPITOR) 20 MG tablet TAKE 1 TABLET BY MOUTH EVERY DAY (Patient taking differently: Take 20 mg by mouth Daily.) 30 tablet 6   • baclofen (LIORESAL) 20 MG tablet TAKE 1 TABLET BY MOUTH THREE TIMES DAILY (Patient taking differently: Take 10 mg by mouth 3 (Three) Times a Day As Needed.) 90 tablet 1   • donepezil (ARICEPT) 10 MG tablet Take 10 mg by mouth Daily.     • gabapentin (NEURONTIN) 600 MG tablet TAKE 1 TABLET BY MOUTH AT BEDTIME (Patient taking differently: 300 mg 2 (Two) Times a Day As Needed. TAKES1/2  MG TABLET) 90 tablet 0   • letrozole (FEMARA) 2.5 MG tablet Take 1 tablet by mouth Daily. 90 tablet 1   • levothyroxine (SYNTHROID, LEVOTHROID) 50 MCG tablet TAKE 1 TABLET BY MOUTH EVERY DAY 90 tablet 1   • LORazepam (ATIVAN) 0.5 MG tablet Take 0.5 mg by mouth Every 6 (Six) Hours As Needed.     • losartan (COZAAR) 100 MG tablet Take 100 mg by mouth Daily. INST PER ANESTHESIA PROTOCOL     • montelukast (SINGULAIR) 10 MG tablet Take 10 mg by mouth Daily.     • Morphine (MS CONTIN) 30 MG 12 hr tablet Take 1 tablet by mouth 2 (Two) Times a Day. 60 tablet 0   • naproxen (NAPROSYN) 500 MG tablet Take 500 mg by mouth 2 (Two) Times a Day With Meals. LAST DOSE 1/5/23     • ondansetron (ZOFRAN) 8 MG tablet Take 1 tablet by mouth 3 (Three) Times a Day As Needed for Nausea or Vomiting. 30 tablet 5   • oxyCODONE-acetaminophen (Percocet)  MG per tablet  Take 1 tablet by mouth Every 6 (Six) Hours As Needed for Moderate Pain for up to 8 doses. 60 tablet 0   • polyethylene glycol (MIRALAX) 17 g packet Take 17 g by mouth Daily. 5 packet 0   • ribociclib succinate 200 MG tablet therapy pack tablet Take 3 tablets by mouth Take As Directed. Take 3 tablets by mouth daily for 21 days then off 7 days on a 28 day cycle. 63 tablet 5   • rOPINIRole (REQUIP) 3 MG tablet Take 3 mg by mouth 2 (Two) Times a Day.     • solifenacin (VESICARE) 10 MG tablet Take 1 tablet by mouth Daily for 360 days. 90 tablet 3   • SUMAtriptan (IMITREX) 100 MG tablet Take 100 mg by mouth 1 (One) Time As Needed.     • traZODone (DESYREL) 150 MG tablet TAKE 1 TABLET BY MOUTH ONCE nightly 90 tablet 2   • venlafaxine XR (EFFEXOR-XR) 150 MG 24 hr capsule Take 1 capsule by mouth Daily. 90 capsule 1     Current Facility-Administered Medications on File Prior to Visit   Medication Dose Route Frequency Provider Last Rate Last Admin   • heparin injection 500 Units  500 Units Intravenous PRN Donald Barker MD       • sodium chloride 0.9 % flush 20 mL  20 mL Intravenous PRN Donald Barker MD           Allergies   Allergen Reactions   • Azithromycin Itching     Past Medical History:   Diagnosis Date   • Abdominal pain     OSTOMY SITE   • Allergic rhinitis    • Anemia     NO S/S   • Anxiety    • Arteriosclerosis     Coronary   • Arthritis    • Bone metastases (HCC) 10/14/2022   • Bowen's disease     SKIN CANCER   • Breast cancer (HCC)     NO SURGERY WAS DONE DUE TO METS TO BONE/COLON/LYMPHNODE   • Cancer related pain 10/13/2022   • Depression    • Disorder associated with Helicobacter species 10/12/2022   • Dysphoric mood    • Fatigue    • Frequent urination     NO S/S INFECTION   • Herpes simplex vulvovaginitis 7/26/2018   • History of colon polyps    • History of IBS    • History of kidney stones    • Hyperlipidemia    • Hypertension    • Hypothyroidism    • Insomnia    • Lumbago    • Mood disorder (HCC)     • Neck pain     R/T CANCER   • Palpitations     ASYMPTOMATIC,  NO CARDIOLOGIST   • Precordial pain     R/T SPINE CANCER   • Sleep disturbance      Past Surgical History:   Procedure Laterality Date   • BREAST BIOPSY Left    • CARDIAC CATHETERIZATION Left 1959   • CARDIAC CATHETERIZATION N/A 04/06/2018    Procedure: Coronary angiography;  Surgeon: Art Licea MD;  Location:  [facility] CATH INVASIVE LOCATION;  Service: Cardiovascular   • CARDIAC CATHETERIZATION N/A 04/06/2018    Procedure: Left heart cath;  Surgeon: Art Licea MD;  [facility];  Service: Cardiovascular   • CARDIAC CATHETERIZATION N/A 04/06/2018    Procedure: Left ventriculography;  Surgeon: Art Licea MD;  [facility];  Service: Cardiovascular   • CHOLECYSTECTOMY     • COLON SURGERY      colostomy bag   • COLONOSCOPY  11/08/2006   • EXPLORATORY LAPAROTOMY N/A 12/06/2022    Procedure: LAPAROTOMY EXPLORATORY sigmoid resection hartmans procedure colostomy;  Surgeon: Alfred Castañeda MD;  Location: [facility];  Service: General;  Laterality: N/A;   • GANGLION CYST EXCISION     • HYSTERECTOMY  05/2005   • LAPAROSCOPIC GASTRIC BANDING      BAND REMOVED 2020   • TONSILLECTOMY     • VENOUS ACCESS DEVICE (PORT) INSERTION N/A 1/9/2023    Procedure: INSERTION VENOUS ACCESS DEVICE;  Surgeon: Alfred Castañeda MD;  Location: [facility];  Service: General;  Laterality: N/A;     Social History     Socioeconomic History   • Marital status:    Tobacco Use   • Smoking status: Every Day     Packs/day: 1.50     Years: 40.00     Pack years: 60.00     Types: Cigarettes     Start date: 1974   • Smokeless tobacco: Never   • Tobacco comments:     caffeine use 3 mt dews daily     INST PER ANESTHESIA PROTOCOL   Vaping Use   • Vaping Use: Never used   Substance and Sexual Activity   • Alcohol use: No   • Drug use: Yes     Types: Marijuana     Comment: Prescribed  Lorazepam/OCC MARIJUANA   • Sexual activity: Defer     Partners: Male     Birth control/protection: None     Family History   Problem Relation Age of Onset   • Hypertension Mother    • Rheum arthritis Mother    • Heart disease Mother    • Breast cancer Mother    • Diabetes Father    • Cancer Maternal Grandmother         colon   • Colon cancer Maternal Grandmother    • Aneurysm Paternal Grandfather    • Diabetes Other    • Fibromyalgia Other    • Malig Hyperthermia Neg Hx        Objective   Physical Exam    There were no vitals filed for this visit.            PHQ-9 Total Score:         {The patient is/is not experiencing fatigue. (Optional):09131}   {PT fx screen 1 (Optional):77370}  {Speech Functional Screening (Optional):17182}  {Rehab to be ordered (Optional):15648}    Result Review :{Labs  Result Review  Imaging  Med Tab  Media  Procedures :23}   The following data was reviewed by: Kim Campos MA on 02/21/2023:  Lab Results   Component Value Date    HGB 10.5 (L) 01/24/2023    HCT 32.6 (L) 01/24/2023    MCV 99.4 (H) 01/24/2023     01/24/2023    WBC 8.12 01/24/2023    NEUTROABS 6.06 01/24/2023    LYMPHSABS 1.59 01/24/2023    MONOSABS 0.32 01/24/2023    EOSABS 0.06 01/24/2023    BASOSABS 0.08 01/24/2023     Lab Results   Component Value Date    GLUCOSE 110 (H) 01/24/2023    BUN 13 01/24/2023    CREATININE 0.77 01/24/2023     01/24/2023    K 3.5 01/24/2023     01/24/2023    CO2 26.0 01/24/2023    CALCIUM 8.8 01/24/2023    PROTEINTOT 6.9 01/24/2023    ALBUMIN 4.0 01/24/2023    BILITOT 0.2 01/24/2023    ALKPHOS 96 01/24/2023    AST 10 01/24/2023    ALT 7 01/24/2023     Lab Results   Component Value Date    MG 1.9 01/24/2023    PHOS 2.7 01/24/2023    FREET4 1.01 01/17/2022    TSH 1.470 11/12/2019     No results found for: IRON, LABIRON, TRANSFERRIN, TIBC  Lab Results   Component Value Date    [id number] 390 04/23/2019    FOLATE 9.93 04/23/2019     No results found for: PSA, CEA, AFP, ,      {Data reviewed (Optional):74059:::1}      Assessment and Plan {CC Problem List  Visit Diagnosis   ROS  Review (Popup)  Health Maintenance  Quality  BestPractice  Medications  SmartSets  SnapShot Encounters  Media :23}   Diagnoses and all orders for this visit:    1. Bone metastases (HCC) (Primary)  -     CBC and Differential; Future  -     Comprehensive metabolic panel; Future  -     Magnesium; Future  -     Phosphorus; Future    2. Malignant neoplasm of left breast in female, estrogen receptor positive, unspecified site of breast (HCC)  -     CBC and Differential; Future  -     Comprehensive metabolic panel; Future  -     Magnesium; Future  -     Phosphorus; Future        {Time Spent (Optional):58563}    Patient Follow Up: ***  Patient was given instructions and counseling regarding her condition or for health maintenance advice. Please see specific information pulled into the AVS if appropriate.     Kim Campos MA    2/21/2023

## 2023-02-21 NOTE — PROGRESS NOTES
Chief Complaint  Breast Cancer    Haile Monique MD    Subjective          Derek Keller presents to [facility] HEMATOLOGY & ONCOLOGY for ongoing treatment of her metastatic breast cancer.  She is on letrozole, ribociclib, denosumab.  She taking her medication as prescribed.  She denies any issues or side effects.  She denies dental or jaw pain.  No new masses or adenopathy.  She has more pain in her abdomen than at her last visit.  Reviewing her medications, she has not been taking her MS Contin as prescribed.  She is only been using breakthrough medications.    Oncology/Hematology History   Malignant neoplasm of left breast in female, estrogen receptor positive (HCC)   10/13/2022 Initial Diagnosis    Malignant neoplasm of left breast in female, estrogen receptor positive (HCC)     10/14/2022 -  Chemotherapy    OP BREAST Letrozole / Ribociclib     10/14/2022 Cancer Staged    Staging form: Breast, AJCC 8th Edition  - Clinical: Stage IV (cT1c, cN1, cM1, ER+, HI+, HER2-) - Signed by Donald Barker MD on 10/14/2022     10/28/2022 -  Chemotherapy    OP SUPPORTIVE Denosumab (Xgeva) Q28D     Bone metastases (HCC)   10/14/2022 Initial Diagnosis    Bone metastases (HCC)     10/28/2022 -  Chemotherapy    OP SUPPORTIVE Denosumab (Xgeva) Q28D     Secondary malignant neoplasm of bone (HCC)   11/14/2022 Initial Diagnosis    Secondary malignant neoplasm of bone (HCC)     11/17/2022 -  Radiation    RADIATION THERAPY Treatment Details (Noted on 11/14/2022)  Site: Spine - Lumbar  Technique: 3D CRT  Goal: No goal specified  Planned Treatment Start Date: 11/17/2022         Review of Systems   Constitutional: Positive for fatigue. Negative for appetite change, diaphoresis, fever, unexpected weight gain and unexpected weight loss.   HENT: Negative for hearing loss, mouth sores, sore throat, swollen glands, trouble swallowing and voice change.    Eyes: Negative for blurred vision.   Respiratory: Negative for cough,  shortness of breath and wheezing.    Cardiovascular: Negative for chest pain and palpitations.   Gastrointestinal: Positive for abdominal pain (right lower abd). Negative for blood in stool, constipation, diarrhea, nausea and vomiting.   Endocrine: Negative for cold intolerance and heat intolerance.   Genitourinary: Negative for difficulty urinating, dysuria, frequency, hematuria and urinary incontinence.   Musculoskeletal: Negative for arthralgias, back pain and myalgias.   Skin: Negative for rash, skin lesions and wound.   Neurological: Negative for dizziness, seizures, weakness, numbness and headache.   Hematological: Does not bruise/bleed easily.   Psychiatric/Behavioral: Negative for depressed mood. The patient is not nervous/anxious.      Current Outpatient Medications on File Prior to Visit   Medication Sig Dispense Refill   • atorvastatin (LIPITOR) 20 MG tablet TAKE 1 TABLET BY MOUTH EVERY DAY (Patient taking differently: Take 20 mg by mouth Daily.) 30 tablet 6   • baclofen (LIORESAL) 20 MG tablet TAKE 1 TABLET BY MOUTH THREE TIMES DAILY (Patient taking differently: Take 10 mg by mouth 3 (Three) Times a Day As Needed.) 90 tablet 1   • donepezil (ARICEPT) 10 MG tablet Take 10 mg by mouth Daily.     • gabapentin (NEURONTIN) 600 MG tablet TAKE 1 TABLET BY MOUTH AT BEDTIME (Patient taking differently: 300 mg 2 (Two) Times a Day As Needed. TAKES1/2  MG TABLET) 90 tablet 0   • letrozole (FEMARA) 2.5 MG tablet Take 1 tablet by mouth Daily. 90 tablet 1   • levothyroxine (SYNTHROID, LEVOTHROID) 50 MCG tablet TAKE 1 TABLET BY MOUTH EVERY DAY 90 tablet 1   • LORazepam (ATIVAN) 0.5 MG tablet Take 0.5 mg by mouth Every 6 (Six) Hours As Needed.     • losartan (COZAAR) 100 MG tablet Take 100 mg by mouth Daily. INST PER ANESTHESIA PROTOCOL     • montelukast (SINGULAIR) 10 MG tablet Take 10 mg by mouth Daily.     • naproxen (NAPROSYN) 500 MG tablet Take 500 mg by mouth 2 (Two) Times a Day With Meals. LAST DOSE 1/5/23      • ondansetron (ZOFRAN) 8 MG tablet Take 1 tablet by mouth 3 (Three) Times a Day As Needed for Nausea or Vomiting. 30 tablet 5   • oxyCODONE-acetaminophen (Percocet)  MG per tablet Take 1 tablet by mouth Every 6 (Six) Hours As Needed for Moderate Pain for up to 8 doses. 60 tablet 0   • polyethylene glycol (MIRALAX) 17 g packet Take 17 g by mouth Daily. 5 packet 0   • ribociclib succinate 200 MG tablet therapy pack tablet Take 3 tablets by mouth Take As Directed. Take 3 tablets by mouth daily for 21 days then off 7 days on a 28 day cycle. 63 tablet 5   • rOPINIRole (REQUIP) 3 MG tablet Take 3 mg by mouth 2 (Two) Times a Day.     • solifenacin (VESICARE) 10 MG tablet Take 1 tablet by mouth Daily for 360 days. 90 tablet 3   • SUMAtriptan (IMITREX) 100 MG tablet Take 100 mg by mouth 1 (One) Time As Needed.     • traZODone (DESYREL) 150 MG tablet TAKE 1 TABLET BY MOUTH ONCE nightly 90 tablet 2   • venlafaxine XR (EFFEXOR-XR) 150 MG 24 hr capsule Take 1 capsule by mouth Daily. 90 capsule 1     Current Facility-Administered Medications on File Prior to Visit   Medication Dose Route Frequency Provider Last Rate Last Admin   • [COMPLETED] denosumab (XGEVA) injection 120 mg  120 mg Subcutaneous Once Angie Epperson APRN   120 mg at 02/21/23 1225   • [DISCONTINUED] heparin injection 500 Units  500 Units Intravenous PRN Donald Barker MD   500 Units at 02/21/23 1105   • [DISCONTINUED] sodium chloride 0.9 % flush 20 mL  20 mL Intravenous PRN Donlad Barker MD   20 mL at 02/21/23 1105       Allergies   Allergen Reactions   • Azithromycin Itching     Past Medical History:   Diagnosis Date   • Abdominal pain     OSTOMY SITE   • Allergic rhinitis    • Anemia     NO S/S   • Anxiety    • Arteriosclerosis     Coronary   • Arthritis    • Bone metastases (HCC) 10/14/2022   • Bowen's disease     SKIN CANCER   • Breast cancer (HCC)     NO SURGERY WAS DONE DUE TO METS TO BONE/COLON/LYMPHNODE   • Cancer related pain  10/13/2022   • Depression    • Disorder associated with Helicobacter species 10/12/2022   • Dysphoric mood    • Fatigue    • Frequent urination     NO S/S INFECTION   • Herpes simplex vulvovaginitis 7/26/2018   • History of colon polyps    • History of IBS    • History of kidney stones    • Hyperlipidemia    • Hypertension    • Hypothyroidism    • Insomnia    • Lumbago    • Mood disorder (HCC)    • Neck pain     R/T CANCER   • Palpitations     ASYMPTOMATIC,  NO CARDIOLOGIST   • Precordial pain     R/T SPINE CANCER   • Sleep disturbance      Past Surgical History:   Procedure Laterality Date   • BREAST BIOPSY Left    • CARDIAC CATHETERIZATION Left 1959   • CARDIAC CATHETERIZATION N/A 04/06/2018    Procedure: Coronary angiography;  Surgeon: Art Licea MD;  [facility];  Service: Cardiovascular   • CARDIAC CATHETERIZATION N/A 04/06/2018    Procedure: Left heart cath;  Surgeon: Art Licea MD;  Location: [facility] CATH INVASIVE LOCATION;  Service: Cardiovascular   • CARDIAC CATHETERIZATION N/A 04/06/2018    Procedure: Left ventriculography;  Surgeon: Art Licea MD;  Location: [facility] CATH INVASIVE LOCATION;  Service: Cardiovascular   • CHOLECYSTECTOMY     • COLON SURGERY      colostomy bag   • COLONOSCOPY  11/08/2006   • EXPLORATORY LAPAROTOMY N/A 12/06/2022    Procedure: LAPAROTOMY EXPLORATORY sigmoid resection hartmans procedure colostomy;  Surgeon: Alferd Castañeda MD;  Location: [facility];  Service: General;  Laterality: N/A;   • GANGLION CYST EXCISION     • HYSTERECTOMY  05/2005   • LAPAROSCOPIC GASTRIC BANDING      BAND REMOVED 2020   • TONSILLECTOMY     • VENOUS ACCESS DEVICE (PORT) INSERTION N/A 1/9/2023    Procedure: INSERTION VENOUS ACCESS DEVICE;  Surgeon: Alfred Castañeda MD;  Location: [facility];  Service: General;  Laterality: N/A;     Social History     Socioeconomic History   • Marital status:    Tobacco Use   • Smoking  status: Every Day     Packs/day: 1.50     Years: 40.00     Pack years: 60.00     Types: Cigarettes     Start date: 1974   • Smokeless tobacco: Never   • Tobacco comments:     caffeine use 3 mt dews daily     INST PER ANESTHESIA PROTOCOL   Vaping Use   • Vaping Use: Never used   Substance and Sexual Activity   • Alcohol use: No   • Drug use: Yes     Types: Marijuana     Comment: Prescribed Lorazepam/OCC MARIJUANA   • Sexual activity: Defer     Partners: Male     Birth control/protection: None     Family History   Problem Relation Age of Onset   • Hypertension Mother    • Rheum arthritis Mother    • Heart disease Mother    • Breast cancer Mother    • Diabetes Father    • Cancer Maternal Grandmother         colon   • Colon cancer Maternal Grandmother    • Aneurysm Paternal Grandfather    • Diabetes Other    • Fibromyalgia Other    • Malig Hyperthermia Neg Hx        Objective   Physical Exam  Vitals reviewed. Exam conducted with a chaperone present.   Constitutional:       General: She is not in acute distress.     Appearance: Normal appearance.   Cardiovascular:      Rate and Rhythm: Normal rate and regular rhythm.      Heart sounds: Normal heart sounds. No murmur heard.    No gallop.   Pulmonary:      Effort: Pulmonary effort is normal.      Breath sounds: Normal breath sounds.      Comments: Port-A-Cath  Abdominal:      General: Abdomen is flat. Bowel sounds are normal.      Palpations: Abdomen is soft.      Comments: Colostomy intact   Musculoskeletal:      Cervical back: Neck supple.      Right lower leg: No edema.      Left lower leg: No edema.   Lymphadenopathy:      Cervical: No cervical adenopathy.   Neurological:      Mental Status: She is alert and oriented to person, place, and time.   Psychiatric:         Mood and Affect: Mood normal.         Behavior: Behavior normal.         Vitals:    02/21/23 1139   BP: 155/73   Pulse: 71   Resp: 16   Temp: 98.2 °F (36.8 °C)   TempSrc: Temporal   SpO2: 98%   Weight:  81.7 kg (180 lb 1.9 oz)   PainSc:   9   PainLoc: Abdomen     ECOG score: 1         PHQ-9 Total Score:                    Result Review :   The following data was reviewed by: Donald Barker MD on 02/21/2023:  Lab Results   Component Value Date    HGB 9.5 (L) 02/21/2023    HCT 29.7 (L) 02/21/2023    .1 (H) 02/21/2023     02/21/2023    WBC 4.87 02/21/2023    NEUTROABS 3.27 02/21/2023    NEUTROABS 4.14 02/21/2023    LYMPHSABS 1.29 02/21/2023    MONOSABS 0.24 02/21/2023    EOSABS 0.04 02/21/2023    BASOSABS 0.02 02/21/2023     Lab Results   Component Value Date    GLUCOSE 95 02/21/2023    BUN 13 02/21/2023    CREATININE 0.73 02/21/2023     02/21/2023    K 3.5 02/21/2023     02/21/2023    CO2 28.4 02/21/2023    CALCIUM 8.6 02/21/2023    PROTEINTOT 6.6 02/21/2023    ALBUMIN 4.0 02/21/2023    BILITOT 0.5 02/21/2023    ALKPHOS 84 02/21/2023    AST 13 02/21/2023    ALT 11 02/21/2023     Lab Results   Component Value Date    MG 1.9 02/21/2023    PHOS 3.2 02/21/2023    FREET4 1.01 01/17/2022    TSH 1.470 11/12/2019     No results found for: IRON, LABIRON, TRANSFERRIN, TIBC  Lab Results   Component Value Date    [id number] 390 04/23/2019    FOLATE 9.93 04/23/2019     No results found for: PSA, CEA, AFP, ,           Assessment and Plan    Diagnoses and all orders for this visit:    1. Malignant neoplasm of upper-outer quadrant of left breast in female, estrogen receptor positive (HCC) (Primary)  Assessment & Plan:  Metastatic.  Patient is on palliative treatment with letrozole, ribociclib, denosumab for bony involvement.  Tolerating well.  Lab work remains reasonable.  She is not having any side effects related to her medication.  Continue letrozole daily.  Continue ribociclib 3 weeks out of 4.  I will see her back for next ribociclib cycle day 1 with lab work prior to monitor for toxicities.    Orders:  -     Morphine (MS CONTIN) 30 MG 12 hr tablet; Take 1 tablet by mouth 2 (Two) Times a  Day.  Dispense: 60 tablet; Refill: 0    2. Bone metastases (HCC)  Assessment & Plan:  Patient is receiving monthly Xgeva.  Tolerating well.  No dental or jaw pain.  Electrolytes are adequate.  Xgeva today monthly.    Orders:  -     CBC and Differential; Future  -     Comprehensive metabolic panel; Future  -     Magnesium; Future  -     Phosphorus; Future  -     Cancel: denosumab (XGEVA) injection 120 mg  -     CBC and Differential; Future  -     Comprehensive metabolic panel; Future  -     Magnesium; Future  -     Phosphorus; Future    3. Cancer related pain  Assessment & Plan:  Patient reports increased pain but she has not been taking her medication as prescribed.  We discussed resuming MS Contin 30 mg twice daily for baseline pain control.  She can continue to use the oxycodone as needed for breakthrough.  Wyatt reviewed and no discrepancies.  Reassess next visit.    Orders:  -     Morphine (MS CONTIN) 30 MG 12 hr tablet; Take 1 tablet by mouth 2 (Two) Times a Day.  Dispense: 60 tablet; Refill: 0    Other orders  -     denosumab (XGEVA) injection 120 mg            Patient was given instructions and counseling regarding her condition or for health maintenance advice. Please see specific information pulled into the AVS if appropriate.     Donald Barker MD    2/22/2023

## 2023-02-22 ENCOUNTER — SPECIALTY PHARMACY (OUTPATIENT)
Dept: PHARMACY | Facility: HOSPITAL | Age: 63
End: 2023-02-22
Payer: MEDICARE

## 2023-02-22 NOTE — ASSESSMENT & PLAN NOTE
Patient reports increased pain but she has not been taking her medication as prescribed.  We discussed resuming MS Contin 30 mg twice daily for baseline pain control.  She can continue to use the oxycodone as needed for breakthrough.  Wyatt reviewed and no discrepancies.  Reassess next visit.

## 2023-02-22 NOTE — ASSESSMENT & PLAN NOTE
Patient is receiving monthly Xgeva.  Tolerating well.  No dental or jaw pain.  Electrolytes are adequate.  Xgeva today monthly.

## 2023-02-22 NOTE — ASSESSMENT & PLAN NOTE
Metastatic.  Patient is on palliative treatment with letrozole, ribociclib, denosumab for bony involvement.  Tolerating well.  Lab work remains reasonable.  She is not having any side effects related to her medication.  Continue letrozole daily.  Continue ribociclib 3 weeks out of 4.  I will see her back for next ribociclib cycle day 1 with lab work prior to monitor for toxicities.

## 2023-02-23 ENCOUNTER — TELEPHONE (OUTPATIENT)
Dept: ONCOLOGY | Facility: HOSPITAL | Age: 63
End: 2023-02-23
Payer: MEDICARE

## 2023-02-28 ENCOUNTER — OFFICE VISIT (OUTPATIENT)
Dept: SURGERY | Facility: CLINIC | Age: 63
End: 2023-02-28
Payer: MEDICAID

## 2023-02-28 ENCOUNTER — TELEPHONE (OUTPATIENT)
Dept: ONCOLOGY | Facility: HOSPITAL | Age: 63
End: 2023-02-28
Payer: MEDICARE

## 2023-02-28 ENCOUNTER — HOSPITAL ENCOUNTER (OUTPATIENT)
Dept: INFUSION THERAPY | Facility: HOSPITAL | Age: 63
Discharge: HOME | End: 2023-02-28
Admitting: SURGERY
Payer: MEDICARE

## 2023-02-28 VITALS — WEIGHT: 180 LBS | BODY MASS INDEX: 28.93 KG/M2 | HEIGHT: 66 IN | RESPIRATION RATE: 14 BRPM

## 2023-02-28 DIAGNOSIS — Z98.890 S/P EXPLORATORY LAPAROTOMY: Primary | ICD-10-CM

## 2023-02-28 DIAGNOSIS — R10.9 ABDOMINAL PAIN, UNSPECIFIED ABDOMINAL LOCATION: Primary | ICD-10-CM

## 2023-02-28 PROCEDURE — G0463 HOSPITAL OUTPT CLINIC VISIT: HCPCS

## 2023-02-28 PROCEDURE — 99212 OFFICE O/P EST SF 10 MIN: CPT | Performed by: SURGERY

## 2023-02-28 NOTE — SIGNIFICANT NOTE
Wound Eval / Progress Noted    JOSE Langston     Patient Name: Derek Keller  : 1960  MRN: [id number]  Today's Date: 2023                 Admit Date: 2023    Visit Dx:    ICD-10-CM ICD-9-CM   1. S/P ex lap with sigmoid resection, Lynn's procedure for stercoral perforation  Z98.890 V45.89       Patient Active Problem List   Diagnosis    Hypertension    Hyperlipidemia    Allergic rhinitis    Hypothyroidism    Chronic pain disorder    Anxiety    Monopolar depression (HCC)    Malaise and fatigue    Tobacco abuse    Fibromyalgia    Vitamin B 12 deficiency    Primary osteoarthritis of left knee    Restless leg syndrome    Primary insomnia    Chronic fatigue    Asthma    Body mass index (BMI) 26.0-26.9, adult    Degeneration of lumbar intervertebral disc    Hearing loss    Inappropriate diet and eating habits    Cervical spondylosis    Obesity with body mass index 30 or greater    Renal stone    Malignant neoplasm of left breast in female, estrogen receptor positive (HCC)    Cancer related pain    Bone metastases (HCC)    Secondary malignant neoplasm of bone (HCC)    Peritonitis (HCC)    Leukopenia    S/P ex lap with sigmoid resection, Lynn's procedure for stercoral perforation    Encounter for adjustment or management of vascular access device    Pulmonary emphysema (HCC)        Past Medical History:   Diagnosis Date    Abdominal pain     OSTOMY SITE    Allergic rhinitis     Anemia     NO S/S    Anxiety     Arteriosclerosis     Coronary    Arthritis     Bone metastases (HCC) 10/14/2022    Bowen's disease     SKIN CANCER    Breast cancer (HCC)     NO SURGERY WAS DONE DUE TO METS TO BONE/COLON/LYMPHNODE    Cancer related pain 10/13/2022    Depression     Disorder associated with Helicobacter species 10/12/2022    Dysphoric mood     Fatigue     Frequent urination     NO S/S INFECTION    Herpes simplex vulvovaginitis 2018    History of colon polyps     History of IBS     History of kidney stones      Hyperlipidemia     Hypertension     Hypothyroidism     Insomnia     Lumbago     Mood disorder (HCC)     Neck pain     R/T CANCER    Palpitations     ASYMPTOMATIC,  NO CARDIOLOGIST    Precordial pain     R/T SPINE CANCER    Sleep disturbance         Past Surgical History:   Procedure Laterality Date    BREAST BIOPSY Left     CARDIAC CATHETERIZATION Left 1960    CARDIAC CATHETERIZATION N/A 04/06/2018    Procedure: Coronary angiography;  Surgeon: Art Licea MD;  Location:  [facility];  Service: Cardiovascular    CARDIAC CATHETERIZATION N/A 04/06/2018    Procedure: Left heart cath;  Surgeon: Art Licea MD;  Location:  [facility];  Service: Cardiovascular    CARDIAC CATHETERIZATION N/A 04/06/2018    Procedure: Left ventriculography;  Surgeon: Art Licea MD;  Location: [facility] CATH INVASIVE LOCATION;  Service: Cardiovascular    CHOLECYSTECTOMY      COLON SURGERY      colostomy bag    COLONOSCOPY  11/08/2006    EXPLORATORY LAPAROTOMY N/A 12/06/2022    Procedure: LAPAROTOMY EXPLORATORY sigmoid resection hartmans procedure colostomy;  Surgeon: Alfred Castañeda MD;  Location: [facility];  Service: General;  Laterality: N/A;    GANGLION CYST EXCISION      HYSTERECTOMY  05/2005    LAPAROSCOPIC GASTRIC BANDING      BAND REMOVED 2020    TONSILLECTOMY      VENOUS ACCESS DEVICE (PORT) INSERTION N/A 1/9/2023    Procedure: INSERTION VENOUS ACCESS DEVICE;  Surgeon: Alfred Castañeda MD;  Location: [facility];  Service: General;  Laterality: N/A;      02/28/23 1230   Colostomy LLQ   Placement Date/Time: 12/06/22 1900   Inserted by: DR. CASTAÑEDA  Hand Hygiene Completed: Yes  Colostomy Type: Descending/sigmoid;End  Location: LLQ   Stomal Appliance 1 piece;Leaking;Changed   Stoma Appearance flush with skin;red;moist   Peristomal Assessment Red   Accessories/Skin Care convex wafer;cleansed with water;skin sealant   Stoma Function stool   Stool Color brown,  light   Stool Consistency soft   Treatment Bag change;Site care         Wound Check / Follow-up:  Patient seen today for ostomy check-in. Patient reports she is feeling better about managing her ostomy. Pouch noted to be leaking upon assessment. Removed pouch using adhesive remover. Midline abdomen with erosion and moist superficial tissue loss, likely from stool sitting on skin for prolonged period of time. Cleansed stoma and peristomal tissue with warm water and washcloth. Applied alginate dressing to midline abdomen and covered with hydrocolloid dressing. Applied two piece convex pouch and sent patient with extra supplies. Instructed patient to change dressing with pouch changes. Patient verbalized understanding to all education. Patient to follow up next week.      Impression: Existing colostomy.      Short term goals:  Regain skin integrity, colostomy management, skin protection.      Analisa Alvarado RN    2/28/2023    13:38 EST

## 2023-02-28 NOTE — TELEPHONE ENCOUNTER
Diagnosis: Breast cancer    Reason for Referral: Financial assistance    Content of Visit: OSW received notification from inpatient SW manager, Kaylan JONES, that she received a VM from pt requesting to speak with the SW in the radiation oncology department. OSW contacted pt via telephone this afternoon. Pt reports she has roughly $1600 in debt through [employer]. Pt now has a [facility] Dual Medicare plan. Discussed opportunity for pt to apply for [employer] financial assistance program and offered my assistance in completing and submitting this application to the financial counselors department. Educated pt she will need to provide documents to accompany her application and OSW will mail this list of required documents to her along with my contact information. Encouraged pt to contact me once she is able to gather these documents and we can schedule a day to complete and submit this application together. Pt agreeable to this plan and expressed gratitude. OSW support remains available.    Resources/Referrals Provided: [employer] assistance program (FAP)

## 2023-03-01 ENCOUNTER — TELEPHONE (OUTPATIENT)
Dept: SURGERY | Facility: CLINIC | Age: 63
End: 2023-03-01
Payer: MEDICARE

## 2023-03-01 NOTE — TELEPHONE ENCOUNTER
Pt saw ostomy nurse yesterday and they are going to fax over a new order for a different type of ostomy bag. She just wanted to let us know to watch for it.

## 2023-03-06 NOTE — PROGRESS NOTES
General Surgery/Colorectal Surgery Note    Patient Name:  Derek Keller  YOB: 1960  [id number]    Referring Provider: No ref. provider found      Patient Care Team:  Haile Monique MD as PCP - General (Family Medicine)  Julien Cardona DO as Consulting Physician (Pain Medicine)  Joel Castillo MD as Consulting Physician (Gastroenterology)  Remington Russell MD as Surgeon (General Surgery)  Ahsan Salas MD as Consulting Physician (Sports Medicine)  Donald Barker MD as Consulting Physician (Hematology and Oncology)  Sandy Ricci MD as Consulting Physician (General Surgery)  Alfred Castañeda MD as Consulting Physician (General Surgery)    Chief complaint follow-up    Subjective .     History of present illness:    History of metastatic breast cancer  Status post ex lap with Lynn's procedure for perforated sigmoid colon 12/6/2022.  Pathology with metastatic lobular breast carcinoma    Status post port placement 1/9/2023    She comes in for follow-up.  She has had some right mid abdominal pain intermittent described as a jabbing pain.  Occasional pain with palpation.  No history of the same.  This has been happening for 2 to 3 weeks.  No nausea vomiting.  No fever.  No relationship to food.  Her ostomy is functioning well with no leakage.      History:  Past Medical History:   Diagnosis Date   • Abdominal pain     OSTOMY SITE   • Allergic rhinitis    • Anemia     NO S/S   • Anxiety    • Arteriosclerosis     Coronary   • Arthritis    • Bone metastases (HCC) 10/14/2022   • Bowen's disease     SKIN CANCER   • Breast cancer (HCC)     NO SURGERY WAS DONE DUE TO METS TO BONE/COLON/LYMPHNODE   • Cancer related pain 10/13/2022   • Depression    • Disorder associated with Helicobacter species 10/12/2022   • Dysphoric mood    • Fatigue    • Frequent urination     NO S/S INFECTION   • Herpes simplex vulvovaginitis 7/26/2018   • History of colon polyps    • History of IBS    • History of  kidney stones    • Hyperlipidemia    • Hypertension    • Hypothyroidism    • Insomnia    • Lumbago    • Mood disorder (HCC)    • Neck pain     R/T CANCER   • Palpitations     ASYMPTOMATIC,  NO CARDIOLOGIST   • Precordial pain     R/T SPINE CANCER   • Sleep disturbance        Past Surgical History:   Procedure Laterality Date   • BREAST BIOPSY Left    • CARDIAC CATHETERIZATION Left 1960   • CARDIAC CATHETERIZATION N/A 04/06/2018    Procedure: Coronary angiography;  Surgeon: Art Licea MD;  Location: [facility] CATH INVASIVE LOCATION;  Service: Cardiovascular   • CARDIAC CATHETERIZATION N/A 04/06/2018    Procedure: Left heart cath;  Surgeon: Art Licea MD;  Location: [facility] CATH INVASIVE LOCATION;  Service: Cardiovascular   • CARDIAC CATHETERIZATION N/A 04/06/2018    Procedure: Left ventriculography;  Surgeon: Art Licea MD;  Location: [facility] CATH INVASIVE LOCATION;  Service: Cardiovascular   • CHOLECYSTECTOMY     • COLON SURGERY      colostomy bag   • COLONOSCOPY  11/08/2006   • EXPLORATORY LAPAROTOMY N/A 12/06/2022    Procedure: LAPAROTOMY EXPLORATORY sigmoid resection hartmans procedure colostomy;  Surgeon: Alfred Castañeda MD;  Location: [facility] MAIN OR;  Service: General;  Laterality: N/A;   • GANGLION CYST EXCISION     • HYSTERECTOMY  05/2005   • LAPAROSCOPIC GASTRIC BANDING      BAND REMOVED 2020   • TONSILLECTOMY     • VENOUS ACCESS DEVICE (PORT) INSERTION N/A 1/9/2023    Procedure: INSERTION VENOUS ACCESS DEVICE;  Surgeon: Alfred Castañeda MD;  Location: [facility] MAIN OR;  Service: General;  Laterality: N/A;       Family History   Problem Relation Age of Onset   • Hypertension Mother    • Rheum arthritis Mother    • Heart disease Mother    • Breast cancer Mother    • Diabetes Father    • Cancer Maternal Grandmother         colon   • Colon cancer Maternal Grandmother    • Aneurysm Paternal Grandfather    • Diabetes Other    • Fibromyalgia Other    • Malig Hyperthermia Neg  Hx        Social History     Tobacco Use   • Smoking status: Every Day     Packs/day: 1.50     Years: 40.00     Pack years: 60.00     Types: Cigarettes     Start date: 1974   • Smokeless tobacco: Never   • Tobacco comments:     caffeine use 3 mt dews daily     INST PER ANESTHESIA PROTOCOL   Vaping Use   • Vaping Use: Never used   Substance Use Topics   • Alcohol use: No   • Drug use: Yes     Types: Marijuana     Comment: Prescribed Lorazepam/OCC MARIJUANA       Review of Systems  All systems were reviewed and negative except for:   Review of Systems   Constitutional: Negative for chills, fever and unexpected weight loss.   HENT: Negative for congestion, nosebleeds and voice change.    Eyes: Negative for blurred vision, double vision and discharge.   Respiratory: Negative for apnea, chest tightness and shortness of breath.    Cardiovascular: Negative for chest pain and leg swelling.   Gastrointestinal:        See HPI   Endocrine: Negative for cold intolerance and heat intolerance.   Genitourinary: Negative for dysuria, hematuria and urgency.   Musculoskeletal: Negative for back pain, joint swelling and neck pain.   Skin: Negative for color change and dry skin.   Neurological: Negative for dizziness and confusion.   Hematological: Negative for adenopathy.   Psychiatric/Behavioral: Negative for agitation and behavioral problems.     MEDS:  Prior to Admission medications    Medication Sig Start Date End Date Taking? Authorizing Provider   atorvastatin (LIPITOR) 20 MG tablet TAKE 1 TABLET BY MOUTH EVERY DAY  Patient taking differently: Take 1 tablet by mouth Daily. 10/1/19  Yes Yudelka Manning MD   baclofen (LIORESAL) 20 MG tablet TAKE 1 TABLET BY MOUTH THREE TIMES DAILY  Patient taking differently: Take 10 mg by mouth 3 (Three) Times a Day As Needed. 12/6/19  Yes Yudelka Manning MD   donepezil (ARICEPT) 10 MG tablet Take 1 tablet by mouth Daily. 10/28/22  Yes ProviderBessie MD   gabapentin (NEURONTIN) 600  MG tablet TAKE 1 TABLET BY MOUTH AT BEDTIME  Patient taking differently: 300 mg 2 (Two) Times a Day As Needed. TAKES1/2  MG TABLET 7/30/20  Yes Yudelka Manning MD   letrozole (FEMARA) 2.5 MG tablet Take 1 tablet by mouth Daily. 11/30/22  Yes Donald Barker MD   levothyroxine (SYNTHROID, LEVOTHROID) 50 MCG tablet TAKE 1 TABLET BY MOUTH EVERY DAY 7/19/19  Yes Yudelka Manning MD   LORazepam (ATIVAN) 0.5 MG tablet Take 1 tablet by mouth Every 6 (Six) Hours As Needed.   Yes Bessie Lopez MD   losartan (COZAAR) 100 MG tablet Take 1 tablet by mouth Daily. INST PER ANESTHESIA PROTOCOL   Yes Bessie Lopez MD   montelukast (SINGULAIR) 10 MG tablet Take 1 tablet by mouth Daily. 1/17/22  Yes Bessie Lopez MD   Morphine (MS CONTIN) 30 MG 12 hr tablet Take 1 tablet by mouth 2 (Two) Times a Day. 2/21/23  Yes Donald Barker MD   naproxen (NAPROSYN) 500 MG tablet Take 1 tablet by mouth 2 (Two) Times a Day With Meals. LAST DOSE 1/5/23   Yes Bessie Lopez MD   ondansetron (ZOFRAN) 8 MG tablet Take 1 tablet by mouth 3 (Three) Times a Day As Needed for Nausea or Vomiting. 10/13/22  Yes Donald Barker MD   oxyCODONE-acetaminophen (Percocet)  MG per tablet Take 1 tablet by mouth Every 6 (Six) Hours As Needed for Moderate Pain for up to 8 doses. 2/10/23  Yes Donald Barker MD   polyethylene glycol (MIRALAX) 17 g packet Take 17 g by mouth Daily. 1/9/23  Yes Alfred Castañeda MD   ribociclib succinate 200 MG tablet therapy pack tablet Take 3 tablets by mouth Take As Directed. Take 3 tablets by mouth daily for 21 days then off 7 days on a 28 day cycle. 2/7/23  Yes Donald Barker MD   rOPINIRole (REQUIP) 3 MG tablet Take 1 tablet by mouth 2 (Two) Times a Day. 6/29/22  Yes Bessie Lopez MD   solifenacin (VESICARE) 10 MG tablet Take 1 tablet by mouth Daily for 360 days. 10/25/22 10/20/23 Yes Destinee Myers MD   SUMAtriptan (IMITREX) 100 MG tablet Take 1 tablet by  "mouth 1 (One) Time As Needed. 10/7/22  Yes Provider, MD Bessie   traZODone (DESYREL) 150 MG tablet TAKE 1 TABLET BY MOUTH ONCE nightly 11/12/19  Yes Yudelka Manning MD   venlafaxine XR (EFFEXOR-XR) 150 MG 24 hr capsule Take 1 capsule by mouth Daily. 11/21/22  Yes Donald Barker MD        Allergies:  Azithromycin    Objective     Vital Signs        Physical Exam:     General Appearance:    Alert, cooperative, in no acute distress   Head:    Normocephalic, without obvious abnormality, atraumatic   Eyes:          Conjunctivae and sclerae normal, no icterus,     Ears:    Ears appear intact with no abnormalities noted   Throat:   No oral lesions, no thrush, oral mucosa moist   Neck:   No adenopathy, supple, trachea midline, no thyromegaly   Back:     No kyphosis present, no scoliosis present, no skin lesions,      erythema or scars, no tenderness to percussion or                   palpation,   range of motion normal   Lungs:     Clear to auscultation,respirations regular, even and                  unlabored    Heart:    Regular rhythm and normal rate, normal S1 and S2, no            murmur, no gallop, no rub, no click   Chest Wall:    No abnormalities observed   Abdomen:     Normal bowel sounds, no masses, no organomegaly, soft        minimal tenderness right lateral abdomen with no obvious mass or hernia, non-distended, no guarding, no rebound                tenderness   Rectal:        Extremities:   Moves all extremities well, no edema, no cyanosis, no             redness   Pulses:   Pulses palpable and equal bilaterally   Skin:   No bleeding, bruising or rash   Lymph nodes:   No palpable adenopathy   Neurologic:   A/o x 4 with no deficits       Results Review:   {Results Review:[id number]::\"I reviewed the patient's new clinical results.\"    LABS/IMAGING:  Results for orders placed or performed during the hospital encounter of 02/21/23   Comprehensive metabolic panel    Specimen: Blood   Result Value Ref Range "    Glucose 95 65 - 99 mg/dL    BUN 13 8 - 23 mg/dL    Creatinine 0.73 0.57 - 1.00 mg/dL    Sodium 142 136 - 145 mmol/L    Potassium 3.5 3.5 - 5.2 mmol/L    Chloride 107 98 - 107 mmol/L    CO2 28.4 22.0 - 29.0 mmol/L    Calcium 8.6 8.6 - 10.5 mg/dL    Total Protein 6.6 6.0 - 8.5 g/dL    Albumin 4.0 3.5 - 5.2 g/dL    ALT (SGPT) 11 1 - 33 U/L    AST (SGOT) 13 1 - 32 U/L    Alkaline Phosphatase 84 39 - 117 U/L    Total Bilirubin 0.5 0.0 - 1.2 mg/dL    Globulin 2.6 gm/dL    A/G Ratio 1.5 g/dL    BUN/Creatinine Ratio 17.8 7.0 - 25.0    Anion Gap 6.6 5.0 - 15.0 mmol/L    eGFR 92.5 >60.0 mL/min/1.73   Magnesium    Specimen: Blood   Result Value Ref Range    Magnesium 1.9 1.6 - 2.4 mg/dL   Phosphorus    Specimen: Blood   Result Value Ref Range    Phosphorus 3.2 2.5 - 4.5 mg/dL   CBC Auto Differential    Specimen: Blood   Result Value Ref Range    WBC 4.87 3.40 - 10.80 10*3/mm3    RBC 2.88 (L) 3.77 - 5.28 10*6/mm3    Hemoglobin 9.5 (L) 12.0 - 15.9 g/dL    Hematocrit 29.7 (L) 34.0 - 46.6 %    .1 (H) 79.0 - 97.0 fL    MCH 33.0 26.6 - 33.0 pg    MCHC 32.0 31.5 - 35.7 g/dL    RDW 17.5 (H) 12.3 - 15.4 %    RDW-SD 67.4 (H) 37.0 - 54.0 fl    MPV 10.2 6.0 - 12.0 fL    Platelets 219 140 - 450 10*3/mm3    Neutrophil % 67.2 42.7 - 76.0 %    Lymphocyte % 26.5 19.6 - 45.3 %    Monocyte % 4.9 (L) 5.0 - 12.0 %    Eosinophil % 0.8 0.3 - 6.2 %    Basophil % 0.4 0.0 - 1.5 %    Immature Grans % 0.2 0.0 - 0.5 %    Neutrophils, Absolute 3.27 1.70 - 7.00 10*3/mm3    Lymphocytes, Absolute 1.29 0.70 - 3.10 10*3/mm3    Monocytes, Absolute 0.24 0.10 - 0.90 10*3/mm3    Eosinophils, Absolute 0.04 0.00 - 0.40 10*3/mm3    Basophils, Absolute 0.02 0.00 - 0.20 10*3/mm3    Immature Grans, Absolute 0.01 0.00 - 0.05 10*3/mm3   Manual Differential    Specimen: Blood   Result Value Ref Range    Neutrophil % 85.0 (H) 42.7 - 76.0 %    Lymphocyte % 13.0 (L) 19.6 - 45.3 %    Monocyte % 1.0 (L) 5.0 - 12.0 %    Promyelocyte % 1.0 (H) 0.0 - 0.0 %    Neutrophils  Absolute 4.14 1.70 - 7.00 10*3/mm3    Lymphocytes Absolute 0.63 (L) 0.70 - 3.10 10*3/mm3    Monocytes Absolute 0.05 (L) 0.10 - 0.90 10*3/mm3    Anisocytosis Slight/1+ None Seen    Hypochromia Slight/1+ None Seen    Macrocytes Slight/1+ None Seen    WBC Morphology Normal Normal    Platelet Estimate Adequate Normal        Result Review :     Assessment & Plan     Right mid abdominal pain unknown etiology    Discussion with patient.  I recommended obtaining a CT abdomen and pelvis with IV contrast for further evaluation.  She is agreeable.  Orders placed.  We will call her with the results.  I explained her there is a chance of reversal of her colostomy approximately a year after her prior surgery.  All questions answered.  She agrees with the plan.  Thank for the consult.              This document has been electronically signed by Alfred Castañeda MD  March 6, 2023 12:59 EST

## 2023-03-07 ENCOUNTER — TELEPHONE (OUTPATIENT)
Dept: SURGERY | Facility: CLINIC | Age: 63
End: 2023-03-07
Payer: MEDICARE

## 2023-03-07 ENCOUNTER — HOSPITAL ENCOUNTER (OUTPATIENT)
Dept: CT IMAGING | Facility: HOSPITAL | Age: 63
Discharge: HOME | End: 2023-03-07
Admitting: SURGERY
Payer: MEDICARE

## 2023-03-07 DIAGNOSIS — R10.9 ABDOMINAL PAIN, UNSPECIFIED ABDOMINAL LOCATION: Primary | ICD-10-CM

## 2023-03-07 DIAGNOSIS — R10.9 ABDOMINAL PAIN, UNSPECIFIED ABDOMINAL LOCATION: ICD-10-CM

## 2023-03-07 PROCEDURE — 74177 CT ABD & PELVIS W/CONTRAST: CPT

## 2023-03-07 PROCEDURE — 0 IOHEXOL 300 MG/ML SOLUTION: Performed by: SURGERY

## 2023-03-07 RX ORDER — HEPARIN SODIUM (PORCINE) LOCK FLUSH IV SOLN 100 UNIT/ML 100 UNIT/ML
SOLUTION INTRAVENOUS
Status: DISCONTINUED
Start: 2023-03-07 | End: 2023-03-08 | Stop reason: HOSPADM

## 2023-03-07 RX ADMIN — IOHEXOL 100 ML: 300 INJECTION, SOLUTION INTRAVENOUS at 20:02

## 2023-03-07 NOTE — TELEPHONE ENCOUNTER
Was able to change order to stat per Dr. Castañeda, appt made for 3/7/23 at 7:30 with 7:15 arrival time at the hospital. Pt aware.

## 2023-03-07 NOTE — TELEPHONE ENCOUNTER
Called patient to let her know I was able to schedule ct scan for 4/3/23 at 5:00 p.m which was next available. Patient states shes having a lot of pain on the right side of her belly buttom and did not want to wait that long. Also stated she was having some draining/leaking of stool from the bottom of her stoma. Sending to Dr. Castañeda to advise.

## 2023-03-07 NOTE — TELEPHONE ENCOUNTER
Patient said Dr. Castañeda ordered a CT for her, and she has not been contacted yet to schedule it.

## 2023-03-08 ENCOUNTER — TELEPHONE (OUTPATIENT)
Dept: SURGERY | Facility: CLINIC | Age: 63
End: 2023-03-08
Payer: MEDICARE

## 2023-03-08 DIAGNOSIS — C50.912 MALIGNANT NEOPLASM OF LEFT BREAST IN FEMALE, ESTROGEN RECEPTOR POSITIVE, UNSPECIFIED SITE OF BREAST: ICD-10-CM

## 2023-03-08 DIAGNOSIS — Z17.0 MALIGNANT NEOPLASM OF LEFT BREAST IN FEMALE, ESTROGEN RECEPTOR POSITIVE, UNSPECIFIED SITE OF BREAST: ICD-10-CM

## 2023-03-08 RX ORDER — ONDANSETRON HYDROCHLORIDE 8 MG/1
TABLET, FILM COATED ORAL
Qty: 30 TABLET | Refills: 5 | Status: SHIPPED | OUTPATIENT
Start: 2023-03-08

## 2023-03-08 NOTE — TELEPHONE ENCOUNTER
Called and spoke with patient in regards to CT results per Dr. Castañeda, pt voiced understanding and wanted a f/u appt with ostomy questions. appt made for 3/16/23 @ 2:00 p.m.

## 2023-03-08 NOTE — TELEPHONE ENCOUNTER
----- Message from Alfred Castañeda MD sent at 3/8/2023  7:06 AM EST -----  Nothing to explain abdominal pain on CT - please call with results     ----- Message -----  From: Interface, Rad Donews In  Sent: 3/7/2023   8:27 PM EST  To: Alfred Castañeda MD

## 2023-03-10 ENCOUNTER — SPECIALTY PHARMACY (OUTPATIENT)
Dept: PHARMACY | Facility: HOSPITAL | Age: 63
End: 2023-03-10
Payer: MEDICARE

## 2023-03-10 NOTE — PROGRESS NOTES
Specialty Pharmacy Patient Management Program  Oncology Refill Outreach      Derek is a 62 y.o. female contacted today regarding refills of her medication(s).    Specialty medication(s) and dose(s) confirmed: Kisqali 600 mg/day (200 mg x 3) tablet. Sent to [facility]. Patient stated she will .     Other medications being refilled: N/A    Refill Questions    Flowsheet Row Most Recent Value   Changes to allergies? No   Changes to medications? No   New conditions since last clinic visit No   Unplanned office visit, urgent care, ED, or hospital admission in the last 4 weeks  No   How does patient/caregiver feel medication is working? Good   Financial problems or insurance changes  No   Since the previous refill, were any specialty medication doses or scheduled injections missed or delayed?  No   If yes, please provide the amount N/A   Why were doses missed? N/A          Delivery Questions    Flowsheet Row Most Recent Value   Delivery method  at Pharmacy   Delivery address correct? Yes   Delivery phone number [phone]   Number of medications in delivery 1   Medication being filled and delivered Kisqali 600 mg/day (200 mg x 3) tablet   Doses left of specialty medications Took last one today. Now on the 7 days off   Is there any medication that is due not being filled? No   Supplies needed? No supplies needed   Cooler needed? No   Do any medications need mixed or dated? No   Copay form of payment Pay at pickup   Additional comments Zero copay   Questions or concerns for the pharmacist? No   Explain any questions or concerns for the pharmacist Patient stated she went to the ER and they gave her a steroid injection   Are any medications first time fills? No                 Follow-up: 21 days     Maria Luz Frazier  Care Coordinator, [facility]  3/10/2023  10:39 EST

## 2023-03-13 DIAGNOSIS — Z87.898 HISTORY OF DIFFICULT VENOUS ACCESS: ICD-10-CM

## 2023-03-14 ENCOUNTER — TELEPHONE (OUTPATIENT)
Dept: ONCOLOGY | Facility: HOSPITAL | Age: 63
End: 2023-03-14
Payer: MEDICARE

## 2023-03-14 RX ORDER — OXYCODONE AND ACETAMINOPHEN 10; 325 MG/1; MG/1
TABLET ORAL
Qty: 60 TABLET | Refills: 0 | Status: SHIPPED | OUTPATIENT
Start: 2023-03-14 | End: 2023-04-05

## 2023-03-14 NOTE — TELEPHONE ENCOUNTER
Caller: Derek Keller    Relationship to patient: Self    Best call back number: [phone]    Type of visit: LAB, F/U 1 & INJECTION     Requested date: CALL TO R/S NEEDS TO BE AFTER 11    If rescheduling, when is the original appointment: 3/29/2023    Additional notes:LAST INJECTION 2/21/2023

## 2023-03-16 ENCOUNTER — OFFICE VISIT (OUTPATIENT)
Dept: SURGERY | Facility: CLINIC | Age: 63
End: 2023-03-16
Payer: MEDICAID

## 2023-03-16 VITALS — BODY MASS INDEX: 28.22 KG/M2 | HEIGHT: 66 IN | WEIGHT: 175.6 LBS | RESPIRATION RATE: 14 BRPM

## 2023-03-16 DIAGNOSIS — C50.412 MALIGNANT NEOPLASM OF UPPER-OUTER QUADRANT OF LEFT BREAST IN FEMALE, ESTROGEN RECEPTOR POSITIVE: ICD-10-CM

## 2023-03-16 DIAGNOSIS — Z17.0 MALIGNANT NEOPLASM OF UPPER-OUTER QUADRANT OF LEFT BREAST IN FEMALE, ESTROGEN RECEPTOR POSITIVE: ICD-10-CM

## 2023-03-16 DIAGNOSIS — G89.3 CANCER RELATED PAIN: ICD-10-CM

## 2023-03-16 DIAGNOSIS — Z93.3 COLOSTOMY IN PLACE: Primary | ICD-10-CM

## 2023-03-16 PROCEDURE — 99024 POSTOP FOLLOW-UP VISIT: CPT | Performed by: SURGERY

## 2023-03-16 PROCEDURE — 1160F RVW MEDS BY RX/DR IN RCRD: CPT | Performed by: SURGERY

## 2023-03-16 PROCEDURE — 1159F MED LIST DOCD IN RCRD: CPT | Performed by: SURGERY

## 2023-03-16 RX ORDER — HYDROCODONE BITARTRATE AND ACETAMINOPHEN 5; 325 MG/1; MG/1
TABLET ORAL
COMMUNITY
Start: 2022-10-05 | End: 2023-03-21 | Stop reason: ALTCHOICE

## 2023-03-16 RX ORDER — LETROZOLE 2.5 MG/1
TABLET, FILM COATED ORAL
COMMUNITY
Start: 2022-09-14

## 2023-03-16 RX ORDER — MORPHINE SULFATE 30 MG/1
TABLET, FILM COATED, EXTENDED RELEASE ORAL
Qty: 60 TABLET | Refills: 0 | Status: SHIPPED | OUTPATIENT
Start: 2023-03-16 | End: 2023-03-21

## 2023-03-16 RX ORDER — HYDROCHLOROTHIAZIDE 12.5 MG/1
TABLET ORAL
COMMUNITY
Start: 2022-11-16

## 2023-03-17 NOTE — TELEPHONE ENCOUNTER
Caller: Derek Keller    Relationship to patient: Self    Best call back number: [phone]    Type of visit: LAB, F/U 2 & INJECTION     Requested date: 3/21/2023 ANOTHER TIME CALL TO R/S    If rescheduling, when is the original appointment: 3/21/2023

## 2023-03-21 ENCOUNTER — OFFICE VISIT (OUTPATIENT)
Dept: ONCOLOGY | Facility: HOSPITAL | Age: 63
End: 2023-03-21
Payer: MEDICAID

## 2023-03-21 ENCOUNTER — HOSPITAL ENCOUNTER (OUTPATIENT)
Dept: ONCOLOGY | Facility: HOSPITAL | Age: 63
Discharge: HOME | End: 2023-03-21
Payer: MEDICARE

## 2023-03-21 ENCOUNTER — HOSPITAL ENCOUNTER (OUTPATIENT)
Dept: INFUSION THERAPY | Facility: HOSPITAL | Age: 63
Discharge: HOME | End: 2023-03-21
Admitting: SURGERY
Payer: MEDICARE

## 2023-03-21 ENCOUNTER — LAB (OUTPATIENT)
Dept: ONCOLOGY | Facility: HOSPITAL | Age: 63
End: 2023-03-21
Payer: MEDICARE

## 2023-03-21 ENCOUNTER — TELEPHONE (OUTPATIENT)
Dept: ONCOLOGY | Facility: HOSPITAL | Age: 63
End: 2023-03-21
Payer: MEDICARE

## 2023-03-21 VITALS
SYSTOLIC BLOOD PRESSURE: 151 MMHG | HEART RATE: 70 BPM | BODY MASS INDEX: 28.36 KG/M2 | TEMPERATURE: 97.7 F | DIASTOLIC BLOOD PRESSURE: 55 MMHG | RESPIRATION RATE: 20 BRPM | WEIGHT: 175.71 LBS | OXYGEN SATURATION: 98 %

## 2023-03-21 VITALS
SYSTOLIC BLOOD PRESSURE: 151 MMHG | HEART RATE: 70 BPM | DIASTOLIC BLOOD PRESSURE: 55 MMHG | TEMPERATURE: 97.7 F | OXYGEN SATURATION: 98 % | RESPIRATION RATE: 20 BRPM

## 2023-03-21 DIAGNOSIS — C79.51 BONE METASTASES: ICD-10-CM

## 2023-03-21 DIAGNOSIS — C79.51 BONE METASTASES: Primary | ICD-10-CM

## 2023-03-21 DIAGNOSIS — C50.912 MALIGNANT NEOPLASM OF LEFT BREAST IN FEMALE, ESTROGEN RECEPTOR POSITIVE, UNSPECIFIED SITE OF BREAST: ICD-10-CM

## 2023-03-21 DIAGNOSIS — Z17.0 MALIGNANT NEOPLASM OF UPPER-OUTER QUADRANT OF LEFT BREAST IN FEMALE, ESTROGEN RECEPTOR POSITIVE: Primary | ICD-10-CM

## 2023-03-21 DIAGNOSIS — Z98.890 S/P EXPLORATORY LAPAROTOMY: Primary | ICD-10-CM

## 2023-03-21 DIAGNOSIS — Z17.0 MALIGNANT NEOPLASM OF LEFT BREAST IN FEMALE, ESTROGEN RECEPTOR POSITIVE, UNSPECIFIED SITE OF BREAST: ICD-10-CM

## 2023-03-21 DIAGNOSIS — C50.412 MALIGNANT NEOPLASM OF UPPER-OUTER QUADRANT OF LEFT BREAST IN FEMALE, ESTROGEN RECEPTOR POSITIVE: Primary | ICD-10-CM

## 2023-03-21 DIAGNOSIS — Z45.2 ENCOUNTER FOR ADJUSTMENT OR MANAGEMENT OF VASCULAR ACCESS DEVICE: ICD-10-CM

## 2023-03-21 DIAGNOSIS — G89.3 CANCER RELATED PAIN: ICD-10-CM

## 2023-03-21 LAB
ALBUMIN SERPL-MCNC: 4 G/DL (ref 3.5–5.2)
ALBUMIN/GLOB SERPL: 1.7 G/DL
ALP SERPL-CCNC: 75 U/L (ref 39–117)
ALT SERPL W P-5'-P-CCNC: 9 U/L (ref 1–33)
ANION GAP SERPL CALCULATED.3IONS-SCNC: 8.9 MMOL/L (ref 5–15)
AST SERPL-CCNC: 12 U/L (ref 1–32)
BASOPHILS # BLD AUTO: 0.02 10*3/MM3 (ref 0–0.2)
BASOPHILS NFR BLD AUTO: 0.3 % (ref 0–1.5)
BILIRUB SERPL-MCNC: 0.3 MG/DL (ref 0–1.2)
BUN SERPL-MCNC: 12 MG/DL (ref 8–23)
BUN/CREAT SERPL: 19 (ref 7–25)
CALCIUM SPEC-SCNC: 8.9 MG/DL (ref 8.6–10.5)
CHLORIDE SERPL-SCNC: 103 MMOL/L (ref 98–107)
CO2 SERPL-SCNC: 28.1 MMOL/L (ref 22–29)
CREAT SERPL-MCNC: 0.63 MG/DL (ref 0.57–1)
DEPRECATED RDW RBC AUTO: 65.6 FL (ref 37–54)
EGFRCR SERPLBLD CKD-EPI 2021: 99.8 ML/MIN/1.73
EOSINOPHIL # BLD AUTO: 0.03 10*3/MM3 (ref 0–0.4)
EOSINOPHIL NFR BLD AUTO: 0.5 % (ref 0.3–6.2)
ERYTHROCYTE [DISTWIDTH] IN BLOOD BY AUTOMATED COUNT: 16.5 % (ref 12.3–15.4)
GLOBULIN UR ELPH-MCNC: 2.4 GM/DL
GLUCOSE SERPL-MCNC: 116 MG/DL (ref 65–99)
HCT VFR BLD AUTO: 32.6 % (ref 34–46.6)
HGB BLD-MCNC: 10.4 G/DL (ref 12–15.9)
IMM GRANULOCYTES # BLD AUTO: 0.01 10*3/MM3 (ref 0–0.05)
IMM GRANULOCYTES NFR BLD AUTO: 0.2 % (ref 0–0.5)
LYMPHOCYTES # BLD AUTO: 1.42 10*3/MM3 (ref 0.7–3.1)
LYMPHOCYTES NFR BLD AUTO: 22.7 % (ref 19.6–45.3)
MAGNESIUM SERPL-MCNC: 2 MG/DL (ref 1.6–2.4)
MCH RBC QN AUTO: 33.7 PG (ref 26.6–33)
MCHC RBC AUTO-ENTMCNC: 31.9 G/DL (ref 31.5–35.7)
MCV RBC AUTO: 105.5 FL (ref 79–97)
MONOCYTES # BLD AUTO: 0.55 10*3/MM3 (ref 0.1–0.9)
MONOCYTES NFR BLD AUTO: 8.8 % (ref 5–12)
NEUTROPHILS NFR BLD AUTO: 4.22 10*3/MM3 (ref 1.7–7)
NEUTROPHILS NFR BLD AUTO: 67.5 % (ref 42.7–76)
PHOSPHATE SERPL-MCNC: 3.1 MG/DL (ref 2.5–4.5)
PLAT MORPH BLD: NORMAL
PLATELET # BLD AUTO: 208 10*3/MM3 (ref 140–450)
PMV BLD AUTO: 9.6 FL (ref 6–12)
POTASSIUM SERPL-SCNC: 3.4 MMOL/L (ref 3.5–5.2)
PROT SERPL-MCNC: 6.4 G/DL (ref 6–8.5)
RBC # BLD AUTO: 3.09 10*6/MM3 (ref 3.77–5.28)
RBC MORPH BLD: NORMAL
SODIUM SERPL-SCNC: 140 MMOL/L (ref 136–145)
WBC MORPH BLD: NORMAL
WBC NRBC COR # BLD: 6.25 10*3/MM3 (ref 3.4–10.8)

## 2023-03-21 PROCEDURE — 25010000002 HEPARIN LOCK FLUSH PER 10 UNITS: Performed by: INTERNAL MEDICINE

## 2023-03-21 PROCEDURE — 85025 COMPLETE CBC W/AUTO DIFF WBC: CPT | Performed by: INTERNAL MEDICINE

## 2023-03-21 PROCEDURE — 99214 OFFICE O/P EST MOD 30 MIN: CPT | Performed by: INTERNAL MEDICINE

## 2023-03-21 PROCEDURE — 36591 DRAW BLOOD OFF VENOUS DEVICE: CPT

## 2023-03-21 PROCEDURE — 3077F SYST BP >= 140 MM HG: CPT | Performed by: INTERNAL MEDICINE

## 2023-03-21 PROCEDURE — 25010000002 DENOSUMAB 120 MG/1.7ML SOLUTION: Performed by: INTERNAL MEDICINE

## 2023-03-21 PROCEDURE — 85007 BL SMEAR W/DIFF WBC COUNT: CPT | Performed by: INTERNAL MEDICINE

## 2023-03-21 PROCEDURE — 3078F DIAST BP <80 MM HG: CPT | Performed by: INTERNAL MEDICINE

## 2023-03-21 PROCEDURE — 83735 ASSAY OF MAGNESIUM: CPT | Performed by: INTERNAL MEDICINE

## 2023-03-21 PROCEDURE — 1125F AMNT PAIN NOTED PAIN PRSNT: CPT | Performed by: INTERNAL MEDICINE

## 2023-03-21 PROCEDURE — 1160F RVW MEDS BY RX/DR IN RCRD: CPT | Performed by: INTERNAL MEDICINE

## 2023-03-21 PROCEDURE — 96372 THER/PROPH/DIAG INJ SC/IM: CPT

## 2023-03-21 PROCEDURE — G0463 HOSPITAL OUTPT CLINIC VISIT: HCPCS

## 2023-03-21 PROCEDURE — 84100 ASSAY OF PHOSPHORUS: CPT | Performed by: INTERNAL MEDICINE

## 2023-03-21 PROCEDURE — 80053 COMPREHEN METABOLIC PANEL: CPT | Performed by: INTERNAL MEDICINE

## 2023-03-21 PROCEDURE — 1159F MED LIST DOCD IN RCRD: CPT | Performed by: INTERNAL MEDICINE

## 2023-03-21 RX ORDER — METHYLPREDNISOLONE 4 MG/1
1 TABLET ORAL ONCE
Qty: 21 TABLET | Refills: 0 | Status: SHIPPED | OUTPATIENT
Start: 2023-03-21 | End: 2023-03-21

## 2023-03-21 RX ORDER — HEPARIN SODIUM (PORCINE) LOCK FLUSH IV SOLN 100 UNIT/ML 100 UNIT/ML
500 SOLUTION INTRAVENOUS AS NEEDED
Status: DISCONTINUED | OUTPATIENT
Start: 2023-03-21 | End: 2023-03-22 | Stop reason: HOSPADM

## 2023-03-21 RX ORDER — SODIUM CHLORIDE 0.9 % (FLUSH) 0.9 %
20 SYRINGE (ML) INJECTION AS NEEDED
Status: DISCONTINUED | OUTPATIENT
Start: 2023-03-21 | End: 2023-03-22 | Stop reason: HOSPADM

## 2023-03-21 RX ORDER — MORPHINE SULFATE 15 MG/1
45 TABLET, FILM COATED, EXTENDED RELEASE ORAL 2 TIMES DAILY
Qty: 180 TABLET | Refills: 0 | Status: SHIPPED | OUTPATIENT
Start: 2023-03-21

## 2023-03-21 RX ORDER — HEPARIN SODIUM (PORCINE) LOCK FLUSH IV SOLN 100 UNIT/ML 100 UNIT/ML
500 SOLUTION INTRAVENOUS AS NEEDED
OUTPATIENT
Start: 2023-03-21

## 2023-03-21 RX ORDER — SODIUM CHLORIDE 0.9 % (FLUSH) 0.9 %
20 SYRINGE (ML) INJECTION AS NEEDED
OUTPATIENT
Start: 2023-03-21

## 2023-03-21 RX ADMIN — DENOSUMAB 120 MG: 120 INJECTION SUBCUTANEOUS at 15:03

## 2023-03-21 RX ADMIN — HEPARIN SODIUM (PORCINE) LOCK FLUSH IV SOLN 100 UNIT/ML 500 UNITS: 100 SOLUTION at 13:48

## 2023-03-21 RX ADMIN — Medication 20 ML: at 13:48

## 2023-03-21 NOTE — PROGRESS NOTES
Chief Complaint  Breast Cancer    Haile Monique MD    Subjective          Derek Keller presents to [facility]EMAT[facility]Y for ongoing treatment of her metastatic breast cancer.  She is on letrozole, ribociclib, denosumab for bony involvement.  She is taking her medications as prescribed.  She denies any issues or side effects.  She continues to have a lot of pain especially in the low back and legs related to bony involvement.  She is on denosumab.  She denies dental or jaw pain.  She takes MS Contin and oxycodone for breakthrough.  She states that her current dose is not controlling her pain.    Oncology/Hematology History   Malignant neoplasm of left breast in female, estrogen receptor positive (HCC)   10/13/2022 Initial Diagnosis    Malignant neoplasm of left breast in female, estrogen receptor positive (HCC)     10/14/2022 -  Chemotherapy    OP BREAST Letrozole / Ribociclib     10/14/2022 Cancer Staged    Staging form: Breast, AJCC 8th Edition  - Clinical: Stage IV (cT1c, cN1, cM1, ER+, KY+, HER2-) - Signed by Donald Barker MD on 10/14/2022     10/28/2022 -  Chemotherapy    OP SUPPORTIVE Denosumab (Xgeva) Q28D     Bone metastases (HCC)   10/14/2022 Initial Diagnosis    Bone metastases (HCC)     10/28/2022 -  Chemotherapy    OP SUPPORTIVE Denosumab (Xgeva) Q28D     Secondary malignant neoplasm of bone (HCC)   11/14/2022 Initial Diagnosis    Secondary malignant neoplasm of bone (HCC)     11/17/2022 -  Radiation    RADIATION THERAPY Treatment Details (Noted on 11/14/2022)  Site: Spine - Lumbar  Technique: 3D CRT  Goal: No goal specified  Planned Treatment Start Date: 11/17/2022         Review of Systems   Constitutional: Positive for fatigue. Negative for appetite change, diaphoresis, fever, unexpected weight gain and unexpected weight loss.   HENT: Negative for hearing loss, mouth sores, sore throat, swollen glands, trouble swallowing and voice change.    Eyes: Negative for  blurred vision.   Respiratory: Negative for cough, shortness of breath and wheezing.    Cardiovascular: Negative for chest pain and palpitations.   Gastrointestinal: Positive for abdominal pain (SORE NEXT TO STOMA). Negative for blood in stool, constipation, diarrhea, nausea and vomiting.   Endocrine: Negative for cold intolerance and heat intolerance.   Genitourinary: Negative for difficulty urinating, dysuria, frequency, hematuria and urinary incontinence.   Musculoskeletal: Positive for back pain (SCIATICA R SIDE). Negative for arthralgias and myalgias.   Skin: Negative for rash, skin lesions and wound.   Neurological: Negative for dizziness, seizures, weakness, numbness and headache.   Hematological: Does not bruise/bleed easily.   Psychiatric/Behavioral: Negative for depressed mood. The patient is not nervous/anxious.      Current Outpatient Medications on File Prior to Visit   Medication Sig Dispense Refill   • atorvastatin (LIPITOR) 20 MG tablet TAKE 1 TABLET BY MOUTH EVERY DAY (Patient taking differently: Take 1 tablet by mouth Daily.) 30 tablet 6   • baclofen (LIORESAL) 20 MG tablet TAKE 1 TABLET BY MOUTH THREE TIMES DAILY (Patient taking differently: Take 10 mg by mouth 3 (Three) Times a Day As Needed.) 90 tablet 1   • donepezil (ARICEPT) 10 MG tablet Take 1 tablet by mouth Daily.     • gabapentin (NEURONTIN) 600 MG tablet TAKE 1 TABLET BY MOUTH AT BEDTIME (Patient taking differently: 300 mg 2 (Two) Times a Day As Needed. TAKES1/2  MG TABLET) 90 tablet 0   • hydroCHLOROthiazide (HYDRODIURIL) 12.5 MG tablet 1 tablet DAILY (route: oral)     • letrozole (FEMARA) 2.5 MG tablet Take 1 tablet by mouth Daily. 90 tablet 1   • letrozole (FEMARA) 2.5 MG tablet      • levothyroxine (SYNTHROID, LEVOTHROID) 50 MCG tablet TAKE 1 TABLET BY MOUTH EVERY DAY 90 tablet 1   • LORazepam (ATIVAN) 0.5 MG tablet Take 1 tablet by mouth Every 6 (Six) Hours As Needed.     • losartan (COZAAR) 100 MG tablet Take 1 tablet by  mouth Daily. INST PER ANESTHESIA PROTOCOL     • montelukast (SINGULAIR) 10 MG tablet Take 1 tablet by mouth Daily.     • naproxen (NAPROSYN) 500 MG tablet Take 1 tablet by mouth 2 (Two) Times a Day With Meals. LAST DOSE 1/5/23     • ondansetron (ZOFRAN) 8 MG tablet TAKE 1 TABLET BY MOUTH THREE TIMES DAILY AS NEEDED FOR NAUSEA AND VOMITING 30 tablet 5   • oxyCODONE-acetaminophen (PERCOCET)  MG per tablet TAKE 1 TABLET BY MOUTH EVERY 6 HOURS AS NEEDED for moderate pain 60 tablet 0   • polyethylene glycol (MIRALAX) 17 g packet Take 17 g by mouth Daily. 5 packet 0   • Ribociclib Succinate (KISQALI 600 DOSE PO) Per instructions AS DIRECTED (route: oral)     • ribociclib succinate 200 MG tablet therapy pack tablet Take 3 tablets by mouth Take As Directed. Take 3 tablets by mouth daily for 21 days then off 7 days on a 28 day cycle. 63 tablet 5   • rOPINIRole (REQUIP) 3 MG tablet Take 1 tablet by mouth 2 (Two) Times a Day.     • solifenacin (VESICARE) 10 MG tablet Take 1 tablet by mouth Daily for 360 days. 90 tablet 3   • SUMAtriptan (IMITREX) 100 MG tablet Take 1 tablet by mouth 1 (One) Time As Needed.     • traZODone (DESYREL) 150 MG tablet TAKE 1 TABLET BY MOUTH ONCE nightly 90 tablet 2   • venlafaxine XR (EFFEXOR-XR) 150 MG 24 hr capsule Take 1 capsule by mouth Daily. 90 capsule 1   • [DISCONTINUED] HYDROcodone-acetaminophen (NORCO) 5-325 MG per tablet 1 tablet EVERY 6 HOURS (route: oral)     • [DISCONTINUED] Morphine (MS CONTIN) 30 MG 12 hr tablet TAKE 1 TABLET BY MOUTH TWICE DAILY **for pain 60 tablet 0     No current facility-administered medications on file prior to visit.       Allergies   Allergen Reactions   • Azithromycin Itching     Past Medical History:   Diagnosis Date   • Abdominal pain     OSTOMY SITE   • Allergic rhinitis    • Anemia     NO S/S   • Anxiety    • Arteriosclerosis     Coronary   • Arthritis    • Bone metastases (HCC) 10/14/2022   • Bowen's disease     SKIN CANCER   • Breast cancer (HCC)      NO SURGERY WAS DONE DUE TO METS TO BONE/COLON/LYMPHNODE   • Cancer related pain 10/13/2022   • Depression    • Disorder associated with Helicobacter species 10/12/2022   • Dysphoric mood    • Fatigue    • Frequent urination     NO S/S INFECTION   • Herpes simplex vulvovaginitis 7/26/2018   • History of colon polyps    • History of IBS    • History of kidney stones    • Hyperlipidemia    • Hypertension    • Hypothyroidism    • Insomnia    • Lumbago    • Mood disorder (HCC)    • Neck pain     R/T CANCER   • Palpitations     ASYMPTOMATIC,  NO CARDIOLOGIST   • Precordial pain     R/T SPINE CANCER   • Sleep disturbance      Past Surgical History:   Procedure Laterality Date   • BREAST BIOPSY Left    • CARDIAC CATHETERIZATION Left 1959   • CARDIAC CATHETERIZATION N/A 04/06/2018    Procedure: Coronary angiography;  Surgeon: Art Licea MD;  [facility];  Service: Cardiovascular   • CARDIAC CATHETERIZATION N/A 04/06/2018    Procedure: Left heart cath;  Surgeon: Art Licea MD;  [facility];  Service: Cardiovascular   • CARDIAC CATHETERIZATION N/A 04/06/2018    Procedure: Left ventriculography;  Surgeon: Art Licea MD;  Location:  [facility] CATH INVASIVE LOCATION;  Service: Cardiovascular   • CHOLECYSTECTOMY     • COLON SURGERY      colostomy bag   • COLONOSCOPY  11/08/2006   • EXPLORATORY LAPAROTOMY N/A 12/06/2022    Procedure: LAPAROTOMY EXPLORATORY sigmoid resection hartmans procedure colostomy;  Surgeon: Alfred Castañeda MD;  Location: [facility]  Service: General;  Laterality: N/A;   • GANGLION CYST EXCISION     • HYSTERECTOMY  05/2005   • LAPAROSCOPIC GASTRIC BANDING      BAND REMOVED 2020   • TONSILLECTOMY     • VENOUS ACCESS DEVICE (PORT) INSERTION N/A 1/9/2023    Procedure: INSERTION VENOUS ACCESS DEVICE;  Surgeon: Alfred Castañeda MD;  Location: [facility] MAIN OR;  Service: General;  Laterality: N/A;     Social  History     Socioeconomic History   • Marital status:    Tobacco Use   • Smoking status: Every Day     Packs/day: 1.50     Years: 40.00     Pack years: 60.00     Types: Cigarettes     Start date: 1974   • Smokeless tobacco: Never   • Tobacco comments:     caffeine use 3 mt dews daily     INST PER ANESTHESIA PROTOCOL   Vaping Use   • Vaping Use: Never used   Substance and Sexual Activity   • Alcohol use: No   • Drug use: Yes     Types: Marijuana     Comment: Prescribed Lorazepam/OCC MARIJUANA   • Sexual activity: Defer     Partners: Male     Birth control/protection: None     Family History   Problem Relation Age of Onset   • Hypertension Mother    • Rheum arthritis Mother    • Heart disease Mother    • Breast cancer Mother    • Diabetes Father    • Cancer Maternal Grandmother         colon   • Colon cancer Maternal Grandmother    • Aneurysm Paternal Grandfather    • Diabetes Other    • Fibromyalgia Other    • Malig Hyperthermia Neg Hx        Objective   Physical Exam  Vitals reviewed. Exam conducted with a chaperone present.   Constitutional:       General: She is not in acute distress.     Appearance: Normal appearance.   Cardiovascular:      Rate and Rhythm: Normal rate and regular rhythm.      Heart sounds: Normal heart sounds. No murmur heard.    No gallop.   Pulmonary:      Effort: Pulmonary effort is normal.      Breath sounds: Normal breath sounds.   Abdominal:      General: Abdomen is flat. Bowel sounds are normal.      Palpations: Abdomen is soft.   Musculoskeletal:      Cervical back: Neck supple.      Right lower leg: No edema.      Left lower leg: No edema.      Comments: Left upper extremity lymphedema   Lymphadenopathy:      Cervical: No cervical adenopathy.   Neurological:      Mental Status: She is alert and oriented to person, place, and time.   Psychiatric:         Mood and Affect: Mood normal.         Behavior: Behavior normal.         Vitals:    03/21/23 1343   BP: 151/55   Pulse: 70    Resp: 20   Temp: 97.7 °F (36.5 °C)   TempSrc: Temporal   SpO2: 98%   Weight: 79.7 kg (175 lb 11.3 oz)   PainSc:   7   PainLoc: Back     ECOG score: 1         PHQ-9 Total Score:                    Result Review :   The following data was reviewed by: Donald Barker MD on 03/21/2023:  Lab Results   Component Value Date    HGB 10.4 (L) 03/21/2023    HCT 32.6 (L) 03/21/2023    .5 (H) 03/21/2023     03/21/2023    WBC 6.25 03/21/2023    NEUTROABS 4.22 03/21/2023    LYMPHSABS 1.42 03/21/2023    MONOSABS 0.55 03/21/2023    EOSABS 0.03 03/21/2023    BASOSABS 0.02 03/21/2023     Lab Results   Component Value Date    GLUCOSE 116 (H) 03/21/2023    BUN 12 03/21/2023    CREATININE 0.63 03/21/2023     03/21/2023    K 3.4 (L) 03/21/2023     03/21/2023    CO2 28.1 03/21/2023    CALCIUM 8.9 03/21/2023    PROTEINTOT 6.4 03/21/2023    ALBUMIN 4.0 03/21/2023    BILITOT 0.3 03/21/2023    ALKPHOS 75 03/21/2023    AST 12 03/21/2023    ALT 9 03/21/2023     Lab Results   Component Value Date    MG 2.0 03/21/2023    PHOS 3.1 03/21/2023    FREET4 1.01 01/17/2022    TSH 1.470 11/12/2019     No results found for: IRON, LABIRON, TRANSFERRIN, TIBC  Lab Results   Component Value Date    EQWLDRII10 390 04/23/2019    FOLATE 9.93 04/23/2019     No results found for: PSA, CEA, AFP, ,     Data reviewed: Radiologic studies CT abdomen pelvis reviewed      Assessment and Plan    Diagnoses and all orders for this visit:    1. Malignant neoplasm of upper-outer quadrant of left breast in female, estrogen receptor positive (HCC) (Primary)  Assessment & Plan:  Metastatic.  Patient is on palliative treatment with letrozole plus ribociclib along with denosumab for bony involvement.  Tolerating her treatments well.  She had recent scans with her surgeon that showed stability of the abdomen and pelvis.  CBC is notable for mild cytopenias related to the ribociclib but not enough to require dose adjustment.  She has some mild  irritation near her port site-looks like a small stitch.  We discussed cleaning the area with soap and water routinely, applying triple antibiotic cream and keeping it covered.  If the area does not improve within the next week or so she should have her surgeon evaluate the area.  Proceed with ribociclib today as planned-3 weeks out of 4.  Continue letrozole daily.  I will see her back in 1 month for ongoing treatment.      2. Cancer related pain  Assessment & Plan:  Patient reports suboptimal pain control.  I will increase MS Contin to 45 mg twice daily.  Continue oxycodone as needed for breakthrough.  I will also give her a steroid taper to help with more acute symptoms.  Reassess next visit.    Orders:  -     Morphine (MS CONTIN) 15 MG 12 hr tablet; Take 3 tablets by mouth 2 (Two) Times a Day.  Dispense: 180 tablet; Refill: 0  -     methylPREDNISolone (MEDROL) 4 MG dose pack; Take 1 tablet by mouth 1 (One) Time for 1 dose. Take as directed on package instructions.  Dispense: 21 tablet; Refill: 0    3. Bone metastases (HCC)  Assessment & Plan:  Patient is on monthly denosumab.  Tolerating well.  No dental or jaw pain.  Irvin electrolytes have been adequate.  Xgeva today monthly.            Patient Follow Up: 1 month    Patient was given instructions and counseling regarding her condition or for health maintenance advice. Please see specific information pulled into the AVS if appropriate.     Donald Barker MD    3/21/2023

## 2023-03-21 NOTE — ASSESSMENT & PLAN NOTE
Patient is on monthly denosumab.  Tolerating well.  No dental or jaw pain.  Irvin electrolytes have been adequate.  Xgeva today monthly.

## 2023-03-21 NOTE — ASSESSMENT & PLAN NOTE
Metastatic.  Patient is on palliative treatment with letrozole plus ribociclib along with denosumab for bony involvement.  Tolerating her treatments well.  She had recent scans with her surgeon that showed stability of the abdomen and pelvis.  CBC is notable for mild cytopenias related to the ribociclib but not enough to require dose adjustment.  She has some mild irritation near her port site-looks like a small stitch.  We discussed cleaning the area with soap and water routinely, applying triple antibiotic cream and keeping it covered.  If the area does not improve within the next week or so she should have her surgeon evaluate the area.  Proceed with ribociclib today as planned-3 weeks out of 4.  Continue letrozole daily.  I will see her back in 1 month for ongoing treatment.

## 2023-03-21 NOTE — ASSESSMENT & PLAN NOTE
Patient reports suboptimal pain control.  I will increase MS Contin to 45 mg twice daily.  Continue oxycodone as needed for breakthrough.  I will also give her a steroid taper to help with more acute symptoms.  Reassess next visit.

## 2023-03-21 NOTE — SIGNIFICANT NOTE
Wound Eval / Progress Noted    JOSE Langston     Patient Name: Derek Keller  : 1960  MRN: [id number]  Today's Date: 3/21/2023                 Admit Date: 3/21/2023    Visit Dx:    ICD-10-CM ICD-9-CM   1. S/P ex lap with sigmoid resection, Lynn's procedure for stercoral perforation  Z98.890 V45.89       Patient Active Problem List   Diagnosis    Hypertension    Hyperlipidemia    Allergic rhinitis    Hypothyroidism    Chronic pain disorder    Anxiety    Monopolar depression (HCC)    Malaise and fatigue    Tobacco abuse    Fibromyalgia    Vitamin B 12 deficiency    Primary osteoarthritis of left knee    Restless leg syndrome    Primary insomnia    Chronic fatigue    Asthma    Body mass index (BMI) 26.0-26.9, adult    Degeneration of lumbar intervertebral disc    Hearing loss    Inappropriate diet and eating habits    Cervical spondylosis    Obesity with body mass index 30 or greater    Renal stone    Malignant neoplasm of left breast in female, estrogen receptor positive (HCC)    Cancer related pain    Bone metastases (HCC)    Secondary malignant neoplasm of bone (HCC)    Peritonitis (HCC)    Leukopenia    S/P ex lap with sigmoid resection, Lynn's procedure for stercoral perforation    Encounter for adjustment or management of vascular access device    Pulmonary emphysema (HCC)        Past Medical History:   Diagnosis Date    Abdominal pain     OSTOMY SITE    Allergic rhinitis     Anemia     NO S/S    Anxiety     Arteriosclerosis     Coronary    Arthritis     Bone metastases (HCC) 10/14/2022    Bowen's disease     SKIN CANCER    Breast cancer (HCC)     NO SURGERY WAS DONE DUE TO METS TO BONE/COLON/LYMPHNODE    Cancer related pain 10/13/2022    Depression     Disorder associated with Helicobacter species 10/12/2022    Dysphoric mood     Fatigue     Frequent urination     NO S/S INFECTION    Herpes simplex vulvovaginitis 2018    History of colon polyps     History of IBS     History of kidney stones      Hyperlipidemia     Hypertension     Hypothyroidism     Insomnia     Lumbago     Mood disorder (HCC)     Neck pain     R/T CANCER    Palpitations     ASYMPTOMATIC,  NO CARDIOLOGIST    Precordial pain     R/T SPINE CANCER    Sleep disturbance         Past Surgical History:   Procedure Laterality Date    BREAST BIOPSY Left     CARDIAC CATHETERIZATION Left 1959    CARDIAC CATHETERIZATION N/A 04/06/2018    Procedure: Coronary angiography;  Surgeon: Art Licea MD;  Location:  [facility] CATH INVASIVE LOCATION;  Service: Cardiovascular    CARDIAC CATHETERIZATION N/A 04/06/2018    Procedure: Left heart cath;  Surgeon: Art Licea MD;  Location:  [facility] CATH INVASIVE LOCATION;  Service: Cardiovascular    CARDIAC CATHETERIZATION N/A 04/06/2018    Procedure: Left ventriculography;  Surgeon: Art Licea MD;  Location:  [facility] CATH INVASIVE LOCATION;  Service: Cardiovascular    CHOLECYSTECTOMY      COLON SURGERY      colostomy bag    COLONOSCOPY  11/08/2006    EXPLORATORY LAPAROTOMY N/A 12/06/2022    Procedure: LAPAROTOMY EXPLORATORY sigmoid resection hartmans procedure colostomy;  Surgeon: Alfred Castañeda MD;  Location: [facility];  Service: General;  Laterality: N/A;    GANGLION CYST EXCISION      HYSTERECTOMY  05/2005    LAPAROSCOPIC GASTRIC BANDING      BAND REMOVED 2020    TONSILLECTOMY      VENOUS ACCESS DEVICE (PORT) INSERTION N/A 1/9/2023    Procedure: INSERTION VENOUS ACCESS DEVICE;  Surgeon: Alfred Castañeda MD;  Location: [facility];  Service: General;  Laterality: N/A;      03/21/23 1220   Colostomy LLQ   Placement Date/Time: 12/06/22 1900   Inserted by: DR. CASTAÑEDA  Hand Hygiene Completed: Yes  Colostomy Type: Descending/sigmoid;End  Location: LLQ   Wound Image    Stomal Appliance 2 piece;Clean;Dry;Intact;Changed   Stoma Appearance flush with skin;red;moist   Peristomal Assessment Denuded;Red   Accessories/Skin Care convex wafer;cleansed with water;skin sealant   Stoma  Function stool   Stool Color brown, light   Stool Consistency soft   Treatment Bag change;Site care     Wound Check / Follow-up:  Patient seen today for PRN ostomy check-in. Patient reports she has a new open area to her left abdomen. Two piece convex pouch in place is clean, dry, and intact with small amount of soft brown stool present in pouch. Open area adjacent to stoma is noted with moist, red, and pink tissue present. There is small amount of yellow drainage noted to dressing. Cleansed stoma and peristomal tissue with warm water and washcloth. Stoma is red and moist, flush with skin level. Applied silver impregnated hydrofiber to open wound. Secured with hydrocolloid dressing. Instructed patient to change this dressing with pouch changes. Supplies given. Placed new two piece convex pouch. Seal obtained. No signs of lifting or leaking. Instructed patient to follow up in one week. She verbalized understanding and expresses no additional needs or concerns at this time.      Impression: Existing colostomy. New open wound adjacent to stoma.      Short term goals:  Regain skin integrity, skin protection, colostomy management.      Analisa Alvarado RN    3/21/2023    12:40 EDT

## 2023-03-21 NOTE — TELEPHONE ENCOUNTER
OSW was requested by clinical nurse to submit a palliative care referral for the patient. [facility] referral was placed on 3/21/2023 at 14:35pm.

## 2023-03-22 ENCOUNTER — SPECIALTY PHARMACY (OUTPATIENT)
Dept: PHARMACY | Facility: HOSPITAL | Age: 63
End: 2023-03-22
Payer: MEDICARE

## 2023-03-22 NOTE — PROGRESS NOTES
General Surgery/Colorectal Surgery Note    Patient Name:  Derek Keller  YOB: 1960  [id number]    Referring Provider: No ref. provider found      Patient Care Team:  Haile Monique MD as PCP - General (Family Medicine)  Julien Cardona DO as Consulting Physician (Pain Medicine)  Joel Castillo MD as Consulting Physician (Gastroenterology)  Remington Russell MD as Surgeon (General Surgery)  Ahsan Salas MD as Consulting Physician (Sports Medicine)  Donald Barker MD as Consulting Physician (Hematology and Oncology)  Sandy Ricci MD as Consulting Physician (General Surgery)  Alfred Castañead MD as Consulting Physician (General Surgery)    Chief complaint follow-up    Subjective .     History of present illness:    History of metastatic breast cancer  Status post ex lap with Lynn's procedure for perforated sigmoid colon 12/6/2022.  Pathology with metastatic lobular breast carcinoma    Status post port placement 1/9/2023    She comes in for evaluation of bleeding from the inferior portion of her colostomy.  Less leakage from her colostomy being followed by the ostomy nurses.  No changes otherwise.        History:  Past Medical History:   Diagnosis Date   • Abdominal pain     OSTOMY SITE   • Allergic rhinitis    • Anemia     NO S/S   • Anxiety    • Arteriosclerosis     Coronary   • Arthritis    • Bone metastases (HCC) 10/14/2022   • Bowen's disease     SKIN CANCER   • Breast cancer (HCC)     NO SURGERY WAS DONE DUE TO METS TO BONE/COLON/LYMPHNODE   • Cancer related pain 10/13/2022   • Depression    • Disorder associated with Helicobacter species 10/12/2022   • Dysphoric mood    • Fatigue    • Frequent urination     NO S/S INFECTION   • Herpes simplex vulvovaginitis 7/26/2018   • History of colon polyps    • History of IBS    • History of kidney stones    • Hyperlipidemia    • Hypertension    • Hypothyroidism    • Insomnia    • Lumbago    • Mood disorder (HCC)    • Neck pain      R/T CANCER   • Palpitations     ASYMPTOMATIC,  NO CARDIOLOGIST   • Precordial pain     R/T SPINE CANCER   • Sleep disturbance        Past Surgical History:   Procedure Laterality Date   • BREAST BIOPSY Left    • CARDIAC CATHETERIZATION Left 1960   • CARDIAC CATHETERIZATION N/A 04/06/2018    Procedure: Coronary angiography;  Surgeon: Art Licea MD;  Location:  [facility];  Service: Cardiovascular   • CARDIAC CATHETERIZATION N/A 04/06/2018    Procedure: Left heart cath;  Surgeon: Art Licea MD;  Location:  [facility];  Service: Cardiovascular   • CARDIAC CATHETERIZATION N/A 04/06/2018    Procedure: Left ventriculography;  Surgeon: Art Licea MD;  Location:  [facility];  Service: Cardiovascular   • CHOLECYSTECTOMY     • COLON SURGERY      colostomy bag   • COLONOSCOPY  11/08/2006   • EXPLORATORY LAPAROTOMY N/A 12/06/2022    Procedure: LAPAROTOMY EXPLORATORY sigmoid resection hartmans procedure colostomy;  Surgeon: Alfred Castañeda MD;  Location: [facility];  Service: General;  Laterality: N/A;   • GANGLION CYST EXCISION     • HYSTERECTOMY  05/2005   • LAPAROSCOPIC GASTRIC BANDING      BAND REMOVED 2020   • TONSILLECTOMY     • VENOUS ACCESS DEVICE (PORT) INSERTION N/A 1/9/2023    Procedure: INSERTION VENOUS ACCESS DEVICE;  Surgeon: Alfred Castañeda MD;  Location: [facility] MAIN OR;  Service: General;  Laterality: N/A;       Family History   Problem Relation Age of Onset   • Hypertension Mother    • Rheum arthritis Mother    • Heart disease Mother    • Breast cancer Mother    • Diabetes Father    • Cancer Maternal Grandmother         colon   • Colon cancer Maternal Grandmother    • Aneurysm Paternal Grandfather    • Diabetes Other    • Fibromyalgia Other    • Malig Hyperthermia Neg Hx        Social History     Tobacco Use   • Smoking status: Every Day     Packs/day: 1.50     Years: 40.00     Pack years: 60.00     Types:  Cigarettes     Start date: 1974   • Smokeless tobacco: Never   • Tobacco comments:     caffeine use 3 mt dews daily     INST PER ANESTHESIA PROTOCOL   Vaping Use   • Vaping Use: Never used   Substance Use Topics   • Alcohol use: No   • Drug use: Yes     Types: Marijuana     Comment: Prescribed Lorazepam/OCC MARIJUANA       Review of Systems  All systems were reviewed and negative except for:   Review of Systems   Constitutional: Negative for chills, fever and unexpected weight loss.   HENT: Negative for congestion, nosebleeds and voice change.    Eyes: Negative for blurred vision, double vision and discharge.   Respiratory: Negative for apnea, chest tightness and shortness of breath.    Cardiovascular: Negative for chest pain and leg swelling.   Gastrointestinal:        See HPI   Endocrine: Negative for cold intolerance and heat intolerance.   Genitourinary: Negative for dysuria, hematuria and urgency.   Musculoskeletal: Negative for back pain, joint swelling and neck pain.   Skin: Negative for color change and dry skin.   Neurological: Negative for dizziness and confusion.   Hematological: Negative for adenopathy.   Psychiatric/Behavioral: Negative for agitation and behavioral problems.     MEDS:  Prior to Admission medications    Medication Sig Start Date End Date Taking? Authorizing Provider   atorvastatin (LIPITOR) 20 MG tablet TAKE 1 TABLET BY MOUTH EVERY DAY  Patient taking differently: Take 1 tablet by mouth Daily. 10/1/19  Yes Yudelka Manning MD   baclofen (LIORESAL) 20 MG tablet TAKE 1 TABLET BY MOUTH THREE TIMES DAILY  Patient taking differently: Take 10 mg by mouth 3 (Three) Times a Day As Needed. 12/6/19  Yes Yudelka Manning MD   donepezil (ARICEPT) 10 MG tablet Take 1 tablet by mouth Daily. 10/28/22  Yes Provider, MD Bessie   gabapentin (NEURONTIN) 600 MG tablet TAKE 1 TABLET BY MOUTH AT BEDTIME  Patient taking differently: 300 mg 2 (Two) Times a Day As Needed. TAKES1/2  MG TABLET  7/30/20  Yes Yudelka Manning MD   hydroCHLOROthiazide (HYDRODIURIL) 12.5 MG tablet 1 tablet DAILY (route: oral) 11/16/22  Yes Bessie Lopez MD   letrozole (FEMARA) 2.5 MG tablet Take 1 tablet by mouth Daily. 11/30/22  Yes Donald Barker MD   levothyroxine (SYNTHROID, LEVOTHROID) 50 MCG tablet TAKE 1 TABLET BY MOUTH EVERY DAY 7/19/19  Yes Yudelka Manning MD   LORazepam (ATIVAN) 0.5 MG tablet Take 1 tablet by mouth Every 6 (Six) Hours As Needed.   Yes Bessie Lopez MD   losartan (COZAAR) 100 MG tablet Take 1 tablet by mouth Daily. INST PER ANESTHESIA PROTOCOL   Yes Bessie Lopez MD   montelukast (SINGULAIR) 10 MG tablet Take 1 tablet by mouth Daily. 1/17/22  Yes Bessie Lopez MD   naproxen (NAPROSYN) 500 MG tablet Take 1 tablet by mouth 2 (Two) Times a Day With Meals. LAST DOSE 1/5/23   Yes Bessie Lopez MD   ondansetron (ZOFRAN) 8 MG tablet TAKE 1 TABLET BY MOUTH THREE TIMES DAILY AS NEEDED FOR NAUSEA AND VOMITING 3/8/23  Yes Donald Barker MD   oxyCODONE-acetaminophen (PERCOCET)  MG per tablet TAKE 1 TABLET BY MOUTH EVERY 6 HOURS AS NEEDED for moderate pain 3/14/23  Yes Donald Barker MD   polyethylene glycol (MIRALAX) 17 g packet Take 17 g by mouth Daily. 1/9/23  Yes Alfred Castañeda MD   Ribociclib Succinate (KISQALI 600 DOSE PO) Per instructions AS DIRECTED (route: oral) 12/20/22  Yes Bessie Lopez MD   ribociclib succinate 200 MG tablet therapy pack tablet Take 3 tablets by mouth Take As Directed. Take 3 tablets by mouth daily for 21 days then off 7 days on a 28 day cycle. 2/7/23  Yes Donald Barker MD   rOPINIRole (REQUIP) 3 MG tablet Take 1 tablet by mouth 2 (Two) Times a Day. 6/29/22  Yes Bessie Lopez MD   solifenacin (VESICARE) 10 MG tablet Take 1 tablet by mouth Daily for 360 days. 10/25/22 10/20/23 Yes Destinee Myers MD   SUMAtriptan (IMITREX) 100 MG tablet Take 1 tablet by mouth 1 (One) Time As Needed. 10/7/22   Yes Provider, MD Bessie   traZODone (DESYREL) 150 MG tablet TAKE 1 TABLET BY MOUTH ONCE nightly 11/12/19  Yes Yudelka Manning MD   venlafaxine XR (EFFEXOR-XR) 150 MG 24 hr capsule Take 1 capsule by mouth Daily. 11/21/22  Yes Donald Barker MD   letrozole (FEMARA) 2.5 MG tablet  9/14/22   Bessie Lopez MD   methylPREDNISolone (MEDROL) 4 MG dose pack Take 1 tablet by mouth 1 (One) Time for 1 dose. Take as directed on package instructions. 3/21/23 3/21/23  Donald Barker MD   Morphine (MS CONTIN) 15 MG 12 hr tablet Take 3 tablets by mouth 2 (Two) Times a Day. 3/21/23   Donald Barker MD        Allergies:  Azithromycin    Objective     Vital Signs   Temp:  [97.7 °F (36.5 °C)] 97.7 °F (36.5 °C)  Heart Rate:  [70] 70  Resp:  [20] 20  BP: (151)/(55) 151/55    Physical Exam:     General Appearance:    Alert, cooperative, in no acute distress   Head:    Normocephalic, without obvious abnormality, atraumatic   Eyes:          Conjunctivae and sclerae normal, no icterus,     Ears:    Ears appear intact with no abnormalities noted   Throat:   No oral lesions, no thrush, oral mucosa moist   Neck:   No adenopathy, supple, trachea midline, no thyromegaly   Back:     No kyphosis present, no scoliosis present, no skin lesions,      erythema or scars, no tenderness to percussion or                   palpation,   range of motion normal   Lungs:     Clear to auscultation,respirations regular, even and                  unlabored    Heart:    Regular rhythm and normal rate, normal S1 and S2, no            murmur, no gallop, no rub, no click   Chest Wall:    No abnormalities observed   Abdomen:     Normal bowel sounds, no masses, no organomegaly, soft        non-tender, non-distended, no guarding, no rebound                tenderness, ostomy viable, granulation tissue noted around the ostomy periphery   Rectal:        Extremities:   Moves all extremities well, no edema, no cyanosis, no             redness  "  Pulses:   Pulses palpable and equal bilaterally   Skin:   No bleeding, bruising or rash   Lymph nodes:   No palpable adenopathy   Neurologic:   A/o x 4 with no deficits       Results Review:   {Results Review:[id number]::\"I reviewed the patient's new clinical results.\"    LABS/IMAGING:  Results for orders placed or performed during the hospital encounter of 02/21/23   Comprehensive metabolic panel    Specimen: Blood   Result Value Ref Range    Glucose 95 65 - 99 mg/dL    BUN 13 8 - 23 mg/dL    Creatinine 0.73 0.57 - 1.00 mg/dL    Sodium 142 136 - 145 mmol/L    Potassium 3.5 3.5 - 5.2 mmol/L    Chloride 107 98 - 107 mmol/L    CO2 28.4 22.0 - 29.0 mmol/L    Calcium 8.6 8.6 - 10.5 mg/dL    Total Protein 6.6 6.0 - 8.5 g/dL    Albumin 4.0 3.5 - 5.2 g/dL    ALT (SGPT) 11 1 - 33 U/L    AST (SGOT) 13 1 - 32 U/L    Alkaline Phosphatase 84 39 - 117 U/L    Total Bilirubin 0.5 0.0 - 1.2 mg/dL    Globulin 2.6 gm/dL    A/G Ratio 1.5 g/dL    BUN/Creatinine Ratio 17.8 7.0 - 25.0    Anion Gap 6.6 5.0 - 15.0 mmol/L    eGFR 92.5 >60.0 mL/min/1.73   Magnesium    Specimen: Blood   Result Value Ref Range    Magnesium 1.9 1.6 - 2.4 mg/dL   Phosphorus    Specimen: Blood   Result Value Ref Range    Phosphorus 3.2 2.5 - 4.5 mg/dL   CBC Auto Differential    Specimen: Blood   Result Value Ref Range    WBC 4.87 3.40 - 10.80 10*3/mm3    RBC 2.88 (L) 3.77 - 5.28 10*6/mm3    Hemoglobin 9.5 (L) 12.0 - 15.9 g/dL    Hematocrit 29.7 (L) 34.0 - 46.6 %    .1 (H) 79.0 - 97.0 fL    MCH 33.0 26.6 - 33.0 pg    MCHC 32.0 31.5 - 35.7 g/dL    RDW 17.5 (H) 12.3 - 15.4 %    RDW-SD 67.4 (H) 37.0 - 54.0 fl    MPV 10.2 6.0 - 12.0 fL    Platelets 219 140 - 450 10*3/mm3    Neutrophil % 67.2 42.7 - 76.0 %    Lymphocyte % 26.5 19.6 - 45.3 %    Monocyte % 4.9 (L) 5.0 - 12.0 %    Eosinophil % 0.8 0.3 - 6.2 %    Basophil % 0.4 0.0 - 1.5 %    Immature Grans % 0.2 0.0 - 0.5 %    Neutrophils, Absolute 3.27 1.70 - 7.00 10*3/mm3    Lymphocytes, Absolute 1.29 0.70 - 3.10 " 10*3/mm3    Monocytes, Absolute 0.24 0.10 - 0.90 10*3/mm3    Eosinophils, Absolute 0.04 0.00 - 0.40 10*3/mm3    Basophils, Absolute 0.02 0.00 - 0.20 10*3/mm3    Immature Grans, Absolute 0.01 0.00 - 0.05 10*3/mm3   Manual Differential    Specimen: Blood   Result Value Ref Range    Neutrophil % 85.0 (H) 42.7 - 76.0 %    Lymphocyte % 13.0 (L) 19.6 - 45.3 %    Monocyte % 1.0 (L) 5.0 - 12.0 %    Promyelocyte % 1.0 (H) 0.0 - 0.0 %    Neutrophils Absolute 4.14 1.70 - 7.00 10*3/mm3    Lymphocytes Absolute 0.63 (L) 0.70 - 3.10 10*3/mm3    Monocytes Absolute 0.05 (L) 0.10 - 0.90 10*3/mm3    Anisocytosis Slight/1+ None Seen    Hypochromia Slight/1+ None Seen    Macrocytes Slight/1+ None Seen    WBC Morphology Normal Normal    Platelet Estimate Adequate Normal        Result Review :     Assessment & Plan     History of metastatic breast cancer  Status post ex lap with Lynn's procedure for perforated sigmoid colon 12/6/2022.  Pathology with metastatic lobular breast carcinoma    Status post port placement 1/9/2023    After verbal consent, applied silver nitrate to the inferior portion of her colostomy mucocutaneous junction.  I explained to her she would have some gray-black drainage from the area.  Continue to be followed by ostomy nurses.  Okay for further treatment if needed.  Follow-up with me as needed.  All questions answered.  She agrees with the plan.  Thank for the consult.              This document has been electronically signed by Alfred Castañeda MD  March 22, 2023 07:46 EDT

## 2023-03-22 NOTE — TELEPHONE ENCOUNTER
3/22/23: Contacted [facility] on 3/22/23 and confirmed this referral was received yesterday. They have not contacted the patient yet to arrange consultation, however, are planning to do so today.

## 2023-03-27 ENCOUNTER — DOCUMENTATION (OUTPATIENT)
Dept: ONCOLOGY | Facility: HOSPITAL | Age: 63
End: 2023-03-27
Payer: MEDICARE

## 2023-03-27 NOTE — PROGRESS NOTES
Diagnosis: Metastatic breast cancer    Reason for Referral: Financial assistance    Content of Visit: [facility] received a fax from Ms. Keller this afternoon with her completed [facility] financial assistance application and supporting documents. [facility]W forwarded this application to the financial counselors department to review. [facility] support remains available.    Resources/Referrals Provided: [facility] financial assistance program

## 2023-03-29 ENCOUNTER — HOSPITAL ENCOUNTER (OUTPATIENT)
Dept: INFUSION THERAPY | Facility: HOSPITAL | Age: 63
Discharge: HOME | End: 2023-03-29
Admitting: SURGERY
Payer: MEDICARE

## 2023-03-29 VITALS
SYSTOLIC BLOOD PRESSURE: 131 MMHG | DIASTOLIC BLOOD PRESSURE: 50 MMHG | HEART RATE: 66 BPM | TEMPERATURE: 98.1 F | OXYGEN SATURATION: 99 % | RESPIRATION RATE: 20 BRPM

## 2023-03-29 DIAGNOSIS — Z98.890 S/P EXPLORATORY LAPAROTOMY: Primary | ICD-10-CM

## 2023-03-29 PROCEDURE — G0463 HOSPITAL OUTPT CLINIC VISIT: HCPCS

## 2023-03-29 NOTE — SIGNIFICANT NOTE
Wound Eval / Progress Noted    JOSE Langston     Patient Name: Derek Keller  : 1960  MRN: [id number]  Today's Date: 3/29/2023                 Admit Date: 3/29/2023    Visit Dx:    ICD-10-CM ICD-9-CM   1. S/P ex lap with sigmoid resection, Lynn's procedure for stercoral perforation  Z98.890 V45.89       Patient Active Problem List   Diagnosis   • Hypertension   • Hyperlipidemia   • Allergic rhinitis   • Hypothyroidism   • Chronic pain disorder   • Anxiety   • Monopolar depression (HCC)   • Malaise and fatigue   • Tobacco abuse   • Fibromyalgia   • Vitamin B 12 deficiency   • Primary osteoarthritis of left knee   • Restless leg syndrome   • Primary insomnia   • Chronic fatigue   • Asthma   • Body mass index (BMI) 26.0-26.9, adult   • Degeneration of lumbar intervertebral disc   • Hearing loss   • Inappropriate diet and eating habits   • Cervical spondylosis   • Obesity with body mass index 30 or greater   • Renal stone   • Malignant neoplasm of left breast in female, estrogen receptor positive (HCC)   • Cancer related pain   • Bone metastases (HCC)   • Secondary malignant neoplasm of bone (HCC)   • Peritonitis (HCC)   • Leukopenia   • S/P ex lap with sigmoid resection, Lynn's procedure for stercoral perforation   • Encounter for adjustment or management of vascular access device   • Pulmonary emphysema (HCC)        Past Medical History:   Diagnosis Date   • Abdominal pain     OSTOMY SITE   • Allergic rhinitis    • Anemia     NO S/S   • Anxiety    • Arteriosclerosis     Coronary   • Arthritis    • Bone metastases (HCC) 10/14/2022   • Bowen's disease     SKIN CANCER   • Breast cancer (HCC)     NO SURGERY WAS DONE DUE TO METS TO BONE/COLON/LYMPHNODE   • Cancer related pain 10/13/2022   • Depression    • Disorder associated with Helicobacter species 10/12/2022   • Dysphoric mood    • Fatigue    • Frequent urination     NO S/S INFECTION   • Herpes simplex vulvovaginitis 2018   • History of colon polyps     • History of IBS    • History of kidney stones    • Hyperlipidemia    • Hypertension    • Hypothyroidism    • Insomnia    • Lumbago    • Mood disorder (HCC)    • Neck pain     R/T CANCER   • Palpitations     ASYMPTOMATIC,  NO CARDIOLOGIST   • Precordial pain     R/T SPINE CANCER   • Sleep disturbance         Past Surgical History:   Procedure Laterality Date   • BREAST BIOPSY Left    • CARDIAC CATHETERIZATION Left 1960   • CARDIAC CATHETERIZATION N/A 04/06/2018    Procedure: Coronary angiography;  Surgeon: Art Licea MD;  Location: [facility] CATH INVASIVE LOCATION;  Service: Cardiovascular   • CARDIAC CATHETERIZATION N/A 04/06/2018    Procedure: Left heart cath;  Surgeon: Art Licea MD;  Location: [facility] CATH INVASIVE LOCATION;  Service: Cardiovascular   • CARDIAC CATHETERIZATION N/A 04/06/2018    Procedure: Left ventriculography;  Surgeon: Art Licea MD;  Location:  [facility]VAS[facility];  Service: Cardiovascular   • CHOLECYSTECTOMY     • COLON SURGERY      colostomy bag   • COLONOSCOPY  11/08/2006   • EXPLORATORY LAPAROTOMY N/A 12/06/2022    Procedure: LAPAROTOMY EXPLORATORY sigmoid resection hartmans procedure colostomy;  Surgeon: Alfred Castañeda MD;  Location: [facility]  Service: General;  Laterality: N/A;   • GANGLION CYST EXCISION     • HYSTERECTOMY  05/2005   • LAPAROSCOPIC GASTRIC BANDING      BAND REMOVED 2020   • TONSILLECTOMY     • VENOUS ACCESS DEVICE (PORT) INSERTION N/A 1/9/2023    Procedure: INSERTION VENOUS ACCESS DEVICE;  Surgeon: Alfred Castañeda MD;  Location: [facility] ([facility]  Service: General;  Laterality: N/A;         Physical Assessment:  Wound 03/29/23 1310 Left medial abdomen Traumatic (Active)   Dressing Appearance intact;dry 03/29/23 1045   Closure None 03/29/23 1045   Base red;moist 03/29/23 1045   Periwound intact;dry;pink 03/29/23 1045   Periwound Temperature warm 03/29/23 1045   Periwound Skin Turgor soft 03/29/23 1045   Edges  open 03/29/23 1045   Wound Length (cm) 2 cm 03/29/23 1045   Wound Width (cm) 3 cm 03/29/23 1045   Wound Depth (cm) 0.4 cm 03/29/23 1045   Wound Surface Area (cm^2) 6 cm^2 03/29/23 1045   Wound Volume (cm^3) 2.4 cm^3 03/29/23 1045   Drainage Characteristics/Odor serosanguineous 03/29/23 1045   Drainage Amount scant 03/29/23 1045   Care, Wound cleansed with;irrigated with;sterile normal saline 03/29/23 1045   Dressing Care dressing applied;foam;hydrocolloid;other (see comments) 03/29/23 1045   Periwound Care absorptive dressing applied 03/29/23 1045        Wound Check / Follow-up:  Patient is seen today for a wound/ostomy check. Patient reports that her pouch has stayed on for over 3 days; no issues with leaking. 2 piece convex pouch was clean dry and intact. Patient removed with adhesive remover.  Open ulceration adjacent to her stoma. Tissue is red and moist; patient reports no stool output from site and minimal drainage. Small area of tissue between the ulceration and the stoma remains at this time. Stoma is red and moist, remains flush with skin. Applied Hydrafera blue foam to the ulceration and covered with a hydrocolloid dressing. Patient performed pouch change with no difficulties. Apply a 2 piece convex pouch. Instructed patient to change wound dressing with pouch changes.  Patient to return next week for follow up.    Impression: colostomy, ulceration to abdomen    Short term goals:  Regain skin integrity, skin protection, colostomy managment    Amadna Almaguer RN    3/29/2023    13:19 EDT

## 2023-04-04 ENCOUNTER — HOSPITAL ENCOUNTER (OUTPATIENT)
Dept: INFUSION THERAPY | Facility: HOSPITAL | Age: 63
Discharge: HOME | End: 2023-04-04
Admitting: SURGERY
Payer: MEDICARE

## 2023-04-04 VITALS
OXYGEN SATURATION: 96 % | RESPIRATION RATE: 18 BRPM | TEMPERATURE: 98.4 F | HEART RATE: 73 BPM | SYSTOLIC BLOOD PRESSURE: 127 MMHG | DIASTOLIC BLOOD PRESSURE: 61 MMHG

## 2023-04-04 DIAGNOSIS — Z98.890 S/P EXPLORATORY LAPAROTOMY: Primary | ICD-10-CM

## 2023-04-04 DIAGNOSIS — Z87.898 HISTORY OF DIFFICULT VENOUS ACCESS: ICD-10-CM

## 2023-04-04 PROCEDURE — G0463 HOSPITAL OUTPT CLINIC VISIT: HCPCS

## 2023-04-04 NOTE — SIGNIFICANT NOTE
Wound Eval / Progress Noted    JOSE Langston     Patient Name: Derek Keller  : 1960  MRN: [id number]  Today's Date: 2023                 Admit Date: 2023    Visit Dx:    ICD-10-CM ICD-9-CM   1. S/P ex lap with sigmoid resection, Lynn's procedure for stercoral perforation  Z98.890 V45.89       Patient Active Problem List   Diagnosis    Hypertension    Hyperlipidemia    Allergic rhinitis    Hypothyroidism    Chronic pain disorder    Anxiety    Monopolar depression    Malaise and fatigue    Tobacco abuse    Fibromyalgia    Vitamin B 12 deficiency    Primary osteoarthritis of left knee    Restless leg syndrome    Primary insomnia    Chronic fatigue    Asthma    Body mass index (BMI) 26.0-26.9, adult    Degeneration of lumbar intervertebral disc    Hearing loss    Inappropriate diet and eating habits    Cervical spondylosis    Obesity with body mass index 30 or greater    Renal stone    Malignant neoplasm of left breast in female, estrogen receptor positive    Cancer related pain    Malignant neoplasm metastatic to bone    Secondary malignant neoplasm of bone    Peritonitis    Leukopenia    S/P ex lap with sigmoid resection, Lynn's procedure for stercoral perforation    Encounter for adjustment or management of vascular access device    Pulmonary emphysema        Past Medical History:   Diagnosis Date    Abdominal pain     OSTOMY SITE    Allergic rhinitis     Anemia     NO S/S    Anxiety     Arteriosclerosis     Coronary    Arthritis     Bone metastases 10/14/2022    Bowen's disease     SKIN CANCER    Breast cancer     NO SURGERY WAS DONE DUE TO METS TO BONE/COLON/LYMPHNODE    Cancer related pain 10/13/2022    Depression     Disorder associated with Helicobacter species 10/12/2022    Dysphoric mood     Fatigue     Frequent urination     NO S/S INFECTION    Herpes simplex vulvovaginitis 2018    History of colon polyps     History of IBS     History of kidney stones     Hyperlipidemia      Hypertension     Hypothyroidism     Insomnia     Lumbago     Mood disorder     Neck pain     R/T CANCER    Palpitations     ASYMPTOMATIC,  NO CARDIOLOGIST    Precordial pain     R/T SPINE CANCER    Sleep disturbance         Past Surgical History:   Procedure Laterality Date    BREAST BIOPSY Left     CARDIAC CATHETERIZATION Left 1960    CARDIAC CATHETERIZATION N/A 04/06/2018    Procedure: Coronary angiography;  Surgeon: Art Licea MD;  Location:  [facility];  Service: Cardiovascular    CARDIAC CATHETERIZATION N/A 04/06/2018    Procedure: Left heart cath;  Surgeon: Art Licea MD;  Location:  [facility];  Service: Cardiovascular    CARDIAC CATHETERIZATION N/A 04/06/2018    Procedure: Left ventriculography;  Surgeon: Art Licea MD;  Location:  [facility];  Service: Cardiovascular    CHOLECYSTECTOMY      COLON SURGERY      colostomy bag    COLONOSCOPY  11/08/2006    EXPLORATORY LAPAROTOMY N/A 12/06/2022    Procedure: LAPAROTOMY EXPLORATORY sigmoid resection hartmans procedure colostomy;  Surgeon: Alfred Castañeda MD;  Location: [facility]  Service: General;  Laterality: N/A;    GANGLION CYST EXCISION      HYSTERECTOMY  05/2005    LAPAROSCOPIC GASTRIC BANDING      BAND REMOVED 2020    TONSILLECTOMY      VENOUS ACCESS DEVICE (PORT) INSERTION N/A 1/9/2023    Procedure: INSERTION VENOUS ACCESS DEVICE;  Surgeon: Alfred Castañeda MD;  Location: [facility]  Service: General;  Laterality: N/A;         Physical Assessment:  Wound 03/29/23 1310 Left medial abdomen Traumatic (Active)   Wound Image   04/04/23 1330   Dressing Appearance intact;dry 04/04/23 1330   Closure None 04/04/23 1330   Base red;moist 04/04/23 1330   Red (%), Wound Tissue Color 100 04/04/23 1330   Periwound intact;dry;pink 04/04/23 1330   Periwound Temperature warm 04/04/23 1330   Periwound Skin Turgor soft 04/04/23 1330   Edges open 04/04/23 1330   Wound  Length (cm) 2 cm 04/04/23 1330   Wound Width (cm) 2.9 cm 04/04/23 1330   Wound Depth (cm) 0.3 cm 04/04/23 1330   Wound Surface Area (cm^2) 5.8 cm^2 04/04/23 1330   Wound Volume (cm^3) 1.74 cm^3 04/04/23 1330   Drainage Characteristics/Odor serosanguineous 04/04/23 1330   Drainage Amount small 04/04/23 1330   Care, Wound cleansed with;irrigated with;sterile normal saline 04/04/23 1330   Dressing Care dressing applied;foam;hydrocolloid;other (see comments) 04/04/23 1330   Periwound Care absorptive dressing applied 04/04/23 1330 04/04/23 1330   Colostomy LLQ   Placement Date/Time: 12/06/22 1900   Inserted by: DR. HESS  Hand Hygiene Completed: Yes  Colostomy Type: Descending/sigmoid;End  Location: LLQ   Wound Image    Stomal Appliance 1 piece;Clean;Dry;Intact;Changed   Stoma Appearance flush with skin;red;moist   Peristomal Assessment Clean;Intact   Accessories/Skin Care convex wafer;cleansed with water;skin sealant;other (see comments)  (hydrafera blue to ulceration, covered with hydrocolloid)   Stoma Function stool   Stool Color brown, light   Stool Consistency soft       Wound Check / Follow-up:  Patient seen today for a ostomy check and wound care. Patient reports that she has not had any issue with leaking and can last up to 3 days before changing a pouch; however, she has been changing her pouch frequently to treat the ulceration to her abdomen. Discussed wound care frequency with patient and decreasing the amount of times she changes the pouch. Made plan with patient to wait to change the ostomy pouch until Saturday to allow for the dressing to stay in place on the wound.  Stoma is red and moist, slightly protruding above the skin in some areas. Peristomal tissue is dry and intact. Cleansed with warm water. Applied skin barrier to peristomal tissue and applied one pice convex pouch,  Ulceration is red and moist; no effluent detected from the ulceration. Cleansed with NS. Wound depth is filling. Patient  is responding to the hydrafera blue. Applied hydrafera blue to wound bed and secured with a hydrocolloid dressing. Recommend to continue current care at this time.  Discussed findings with surgeon and wound care plan. Surgeon is agreeable at this time.    Impression: colostomy, ulceration to abdomen    Short term goals:  regain skin integrity, pressure reduction, skin protection    Amanda Almaguer RN    4/4/2023    15:39 EDT

## 2023-04-05 ENCOUNTER — TELEPHONE (OUTPATIENT)
Dept: ONCOLOGY | Facility: HOSPITAL | Age: 63
End: 2023-04-05
Payer: MEDICARE

## 2023-04-05 RX ORDER — OXYCODONE AND ACETAMINOPHEN 10; 325 MG/1; MG/1
TABLET ORAL
Qty: 60 TABLET | Refills: 0 | Status: SHIPPED | OUTPATIENT
Start: 2023-04-05

## 2023-04-05 NOTE — TELEPHONE ENCOUNTER
Caller: Derek Keller    Relationship: Self    Best call back number: [phone]    What is the best time to reach you: ANYTIME     Who are you requesting to speak with (clinical staff, provider,  specific staff member): CLINICAL    What was the call regarding: PT CALLING TO SEE IF DR DIANE COULD SEND A ORDER FOR A GEL MATTRESS TO [facility]  [phone]    Do you require a callback: YES TO ADVISE

## 2023-04-06 NOTE — TELEPHONE ENCOUNTER
Spoke with patient, advised that we faxed the order request to [facility] for her. Instructed to maintain all follow up care and to contact the office with any questions or concerns. Patient verbalized understanding of all information discussed.

## 2023-04-10 ENCOUNTER — SPECIALTY PHARMACY (OUTPATIENT)
Dept: PHARMACY | Facility: HOSPITAL | Age: 63
End: 2023-04-10
Payer: MEDICARE

## 2023-04-10 ENCOUNTER — TELEPHONE (OUTPATIENT)
Dept: ONCOLOGY | Facility: HOSPITAL | Age: 63
End: 2023-04-10

## 2023-04-10 NOTE — TELEPHONE ENCOUNTER
Caller: Derek Keller    Relationship: Self    Best call back number: [phone]    Who are you requesting to speak with (clinical staff, provider,  specific staff member):CLINICAL    What was the call regarding:PATIENT CALLING TO VERIFY IF SHE CAN USE TANNING BED OR BE OUT IN THE SUN. PATIENT ALSO CALLING TO VERIFY IF IT IS OK TO COMPLETE AN EYE LASH LIFT    Do you require a callback: YES

## 2023-04-10 NOTE — PROGRESS NOTES
Specialty Pharmacy Patient Management Program  Oncology Refill Outreach      Derek is a 62 y.o. female contacted today regarding refills of her medication(s).    Specialty medication(s) and dose(s) confirmed: ribociclib succinate 200 MG tablet therapy pack tablet. [facility]     Other medications being refilled: N/A    Refill Questions    Flowsheet Row Most Recent Value   Changes to allergies? No   Changes to medications? No   New conditions since last clinic visit No   Unplanned office visit, urgent care, ED, or hospital admission in the last 4 weeks  No   How does patient/caregiver feel medication is working? Very good   Financial problems or insurance changes  No   Since the previous refill, were any specialty medication doses or scheduled injections missed or delayed?  No   If yes, please provide the amount N/A   Why were doses missed? N/A   Does this patient require a clinical escalation to a pharmacist? No          Delivery Questions    Flowsheet Row Most Recent Value   Delivery method  at Pharmacy   Delivery address correct? Yes   Delivery phone number [phone]   Number of medications in delivery 1   Medication being filled and delivered ribociclib succinate 200 MG tablet therapy pack tablet   Doses left of specialty medications 1 dose left followed by 7 days off.   Is there any medication that is due not being filled? No   Supplies needed? No supplies needed   Cooler needed? No   Do any medications need mixed or dated? No   Copay form of payment Pay at pickup   Additional comments zero copay   Questions or concerns for the pharmacist? No   Explain any questions or concerns for the pharmacist N/A   Are any medications first time fills? No                 Follow-up: 21 days     Maria Luz Frazier  Care Coordinator, [facility]  4/10/2023  09:00 EDT

## 2023-04-12 ENCOUNTER — HOSPITAL ENCOUNTER (EMERGENCY)
Facility: HOSPITAL | Age: 63
Discharge: HOME | End: 2023-04-12
Attending: EMERGENCY MEDICINE | Admitting: EMERGENCY MEDICINE
Payer: MEDICARE

## 2023-04-12 VITALS
HEIGHT: 66 IN | WEIGHT: 178.79 LBS | RESPIRATION RATE: 18 BRPM | BODY MASS INDEX: 28.73 KG/M2 | HEART RATE: 59 BPM | DIASTOLIC BLOOD PRESSURE: 57 MMHG | SYSTOLIC BLOOD PRESSURE: 98 MMHG | OXYGEN SATURATION: 98 % | TEMPERATURE: 98 F

## 2023-04-12 DIAGNOSIS — K94.09 SKIN ULCERATION AT COLOSTOMY SITE: Primary | ICD-10-CM

## 2023-04-12 LAB
ALBUMIN SERPL-MCNC: 4 G/DL (ref 3.5–5.2)
ALBUMIN/GLOB SERPL: 1.3 G/DL
ALP SERPL-CCNC: 91 U/L (ref 39–117)
ALT SERPL W P-5'-P-CCNC: 10 U/L (ref 1–33)
ANION GAP SERPL CALCULATED.3IONS-SCNC: 10.5 MMOL/L (ref 5–15)
AST SERPL-CCNC: 14 U/L (ref 1–32)
BASOPHILS # BLD AUTO: 0.04 10*3/MM3 (ref 0–0.2)
BASOPHILS NFR BLD AUTO: 1 % (ref 0–1.5)
BILIRUB SERPL-MCNC: 0.2 MG/DL (ref 0–1.2)
BUN SERPL-MCNC: 11 MG/DL (ref 8–23)
BUN/CREAT SERPL: 14.7 (ref 7–25)
CALCIUM SPEC-SCNC: 8.9 MG/DL (ref 8.6–10.5)
CHLORIDE SERPL-SCNC: 103 MMOL/L (ref 98–107)
CO2 SERPL-SCNC: 26.5 MMOL/L (ref 22–29)
CREAT SERPL-MCNC: 0.75 MG/DL (ref 0.57–1)
DEPRECATED RDW RBC AUTO: 58.7 FL (ref 37–54)
EGFRCR SERPLBLD CKD-EPI 2021: 89.6 ML/MIN/1.73
EOSINOPHIL # BLD AUTO: 0.03 10*3/MM3 (ref 0–0.4)
EOSINOPHIL NFR BLD AUTO: 0.8 % (ref 0.3–6.2)
ERYTHROCYTE [DISTWIDTH] IN BLOOD BY AUTOMATED COUNT: 15.4 % (ref 12.3–15.4)
GLOBULIN UR ELPH-MCNC: 3 GM/DL
GLUCOSE SERPL-MCNC: 91 MG/DL (ref 65–99)
HCT VFR BLD AUTO: 34.5 % (ref 34–46.6)
HGB BLD-MCNC: 11.1 G/DL (ref 12–15.9)
HOLD SPECIMEN: NORMAL
IMM GRANULOCYTES # BLD AUTO: 0.01 10*3/MM3 (ref 0–0.05)
IMM GRANULOCYTES NFR BLD AUTO: 0.3 % (ref 0–0.5)
INR PPP: 0.93 (ref 0.86–1.15)
LARGE PLATELETS: NORMAL
LYMPHOCYTES # BLD AUTO: 1.11 10*3/MM3 (ref 0.7–3.1)
LYMPHOCYTES NFR BLD AUTO: 28 % (ref 19.6–45.3)
MACROCYTES BLD QL SMEAR: NORMAL
MCH RBC QN AUTO: 33.5 PG (ref 26.6–33)
MCHC RBC AUTO-ENTMCNC: 32.2 G/DL (ref 31.5–35.7)
MCV RBC AUTO: 104.2 FL (ref 79–97)
MONOCYTES # BLD AUTO: 0.17 10*3/MM3 (ref 0.1–0.9)
MONOCYTES NFR BLD AUTO: 4.3 % (ref 5–12)
NEUTROPHILS NFR BLD AUTO: 2.6 10*3/MM3 (ref 1.7–7)
NEUTROPHILS NFR BLD AUTO: 65.6 % (ref 42.7–76)
NRBC BLD AUTO-RTO: 0 /100 WBC (ref 0–0.2)
PLATELET # BLD AUTO: 175 10*3/MM3 (ref 140–450)
PMV BLD AUTO: 10.6 FL (ref 6–12)
POTASSIUM SERPL-SCNC: 3.7 MMOL/L (ref 3.5–5.2)
PROT SERPL-MCNC: 7 G/DL (ref 6–8.5)
PROTHROMBIN TIME: 12.5 SECONDS (ref 11.8–14.9)
RBC # BLD AUTO: 3.31 10*6/MM3 (ref 3.77–5.28)
SMALL PLATELETS BLD QL SMEAR: ADEQUATE
SODIUM SERPL-SCNC: 140 MMOL/L (ref 136–145)
WBC MORPH BLD: NORMAL
WBC NRBC COR # BLD: 3.96 10*3/MM3 (ref 3.4–10.8)

## 2023-04-12 PROCEDURE — 85025 COMPLETE CBC W/AUTO DIFF WBC: CPT | Performed by: EMERGENCY MEDICINE

## 2023-04-12 PROCEDURE — 99282 EMERGENCY DEPT VISIT SF MDM: CPT

## 2023-04-12 PROCEDURE — 85610 PROTHROMBIN TIME: CPT | Performed by: EMERGENCY MEDICINE

## 2023-04-12 PROCEDURE — 36415 COLL VENOUS BLD VENIPUNCTURE: CPT

## 2023-04-12 PROCEDURE — 80053 COMPREHEN METABOLIC PANEL: CPT | Performed by: EMERGENCY MEDICINE

## 2023-04-12 PROCEDURE — 85007 BL SMEAR W/DIFF WBC COUNT: CPT | Performed by: EMERGENCY MEDICINE

## 2023-04-12 NOTE — TELEPHONE ENCOUNTER
Message left for patient informing her that her medication can cause sensitivity to the sun and she should use sunscreen when outdoors. She probably should also avoid the the tanning bed. He also does not recommend nay unnecessary procedures, but that I you want the eye lift that it up to you. Left message to return call.

## 2023-04-12 NOTE — ED PROVIDER NOTES
Time: 12:18 AM EDT  Date of encounter:  4/12/2023  Independent Historian/Clinical History and Information was obtained by:   Patient  Chief Complaint: Bleeding from ostomy    History is limited by: N/A    History of Present Illness:  Patient is a 62 y.o. year old female who presents to the emergency department for evaluation of bleeding from ostomy.    Patient had ostomy placed approximately 4 months ago.  She follows with the ostomy nurse and has regular wound care.  She has a known area of ulceration adjacent to her ostomy.  Today she had a small area of blood dripping down her abdomen if she changed her ostomy bag.  Her bag is not filled with any blood.  She has not had any abdominal pain.  She is not anticoagulated.  She has had no lightheadedness dizziness shortness of breath or chest pain.    HPI    Patient Care Team  Primary Care Provider: Haile Monique MD    Past Medical History:     Allergies   Allergen Reactions   • Azithromycin Itching     Past Medical History:   Diagnosis Date   • Abdominal pain     OSTOMY SITE   • Allergic rhinitis    • Anemia     NO S/S   • Anxiety    • Arteriosclerosis     Coronary   • Arthritis    • Bone metastases 10/14/2022   • Bowen's disease     SKIN CANCER   • Breast cancer     NO SURGERY WAS DONE DUE TO METS TO BONE/COLON/LYMPHNODE   • Cancer related pain 10/13/2022   • Depression    • Disorder associated with Helicobacter species 10/12/2022   • Dysphoric mood    • Fatigue    • Frequent urination     NO S/S INFECTION   • Herpes simplex vulvovaginitis 7/26/2018   • History of colon polyps    • History of IBS    • History of kidney stones    • Hyperlipidemia    • Hypertension    • Hypothyroidism    • Insomnia    • Lumbago    • Mood disorder    • Neck pain     R/T CANCER   • Palpitations     ASYMPTOMATIC,  NO CARDIOLOGIST   • Precordial pain     R/T SPINE CANCER   • Sleep disturbance      Past Surgical History:   Procedure Laterality Date   • BREAST BIOPSY Left    • CARDIAC  CATHETERIZATION Left 1959   • CARDIAC CATHETERIZATION N/A 04/06/2018    Procedure: Coronary angiography;  Surgeon: Art Licea MD;  Location:  [facility];  Service: Cardiovascular   • CARDIAC CATHETERIZATION N/A 04/06/2018    Procedure: Left heart cath;  Surgeon: Art Licea MD;  Location:  [facility];  Service: Cardiovascular   • CARDIAC CATHETERIZATION N/A 04/06/2018    Procedure: Left ventriculography;  Surgeon: Art Licea MD;  Location:  [facility];  Service: Cardiovascular   • CHOLECYSTECTOMY     • COLON SURGERY      colostomy bag   • COLONOSCOPY  11/08/2006   • EXPLORATORY LAPAROTOMY N/A 12/06/2022    Procedure: LAPAROTOMY EXPLORATORY sigmoid resection hartmans procedure colostomy;  Surgeon: Alfred Castañeda MD;  Location: [facility];  Service: General;  Laterality: N/A;   • GANGLION CYST EXCISION     • HYSTERECTOMY  05/2005   • LAPAROSCOPIC GASTRIC BANDING      BAND REMOVED 2020   • TONSILLECTOMY     • VENOUS ACCESS DEVICE (PORT) INSERTION N/A 1/9/2023    Procedure: INSERTION VENOUS ACCESS DEVICE;  Surgeon: Alfred Castañeda MD;  Location: [facility];  Service: General;  Laterality: N/A;     Family History   Problem Relation Age of Onset   • Hypertension Mother    • Rheum arthritis Mother    • Heart disease Mother    • Breast cancer Mother    • Diabetes Father    • Cancer Maternal Grandmother         colon   • Colon cancer Maternal Grandmother    • Aneurysm Paternal Grandfather    • Diabetes Other    • Fibromyalgia Other    • Malig Hyperthermia Neg Hx        Home Medications:  Prior to Admission medications    Medication Sig Start Date End Date Taking? Authorizing Provider   atorvastatin (LIPITOR) 20 MG tablet TAKE 1 TABLET BY MOUTH EVERY DAY  Patient taking differently: Take 1 tablet by mouth Daily. 10/1/19   Yudelka Manning MD   baclofen (LIORESAL) 20 MG tablet TAKE 1 TABLET BY MOUTH THREE TIMES  DAILY  Patient taking differently: Take 10 mg by mouth 3 (Three) Times a Day As Needed. 12/6/19   Yudelka Manning MD   donepezil (ARICEPT) 10 MG tablet Take 1 tablet by mouth Daily. 10/28/22   Bessie Lopez MD   gabapentin (NEURONTIN) 600 MG tablet TAKE 1 TABLET BY MOUTH AT BEDTIME  Patient taking differently: 300 mg 2 (Two) Times a Day As Needed. TAKES1/2  MG TABLET 7/30/20   Yudelka Manning MD   hydroCHLOROthiazide (HYDRODIURIL) 12.5 MG tablet 1 tablet DAILY (route: oral) 11/16/22   Bessie Lopez MD   letrozole (FEMARA) 2.5 MG tablet Take 1 tablet by mouth Daily. 11/30/22   Donald Barker MD   letrozole (FEMARA) 2.5 MG tablet  9/14/22   Bessie Lopez MD   levothyroxine (SYNTHROID, LEVOTHROID) 50 MCG tablet TAKE 1 TABLET BY MOUTH EVERY DAY 7/19/19   Yudelka Manning MD   LORazepam (ATIVAN) 0.5 MG tablet Take 1 tablet by mouth Every 6 (Six) Hours As Needed.    Bessie Lopez MD   losartan (COZAAR) 100 MG tablet Take 1 tablet by mouth Daily. INST PER ANESTHESIA PROTOCOL    Bessie Lopez MD   montelukast (SINGULAIR) 10 MG tablet Take 1 tablet by mouth Daily. 1/17/22   Bessie Lopez MD   Morphine (MS CONTIN) 15 MG 12 hr tablet Take 3 tablets by mouth 2 (Two) Times a Day. 3/21/23   Donald Barker MD   naproxen (NAPROSYN) 500 MG tablet Take 1 tablet by mouth 2 (Two) Times a Day With Meals. LAST DOSE 1/5/23    Bessie Lopez MD   ondansetron (ZOFRAN) 8 MG tablet TAKE 1 TABLET BY MOUTH THREE TIMES DAILY AS NEEDED FOR NAUSEA AND VOMITING 3/8/23   Donald Barker MD   oxyCODONE-acetaminophen (PERCOCET)  MG per tablet TAKE 1 TABLET BY MOUTH EVERY 6 HOURS AS NEEDED for moderate pain 4/5/23   Donald Barker MD   polyethylene glycol (MIRALAX) 17 g packet Take 17 g by mouth Daily. 1/9/23   Alfred Castañeda MD   Ribociclib Succinate (KISQALI 600 DOSE PO) Per instructions AS DIRECTED (route: oral) 12/20/22   Provider, MD Bessie    ribociclib succinate 200 MG tablet therapy pack tablet Take 3 tablets by mouth Take As Directed. Take 3 tablets by mouth daily for 21 days then off 7 days on a 28 day cycle. 2/7/23   Donald Barker MD   rOPINIRole (REQUIP) 3 MG tablet Take 1 tablet by mouth 2 (Two) Times a Day. 6/29/22   Bessie Lopez MD   solifenacin (VESICARE) 10 MG tablet Take 1 tablet by mouth Daily for 360 days. 10/25/22 10/20/23  Destinee Myers MD   SUMAtriptan (IMITREX) 100 MG tablet Take 1 tablet by mouth 1 (One) Time As Needed. 10/7/22   Bessie Lopez MD   traZODone (DESYREL) 150 MG tablet TAKE 1 TABLET BY MOUTH ONCE nightly 11/12/19   Yudelka Manning MD   venlafaxine XR (EFFEXOR-XR) 150 MG 24 hr capsule Take 1 capsule by mouth Daily. 11/21/22   Donald Barker MD        Social History:   Social History     Tobacco Use   • Smoking status: Every Day     Packs/day: 1.50     Years: 40.00     Pack years: 60.00     Types: Cigarettes     Start date: 1974   • Smokeless tobacco: Never   • Tobacco comments:     caffeine use 3 mt dews daily     INST PER ANESTHESIA PROTOCOL   Vaping Use   • Vaping Use: Never used   Substance Use Topics   • Alcohol use: No   • Drug use: Yes     Types: Marijuana     Comment: Prescribed Lorazepam/OCC MARIJUANA         Review of Systems:  Review of Systems   Constitutional: Negative for chills and fever.   HENT: Negative for congestion, ear pain and sore throat.    Eyes: Negative for pain.   Respiratory: Negative for cough, chest tightness and shortness of breath.    Cardiovascular: Negative for chest pain.   Gastrointestinal: Positive for blood in stool. Negative for abdominal pain, diarrhea, nausea and vomiting.   Genitourinary: Negative for flank pain and hematuria.   Musculoskeletal: Negative for joint swelling.   Skin: Negative for pallor.   Neurological: Negative for seizures and headaches.   All other systems reviewed and are negative.       Physical Exam:  BP 98/57   Pulse 59    "Temp 98 °F (36.7 °C) (Oral)   Resp 18   Ht 167.6 cm (66\")   Wt 81.1 kg (178 lb 12.7 oz)   LMP  (LMP Unknown)   SpO2 98%   BMI 28.86 kg/m²     Physical Exam  Vitals and nursing note reviewed.   Constitutional:       General: She is not in acute distress.     Appearance: Normal appearance. She is not toxic-appearing.   HENT:      Head: Normocephalic and atraumatic.      Jaw: There is normal jaw occlusion.   Eyes:      General: Lids are normal.      Extraocular Movements: Extraocular movements intact.      Conjunctiva/sclera: Conjunctivae normal.      Pupils: Pupils are equal, round, and reactive to light.   Cardiovascular:      Rate and Rhythm: Normal rate and regular rhythm.      Pulses: Normal pulses.      Heart sounds: Normal heart sounds.   Pulmonary:      Effort: Pulmonary effort is normal. No respiratory distress.      Breath sounds: Normal breath sounds. No wheezing or rhonchi.   Abdominal:      General: Abdomen is flat.      Palpations: Abdomen is soft.      Tenderness: There is no abdominal tenderness. There is no guarding or rebound.      Comments: Left lower quadrant to mid abdominal ostomy in place.  Abdomen is nontender.  There is light brown stool in the bag.  No evidence of blood.  I reviewed the recent photographs from a week ago of the wounds adjacent to the patient's ostomy.  They do not appear infected today.   Musculoskeletal:         General: Normal range of motion.      Cervical back: Normal range of motion and neck supple.      Right lower leg: No edema.      Left lower leg: No edema.   Skin:     General: Skin is warm and dry.   Neurological:      Mental Status: She is alert and oriented to person, place, and time. Mental status is at baseline.   Psychiatric:         Mood and Affect: Mood normal.                  Procedures:  Procedures      Medical Decision Making:      Comorbidities that affect care:    Cancer    External Notes reviewed:    Previous Clinic Note: Ostomy nurse " check      The following orders were placed and all results were independently analyzed by me:  Orders Placed This Encounter   Procedures   • Comprehensive Metabolic Panel   • Protime-INR   • CBC Auto Differential   • Scan Slide   • CBC & Differential   • Extra Tubes   • Gold Top - SST       Medications Given in the Emergency Department:  Medications - No data to display     ED Course:         Labs:    Lab Results (last 24 hours)     Procedure Component Value Units Date/Time    CBC & Differential [[id number]]  (Abnormal) Collected: 04/12/23 0154    Specimen: Blood Updated: 04/12/23 0230    Narrative:      The following orders were created for panel order CBC & Differential.  Procedure                               Abnormality         Status                     ---------                               -----------         ------                     CBC Auto Differential[[id number]]        Abnormal            Final result               Scan Slide[[id number]]                                       Final result                 Please view results for these tests on the individual orders.    Comprehensive Metabolic Panel [[id number]] Collected: 04/12/23 0154    Specimen: Blood Updated: 04/12/23 0234     Glucose 91 mg/dL      BUN 11 mg/dL      Creatinine 0.75 mg/dL      Sodium 140 mmol/L      Potassium 3.7 mmol/L      Chloride 103 mmol/L      CO2 26.5 mmol/L      Calcium 8.9 mg/dL      Total Protein 7.0 g/dL      Albumin 4.0 g/dL      ALT (SGPT) 10 U/L      AST (SGOT) 14 U/L      Alkaline Phosphatase 91 U/L      Total Bilirubin 0.2 mg/dL      Globulin 3.0 gm/dL      A/G Ratio 1.3 g/dL      BUN/Creatinine Ratio 14.7     Anion Gap 10.5 mmol/L      eGFR 89.6 mL/min/1.73     Narrative:      GFR Normal >60  Chronic Kidney Disease <60  Kidney Failure <15      Protime-INR [[id number]]  (Normal) Collected: 04/12/23 0154    Specimen: Blood Updated: 04/12/23 0219     Protime 12.5 Seconds      INR 0.93    Narrative:      Suggested  Therapeutic Ranges For Oral Anticoagulant Therapy:  Level of Therapy                      INR Target Range  Standard Dose                            2.0-3.0  High Dose                                2.5-3.5  Patients not receiving anticoagulant  Therapy Normal Range                     0.86-1.15    CBC Auto Differential [[id number]]  (Abnormal) Collected: 04/12/23 0154    Specimen: Blood Updated: 04/12/23 0230     WBC 3.96 10*3/mm3      RBC 3.31 10*6/mm3      Hemoglobin 11.1 g/dL      Hematocrit 34.5 %      .2 fL      MCH 33.5 pg      MCHC 32.2 g/dL      RDW 15.4 %      RDW-SD 58.7 fl      MPV 10.6 fL      Platelets 175 10*3/mm3      Neutrophil % 65.6 %      Lymphocyte % 28.0 %      Monocyte % 4.3 %      Eosinophil % 0.8 %      Basophil % 1.0 %      Immature Grans % 0.3 %      Neutrophils, Absolute 2.60 10*3/mm3      Lymphocytes, Absolute 1.11 10*3/mm3      Monocytes, Absolute 0.17 10*3/mm3      Eosinophils, Absolute 0.03 10*3/mm3      Basophils, Absolute 0.04 10*3/mm3      Immature Grans, Absolute 0.01 10*3/mm3      nRBC 0.0 /100 WBC     Scan Slide [[id number]] Collected: 04/12/23 0154    Specimen: Blood Updated: 04/12/23 0230     Macrocytes Mod/2+     WBC Morphology Normal     Platelet Estimate Adequate     Large Platelets Slight/1+           Imaging:    No Radiology Exams Resulted Within Past 24 Hours      Differential Diagnosis and Discussion:    Rash: Differential diagnosis includes but is not limited to sepsis, cellulitis, [place] Spotted Fever, meningitis, meningococcemia, Varicella, Strep infection, dermatitis, allergic reaction, Lyme disease, and toxic shock syndrome.    All labs were reviewed and interpreted by me.    MDM         Patient Care Considerations:    NARCOTICS: I considered prescribing opiate pain medication as an outpatient, however Not required in the emergency department to control symptoms.      Consultants/Shared Management Plan:    None    Social Determinants of  Health:    Patient is independent, reliable, and has access to care.       Disposition and Care Coordination:    Discharged: The patient is suitable and stable for discharge with no need for consideration of observation or admission.    I have explained the patient´s condition, diagnoses and treatment plan based on the information available to me at this time. I have answered questions and addressed any concerns. The patient has a good  understanding of the patient´s diagnosis, condition, and treatment plan as can be expected at this point. The vital signs have been stable. The patient´s condition is stable and appropriate for discharge from the emergency department.      The patient will pursue further outpatient evaluation with the primary care physician or other designated or consulting physician as outlined in the discharge instructions. They are agreeable to this plan of care and follow-up instructions have been explained in detail. The patient has received these instructions in written format and have expressed an understanding of the discharge instructions. The patient is aware that any significant change in condition or worsening of symptoms should prompt an immediate return to this or the closest emergency department or call to 911.  I have explained discharge medications and the need for follow up with the patient/caretakers. This was also printed in the discharge instructions. Patient was discharged with the following medications and follow up:      Medication List      Changed    baclofen 20 MG tablet  Commonly known as: LIORESAL  TAKE 1 TABLET BY MOUTH THREE TIMES DAILY  What changed:   · how much to take  · when to take this  · reasons to take this     gabapentin 600 MG tablet  Commonly known as: NEURONTIN  TAKE 1 TABLET BY MOUTH AT BEDTIME  What changed:   · how much to take  · when to take this  · reasons to take this  · additional instructions         Alfred Castañeda MD  [id number]  [place] [address]  [phone]    Schedule an appointment as soon as possible for a visit       Your ostomy nurse    Schedule an appointment as soon as possible for a visit          Final diagnoses:   Skin ulceration at colostomy site        ED Disposition     ED Disposition   Discharge    Condition   Stable    Comment   --             This medical record created using voice recognition software.           Danny Frazier MD  04/12/23 0653

## 2023-04-13 ENCOUNTER — SPECIALTY PHARMACY (OUTPATIENT)
Dept: PHARMACY | Facility: HOSPITAL | Age: 63
End: 2023-04-13
Payer: MEDICARE

## 2023-04-13 NOTE — PROGRESS NOTES
Specialty Pharmacy Patient Management Program  Oncology Reassessment     Patient is a 62 y.o. female with ER+ breast cancer seen by an Oncology provider and enrolled in the Oncology Patient Management program offered by [facility].  A follow-up outreach was conducted, including assessment of continued therapy appropriateness, medication adherence, and side effect incidence and management for ribociclib.       Relevant Past Medical History and Comorbidities  Relevant medical history and concomitant health conditions were discussed with the patient. The patient's chart has been reviewed for relevant past medical history and comorbid health conditions and updated as necessary.   Past Medical History:   Diagnosis Date   • Abdominal pain     OSTOMY SITE   • Allergic rhinitis    • Anemia     NO S/S   • Anxiety    • Arteriosclerosis     Coronary   • Arthritis    • Bone metastases 10/14/2022   • Bowen's disease     SKIN CANCER   • Breast cancer     NO SURGERY WAS DONE DUE TO METS TO BONE/COLON/LYMPHNODE   • Cancer related pain 10/13/2022   • Depression    • Disorder associated with Helicobacter species 10/12/2022   • Dysphoric mood    • Fatigue    • Frequent urination     NO S/S INFECTION   • Herpes simplex vulvovaginitis 7/26/2018   • History of colon polyps    • History of IBS    • History of kidney stones    • Hyperlipidemia    • Hypertension    • Hypothyroidism    • Insomnia    • Lumbago    • Mood disorder    • Neck pain     R/T CANCER   • Palpitations     ASYMPTOMATIC,  NO CARDIOLOGIST   • Precordial pain     R/T SPINE CANCER   • Sleep disturbance      Social History     Socioeconomic History   • Marital status:    Tobacco Use   • Smoking status: Every Day     Packs/day: 1.50     Years: 40.00     Pack years: 60.00     Types: Cigarettes     Start date: 1974   • Smokeless tobacco: Never   • Tobacco comments:     caffeine use 3 mt dews daily     INST PER ANESTHESIA PROTOCOL   Vaping Use    • Vaping Use: Never used   Substance and Sexual Activity   • Alcohol use: No   • Drug use: Yes     Types: Marijuana     Comment: Prescribed Lorazepam/OCC MARIJUANA   • Sexual activity: Defer     Partners: Male     Birth control/protection: None       Allergies  Known allergies and reactions were discussed with the patient. The patient's chart has been reviewed for allergy information and updated as necessary.   Azithromycin    Relevant Laboratory Values  Lab Results   Component Value Date    GLUCOSE 91 04/12/2023    CALCIUM 8.9 04/12/2023     04/12/2023    K 3.7 04/12/2023    CO2 26.5 04/12/2023     04/12/2023    BUN 11 04/12/2023    CREATININE 0.75 04/12/2023    EGFRIFNONA 75 11/12/2019    BCR 14.7 04/12/2023    ANIONGAP 10.5 04/12/2023     Lab Results   Component Value Date    WBC 3.96 04/12/2023    RBC 3.31 (L) 04/12/2023    HGB 11.1 (L) 04/12/2023    HCT 34.5 04/12/2023    .2 (H) 04/12/2023    MCH 33.5 (H) 04/12/2023    MCHC 32.2 04/12/2023    RDW 15.4 04/12/2023    RDWSD 58.7 (H) 04/12/2023    MPV 10.6 04/12/2023     04/12/2023    NEUTRORELPCT 65.6 04/12/2023    LYMPHORELPCT 28.0 04/12/2023    MONORELPCT 4.3 (L) 04/12/2023    EOSRELPCT 0.8 04/12/2023    BASORELPCT 1.0 04/12/2023    AUTOIGPER 0.3 04/12/2023    NEUTROABS 2.60 04/12/2023    LYMPHSABS 1.11 04/12/2023    MONOSABS 0.17 04/12/2023    EOSABS 0.03 04/12/2023    BASOSABS 0.04 04/12/2023    AUTOIGNUM 0.01 04/12/2023    NRBC 0.0 04/12/2023        Current Medication List  This medication list has been reviewed with the patient and evaluated for any interactions or necessary modifications/recommendations, and updated to include all prescription medications, OTC medications, and supplements the patient is currently taking.  This list reflects what is contained in the patient's profile, which has also been marked as reviewed to communicate to other providers it is the most up to date version of the patient's current medication  therapy.     Current Outpatient Medications:   •  atorvastatin (LIPITOR) 20 MG tablet, TAKE 1 TABLET BY MOUTH EVERY DAY (Patient taking differently: Take 1 tablet by mouth Daily.), Disp: 30 tablet, Rfl: 6  •  baclofen (LIORESAL) 20 MG tablet, TAKE 1 TABLET BY MOUTH THREE TIMES DAILY (Patient taking differently: Take 10 mg by mouth 3 (Three) Times a Day As Needed.), Disp: 90 tablet, Rfl: 1  •  donepezil (ARICEPT) 10 MG tablet, Take 1 tablet by mouth Daily., Disp: , Rfl:   •  gabapentin (NEURONTIN) 600 MG tablet, TAKE 1 TABLET BY MOUTH AT BEDTIME (Patient taking differently: 300 mg 2 (Two) Times a Day As Needed. TAKES1/2  MG TABLET), Disp: 90 tablet, Rfl: 0  •  hydroCHLOROthiazide (HYDRODIURIL) 12.5 MG tablet, 1 tablet DAILY (route: oral), Disp: , Rfl:   •  letrozole (FEMARA) 2.5 MG tablet, Take 1 tablet by mouth Daily., Disp: 90 tablet, Rfl: 1  •  letrozole (FEMARA) 2.5 MG tablet, , Disp: , Rfl:   •  levothyroxine (SYNTHROID, LEVOTHROID) 50 MCG tablet, TAKE 1 TABLET BY MOUTH EVERY DAY, Disp: 90 tablet, Rfl: 1  •  LORazepam (ATIVAN) 0.5 MG tablet, Take 1 tablet by mouth Every 6 (Six) Hours As Needed., Disp: , Rfl:   •  losartan (COZAAR) 100 MG tablet, Take 1 tablet by mouth Daily. INST PER ANESTHESIA PROTOCOL, Disp: , Rfl:   •  montelukast (SINGULAIR) 10 MG tablet, Take 1 tablet by mouth Daily., Disp: , Rfl:   •  Morphine (MS CONTIN) 15 MG 12 hr tablet, Take 3 tablets by mouth 2 (Two) Times a Day., Disp: 180 tablet, Rfl: 0  •  naproxen (NAPROSYN) 500 MG tablet, Take 1 tablet by mouth 2 (Two) Times a Day With Meals. LAST DOSE 1/5/23, Disp: , Rfl:   •  ondansetron (ZOFRAN) 8 MG tablet, TAKE 1 TABLET BY MOUTH THREE TIMES DAILY AS NEEDED FOR NAUSEA AND VOMITING, Disp: 30 tablet, Rfl: 5  •  oxyCODONE-acetaminophen (PERCOCET)  MG per tablet, TAKE 1 TABLET BY MOUTH EVERY 6 HOURS AS NEEDED for moderate pain, Disp: 60 tablet, Rfl: 0  •  polyethylene glycol (MIRALAX) 17 g packet, Take 17 g by mouth Daily., Disp: 5  packet, Rfl: 0  •  Ribociclib Succinate (KISQALI 600 DOSE PO), Per instructions AS DIRECTED (route: oral), Disp: , Rfl:   •  ribociclib succinate 200 MG tablet therapy pack tablet, Take 3 tablets by mouth Take As Directed. Take 3 tablets by mouth daily for 21 days then off 7 days on a 28 day cycle., Disp: 63 tablet, Rfl: 5  •  rOPINIRole (REQUIP) 3 MG tablet, Take 1 tablet by mouth 2 (Two) Times a Day., Disp: , Rfl:   •  solifenacin (VESICARE) 10 MG tablet, Take 1 tablet by mouth Daily for 360 days., Disp: 90 tablet, Rfl: 3  •  SUMAtriptan (IMITREX) 100 MG tablet, Take 1 tablet by mouth 1 (One) Time As Needed., Disp: , Rfl:   •  traZODone (DESYREL) 150 MG tablet, TAKE 1 TABLET BY MOUTH ONCE nightly, Disp: 90 tablet, Rfl: 2  •  venlafaxine XR (EFFEXOR-XR) 150 MG 24 hr capsule, Take 1 capsule by mouth Daily., Disp: 90 capsule, Rfl: 1  No current facility-administered medications for this visit.    Drug Interactions  Kisqali may increase the concentration of several medications. No major interactions listed.      Adverse Drug Reactions  • Adverse Reactions Experienced: none reported   • Plan for ADR Management: Not required      Hospitalizations and Urgent Care Since Last Assessment  • Hospitalizations or Admissions: none    • ED Visits: none   • Urgent Office Visits: none     Adherence and Self-Administration  • Approximate Number of Doses Missed Since Last Assessment: 0   • Ongoing or New Barriers to Patient Adherence and/or Self-Administration: none   • Methods for Supporting Patient Adherence and/or Self-Administration: continue current method    Goals of Therapy  • Progress Toward Meeting Patient-Identified Goals of Therapy:  Goal Met  o Patient-Identified Goals  - Consistently take medications as prescribed  - Patient will adhere to medication regimen  - Patient will report any medication side effects to healthcare provider    • Progress Toward Meeting Clinical Goals or Therapeutic Targets: Goal Met  o Clinical  Goals or Therapeutic Targets  - Support patient understanding of medication regimen  - Ensure patient knows the pharmacy contact information  - Schedule regular follow-up to monitor the treatment serious adverse events  - Schedule regular follow-up to confirm medication adherence  - Schedule regular follow-up to monitor disease progression or stabilty    Quality of Life Assessment   Quality of Life related to the patient's specialty therapy was discussed with the patient. The QOL segment of this outreach has been reviewed and updated.   • Quality of Life Score: 8    Reassessment Plan & Follow-Up  1. Pharmacist to continue to perform regular reassessments no more than (6) months from the previous assessment.  2. Care Coordinator to facilitate future refill outreaches, coordinate prescription delivery, and escalate clinical questions to pharmacist.     Additional Plans, Therapy Recommendations or Therapy Problems to Be Addressed: none at this time      Attestation  I attest that the specialty medication(s) addressed above are appropriate for the patient based on my reassessment.  If the prescribed therapy is at any point deemed not appropriate based on the current or future assessments, a consultation will be initiated with the patient's specialty care provider to determine the best course of action. The revised plan of therapy will be documented along with any additional patient education provided.     Yakelin Bajwa, PharmD, [facility]  Oncology Clinical Pharmacist  4/13/2023  12:19 EDT

## 2023-04-14 ENCOUNTER — HOSPITAL ENCOUNTER (OUTPATIENT)
Dept: INFUSION THERAPY | Facility: HOSPITAL | Age: 63
Discharge: HOME | End: 2023-04-14
Admitting: SURGERY
Payer: MEDICARE

## 2023-04-14 VITALS
RESPIRATION RATE: 20 BRPM | HEART RATE: 65 BPM | DIASTOLIC BLOOD PRESSURE: 61 MMHG | SYSTOLIC BLOOD PRESSURE: 144 MMHG | OXYGEN SATURATION: 97 % | TEMPERATURE: 98.5 F

## 2023-04-14 DIAGNOSIS — Z98.890 S/P EXPLORATORY LAPAROTOMY: Primary | ICD-10-CM

## 2023-04-14 PROCEDURE — G0463 HOSPITAL OUTPT CLINIC VISIT: HCPCS

## 2023-04-14 NOTE — SIGNIFICANT NOTE
Wound Eval / Progress Noted    JOSE Langston     Patient Name: Derek Keller  : 1960  MRN: [id number]  Today's Date: 2023                 Admit Date: 2023    Visit Dx:    ICD-10-CM ICD-9-CM   1. S/P ex lap with sigmoid resection, Lynn's procedure for stercoral perforation  Z98.890 V45.89       Patient Active Problem List   Diagnosis   • Hypertension   • Hyperlipidemia   • Allergic rhinitis   • Hypothyroidism   • Chronic pain disorder   • Anxiety   • Monopolar depression   • Malaise and fatigue   • Tobacco abuse   • Fibromyalgia   • Vitamin B 12 deficiency   • Primary osteoarthritis of left knee   • Restless leg syndrome   • Primary insomnia   • Chronic fatigue   • Asthma   • Body mass index (BMI) 26.0-26.9, adult   • Degeneration of lumbar intervertebral disc   • Hearing loss   • Inappropriate diet and eating habits   • Cervical spondylosis   • Obesity with body mass index 30 or greater   • Renal stone   • Malignant neoplasm of left breast in female, estrogen receptor positive   • Cancer related pain   • Malignant neoplasm metastatic to bone   • Secondary malignant neoplasm of bone   • Peritonitis   • Leukopenia   • S/P ex lap with sigmoid resection, Lynn's procedure for stercoral perforation   • Encounter for adjustment or management of vascular access device   • Pulmonary emphysema        Past Medical History:   Diagnosis Date   • Abdominal pain     OSTOMY SITE   • Allergic rhinitis    • Anemia     NO S/S   • Anxiety    • Arteriosclerosis     Coronary   • Arthritis    • Bone metastases 10/14/2022   • Bowen's disease     SKIN CANCER   • Breast cancer     NO SURGERY WAS DONE DUE TO METS TO BONE/COLON/LYMPHNODE   • Cancer related pain 10/13/2022   • Depression    • Disorder associated with Helicobacter species 10/12/2022   • Dysphoric mood    • Fatigue    • Frequent urination     NO S/S INFECTION   • Herpes simplex vulvovaginitis 2018   • History of colon polyps    • History of IBS    •  History of kidney stones    • Hyperlipidemia    • Hypertension    • Hypothyroidism    • Insomnia    • Lumbago    • Mood disorder    • Neck pain     R/T CANCER   • Palpitations     ASYMPTOMATIC,  NO CARDIOLOGIST   • Precordial pain     R/T SPINE CANCER   • Sleep disturbance         Past Surgical History:   Procedure Laterality Date   • BREAST BIOPSY Left    • CARDIAC CATHETERIZATION Left 1960   • CARDIAC CATHETERIZATION N/A 04/06/2018    Procedure: Coronary angiography;  Surgeon: Art Licea MD;  [facility];  Service: Cardiovascular   • CARDIAC CATHETERIZATION N/A 04/06/2018    Procedure: Left heart cath;  Surgeon: Art Licea MD;  [facility];  Service: Cardiovascular   • CARDIAC CATHETERIZATION N/A 04/06/2018    Procedure: Left ventriculography;  Surgeon: Art Licea MD;  [facility];  Service: Cardiovascular   • CHOLECYSTECTOMY     • COLON SURGERY      colostomy bag   • COLONOSCOPY  11/08/2006   • EXPLORATORY LAPAROTOMY N/A 12/06/2022    Procedure: LAPAROTOMY EXPLORATORY sigmoid resection hartmans procedure colostomy;  Surgeon: Alfred Castañeda MD;  Location: [facility];  Service: General;  Laterality: N/A;   • GANGLION CYST EXCISION     • HYSTERECTOMY  05/2005   • LAPAROSCOPIC GASTRIC BANDING      BAND REMOVED 2020   • TONSILLECTOMY     • VENOUS ACCESS DEVICE (PORT) INSERTION N/A 1/9/2023    Procedure: INSERTION VENOUS ACCESS DEVICE;  Surgeon: Alfred Castañeda MD;  Location: [facility] OR;  Service: General;  Laterality: N/A;         Physical Assessment:  Wound 03/29/23 1310 Left medial abdomen Traumatic (Active)   Wound Image   04/14/23 0940   Dressing Appearance moist drainage;dressing loose 04/14/23 0940   Closure None 04/14/23 0940   Base red;moist;yellow 04/14/23 0940   Periwound intact;dry;pink 04/14/23 0940   Periwound Temperature warm 04/14/23 0940   Periwound Skin Turgor  soft 04/14/23 0940   Edges open 04/14/23 0940   Wound Length (cm) 1.8 cm 04/14/23 0940   Wound Width (cm) 2.5 cm 04/14/23 0940   Wound Depth (cm) 0.3 cm 04/14/23 0940   Wound Surface Area (cm^2) 4.5 cm^2 04/14/23 0940   Wound Volume (cm^3) 1.35 cm^3 04/14/23 0940   Drainage Characteristics/Odor serosanguineous 04/14/23 0940   Drainage Amount small 04/14/23 0940   Care, Wound cleansed with;irrigated with;sterile normal saline;barrier applied 04/14/23 0940   Dressing Care dressing applied;foam;hydrocolloid;other (see comments) 04/14/23 0940   Periwound Care absorptive dressing applied 04/14/23 0940        Wound Check / Follow-up:  Patient seen today for a wound check and ostomy check. Patient reports that she went to the ER dueto bleeding from the stoma; no active bleeding noted from the stoma or blood in the pouch during removal.  Patient with 2 piece pouch intact with evidence of leaking at 10-11 o'clock. Discussed with patient the importance of frequent checks for leaking and removal of pouch as soon as leaking is noticed. Discussed changing pouch every other day at this time to allow for wound care and stoma site care.  Stoma is flush with the peristomal tissue, red and moist. Peristomal tissue is pink and intact. Ulceration to 9-11 o'clock with thinning wall connecting the ulceration and the stoma. Cleansed with NS. Applied eakins from 4- 5 o'clock on the ulceration to build a barrier from the stoma and would. Wound base is red and moist with iain yellow tissue noted. Hyperbole noted to the wound edges. Applied purple foam to ulceration and secured with a hydrocolloid dressing. Applied one piece convex pouch and secured with barrier strips.   Patient to return next week for weekly check    Impression: ulceration to abdomen, colostomy    Short term goals:  Regain skin integrity, every other day pouch changes and wound care    Amadna Almaguer RN    4/14/2023    10:03 EDT

## 2023-04-18 ENCOUNTER — HOSPITAL ENCOUNTER (OUTPATIENT)
Dept: ONCOLOGY | Facility: HOSPITAL | Age: 63
Discharge: HOME | End: 2023-04-18
Payer: MEDICARE

## 2023-04-18 ENCOUNTER — OFFICE VISIT (OUTPATIENT)
Dept: ONCOLOGY | Facility: HOSPITAL | Age: 63
End: 2023-04-18
Payer: MEDICARE

## 2023-04-18 ENCOUNTER — LAB (OUTPATIENT)
Dept: ONCOLOGY | Facility: HOSPITAL | Age: 63
End: 2023-04-18
Payer: MEDICARE

## 2023-04-18 VITALS
WEIGHT: 177.69 LBS | RESPIRATION RATE: 16 BRPM | HEART RATE: 70 BPM | TEMPERATURE: 97.7 F | OXYGEN SATURATION: 98 % | BODY MASS INDEX: 28.68 KG/M2 | SYSTOLIC BLOOD PRESSURE: 113 MMHG | DIASTOLIC BLOOD PRESSURE: 38 MMHG

## 2023-04-18 VITALS — DIASTOLIC BLOOD PRESSURE: 65 MMHG | HEART RATE: 58 BPM | SYSTOLIC BLOOD PRESSURE: 135 MMHG

## 2023-04-18 DIAGNOSIS — C50.912 MALIGNANT NEOPLASM OF LEFT BREAST IN FEMALE, ESTROGEN RECEPTOR POSITIVE, UNSPECIFIED SITE OF BREAST: ICD-10-CM

## 2023-04-18 DIAGNOSIS — Z45.2 ENCOUNTER FOR ADJUSTMENT OR MANAGEMENT OF VASCULAR ACCESS DEVICE: ICD-10-CM

## 2023-04-18 DIAGNOSIS — Z17.0 MALIGNANT NEOPLASM OF UPPER-OUTER QUADRANT OF LEFT BREAST IN FEMALE, ESTROGEN RECEPTOR POSITIVE: Primary | ICD-10-CM

## 2023-04-18 DIAGNOSIS — G89.3 CANCER RELATED PAIN: ICD-10-CM

## 2023-04-18 DIAGNOSIS — C79.51 MALIGNANT NEOPLASM METASTATIC TO BONE: ICD-10-CM

## 2023-04-18 DIAGNOSIS — Z17.0 MALIGNANT NEOPLASM OF LEFT BREAST IN FEMALE, ESTROGEN RECEPTOR POSITIVE, UNSPECIFIED SITE OF BREAST: ICD-10-CM

## 2023-04-18 DIAGNOSIS — C79.51 MALIGNANT NEOPLASM METASTATIC TO BONE: Primary | ICD-10-CM

## 2023-04-18 DIAGNOSIS — C50.412 MALIGNANT NEOPLASM OF UPPER-OUTER QUADRANT OF LEFT BREAST IN FEMALE, ESTROGEN RECEPTOR POSITIVE: Primary | ICD-10-CM

## 2023-04-18 PROBLEM — C18.9 MALIGNANT TUMOR OF COLON: Status: ACTIVE | Noted: 2023-03-09

## 2023-04-18 LAB
ALBUMIN SERPL-MCNC: 3.9 G/DL (ref 3.5–5.2)
ALBUMIN/GLOB SERPL: 1.7 G/DL
ALP SERPL-CCNC: 75 U/L (ref 39–117)
ALT SERPL W P-5'-P-CCNC: 7 U/L (ref 1–33)
ANION GAP SERPL CALCULATED.3IONS-SCNC: 5.1 MMOL/L (ref 5–15)
AST SERPL-CCNC: 11 U/L (ref 1–32)
BASOPHILS # BLD AUTO: 0.02 10*3/MM3 (ref 0–0.2)
BASOPHILS NFR BLD AUTO: 0.5 % (ref 0–1.5)
BILIRUB SERPL-MCNC: 0.2 MG/DL (ref 0–1.2)
BUN SERPL-MCNC: 16 MG/DL (ref 8–23)
BUN/CREAT SERPL: 20.8 (ref 7–25)
CALCIUM SPEC-SCNC: 10 MG/DL (ref 8.6–10.5)
CHLORIDE SERPL-SCNC: 106 MMOL/L (ref 98–107)
CO2 SERPL-SCNC: 29.9 MMOL/L (ref 22–29)
CREAT SERPL-MCNC: 0.77 MG/DL (ref 0.57–1)
DEPRECATED RDW RBC AUTO: 61.4 FL (ref 37–54)
EGFRCR SERPLBLD CKD-EPI 2021: 86.8 ML/MIN/1.73
EOSINOPHIL # BLD AUTO: 0.02 10*3/MM3 (ref 0–0.4)
EOSINOPHIL NFR BLD AUTO: 0.5 % (ref 0.3–6.2)
ERYTHROCYTE [DISTWIDTH] IN BLOOD BY AUTOMATED COUNT: 15.9 % (ref 12.3–15.4)
GLOBULIN UR ELPH-MCNC: 2.3 GM/DL
GLUCOSE SERPL-MCNC: 93 MG/DL (ref 65–99)
HCT VFR BLD AUTO: 31.2 % (ref 34–46.6)
HGB BLD-MCNC: 10.2 G/DL (ref 12–15.9)
IMM GRANULOCYTES # BLD AUTO: 0.01 10*3/MM3 (ref 0–0.05)
IMM GRANULOCYTES NFR BLD AUTO: 0.3 % (ref 0–0.5)
LYMPHOCYTES # BLD AUTO: 1.34 10*3/MM3 (ref 0.7–3.1)
LYMPHOCYTES NFR BLD AUTO: 35.8 % (ref 19.6–45.3)
MAGNESIUM SERPL-MCNC: 1.8 MG/DL (ref 1.6–2.4)
MCH RBC QN AUTO: 34.3 PG (ref 26.6–33)
MCHC RBC AUTO-ENTMCNC: 32.7 G/DL (ref 31.5–35.7)
MCV RBC AUTO: 105.1 FL (ref 79–97)
MONOCYTES # BLD AUTO: 0.49 10*3/MM3 (ref 0.1–0.9)
MONOCYTES NFR BLD AUTO: 13.1 % (ref 5–12)
NEUTROPHILS NFR BLD AUTO: 1.86 10*3/MM3 (ref 1.7–7)
NEUTROPHILS NFR BLD AUTO: 49.8 % (ref 42.7–76)
PHOSPHATE SERPL-MCNC: 3.6 MG/DL (ref 2.5–4.5)
PLATELET # BLD AUTO: 170 10*3/MM3 (ref 140–450)
PMV BLD AUTO: 10.4 FL (ref 6–12)
POTASSIUM SERPL-SCNC: 4 MMOL/L (ref 3.5–5.2)
PROT SERPL-MCNC: 6.2 G/DL (ref 6–8.5)
RBC # BLD AUTO: 2.97 10*6/MM3 (ref 3.77–5.28)
SODIUM SERPL-SCNC: 141 MMOL/L (ref 136–145)
WBC NRBC COR # BLD: 3.74 10*3/MM3 (ref 3.4–10.8)

## 2023-04-18 PROCEDURE — 85025 COMPLETE CBC W/AUTO DIFF WBC: CPT

## 2023-04-18 PROCEDURE — 36591 DRAW BLOOD OFF VENOUS DEVICE: CPT

## 2023-04-18 PROCEDURE — 84100 ASSAY OF PHOSPHORUS: CPT

## 2023-04-18 PROCEDURE — 83735 ASSAY OF MAGNESIUM: CPT

## 2023-04-18 PROCEDURE — 80053 COMPREHEN METABOLIC PANEL: CPT

## 2023-04-18 PROCEDURE — G0463 HOSPITAL OUTPT CLINIC VISIT: HCPCS | Performed by: INTERNAL MEDICINE

## 2023-04-18 PROCEDURE — 25010000002 DENOSUMAB 120 MG/1.7ML SOLUTION: Performed by: INTERNAL MEDICINE

## 2023-04-18 PROCEDURE — 96372 THER/PROPH/DIAG INJ SC/IM: CPT

## 2023-04-18 PROCEDURE — 25010000002 HEPARIN LOCK FLUSH PER 10 UNITS: Performed by: INTERNAL MEDICINE

## 2023-04-18 RX ORDER — KETOCONAZOLE 20 MG/G
CREAM TOPICAL
COMMUNITY
Start: 2023-04-06

## 2023-04-18 RX ORDER — HEPARIN SODIUM (PORCINE) LOCK FLUSH IV SOLN 100 UNIT/ML 100 UNIT/ML
500 SOLUTION INTRAVENOUS AS NEEDED
Status: DISCONTINUED | OUTPATIENT
Start: 2023-04-18 | End: 2023-04-18 | Stop reason: HOSPADM

## 2023-04-18 RX ORDER — BUPROPION HYDROCHLORIDE 300 MG/1
TABLET ORAL
COMMUNITY
Start: 2023-03-09

## 2023-04-18 RX ORDER — LEVOTHYROXINE SODIUM 0.03 MG/1
TABLET ORAL EVERY 24 HOURS
COMMUNITY
Start: 2023-03-09 | End: 2023-04-18 | Stop reason: ALTCHOICE

## 2023-04-18 RX ORDER — DICYCLOMINE HCL 20 MG
TABLET ORAL
COMMUNITY
Start: 2023-03-09

## 2023-04-18 RX ORDER — ARIPIPRAZOLE 10 MG/1
TABLET ORAL
COMMUNITY
Start: 2023-03-09 | End: 2023-04-18 | Stop reason: ALTCHOICE

## 2023-04-18 RX ORDER — SODIUM CHLORIDE 0.9 % (FLUSH) 0.9 %
20 SYRINGE (ML) INJECTION AS NEEDED
Status: DISCONTINUED | OUTPATIENT
Start: 2023-04-18 | End: 2023-04-18 | Stop reason: HOSPADM

## 2023-04-18 RX ORDER — HEPARIN SODIUM (PORCINE) LOCK FLUSH IV SOLN 100 UNIT/ML 100 UNIT/ML
500 SOLUTION INTRAVENOUS AS NEEDED
OUTPATIENT
Start: 2023-04-18

## 2023-04-18 RX ORDER — ALENDRONATE SODIUM 70 MG/1
TABLET ORAL
COMMUNITY
Start: 2023-03-09 | End: 2023-04-18 | Stop reason: ALTCHOICE

## 2023-04-18 RX ORDER — SODIUM CHLORIDE 0.9 % (FLUSH) 0.9 %
20 SYRINGE (ML) INJECTION AS NEEDED
OUTPATIENT
Start: 2023-04-18

## 2023-04-18 RX ADMIN — DENOSUMAB 120 MG: 120 INJECTION SUBCUTANEOUS at 14:55

## 2023-04-18 RX ADMIN — HEPARIN SODIUM (PORCINE) LOCK FLUSH IV SOLN 100 UNIT/ML 500 UNITS: 100 SOLUTION at 13:29

## 2023-04-18 RX ADMIN — Medication 20 ML: at 13:29

## 2023-04-18 NOTE — PROGRESS NOTES
Chief Complaint  Breast Cancer    Haile Monique MD    Subjective          Derek Keller presents to [facility] for ongoing treatment of her metastatic breast cancer.  She is on letrozole, ribociclib and denosumab for bony involvement.  She states she has been tolerating her treatments well.  Overall she feels little bit better.  She is having some days where she feels almost back to normal.  Other day she can does continue to have pain.  She notes better pain control with her MS Contin and oxycodone as needed for breakthrough.  She reports adequate appetite.  She is able to perform her ADLs.    Oncology/Hematology History   Malignant neoplasm of left breast in female, estrogen receptor positive   10/13/2022 Initial Diagnosis    Malignant neoplasm of left breast in female, estrogen receptor positive (HCC)     10/14/2022 -  Chemotherapy    OP BREAST Letrozole / Ribociclib     10/14/2022 Cancer Staged    Staging form: Breast, AJCC 8th Edition  - Clinical: Stage IV (cT1c, cN1, cM1, ER+, FL+, HER2-) - Signed by Donald Barker MD on 10/14/2022     10/28/2022 -  Chemotherapy    OP SUPPORTIVE Denosumab (Xgeva) Q28D     Malignant neoplasm metastatic to bone   10/14/2022 Initial Diagnosis    Bone metastases (HCC)     10/28/2022 -  Chemotherapy    OP SUPPORTIVE Denosumab (Xgeva) Q28D     Secondary malignant neoplasm of bone (Resolved)   11/14/2022 Initial Diagnosis    Secondary malignant neoplasm of bone (HCC)     11/17/2022 - 4/19/2023 Radiation    RADIATION THERAPY Treatment Details (11/14/2022 - 4/19/2023)  Site: Spine - Lumbar  Technique: 3D CRT  Goal: No goal specified  Planned Treatment Start Date: 11/17/2022         Review of Systems   Constitutional: Positive for fatigue. Negative for appetite change, diaphoresis, fever, unexpected weight gain and unexpected weight loss.   HENT: Negative for hearing loss, mouth sores, sore throat, swollen glands, trouble swallowing and voice  change.    Eyes: Negative for blurred vision.   Respiratory: Negative for cough, shortness of breath and wheezing.    Cardiovascular: Negative for chest pain and palpitations.   Gastrointestinal: Positive for abdominal pain. Negative for blood in stool, constipation, diarrhea, nausea and vomiting.   Endocrine: Negative for cold intolerance and heat intolerance.   Genitourinary: Negative for difficulty urinating, dysuria, frequency, hematuria and urinary incontinence.   Musculoskeletal: Negative for arthralgias, back pain and myalgias.   Skin: Negative for rash, skin lesions and wound.   Neurological: Negative for dizziness, seizures, weakness, numbness and headache.   Hematological: Does not bruise/bleed easily.   Psychiatric/Behavioral: Negative for depressed mood. The patient is not nervous/anxious.      Current Outpatient Medications on File Prior to Visit   Medication Sig Dispense Refill   • atorvastatin (LIPITOR) 20 MG tablet TAKE 1 TABLET BY MOUTH EVERY DAY (Patient taking differently: Take 1 tablet by mouth Daily.) 30 tablet 6   • buPROPion XL (WELLBUTRIN XL) 300 MG 24 hr tablet bupropion HCl  mg 24 hr tablet, extended release     • dicyclomine (BENTYL) 20 MG tablet dicyclomine 20 mg tablet     • donepezil (ARICEPT) 10 MG tablet Take 1 tablet by mouth Daily.     • gabapentin (NEURONTIN) 600 MG tablet TAKE 1 TABLET BY MOUTH AT BEDTIME (Patient taking differently: 300 mg 2 (Two) Times a Day As Needed. TAKES1/2  MG TABLET) 90 tablet 0   • hydroCHLOROthiazide (HYDRODIURIL) 12.5 MG tablet 1 tablet DAILY (route: oral)     • ketoconazole (NIZORAL) 2 % cream APPLY TO THE AFFECTED AREA(S) once DAILY     • letrozole (FEMARA) 2.5 MG tablet Take 1 tablet by mouth Daily. 90 tablet 1   • levothyroxine (SYNTHROID, LEVOTHROID) 50 MCG tablet TAKE 1 TABLET BY MOUTH EVERY DAY 90 tablet 1   • linaclotide (LINZESS) 145 MCG capsule capsule Linzess 145 mcg capsule     • LORazepam (ATIVAN) 0.5 MG tablet Take 1 tablet by  mouth Every 6 (Six) Hours As Needed.     • losartan (COZAAR) 100 MG tablet Take 1 tablet by mouth Daily. INST PER ANESTHESIA PROTOCOL     • montelukast (SINGULAIR) 10 MG tablet Take 1 tablet by mouth Daily.     • Morphine (MS CONTIN) 15 MG 12 hr tablet Take 3 tablets by mouth 2 (Two) Times a Day. 180 tablet 0   • naproxen (NAPROSYN) 500 MG tablet Take 1 tablet by mouth 2 (Two) Times a Day With Meals. LAST DOSE 1/5/23     • ondansetron (ZOFRAN) 8 MG tablet TAKE 1 TABLET BY MOUTH THREE TIMES DAILY AS NEEDED FOR NAUSEA AND VOMITING 30 tablet 5   • oxyCODONE-acetaminophen (PERCOCET)  MG per tablet TAKE 1 TABLET BY MOUTH EVERY 6 HOURS AS NEEDED for moderate pain 60 tablet 0   • polyethylene glycol (MIRALAX) 17 g packet Take 17 g by mouth Daily. 5 packet 0   • ribociclib succinate 200 MG tablet therapy pack tablet Take 3 tablets by mouth Take As Directed. Take 3 tablets by mouth daily for 21 days then off 7 days on a 28 day cycle. 63 tablet 5   • rOPINIRole (REQUIP) 3 MG tablet Take 1 tablet by mouth 2 (Two) Times a Day.     • solifenacin (VESICARE) 10 MG tablet Take 1 tablet by mouth Daily for 360 days. 90 tablet 3   • SUMAtriptan (IMITREX) 100 MG tablet Take 1 tablet by mouth 1 (One) Time As Needed.     • traZODone (DESYREL) 150 MG tablet TAKE 1 TABLET BY MOUTH ONCE nightly 90 tablet 2   • venlafaxine XR (EFFEXOR-XR) 150 MG 24 hr capsule Take 1 capsule by mouth Daily. 90 capsule 1     Current Facility-Administered Medications on File Prior to Visit   Medication Dose Route Frequency Provider Last Rate Last Admin   • [COMPLETED] denosumab (XGEVA) injection 120 mg  120 mg Subcutaneous Once Donald Barker MD   120 mg at 04/18/23 3815   • [DISCONTINUED] heparin injection 500 Units  500 Units Intravenous PRN Donald Barker MD   500 Units at 04/18/23 1329   • [DISCONTINUED] sodium chloride 0.9 % flush 20 mL  20 mL Intravenous PRN Donald Barker MD   20 mL at 04/18/23 1329       Allergies   Allergen Reactions   •  Azithromycin Itching     Past Medical History:   Diagnosis Date   • Abdominal pain     OSTOMY SITE   • Allergic rhinitis    • Anemia     NO S/S   • Anxiety    • Arteriosclerosis     Coronary   • Arthritis    • Bone metastases 10/14/2022   • Bowen's disease     SKIN CANCER   • Breast cancer     NO SURGERY WAS DONE DUE TO METS TO BONE/COLON/LYMPHNODE   • Cancer related pain 10/13/2022   • Depression    • Disorder associated with Helicobacter species 10/12/2022   • Dysphoric mood    • Fatigue    • Frequent urination     NO S/S INFECTION   • Herpes simplex vulvovaginitis 7/26/2018   • History of colon polyps    • History of IBS    • History of kidney stones    • Hyperlipidemia    • Hypertension    • Hypothyroidism    • Insomnia    • Lumbago    • Mood disorder    • Neck pain     R/T CANCER   • Palpitations     ASYMPTOMATIC,  NO CARDIOLOGIST   • Precordial pain     R/T SPINE CANCER   • Sleep disturbance      Past Surgical History:   Procedure Laterality Date   • BREAST BIOPSY Left    • CARDIAC CATHETERIZATION Left 1959   • CARDIAC CATHETERIZATION N/A 04/06/2018    Procedure: Coronary angiography;  Surgeon: Art Licea MD;  Location: [facility] INVASIVE LOCATION;  Service: Cardiovascular   • CARDIAC CATHETERIZATION N/A 04/06/2018    Procedure: Left heart cath;  Surgeon: Art Licea MD;  Location: [facility] INVASIVE LOCATION;  Service: Cardiovascular   • CARDIAC CATHETERIZATION N/A 04/06/2018    Procedure: Left ventriculography;  Surgeon: Art Licea MD;  Location: [facility] INVASIVE LOCATION;  Service: Cardiovascular   • CHOLECYSTECTOMY     • COLON SURGERY      colostomy bag   • COLONOSCOPY  11/08/2006   • EXPLORATORY LAPAROTOMY N/A 12/06/2022    Procedure: LAPAROTOMY EXPLORATORY sigmoid resection hartmans procedure colostomy;  Surgeon: Alfred Castañeda MD;  Location: [facility] OR;  Service: General;  Laterality: N/A;   • GANGLION CYST EXCISION     • HYSTERECTOMY  05/2005   •  LAPAROSCOPIC GASTRIC BANDING      BAND REMOVED 2020   • TONSILLECTOMY     • VENOUS ACCESS DEVICE (PORT) INSERTION N/A 1/9/2023    Procedure: INSERTION VENOUS ACCESS DEVICE;  Surgeon: Alfred Castañeda MD;  Location: [facility] MAIN OR;  Service: General;  Laterality: N/A;     Social History     Socioeconomic History   • Marital status:    Tobacco Use   • Smoking status: Every Day     Packs/day: 1.50     Years: 40.00     Pack years: 60.00     Types: Cigarettes     Start date: 1974   • Smokeless tobacco: Never   • Tobacco comments:     caffeine use 3 mt dews daily     INST PER ANESTHESIA PROTOCOL   Vaping Use   • Vaping Use: Never used   Substance and Sexual Activity   • Alcohol use: No   • Drug use: Yes     Types: Marijuana     Comment: Prescribed Lorazepam/OCC MARIJUANA   • Sexual activity: Defer     Partners: Male     Birth control/protection: None     Family History   Problem Relation Age of Onset   • Hypertension Mother    • Rheum arthritis Mother    • Heart disease Mother    • Breast cancer Mother    • Diabetes Father    • Cancer Maternal Grandmother         colon   • Colon cancer Maternal Grandmother    • Aneurysm Paternal Grandfather    • Diabetes Other    • Fibromyalgia Other    • Malig Hyperthermia Neg Hx        Objective   Physical Exam  Vitals reviewed. Exam conducted with a chaperone present.   Constitutional:       General: She is not in acute distress.     Appearance: Normal appearance.   Cardiovascular:      Rate and Rhythm: Normal rate and regular rhythm.      Heart sounds: Normal heart sounds. No murmur heard.    No gallop.   Pulmonary:      Effort: Pulmonary effort is normal.      Breath sounds: Normal breath sounds.      Comments: Port-A-Cath  Abdominal:      General: Abdomen is flat. Bowel sounds are normal.      Palpations: Abdomen is soft.      Comments: Colostomy intact   Musculoskeletal:      Cervical back: Neck supple.      Right lower leg: No edema.      Left lower leg: No edema.    Lymphadenopathy:      Cervical: No cervical adenopathy.   Neurological:      Mental Status: She is alert and oriented to person, place, and time.   Psychiatric:         Mood and Affect: Mood normal.         Behavior: Behavior normal.         Vitals:    04/18/23 1326   BP: (!) 113/38   Pulse: 70   Resp: 16   Temp: 97.7 °F (36.5 °C)   TempSrc: Temporal   SpO2: 98%   Weight: 80.6 kg (177 lb 11.1 oz)   PainSc:   8   PainLoc: Abdomen     ECOG score: 0         PHQ-9 Total Score:                    Result Review :   The following data was reviewed by: Donald Barker MD on 04/18/2023:  Lab Results   Component Value Date    HGB 10.2 (L) 04/18/2023    HCT 31.2 (L) 04/18/2023    .1 (H) 04/18/2023     04/18/2023    WBC 3.74 04/18/2023    NEUTROABS 1.86 04/18/2023    LYMPHSABS 1.34 04/18/2023    MONOSABS 0.49 04/18/2023    EOSABS 0.02 04/18/2023    BASOSABS 0.02 04/18/2023     Lab Results   Component Value Date    GLUCOSE 93 04/18/2023    BUN 16 04/18/2023    CREATININE 0.77 04/18/2023     04/18/2023    K 4.0 04/18/2023     04/18/2023    CO2 29.9 (H) 04/18/2023    CALCIUM 10.0 04/18/2023    PROTEINTOT 6.2 04/18/2023    ALBUMIN 3.9 04/18/2023    BILITOT 0.2 04/18/2023    ALKPHOS 75 04/18/2023    AST 11 04/18/2023    ALT 7 04/18/2023     Lab Results   Component Value Date    MG 1.8 04/18/2023    PHOS 3.6 04/18/2023    FREET4 1.01 01/17/2022    TSH 1.470 11/12/2019     No results found for: IRON, LABIRON, TRANSFERRIN, TIBC  Lab Results   Component Value Date    CEHMPYZS25 390 04/23/2019    FOLATE 9.93 04/23/2019     No results found for: PSA, CEA, AFP, ,           Assessment and Plan    Diagnoses and all orders for this visit:    1. Malignant neoplasm of upper-outer quadrant of left breast in female, estrogen receptor positive (Primary)  Assessment & Plan:  Metastatic.  Hormone receptor positive.  Patient is on treatment letrozole plus ribociclib along with denosumab for bony involvement.   Tolerating her treatments well.  CBC is notable for mild anemia related to the regimen but not enough to require dose adjustment.  Continue letrozole daily.  Continue ribociclib 3 weeks out of 4.  I will see her back in 1 month for ongoing treatment with lab work prior to monitor for toxicities and restaging CT scans to assess response to therapy.      2. Malignant neoplasm metastatic to bone  Assessment & Plan:  Patient is on monthly denosumab.  Tolerating well.  Denies dental or jaw pain.  Electrolytes have been adequate.  Xgeva today monthly.    Orders:  -     CT chest w contrast; Future  -     CT abdomen pelvis w contrast; Future  -     CBC & Differential; Future  -     Comprehensive Metabolic Panel; Future  -     Magnesium; Future  -     Phosphorus; Future  -     Cancel: denosumab (XGEVA) injection 120 mg    3. Cancer related pain  Assessment & Plan:  Patient reports adequate pain control with her current regimen.  Wyatt reviewed and no discrepancies.  Continue same.  Reassess next visit.            Patient Follow Up: 1 month    Patient was given instructions and counseling regarding her condition or for health maintenance advice. Please see specific information pulled into the AVS if appropriate.     Donald Barker MD    4/19/2023

## 2023-04-19 DIAGNOSIS — F41.9 ANXIETY: ICD-10-CM

## 2023-04-19 PROBLEM — C79.51 SECONDARY MALIGNANT NEOPLASM OF BONE: Status: RESOLVED | Noted: 2022-11-14 | Resolved: 2023-04-19

## 2023-04-19 PROBLEM — C18.9 MALIGNANT TUMOR OF COLON: Status: RESOLVED | Noted: 2023-03-09 | Resolved: 2023-04-19

## 2023-04-19 RX ORDER — VENLAFAXINE HYDROCHLORIDE 150 MG/1
CAPSULE, EXTENDED RELEASE ORAL
Qty: 90 CAPSULE | Refills: 1 | Status: SHIPPED | OUTPATIENT
Start: 2023-04-19

## 2023-04-19 NOTE — ASSESSMENT & PLAN NOTE
Patient is on monthly denosumab.  Tolerating well.  Denies dental or jaw pain.  Electrolytes have been adequate.  Xgeva today monthly.

## 2023-04-19 NOTE — ASSESSMENT & PLAN NOTE
Metastatic.  Hormone receptor positive.  Patient is on treatment letrozole plus ribociclib along with denosumab for bony involvement.  Tolerating her treatments well.  CBC is notable for mild anemia related to the regimen but not enough to require dose adjustment.  Continue letrozole daily.  Continue ribociclib 3 weeks out of 4.  I will see her back in 1 month for ongoing treatment with lab work prior to monitor for toxicities and restaging CT scans to assess response to therapy.

## 2023-04-19 NOTE — ASSESSMENT & PLAN NOTE
Patient reports adequate pain control with her current regimen.  Wyatt reviewed and no discrepancies.  Continue same.  Reassess next visit.

## 2023-04-20 ENCOUNTER — SPECIALTY PHARMACY (OUTPATIENT)
Dept: PHARMACY | Facility: HOSPITAL | Age: 63
End: 2023-04-20
Payer: MEDICARE

## 2023-04-24 ENCOUNTER — TELEPHONE (OUTPATIENT)
Dept: RADIATION ONCOLOGY | Facility: HOSPITAL | Age: 63
End: 2023-04-24
Payer: MEDICARE

## 2023-04-24 NOTE — TELEPHONE ENCOUNTER
Diagnosis: Metastatic breast cancer    Reason for Referral: Financial assistance     Content of Visit: OSW received a VM from Ms. Keller this morning regarding her medical expenses and financial assistance application. OSW returned Ms. Keller's phone call this afternoon. She reports her previous balance of $1600+ decreased to $300+. She recently submitted a [facility] financial assistance application back on 3/27/23. She inquired what this $300+ balance is for and whether or not it's eligible to be adjusted. Advised OSW will consult with the financial counselors department and contact her back once an update is available. Educated Ms. Keller if she is approved on [facility]'s financial assistance program, this will be active through 12/31/23. The financial counselors department informed OSW that Ms. Keller's financial application is currently under review at this time.     Resources/Referrals Provided: [facility] financial assistance/financial counselors department

## 2023-04-25 ENCOUNTER — HOSPITAL ENCOUNTER (OUTPATIENT)
Dept: ONCOLOGY | Facility: HOSPITAL | Age: 63
Discharge: HOME | End: 2023-04-25
Admitting: INTERNAL MEDICINE
Payer: MEDICARE

## 2023-04-25 ENCOUNTER — TELEPHONE (OUTPATIENT)
Dept: ONCOLOGY | Facility: HOSPITAL | Age: 63
End: 2023-04-25
Payer: MEDICARE

## 2023-04-25 ENCOUNTER — HOSPITAL ENCOUNTER (OUTPATIENT)
Dept: INFUSION THERAPY | Facility: HOSPITAL | Age: 63
Discharge: HOME | End: 2023-04-25
Admitting: SURGERY
Payer: MEDICARE

## 2023-04-25 VITALS
RESPIRATION RATE: 20 BRPM | HEART RATE: 69 BPM | OXYGEN SATURATION: 95 % | TEMPERATURE: 97.6 F | DIASTOLIC BLOOD PRESSURE: 56 MMHG | SYSTOLIC BLOOD PRESSURE: 135 MMHG

## 2023-04-25 DIAGNOSIS — Z17.0 MALIGNANT NEOPLASM OF UPPER-OUTER QUADRANT OF LEFT BREAST IN FEMALE, ESTROGEN RECEPTOR POSITIVE: ICD-10-CM

## 2023-04-25 DIAGNOSIS — Z98.890 S/P EXPLORATORY LAPAROTOMY: Primary | ICD-10-CM

## 2023-04-25 DIAGNOSIS — C50.412 MALIGNANT NEOPLASM OF UPPER-OUTER QUADRANT OF LEFT BREAST IN FEMALE, ESTROGEN RECEPTOR POSITIVE: ICD-10-CM

## 2023-04-25 DIAGNOSIS — N39.0 URINARY TRACT INFECTION WITHOUT HEMATURIA, SITE UNSPECIFIED: ICD-10-CM

## 2023-04-25 DIAGNOSIS — Z45.2 ENCOUNTER FOR ADJUSTMENT OR MANAGEMENT OF VASCULAR ACCESS DEVICE: Primary | ICD-10-CM

## 2023-04-25 DIAGNOSIS — C50.412 MALIGNANT NEOPLASM OF UPPER-OUTER QUADRANT OF LEFT BREAST IN FEMALE, ESTROGEN RECEPTOR POSITIVE: Primary | ICD-10-CM

## 2023-04-25 DIAGNOSIS — Z17.0 MALIGNANT NEOPLASM OF UPPER-OUTER QUADRANT OF LEFT BREAST IN FEMALE, ESTROGEN RECEPTOR POSITIVE: Primary | ICD-10-CM

## 2023-04-25 LAB
ALBUMIN SERPL-MCNC: 3.8 G/DL (ref 3.5–5.2)
ALBUMIN/GLOB SERPL: 1.5 G/DL
ALP SERPL-CCNC: 80 U/L (ref 39–117)
ALT SERPL W P-5'-P-CCNC: 8 U/L (ref 1–33)
ANION GAP SERPL CALCULATED.3IONS-SCNC: 6.8 MMOL/L (ref 5–15)
AST SERPL-CCNC: 14 U/L (ref 1–32)
BACTERIA UR QL AUTO: ABNORMAL /HPF
BASOPHILS # BLD AUTO: 0.05 10*3/MM3 (ref 0–0.2)
BASOPHILS NFR BLD AUTO: 1.1 % (ref 0–1.5)
BILIRUB SERPL-MCNC: 0.2 MG/DL (ref 0–1.2)
BILIRUB UR QL STRIP: NEGATIVE
BUN SERPL-MCNC: 13 MG/DL (ref 8–23)
BUN/CREAT SERPL: 15.9 (ref 7–25)
CALCIUM SPEC-SCNC: 8.8 MG/DL (ref 8.6–10.5)
CHLORIDE SERPL-SCNC: 104 MMOL/L (ref 98–107)
CLARITY UR: ABNORMAL
CO2 SERPL-SCNC: 32.2 MMOL/L (ref 22–29)
COLOR UR: YELLOW
CREAT SERPL-MCNC: 0.82 MG/DL (ref 0.57–1)
DEPRECATED RDW RBC AUTO: 58 FL (ref 37–54)
EGFRCR SERPLBLD CKD-EPI 2021: 80.5 ML/MIN/1.73
EOSINOPHIL # BLD AUTO: 0.03 10*3/MM3 (ref 0–0.4)
EOSINOPHIL NFR BLD AUTO: 0.6 % (ref 0.3–6.2)
ERYTHROCYTE [DISTWIDTH] IN BLOOD BY AUTOMATED COUNT: 15.2 % (ref 12.3–15.4)
GLOBULIN UR ELPH-MCNC: 2.6 GM/DL
GLUCOSE SERPL-MCNC: 132 MG/DL (ref 65–99)
GLUCOSE UR STRIP-MCNC: NEGATIVE MG/DL
HCT VFR BLD AUTO: 31.5 % (ref 34–46.6)
HGB BLD-MCNC: 10.2 G/DL (ref 12–15.9)
HGB UR QL STRIP.AUTO: ABNORMAL
HYALINE CASTS UR QL AUTO: ABNORMAL /LPF
IMM GRANULOCYTES # BLD AUTO: 0.01 10*3/MM3 (ref 0–0.05)
IMM GRANULOCYTES NFR BLD AUTO: 0.2 % (ref 0–0.5)
KETONES UR QL STRIP: NEGATIVE
LEUKOCYTE ESTERASE UR QL STRIP.AUTO: NEGATIVE
LYMPHOCYTES # BLD AUTO: 1.18 10*3/MM3 (ref 0.7–3.1)
LYMPHOCYTES NFR BLD AUTO: 24.8 % (ref 19.6–45.3)
MCH RBC QN AUTO: 33.6 PG (ref 26.6–33)
MCHC RBC AUTO-ENTMCNC: 32.4 G/DL (ref 31.5–35.7)
MCV RBC AUTO: 103.6 FL (ref 79–97)
MONOCYTES # BLD AUTO: 0.22 10*3/MM3 (ref 0.1–0.9)
MONOCYTES NFR BLD AUTO: 4.6 % (ref 5–12)
NEUTROPHILS NFR BLD AUTO: 3.27 10*3/MM3 (ref 1.7–7)
NEUTROPHILS NFR BLD AUTO: 68.7 % (ref 42.7–76)
NITRITE UR QL STRIP: NEGATIVE
NRBC BLD AUTO-RTO: 0 /100 WBC (ref 0–0.2)
PH UR STRIP.AUTO: 8.5 [PH] (ref 5–8)
PLATELET # BLD AUTO: 273 10*3/MM3 (ref 140–450)
PMV BLD AUTO: 9.8 FL (ref 6–12)
POTASSIUM SERPL-SCNC: 3.9 MMOL/L (ref 3.5–5.2)
PROT SERPL-MCNC: 6.4 G/DL (ref 6–8.5)
PROT UR QL STRIP: ABNORMAL
RBC # BLD AUTO: 3.04 10*6/MM3 (ref 3.77–5.28)
RBC # UR STRIP: ABNORMAL /HPF
REF LAB TEST METHOD: ABNORMAL
SODIUM SERPL-SCNC: 143 MMOL/L (ref 136–145)
SP GR UR STRIP: 1.02 (ref 1–1.03)
SQUAMOUS #/AREA URNS HPF: ABNORMAL /HPF
UROBILINOGEN UR QL STRIP: ABNORMAL
WBC # UR STRIP: ABNORMAL /HPF
WBC NRBC COR # BLD: 4.76 10*3/MM3 (ref 3.4–10.8)
YEAST URNS QL MICRO: ABNORMAL /HPF

## 2023-04-25 PROCEDURE — G0463 HOSPITAL OUTPT CLINIC VISIT: HCPCS

## 2023-04-25 PROCEDURE — 25010000002 HEPARIN LOCK FLUSH PER 10 UNITS: Performed by: INTERNAL MEDICINE

## 2023-04-25 PROCEDURE — 80053 COMPREHEN METABOLIC PANEL: CPT | Performed by: INTERNAL MEDICINE

## 2023-04-25 PROCEDURE — 85025 COMPLETE CBC W/AUTO DIFF WBC: CPT | Performed by: INTERNAL MEDICINE

## 2023-04-25 PROCEDURE — 81001 URINALYSIS AUTO W/SCOPE: CPT | Performed by: INTERNAL MEDICINE

## 2023-04-25 PROCEDURE — 36591 DRAW BLOOD OFF VENOUS DEVICE: CPT

## 2023-04-25 RX ORDER — HEPARIN SODIUM (PORCINE) LOCK FLUSH IV SOLN 100 UNIT/ML 100 UNIT/ML
500 SOLUTION INTRAVENOUS AS NEEDED
Status: DISCONTINUED | OUTPATIENT
Start: 2023-04-25 | End: 2023-04-27 | Stop reason: HOSPADM

## 2023-04-25 RX ORDER — SODIUM CHLORIDE 0.9 % (FLUSH) 0.9 %
20 SYRINGE (ML) INJECTION AS NEEDED
Status: DISCONTINUED | OUTPATIENT
Start: 2023-04-25 | End: 2023-04-27 | Stop reason: HOSPADM

## 2023-04-25 RX ORDER — SODIUM CHLORIDE 0.9 % (FLUSH) 0.9 %
20 SYRINGE (ML) INJECTION AS NEEDED
OUTPATIENT
Start: 2023-04-25

## 2023-04-25 RX ORDER — HEPARIN SODIUM (PORCINE) LOCK FLUSH IV SOLN 100 UNIT/ML 100 UNIT/ML
500 SOLUTION INTRAVENOUS AS NEEDED
OUTPATIENT
Start: 2023-04-25

## 2023-04-25 RX ADMIN — HEPARIN SODIUM (PORCINE) LOCK FLUSH IV SOLN 100 UNIT/ML 500 UNITS: 100 SOLUTION at 14:06

## 2023-04-25 RX ADMIN — Medication 20 ML: at 14:06

## 2023-04-25 NOTE — TELEPHONE ENCOUNTER
Cassy, would you please schedule the pt for labs today. She has a port. Please call the pt when you get her scheduled.

## 2023-04-25 NOTE — SIGNIFICANT NOTE
Wound Eval / Progress Noted    JOSE Langston     Patient Name: Derek Keller  : 1960  MRN: [id number]  Today's Date: 2023                 Admit Date: 2023    Visit Dx:    ICD-10-CM ICD-9-CM   1. S/P ex lap with sigmoid resection, Lynn's procedure for stercoral perforation  Z98.890 V45.89       Patient Active Problem List   Diagnosis   • Hypertension   • Hyperlipidemia   • Allergic rhinitis   • Hypothyroidism   • Chronic pain disorder   • Anxiety   • Monopolar depression   • Malaise and fatigue   • Tobacco abuse   • Fibromyalgia   • Vitamin B 12 deficiency   • Primary osteoarthritis of left knee   • Restless leg syndrome   • Primary insomnia   • Chronic fatigue   • Asthma   • Body mass index (BMI) 26.0-26.9, adult   • Degeneration of lumbar intervertebral disc   • Hearing loss   • Inappropriate diet and eating habits   • Cervical spondylosis   • Obesity with body mass index 30 or greater   • Renal stone   • Malignant neoplasm of left breast in female, estrogen receptor positive   • Cancer related pain   • Malignant neoplasm metastatic to bone   • Peritonitis   • Leukopenia   • S/P ex lap with sigmoid resection, Lynn's procedure for stercoral perforation   • Encounter for adjustment or management of vascular access device   • Pulmonary emphysema        Past Medical History:   Diagnosis Date   • Abdominal pain     OSTOMY SITE   • Allergic rhinitis    • Anemia     NO S/S   • Anxiety    • Arteriosclerosis     Coronary   • Arthritis    • Bone metastases 10/14/2022   • Bowen's disease     SKIN CANCER   • Breast cancer     NO SURGERY WAS DONE DUE TO METS TO BONE/COLON/LYMPHNODE   • Cancer related pain 10/13/2022   • Depression    • Disorder associated with Helicobacter species 10/12/2022   • Dysphoric mood    • Fatigue    • Frequent urination     NO S/S INFECTION   • Herpes simplex vulvovaginitis 2018   • History of colon polyps    • History of IBS    • History of kidney stones    • Hyperlipidemia     • Hypertension    • Hypothyroidism    • Insomnia    • Lumbago    • Mood disorder    • Neck pain     R/T CANCER   • Palpitations     ASYMPTOMATIC,  NO CARDIOLOGIST   • Precordial pain     R/T SPINE CANCER   • Sleep disturbance         Past Surgical History:   Procedure Laterality Date   • BREAST BIOPSY Left    • CARDIAC CATHETERIZATION Left 1959   • CARDIAC CATHETERIZATION N/A 04/06/2018    Procedure: Coronary angiography;  Surgeon: Art Licea MD;  Location:  [facility] INVASIVE LOCATION;  Service: Cardiovascular   • CARDIAC CATHETERIZATION N/A 04/06/2018    Procedure: Left heart cath;  Surgeon: Art Licea MD;  Location:  [facility]ATION;  Service: Cardiovascular   • CARDIAC CATHETERIZATION N/A 04/06/2018    Procedure: Left ventriculography;  Surgeon: Art Licea MD;  [facility];  Service: Cardiovascular   • CHOLECYSTECTOMY     • COLON SURGERY      colostomy bag   • COLONOSCOPY  11/08/2006   • EXPLORATORY LAPAROTOMY N/A 12/06/2022    Procedure: LAPAROTOMY EXPLORATORY sigmoid resection hartmans procedure colostomy;  Surgeon: Alfred Castañeda MD;  Location: [facility];  Service: General;  Laterality: N/A;   • GANGLION CYST EXCISION     • HYSTERECTOMY  05/2005   • LAPAROSCOPIC GASTRIC BANDING      BAND REMOVED 2020   • TONSILLECTOMY     • VENOUS ACCESS DEVICE (PORT) INSERTION N/A 1/9/2023    Procedure: INSERTION VENOUS ACCESS DEVICE;  Surgeon: Alfred Castañeda MD;  Location: [facility];  Service: General;  Laterality: N/A;      04/25/23 1102   Wound 03/29/23 1310 Left medial abdomen Traumatic   Placement Date/Time: 03/29/23 1310   Side: Left  Orientation: medial  Location: abdomen  Primary Wound Type: Traumatic   Wound Image    Dressing Appearance moist drainage;intact   Closure None   Base red;moist;yellow;pink   Periwound intact;dry;pink   Periwound Temperature warm   Periwound Skin Turgor soft   Edges open   Wound Length  (cm) 1.7 cm   Wound Width (cm) 2.5 cm   Wound Depth (cm) 0.2 cm   Wound Surface Area (cm^2) 4.25 cm^2   Wound Volume (cm^3) 0.85 cm^3   Drainage Characteristics/Odor serosanguineous   Drainage Amount scant   Care, Wound cleansed with;sterile normal saline   Dressing Care dressing applied;dressing moistened;foam;hydrocolloid;other (see comments)  (eakins applied as wall between stoma and peristomal ulceration)   Periwound Care absorptive dressing applied;hydrocolloid barrier applied   Colostomy LLQ   Placement Date/Time: 12/06/22 1900   Inserted by: DR. HESS  Hand Hygiene Completed: Yes  Colostomy Type: Descending/sigmoid;End  Location: LLQ   Stomal Appliance 2 piece;Clean;Dry;Intact;Changed;Drainable   Stoma Appearance flush with skin;red;moist   Peristomal Assessment Clean;Intact;Red;Other (Comment)  (peristomal ulceration is present)   Accessories/Skin Care cleansed with water;skin barrier ring;skin sealant;convex wafer;other (see comments)  (purple foam and hydrocolloid applied to peristomal ulceration)   Stoma Function stool   Stool Color brown, light   Stool Consistency soft   Treatment Bag change;Site care     Wound Check / Follow-up:  Patient seen today for ostomy and wound follow-up, pouch change, and dressing change. Patient states she is not feeling well today. Reports generalized weakness and fatigue. Patient spoke with cancer center staff during my visit and they instructed her to come in for lab work today. Pouching system to colostomy is clean, dry, and intact. No signs of leaking. Soft light brown stool noted in pouch. Removed pouching system with use of adhesive remover. Patient remains with ulceration to peristomal tissue with thinning wall between stoma and wound. Cleansed stoma and peristomal tissue with warm water and washcloth. Cleansed ulceration with normal saline and gauze. Moist red and pink tissue is present as well as small amount of yellow tissue. Applied Eakins ring to create a barrier  between stoma and wound. Placed normal saline moistened purple foam (Hydrofera) to wound base. Secured with hydrocolloid dressing. Cut to fit new two piece convex pouch and placed after preparing skin with skin barrier wipe. Seal obtained. No signs of lifting or leaking. Placed skin barrier strips to borders per patient request. Patient expresses no additional needs or concerns. She was instructed to follow up next week and verbalized understanding.      Impression: Existing colostomy, peristomal ulceration.      Short term goals:  Regain skin integrity, colostomy management, skin protection.      Analisa Alvarado RN    4/25/2023    11:26 EDT

## 2023-04-25 NOTE — TELEPHONE ENCOUNTER
The pt called and states that she has been feeling bad since 4/19/23. The pt c/o bilateral leg weakness, lower back pain 7/10, fatigue, abd pain 8/10, bilateral breast pain 8/10, sleeping all the time, brain fog, some double vision, polyuria, and a cough. The pt states that these are common s/s for her but she has never had all of them at one time and she has never been this fatigued before. When questioned the pt states that her cough stopped today. When questioned the pt denies a fever or burning upon urination. The pt states that her urine is clear and does not have a odor. The pt states that she is urinating about once an hour.

## 2023-05-01 DIAGNOSIS — Z87.898 HISTORY OF DIFFICULT VENOUS ACCESS: ICD-10-CM

## 2023-05-01 DIAGNOSIS — G89.3 CANCER RELATED PAIN: ICD-10-CM

## 2023-05-01 RX ORDER — MORPHINE SULFATE 15 MG/1
45 TABLET, FILM COATED, EXTENDED RELEASE ORAL 2 TIMES DAILY
Qty: 180 TABLET | Refills: 0 | Status: SHIPPED | OUTPATIENT
Start: 2023-05-01

## 2023-05-01 RX ORDER — OXYCODONE AND ACETAMINOPHEN 10; 325 MG/1; MG/1
1 TABLET ORAL EVERY 6 HOURS PRN
Qty: 60 TABLET | Refills: 0 | Status: SHIPPED | OUTPATIENT
Start: 2023-05-01

## 2023-05-01 NOTE — TELEPHONE ENCOUNTER
Caller: JERONIMO HODGSON    Relationship: SELF     Best call back number: [phone]    Requested Prescriptions:   Requested Prescriptions     Pending Prescriptions Disp Refills   • oxyCODONE-acetaminophen (PERCOCET)  MG per tablet 60 tablet 0     Sig: Take 1 tablet by mouth Every 6 (Six) Hours As Needed for Moderate Pain.   • Morphine (MS CONTIN) 15 MG 12 hr tablet 180 tablet 0     Sig: Take 3 tablets by mouth 2 (Two) Times a Day.        Pharmacy where request should be sent:       Pharmacy, [facility] [place], [place] - [address] - [phone] PH - [phone] FX  [phone]      Last office visit with prescribing clinician: 4/18/2023   Last telemedicine visit with prescribing clinician: 5/17/2023   Next office visit with prescribing clinician: 5/17/2023     Additional details provided by patient:     MORPHINE ONLY HAS TONITE'S DOSE LEFT     OXYCODONE  HAS 3-4 DAYS SUPPLY     Does the patient have less than a 3 day supply:  [x] Yes  [] No    Would you like a call back once the refill request has been completed: [] Yes [x] No    If the office needs to give you a call back, can they leave a voicemail: [] Yes [x] No

## 2023-05-02 ENCOUNTER — TELEPHONE (OUTPATIENT)
Dept: ONCOLOGY | Facility: HOSPITAL | Age: 63
End: 2023-05-02
Payer: MEDICARE

## 2023-05-02 ENCOUNTER — TELEPHONE (OUTPATIENT)
Dept: ONCOLOGY | Facility: HOSPITAL | Age: 63
End: 2023-05-02

## 2023-05-02 ENCOUNTER — HOSPITAL ENCOUNTER (OUTPATIENT)
Dept: INFUSION THERAPY | Facility: HOSPITAL | Age: 63
Discharge: HOME | End: 2023-05-02
Admitting: SURGERY
Payer: MEDICARE

## 2023-05-02 VITALS
SYSTOLIC BLOOD PRESSURE: 139 MMHG | HEART RATE: 60 BPM | OXYGEN SATURATION: 99 % | TEMPERATURE: 98.4 F | DIASTOLIC BLOOD PRESSURE: 53 MMHG | RESPIRATION RATE: 18 BRPM

## 2023-05-02 DIAGNOSIS — Z98.890 S/P EXPLORATORY LAPAROTOMY: Primary | ICD-10-CM

## 2023-05-02 PROCEDURE — G0463 HOSPITAL OUTPT CLINIC VISIT: HCPCS

## 2023-05-02 NOTE — TELEPHONE ENCOUNTER
Spoke with patient advised to start the morphine at 6 pm after she picks it up in order to keep it on schedule that she has been on. Patient verbalized understanding of all information discussed.

## 2023-05-02 NOTE — TELEPHONE ENCOUNTER
Caller: Derek Keller    Relationship to patient: Self    Best call back number: [phone]    Patient is needing: PT TOOK OXYCODONE THIS MORNING BECAUSE SHE WAS OUT OF HER MORPHINE. SHE IS WONDERING WHEN TO TAKE HER MORPHINE WHEN SHE GETS IT TODAY: WHEN SHE GETS TO THE PHARMACY OR TAKE IT AT 6 PM TONIGHT TO GET BACK ON HER 12 HOUR SCHEDULE?

## 2023-05-02 NOTE — SIGNIFICANT NOTE
Wound Eval / Progress Noted    JOSE Langston     Patient Name: Derek Keller  : 1960  MRN: [id number]  Today's Date: 2023                 Admit Date: 2023    Visit Dx:    ICD-10-CM ICD-9-CM   1. S/P ex lap with sigmoid resection, Lynn's procedure for stercoral perforation  Z98.890 V45.89       Patient Active Problem List   Diagnosis    Hypertension    Hyperlipidemia    Allergic rhinitis    Hypothyroidism    Chronic pain disorder    Anxiety    Monopolar depression    Malaise and fatigue    Tobacco abuse    Fibromyalgia    Vitamin B 12 deficiency    Primary osteoarthritis of left knee    Restless leg syndrome    Primary insomnia    Chronic fatigue    Asthma    Body mass index (BMI) 26.0-26.9, adult    Degeneration of lumbar intervertebral disc    Hearing loss    Inappropriate diet and eating habits    Cervical spondylosis    Obesity with body mass index 30 or greater    Renal stone    Malignant neoplasm of left breast in female, estrogen receptor positive    Cancer related pain    Malignant neoplasm metastatic to bone    Peritonitis    Leukopenia    S/P ex lap with sigmoid resection, Lynn's procedure for stercoral perforation    Encounter for adjustment or management of vascular access device    Pulmonary emphysema        Past Medical History:   Diagnosis Date    Abdominal pain     OSTOMY SITE    Allergic rhinitis     Anemia     NO S/S    Anxiety     Arteriosclerosis     Coronary    Arthritis     Bone metastases 10/14/2022    Bowen's disease     SKIN CANCER    Breast cancer     NO SURGERY WAS DONE DUE TO METS TO BONE/COLON/LYMPHNODE    Cancer related pain 10/13/2022    Depression     Disorder associated with Helicobacter species 10/12/2022    Dysphoric mood     Fatigue     Frequent urination     NO S/S INFECTION    Herpes simplex vulvovaginitis 2018    History of colon polyps     History of IBS     History of kidney stones     Hyperlipidemia     Hypertension     Hypothyroidism     Insomnia      Lumbago     Mood disorder     Neck pain     R/T CANCER    Palpitations     ASYMPTOMATIC,  NO CARDIOLOGIST    Precordial pain     R/T SPINE CANCER    Sleep disturbance         Past Surgical History:   Procedure Laterality Date    BREAST BIOPSY Left     CARDIAC CATHETERIZATION Left 1960    CARDIAC CATHETERIZATION N/A 04/06/2018    Procedure: Coronary angiography;  Surgeon: Art Licea MD;  Location:  [facility];  Service: Cardiovascular    CARDIAC CATHETERIZATION N/A 04/06/2018    Procedure: Left heart cath;  Surgeon: Art Licea MD;  Location:  [facility];  Service: Cardiovascular    CARDIAC CATHETERIZATION N/A 04/06/2018    Procedure: Left ventriculography;  Surgeon: Art Licea MD;  Location:  [facility];  Service: Cardiovascular    CHOLECYSTECTOMY      COLON SURGERY      colostomy bag    COLONOSCOPY  11/08/2006    EXPLORATORY LAPAROTOMY N/A 12/06/2022    Procedure: LAPAROTOMY EXPLORATORY sigmoid resection hartmans procedure colostomy;  Surgeon: Alfred Castañeda MD;  Location: [facility];  Service: General;  Laterality: N/A;    GANGLION CYST EXCISION      HYSTERECTOMY  05/2005    LAPAROSCOPIC GASTRIC BANDING      BAND REMOVED 2020    TONSILLECTOMY      VENOUS ACCESS DEVICE (PORT) INSERTION N/A 1/9/2023    Procedure: INSERTION VENOUS ACCESS DEVICE;  Surgeon: Alfred Castañeda MD;  Location: [facility];  Service: General;  Laterality: N/A;      05/02/23 1039   Wound 03/29/23 1310 Left medial abdomen Traumatic   Placement Date/Time: 03/29/23 1310   Side: Left  Orientation: medial  Location: abdomen  Primary Wound Type: Traumatic   Wound Image    Dressing Appearance dry;intact   Closure None   Base red;moist;pink;yellow   Periwound intact;dry;pink   Periwound Temperature warm   Periwound Skin Turgor soft   Edges open   Drainage Characteristics/Odor serous   Drainage Amount scant   Care, Wound cleansed with;sterile  normal saline;water   Dressing Care dressing applied;dressing moistened;foam;hydrocolloid;other (see comments)  (eakins applied as wall between stoma and peristomal ulceration)   Periwound Care absorptive dressing applied;hydrocolloid barrier applied   Colostomy LLQ   Placement Date/Time: 12/06/22 1900   Inserted by: DR. HESS  Hand Hygiene Completed: Yes  Colostomy Type: Descending/sigmoid;End  Location: LLQ   Stoma Appearance flush with skin;red;moist   Peristomal Assessment Clean;Intact;Other (Comment);Pink  (peristomal ulceration is present)   Accessories/Skin Care cleansed with water;skin barrier ring;skin sealant;convex wafer;other (see comments)  (purple foam and hydrocolloid applied to ulceration)   Stoma Function stool   Stool Color brown, light   Stool Consistency soft   Treatment Bag change;Site care        Wound Check / Follow-up:  Patient seen today for ostomy and wound follow-up, pouch change, and dressing change. Pouching system to colostomy is clean, dry, and intact. No signs of leaking. Soft light brown stool noted in pouch. Removed pouching system with use of adhesive remover. Patient remains with ulceration to peristomal tissue with thinning wall between stoma and wound. Ulceration is decreasing in size and responding to current treatment. Cleansed stoma and peristomal tissue with warm water and washcloth. Cleansed ulceration with normal saline and gauze. Moist red and pink tissue is present as well as small amount of yellow tissue. Applied Eakins ring to create a barrier between stoma and wound. Placed normal saline moistened purple foam (Hydrofera) to wound base. Secured with hydrocolloid dressing. Patient performed pouch change independently and placed new two piece convex pouch as well as barrier strips. Seal obtained. No signs of lifting or leaking.Patient expresses no additional needs or concerns. She was instructed to follow up next week and verbalized understanding.      Impression:  Existing colostomy, peristomal ulceration.      Short term goals:  Regain skin integrity, skin protection, colostomy management.      Analisa Alvarado RN    5/2/2023    10:45 EDT

## 2023-05-02 NOTE — TELEPHONE ENCOUNTER
Caller: Derek Keller    Relationship: Self    Best call back number: [phone]    What is the best time to reach you: ASAP    Who are you requesting to speak with (clinical staff, provider,  specific staff member): CLINICAL    What was the call regarding: PT SAYS PHARMACY NEEDS PRIOR AUTH ON THE MORPHINE RX SENT YESTERDAY, THEY SAID THEY HAVE CONTACTED US FOR THAT AND THE PT IS WANTING TO KNOW THE STATUS.    Do you require a callback: YES, OK TO LEAVE .

## 2023-05-08 ENCOUNTER — DOCUMENTATION (OUTPATIENT)
Dept: RADIATION ONCOLOGY | Facility: HOSPITAL | Age: 63
End: 2023-05-08
Payer: MEDICARE

## 2023-05-08 ENCOUNTER — HOSPITAL ENCOUNTER (OUTPATIENT)
Dept: INFUSION THERAPY | Facility: HOSPITAL | Age: 63
Discharge: HOME | End: 2023-05-08
Admitting: SURGERY
Payer: MEDICARE

## 2023-05-08 VITALS
HEART RATE: 66 BPM | OXYGEN SATURATION: 100 % | TEMPERATURE: 97.7 F | DIASTOLIC BLOOD PRESSURE: 64 MMHG | RESPIRATION RATE: 16 BRPM | SYSTOLIC BLOOD PRESSURE: 127 MMHG

## 2023-05-08 DIAGNOSIS — Z98.890 S/P EXPLORATORY LAPAROTOMY: Primary | ICD-10-CM

## 2023-05-08 PROCEDURE — G0463 HOSPITAL OUTPT CLINIC VISIT: HCPCS

## 2023-05-08 NOTE — SIGNIFICANT NOTE
Wound Eval / Progress Noted    JOSE Langston     Patient Name: Derek Keller  : 1960  MRN: [id number]  Today's Date: 2023                 Admit Date: 2023    Visit Dx:    ICD-10-CM ICD-9-CM   1. S/P ex lap with sigmoid resection, Lynn's procedure for stercoral perforation  Z98.890 V45.89       Patient Active Problem List   Diagnosis    Hypertension    Hyperlipidemia    Allergic rhinitis    Hypothyroidism    Chronic pain disorder    Anxiety    Monopolar depression    Malaise and fatigue    Tobacco abuse    Fibromyalgia    Vitamin B 12 deficiency    Primary osteoarthritis of left knee    Restless leg syndrome    Primary insomnia    Chronic fatigue    Asthma    Body mass index (BMI) 26.0-26.9, adult    Degeneration of lumbar intervertebral disc    Hearing loss    Inappropriate diet and eating habits    Cervical spondylosis    Obesity with body mass index 30 or greater    Renal stone    Malignant neoplasm of left breast in female, estrogen receptor positive    Cancer related pain    Malignant neoplasm metastatic to bone    Peritonitis    Leukopenia    S/P ex lap with sigmoid resection, Lynn's procedure for stercoral perforation    Encounter for adjustment or management of vascular access device    Pulmonary emphysema        Past Medical History:   Diagnosis Date    Abdominal pain     OSTOMY SITE    Allergic rhinitis     Anemia     NO S/S    Anxiety     Arteriosclerosis     Coronary    Arthritis     Bone metastases 10/14/2022    Bowen's disease     SKIN CANCER    Breast cancer     NO SURGERY WAS DONE DUE TO METS TO BONE/COLON/LYMPHNODE    Cancer related pain 10/13/2022    Depression     Disorder associated with Helicobacter species 10/12/2022    Dysphoric mood     Fatigue     Frequent urination     NO S/S INFECTION    Herpes simplex vulvovaginitis 2018    History of colon polyps     History of IBS     History of kidney stones     Hyperlipidemia     Hypertension     Hypothyroidism     Insomnia      Lumbago     Mood disorder     Neck pain     R/T CANCER    Palpitations     ASYMPTOMATIC,  NO CARDIOLOGIST    Precordial pain     R/T SPINE CANCER    Sleep disturbance         Past Surgical History:   Procedure Laterality Date    BREAST BIOPSY Left     CARDIAC CATHETERIZATION Left 1959    CARDIAC CATHETERIZATION N/A 04/06/2018    Procedure: Coronary angiography;  Surgeon: Art Licea MD;  Location:  [facility];  Service: Cardiovascular    CARDIAC CATHETERIZATION N/A 04/06/2018    Procedure: Left heart cath;  Surgeon: Art Licea MD;  Location:  [facility];  Service: Cardiovascular    CARDIAC CATHETERIZATION N/A 04/06/2018    Procedure: Left ventriculography;  Surgeon: Art Licea MD;  Location:  [facility];  Service: Cardiovascular    CHOLECYSTECTOMY      COLON SURGERY      colostomy bag    COLONOSCOPY  11/08/2006    EXPLORATORY LAPAROTOMY N/A 12/06/2022    Procedure: LAPAROTOMY EXPLORATORY sigmoid resection hartmans procedure colostomy;  Surgeon: Alfred Castañeda MD;  Location: [facility];  Service: General;  Laterality: N/A;    GANGLION CYST EXCISION      HYSTERECTOMY  05/2005    LAPAROSCOPIC GASTRIC BANDING      BAND REMOVED 2020    TONSILLECTOMY      VENOUS ACCESS DEVICE (PORT) INSERTION N/A 1/9/2023    Procedure: INSERTION VENOUS ACCESS DEVICE;  Surgeon: Alfred Castañeda MD;  Location: [facility];  Service: General;  Laterality: N/A;         Physical Assessment:         05/08/23 1030   Colostomy LLQ   Placement Date/Time: 12/06/22 1900   Inserted by: DR. CASTAÑEDA  Hand Hygiene Completed: Yes  Colostomy Type: Descending/sigmoid;End  Location: LLQ   Wound Image     Stomal Appliance 2 piece;Intact   Stoma Appearance irregular;red;flush with skin;moist;protruding above skin level   Peristomal Assessment Other (Comment)  (ulceration from 7 o'clock to 11 o'clock)   Accessories/Skin Care convex wafer;cleansed with  water;cleansed with sterile normal saline;skin sealant;skin barrier ring;other (see comments)  (hydrofera (purple foam) to ulceration)   Stoma Function stool   Stool Color brown, light   Stool Consistency liquid   Treatment Bag change;Site care       Wound Check / Follow-up:  Patient seen today for wound follow-up / assistance with ostomy and peristomal ulcerations.   Patient with intact pouching system. Pouching system removed. No definitive leaks identified.  Stoma is red and moist only minimally protruding above skin surface at 0.2cm and 20mm width.   Ulceration measuring 2.3cm x 2.5cm x 0.2cm to right side of stoma from 7 - 11 o'clock. Darker discoloration noted to base of wound. Stoma and tete-stomal tissue cleansed with warm water.   Ulceration cleansed with NS and gauze.   Recommending to continue applying purple foam (Hydrofera) to open ulceration. Eakins used to border ulceration and stoma and then foam covered with hydrocolloid. 2 piece convex pouching system utilized.   Moon strips applied to pouching system margins per patient request for additional security.     Impression: Existing Ostomy with pier-stomal ulceration    Short term goals:  Regain skin integrity. Ostomy management with dressing changes to ulceration.    Emily Karimi RN    5/8/2023    12:53 EDT

## 2023-05-08 NOTE — PROGRESS NOTES
Diagnosis: Metastatic breast cancer     Reason for Referral: Financial assistance      Content of Visit: Ms. Keller and her sister stopped by [facility] office in radiation oncology this morning to follow-up in regards to her [employer] financial assistance application and medical bills. Ms. Keller is utilizing a rollator today. She informed [facility] she is coming to the hospital weekly to see the ostomy nurse, explaining her colon ruptured and now has a colostomy. Advised Mrs. Keller that the financial counselors department is currently unable to process her application at this time due to the billing department resubmitting claims to her insurance. Ms. Keller v/u. She reports having good and bad days in regards to her physical health. Her medical oncologist, Dr. Barker, is prescribing 45mg of morphine BID to help manage her pain. Provided her and her sister with my business card, encouraging [facility] support remains available. Ms. Keller expressed gratitude and appreciation.

## 2023-05-10 ENCOUNTER — SPECIALTY PHARMACY (OUTPATIENT)
Dept: PHARMACY | Facility: HOSPITAL | Age: 63
End: 2023-05-10
Payer: MEDICARE

## 2023-05-10 NOTE — PROGRESS NOTES
Specialty Pharmacy Patient Management Program  Oncology Refill Outreach      Derek is a 62 y.o. female contacted today regarding refills of her medication(s).    Specialty medication(s) and dose(s) confirmed: ribociclib succinate 200 MG tablet therapy pack tablet. Sent to [place] [facility].    Other medications being refilled: N/A    Refill Questions    Flowsheet Row Most Recent Value   Changes to allergies? No   Changes to medications? No   New conditions since last clinic visit No   Unplanned office visit, urgent care, ED, or hospital admission in the last 4 weeks  No   How does patient/caregiver feel medication is working? Very good   Financial problems or insurance changes  No   Since the previous refill, were any specialty medication doses or scheduled injections missed or delayed?  No   Does this patient require a clinical escalation to a pharmacist? No          Delivery Questions    Flowsheet Row Most Recent Value   Delivery method  at Pharmacy   Delivery address correct? Yes   Delivery phone number [phone]   Number of medications in delivery 1   Medication being filled and delivered ribociclib succinate 200 MG tablet therapy pack tablet   Doses left of specialty medications Pt is on her week off   Is there any medication that is due not being filled? No   Supplies needed? No supplies needed   Cooler needed? No   Do any medications need mixed or dated? No   Copay form of payment Credit card on file   Additional comments Zero copay   Questions or concerns for the pharmacist? No   Explain any questions or concerns for the pharmacist N/A   Are any medications first time fills? No                 Follow-up: 21 days     Maria Luz Frazier  Care Coordinator, [facility]  5/10/2023  08:25 EDT

## 2023-05-12 ENCOUNTER — HOSPITAL ENCOUNTER (OUTPATIENT)
Dept: INFUSION THERAPY | Facility: HOSPITAL | Age: 63
Discharge: HOME | End: 2023-05-12
Payer: MEDICARE

## 2023-05-12 ENCOUNTER — HOSPITAL ENCOUNTER (OUTPATIENT)
Dept: CT IMAGING | Facility: HOSPITAL | Age: 63
Discharge: HOME | End: 2023-05-12
Payer: MEDICARE

## 2023-05-12 VITALS
DIASTOLIC BLOOD PRESSURE: 51 MMHG | HEART RATE: 62 BPM | TEMPERATURE: 98.7 F | OXYGEN SATURATION: 99 % | SYSTOLIC BLOOD PRESSURE: 120 MMHG | RESPIRATION RATE: 20 BRPM

## 2023-05-12 DIAGNOSIS — Z98.890 S/P EXPLORATORY LAPAROTOMY: Primary | ICD-10-CM

## 2023-05-12 DIAGNOSIS — C79.51 MALIGNANT NEOPLASM METASTATIC TO BONE: ICD-10-CM

## 2023-05-12 LAB
ALBUMIN SERPL-MCNC: 4 G/DL (ref 3.5–5.2)
ALBUMIN/GLOB SERPL: 1.4 G/DL
ALP SERPL-CCNC: 76 U/L (ref 39–117)
ALT SERPL W P-5'-P-CCNC: 6 U/L (ref 1–33)
ANION GAP SERPL CALCULATED.3IONS-SCNC: 11.2 MMOL/L (ref 5–15)
AST SERPL-CCNC: 10 U/L (ref 1–32)
BASOPHILS # BLD AUTO: 0.06 10*3/MM3 (ref 0–0.2)
BASOPHILS NFR BLD AUTO: 1.7 % (ref 0–1.5)
BILIRUB SERPL-MCNC: 0.2 MG/DL (ref 0–1.2)
BUN SERPL-MCNC: 21 MG/DL (ref 8–23)
BUN/CREAT SERPL: 26.3 (ref 7–25)
CALCIUM SPEC-SCNC: 9.5 MG/DL (ref 8.6–10.5)
CHLORIDE SERPL-SCNC: 98 MMOL/L (ref 98–107)
CO2 SERPL-SCNC: 30.8 MMOL/L (ref 22–29)
CREAT SERPL-MCNC: 0.8 MG/DL (ref 0.57–1)
DEPRECATED RDW RBC AUTO: 58.4 FL (ref 37–54)
EGFRCR SERPLBLD CKD-EPI 2021: 82.9 ML/MIN/1.73
EOSINOPHIL # BLD AUTO: 0.05 10*3/MM3 (ref 0–0.4)
EOSINOPHIL NFR BLD AUTO: 1.4 % (ref 0.3–6.2)
ERYTHROCYTE [DISTWIDTH] IN BLOOD BY AUTOMATED COUNT: 15.7 % (ref 12.3–15.4)
GLOBULIN UR ELPH-MCNC: 2.9 GM/DL
GLUCOSE SERPL-MCNC: 94 MG/DL (ref 65–99)
HCT VFR BLD AUTO: 29.9 % (ref 34–46.6)
HGB BLD-MCNC: 9.8 G/DL (ref 12–15.9)
IMM GRANULOCYTES # BLD AUTO: 0.02 10*3/MM3 (ref 0–0.05)
IMM GRANULOCYTES NFR BLD AUTO: 0.6 % (ref 0–0.5)
LYMPHOCYTES # BLD AUTO: 1.1 10*3/MM3 (ref 0.7–3.1)
LYMPHOCYTES NFR BLD AUTO: 30.3 % (ref 19.6–45.3)
MAGNESIUM SERPL-MCNC: 1.7 MG/DL (ref 1.6–2.4)
MCH RBC QN AUTO: 34 PG (ref 26.6–33)
MCHC RBC AUTO-ENTMCNC: 32.8 G/DL (ref 31.5–35.7)
MCV RBC AUTO: 103.8 FL (ref 79–97)
MONOCYTES # BLD AUTO: 0.27 10*3/MM3 (ref 0.1–0.9)
MONOCYTES NFR BLD AUTO: 7.4 % (ref 5–12)
NEUTROPHILS NFR BLD AUTO: 2.13 10*3/MM3 (ref 1.7–7)
NEUTROPHILS NFR BLD AUTO: 58.6 % (ref 42.7–76)
NRBC BLD AUTO-RTO: 0 /100 WBC (ref 0–0.2)
PHOSPHATE SERPL-MCNC: 4 MG/DL (ref 2.5–4.5)
PLATELET # BLD AUTO: 177 10*3/MM3 (ref 140–450)
PMV BLD AUTO: 10.4 FL (ref 6–12)
POTASSIUM SERPL-SCNC: 4.3 MMOL/L (ref 3.5–5.2)
PROT SERPL-MCNC: 6.9 G/DL (ref 6–8.5)
RBC # BLD AUTO: 2.88 10*6/MM3 (ref 3.77–5.28)
SODIUM SERPL-SCNC: 140 MMOL/L (ref 136–145)
WBC NRBC COR # BLD: 3.63 10*3/MM3 (ref 3.4–10.8)

## 2023-05-12 PROCEDURE — 74177 CT ABD & PELVIS W/CONTRAST: CPT

## 2023-05-12 PROCEDURE — 83735 ASSAY OF MAGNESIUM: CPT | Performed by: INTERNAL MEDICINE

## 2023-05-12 PROCEDURE — 80053 COMPREHEN METABOLIC PANEL: CPT | Performed by: INTERNAL MEDICINE

## 2023-05-12 PROCEDURE — 71260 CT THORAX DX C+: CPT

## 2023-05-12 PROCEDURE — 85025 COMPLETE CBC W/AUTO DIFF WBC: CPT | Performed by: INTERNAL MEDICINE

## 2023-05-12 PROCEDURE — 84100 ASSAY OF PHOSPHORUS: CPT | Performed by: INTERNAL MEDICINE

## 2023-05-12 PROCEDURE — 25510000001 IOPAMIDOL PER 1 ML: Performed by: INTERNAL MEDICINE

## 2023-05-12 PROCEDURE — G0463 HOSPITAL OUTPT CLINIC VISIT: HCPCS

## 2023-05-12 RX ORDER — HEPARIN SODIUM (PORCINE) LOCK FLUSH IV SOLN 100 UNIT/ML 100 UNIT/ML
SOLUTION INTRAVENOUS
Status: DISCONTINUED
Start: 2023-05-12 | End: 2023-05-13 | Stop reason: HOSPADM

## 2023-05-12 RX ORDER — HYDROCHLOROTHIAZIDE 12.5 MG/1
TABLET ORAL
Status: CANCELLED | OUTPATIENT
Start: 2023-05-12

## 2023-05-12 RX ADMIN — IOPAMIDOL 100 ML: 755 INJECTION, SOLUTION INTRAVENOUS at 16:15

## 2023-05-12 NOTE — SIGNIFICANT NOTE
Wound Eval / Progress Noted    JOSE Langston     Patient Name: Derek Keller  : 1960  MRN: [id number]  Today's Date: 2023                 Admit Date: 2023    Visit Dx:    ICD-10-CM ICD-9-CM   1. S/P ex lap with sigmoid resection, Lynn's procedure for stercoral perforation  Z98.890 V45.89       Patient Active Problem List   Diagnosis    Hypertension    Hyperlipidemia    Allergic rhinitis    Hypothyroidism    Chronic pain disorder    Anxiety    Monopolar depression    Malaise and fatigue    Tobacco abuse    Fibromyalgia    Vitamin B 12 deficiency    Primary osteoarthritis of left knee    Restless leg syndrome    Primary insomnia    Chronic fatigue    Asthma    Body mass index (BMI) 26.0-26.9, adult    Degeneration of lumbar intervertebral disc    Hearing loss    Inappropriate diet and eating habits    Cervical spondylosis    Obesity with body mass index 30 or greater    Renal stone    Malignant neoplasm of left breast in female, estrogen receptor positive    Cancer related pain    Malignant neoplasm metastatic to bone    Peritonitis    Leukopenia    S/P ex lap with sigmoid resection, Lynn's procedure for stercoral perforation    Encounter for adjustment or management of vascular access device    Pulmonary emphysema        Past Medical History:   Diagnosis Date    Abdominal pain     OSTOMY SITE    Allergic rhinitis     Anemia     NO S/S    Anxiety     Arteriosclerosis     Coronary    Arthritis     Bone metastases 10/14/2022    Bowen's disease     SKIN CANCER    Breast cancer     NO SURGERY WAS DONE DUE TO METS TO BONE/COLON/LYMPHNODE    Cancer related pain 10/13/2022    Depression     Disorder associated with Helicobacter species 10/12/2022    Dysphoric mood     Fatigue     Frequent urination     NO S/S INFECTION    Herpes simplex vulvovaginitis 2018    History of colon polyps     History of IBS     History of kidney stones     Hyperlipidemia     Hypertension     Hypothyroidism     Insomnia      Lumbago     Mood disorder     Neck pain     R/T CANCER    Palpitations     ASYMPTOMATIC,  NO CARDIOLOGIST    Precordial pain     R/T SPINE CANCER    Sleep disturbance         Past Surgical History:   Procedure Laterality Date    BREAST BIOPSY Left     CARDIAC CATHETERIZATION Left 1959    CARDIAC CATHETERIZATION N/A 04/06/2018    Procedure: Coronary angiography;  Surgeon: Art Licea MD;  Location:  [facility];  Service: Cardiovascular    CARDIAC CATHETERIZATION N/A 04/06/2018    Procedure: Left heart cath;  Surgeon: Art Licea MD;  Location:  [facility];  Service: Cardiovascular    CARDIAC CATHETERIZATION N/A 04/06/2018    Procedure: Left ventriculography;  Surgeon: Art Licea MD;  Location:  [facility];  Service: Cardiovascular    CHOLECYSTECTOMY      COLON SURGERY      colostomy bag    COLONOSCOPY  11/08/2006    EXPLORATORY LAPAROTOMY N/A 12/06/2022    Procedure: LAPAROTOMY EXPLORATORY sigmoid resection hartmans procedure colostomy;  Surgeon: Alfred Castañeda MD;  Location: [facility];  Service: General;  Laterality: N/A;    GANGLION CYST EXCISION      HYSTERECTOMY  05/2005    LAPAROSCOPIC GASTRIC BANDING      BAND REMOVED 2020    TONSILLECTOMY      VENOUS ACCESS DEVICE (PORT) INSERTION N/A 1/9/2023    Procedure: INSERTION VENOUS ACCESS DEVICE;  Surgeon: Alfred Castañeda MD;  Location: [facility];  Service: General;  Laterality: N/A;         Physical Assessment:     05/12/23 1505   Wound 03/29/23 1310 Left medial abdomen Traumatic   Placement Date/Time: 03/29/23 1310   Side: Left  Orientation: medial  Location: abdomen  Primary Wound Type: Traumatic   Dressing Appearance other (see comments)  (leaking of stool into wound under pouching system)   Base moist;pink  (brown)   Periwound intact;redness   Periwound Temperature warm   Periwound Skin Turgor soft   Edges open   Wound Length (cm) 2.1 cm   Wound Width  (cm) 2.5 cm   Wound Depth (cm) 0.4 cm   Wound Surface Area (cm^2) 5.25 cm^2   Wound Volume (cm^3) 2.1 cm^3   Drainage Characteristics/Odor serosanguineous   Drainage Amount small   Care, Wound cleansed with;sterile normal saline   Dressing Care dressing applied;foam;hydrocolloid   Periwound Care absorptive dressing applied   Colostomy LLQ   Placement Date/Time: 12/06/22 1900   Inserted by: DR. HESS  Hand Hygiene Completed: Yes  Colostomy Type: Descending/sigmoid;End  Location: LLQ   Wound Image    Stomal Appliance 2 piece;Leaking   Stoma Appearance red;moist   Peristomal Assessment Denuded   Accessories/Skin Care convex wafer;skin sealant;skin barrier ring;other (see comments)  (dressing of tete-stomal ulceration)   Stoma Function stool   Stool Color brown, light   Stool Consistency soft   Treatment Bag change;Site care          Wound Check / Follow-up:  Patient seen today for ostomy site care and tete-stomal wound treatment. Patient initially with what seemed to be intact pouching system, however upon removal visible leaking with caking of stool under barrier noted. Patient states that the pouch has been on for three days.   She was unaware of leaking and denies any itching or burning. She states she only knows it's leaking if it's coming out of the sides.   Stoma is red and moist measuring 20mm and raised 2mm above skin surface.   The ulceration is to right side of stoma from 6 - 12 o'clock. Ulceration was filled with stool. All dressing over ulceration was completely soiled with stool. Cleansed and irrigated wound copiously with NS. Residual brown discoloration remains to wound base.   Pouching completed by applying barrier ring strip as barrier between stoma and and ulceration, then filled wound with purple foam and secured with hydrocolloid. Barrier ring strip applied over edges along initial barrier to provide additional sealant and barrier between stoma and ulceration. Pouching system applied around stoma  consisting of 2 piece convex pouching system. Per patient habits / request moon strips applied around barrier.   To left lower abdomen at 1 o'clock over stoma, small abrasion noted. Patient states that is how her ulcer started.     Impression: Existing Colostomy with tete-stomal ulceration    Short term goals:  Regain skin integrity. Ostomy management and ulcer care.    Emily Karimi RN    5/12/2023    16:59 EDT

## 2023-05-12 NOTE — TELEPHONE ENCOUNTER
Caller: Derek Keller    Relationship: Self    Best call back number: [phone]    Requested Prescriptions:   Requested Prescriptions     Pending Prescriptions Disp Refills   • hydroCHLOROthiazide (HYDRODIURIL) 12.5 MG tablet          Pharmacy where request should be sent: [facility], [place] - [address] - [phone]  - [phone] FX     Last office visit with prescribing clinician: 4/18/2023   Last telemedicine visit with prescribing clinician: 5/2/2023   Next office visit with prescribing clinician: 5/17/2023     Additional details provided by patient: PT ONLY HAS ONE LEFT FOR TOMORROW THAT SHE GOT FROM PHARMACY    Does the patient have less than a 3 day supply:  [x] Yes  [] No    Would you like a call back once the refill request has been completed: [] Yes [x] No    If the office needs to give you a call back, can they leave a voicemail: [] Yes [x] No    Abby Burnett Rep   05/12/23 13:30 EDT

## 2023-05-16 ENCOUNTER — TELEPHONE (OUTPATIENT)
Dept: SURGERY | Facility: CLINIC | Age: 63
End: 2023-05-16
Payer: MEDICARE

## 2023-05-16 RX ORDER — BACLOFEN 20 MG/1
TABLET ORAL
COMMUNITY
Start: 2023-04-19 | End: 2023-05-17 | Stop reason: SDUPTHER

## 2023-05-16 NOTE — TELEPHONE ENCOUNTER
Attempted to call patient to bring her in office today, Dr brar Is only here until 11:00. No answer and voicemail was full so I was unable to leave message. Patient can be scheduled tomorrow or Thursday if she wishes or she can go to the ER if she feels like she needs too.

## 2023-05-16 NOTE — TELEPHONE ENCOUNTER
PATIENT SAID DR. DIANE ORDERED A CT ABDOMEN PELVIS.  SHE HAS AN APPOINTMENT WITH HIM TOMORROW.  PATIENT HASN'T GOTTEN RESULTS FROM HIM, BUT SAID THAT THE WAY SHE READS IT IS THAT SHE HAS A HERNIA.    SHE SAID SHE SEES THE WOUND AND OSTOMY NURSE ONCE A WEEK.    SHE SAID SHE ISN'T GETTING HARDLY ANYTHING OUT OF HER OSTOMY.  HER STOMACH IS HARD ON THE LEFT SIDE WHERE THE OSTOMY IS, AND IT IS SORE TO TOUCH.    SHE ASKED IF DR. HESS WILL SEE HER TODAY.

## 2023-05-17 ENCOUNTER — HOSPITAL ENCOUNTER (OUTPATIENT)
Dept: ONCOLOGY | Facility: HOSPITAL | Age: 63
Discharge: HOME | End: 2023-05-17
Payer: MEDICARE

## 2023-05-17 ENCOUNTER — PREP FOR SURGERY (OUTPATIENT)
Dept: OTHER | Facility: HOSPITAL | Age: 63
End: 2023-05-17
Payer: MEDICARE

## 2023-05-17 ENCOUNTER — OFFICE VISIT (OUTPATIENT)
Dept: ONCOLOGY | Facility: HOSPITAL | Age: 63
End: 2023-05-17
Payer: MEDICARE

## 2023-05-17 ENCOUNTER — OFFICE VISIT (OUTPATIENT)
Dept: SURGERY | Facility: CLINIC | Age: 63
End: 2023-05-17
Payer: MEDICARE

## 2023-05-17 VITALS
SYSTOLIC BLOOD PRESSURE: 122 MMHG | DIASTOLIC BLOOD PRESSURE: 47 MMHG | HEART RATE: 64 BPM | OXYGEN SATURATION: 100 % | TEMPERATURE: 98.5 F | BODY MASS INDEX: 29.57 KG/M2 | WEIGHT: 183.2 LBS | RESPIRATION RATE: 18 BRPM

## 2023-05-17 VITALS — RESPIRATION RATE: 14 BRPM | BODY MASS INDEX: 29.47 KG/M2 | HEIGHT: 66 IN | WEIGHT: 183.4 LBS

## 2023-05-17 DIAGNOSIS — K43.5 PARASTOMAL HERNIA WITHOUT OBSTRUCTION OR GANGRENE: Primary | ICD-10-CM

## 2023-05-17 DIAGNOSIS — R60.9 PERIPHERAL EDEMA: ICD-10-CM

## 2023-05-17 DIAGNOSIS — C50.412 MALIGNANT NEOPLASM OF UPPER-OUTER QUADRANT OF LEFT BREAST IN FEMALE, ESTROGEN RECEPTOR POSITIVE: Primary | ICD-10-CM

## 2023-05-17 DIAGNOSIS — C50.912 MALIGNANT NEOPLASM OF LEFT BREAST IN FEMALE, ESTROGEN RECEPTOR POSITIVE, UNSPECIFIED SITE OF BREAST: ICD-10-CM

## 2023-05-17 DIAGNOSIS — Z17.0 MALIGNANT NEOPLASM OF UPPER-OUTER QUADRANT OF LEFT BREAST IN FEMALE, ESTROGEN RECEPTOR POSITIVE: Primary | ICD-10-CM

## 2023-05-17 DIAGNOSIS — Z45.2 ENCOUNTER FOR ADJUSTMENT OR MANAGEMENT OF VASCULAR ACCESS DEVICE: ICD-10-CM

## 2023-05-17 DIAGNOSIS — Z93.3 COLOSTOMY IN PLACE: Primary | ICD-10-CM

## 2023-05-17 DIAGNOSIS — Z86.010 HISTORY OF COLON POLYPS: Primary | ICD-10-CM

## 2023-05-17 DIAGNOSIS — C79.51 MALIGNANT NEOPLASM METASTATIC TO BONE: Primary | ICD-10-CM

## 2023-05-17 DIAGNOSIS — Z17.0 MALIGNANT NEOPLASM OF LEFT BREAST IN FEMALE, ESTROGEN RECEPTOR POSITIVE, UNSPECIFIED SITE OF BREAST: ICD-10-CM

## 2023-05-17 DIAGNOSIS — C79.51 MALIGNANT NEOPLASM METASTATIC TO BONE: ICD-10-CM

## 2023-05-17 PROBLEM — Z86.0100 HISTORY OF COLON POLYPS: Status: ACTIVE | Noted: 2023-05-17

## 2023-05-17 LAB
ALBUMIN SERPL-MCNC: 3.8 G/DL (ref 3.5–5.2)
ALBUMIN/GLOB SERPL: 1.5 G/DL
ALP SERPL-CCNC: 79 U/L (ref 39–117)
ALT SERPL W P-5'-P-CCNC: 7 U/L (ref 1–33)
ANION GAP SERPL CALCULATED.3IONS-SCNC: 7.8 MMOL/L (ref 5–15)
AST SERPL-CCNC: 12 U/L (ref 1–32)
BASOPHILS # BLD AUTO: 0.04 10*3/MM3 (ref 0–0.2)
BASOPHILS NFR BLD AUTO: 0.8 % (ref 0–1.5)
BILIRUB SERPL-MCNC: 0.3 MG/DL (ref 0–1.2)
BUN SERPL-MCNC: 16 MG/DL (ref 8–23)
BUN/CREAT SERPL: 22.2 (ref 7–25)
CALCIUM SPEC-SCNC: 8.9 MG/DL (ref 8.6–10.5)
CHLORIDE SERPL-SCNC: 103 MMOL/L (ref 98–107)
CO2 SERPL-SCNC: 29.2 MMOL/L (ref 22–29)
CREAT SERPL-MCNC: 0.72 MG/DL (ref 0.57–1)
DEPRECATED RDW RBC AUTO: 60.3 FL (ref 37–54)
EGFRCR SERPLBLD CKD-EPI 2021: 94.1 ML/MIN/1.73
EOSINOPHIL # BLD AUTO: 0.03 10*3/MM3 (ref 0–0.4)
EOSINOPHIL NFR BLD AUTO: 0.6 % (ref 0.3–6.2)
ERYTHROCYTE [DISTWIDTH] IN BLOOD BY AUTOMATED COUNT: 15.6 % (ref 12.3–15.4)
GLOBULIN UR ELPH-MCNC: 2.5 GM/DL
GLUCOSE SERPL-MCNC: 98 MG/DL (ref 65–99)
HCT VFR BLD AUTO: 29.2 % (ref 34–46.6)
HGB BLD-MCNC: 9.5 G/DL (ref 12–15.9)
IMM GRANULOCYTES # BLD AUTO: 0 10*3/MM3 (ref 0–0.05)
IMM GRANULOCYTES NFR BLD AUTO: 0 % (ref 0–0.5)
LYMPHOCYTES # BLD AUTO: 1.11 10*3/MM3 (ref 0.7–3.1)
LYMPHOCYTES NFR BLD AUTO: 20.8 % (ref 19.6–45.3)
MAGNESIUM SERPL-MCNC: 2.1 MG/DL (ref 1.6–2.4)
MCH RBC QN AUTO: 34.3 PG (ref 26.6–33)
MCHC RBC AUTO-ENTMCNC: 32.5 G/DL (ref 31.5–35.7)
MCV RBC AUTO: 105.4 FL (ref 79–97)
MONOCYTES # BLD AUTO: 0.87 10*3/MM3 (ref 0.1–0.9)
MONOCYTES NFR BLD AUTO: 16.3 % (ref 5–12)
NEUTROPHILS NFR BLD AUTO: 3.28 10*3/MM3 (ref 1.7–7)
NEUTROPHILS NFR BLD AUTO: 61.5 % (ref 42.7–76)
PHOSPHATE SERPL-MCNC: 3.1 MG/DL (ref 2.5–4.5)
PLATELET # BLD AUTO: 196 10*3/MM3 (ref 140–450)
PMV BLD AUTO: 10.1 FL (ref 6–12)
POTASSIUM SERPL-SCNC: 3.5 MMOL/L (ref 3.5–5.2)
PROT SERPL-MCNC: 6.3 G/DL (ref 6–8.5)
RBC # BLD AUTO: 2.77 10*6/MM3 (ref 3.77–5.28)
SODIUM SERPL-SCNC: 140 MMOL/L (ref 136–145)
WBC NRBC COR # BLD: 5.33 10*3/MM3 (ref 3.4–10.8)

## 2023-05-17 PROCEDURE — 96372 THER/PROPH/DIAG INJ SC/IM: CPT

## 2023-05-17 PROCEDURE — 25010000002 DENOSUMAB 120 MG/1.7ML SOLUTION: Performed by: INTERNAL MEDICINE

## 2023-05-17 PROCEDURE — G0463 HOSPITAL OUTPT CLINIC VISIT: HCPCS | Performed by: INTERNAL MEDICINE

## 2023-05-17 PROCEDURE — 83735 ASSAY OF MAGNESIUM: CPT | Performed by: INTERNAL MEDICINE

## 2023-05-17 PROCEDURE — 84100 ASSAY OF PHOSPHORUS: CPT | Performed by: INTERNAL MEDICINE

## 2023-05-17 PROCEDURE — 36591 DRAW BLOOD OFF VENOUS DEVICE: CPT

## 2023-05-17 PROCEDURE — 80053 COMPREHEN METABOLIC PANEL: CPT | Performed by: INTERNAL MEDICINE

## 2023-05-17 PROCEDURE — 25010000002 HEPARIN LOCK FLUSH PER 10 UNITS: Performed by: INTERNAL MEDICINE

## 2023-05-17 PROCEDURE — 85025 COMPLETE CBC W/AUTO DIFF WBC: CPT | Performed by: INTERNAL MEDICINE

## 2023-05-17 RX ORDER — SODIUM CHLORIDE 0.9 % (FLUSH) 0.9 %
3 SYRINGE (ML) INJECTION EVERY 12 HOURS SCHEDULED
OUTPATIENT
Start: 2023-05-17

## 2023-05-17 RX ORDER — SODIUM CHLORIDE 0.9 % (FLUSH) 0.9 %
20 SYRINGE (ML) INJECTION AS NEEDED
OUTPATIENT
Start: 2023-05-17

## 2023-05-17 RX ORDER — CEFAZOLIN SODIUM 2 G/100ML
2 INJECTION, SOLUTION INTRAVENOUS ONCE
OUTPATIENT
Start: 2023-05-17 | End: 2023-05-17

## 2023-05-17 RX ORDER — METRONIDAZOLE 500 MG/100ML
500 INJECTION, SOLUTION INTRAVENOUS ONCE
OUTPATIENT
Start: 2023-05-17 | End: 2023-05-17

## 2023-05-17 RX ORDER — METRONIDAZOLE 500 MG/1
TABLET ORAL
Qty: 6 TABLET | Refills: 0 | Status: SHIPPED | OUTPATIENT
Start: 2023-05-17

## 2023-05-17 RX ORDER — NEOMYCIN SULFATE 500 MG/1
TABLET ORAL
Qty: 6 TABLET | Refills: 0 | Status: SHIPPED | OUTPATIENT
Start: 2023-05-17

## 2023-05-17 RX ORDER — SODIUM, POTASSIUM,MAG SULFATES 17.5-3.13G
SOLUTION, RECONSTITUTED, ORAL ORAL
Qty: 177 ML | Refills: 0 | Status: SHIPPED | OUTPATIENT
Start: 2023-05-17

## 2023-05-17 RX ORDER — SODIUM, POTASSIUM,MAG SULFATES 17.5-3.13G
1 SOLUTION, RECONSTITUTED, ORAL ORAL EVERY 12 HOURS
Qty: 354 ML | Refills: 0 | Status: SHIPPED | OUTPATIENT
Start: 2023-05-17

## 2023-05-17 RX ORDER — SODIUM CHLORIDE 0.9 % (FLUSH) 0.9 %
10 SYRINGE (ML) INJECTION EVERY 12 HOURS SCHEDULED
OUTPATIENT
Start: 2023-05-17

## 2023-05-17 RX ORDER — SODIUM CHLORIDE 0.9 % (FLUSH) 0.9 %
10 SYRINGE (ML) INJECTION AS NEEDED
OUTPATIENT
Start: 2023-05-17

## 2023-05-17 RX ORDER — HYDROCHLOROTHIAZIDE 12.5 MG/1
12.5 TABLET ORAL DAILY
Qty: 90 TABLET | Refills: 2 | Status: SHIPPED | OUTPATIENT
Start: 2023-05-17

## 2023-05-17 RX ORDER — SODIUM CHLORIDE 9 MG/ML
40 INJECTION, SOLUTION INTRAVENOUS AS NEEDED
OUTPATIENT
Start: 2023-05-17

## 2023-05-17 RX ORDER — SODIUM CHLORIDE, SODIUM LACTATE, POTASSIUM CHLORIDE, CALCIUM CHLORIDE 600; 310; 30; 20 MG/100ML; MG/100ML; MG/100ML; MG/100ML
50 INJECTION, SOLUTION INTRAVENOUS CONTINUOUS
OUTPATIENT
Start: 2023-05-17

## 2023-05-17 RX ORDER — SODIUM CHLORIDE 0.9 % (FLUSH) 0.9 %
20 SYRINGE (ML) INJECTION AS NEEDED
Status: DISCONTINUED | OUTPATIENT
Start: 2023-05-17 | End: 2023-05-18 | Stop reason: HOSPADM

## 2023-05-17 RX ORDER — HEPARIN SODIUM (PORCINE) LOCK FLUSH IV SOLN 100 UNIT/ML 100 UNIT/ML
500 SOLUTION INTRAVENOUS AS NEEDED
OUTPATIENT
Start: 2023-05-17

## 2023-05-17 RX ORDER — HEPARIN SODIUM (PORCINE) LOCK FLUSH IV SOLN 100 UNIT/ML 100 UNIT/ML
500 SOLUTION INTRAVENOUS AS NEEDED
Status: DISCONTINUED | OUTPATIENT
Start: 2023-05-17 | End: 2023-05-18 | Stop reason: HOSPADM

## 2023-05-17 RX ADMIN — DENOSUMAB 120 MG: 120 INJECTION SUBCUTANEOUS at 14:32

## 2023-05-17 RX ADMIN — HEPARIN SODIUM (PORCINE) LOCK FLUSH IV SOLN 100 UNIT/ML 500 UNITS: 100 SOLUTION at 13:19

## 2023-05-17 RX ADMIN — Medication 20 ML: at 13:18

## 2023-05-17 NOTE — PROGRESS NOTES
Chief Complaint  Breast Cancer    Haile Monique MD    Subjective          Derek Keller presents to [facility] for ongoing treatment of her metastatic breast cancer.  She is on letrozole, ribociclib along with denosumab for bony involvement.  She denies any issues from her medications and is taking them as prescribed.  She does note occasional swelling on her feet and requests a refill of hydrochlorothiazide as needed for edema.  She notes that her colostomy is functioning normally but does have an ulcer that she has been working on with surgery.  She also notes a hernia near her stoma site.  She has a follow-up with surgery later today.    Oncology/Hematology History   Malignant neoplasm of left breast in female, estrogen receptor positive   10/13/2022 Initial Diagnosis    Malignant neoplasm of left breast in female, estrogen receptor positive (HCC)     10/14/2022 -  Chemotherapy    OP BREAST Letrozole / Ribociclib     10/14/2022 Cancer Staged    Staging form: Breast, AJCC 8th Edition  - Clinical: Stage IV (cT1c, cN1, cM1, ER+, CT+, HER2-) - Signed by Donald Barker MD on 10/14/2022     10/28/2022 -  Chemotherapy    OP SUPPORTIVE Denosumab (Xgeva) Q28D     Malignant neoplasm metastatic to bone   10/14/2022 Initial Diagnosis    Bone metastases (HCC)     10/28/2022 -  Chemotherapy    OP SUPPORTIVE Denosumab (Xgeva) Q28D     Secondary malignant neoplasm of bone (Resolved)   11/14/2022 Initial Diagnosis    Secondary malignant neoplasm of bone (HCC)     11/17/2022 - 4/19/2023 Radiation    RADIATION THERAPY Treatment Details (11/14/2022 - 4/19/2023)  Site: Spine - Lumbar  Technique: 3D CRT  Goal: No goal specified  Planned Treatment Start Date: 11/17/2022         Review of Systems   Constitutional: Positive for fatigue. Negative for appetite change, diaphoresis, fever, unexpected weight gain and unexpected weight loss.   HENT: Negative for hearing loss, mouth sores, sore  throat, swollen glands, trouble swallowing and voice change.    Eyes: Negative for blurred vision.   Respiratory: Negative for cough, shortness of breath and wheezing.    Cardiovascular: Negative for chest pain and palpitations.   Gastrointestinal: Negative for abdominal pain, blood in stool, constipation, diarrhea, nausea and vomiting.   Endocrine: Negative for cold intolerance and heat intolerance.   Genitourinary: Negative for difficulty urinating, dysuria, frequency, hematuria and urinary incontinence.   Musculoskeletal: Positive for myalgias. Negative for arthralgias and back pain.   Skin: Negative for rash, skin lesions and wound.   Neurological: Negative for dizziness, seizures, weakness, numbness and headache.   Hematological: Does not bruise/bleed easily.   Psychiatric/Behavioral: Negative for depressed mood. The patient is not nervous/anxious.      Current Outpatient Medications on File Prior to Visit   Medication Sig Dispense Refill   • atorvastatin (LIPITOR) 20 MG tablet TAKE 1 TABLET BY MOUTH EVERY DAY (Patient taking differently: Take 1 tablet by mouth Daily.) 30 tablet 6   • donepezil (ARICEPT) 10 MG tablet Take 1 tablet by mouth Daily.     • gabapentin (NEURONTIN) 600 MG tablet TAKE 1 TABLET BY MOUTH AT BEDTIME (Patient taking differently: 300 mg 2 (Two) Times a Day As Needed. TAKES1/2  MG TABLET) 90 tablet 0   • ketoconazole (NIZORAL) 2 % cream APPLY TO THE AFFECTED AREA(S) once DAILY     • letrozole (FEMARA) 2.5 MG tablet Take 1 tablet by mouth Daily. 90 tablet 1   • levothyroxine (SYNTHROID, LEVOTHROID) 50 MCG tablet TAKE 1 TABLET BY MOUTH EVERY DAY 90 tablet 1   • LORazepam (ATIVAN) 0.5 MG tablet Take 1 tablet by mouth Every 6 (Six) Hours As Needed.     • losartan (COZAAR) 100 MG tablet Take 1 tablet by mouth Daily. INST PER ANESTHESIA PROTOCOL     • montelukast (SINGULAIR) 10 MG tablet Take 1 tablet by mouth Daily.     • Morphine (MS CONTIN) 15 MG 12 hr tablet Take 3 tablets by mouth 2  (Two) Times a Day. 180 tablet 0   • naproxen (NAPROSYN) 500 MG tablet Take 1 tablet by mouth 2 (Two) Times a Day With Meals. LAST DOSE 1/5/23     • ondansetron (ZOFRAN) 8 MG tablet TAKE 1 TABLET BY MOUTH THREE TIMES DAILY AS NEEDED FOR NAUSEA AND VOMITING 30 tablet 5   • oxyCODONE-acetaminophen (PERCOCET)  MG per tablet Take 1 tablet by mouth Every 6 (Six) Hours As Needed for Moderate Pain. 60 tablet 0   • polyethylene glycol (MIRALAX) 17 g packet Take 17 g by mouth Daily. 5 packet 0   • rOPINIRole (REQUIP) 3 MG tablet Take 1 tablet by mouth 2 (Two) Times a Day.     • solifenacin (VESICARE) 10 MG tablet Take 1 tablet by mouth Daily for 360 days. 90 tablet 3   • SUMAtriptan (IMITREX) 100 MG tablet Take 1 tablet by mouth 1 (One) Time As Needed.     • traZODone (DESYREL) 150 MG tablet TAKE 1 TABLET BY MOUTH ONCE nightly 90 tablet 2   • [DISCONTINUED] hydroCHLOROthiazide (HYDRODIURIL) 12.5 MG tablet 1 tablet DAILY (route: oral)     • ribociclib succinate 200 MG tablet therapy pack tablet Take 3 tablets by mouth Take As Directed. Take 3 tablets by mouth daily for 21 days then off 7 days on a 28 day cycle. (Patient not taking: Reported on 5/17/2023) 63 tablet 5   • [DISCONTINUED] baclofen (LIORESAL) 20 MG tablet TAKE 1 TABLET BY MOUTH THREE TIMES DAILY FOR MUSCLE SPASMS     • [DISCONTINUED] buPROPion XL (WELLBUTRIN XL) 300 MG 24 hr tablet bupropion HCl  mg 24 hr tablet, extended release     • [DISCONTINUED] dicyclomine (BENTYL) 20 MG tablet dicyclomine 20 mg tablet     • [DISCONTINUED] linaclotide (LINZESS) 145 MCG capsule capsule Linzess 145 mcg capsule     • [DISCONTINUED] venlafaxine XR (EFFEXOR-XR) 150 MG 24 hr capsule TAKE 1 capsule BY MOUTH DAILY FOR ANXIETY 90 capsule 1     No current facility-administered medications on file prior to visit.       Allergies   Allergen Reactions   • Azithromycin Itching     Past Medical History:   Diagnosis Date   • Abdominal pain     OSTOMY SITE   • Allergic rhinitis     • Anemia     NO S/S   • Anxiety    • Arteriosclerosis     Coronary   • Arthritis    • Bone metastases 10/14/2022   • Bowen's disease     SKIN CANCER   • Breast cancer     NO SURGERY WAS DONE DUE TO METS TO BONE/COLON/LYMPHNODE   • Cancer related pain 10/13/2022   • Depression    • Disorder associated with Helicobacter species 10/12/2022   • Dysphoric mood    • Fatigue    • Frequent urination     NO S/S INFECTION   • Herpes simplex vulvovaginitis 7/26/2018   • History of colon polyps    • History of IBS    • History of kidney stones    • Hyperlipidemia    • Hypertension    • Hypothyroidism    • Insomnia    • Lumbago    • Mood disorder    • Neck pain     R/T CANCER   • Palpitations     ASYMPTOMATIC,  NO CARDIOLOGIST   • Precordial pain     R/T SPINE CANCER   • Sleep disturbance      Past Surgical History:   Procedure Laterality Date   • BREAST BIOPSY Left    • CARDIAC CATHETERIZATION Left 1959   • CARDIAC CATHETERIZATION N/A 04/06/2018    Procedure: Coronary angiography;  Surgeon: Art Licea MD;  Location: [facility] INVASIVE LOCATION;  Service: Cardiovascular   • CARDIAC CATHETERIZATION N/A 04/06/2018    Procedure: Left heart cath;  Surgeon: Art Licea MD;  Location: [facility] INVASIVE LOCATION;  Service: Cardiovascular   • CARDIAC CATHETERIZATION N/A 04/06/2018    Procedure: Left ventriculography;  Surgeon: Art Licea MD;  Location: [facility] INVASIVE LOCATION;  Service: Cardiovascular   • CHOLECYSTECTOMY     • COLON SURGERY      colostomy bag   • COLONOSCOPY  11/08/2006   • EXPLORATORY LAPAROTOMY N/A 12/06/2022    Procedure: LAPAROTOMY EXPLORATORY sigmoid resection hartmans procedure colostomy;  Surgeon: Alfred Castañeda MD;  Location: [facility] ([facility]);  Service: General;  Laterality: N/A;   • GANGLION CYST EXCISION     • HYSTERECTOMY  05/2005   • LAPAROSCOPIC GASTRIC BANDING      BAND REMOVED 2020   • TONSILLECTOMY     • VENOUS ACCESS DEVICE (PORT) INSERTION N/A 1/9/2023     Procedure: INSERTION VENOUS ACCESS DEVICE;  Surgeon: Alfred Castañeda MD;  Location: [facility];  Service: General;  Laterality: N/A;     Social History     Socioeconomic History   • Marital status:    Tobacco Use   • Smoking status: Every Day     Packs/day: 1.50     Years: 40.00     Pack years: 60.00     Types: Cigarettes     Start date: 1974   • Smokeless tobacco: Never   • Tobacco comments:     caffeine use 3 mt dews daily     INST PER ANESTHESIA PROTOCOL   Vaping Use   • Vaping Use: Never used   Substance and Sexual Activity   • Alcohol use: No   • Drug use: Yes     Types: Marijuana     Comment: Prescribed Lorazepam/OCC MARIJUANA   • Sexual activity: Defer     Partners: Male     Birth control/protection: None     Family History   Problem Relation Age of Onset   • Hypertension Mother    • Rheum arthritis Mother    • Heart disease Mother    • Breast cancer Mother    • Diabetes Father    • Cancer Maternal Grandmother         colon   • Colon cancer Maternal Grandmother    • Aneurysm Paternal Grandfather    • Diabetes Other    • Fibromyalgia Other    • Malig Hyperthermia Neg Hx        Objective   Physical Exam  Vitals reviewed. Exam conducted with a chaperone present.   Constitutional:       General: She is not in acute distress.     Appearance: Normal appearance.   Cardiovascular:      Rate and Rhythm: Normal rate and regular rhythm.      Heart sounds: Normal heart sounds. No murmur heard.    No gallop.   Pulmonary:      Effort: Pulmonary effort is normal.      Breath sounds: Normal breath sounds.   Abdominal:      General: Abdomen is flat. Bowel sounds are normal.      Palpations: Abdomen is soft.      Comments: Left lower quadrant colostomy with parastomal hernia without incarceration   Musculoskeletal:      Cervical back: Neck supple.      Right lower leg: No edema.      Left lower leg: No edema.   Lymphadenopathy:      Cervical: No cervical adenopathy.   Neurological:      Mental Status: She  is alert and oriented to person, place, and time.   Psychiatric:         Mood and Affect: Mood normal.         Behavior: Behavior normal.         Thought Content: Thought content normal.         Vitals:    05/17/23 1312   BP: 122/47   Pulse: 64   Resp: 18   Temp: 98.5 °F (36.9 °C)   TempSrc: Temporal   SpO2: 100%   Weight: 83.1 kg (183 lb 3.2 oz)   PainSc:   8   PainLoc: Generalized  Comment: MUSCLE     ECOG score: 0         PHQ-9 Total Score:                    Result Review :   The following data was reviewed by: Donald Barker MD on 05/17/2023:  Lab Results   Component Value Date    HGB 9.5 (L) 05/17/2023    HCT 29.2 (L) 05/17/2023    .4 (H) 05/17/2023     05/17/2023    WBC 5.33 05/17/2023    NEUTROABS 3.28 05/17/2023    LYMPHSABS 1.11 05/17/2023    MONOSABS 0.87 05/17/2023    EOSABS 0.03 05/17/2023    BASOSABS 0.04 05/17/2023     Lab Results   Component Value Date    GLUCOSE 98 05/17/2023    BUN 16 05/17/2023    CREATININE 0.72 05/17/2023     05/17/2023    K 3.5 05/17/2023     05/17/2023    CO2 29.2 (H) 05/17/2023    CALCIUM 8.9 05/17/2023    PROTEINTOT 6.3 05/17/2023    ALBUMIN 3.8 05/17/2023    BILITOT 0.3 05/17/2023    ALKPHOS 79 05/17/2023    AST 12 05/17/2023    ALT 7 05/17/2023     Lab Results   Component Value Date    MG 2.1 05/17/2023    PHOS 3.1 05/17/2023    FREET4 1.01 01/17/2022    TSH 1.470 11/12/2019     No results found for: IRON, LABIRON, TRANSFERRIN, TIBC  Lab Results   Component Value Date    [id number] 390 04/23/2019    FOLATE 9.93 04/23/2019     No results found for: PSA, CEA, AFP, ,           Assessment and Plan    Diagnoses and all orders for this visit:    1. Malignant neoplasm of upper-outer quadrant of left breast in female, estrogen receptor positive (Primary)  Assessment & Plan:  Metastatic.  Patient is on palliative treatment letrozole, ribociclib plus denosumab for bony involvement.  Restaging scans show diffuse bony involvement but stable with  no new or worsening findings.  Lab work is notable for mild decrease in blood counts related to the ribociclib but not enough to require dose adjustment.  Continue letrozole and ribociclib as prescribed.  Xgeva today monthly for bony involvement.  She will follow-up with surgery regarding her colostomy and parastomal hernia.  I will see her back in 1 month for ongoing treatment.      2. Malignant neoplasm metastatic to bone  Assessment & Plan:  Patient is on monthly denosumab.  Recent scans show stable disease.  Her electrolytes are normal.  Denosumab today and monthly.    Orders:  -     Cancel: denosumab (XGEVA) injection 120 mg    3. Peripheral edema  Assessment & Plan:  I refilled hydrochlorothiazide to use once daily as needed for swelling.  Counseled to avoid the medication if she is not drinking sufficiently or if she feels like she is dehydrated.    Orders:  -     hydroCHLOROthiazide (HYDRODIURIL) 12.5 MG tablet; Take 1 tablet by mouth Daily.  Dispense: 90 tablet; Refill: 2          Patient Follow Up: 1 month    Patient was given instructions and counseling regarding her condition or for health maintenance advice. Please see specific information pulled into the AVS if appropriate.     Donald Barker MD    5/17/2023

## 2023-05-17 NOTE — LETTER
May 23, 2023       No Recipients    Patient: Derek Keller   YOB: 1960   Date of Visit: 5/17/2023       Dear Dr. Monsalve Recipients:    Thank you for referring Derek Keller to me for evaluation. Below are the relevant portions of my assessment and plan of care.    If you have questions, please do not hesitate to call me. I look forward to following Derek along with you.         Sincerely,        Alfred Castañeda MD        CC:   No Recipients    Alfred Castañeda MD  05/23/23 0910  Signed  General Surgery/Colorectal Surgery Note    Patient Name:  Derek Keller  YOB: 1960  [id number]    Referring Provider: No ref. provider found      Patient Care Team:  Haile Monique MD as PCP - General (Family Medicine)  Julien Cardona DO as Consulting Physician (Pain Medicine)  Joel Castillo MD as Consulting Physician (Gastroenterology)  Remington Russell MD as Surgeon (General Surgery)  Ahsan Salas MD as Consulting Physician (Sports Medicine)  Donald Barker MD as Consulting Physician (Hematology and Oncology)  Sandy Ricci MD as Consulting Physician (General Surgery)  Alfred Castañeda MD as Consulting Physician (General Surgery)    Chief complaint hernia    Subjective .     History of present illness:    History of metastatic breast cancer  Status post ex lap with Lynn's procedure for perforated sigmoid colon 12/6/2022.  Pathology with metastatic lobular breast carcinoma    Status post port placement 1/9/2023    She comes in for evaluation of increased size of parastomal hernia with pain to the left lower quadrant.  She also has a sore around the colostomy site which has not been able to heal despite seeing the ostomy nurses.  She is using tobacco.    CT abdomen pelvis 5/13/2023 with new parastomal hernia containing nonobstructed transverse colon    History:  Past Medical History:   Diagnosis Date   • Abdominal pain     OSTOMY SITE   • Allergic rhinitis    •  Anemia     NO S/S   • Anxiety    • Arteriosclerosis     Coronary   • Arthritis    • Bone metastases 10/14/2022   • Bowen's disease     SKIN CANCER   • Breast cancer     NO SURGERY WAS DONE DUE TO METS TO BONE/COLON/LYMPHNODE   • Cancer related pain 10/13/2022   • Depression    • Disorder associated with Helicobacter species 10/12/2022   • Dysphoric mood    • Fatigue    • Frequent urination     NO S/S INFECTION   • Herpes simplex vulvovaginitis 7/26/2018   • History of colon polyps    • History of IBS    • History of kidney stones    • Hyperlipidemia    • Hypertension    • Hypothyroidism    • Insomnia    • Lumbago    • Mood disorder    • Neck pain     R/T CANCER   • Palpitations     ASYMPTOMATIC,  NO CARDIOLOGIST   • Precordial pain     R/T SPINE CANCER   • Sleep disturbance        Past Surgical History:   Procedure Laterality Date   • BREAST BIOPSY Left    • CARDIAC CATHETERIZATION Left 1960   • CARDIAC CATHETERIZATION N/A 04/06/2018    Procedure: Coronary angiography;  Surgeon: Art Licea MD;  Location: [facility] INVASIVE LOCATION;  Service: Cardiovascular   • CARDIAC CATHETERIZATION N/A 04/06/2018    Procedure: Left heart cath;  Surgeon: Art Licea MD;  Location: [facility] INVASIVE LOCATION;  Service: Cardiovascular   • CARDIAC CATHETERIZATION N/A 04/06/2018    Procedure: Left ventriculography;  Surgeon: Art Licea MD;  Location: [facility] INVASIVE LOCATION;  Service: Cardiovascular   • CHOLECYSTECTOMY     • COLON SURGERY      colostomy bag   • COLONOSCOPY  11/08/2006   • EXPLORATORY LAPAROTOMY N/A 12/06/2022    Procedure: LAPAROTOMY EXPLORATORY sigmoid resection hartmans procedure colostomy;  Surgeon: Alfred Castañeda MD;  Location: [facility];  Service: General;  Laterality: N/A;   • GANGLION CYST EXCISION     • HYSTERECTOMY  05/2005   • LAPAROSCOPIC GASTRIC BANDING      BAND REMOVED 2020   • TONSILLECTOMY     • VENOUS ACCESS DEVICE (PORT) INSERTION N/A 1/9/2023     Procedure: INSERTION VENOUS ACCESS DEVICE;  Surgeon: Alfred Castañeda MD;  Location: [facility] MAIN OR;  Service: General;  Laterality: N/A;       Family History   Problem Relation Age of Onset   • Hypertension Mother    • Rheum arthritis Mother    • Heart disease Mother    • Breast cancer Mother    • Diabetes Father    • Cancer Maternal Grandmother         colon   • Colon cancer Maternal Grandmother    • Aneurysm Paternal Grandfather    • Diabetes Other    • Fibromyalgia Other    • Malig Hyperthermia Neg Hx        Social History     Tobacco Use   • Smoking status: Every Day     Packs/day: 1.50     Years: 40.00     Pack years: 60.00     Types: Cigarettes     Start date: 1974   • Smokeless tobacco: Never   • Tobacco comments:     caffeine use 3 mt dews daily     INST PER ANESTHESIA PROTOCOL   Vaping Use   • Vaping Use: Never used   Substance Use Topics   • Alcohol use: No   • Drug use: Yes     Types: Marijuana     Comment: Prescribed Lorazepam/OCC MARIJUANA       Review of Systems  All systems were reviewed and negative except for:   Review of Systems   Constitutional: Negative for chills, fever and unexpected weight loss.   HENT: Negative for congestion, nosebleeds and voice change.    Eyes: Negative for blurred vision, double vision and discharge.   Respiratory: Negative for apnea, chest tightness and shortness of breath.    Cardiovascular: Negative for chest pain and leg swelling.   Gastrointestinal:        See HPI   Endocrine: Negative for cold intolerance and heat intolerance.   Genitourinary: Negative for dysuria, hematuria and urgency.   Musculoskeletal: Negative for back pain, joint swelling and neck pain.   Skin: Negative for color change and dry skin.   Neurological: Negative for dizziness and confusion.   Hematological: Negative for adenopathy.   Psychiatric/Behavioral: Negative for agitation and behavioral problems.     MEDS:  Prior to Admission medications    Medication Sig Start Date End Date  Taking? Authorizing Provider   atorvastatin (LIPITOR) 20 MG tablet TAKE 1 TABLET BY MOUTH EVERY DAY  Patient taking differently: Take 1 tablet by mouth Daily. 10/1/19  Yes Yudelka Manning MD   donepezil (ARICEPT) 10 MG tablet Take 1 tablet by mouth Daily. 10/28/22  Yes Bessie Lopez MD   gabapentin (NEURONTIN) 600 MG tablet TAKE 1 TABLET BY MOUTH AT BEDTIME  Patient taking differently: 300 mg 2 (Two) Times a Day As Needed. TAKES1/2  MG TABLET 7/30/20  Yes Yudelka Manning MD   hydroCHLOROthiazide (HYDRODIURIL) 12.5 MG tablet Take 1 tablet by mouth Daily. 5/17/23  Yes Donald Barker MD   ketoconazole (NIZORAL) 2 % cream APPLY TO THE AFFECTED AREA(S) once DAILY 4/6/23  Yes Bessie Lopez MD   letrozole (FEMARA) 2.5 MG tablet Take 1 tablet by mouth Daily. 11/30/22  Yes Donald Barker MD   levothyroxine (SYNTHROID, LEVOTHROID) 50 MCG tablet TAKE 1 TABLET BY MOUTH EVERY DAY 7/19/19  Yes Yudelka Manning MD   LORazepam (ATIVAN) 0.5 MG tablet Take 1 tablet by mouth Every 6 (Six) Hours As Needed.   Yes Bessie Lopez MD   losartan (COZAAR) 100 MG tablet Take 1 tablet by mouth Daily. INST PER ANESTHESIA PROTOCOL   Yes Bessie Lopez MD   montelukast (SINGULAIR) 10 MG tablet Take 1 tablet by mouth Daily. 1/17/22  Yes Bessie Lopez MD   Morphine (MS CONTIN) 15 MG 12 hr tablet Take 3 tablets by mouth 2 (Two) Times a Day. 5/1/23  Yes Donald Barker MD   naproxen (NAPROSYN) 500 MG tablet Take 1 tablet by mouth 2 (Two) Times a Day With Meals. LAST DOSE 1/5/23   Yes Bessie Lopez MD   ondansetron (ZOFRAN) 8 MG tablet TAKE 1 TABLET BY MOUTH THREE TIMES DAILY AS NEEDED FOR NAUSEA AND VOMITING 3/8/23  Yes Donald Barker MD   polyethylene glycol (MIRALAX) 17 g packet Take 17 g by mouth Daily. 1/9/23  Yes Alfred Castañeda MD   rOPINIRole (REQUIP) 3 MG tablet Take 1 tablet by mouth 2 (Two) Times a Day. 6/29/22  Yes Provider, MD Bessie   solifenacin  (VESICARE) 10 MG tablet Take 1 tablet by mouth Daily for 360 days. 10/25/22 10/20/23 Yes Destinee Myers MD   SUMAtriptan (IMITREX) 100 MG tablet Take 1 tablet by mouth 1 (One) Time As Needed. 10/7/22  Yes Bessie Lopez MD   traZODone (DESYREL) 150 MG tablet TAKE 1 TABLET BY MOUTH ONCE nightly 11/12/19  Yes Yudelka Manning MD   metroNIDAZOLE (FLAGYL) 500 MG tablet Day Before Surgery Take 2 Tabs at 1 pm, 2 Tabs at 2 pm, and 2 Tabs at 11 pm 5/17/23   Alfred Castañeda MD   neomycin (MYCIFRADIN) 500 MG tablet Day Before Surgery. Take 2 Tabs at 1 pm, 2 Tabs at 2 pm, and 2 Tabs at 11pm 5/17/23   Alfred Castañeda MD   oxyCODONE-acetaminophen (PERCOCET)  MG per tablet Take 1 tablet by mouth Every 6 (Six) Hours As Needed for Moderate Pain. 5/22/23   Donald Barker MD   ribociclib succinate 200 MG tablet therapy pack tablet Take 3 tablets by mouth Take As Directed. Take 3 tablets by mouth daily for 21 days then off 7 days on a 28 day cycle.  Patient not taking: Reported on 5/17/2023 2/7/23   Donald Barker MD   sodium-potassium-magnesium sulfates (Suprep Bowel Prep Kit) 17.5-3.13-1.6 GM/177ML solution oral solution Take 1 bottle by mouth Every 12 (Twelve) Hours. 5/17/23   Alfred Castañeda MD   sodium-potassium-magnesium sulfates (SUPREP) 17.5-3.13-1.6 GM/177ML solution oral solution Take as directed 5/17/23   Alfred Castañeda MD        Allergies:  Azithromycin    Objective     Vital Signs        Physical Exam:     General Appearance:    Alert, cooperative, in no acute distress   Head:    Normocephalic, without obvious abnormality, atraumatic   Eyes:          Conjunctivae and sclerae normal, no icterus,     Ears:    Ears appear intact with no abnormalities noted   Throat:   No oral lesions, no thrush, oral mucosa moist   Neck:   No adenopathy, supple, trachea midline, no thyromegaly   Back:     No kyphosis present, no scoliosis present, no skin lesions,      erythema or scars,  "no tenderness to percussion or                   palpation,   range of motion normal   Lungs:     Clear to auscultation,respirations regular, even and                  unlabored    Heart:    Regular rhythm and normal rate, normal S1 and S2, no            murmur, no gallop, no rub, no click   Chest Wall:    No abnormalities observed   Abdomen:     Normal bowel sounds, no masses, no organomegaly, soft        non-tender, non-distended, no guarding, no rebound                tenderness, nonreducible parastomal hernia without evidence of obstruction or gangrene   Rectal:        Extremities:   Moves all extremities well, no edema, no cyanosis, no             redness   Pulses:   Pulses palpable and equal bilaterally   Skin:   No bleeding, bruising or rash   Lymph nodes:   No palpable adenopathy   Neurologic:   A/o x 4 with no deficits       Results Review:   {Results Review:67421::\"I reviewed the patient's new clinical results.\"    LABS/IMAGING:  Results for orders placed or performed during the hospital encounter of 05/17/23   Comprehensive metabolic panel    Specimen: Blood   Result Value Ref Range    Glucose 98 65 - 99 mg/dL    BUN 16 8 - 23 mg/dL    Creatinine 0.72 0.57 - 1.00 mg/dL    Sodium 140 136 - 145 mmol/L    Potassium 3.5 3.5 - 5.2 mmol/L    Chloride 103 98 - 107 mmol/L    CO2 29.2 (H) 22.0 - 29.0 mmol/L    Calcium 8.9 8.6 - 10.5 mg/dL    Total Protein 6.3 6.0 - 8.5 g/dL    Albumin 3.8 3.5 - 5.2 g/dL    ALT (SGPT) 7 1 - 33 U/L    AST (SGOT) 12 1 - 32 U/L    Alkaline Phosphatase 79 39 - 117 U/L    Total Bilirubin 0.3 0.0 - 1.2 mg/dL    Globulin 2.5 gm/dL    A/G Ratio 1.5 g/dL    BUN/Creatinine Ratio 22.2 7.0 - 25.0    Anion Gap 7.8 5.0 - 15.0 mmol/L    eGFR 94.1 >60.0 mL/min/1.73   Magnesium    Specimen: Blood   Result Value Ref Range    Magnesium 2.1 1.6 - 2.4 mg/dL   Phosphorus    Specimen: Blood   Result Value Ref Range    Phosphorus 3.1 2.5 - 4.5 mg/dL   CBC Auto Differential    Specimen: Blood   Result " Value Ref Range    WBC 5.33 3.40 - 10.80 10*3/mm3    RBC 2.77 (L) 3.77 - 5.28 10*6/mm3    Hemoglobin 9.5 (L) 12.0 - 15.9 g/dL    Hematocrit 29.2 (L) 34.0 - 46.6 %    .4 (H) 79.0 - 97.0 fL    MCH 34.3 (H) 26.6 - 33.0 pg    MCHC 32.5 31.5 - 35.7 g/dL    RDW 15.6 (H) 12.3 - 15.4 %    RDW-SD 60.3 (H) 37.0 - 54.0 fl    MPV 10.1 6.0 - 12.0 fL    Platelets 196 140 - 450 10*3/mm3    Neutrophil % 61.5 42.7 - 76.0 %    Lymphocyte % 20.8 19.6 - 45.3 %    Monocyte % 16.3 (H) 5.0 - 12.0 %    Eosinophil % 0.6 0.3 - 6.2 %    Basophil % 0.8 0.0 - 1.5 %    Immature Grans % 0.0 0.0 - 0.5 %    Neutrophils, Absolute 3.28 1.70 - 7.00 10*3/mm3    Lymphocytes, Absolute 1.11 0.70 - 3.10 10*3/mm3    Monocytes, Absolute 0.87 0.10 - 0.90 10*3/mm3    Eosinophils, Absolute 0.03 0.00 - 0.40 10*3/mm3    Basophils, Absolute 0.04 0.00 - 0.20 10*3/mm3    Immature Grans, Absolute 0.00 0.00 - 0.05 10*3/mm3        Result Review :     Assessment & Plan     History of metastatic breast cancer  Status post ex lap with Lynn's procedure for perforated sigmoid colon 12/6/2022.  Pathology with metastatic lobular breast carcinoma  Nonreducible parastomal hernia without evidence of obstruction or gangrene     Discussion with patient.  I reviewed the CT images with her and her family.  I discussed the warning signs of incarceration and strangulation.  She was instructed go to the emergency department for any concern.  I instructed her to quit smoking prior to elective colostomy closure and parastomal hernia repair.  I recommended colonoscopy prior to colostomy closure.  Benefits and alternatives discussed.  Risk of procedure including bleeding perforation missing a polyp discussed.  I then recommended proceeding with laparoscopic hand-assisted possible open colostomy closure with parastomal hernia repair with biologic mesh.  I explained the procedure and recovery.  Benefits and alternatives discussed.  Risk procedure including risk of anesthesia,  bleeding, infection, hernia recurrence, conversion open, damage to surrounding structures including ureters, anastomotic leak, possible ostomy, heart attack, stroke, blood clot, pneumonia discussed.  All questions answered.  She agrees with the plan.  Oral and mechanical bowel prep for surgery.  She was instructed to use chlorhexidine the night before surgery.  Orders placed.  Thank you for the consult.        This document has been electronically signed by Alfred Castañeda MD  May 23, 2023 09:06 EDT

## 2023-05-17 NOTE — ASSESSMENT & PLAN NOTE
Metastatic.  Patient is on palliative treatment letrozole, ribociclib plus denosumab for bony involvement.  Restaging scans show diffuse bony involvement but stable with no new or worsening findings.  Lab work is notable for mild decrease in blood counts related to the ribociclib but not enough to require dose adjustment.  Continue letrozole and ribociclib as prescribed.  Xgeva today monthly for bony involvement.  She will follow-up with surgery regarding her colostomy and parastomal hernia.  I will see her back in 1 month for ongoing treatment.

## 2023-05-17 NOTE — ASSESSMENT & PLAN NOTE
I refilled hydrochlorothiazide to use once daily as needed for swelling.  Counseled to avoid the medication if she is not drinking sufficiently or if she feels like she is dehydrated.

## 2023-05-17 NOTE — ASSESSMENT & PLAN NOTE
Patient is on monthly denosumab.  Recent scans show stable disease.  Her electrolytes are normal.  Denosumab today and monthly.

## 2023-05-18 ENCOUNTER — SPECIALTY PHARMACY (OUTPATIENT)
Dept: PHARMACY | Facility: HOSPITAL | Age: 63
End: 2023-05-18
Payer: MEDICARE

## 2023-05-22 DIAGNOSIS — Z87.898 HISTORY OF DIFFICULT VENOUS ACCESS: ICD-10-CM

## 2023-05-22 RX ORDER — OXYCODONE AND ACETAMINOPHEN 10; 325 MG/1; MG/1
1 TABLET ORAL EVERY 6 HOURS PRN
Qty: 60 TABLET | Refills: 0 | Status: SHIPPED | OUTPATIENT
Start: 2023-05-22

## 2023-05-22 NOTE — TELEPHONE ENCOUNTER
Caller: Derek Keller    Relationship: Self    Best call back number: [phone]    Requested Prescriptions:   Requested Prescriptions     Pending Prescriptions Disp Refills   • oxyCODONE-acetaminophen (PERCOCET)  MG per tablet 60 tablet 0     Sig: Take 1 tablet by mouth Every 6 (Six) Hours As Needed for Moderate Pain.        Pharmacy where request should be sent: [facility], [facility], [facility], [place] - [address] - [phone] [facility] [phone] FX     Last office visit with prescribing clinician: 5/17/2023   Last telemedicine visit with prescribing clinician: 5/17/2023   Next office visit with prescribing clinician: 6/14/2023     Does the patient have less than a 3 day supply:  [x] Yes  [] No    Would you like a call back once the refill request has been completed: [x] Yes [] No    If the office needs to give you a call back, can they leave a voicemail: [x] Yes [] No    Abby Cota Rep   05/22/23 12:49 EDT

## 2023-05-23 ENCOUNTER — PREP FOR SURGERY (OUTPATIENT)
Dept: OTHER | Facility: HOSPITAL | Age: 63
End: 2023-05-23
Payer: MEDICARE

## 2023-05-23 NOTE — H&P (VIEW-ONLY)
General Surgery/Colorectal Surgery Note    Patient Name:  Derek Keller  YOB: 1960  [id number]    Referring Provider: No ref. provider found      Patient Care Team:  Haile Monique MD as PCP - General (Family Medicine)  Julien Cardona DO as Consulting Physician (Pain Medicine)  Joel Castillo MD as Consulting Physician (Gastroenterology)  Remington Russell MD as Surgeon (General Surgery)  Ahsan Salas MD as Consulting Physician (Sports Medicine)  Donald Barker MD as Consulting Physician (Hematology and Oncology)  Sandy Ricci MD as Consulting Physician (General Surgery)  Alfred Castañeda MD as Consulting Physician (General Surgery)    Chief complaint hernia    Subjective .     History of present illness:    History of metastatic breast cancer  Status post ex lap with Lynn's procedure for perforated sigmoid colon 12/6/2022.  Pathology with metastatic lobular breast carcinoma    Status post port placement 1/9/2023    She comes in for evaluation of increased size of parastomal hernia with pain to the left lower quadrant.  She also has a sore around the colostomy site which has not been able to heal despite seeing the ostomy nurses.  She is using tobacco.    CT abdomen pelvis 5/13/2023 with new parastomal hernia containing nonobstructed transverse colon    History:  Past Medical History:   Diagnosis Date   • Abdominal pain     OSTOMY SITE   • Allergic rhinitis    • Anemia     NO S/S   • Anxiety    • Arteriosclerosis     Coronary   • Arthritis    • Bone metastases 10/14/2022   • Bowen's disease     SKIN CANCER   • Breast cancer     NO SURGERY WAS DONE DUE TO METS TO BONE/COLON/LYMPHNODE   • Cancer related pain 10/13/2022   • Depression    • Disorder associated with Helicobacter species 10/12/2022   • Dysphoric mood    • Fatigue    • Frequent urination     NO S/S INFECTION   • Herpes simplex vulvovaginitis 7/26/2018   • History of colon polyps    • History of IBS    • History  of kidney stones    • Hyperlipidemia    • Hypertension    • Hypothyroidism    • Insomnia    • Lumbago    • Mood disorder    • Neck pain     R/T CANCER   • Palpitations     ASYMPTOMATIC,  NO CARDIOLOGIST   • Precordial pain     R/T SPINE CANCER   • Sleep disturbance        Past Surgical History:   Procedure Laterality Date   • BREAST BIOPSY Left    • CARDIAC CATHETERIZATION Left 1960   • CARDIAC CATHETERIZATION N/A 04/06/2018    Procedure: Coronary angiography;  Surgeon: Art Licea MD;  Location: [facility] CATH INVASIVE LOCATION;  Service: Cardiovascular   • CARDIAC CATHETERIZATION N/A 04/06/2018    Procedure: Left heart cath;  Surgeon: Art Licea MD;  Location: [facility] CATH INVASIVE LOCATION;  Service: Cardiovascular   • CARDIAC CATHETERIZATION N/A 04/06/2018    Procedure: Left ventriculography;  Surgeon: Art Licea MD;  [facility];  Service: Cardiovascular   • CHOLECYSTECTOMY     • COLON SURGERY      colostomy bag   • COLONOSCOPY  11/08/2006   • EXPLORATORY LAPAROTOMY N/A 12/06/2022    Procedure: LAPAROTOMY EXPLORATORY sigmoid resection hartmans procedure colostomy;  Surgeon: Alfred Castañeda MD;  Location: [facility] MAIN OR;  Service: General;  Laterality: N/A;   • GANGLION CYST EXCISION     • HYSTERECTOMY  05/2005   • LAPAROSCOPIC GASTRIC BANDING      BAND REMOVED 2020   • TONSILLECTOMY     • VENOUS ACCESS DEVICE (PORT) INSERTION N/A 1/9/2023    Procedure: INSERTION VENOUS ACCESS DEVICE;  Surgeon: Alfred Castañeda MD;  Location: [facility] MAIN OR;  Service: General;  Laterality: N/A;       Family History   Problem Relation Age of Onset   • Hypertension Mother    • Rheum arthritis Mother    • Heart disease Mother    • Breast cancer Mother    • Diabetes Father    • Cancer Maternal Grandmother         colon   • Colon cancer Maternal Grandmother    • Aneurysm Paternal Grandfather    • Diabetes Other    • Fibromyalgia Other    • Malig Hyperthermia Neg Hx         Social History     Tobacco Use   • Smoking status: Every Day     Packs/day: 1.50     Years: 40.00     Pack years: 60.00     Types: Cigarettes     Start date: 1974   • Smokeless tobacco: Never   • Tobacco comments:     caffeine use 3 mt dews daily     INST PER ANESTHESIA PROTOCOL   Vaping Use   • Vaping Use: Never used   Substance Use Topics   • Alcohol use: No   • Drug use: Yes     Types: Marijuana     Comment: Prescribed Lorazepam/OCC MARIJUANA       Review of Systems  All systems were reviewed and negative except for:   Review of Systems   Constitutional: Negative for chills, fever and unexpected weight loss.   HENT: Negative for congestion, nosebleeds and voice change.    Eyes: Negative for blurred vision, double vision and discharge.   Respiratory: Negative for apnea, chest tightness and shortness of breath.    Cardiovascular: Negative for chest pain and leg swelling.   Gastrointestinal:        See HPI   Endocrine: Negative for cold intolerance and heat intolerance.   Genitourinary: Negative for dysuria, hematuria and urgency.   Musculoskeletal: Negative for back pain, joint swelling and neck pain.   Skin: Negative for color change and dry skin.   Neurological: Negative for dizziness and confusion.   Hematological: Negative for adenopathy.   Psychiatric/Behavioral: Negative for agitation and behavioral problems.     MEDS:  Prior to Admission medications    Medication Sig Start Date End Date Taking? Authorizing Provider   atorvastatin (LIPITOR) 20 MG tablet TAKE 1 TABLET BY MOUTH EVERY DAY  Patient taking differently: Take 1 tablet by mouth Daily. 10/1/19  Yes Yudelka Manning MD   donepezil (ARICEPT) 10 MG tablet Take 1 tablet by mouth Daily. 10/28/22  Yes ProviderBessie MD   gabapentin (NEURONTIN) 600 MG tablet TAKE 1 TABLET BY MOUTH AT BEDTIME  Patient taking differently: 300 mg 2 (Two) Times a Day As Needed. TAKES1/2  MG TABLET 7/30/20  Yes Yudelka Manning MD   hydroCHLOROthiazide  (HYDRODIURIL) 12.5 MG tablet Take 1 tablet by mouth Daily. 5/17/23  Yes Donald Barker MD   ketoconazole (NIZORAL) 2 % cream APPLY TO THE AFFECTED AREA(S) once DAILY 4/6/23  Yes Bessie Lopez MD   letrozole (FEMARA) 2.5 MG tablet Take 1 tablet by mouth Daily. 11/30/22  Yes Donald Barker MD   levothyroxine (SYNTHROID, LEVOTHROID) 50 MCG tablet TAKE 1 TABLET BY MOUTH EVERY DAY 7/19/19  Yes Yudelka Manning MD   LORazepam (ATIVAN) 0.5 MG tablet Take 1 tablet by mouth Every 6 (Six) Hours As Needed.   Yes ProviderBessie MD   losartan (COZAAR) 100 MG tablet Take 1 tablet by mouth Daily. INST PER ANESTHESIA PROTOCOL   Yes Bessie Lopez MD   montelukast (SINGULAIR) 10 MG tablet Take 1 tablet by mouth Daily. 1/17/22  Yes Bessie Lopez MD   Morphine (MS CONTIN) 15 MG 12 hr tablet Take 3 tablets by mouth 2 (Two) Times a Day. 5/1/23  Yes Donald Barker MD   naproxen (NAPROSYN) 500 MG tablet Take 1 tablet by mouth 2 (Two) Times a Day With Meals. LAST DOSE 1/5/23   Yes Bessie Lopez MD   ondansetron (ZOFRAN) 8 MG tablet TAKE 1 TABLET BY MOUTH THREE TIMES DAILY AS NEEDED FOR NAUSEA AND VOMITING 3/8/23  Yes Donald Barker MD   polyethylene glycol (MIRALAX) 17 g packet Take 17 g by mouth Daily. 1/9/23  Yes Alfred Castañeda MD   rOPINIRole (REQUIP) 3 MG tablet Take 1 tablet by mouth 2 (Two) Times a Day. 6/29/22  Yes Bessie Lopez MD   solifenacin (VESICARE) 10 MG tablet Take 1 tablet by mouth Daily for 360 days. 10/25/22 10/20/23 Yes Destinee Myers MD   SUMAtriptan (IMITREX) 100 MG tablet Take 1 tablet by mouth 1 (One) Time As Needed. 10/7/22  Yes Bessie Lopez MD   traZODone (DESYREL) 150 MG tablet TAKE 1 TABLET BY MOUTH ONCE nightly 11/12/19  Yes Yudelka Manning MD   metroNIDAZOLE (FLAGYL) 500 MG tablet Day Before Surgery Take 2 Tabs at 1 pm, 2 Tabs at 2 pm, and 2 Tabs at 11 pm 5/17/23   Alfred Castañeda MD   neomycin (MYCIFRADIN) 500 MG  tablet Day Before Surgery. Take 2 Tabs at 1 pm, 2 Tabs at 2 pm, and 2 Tabs at 11pm 5/17/23   Alfred Castañeda MD   oxyCODONE-acetaminophen (PERCOCET)  MG per tablet Take 1 tablet by mouth Every 6 (Six) Hours As Needed for Moderate Pain. 5/22/23   Donald Barker MD   ribociclib succinate 200 MG tablet therapy pack tablet Take 3 tablets by mouth Take As Directed. Take 3 tablets by mouth daily for 21 days then off 7 days on a 28 day cycle.  Patient not taking: Reported on 5/17/2023 2/7/23   Donald Barker MD   sodium-potassium-magnesium sulfates (Suprep Bowel Prep Kit) 17.5-3.13-1.6 GM/177ML solution oral solution Take 1 bottle by mouth Every 12 (Twelve) Hours. 5/17/23   Alfred Castañeda MD   sodium-potassium-magnesium sulfates (SUPREP) 17.5-3.13-1.6 GM/177ML solution oral solution Take as directed 5/17/23   Alfred Castañeda MD        Allergies:  Azithromycin    Objective     Vital Signs        Physical Exam:     General Appearance:    Alert, cooperative, in no acute distress   Head:    Normocephalic, without obvious abnormality, atraumatic   Eyes:          Conjunctivae and sclerae normal, no icterus,     Ears:    Ears appear intact with no abnormalities noted   Throat:   No oral lesions, no thrush, oral mucosa moist   Neck:   No adenopathy, supple, trachea midline, no thyromegaly   Back:     No kyphosis present, no scoliosis present, no skin lesions,      erythema or scars, no tenderness to percussion or                   palpation,   range of motion normal   Lungs:     Clear to auscultation,respirations regular, even and                  unlabored    Heart:    Regular rhythm and normal rate, normal S1 and S2, no            murmur, no gallop, no rub, no click   Chest Wall:    No abnormalities observed   Abdomen:     Normal bowel sounds, no masses, no organomegaly, soft        non-tender, non-distended, no guarding, no rebound                tenderness, nonreducible parastomal hernia  "without evidence of obstruction or gangrene   Rectal:        Extremities:   Moves all extremities well, no edema, no cyanosis, no             redness   Pulses:   Pulses palpable and equal bilaterally   Skin:   No bleeding, bruising or rash   Lymph nodes:   No palpable adenopathy   Neurologic:   A/o x 4 with no deficits       Results Review:   {Results Review:04910::\"I reviewed the patient's new clinical results.\"    LABS/IMAGING:  Results for orders placed or performed during the hospital encounter of 05/17/23   Comprehensive metabolic panel    Specimen: Blood   Result Value Ref Range    Glucose 98 65 - 99 mg/dL    BUN 16 8 - 23 mg/dL    Creatinine 0.72 0.57 - 1.00 mg/dL    Sodium 140 136 - 145 mmol/L    Potassium 3.5 3.5 - 5.2 mmol/L    Chloride 103 98 - 107 mmol/L    CO2 29.2 (H) 22.0 - 29.0 mmol/L    Calcium 8.9 8.6 - 10.5 mg/dL    Total Protein 6.3 6.0 - 8.5 g/dL    Albumin 3.8 3.5 - 5.2 g/dL    ALT (SGPT) 7 1 - 33 U/L    AST (SGOT) 12 1 - 32 U/L    Alkaline Phosphatase 79 39 - 117 U/L    Total Bilirubin 0.3 0.0 - 1.2 mg/dL    Globulin 2.5 gm/dL    A/G Ratio 1.5 g/dL    BUN/Creatinine Ratio 22.2 7.0 - 25.0    Anion Gap 7.8 5.0 - 15.0 mmol/L    eGFR 94.1 >60.0 mL/min/1.73   Magnesium    Specimen: Blood   Result Value Ref Range    Magnesium 2.1 1.6 - 2.4 mg/dL   Phosphorus    Specimen: Blood   Result Value Ref Range    Phosphorus 3.1 2.5 - 4.5 mg/dL   CBC Auto Differential    Specimen: Blood   Result Value Ref Range    WBC 5.33 3.40 - 10.80 10*3/mm3    RBC 2.77 (L) 3.77 - 5.28 10*6/mm3    Hemoglobin 9.5 (L) 12.0 - 15.9 g/dL    Hematocrit 29.2 (L) 34.0 - 46.6 %    .4 (H) 79.0 - 97.0 fL    MCH 34.3 (H) 26.6 - 33.0 pg    MCHC 32.5 31.5 - 35.7 g/dL    RDW 15.6 (H) 12.3 - 15.4 %    RDW-SD 60.3 (H) 37.0 - 54.0 fl    MPV 10.1 6.0 - 12.0 fL    Platelets 196 140 - 450 10*3/mm3    Neutrophil % 61.5 42.7 - 76.0 %    Lymphocyte % 20.8 19.6 - 45.3 %    Monocyte % 16.3 (H) 5.0 - 12.0 %    Eosinophil % 0.6 0.3 - 6.2 %    " Basophil % 0.8 0.0 - 1.5 %    Immature Grans % 0.0 0.0 - 0.5 %    Neutrophils, Absolute 3.28 1.70 - 7.00 10*3/mm3    Lymphocytes, Absolute 1.11 0.70 - 3.10 10*3/mm3    Monocytes, Absolute 0.87 0.10 - 0.90 10*3/mm3    Eosinophils, Absolute 0.03 0.00 - 0.40 10*3/mm3    Basophils, Absolute 0.04 0.00 - 0.20 10*3/mm3    Immature Grans, Absolute 0.00 0.00 - 0.05 10*3/mm3        Result Review :     Assessment & Plan     History of metastatic breast cancer  Status post ex lap with Lynn's procedure for perforated sigmoid colon 12/6/2022.  Pathology with metastatic lobular breast carcinoma  Nonreducible parastomal hernia without evidence of obstruction or gangrene     Discussion with patient.  I reviewed the CT images with her and her family.  I discussed the warning signs of incarceration and strangulation.  She was instructed go to the emergency department for any concern.  I instructed her to quit smoking prior to elective colostomy closure and parastomal hernia repair.  I recommended colonoscopy prior to colostomy closure.  Benefits and alternatives discussed.  Risk of procedure including bleeding perforation missing a polyp discussed.  I then recommended proceeding with laparoscopic hand-assisted possible open colostomy closure with parastomal hernia repair with biologic mesh.  I explained the procedure and recovery.  Benefits and alternatives discussed.  Risk procedure including risk of anesthesia, bleeding, infection, hernia recurrence, conversion open, damage to surrounding structures including ureters, anastomotic leak, possible ostomy, heart attack, stroke, blood clot, pneumonia discussed.  All questions answered.  She agrees with the plan.  Oral and mechanical bowel prep for surgery.  She was instructed to use chlorhexidine the night before surgery.  Orders placed.  Thank you for the consult.        This document has been electronically signed by Alfred Castañeda MD  May 23, 2023 09:06 EDT

## 2023-05-23 NOTE — PROGRESS NOTES
General Surgery/Colorectal Surgery Note    Patient Name:  Derek Keller  YOB: 1960  [id number]    Referring Provider: No ref. provider found      Patient Care Team:  Haile Monique MD as PCP - General (Family Medicine)  Julien Cardona DO as Consulting Physician (Pain Medicine)  Joel Castillo MD as Consulting Physician (Gastroenterology)  Remington Russell MD as Surgeon (General Surgery)  Ahsan Salas MD as Consulting Physician (Sports Medicine)  Donald Barker MD as Consulting Physician (Hematology and Oncology)  Sandy Ricci MD as Consulting Physician (General Surgery)  Alfred Castañeda MD as Consulting Physician (General Surgery)    Chief complaint hernia    Subjective .     History of present illness:    History of metastatic breast cancer  Status post ex lap with Lynn's procedure for perforated sigmoid colon 12/6/2022.  Pathology with metastatic lobular breast carcinoma    Status post port placement 1/9/2023    She comes in for evaluation of increased size of parastomal hernia with pain to the left lower quadrant.  She also has a sore around the colostomy site which has not been able to heal despite seeing the ostomy nurses.  She is using tobacco.    CT abdomen pelvis 5/13/2023 with new parastomal hernia containing nonobstructed transverse colon    History:  Past Medical History:   Diagnosis Date   • Abdominal pain     OSTOMY SITE   • Allergic rhinitis    • Anemia     NO S/S   • Anxiety    • Arteriosclerosis     Coronary   • Arthritis    • Bone metastases 10/14/2022   • Bowen's disease     SKIN CANCER   • Breast cancer     NO SURGERY WAS DONE DUE TO METS TO BONE/COLON/LYMPHNODE   • Cancer related pain 10/13/2022   • Depression    • Disorder associated with Helicobacter species 10/12/2022   • Dysphoric mood    • Fatigue    • Frequent urination     NO S/S INFECTION   • Herpes simplex vulvovaginitis 7/26/2018   • History of colon polyps    • History of IBS    • History  of kidney stones    • Hyperlipidemia    • Hypertension    • Hypothyroidism    • Insomnia    • Lumbago    • Mood disorder    • Neck pain     R/T CANCER   • Palpitations     ASYMPTOMATIC,  NO CARDIOLOGIST   • Precordial pain     R/T SPINE CANCER   • Sleep disturbance        Past Surgical History:   Procedure Laterality Date   • BREAST BIOPSY Left    • CARDIAC CATHETERIZATION Left 1960   • CARDIAC CATHETERIZATION N/A 04/06/2018    Procedure: Coronary angiography;  Surgeon: Art Licea MD;  Location: [facility] CATH INVASIVE LOCATION;  Service: Cardiovascular   • CARDIAC CATHETERIZATION N/A 04/06/2018    Procedure: Left heart cath;  Surgeon: Art Licea MD;  Location: [facility] CATH INVASIVE LOCATION;  Service: Cardiovascular   • CARDIAC CATHETERIZATION N/A 04/06/2018    Procedure: Left ventriculography;  Surgeon: Art Licea MD;  Location[facility] CATH INVASIVE LOC[facility];  Service: Cardiovascular   • CHOLECYSTECTOMY     • COLON SURGERY      colostomy bag   • COLONOSCOPY  11/08/2006   • EXPLORATORY LAPAROTOMY N/A 12/06/2022    Procedure: LAPAROTOMY EXPLORATORY sigmoid resection hartmans procedure colostomy;  Surgeon: Alfred Castañeda MD;  Location: [facility] MAIN OR;  Service: General;  Laterality: N/A;   • GANGLION CYST EXCISION     • HYSTERECTOMY  05/2005   • LAPAROSCOPIC GASTRIC BANDING      BAND REMOVED 2020   • TONSILLECTOMY     • VENOUS ACCESS DEVICE (PORT) INSERTION N/A 1/9/2023    Procedure: INSERTION VENOUS ACCESS DEVICE;  Surgeon: Alfred Castañeda MD;  Location: [facility] MAIN OR;  Service: General;  Laterality: N/A;       Family History   Problem Relation Age of Onset   • Hypertension Mother    • Rheum arthritis Mother    • Heart disease Mother    • Breast cancer Mother    • Diabetes Father    • Cancer Maternal Grandmother         colon   • Colon cancer Maternal Grandmother    • Aneurysm Paternal Grandfather    • Diabetes Other    • Fibromyalgia Other    • Malig Hyperthermia Neg Hx         Social History     Tobacco Use   • Smoking status: Every Day     Packs/day: 1.50     Years: 40.00     Pack years: 60.00     Types: Cigarettes     Start date: 1974   • Smokeless tobacco: Never   • Tobacco comments:     caffeine use 3 mt dews daily     INST PER ANESTHESIA PROTOCOL   Vaping Use   • Vaping Use: Never used   Substance Use Topics   • Alcohol use: No   • Drug use: Yes     Types: Marijuana     Comment: Prescribed Lorazepam/OCC MARIJUANA       Review of Systems  All systems were reviewed and negative except for:   Review of Systems   Constitutional: Negative for chills, fever and unexpected weight loss.   HENT: Negative for congestion, nosebleeds and voice change.    Eyes: Negative for blurred vision, double vision and discharge.   Respiratory: Negative for apnea, chest tightness and shortness of breath.    Cardiovascular: Negative for chest pain and leg swelling.   Gastrointestinal:        See HPI   Endocrine: Negative for cold intolerance and heat intolerance.   Genitourinary: Negative for dysuria, hematuria and urgency.   Musculoskeletal: Negative for back pain, joint swelling and neck pain.   Skin: Negative for color change and dry skin.   Neurological: Negative for dizziness and confusion.   Hematological: Negative for adenopathy.   Psychiatric/Behavioral: Negative for agitation and behavioral problems.     MEDS:  Prior to Admission medications    Medication Sig Start Date End Date Taking? Authorizing Provider   atorvastatin (LIPITOR) 20 MG tablet TAKE 1 TABLET BY MOUTH EVERY DAY  Patient taking differently: Take 1 tablet by mouth Daily. 10/1/19  Yes Yudelka Manning MD   donepezil (ARICEPT) 10 MG tablet Take 1 tablet by mouth Daily. 10/28/22  Yes ProviderBessie MD   gabapentin (NEURONTIN) 600 MG tablet TAKE 1 TABLET BY MOUTH AT BEDTIME  Patient taking differently: 300 mg 2 (Two) Times a Day As Needed. TAKES1/2  MG TABLET 7/30/20  Yes Yudelka Manning MD   hydroCHLOROthiazide  (HYDRODIURIL) 12.5 MG tablet Take 1 tablet by mouth Daily. 5/17/23  Yes Donald Barker MD   ketoconazole (NIZORAL) 2 % cream APPLY TO THE AFFECTED AREA(S) once DAILY 4/6/23  Yes Bessie Lopez MD   letrozole (FEMARA) 2.5 MG tablet Take 1 tablet by mouth Daily. 11/30/22  Yes Donald Barker MD   levothyroxine (SYNTHROID, LEVOTHROID) 50 MCG tablet TAKE 1 TABLET BY MOUTH EVERY DAY 7/19/19  Yes Yudelka Manning MD   LORazepam (ATIVAN) 0.5 MG tablet Take 1 tablet by mouth Every 6 (Six) Hours As Needed.   Yes ProviderBessie MD   losartan (COZAAR) 100 MG tablet Take 1 tablet by mouth Daily. INST PER ANESTHESIA PROTOCOL   Yes Bessie Lopez MD   montelukast (SINGULAIR) 10 MG tablet Take 1 tablet by mouth Daily. 1/17/22  Yes Bessie Lopez MD   Morphine (MS CONTIN) 15 MG 12 hr tablet Take 3 tablets by mouth 2 (Two) Times a Day. 5/1/23  Yes Donald Barker MD   naproxen (NAPROSYN) 500 MG tablet Take 1 tablet by mouth 2 (Two) Times a Day With Meals. LAST DOSE 1/5/23   Yes Bessie Lopez MD   ondansetron (ZOFRAN) 8 MG tablet TAKE 1 TABLET BY MOUTH THREE TIMES DAILY AS NEEDED FOR NAUSEA AND VOMITING 3/8/23  Yes Donald Barker MD   polyethylene glycol (MIRALAX) 17 g packet Take 17 g by mouth Daily. 1/9/23  Yes Alfred Castañeda MD   rOPINIRole (REQUIP) 3 MG tablet Take 1 tablet by mouth 2 (Two) Times a Day. 6/29/22  Yes Bessie Lopez MD   solifenacin (VESICARE) 10 MG tablet Take 1 tablet by mouth Daily for 360 days. 10/25/22 10/20/23 Yes Destinee Myers MD   SUMAtriptan (IMITREX) 100 MG tablet Take 1 tablet by mouth 1 (One) Time As Needed. 10/7/22  Yes Bessie Lopez MD   traZODone (DESYREL) 150 MG tablet TAKE 1 TABLET BY MOUTH ONCE nightly 11/12/19  Yes Yudelka Manning MD   metroNIDAZOLE (FLAGYL) 500 MG tablet Day Before Surgery Take 2 Tabs at 1 pm, 2 Tabs at 2 pm, and 2 Tabs at 11 pm 5/17/23   Alfred Castañeda MD   neomycin (MYCIFRADIN) 500 MG  tablet Day Before Surgery. Take 2 Tabs at 1 pm, 2 Tabs at 2 pm, and 2 Tabs at 11pm 5/17/23   Alfred Castañeda MD   oxyCODONE-acetaminophen (PERCOCET)  MG per tablet Take 1 tablet by mouth Every 6 (Six) Hours As Needed for Moderate Pain. 5/22/23   Donald Barker MD   ribociclib succinate 200 MG tablet therapy pack tablet Take 3 tablets by mouth Take As Directed. Take 3 tablets by mouth daily for 21 days then off 7 days on a 28 day cycle.  Patient not taking: Reported on 5/17/2023 2/7/23   Donald Barker MD   sodium-potassium-magnesium sulfates (Suprep Bowel Prep Kit) 17.5-3.13-1.6 GM/177ML solution oral solution Take 1 bottle by mouth Every 12 (Twelve) Hours. 5/17/23   Alfred Castañeda MD   sodium-potassium-magnesium sulfates (SUPREP) 17.5-3.13-1.6 GM/177ML solution oral solution Take as directed 5/17/23   Alfred Castañeda MD        Allergies:  Azithromycin    Objective     Vital Signs        Physical Exam:     General Appearance:    Alert, cooperative, in no acute distress   Head:    Normocephalic, without obvious abnormality, atraumatic   Eyes:          Conjunctivae and sclerae normal, no icterus,     Ears:    Ears appear intact with no abnormalities noted   Throat:   No oral lesions, no thrush, oral mucosa moist   Neck:   No adenopathy, supple, trachea midline, no thyromegaly   Back:     No kyphosis present, no scoliosis present, no skin lesions,      erythema or scars, no tenderness to percussion or                   palpation,   range of motion normal   Lungs:     Clear to auscultation,respirations regular, even and                  unlabored    Heart:    Regular rhythm and normal rate, normal S1 and S2, no            murmur, no gallop, no rub, no click   Chest Wall:    No abnormalities observed   Abdomen:     Normal bowel sounds, no masses, no organomegaly, soft        non-tender, non-distended, no guarding, no rebound                tenderness, nonreducible parastomal hernia  "without evidence of obstruction or gangrene   Rectal:        Extremities:   Moves all extremities well, no edema, no cyanosis, no             redness   Pulses:   Pulses palpable and equal bilaterally   Skin:   No bleeding, bruising or rash   Lymph nodes:   No palpable adenopathy   Neurologic:   A/o x 4 with no deficits       Results Review:   {Results Review:[id number]::\"I reviewed the patient's new clinical results.\"    LABS/IMAGING:  Results for orders placed or performed during the hospital encounter of 05/17/23   Comprehensive metabolic panel    Specimen: Blood   Result Value Ref Range    Glucose 98 65 - 99 mg/dL    BUN 16 8 - 23 mg/dL    Creatinine 0.72 0.57 - 1.00 mg/dL    Sodium 140 136 - 145 mmol/L    Potassium 3.5 3.5 - 5.2 mmol/L    Chloride 103 98 - 107 mmol/L    CO2 29.2 (H) 22.0 - 29.0 mmol/L    Calcium 8.9 8.6 - 10.5 mg/dL    Total Protein 6.3 6.0 - 8.5 g/dL    Albumin 3.8 3.5 - 5.2 g/dL    ALT (SGPT) 7 1 - 33 U/L    AST (SGOT) 12 1 - 32 U/L    Alkaline Phosphatase 79 39 - 117 U/L    Total Bilirubin 0.3 0.0 - 1.2 mg/dL    Globulin 2.5 gm/dL    A/G Ratio 1.5 g/dL    BUN/Creatinine Ratio 22.2 7.0 - 25.0    Anion Gap 7.8 5.0 - 15.0 mmol/L    eGFR 94.1 >60.0 mL/min/1.73   Magnesium    Specimen: Blood   Result Value Ref Range    Magnesium 2.1 1.6 - 2.4 mg/dL   Phosphorus    Specimen: Blood   Result Value Ref Range    Phosphorus 3.1 2.5 - 4.5 mg/dL   CBC Auto Differential    Specimen: Blood   Result Value Ref Range    WBC 5.33 3.40 - 10.80 10*3/mm3    RBC 2.77 (L) 3.77 - 5.28 10*6/mm3    Hemoglobin 9.5 (L) 12.0 - 15.9 g/dL    Hematocrit 29.2 (L) 34.0 - 46.6 %    .4 (H) 79.0 - 97.0 fL    MCH 34.3 (H) 26.6 - 33.0 pg    MCHC 32.5 31.5 - 35.7 g/dL    RDW 15.6 (H) 12.3 - 15.4 %    RDW-SD 60.3 (H) 37.0 - 54.0 fl    MPV 10.1 6.0 - 12.0 fL    Platelets 196 140 - 450 10*3/mm3    Neutrophil % 61.5 42.7 - 76.0 %    Lymphocyte % 20.8 19.6 - 45.3 %    Monocyte % 16.3 (H) 5.0 - 12.0 %    Eosinophil % 0.6 0.3 - 6.2 %    " Basophil % 0.8 0.0 - 1.5 %    Immature Grans % 0.0 0.0 - 0.5 %    Neutrophils, Absolute 3.28 1.70 - 7.00 10*3/mm3    Lymphocytes, Absolute 1.11 0.70 - 3.10 10*3/mm3    Monocytes, Absolute 0.87 0.10 - 0.90 10*3/mm3    Eosinophils, Absolute 0.03 0.00 - 0.40 10*3/mm3    Basophils, Absolute 0.04 0.00 - 0.20 10*3/mm3    Immature Grans, Absolute 0.00 0.00 - 0.05 10*3/mm3        Result Review :     Assessment & Plan     History of metastatic breast cancer  Status post ex lap with Lynn's procedure for perforated sigmoid colon 12/6/2022.  Pathology with metastatic lobular breast carcinoma  Nonreducible parastomal hernia without evidence of obstruction or gangrene     Discussion with patient.  I reviewed the CT images with her and her family.  I discussed the warning signs of incarceration and strangulation.  She was instructed go to the emergency department for any concern.  I instructed her to quit smoking prior to elective colostomy closure and parastomal hernia repair.  I recommended colonoscopy prior to colostomy closure.  Benefits and alternatives discussed.  Risk of procedure including bleeding perforation missing a polyp discussed.  I then recommended proceeding with laparoscopic hand-assisted possible open colostomy closure with parastomal hernia repair with biologic mesh.  I explained the procedure and recovery.  Benefits and alternatives discussed.  Risk procedure including risk of anesthesia, bleeding, infection, hernia recurrence, conversion open, damage to surrounding structures including ureters, anastomotic leak, possible ostomy, heart attack, stroke, blood clot, pneumonia discussed.  All questions answered.  She agrees with the plan.  Oral and mechanical bowel prep for surgery.  She was instructed to use chlorhexidine the night before surgery.  Orders placed.  Thank you for the consult.        This document has been electronically signed by Alfred Castañeda MD  May 23, 2023 09:06 EDT

## 2023-05-30 DIAGNOSIS — G89.3 CANCER RELATED PAIN: ICD-10-CM

## 2023-05-30 RX ORDER — MORPHINE SULFATE 15 MG/1
45 TABLET, FILM COATED, EXTENDED RELEASE ORAL 2 TIMES DAILY
Qty: 180 TABLET | Refills: 0 | Status: SHIPPED | OUTPATIENT
Start: 2023-05-30

## 2023-05-30 NOTE — TELEPHONE ENCOUNTER
Caller: Derek Keller    Relationship: Self    Best call back number: [phone]    Requested Prescriptions:   Requested Prescriptions     Pending Prescriptions Disp Refills   • Morphine (MS CONTIN) 15 MG 12 hr tablet 180 tablet 0     Sig: Take 3 tablets by mouth 2 (Two) Times a Day.        Pharmacy where request should be sent: [facility], [facility], & [facility]  [facility] [place], [place] - [address] - [phone] [facility] [phone] FX     Last office visit with prescribing clinician: 5/17/2023   Last telemedicine visit with prescribing clinician: Visit date not found   Next office visit with prescribing clinician: 6/14/2023         Does the patient have less than a 3 day supply:  [x] Yes  [] No    Would you like a call back once the refill request has been completed: [x] Yes [] No    If the office needs to give you a call back, can they leave a voicemail: [x] Yes [] No    Abby Cota Rep   05/30/23 13:02 EDT

## 2023-05-31 ENCOUNTER — TELEPHONE (OUTPATIENT)
Dept: SURGERY | Facility: CLINIC | Age: 63
End: 2023-05-31

## 2023-05-31 NOTE — TELEPHONE ENCOUNTER
PATIENT CALLED TO LET DR. HESS KNOW THAT SHE HAD AN APPOINTMENT WITH HER MD TODAY.  THEY DID BLOOD WORK.  SHE SAID SHE THINKS IT WAS JUST THE TIME SHE WAS DUE THERE.    SHE REQUESTED A STEROID SHOT, DUE TO PAIN AND BECAUSE SHE WAS TIRED, AND SHE GOT ONE.      SHE ASKED IF THERE IS ANY WAY SHE CAN GET THE COLONOSCOPY, COLOSTOMY TAKE DOWN AND HERNIA REPAIR SCHEDULED ANY SOONER.

## 2023-06-01 ENCOUNTER — HOSPITAL ENCOUNTER (OUTPATIENT)
Dept: INFUSION THERAPY | Facility: HOSPITAL | Age: 63
Discharge: HOME | End: 2023-06-01
Admitting: SURGERY

## 2023-06-01 VITALS
SYSTOLIC BLOOD PRESSURE: 150 MMHG | TEMPERATURE: 98.1 F | HEART RATE: 67 BPM | OXYGEN SATURATION: 100 % | DIASTOLIC BLOOD PRESSURE: 60 MMHG | RESPIRATION RATE: 16 BRPM

## 2023-06-01 DIAGNOSIS — Z98.890 S/P EXPLORATORY LAPAROTOMY: Primary | ICD-10-CM

## 2023-06-01 PROCEDURE — G0463 HOSPITAL OUTPT CLINIC VISIT: HCPCS

## 2023-06-01 NOTE — SIGNIFICANT NOTE
Wound Eval / Progress Noted    JOSE Langston     Patient Name: Derek Keller  : 1960  MRN: [id number]  Today's Date: 2023                 Admit Date: 2023    Visit Dx:    ICD-10-CM ICD-9-CM   1. S/P ex lap with sigmoid resection, Lynn's procedure for stercoral perforation  Z98.890 V45.89       Patient Active Problem List   Diagnosis   • Hypertension   • Hyperlipidemia   • Allergic rhinitis   • Hypothyroidism   • Chronic pain disorder   • Anxiety   • Monopolar depression   • Malaise and fatigue   • Tobacco abuse   • Fibromyalgia   • Vitamin B 12 deficiency   • Primary osteoarthritis of left knee   • Restless leg syndrome   • Primary insomnia   • Chronic fatigue   • Asthma   • Body mass index (BMI) 26.0-26.9, adult   • Degeneration of lumbar intervertebral disc   • Hearing loss   • Inappropriate diet and eating habits   • Cervical spondylosis   • Obesity with body mass index 30 or greater   • Renal stone   • Malignant neoplasm of left breast in female, estrogen receptor positive   • Cancer related pain   • Malignant neoplasm metastatic to bone   • Peripheral edema   • Peritonitis   • Leukopenia   • S/P ex lap with sigmoid resection, Lynn's procedure for stercoral perforation   • Encounter for adjustment or management of vascular access device   • Pulmonary emphysema   • History of colon polyps   • Colostomy in place        Past Medical History:   Diagnosis Date   • Abdominal pain     OSTOMY SITE   • Allergic rhinitis    • Anemia     NO S/S   • Anxiety    • Arteriosclerosis     Coronary   • Arthritis    • Bone metastases 10/14/2022   • Bowen's disease     SKIN CANCER   • Breast cancer     NO SURGERY WAS DONE DUE TO METS TO BONE/COLON/LYMPHNODE   • Cancer related pain 10/13/2022   • Depression    • Disorder associated with Helicobacter species 10/12/2022   • Dysphoric mood    • Fatigue    • Frequent urination     NO S/S INFECTION   • Herpes simplex vulvovaginitis 2018   • History of colon polyps     • History of IBS    • History of kidney stones    • Hyperlipidemia    • Hypertension    • Hypothyroidism    • Insomnia    • Lumbago    • Mood disorder    • Neck pain     R/T CANCER   • Palpitations     ASYMPTOMATIC,  NO CARDIOLOGIST   • Precordial pain     R/T SPINE CANCER   • Sleep disturbance         Past Surgical History:   Procedure Laterality Date   • BREAST BIOPSY Left    • CARDIAC CATHETERIZATION Left 1960   • CARDIAC CATHETERIZATION N/A 04/06/2018    Procedure: Coronary angiography;  Surgeon: Art Licea MD;  Location: [facility] CATH INVASIVE LOCATION;  Service: Cardiovascular   • CARDIAC CATHETERIZATION N/A 04/06/2018    Procedure: Left heart cath;  Surgeon: Art Licea MD;  Location: [facility] CATH INVASIVE LOCATION;  Service: Cardiovascular   • CARDIAC CATHETERIZATION N/A 04/06/2018    Procedure: Left ventriculography;  Surgeon: Art Licea MD;  [facility];  Service: Cardiovascular   • CHOLECYSTECTOMY     • COLON SURGERY      colostomy bag   • COLONOSCOPY  11/08/2006   • EXPLORATORY LAPAROTOMY N/A 12/06/2022    Procedure: LAPAROTOMY EXPLORATORY sigmoid resection hartmans procedure colostomy;  Surgeon: Alfred Castañeda MD;  Location: [facility];  Service: General;  Laterality: N/A;   • GANGLION CYST EXCISION     • HYSTERECTOMY  05/2005   • LAPAROSCOPIC GASTRIC BANDING      BAND REMOVED 2020   • TONSILLECTOMY     • VENOUS ACCESS DEVICE (PORT) INSERTION N/A 1/9/2023    Procedure: INSERTION VENOUS ACCESS DEVICE;  Surgeon: Alfred Castañeda MD;  Location: [facility];  Service: General;  Laterality: N/A;         Physical Assessment:  Wound 03/29/23 1310 Left medial abdomen Traumatic (Active)   Wound Image    06/01/23 1255   Dressing Appearance other (see comments) 06/01/23 1255   Closure None 06/01/23 1255   Base pink;moist;yellow 06/01/23 1255   Periwound intact;redness 06/01/23 1255   Periwound Temperature warm 06/01/23 1255    Periwound Skin Turgor soft 06/01/23 1255   Edges open 06/01/23 1255   Wound Length (cm) 2.5 cm 06/01/23 1255   Wound Width (cm) 3 cm 06/01/23 1255   Wound Depth (cm) 0.4 cm 06/01/23 1255   Wound Surface Area (cm^2) 7.5 cm^2 06/01/23 1255   Wound Volume (cm^3) 3 cm^3 06/01/23 1255   Drainage Characteristics/Odor serosanguineous 06/01/23 1255   Drainage Amount small 06/01/23 1255   Care, Wound cleansed with;irrigated with;sterile normal saline 06/01/23 1255   Dressing Care dressing applied;foam;hydrocolloid 06/01/23 1255   Periwound Care absorptive dressing applied 06/01/23 1255        Wound Check / Follow-up:  Patient seen today for ostomy care and treatment to the ulceration to the peristomal tissue. Patient to have a colostomy on 6/5 and then an ostomy take on 6/26. Patient with an intact pouch but during pouch removal, patient was found a copious amount of stool leaking under the appliance. Stool was found in the ulceration near the stoma as well as her umbilicus. Cleansed with warm water. Discussed the importance of checking pouch often to ensure no leaking has occurred and to change pouch as soon as leaking is noted. I showed the patient how to tell if her pouch was leaking without stool coming from the side. The patient disclosed that she does not always change her pouch immediately when she notices leaking due to only having a set amount of pouches to make it through the month. Expressed understanding but reinforced that the stool can cause worsening of the ulceration that is already present as well as further skin breakdown. Encouraged patient to call the Ostomy nurse if she was in need of supplies.  Stoma is red and moist. Ulceration remains to the right side of her stoma and has progressed in size from 6-12 o'clock. Cleansed and irrigated stoma and wound with a copious amount of NS. Discoloration present to the base of the ulceration with some green tissue. New ulceration appearing to the midline abdomen  under the umbilicus with yellow tissue present. Applied purple foam to all ulcerations and covered with a hydrocolloid dressing, then placed stoma paste to the edge of the dressing to secure dressing and to attempt to prevent stool from disrupting the dressing. Applied a 2 piece convex pouching system.  encouraged patient to return next week for another ostomy / wound check.    Impression: existing colostomy with tete-stomal ulceration    Short term goals:  Regain skin integrity, ostomy management and wound care    Amanda Almaguer RN    6/1/2023    15:36 EDT

## 2023-06-01 NOTE — TELEPHONE ENCOUNTER
Called and spoke with patient and was able to move her colonoscopy to 6/8/23 due to a cancellation and I scheduled her colostomy takedown on 6/26/23. Spoke with deniz in surgery scheduling. Pt v/u and had no further questions.

## 2023-06-05 ENCOUNTER — TELEPHONE (OUTPATIENT)
Dept: SURGERY | Facility: CLINIC | Age: 63
End: 2023-06-05
Payer: MEDICARE

## 2023-06-05 ENCOUNTER — TELEPHONE (OUTPATIENT)
Dept: ONCOLOGY | Facility: HOSPITAL | Age: 63
End: 2023-06-05

## 2023-06-05 NOTE — TELEPHONE ENCOUNTER
Caller: Derek Keller    Relationship: Self    Best call back number: [phone]     What is the best time to reach you: ANYTIME    Who are you requesting to speak with (clinical staff, provider,  specific staff member): NON-CLINICAL/CLINICAL    What was the call regarding: PT RECEIVED CALL. NO VOICE MAIL WAS LEFT. PT IS RETURNING CALL TO SEE WHAT THE CALL MAY HAVE BEEN ABOUT.     Is it okay if the provider responds through Showpadt: NO. PLEASE CALL BACK.

## 2023-06-05 NOTE — TELEPHONE ENCOUNTER
PATIENT HAS QUESTIONS ABOUT HER COLONOSCOPY, AND BOWEL PREP.  SHE SAID SHE HAS THE OSTOMY POUCH, AND NEEDS TO KNOW IF SHE IS TO TAKE IT OFF AND SIT ON THE TOILET, OR WHAT TO DO.  SHE ASKED WHEN SHE IS TO STOP EATING AND DRINKING.    SHE SAID SHE WAS PRESCRIBED 2 ANTIBIOTICS BY DR. HESS THAT ARE DATED THE SAME DATE, WITH THE SAME INSTRUCTIONS.  SHE NEEDS CLARIFICATION ON THESE ANTIBIOTICS.

## 2023-06-06 NOTE — TELEPHONE ENCOUNTER
Patient called back and gave patient arrival time of 7 a.m.. Reviewed bowel prep instructions as well as clear liquid diet instructions. Advised not to take morning medications, to bring those to the hospital with her as well as photo ID and insurance cards. Pt v/u and had no further questions or concerns.

## 2023-06-06 NOTE — TELEPHONE ENCOUNTER
Attempted to call patient in regards to questions/concerns for her colonoscopy on Thursday. LMOM to call back.

## 2023-06-07 ENCOUNTER — SPECIALTY PHARMACY (OUTPATIENT)
Dept: PHARMACY | Facility: HOSPITAL | Age: 63
End: 2023-06-07
Payer: MEDICARE

## 2023-06-07 ENCOUNTER — TELEPHONE (OUTPATIENT)
Dept: SURGERY | Facility: CLINIC | Age: 63
End: 2023-06-07
Payer: MEDICARE

## 2023-06-07 DIAGNOSIS — Z87.898 HISTORY OF DIFFICULT VENOUS ACCESS: ICD-10-CM

## 2023-06-07 NOTE — TELEPHONE ENCOUNTER
PATIENT ASKED TO BE CALLED ASAP.  SHE NEEDS TO KNOW WHEN TO START HER BOWEL PREP FOR TOMORROW.      SHE ALSO STATED THAT SHE CALLED THE EMERGENCY LINE LAST NIGHT TO SPEAK TO DR. HESS.  SHE SAID SHE WAS TOLD THERE WERE STRICT INSTRUCTIONS THAT HE WAS NOT TO BE CALLED UNTIL POST-OP.    SHE SAID SHE HAS HAD AN ULCER NEAR HER STOMA ON THE RIGHT SIDE.  IT IS GROWING AND GOING ONTO THE LEFT SIDE OF THE STOMA.  SHE SAID ALL SHE IS OUT-PUTTING IS A LITTLE BLOOD, AND ONE SMALL PIECE OF STOOL THE SIZE OF A PINKY-TIP.

## 2023-06-07 NOTE — TELEPHONE ENCOUNTER
Called and spoke with patient in regards to bowel prep instructions, reviewed what time she is to start her prep and verified she has been on a liquid diet all morning. Pt v/u and had no further questions regarding her prep.       I have made our  aware of the situation with the Emergency line as I am not familiar with their protocols. She will f/u on this.     I will advise Dr Castañeda of patients concerns about the ulcer and call patient back with advice or instructions once I talk to him about it.

## 2023-06-07 NOTE — PROGRESS NOTES
Specialty Pharmacy Patient Management Program  Oncology Refill Outreach      Derek is a 62 y.o. female contacted today regarding refills of her medication(s).    Specialty medication(s) and dose(s) confirmed: ribociclib succinate 200 MG tablet therapy pack tablet.    Other medications being refilled: N/A    Refill Questions      Flowsheet Row Most Recent Value   Changes to allergies? No   Changes to medications? No   New conditions since last clinic visit No   Unplanned office visit, urgent care, ED, or hospital admission in the last 4 weeks  No   How does patient/caregiver feel medication is working? Very good   Financial problems or insurance changes  No   Since the previous refill, were any specialty medication doses or scheduled injections missed or delayed?  No   If yes, please provide the amount N/A   Why were doses missed? N/A   Does this patient require a clinical escalation to a pharmacist? No            Delivery Questions      Flowsheet Row Most Recent Value   Delivery method  at Pharmacy   Delivery address correct? Yes   Delivery phone number [phone]   Number of medications in delivery 1   Medication being filled and delivered ribociclib succinate 200 MG tablet therapy pack tablet   Doses left of specialty medications Week off   Is there any medication that is due not being filled? No   Supplies needed? No supplies needed   Cooler needed? No   Do any medications need mixed or dated? No   Copay form of payment Pay at pickup   Additional comments N/A   Questions or concerns for the pharmacist? No   Explain any questions or concerns for the pharmacist N/A   Are any medications first time fills? No                   Follow-up: 21 days     Maria Luz Frazier  Care Coordinator, [facility]  6/7/2023  16:59 EDT

## 2023-06-08 ENCOUNTER — ANESTHESIA EVENT (OUTPATIENT)
Dept: GASTROENTEROLOGY | Facility: HOSPITAL | Age: 63
End: 2023-06-08
Payer: MEDICARE

## 2023-06-08 ENCOUNTER — ANESTHESIA (OUTPATIENT)
Dept: GASTROENTEROLOGY | Facility: HOSPITAL | Age: 63
End: 2023-06-08
Payer: MEDICARE

## 2023-06-08 ENCOUNTER — HOSPITAL ENCOUNTER (OUTPATIENT)
Facility: HOSPITAL | Age: 63
Setting detail: HOSPITAL OUTPATIENT SURGERY
Discharge: HOME | End: 2023-06-08
Attending: SURGERY | Admitting: SURGERY
Payer: MEDICARE

## 2023-06-08 VITALS
WEIGHT: 179.45 LBS | DIASTOLIC BLOOD PRESSURE: 56 MMHG | TEMPERATURE: 98 F | HEIGHT: 66 IN | BODY MASS INDEX: 28.84 KG/M2 | SYSTOLIC BLOOD PRESSURE: 129 MMHG | HEART RATE: 65 BPM | OXYGEN SATURATION: 96 % | RESPIRATION RATE: 16 BRPM

## 2023-06-08 DIAGNOSIS — Z86.010 HISTORY OF COLON POLYPS: ICD-10-CM

## 2023-06-08 PROCEDURE — 25010000002 HEPARIN LOCK FLUSH PER 10 UNITS: Performed by: ANESTHESIOLOGY

## 2023-06-08 PROCEDURE — 25010000002 PROPOFOL 10 MG/ML EMULSION: Performed by: NURSE ANESTHETIST, CERTIFIED REGISTERED

## 2023-06-08 RX ORDER — SODIUM CHLORIDE 0.9 % (FLUSH) 0.9 %
10 SYRINGE (ML) INJECTION EVERY 12 HOURS SCHEDULED
Status: DISCONTINUED | OUTPATIENT
Start: 2023-06-08 | End: 2023-06-08 | Stop reason: HOSPADM

## 2023-06-08 RX ORDER — SODIUM CHLORIDE 9 MG/ML
40 INJECTION, SOLUTION INTRAVENOUS AS NEEDED
Status: DISCONTINUED | OUTPATIENT
Start: 2023-06-08 | End: 2023-06-08 | Stop reason: HOSPADM

## 2023-06-08 RX ORDER — OXYCODONE AND ACETAMINOPHEN 10; 325 MG/1; MG/1
TABLET ORAL
Qty: 60 TABLET | Refills: 0 | Status: SHIPPED | OUTPATIENT
Start: 2023-06-08

## 2023-06-08 RX ORDER — PROPOFOL 10 MG/ML
VIAL (ML) INTRAVENOUS AS NEEDED
Status: DISCONTINUED | OUTPATIENT
Start: 2023-06-08 | End: 2023-06-08 | Stop reason: SURG

## 2023-06-08 RX ORDER — LIDOCAINE HYDROCHLORIDE 20 MG/ML
INJECTION, SOLUTION EPIDURAL; INFILTRATION; INTRACAUDAL; PERINEURAL AS NEEDED
Status: DISCONTINUED | OUTPATIENT
Start: 2023-06-08 | End: 2023-06-08 | Stop reason: SURG

## 2023-06-08 RX ORDER — HEPARIN SODIUM (PORCINE) LOCK FLUSH IV SOLN 100 UNIT/ML 100 UNIT/ML
5 SOLUTION INTRAVENOUS AS NEEDED
Status: DISCONTINUED | OUTPATIENT
Start: 2023-06-08 | End: 2023-06-08 | Stop reason: HOSPADM

## 2023-06-08 RX ORDER — SODIUM CHLORIDE 0.9 % (FLUSH) 0.9 %
3 SYRINGE (ML) INJECTION EVERY 12 HOURS SCHEDULED
Status: DISCONTINUED | OUTPATIENT
Start: 2023-06-08 | End: 2023-06-08 | Stop reason: HOSPADM

## 2023-06-08 RX ORDER — SODIUM PHOSPHATE,MONO-DIBASIC 19G-7G/118
1 ENEMA (ML) RECTAL ONCE
Status: COMPLETED | OUTPATIENT
Start: 2023-06-08 | End: 2023-06-08

## 2023-06-08 RX ORDER — SODIUM CHLORIDE 0.9 % (FLUSH) 0.9 %
10 SYRINGE (ML) INJECTION AS NEEDED
Status: DISCONTINUED | OUTPATIENT
Start: 2023-06-08 | End: 2023-06-08 | Stop reason: HOSPADM

## 2023-06-08 RX ORDER — VENLAFAXINE 25 MG/1
TABLET ORAL DAILY
COMMUNITY
End: 2023-06-14

## 2023-06-08 RX ORDER — SODIUM CHLORIDE 0.9 % (FLUSH) 0.9 %
20 SYRINGE (ML) INJECTION AS NEEDED
Status: DISCONTINUED | OUTPATIENT
Start: 2023-06-08 | End: 2023-06-08 | Stop reason: HOSPADM

## 2023-06-08 RX ORDER — SODIUM CHLORIDE, SODIUM LACTATE, POTASSIUM CHLORIDE, CALCIUM CHLORIDE 600; 310; 30; 20 MG/100ML; MG/100ML; MG/100ML; MG/100ML
30 INJECTION, SOLUTION INTRAVENOUS CONTINUOUS
Status: DISCONTINUED | OUTPATIENT
Start: 2023-06-08 | End: 2023-06-08 | Stop reason: HOSPADM

## 2023-06-08 RX ADMIN — SODIUM CHLORIDE, POTASSIUM CHLORIDE, SODIUM LACTATE AND CALCIUM CHLORIDE 30 ML/HR: 600; 310; 30; 20 INJECTION, SOLUTION INTRAVENOUS at 09:23

## 2023-06-08 RX ADMIN — SODIUM PHOSPHATE 1 ENEMA: 7; 19 ENEMA RECTAL at 09:26

## 2023-06-08 RX ADMIN — Medication 500 UNITS: at 10:52

## 2023-06-08 RX ADMIN — PROPOFOL 75 MCG/KG/MIN: 10 INJECTION, EMULSION INTRAVENOUS at 10:14

## 2023-06-08 RX ADMIN — PROPOFOL 50 MG: 10 INJECTION, EMULSION INTRAVENOUS at 10:20

## 2023-06-08 RX ADMIN — LIDOCAINE HYDROCHLORIDE 100 MG: 20 INJECTION, SOLUTION EPIDURAL; INFILTRATION; INTRACAUDAL; PERINEURAL at 10:14

## 2023-06-08 RX ADMIN — PROPOFOL 50 MG: 10 INJECTION, EMULSION INTRAVENOUS at 10:14

## 2023-06-08 RX ADMIN — PROPOFOL 50 MG: 10 INJECTION, EMULSION INTRAVENOUS at 10:17

## 2023-06-08 NOTE — ANESTHESIA POSTPROCEDURE EVALUATION
Patient: Derek Keller    Procedure Summary       Date: 06/08/23 Room / Location: [facility] ENDOSCOPY 3 / [facility] ENDOSCOPY    Anesthesia Start: 1010 Anesthesia Stop: 1027    Procedure: COLONOSCOPY Diagnosis:       History of colon polyps      (History of colon polyps [Z86.010])    Surgeons: Alfred Castañeda MD Provider: Reyes, Mirabelle, DO    Anesthesia Type: general ASA Status: 4            Anesthesia Type: general    Vitals  Vitals Value Taken Time   /56 06/08/23 1049   Temp 36.7 °C (98 °F) 06/08/23 1049   Pulse 63 06/08/23 1050   Resp 16 06/08/23 1049   SpO2 98 % 06/08/23 1050   Vitals shown include unvalidated device data.        Post Anesthesia Care and Evaluation    Patient location during evaluation: bedside  Patient participation: complete - patient participated  Level of consciousness: awake  Pain management: adequate    Airway patency: patent  PONV Status: none  Cardiovascular status: acceptable and stable  Respiratory status: acceptable  Hydration status: acceptable    Comments: An Anesthesiologist personally participated in the most demanding procedures (including induction and emergence if applicable) in the anesthesia plan, monitored the course of anesthesia administration at frequent intervals and remained physically present and available for immediate diagnosis and treatment of emergencies.

## 2023-06-08 NOTE — SIGNIFICANT NOTE
Wound Eval / Progress Noted    JOSE Langston     Patient Name: Derek Keller  : 1960  MRN: [id number]  Today's Date: 2023                 Admit Date: 2023    Visit Dx:    ICD-10-CM ICD-9-CM   1. History of colon polyps  Z86.010 V12.72       Patient Active Problem List   Diagnosis    Hypertension    Hyperlipidemia    Allergic rhinitis    Hypothyroidism    Chronic pain disorder    Anxiety    Monopolar depression    Malaise and fatigue    Tobacco abuse    Fibromyalgia    Vitamin B 12 deficiency    Primary osteoarthritis of left knee    Restless leg syndrome    Primary insomnia    Chronic fatigue    Asthma    Body mass index (BMI) 26.0-26.9, adult    Degeneration of lumbar intervertebral disc    Hearing loss    Inappropriate diet and eating habits    Cervical spondylosis    Obesity with body mass index 30 or greater    Renal stone    Malignant neoplasm of left breast in female, estrogen receptor positive    Cancer related pain    Malignant neoplasm metastatic to bone    Peripheral edema    Peritonitis    Leukopenia    S/P ex lap with sigmoid resection, Lynn's procedure for stercoral perforation    Encounter for adjustment or management of vascular access device    Pulmonary emphysema    History of colon polyps    Colostomy in place        Past Medical History:   Diagnosis Date    Abdominal pain     OSTOMY SITE    Allergic rhinitis     Anemia     NO S/S    Anxiety     Arteriosclerosis     Coronary    Arthritis     Bone metastases 10/14/2022    Bowen's disease     SKIN CANCER    Breast cancer     NO SURGERY WAS DONE DUE TO METS TO BONE/COLON/LYMPHNODE    Cancer related pain 10/13/2022    Depression     Disorder associated with Helicobacter species 10/12/2022    Dysphoric mood     Fatigue     Frequent urination     NO S/S INFECTION    Herpes simplex vulvovaginitis 2018    History of colon polyps     History of IBS     History of kidney stones     Hyperlipidemia     Hypertension     Hypothyroidism      Insomnia     Lumbago     Mood disorder     Neck pain     R/T CANCER    Palpitations     ASYMPTOMATIC,  NO CARDIOLOGIST    Precordial pain     R/T SPINE CANCER    Sleep disturbance         Past Surgical History:   Procedure Laterality Date    BREAST BIOPSY Left     CARDIAC CATHETERIZATION Left 1959    CARDIAC CATHETERIZATION N/A 04/06/2018    Procedure: Coronary angiography;  Surgeon: Art Licea MD;  Location:  [facility];  Service: Cardiovascular    CARDIAC CATHETERIZATION N/A 04/06/2018    Procedure: Left heart cath;  Surgeon: Art Licea MD;  Location:  [facility];  Service: Cardiovascular    CARDIAC CATHETERIZATION N/A 04/06/2018    Procedure: Left ventriculography;  Surgeon: Art Licea MD;  Location:  [facility];  Service: Cardiovascular    CHOLECYSTECTOMY      COLON SURGERY      colostomy bag    COLONOSCOPY  11/08/2006    EXPLORATORY LAPAROTOMY N/A 12/06/2022    Procedure: LAPAROTOMY EXPLORATORY sigmoid resection hartmans procedure colostomy;  Surgeon: Alfred Castañeda MD;  Location: [facility];  Service: General;  Laterality: N/A;    GANGLION CYST EXCISION      HYSTERECTOMY  05/2005    LAPAROSCOPIC GASTRIC BANDING      BAND REMOVED 2020    TONSILLECTOMY      VENOUS ACCESS DEVICE (PORT) INSERTION N/A 1/9/2023    Procedure: INSERTION VENOUS ACCESS DEVICE;  Surgeon: Alfred Castañeda MD;  Location: [facility] MAIN OR;  Service: General;  Laterality: N/A;         Physical Assessment:  Wound 03/29/23 1310 Left medial abdomen Traumatic (Active)   Wound Image   06/08/23 0900   Dressing Appearance intact;dry 06/08/23 0900   Closure None 06/08/23 0900   Base slough;yellow;pink;moist;other (see comments) 06/08/23 0900   Periwound redness;moist 06/08/23 0900   Periwound Temperature warm 06/08/23 0900   Periwound Skin Turgor soft 06/08/23 0900   Edges open 06/08/23 0900   Drainage Characteristics/Odor serosanguineous 06/08/23  0900   Drainage Amount small 06/08/23 0900   Care, Wound cleansed with;sterile normal saline 06/08/23 0900   Dressing Care dressing applied;foam;other (see comments) 06/08/23 0900   Periwound Care absorptive dressing applied 06/08/23 0900        Wound Check / Follow-up: Patient seen today in endoscopy before colonoscopy procedure for a wound and ostomy check. Patient accompanied by mother. Patient had taken bowel prep the night before for the procedure resulting in ostomy pouch to fill too rapidly and bust.   Progressing ulceration from 6-12 o'clock with ulceration beginning to expand from 1-5 o'clock. Ulceration with green discoloration and undermining present. Cleansed with NS. Applied purple foam to the ulceration and covered with stoma paste to secure in wound base. Built wall between the stoma and the ulceration to attempt to prevent stool disrupting the dressing.   Stoma is reddened but currently retracted below the skin. Peristomal tissue is reddened and denuded. Extremely painful to touch. Cleansed with NS. Applied stoma powder to the peristomal tissue and then applied skin prep; applied a 1 piece convex pouching system.    Impression: ulceration, colostomy     Short term goals:  regain skin integrity, colostomy support    Amanda Almaguer RN    6/8/2023    13:04 EDT

## 2023-06-08 NOTE — ANESTHESIA PREPROCEDURE EVALUATION
Anesthesia Evaluation     Patient summary reviewed and Nursing notes reviewed   no history of anesthetic complications:   NPO Solid Status: > 8 hours  NPO Liquid Status: < 2 hours           Airway   Mallampati: II  TM distance: >3 FB  Neck ROM: full  No difficulty expected  Dental    (+) upper dentures    Pulmonary - normal exam    breath sounds clear to auscultation  (+) a smoker Current Smoked day of surgery, COPD mild, asthma,  Cardiovascular - normal exam  Exercise tolerance: poor (<4 METS)    ECG reviewed  Rhythm: regular  Rate: normal    (+) hypertensionhyperlipidemia    ROS comment: SR    Neuro/Psych  (+) psychiatric history Anxiety and Depression  GI/Hepatic/Renal/Endo    (+) renal disease stones, thyroid problem hypothyroidism    ROS Comment: Irritable bowel syndrome    Musculoskeletal     (+) neck pain      ROS comment: Fibromyalgia, chronic fatigue  Abdominal    Substance History - negative use     OB/GYN negative ob/gyn ROS         Other   arthritis,   history of cancer (breast cancer with mets throughout -bone, colon, spine) active    ROS/Med Hx Other: PAT Nursing Notes unavailable.                   Anesthesia Plan    ASA 4     general     (Patient understands anesthesia not responsible for dental damage.)  intravenous induction     Anesthetic plan, risks, benefits, and alternatives have been provided, discussed and informed consent has been obtained with: patient.  Pre-procedure education provided  Use of blood products discussed with patient  Consented to blood products.    Plan discussed with CRNA.      CODE STATUS:

## 2023-06-14 ENCOUNTER — HOSPITAL ENCOUNTER (OUTPATIENT)
Dept: ONCOLOGY | Facility: HOSPITAL | Age: 63
Discharge: HOME | End: 2023-06-14
Payer: MEDICARE

## 2023-06-14 ENCOUNTER — HOSPITAL ENCOUNTER (OUTPATIENT)
Dept: INFUSION THERAPY | Facility: HOSPITAL | Age: 63
Discharge: HOME | End: 2023-06-14
Admitting: SURGERY
Payer: MEDICARE

## 2023-06-14 ENCOUNTER — OFFICE VISIT (OUTPATIENT)
Dept: ONCOLOGY | Facility: HOSPITAL | Age: 63
End: 2023-06-14
Payer: MEDICARE

## 2023-06-14 VITALS
TEMPERATURE: 98.3 F | RESPIRATION RATE: 18 BRPM | HEART RATE: 68 BPM | DIASTOLIC BLOOD PRESSURE: 53 MMHG | SYSTOLIC BLOOD PRESSURE: 135 MMHG | OXYGEN SATURATION: 99 %

## 2023-06-14 VITALS
OXYGEN SATURATION: 97 % | SYSTOLIC BLOOD PRESSURE: 131 MMHG | TEMPERATURE: 98 F | HEART RATE: 61 BPM | BODY MASS INDEX: 29.53 KG/M2 | DIASTOLIC BLOOD PRESSURE: 58 MMHG | WEIGHT: 182.98 LBS | RESPIRATION RATE: 18 BRPM

## 2023-06-14 DIAGNOSIS — Z17.0 MALIGNANT NEOPLASM OF LEFT BREAST IN FEMALE, ESTROGEN RECEPTOR POSITIVE, UNSPECIFIED SITE OF BREAST: Primary | ICD-10-CM

## 2023-06-14 DIAGNOSIS — C50.912 MALIGNANT NEOPLASM OF LEFT BREAST IN FEMALE, ESTROGEN RECEPTOR POSITIVE, UNSPECIFIED SITE OF BREAST: Primary | ICD-10-CM

## 2023-06-14 DIAGNOSIS — Z17.0 MALIGNANT NEOPLASM OF UPPER-OUTER QUADRANT OF LEFT BREAST IN FEMALE, ESTROGEN RECEPTOR POSITIVE: Primary | ICD-10-CM

## 2023-06-14 DIAGNOSIS — C79.51 MALIGNANT NEOPLASM METASTATIC TO BONE: Primary | ICD-10-CM

## 2023-06-14 DIAGNOSIS — C79.51 MALIGNANT NEOPLASM METASTATIC TO BONE: ICD-10-CM

## 2023-06-14 DIAGNOSIS — Z98.890 S/P EXPLORATORY LAPAROTOMY: Primary | ICD-10-CM

## 2023-06-14 DIAGNOSIS — C50.912 MALIGNANT NEOPLASM OF LEFT BREAST IN FEMALE, ESTROGEN RECEPTOR POSITIVE, UNSPECIFIED SITE OF BREAST: ICD-10-CM

## 2023-06-14 DIAGNOSIS — Z17.0 MALIGNANT NEOPLASM OF LEFT BREAST IN FEMALE, ESTROGEN RECEPTOR POSITIVE, UNSPECIFIED SITE OF BREAST: ICD-10-CM

## 2023-06-14 DIAGNOSIS — F33.9 MONOPOLAR DEPRESSION: ICD-10-CM

## 2023-06-14 DIAGNOSIS — G89.3 CANCER RELATED PAIN: ICD-10-CM

## 2023-06-14 DIAGNOSIS — Z45.2 ENCOUNTER FOR ADJUSTMENT OR MANAGEMENT OF VASCULAR ACCESS DEVICE: ICD-10-CM

## 2023-06-14 DIAGNOSIS — C50.412 MALIGNANT NEOPLASM OF UPPER-OUTER QUADRANT OF LEFT BREAST IN FEMALE, ESTROGEN RECEPTOR POSITIVE: Primary | ICD-10-CM

## 2023-06-14 LAB
ALBUMIN SERPL-MCNC: 3.9 G/DL (ref 3.5–5.2)
ALBUMIN/GLOB SERPL: 1.6 G/DL
ALP SERPL-CCNC: 75 U/L (ref 39–117)
ALT SERPL W P-5'-P-CCNC: 4 U/L (ref 1–33)
ANION GAP SERPL CALCULATED.3IONS-SCNC: 8.8 MMOL/L (ref 5–15)
AST SERPL-CCNC: 9 U/L (ref 1–32)
BASOPHILS # BLD AUTO: 0.03 10*3/MM3 (ref 0–0.2)
BASOPHILS NFR BLD AUTO: 0.7 % (ref 0–1.5)
BILIRUB SERPL-MCNC: 0.2 MG/DL (ref 0–1.2)
BUN SERPL-MCNC: 16 MG/DL (ref 8–23)
BUN/CREAT SERPL: 20 (ref 7–25)
CALCIUM SPEC-SCNC: 8.6 MG/DL (ref 8.6–10.5)
CHLORIDE SERPL-SCNC: 103 MMOL/L (ref 98–107)
CO2 SERPL-SCNC: 27.2 MMOL/L (ref 22–29)
CREAT SERPL-MCNC: 0.8 MG/DL (ref 0.57–1)
DEPRECATED RDW RBC AUTO: 61.5 FL (ref 37–54)
EGFRCR SERPLBLD CKD-EPI 2021: 82.9 ML/MIN/1.73
EOSINOPHIL # BLD AUTO: 0.06 10*3/MM3 (ref 0–0.4)
EOSINOPHIL NFR BLD AUTO: 1.4 % (ref 0.3–6.2)
ERYTHROCYTE [DISTWIDTH] IN BLOOD BY AUTOMATED COUNT: 15.6 % (ref 12.3–15.4)
GLOBULIN UR ELPH-MCNC: 2.4 GM/DL
GLUCOSE SERPL-MCNC: 106 MG/DL (ref 65–99)
HCT VFR BLD AUTO: 28.3 % (ref 34–46.6)
HGB BLD-MCNC: 8.8 G/DL (ref 12–15.9)
IMM GRANULOCYTES # BLD AUTO: 0.01 10*3/MM3 (ref 0–0.05)
IMM GRANULOCYTES NFR BLD AUTO: 0.2 % (ref 0–0.5)
LYMPHOCYTES # BLD AUTO: 1.26 10*3/MM3 (ref 0.7–3.1)
LYMPHOCYTES NFR BLD AUTO: 29.3 % (ref 19.6–45.3)
MAGNESIUM SERPL-MCNC: 1.8 MG/DL (ref 1.6–2.4)
MCH RBC QN AUTO: 33.3 PG (ref 26.6–33)
MCHC RBC AUTO-ENTMCNC: 31.1 G/DL (ref 31.5–35.7)
MCV RBC AUTO: 107.2 FL (ref 79–97)
MONOCYTES # BLD AUTO: 0.45 10*3/MM3 (ref 0.1–0.9)
MONOCYTES NFR BLD AUTO: 10.5 % (ref 5–12)
NEUTROPHILS NFR BLD AUTO: 2.49 10*3/MM3 (ref 1.7–7)
NEUTROPHILS NFR BLD AUTO: 57.9 % (ref 42.7–76)
PHOSPHATE SERPL-MCNC: 3.5 MG/DL (ref 2.5–4.5)
PLATELET # BLD AUTO: 202 10*3/MM3 (ref 140–450)
PMV BLD AUTO: 9.6 FL (ref 6–12)
POTASSIUM SERPL-SCNC: 3.9 MMOL/L (ref 3.5–5.2)
PROT SERPL-MCNC: 6.3 G/DL (ref 6–8.5)
RBC # BLD AUTO: 2.64 10*6/MM3 (ref 3.77–5.28)
SODIUM SERPL-SCNC: 139 MMOL/L (ref 136–145)
WBC NRBC COR # BLD: 4.3 10*3/MM3 (ref 3.4–10.8)

## 2023-06-14 PROCEDURE — 96372 THER/PROPH/DIAG INJ SC/IM: CPT

## 2023-06-14 PROCEDURE — 83735 ASSAY OF MAGNESIUM: CPT | Performed by: INTERNAL MEDICINE

## 2023-06-14 PROCEDURE — 85025 COMPLETE CBC W/AUTO DIFF WBC: CPT | Performed by: INTERNAL MEDICINE

## 2023-06-14 PROCEDURE — 25010000002 HEPARIN LOCK FLUSH PER 10 UNITS: Performed by: INTERNAL MEDICINE

## 2023-06-14 PROCEDURE — 80053 COMPREHEN METABOLIC PANEL: CPT | Performed by: INTERNAL MEDICINE

## 2023-06-14 PROCEDURE — 84100 ASSAY OF PHOSPHORUS: CPT | Performed by: INTERNAL MEDICINE

## 2023-06-14 PROCEDURE — G0463 HOSPITAL OUTPT CLINIC VISIT: HCPCS

## 2023-06-14 PROCEDURE — 25010000002 DENOSUMAB 120 MG/1.7ML SOLUTION: Performed by: INTERNAL MEDICINE

## 2023-06-14 PROCEDURE — 36591 DRAW BLOOD OFF VENOUS DEVICE: CPT

## 2023-06-14 PROCEDURE — G0463 HOSPITAL OUTPT CLINIC VISIT: HCPCS | Performed by: INTERNAL MEDICINE

## 2023-06-14 RX ORDER — DULOXETIN HYDROCHLORIDE 30 MG/1
30 CAPSULE, DELAYED RELEASE ORAL DAILY
Qty: 90 CAPSULE | Refills: 1 | Status: SHIPPED | OUTPATIENT
Start: 2023-06-14

## 2023-06-14 RX ORDER — SODIUM CHLORIDE 0.9 % (FLUSH) 0.9 %
20 SYRINGE (ML) INJECTION AS NEEDED
OUTPATIENT
Start: 2023-06-14

## 2023-06-14 RX ORDER — BACLOFEN 20 MG/1
TABLET ORAL
COMMUNITY
Start: 2023-05-19

## 2023-06-14 RX ORDER — SODIUM CHLORIDE 0.9 % (FLUSH) 0.9 %
20 SYRINGE (ML) INJECTION AS NEEDED
Status: DISCONTINUED | OUTPATIENT
Start: 2023-06-14 | End: 2023-06-15 | Stop reason: HOSPADM

## 2023-06-14 RX ORDER — HEPARIN SODIUM (PORCINE) LOCK FLUSH IV SOLN 100 UNIT/ML 100 UNIT/ML
500 SOLUTION INTRAVENOUS AS NEEDED
OUTPATIENT
Start: 2023-06-14

## 2023-06-14 RX ORDER — HEPARIN SODIUM (PORCINE) LOCK FLUSH IV SOLN 100 UNIT/ML 100 UNIT/ML
500 SOLUTION INTRAVENOUS AS NEEDED
Status: DISCONTINUED | OUTPATIENT
Start: 2023-06-14 | End: 2023-06-15 | Stop reason: HOSPADM

## 2023-06-14 RX ADMIN — Medication 20 ML: at 13:05

## 2023-06-14 RX ADMIN — HEPARIN SODIUM (PORCINE) LOCK FLUSH IV SOLN 100 UNIT/ML 500 UNITS: 100 SOLUTION at 13:05

## 2023-06-14 RX ADMIN — DENOSUMAB 120 MG: 120 INJECTION SUBCUTANEOUS at 14:23

## 2023-06-14 NOTE — SIGNIFICANT NOTE
Wound Eval / Progress Noted    JOSE Langston     Patient Name: Derek Keller  : 1960  MRN: [id number]  Today's Date: 2023                 Admit Date: 2023    Visit Dx:    ICD-10-CM ICD-9-CM   1. S/P exploratory laparotomy  Z98.890 V45.89       Patient Active Problem List   Diagnosis    Hypertension    Hyperlipidemia    Allergic rhinitis    Hypothyroidism    Chronic pain disorder    Anxiety    Monopolar depression    Malaise and fatigue    Tobacco abuse    Fibromyalgia    Vitamin B 12 deficiency    Primary osteoarthritis of left knee    Restless leg syndrome    Primary insomnia    Chronic fatigue    Asthma    Body mass index (BMI) 26.0-26.9, adult    Degeneration of lumbar intervertebral disc    Hearing loss    Inappropriate diet and eating habits    Cervical spondylosis    Obesity with body mass index 30 or greater    Renal stone    Malignant neoplasm of left breast in female, estrogen receptor positive    Cancer related pain    Malignant neoplasm metastatic to bone    Peripheral edema    Peritonitis    Leukopenia    S/P ex lap with sigmoid resection, Lynn's procedure for stercoral perforation    Encounter for adjustment or management of vascular access device    Pulmonary emphysema    History of colon polyps    Colostomy in place        Past Medical History:   Diagnosis Date    Abdominal pain     OSTOMY SITE    Allergic rhinitis     Anemia     NO S/S    Anxiety     Arteriosclerosis     Coronary    Arthritis     Bone metastases 10/14/2022    Bowen's disease     SKIN CANCER    Breast cancer     NO SURGERY WAS DONE DUE TO METS TO BONE/COLON/LYMPHNODE    Cancer related pain 10/13/2022    Depression     Disorder associated with Helicobacter species 10/12/2022    Dysphoric mood     Fatigue     Frequent urination     NO S/S INFECTION    Herpes simplex vulvovaginitis 2018    History of colon polyps     History of IBS     History of kidney stones     Hyperlipidemia     Hypertension     Hypothyroidism      Insomnia     Lumbago     Mood disorder     Neck pain     R/T CANCER    Palpitations     ASYMPTOMATIC,  NO CARDIOLOGIST    Precordial pain     R/T SPINE CANCER    Sleep disturbance         Past Surgical History:   Procedure Laterality Date    BREAST BIOPSY Left     CARDIAC CATHETERIZATION Left 1959    CARDIAC CATHETERIZATION N/A 04/06/2018    Procedure: Coronary angiography;  Surgeon: Art Lieca MD;  Location:  [facility];  Service: Cardiovascular    CARDIAC CATHETERIZATION N/A 04/06/2018    Procedure: Left heart cath;  Surgeon: Art Licea MD;  Location:  [facility];  Service: Cardiovascular    CARDIAC CATHETERIZATION N/A 04/06/2018    Procedure: Left ventriculography;  Surgeon: Art Licea MD;  Location:  [facility];  Service: Cardiovascular    CHOLECYSTECTOMY      COLON SURGERY      colostomy bag    COLONOSCOPY  11/08/2006    COLONOSCOPY N/A 6/8/2023    Procedure: COLONOSCOPY;  Surgeon: Alfred Castañeda MD;  Location: [facility] ENDOSCOPY;  Service: General;  Laterality: N/A;  same as preop    EXPLORATORY LAPAROTOMY N/A 12/06/2022    Procedure: LAPAROTOMY EXPLORATORY sigmoid resection hartmans procedure colostomy;  Surgeon: Alfred Castañeda MD;  Location: [facility] OR;  Service: General;  Laterality: N/A;    GANGLION CYST EXCISION      HYSTERECTOMY  05/2005    LAPAROSCOPIC GASTRIC BANDING      BAND REMOVED 2020    TONSILLECTOMY      VENOUS ACCESS DEVICE (PORT) INSERTION N/A 1/9/2023    Procedure: INSERTION VENOUS ACCESS DEVICE;  Surgeon: Alfred Castañeda MD;  Location: [facility] MAIN OR;  Service: General;  Laterality: N/A;         Physical Assessment:     06/14/23 1510   Wound 03/29/23 1310 Left medial abdomen Traumatic   Placement Date/Time: 03/29/23 1310   Side: Left  Orientation: medial  Location: abdomen  Primary Wound Type: Traumatic   Wound Image    Dressing Appearance intact;other (see comments)  (stool  leaking under pouching system)   Closure None   Base pink;moist;yellow;red   Periwound intact;redness   Periwound Temperature warm   Periwound Skin Turgor soft   Edges open   Drainage Characteristics/Odor serosanguineous   Drainage Amount scant   Care, Wound cleansed with;irrigated with;sterile normal saline   Dressing Care dressing applied;foam;silver impregnated;hydrocolloid   Periwound Care absorptive dressing applied   Colostomy LLQ   Placement Date/Time: 12/06/22 1900   Inserted by: DR. HESS  Hand Hygiene Completed: Yes  Colostomy Type: Descending/sigmoid;End  Location: LLQ   Stomal Appliance 2 piece;Leaking   Stoma Appearance red;moist   Peristomal Assessment Blanchable erythema   Accessories/Skin Care convex wafer;skin sealant   Stoma Function stool   Stool Color brown, light   Stool Consistency loose;soft   Treatment Bag change;Site care     Wound Check / Follow-up:  Patient seen today for wound and ostomy follow-up. Patient with leaking 2 piece pouching system, leaking beneath pouching system. Removed pouching system with use of adhesive remover. Patient remains with peristomal ulceration, which she states is very painful to the touch. Dry red tissue present to peristomal area. Cleansed stoma and peristomal tissue with warm water and washcloth. Cleansed and irrigated ulceration with normal saline then dried with gauze. Moist pink, red, and yellow tissue present to ulceration. There is no tissue separation present between stoma and ulceration at this time. Placed silver foam to ulceration and secured with hydrocolloid dressing after placing two piece convex pouch around stoma and peristomal ulceration. Instructed patient we are using two piece pouch as a window pouch so that she is able to change dressing at least twice daily but also PRN when soiled. Instructed her how to remove outer part of two piece pouch and change dressing. She verbalized understanding. Supplies given. Instructed patient to return  either Tuesday or Wednesday next week for follow up. She verbalized understanding.      Impression: Existing colostomy. Peristomal ulceration.      Short term goals:  Regain skin integrity, daily and PRN dressing changes, colostomy management.      Analisa Alvarado RN    6/14/2023    16:30 EDT

## 2023-06-14 NOTE — PROGRESS NOTES
Chief Complaint  Breast Cancer      Haile Monique MD    Subjective          Derek Keller presents to [facility] for ongoing treatment of metastatic breast cancer. She is on letrozole and ribociclib. Tolerating well. She will be having colostomy reversal later this month. Her pain is controlled on current regimen.     Oncology/Hematology History   Malignant neoplasm of left breast in female, estrogen receptor positive   10/13/2022 Initial Diagnosis    Malignant neoplasm of left breast in female, estrogen receptor positive (HCC)     10/14/2022 -  Chemotherapy    OP BREAST Letrozole / Ribociclib       10/14/2022 Cancer Staged    Staging form: Breast, AJCC 8th Edition  - Clinical: Stage IV (cT1c, cN1, cM1, ER+, IN+, HER2-) - Signed by Donald Barker MD on 10/14/2022     10/28/2022 -  Chemotherapy    OP SUPPORTIVE Denosumab (Xgeva) Q28D       Malignant neoplasm metastatic to bone   10/14/2022 Initial Diagnosis    Bone metastases (HCC)     10/28/2022 -  Chemotherapy    OP SUPPORTIVE Denosumab (Xgeva) Q28D       Secondary malignant neoplasm of bone (Resolved)   11/14/2022 Initial Diagnosis    Secondary malignant neoplasm of bone (HCC)     11/17/2022 - 4/19/2023 Radiation    RADIATION THERAPY Treatment Details (11/14/2022 - 4/19/2023)  Site: Spine - Lumbar  Technique: 3D CRT  Goal: No goal specified  Planned Treatment Start Date: 11/17/2022         Review of Systems   Constitutional:  Positive for fatigue. Negative for appetite change, diaphoresis, fever, unexpected weight gain and unexpected weight loss.   HENT:  Negative for hearing loss, mouth sores, sore throat, swollen glands, trouble swallowing and voice change.    Eyes:  Negative for blurred vision.   Respiratory:  Negative for cough, shortness of breath and wheezing.    Cardiovascular:  Negative for chest pain and palpitations.   Gastrointestinal:  Negative for abdominal pain, blood in stool, constipation, diarrhea,  nausea and vomiting.   Endocrine: Negative for cold intolerance and heat intolerance.   Genitourinary:  Negative for difficulty urinating, dysuria, frequency, hematuria and urinary incontinence.   Musculoskeletal:  Negative for arthralgias, back pain and myalgias.   Skin:  Negative for rash, skin lesions and wound.   Neurological:  Negative for dizziness, seizures, weakness, numbness and headache.   Hematological:  Does not bruise/bleed easily.   Psychiatric/Behavioral:  Negative for depressed mood. The patient is not nervous/anxious.    Current Outpatient Medications on File Prior to Visit   Medication Sig Dispense Refill    atorvastatin (LIPITOR) 20 MG tablet TAKE 1 TABLET BY MOUTH EVERY DAY (Patient taking differently: Take 1 tablet by mouth Daily.) 30 tablet 6    baclofen (LIORESAL) 20 MG tablet TAKE 1 TABLET BY MOUTH THREE TIMES DAILY FOR MUSCLE SPASMS      donepezil (ARICEPT) 10 MG tablet Take 1 tablet by mouth Daily.      gabapentin (NEURONTIN) 600 MG tablet TAKE 1 TABLET BY MOUTH AT BEDTIME (Patient taking differently: 300 mg 2 (Two) Times a Day As Needed. TAKES1/2  MG TABLET) 90 tablet 0    hydroCHLOROthiazide (HYDRODIURIL) 12.5 MG tablet Take 1 tablet by mouth Daily. 90 tablet 2    ketoconazole (NIZORAL) 2 % cream APPLY TO THE AFFECTED AREA(S) once DAILY      letrozole (FEMARA) 2.5 MG tablet Take 1 tablet by mouth Daily. 90 tablet 1    levothyroxine (SYNTHROID, LEVOTHROID) 50 MCG tablet TAKE 1 TABLET BY MOUTH EVERY DAY 90 tablet 1    LORazepam (ATIVAN) 0.5 MG tablet Take 1 tablet by mouth Every 6 (Six) Hours As Needed.      losartan (COZAAR) 100 MG tablet Take 1 tablet by mouth Daily. INST PER ANESTHESIA PROTOCOL      metroNIDAZOLE (FLAGYL) 500 MG tablet Day Before Surgery Take 2 Tabs at 1 pm, 2 Tabs at 2 pm, and 2 Tabs at 11 pm 6 tablet 0    montelukast (SINGULAIR) 10 MG tablet Take 1 tablet by mouth Daily.      Morphine (MS CONTIN) 15 MG 12 hr tablet Take 3 tablets by mouth 2 (Two) Times a Day. 180  tablet 0    mupirocin (BACTROBAN) 2 % ointment apply to the affected area(s) twice daily      naproxen (NAPROSYN) 500 MG tablet Take 1 tablet by mouth 2 (Two) Times a Day With Meals. LAST DOSE 1/5/23      neomycin (MYCIFRADIN) 500 MG tablet Day Before Surgery. Take 2 Tabs at 1 pm, 2 Tabs at 2 pm, and 2 Tabs at 11pm 6 tablet 0    ondansetron (ZOFRAN) 8 MG tablet TAKE 1 TABLET BY MOUTH THREE TIMES DAILY AS NEEDED FOR NAUSEA AND VOMITING 30 tablet 5    oxyCODONE-acetaminophen (PERCOCET)  MG per tablet TAKE 1 TABLET BY MOUTH EVERY 6 HOURS AS NEEDED for moderate pain 60 tablet 0    polyethylene glycol (MIRALAX) 17 g packet Take 17 g by mouth Daily. 5 packet 0    ribociclib succinate 200 MG tablet therapy pack tablet Take 3 tablets by mouth Take As Directed. Take 3 tablets by mouth daily for 21 days then off 7 days on a 28 day cycle. 63 tablet 5    rOPINIRole (REQUIP) 3 MG tablet Take 1 tablet by mouth 2 (Two) Times a Day.      solifenacin (VESICARE) 10 MG tablet Take 1 tablet by mouth Daily for 360 days. 90 tablet 3    SUMAtriptan (IMITREX) 100 MG tablet Take 1 tablet by mouth 1 (One) Time As Needed.      traZODone (DESYREL) 150 MG tablet TAKE 1 TABLET BY MOUTH ONCE nightly 90 tablet 2     Current Facility-Administered Medications on File Prior to Visit   Medication Dose Route Frequency Provider Last Rate Last Admin    [COMPLETED] denosumab (XGEVA) injection 120 mg  120 mg Subcutaneous Once Donald Barker MD   120 mg at 06/14/23 1423    [DISCONTINUED] heparin injection 500 Units  500 Units Intravenous PRN Donald Barker MD   500 Units at 06/14/23 1305    [DISCONTINUED] sodium chloride 0.9 % flush 20 mL  20 mL Intravenous PRN Donald Barker MD   20 mL at 06/14/23 1305       Allergies   Allergen Reactions    Azithromycin Itching     Past Medical History:   Diagnosis Date    Abdominal pain     OSTOMY SITE    Allergic rhinitis     Anemia     NO S/S    Anxiety     Arteriosclerosis     Coronary    Arthritis      Bone metastases 10/14/2022    Bowen's disease     SKIN CANCER    Breast cancer     NO SURGERY WAS DONE DUE TO METS TO BONE/COLON/LYMPHNODE    Cancer related pain 10/13/2022    Depression     Disorder associated with Helicobacter species 10/12/2022    Dysphoric mood     Fatigue     Frequent urination     NO S/S INFECTION    Herpes simplex vulvovaginitis 7/26/2018    History of colon polyps     History of IBS     History of kidney stones     Hyperlipidemia     Hypertension     Hypothyroidism     Insomnia     Lumbago     Mood disorder     Neck pain     R/T CANCER    Palpitations     ASYMPTOMATIC,  NO CARDIOLOGIST    Precordial pain     R/T SPINE CANCER    Sleep disturbance      Past Surgical History:   Procedure Laterality Date    BREAST BIOPSY Left     CARDIAC CATHETERIZATION Left 1959    CARDIAC CATHETERIZATION N/A 04/06/2018    Procedure: Coronary angiography;  Surgeon: Art Licea MD;  Location: [facility] CATH INVASIVE LOCATION;  Service: Cardiovascular    CARDIAC CATHETERIZATION N/A 04/06/2018    Procedure: Left heart cath;  Surgeon: Art Licea MD;  Location: [facility] CATH INVASIVE LOCATION;  Service: Cardiovascular    CARDIAC CATHETERIZATION N/A 04/06/2018    Procedure: Left ventriculography;  Surgeon: Art Licea MD;  Location: [facility] CATH INVASIVE LOCATION;  Service: Cardiovascular    CHOLECYSTECTOMY      COLON SURGERY      colostomy bag    COLONOSCOPY  11/08/2006    COLONOSCOPY N/A 6/8/2023    Procedure: COLONOSCOPY;  Surgeon: Alfred Castañeda MD;  Location: [facility] ENDOSCOPY;  Service: General;  Laterality: N/A;  same as preop    EXPLORATORY LAPAROTOMY N/A 12/06/2022    Procedure: LAPAROTOMY EXPLORATORY sigmoid resection hartmans procedure colostomy;  Surgeon: Alferd Castañeda MD;  Location: [facility] MAIN OR;  Service: General;  Laterality: N/A;    GANGLION CYST EXCISION      HYSTERECTOMY  05/2005    LAPAROSCOPIC GASTRIC BANDING      BAND REMOVED 2020    TONSILLECTOMY       VENOUS ACCESS DEVICE (PORT) INSERTION N/A 1/9/2023    Procedure: INSERTION VENOUS ACCESS DEVICE;  Surgeon: Alfred Castañeda MD;  Location: [facility] MAIN OR;  Service: General;  Laterality: N/A;     Social History     Socioeconomic History    Marital status:    Tobacco Use    Smoking status: Every Day     Packs/day: 1.50     Years: 40.00     Pack years: 60.00     Types: Cigarettes     Start date: 1974    Smokeless tobacco: Never    Tobacco comments:     caffeine use 3 mt dews daily     INST PER ANESTHESIA PROTOCOL   Vaping Use    Vaping Use: Never used   Substance and Sexual Activity    Alcohol use: No    Drug use: Yes     Types: Marijuana     Comment: LAST USE 6/6/23    Sexual activity: Defer     Partners: Male     Birth control/protection: None     Family History   Problem Relation Age of Onset    Hypertension Mother     Rheum arthritis Mother     Heart disease Mother     Breast cancer Mother     Diabetes Father     Cancer Maternal Grandmother         colon    Colon cancer Maternal Grandmother     Aneurysm Paternal Grandfather     Diabetes Other     Fibromyalgia Other     Malig Hyperthermia Neg Hx        Objective   Physical Exam  Vitals reviewed. Exam conducted with a chaperone present.   Constitutional:       General: She is not in acute distress.     Appearance: Normal appearance.   Cardiovascular:      Rate and Rhythm: Normal rate and regular rhythm.      Heart sounds: Normal heart sounds. No murmur heard.    No gallop.   Pulmonary:      Effort: Pulmonary effort is normal.      Breath sounds: Normal breath sounds.   Abdominal:      General: Abdomen is flat. Bowel sounds are normal.      Palpations: Abdomen is soft.   Musculoskeletal:      Cervical back: Neck supple.      Right lower leg: No edema.      Left lower leg: No edema.   Lymphadenopathy:      Cervical: No cervical adenopathy.   Neurological:      Mental Status: She is alert and oriented to person, place, and time.   Psychiatric:          Mood and Affect: Mood normal.         Behavior: Behavior normal.       Vitals:    06/14/23 1314   BP: 131/58   Pulse: 61   Resp: 18   Temp: 98 °F (36.7 °C)   TempSrc: Temporal   SpO2: 97%   Weight: 83 kg (182 lb 15.7 oz)   PainSc:   5   PainLoc: Generalized     ECOG score: 1         PHQ-9 Total Score:                    Result Review :   The following data was reviewed by: Donald Barker MD on 06/14/2023:  Lab Results   Component Value Date    HGB 8.8 (L) 06/14/2023    HCT 28.3 (L) 06/14/2023    .2 (H) 06/14/2023     06/14/2023    WBC 4.30 06/14/2023    NEUTROABS 2.49 06/14/2023    LYMPHSABS 1.26 06/14/2023    MONOSABS 0.45 06/14/2023    EOSABS 0.06 06/14/2023    BASOSABS 0.03 06/14/2023     Lab Results   Component Value Date    GLUCOSE 106 (H) 06/14/2023    BUN 16 06/14/2023    CREATININE 0.80 06/14/2023     06/14/2023    K 3.9 06/14/2023     06/14/2023    CO2 27.2 06/14/2023    CALCIUM 8.6 06/14/2023    PROTEINTOT 6.3 06/14/2023    ALBUMIN 3.9 06/14/2023    BILITOT 0.2 06/14/2023    ALKPHOS 75 06/14/2023    AST 9 06/14/2023    ALT 4 06/14/2023     Lab Results   Component Value Date    MG 1.8 06/14/2023    PHOS 3.5 06/14/2023    FREET4 1.01 01/17/2022    TSH 1.470 11/12/2019     No results found for: IRON, LABIRON, TRANSFERRIN, TIBC  Lab Results   Component Value Date    EISXQAOG16 390 04/23/2019    FOLATE 9.93 04/23/2019     No results found for: PSA, CEA, AFP, ,           Assessment and Plan    Diagnoses and all orders for this visit:    1. Malignant neoplasm of upper-outer quadrant of left breast in female, estrogen receptor positive (Primary)  Assessment & Plan:  Metastatic. On letrozole and ribociclib. Tolerating well. No side effects. CBC shows anemia but not enough to require dose adjustment. Cont current therapy.       2. Cancer related pain  Assessment & Plan:  Pt reports adequate pain control with current regimen. Wyatt reviewed and no discrepancy. Cymbalta started  for depression may also help pain.       3. Monopolar depression  Assessment & Plan:  Worsened. Will start cymbalta 30mg daily. Stop effexor--begin cymbalta 2 days after stopping. Reassess next visit    Orders:  -     DULoxetine (CYMBALTA) 30 MG capsule; Take 1 capsule by mouth Daily.  Dispense: 90 capsule; Refill: 1    4. Malignant neoplasm metastatic to bone  Assessment & Plan:  Cont monthly xgeva. No dental or jaw pain. Lytes are normal.       Other orders  -     Cancel: denosumab (XGEVA) injection 120 mg            Patient Follow Up: 4 w      Patient was given instructions and counseling regarding her condition or for health maintenance advice. Please see specific information pulled into the AVS if appropriate.     Donald Barker MD    6/15/2023

## 2023-06-15 NOTE — ASSESSMENT & PLAN NOTE
Worsened. Will start cymbalta 30mg daily. Stop effexor--begin cymbalta 2 days after stopping. Reassess next visit

## 2023-06-15 NOTE — ASSESSMENT & PLAN NOTE
Metastatic. On letrozole and ribociclib. Tolerating well. No side effects. CBC shows anemia but not enough to require dose adjustment. Cont current therapy.

## 2023-06-15 NOTE — ASSESSMENT & PLAN NOTE
Pt reports adequate pain control with current regimen. Wyatt reviewed and no discrepancy. Cymbalta started for depression may also help pain.

## 2023-06-16 ENCOUNTER — TELEPHONE (OUTPATIENT)
Dept: ONCOLOGY | Facility: HOSPITAL | Age: 63
End: 2023-06-16
Payer: MEDICARE

## 2023-06-16 NOTE — TELEPHONE ENCOUNTER
Caller: KrishnaChristianofortunato FINN    Relationship: Self    Best call back number: [phone]    What is the best time to reach you: ANY    Who are you requesting to speak with (clinical staff, provider,  specific staff member): CLINICAL    What was the call regarding: DEREK WAS PRESCRIBED DULoxetine (CYMBALTA) 30 MG capsule , SHE IS WANTING TO KNOW IF SHE SHOULD STOP THE VENLAFAXINE     Is it okay if the provider responds through MyChart: NO    PLEASE ADVISE

## 2023-06-19 ENCOUNTER — SPECIALTY PHARMACY (OUTPATIENT)
Dept: PHARMACY | Facility: HOSPITAL | Age: 63
End: 2023-06-19
Payer: MEDICARE

## 2023-06-19 ENCOUNTER — TELEPHONE (OUTPATIENT)
Dept: SURGERY | Facility: CLINIC | Age: 63
End: 2023-06-19
Payer: MEDICARE

## 2023-06-19 NOTE — TELEPHONE ENCOUNTER
BRANDIE CALLED FROM PAT.  PATIENT IS SCHEDULED FOR SURGERY 06/26/23.  NO PAT APPOINTMENT SCHEDULED, AND SHE THINKS ONE NEEDS TO BE SCHEDULED.

## 2023-06-20 NOTE — TELEPHONE ENCOUNTER
Spoke with patient and was able to schedule a pat appt for 6/21 at 10:00 prior to her other appt that same day. Spoke with deniz in scheduling. Pt v/u and had no further questions.

## 2023-07-13 ENCOUNTER — OFFICE VISIT (OUTPATIENT)
Dept: SURGERY | Facility: CLINIC | Age: 63
End: 2023-07-13
Payer: MEDICARE

## 2023-07-13 VITALS — RESPIRATION RATE: 16 BRPM | BODY MASS INDEX: 28.16 KG/M2 | HEIGHT: 66 IN | WEIGHT: 175.2 LBS

## 2023-07-13 DIAGNOSIS — Z98.890 HISTORY OF COLOSTOMY REVERSAL: Primary | ICD-10-CM

## 2023-07-13 PROCEDURE — 99024 POSTOP FOLLOW-UP VISIT: CPT | Performed by: SURGERY

## 2023-07-13 PROCEDURE — 1160F RVW MEDS BY RX/DR IN RCRD: CPT | Performed by: SURGERY

## 2023-07-13 PROCEDURE — 1159F MED LIST DOCD IN RCRD: CPT | Performed by: SURGERY

## 2023-07-13 NOTE — LETTER
July 29, 2023       No Recipients    Patient: Derek Keller   YOB: 1960   Date of Visit: 7/13/2023     Dear Haile Monique MD:       Thank you for referring Derek Keller to me for evaluation. Below are the relevant portions of my assessment and plan of care.    If you have questions, please do not hesitate to call me. I look forward to following Derek along with you.         Sincerely,        Alfred Castañeda MD        CC:   No Recipients    Alfred Castañeda MD  07/29/23 1401  Sign when Signing Visit  General Surgery/Colorectal Surgery Note    Patient Name:  Derek Keller  YOB: 1960  [id number]    Referring Provider: No ref. provider found      Patient Care Team:  Haile Monique MD as PCP - General (Family Medicine)  Julien Cardona DO as Consulting Physician (Pain Medicine)  Joel Castillo MD as Consulting Physician (Gastroenterology)  Remington Russell MD as Surgeon (General Surgery)  Ahsan Salas MD as Consulting Physician (Sports Medicine)  Donald Barker MD as Consulting Physician (Hematology and Oncology)  Sandy Ricci MD as Consulting Physician (General Surgery)  Alfred Castañeda MD as Consulting Physician (General Surgery)    Chief complaint follow-up    Subjective.     History of present illness:    Status post laparoscopic hand-assisted colostomy closure with resection, open parastomal hernia repair 6/26/2023  Pathology with metastatic lobular carcinoma to portion of descending colon and anastomotic rings    She comes in for follow-up.  No fever.  She has had some constipation.  She is also had some back pain.  Overall she is pleased with her surgery.      History:  Past Medical History:   Diagnosis Date   • Abdominal pain     OSTOMY SITE   • Allergic rhinitis    • Anemia     NO S/S   • Anxiety    • Arteriosclerosis     Coronary, follows with Dr. Núñez   • Arthritis    • Bone metastases 10/14/2022   • Bowen's disease     SKIN CANCER   •  Breast cancer     NO SURGERY WAS DONE DUE TO METS TO BONE/COLON/LYMPHNODE   • Cancer related pain 10/13/2022   • Depression    • Disorder associated with Helicobacter species 10/12/2022   • Dysphoric mood    • Fatigue    • Fibromyalgia    • Frequent urination     NO S/S INFECTION   • Herpes simplex vulvovaginitis 07/26/2018   • History of colon polyps    • History of IBS    • History of kidney stones    • Hyperlipidemia    • Hypertension    • Hypothyroidism    • Insomnia    • Lumbago    • Mood disorder    • Neck pain     R/T CANCER   • Palpitations     last time a few months ago   • Precordial pain     R/T SPINE CANCER   • S/P Laparoscopic Hand-Assisted Colostomy Closure with End to End Anastomosis Open Parastomal Hernia Repair 12/08/2022   • Sleep disturbance    • SOB (shortness of breath)     at times at rest   • SOBOE (shortness of breath on exertion)        Past Surgical History:   Procedure Laterality Date   • BREAST BIOPSY Left    • CARDIAC CATHETERIZATION Left 1960   • CARDIAC CATHETERIZATION N/A 04/06/2018    Procedure: Coronary angiography;  Surgeon: Art Licea MD;  Location: [facility] INVASIVE LOCATION;  Service: Cardiovascular   • CARDIAC CATHETERIZATION N/A 04/06/2018    Procedure: Left heart cath;  Surgeon: Art Licea MD;  Location: [facility] CATH INVASIVE LOCATION;  Service: Cardiovascular   • CARDIAC CATHETERIZATION N/A 04/06/2018    Procedure: Left ventriculography;  Surgeon: Art Licea MD;  Location: [facility] CATH INVASIVE LOCATION;  Service: Cardiovascular   • CHOLECYSTECTOMY     • COLON SURGERY      colostomy bag   • COLONOSCOPY  11/08/2006   • COLONOSCOPY N/A 06/08/2023    Procedure: COLONOSCOPY;  Surgeon: Alfred Castañeda MD;  Location: [facility] ENDOSCOPY;  Service: General;  Laterality: N/A;  same as preop   • EXPLORATORY LAPAROTOMY N/A 12/06/2022    Procedure: LAPAROTOMY EXPLORATORY sigmoid resection hartmans procedure colostomy;  Surgeon: Alfred Castañeda  MD George;  Location: [facility] MAIN OR;  Service: General;  Laterality: N/A;   • GANGLION CYST EXCISION Bilateral    • HYSTERECTOMY  05/2005   • ILEOSTOMY CLOSURE N/A 6/26/2023    Procedure: Laparoscopic Hand-Assisted Colostomy Closure with End to End Anastomosis;  Surgeon: Alfred Castañeda MD;  Location: [facility] MAIN OR;  Service: General;  Laterality: N/A;   • LAPAROSCOPIC GASTRIC BANDING      BAND REMOVED 2020   • PARASTOMAL HERNIA REPAIR N/A 6/26/2023    Procedure: Open Parastomal Hernia Repair;  Surgeon: Alfred Castañeda MD;  Location: [facility] MAIN OR;  Service: General;  Laterality: N/A;   • TONSILLECTOMY     • VENOUS ACCESS DEVICE (PORT) INSERTION N/A 01/09/2023    Procedure: INSERTION VENOUS ACCESS DEVICE;  Surgeon: Alfred Castañeda MD;  Location: [facility] MAIN OR;  Service: General;  Laterality: N/A;       Family History   Problem Relation Age of Onset   • Hypertension Mother    • Rheum arthritis Mother    • Heart disease Mother    • Breast cancer Mother    • Diabetes Father    • Cancer Maternal Grandmother         colon   • Colon cancer Maternal Grandmother    • Aneurysm Paternal Grandfather    • Diabetes Other    • Fibromyalgia Other    • Malig Hyperthermia Neg Hx        Social History     Tobacco Use   • Smoking status: Every Day     Packs/day: 1.50     Years: 40.00     Pack years: 60.00     Types: Cigarettes     Start date: 1974   • Smokeless tobacco: Never   • Tobacco comments:          INST PER ANESTHESIA PROTOCOL, LAST SMOKED TODAY   Vaping Use   • Vaping Use: Never used   Substance Use Topics   • Alcohol use: No   • Drug use: Yes     Types: Marijuana     Comment: LAST USE 6/19/23       Review of Systems  All systems were reviewed and negative except for:   Review of Systems   Constitutional: Negative for chills, fever and unexpected weight loss.   HENT: Negative for congestion, nosebleeds and voice change.    Eyes: Negative for blurred vision, double vision and discharge.   Respiratory:  Negative for apnea, chest tightness and shortness of breath.    Cardiovascular: Negative for chest pain and leg swelling.   Gastrointestinal:        See HPI   Endocrine: Negative for cold intolerance and heat intolerance.   Genitourinary: Negative for dysuria, hematuria and urgency.   Musculoskeletal: Negative for back pain, joint swelling and neck pain.   Skin: Negative for color change and dry skin.   Neurological: Negative for dizziness and confusion.   Hematological: Negative for adenopathy.   Psychiatric/Behavioral: Negative for agitation and behavioral problems.     MEDS:  Prior to Admission medications    Medication Sig Start Date End Date Taking? Authorizing Provider   atorvastatin (LIPITOR) 20 MG tablet TAKE 1 TABLET BY MOUTH EVERY DAY  Patient taking differently: Take 1 tablet by mouth Daily. 10/1/19  Yes Yudelka Manning MD   baclofen (LIORESAL) 20 MG tablet TAKE 1 TABLET BY MOUTH THREE TIMES DAILY FOR MUSCLE SPASMS   Yes Provider, MD Bessie   cetirizine (zyrTEC) 10 MG tablet Take 1 tablet by mouth Daily.   Yes Provider, MD Bessie   donepezil (ARICEPT) 10 MG tablet Take 1 tablet by mouth Daily. 10/28/22  Yes ProviderBessie MD   fluticasone (FLONASE) 50 MCG/ACT nasal spray    Yes Provider, MD Bessie   gabapentin (NEURONTIN) 600 MG tablet TAKE 1 TABLET BY MOUTH AT BEDTIME  Patient taking differently: Take 1 tablet by mouth 2 (Two) Times a Day As Needed. 7/30/20  Yes Yudelka Manning MD   hydroCHLOROthiazide (HYDRODIURIL) 12.5 MG tablet Take 1 tablet by mouth Daily. 5/17/23  Yes Donald Barker MD   ketoconazole (NIZORAL) 2 % cream Apply 1 application  topically to the appropriate area as directed Daily. 4/6/23  Yes ProviderBessie MD   letrozole (FEMARA) 2.5 MG tablet Take 1 tablet by mouth Daily. 11/30/22  Yes Donald Barker MD   levothyroxine (SYNTHROID, LEVOTHROID) 50 MCG tablet TAKE 1 TABLET BY MOUTH EVERY DAY  Patient taking differently: Take 1 tablet by mouth  Daily. 7/19/19  Yes Yudelka Manning MD   LORazepam (ATIVAN) 0.5 MG tablet Take 1 tablet by mouth Every 6 (Six) Hours As Needed.   Yes Bessie Lopez MD   losartan (COZAAR) 100 MG tablet Take 1 tablet by mouth Daily. INST PER ANESTHESIA PROTOCOL   Yes Bessie Lopez MD   montelukast (SINGULAIR) 10 MG tablet Take 1 tablet by mouth Daily. 1/17/22  Yes Bessie Lopez MD   mupirocin (BACTROBAN) 2 % ointment apply to the affected area(s) twice daily 5/22/23  Yes Bessie Lopez MD   naproxen (NAPROSYN) 500 MG tablet Take 1 tablet by mouth 2 (Two) Times a Day With Meals. LAST DOSE 1/5/23   Yes Bessie Lopez MD   ondansetron (ZOFRAN) 8 MG tablet TAKE 1 TABLET BY MOUTH THREE TIMES DAILY AS NEEDED FOR NAUSEA AND VOMITING 3/8/23  Yes Donald Barker MD   oxybutynin XL (DITROPAN XL) 15 MG 24 hr tablet TAKE 1 TABLET BY MOUTH DAILY bladder   Yes Bessie Lopez MD   oxyCODONE-acetaminophen (PERCOCET)  MG per tablet Take 1 tablet by mouth Every 6 (Six) Hours As Needed (pain). 7/12/23  Yes Donald Barker MD   polyethylene glycol (MIRALAX) 17 g packet Take 17 g by mouth Daily. 1/9/23  Yes Alfred Castañeda MD   ribociclib succinate 200 MG tablet therapy pack tablet Take 3 tablets by mouth Take As Directed. Take 3 tablets by mouth daily for 21 days then off 7 days on a 28 day cycle. 2/7/23  Yes Donald Barker MD   rOPINIRole (REQUIP) 3 MG tablet Take 1 tablet by mouth 2 (Two) Times a Day. 6/29/22  Yes Bessie Lopez MD   solifenacin (VESICARE) 10 MG tablet Take 1 tablet by mouth Daily for 360 days. 10/25/22 10/20/23 Yes Destinee Myers MD   SUMAtriptan (IMITREX) 100 MG tablet Take 1 tablet by mouth 1 (One) Time As Needed. 10/7/22  Yes Bessie Lopez MD   traZODone (DESYREL) 150 MG tablet TAKE 1 TABLET BY MOUTH ONCE nightly  Patient taking differently: Take 1 tablet by mouth Every Night. 11/12/19  Yes Yudelka Manning MD   DULoxetine (CYMBALTA) 20 MG  "capsule Take 1 capsule by mouth Daily. 7/14/23   Donald Barker MD   Morphine (MS CONTIN) 60 MG 12 hr tablet Take 1 tablet by mouth 2 (Two) Times a Day. 7/27/23   Donald Barker MD   oxybutynin XL (DITROPAN-XL) 10 MG 24 hr tablet TAKE 1 TABLET BY MOUTH DAILY FOR OVERACTIVE BLADDER  Patient not taking: Reported on 7/13/2023    Provider, MD Bessie   sulfamethoxazole-trimethoprim (Bactrim DS) 800-160 MG per tablet Take 1 tablet by mouth 2 (Two) Times a Day for 10 days. 7/24/23 8/3/23  Mark, April, APRN        Allergies:  Azithromycin and Erythromycin    Objective    Vital Signs        Physical Exam: Incisions without evidence of infection, no hernia, ostomy site healing well with healthy granulation tissue        Results Review:   {Results Review:06191::\"I reviewed the patient's new clinical results.\"    LABS/IMAGING:  Results for orders placed or performed during the hospital encounter of 07/12/23   Comprehensive Metabolic Panel    Specimen: Blood, Central Line   Result Value Ref Range    Glucose 153 (H) 65 - 99 mg/dL    BUN 15 8 - 23 mg/dL    Creatinine 1.00 0.57 - 1.00 mg/dL    Sodium 143 136 - 145 mmol/L    Potassium 4.1 3.5 - 5.2 mmol/L    Chloride 105 98 - 107 mmol/L    CO2 30.2 (H) 22.0 - 29.0 mmol/L    Calcium 8.7 8.6 - 10.5 mg/dL    Total Protein 6.3 6.0 - 8.5 g/dL    Albumin 3.9 3.5 - 5.2 g/dL    ALT (SGPT) 5 1 - 33 U/L    AST (SGOT) 10 1 - 32 U/L    Alkaline Phosphatase 88 39 - 117 U/L    Total Bilirubin 0.2 0.0 - 1.2 mg/dL    Globulin 2.4 gm/dL    A/G Ratio 1.6 g/dL    BUN/Creatinine Ratio 15.0 7.0 - 25.0    Anion Gap 7.8 5.0 - 15.0 mmol/L    eGFR 63.4 >60.0 mL/min/1.73   Magnesium    Specimen: Blood, Central Line   Result Value Ref Range    Magnesium 2.1 1.6 - 2.4 mg/dL   Phosphorus    Specimen: Blood, Central Line   Result Value Ref Range    Phosphorus 3.7 2.5 - 4.5 mg/dL   CBC Auto Differential    Specimen: Blood, Central Line   Result Value Ref Range    WBC 4.98 3.40 - 10.80 10*3/mm3    RBC " 2.62 (L) 3.77 - 5.28 10*6/mm3    Hemoglobin 8.6 (L) 12.0 - 15.9 g/dL    Hematocrit 28.3 (L) 34.0 - 46.6 %    .0 (H) 79.0 - 97.0 fL    MCH 32.8 26.6 - 33.0 pg    MCHC 30.4 (L) 31.5 - 35.7 g/dL    RDW 14.8 12.3 - 15.4 %    RDW-SD 59.9 (H) 37.0 - 54.0 fl    MPV 9.3 6.0 - 12.0 fL    Platelets 240 140 - 450 10*3/mm3    Neutrophil % 62.3 42.7 - 76.0 %    Lymphocyte % 21.7 19.6 - 45.3 %    Monocyte % 10.2 5.0 - 12.0 %    Eosinophil % 4.0 0.3 - 6.2 %    Basophil % 1.6 (H) 0.0 - 1.5 %    Immature Grans % 0.2 0.0 - 0.5 %    Neutrophils, Absolute 3.10 1.70 - 7.00 10*3/mm3    Lymphocytes, Absolute 1.08 0.70 - 3.10 10*3/mm3    Monocytes, Absolute 0.51 0.10 - 0.90 10*3/mm3    Eosinophils, Absolute 0.20 0.00 - 0.40 10*3/mm3    Basophils, Absolute 0.08 0.00 - 0.20 10*3/mm3    Immature Grans, Absolute 0.01 0.00 - 0.05 10*3/mm3        Result Review:     Assessment & Plan    Status post laparoscopic hand-assisted colostomy closure with resection, open parastomal hernia repair 6/26/2023  Pathology with metastatic lobular carcinoma to portion of descending colon and anastomotic rings    We removed her staples.  I encouraged her to try MiraLAX for her constipation with her significant narcotic use.  No lifting greater than 10 pounds for 6 weeks from the time of surgery.  Follow-up with me as needed.  All questions answered.  She agrees with the plan.  Thank you for the consult.           This document has been electronically signed by Alfred Castañeda MD  July 29, 2023 13:58 EDT

## 2023-07-24 ENCOUNTER — TELEPHONE (OUTPATIENT)
Dept: SURGERY | Facility: CLINIC | Age: 63
End: 2023-07-24
Payer: MEDICARE

## 2023-07-24 RX ORDER — SULFAMETHOXAZOLE AND TRIMETHOPRIM 800; 160 MG/1; MG/1
1 TABLET ORAL 2 TIMES DAILY
Qty: 20 TABLET | Refills: 0 | Status: SHIPPED | OUTPATIENT
Start: 2023-07-24 | End: 2023-08-03

## 2023-07-24 NOTE — TELEPHONE ENCOUNTER
I have called and spoke with this patient. I have instructed her to leave packing in until after showering. Then remove all the old dressing and packing and apply new packing and dressing. I have also sent in bactrim for her green drainage. She reports that she lost her medicaid and prefers not to be seen. I have instructed the patient to notify the office if no improvement with the wound or if she develops a fever or chills.

## 2023-07-24 NOTE — TELEPHONE ENCOUNTER
"Procedure(s):  Laparoscopic Hand-Assisted Colostomy Closure with resection of portion of descending colon, sigmoid colon, rectum with descending colon to rectal end to End Anastomosis  Open Parastomal Hernia Repair    PATIENT HAD SURGERY 06/26/23 BY DR. HESS.  AT THE STOMA WHERE SHE PACKS, THERE IS A TOUCH OF GREEN DRAINAGE COMING OUT.  THE HOLE IS NOT GETTING ANY LESS.    THE INCISION STRAIGHT UP AND DOWN ON HER BELLY BUTTON IS OPEN 1/4\".      SHE SAID SHE CHANGES THE DRESSING 2 TIMES A DAY.  SHE USES GAUZE AND ABDOMINAL DRESSING.  SHOULD SHE BE DOING ANYTHING DIFFERENT.    SHE WANTS TO KNOW HOW SHE SHOULD SHOWER.  WHEN SHE DOES, SHE REMOVES ALL THE GAUZE AND PACKING.  SHE DOESN'T SCRUB THE AREA.  "

## 2023-07-27 ENCOUNTER — TELEPHONE (OUTPATIENT)
Dept: ONCOLOGY | Facility: HOSPITAL | Age: 63
End: 2023-07-27
Payer: MEDICARE

## 2023-07-27 DIAGNOSIS — F33.9 MONOPOLAR DEPRESSION: ICD-10-CM

## 2023-07-27 DIAGNOSIS — G89.3 CANCER RELATED PAIN: Primary | ICD-10-CM

## 2023-07-27 DIAGNOSIS — F41.9 ANXIETY: Primary | ICD-10-CM

## 2023-07-27 RX ORDER — MORPHINE SULFATE 60 MG/1
60 TABLET, FILM COATED, EXTENDED RELEASE ORAL 2 TIMES DAILY
Qty: 60 TABLET | Refills: 0 | Status: SHIPPED | OUTPATIENT
Start: 2023-07-27

## 2023-07-27 NOTE — TELEPHONE ENCOUNTER
This nurse called the pt. The pt is c/o neck and low back pain 9/10 and also states that she has crying spells. The pt states that her neck and low back pain started 5 days ago and has not improved. The pt voices that she is having trouble sleeping due to the pain. When questioned the pt states that she is taking her pain meds as prescribed. The pt denies any falls or fevers. The pt states that she feels like she hurts all over but her low back and neck is where she is hurting. The pt also c/o uncontrolled crying spells. When questioned about her Cymbalta the pt states that she has been taking it as prescribed. The pt made this nurse aware that there was a dose decrease in the past from 30mg to 20mg. The pt was sobbing for half of the phone call with this nurse.

## 2023-07-27 NOTE — TELEPHONE ENCOUNTER
Caller: Jeronimo Keller    Relationship: Self    Best call back number: [phone]    What is the best time to reach you: ANY    Who are you requesting to speak with (clinical staff, provider,  specific staff member): CLINICAL        What was the call regarding: PATIENT JERONIMO CALLED IN CRYING AND NEEDS TO SPEAK TO SOMEONE FROM CLINICAL STAFF ABOUT HER PAIN LEVEL.    Is it okay if the provider responds through MyChart: NO

## 2023-07-27 NOTE — TELEPHONE ENCOUNTER
This nurse called the pt and made her aware of the Morphine increase and the referral to psychiatry. The pt voiced understanding.    Georges could you place the referral.

## 2023-07-29 NOTE — PROGRESS NOTES
General Surgery/Colorectal Surgery Note    Patient Name:  Derek Keller  YOB: 1960  [id number]    Referring Provider: No ref. provider found      Patient Care Team:  Haile Monique MD as PCP - General (Family Medicine)  Julien Cardona DO as Consulting Physician (Pain Medicine)  Joel Castillo MD as Consulting Physician (Gastroenterology)  Remington Russell MD as Surgeon (General Surgery)  Ahsan Salas MD as Consulting Physician (Sports Medicine)  Donald Barker MD as Consulting Physician (Hematology and Oncology)  Sandy Ricci MD as Consulting Physician (General Surgery)  Alfred Castañeda MD as Consulting Physician (General Surgery)    Chief complaint follow-up    Subjective .     History of present illness:    Status post laparoscopic hand-assisted colostomy closure with resection, open parastomal hernia repair 6/26/2023  Pathology with metastatic lobular carcinoma to portion of descending colon and anastomotic rings    She comes in for follow-up.  No fever.  She has had some constipation.  She is also had some back pain.  Overall she is pleased with her surgery.      History:  Past Medical History:   Diagnosis Date    Abdominal pain     OSTOMY SITE    Allergic rhinitis     Anemia     NO S/S    Anxiety     Arteriosclerosis     Coronary, follows with Dr. Núñez    Arthritis     Bone metastases 10/14/2022    Bowen's disease     SKIN CANCER    Breast cancer     NO SURGERY WAS DONE DUE TO METS TO BONE/COLON/LYMPHNODE    Cancer related pain 10/13/2022    Depression     Disorder associated with Helicobacter species 10/12/2022    Dysphoric mood     Fatigue     Fibromyalgia     Frequent urination     NO S/S INFECTION    Herpes simplex vulvovaginitis 07/26/2018    History of colon polyps     History of IBS     History of kidney stones     Hyperlipidemia     Hypertension     Hypothyroidism     Insomnia     Lumbago     Mood disorder     Neck pain     R/T CANCER    Palpitations      last time a few months ago    Precordial pain     R/T SPINE CANCER    S/P Laparoscopic Hand-Assisted Colostomy Closure with End to End Anastomosis Open Parastomal Hernia Repair 12/08/2022    Sleep disturbance     SOB (shortness of breath)     at times at rest    SOBOE (shortness of breath on exertion)        Past Surgical History:   Procedure Laterality Date    BREAST BIOPSY Left     CARDIAC CATHETERIZATION Left 1960    CARDIAC CATHETERIZATION N/A 04/06/2018    Procedure: Coronary angiography;  Surgeon: Art Licea MD;  Location: [facility] CATH INVASIVE LOCATION;  Service: Cardiovascular    CARDIAC CATHETERIZATION N/A 04/06/2018    Procedure: Left heart cath;  Surgeon: Art Licea MD;  Location: [facility] CATH INVASIVE LOCATION;  Service: Cardiovascular    CARDIAC CATHETERIZATION N/A 04/06/2018    Procedure: Left ventriculography;  Surgeon: Art Licea MD;  Location: [facility] CATH INVASIVE LOCATION;  Service: Cardiovascular    CHOLECYSTECTOMY      COLON SURGERY      colostomy bag    COLONOSCOPY  11/08/2006    COLONOSCOPY N/A 06/08/2023    Procedure: COLONOSCOPY;  Surgeon: Alfred Castañeda MD;  Location: [facility] ENDOSCOPY;  Service: General;  Laterality: N/A;  same as preop    EXPLORATORY LAPAROTOMY N/A 12/06/2022    Procedure: LAPAROTOMY EXPLORATORY sigmoid resection hartmans procedure colostomy;  Surgeon: Alfred Castañeda MD;  Location: [facility] MAIN OR;  Service: General;  Laterality: N/A;    GANGLION CYST EXCISION Bilateral     HYSTERECTOMY  05/2005    ILEOSTOMY CLOSURE N/A 6/26/2023    Procedure: Laparoscopic Hand-Assisted Colostomy Closure with End to End Anastomosis;  Surgeon: Alfred Castañeda MD;  Location: [facility] MAIN OR;  Service: General;  Laterality: N/A;    LAPAROSCOPIC GASTRIC BANDING      BAND REMOVED 2020    PARASTOMAL HERNIA REPAIR N/A 6/26/2023    Procedure: Open Parastomal Hernia Repair;  Surgeon: Alfred Castañeda MD;  Location: [facility] MAIN OR;  Service:  General;  Laterality: N/A;    TONSILLECTOMY      VENOUS ACCESS DEVICE (PORT) INSERTION N/A 01/09/2023    Procedure: INSERTION VENOUS ACCESS DEVICE;  Surgeon: Alfred Castañeda MD;  Location: [facility] MAIN OR;  Service: General;  Laterality: N/A;       Family History   Problem Relation Age of Onset    Hypertension Mother     Rheum arthritis Mother     Heart disease Mother     Breast cancer Mother     Diabetes Father     Cancer Maternal Grandmother         colon    Colon cancer Maternal Grandmother     Aneurysm Paternal Grandfather     Diabetes Other     Fibromyalgia Other     Malig Hyperthermia Neg Hx        Social History     Tobacco Use    Smoking status: Every Day     Packs/day: 1.50     Years: 40.00     Pack years: 60.00     Types: Cigarettes     Start date: 1974    Smokeless tobacco: Never    Tobacco comments:          INST PER ANESTHESIA PROTOCOL, LAST SMOKED TODAY   Vaping Use    Vaping Use: Never used   Substance Use Topics    Alcohol use: No    Drug use: Yes     Types: Marijuana     Comment: LAST USE 6/19/23       Review of Systems  All systems were reviewed and negative except for:   Review of Systems   Constitutional: Negative for chills, fever and unexpected weight loss.   HENT: Negative for congestion, nosebleeds and voice change.    Eyes: Negative for blurred vision, double vision and discharge.   Respiratory: Negative for apnea, chest tightness and shortness of breath.    Cardiovascular: Negative for chest pain and leg swelling.   Gastrointestinal:        See HPI   Endocrine: Negative for cold intolerance and heat intolerance.   Genitourinary: Negative for dysuria, hematuria and urgency.   Musculoskeletal: Negative for back pain, joint swelling and neck pain.   Skin: Negative for color change and dry skin.   Neurological: Negative for dizziness and confusion.   Hematological: Negative for adenopathy.   Psychiatric/Behavioral: Negative for agitation and behavioral problems.     MEDS:  Prior to  Admission medications    Medication Sig Start Date End Date Taking? Authorizing Provider   atorvastatin (LIPITOR) 20 MG tablet TAKE 1 TABLET BY MOUTH EVERY DAY  Patient taking differently: Take 1 tablet by mouth Daily. 10/1/19  Yes Yudelka Manning MD   baclofen (LIORESAL) 20 MG tablet TAKE 1 TABLET BY MOUTH THREE TIMES DAILY FOR MUSCLE SPASMS   Yes Provider, MD Bessie   cetirizine (zyrTEC) 10 MG tablet Take 1 tablet by mouth Daily.   Yes Provider, MD Bessie   donepezil (ARICEPT) 10 MG tablet Take 1 tablet by mouth Daily. 10/28/22  Yes Provider, MD Bessie   fluticasone (FLONASE) 50 MCG/ACT nasal spray    Yes Provider, MD Bessie   gabapentin (NEURONTIN) 600 MG tablet TAKE 1 TABLET BY MOUTH AT BEDTIME  Patient taking differently: Take 1 tablet by mouth 2 (Two) Times a Day As Needed. 7/30/20  Yes Yudelka Manning MD   hydroCHLOROthiazide (HYDRODIURIL) 12.5 MG tablet Take 1 tablet by mouth Daily. 5/17/23  Yes Donald Barker MD   ketoconazole (NIZORAL) 2 % cream Apply 1 application  topically to the appropriate area as directed Daily. 4/6/23  Yes ProviderBessie MD   letrozole (FEMARA) 2.5 MG tablet Take 1 tablet by mouth Daily. 11/30/22  Yes Donald Barker MD   levothyroxine (SYNTHROID, LEVOTHROID) 50 MCG tablet TAKE 1 TABLET BY MOUTH EVERY DAY  Patient taking differently: Take 1 tablet by mouth Daily. 7/19/19  Yes Yudelka Manning MD   LORazepam (ATIVAN) 0.5 MG tablet Take 1 tablet by mouth Every 6 (Six) Hours As Needed.   Yes Provider, MD Bessie   losartan (COZAAR) 100 MG tablet Take 1 tablet by mouth Daily. INST PER ANESTHESIA PROTOCOL   Yes Provider, MD Bessie   montelukast (SINGULAIR) 10 MG tablet Take 1 tablet by mouth Daily. 1/17/22  Yes ProviderBessie MD   mupirocin (BACTROBAN) 2 % ointment apply to the affected area(s) twice daily 5/22/23  Yes ProviderBessie MD   naproxen (NAPROSYN) 500 MG tablet Take 1 tablet by mouth 2 (Two) Times a Day With  Meals. LAST DOSE 1/5/23   Yes Bessie Lopez MD   ondansetron (ZOFRAN) 8 MG tablet TAKE 1 TABLET BY MOUTH THREE TIMES DAILY AS NEEDED FOR NAUSEA AND VOMITING 3/8/23  Yes Donald Barker MD   oxybutynin XL (DITROPAN XL) 15 MG 24 hr tablet TAKE 1 TABLET BY MOUTH DAILY bladder   Yes Bessie Lopez MD   oxyCODONE-acetaminophen (PERCOCET)  MG per tablet Take 1 tablet by mouth Every 6 (Six) Hours As Needed (pain). 7/12/23  Yes Donald Barker MD   polyethylene glycol (MIRALAX) 17 g packet Take 17 g by mouth Daily. 1/9/23  Yes Alfred Castañeda MD   ribociclib succinate 200 MG tablet therapy pack tablet Take 3 tablets by mouth Take As Directed. Take 3 tablets by mouth daily for 21 days then off 7 days on a 28 day cycle. 2/7/23  Yes Donald Barker MD   rOPINIRole (REQUIP) 3 MG tablet Take 1 tablet by mouth 2 (Two) Times a Day. 6/29/22  Yes Bessie Lopez MD   solifenacin (VESICARE) 10 MG tablet Take 1 tablet by mouth Daily for 360 days. 10/25/22 10/20/23 Yes Destinee Myers MD   SUMAtriptan (IMITREX) 100 MG tablet Take 1 tablet by mouth 1 (One) Time As Needed. 10/7/22  Yes Bessie Lopez MD   traZODone (DESYREL) 150 MG tablet TAKE 1 TABLET BY MOUTH ONCE nightly  Patient taking differently: Take 1 tablet by mouth Every Night. 11/12/19  Yes Yudelka Manning MD   DULoxetine (CYMBALTA) 20 MG capsule Take 1 capsule by mouth Daily. 7/14/23   Donald Barker MD   Morphine (MS CONTIN) 60 MG 12 hr tablet Take 1 tablet by mouth 2 (Two) Times a Day. 7/27/23   Donald Barker MD   oxybutynin XL (DITROPAN-XL) 10 MG 24 hr tablet TAKE 1 TABLET BY MOUTH DAILY FOR OVERACTIVE BLADDER  Patient not taking: Reported on 7/13/2023    Bessie Lopez MD   sulfamethoxazole-trimethoprim (Bactrim DS) 800-160 MG per tablet Take 1 tablet by mouth 2 (Two) Times a Day for 10 days. 7/24/23 8/3/23  Mark, April, APRN        Allergies:  Azithromycin and Erythromycin    Objective     Vital Signs  "       Physical Exam: Incisions without evidence of infection, no hernia, ostomy site healing well with healthy granulation tissue        Results Review:   {Results Review:[id number]::\"I reviewed the patient's new clinical results.\"    LABS/IMAGING:  Results for orders placed or performed during the hospital encounter of 07/12/23   Comprehensive Metabolic Panel    Specimen: Blood, Central Line   Result Value Ref Range    Glucose 153 (H) 65 - 99 mg/dL    BUN 15 8 - 23 mg/dL    Creatinine 1.00 0.57 - 1.00 mg/dL    Sodium 143 136 - 145 mmol/L    Potassium 4.1 3.5 - 5.2 mmol/L    Chloride 105 98 - 107 mmol/L    CO2 30.2 (H) 22.0 - 29.0 mmol/L    Calcium 8.7 8.6 - 10.5 mg/dL    Total Protein 6.3 6.0 - 8.5 g/dL    Albumin 3.9 3.5 - 5.2 g/dL    ALT (SGPT) 5 1 - 33 U/L    AST (SGOT) 10 1 - 32 U/L    Alkaline Phosphatase 88 39 - 117 U/L    Total Bilirubin 0.2 0.0 - 1.2 mg/dL    Globulin 2.4 gm/dL    A/G Ratio 1.6 g/dL    BUN/Creatinine Ratio 15.0 7.0 - 25.0    Anion Gap 7.8 5.0 - 15.0 mmol/L    eGFR 63.4 >60.0 mL/min/1.73   Magnesium    Specimen: Blood, Central Line   Result Value Ref Range    Magnesium 2.1 1.6 - 2.4 mg/dL   Phosphorus    Specimen: Blood, Central Line   Result Value Ref Range    Phosphorus 3.7 2.5 - 4.5 mg/dL   CBC Auto Differential    Specimen: Blood, Central Line   Result Value Ref Range    WBC 4.98 3.40 - 10.80 10*3/mm3    RBC 2.62 (L) 3.77 - 5.28 10*6/mm3    Hemoglobin 8.6 (L) 12.0 - 15.9 g/dL    Hematocrit 28.3 (L) 34.0 - 46.6 %    .0 (H) 79.0 - 97.0 fL    MCH 32.8 26.6 - 33.0 pg    MCHC 30.4 (L) 31.5 - 35.7 g/dL    RDW 14.8 12.3 - 15.4 %    RDW-SD 59.9 (H) 37.0 - 54.0 fl    MPV 9.3 6.0 - 12.0 fL    Platelets 240 140 - 450 10*3/mm3    Neutrophil % 62.3 42.7 - 76.0 %    Lymphocyte % 21.7 19.6 - 45.3 %    Monocyte % 10.2 5.0 - 12.0 %    Eosinophil % 4.0 0.3 - 6.2 %    Basophil % 1.6 (H) 0.0 - 1.5 %    Immature Grans % 0.2 0.0 - 0.5 %    Neutrophils, Absolute 3.10 1.70 - 7.00 10*3/mm3    Lymphocytes, " Absolute 1.08 0.70 - 3.10 10*3/mm3    Monocytes, Absolute 0.51 0.10 - 0.90 10*3/mm3    Eosinophils, Absolute 0.20 0.00 - 0.40 10*3/mm3    Basophils, Absolute 0.08 0.00 - 0.20 10*3/mm3    Immature Grans, Absolute 0.01 0.00 - 0.05 10*3/mm3        Result Review :     Assessment & Plan     Status post laparoscopic hand-assisted colostomy closure with resection, open parastomal hernia repair 6/26/2023  Pathology with metastatic lobular carcinoma to portion of descending colon and anastomotic rings    We removed her staples.  I encouraged her to try MiraLAX for her constipation with her significant narcotic use.  No lifting greater than 10 pounds for 6 weeks from the time of surgery.  Follow-up with me as needed.  All questions answered.  She agrees with the plan.  Thank you for the consult.           This document has been electronically signed by Alfred Castañeda MD  July 29, 2023 13:58 EDT

## 2023-08-01 ENCOUNTER — SPECIALTY PHARMACY (OUTPATIENT)
Dept: PHARMACY | Facility: HOSPITAL | Age: 63
End: 2023-08-01
Payer: MEDICARE

## 2023-08-01 DIAGNOSIS — C50.912 MALIGNANT NEOPLASM OF LEFT BREAST IN FEMALE, ESTROGEN RECEPTOR POSITIVE, UNSPECIFIED SITE OF BREAST: ICD-10-CM

## 2023-08-01 DIAGNOSIS — Z17.0 MALIGNANT NEOPLASM OF LEFT BREAST IN FEMALE, ESTROGEN RECEPTOR POSITIVE, UNSPECIFIED SITE OF BREAST: ICD-10-CM

## 2023-08-02 ENCOUNTER — TELEPHONE (OUTPATIENT)
Dept: ONCOLOGY | Facility: HOSPITAL | Age: 63
End: 2023-08-02
Payer: MEDICARE

## 2023-08-02 ENCOUNTER — SPECIALTY PHARMACY (OUTPATIENT)
Dept: PHARMACY | Facility: HOSPITAL | Age: 63
End: 2023-08-02
Payer: MEDICARE

## 2023-08-04 ENCOUNTER — TELEPHONE (OUTPATIENT)
Dept: SURGERY | Facility: CLINIC | Age: 63
End: 2023-08-04
Payer: MEDICARE

## 2023-08-07 ENCOUNTER — HOSPITAL ENCOUNTER (OUTPATIENT)
Dept: CT IMAGING | Facility: HOSPITAL | Age: 63
Discharge: HOME | End: 2023-08-07
Admitting: INTERNAL MEDICINE
Payer: MEDICARE

## 2023-08-07 DIAGNOSIS — C50.912 MALIGNANT NEOPLASM OF LEFT BREAST IN FEMALE, ESTROGEN RECEPTOR POSITIVE, UNSPECIFIED SITE OF BREAST: ICD-10-CM

## 2023-08-07 DIAGNOSIS — Z17.0 MALIGNANT NEOPLASM OF LEFT BREAST IN FEMALE, ESTROGEN RECEPTOR POSITIVE, UNSPECIFIED SITE OF BREAST: ICD-10-CM

## 2023-08-07 PROCEDURE — 74177 CT ABD & PELVIS W/CONTRAST: CPT

## 2023-08-07 PROCEDURE — 25510000001 IOPAMIDOL PER 1 ML: Performed by: INTERNAL MEDICINE

## 2023-08-07 PROCEDURE — 71260 CT THORAX DX C+: CPT

## 2023-08-07 RX ORDER — HEPARIN SODIUM (PORCINE) LOCK FLUSH IV SOLN 100 UNIT/ML 100 UNIT/ML
SOLUTION INTRAVENOUS
Status: DISCONTINUED
Start: 2023-08-07 | End: 2023-08-08 | Stop reason: HOSPADM

## 2023-08-07 RX ADMIN — IOPAMIDOL 100 ML: 755 INJECTION, SOLUTION INTRAVENOUS at 14:48

## 2023-08-09 ENCOUNTER — OFFICE VISIT (OUTPATIENT)
Dept: ONCOLOGY | Facility: HOSPITAL | Age: 63
End: 2023-08-09
Payer: MEDICARE

## 2023-08-09 ENCOUNTER — HOSPITAL ENCOUNTER (OUTPATIENT)
Dept: ONCOLOGY | Facility: HOSPITAL | Age: 63
Discharge: HOME | End: 2023-08-09
Admitting: INTERNAL MEDICINE
Payer: MEDICARE

## 2023-08-09 VITALS
RESPIRATION RATE: 18 BRPM | TEMPERATURE: 98.6 F | WEIGHT: 171.08 LBS | BODY MASS INDEX: 27.61 KG/M2 | DIASTOLIC BLOOD PRESSURE: 45 MMHG | SYSTOLIC BLOOD PRESSURE: 122 MMHG | OXYGEN SATURATION: 100 % | HEART RATE: 56 BPM

## 2023-08-09 DIAGNOSIS — C50.912 MALIGNANT NEOPLASM OF LEFT BREAST IN FEMALE, ESTROGEN RECEPTOR POSITIVE, UNSPECIFIED SITE OF BREAST: Primary | ICD-10-CM

## 2023-08-09 DIAGNOSIS — C50.912 MALIGNANT NEOPLASM OF LEFT BREAST IN FEMALE, ESTROGEN RECEPTOR POSITIVE, UNSPECIFIED SITE OF BREAST: ICD-10-CM

## 2023-08-09 DIAGNOSIS — C79.51 MALIGNANT NEOPLASM METASTATIC TO BONE: ICD-10-CM

## 2023-08-09 DIAGNOSIS — C50.412 MALIGNANT NEOPLASM OF UPPER-OUTER QUADRANT OF LEFT BREAST IN FEMALE, ESTROGEN RECEPTOR POSITIVE: Primary | ICD-10-CM

## 2023-08-09 DIAGNOSIS — Z17.0 MALIGNANT NEOPLASM OF LEFT BREAST IN FEMALE, ESTROGEN RECEPTOR POSITIVE, UNSPECIFIED SITE OF BREAST: ICD-10-CM

## 2023-08-09 DIAGNOSIS — G89.3 CANCER RELATED PAIN: ICD-10-CM

## 2023-08-09 DIAGNOSIS — Z17.0 MALIGNANT NEOPLASM OF UPPER-OUTER QUADRANT OF LEFT BREAST IN FEMALE, ESTROGEN RECEPTOR POSITIVE: Primary | ICD-10-CM

## 2023-08-09 DIAGNOSIS — Z17.0 MALIGNANT NEOPLASM OF LEFT BREAST IN FEMALE, ESTROGEN RECEPTOR POSITIVE, UNSPECIFIED SITE OF BREAST: Primary | ICD-10-CM

## 2023-08-09 DIAGNOSIS — C79.51 MALIGNANT NEOPLASM METASTATIC TO BONE: Primary | ICD-10-CM

## 2023-08-09 DIAGNOSIS — Z45.2 ENCOUNTER FOR ADJUSTMENT OR MANAGEMENT OF VASCULAR ACCESS DEVICE: ICD-10-CM

## 2023-08-09 LAB
ALBUMIN SERPL-MCNC: 4.1 G/DL (ref 3.5–5.2)
ALBUMIN/GLOB SERPL: 1.7 G/DL
ALP SERPL-CCNC: 96 U/L (ref 39–117)
ALT SERPL W P-5'-P-CCNC: 5 U/L (ref 1–33)
ANION GAP SERPL CALCULATED.3IONS-SCNC: 11.4 MMOL/L (ref 5–15)
AST SERPL-CCNC: 11 U/L (ref 1–32)
BASOPHILS # BLD AUTO: 0.03 10*3/MM3 (ref 0–0.2)
BASOPHILS NFR BLD AUTO: 1 % (ref 0–1.5)
BILIRUB SERPL-MCNC: 0.2 MG/DL (ref 0–1.2)
BUN SERPL-MCNC: 15 MG/DL (ref 8–23)
BUN/CREAT SERPL: 18.5 (ref 7–25)
CALCIUM SPEC-SCNC: 8.6 MG/DL (ref 8.6–10.5)
CHLORIDE SERPL-SCNC: 102 MMOL/L (ref 98–107)
CO2 SERPL-SCNC: 26.6 MMOL/L (ref 22–29)
CREAT SERPL-MCNC: 0.81 MG/DL (ref 0.57–1)
DEPRECATED RDW RBC AUTO: 58.5 FL (ref 37–54)
EGFRCR SERPLBLD CKD-EPI 2021: 81.7 ML/MIN/1.73
EOSINOPHIL # BLD AUTO: 0.02 10*3/MM3 (ref 0–0.4)
EOSINOPHIL NFR BLD AUTO: 0.6 % (ref 0.3–6.2)
ERYTHROCYTE [DISTWIDTH] IN BLOOD BY AUTOMATED COUNT: 15.2 % (ref 12.3–15.4)
GLOBULIN UR ELPH-MCNC: 2.4 GM/DL
GLUCOSE SERPL-MCNC: 106 MG/DL (ref 65–99)
HCT VFR BLD AUTO: 28.2 % (ref 34–46.6)
HGB BLD-MCNC: 8.6 G/DL (ref 12–15.9)
IMM GRANULOCYTES # BLD AUTO: 0.01 10*3/MM3 (ref 0–0.05)
IMM GRANULOCYTES NFR BLD AUTO: 0.3 % (ref 0–0.5)
LYMPHOCYTES # BLD AUTO: 1.01 10*3/MM3 (ref 0.7–3.1)
LYMPHOCYTES NFR BLD AUTO: 32.6 % (ref 19.6–45.3)
MAGNESIUM SERPL-MCNC: 2.1 MG/DL (ref 1.6–2.4)
MCH RBC QN AUTO: 32.5 PG (ref 26.6–33)
MCHC RBC AUTO-ENTMCNC: 30.5 G/DL (ref 31.5–35.7)
MCV RBC AUTO: 106.4 FL (ref 79–97)
MONOCYTES # BLD AUTO: 0.32 10*3/MM3 (ref 0.1–0.9)
MONOCYTES NFR BLD AUTO: 10.3 % (ref 5–12)
NEUTROPHILS NFR BLD AUTO: 1.71 10*3/MM3 (ref 1.7–7)
NEUTROPHILS NFR BLD AUTO: 55.2 % (ref 42.7–76)
PHOSPHATE SERPL-MCNC: 2.7 MG/DL (ref 2.5–4.5)
PLATELET # BLD AUTO: 157 10*3/MM3 (ref 140–450)
PMV BLD AUTO: 10.4 FL (ref 6–12)
POTASSIUM SERPL-SCNC: 4.1 MMOL/L (ref 3.5–5.2)
PROT SERPL-MCNC: 6.5 G/DL (ref 6–8.5)
RBC # BLD AUTO: 2.65 10*6/MM3 (ref 3.77–5.28)
SODIUM SERPL-SCNC: 140 MMOL/L (ref 136–145)
WBC NRBC COR # BLD: 3.1 10*3/MM3 (ref 3.4–10.8)

## 2023-08-09 PROCEDURE — 80053 COMPREHEN METABOLIC PANEL: CPT | Performed by: INTERNAL MEDICINE

## 2023-08-09 PROCEDURE — G0463 HOSPITAL OUTPT CLINIC VISIT: HCPCS | Performed by: INTERNAL MEDICINE

## 2023-08-09 PROCEDURE — 83735 ASSAY OF MAGNESIUM: CPT | Performed by: INTERNAL MEDICINE

## 2023-08-09 PROCEDURE — 85025 COMPLETE CBC W/AUTO DIFF WBC: CPT | Performed by: INTERNAL MEDICINE

## 2023-08-09 PROCEDURE — 25010000002 HEPARIN LOCK FLUSH PER 10 UNITS: Performed by: INTERNAL MEDICINE

## 2023-08-09 PROCEDURE — 25010000002 DENOSUMAB 120 MG/1.7ML SOLUTION: Performed by: INTERNAL MEDICINE

## 2023-08-09 PROCEDURE — 36591 DRAW BLOOD OFF VENOUS DEVICE: CPT

## 2023-08-09 PROCEDURE — 96372 THER/PROPH/DIAG INJ SC/IM: CPT

## 2023-08-09 PROCEDURE — 84100 ASSAY OF PHOSPHORUS: CPT | Performed by: INTERNAL MEDICINE

## 2023-08-09 RX ORDER — OXYCODONE AND ACETAMINOPHEN 10; 325 MG/1; MG/1
1 TABLET ORAL EVERY 6 HOURS PRN
Qty: 60 TABLET | Refills: 0 | Status: SHIPPED | OUTPATIENT
Start: 2023-08-09

## 2023-08-09 RX ORDER — HEPARIN SODIUM (PORCINE) LOCK FLUSH IV SOLN 100 UNIT/ML 100 UNIT/ML
500 SOLUTION INTRAVENOUS AS NEEDED
Status: DISCONTINUED | OUTPATIENT
Start: 2023-08-09 | End: 2023-08-10 | Stop reason: HOSPADM

## 2023-08-09 RX ORDER — VENLAFAXINE HYDROCHLORIDE 150 MG/1
CAPSULE, EXTENDED RELEASE ORAL
COMMUNITY
Start: 2023-07-14

## 2023-08-09 RX ORDER — SODIUM CHLORIDE 0.9 % (FLUSH) 0.9 %
20 SYRINGE (ML) INJECTION AS NEEDED
OUTPATIENT
Start: 2023-08-09

## 2023-08-09 RX ORDER — SODIUM CHLORIDE 0.9 % (FLUSH) 0.9 %
20 SYRINGE (ML) INJECTION AS NEEDED
Status: DISCONTINUED | OUTPATIENT
Start: 2023-08-09 | End: 2023-08-10 | Stop reason: HOSPADM

## 2023-08-09 RX ORDER — MORPHINE SULFATE 15 MG/1
45 TABLET, FILM COATED, EXTENDED RELEASE ORAL 2 TIMES DAILY
Qty: 90 TABLET | Refills: 0 | Status: SHIPPED | OUTPATIENT
Start: 2023-08-09

## 2023-08-09 RX ORDER — HEPARIN SODIUM (PORCINE) LOCK FLUSH IV SOLN 100 UNIT/ML 100 UNIT/ML
500 SOLUTION INTRAVENOUS AS NEEDED
OUTPATIENT
Start: 2023-08-09

## 2023-08-09 RX ADMIN — HEPARIN SODIUM (PORCINE) LOCK FLUSH IV SOLN 100 UNIT/ML 500 UNITS: 100 SOLUTION at 13:00

## 2023-08-09 RX ADMIN — Medication 20 ML: at 12:59

## 2023-08-09 RX ADMIN — DENOSUMAB 120 MG: 120 INJECTION SUBCUTANEOUS at 14:00

## 2023-08-09 NOTE — PROGRESS NOTES
Chief Complaint  Breast Cancer      Haile Monique MD    Subjective          Derek Keller presents to [facility] for ongoing treatment of her metastatic breast cancer.  She is on letrozole, ribociclib and denosumab for bony involvement.  She is tolerating her treatments well.  She takes morphine for cancer-related pain.  Recently increased her morphine long-acting dose to 60 mg but she got woozy and lightheaded from that.  She is back to taking 45 mg twice daily with breakthrough medicine as needed.  She feels like that is controlling her pain.  She is also taking Cymbalta and feels like that is helped her pain and mood.    Oncology/Hematology History   Malignant neoplasm of left breast in female, estrogen receptor positive   10/13/2022 Initial Diagnosis    Malignant neoplasm of left breast in female, estrogen receptor positive (HCC)     10/14/2022 -  Chemotherapy    OP BREAST Letrozole / Ribociclib       10/14/2022 Cancer Staged    Staging form: Breast, AJCC 8th Edition  - Clinical: Stage IV (cT1c, cN1, cM1, ER+, AR+, HER2-) - Signed by Donald Barker MD on 10/14/2022     10/28/2022 -  Chemotherapy    OP SUPPORTIVE Denosumab (Xgeva) Q28D       Malignant neoplasm metastatic to bone   10/14/2022 Initial Diagnosis    Bone metastases (HCC)     10/28/2022 -  Chemotherapy    OP SUPPORTIVE Denosumab (Xgeva) Q28D       Secondary malignant neoplasm of bone (Resolved)   11/14/2022 Initial Diagnosis    Secondary malignant neoplasm of bone (HCC)     11/17/2022 - 4/19/2023 Radiation    RADIATION THERAPY Treatment Details (11/14/2022 - 4/19/2023)  Site: Spine - Lumbar  Technique: 3D CRT  Goal: No goal specified  Planned Treatment Start Date: 11/17/2022         Review of Systems   Constitutional:  Positive for fatigue. Negative for appetite change, diaphoresis, fever, unexpected weight gain and unexpected weight loss.   HENT:  Negative for hearing loss, mouth sores, sore throat,  swollen glands, trouble swallowing and voice change.    Eyes:  Negative for blurred vision.   Respiratory:  Negative for cough, shortness of breath and wheezing.    Cardiovascular:  Negative for chest pain and palpitations.   Gastrointestinal:  Negative for abdominal pain, blood in stool, constipation, diarrhea, nausea and vomiting.   Endocrine: Negative for cold intolerance and heat intolerance.   Genitourinary:  Negative for difficulty urinating, dysuria, frequency, hematuria and urinary incontinence.   Musculoskeletal:  Positive for back pain. Negative for arthralgias and myalgias.   Skin:  Negative for rash, skin lesions and wound.   Neurological:  Negative for dizziness, seizures, weakness, numbness and headache.   Hematological:  Does not bruise/bleed easily.   Psychiatric/Behavioral:  Negative for depressed mood. The patient is not nervous/anxious.    Current Outpatient Medications on File Prior to Visit   Medication Sig Dispense Refill    atorvastatin (LIPITOR) 20 MG tablet TAKE 1 TABLET BY MOUTH EVERY DAY (Patient taking differently: Take 1 tablet by mouth Daily.) 30 tablet 6    baclofen (LIORESAL) 20 MG tablet TAKE 1 TABLET BY MOUTH THREE TIMES DAILY FOR MUSCLE SPASMS      cetirizine (zyrTEC) 10 MG tablet Take 1 tablet by mouth Daily.      donepezil (ARICEPT) 10 MG tablet Take 1 tablet by mouth Daily.      DULoxetine (CYMBALTA) 20 MG capsule Take 1 capsule by mouth Daily. 30 capsule 5    fluticasone (FLONASE) 50 MCG/ACT nasal spray       gabapentin (NEURONTIN) 600 MG tablet TAKE 1 TABLET BY MOUTH AT BEDTIME (Patient taking differently: Take 1 tablet by mouth 2 (Two) Times a Day As Needed.) 90 tablet 0    hydroCHLOROthiazide (HYDRODIURIL) 12.5 MG tablet Take 1 tablet by mouth Daily. 90 tablet 2    ketoconazole (NIZORAL) 2 % cream Apply 1 application  topically to the appropriate area as directed Daily.      letrozole (FEMARA) 2.5 MG tablet Take 1 tablet by mouth Daily. 90 tablet 1    levothyroxine  (SYNTHROID, LEVOTHROID) 50 MCG tablet TAKE 1 TABLET BY MOUTH EVERY DAY (Patient taking differently: Take 1 tablet by mouth Daily.) 90 tablet 1    LORazepam (ATIVAN) 0.5 MG tablet Take 1 tablet by mouth Every 6 (Six) Hours As Needed.      losartan (COZAAR) 100 MG tablet Take 1 tablet by mouth Daily. INST PER ANESTHESIA PROTOCOL      montelukast (SINGULAIR) 10 MG tablet Take 1 tablet by mouth Daily.      mupirocin (BACTROBAN) 2 % ointment apply to the affected area(s) twice daily      naproxen (NAPROSYN) 500 MG tablet Take 1 tablet by mouth 2 (Two) Times a Day With Meals. LAST DOSE 1/5/23      ondansetron (ZOFRAN) 8 MG tablet TAKE 1 TABLET BY MOUTH THREE TIMES DAILY AS NEEDED FOR NAUSEA AND VOMITING 30 tablet 5    oxybutynin XL (DITROPAN XL) 15 MG 24 hr tablet TAKE 1 TABLET BY MOUTH DAILY bladder      oxybutynin XL (DITROPAN-XL) 10 MG 24 hr tablet       polyethylene glycol (MIRALAX) 17 g packet Take 17 g by mouth Daily. 5 packet 0    ribociclib succinate 200 MG tablet therapy pack tablet Take 3 tablets by mouth Take As Directed. Take 3 tablets by mouth daily for 21 days then off 7 days on a 28 day cycle. 63 tablet 11    rOPINIRole (REQUIP) 3 MG tablet Take 1 tablet by mouth 2 (Two) Times a Day.      solifenacin (VESICARE) 10 MG tablet Take 1 tablet by mouth Daily for 360 days. 90 tablet 3    SUMAtriptan (IMITREX) 100 MG tablet Take 1 tablet by mouth 1 (One) Time As Needed.      traZODone (DESYREL) 150 MG tablet TAKE 1 TABLET BY MOUTH ONCE nightly (Patient taking differently: Take 1 tablet by mouth Every Night.) 90 tablet 2    venlafaxine XR (EFFEXOR-XR) 150 MG 24 hr capsule TAKE 1 capsule BY MOUTH DAILY FOR ANXIETY      [DISCONTINUED] Morphine (MS CONTIN) 60 MG 12 hr tablet Take 1 tablet by mouth 2 (Two) Times a Day. 60 tablet 0    [DISCONTINUED] oxyCODONE-acetaminophen (PERCOCET)  MG per tablet Take 1 tablet by mouth Every 6 (Six) Hours As Needed (pain). 60 tablet 0     Current Facility-Administered Medications  on File Prior to Visit   Medication Dose Route Frequency Provider Last Rate Last Admin    heparin injection 500 Units  500 Units Intravenous PRN Donald Barker MD   500 Units at 08/09/23 1300    sodium chloride 0.9 % flush 20 mL  20 mL Intravenous PRN Donald Barker MD   20 mL at 08/09/23 1259       Allergies   Allergen Reactions    Azithromycin Itching    Erythromycin Itching     Past Medical History:   Diagnosis Date    Abdominal pain     OSTOMY SITE    Allergic rhinitis     Anemia     NO S/S    Anxiety     Arteriosclerosis     Coronary, follows with Dr. Núñez    Arthritis     Bone metastases 10/14/2022    Bowen's disease     SKIN CANCER    Breast cancer     NO SURGERY WAS DONE DUE TO METS TO BONE/COLON/LYMPHNODE    Cancer related pain 10/13/2022    Depression     Disorder associated with Helicobacter species 10/12/2022    Dysphoric mood     Fatigue     Fibromyalgia     Frequent urination     NO S/S INFECTION    Herpes simplex vulvovaginitis 07/26/2018    History of colon polyps     History of IBS     History of kidney stones     Hyperlipidemia     Hypertension     Hypothyroidism     Insomnia     Lumbago     Mood disorder     Neck pain     R/T CANCER    Palpitations     last time a few months ago    Precordial pain     R/T SPINE CANCER    S/P Laparoscopic Hand-Assisted Colostomy Closure with End to End Anastomosis Open Parastomal Hernia Repair 12/08/2022    Sleep disturbance     SOB (shortness of breath)     at times at rest    SOBOE (shortness of breath on exertion)      Past Surgical History:   Procedure Laterality Date    BREAST BIOPSY Left     CARDIAC CATHETERIZATION Left 1959    CARDIAC CATHETERIZATION N/A 04/06/2018    Procedure: Coronary angiography;  Surgeon: Art Licea MD;  Location: [facility] CATH INVASIVE LOCATION;  Service: Cardiovascular    CARDIAC CATHETERIZATION N/A 04/06/2018    Procedure: Left heart cath;  Surgeon: Art Licea MD;  Location: [facility] CATH INVASIVE  LOCATION;  Service: Cardiovascular    CARDIAC CATHETERIZATION N/A 04/06/2018    Procedure: Left ventriculography;  Surgeon: Art Licea MD;  Location:  [facility] CATH INVASIVE LOCATION;  Service: Cardiovascular    CHOLECYSTECTOMY      COLON SURGERY      colostomy bag    COLONOSCOPY  11/08/2006    COLONOSCOPY N/A 06/08/2023    Procedure: COLONOSCOPY;  Surgeon: Alfred Castañeda MD;  Location: [facility] ENDOSCOPY;  Service: General;  Laterality: N/A;  same as preop    EXPLORATORY LAPAROTOMY N/A 12/06/2022    Procedure: LAPAROTOMY EXPLORATORY sigmoid resection hartmans procedure colostomy;  Surgeon: Alfred Castañeda MD;  Location: [facility] MAIN OR;  Service: General;  Laterality: N/A;    GANGLION CYST EXCISION Bilateral     HYSTERECTOMY  05/2005    ILEOSTOMY CLOSURE N/A 6/26/2023    Procedure: Laparoscopic Hand-Assisted Colostomy Closure with End to End Anastomosis;  Surgeon: Alfred Castañeda MD;  Location: [facility] MAIN OR;  Service: General;  Laterality: N/A;    LAPAROSCOPIC GASTRIC BANDING      BAND REMOVED 2020    PARASTOMAL HERNIA REPAIR N/A 6/26/2023    Procedure: Open Parastomal Hernia Repair;  Surgeon: Alfred Castañeda MD;  Location: [facility] MAIN OR;  Service: General;  Laterality: N/A;    TONSILLECTOMY      VENOUS ACCESS DEVICE (PORT) INSERTION N/A 01/09/2023    Procedure: INSERTION VENOUS ACCESS DEVICE;  Surgeon: Alfred Castañeda MD;  Location: [facility] MAIN OR;  Service: General;  Laterality: N/A;     Social History     Socioeconomic History    Marital status:    Tobacco Use    Smoking status: Every Day     Packs/day: 1.50     Years: 40.00     Pack years: 60.00     Types: Cigarettes     Start date: 1974    Smokeless tobacco: Never    Tobacco comments:          INST PER ANESTHESIA PROTOCOL, LAST SMOKED TODAY   Vaping Use    Vaping Use: Never used   Substance and Sexual Activity    Alcohol use: No    Drug use: Yes     Types: Marijuana     Comment: LAST USE 6/19/23    Sexual  activity: Defer     Partners: Male     Birth control/protection: None     Family History   Problem Relation Age of Onset    Hypertension Mother     Rheum arthritis Mother     Heart disease Mother     Breast cancer Mother     Diabetes Father     Cancer Maternal Grandmother         colon    Colon cancer Maternal Grandmother     Aneurysm Paternal Grandfather     Diabetes Other     Fibromyalgia Other     Malig Hyperthermia Neg Hx        Objective   Physical Exam  Vitals reviewed. Exam conducted with a chaperone present.   Constitutional:       General: She is not in acute distress.     Appearance: Normal appearance.   Cardiovascular:      Rate and Rhythm: Normal rate and regular rhythm.      Heart sounds: Normal heart sounds. No murmur heard.    No gallop.   Pulmonary:      Effort: Pulmonary effort is normal.      Breath sounds: Normal breath sounds.   Abdominal:      General: Abdomen is flat. Bowel sounds are normal.      Palpations: Abdomen is soft.   Musculoskeletal:      Cervical back: Neck supple.      Right lower leg: No edema.      Left lower leg: No edema.   Lymphadenopathy:      Cervical: No cervical adenopathy.   Neurological:      Mental Status: She is alert and oriented to person, place, and time.   Psychiatric:         Mood and Affect: Mood normal.         Behavior: Behavior normal.       Vitals:    08/09/23 1304   BP: 122/45   Pulse: 56   Resp: 18   Temp: 98.6 øF (37 øC)   TempSrc: Temporal   SpO2: 100%   Weight: 77.6 kg (171 lb 1.2 oz)   PainSc:   7   PainLoc: Neck     ECOG score: 1         PHQ-9 Total Score:                    Result Review :   The following data was reviewed by: Donald Barker MD on 08/09/2023:  Lab Results   Component Value Date    HGB 8.6 (L) 08/09/2023    HCT 28.2 (L) 08/09/2023    .4 (H) 08/09/2023     08/09/2023    WBC 3.10 (L) 08/09/2023    NEUTROABS 1.71 08/09/2023    LYMPHSABS 1.01 08/09/2023    MONOSABS 0.32 08/09/2023    EOSABS 0.02 08/09/2023    BASOSABS 0.03  08/09/2023     Lab Results   Component Value Date    GLUCOSE 106 (H) 08/09/2023    BUN 15 08/09/2023    CREATININE 0.81 08/09/2023     08/09/2023    K 4.1 08/09/2023     08/09/2023    CO2 26.6 08/09/2023    CALCIUM 8.6 08/09/2023    PROTEINTOT 6.5 08/09/2023    ALBUMIN 4.1 08/09/2023    BILITOT 0.2 08/09/2023    ALKPHOS 96 08/09/2023    AST 11 08/09/2023    ALT 5 08/09/2023     Lab Results   Component Value Date    MG 2.1 08/09/2023    PHOS 2.7 08/09/2023    FREET4 1.01 01/17/2022    TSH 1.470 11/12/2019     No results found for: IRON, LABIRON, TRANSFERRIN, TIBC  Lab Results   Component Value Date    TWWNOIOL00 390 04/23/2019    FOLATE 9.93 04/23/2019     No results found for: PSA, CEA, AFP, ,     Data reviewed : Radiologic studies CT chest, abdomen, pelvis reviewed       Assessment and Plan    Diagnoses and all orders for this visit:    1. Malignant neoplasm of upper-outer quadrant of left breast in female, estrogen receptor positive (Primary)  Assessment & Plan:  Metastatic.  Patient is on palliative treatment with letrozole, ribociclib, denosumab for bony involvement.  Restaging CT scans show stable disease with no new or worsening findings.  CBC shows mild cytopenias from her ribociclib but not enough to require dose adjustment.  Continue ribociclib and letrozole as prescribed.  I will see her back in 1 month for ongoing treatment with lab work prior to monitor for toxicities      2. Malignant neoplasm metastatic to bone  Assessment & Plan:  Patient is on monthly denosumab.  Tolerating well.  No dental or jaw pain.  Electrolytes today are normal including calcium, phosphorus, magnesium.  Xgeva today monthly.      3. Cancer related pain  Assessment & Plan:  Patient is on MS Contin with oxycodone as needed for breakthrough.  She went back to 45 mg twice daily as higher doses caused mentation changes.  She feels like she is adequately controlled at this level.  Wyatt reviewed and no  discrepancies.  Refill of oxycodone and MS Contin provided.  Reassess next visit.    Orders:  -     Morphine (MS CONTIN) 15 MG 12 hr tablet; Take 3 tablets by mouth 2 (Two) Times a Day.  Dispense: 90 tablet; Refill: 0  -     oxyCODONE-acetaminophen (PERCOCET)  MG per tablet; Take 1 tablet by mouth Every 6 (Six) Hours As Needed (pain).  Dispense: 60 tablet; Refill: 0    Other orders  -     Cancel: denosumab (XGEVA) injection 120 mg            Patient Follow Up: 1 month    Patient was given instructions and counseling regarding her condition or for health maintenance advice. Please see specific information pulled into the AVS if appropriate.     Donald Barker MD    8/9/2023

## 2023-08-09 NOTE — ASSESSMENT & PLAN NOTE
Patient is on monthly denosumab.  Tolerating well.  No dental or jaw pain.  Electrolytes today are normal including calcium, phosphorus, magnesium.  Xgeva today monthly.

## 2023-08-09 NOTE — ASSESSMENT & PLAN NOTE
Metastatic.  Patient is on palliative treatment with letrozole, ribociclib, denosumab for bony involvement.  Restaging CT scans show stable disease with no new or worsening findings.  CBC shows mild cytopenias from her ribociclib but not enough to require dose adjustment.  Continue ribociclib and letrozole as prescribed.  I will see her back in 1 month for ongoing treatment with lab work prior to monitor for toxicities

## 2023-08-09 NOTE — ASSESSMENT & PLAN NOTE
Patient is on MS Contin with oxycodone as needed for breakthrough.  She went back to 45 mg twice daily as higher doses caused mentation changes.  She feels like she is adequately controlled at this level.  Wyatt reviewed and no discrepancies.  Refill of oxycodone and MS Contin provided.  Reassess next visit.

## 2023-08-12 ENCOUNTER — SPECIALTY PHARMACY (OUTPATIENT)
Dept: PHARMACY | Facility: HOSPITAL | Age: 63
End: 2023-08-12
Payer: MEDICARE

## 2023-08-15 ENCOUNTER — TELEPHONE (OUTPATIENT)
Dept: SURGERY | Facility: CLINIC | Age: 63
End: 2023-08-15
Payer: MEDICARE

## 2023-08-15 NOTE — TELEPHONE ENCOUNTER
PATIENT CALLED.  I RELAYED MESSAGE, PER DR. HESS.      I SCHEDULED AN APPOINTMENT FOR HER TO F/U ON 08/16/23 AT 1:20 WITH DR. HESS.

## 2023-08-15 NOTE — TELEPHONE ENCOUNTER
I LMOM FOR PATIENT TO CALL ME.  PER DR. HESS, SHE CAN COME SEE HIM ANYTIME FOR FURTHER EVALUATION.

## 2023-08-15 NOTE — TELEPHONE ENCOUNTER
PATIENT CALLED AND SAID WHEN SHE PULLS HER PACKING FROM HER STOMA, THE PACKING IS A LOT GREEN AND IS BLOODY.    AROUND HER STOMA, IT IS RED AND SORE TO TOUCH.  THERE IS SOME SWELLING.    SHE IS NOT HAVING GOOD BOWEL MOVEMENTS AT ALL.  TODAY, SHE HAS TAKEN MIRALAX AND 3 [employer] LAXATIVES, AND HAS DRANK 2 BOTTLES OF WATER.  SHE HAS STILL NOT HAD A BOWEL MOVEMENT.

## 2023-08-16 ENCOUNTER — OFFICE VISIT (OUTPATIENT)
Dept: SURGERY | Facility: CLINIC | Age: 63
End: 2023-08-16
Payer: MEDICARE

## 2023-08-16 VITALS — BODY MASS INDEX: 27.23 KG/M2 | HEIGHT: 66 IN | WEIGHT: 169.4 LBS | RESPIRATION RATE: 16 BRPM

## 2023-08-16 DIAGNOSIS — Z98.890 HISTORY OF COLOSTOMY REVERSAL: Primary | ICD-10-CM

## 2023-08-16 PROCEDURE — 1160F RVW MEDS BY RX/DR IN RCRD: CPT | Performed by: SURGERY

## 2023-08-16 PROCEDURE — 99024 POSTOP FOLLOW-UP VISIT: CPT | Performed by: SURGERY

## 2023-08-16 PROCEDURE — 1159F MED LIST DOCD IN RCRD: CPT | Performed by: SURGERY

## 2023-08-22 ENCOUNTER — TELEPHONE (OUTPATIENT)
Dept: SURGERY | Facility: CLINIC | Age: 63
End: 2023-08-22
Payer: MEDICARE

## 2023-08-22 NOTE — TELEPHONE ENCOUNTER
PATIENT CALLED AND SAID SHE STILL HAS GREEN AND BLOOD WHEN SHE PUTS THE Q-TIP IN HER STOMA.  SHE SAID DR. HESS TOLD HER TO COME FOR F/U IF IT DIDN'T GET BETTER.    I SCHEDULED AN APPOINTMENT FOR HER ON 08/24/23 AT 2:00.  OKAY PER LINDSEY.

## 2023-08-22 NOTE — PROGRESS NOTES
General Surgery/Colorectal Surgery Note    Patient Name:  Derek Keller  YOB: 1960  [id number]    Referring Provider: No ref. provider found      Patient Care Team:  Haile Monique MD as PCP - General (Family Medicine)  Julien Cardona DO as Consulting Physician (Pain Medicine)  Joel Castillo MD as Consulting Physician (Gastroenterology)  Remington Russell MD as Surgeon (General Surgery)  Ahsan Salas MD as Consulting Physician (Sports Medicine)  Donald Barker MD as Consulting Physician (Hematology and Oncology)  Sandy Ricci MD as Consulting Physician (General Surgery)  Alfred Castañeda MD as Consulting Physician (General Surgery)    Chief complaint follow-up    Subjective .     History of present illness:    Status post laparoscopic hand-assisted colostomy closure with resection, open parastomal hernia repair 6/26/2023  Pathology with metastatic lobular carcinoma to portion of descending colon and anastomotic rings    She comes in for follow-up.  She has had some drainage from her former colostomy site and wants to be evaluated for that.  No fever.  No erythema.  She is having some constipation.      History:  Past Medical History:   Diagnosis Date    Abdominal pain     OSTOMY SITE    Allergic rhinitis     Anemia     NO S/S    Anxiety     Arteriosclerosis     Coronary, follows with Dr. Núñez    Arthritis     Bone metastases 10/14/2022    Bowen's disease     SKIN CANCER    Breast cancer     NO SURGERY WAS DONE DUE TO METS TO BONE/COLON/LYMPHNODE    Cancer related pain 10/13/2022    Depression     Disorder associated with Helicobacter species 10/12/2022    Dysphoric mood     Fatigue     Fibromyalgia     Frequent urination     NO S/S INFECTION    Herpes simplex vulvovaginitis 07/26/2018    History of colon polyps     History of IBS     History of kidney stones     Hyperlipidemia     Hypertension     Hypothyroidism     Insomnia     Lumbago     Mood disorder     Neck pain      R/T CANCER    Palpitations     last time a few months ago    Precordial pain     R/T SPINE CANCER    S/P Laparoscopic Hand-Assisted Colostomy Closure with End to End Anastomosis Open Parastomal Hernia Repair 12/08/2022    Sleep disturbance     SOB (shortness of breath)     at times at rest    SOBOE (shortness of breath on exertion)        Past Surgical History:   Procedure Laterality Date    BREAST BIOPSY Left     CARDIAC CATHETERIZATION Left 1960    CARDIAC CATHETERIZATION N/A 04/06/2018    Procedure: Coronary angiography;  Surgeon: Art Licea MD;  Location: [facility] CATH INVASIVE LOCATION;  Service: Cardiovascular    CARDIAC CATHETERIZATION N/A 04/06/2018    Procedure: Left heart cath;  Surgeon: Art Licea MD;  Location: [facility] CATH INVASIVE LOCATION;  Service: Cardiovascular    CARDIAC CATHETERIZATION N/A 04/06/2018    Procedure: Left ventriculography;  Surgeon: Art Licea MD;  Location: [facility] CATH INVASIVE LOCATION;  Service: Cardiovascular    CHOLECYSTECTOMY      COLON SURGERY      colostomy bag    COLONOSCOPY  11/08/2006    COLONOSCOPY N/A 06/08/2023    Procedure: COLONOSCOPY;  Surgeon: Alfred Castañeda MD;  Location: [facility] ENDOSCOPY;  Service: General;  Laterality: N/A;  same as preop    EXPLORATORY LAPAROTOMY N/A 12/06/2022    Procedure: LAPAROTOMY EXPLORATORY sigmoid resection hartmans procedure colostomy;  Surgeon: Alfred Castañeda MD;  Location: [facility] MAIN OR;  Service: General;  Laterality: N/A;    GANGLION CYST EXCISION Bilateral     HYSTERECTOMY  05/2005    ILEOSTOMY CLOSURE N/A 6/26/2023    Procedure: Laparoscopic Hand-Assisted Colostomy Closure with End to End Anastomosis;  Surgeon: Alfred Castañeda MD;  Location: [facility] OR;  Service: General;  Laterality: N/A;    LAPAROSCOPIC GASTRIC BANDING      BAND REMOVED 2020    PARASTOMAL HERNIA REPAIR N/A 6/26/2023    Procedure: Open Parastomal Hernia Repair;  Surgeon: Alfred Castañeda MD;   Location: [facility] MAIN OR;  Service: General;  Laterality: N/A;    TONSILLECTOMY      VENOUS ACCESS DEVICE (PORT) INSERTION N/A 01/09/2023    Procedure: INSERTION VENOUS ACCESS DEVICE;  Surgeon: Alfred Castañeda MD;  Location: [facility] MAIN OR;  Service: General;  Laterality: N/A;       Family History   Problem Relation Age of Onset    Hypertension Mother     Rheum arthritis Mother     Heart disease Mother     Breast cancer Mother     Diabetes Father     Cancer Maternal Grandmother         colon    Colon cancer Maternal Grandmother     Aneurysm Paternal Grandfather     Diabetes Other     Fibromyalgia Other     Malig Hyperthermia Neg Hx        Social History     Tobacco Use    Smoking status: Every Day     Packs/day: 1.50     Years: 40.00     Pack years: 60.00     Types: Cigarettes     Start date: 1974    Smokeless tobacco: Never    Tobacco comments:          INST PER ANESTHESIA PROTOCOL, LAST SMOKED TODAY   Vaping Use    Vaping Use: Never used   Substance Use Topics    Alcohol use: No    Drug use: Yes     Types: Marijuana     Comment: LAST USE 6/19/23       Review of Systems  All systems were reviewed and negative except for:   Review of Systems   Constitutional: Negative for chills, fever and unexpected weight loss.   HENT: Negative for congestion, nosebleeds and voice change.    Eyes: Negative for blurred vision, double vision and discharge.   Respiratory: Negative for apnea, chest tightness and shortness of breath.    Cardiovascular: Negative for chest pain and leg swelling.   Gastrointestinal:        See HPI   Endocrine: Negative for cold intolerance and heat intolerance.   Genitourinary: Negative for dysuria, hematuria and urgency.   Musculoskeletal: Negative for back pain, joint swelling and neck pain.   Skin: Negative for color change and dry skin.   Neurological: Negative for dizziness and confusion.   Hematological: Negative for adenopathy.   Psychiatric/Behavioral: Negative for agitation and behavioral  problems.     MEDS:  Prior to Admission medications    Medication Sig Start Date End Date Taking? Authorizing Provider   atorvastatin (LIPITOR) 20 MG tablet TAKE 1 TABLET BY MOUTH EVERY DAY  Patient taking differently: Take 1 tablet by mouth Daily. 10/1/19  Yes Yudelka Manning MD   baclofen (LIORESAL) 20 MG tablet TAKE 1 TABLET BY MOUTH THREE TIMES DAILY FOR MUSCLE SPASMS   Yes ProviderBessie MD   cetirizine (zyrTEC) 10 MG tablet Take 1 tablet by mouth Daily.   Yes Provider, MD Bessie   donepezil (ARICEPT) 10 MG tablet Take 1 tablet by mouth Daily. 10/28/22  Yes ProviderBessie MD   DULoxetine (CYMBALTA) 20 MG capsule Take 1 capsule by mouth Daily. 7/14/23  Yes Donald Barker MD   fluticasone (FLONASE) 50 MCG/ACT nasal spray    Yes Provider, MD Bessie   gabapentin (NEURONTIN) 600 MG tablet TAKE 1 TABLET BY MOUTH AT BEDTIME  Patient taking differently: Take 1 tablet by mouth 2 (Two) Times a Day As Needed. 7/30/20  Yes Yudelka Manning MD   hydroCHLOROthiazide (HYDRODIURIL) 12.5 MG tablet Take 1 tablet by mouth Daily. 5/17/23  Yes Donald Barker MD   ketoconazole (NIZORAL) 2 % cream Apply 1 application  topically to the appropriate area as directed Daily. 4/6/23  Yes Bessie Lopez MD   letrozole (FEMARA) 2.5 MG tablet Take 1 tablet by mouth Daily. 11/30/22  Yes Donald Barker MD   levothyroxine (SYNTHROID, LEVOTHROID) 50 MCG tablet TAKE 1 TABLET BY MOUTH EVERY DAY  Patient taking differently: Take 1 tablet by mouth Daily. 7/19/19  Yes Yudelka Manning MD   LORazepam (ATIVAN) 0.5 MG tablet Take 1 tablet by mouth Every 6 (Six) Hours As Needed.   Yes Provider, MD Bessie   losartan (COZAAR) 100 MG tablet Take 1 tablet by mouth Daily. INST PER ANESTHESIA PROTOCOL   Yes ProviderBessie MD   montelukast (SINGULAIR) 10 MG tablet Take 1 tablet by mouth Daily. 1/17/22  Yes Bessie Lopez MD   Morphine (MS CONTIN) 15 MG 12 hr tablet Take 3 tablets by mouth 2  (Two) Times a Day. 8/9/23  Yes Donald Barker MD   mupirocin (BACTROBAN) 2 % ointment apply to the affected area(s) twice daily 5/22/23  Yes Bessie Lopez MD   naproxen (NAPROSYN) 500 MG tablet Take 1 tablet by mouth 2 (Two) Times a Day With Meals. LAST DOSE 1/5/23   Yes Bessie Lopez MD   ondansetron (ZOFRAN) 8 MG tablet TAKE 1 TABLET BY MOUTH THREE TIMES DAILY AS NEEDED FOR NAUSEA AND VOMITING 3/8/23  Yes Donald Barker MD   oxybutynin XL (DITROPAN XL) 15 MG 24 hr tablet TAKE 1 TABLET BY MOUTH DAILY bladder   Yes Bessie Lopez MD   oxybutynin XL (DITROPAN-XL) 10 MG 24 hr tablet    Yes Bessie Lopez MD   oxyCODONE-acetaminophen (PERCOCET)  MG per tablet Take 1 tablet by mouth Every 6 (Six) Hours As Needed (pain). 8/9/23  Yes Donald Barker MD   polyethylene glycol (MIRALAX) 17 g packet Take 17 g by mouth Daily. 1/9/23  Yes Alfred Castañeda MD   ribociclib succinate 200 MG tablet therapy pack tablet Take 3 tablets by mouth Take As Directed. Take 3 tablets by mouth daily for 21 days then off 7 days on a 28 day cycle. 8/1/23  Yes Donald Barker MD   rOPINIRole (REQUIP) 3 MG tablet Take 1 tablet by mouth 2 (Two) Times a Day. 6/29/22  Yes Bessie Lopez MD   solifenacin (VESICARE) 10 MG tablet Take 1 tablet by mouth Daily for 360 days. 10/25/22 10/20/23 Yes Destinee Myers MD   SUMAtriptan (IMITREX) 100 MG tablet Take 1 tablet by mouth 1 (One) Time As Needed. 10/7/22  Yes Bessie Lopez MD   traZODone (DESYREL) 150 MG tablet TAKE 1 TABLET BY MOUTH ONCE nightly  Patient taking differently: Take 1 tablet by mouth Every Night. 11/12/19  Yes Yudelka Manning MD   venlafaxine XR (EFFEXOR-XR) 150 MG 24 hr capsule TAKE 1 capsule BY MOUTH DAILY FOR ANXIETY 7/14/23  Yes Provider, Historical, MD        Allergies:  Azithromycin and Erythromycin    Objective     Vital Signs        Physical Exam: Former ostomy site healing well, shallow, no evidence of  "infection, no hernia        Results Review:   {Results Review:02395::\"I reviewed the patient's new clinical results.\"    LABS/IMAGING:  Results for orders placed or performed during the hospital encounter of 08/09/23   Comprehensive Metabolic Panel    Specimen: Blood, Central Line   Result Value Ref Range    Glucose 106 (H) 65 - 99 mg/dL    BUN 15 8 - 23 mg/dL    Creatinine 0.81 0.57 - 1.00 mg/dL    Sodium 140 136 - 145 mmol/L    Potassium 4.1 3.5 - 5.2 mmol/L    Chloride 102 98 - 107 mmol/L    CO2 26.6 22.0 - 29.0 mmol/L    Calcium 8.6 8.6 - 10.5 mg/dL    Total Protein 6.5 6.0 - 8.5 g/dL    Albumin 4.1 3.5 - 5.2 g/dL    ALT (SGPT) 5 1 - 33 U/L    AST (SGOT) 11 1 - 32 U/L    Alkaline Phosphatase 96 39 - 117 U/L    Total Bilirubin 0.2 0.0 - 1.2 mg/dL    Globulin 2.4 gm/dL    A/G Ratio 1.7 g/dL    BUN/Creatinine Ratio 18.5 7.0 - 25.0    Anion Gap 11.4 5.0 - 15.0 mmol/L    eGFR 81.7 >60.0 mL/min/1.73   Magnesium    Specimen: Blood, Central Line   Result Value Ref Range    Magnesium 2.1 1.6 - 2.4 mg/dL   Phosphorus    Specimen: Blood, Central Line   Result Value Ref Range    Phosphorus 2.7 2.5 - 4.5 mg/dL   CBC Auto Differential    Specimen: Blood, Central Line   Result Value Ref Range    WBC 3.10 (L) 3.40 - 10.80 10*3/mm3    RBC 2.65 (L) 3.77 - 5.28 10*6/mm3    Hemoglobin 8.6 (L) 12.0 - 15.9 g/dL    Hematocrit 28.2 (L) 34.0 - 46.6 %    .4 (H) 79.0 - 97.0 fL    MCH 32.5 26.6 - 33.0 pg    MCHC 30.5 (L) 31.5 - 35.7 g/dL    RDW 15.2 12.3 - 15.4 %    RDW-SD 58.5 (H) 37.0 - 54.0 fl    MPV 10.4 6.0 - 12.0 fL    Platelets 157 140 - 450 10*3/mm3    Neutrophil % 55.2 42.7 - 76.0 %    Lymphocyte % 32.6 19.6 - 45.3 %    Monocyte % 10.3 5.0 - 12.0 %    Eosinophil % 0.6 0.3 - 6.2 %    Basophil % 1.0 0.0 - 1.5 %    Immature Grans % 0.3 0.0 - 0.5 %    Neutrophils, Absolute 1.71 1.70 - 7.00 10*3/mm3    Lymphocytes, Absolute 1.01 0.70 - 3.10 10*3/mm3    Monocytes, Absolute 0.32 0.10 - 0.90 10*3/mm3    Eosinophils, Absolute 0.02 " 0.00 - 0.40 10*3/mm3    Basophils, Absolute 0.03 0.00 - 0.20 10*3/mm3    Immature Grans, Absolute 0.01 0.00 - 0.05 10*3/mm3        Result Review :     Assessment & Plan     Status post laparoscopic hand-assisted colostomy closure with resection, open parastomal hernia repair 6/26/2023  Pathology with metastatic lobular carcinoma to portion of descending colon and anastomotic rings  Constipation    Discussion with patient.  I reassured her there is no evidence of infection.  After verbal consent, applied silver nitrate to the opening to help stimulate closure of the dead space.  Continue dry to dry packing until this is completely healed.  Patient has a history of chronic narcotic use and I encouraged her to take MiraLAX daily to stay ahead of her constipation.  Follow-up with me as needed.  All questions answered.  She agrees with the plan.  Thank you for the consult.         This document has been electronically signed by Alfred Castañeda MD  August 22, 2023 13:16 EDT

## 2023-08-24 ENCOUNTER — OFFICE VISIT (OUTPATIENT)
Dept: SURGERY | Facility: CLINIC | Age: 63
End: 2023-08-24
Payer: MEDICARE

## 2023-08-24 VITALS — WEIGHT: 175.2 LBS | RESPIRATION RATE: 14 BRPM | HEIGHT: 66 IN | BODY MASS INDEX: 28.16 KG/M2

## 2023-08-24 DIAGNOSIS — Z98.890 HISTORY OF COLOSTOMY REVERSAL: Primary | ICD-10-CM

## 2023-08-24 PROCEDURE — 1160F RVW MEDS BY RX/DR IN RCRD: CPT | Performed by: SURGERY

## 2023-08-24 PROCEDURE — 99024 POSTOP FOLLOW-UP VISIT: CPT | Performed by: SURGERY

## 2023-08-24 PROCEDURE — 1159F MED LIST DOCD IN RCRD: CPT | Performed by: SURGERY

## 2023-08-28 ENCOUNTER — SPECIALTY PHARMACY (OUTPATIENT)
Dept: PHARMACY | Facility: HOSPITAL | Age: 63
End: 2023-08-28
Payer: MEDICARE

## 2023-08-28 DIAGNOSIS — G89.3 CANCER RELATED PAIN: ICD-10-CM

## 2023-08-28 NOTE — PROGRESS NOTES
Specialty Pharmacy Patient Management Program  Oncology Refill Outreach      Derek is a 62 y.o. female contacted today regarding refills of her medication(s).    Specialty medication(s) and dose(s) confirmed: ribociclib succinate 200 MG tablet therapy pack tablet. Sent to [place] pharmacy for patient to   once ready.    Other medications being refilled: N/A    Refill Questions      Flowsheet Row Most Recent Value   Changes to allergies? No   Changes to medications? No   New conditions since last clinic visit No   Unplanned office visit, urgent care, ED, or hospital admission in the last 4 weeks  No   How does patient/caregiver feel medication is working? Very good   Financial problems or insurance changes  No   Since the previous refill, were any specialty medication doses or scheduled injections missed or delayed?  No   If yes, please provide the amount N/A   Why were doses missed? N/A   Does this patient require a clinical escalation to a pharmacist? No            Delivery Questions      Flowsheet Row Most Recent Value   Delivery method  at Pharmacy   Delivery address correct? Yes   Delivery phone number [phone]   Preferred delivery time? Anytime   Number of medications in delivery 1   Medication being filled and delivered ribociclib succinate 200 MG tablet therapy pack tablet   Doses left of specialty medications N/A Week off   Is there any medication that is due not being filled? No   Supplies needed? No supplies needed   Cooler needed? No   Do any medications need mixed or dated? No   Copay form of payment Pay at pickup   Additional comments Zero copay   Questions or concerns for the pharmacist? No   Explain any questions or concerns for the pharmacist N/A   Are any medications first time fills? No                   Follow-up: 21 days     Maria Luz Frazier  Care Coordinator, [facility]  8/28/2023  11:36 EDT

## 2023-08-28 NOTE — TELEPHONE ENCOUNTER
Caller: Derek Keller    Relationship: Self    Best call back number: [phone]    Requested Prescriptions:   Requested Prescriptions     Pending Prescriptions Disp Refills    Morphine (MS CONTIN) 15 MG 12 hr tablet 90 tablet 0     Sig: Take 3 tablets by mouth 2 (Two) Times a Day.    oxyCODONE-acetaminophen (PERCOCET)  MG per tablet 60 tablet 0     Sig: Take 1 tablet by mouth Every 6 (Six) Hours As Needed (pain).        Pharmacy where request should be sent:      [facility], [facility], & [facility], [place] - [address] - [phone]  - [phone]     Last office visit with prescribing clinician: 8/9/2023   Last telemedicine visit with prescribing clinician: Visit date not found   Next office visit with prescribing clinician: 9/6/2023     Does the patient have less than a 3 day supply:  [x] Yes  [] No    Would you like a call back once the refill request has been completed: [x] Yes [] No    If the office needs to give you a call back, can they leave a voicemail: [x] Yes [] No    Hafsa High   08/28/23 14:52 EDT

## 2023-08-29 RX ORDER — MORPHINE SULFATE 15 MG/1
45 TABLET, FILM COATED, EXTENDED RELEASE ORAL 2 TIMES DAILY
Qty: 180 TABLET | Refills: 0 | Status: SHIPPED | OUTPATIENT
Start: 2023-08-29

## 2023-08-29 RX ORDER — OXYCODONE AND ACETAMINOPHEN 10; 325 MG/1; MG/1
1 TABLET ORAL EVERY 6 HOURS PRN
Qty: 60 TABLET | Refills: 0 | Status: SHIPPED | OUTPATIENT
Start: 2023-08-29

## 2023-08-29 NOTE — PROGRESS NOTES
General Surgery/Colorectal Surgery Note    Patient Name:  Derek Keller  YOB: 1960  [id number]    Referring Provider: No ref. provider found      Patient Care Team:  Haile Monique MD as PCP - General (Family Medicine)  Julien Cardona DO as Consulting Physician (Pain Medicine)  Joel Castillo MD as Consulting Physician (Gastroenterology)  Remington Russell MD as Surgeon (General Surgery)  Ahsan Salas MD as Consulting Physician (Sports Medicine)  Donald Barker MD as Consulting Physician (Hematology and Oncology)  Sandy Ricci MD as Consulting Physician (General Surgery)  Alfred Castañeda MD as Consulting Physician (General Surgery)    Chief complaint follow-up    Subjective .     History of present illness:    Status post laparoscopic hand-assisted colostomy closure with resection, open parastomal hernia repair 6/26/2023  Pathology with metastatic lobular carcinoma to portion of descending colon and anastomotic rings    She comes in for silver nitrate application to her former stoma site.      History:  Past Medical History:   Diagnosis Date    Abdominal pain     OSTOMY SITE    Allergic rhinitis     Anemia     NO S/S    Anxiety     Arteriosclerosis     Coronary, follows with Dr. Núñez    Arthritis     Bone metastases 10/14/2022    Bowen's disease     SKIN CANCER    Breast cancer     NO SURGERY WAS DONE DUE TO METS TO BONE/COLON/LYMPHNODE    Cancer related pain 10/13/2022    Depression     Disorder associated with Helicobacter species 10/12/2022    Dysphoric mood     Fatigue     Fibromyalgia     Frequent urination     NO S/S INFECTION    Herpes simplex vulvovaginitis 07/26/2018    History of colon polyps     History of IBS     History of kidney stones     Hyperlipidemia     Hypertension     Hypothyroidism     Insomnia     Lumbago     Mood disorder     Neck pain     R/T CANCER    Palpitations     last time a few months ago    Precordial pain     R/T SPINE CANCER    S/P  Laparoscopic Hand-Assisted Colostomy Closure with End to End Anastomosis Open Parastomal Hernia Repair 12/08/2022    Sleep disturbance     SOB (shortness of breath)     at times at rest    SOBOE (shortness of breath on exertion)        Past Surgical History:   Procedure Laterality Date    BREAST BIOPSY Left     CARDIAC CATHETERIZATION Left 1960    CARDIAC CATHETERIZATION N/A 04/06/2018    Procedure: Coronary angiography;  Surgeon: Art Licea MD;  Location: [facility] CATH INVASIVE LOCATION;  Service: Cardiovascular    CARDIAC CATHETERIZATION N/A 04/06/2018    Procedure: Left heart cath;  Surgeon: Art Licea MD;  [facility] LOC[facility];  Service: Cardiovascular    CARDIAC CATHETERIZATION N/A 04/06/2018    Procedure: Left ventriculography;  Surgeon: Art Licea MD;  [facility] C[facility];  Service: Cardiovascular    CHOLECYSTECTOMY      COLON SURGERY      colostomy bag    COLONOSCOPY  11/08/2006    COLONOSCOPY N/A 06/08/2023    Procedure: COLONOSCOPY;  Surgeon: Alfred Castañeda MD;  Location: [facility] ENDOSCOPY;  Service: General;  Laterality: N/A;  same as preop    EXPLORATORY LAPAROTOMY N/A 12/06/2022    Procedure: LAPAROTOMY EXPLORATORY sigmoid resection hartmans procedure colostomy;  Surgeon: Alfred Castañeda MD;  Location: [facility] MAIN OR;  Service: General;  Laterality: N/A;    GANGLION CYST EXCISION Bilateral     HYSTERECTOMY  05/2005    ILEOSTOMY CLOSURE N/A 6/26/2023    Procedure: Laparoscopic Hand-Assisted Colostomy Closure with End to End Anastomosis;  Surgeon: Alfred Castañeda MD;  Location: [facility] MAIN OR;  Service: General;  Laterality: N/A;    LAPAROSCOPIC GASTRIC BANDING      BAND REMOVED 2020    PARASTOMAL HERNIA REPAIR N/A 6/26/2023    Procedure: Open Parastomal Hernia Repair;  Surgeon: Alfred Castañeda MD;  Location: [facility] MAIN OR;  Service: General;  Laterality: N/A;    TONSILLECTOMY      VENOUS ACCESS DEVICE  (PORT) INSERTION N/A 01/09/2023    Procedure: INSERTION VENOUS ACCESS DEVICE;  Surgeon: Alfred Castañeda MD;  Location: [facility] MAIN OR;  Service: General;  Laterality: N/A;       Family History   Problem Relation Age of Onset    Hypertension Mother     Rheum arthritis Mother     Heart disease Mother     Breast cancer Mother     Diabetes Father     Cancer Maternal Grandmother         colon    Colon cancer Maternal Grandmother     Aneurysm Paternal Grandfather     Diabetes Other     Fibromyalgia Other     Malig Hyperthermia Neg Hx        Social History     Tobacco Use    Smoking status: Every Day     Packs/day: 1.50     Years: 40.00     Pack years: 60.00     Types: Cigarettes     Start date: 1974    Smokeless tobacco: Never    Tobacco comments:          INST PER ANESTHESIA PROTOCOL, LAST SMOKED TODAY   Vaping Use    Vaping Use: Never used   Substance Use Topics    Alcohol use: No    Drug use: Yes     Types: Marijuana     Comment: LAST USE 6/19/23       Review of Systems  All systems were reviewed and negative except for:   Review of Systems   Constitutional: Negative for chills, fever and unexpected weight loss.   HENT: Negative for congestion, nosebleeds and voice change.    Eyes: Negative for blurred vision, double vision and discharge.   Respiratory: Negative for apnea, chest tightness and shortness of breath.    Cardiovascular: Negative for chest pain and leg swelling.   Gastrointestinal:        See HPI   Endocrine: Negative for cold intolerance and heat intolerance.   Genitourinary: Negative for dysuria, hematuria and urgency.   Musculoskeletal: Negative for back pain, joint swelling and neck pain.   Skin: Negative for color change and dry skin.   Neurological: Negative for dizziness and confusion.   Hematological: Negative for adenopathy.   Psychiatric/Behavioral: Negative for agitation and behavioral problems.     MEDS:  Prior to Admission medications    Medication Sig Start Date End Date Taking?  Authorizing Provider   atorvastatin (LIPITOR) 20 MG tablet TAKE 1 TABLET BY MOUTH EVERY DAY  Patient taking differently: Take 1 tablet by mouth Daily. 10/1/19  Yes Yudelka Manning MD   donepezil (ARICEPT) 10 MG tablet Take 1 tablet by mouth Daily. 10/28/22  Yes Bessie Lopez MD   DULoxetine (CYMBALTA) 20 MG capsule Take 1 capsule by mouth Daily. 7/14/23  Yes Donald Barker MD   gabapentin (NEURONTIN) 600 MG tablet TAKE 1 TABLET BY MOUTH AT BEDTIME  Patient taking differently: Take 1 tablet by mouth 2 (Two) Times a Day As Needed. 7/30/20  Yes Yudelka Manning MD   hydroCHLOROthiazide (HYDRODIURIL) 12.5 MG tablet Take 1 tablet by mouth Daily. 5/17/23  Yes Donald Barker MD   letrozole (FEMARA) 2.5 MG tablet Take 1 tablet by mouth Daily. 11/30/22  Yes Donald Barker MD   levothyroxine (SYNTHROID, LEVOTHROID) 50 MCG tablet TAKE 1 TABLET BY MOUTH EVERY DAY  Patient taking differently: Take 1 tablet by mouth Daily. 7/19/19  Yes Yudelka Manning MD   LORazepam (ATIVAN) 0.5 MG tablet Take 1 tablet by mouth Every 6 (Six) Hours As Needed.   Yes Bessie Lopez MD   losartan (COZAAR) 100 MG tablet Take 1 tablet by mouth Daily. INST PER ANESTHESIA PROTOCOL   Yes Bessie Lopez MD   montelukast (SINGULAIR) 10 MG tablet Take 1 tablet by mouth Daily. 1/17/22  Yes Bessie Lopez MD   ondansetron (ZOFRAN) 8 MG tablet TAKE 1 TABLET BY MOUTH THREE TIMES DAILY AS NEEDED FOR NAUSEA AND VOMITING 3/8/23  Yes Donald Barker MD   oxybutynin XL (DITROPAN XL) 15 MG 24 hr tablet TAKE 1 TABLET BY MOUTH DAILY bladder   Yes Bessie Lopez MD   polyethylene glycol (MIRALAX) 17 g packet Take 17 g by mouth Daily. 1/9/23  Yes Alfred Castañeda MD   ribociclib succinate 200 MG tablet therapy pack tablet Take 3 tablets by mouth Take As Directed. Take 3 tablets by mouth daily for 21 days then off 7 days on a 28 day cycle. 8/1/23  Yes Donald Barker MD   rOPINIRole (REQUIP) 3 MG tablet Take  1 tablet by mouth 2 (Two) Times a Day. 6/29/22  Yes Bessie Lopez MD   SUMAtriptan (IMITREX) 100 MG tablet Take 1 tablet by mouth 1 (One) Time As Needed. 10/7/22  Yes Bessie Lopez MD   traZODone (DESYREL) 150 MG tablet TAKE 1 TABLET BY MOUTH ONCE nightly  Patient taking differently: Take 1 tablet by mouth Every Night. 11/12/19  Yes Yudelka Manning MD   baclofen (LIORESAL) 20 MG tablet TAKE 1 TABLET BY MOUTH THREE TIMES DAILY FOR MUSCLE SPASMS  Patient not taking: Reported on 8/24/2023    Bessie Lopez MD   cetirizine (zyrTEC) 10 MG tablet Take 1 tablet by mouth Daily.  Patient not taking: Reported on 8/24/2023    Bessie Lopez MD   fluticasone (FLONASE) 50 MCG/ACT nasal spray     Bessie Lopez MD   ketoconazole (NIZORAL) 2 % cream Apply 1 application  topically to the appropriate area as directed Daily.  Patient not taking: Reported on 8/24/2023 4/6/23   Bessie Lopez MD   Morphine (MS CONTIN) 15 MG 12 hr tablet Take 3 tablets by mouth 2 (Two) Times a Day. 8/29/23   Donald Barker MD   mupirocin (BACTROBAN) 2 % ointment apply to the affected area(s) twice daily  Patient not taking: Reported on 8/24/2023 5/22/23   Bessie Lopez MD   naproxen (NAPROSYN) 500 MG tablet Take 1 tablet by mouth 2 (Two) Times a Day With Meals. LAST DOSE 1/5/23  Patient not taking: Reported on 8/24/2023    Bessie Lopez MD   oxybutynin XL (DITROPAN-XL) 10 MG 24 hr tablet     Bessie Lopez MD   oxyCODONE-acetaminophen (PERCOCET)  MG per tablet Take 1 tablet by mouth Every 6 (Six) Hours As Needed (pain). 8/29/23   Donald Barker MD   solifenacin (VESICARE) 10 MG tablet Take 1 tablet by mouth Daily for 360 days.  Patient not taking: Reported on 8/24/2023 10/25/22 10/20/23  Destinee Myers MD        Allergies:  Azithromycin and Erythromycin    Objective     Vital Signs        Physical Exam:     General Appearance:    Alert, cooperative, in no acute  "distress   Head:    Normocephalic, without obvious abnormality, atraumatic   Eyes:          Conjunctivae and sclerae normal, no icterus,     Ears:    Ears appear intact with no abnormalities noted   Throat:   No oral lesions, no thrush, oral mucosa moist   Neck:   No adenopathy, supple, trachea midline, no thyromegaly   Back:     No kyphosis present, no scoliosis present, no skin lesions,      erythema or scars, no tenderness to percussion or                   palpation,   range of motion normal   Lungs:     Clear to auscultation,respirations regular, even and                  unlabored    Heart:    Regular rhythm and normal rate, normal S1 and S2, no            murmur, no gallop, no rub, no click   Chest Wall:    No abnormalities observed   Abdomen:     Normal bowel sounds, no masses, no organomegaly, soft        non-tender, non-distended, no guarding, no rebound                tenderness, no infection to the former stoma site, shallow, no hernia   Rectal:        Extremities:   Moves all extremities well, no edema, no cyanosis, no             redness   Pulses:   Pulses palpable and equal bilaterally   Skin:   No bleeding, bruising or rash   Lymph nodes:   No palpable adenopathy   Neurologic:   A/o x 4 with no deficits       Results Review:   {Results Review:[id number]::\"I reviewed the patient's new clinical results.\"    LABS/IMAGING:  Results for orders placed or performed during the hospital encounter of 08/09/23   Comprehensive Metabolic Panel    Specimen: Blood, Central Line   Result Value Ref Range    Glucose 106 (H) 65 - 99 mg/dL    BUN 15 8 - 23 mg/dL    Creatinine 0.81 0.57 - 1.00 mg/dL    Sodium 140 136 - 145 mmol/L    Potassium 4.1 3.5 - 5.2 mmol/L    Chloride 102 98 - 107 mmol/L    CO2 26.6 22.0 - 29.0 mmol/L    Calcium 8.6 8.6 - 10.5 mg/dL    Total Protein 6.5 6.0 - 8.5 g/dL    Albumin 4.1 3.5 - 5.2 g/dL    ALT (SGPT) 5 1 - 33 U/L    AST (SGOT) 11 1 - 32 U/L    Alkaline Phosphatase 96 39 - 117 U/L    Total " Bilirubin 0.2 0.0 - 1.2 mg/dL    Globulin 2.4 gm/dL    A/G Ratio 1.7 g/dL    BUN/Creatinine Ratio 18.5 7.0 - 25.0    Anion Gap 11.4 5.0 - 15.0 mmol/L    eGFR 81.7 >60.0 mL/min/1.73   Magnesium    Specimen: Blood, Central Line   Result Value Ref Range    Magnesium 2.1 1.6 - 2.4 mg/dL   Phosphorus    Specimen: Blood, Central Line   Result Value Ref Range    Phosphorus 2.7 2.5 - 4.5 mg/dL   CBC Auto Differential    Specimen: Blood, Central Line   Result Value Ref Range    WBC 3.10 (L) 3.40 - 10.80 10*3/mm3    RBC 2.65 (L) 3.77 - 5.28 10*6/mm3    Hemoglobin 8.6 (L) 12.0 - 15.9 g/dL    Hematocrit 28.2 (L) 34.0 - 46.6 %    .4 (H) 79.0 - 97.0 fL    MCH 32.5 26.6 - 33.0 pg    MCHC 30.5 (L) 31.5 - 35.7 g/dL    RDW 15.2 12.3 - 15.4 %    RDW-SD 58.5 (H) 37.0 - 54.0 fl    MPV 10.4 6.0 - 12.0 fL    Platelets 157 140 - 450 10*3/mm3    Neutrophil % 55.2 42.7 - 76.0 %    Lymphocyte % 32.6 19.6 - 45.3 %    Monocyte % 10.3 5.0 - 12.0 %    Eosinophil % 0.6 0.3 - 6.2 %    Basophil % 1.0 0.0 - 1.5 %    Immature Grans % 0.3 0.0 - 0.5 %    Neutrophils, Absolute 1.71 1.70 - 7.00 10*3/mm3    Lymphocytes, Absolute 1.01 0.70 - 3.10 10*3/mm3    Monocytes, Absolute 0.32 0.10 - 0.90 10*3/mm3    Eosinophils, Absolute 0.02 0.00 - 0.40 10*3/mm3    Basophils, Absolute 0.03 0.00 - 0.20 10*3/mm3    Immature Grans, Absolute 0.01 0.00 - 0.05 10*3/mm3        Result Review :     Assessment & Plan     Status post laparoscopic hand-assisted colostomy closure with resection, open parastomal hernia repair 6/26/2023  Pathology with metastatic lobular carcinoma to portion of descending colon and anastomotic rings    After verbal consent, applied silver nitrate to the colostomy site to help improve healing.  Continue packing till the the site has filled in.  Follow-up with me as needed.  All questions answered.  She agrees with the plan.  Thank for the consult.              This document has been electronically signed by MD Festus Urena  29, 2023 13:20 EDT

## 2023-09-05 ENCOUNTER — TELEPHONE (OUTPATIENT)
Dept: SURGERY | Facility: CLINIC | Age: 63
End: 2023-09-05
Payer: MEDICARE

## 2023-09-05 NOTE — TELEPHONE ENCOUNTER
PT HAD COLOSTOMY REVERSAL, SHE HAS BEEN PACKING HER WOUND. SHE CALLED TO REPORT THAT THE OPENING IS NOW TOO SMALL FOR HER TO GET THE PACKING IN BUT IT IS STILL OOZING A LOT. WHAT SHOULD SHE DO NOW?

## 2023-09-06 ENCOUNTER — OFFICE VISIT (OUTPATIENT)
Dept: ONCOLOGY | Facility: HOSPITAL | Age: 63
End: 2023-09-06
Payer: MEDICARE

## 2023-09-06 ENCOUNTER — HOSPITAL ENCOUNTER (OUTPATIENT)
Dept: ONCOLOGY | Facility: HOSPITAL | Age: 63
Discharge: HOME | End: 2023-09-06
Admitting: INTERNAL MEDICINE
Payer: MEDICARE

## 2023-09-06 ENCOUNTER — HOSPITAL ENCOUNTER (OUTPATIENT)
Dept: ONCOLOGY | Facility: HOSPITAL | Age: 63
Discharge: HOME | End: 2023-09-06
Payer: MEDICARE

## 2023-09-06 VITALS
DIASTOLIC BLOOD PRESSURE: 49 MMHG | TEMPERATURE: 98 F | HEART RATE: 64 BPM | WEIGHT: 171.74 LBS | RESPIRATION RATE: 16 BRPM | OXYGEN SATURATION: 97 % | SYSTOLIC BLOOD PRESSURE: 88 MMHG | BODY MASS INDEX: 27.72 KG/M2

## 2023-09-06 DIAGNOSIS — Z17.0 MALIGNANT NEOPLASM OF UPPER-OUTER QUADRANT OF LEFT BREAST IN FEMALE, ESTROGEN RECEPTOR POSITIVE: Primary | ICD-10-CM

## 2023-09-06 DIAGNOSIS — Z17.0 MALIGNANT NEOPLASM OF LEFT BREAST IN FEMALE, ESTROGEN RECEPTOR POSITIVE, UNSPECIFIED SITE OF BREAST: ICD-10-CM

## 2023-09-06 DIAGNOSIS — C50.912 MALIGNANT NEOPLASM OF LEFT BREAST IN FEMALE, ESTROGEN RECEPTOR POSITIVE, UNSPECIFIED SITE OF BREAST: ICD-10-CM

## 2023-09-06 DIAGNOSIS — Z17.0 MALIGNANT NEOPLASM OF LEFT BREAST IN FEMALE, ESTROGEN RECEPTOR POSITIVE, UNSPECIFIED SITE OF BREAST: Primary | ICD-10-CM

## 2023-09-06 DIAGNOSIS — I10 PRIMARY HYPERTENSION: ICD-10-CM

## 2023-09-06 DIAGNOSIS — C50.412 MALIGNANT NEOPLASM OF UPPER-OUTER QUADRANT OF LEFT BREAST IN FEMALE, ESTROGEN RECEPTOR POSITIVE: Primary | ICD-10-CM

## 2023-09-06 DIAGNOSIS — Z45.2 ENCOUNTER FOR ADJUSTMENT OR MANAGEMENT OF VASCULAR ACCESS DEVICE: ICD-10-CM

## 2023-09-06 DIAGNOSIS — G89.3 CANCER RELATED PAIN: ICD-10-CM

## 2023-09-06 DIAGNOSIS — C50.912 MALIGNANT NEOPLASM OF LEFT BREAST IN FEMALE, ESTROGEN RECEPTOR POSITIVE, UNSPECIFIED SITE OF BREAST: Primary | ICD-10-CM

## 2023-09-06 DIAGNOSIS — C79.51 MALIGNANT NEOPLASM METASTATIC TO BONE: ICD-10-CM

## 2023-09-06 DIAGNOSIS — C79.51 MALIGNANT NEOPLASM METASTATIC TO BONE: Primary | ICD-10-CM

## 2023-09-06 LAB
ALBUMIN SERPL-MCNC: 4.2 G/DL (ref 3.5–5.2)
ALBUMIN/GLOB SERPL: 1.6 G/DL
ALP SERPL-CCNC: 95 U/L (ref 39–117)
ALT SERPL W P-5'-P-CCNC: 5 U/L (ref 1–33)
ANION GAP SERPL CALCULATED.3IONS-SCNC: 6.8 MMOL/L (ref 5–15)
AST SERPL-CCNC: 10 U/L (ref 1–32)
BASOPHILS # BLD AUTO: 0.03 10*3/MM3 (ref 0–0.2)
BASOPHILS NFR BLD AUTO: 0.8 % (ref 0–1.5)
BILIRUB SERPL-MCNC: 0.2 MG/DL (ref 0–1.2)
BUN SERPL-MCNC: 13 MG/DL (ref 8–23)
BUN/CREAT SERPL: 17.3 (ref 7–25)
CALCIUM SPEC-SCNC: 9 MG/DL (ref 8.6–10.5)
CHLORIDE SERPL-SCNC: 103 MMOL/L (ref 98–107)
CO2 SERPL-SCNC: 31.2 MMOL/L (ref 22–29)
CREAT SERPL-MCNC: 0.75 MG/DL (ref 0.57–1)
DEPRECATED RDW RBC AUTO: 58.8 FL (ref 37–54)
EGFRCR SERPLBLD CKD-EPI 2021: 89.6 ML/MIN/1.73
EOSINOPHIL # BLD AUTO: 0.02 10*3/MM3 (ref 0–0.4)
EOSINOPHIL NFR BLD AUTO: 0.5 % (ref 0.3–6.2)
ERYTHROCYTE [DISTWIDTH] IN BLOOD BY AUTOMATED COUNT: 15.1 % (ref 12.3–15.4)
GLOBULIN UR ELPH-MCNC: 2.6 GM/DL
GLUCOSE SERPL-MCNC: 116 MG/DL (ref 65–99)
HCT VFR BLD AUTO: 29.8 % (ref 34–46.6)
HGB BLD-MCNC: 9.2 G/DL (ref 12–15.9)
IMM GRANULOCYTES # BLD AUTO: 0 10*3/MM3 (ref 0–0.05)
IMM GRANULOCYTES NFR BLD AUTO: 0 % (ref 0–0.5)
LYMPHOCYTES # BLD AUTO: 1.15 10*3/MM3 (ref 0.7–3.1)
LYMPHOCYTES NFR BLD AUTO: 29.4 % (ref 19.6–45.3)
MAGNESIUM SERPL-MCNC: 2.1 MG/DL (ref 1.6–2.4)
MCH RBC QN AUTO: 32.9 PG (ref 26.6–33)
MCHC RBC AUTO-ENTMCNC: 30.9 G/DL (ref 31.5–35.7)
MCV RBC AUTO: 106.4 FL (ref 79–97)
MONOCYTES # BLD AUTO: 0.23 10*3/MM3 (ref 0.1–0.9)
MONOCYTES NFR BLD AUTO: 5.9 % (ref 5–12)
NEUTROPHILS NFR BLD AUTO: 2.48 10*3/MM3 (ref 1.7–7)
NEUTROPHILS NFR BLD AUTO: 63.4 % (ref 42.7–76)
PHOSPHATE SERPL-MCNC: 3 MG/DL (ref 2.5–4.5)
PLATELET # BLD AUTO: 178 10*3/MM3 (ref 140–450)
PMV BLD AUTO: 10.3 FL (ref 6–12)
POTASSIUM SERPL-SCNC: 3.8 MMOL/L (ref 3.5–5.2)
PROT SERPL-MCNC: 6.8 G/DL (ref 6–8.5)
RBC # BLD AUTO: 2.8 10*6/MM3 (ref 3.77–5.28)
SODIUM SERPL-SCNC: 141 MMOL/L (ref 136–145)
WBC NRBC COR # BLD: 3.91 10*3/MM3 (ref 3.4–10.8)

## 2023-09-06 PROCEDURE — 80053 COMPREHEN METABOLIC PANEL: CPT | Performed by: INTERNAL MEDICINE

## 2023-09-06 PROCEDURE — G0463 HOSPITAL OUTPT CLINIC VISIT: HCPCS | Performed by: INTERNAL MEDICINE

## 2023-09-06 PROCEDURE — 25010000002 HEPARIN LOCK FLUSH PER 10 UNITS: Performed by: INTERNAL MEDICINE

## 2023-09-06 PROCEDURE — 85025 COMPLETE CBC W/AUTO DIFF WBC: CPT | Performed by: INTERNAL MEDICINE

## 2023-09-06 PROCEDURE — 83735 ASSAY OF MAGNESIUM: CPT | Performed by: INTERNAL MEDICINE

## 2023-09-06 PROCEDURE — 84100 ASSAY OF PHOSPHORUS: CPT | Performed by: INTERNAL MEDICINE

## 2023-09-06 PROCEDURE — 25010000002 DENOSUMAB 120 MG/1.7ML SOLUTION: Performed by: INTERNAL MEDICINE

## 2023-09-06 PROCEDURE — 96372 THER/PROPH/DIAG INJ SC/IM: CPT

## 2023-09-06 PROCEDURE — 36591 DRAW BLOOD OFF VENOUS DEVICE: CPT

## 2023-09-06 RX ORDER — SODIUM CHLORIDE 0.9 % (FLUSH) 0.9 %
20 SYRINGE (ML) INJECTION AS NEEDED
Status: DISCONTINUED | OUTPATIENT
Start: 2023-09-06 | End: 2023-09-07 | Stop reason: HOSPADM

## 2023-09-06 RX ORDER — HEPARIN SODIUM (PORCINE) LOCK FLUSH IV SOLN 100 UNIT/ML 100 UNIT/ML
500 SOLUTION INTRAVENOUS AS NEEDED
OUTPATIENT
Start: 2023-09-06

## 2023-09-06 RX ORDER — HEPARIN SODIUM (PORCINE) LOCK FLUSH IV SOLN 100 UNIT/ML 100 UNIT/ML
500 SOLUTION INTRAVENOUS AS NEEDED
Status: DISCONTINUED | OUTPATIENT
Start: 2023-09-06 | End: 2023-09-07 | Stop reason: HOSPADM

## 2023-09-06 RX ORDER — SODIUM CHLORIDE 0.9 % (FLUSH) 0.9 %
20 SYRINGE (ML) INJECTION AS NEEDED
OUTPATIENT
Start: 2023-09-06

## 2023-09-06 RX ADMIN — Medication 20 ML: at 13:25

## 2023-09-06 RX ADMIN — HEPARIN SODIUM (PORCINE) LOCK FLUSH IV SOLN 100 UNIT/ML 500 UNITS: 100 SOLUTION at 13:25

## 2023-09-06 RX ADMIN — DENOSUMAB 120 MG: 120 INJECTION SUBCUTANEOUS at 14:42

## 2023-09-06 NOTE — PROGRESS NOTES
Chief Complaint  Breast Cancer      Haile Monique MD    Subjective          Derek Keller presents to [facility] for ongoing treatment of her breast cancer.  She is on letrozole, ribociclib, denosumab for bony involvement.  She feels like she is tolerating this fairly well.  She notes increased fatigue but she is able to perform her ADLs.  She has pain related to bony involvement.  She reports her current regimen is controlling her pain symptoms.  She denies new masses or adenopathy.  She had recent colostomy reversal and still has a small area of wound healing but improving.    Oncology/Hematology History   Malignant neoplasm of left breast in female, estrogen receptor positive   10/13/2022 Initial Diagnosis    Malignant neoplasm of left breast in female, estrogen receptor positive (HCC)     10/14/2022 -  Chemotherapy    OP BREAST Letrozole / Ribociclib       10/14/2022 Cancer Staged    Staging form: Breast, AJCC 8th Edition  - Clinical: Stage IV (cT1c, cN1, cM1, ER+, TN+, HER2-) - Signed by Donald Barker MD on 10/14/2022     10/28/2022 -  Chemotherapy    OP SUPPORTIVE Denosumab (Xgeva) Q28D       Malignant neoplasm metastatic to bone   10/14/2022 Initial Diagnosis    Bone metastases (HCC)     10/28/2022 -  Chemotherapy    OP SUPPORTIVE Denosumab (Xgeva) Q28D       Secondary malignant neoplasm of bone (Resolved)   11/14/2022 Initial Diagnosis    Secondary malignant neoplasm of bone (HCC)     11/17/2022 - 4/19/2023 Radiation    RADIATION THERAPY Treatment Details (11/14/2022 - 4/19/2023)  Site: Spine - Lumbar  Technique: 3D CRT  Goal: No goal specified  Planned Treatment Start Date: 11/17/2022         Review of Systems   Constitutional:  Positive for fatigue. Negative for appetite change, diaphoresis, fever, unexpected weight gain and unexpected weight loss.   HENT:  Negative for hearing loss, mouth sores, sore throat, swollen glands, trouble swallowing and voice change.     Eyes:  Negative for blurred vision.   Respiratory:  Negative for cough, shortness of breath and wheezing.    Cardiovascular:  Negative for chest pain and palpitations.   Gastrointestinal:  Negative for abdominal pain, blood in stool, constipation, diarrhea, nausea and vomiting.   Endocrine: Negative for cold intolerance and heat intolerance.   Genitourinary:  Negative for difficulty urinating, dysuria, frequency, hematuria and urinary incontinence.   Musculoskeletal:  Negative for arthralgias, back pain and myalgias.   Skin:  Negative for rash, skin lesions and wound.   Neurological:  Negative for dizziness, seizures, weakness, numbness and headache.   Hematological:  Does not bruise/bleed easily.   Psychiatric/Behavioral:  Negative for depressed mood. The patient is not nervous/anxious.    Current Outpatient Medications on File Prior to Visit   Medication Sig Dispense Refill    atorvastatin (LIPITOR) 20 MG tablet TAKE 1 TABLET BY MOUTH EVERY DAY (Patient taking differently: Take 1 tablet by mouth Daily.) 30 tablet 6    donepezil (ARICEPT) 10 MG tablet Take 1 tablet by mouth Daily.      DULoxetine (CYMBALTA) 20 MG capsule Take 1 capsule by mouth Daily. 30 capsule 5    fluticasone (FLONASE) 50 MCG/ACT nasal spray       gabapentin (NEURONTIN) 600 MG tablet TAKE 1 TABLET BY MOUTH AT BEDTIME (Patient taking differently: Take 1 tablet by mouth 2 (Two) Times a Day As Needed.) 90 tablet 0    hydroCHLOROthiazide (HYDRODIURIL) 12.5 MG tablet Take 1 tablet by mouth Daily. 90 tablet 2    letrozole (FEMARA) 2.5 MG tablet Take 1 tablet by mouth Daily. 90 tablet 1    levothyroxine (SYNTHROID, LEVOTHROID) 50 MCG tablet TAKE 1 TABLET BY MOUTH EVERY DAY (Patient taking differently: Take 1 tablet by mouth Daily.) 90 tablet 1    LORazepam (ATIVAN) 0.5 MG tablet Take 1 tablet by mouth Every 6 (Six) Hours As Needed.      losartan (COZAAR) 100 MG tablet Take 1 tablet by mouth Daily. INST PER ANESTHESIA PROTOCOL      montelukast  (SINGULAIR) 10 MG tablet Take 1 tablet by mouth Daily.      Morphine (MS CONTIN) 15 MG 12 hr tablet Take 3 tablets by mouth 2 (Two) Times a Day. 180 tablet 0    mupirocin (BACTROBAN) 2 % ointment       ondansetron (ZOFRAN) 8 MG tablet TAKE 1 TABLET BY MOUTH THREE TIMES DAILY AS NEEDED FOR NAUSEA AND VOMITING 30 tablet 5    oxybutynin XL (DITROPAN XL) 15 MG 24 hr tablet TAKE 1 TABLET BY MOUTH DAILY bladder      oxyCODONE-acetaminophen (PERCOCET)  MG per tablet Take 1 tablet by mouth Every 6 (Six) Hours As Needed (pain). 60 tablet 0    polyethylene glycol (MIRALAX) 17 g packet Take 17 g by mouth Daily. 5 packet 0    ribociclib succinate 200 MG tablet therapy pack tablet Take 3 tablets by mouth Take As Directed. Take 3 tablets by mouth daily for 21 days then off 7 days on a 28 day cycle. 63 tablet 11    rOPINIRole (REQUIP) 3 MG tablet Take 1 tablet by mouth 2 (Two) Times a Day.      SUMAtriptan (IMITREX) 100 MG tablet Take 1 tablet by mouth 1 (One) Time As Needed.      traZODone (DESYREL) 150 MG tablet TAKE 1 TABLET BY MOUTH ONCE nightly (Patient taking differently: Take 1 tablet by mouth Every Night.) 90 tablet 2    baclofen (LIORESAL) 20 MG tablet TAKE 1 TABLET BY MOUTH THREE TIMES DAILY FOR MUSCLE SPASMS (Patient not taking: Reported on 8/24/2023)      ketoconazole (NIZORAL) 2 % cream Apply 1 application  topically to the appropriate area as directed Daily. (Patient not taking: Reported on 8/24/2023)      naproxen (NAPROSYN) 500 MG tablet Take 1 tablet by mouth 2 (Two) Times a Day With Meals. LAST DOSE 1/5/23 (Patient not taking: Reported on 8/24/2023)      oxybutynin XL (DITROPAN-XL) 10 MG 24 hr tablet  (Patient not taking: Reported on 8/24/2023)      solifenacin (VESICARE) 10 MG tablet Take 1 tablet by mouth Daily for 360 days. (Patient not taking: Reported on 8/24/2023) 90 tablet 3     Current Facility-Administered Medications on File Prior to Visit   Medication Dose Route Frequency Provider Last Rate Last  Admin    [COMPLETED] denosumab (XGEVA) injection 120 mg  120 mg Subcutaneous Once Donald Barker MD   120 mg at 09/06/23 1442       Allergies   Allergen Reactions    Azithromycin Itching    Erythromycin Itching     Past Medical History:   Diagnosis Date    Abdominal pain     OSTOMY SITE    Allergic rhinitis     Anemia     NO S/S    Anxiety     Arteriosclerosis     Coronary, follows with Dr. Núñez    Arthritis     Bone metastases 10/14/2022    Bowen's disease     SKIN CANCER    Breast cancer     NO SURGERY WAS DONE DUE TO METS TO BONE/COLON/LYMPHNODE    Cancer related pain 10/13/2022    Depression     Disorder associated with Helicobacter species 10/12/2022    Dysphoric mood     Fatigue     Fibromyalgia     Frequent urination     NO S/S INFECTION    Herpes simplex vulvovaginitis 07/26/2018    History of colon polyps     History of IBS     History of kidney stones     Hyperlipidemia     Hypertension     Hypothyroidism     Insomnia     Lumbago     Mood disorder     Neck pain     R/T CANCER    Palpitations     last time a few months ago    Precordial pain     R/T SPINE CANCER    S/P Laparoscopic Hand-Assisted Colostomy Closure with End to End Anastomosis Open Parastomal Hernia Repair 12/08/2022    Sleep disturbance     SOB (shortness of breath)     at times at rest    SOBOE (shortness of breath on exertion)      Past Surgical History:   Procedure Laterality Date    BREAST BIOPSY Left     CARDIAC CATHETERIZATION Left 1959    CARDIAC CATHETERIZATION N/A 04/06/2018    Procedure: Coronary angiography;  Surgeon: Art Licea MD;  Location: [facility] INVASIVE LOCATION;  Service: Cardiovascular    CARDIAC CATHETERIZATION N/A 04/06/2018    Procedure: Left heart cath;  Surgeon: Art Licea MD;  Location: [facility] CATH INVASIVE LOCATION;  Service: Cardiovascular    CARDIAC CATHETERIZATION N/A 04/06/2018    Procedure: Left ventriculography;  Surgeon: Art Licea MD;  Location: [facility] CATH  INVASIVE LOCATION;  Service: Cardiovascular    CHOLECYSTECTOMY      COLON SURGERY      colostomy bag    COLONOSCOPY  11/08/2006    COLONOSCOPY N/A 06/08/2023    Procedure: COLONOSCOPY;  Surgeon: Alfred Castañeda MD;  Location: [facility] ENDOSCOPY;  Service: General;  Laterality: N/A;  same as preop    EXPLORATORY LAPAROTOMY N/A 12/06/2022    Procedure: LAPAROTOMY EXPLORATORY sigmoid resection hartmans procedure colostomy;  Surgeon: Alfred Castañeda MD;  Location: [facility] MAIN OR;  Service: General;  Laterality: N/A;    GANGLION CYST EXCISION Bilateral     HYSTERECTOMY  05/2005    ILEOSTOMY CLOSURE N/A 6/26/2023    Procedure: Laparoscopic Hand-Assisted Colostomy Closure with End to End Anastomosis;  Surgeon: Alfred Castañeda MD;  Location: [facility] MAIN OR;  Service: General;  Laterality: N/A;    LAPAROSCOPIC GASTRIC BANDING      BAND REMOVED 2020    PARASTOMAL HERNIA REPAIR N/A 6/26/2023    Procedure: Open Parastomal Hernia Repair;  Surgeon: Alfred Castañeda MD;  Location: [facility] MAIN OR;  Service: General;  Laterality: N/A;    TONSILLECTOMY      VENOUS ACCESS DEVICE (PORT) INSERTION N/A 01/09/2023    Procedure: INSERTION VENOUS ACCESS DEVICE;  Surgeon: Alfred Castañeda MD;  Location: [facility] MAIN OR;  Service: General;  Laterality: N/A;     Social History     Socioeconomic History    Marital status:    Tobacco Use    Smoking status: Every Day     Packs/day: 1.50     Years: 40.00     Pack years: 60.00     Types: Cigarettes     Start date: 1974    Smokeless tobacco: Never    Tobacco comments:          INST PER ANESTHESIA PROTOCOL, LAST SMOKED TODAY   Vaping Use    Vaping Use: Never used   Substance and Sexual Activity    Alcohol use: No    Drug use: Yes     Types: Marijuana     Comment: LAST USE 6/19/23    Sexual activity: Defer     Partners: Male     Birth control/protection: None     Family History   Problem Relation Age of Onset    Hypertension Mother     Rheum arthritis Mother      Heart disease Mother     Breast cancer Mother     Diabetes Father     Cancer Maternal Grandmother         colon    Colon cancer Maternal Grandmother     Aneurysm Paternal Grandfather     Diabetes Other     Fibromyalgia Other     Malig Hyperthermia Neg Hx        Objective   Physical Exam  Vitals reviewed. Exam conducted with a chaperone present.   Constitutional:       General: She is not in acute distress.     Appearance: Normal appearance.   Cardiovascular:      Rate and Rhythm: Normal rate and regular rhythm.      Heart sounds: Normal heart sounds. No murmur heard.    No gallop.   Pulmonary:      Effort: Pulmonary effort is normal.      Breath sounds: Normal breath sounds.   Abdominal:      General: Abdomen is flat. Bowel sounds are normal.      Palpations: Abdomen is soft.      Comments: Healing surgical incision with 1 small pinpoint area of granulation tissue, improved over previous   Musculoskeletal:      Right lower leg: No edema.      Left lower leg: No edema.   Neurological:      Mental Status: She is alert and oriented to person, place, and time.   Psychiatric:         Mood and Affect: Mood normal.         Behavior: Behavior normal.       Vitals:    09/06/23 1357   BP: (!) 88/49   Pulse: 64   Resp: 16   Temp: 98 °F (36.7 °C)   TempSrc: Temporal   SpO2: 97%   Weight: 77.9 kg (171 lb 11.8 oz)   PainSc: 0-No pain     ECOG score: 1         PHQ-9 Total Score:                    Result Review :   The following data was reviewed by: Donald Barker MD on 09/06/2023:  Lab Results   Component Value Date    HGB 9.2 (L) 09/06/2023    HCT 29.8 (L) 09/06/2023    .4 (H) 09/06/2023     09/06/2023    WBC 3.91 09/06/2023    NEUTROABS 2.48 09/06/2023    LYMPHSABS 1.15 09/06/2023    MONOSABS 0.23 09/06/2023    EOSABS 0.02 09/06/2023    BASOSABS 0.03 09/06/2023     Lab Results   Component Value Date    GLUCOSE 116 (H) 09/06/2023    BUN 13 09/06/2023    CREATININE 0.75 09/06/2023     09/06/2023    K 3.8  09/06/2023     09/06/2023    CO2 31.2 (H) 09/06/2023    CALCIUM 9.0 09/06/2023    PROTEINTOT 6.8 09/06/2023    ALBUMIN 4.2 09/06/2023    BILITOT 0.2 09/06/2023    ALKPHOS 95 09/06/2023    AST 10 09/06/2023    ALT 5 09/06/2023     Lab Results   Component Value Date    MG 2.1 09/06/2023    PHOS 3.0 09/06/2023    FREET4 1.01 01/17/2022    TSH 1.470 11/12/2019     No results found for: IRON, LABIRON, TRANSFERRIN, TIBC  Lab Results   Component Value Date    ETHZNVCB78 390 04/23/2019    FOLATE 9.93 04/23/2019     No results found for: PSA, CEA, AFP, ,     Data reviewed : Consultant notes Dr. Castañeda's note reviewed       Assessment and Plan    Diagnoses and all orders for this visit:    1. Malignant neoplasm of upper-outer quadrant of left breast in female, estrogen receptor positive (Primary)  Assessment & Plan:  Metastatic.  Hormone receptor positive.  Patient is on palliative therapy with letrozole plus ribociclib.  Tolerating both well.  CBC demonstrates cytopenias related to the ribociclib but not enough to require dose adjustment.  Continue current treatment.  I will see her back in 1 month for ongoing therapy with lab work prior.      2. Malignant neoplasm metastatic to bone  Assessment & Plan:  Patient is on monthly denosumab.  Tolerating well.  No dental or jaw pain.  Electrolytes are adequate.  Xgeva today monthly.      3. Cancer related pain  Assessment & Plan:  Patient reports adequate pain control with her current regimen of MS Contin and oxycodone as needed for breakthrough.  Wyatt reviewed and no discrepancies.  Continue same.  Reassess next visit.      4. Primary hypertension  Assessment & Plan:  Blood pressure today is on the low side and she does note increased fatigue.  She is on several medications that can affect the blood pressure including losartan as well as her pain medications.  I recommended she follow-up with her PCP regarding adjustment/discontinuation of the losartan in the  setting of hypotension.  She will hold that medicine until she talks with them.      Other orders  -     Cancel: denosumab (XGEVA) injection 120 mg            Patient Follow Up:   Patient was given instructions and counseling regarding her condition or for health maintenance advice. Please see specific information pulled into the AVS if appropriate.     Donald Barker MD    9/7/2023

## 2023-09-08 ENCOUNTER — SPECIALTY PHARMACY (OUTPATIENT)
Dept: PHARMACY | Facility: HOSPITAL | Age: 63
End: 2023-09-08
Payer: MEDICARE

## 2023-09-14 DIAGNOSIS — G89.3 CANCER RELATED PAIN: ICD-10-CM

## 2023-09-14 RX ORDER — MORPHINE SULFATE 15 MG/1
45 TABLET, FILM COATED, EXTENDED RELEASE ORAL 2 TIMES DAILY
Qty: 180 TABLET | Refills: 0 | Status: CANCELLED | OUTPATIENT
Start: 2023-09-14

## 2023-09-14 RX ORDER — OXYCODONE AND ACETAMINOPHEN 10; 325 MG/1; MG/1
1 TABLET ORAL EVERY 6 HOURS PRN
Qty: 60 TABLET | Refills: 0 | Status: SHIPPED | OUTPATIENT
Start: 2023-09-14

## 2023-09-14 NOTE — TELEPHONE ENCOUNTER
Caller: Derek Keller    Relationship: Self    Best call back number: [phone]    Requested Prescriptions:   Requested Prescriptions     Pending Prescriptions Disp Refills    oxyCODONE-acetaminophen (PERCOCET)  MG per tablet 60 tablet 0     Sig: Take 1 tablet by mouth Every 6 (Six) Hours As Needed (pain).    Morphine (MS CONTIN) 15 MG 12 hr tablet 180 tablet 0     Sig: Take 3 tablets by mouth 2 (Two) Times a Day.        [facility] be sent:  [facility]      Last office visit with prescribing clinician: 9/6/2023   Last telemedicine visit with prescribing clinician: Visit date not found   Next office visit with prescribing clinician: 10/5/2023     Additional details provided by patient: NA    Does the patient have less than a 3 day supply:  [x] Yes  [] No    Would you like a call back once the refill request has been completed: [x] Yes [] No    If the office needs to give you a call back, can they leave a voicemail: [x] Yes [] No    Abby Todd Rep   09/14/23 14:02 EDT

## 2023-09-15 DIAGNOSIS — Z17.0 MALIGNANT NEOPLASM OF LEFT BREAST IN FEMALE, ESTROGEN RECEPTOR POSITIVE, UNSPECIFIED SITE OF BREAST: ICD-10-CM

## 2023-09-15 DIAGNOSIS — C50.912 MALIGNANT NEOPLASM OF LEFT BREAST IN FEMALE, ESTROGEN RECEPTOR POSITIVE, UNSPECIFIED SITE OF BREAST: ICD-10-CM

## 2023-09-21 DIAGNOSIS — G89.3 CANCER RELATED PAIN: ICD-10-CM

## 2023-09-22 RX ORDER — MORPHINE SULFATE 15 MG/1
TABLET, FILM COATED, EXTENDED RELEASE ORAL
Qty: 180 TABLET | Refills: 0 | OUTPATIENT
Start: 2023-09-22

## 2023-09-26 ENCOUNTER — SPECIALTY PHARMACY (OUTPATIENT)
Dept: PHARMACY | Facility: HOSPITAL | Age: 63
End: 2023-09-26
Payer: MEDICARE

## 2023-09-26 DIAGNOSIS — G89.3 CANCER RELATED PAIN: ICD-10-CM

## 2023-09-26 RX ORDER — OXYCODONE AND ACETAMINOPHEN 10; 325 MG/1; MG/1
TABLET ORAL
Qty: 60 TABLET | Refills: 0 | Status: SHIPPED | OUTPATIENT
Start: 2023-09-26

## 2023-09-26 NOTE — PROGRESS NOTES
Specialty Pharmacy Patient Management Program  Oncology Refill Outreach      Derek is a 63 y.o. female contacted today regarding refills of her medication(s).    Specialty medication(s) and dose(s) confirmed: ribociclib succinate 200 MG tablet therapy pack tablet.    Other medications being refilled: N/A    Refill Questions      Flowsheet Row Most Recent Value   Changes to allergies? Yes   Changes to medications? Yes   New conditions since last clinic visit No   Unplanned office visit, urgent care, ED, or hospital admission in the last 4 weeks  No   How does patient/caregiver feel medication is working? Very good   Financial problems or insurance changes  No   Since the previous refill, were any specialty medication doses or scheduled injections missed or delayed?  No   Does this patient require a clinical escalation to a pharmacist? Yes            Delivery Questions      Flowsheet Row Most Recent Value   Delivery method  at Pharmacy   Delivery address correct? Yes   Delivery phone number [phone]   Preferred delivery time? Anytime   Number of medications in delivery 1   Medication being filled and delivered ribociclib succinate 200 MG tablet therapy pack tablet   Doses left of specialty medications Week off   Is there any medication that is due not being filled? No   Supplies needed? No supplies needed   Cooler needed? No   Do any medications need mixed or dated? No   Copay form of payment Pay at pickup   Additional comments Zero copay   Questions or concerns for the pharmacist? No   Explain any questions or concerns for the pharmacist N/A   Are any medications first time fills? No                   Follow-up: 21 days     Maria Luz Sandhu  Care Coordinator, [facility]  9/26/2023  15:33 EDT

## 2023-09-27 DIAGNOSIS — G89.3 CANCER RELATED PAIN: ICD-10-CM

## 2023-09-27 NOTE — TELEPHONE ENCOUNTER
Caller: Derek Keller    Relationship: Self    Best call back number: [phone]    Requested Prescriptions:   Requested Prescriptions     Pending Prescriptions Disp Refills    Morphine (MS CONTIN) 15 MG 12 hr tablet 180 tablet 0     Sig: Take 3 tablets by mouth 2 (Two) Times a Day.        Pharmacy where request should be sent: [facility], [facility] [place] - [address] - [phone] [facility] [phone] FX     Last office visit with prescribing clinician: 9/6/2023   Last telemedicine visit with prescribing clinician: Visit date not found   Next office visit with prescribing clinician: 10/5/2023       Does the patient have less than a 3 day supply:  [x] Yes  [] No    Would you like a call back once the refill request has been completed: [] Yes [x] No    If the office needs to give you a call back, can they leave a voicemail: [] Yes [x] No

## 2023-09-28 RX ORDER — MORPHINE SULFATE 15 MG/1
45 TABLET, FILM COATED, EXTENDED RELEASE ORAL 2 TIMES DAILY
Qty: 180 TABLET | Refills: 0 | Status: SHIPPED | OUTPATIENT
Start: 2023-09-28

## 2023-10-02 ENCOUNTER — TELEPHONE (OUTPATIENT)
Dept: SURGERY | Facility: CLINIC | Age: 63
End: 2023-10-02
Payer: MEDICARE

## 2023-10-02 NOTE — TELEPHONE ENCOUNTER
PATIENT CALLED AND ASKED IF DR. HESS HAS HER SCHEDULED FOR A CT SCAN.  SHE SAID SHE SAW SOMETHING ON MY CHART, BUT CANNOT PULL IT UP NOW.    I TOLD HER THAT I DON'T SEE ONE ORDERED BY HIM, BUT I WILL CALL HER, IF I FIND OUT ANYTHING DIFFERENT.    SHE SAID SHE WILL ALSO INVESTIGATE MORE.    LINDSEY, DO YOU SEE ANYTHING?

## 2023-10-02 NOTE — TELEPHONE ENCOUNTER
I told pt that I do not see a CT scan ordered by Dr Castañeda, she was fine with that.     She wanted to give you a update. She is aware that your out this week. It's not emergent.   Patient is no longer needing to use bandages on stoma. Quit using the long q-tips. Not as red around. She does think she that has a hernia next to the stoma.

## 2023-10-05 ENCOUNTER — OFFICE VISIT (OUTPATIENT)
Dept: ONCOLOGY | Facility: HOSPITAL | Age: 63
End: 2023-10-05
Payer: MEDICARE

## 2023-10-05 ENCOUNTER — SPECIALTY PHARMACY (OUTPATIENT)
Dept: PHARMACY | Facility: HOSPITAL | Age: 63
End: 2023-10-05
Payer: MEDICARE

## 2023-10-05 ENCOUNTER — APPOINTMENT (OUTPATIENT)
Dept: PHARMACY | Facility: HOSPITAL | Age: 63
End: 2023-10-05
Payer: MEDICARE

## 2023-10-05 ENCOUNTER — HOSPITAL ENCOUNTER (OUTPATIENT)
Dept: ONCOLOGY | Facility: HOSPITAL | Age: 63
Discharge: HOME | End: 2023-10-05
Payer: MEDICARE

## 2023-10-05 VITALS
HEART RATE: 57 BPM | OXYGEN SATURATION: 100 % | DIASTOLIC BLOOD PRESSURE: 48 MMHG | SYSTOLIC BLOOD PRESSURE: 124 MMHG | BODY MASS INDEX: 27.86 KG/M2 | TEMPERATURE: 98.5 F | RESPIRATION RATE: 16 BRPM | WEIGHT: 172.62 LBS

## 2023-10-05 DIAGNOSIS — C79.51 MALIGNANT NEOPLASM METASTATIC TO BONE: ICD-10-CM

## 2023-10-05 DIAGNOSIS — Z45.2 ENCOUNTER FOR ADJUSTMENT OR MANAGEMENT OF VASCULAR ACCESS DEVICE: Primary | ICD-10-CM

## 2023-10-05 DIAGNOSIS — G89.3 CANCER RELATED PAIN: ICD-10-CM

## 2023-10-05 DIAGNOSIS — Z17.0 MALIGNANT NEOPLASM OF LEFT BREAST IN FEMALE, ESTROGEN RECEPTOR POSITIVE, UNSPECIFIED SITE OF BREAST: Primary | ICD-10-CM

## 2023-10-05 DIAGNOSIS — C79.51 MALIGNANT NEOPLASM METASTATIC TO BONE: Primary | ICD-10-CM

## 2023-10-05 DIAGNOSIS — R23.2 HOT FLASHES: ICD-10-CM

## 2023-10-05 DIAGNOSIS — C50.912 MALIGNANT NEOPLASM OF LEFT BREAST IN FEMALE, ESTROGEN RECEPTOR POSITIVE, UNSPECIFIED SITE OF BREAST: ICD-10-CM

## 2023-10-05 DIAGNOSIS — C50.912 MALIGNANT NEOPLASM OF LEFT BREAST IN FEMALE, ESTROGEN RECEPTOR POSITIVE, UNSPECIFIED SITE OF BREAST: Primary | ICD-10-CM

## 2023-10-05 DIAGNOSIS — Z17.0 MALIGNANT NEOPLASM OF LEFT BREAST IN FEMALE, ESTROGEN RECEPTOR POSITIVE, UNSPECIFIED SITE OF BREAST: ICD-10-CM

## 2023-10-05 LAB
ALBUMIN SERPL-MCNC: 4 G/DL (ref 3.5–5.2)
ALBUMIN/GLOB SERPL: 1.7 G/DL
ALP SERPL-CCNC: 86 U/L (ref 39–117)
ALT SERPL W P-5'-P-CCNC: 6 U/L (ref 1–33)
ANION GAP SERPL CALCULATED.3IONS-SCNC: 7.3 MMOL/L (ref 5–15)
ANISOCYTOSIS BLD QL: NORMAL
AST SERPL-CCNC: 11 U/L (ref 1–32)
BASOPHILS # BLD AUTO: 0.03 10*3/MM3 (ref 0–0.2)
BASOPHILS NFR BLD AUTO: 0.8 % (ref 0–1.5)
BILIRUB SERPL-MCNC: 0.3 MG/DL (ref 0–1.2)
BUN SERPL-MCNC: 15 MG/DL (ref 8–23)
BUN/CREAT SERPL: 20 (ref 7–25)
CALCIUM SPEC-SCNC: 8.7 MG/DL (ref 8.6–10.5)
CHLORIDE SERPL-SCNC: 103 MMOL/L (ref 98–107)
CO2 SERPL-SCNC: 29.7 MMOL/L (ref 22–29)
CREAT SERPL-MCNC: 0.75 MG/DL (ref 0.57–1)
DEPRECATED RDW RBC AUTO: 61 FL (ref 37–54)
EGFRCR SERPLBLD CKD-EPI 2021: 89 ML/MIN/1.73
EOSINOPHIL # BLD AUTO: 0.03 10*3/MM3 (ref 0–0.4)
EOSINOPHIL NFR BLD AUTO: 0.8 % (ref 0.3–6.2)
ERYTHROCYTE [DISTWIDTH] IN BLOOD BY AUTOMATED COUNT: 15.5 % (ref 12.3–15.4)
GLOBULIN UR ELPH-MCNC: 2.4 GM/DL
GLUCOSE SERPL-MCNC: 126 MG/DL (ref 65–99)
HCT VFR BLD AUTO: 29.7 % (ref 34–46.6)
HGB BLD-MCNC: 9.4 G/DL (ref 12–15.9)
IMM GRANULOCYTES # BLD AUTO: 0 10*3/MM3 (ref 0–0.05)
IMM GRANULOCYTES NFR BLD AUTO: 0 % (ref 0–0.5)
LYMPHOCYTES # BLD AUTO: 1.18 10*3/MM3 (ref 0.7–3.1)
LYMPHOCYTES NFR BLD AUTO: 30.9 % (ref 19.6–45.3)
MACROCYTES BLD QL SMEAR: NORMAL
MAGNESIUM SERPL-MCNC: 2.3 MG/DL (ref 1.6–2.4)
MCH RBC QN AUTO: 33.5 PG (ref 26.6–33)
MCHC RBC AUTO-ENTMCNC: 31.6 G/DL (ref 31.5–35.7)
MCV RBC AUTO: 105.7 FL (ref 79–97)
MONOCYTES # BLD AUTO: 0.24 10*3/MM3 (ref 0.1–0.9)
MONOCYTES NFR BLD AUTO: 6.3 % (ref 5–12)
NEUTROPHILS NFR BLD AUTO: 2.34 10*3/MM3 (ref 1.7–7)
NEUTROPHILS NFR BLD AUTO: 61.2 % (ref 42.7–76)
PHOSPHATE SERPL-MCNC: 3.4 MG/DL (ref 2.5–4.5)
PLATELET # BLD AUTO: 173 10*3/MM3 (ref 140–450)
PMV BLD AUTO: 10 FL (ref 6–12)
POTASSIUM SERPL-SCNC: 3.6 MMOL/L (ref 3.5–5.2)
PROT SERPL-MCNC: 6.4 G/DL (ref 6–8.5)
RBC # BLD AUTO: 2.81 10*6/MM3 (ref 3.77–5.28)
SMALL PLATELETS BLD QL SMEAR: ADEQUATE
SODIUM SERPL-SCNC: 140 MMOL/L (ref 136–145)
WBC MORPH BLD: NORMAL
WBC NRBC COR # BLD: 3.82 10*3/MM3 (ref 3.4–10.8)

## 2023-10-05 PROCEDURE — 96372 THER/PROPH/DIAG INJ SC/IM: CPT

## 2023-10-05 PROCEDURE — 25010000002 HEPARIN LOCK FLUSH PER 10 UNITS: Performed by: INTERNAL MEDICINE

## 2023-10-05 PROCEDURE — 85025 COMPLETE CBC W/AUTO DIFF WBC: CPT | Performed by: INTERNAL MEDICINE

## 2023-10-05 PROCEDURE — 83735 ASSAY OF MAGNESIUM: CPT | Performed by: INTERNAL MEDICINE

## 2023-10-05 PROCEDURE — 80053 COMPREHEN METABOLIC PANEL: CPT | Performed by: INTERNAL MEDICINE

## 2023-10-05 PROCEDURE — 25010000002 DENOSUMAB 120 MG/1.7ML SOLUTION: Performed by: INTERNAL MEDICINE

## 2023-10-05 PROCEDURE — G0463 HOSPITAL OUTPT CLINIC VISIT: HCPCS | Performed by: INTERNAL MEDICINE

## 2023-10-05 PROCEDURE — 85007 BL SMEAR W/DIFF WBC COUNT: CPT | Performed by: INTERNAL MEDICINE

## 2023-10-05 PROCEDURE — 84100 ASSAY OF PHOSPHORUS: CPT | Performed by: INTERNAL MEDICINE

## 2023-10-05 PROCEDURE — 36591 DRAW BLOOD OFF VENOUS DEVICE: CPT

## 2023-10-05 RX ORDER — OXYBUTYNIN CHLORIDE 15 MG/1
15 TABLET, EXTENDED RELEASE ORAL DAILY
Qty: 30 TABLET | Refills: 1 | Status: SHIPPED | OUTPATIENT
Start: 2023-10-05

## 2023-10-05 RX ORDER — HEPARIN SODIUM (PORCINE) LOCK FLUSH IV SOLN 100 UNIT/ML 100 UNIT/ML
500 SOLUTION INTRAVENOUS AS NEEDED
Status: DISCONTINUED | OUTPATIENT
Start: 2023-10-05 | End: 2023-10-06 | Stop reason: HOSPADM

## 2023-10-05 RX ORDER — HEPARIN SODIUM (PORCINE) LOCK FLUSH IV SOLN 100 UNIT/ML 100 UNIT/ML
500 SOLUTION INTRAVENOUS AS NEEDED
OUTPATIENT
Start: 2023-10-05

## 2023-10-05 RX ORDER — SODIUM CHLORIDE 0.9 % (FLUSH) 0.9 %
20 SYRINGE (ML) INJECTION AS NEEDED
OUTPATIENT
Start: 2023-10-05

## 2023-10-05 RX ORDER — SODIUM CHLORIDE 0.9 % (FLUSH) 0.9 %
20 SYRINGE (ML) INJECTION AS NEEDED
Status: DISCONTINUED | OUTPATIENT
Start: 2023-10-05 | End: 2023-10-06 | Stop reason: HOSPADM

## 2023-10-05 RX ORDER — PROCHLORPERAZINE MALEATE 10 MG
TABLET ORAL
COMMUNITY
Start: 2023-09-29

## 2023-10-05 RX ADMIN — DENOSUMAB 120 MG: 120 INJECTION SUBCUTANEOUS at 14:04

## 2023-10-05 RX ADMIN — Medication 20 ML: at 12:59

## 2023-10-05 RX ADMIN — HEPARIN SODIUM (PORCINE) LOCK FLUSH IV SOLN 100 UNIT/ML 500 UNITS: 100 SOLUTION at 13:00

## 2023-10-05 NOTE — PROGRESS NOTES
Chief Complaint  Breast Cancer    Haile Monique MD Patel, Saagar, MD    Subjective          Derek Keller presents to [facility] for ongoing treatment of her metastatic breast cancer.  She is on letrozole, ribociclib, denosumab for bony involvement.  She states that she is tolerating the medications reasonably well.  She feels like her energy level has improved although she still has some fatigue.  She has continued pain in the bones from her metastatic sites but her pain regimen is adequate.  She reports good appetite.  Her abdomen is healing from her recent colostomy reversal.    Oncology/Hematology History   Malignant neoplasm of left breast in female, estrogen receptor positive   10/13/2022 Initial Diagnosis    Malignant neoplasm of left breast in female, estrogen receptor positive (HCC)     10/14/2022 -  Chemotherapy    OP BREAST Letrozole / Ribociclib       10/14/2022 Cancer Staged    Staging form: Breast, AJCC 8th Edition  - Clinical: Stage IV (cT1c, cN1, cM1, ER+, SD+, HER2-) - Signed by Donald Barker MD on 10/14/2022     10/28/2022 -  Chemotherapy    OP SUPPORTIVE Denosumab (Xgeva) Q28D       Malignant neoplasm metastatic to bone   10/14/2022 Initial Diagnosis    Bone metastases (HCC)     10/28/2022 -  Chemotherapy    OP SUPPORTIVE Denosumab (Xgeva) Q28D       Secondary malignant neoplasm of bone (Resolved)   11/14/2022 Initial Diagnosis    Secondary malignant neoplasm of bone (HCC)     11/17/2022 - 4/19/2023 Radiation    RADIATION THERAPY Treatment Details (11/14/2022 - 4/19/2023)  Site: Spine - Lumbar  Technique: 3D CRT  Goal: No goal specified  Planned Treatment Start Date: 11/17/2022         Review of Systems   Constitutional:  Positive for fatigue. Negative for appetite change, diaphoresis, fever, unexpected weight gain and unexpected weight loss.   HENT:  Negative for hearing loss, mouth sores, sore throat, swollen glands, trouble swallowing and voice  change.    Eyes:  Negative for blurred vision.   Respiratory:  Negative for cough, shortness of breath and wheezing.    Cardiovascular:  Negative for chest pain and palpitations.   Gastrointestinal:  Negative for abdominal pain, blood in stool, constipation, diarrhea, nausea and vomiting.   Endocrine: Negative for cold intolerance and heat intolerance.   Genitourinary:  Negative for difficulty urinating, dysuria, frequency, hematuria and urinary incontinence.   Musculoskeletal:  Negative for arthralgias, back pain and myalgias.   Skin:  Negative for rash, skin lesions and wound.   Neurological:  Negative for dizziness, seizures, weakness, numbness and headache.   Hematological:  Does not bruise/bleed easily.   Psychiatric/Behavioral:  Negative for depressed mood. The patient is not nervous/anxious.    Current Outpatient Medications on File Prior to Visit   Medication Sig Dispense Refill    atorvastatin (LIPITOR) 20 MG tablet TAKE 1 TABLET BY MOUTH EVERY DAY (Patient taking differently: Take 1 tablet by mouth Daily.) 30 tablet 6    donepezil (ARICEPT) 10 MG tablet Take 1 tablet by mouth Daily.      DULoxetine (CYMBALTA) 20 MG capsule Take 1 capsule by mouth Daily. 30 capsule 5    gabapentin (NEURONTIN) 600 MG tablet TAKE 1 TABLET BY MOUTH AT BEDTIME (Patient taking differently: Take 1 tablet by mouth 2 (Two) Times a Day As Needed.) 90 tablet 0    hydroCHLOROthiazide (HYDRODIURIL) 12.5 MG tablet Take 1 tablet by mouth Daily. 90 tablet 2    letrozole (FEMARA) 2.5 MG tablet Take 1 tablet by mouth Daily. 90 tablet 1    levothyroxine (SYNTHROID, LEVOTHROID) 50 MCG tablet TAKE 1 TABLET BY MOUTH EVERY DAY (Patient taking differently: Take 1 tablet by mouth Daily.) 90 tablet 1    LORazepam (ATIVAN) 0.5 MG tablet Take 1 tablet by mouth Every 6 (Six) Hours As Needed.      losartan (COZAAR) 100 MG tablet Take 1 tablet by mouth Daily. INST PER ANESTHESIA PROTOCOL      montelukast (SINGULAIR) 10 MG tablet Take 1 tablet by mouth  Daily.      Morphine (MS CONTIN) 15 MG 12 hr tablet Take 3 tablets by mouth 2 (Two) Times a Day. 180 tablet 0    ondansetron (ZOFRAN) 8 MG tablet TAKE 1 TABLET BY MOUTH THREE TIMES DAILY AS NEEDED FOR NAUSEA AND VOMITING 30 tablet 5    oxyCODONE-acetaminophen (PERCOCET)  MG per tablet TAKE 1 TABLET BY MOUTH EVERY 6 HOURS AS NEEDED FOR PAIN 60 tablet 0    polyethylene glycol (MIRALAX) 17 g packet Take 17 g by mouth Daily. 5 packet 0    prochlorperazine (COMPAZINE) 10 MG tablet       ribociclib succinate 200 MG tablet therapy pack tablet Take 3 tablets by mouth Take As Directed. Take 3 tablets by mouth daily for 21 days then off 7 days on a 28 day cycle. 63 tablet 11    rOPINIRole (REQUIP) 3 MG tablet Take 1 tablet by mouth 2 (Two) Times a Day.      solifenacin (VESICARE) 10 MG tablet Take 1 tablet by mouth Daily for 360 days. 90 tablet 3    SUMAtriptan (IMITREX) 100 MG tablet Take 1 tablet by mouth 1 (One) Time As Needed.      traZODone (DESYREL) 150 MG tablet TAKE 1 TABLET BY MOUTH ONCE nightly (Patient taking differently: Take 1 tablet by mouth Every Night.) 90 tablet 2    [DISCONTINUED] oxybutynin XL (DITROPAN XL) 15 MG 24 hr tablet TAKE 1 TABLET BY MOUTH DAILY bladder      [DISCONTINUED] baclofen (LIORESAL) 20 MG tablet TAKE 1 TABLET BY MOUTH THREE TIMES DAILY FOR MUSCLE SPASMS (Patient not taking: Reported on 8/24/2023)      [DISCONTINUED] fluticasone (FLONASE) 50 MCG/ACT nasal spray  (Patient not taking: Reported on 10/5/2023)      [DISCONTINUED] ketoconazole (NIZORAL) 2 % cream Apply 1 application  topically to the appropriate area as directed Daily. (Patient not taking: Reported on 8/24/2023)      [DISCONTINUED] mupirocin (BACTROBAN) 2 % ointment       [DISCONTINUED] naproxen (NAPROSYN) 500 MG tablet Take 1 tablet by mouth 2 (Two) Times a Day With Meals. LAST DOSE 1/5/23 (Patient not taking: Reported on 8/24/2023)      [DISCONTINUED] oxybutynin XL (DITROPAN-XL) 10 MG 24 hr tablet  (Patient not taking:  Reported on 8/24/2023)       No current facility-administered medications on file prior to visit.       Allergies   Allergen Reactions    Azithromycin Itching    Erythromycin Itching     Past Medical History:   Diagnosis Date    Abdominal pain     OSTOMY SITE    Allergic rhinitis     Anemia     NO S/S    Anxiety     Arteriosclerosis     Coronary, follows with Dr. Núñez    Arthritis     Bone metastases 10/14/2022    Bowen's disease     SKIN CANCER    Breast cancer     NO SURGERY WAS DONE DUE TO METS TO BONE/COLON/LYMPHNODE    Cancer related pain 10/13/2022    Depression     Disorder associated with Helicobacter species 10/12/2022    Dysphoric mood     Fatigue     Fibromyalgia     Frequent urination     NO S/S INFECTION    Herpes simplex vulvovaginitis 07/26/2018    History of colon polyps     History of IBS     History of kidney stones     Hyperlipidemia     Hypertension     Hypothyroidism     Insomnia     Lumbago     Mood disorder     Neck pain     R/T CANCER    Palpitations     last time a few months ago    Precordial pain     R/T SPINE CANCER    S/P Laparoscopic Hand-Assisted Colostomy Closure with End to End Anastomosis Open Parastomal Hernia Repair 12/08/2022    Sleep disturbance     SOB (shortness of breath)     at times at rest    SOBOE (shortness of breath on exertion)      Past Surgical History:   Procedure Laterality Date    BREAST BIOPSY Left     CARDIAC CATHETERIZATION Left 1959    CARDIAC CATHETERIZATION N/A 04/06/2018    Procedure: Coronary angiography;  Surgeon: Art Licea MD;  Location: [facility] CATH INVASIVE LOCATION;  Service: Cardiovascular    CARDIAC CATHETERIZATION N/A 04/06/2018    Procedure: Left heart cath;  Surgeon: Art Licea MD;  Location: [facility] CATH INVASIVE LOCATION;  Service: Cardiovascular    CARDIAC CATHETERIZATION N/A 04/06/2018    Procedure: Left ventriculography;  Surgeon: Art Licea MD;  Location: [facility] CATH INVASIVE LOCATION;  Service:  Cardiovascular    CHOLECYSTECTOMY      COLON SURGERY      colostomy bag    COLONOSCOPY  11/08/2006    COLONOSCOPY N/A 06/08/2023    Procedure: COLONOSCOPY;  Surgeon: Alfred Castañeda MD;  Location: [facility] ENDOSCOPY;  Service: General;  Laterality: N/A;  same as preop    EXPLORATORY LAPAROTOMY N/A 12/06/2022    Procedure: LAPAROTOMY EXPLORATORY sigmoid resection hartmans procedure colostomy;  Surgeon: Alfred Castañeda MD;  Location: [facility] MAIN OR;  Service: General;  Laterality: N/A;    GANGLION CYST EXCISION Bilateral     HYSTERECTOMY  05/2005    ILEOSTOMY CLOSURE N/A 6/26/2023    Procedure: Laparoscopic Hand-Assisted Colostomy Closure with End to End Anastomosis;  Surgeon: Alfred Castañeda MD;  Location: [facility] MAIN OR;  Service: General;  Laterality: N/A;    LAPAROSCOPIC GASTRIC BANDING      BAND REMOVED 2020    PARASTOMAL HERNIA REPAIR N/A 6/26/2023    Procedure: Open Parastomal Hernia Repair;  Surgeon: Alfred Castañeda MD;  Location: [facility] MAIN OR;  Service: General;  Laterality: N/A;    TONSILLECTOMY      VENOUS ACCESS DEVICE (PORT) INSERTION N/A 01/09/2023    Procedure: INSERTION VENOUS ACCESS DEVICE;  Surgeon: Alfred Castañeda MD;  Location: [facility] MAIN OR;  Service: General;  Laterality: N/A;     Social History     Socioeconomic History    Marital status:    Tobacco Use    Smoking status: Every Day     Packs/day: 1.50     Years: 40.00     Pack years: 60.00     Types: Cigarettes     Start date: 1974    Smokeless tobacco: Never    Tobacco comments:          INST PER ANESTHESIA PROTOCOL, LAST SMOKED TODAY   Vaping Use    Vaping Use: Never used   Substance and Sexual Activity    Alcohol use: No    Drug use: Yes     Types: Marijuana     Comment: LAST USE 6/19/23    Sexual activity: Defer     Partners: Male     Birth control/protection: None     Family History   Problem Relation Age of Onset    Hypertension Mother     Rheum arthritis Mother     Heart disease Mother      "Breast cancer Mother     Diabetes Father     Cancer Maternal Grandmother         colon    Colon cancer Maternal Grandmother     Aneurysm Paternal Grandfather     Diabetes Other     Fibromyalgia Other     Malig Hyperthermia Neg Hx        Objective   Physical Exam  Vitals reviewed. Exam conducted with a chaperone present.   Constitutional:       General: She is not in acute distress.     Appearance: Normal appearance.   Cardiovascular:      Rate and Rhythm: Normal rate and regular rhythm.      Heart sounds: Normal heart sounds. No murmur heard.    No gallop.   Pulmonary:      Effort: Pulmonary effort is normal.      Breath sounds: Normal breath sounds.      Comments: Port-A-Cath  Abdominal:      General: Abdomen is flat. Bowel sounds are normal.      Palpations: Abdomen is soft.   Musculoskeletal:      Right lower leg: No edema.      Left lower leg: No edema.   Neurological:      Mental Status: She is alert and oriented to person, place, and time.   Psychiatric:         Mood and Affect: Mood normal.         Behavior: Behavior normal.       Vitals:    10/05/23 1312   BP: 124/48   Pulse: 57   Resp: 16   Temp: 98.5 °F (36.9 °C)   TempSrc: Temporal   SpO2: 100%   Weight: 78.3 kg (172 lb 9.9 oz)   PainSc:   5   PainLoc: Generalized  Comment: says it is \"bareable\"     ECOG score: 1         PHQ-9 Total Score:                    Result Review :   The following data was reviewed by: Donald Barker MD on 10/05/2023:  Lab Results   Component Value Date    HGB 9.4 (L) 10/05/2023    HCT 29.7 (L) 10/05/2023    .7 (H) 10/05/2023     10/05/2023    WBC 3.82 10/05/2023    NEUTROABS 2.34 10/05/2023    LYMPHSABS 1.18 10/05/2023    MONOSABS 0.24 10/05/2023    EOSABS 0.03 10/05/2023    BASOSABS 0.03 10/05/2023     Lab Results   Component Value Date    GLUCOSE 126 (H) 10/05/2023    BUN 15 10/05/2023    CREATININE 0.75 10/05/2023     10/05/2023    K 3.6 10/05/2023     10/05/2023    CO2 29.7 (H) 10/05/2023    " CALCIUM 8.7 10/05/2023    PROTEINTOT 6.4 10/05/2023    ALBUMIN 4.0 10/05/2023    BILITOT 0.3 10/05/2023    ALKPHOS 86 10/05/2023    AST 11 10/05/2023    ALT 6 10/05/2023     Lab Results   Component Value Date    MG 2.3 10/05/2023    PHOS 3.4 10/05/2023    FREET4 1.01 01/17/2022    TSH 1.470 11/12/2019     No results found for: IRON, LABIRON, TRANSFERRIN, TIBC  Lab Results   Component Value Date    WBDNQQFR30 390 04/23/2019    FOLATE 9.93 04/23/2019     No results found for: PSA, CEA, AFP, ,           Assessment and Plan    Diagnoses and all orders for this visit:    1. Malignant neoplasm of left breast in female, estrogen receptor positive, unspecified site of breast (Primary)  Assessment & Plan:  Metastatic.  Hormone receptor positive.  Patient is on letrozole, ribociclib with denosumab for bony involvement.  Tolerating fairly well.  CBC does demonstrate ongoing anemia related to the ribociclib but the other blood counts are acceptable.  She does note increased vasomotor symptoms with her letrozole and I will provide a prescription for oxybutynin for those symptoms.  Continue letrozole and ribociclib.  I will see her back in 1 month for ongoing treatment with lab work prior to monitor for toxicities and restaging CT scans to assess response to therapy.    Orders:  -     CT chest w contrast; Future  -     CT abdomen pelvis w contrast; Future    2. Malignant neoplasm metastatic to bone  Assessment & Plan:  Patient is on monthly Xgeva.  Tolerating well.  Electrolytes are adequate for therapy.  Xgeva today monthly.    Orders:  -     CT chest w contrast; Future  -     CT abdomen pelvis w contrast; Future    3. Hot flashes  -     oxybutynin XL (DITROPAN XL) 15 MG 24 hr tablet; Take 1 tablet by mouth Daily.  Dispense: 30 tablet; Refill: 1    4. Cancer related pain  Assessment & Plan:  Patient reports adequate pain control with her current regimen.  Wyatt reviewed and no discrepancies.  Continue same.  Reassess  next visit.      Other orders  -     Cancel: denosumab (XGEVA) injection 120 mg            Patient Follow Up: 1 month    Patient was given instructions and counseling regarding her condition or for health maintenance advice. Please see specific information pulled into the AVS if appropriate.     Donald Barker MD    10/5/2023

## 2023-10-05 NOTE — ASSESSMENT & PLAN NOTE
Patient is on monthly Xgeva.  Tolerating well.  Electrolytes are adequate for therapy.  Xgeva today monthly.

## 2023-10-05 NOTE — PROGRESS NOTES
Specialty Pharmacy Patient Management Program  Oncology Reassessment     Patient is a 63 y.o. female with metastatic ER + breast cancer seen by an Oncology provider and enrolled in the Oncology Patient Management program offered by [facility].  A follow-up outreach was conducted via telemedicine to assess continued therapy appropriateness, medication adherence, and side effect incidence and management for Kisqali (ribociclib).       Relevant Past Medical History and Comorbidities  Relevant medical history and concomitant health conditions were discussed with the patient. The patient's chart has been reviewed for relevant past medical history and comorbid health conditions and updated as necessary.   Past Medical History:   Diagnosis Date    Abdominal pain     OSTOMY SITE    Allergic rhinitis     Anemia     NO S/S    Anxiety     Arteriosclerosis     Coronary, follows with Dr. Núñez    Arthritis     Bone metastases 10/14/2022    Bowen's disease     SKIN CANCER    Breast cancer     NO SURGERY WAS DONE DUE TO METS TO BONE/COLON/LYMPHNODE    Cancer related pain 10/13/2022    Depression     Disorder associated with Helicobacter species 10/12/2022    Dysphoric mood     Fatigue     Fibromyalgia     Frequent urination     NO S/S INFECTION    Herpes simplex vulvovaginitis 07/26/2018    History of colon polyps     History of IBS     History of kidney stones     Hyperlipidemia     Hypertension     Hypothyroidism     Insomnia     Lumbago     Mood disorder     Neck pain     R/T CANCER    Palpitations     last time a few months ago    Precordial pain     R/T SPINE CANCER    S/P Laparoscopic Hand-Assisted Colostomy Closure with End to End Anastomosis Open Parastomal Hernia Repair 12/08/2022    Sleep disturbance     SOB (shortness of breath)     at times at rest    SOBOE (shortness of breath on exertion)      Social History     Socioeconomic History    Marital status:    Tobacco Use    Smoking  status: Every Day     Packs/day: 1.50     Years: 40.00     Pack years: 60.00     Types: Cigarettes     Start date: 1974    Smokeless tobacco: Never    Tobacco comments:          INST PER ANESTHESIA PROTOCOL, LAST SMOKED TODAY   Vaping Use    Vaping Use: Never used   Substance and Sexual Activity    Alcohol use: No    Drug use: Yes     Types: Marijuana     Comment: LAST USE 6/19/23    Sexual activity: Defer     Partners: Male     Birth control/protection: None       Allergies  Known allergies and reactions were discussed with the patient. The patient's chart has been reviewed for allergy information and updated as necessary.   Azithromycin and Erythromycin    Relevant Laboratory Values  Lab Results   Component Value Date    GLUCOSE 116 (H) 09/06/2023    CALCIUM 9.0 09/06/2023     09/06/2023    K 3.8 09/06/2023    CO2 31.2 (H) 09/06/2023     09/06/2023    BUN 13 09/06/2023    CREATININE 0.75 09/06/2023    EGFRIFNONA 75 11/12/2019    BCR 17.3 09/06/2023    ANIONGAP 6.8 09/06/2023     Lab Results   Component Value Date    WBC 3.91 09/06/2023    RBC 2.80 (L) 09/06/2023    HGB 9.2 (L) 09/06/2023    HCT 29.8 (L) 09/06/2023    .4 (H) 09/06/2023    MCH 32.9 09/06/2023    MCHC 30.9 (L) 09/06/2023    RDW 15.1 09/06/2023    RDWSD 58.8 (H) 09/06/2023    MPV 10.3 09/06/2023     09/06/2023    NEUTRORELPCT 63.4 09/06/2023    LYMPHORELPCT 29.4 09/06/2023    MONORELPCT 5.9 09/06/2023    EOSRELPCT 0.5 09/06/2023    BASORELPCT 0.8 09/06/2023    AUTOIGPER 0.0 09/06/2023    NEUTROABS 2.48 09/06/2023    LYMPHSABS 1.15 09/06/2023    MONOSABS 0.23 09/06/2023    EOSABS 0.02 09/06/2023    BASOSABS 0.03 09/06/2023    AUTOIGNUM 0.00 09/06/2023    NRBC 0.0 06/28/2023        The above labs have been reviewed. The following labs are outside of normal limits: Hgb is low No dose adjustments are needed for the oral specialty medication(s) based on the labs.    Current Medication List  This medication list has been reviewed  with the patient and evaluated for any interactions or necessary modifications/recommendations, and updated to include all prescription medications, OTC medications, and supplements the patient is currently taking.  This list reflects what is contained in the patient's profile, which has also been marked as reviewed to communicate to other providers it is the most up to date version of the patient's current medication therapy.     Current Outpatient Medications:     atorvastatin (LIPITOR) 20 MG tablet, TAKE 1 TABLET BY MOUTH EVERY DAY (Patient taking differently: Take 1 tablet by mouth Daily.), Disp: 30 tablet, Rfl: 6    donepezil (ARICEPT) 10 MG tablet, Take 1 tablet by mouth Daily., Disp: , Rfl:     DULoxetine (CYMBALTA) 20 MG capsule, Take 1 capsule by mouth Daily., Disp: 30 capsule, Rfl: 5    gabapentin (NEURONTIN) 600 MG tablet, TAKE 1 TABLET BY MOUTH AT BEDTIME (Patient taking differently: Take 1 tablet by mouth 2 (Two) Times a Day As Needed.), Disp: 90 tablet, Rfl: 0    hydroCHLOROthiazide (HYDRODIURIL) 12.5 MG tablet, Take 1 tablet by mouth Daily., Disp: 90 tablet, Rfl: 2    letrozole (FEMARA) 2.5 MG tablet, Take 1 tablet by mouth Daily., Disp: 90 tablet, Rfl: 1    levothyroxine (SYNTHROID, LEVOTHROID) 50 MCG tablet, TAKE 1 TABLET BY MOUTH EVERY DAY (Patient taking differently: Take 1 tablet by mouth Daily.), Disp: 90 tablet, Rfl: 1    LORazepam (ATIVAN) 0.5 MG tablet, Take 1 tablet by mouth Every 6 (Six) Hours As Needed., Disp: , Rfl:     losartan (COZAAR) 100 MG tablet, Take 1 tablet by mouth Daily. INST PER ANESTHESIA PROTOCOL, Disp: , Rfl:     montelukast (SINGULAIR) 10 MG tablet, Take 1 tablet by mouth Daily., Disp: , Rfl:     Morphine (MS CONTIN) 15 MG 12 hr tablet, Take 3 tablets by mouth 2 (Two) Times a Day., Disp: 180 tablet, Rfl: 0    ondansetron (ZOFRAN) 8 MG tablet, TAKE 1 TABLET BY MOUTH THREE TIMES DAILY AS NEEDED FOR NAUSEA AND VOMITING, Disp: 30 tablet, Rfl: 5    oxyCODONE-acetaminophen  (PERCOCET)  MG per tablet, TAKE 1 TABLET BY MOUTH EVERY 6 HOURS AS NEEDED FOR PAIN, Disp: 60 tablet, Rfl: 0    ribociclib succinate 200 MG tablet therapy pack tablet, Take 3 tablets by mouth Take As Directed. Take 3 tablets by mouth daily for 21 days then off 7 days on a 28 day cycle., Disp: 63 tablet, Rfl: 11    rOPINIRole (REQUIP) 3 MG tablet, Take 1 tablet by mouth 2 (Two) Times a Day., Disp: , Rfl:     solifenacin (VESICARE) 10 MG tablet, Take 1 tablet by mouth Daily for 360 days., Disp: 90 tablet, Rfl: 3    SUMAtriptan (IMITREX) 100 MG tablet, Take 1 tablet by mouth 1 (One) Time As Needed., Disp: , Rfl:     traZODone (DESYREL) 150 MG tablet, TAKE 1 TABLET BY MOUTH ONCE nightly (Patient taking differently: Take 1 tablet by mouth Every Night.), Disp: 90 tablet, Rfl: 2    oxybutynin XL (DITROPAN XL) 15 MG 24 hr tablet, TAKE 1 TABLET BY MOUTH DAILY bladder, Disp: , Rfl:     polyethylene glycol (MIRALAX) 17 g packet, Take 17 g by mouth Daily. (Patient not taking: Reported on 10/5/2023), Disp: 5 packet, Rfl: 0  No current facility-administered medications for this visit.    Drug Interactions  Ribociclib may increase concentration of atorvastatin, oxycodone, trazodone, and Vesicare  Ribociclib may increase risk of Qtc prolongation with ondansetron     Adverse Drug Reactions  Adverse Reactions Experienced: Constipation   Plan for ADR Management: Encouraged patient to increase fluid in take and to take Miralax daily     Hospitalizations and Urgent Care Since Last Assessment  Hospitalizations or Admissions: none    ED Visits: none   Urgent Office Visits: none     Adherence and Self-Administration  Approximate Number of Doses Missed Since Last Assessment: 1   Ongoing or New Barriers to Patient Adherence and/or Self-Administration: No new barriers, patient wasn't home and didn't have medication with them   Methods for Supporting Patient Adherence and/or Self-Administration: continue current method    Goals of  Therapy  Progress Toward Meeting Patient-Identified Goals of Therapy:  Goal Met  Patient-Identified Goals  Consistently take medications as prescribed  Patient will adhere to medication regimen  Patient will report any medication side effects to healthcare provider    Progress Toward Meeting Clinical Goals or Therapeutic Targets: Goal Met  Clinical Goals or Therapeutic Targets  Support patient understanding of medication regimen  Ensure patient knows the pharmacy contact information  Schedule regular follow-up to monitor the treatment serious adverse events  Schedule regular follow-up to confirm medication adherence  Schedule regular follow-up to monitor disease progression or stabilty  Most recent scans showed stable disease,    Quality of Life Assessment   Quality of Life related to the patient's specialty therapy was discussed with the patient. The QOL segment of this outreach has been reviewed and updated.   Quality of Life Score: 3 (due to pain and malaise)    Reassessment Plan & Follow-Up  Pharmacist to continue to perform regular reassessments no more than (6) months from the previous assessment.  Care Coordinator to facilitate future refill outreaches, coordinate prescription delivery, and escalate clinical questions to pharmacist.     Additional Plans, Therapy Recommendations or Therapy Problems to Be Addressed: none at this time    Attestation  I attest the patient was actively involved in and has agreed to the above plan of care. If the prescribed therapy is at any point deemed not appropriate based on the current or future assessments, a consultation will be initiated with the patient's specialty care provider to determine the best course of action. The revised plan of therapy will be documented along with any additional patient education provided.     Yakelin Bajwa, PharmD, [facility]  Oncology Clinical Pharmacist  10/5/2023  09:22 EDT

## 2023-10-05 NOTE — ASSESSMENT & PLAN NOTE
Metastatic.  Hormone receptor positive.  Patient is on letrozole, ribociclib with denosumab for bony involvement.  Tolerating fairly well.  CBC does demonstrate ongoing anemia related to the ribociclib but the other blood counts are acceptable.  She does note increased vasomotor symptoms with her letrozole and I will provide a prescription for oxybutynin for those symptoms.  Continue letrozole and ribociclib.  I will see her back in 1 month for ongoing treatment with lab work prior to monitor for toxicities and restaging CT scans to assess response to therapy.

## 2023-10-06 ENCOUNTER — SPECIALTY PHARMACY (OUTPATIENT)
Dept: PHARMACY | Facility: HOSPITAL | Age: 63
End: 2023-10-06
Payer: MEDICARE

## 2023-10-13 DIAGNOSIS — G89.3 CANCER RELATED PAIN: ICD-10-CM

## 2023-10-13 RX ORDER — OXYCODONE AND ACETAMINOPHEN 10; 325 MG/1; MG/1
1 TABLET ORAL EVERY 6 HOURS PRN
Qty: 60 TABLET | Refills: 0 | Status: SHIPPED | OUTPATIENT
Start: 2023-10-13

## 2023-10-13 NOTE — TELEPHONE ENCOUNTER
Caller: Derek Keller    Relationship: Self    Best call back number: [phone]    Requested Prescriptions:   Requested Prescriptions     Pending Prescriptions Disp Refills    oxyCODONE-acetaminophen (PERCOCET)  MG per tablet 60 tablet 0     Sig: Take 1 tablet by mouth Every 6 (Six) Hours As Needed. for pain        Pharmacy where request should be sent: [facility]      Last office visit with prescribing clinician: 10/5/2023   Last telemedicine visit with prescribing clinician: Visit date not found   Next office visit with prescribing clinician: 11/2/2023     Additional details provided by patient: NA    Does the patient have less than a 3 day supply:  [x] Yes  [] No    Would you like a call back once the refill request has been completed: [x] Yes [] No    If the office needs to give you a call back, can they leave a voicemail: [x] Yes [] No    Abby Todd Rep   10/13/23 08:24 EDT

## 2023-10-17 ENCOUNTER — SPECIALTY PHARMACY (OUTPATIENT)
Dept: PHARMACY | Facility: HOSPITAL | Age: 63
End: 2023-10-17
Payer: MEDICARE

## 2023-10-17 NOTE — PROGRESS NOTES
Specialty Pharmacy Patient Management Program  Oncology Refill Outreach      Derek is a 63 y.o. female contacted today regarding refills of her medication(s).    Specialty medication(s) and dose(s) confirmed: ribociclib succinate 200 MG tablet therapy pack tablet.    Other medications being refilled: N/A    Refill Questions      Flowsheet Row Most Recent Value   Changes to allergies? No   Changes to medications? No   New conditions since last clinic visit No   Unplanned office visit, urgent care, ED, or hospital admission in the last 4 weeks  No   How does patient/caregiver feel medication is working? Very good   Financial problems or insurance changes  No   Since the previous refill, were any specialty medication doses or scheduled injections missed or delayed?  No   Does this patient require a clinical escalation to a pharmacist? No            Delivery Questions      Flowsheet Row Most Recent Value   Delivery method  at Pharmacy   Delivery address correct? Yes   Delivery phone number [phone]   Preferred delivery time? Anytime   Number of medications in delivery 1   Medication(s) being filled and delivered Ribociclib Succinate   Doses left of specialty medications 3. Then a week off   Is there any medication that is due not being filled? No   Supplies needed? No supplies needed   Cooler needed? No   Do any medications need mixed or dated? No   Copay form of payment Credit card on file   Additional comments Zero copay   Questions or concerns for the pharmacist? No   Explain any questions or concerns for the pharmacist N/A   Are any medications first time fills? No                   Follow-up: 21 days     Maria Luz Sandhu  Care Coordinator, [facility]  10/17/2023  16:15 EDT

## 2023-10-23 ENCOUNTER — SPECIALTY PHARMACY (OUTPATIENT)
Dept: PHARMACY | Facility: HOSPITAL | Age: 63
End: 2023-10-23
Payer: MEDICARE

## 2023-10-26 ENCOUNTER — TELEPHONE (OUTPATIENT)
Dept: ONCOLOGY | Facility: HOSPITAL | Age: 63
End: 2023-10-26
Payer: MEDICARE

## 2023-10-26 NOTE — TELEPHONE ENCOUNTER
Caller: Jeronimo Keller    Relationship: Self    Best call back number: [phone]    What is the best time to reach you: ANY    Who are you requesting to speak with (clinical staff, provider,  specific staff member): CLINICAL       What was the call regarding: JERONIMO IS CALLING STATES SHE CAN NOT GET COMFORTABLE WHEN SHE IS TRYING TO SLEEP DUE TO HER CANCER  HER FRIEND HAS A CERTAIN BED THAT HAS LIKE A GEL MATTRESS, SHE GOT IT FROM  [facility]  JERONIMO IS WANTING TO SEE IF DR DIANE CAN WRITE AN ORDER FOR THE MATTRESS TO SEE IF HER INSURANCE WILL PAY FOR IT       PLEASE CALL TO DISCUSS AND ADVISE

## 2023-10-26 NOTE — TELEPHONE ENCOUNTER
JERONIMO IS CALLING BACK STATES SHE CONTACTED HER INSURANCE COMPANY AND ALL THEY NEED IS A PRIOR AUTH FOR THE BED AND IT GOES TO [facility]    PLEASE ADVISE

## 2023-10-30 DIAGNOSIS — G89.3 CANCER RELATED PAIN: ICD-10-CM

## 2023-10-30 NOTE — TELEPHONE ENCOUNTER
Caller: Derek Kelelr    Relationship: Self    Best call back number: [phone]    Requested Prescriptions:   Requested Prescriptions     Pending Prescriptions Disp Refills    Morphine (MS CONTIN) 15 MG 12 hr tablet 180 tablet 0     Sig: Take 3 tablets by mouth 2 (Two) Times a Day.    oxyCODONE-acetaminophen (PERCOCET)  MG per tablet 60 tablet 0     Sig: Take 1 tablet by mouth Every 6 (Six) Hours As Needed for Moderate Pain. for pain        Pharmacy where request should be sent: [facility]  [facility] [place], [place] - [address] - [phone]  - [phone] FX     Last office visit with prescribing clinician: 10/5/2023   Last telemedicine visit with prescribing clinician: Visit date not found   Next office visit with prescribing clinician: 11/2/2023       Does the patient have less than a 3 day supply:  [x] Yes  [] No    Abby Cota Rep   10/30/23 13:24 EDT

## 2023-10-31 ENCOUNTER — HOSPITAL ENCOUNTER (OUTPATIENT)
Dept: CT IMAGING | Facility: HOSPITAL | Age: 63
Discharge: HOME | End: 2023-10-31
Admitting: INTERNAL MEDICINE
Payer: MEDICARE

## 2023-10-31 DIAGNOSIS — C50.912 MALIGNANT NEOPLASM OF LEFT BREAST IN FEMALE, ESTROGEN RECEPTOR POSITIVE, UNSPECIFIED SITE OF BREAST: ICD-10-CM

## 2023-10-31 DIAGNOSIS — C79.51 MALIGNANT NEOPLASM METASTATIC TO BONE: ICD-10-CM

## 2023-10-31 DIAGNOSIS — Z17.0 MALIGNANT NEOPLASM OF LEFT BREAST IN FEMALE, ESTROGEN RECEPTOR POSITIVE, UNSPECIFIED SITE OF BREAST: ICD-10-CM

## 2023-10-31 PROCEDURE — 25510000001 IOPAMIDOL PER 1 ML: Performed by: INTERNAL MEDICINE

## 2023-10-31 PROCEDURE — 74177 CT ABD & PELVIS W/CONTRAST: CPT

## 2023-10-31 PROCEDURE — 71260 CT THORAX DX C+: CPT

## 2023-10-31 RX ORDER — HEPARIN SODIUM (PORCINE) LOCK FLUSH IV SOLN 100 UNIT/ML 100 UNIT/ML
SOLUTION INTRAVENOUS
Status: DISCONTINUED
Start: 2023-10-31 | End: 2023-11-01 | Stop reason: HOSPADM

## 2023-10-31 RX ORDER — OXYCODONE AND ACETAMINOPHEN 10; 325 MG/1; MG/1
1 TABLET ORAL EVERY 6 HOURS PRN
Qty: 60 TABLET | Refills: 0 | Status: SHIPPED | OUTPATIENT
Start: 2023-10-31

## 2023-10-31 RX ORDER — MORPHINE SULFATE 15 MG/1
45 TABLET, FILM COATED, EXTENDED RELEASE ORAL 2 TIMES DAILY
Qty: 180 TABLET | Refills: 0 | Status: SHIPPED | OUTPATIENT
Start: 2023-10-31

## 2023-10-31 RX ADMIN — IOPAMIDOL 100 ML: 755 INJECTION, SOLUTION INTRAVENOUS at 18:13

## 2023-11-02 ENCOUNTER — HOSPITAL ENCOUNTER (OUTPATIENT)
Dept: ONCOLOGY | Facility: HOSPITAL | Age: 63
Discharge: HOME | End: 2023-11-02
Payer: MEDICARE

## 2023-11-02 ENCOUNTER — OFFICE VISIT (OUTPATIENT)
Dept: ONCOLOGY | Facility: HOSPITAL | Age: 63
End: 2023-11-02
Payer: MEDICARE

## 2023-11-02 VITALS
WEIGHT: 169.09 LBS | HEART RATE: 57 BPM | OXYGEN SATURATION: 99 % | RESPIRATION RATE: 18 BRPM | DIASTOLIC BLOOD PRESSURE: 53 MMHG | TEMPERATURE: 98.2 F | BODY MASS INDEX: 27.29 KG/M2 | SYSTOLIC BLOOD PRESSURE: 143 MMHG

## 2023-11-02 DIAGNOSIS — D64.9 ANEMIA, UNSPECIFIED TYPE: ICD-10-CM

## 2023-11-02 DIAGNOSIS — C50.412 MALIGNANT NEOPLASM OF UPPER-OUTER QUADRANT OF LEFT BREAST IN FEMALE, ESTROGEN RECEPTOR POSITIVE: Primary | ICD-10-CM

## 2023-11-02 DIAGNOSIS — Z45.2 ENCOUNTER FOR ADJUSTMENT OR MANAGEMENT OF VASCULAR ACCESS DEVICE: ICD-10-CM

## 2023-11-02 DIAGNOSIS — C79.51 MALIGNANT NEOPLASM METASTATIC TO BONE: ICD-10-CM

## 2023-11-02 DIAGNOSIS — Z17.0 MALIGNANT NEOPLASM OF UPPER-OUTER QUADRANT OF LEFT BREAST IN FEMALE, ESTROGEN RECEPTOR POSITIVE: Primary | ICD-10-CM

## 2023-11-02 DIAGNOSIS — G89.3 CANCER RELATED PAIN: ICD-10-CM

## 2023-11-02 DIAGNOSIS — C50.912 MALIGNANT NEOPLASM OF LEFT BREAST IN FEMALE, ESTROGEN RECEPTOR POSITIVE, UNSPECIFIED SITE OF BREAST: ICD-10-CM

## 2023-11-02 DIAGNOSIS — C79.51 MALIGNANT NEOPLASM METASTATIC TO BONE: Primary | ICD-10-CM

## 2023-11-02 DIAGNOSIS — Z17.0 MALIGNANT NEOPLASM OF LEFT BREAST IN FEMALE, ESTROGEN RECEPTOR POSITIVE, UNSPECIFIED SITE OF BREAST: ICD-10-CM

## 2023-11-02 LAB
ALBUMIN SERPL-MCNC: 4.3 G/DL (ref 3.5–5.2)
ALBUMIN/GLOB SERPL: 1.7 G/DL
ALP SERPL-CCNC: 86 U/L (ref 39–117)
ALT SERPL W P-5'-P-CCNC: 6 U/L (ref 1–33)
ANION GAP SERPL CALCULATED.3IONS-SCNC: 2.3 MMOL/L (ref 5–15)
AST SERPL-CCNC: 11 U/L (ref 1–32)
BASOPHILS # BLD AUTO: 0.05 10*3/MM3 (ref 0–0.2)
BASOPHILS NFR BLD AUTO: 1.1 % (ref 0–1.5)
BILIRUB SERPL-MCNC: 0.4 MG/DL (ref 0–1.2)
BUN SERPL-MCNC: 16 MG/DL (ref 8–23)
BUN/CREAT SERPL: 19 (ref 7–25)
CALCIUM SPEC-SCNC: 9 MG/DL (ref 8.6–10.5)
CHLORIDE SERPL-SCNC: 102 MMOL/L (ref 98–107)
CO2 SERPL-SCNC: 33.7 MMOL/L (ref 22–29)
CREAT SERPL-MCNC: 0.84 MG/DL (ref 0.57–1)
DEPRECATED RDW RBC AUTO: 61.4 FL (ref 37–54)
EGFRCR SERPLBLD CKD-EPI 2021: 77.7 ML/MIN/1.73
EOSINOPHIL # BLD AUTO: 0.03 10*3/MM3 (ref 0–0.4)
EOSINOPHIL NFR BLD AUTO: 0.7 % (ref 0.3–6.2)
ERYTHROCYTE [DISTWIDTH] IN BLOOD BY AUTOMATED COUNT: 15.6 % (ref 12.3–15.4)
FERRITIN SERPL-MCNC: 44.12 NG/ML (ref 13–150)
GLOBULIN UR ELPH-MCNC: 2.6 GM/DL
GLUCOSE SERPL-MCNC: 120 MG/DL (ref 65–99)
HCT VFR BLD AUTO: 33.3 % (ref 34–46.6)
HGB BLD-MCNC: 10.5 G/DL (ref 12–15.9)
IMM GRANULOCYTES # BLD AUTO: 0 10*3/MM3 (ref 0–0.05)
IMM GRANULOCYTES NFR BLD AUTO: 0 % (ref 0–0.5)
IRON 24H UR-MRATE: 45 MCG/DL (ref 37–145)
IRON SATN MFR SERPL: 11 % (ref 20–50)
LYMPHOCYTES # BLD AUTO: 1.1 10*3/MM3 (ref 0.7–3.1)
LYMPHOCYTES NFR BLD AUTO: 24.3 % (ref 19.6–45.3)
MAGNESIUM SERPL-MCNC: 2.2 MG/DL (ref 1.6–2.4)
MCH RBC QN AUTO: 33.4 PG (ref 26.6–33)
MCHC RBC AUTO-ENTMCNC: 31.5 G/DL (ref 31.5–35.7)
MCV RBC AUTO: 106.1 FL (ref 79–97)
MONOCYTES # BLD AUTO: 0.2 10*3/MM3 (ref 0.1–0.9)
MONOCYTES NFR BLD AUTO: 4.4 % (ref 5–12)
NEUTROPHILS NFR BLD AUTO: 3.15 10*3/MM3 (ref 1.7–7)
NEUTROPHILS NFR BLD AUTO: 69.5 % (ref 42.7–76)
PHOSPHATE SERPL-MCNC: 3.4 MG/DL (ref 2.5–4.5)
PLATELET # BLD AUTO: 232 10*3/MM3 (ref 140–450)
PMV BLD AUTO: 9.9 FL (ref 6–12)
POTASSIUM SERPL-SCNC: 3.7 MMOL/L (ref 3.5–5.2)
PROT SERPL-MCNC: 6.9 G/DL (ref 6–8.5)
RBC # BLD AUTO: 3.14 10*6/MM3 (ref 3.77–5.28)
SODIUM SERPL-SCNC: 138 MMOL/L (ref 136–145)
TIBC SERPL-MCNC: 411 MCG/DL (ref 298–536)
TRANSFERRIN SERPL-MCNC: 276 MG/DL (ref 200–360)
WBC NRBC COR # BLD: 4.53 10*3/MM3 (ref 3.4–10.8)

## 2023-11-02 PROCEDURE — 96372 THER/PROPH/DIAG INJ SC/IM: CPT

## 2023-11-02 PROCEDURE — 85025 COMPLETE CBC W/AUTO DIFF WBC: CPT | Performed by: INTERNAL MEDICINE

## 2023-11-02 PROCEDURE — 83540 ASSAY OF IRON: CPT | Performed by: INTERNAL MEDICINE

## 2023-11-02 PROCEDURE — 83735 ASSAY OF MAGNESIUM: CPT | Performed by: INTERNAL MEDICINE

## 2023-11-02 PROCEDURE — G0463 HOSPITAL OUTPT CLINIC VISIT: HCPCS | Performed by: INTERNAL MEDICINE

## 2023-11-02 PROCEDURE — 36591 DRAW BLOOD OFF VENOUS DEVICE: CPT

## 2023-11-02 PROCEDURE — 25010000002 DENOSUMAB 120 MG/1.7ML SOLUTION: Performed by: INTERNAL MEDICINE

## 2023-11-02 PROCEDURE — 82728 ASSAY OF FERRITIN: CPT | Performed by: INTERNAL MEDICINE

## 2023-11-02 PROCEDURE — 84466 ASSAY OF TRANSFERRIN: CPT | Performed by: INTERNAL MEDICINE

## 2023-11-02 PROCEDURE — 84100 ASSAY OF PHOSPHORUS: CPT | Performed by: INTERNAL MEDICINE

## 2023-11-02 PROCEDURE — 25010000002 HEPARIN LOCK FLUSH PER 10 UNITS: Performed by: INTERNAL MEDICINE

## 2023-11-02 PROCEDURE — 80053 COMPREHEN METABOLIC PANEL: CPT | Performed by: INTERNAL MEDICINE

## 2023-11-02 RX ORDER — HEPARIN SODIUM (PORCINE) LOCK FLUSH IV SOLN 100 UNIT/ML 100 UNIT/ML
500 SOLUTION INTRAVENOUS AS NEEDED
OUTPATIENT
Start: 2023-11-02

## 2023-11-02 RX ORDER — CYPROHEPTADINE HYDROCHLORIDE 4 MG/1
1 TABLET ORAL EVERY 12 HOURS SCHEDULED
COMMUNITY
Start: 2023-10-30

## 2023-11-02 RX ORDER — SODIUM CHLORIDE 0.9 % (FLUSH) 0.9 %
20 SYRINGE (ML) INJECTION AS NEEDED
OUTPATIENT
Start: 2023-11-02

## 2023-11-02 RX ORDER — SODIUM CHLORIDE 0.9 % (FLUSH) 0.9 %
20 SYRINGE (ML) INJECTION AS NEEDED
Status: DISCONTINUED | OUTPATIENT
Start: 2023-11-02 | End: 2023-11-03 | Stop reason: HOSPADM

## 2023-11-02 RX ORDER — HEPARIN SODIUM (PORCINE) LOCK FLUSH IV SOLN 100 UNIT/ML 100 UNIT/ML
500 SOLUTION INTRAVENOUS AS NEEDED
Status: DISCONTINUED | OUTPATIENT
Start: 2023-11-02 | End: 2023-11-03 | Stop reason: HOSPADM

## 2023-11-02 RX ADMIN — HEPARIN SODIUM (PORCINE) LOCK FLUSH IV SOLN 100 UNIT/ML 500 UNITS: 100 SOLUTION at 13:36

## 2023-11-02 RX ADMIN — DENOSUMAB 120 MG: 120 INJECTION SUBCUTANEOUS at 14:48

## 2023-11-02 RX ADMIN — Medication 20 ML: at 13:36

## 2023-11-02 NOTE — ADDENDUM NOTE
Encounter addended by: Sandy Paulino on: 11/2/2023 2:38 PM   Actions taken: Child order released for a procedure order

## 2023-11-02 NOTE — ASSESSMENT & PLAN NOTE
Metastatic.  Hormone receptor positive.  Patient is on letrozole daily along with ribociclib 3 weeks out of 4.  Tolerating both well.  Mild anemia likely related to the ribociclib therapy.  Not enough to require dose adjustment at this time.  Restaging CT scans reviewed demonstrating stable disease.  Continue current treatment.  I will see her back in 1 month for ongoing therapy with labs prior to monitor for toxicity  Patient in need of a hospital bed. Patient has a medical condition which requires position of the body in ways not feasible with an ordinary bed in order to alleviate cancer related pain.

## 2023-11-02 NOTE — PROGRESS NOTES
Chief Complaint  Breast Cancer    Haile Monique MD Patel, Saagar, MD    Subjective          Derek Keller presents to [facility] for ongoing treatment of her metastatic hormone receptor positive breast cancer.  She is on letrozole, ribociclib along with Xgeva for bony involvement.  She states she is tolerating her treatment well.  She still has significant pain but her MS Contin and oxycodone for breakthrough help.  She does not feel like she needs adjustment in her medicine at this time.  She does report that she has more discomfort when trying to sleep at night due to her bed.  She reports adequate appetite and her weight is maintained.  She notes some constipation but is taking stool softener to good effect.  Her energy level is low but adequate for her ADLs.    Oncology/Hematology History   Malignant neoplasm of upper-outer quadrant of left breast in female, estrogen receptor positive   10/13/2022 Initial Diagnosis    Malignant neoplasm of left breast in female, estrogen receptor positive (HCC)     10/14/2022 -  Chemotherapy    OP BREAST Letrozole / Ribociclib     10/14/2022 Cancer Staged    Staging form: Breast, AJCC 8th Edition  - Clinical: Stage IV (cT1c, cN1, cM1, ER+, AK+, HER2-) - Signed by Donald Barker MD on 10/14/2022     10/28/2022 -  Chemotherapy    OP SUPPORTIVE Denosumab (Xgeva) Q28D     Malignant neoplasm metastatic to bone   10/14/2022 Initial Diagnosis    Bone metastases (HCC)     10/28/2022 -  Chemotherapy    OP SUPPORTIVE Denosumab (Xgeva) Q28D     Secondary malignant neoplasm of bone (Resolved)   11/14/2022 Initial Diagnosis    Secondary malignant neoplasm of bone (HCC)     11/17/2022 - 4/19/2023 Radiation    RADIATION THERAPY Treatment Details (11/14/2022 - 4/19/2023)  Site: Spine - Lumbar  Technique: 3D CRT  Goal: No goal specified  Planned Treatment Start Date: 11/17/2022         Review of Systems   Constitutional:  Positive for fatigue.  Negative for appetite change, diaphoresis, fever, unexpected weight gain and unexpected weight loss.   HENT:  Negative for hearing loss, mouth sores, sore throat, swollen glands, trouble swallowing and voice change.    Eyes:  Negative for blurred vision.   Respiratory:  Negative for cough, shortness of breath and wheezing.    Cardiovascular:  Negative for chest pain and palpitations.   Gastrointestinal:  Negative for abdominal pain, blood in stool, constipation, diarrhea, nausea and vomiting.   Endocrine: Negative for cold intolerance and heat intolerance.   Genitourinary:  Negative for difficulty urinating, dysuria, frequency, hematuria and urinary incontinence.   Musculoskeletal:  Positive for arthralgias and myalgias. Negative for back pain.   Skin:  Negative for rash, skin lesions and wound.   Neurological:  Negative for dizziness, seizures, weakness, numbness and headache.   Hematological:  Does not bruise/bleed easily.   Psychiatric/Behavioral:  Negative for depressed mood. The patient is not nervous/anxious.      Current Outpatient Medications on File Prior to Visit   Medication Sig Dispense Refill    atorvastatin (LIPITOR) 20 MG tablet TAKE 1 TABLET BY MOUTH EVERY DAY (Patient taking differently: Take 1 tablet by mouth Daily.) 30 tablet 6    cyproheptadine (PERIACTIN) 4 MG tablet Take 1 tablet by mouth Every 12 (Twelve) Hours.      donepezil (ARICEPT) 10 MG tablet Take 1 tablet by mouth Daily.      DULoxetine (CYMBALTA) 20 MG capsule Take 1 capsule by mouth Daily. 30 capsule 5    gabapentin (NEURONTIN) 600 MG tablet TAKE 1 TABLET BY MOUTH AT BEDTIME (Patient taking differently: Take 1 tablet by mouth 2 (Two) Times a Day As Needed.) 90 tablet 0    hydroCHLOROthiazide (HYDRODIURIL) 12.5 MG tablet Take 1 tablet by mouth Daily. 90 tablet 2    letrozole (FEMARA) 2.5 MG tablet Take 1 tablet by mouth Daily. 90 tablet 1    levothyroxine (SYNTHROID, LEVOTHROID) 50 MCG tablet TAKE 1 TABLET BY MOUTH EVERY DAY (Patient  taking differently: Take 1 tablet by mouth Daily.) 90 tablet 1    LORazepam (ATIVAN) 0.5 MG tablet Take 1 tablet by mouth Every 6 (Six) Hours As Needed.      losartan (COZAAR) 100 MG tablet Take 1 tablet by mouth Daily. INST PER ANESTHESIA PROTOCOL      montelukast (SINGULAIR) 10 MG tablet Take 1 tablet by mouth Daily.      Morphine (MS CONTIN) 15 MG 12 hr tablet Take 3 tablets by mouth 2 (Two) Times a Day. 180 tablet 0    ondansetron (ZOFRAN) 8 MG tablet TAKE 1 TABLET BY MOUTH THREE TIMES DAILY AS NEEDED FOR NAUSEA AND VOMITING 30 tablet 5    oxybutynin XL (DITROPAN XL) 15 MG 24 hr tablet Take 1 tablet by mouth Daily. 30 tablet 1    oxyCODONE-acetaminophen (PERCOCET)  MG per tablet Take 1 tablet by mouth Every 6 (Six) Hours As Needed for Moderate Pain. for pain 60 tablet 0    polyethylene glycol (MIRALAX) 17 g packet Take 17 g by mouth Daily. 5 packet 0    prochlorperazine (COMPAZINE) 10 MG tablet       ribociclib succinate 200 MG tablet therapy pack tablet Take 3 tablets by mouth Take As Directed. Take 3 tablets by mouth daily for 21 days then off 7 days on a 28 day cycle. 63 tablet 11    rOPINIRole (REQUIP) 3 MG tablet Take 1 tablet by mouth 2 (Two) Times a Day.      SUMAtriptan (IMITREX) 100 MG tablet Take 1 tablet by mouth 1 (One) Time As Needed.      traZODone (DESYREL) 150 MG tablet TAKE 1 TABLET BY MOUTH ONCE nightly (Patient taking differently: Take 1 tablet by mouth Every Night.) 90 tablet 2     No current facility-administered medications on file prior to visit.       Allergies   Allergen Reactions    Azithromycin Itching    Erythromycin Itching     Past Medical History:   Diagnosis Date    Abdominal pain     OSTOMY SITE    Allergic rhinitis     Anemia     NO S/S    Anxiety     Arteriosclerosis     Coronary, follows with Dr. Núñez    Arthritis     Bone metastases 10/14/2022    Bowen's disease     SKIN CANCER    Breast cancer     NO SURGERY WAS DONE DUE TO METS TO BONE/COLON/LYMPHNODE    Cancer  related pain 10/13/2022    Depression     Disorder associated with Helicobacter species 10/12/2022    Dysphoric mood     Fatigue     Fibromyalgia     Frequent urination     NO S/S INFECTION    Herpes simplex vulvovaginitis 07/26/2018    History of colon polyps     History of IBS     History of kidney stones     Hyperlipidemia     Hypertension     Hypothyroidism     Insomnia     Lumbago     Mood disorder     Neck pain     R/T CANCER    Palpitations     last time a few months ago    Precordial pain     R/T SPINE CANCER    S/P Laparoscopic Hand-Assisted Colostomy Closure with End to End Anastomosis Open Parastomal Hernia Repair 12/08/2022    Sleep disturbance     SOB (shortness of breath)     at times at rest    SOBOE (shortness of breath on exertion)      Past Surgical History:   Procedure Laterality Date    BREAST BIOPSY Left     CARDIAC CATHETERIZATION Left 1959    CARDIAC CATHETERIZATION N/A 04/06/2018    Procedure: Coronary angiography;  Surgeon: Art Licea MD;  Location: [facility] INVASIVE LOCATION;  Service: Cardiovascular    CARDIAC CATHETERIZATION N/A 04/06/2018    Procedure: Left heart cath;  Surgeon: Art Licea MD;  Location: [facility] INVASIVE LOCATION;  Service: Cardiovascular    CARDIAC CATHETERIZATION N/A 04/06/2018    Procedure: Left ventriculography;  Surgeon: Art Licea MD;  Location: [facility] INVASIVE LOCATION;  Service: Cardiovascular    CHOLECYSTECTOMY      COLON SURGERY      colostomy bag    COLONOSCOPY  11/08/2006    COLONOSCOPY N/A 06/08/2023    Procedure: COLONOSCOPY;  Surgeon: Alfred Castañeda MD;  Location: [facility] ENDOSCOPY;  Service: General;  Laterality: N/A;  same as preop    EXPLORATORY LAPAROTOMY N/A 12/06/2022    Procedure: LAPAROTOMY EXPLORATORY sigmoid resection hartmans procedure colostomy;  Surgeon: Alfred Castañeda MD;  Location: [facility] MAIN OR;  Service: General;  Laterality: N/A;    GANGLION CYST EXCISION Bilateral      HYSTERECTOMY  05/2005    ILEOSTOMY CLOSURE N/A 6/26/2023    Procedure: Laparoscopic Hand-Assisted Colostomy Closure with End to End Anastomosis;  Surgeon: Alfred Castañeda MD;  Location: [facility] MAIN OR;  Service: General;  Laterality: N/A;    LAPAROSCOPIC GASTRIC BANDING      BAND REMOVED 2020    PARASTOMAL HERNIA REPAIR N/A 6/26/2023    Procedure: Open Parastomal Hernia Repair;  Surgeon: Alfred Castañeda MD;  Location: [facility] MAIN OR;  Service: General;  Laterality: N/A;    TONSILLECTOMY      VENOUS ACCESS DEVICE (PORT) INSERTION N/A 01/09/2023    Procedure: INSERTION VENOUS ACCESS DEVICE;  Surgeon: Alfred Castañeda MD;  Location: [facility] MAIN OR;  Service: General;  Laterality: N/A;     Social History     Socioeconomic History    Marital status:    Tobacco Use    Smoking status: Every Day     Packs/day: 1.50     Years: 40.00     Additional pack years: 0.00     Total pack years: 60.00     Types: Cigarettes     Start date: 1974    Smokeless tobacco: Never    Tobacco comments:          INST PER ANESTHESIA PROTOCOL, LAST SMOKED TODAY   Vaping Use    Vaping Use: Never used   Substance and Sexual Activity    Alcohol use: No    Drug use: Yes     Types: Marijuana     Comment: LAST USE 6/19/23    Sexual activity: Defer     Partners: Male     Birth control/protection: None     Family History   Problem Relation Age of Onset    Hypertension Mother     Rheum arthritis Mother     Heart disease Mother     Breast cancer Mother     Diabetes Father     Cancer Maternal Grandmother         colon    Colon cancer Maternal Grandmother     Aneurysm Paternal Grandfather     Diabetes Other     Fibromyalgia Other     Malig Hyperthermia Neg Hx        Objective   Physical Exam  Vitals reviewed. Exam conducted with a chaperone present.   Constitutional:       General: She is not in acute distress.     Appearance: Normal appearance.   Cardiovascular:      Rate and Rhythm: Normal rate and regular rhythm.      Heart  "sounds: Normal heart sounds. No murmur heard.     No gallop.   Pulmonary:      Effort: Pulmonary effort is normal.      Breath sounds: Normal breath sounds.   Abdominal:      General: Abdomen is flat. Bowel sounds are normal.      Palpations: Abdomen is soft.   Musculoskeletal:      Right lower leg: No edema.      Left lower leg: No edema.   Neurological:      Mental Status: She is alert and oriented to person, place, and time.   Psychiatric:         Mood and Affect: Mood normal.         Behavior: Behavior normal.         Vitals:    11/02/23 1338   BP: 143/53   Pulse: 57   Resp: 18   Temp: 98.2 °F (36.8 °C)   TempSrc: Temporal   SpO2: 99%   Weight: 76.7 kg (169 lb 1.5 oz)   PainSc: 0-No pain     ECOG score: 1         PHQ-9 Total Score:                    Result Review :   The following data was reviewed by: Donald Barker MD on 11/02/2023:  Lab Results   Component Value Date    HGB 10.5 (L) 11/02/2023    HCT 33.3 (L) 11/02/2023    .1 (H) 11/02/2023     11/02/2023    WBC 4.53 11/02/2023    NEUTROABS 3.15 11/02/2023    LYMPHSABS 1.10 11/02/2023    MONOSABS 0.20 11/02/2023    EOSABS 0.03 11/02/2023    BASOSABS 0.05 11/02/2023     Lab Results   Component Value Date    GLUCOSE 126 (H) 10/05/2023    BUN 15 10/05/2023    CREATININE 0.75 10/05/2023     10/05/2023    K 3.6 10/05/2023     10/05/2023    CO2 29.7 (H) 10/05/2023    CALCIUM 8.7 10/05/2023    PROTEINTOT 6.4 10/05/2023    ALBUMIN 4.0 10/05/2023    BILITOT 0.3 10/05/2023    ALKPHOS 86 10/05/2023    AST 11 10/05/2023    ALT 6 10/05/2023     Lab Results   Component Value Date    MG 2.3 10/05/2023    PHOS 3.4 10/05/2023    FREET4 1.01 01/17/2022    TSH 1.470 11/12/2019     No results found for: \"IRON\", \"LABIRON\", \"TRANSFERRIN\", \"TIBC\"  Lab Results   Component Value Date    JQSVJWOT11 390 04/23/2019    FOLATE 9.93 04/23/2019     No results found for: \"PSA\", \"CEA\", \"AFP\", \"\", \"\"          Assessment and Plan    Diagnoses and all " orders for this visit:    1. Malignant neoplasm of upper-outer quadrant of left breast in female, estrogen receptor positive (Primary)  Assessment & Plan:  Metastatic.  Hormone receptor positive.  Patient is on letrozole daily along with ribociclib 3 weeks out of 4.  Tolerating both well.  Mild anemia likely related to the ribociclib therapy.  Not enough to require dose adjustment at this time.  Restaging CT scans reviewed demonstrating stable disease.  Continue current treatment.  I will see her back in 1 month for ongoing therapy with labs prior to monitor for toxicity  Patient in need of a hospital bed. Patient has a medical condition which requires position of the body in ways not feasible with an ordinary bed in order to alleviate cancer related pain.      2. Anemia, unspecified type  Assessment & Plan:  Patient's hemoglobin is decreased at 10.5 g/dL.  This is likely related to her ribociclib therapy but I will check iron studies.    Orders:  -     Ferritin; Future  -     Iron Profile; Future    3. Malignant neoplasm metastatic to bone  Assessment & Plan:  Patient is on Xgeva monthly.  Tolerating well.  No dental or jaw pain.  Recent scans show stable disease in the bones.      4. Cancer related pain  Assessment & Plan:  Patient reports adequate pain control with her current regimen of MS Contin with oxycodone as needed for breakthrough.  Wyatt reviewed and no discrepancies.  Continue same.  Reassess next visit.  Continue bowel regimen              Patient Follow Up: 1 m    Patient was given instructions and counseling regarding her condition or for health maintenance advice. Please see specific information pulled into the AVS if appropriate.     Donald Barker MD    11/2/2023

## 2023-11-02 NOTE — ASSESSMENT & PLAN NOTE
Patient is on Xgeva monthly.  Tolerating well.  No dental or jaw pain.  Recent scans show stable disease in the bones.

## 2023-11-02 NOTE — ASSESSMENT & PLAN NOTE
Patient's hemoglobin is decreased at 10.5 g/dL.  This is likely related to her ribociclib therapy but I will check iron studies.

## 2023-11-02 NOTE — ASSESSMENT & PLAN NOTE
Patient reports adequate pain control with her current regimen of MS Contin with oxycodone as needed for breakthrough.  Wyatt reviewed and no discrepancies.  Continue same.  Reassess next visit.  Continue bowel regimen

## 2023-11-03 ENCOUNTER — TELEPHONE (OUTPATIENT)
Dept: ONCOLOGY | Facility: HOSPITAL | Age: 63
End: 2023-11-03
Payer: MEDICARE

## 2023-11-03 ENCOUNTER — TELEPHONE (OUTPATIENT)
Dept: SURGERY | Facility: CLINIC | Age: 63
End: 2023-11-03
Payer: MEDICARE

## 2023-11-03 NOTE — CODE DOCUMENTATION
Spoke with patient about her iron labs and informed her that Dr Barker wanted her to start an oral iron tablet and to take one per day without food if her stomach can tolerate it. Patient v/u of the issues discussed.

## 2023-11-03 NOTE — PROGRESS NOTES
[facility] lab called to make provider aware they did isolate Aeromonas hydrophila/caviae in her stool sample.     Component      Latest Ref Rng & Units 3/5/2021   STOOL CULTURE       Isolated Aeromonas hydrophila/caviae (A)       Please advise next step.    Iron profile is decreased but ferritin normal.  This is most likely anemia of chronic disease and her medication-ribociclib.  A trial of over-the-counter oral iron would be reasonable.  Let patient know to begin oral iron therapy from her drugstore once daily.  Without food would be best if her stomach tolerates.

## 2023-11-03 NOTE — TELEPHONE ENCOUNTER
Spoke with Kacy with [employer] regarding a hospital bed for the patient. He informed me that they only dealt in specialty mattresses and beds for people with pressure sores and other wounds. As he stated the office notes make no mention of patient having any of the issues discussed.

## 2023-11-03 NOTE — ADDENDUM NOTE
Encounter addended by: Florencio Woodruff RN on: 11/3/2023 9:05 AM   Actions taken: Clinical Note Signed

## 2023-11-03 NOTE — TELEPHONE ENCOUNTER
Tried calling pt to make her an appt with Dr. Castañeda. No answer. She can f/u with Dr. Castañeda whenever she wants.

## 2023-11-03 NOTE — TELEPHONE ENCOUNTER
Patient said she seen Dr. Barker yesterday.   She still has a wound where her stoma is.   She said he told her to pack it and when she tried to stick the q-tip in it the wound poured dark red blood.   She was not able to successfully pack it because of this and because of pain. She said she has been cleaning with peroxide & is covering with a gauze.   No fevers/nausea/vomiting but she does admit to redness around the wound. She is unsure what she should do? Please advise.   CB# [phone]

## 2023-11-03 NOTE — TELEPHONE ENCOUNTER
Caller: NATASHA    Relationship: [employer]    Best call back number: [phone]     Who are you requesting to speak with (clinical staff, provider,  specific staff member): CLINICAL    What was the call regarding: COMPANY CANNOT SERVICE ORDER FOR HOSPITAL BED ONLY

## 2023-11-08 ENCOUNTER — OFFICE VISIT (OUTPATIENT)
Dept: SURGERY | Facility: CLINIC | Age: 63
End: 2023-11-08
Payer: MEDICARE

## 2023-11-08 VITALS
HEART RATE: 76 BPM | BODY MASS INDEX: 27.64 KG/M2 | WEIGHT: 172 LBS | SYSTOLIC BLOOD PRESSURE: 134 MMHG | DIASTOLIC BLOOD PRESSURE: 62 MMHG | HEIGHT: 66 IN

## 2023-11-08 DIAGNOSIS — Z98.890 HISTORY OF COLOSTOMY REVERSAL: Primary | ICD-10-CM

## 2023-11-08 PROCEDURE — 99212 OFFICE O/P EST SF 10 MIN: CPT | Performed by: SURGERY

## 2023-11-08 PROCEDURE — 3078F DIAST BP <80 MM HG: CPT | Performed by: SURGERY

## 2023-11-08 PROCEDURE — 1160F RVW MEDS BY RX/DR IN RCRD: CPT | Performed by: SURGERY

## 2023-11-08 PROCEDURE — 3075F SYST BP GE 130 - 139MM HG: CPT | Performed by: SURGERY

## 2023-11-08 PROCEDURE — 1159F MED LIST DOCD IN RCRD: CPT | Performed by: SURGERY

## 2023-11-08 RX ORDER — AMOXICILLIN 875 MG/1
TABLET, COATED ORAL
COMMUNITY
Start: 2023-11-07

## 2023-11-09 ENCOUNTER — SPECIALTY PHARMACY (OUTPATIENT)
Dept: PHARMACY | Facility: HOSPITAL | Age: 63
End: 2023-11-09
Payer: MEDICARE

## 2023-11-15 ENCOUNTER — SPECIALTY PHARMACY (OUTPATIENT)
Dept: PHARMACY | Facility: HOSPITAL | Age: 63
End: 2023-11-15
Payer: MEDICARE

## 2023-11-15 NOTE — PROGRESS NOTES
Specialty Pharmacy Patient Management Program  Oncology Refill Outreach      Derek is a 63 y.o. female contacted today regarding refills of her medication(s).    Specialty medication(s) and dose(s) confirmed: ribociclib succinate 200 MG tablet therapy pack tablet     Other medications being refilled: N/A    Refill Questions      Flowsheet Row Most Recent Value   Changes to allergies? No   Changes to medications? No   New conditions since last clinic visit Yes   Unplanned office visit, urgent care, ED, or hospital admission in the last 4 weeks  Yes   How does patient/caregiver feel medication is working? Very good   Financial problems or insurance changes  No   Since the previous refill, were any specialty medication doses or scheduled injections missed or delayed?  No   If yes, please provide the amount N/A   Why were doses missed? N/A   Does this patient require a clinical escalation to a pharmacist? Yes            Delivery Questions      Flowsheet Row Most Recent Value   Delivery method  at Pharmacy   Delivery address correct? Yes   Delivery phone number [phone]   Preferred delivery time? Anytime   Number of medications in delivery 1   Medication(s) being filled and delivered Ribociclib Succinate   Doses left of specialty medications 5   Is there any medication that is due not being filled? No   Supplies needed? No supplies needed   Cooler needed? No   Do any medications need mixed or dated? No   Copay form of payment Pay at pickup   Additional comments Zero copay   Questions or concerns for the pharmacist? No   Explain any questions or concerns for the pharmacist N/A   Are any medications first time fills? No                   Follow-up: 21 days     Maria Luz Sandhu  Care Coordinator, [facility]  11/15/2023  09:38 EST

## 2023-11-16 ENCOUNTER — OFFICE VISIT (OUTPATIENT)
Dept: SURGERY | Facility: CLINIC | Age: 63
End: 2023-11-16
Payer: MEDICARE

## 2023-11-16 VITALS
BODY MASS INDEX: 27.19 KG/M2 | SYSTOLIC BLOOD PRESSURE: 131 MMHG | HEIGHT: 66 IN | WEIGHT: 169.2 LBS | DIASTOLIC BLOOD PRESSURE: 61 MMHG

## 2023-11-16 DIAGNOSIS — Z98.890 HISTORY OF COLOSTOMY REVERSAL: Primary | ICD-10-CM

## 2023-11-16 PROCEDURE — 1159F MED LIST DOCD IN RCRD: CPT | Performed by: SURGERY

## 2023-11-16 PROCEDURE — 3075F SYST BP GE 130 - 139MM HG: CPT | Performed by: SURGERY

## 2023-11-16 PROCEDURE — 3078F DIAST BP <80 MM HG: CPT | Performed by: SURGERY

## 2023-11-16 PROCEDURE — 1160F RVW MEDS BY RX/DR IN RCRD: CPT | Performed by: SURGERY

## 2023-11-16 PROCEDURE — 99212 OFFICE O/P EST SF 10 MIN: CPT | Performed by: SURGERY

## 2023-11-16 RX ORDER — PSEUDOEPHEDRINE HYDROCHLORIDE 60 MG/1
60 TABLET, FILM COATED ORAL EVERY 8 HOURS
COMMUNITY
Start: 2023-11-13

## 2023-11-17 DIAGNOSIS — G89.3 CANCER RELATED PAIN: ICD-10-CM

## 2023-11-17 RX ORDER — OXYCODONE AND ACETAMINOPHEN 10; 325 MG/1; MG/1
1 TABLET ORAL EVERY 6 HOURS PRN
Qty: 60 TABLET | Refills: 0 | Status: SHIPPED | OUTPATIENT
Start: 2023-11-17

## 2023-11-20 ENCOUNTER — TELEPHONE (OUTPATIENT)
Dept: ONCOLOGY | Facility: HOSPITAL | Age: 63
End: 2023-11-20

## 2023-11-20 DIAGNOSIS — Z98.890 HISTORY OF COLOSTOMY REVERSAL: Primary | ICD-10-CM

## 2023-11-20 NOTE — PROGRESS NOTES
General Surgery/Colorectal Surgery Note    Patient Name:  Derek Keller  YOB: 1960  [id number]    Referring Provider: No ref. provider found      Patient Care Team:  Haile Monique MD as PCP - General (Family Medicine)  Julien Cardona DO as Consulting Physician (Pain Medicine)  Joel Castillo MD as Consulting Physician (Gastroenterology)  Remington Russell MD as Surgeon (General Surgery)  Ahsan Salas MD as Consulting Physician (Sports Medicine)  Donald Barker MD as Consulting Physician (Hematology and Oncology)  Sandy Ricci MD as Consulting Physician (General Surgery)  Alfred Castañeda MD as Consulting Physician (General Surgery)    Chief complaint follow-up colostomy site    Subjective .     History of present illness:    Status post laparoscopic hand-assisted colostomy closure with resection, open parastomal hernia repair 6/26/2023  Pathology with metastatic lobular carcinoma to portion of descending colon and anastomotic rings    She comes in for evaluation of drainage from her former colostomy site.  No fever.  No erythema.  No changes in health or medications since last seen.      History:  Past Medical History:   Diagnosis Date    Abdominal pain     OSTOMY SITE    Allergic rhinitis     Anemia     NO S/S    Anxiety     Arteriosclerosis     Coronary, follows with Dr. Núñez    Arthritis     Bone metastases 10/14/2022    Bowen's disease     SKIN CANCER    Breast cancer     NO SURGERY WAS DONE DUE TO METS TO BONE/COLON/LYMPHNODE    Cancer related pain 10/13/2022    Depression     Disorder associated with Helicobacter species 10/12/2022    Dysphoric mood     Fatigue     Fibromyalgia     Frequent urination     NO S/S INFECTION    Herpes simplex vulvovaginitis 07/26/2018    History of colon polyps     History of IBS     History of kidney stones     Hyperlipidemia     Hypertension     Hypothyroidism     Insomnia     Lumbago     Mood disorder     Neck pain     R/T CANCER     Palpitations     last time a few months ago    Precordial pain     R/T SPINE CANCER    S/P Laparoscopic Hand-Assisted Colostomy Closure with End to End Anastomosis Open Parastomal Hernia Repair 12/08/2022    Sleep disturbance     SOB (shortness of breath)     at times at rest    SOBOE (shortness of breath on exertion)        Past Surgical History:   Procedure Laterality Date    BREAST BIOPSY Left     CARDIAC CATHETERIZATION Left 1960    CARDIAC CATHETERIZATION N/A 04/06/2018    Procedure: Coronary angiography;  Surgeon: Art Licea MD;  Location: [facility] CATH INVASIVE LOCATION;  Service: Cardiovascular    CARDIAC CATHETERIZATION N/A 04/06/2018    Procedure: Left heart cath;  Surgeon: Art Licea MD;  Location: [facility] CATH INVASIVE LOCATION;  Service: Cardiovascular    CARDIAC CATHETERIZATION N/A 04/06/2018    Procedure: Left ventriculography;  Surgeon: Art Licea MD;  Location: [facility] CATH INVASIVE LOCATION;  Service: Cardiovascular    CHOLECYSTECTOMY      COLON SURGERY      colostomy bag    COLONOSCOPY  11/08/2006    COLONOSCOPY N/A 06/08/2023    Procedure: COLONOSCOPY;  Surgeon: Alfred Castañeda MD;  Location: [facility] ENDOSCOPY;  Service: General;  Laterality: N/A;  same as preop    EXPLORATORY LAPAROTOMY N/A 12/06/2022    Procedure: LAPAROTOMY EXPLORATORY sigmoid resection hartmans procedure colostomy;  Surgeon: Alfred Castañeda MD;  Location: [facility] MAIN OR;  Service: General;  Laterality: N/A;    GANGLION CYST EXCISION Bilateral     HYSTERECTOMY  05/2005    ILEOSTOMY CLOSURE N/A 6/26/2023    Procedure: Laparoscopic Hand-Assisted Colostomy Closure with End to End Anastomosis;  Surgeon: Alfred Castañeda MD;  Location: [facility] MAIN OR;  Service: General;  Laterality: N/A;    LAPAROSCOPIC GASTRIC BANDING      BAND REMOVED 2020    PARASTOMAL HERNIA REPAIR N/A 6/26/2023    Procedure: Open Parastomal Hernia Repair;  Surgeon: Alfred Castañeda MD;  Location: [facility]  MAIN OR;  Service: General;  Laterality: N/A;    TONSILLECTOMY      VENOUS ACCESS DEVICE (PORT) INSERTION N/A 01/09/2023    Procedure: INSERTION VENOUS ACCESS DEVICE;  Surgeon: Alfred Castañeda MD;  [facility];  Service: General;  Laterality: N/A;       Family History   Problem Relation Age of Onset    Hypertension Mother     Rheum arthritis Mother     Heart disease Mother     Breast cancer Mother     Diabetes Father     Cancer Maternal Grandmother         colon    Colon cancer Maternal Grandmother     Aneurysm Paternal Grandfather     Diabetes Other     Fibromyalgia Other     Malig Hyperthermia Neg Hx        Social History     Tobacco Use    Smoking status: Every Day     Packs/day: 1.50     Years: 40.00     Additional pack years: 0.00     Total pack years: 60.00     Types: Cigarettes     Start date: 1974    Smokeless tobacco: Never    Tobacco comments:          INST PER ANESTHESIA PROTOCOL, LAST SMOKED TODAY   Vaping Use    Vaping Use: Never used   Substance Use Topics    Alcohol use: No    Drug use: Yes     Types: Marijuana     Comment: LAST USE 6/19/23       Review of Systems  All systems were reviewed and negative except for:   Review of Systems   Constitutional: Negative for chills, fever and unexpected weight loss.   HENT: Negative for congestion, nosebleeds and voice change.    Eyes: Negative for blurred vision, double vision and discharge.   Respiratory: Negative for apnea, chest tightness and shortness of breath.    Cardiovascular: Negative for chest pain and leg swelling.   Gastrointestinal:        See HPI   Endocrine: Negative for cold intolerance and heat intolerance.   Genitourinary: Negative for dysuria, hematuria and urgency.   Musculoskeletal: Negative for back pain, joint swelling and neck pain.   Skin: Negative for color change and dry skin.   Neurological: Negative for dizziness and confusion.   Hematological: Negative for adenopathy.   Psychiatric/Behavioral: Negative for  agitation and behavioral problems.     MEDS:  Prior to Admission medications    Medication Sig Start Date End Date Taking? Authorizing Provider   amoxicillin (AMOXIL) 875 MG tablet  11/7/23  Yes Bessie Lopez MD   atorvastatin (LIPITOR) 20 MG tablet TAKE 1 TABLET BY MOUTH EVERY DAY  Patient taking differently: Take 1 tablet by mouth Daily. 10/1/19  Yes Yudelka Manning MD   cyproheptadine (PERIACTIN) 4 MG tablet Take 1 tablet by mouth Every 12 (Twelve) Hours. 10/30/23  Yes Bessie Lopez MD   donepezil (ARICEPT) 10 MG tablet Take 1 tablet by mouth Daily. 10/28/22  Yes Bessie Lopez MD   DULoxetine (CYMBALTA) 20 MG capsule Take 1 capsule by mouth Daily. 7/14/23  Yes Donald Barker MD   gabapentin (NEURONTIN) 600 MG tablet TAKE 1 TABLET BY MOUTH AT BEDTIME  Patient taking differently: Take 1 tablet by mouth 2 (Two) Times a Day As Needed. 7/30/20  Yes Yudelka Manning MD   hydroCHLOROthiazide (HYDRODIURIL) 12.5 MG tablet Take 1 tablet by mouth Daily. 5/17/23  Yes Donald Barker MD   letrozole (FEMARA) 2.5 MG tablet Take 1 tablet by mouth Daily. 11/30/22  Yes Donald Barker MD   levothyroxine (SYNTHROID, LEVOTHROID) 50 MCG tablet TAKE 1 TABLET BY MOUTH EVERY DAY  Patient taking differently: Take 1 tablet by mouth Daily. 7/19/19  Yes Yudelka Manning MD   LORazepam (ATIVAN) 0.5 MG tablet Take 1 tablet by mouth Every 6 (Six) Hours As Needed.   Yes ProviderBessie MD   losartan (COZAAR) 100 MG tablet Take 1 tablet by mouth Daily. INST PER ANESTHESIA PROTOCOL   Yes Bessie oLpez MD   montelukast (SINGULAIR) 10 MG tablet Take 1 tablet by mouth Daily. 1/17/22  Yes Bessie Lopez MD   Morphine (MS CONTIN) 15 MG 12 hr tablet Take 3 tablets by mouth 2 (Two) Times a Day. 10/31/23  Yes Donald Barker MD   ondansetron (ZOFRAN) 8 MG tablet TAKE 1 TABLET BY MOUTH THREE TIMES DAILY AS NEEDED FOR NAUSEA AND VOMITING 3/8/23  Yes Donald Barker MD   oxybutynin XL  (DITROPAN XL) 15 MG 24 hr tablet Take 1 tablet by mouth Daily. 10/5/23  Yes Donald Barker MD   polyethylene glycol (MIRALAX) 17 g packet Take 17 g by mouth Daily. 1/9/23  Yes Alfred Castañeda MD   prochlorperazine (COMPAZINE) 10 MG tablet  9/29/23  Yes Bessie Lopez MD   ribociclib succinate 200 MG tablet therapy pack tablet Take 3 tablets by mouth Take As Directed. Take 3 tablets by mouth daily for 21 days then off 7 days on a 28 day cycle. 8/1/23  Yes Donald Barker MD   rOPINIRole (REQUIP) 3 MG tablet Take 1 tablet by mouth 2 (Two) Times a Day. 6/29/22  Yes Bessie Lopez MD   SUMAtriptan (IMITREX) 100 MG tablet Take 1 tablet by mouth 1 (One) Time As Needed. 10/7/22  Yes Bessie Lopez MD   traZODone (DESYREL) 150 MG tablet TAKE 1 TABLET BY MOUTH ONCE nightly  Patient taking differently: Take 1 tablet by mouth Every Night. 11/12/19  Yes Yudelka Manning MD   oxyCODONE-acetaminophen (PERCOCET)  MG per tablet Take 1 tablet by mouth Every 6 (Six) Hours As Needed for Moderate Pain. for pain 11/17/23   Donald Barker MD   SudoGest 60 MG tablet Take 1 tablet by mouth Every 8 (Eight) Hours. 11/13/23   Bessie Lopez MD        Allergies:  Azithromycin and Erythromycin    Objective     Vital Signs        Physical Exam:     General Appearance:    Alert, cooperative, in no acute distress   Head:    Normocephalic, without obvious abnormality, atraumatic   Eyes:          Conjunctivae and sclerae normal, no icterus,     Ears:    Ears appear intact with no abnormalities noted   Throat:   No oral lesions, no thrush, oral mucosa moist   Neck:   No adenopathy, supple, trachea midline, no thyromegaly   Back:     No kyphosis present, no scoliosis present, no skin lesions,      erythema or scars, no tenderness to percussion or                   palpation,   range of motion normal   Lungs:     Clear to auscultation,respirations regular, even and                  unlabored    Heart:  "   Regular rhythm and normal rate, normal S1 and S2, no            murmur, no gallop, no rub, no click   Chest Wall:    No abnormalities observed   Abdomen:     Normal bowel sounds, no masses, no organomegaly, soft        non-tender, non-distended, no guarding, no rebound                tenderness, former ostomy site with no evidence of infection   Rectal:        Extremities:   Moves all extremities well, no edema, no cyanosis, no             redness   Pulses:   Pulses palpable and equal bilaterally   Skin:   No bleeding, bruising or rash   Lymph nodes:   No palpable adenopathy   Neurologic:   A/o x 4 with no deficits       Results Review:   {Results Review:[id number]::\"I reviewed the patient's new clinical results.\"    LABS/IMAGING:  Results for orders placed or performed during the hospital encounter of 11/02/23   Comprehensive metabolic panel    Specimen: Blood   Result Value Ref Range    Glucose 120 (H) 65 - 99 mg/dL    BUN 16 8 - 23 mg/dL    Creatinine 0.84 0.57 - 1.00 mg/dL    Sodium 138 136 - 145 mmol/L    Potassium 3.7 3.5 - 5.2 mmol/L    Chloride 102 98 - 107 mmol/L    CO2 33.7 (H) 22.0 - 29.0 mmol/L    Calcium 9.0 8.6 - 10.5 mg/dL    Total Protein 6.9 6.0 - 8.5 g/dL    Albumin 4.3 3.5 - 5.2 g/dL    ALT (SGPT) 6 1 - 33 U/L    AST (SGOT) 11 1 - 32 U/L    Alkaline Phosphatase 86 39 - 117 U/L    Total Bilirubin 0.4 0.0 - 1.2 mg/dL    Globulin 2.6 gm/dL    A/G Ratio 1.7 g/dL    BUN/Creatinine Ratio 19.0 7.0 - 25.0    Anion Gap 2.3 (L) 5.0 - 15.0 mmol/L    eGFR 77.7 >60.0 mL/min/1.73   Magnesium    Specimen: Blood   Result Value Ref Range    Magnesium 2.2 1.6 - 2.4 mg/dL   Phosphorus    Specimen: Blood   Result Value Ref Range    Phosphorus 3.4 2.5 - 4.5 mg/dL   CBC Auto Differential    Specimen: Blood   Result Value Ref Range    WBC 4.53 3.40 - 10.80 10*3/mm3    RBC 3.14 (L) 3.77 - 5.28 10*6/mm3    Hemoglobin 10.5 (L) 12.0 - 15.9 g/dL    Hematocrit 33.3 (L) 34.0 - 46.6 %    .1 (H) 79.0 - 97.0 fL    MCH 33.4 " (H) 26.6 - 33.0 pg    MCHC 31.5 31.5 - 35.7 g/dL    RDW 15.6 (H) 12.3 - 15.4 %    RDW-SD 61.4 (H) 37.0 - 54.0 fl    MPV 9.9 6.0 - 12.0 fL    Platelets 232 140 - 450 10*3/mm3    Neutrophil % 69.5 42.7 - 76.0 %    Lymphocyte % 24.3 19.6 - 45.3 %    Monocyte % 4.4 (L) 5.0 - 12.0 %    Eosinophil % 0.7 0.3 - 6.2 %    Basophil % 1.1 0.0 - 1.5 %    Immature Grans % 0.0 0.0 - 0.5 %    Neutrophils, Absolute 3.15 1.70 - 7.00 10*3/mm3    Lymphocytes, Absolute 1.10 0.70 - 3.10 10*3/mm3    Monocytes, Absolute 0.20 0.10 - 0.90 10*3/mm3    Eosinophils, Absolute 0.03 0.00 - 0.40 10*3/mm3    Basophils, Absolute 0.05 0.00 - 0.20 10*3/mm3    Immature Grans, Absolute 0.00 0.00 - 0.05 10*3/mm3   Ferritin    Specimen: Blood   Result Value Ref Range    Ferritin 44.12 13.00 - 150.00 ng/mL   Iron Profile    Specimen: Blood   Result Value Ref Range    Iron 45 37 - 145 mcg/dL    Iron Saturation (TSAT) 11 (L) 20 - 50 %    Transferrin 276 200 - 360 mg/dL    TIBC 411 298 - 536 mcg/dL        Result Review :     Assessment & Plan     Status post laparoscopic hand-assisted colostomy closure with resection, open parastomal hernia repair 6/26/2023  Pathology with metastatic lobular carcinoma to portion of descending colon and anastomotic rings    After verbal consent, applied silver nitrate in the opening of the colostomy site to help stimulate closure.  Follow-up with me in 1 week for further application.  All questions answered.  She agrees with the plan.  Thank you for the consult.              This document has been electronically signed by Alfred Castañeda MD  November 20, 2023 07:30 EST

## 2023-11-20 NOTE — PROGRESS NOTES
General Surgery/Colorectal Surgery Note    Patient Name:  Derek Keller  YOB: 1960  [id number]    Referring Provider: No ref. provider found      Patient Care Team:  Haile Monique MD as PCP - General (Family Medicine)  Julien Cardona DO as Consulting Physician (Pain Medicine)  Joel Castillo MD as Consulting Physician (Gastroenterology)  Remington Russell MD as Surgeon (General Surgery)  Ahsan Salas MD as Consulting Physician (Sports Medicine)  Donald Barker MD as Consulting Physician (Hematology and Oncology)  Sandy Ricci MD as Consulting Physician (General Surgery)  Alfred Castañeda MD as Consulting Physician (General Surgery)    Chief complaint follow-up    Subjective .     History of present illness:    Status post laparoscopic hand-assisted colostomy closure with resection, open parastomal hernia repair 6/26/2023  Pathology with metastatic lobular carcinoma to portion of descending colon and anastomotic rings    She comes in for further silver nitrate application to her colostomy site.  No changes since last seen.      History:  Past Medical History:   Diagnosis Date    Abdominal pain     OSTOMY SITE    Allergic rhinitis     Anemia     NO S/S    Anxiety     Arteriosclerosis     Coronary, follows with Dr. Núñez    Arthritis     Bone metastases 10/14/2022    Bowen's disease     SKIN CANCER    Breast cancer     NO SURGERY WAS DONE DUE TO METS TO BONE/COLON/LYMPHNODE    Cancer related pain 10/13/2022    Depression     Disorder associated with Helicobacter species 10/12/2022    Dysphoric mood     Fatigue     Fibromyalgia     Frequent urination     NO S/S INFECTION    Herpes simplex vulvovaginitis 07/26/2018    History of colon polyps     History of IBS     History of kidney stones     Hyperlipidemia     Hypertension     Hypothyroidism     Insomnia     Lumbago     Mood disorder     Neck pain     R/T CANCER    Palpitations     last time a few months ago    Precordial  pain     R/T SPINE CANCER    S/P Laparoscopic Hand-Assisted Colostomy Closure with End to End Anastomosis Open Parastomal Hernia Repair 12/08/2022    Sleep disturbance     SOB (shortness of breath)     at times at rest    SOBOE (shortness of breath on exertion)        Past Surgical History:   Procedure Laterality Date    BREAST BIOPSY Left     CARDIAC CATHETERIZATION Left 1960    CARDIAC CATHETERIZATION N/A 04/06/2018    Procedure: Coronary angiography;  Surgeon: Art Licea MD;  Location: [facility] INVASIVE LOCATION;  Service: Cardiovascular    CARDIAC CATHETERIZATION N/A 04/06/2018    Procedure: Left heart cath;  Surgeon: Art Licea MD;  Location: [facility] CATH INVASIVE LOCATION;  Service: Cardiovascular    CARDIAC CATHETERIZATION N/A 04/06/2018    Procedure: Left ventriculography;  Surgeon: Art Licea MD;  Location: [facility] CATH INVASIVE LOCATION;  Service: Cardiovascular    CHOLECYSTECTOMY      COLON SURGERY      colostomy bag    COLONOSCOPY  11/08/2006    COLONOSCOPY N/A 06/08/2023    Procedure: COLONOSCOPY;  Surgeon: Alfred Castañeda MD;  Location: [facility] ENDOSCOPY;  Service: General;  Laterality: N/A;  same as preop    EXPLORATORY LAPAROTOMY N/A 12/06/2022    Procedure: LAPAROTOMY EXPLORATORY sigmoid resection hartmans procedure colostomy;  Surgeon: Alfred Castañeda MD;  Location: [facility] MAIN OR;  Service: General;  Laterality: N/A;    GANGLION CYST EXCISION Bilateral     HYSTERECTOMY  05/2005    ILEOSTOMY CLOSURE N/A 6/26/2023    Procedure: Laparoscopic Hand-Assisted Colostomy Closure with End to End Anastomosis;  Surgeon: Alfred Castañeda MD;  Location: [facility] MAIN OR;  Service: General;  Laterality: N/A;    LAPAROSCOPIC GASTRIC BANDING      BAND REMOVED 2020    PARASTOMAL HERNIA REPAIR N/A 6/26/2023    Procedure: Open Parastomal Hernia Repair;  Surgeon: Alfred Castañeda MD;  Location: [facility] MAIN OR;  Service: General;  Laterality: N/A;     TONSILLECTOMY      VENOUS ACCESS DEVICE (PORT) INSERTION N/A 01/09/2023    Procedure: INSERTION VENOUS ACCESS DEVICE;  Surgeon: Alfred Castañeda MD;  [facility];  Service: General;  Laterality: N/A;       Family History   Problem Relation Age of Onset    Hypertension Mother     Rheum arthritis Mother     Heart disease Mother     Breast cancer Mother     Diabetes Father     Cancer Maternal Grandmother         colon    Colon cancer Maternal Grandmother     Aneurysm Paternal Grandfather     Diabetes Other     Fibromyalgia Other     Malig Hyperthermia Neg Hx        Social History     Tobacco Use    Smoking status: Every Day     Packs/day: 1.50     Years: 40.00     Additional pack years: 0.00     Total pack years: 60.00     Types: Cigarettes     Start date: 1974    Smokeless tobacco: Never    Tobacco comments:          INST PER ANESTHESIA PROTOCOL, LAST SMOKED TODAY   Vaping Use    Vaping Use: Never used   Substance Use Topics    Alcohol use: No    Drug use: Yes     Types: Marijuana     Comment: LAST USE 6/19/23       Review of Systems  All systems were reviewed and negative except for:   Review of Systems   Constitutional: Negative for chills, fever and unexpected weight loss.   HENT: Negative for congestion, nosebleeds and voice change.    Eyes: Negative for blurred vision, double vision and discharge.   Respiratory: Negative for apnea, chest tightness and shortness of breath.    Cardiovascular: Negative for chest pain and leg swelling.   Gastrointestinal:        See HPI   Endocrine: Negative for cold intolerance and heat intolerance.   Genitourinary: Negative for dysuria, hematuria and urgency.   Musculoskeletal: Negative for back pain, joint swelling and neck pain.   Skin: Negative for color change and dry skin.   Neurological: Negative for dizziness and confusion.   Hematological: Negative for adenopathy.   Psychiatric/Behavioral: Negative for agitation and behavioral problems.     MEDS:  Prior  to Admission medications    Medication Sig Start Date End Date Taking? Authorizing Provider   amoxicillin (AMOXIL) 875 MG tablet  11/7/23  Yes Bessie Lopez MD   atorvastatin (LIPITOR) 20 MG tablet TAKE 1 TABLET BY MOUTH EVERY DAY  Patient taking differently: Take 1 tablet by mouth Daily. 10/1/19  Yes Yudelka Manning MD   cyproheptadine (PERIACTIN) 4 MG tablet Take 1 tablet by mouth Every 12 (Twelve) Hours. 10/30/23  Yes Bessie Lopez MD   donepezil (ARICEPT) 10 MG tablet Take 1 tablet by mouth Daily. 10/28/22  Yes Bessie Lopez MD   DULoxetine (CYMBALTA) 20 MG capsule Take 1 capsule by mouth Daily. 7/14/23  Yes Donald Barker MD   gabapentin (NEURONTIN) 600 MG tablet TAKE 1 TABLET BY MOUTH AT BEDTIME  Patient taking differently: Take 1 tablet by mouth 2 (Two) Times a Day As Needed. 7/30/20  Yes Yudelka Manning MD   hydroCHLOROthiazide (HYDRODIURIL) 12.5 MG tablet Take 1 tablet by mouth Daily. 5/17/23  Yes Donald Barker MD   letrozole (FEMARA) 2.5 MG tablet Take 1 tablet by mouth Daily. 11/30/22  Yes Donald Barker MD   levothyroxine (SYNTHROID, LEVOTHROID) 50 MCG tablet TAKE 1 TABLET BY MOUTH EVERY DAY  Patient taking differently: Take 1 tablet by mouth Daily. 7/19/19  Yes Yudelka Manning MD   LORazepam (ATIVAN) 0.5 MG tablet Take 1 tablet by mouth Every 6 (Six) Hours As Needed.   Yes ProviderBessie MD   losartan (COZAAR) 100 MG tablet Take 1 tablet by mouth Daily. INST PER ANESTHESIA PROTOCOL   Yes Bessie Lopez MD   montelukast (SINGULAIR) 10 MG tablet Take 1 tablet by mouth Daily. 1/17/22  Yes Bessie Lopez MD   Morphine (MS CONTIN) 15 MG 12 hr tablet Take 3 tablets by mouth 2 (Two) Times a Day. 10/31/23  Yes Donald Barker MD   ondansetron (ZOFRAN) 8 MG tablet TAKE 1 TABLET BY MOUTH THREE TIMES DAILY AS NEEDED FOR NAUSEA AND VOMITING 3/8/23  Yes Donald Barker MD   oxybutynin XL (DITROPAN XL) 15 MG 24 hr tablet Take 1 tablet by mouth  Daily. 10/5/23  Yes Donald Barker MD   polyethylene glycol (MIRALAX) 17 g packet Take 17 g by mouth Daily. 1/9/23  Yes Alfred Castañeda MD   prochlorperazine (COMPAZINE) 10 MG tablet  9/29/23  Yes Bessie Lopez MD   ribociclib succinate 200 MG tablet therapy pack tablet Take 3 tablets by mouth Take As Directed. Take 3 tablets by mouth daily for 21 days then off 7 days on a 28 day cycle. 8/1/23  Yes Donald Barker MD   rOPINIRole (REQUIP) 3 MG tablet Take 1 tablet by mouth 2 (Two) Times a Day. 6/29/22  Yes Bessie Lopez MD   SudoGest 60 MG tablet Take 1 tablet by mouth Every 8 (Eight) Hours. 11/13/23  Yes Bessie Lopez MD   SUMAtriptan (IMITREX) 100 MG tablet Take 1 tablet by mouth 1 (One) Time As Needed. 10/7/22  Yes Bessie Lopez MD   traZODone (DESYREL) 150 MG tablet TAKE 1 TABLET BY MOUTH ONCE nightly  Patient taking differently: Take 1 tablet by mouth Every Night. 11/12/19  Yes Yudelka Manning MD   oxyCODONE-acetaminophen (PERCOCET)  MG per tablet Take 1 tablet by mouth Every 6 (Six) Hours As Needed for Moderate Pain. for pain 11/17/23   Donald Barker MD        Allergies:  Azithromycin and Erythromycin    Objective     Vital Signs        Physical Exam:     General Appearance:    Alert, cooperative, in no acute distress   Head:    Normocephalic, without obvious abnormality, atraumatic   Eyes:          Conjunctivae and sclerae normal, no icterus,     Ears:    Ears appear intact with no abnormalities noted   Throat:   No oral lesions, no thrush, oral mucosa moist   Neck:   No adenopathy, supple, trachea midline, no thyromegaly   Back:     No kyphosis present, no scoliosis present, no skin lesions,      erythema or scars, no tenderness to percussion or                   palpation,   range of motion normal   Lungs:     Clear to auscultation,respirations regular, even and                  unlabored    Heart:    Regular rhythm and normal rate, normal S1 and S2,  "no            murmur, no gallop, no rub, no click   Chest Wall:    No abnormalities observed   Abdomen:     Normal bowel sounds, no masses, no organomegaly, soft        non-tender, non-distended, no guarding, no rebound                tenderness, colostomy site closing approximately 2 cm deep, no evidence of infection   Rectal:        Extremities:   Moves all extremities well, no edema, no cyanosis, no             redness   Pulses:   Pulses palpable and equal bilaterally   Skin:   No bleeding, bruising or rash   Lymph nodes:   No palpable adenopathy   Neurologic:   A/o x 4 with no deficits       Results Review:   {Results Review:94213::\"I reviewed the patient's new clinical results.\"    LABS/IMAGING:  Results for orders placed or performed during the hospital encounter of 11/02/23   Comprehensive metabolic panel    Specimen: Blood   Result Value Ref Range    Glucose 120 (H) 65 - 99 mg/dL    BUN 16 8 - 23 mg/dL    Creatinine 0.84 0.57 - 1.00 mg/dL    Sodium 138 136 - 145 mmol/L    Potassium 3.7 3.5 - 5.2 mmol/L    Chloride 102 98 - 107 mmol/L    CO2 33.7 (H) 22.0 - 29.0 mmol/L    Calcium 9.0 8.6 - 10.5 mg/dL    Total Protein 6.9 6.0 - 8.5 g/dL    Albumin 4.3 3.5 - 5.2 g/dL    ALT (SGPT) 6 1 - 33 U/L    AST (SGOT) 11 1 - 32 U/L    Alkaline Phosphatase 86 39 - 117 U/L    Total Bilirubin 0.4 0.0 - 1.2 mg/dL    Globulin 2.6 gm/dL    A/G Ratio 1.7 g/dL    BUN/Creatinine Ratio 19.0 7.0 - 25.0    Anion Gap 2.3 (L) 5.0 - 15.0 mmol/L    eGFR 77.7 >60.0 mL/min/1.73   Magnesium    Specimen: Blood   Result Value Ref Range    Magnesium 2.2 1.6 - 2.4 mg/dL   Phosphorus    Specimen: Blood   Result Value Ref Range    Phosphorus 3.4 2.5 - 4.5 mg/dL   CBC Auto Differential    Specimen: Blood   Result Value Ref Range    WBC 4.53 3.40 - 10.80 10*3/mm3    RBC 3.14 (L) 3.77 - 5.28 10*6/mm3    Hemoglobin 10.5 (L) 12.0 - 15.9 g/dL    Hematocrit 33.3 (L) 34.0 - 46.6 %    .1 (H) 79.0 - 97.0 fL    MCH 33.4 (H) 26.6 - 33.0 pg    MCHC " 31.5 31.5 - 35.7 g/dL    RDW 15.6 (H) 12.3 - 15.4 %    RDW-SD 61.4 (H) 37.0 - 54.0 fl    MPV 9.9 6.0 - 12.0 fL    Platelets 232 140 - 450 10*3/mm3    Neutrophil % 69.5 42.7 - 76.0 %    Lymphocyte % 24.3 19.6 - 45.3 %    Monocyte % 4.4 (L) 5.0 - 12.0 %    Eosinophil % 0.7 0.3 - 6.2 %    Basophil % 1.1 0.0 - 1.5 %    Immature Grans % 0.0 0.0 - 0.5 %    Neutrophils, Absolute 3.15 1.70 - 7.00 10*3/mm3    Lymphocytes, Absolute 1.10 0.70 - 3.10 10*3/mm3    Monocytes, Absolute 0.20 0.10 - 0.90 10*3/mm3    Eosinophils, Absolute 0.03 0.00 - 0.40 10*3/mm3    Basophils, Absolute 0.05 0.00 - 0.20 10*3/mm3    Immature Grans, Absolute 0.00 0.00 - 0.05 10*3/mm3   Ferritin    Specimen: Blood   Result Value Ref Range    Ferritin 44.12 13.00 - 150.00 ng/mL   Iron Profile    Specimen: Blood   Result Value Ref Range    Iron 45 37 - 145 mcg/dL    Iron Saturation (TSAT) 11 (L) 20 - 50 %    Transferrin 276 200 - 360 mg/dL    TIBC 411 298 - 536 mcg/dL        Result Review :     Assessment & Plan     Status post laparoscopic hand-assisted colostomy closure with resection, open parastomal hernia repair 6/26/2023  Pathology with metastatic lobular carcinoma to portion of descending colon and anastomotic rings    After verbal consent, applied silver nitrate in the opening of the former colostomy site to help stimulate closure.  We will try to arrange home health with silver nitrate application every 3 days.  Otherwise follow-up with me in 1 week for further application.  All questions answered.  She agrees with the plan.  Thank you for the consult.           This document has been electronically signed by Alfred Castañeda MD  November 20, 2023 10:16 EST

## 2023-11-20 NOTE — TELEPHONE ENCOUNTER
Caller: Jeronimo Keller    Relationship: Self    Best call back number: [phone]    What is the best time to reach you: ANY    Who are you requesting to speak with (clinical staff, provider,  specific staff member): CLINICAL     What was the call regarding: JERONIMO IS CALLING WANTING TO KNOW IF THERE IS A PROGRAM FOR THE MEDICATION ribociclib succinate 200 MG tablet therapy pack tablet   SHE STATES IT WILL COST HER OVER 17,000 A MONTH    PLEASE ADVISE AND DISCUSS

## 2023-11-21 ENCOUNTER — TELEPHONE (OUTPATIENT)
Dept: SURGERY | Facility: CLINIC | Age: 63
End: 2023-11-21
Payer: MEDICARE

## 2023-11-21 NOTE — TELEPHONE ENCOUNTER
Kinza from [facility] called stating they do not have to the silver nitrate you ordered for patient's wound. She would like to know if it is possible for you to send in a prescription for it?

## 2023-11-22 NOTE — TELEPHONE ENCOUNTER
Pt's pharmacy does not have the siver nitrate, they can order it at a cost of $45. I called the patient and informed her of this, she does not want to pay out of pocket. I offered the patient an appt for this afternoon. She is on her way to [place] and cannot come in today. She has an appt for 11/29 that she said she will keep.

## 2023-11-23 DIAGNOSIS — G89.3 CANCER RELATED PAIN: ICD-10-CM

## 2023-11-27 RX ORDER — MORPHINE SULFATE 15 MG/1
45 TABLET, FILM COATED, EXTENDED RELEASE ORAL 2 TIMES DAILY
Qty: 180 TABLET | Refills: 0 | Status: SHIPPED | OUTPATIENT
Start: 2023-11-27

## 2023-11-27 NOTE — TELEPHONE ENCOUNTER
Caller: Derek Keller    Relationship: Self    Best call back number: [phone]    What is the best time to reach you: ANYTIME    Who are you requesting to speak with (clinical staff, provider,  specific staff member): MAY IN PHARMACY    Do you know the name of the person who called:     What was the call regarding: PATIENT WILL HAVE [employer] STARTING 1/1/2024.  PLEASE HAVE RUPA CALL HER TO DISCUSS REGARDING HER GETTING HELP FOR THE RIBOCICLIB SUCCINATE 200MG TALBET.    CALL PATIENT BACK     Is it okay if the provider responds through MyChart:

## 2023-11-28 ENCOUNTER — TELEPHONE (OUTPATIENT)
Dept: ONCOLOGY | Facility: HOSPITAL | Age: 63
End: 2023-11-28

## 2023-11-28 DIAGNOSIS — G89.3 CANCER RELATED PAIN: ICD-10-CM

## 2023-11-28 RX ORDER — MORPHINE SULFATE 15 MG/1
45 TABLET, FILM COATED, EXTENDED RELEASE ORAL 2 TIMES DAILY
Qty: 180 TABLET | Refills: 0 | Status: CANCELLED | OUTPATIENT
Start: 2023-11-28

## 2023-11-28 NOTE — TELEPHONE ENCOUNTER
Caller: Derek Keller    Relationship: Self    Best call back number: [phone]     Requested Prescriptions:   Requested Prescriptions     Pending Prescriptions Disp Refills    Morphine (MS CONTIN) 15 MG 12 hr tablet 180 tablet 0     Sig: Take 3 tablets by mouth 2 (Two) Times a Day.        Pharmacy where request should be sent: [facility], [facility], [facility]  [facility], [place] - [address] - [phone] [facility] [phone] FX     Last office visit with prescribing clinician: 11/2/2023   Last telemedicine visit with prescribing clinician: Visit date not found   Next office visit with prescribing clinician: 11/30/2023       Does the patient have less than a 3 day supply:  [x] Yes  [] No    Would you like a call back once the refill request has been completed: [] Yes [x] No    If the office needs to give you a call back, can they leave a voicemail: [] Yes [x] No    Abby Benítez Rep   11/28/23 10:55 EST

## 2023-11-29 ENCOUNTER — OFFICE VISIT (OUTPATIENT)
Dept: SURGERY | Facility: CLINIC | Age: 63
End: 2023-11-29
Payer: MEDICARE

## 2023-11-29 VITALS — WEIGHT: 172 LBS | RESPIRATION RATE: 16 BRPM | HEIGHT: 66 IN | BODY MASS INDEX: 27.64 KG/M2

## 2023-11-29 DIAGNOSIS — Z98.890 HISTORY OF COLOSTOMY REVERSAL: Primary | ICD-10-CM

## 2023-11-29 PROCEDURE — 1159F MED LIST DOCD IN RCRD: CPT | Performed by: SURGERY

## 2023-11-29 PROCEDURE — 1160F RVW MEDS BY RX/DR IN RCRD: CPT | Performed by: SURGERY

## 2023-11-29 PROCEDURE — 99212 OFFICE O/P EST SF 10 MIN: CPT | Performed by: SURGERY

## 2023-11-30 ENCOUNTER — HOSPITAL ENCOUNTER (OUTPATIENT)
Dept: ONCOLOGY | Facility: HOSPITAL | Age: 63
Discharge: HOME | End: 2023-11-30
Payer: MEDICARE

## 2023-11-30 ENCOUNTER — OFFICE VISIT (OUTPATIENT)
Dept: ONCOLOGY | Facility: HOSPITAL | Age: 63
End: 2023-11-30
Payer: MEDICARE

## 2023-11-30 VITALS
RESPIRATION RATE: 18 BRPM | SYSTOLIC BLOOD PRESSURE: 138 MMHG | OXYGEN SATURATION: 94 % | TEMPERATURE: 98.1 F | WEIGHT: 170.19 LBS | HEART RATE: 84 BPM | DIASTOLIC BLOOD PRESSURE: 63 MMHG | BODY MASS INDEX: 27.47 KG/M2

## 2023-11-30 DIAGNOSIS — C79.51 MALIGNANT NEOPLASM METASTATIC TO BONE: Primary | ICD-10-CM

## 2023-11-30 DIAGNOSIS — F33.9 MONOPOLAR DEPRESSION: ICD-10-CM

## 2023-11-30 DIAGNOSIS — Z17.0 MALIGNANT NEOPLASM OF UPPER-OUTER QUADRANT OF LEFT BREAST IN FEMALE, ESTROGEN RECEPTOR POSITIVE: Primary | ICD-10-CM

## 2023-11-30 DIAGNOSIS — C79.51 MALIGNANT NEOPLASM METASTATIC TO BONE: ICD-10-CM

## 2023-11-30 DIAGNOSIS — Z45.2 ENCOUNTER FOR ADJUSTMENT OR MANAGEMENT OF VASCULAR ACCESS DEVICE: ICD-10-CM

## 2023-11-30 DIAGNOSIS — D64.9 ANEMIA, UNSPECIFIED TYPE: ICD-10-CM

## 2023-11-30 DIAGNOSIS — Z17.0 MALIGNANT NEOPLASM OF UPPER-OUTER QUADRANT OF LEFT BREAST IN FEMALE, ESTROGEN RECEPTOR POSITIVE: ICD-10-CM

## 2023-11-30 DIAGNOSIS — G89.3 CANCER RELATED PAIN: ICD-10-CM

## 2023-11-30 DIAGNOSIS — E87.6 HYPOKALEMIA: ICD-10-CM

## 2023-11-30 DIAGNOSIS — C50.412 MALIGNANT NEOPLASM OF UPPER-OUTER QUADRANT OF LEFT BREAST IN FEMALE, ESTROGEN RECEPTOR POSITIVE: ICD-10-CM

## 2023-11-30 DIAGNOSIS — C50.412 MALIGNANT NEOPLASM OF UPPER-OUTER QUADRANT OF LEFT BREAST IN FEMALE, ESTROGEN RECEPTOR POSITIVE: Primary | ICD-10-CM

## 2023-11-30 LAB
ALBUMIN SERPL-MCNC: 3.9 G/DL (ref 3.5–5.2)
ALBUMIN/GLOB SERPL: 1.6 G/DL
ALP SERPL-CCNC: 84 U/L (ref 39–117)
ALT SERPL W P-5'-P-CCNC: 7 U/L (ref 1–33)
AMPHET+METHAMPHET UR QL: POSITIVE
ANION GAP SERPL CALCULATED.3IONS-SCNC: 3.2 MMOL/L (ref 5–15)
AST SERPL-CCNC: 10 U/L (ref 1–32)
BARBITURATES UR QL SCN: NEGATIVE
BASOPHILS # BLD AUTO: 0.03 10*3/MM3 (ref 0–0.2)
BASOPHILS NFR BLD AUTO: 0.9 % (ref 0–1.5)
BENZODIAZ UR QL SCN: NEGATIVE
BILIRUB SERPL-MCNC: 0.4 MG/DL (ref 0–1.2)
BUN SERPL-MCNC: 10 MG/DL (ref 8–23)
BUN/CREAT SERPL: 13.9 (ref 7–25)
CALCIUM SPEC-SCNC: 8.9 MG/DL (ref 8.6–10.5)
CANNABINOIDS SERPL QL: POSITIVE
CHLORIDE SERPL-SCNC: 99 MMOL/L (ref 98–107)
CO2 SERPL-SCNC: 34.8 MMOL/L (ref 22–29)
COCAINE UR QL: NEGATIVE
CREAT SERPL-MCNC: 0.72 MG/DL (ref 0.57–1)
DEPRECATED RDW RBC AUTO: 61.1 FL (ref 37–54)
EGFRCR SERPLBLD CKD-EPI 2021: 93.5 ML/MIN/1.73
EOSINOPHIL # BLD AUTO: 0.02 10*3/MM3 (ref 0–0.4)
EOSINOPHIL NFR BLD AUTO: 0.6 % (ref 0.3–6.2)
ERYTHROCYTE [DISTWIDTH] IN BLOOD BY AUTOMATED COUNT: 15.4 % (ref 12.3–15.4)
FENTANYL UR-MCNC: NEGATIVE NG/ML
GLOBULIN UR ELPH-MCNC: 2.4 GM/DL
GLUCOSE SERPL-MCNC: 136 MG/DL (ref 65–99)
HCT VFR BLD AUTO: 32.4 % (ref 34–46.6)
HGB BLD-MCNC: 10.2 G/DL (ref 12–15.9)
IMM GRANULOCYTES # BLD AUTO: 0.01 10*3/MM3 (ref 0–0.05)
IMM GRANULOCYTES NFR BLD AUTO: 0.3 % (ref 0–0.5)
LYMPHOCYTES # BLD AUTO: 0.93 10*3/MM3 (ref 0.7–3.1)
LYMPHOCYTES NFR BLD AUTO: 26.7 % (ref 19.6–45.3)
MAGNESIUM SERPL-MCNC: 2 MG/DL (ref 1.6–2.4)
MCH RBC QN AUTO: 34 PG (ref 26.6–33)
MCHC RBC AUTO-ENTMCNC: 31.5 G/DL (ref 31.5–35.7)
MCV RBC AUTO: 108 FL (ref 79–97)
METHADONE UR QL SCN: NEGATIVE
MONOCYTES # BLD AUTO: 0.17 10*3/MM3 (ref 0.1–0.9)
MONOCYTES NFR BLD AUTO: 4.9 % (ref 5–12)
NEUTROPHILS NFR BLD AUTO: 2.32 10*3/MM3 (ref 1.7–7)
NEUTROPHILS NFR BLD AUTO: 66.6 % (ref 42.7–76)
OPIATES UR QL: POSITIVE
OXYCODONE UR QL SCN: POSITIVE
PHOSPHATE SERPL-MCNC: 2.9 MG/DL (ref 2.5–4.5)
PLATELET # BLD AUTO: 198 10*3/MM3 (ref 140–450)
PMV BLD AUTO: 9.5 FL (ref 6–12)
POTASSIUM SERPL-SCNC: 3 MMOL/L (ref 3.5–5.2)
PROT SERPL-MCNC: 6.3 G/DL (ref 6–8.5)
RBC # BLD AUTO: 3 10*6/MM3 (ref 3.77–5.28)
SODIUM SERPL-SCNC: 137 MMOL/L (ref 136–145)
WBC NRBC COR # BLD AUTO: 3.48 10*3/MM3 (ref 3.4–10.8)

## 2023-11-30 PROCEDURE — 80307 DRUG TEST PRSMV CHEM ANLYZR: CPT | Performed by: INTERNAL MEDICINE

## 2023-11-30 PROCEDURE — 36591 DRAW BLOOD OFF VENOUS DEVICE: CPT

## 2023-11-30 PROCEDURE — G0463 HOSPITAL OUTPT CLINIC VISIT: HCPCS | Performed by: INTERNAL MEDICINE

## 2023-11-30 PROCEDURE — 96372 THER/PROPH/DIAG INJ SC/IM: CPT

## 2023-11-30 PROCEDURE — 80053 COMPREHEN METABOLIC PANEL: CPT | Performed by: INTERNAL MEDICINE

## 2023-11-30 PROCEDURE — 84100 ASSAY OF PHOSPHORUS: CPT | Performed by: INTERNAL MEDICINE

## 2023-11-30 PROCEDURE — 85025 COMPLETE CBC W/AUTO DIFF WBC: CPT | Performed by: INTERNAL MEDICINE

## 2023-11-30 PROCEDURE — 25010000002 HEPARIN LOCK FLUSH PER 10 UNITS: Performed by: INTERNAL MEDICINE

## 2023-11-30 PROCEDURE — 83735 ASSAY OF MAGNESIUM: CPT | Performed by: INTERNAL MEDICINE

## 2023-11-30 PROCEDURE — 25010000002 DENOSUMAB 120 MG/1.7ML SOLUTION: Performed by: INTERNAL MEDICINE

## 2023-11-30 RX ORDER — HEPARIN SODIUM (PORCINE) LOCK FLUSH IV SOLN 100 UNIT/ML 100 UNIT/ML
500 SOLUTION INTRAVENOUS AS NEEDED
OUTPATIENT
Start: 2023-11-30

## 2023-11-30 RX ORDER — SODIUM CHLORIDE 0.9 % (FLUSH) 0.9 %
20 SYRINGE (ML) INJECTION AS NEEDED
Status: DISCONTINUED | OUTPATIENT
Start: 2023-11-30 | End: 2023-12-01 | Stop reason: HOSPADM

## 2023-11-30 RX ORDER — DULOXETIN HYDROCHLORIDE 60 MG/1
60 CAPSULE, DELAYED RELEASE ORAL DAILY
Qty: 30 CAPSULE | Refills: 2 | Status: SHIPPED | OUTPATIENT
Start: 2023-11-30

## 2023-11-30 RX ORDER — HEPARIN SODIUM (PORCINE) LOCK FLUSH IV SOLN 100 UNIT/ML 100 UNIT/ML
500 SOLUTION INTRAVENOUS AS NEEDED
Status: DISCONTINUED | OUTPATIENT
Start: 2023-11-30 | End: 2023-12-01 | Stop reason: HOSPADM

## 2023-11-30 RX ORDER — SODIUM CHLORIDE 0.9 % (FLUSH) 0.9 %
20 SYRINGE (ML) INJECTION AS NEEDED
OUTPATIENT
Start: 2023-11-30

## 2023-11-30 RX ADMIN — DENOSUMAB 120 MG: 120 INJECTION SUBCUTANEOUS at 13:26

## 2023-11-30 RX ADMIN — HEPARIN SODIUM (PORCINE) LOCK FLUSH IV SOLN 100 UNIT/ML 500 UNITS: 100 SOLUTION at 10:38

## 2023-11-30 RX ADMIN — Medication 20 ML: at 10:38

## 2023-11-30 NOTE — PROGRESS NOTES
Chief Complaint  Breast Cancer    Haile Monique MD Patel, Saagar, MD    Subjective          Derek Keller presents to [facility] for ongoing treatment of her metastatic breast cancer.  She is on letrozole, ribociclib and denosumab for bony involvement.  She states she is tolerating those regimens well.  She denies any dental or jaw pain.  No mouth sores or ulcers.  She reports adequate appetite.  She notes a lot of fatigue.  She also reports having more depressive issues with poor mood, tearfulness, anhedonia, excessive sleeping.  She is taking Cymbalta as prescribed.  She has chronic pain related to bony involvement.  She is on MS Contin and oxycodone for breakthrough.  She notes that she has been having more pain in the neck especially on the left side for the last couple of weeks.  She denies any obvious trauma to the area.  There was not any obvious metastatic disease on prior bone scan in that area.  She denies numbness or weakness in her arms but the pain does radiate some into the left shoulder.    Oncology/Hematology History   Malignant neoplasm of upper-outer quadrant of left breast in female, estrogen receptor positive   10/13/2022 Initial Diagnosis    Malignant neoplasm of left breast in female, estrogen receptor positive (HCC)     10/14/2022 -  Chemotherapy    OP BREAST Letrozole / Ribociclib     10/14/2022 Cancer Staged    Staging form: Breast, AJCC 8th Edition  - Clinical: Stage IV (cT1c, cN1, cM1, ER+, PA+, HER2-) - Signed by Donald Barker MD on 10/14/2022     10/28/2022 -  Chemotherapy    OP SUPPORTIVE Denosumab (Xgeva) Q28D     Malignant neoplasm metastatic to bone   10/14/2022 Initial Diagnosis    Bone metastases (HCC)     10/28/2022 -  Chemotherapy    OP SUPPORTIVE Denosumab (Xgeva) Q28D     Secondary malignant neoplasm of bone (Resolved)   11/14/2022 Initial Diagnosis    Secondary malignant neoplasm of bone (HCC)     11/17/2022 - 4/19/2023 Radiation     RADIATION THERAPY Treatment Details (11/14/2022 - 4/19/2023)  Site: Spine - Lumbar  Technique: 3D CRT  Goal: No goal specified  Planned Treatment Start Date: 11/17/2022         Review of Systems   Constitutional:  Positive for fatigue. Negative for appetite change, diaphoresis, fever, unexpected weight gain and unexpected weight loss.   HENT:  Negative for hearing loss, mouth sores, sore throat, swollen glands, trouble swallowing and voice change.    Eyes:  Negative for blurred vision.   Respiratory:  Negative for cough, shortness of breath and wheezing.    Cardiovascular:  Negative for chest pain and palpitations.   Gastrointestinal:  Positive for constipation (HAS TO TAKE LAXATIVES). Negative for abdominal pain, blood in stool, diarrhea, nausea and vomiting.   Endocrine: Negative for cold intolerance and heat intolerance.   Genitourinary:  Negative for difficulty urinating, dysuria, frequency, hematuria and urinary incontinence.   Musculoskeletal:  Negative for arthralgias, back pain and myalgias.   Skin:  Negative for rash, skin lesions and wound.   Neurological:  Positive for weakness. Negative for dizziness, seizures, numbness and headache.   Hematological:  Does not bruise/bleed easily.   Psychiatric/Behavioral:  Positive for depressed mood. The patient is nervous/anxious.      Current Outpatient Medications on File Prior to Visit   Medication Sig Dispense Refill    atorvastatin (LIPITOR) 20 MG tablet TAKE 1 TABLET BY MOUTH EVERY DAY (Patient taking differently: Take 1 tablet by mouth Daily.) 30 tablet 6    cyproheptadine (PERIACTIN) 4 MG tablet Take 1 tablet by mouth Every 12 (Twelve) Hours.      donepezil (ARICEPT) 10 MG tablet Take 1 tablet by mouth Daily.      gabapentin (NEURONTIN) 600 MG tablet TAKE 1 TABLET BY MOUTH AT BEDTIME (Patient taking differently: Take 1 tablet by mouth 2 (Two) Times a Day As Needed.) 90 tablet 0    hydroCHLOROthiazide (HYDRODIURIL) 12.5 MG tablet Take 1 tablet by mouth  Daily. 90 tablet 2    letrozole (FEMARA) 2.5 MG tablet Take 1 tablet by mouth Daily. 90 tablet 1    levothyroxine (SYNTHROID, LEVOTHROID) 50 MCG tablet TAKE 1 TABLET BY MOUTH EVERY DAY (Patient taking differently: Take 1 tablet by mouth Daily.) 90 tablet 1    LORazepam (ATIVAN) 0.5 MG tablet Take 1 tablet by mouth Every 6 (Six) Hours As Needed.      losartan (COZAAR) 100 MG tablet Take 1 tablet by mouth Daily. INST PER ANESTHESIA PROTOCOL      montelukast (SINGULAIR) 10 MG tablet Take 1 tablet by mouth Daily.      Morphine (MS CONTIN) 15 MG 12 hr tablet TAKE 3 TABLETS BY MOUTH TWICE DAILY 180 tablet 0    ondansetron (ZOFRAN) 8 MG tablet TAKE 1 TABLET BY MOUTH THREE TIMES DAILY AS NEEDED FOR NAUSEA AND VOMITING 30 tablet 5    oxybutynin XL (DITROPAN XL) 15 MG 24 hr tablet Take 1 tablet by mouth Daily. 30 tablet 1    oxyCODONE-acetaminophen (PERCOCET)  MG per tablet Take 1 tablet by mouth Every 6 (Six) Hours As Needed for Moderate Pain. for pain 60 tablet 0    polyethylene glycol (MIRALAX) 17 g packet Take 17 g by mouth Daily. 5 packet 0    prochlorperazine (COMPAZINE) 10 MG tablet       ribociclib succinate 200 MG tablet therapy pack tablet Take 3 tablets by mouth Take As Directed. Take 3 tablets by mouth daily for 21 days then off 7 days on a 28 day cycle. 63 tablet 11    rOPINIRole (REQUIP) 3 MG tablet Take 1 tablet by mouth 2 (Two) Times a Day.      SudoGest 60 MG tablet Take 1 tablet by mouth Every 8 (Eight) Hours.      SUMAtriptan (IMITREX) 100 MG tablet Take 1 tablet by mouth 1 (One) Time As Needed.      traZODone (DESYREL) 150 MG tablet TAKE 1 TABLET BY MOUTH ONCE nightly (Patient taking differently: Take 1 tablet by mouth Every Night.) 90 tablet 2    amoxicillin (AMOXIL) 875 MG tablet  (Patient not taking: Reported on 11/30/2023)       Current Facility-Administered Medications on File Prior to Visit   Medication Dose Route Frequency Provider Last Rate Last Admin    [COMPLETED] denosumab (XGEVA)  injection 120 mg  120 mg Subcutaneous Once Donald Barker MD   120 mg at 11/30/23 1326    [DISCONTINUED] heparin injection 500 Units  500 Units Intravenous PRN Donald Barker MD   500 Units at 11/30/23 1038    [DISCONTINUED] sodium chloride 0.9 % flush 20 mL  20 mL Intravenous PRN Donald Barker MD   20 mL at 11/30/23 1038       Allergies   Allergen Reactions    Azithromycin Itching    Erythromycin Itching     Past Medical History:   Diagnosis Date    Abdominal pain     OSTOMY SITE    Allergic rhinitis     Anemia     NO S/S    Anxiety     Arteriosclerosis     Coronary, follows with Dr. Núñez    Arthritis     Bone metastases 10/14/2022    Bowen's disease     SKIN CANCER    Breast cancer     NO SURGERY WAS DONE DUE TO METS TO BONE/COLON/LYMPHNODE    Cancer related pain 10/13/2022    Depression     Disorder associated with Helicobacter species 10/12/2022    Dysphoric mood     Fatigue     Fibromyalgia     Frequent urination     NO S/S INFECTION    Herpes simplex vulvovaginitis 07/26/2018    History of colon polyps     History of IBS     History of kidney stones     Hyperlipidemia     Hypertension     Hypothyroidism     Insomnia     Lumbago     Mood disorder     Neck pain     R/T CANCER    Palpitations     last time a few months ago    Precordial pain     R/T SPINE CANCER    S/P Laparoscopic Hand-Assisted Colostomy Closure with End to End Anastomosis Open Parastomal Hernia Repair 12/08/2022    Sleep disturbance     SOB (shortness of breath)     at times at rest    SOBOE (shortness of breath on exertion)      Past Surgical History:   Procedure Laterality Date    BREAST BIOPSY Left     CARDIAC CATHETERIZATION Left 1959    CARDIAC CATHETERIZATION N/A 04/06/2018    Procedure: Coronary angiography;  Surgeon: Art Licea MD;  Location: [facility] INVASIVE LOCATION;  Service: Cardiovascular    CARDIAC CATHETERIZATION N/A 04/06/2018    Procedure: Left heart cath;  Surgeon: Art Licea MD;   Location: [facility] CATH INVASIVE LOCATION;  Service: Cardiovascular    CARDIAC CATHETERIZATION N/A 04/06/2018    Procedure: Left ventriculography;  Surgeon: Art Licea MD;  Location: [facility] CATH INVASIVE LOCATION;  Service: Cardiovascular    CHOLECYSTECTOMY      COLON SURGERY      colostomy bag    COLONOSCOPY  11/08/2006    COLONOSCOPY N/A 06/08/2023    Procedure: COLONOSCOPY;  Surgeon: Alfred Castañeda MD;  Location: [facility] ENDOSCOPY;  Service: General;  Laterality: N/A;  same as preop    EXPLORATORY LAPAROTOMY N/A 12/06/2022    Procedure: LAPAROTOMY EXPLORATORY sigmoid resection hartmans procedure colostomy;  Surgeon: Alfrde Castañeda MD;  Location: [facility] MAIN OR;  Service: General;  Laterality: N/A;    GANGLION CYST EXCISION Bilateral     HYSTERECTOMY  05/2005    ILEOSTOMY CLOSURE N/A 6/26/2023    Procedure: Laparoscopic Hand-Assisted Colostomy Closure with End to End Anastomosis;  Surgeon: Alfred Castañeda MD;  Location: [facility] MAIN OR;  Service: General;  Laterality: N/A;    LAPAROSCOPIC GASTRIC BANDING      BAND REMOVED 2020    PARASTOMAL HERNIA REPAIR N/A 6/26/2023    Procedure: Open Parastomal Hernia Repair;  Surgeon: Alfred Castañeda MD;  Location: [facility] MAIN OR;  Service: General;  Laterality: N/A;    TONSILLECTOMY      VENOUS ACCESS DEVICE (PORT) INSERTION N/A 01/09/2023    Procedure: INSERTION VENOUS ACCESS DEVICE;  Surgeon: Alfred Castañeda MD;  Location: [facility] MAIN OR;  Service: General;  Laterality: N/A;     Social History     Socioeconomic History    Marital status:    Tobacco Use    Smoking status: Every Day     Packs/day: 1.50     Years: 40.00     Additional pack years: 0.00     Total pack years: 60.00     Types: Cigarettes     Start date: 1974    Smokeless tobacco: Never    Tobacco comments:          INST PER ANESTHESIA PROTOCOL, LAST SMOKED TODAY   Vaping Use    Vaping Use: Never used   Substance and Sexual Activity    Alcohol use: No    Drug use:  Yes     Types: Marijuana     Comment: LAST USE 6/19/23    Sexual activity: Defer     Partners: Male     Birth control/protection: None     Family History   Problem Relation Age of Onset    Hypertension Mother     Rheum arthritis Mother     Heart disease Mother     Breast cancer Mother     Diabetes Father     Cancer Maternal Grandmother         colon    Colon cancer Maternal Grandmother     Aneurysm Paternal Grandfather     Diabetes Other     Fibromyalgia Other     Malig Hyperthermia Neg Hx        Objective   Physical Exam  Vitals reviewed. Exam conducted with a chaperone present.   Constitutional:       Appearance: Normal appearance.   Cardiovascular:      Rate and Rhythm: Normal rate and regular rhythm.      Heart sounds: Normal heart sounds. No murmur heard.     No gallop.   Pulmonary:      Effort: Pulmonary effort is normal.      Breath sounds: Normal breath sounds.   Abdominal:      General: Abdomen is flat. Bowel sounds are normal.      Palpations: Abdomen is soft.   Musculoskeletal:      Right lower leg: No edema.      Left lower leg: No edema.   Neurological:      Mental Status: She is alert and oriented to person, place, and time.   Psychiatric:      Comments: Tearful         Vitals:    11/30/23 1059   BP: 138/63   Pulse: 84   Resp: 18   Temp: 98.1 °F (36.7 °C)   TempSrc: Temporal   SpO2: 94%   Weight: 77.2 kg (170 lb 3.1 oz)   PainSc:   9   PainLoc: Neck     ECOG score: 1         PHQ-9 Total Score:                    Result Review :   The following data was reviewed by: Donald Barker MD on 11/30/2023:  Lab Results   Component Value Date    HGB 10.2 (L) 11/30/2023    HCT 32.4 (L) 11/30/2023    .0 (H) 11/30/2023     11/30/2023    WBC 3.48 11/30/2023    NEUTROABS 2.32 11/30/2023    LYMPHSABS 0.93 11/30/2023    MONOSABS 0.17 11/30/2023    EOSABS 0.02 11/30/2023    BASOSABS 0.03 11/30/2023     Lab Results   Component Value Date    GLUCOSE 136 (H) 11/30/2023    BUN 10 11/30/2023    CREATININE  "0.72 11/30/2023     11/30/2023    K 3.0 (L) 11/30/2023    CL 99 11/30/2023    CO2 34.8 (H) 11/30/2023    CALCIUM 8.9 11/30/2023    PROTEINTOT 6.3 11/30/2023    ALBUMIN 3.9 11/30/2023    BILITOT 0.4 11/30/2023    ALKPHOS 84 11/30/2023    AST 10 11/30/2023    ALT 7 11/30/2023     Lab Results   Component Value Date    MG 2.0 11/30/2023    PHOS 2.9 11/30/2023    FREET4 1.01 01/17/2022    TSH 1.470 11/12/2019     Lab Results   Component Value Date    IRON 45 11/02/2023    LABIRON 11 (L) 11/02/2023    TRANSFERRIN 276 11/02/2023    TIBC 411 11/02/2023     Lab Results   Component Value Date    FERRITIN 44.12 11/02/2023    IIZBUWCQ55 390 04/23/2019    FOLATE 9.93 04/23/2019     No results found for: \"PSA\", \"CEA\", \"AFP\", \"\", \"\"          Assessment and Plan    Diagnoses and all orders for this visit:    1. Malignant neoplasm of upper-outer quadrant of left breast in female, estrogen receptor positive (Primary)  Assessment & Plan:  Metastatic.  Hormone receptor positive.  Patient is on letrozole, ribociclib, denosumab for bony involvement.  Tolerating her regimen well.  She does have some mild anemia related to the ribociclib but not enough to require dose adjustment.  Continue letrozole daily.  Continue ribociclib 3 weeks out of 4.  I will see her back in 1 month for ongoing treatment with lab work prior.    Orders:  -     Cancel: MRI Cervical Spine With Contrast; Future  -     DULoxetine (CYMBALTA) 60 MG capsule; Take 1 capsule by mouth Daily.  Dispense: 30 capsule; Refill: 2  -     MRI cervical spine w wo contrast; Future    2. Malignant neoplasm metastatic to bone  Assessment & Plan:  Patient is on monthly Xgeva.  Tolerating her injections well.  No dental or jaw pain.  Calcium, magnesium, phosphorus are adequate.  Proceed with Xgeva today as planned.    Orders:  -     Cancel: MRI Cervical Spine With Contrast; Future  -     MRI cervical spine w wo contrast; Future    3. Cancer related pain  Assessment & " Plan:  Patient is on MS Contin with oxycodone for breakthrough.  She is having more pain in the left neck.  This is not an area of known prior involvement.  I will obtain imaging with MRI cervical spine.  Continue pain medication as prescribed.  Wyatt reviewed and no discrepancies.  I will obtain urine drug screen today.    Orders:  -     Urine Drug Screen - Urine, Clean Catch; Standing  -     Urine Drug Screen - Urine, Clean Catch  -     Cancel: MRI Cervical Spine With Contrast; Future  -     DULoxetine (CYMBALTA) 60 MG capsule; Take 1 capsule by mouth Daily.  Dispense: 30 capsule; Refill: 2  -     MRI cervical spine w wo contrast; Future    4. Monopolar depression  Assessment & Plan:  Patient reports increased depressive symptoms.  She is taking her Cymbalta as prescribed.  She denies SI or HI.  I will increase Cymbalta to 60 mg daily.  New prescription sent to pharmacy.    Orders:  -     DULoxetine (CYMBALTA) 60 MG capsule; Take 1 capsule by mouth Daily.  Dispense: 30 capsule; Refill: 2    5. Anemia, unspecified type  Assessment & Plan:  Likely related to her ribociclib medication.  Hemoglobin today 8.2 g/dL which is fairly stable.  Repeat CBC next visit.      6. Hypokalemia  Assessment & Plan:  Patient is low today.  This may be related to her diuretic use with hydrochlorothiazide.  Begin potassium supplement with 10 mill equivalents daily.  Repeat level next visit.    Orders:  -     potassium chloride 10 MEQ CR tablet; Take 1 tablet by mouth Daily for 30 days.  Dispense: 30 tablet; Refill: 1    Other orders  -     Cancel: denosumab (XGEVA) injection 120 mg            Patient Follow Up: 1 month    Patient was given instructions and counseling regarding her condition or for health maintenance advice. Please see specific information pulled into the AVS if appropriate.     Donald Barker MD    12/1/2023

## 2023-12-01 ENCOUNTER — TELEPHONE (OUTPATIENT)
Dept: ONCOLOGY | Facility: HOSPITAL | Age: 63
End: 2023-12-01
Payer: MEDICARE

## 2023-12-01 ENCOUNTER — SPECIALTY PHARMACY (OUTPATIENT)
Dept: PHARMACY | Facility: HOSPITAL | Age: 63
End: 2023-12-01
Payer: MEDICARE

## 2023-12-01 PROBLEM — E87.6 HYPOKALEMIA: Status: ACTIVE | Noted: 2023-12-01

## 2023-12-01 RX ORDER — POTASSIUM CHLORIDE 750 MG/1
10 TABLET, FILM COATED, EXTENDED RELEASE ORAL DAILY
Qty: 30 TABLET | Refills: 1 | Status: SHIPPED | OUTPATIENT
Start: 2023-12-01 | End: 2023-12-31

## 2023-12-01 NOTE — ASSESSMENT & PLAN NOTE
Patient reports increased depressive symptoms.  She is taking her Cymbalta as prescribed.  She denies SI or HI.  I will increase Cymbalta to 60 mg daily.  New prescription sent to pharmacy.

## 2023-12-01 NOTE — PROGRESS NOTES
General Surgery/Colorectal Surgery Note    Patient Name:  Derek Keller  YOB: 1960  [id number]    Referring Provider: No ref. provider found      Patient Care Team:  Haile Monique MD as PCP - General (Family Medicine)  Julien Cardona DO as Consulting Physician (Pain Medicine)  Joel Castillo MD as Consulting Physician (Gastroenterology)  Remington Russell MD as Surgeon (General Surgery)  Ahsan Salas MD as Consulting Physician (Sports Medicine)  Donald Barker MD as Consulting Physician (Hematology and Oncology)  Sandy Ricci MD as Consulting Physician (General Surgery)  Alfred Castañeda MD as Consulting Physician (General Surgery)    Chief complaint follow-up    Subjective .     History of present illness:    Status post laparoscopic hand-assisted colostomy closure with resection, open parastomal hernia repair 6/26/2023  Pathology with metastatic lobular carcinoma to portion of descending colon and anastomotic rings    She comes in for silver nitrate application.  No changes in health or medications since last seen.      History:  Past Medical History:   Diagnosis Date    Abdominal pain     OSTOMY SITE    Allergic rhinitis     Anemia     NO S/S    Anxiety     Arteriosclerosis     Coronary, follows with Dr. Núñez    Arthritis     Bone metastases 10/14/2022    Bowen's disease     SKIN CANCER    Breast cancer     NO SURGERY WAS DONE DUE TO METS TO BONE/COLON/LYMPHNODE    Cancer related pain 10/13/2022    Depression     Disorder associated with Helicobacter species 10/12/2022    Dysphoric mood     Fatigue     Fibromyalgia     Frequent urination     NO S/S INFECTION    Herpes simplex vulvovaginitis 07/26/2018    History of colon polyps     History of IBS     History of kidney stones     Hyperlipidemia     Hypertension     Hypothyroidism     Insomnia     Lumbago     Mood disorder     Neck pain     R/T CANCER    Palpitations     last time a few months ago    Precordial pain      R/T SPINE CANCER    S/P Laparoscopic Hand-Assisted Colostomy Closure with End to End Anastomosis Open Parastomal Hernia Repair 12/08/2022    Sleep disturbance     SOB (shortness of breath)     at times at rest    SOBOE (shortness of breath on exertion)        Past Surgical History:   Procedure Laterality Date    BREAST BIOPSY Left     CARDIAC CATHETERIZATION Left 1960    CARDIAC CATHETERIZATION N/A 04/06/2018    Procedure: Coronary angiography;  Surgeon: Art Licea MD;  Location: [facility] CATH INVASIVE LOCATION;  Service: Cardiovascular    CARDIAC CATHETERIZATION N/A 04/06/2018    Procedure: Left heart cath;  Surgeon: Art Licea MD;  Location: [facility] CATH INVASIVE LOCATION;  Service: Cardiovascular    CARDIAC CATHETERIZATION N/A 04/06/2018    Procedure: Left ventriculography;  Surgeon: Art Licea MD;  Location: [facility] CATH INVASIVE LOCATION;  Service: Cardiovascular    CHOLECYSTECTOMY      COLON SURGERY      colostomy bag    COLONOSCOPY  11/08/2006    COLONOSCOPY N/A 06/08/2023    Procedure: COLONOSCOPY;  Surgeon: Alfred Castañeda MD;  Location: [facility] ENDOSCOPY;  Service: General;  Laterality: N/A;  same as preop    EXPLORATORY LAPAROTOMY N/A 12/06/2022    Procedure: LAPAROTOMY EXPLORATORY sigmoid resection hartmans procedure colostomy;  Surgeon: Alfred Castañeda MD;  Location: [facility] MAIN OR;  Service: General;  Laterality: N/A;    GANGLION CYST EXCISION Bilateral     HYSTERECTOMY  05/2005    ILEOSTOMY CLOSURE N/A 6/26/2023    Procedure: Laparoscopic Hand-Assisted Colostomy Closure with End to End Anastomosis;  Surgeon: Alfred Castañeda MD;  Location: [facility] MAIN OR;  Service: General;  Laterality: N/A;    LAPAROSCOPIC GASTRIC BANDING      BAND REMOVED 2020    PARASTOMAL HERNIA REPAIR N/A 6/26/2023    Procedure: Open Parastomal Hernia Repair;  Surgeon: Alfred Castañeda MD;  Location: [facility] MAIN OR;  Service: General;  Laterality: N/A;    TONSILLECTOMY       VENOUS ACCESS DEVICE (PORT) INSERTION N/A 01/09/2023    Procedure: INSERTION VENOUS ACCESS DEVICE;  Surgeon: Alfred Castañeda MD;  Location: [facility] MAIN OR;  Service: General;  Laterality: N/A;       Family History   Problem Relation Age of Onset    Hypertension Mother     Rheum arthritis Mother     Heart disease Mother     Breast cancer Mother     Diabetes Father     Cancer Maternal Grandmother         colon    Colon cancer Maternal Grandmother     Aneurysm Paternal Grandfather     Diabetes Other     Fibromyalgia Other     Malig Hyperthermia Neg Hx        Social History     Tobacco Use    Smoking status: Every Day     Packs/day: 1.50     Years: 40.00     Additional pack years: 0.00     Total pack years: 60.00     Types: Cigarettes     Start date: 1974    Smokeless tobacco: Never    Tobacco comments:          INST PER ANESTHESIA PROTOCOL, LAST SMOKED TODAY   Vaping Use    Vaping Use: Never used   Substance Use Topics    Alcohol use: No    Drug use: Yes     Types: Marijuana     Comment: LAST USE 6/19/23       Review of Systems  All systems were reviewed and negative except for:   Review of Systems   Constitutional: Negative for chills, fever and unexpected weight loss.   HENT: Negative for congestion, nosebleeds and voice change.    Eyes: Negative for blurred vision, double vision and discharge.   Respiratory: Negative for apnea, chest tightness and shortness of breath.    Cardiovascular: Negative for chest pain and leg swelling.   Gastrointestinal:        See HPI   Endocrine: Negative for cold intolerance and heat intolerance.   Genitourinary: Negative for dysuria, hematuria and urgency.   Musculoskeletal: Negative for back pain, joint swelling and neck pain.   Skin: Negative for color change and dry skin.   Neurological: Negative for dizziness and confusion.   Hematological: Negative for adenopathy.   Psychiatric/Behavioral: Negative for agitation and behavioral problems.     MEDS:  Prior to Admission  medications    Medication Sig Start Date End Date Taking? Authorizing Provider   amoxicillin (AMOXIL) 875 MG tablet  11/7/23  Yes Bessie Lopez MD   atorvastatin (LIPITOR) 20 MG tablet TAKE 1 TABLET BY MOUTH EVERY DAY  Patient taking differently: Take 1 tablet by mouth Daily. 10/1/19  Yes Yudelka Manning MD   cyproheptadine (PERIACTIN) 4 MG tablet Take 1 tablet by mouth Every 12 (Twelve) Hours. 10/30/23  Yes Bessie Lopez MD   donepezil (ARICEPT) 10 MG tablet Take 1 tablet by mouth Daily. 10/28/22  Yes Bessie Lopez MD   gabapentin (NEURONTIN) 600 MG tablet TAKE 1 TABLET BY MOUTH AT BEDTIME  Patient taking differently: Take 1 tablet by mouth 2 (Two) Times a Day As Needed. 7/30/20  Yes Yudelka Manning MD   hydroCHLOROthiazide (HYDRODIURIL) 12.5 MG tablet Take 1 tablet by mouth Daily. 5/17/23  Yes Donald aBrker MD   letrozole (FEMARA) 2.5 MG tablet Take 1 tablet by mouth Daily. 11/30/22  Yes Donald Barker MD   levothyroxine (SYNTHROID, LEVOTHROID) 50 MCG tablet TAKE 1 TABLET BY MOUTH EVERY DAY  Patient taking differently: Take 1 tablet by mouth Daily. 7/19/19  Yes Yudelka Manning MD   LORazepam (ATIVAN) 0.5 MG tablet Take 1 tablet by mouth Every 6 (Six) Hours As Needed.   Yes Bessie Lopez MD   losartan (COZAAR) 100 MG tablet Take 1 tablet by mouth Daily. INST PER ANESTHESIA PROTOCOL   Yes Bessie Lopez MD   montelukast (SINGULAIR) 10 MG tablet Take 1 tablet by mouth Daily. 1/17/22  Yes Bessie Lopez MD   Morphine (MS CONTIN) 15 MG 12 hr tablet TAKE 3 TABLETS BY MOUTH TWICE DAILY 11/27/23  Yes Donald Barker MD   ondansetron (ZOFRAN) 8 MG tablet TAKE 1 TABLET BY MOUTH THREE TIMES DAILY AS NEEDED FOR NAUSEA AND VOMITING 3/8/23  Yes Donald Barker MD   oxybutynin XL (DITROPAN XL) 15 MG 24 hr tablet Take 1 tablet by mouth Daily. 10/5/23  Yes Donald Barker MD   oxyCODONE-acetaminophen (PERCOCET)  MG per tablet Take 1 tablet by mouth Every  6 (Six) Hours As Needed for Moderate Pain. for pain 11/17/23  Yes Donald Barker MD   polyethylene glycol (MIRALAX) 17 g packet Take 17 g by mouth Daily. 1/9/23  Yes Alfred Castañeda MD   prochlorperazine (COMPAZINE) 10 MG tablet  9/29/23  Yes Bessie Lopez MD   ribociclib succinate 200 MG tablet therapy pack tablet Take 3 tablets by mouth Take As Directed. Take 3 tablets by mouth daily for 21 days then off 7 days on a 28 day cycle. 8/1/23  Yes Donald Barker MD   rOPINIRole (REQUIP) 3 MG tablet Take 1 tablet by mouth 2 (Two) Times a Day. 6/29/22  Yes Bessie Lopez MD   SudoGest 60 MG tablet Take 1 tablet by mouth Every 8 (Eight) Hours. 11/13/23  Yes Bessie Lopez MD   SUMAtriptan (IMITREX) 100 MG tablet Take 1 tablet by mouth 1 (One) Time As Needed. 10/7/22  Yes Bessie Lopez MD   traZODone (DESYREL) 150 MG tablet TAKE 1 TABLET BY MOUTH ONCE nightly  Patient taking differently: Take 1 tablet by mouth Every Night. 11/12/19  Yes Yudelka Manning MD   DULoxetine (CYMBALTA) 60 MG capsule Take 1 capsule by mouth Daily. 11/30/23   Donald Barker MD        Allergies:  Azithromycin and Erythromycin    Objective     Vital Signs   Temp:  [98.1 °F (36.7 °C)] 98.1 °F (36.7 °C)  Heart Rate:  [84] 84  Resp:  [18] 18  BP: (138)/(63) 138/63    Physical Exam:     General Appearance:    Alert, cooperative, in no acute distress   Head:    Normocephalic, without obvious abnormality, atraumatic   Eyes:          Conjunctivae and sclerae normal, no icterus,     Ears:    Ears appear intact with no abnormalities noted   Throat:   No oral lesions, no thrush, oral mucosa moist   Neck:   No adenopathy, supple, trachea midline, no thyromegaly   Back:     No kyphosis present, no scoliosis present, no skin lesions,      erythema or scars, no tenderness to percussion or                   palpation,   range of motion normal   Lungs:     Clear to auscultation,respirations regular, even and            "       unlabored    Heart:    Regular rhythm and normal rate, normal S1 and S2, no            murmur, no gallop, no rub, no click   Chest Wall:    No abnormalities observed   Abdomen:     Normal bowel sounds, no masses, no organomegaly, soft        non-tender, non-distended, no guarding, no rebound                tenderness, former ostomy site healing with no evidence of infection, much more shallow   Rectal:        Extremities:   Moves all extremities well, no edema, no cyanosis, no             redness   Pulses:   Pulses palpable and equal bilaterally   Skin:   No bleeding, bruising or rash   Lymph nodes:   No palpable adenopathy   Neurologic:   A/o x 4 with no deficits       Results Review:   {Results Review:[id number]::\"I reviewed the patient's new clinical results.\"    LABS/IMAGING:  Results for orders placed or performed during the hospital encounter of 11/02/23   Comprehensive metabolic panel    Specimen: Blood   Result Value Ref Range    Glucose 120 (H) 65 - 99 mg/dL    BUN 16 8 - 23 mg/dL    Creatinine 0.84 0.57 - 1.00 mg/dL    Sodium 138 136 - 145 mmol/L    Potassium 3.7 3.5 - 5.2 mmol/L    Chloride 102 98 - 107 mmol/L    CO2 33.7 (H) 22.0 - 29.0 mmol/L    Calcium 9.0 8.6 - 10.5 mg/dL    Total Protein 6.9 6.0 - 8.5 g/dL    Albumin 4.3 3.5 - 5.2 g/dL    ALT (SGPT) 6 1 - 33 U/L    AST (SGOT) 11 1 - 32 U/L    Alkaline Phosphatase 86 39 - 117 U/L    Total Bilirubin 0.4 0.0 - 1.2 mg/dL    Globulin 2.6 gm/dL    A/G Ratio 1.7 g/dL    BUN/Creatinine Ratio 19.0 7.0 - 25.0    Anion Gap 2.3 (L) 5.0 - 15.0 mmol/L    eGFR 77.7 >60.0 mL/min/1.73   Magnesium    Specimen: Blood   Result Value Ref Range    Magnesium 2.2 1.6 - 2.4 mg/dL   Phosphorus    Specimen: Blood   Result Value Ref Range    Phosphorus 3.4 2.5 - 4.5 mg/dL   CBC Auto Differential    Specimen: Blood   Result Value Ref Range    WBC 4.53 3.40 - 10.80 10*3/mm3    RBC 3.14 (L) 3.77 - 5.28 10*6/mm3    Hemoglobin 10.5 (L) 12.0 - 15.9 g/dL    Hematocrit 33.3 (L) 34.0 " - 46.6 %    .1 (H) 79.0 - 97.0 fL    MCH 33.4 (H) 26.6 - 33.0 pg    MCHC 31.5 31.5 - 35.7 g/dL    RDW 15.6 (H) 12.3 - 15.4 %    RDW-SD 61.4 (H) 37.0 - 54.0 fl    MPV 9.9 6.0 - 12.0 fL    Platelets 232 140 - 450 10*3/mm3    Neutrophil % 69.5 42.7 - 76.0 %    Lymphocyte % 24.3 19.6 - 45.3 %    Monocyte % 4.4 (L) 5.0 - 12.0 %    Eosinophil % 0.7 0.3 - 6.2 %    Basophil % 1.1 0.0 - 1.5 %    Immature Grans % 0.0 0.0 - 0.5 %    Neutrophils, Absolute 3.15 1.70 - 7.00 10*3/mm3    Lymphocytes, Absolute 1.10 0.70 - 3.10 10*3/mm3    Monocytes, Absolute 0.20 0.10 - 0.90 10*3/mm3    Eosinophils, Absolute 0.03 0.00 - 0.40 10*3/mm3    Basophils, Absolute 0.05 0.00 - 0.20 10*3/mm3    Immature Grans, Absolute 0.00 0.00 - 0.05 10*3/mm3   Ferritin    Specimen: Blood   Result Value Ref Range    Ferritin 44.12 13.00 - 150.00 ng/mL   Iron Profile    Specimen: Blood   Result Value Ref Range    Iron 45 37 - 145 mcg/dL    Iron Saturation (TSAT) 11 (L) 20 - 50 %    Transferrin 276 200 - 360 mg/dL    TIBC 411 298 - 536 mcg/dL        Result Review :     Assessment & Plan     Status post laparoscopic hand-assisted colostomy closure with resection, open parastomal hernia repair 6/26/2023  Pathology with metastatic lobular carcinoma to portion of descending colon and anastomotic rings    After verbal consent, applied silver nitrate in the opening of the previous colostomy site to help stimulate closure.  Follow-up with me as needed.  Discontinue home health per patient's request.  All questions answered.  She agrees with the plan.  Thank for the consult.              This document has been electronically signed by Alfred Castañeda MD  December 1, 2023 08:25 EST

## 2023-12-01 NOTE — ASSESSMENT & PLAN NOTE
Patient is on monthly Xgeva.  Tolerating her injections well.  No dental or jaw pain.  Calcium, magnesium, phosphorus are adequate.  Proceed with Xgeva today as planned.

## 2023-12-01 NOTE — ASSESSMENT & PLAN NOTE
Patient is low today.  This may be related to her diuretic use with hydrochlorothiazide.  Begin potassium supplement with 10 mill equivalents daily.  Repeat level next visit.

## 2023-12-01 NOTE — TELEPHONE ENCOUNTER
Left message for patient to  a potassium rx at her drug store to take for the next month. Informed her that she could also increase her dietary intake by eating high potassium foods. Instructed to call with any questions, [phone] option 1

## 2023-12-01 NOTE — ASSESSMENT & PLAN NOTE
Likely related to her ribociclib medication.  Hemoglobin today 8.2 g/dL which is fairly stable.  Repeat CBC next visit.

## 2023-12-01 NOTE — ASSESSMENT & PLAN NOTE
Metastatic.  Hormone receptor positive.  Patient is on letrozole, ribociclib, denosumab for bony involvement.  Tolerating her regimen well.  She does have some mild anemia related to the ribociclib but not enough to require dose adjustment.  Continue letrozole daily.  Continue ribociclib 3 weeks out of 4.  I will see her back in 1 month for ongoing treatment with lab work prior.

## 2023-12-01 NOTE — TELEPHONE ENCOUNTER
----- Message from Donald Barker MD sent at 11/30/2023 12:06 PM EST -----  Potassium is low. Needs to take extra    ----- Message -----  From: Lab, Background User  Sent: 11/30/2023  11:01 AM EST  To: Donald Barker MD

## 2023-12-01 NOTE — ASSESSMENT & PLAN NOTE
Patient is on MS Contin with oxycodone for breakthrough.  She is having more pain in the left neck.  This is not an area of known prior involvement.  I will obtain imaging with MRI cervical spine.  Continue pain medication as prescribed.  Wyatt reviewed and no discrepancies.  I will obtain urine drug screen today.

## 2023-12-04 ENCOUNTER — TELEPHONE (OUTPATIENT)
Dept: SURGERY | Facility: CLINIC | Age: 63
End: 2023-12-04
Payer: MEDICARE

## 2023-12-04 NOTE — TELEPHONE ENCOUNTER
AVINASH FROM [facility] CALLED.  SHE SAID THEY HAVE HOME HEALTH ORDER TO DO WOUND CARE, WHICH IS TO APPLY SILVER NITRATE.    SHE SAID THE WOUND IS PRACTICALLY CLOSED.    THE PATIENT CAME TO OUR OFFICE TO GET SILVER NITRATE, AND SHE SAID WE DID ANOTHER STICK, AND SOMEONE LOOKED AT IT.    SHE WANTS TO KNOW IF THEY ARE TO DISCHARGE, OR IF THERE ARE OTHER ORDERS.

## 2023-12-05 DIAGNOSIS — G89.3 CANCER RELATED PAIN: ICD-10-CM

## 2023-12-05 DIAGNOSIS — R23.2 HOT FLASHES: ICD-10-CM

## 2023-12-05 RX ORDER — OXYCODONE AND ACETAMINOPHEN 10; 325 MG/1; MG/1
1 TABLET ORAL EVERY 6 HOURS PRN
Qty: 60 TABLET | Refills: 0 | Status: SHIPPED | OUTPATIENT
Start: 2023-12-05

## 2023-12-05 NOTE — TELEPHONE ENCOUNTER
I CALLED AVINASH AT [facility] AND TOLD HER, PER DR. HESS:  No home health needed.  Cancel it.

## 2023-12-05 NOTE — TELEPHONE ENCOUNTER
CALLER: JERONIMO    Relationship: Self    Best call back number:[phone]    Requested Prescriptions:   Requested Prescriptions     Pending Prescriptions Disp Refills    oxyCODONE-acetaminophen (PERCOCET)  MG per tablet 60 tablet 0     Sig: Take 1 tablet by mouth Every 6 (Six) Hours As Needed for Moderate Pain. for pain        Pharmacy where request should be sent: [facility], , & [facility]  [facility] [place]  [place], [place] - [address] - [phone] [facility] [phone] FX     Last office visit with prescribing clinician: 11/30/2023   Last telemedicine visit with prescribing clinician: Visit date not found   Next office visit with prescribing clinician: 1/3/2024     Additional details provided by patient:      PLEASE ADVISE!!       JERONIMO SAYS THAT SHE TOOK 3 MORPHINE PILLS THIS MORNING AND THAT SHE HAD DIARRHEA . SHE SAYS THAT THE 3 PILLS DIDN'T GET ABSORBED AND SHE SAW THEM IN HER STOOL. THAT WAS 4 HOURS AGO.       Does the patient have less than a 3 day supply:  [x] Yes  [] No    Would you like a call back once the refill request has been completed: [] Yes [x] No    If the office needs to give you a call back, can they leave a voicemail: [] Yes [x] No

## 2023-12-06 ENCOUNTER — HOSPITAL ENCOUNTER (OUTPATIENT)
Dept: MRI IMAGING | Facility: HOSPITAL | Age: 63
Discharge: HOME | End: 2023-12-06
Admitting: INTERNAL MEDICINE
Payer: MEDICARE

## 2023-12-06 ENCOUNTER — SPECIALTY PHARMACY (OUTPATIENT)
Dept: PHARMACY | Facility: HOSPITAL | Age: 63
End: 2023-12-06
Payer: MEDICARE

## 2023-12-06 DIAGNOSIS — Z17.0 MALIGNANT NEOPLASM OF UPPER-OUTER QUADRANT OF LEFT BREAST IN FEMALE, ESTROGEN RECEPTOR POSITIVE: ICD-10-CM

## 2023-12-06 DIAGNOSIS — C50.412 MALIGNANT NEOPLASM OF UPPER-OUTER QUADRANT OF LEFT BREAST IN FEMALE, ESTROGEN RECEPTOR POSITIVE: ICD-10-CM

## 2023-12-06 DIAGNOSIS — G89.3 CANCER RELATED PAIN: ICD-10-CM

## 2023-12-06 DIAGNOSIS — C79.51 MALIGNANT NEOPLASM METASTATIC TO BONE: ICD-10-CM

## 2023-12-06 PROCEDURE — 0 GADOBENATE DIMEGLUMINE 529 MG/ML SOLUTION: Performed by: INTERNAL MEDICINE

## 2023-12-06 PROCEDURE — 72156 MRI NECK SPINE W/O & W/DYE: CPT

## 2023-12-06 PROCEDURE — A9577 INJ MULTIHANCE: HCPCS | Performed by: INTERNAL MEDICINE

## 2023-12-06 RX ORDER — OXYBUTYNIN CHLORIDE 15 MG/1
15 TABLET, EXTENDED RELEASE ORAL DAILY
Qty: 30 TABLET | Refills: 1 | Status: SHIPPED | OUTPATIENT
Start: 2023-12-06

## 2023-12-06 RX ORDER — FERROUS SULFATE 324(65)MG
324 TABLET, DELAYED RELEASE (ENTERIC COATED) ORAL
COMMUNITY

## 2023-12-06 RX ADMIN — GADOBENATE DIMEGLUMINE 15 ML: 529 INJECTION, SOLUTION INTRAVENOUS at 08:02

## 2023-12-06 NOTE — PROGRESS NOTES
Specialty Pharmacy Patient Management Program  Oncology Refill Outreach      Derek is a 63 y.o. female contacted today regarding refills of her medication(s).    Specialty medication(s) and dose(s) confirmed: ribociclib succinate 200 MG tablet therapy pack tablet     Other medications being refilled: N/A    Refill Questions      Flowsheet Row Most Recent Value   Changes to allergies? No   Changes to medications? Yes   New conditions since last clinic visit No   Unplanned office visit, urgent care, ED, or hospital admission in the last 4 weeks  No   How does patient/caregiver feel medication is working? Good   Financial problems or insurance changes  No   Since the previous refill, were any specialty medication doses or scheduled injections missed or delayed?  No   Does this patient require a clinical escalation to a pharmacist? Yes            Delivery Questions      Flowsheet Row Most Recent Value   Delivery method  at Pharmacy   Delivery address correct? Yes   Delivery phone number [phone]   Number of medications in delivery 1   Medication(s) being filled and delivered Ribociclib Succinate   Doses left of specialty medications 8   Is there any medication that is due not being filled? No   Supplies needed? No supplies needed   Cooler needed? No   Do any medications need mixed or dated? No   Copay form of payment Pay at pickup   Additional comments Zero copay   Questions or concerns for the pharmacist? No   Explain any questions or concerns for the pharmacist N/A   Are any medications first time fills? No                   Follow-up: 21 days     Maria Luz Sandhu  Care Coordinator, [facility]  12/6/2023  11:13 EST

## 2023-12-20 DIAGNOSIS — G89.3 CANCER RELATED PAIN: ICD-10-CM

## 2023-12-20 NOTE — TELEPHONE ENCOUNTER
Caller: Derek Keller    Relationship: Self    Best call back number: [phone]    Requested Prescriptions:   Requested Prescriptions     Pending Prescriptions Disp Refills    oxyCODONE-acetaminophen (PERCOCET)  MG per tablet 60 tablet 0     Sig: Take 1 tablet by mouth Every 6 (Six) Hours As Needed for Moderate Pain. for pain      MORPHINE (MS CONTIN) 15 MG 12 HR TABLET    Pharmacy where request should be sent:    [facility], [place], & [facility]  [facility], [place] - [address] - [phone] [facility] [phone] FX      Last office visit with prescribing clinician: 11/30/2023   Last telemedicine visit with prescribing clinician: Visit date not found   Next office visit with prescribing clinician: 1/31/2024     Does the patient have less than a 3 day supply:  [] Yes  [x] No    Hafsa High   12/20/23 14:11 EST

## 2023-12-21 RX ORDER — OXYCODONE AND ACETAMINOPHEN 10; 325 MG/1; MG/1
1 TABLET ORAL EVERY 6 HOURS PRN
Qty: 60 TABLET | Refills: 0 | Status: SHIPPED | OUTPATIENT
Start: 2023-12-21

## 2023-12-26 ENCOUNTER — TELEPHONE (OUTPATIENT)
Dept: ONCOLOGY | Facility: HOSPITAL | Age: 63
End: 2023-12-26
Payer: MEDICARE

## 2023-12-26 NOTE — TELEPHONE ENCOUNTER
The pt called and states that she was not supposed to take her Ribociclib until Thursday but accidentally to a dose (3 tabs) on Monday morning. The pt is asking what she should do. The pt will be on dose short on her upcoming cycle.     The pt is a Dr Barker pt.

## 2023-12-27 ENCOUNTER — SPECIALTY PHARMACY (OUTPATIENT)
Dept: PHARMACY | Facility: HOSPITAL | Age: 63
End: 2023-12-27
Payer: MEDICARE

## 2023-12-27 DIAGNOSIS — G89.3 CANCER RELATED PAIN: ICD-10-CM

## 2023-12-27 RX ORDER — GUAIFENESIN 600 MG/1
1200 TABLET, EXTENDED RELEASE ORAL 2 TIMES DAILY
COMMUNITY

## 2023-12-27 NOTE — TELEPHONE ENCOUNTER
Caller: Derek Keller    Relationship: Self    Best call back number: [phone]    Requested Prescriptions:   Requested Prescriptions     Pending Prescriptions Disp Refills    Morphine (MS CONTIN) 15 MG 12 hr tablet 180 tablet 0     Sig: Take 3 tablets by mouth 2 (Two) Times a Day.        Pharmacy where request should be sent: [facility], [facility], & [facility]  [facility] [place] - [address] - [phone] [facility] [phone] FX     Last office visit with prescribing clinician: 11/30/2023   Last telemedicine visit with prescribing clinician: Visit date not found   Next office visit with prescribing clinician: 1/31/2024     Additional details provided by patient:     Does the patient have less than a 3 day supply:  [x] Yes  [] No    Would you like a call back once the refill request has been completed: [] Yes [x] No    If the office needs to give you a call back, can they leave a voicemail: [x] Yes [] No

## 2023-12-27 NOTE — PROGRESS NOTES
Specialty Pharmacy Patient Management Program  Oncology Refill Outreach      Derek is a 63 y.o. female contacted today regarding refills of her medication(s).    Specialty medication(s) and dose(s) confirmed: ribociclib succinate 200 MG tablet therapy pack tablet     Other medications being refilled: N/A    Refill Questions      Flowsheet Row Most Recent Value   Changes to allergies? No   Changes to medications? Yes   New conditions or infections since last clinic visit No   Unplanned office visit, urgent care, ED, or hospital admission in the last 4 weeks  No   How does patient/caregiver feel medication is working? Very good   Financial problems or insurance changes  No   Since the previous refill, were any specialty medication doses or scheduled injections missed or delayed?  No   Does this patient require a clinical escalation to a pharmacist? No            Delivery Questions      Flowsheet Row Most Recent Value   Delivery method  at Pharmacy   Delivery address verified with patient/caregiver? Yes   Delivery address Home   Number of medications in delivery 1   Medication(s) being filled and delivered Ribociclib Succinate   Doses left of specialty medications 2 weeks   Copay verified? Yes   Copay amount Zero copay   Copay form of payment No copayment ($0)                   Follow-up: 21 days     Maria Luz Sandhu  Care Coordinator, [facility]  12/27/2023  10:21 EST

## 2023-12-28 RX ORDER — MORPHINE SULFATE 15 MG/1
45 TABLET, FILM COATED, EXTENDED RELEASE ORAL 2 TIMES DAILY
Qty: 180 TABLET | Refills: 0 | Status: SHIPPED | OUTPATIENT
Start: 2023-12-28

## 2024-01-02 ENCOUNTER — SPECIALTY PHARMACY (OUTPATIENT)
Dept: PHARMACY | Facility: HOSPITAL | Age: 64
End: 2024-01-02
Payer: MEDICARE

## 2024-01-02 ENCOUNTER — TELEPHONE (OUTPATIENT)
Dept: PHARMACY | Facility: HOSPITAL | Age: 64
End: 2024-01-02
Payer: MEDICARE

## 2024-01-02 NOTE — TELEPHONE ENCOUNTER
Patient called reporting confusion about when she is supposed to be on and off her Kisqali. She does not remember when she had her last off day. She currently has 1.5 months of Kisqali on hand. Patient also reports going to [facility] s/p falling due to being off balance and was told she had a concussion. She believes she may have taken the wrong dose of Cymbalta since she has 3 different strengths and is not sure which one she is supposed to be taking. After speaking with Dr Barker patient was contacted and instructed to hold Kisqali until her next follow up with Dr Barker. She was also instructed to only her Cymbalta 60mg capsules and to put the other 2 doses in a separate place so she does not continue to get the doses confused. Patient expressed understanding and had no additional questions at this time.

## 2024-01-04 ENCOUNTER — SPECIALTY PHARMACY (OUTPATIENT)
Dept: PHARMACY | Facility: HOSPITAL | Age: 64
End: 2024-01-04
Payer: MEDICARE

## 2024-01-10 ENCOUNTER — OFFICE VISIT (OUTPATIENT)
Dept: ONCOLOGY | Facility: HOSPITAL | Age: 64
End: 2024-01-10
Payer: MEDICARE

## 2024-01-10 ENCOUNTER — HOSPITAL ENCOUNTER (OUTPATIENT)
Dept: ONCOLOGY | Facility: HOSPITAL | Age: 64
Discharge: HOME | End: 2024-01-10
Payer: MEDICARE

## 2024-01-10 VITALS
HEART RATE: 52 BPM | SYSTOLIC BLOOD PRESSURE: 147 MMHG | OXYGEN SATURATION: 100 % | HEIGHT: 66 IN | TEMPERATURE: 98.2 F | DIASTOLIC BLOOD PRESSURE: 69 MMHG | RESPIRATION RATE: 16 BRPM | BODY MASS INDEX: 28.42 KG/M2 | WEIGHT: 176.81 LBS

## 2024-01-10 DIAGNOSIS — C50.412 MALIGNANT NEOPLASM OF UPPER-OUTER QUADRANT OF LEFT BREAST IN FEMALE, ESTROGEN RECEPTOR POSITIVE: ICD-10-CM

## 2024-01-10 DIAGNOSIS — Z45.2 ENCOUNTER FOR ADJUSTMENT OR MANAGEMENT OF VASCULAR ACCESS DEVICE: ICD-10-CM

## 2024-01-10 DIAGNOSIS — Z17.0 MALIGNANT NEOPLASM OF UPPER-OUTER QUADRANT OF LEFT BREAST IN FEMALE, ESTROGEN RECEPTOR POSITIVE: ICD-10-CM

## 2024-01-10 DIAGNOSIS — C79.51 MALIGNANT NEOPLASM METASTATIC TO BONE: ICD-10-CM

## 2024-01-10 DIAGNOSIS — C79.51 MALIGNANT NEOPLASM METASTATIC TO BONE: Primary | ICD-10-CM

## 2024-01-10 DIAGNOSIS — C50.412 MALIGNANT NEOPLASM OF UPPER-OUTER QUADRANT OF LEFT BREAST IN FEMALE, ESTROGEN RECEPTOR POSITIVE: Primary | ICD-10-CM

## 2024-01-10 DIAGNOSIS — Z17.0 MALIGNANT NEOPLASM OF UPPER-OUTER QUADRANT OF LEFT BREAST IN FEMALE, ESTROGEN RECEPTOR POSITIVE: Primary | ICD-10-CM

## 2024-01-10 DIAGNOSIS — G89.3 CANCER RELATED PAIN: ICD-10-CM

## 2024-01-10 LAB
ALBUMIN SERPL-MCNC: 4.1 G/DL (ref 3.5–5.2)
ALBUMIN/GLOB SERPL: 1.6 G/DL
ALP SERPL-CCNC: 83 U/L (ref 39–117)
ALT SERPL W P-5'-P-CCNC: 11 U/L (ref 1–33)
ANION GAP SERPL CALCULATED.3IONS-SCNC: 6.1 MMOL/L (ref 5–15)
AST SERPL-CCNC: 12 U/L (ref 1–32)
BASOPHILS # BLD AUTO: 0.03 10*3/MM3 (ref 0–0.2)
BASOPHILS NFR BLD AUTO: 0.8 % (ref 0–1.5)
BILIRUB SERPL-MCNC: 0.3 MG/DL (ref 0–1.2)
BUN SERPL-MCNC: 15 MG/DL (ref 8–23)
BUN/CREAT SERPL: 20.5 (ref 7–25)
CALCIUM SPEC-SCNC: 9.2 MG/DL (ref 8.6–10.5)
CHLORIDE SERPL-SCNC: 101 MMOL/L (ref 98–107)
CO2 SERPL-SCNC: 31.9 MMOL/L (ref 22–29)
CREAT SERPL-MCNC: 0.73 MG/DL (ref 0.57–1)
DEPRECATED RDW RBC AUTO: 55.6 FL (ref 37–54)
EGFRCR SERPLBLD CKD-EPI 2021: 92 ML/MIN/1.73
EOSINOPHIL # BLD AUTO: 0.09 10*3/MM3 (ref 0–0.4)
EOSINOPHIL NFR BLD AUTO: 2.5 % (ref 0.3–6.2)
ERYTHROCYTE [DISTWIDTH] IN BLOOD BY AUTOMATED COUNT: 13.9 % (ref 12.3–15.4)
GLOBULIN UR ELPH-MCNC: 2.5 GM/DL
GLUCOSE SERPL-MCNC: 122 MG/DL (ref 65–99)
HCT VFR BLD AUTO: 32.5 % (ref 34–46.6)
HGB BLD-MCNC: 10.4 G/DL (ref 12–15.9)
IMM GRANULOCYTES # BLD AUTO: 0.01 10*3/MM3 (ref 0–0.05)
IMM GRANULOCYTES NFR BLD AUTO: 0.3 % (ref 0–0.5)
LYMPHOCYTES # BLD AUTO: 1.08 10*3/MM3 (ref 0.7–3.1)
LYMPHOCYTES NFR BLD AUTO: 29.5 % (ref 19.6–45.3)
MAGNESIUM SERPL-MCNC: 1.8 MG/DL (ref 1.6–2.4)
MCH RBC QN AUTO: 34 PG (ref 26.6–33)
MCHC RBC AUTO-ENTMCNC: 32 G/DL (ref 31.5–35.7)
MCV RBC AUTO: 106.2 FL (ref 79–97)
MONOCYTES # BLD AUTO: 0.32 10*3/MM3 (ref 0.1–0.9)
MONOCYTES NFR BLD AUTO: 8.7 % (ref 5–12)
NEUTROPHILS NFR BLD AUTO: 2.13 10*3/MM3 (ref 1.7–7)
NEUTROPHILS NFR BLD AUTO: 58.2 % (ref 42.7–76)
PHOSPHATE SERPL-MCNC: 3 MG/DL (ref 2.5–4.5)
PLATELET # BLD AUTO: 153 10*3/MM3 (ref 140–450)
PMV BLD AUTO: 9.5 FL (ref 6–12)
POTASSIUM SERPL-SCNC: 3.9 MMOL/L (ref 3.5–5.2)
PROT SERPL-MCNC: 6.6 G/DL (ref 6–8.5)
RBC # BLD AUTO: 3.06 10*6/MM3 (ref 3.77–5.28)
SODIUM SERPL-SCNC: 139 MMOL/L (ref 136–145)
WBC NRBC COR # BLD AUTO: 3.66 10*3/MM3 (ref 3.4–10.8)

## 2024-01-10 PROCEDURE — G0463 HOSPITAL OUTPT CLINIC VISIT: HCPCS | Performed by: INTERNAL MEDICINE

## 2024-01-10 PROCEDURE — 85025 COMPLETE CBC W/AUTO DIFF WBC: CPT | Performed by: INTERNAL MEDICINE

## 2024-01-10 PROCEDURE — 80053 COMPREHEN METABOLIC PANEL: CPT | Performed by: INTERNAL MEDICINE

## 2024-01-10 PROCEDURE — 25010000002 HEPARIN LOCK FLUSH PER 10 UNITS: Performed by: INTERNAL MEDICINE

## 2024-01-10 PROCEDURE — 25010000002 DENOSUMAB 120 MG/1.7ML SOLUTION: Performed by: INTERNAL MEDICINE

## 2024-01-10 PROCEDURE — 36591 DRAW BLOOD OFF VENOUS DEVICE: CPT

## 2024-01-10 PROCEDURE — 84100 ASSAY OF PHOSPHORUS: CPT | Performed by: INTERNAL MEDICINE

## 2024-01-10 PROCEDURE — 96372 THER/PROPH/DIAG INJ SC/IM: CPT

## 2024-01-10 PROCEDURE — 83735 ASSAY OF MAGNESIUM: CPT | Performed by: INTERNAL MEDICINE

## 2024-01-10 RX ORDER — SODIUM CHLORIDE 0.9 % (FLUSH) 0.9 %
20 SYRINGE (ML) INJECTION AS NEEDED
OUTPATIENT
Start: 2024-01-10

## 2024-01-10 RX ORDER — LETROZOLE 2.5 MG/1
TABLET, FILM COATED ORAL
Refills: 11 | OUTPATIENT
Start: 2024-01-10

## 2024-01-10 RX ORDER — HEPARIN SODIUM (PORCINE) LOCK FLUSH IV SOLN 100 UNIT/ML 100 UNIT/ML
500 SOLUTION INTRAVENOUS AS NEEDED
OUTPATIENT
Start: 2024-01-10

## 2024-01-10 RX ORDER — HEPARIN SODIUM (PORCINE) LOCK FLUSH IV SOLN 100 UNIT/ML 100 UNIT/ML
500 SOLUTION INTRAVENOUS AS NEEDED
Status: DISCONTINUED | OUTPATIENT
Start: 2024-01-10 | End: 2024-01-11 | Stop reason: HOSPADM

## 2024-01-10 RX ORDER — POTASSIUM CHLORIDE 750 MG/1
1 CAPSULE, EXTENDED RELEASE ORAL EVERY 12 HOURS SCHEDULED
COMMUNITY
Start: 2023-12-30

## 2024-01-10 RX ORDER — LETROZOLE 2.5 MG/1
2.5 TABLET, FILM COATED ORAL DAILY
Qty: 90 TABLET | Refills: 1 | Status: SHIPPED | OUTPATIENT
Start: 2024-01-10

## 2024-01-10 RX ORDER — OXYCODONE AND ACETAMINOPHEN 10; 325 MG/1; MG/1
1 TABLET ORAL EVERY 6 HOURS PRN
Qty: 60 TABLET | Refills: 0 | Status: SHIPPED | OUTPATIENT
Start: 2024-01-10

## 2024-01-10 RX ORDER — SODIUM CHLORIDE 0.9 % (FLUSH) 0.9 %
20 SYRINGE (ML) INJECTION AS NEEDED
Status: DISCONTINUED | OUTPATIENT
Start: 2024-01-10 | End: 2024-01-11 | Stop reason: HOSPADM

## 2024-01-10 RX ADMIN — Medication 20 ML: at 13:47

## 2024-01-10 RX ADMIN — HEPARIN SODIUM (PORCINE) LOCK FLUSH IV SOLN 100 UNIT/ML 500 UNITS: 100 SOLUTION at 13:47

## 2024-01-10 RX ADMIN — DENOSUMAB 120 MG: 120 INJECTION SUBCUTANEOUS at 15:07

## 2024-01-10 NOTE — ASSESSMENT & PLAN NOTE
Metastatic.  Hormone receptor positive.  Patient is on palliative treatment letrozole, ribociclib and denosumab for bony involvement.  She had a little mixup with her last cycle of ribociclib and has been on hold.  I will resume ribociclib day 1 today.  Take for 3 weeks and then off 1.  She has mild cytopenias related to the ribociclib but not enough to require dose adjustment.  Continue letrozole daily.  Refill provided today.  I will see her back in 1 month for ongoing treatment with lab work prior to monitor for toxicities and restaging CT/bone scan to assess response to therapy.

## 2024-01-10 NOTE — ASSESSMENT & PLAN NOTE
Patient is on monthly Xgeva.  Tolerating well.  No dental or jaw pain.  Electrolytes are adequate for treatment.  Proceed with Xgeva today monthly.  Bone scan before next visit.

## 2024-01-10 NOTE — PROGRESS NOTES
Chief Complaint  Malignant neoplasm of upper-outer quadrant of left breast Haile San MD Patel, Saagar, MD    Subjective          Derek Keller presents to [facility] for ongoing treatment of her breast cancer.  She is on letrozole, ribociclib, denosumab for bony involvement.  She had a little confusion on her ribociclib doses with her last cycle so she has been on hold for a couple of weeks.  She denies issues from the medication.  She denies new masses or adenopathy.  She has chronic pain related to her cancer as well as underlying lumbar disc disease.  She does take her pain medication as prescribed.  She feels like it helps adequately.  She reports adequate appetite.  She denies dental or jaw pain    Oncology/Hematology History   Malignant neoplasm of upper-outer quadrant of left breast in female, estrogen receptor positive   10/13/2022 Initial Diagnosis    Malignant neoplasm of left breast in female, estrogen receptor positive (HCC)     10/14/2022 -  Chemotherapy    OP BREAST Letrozole / Ribociclib     10/14/2022 Cancer Staged    Staging form: Breast, AJCC 8th Edition  - Clinical: Stage IV (cT1c, cN1, cM1, ER+, ME+, HER2-) - Signed by Donald Barker MD on 10/14/2022     10/28/2022 -  Chemotherapy    OP SUPPORTIVE Denosumab (Xgeva) Q28D     Malignant neoplasm metastatic to bone   10/14/2022 Initial Diagnosis    Bone metastases (HCC)     10/28/2022 -  Chemotherapy    OP SUPPORTIVE Denosumab (Xgeva) Q28D     Secondary malignant neoplasm of bone (Resolved)   11/14/2022 Initial Diagnosis    Secondary malignant neoplasm of bone (HCC)     11/17/2022 - 4/19/2023 Radiation    RADIATION THERAPY Treatment Details (11/14/2022 - 4/19/2023)  Site: Spine - Lumbar  Technique: 3D CRT  Goal: No goal specified  Planned Treatment Start Date: 11/17/2022         Review of Systems   Constitutional:  Positive for fatigue. Negative for appetite change, diaphoresis, fever, unexpected  weight gain and unexpected weight loss.   HENT:  Negative for hearing loss, mouth sores, sore throat, swollen glands, trouble swallowing and voice change.    Eyes:  Negative for blurred vision.   Respiratory:  Negative for cough, shortness of breath and wheezing.    Cardiovascular:  Negative for chest pain and palpitations.   Gastrointestinal:  Negative for abdominal pain, blood in stool, constipation, diarrhea, nausea and vomiting.   Endocrine: Negative for cold intolerance and heat intolerance.   Genitourinary:  Negative for difficulty urinating, dysuria, frequency, hematuria and urinary incontinence.   Musculoskeletal:  Positive for back pain. Negative for arthralgias and myalgias.   Skin:  Negative for rash, skin lesions and wound.   Neurological:  Negative for dizziness, seizures, weakness, numbness and headache.   Hematological:  Does not bruise/bleed easily.   Psychiatric/Behavioral:  Negative for depressed mood. The patient is not nervous/anxious.      Current Outpatient Medications on File Prior to Visit   Medication Sig Dispense Refill    atorvastatin (LIPITOR) 20 MG tablet TAKE 1 TABLET BY MOUTH EVERY DAY (Patient taking differently: Take 1 tablet by mouth Daily.) 30 tablet 6    cyproheptadine (PERIACTIN) 4 MG tablet Take 1 tablet by mouth Every 12 (Twelve) Hours.      donepezil (ARICEPT) 10 MG tablet Take 1 tablet by mouth Daily.      DULoxetine (CYMBALTA) 60 MG capsule Take 1 capsule by mouth Daily. 30 capsule 2    ferrous sulfate 324 (65 Fe) MG tablet delayed-release EC tablet Take 1 tablet by mouth Daily With Breakfast.      gabapentin (NEURONTIN) 600 MG tablet TAKE 1 TABLET BY MOUTH AT BEDTIME (Patient taking differently: Take 1 tablet by mouth 2 (Two) Times a Day As Needed.) 90 tablet 0    guaiFENesin (MUCINEX) 600 MG 12 hr tablet Take 2 tablets by mouth 2 (Two) Times a Day.      hydroCHLOROthiazide (HYDRODIURIL) 12.5 MG tablet Take 1 tablet by mouth Daily. 90 tablet 2    levothyroxine (SYNTHROID,  LEVOTHROID) 50 MCG tablet TAKE 1 TABLET BY MOUTH EVERY DAY (Patient taking differently: Take 1 tablet by mouth Daily.) 90 tablet 1    LORazepam (ATIVAN) 0.5 MG tablet Take 1 tablet by mouth Every 6 (Six) Hours As Needed.      losartan (COZAAR) 100 MG tablet Take 1 tablet by mouth Daily. INST PER ANESTHESIA PROTOCOL      montelukast (SINGULAIR) 10 MG tablet Take 1 tablet by mouth Daily.      Morphine (MS CONTIN) 15 MG 12 hr tablet Take 3 tablets by mouth 2 (Two) Times a Day. 180 tablet 0    ondansetron (ZOFRAN) 8 MG tablet TAKE 1 TABLET BY MOUTH THREE TIMES DAILY AS NEEDED FOR NAUSEA AND VOMITING 30 tablet 5    oxybutynin XL (DITROPAN XL) 15 MG 24 hr tablet TAKE 1 TABLET BY MOUTH DAILY 30 tablet 1    polyethylene glycol (MIRALAX) 17 g packet Take 17 g by mouth Daily. 5 packet 0    potassium chloride (MICRO-K) 10 MEQ CR capsule Take 1 capsule by mouth Every 12 (Twelve) Hours.      prochlorperazine (COMPAZINE) 10 MG tablet       ribociclib succinate 200 MG tablet therapy pack tablet Take 3 tablets by mouth Take As Directed. Take 3 tablets by mouth daily for 21 days then off 7 days on a 28 day cycle. 63 tablet 11    rOPINIRole (REQUIP) 3 MG tablet Take 1 tablet by mouth 2 (Two) Times a Day.      SudoGest 60 MG tablet Take 1 tablet by mouth Every 8 (Eight) Hours.      SUMAtriptan (IMITREX) 100 MG tablet Take 1 tablet by mouth 1 (One) Time As Needed.      traZODone (DESYREL) 150 MG tablet TAKE 1 TABLET BY MOUTH ONCE nightly (Patient taking differently: Take 1 tablet by mouth Every Night.) 90 tablet 2    [DISCONTINUED] letrozole (FEMARA) 2.5 MG tablet Take 1 tablet by mouth Daily. 90 tablet 1    [DISCONTINUED] oxyCODONE-acetaminophen (PERCOCET)  MG per tablet Take 1 tablet by mouth Every 6 (Six) Hours As Needed for Moderate Pain. for pain 60 tablet 0    amoxicillin (AMOXIL) 875 MG tablet  (Patient not taking: Reported on 11/30/2023)       No current facility-administered medications on file prior to visit.        Allergies   Allergen Reactions    Azithromycin Itching    Erythromycin Itching     Past Medical History:   Diagnosis Date    Abdominal pain     OSTOMY SITE    Allergic rhinitis     Anemia     NO S/S    Anxiety     Arteriosclerosis     Coronary, follows with Dr. Núñez    Arthritis     Bone metastases 10/14/2022    Bowen's disease     SKIN CANCER    Breast cancer     NO SURGERY WAS DONE DUE TO METS TO BONE/COLON/LYMPHNODE    Cancer related pain 10/13/2022    Depression     Disorder associated with Helicobacter species 10/12/2022    Dysphoric mood     Fatigue     Fibromyalgia     Frequent urination     NO S/S INFECTION    Herpes simplex vulvovaginitis 07/26/2018    History of colon polyps     History of IBS     History of kidney stones     Hyperlipidemia     Hypertension     Hypothyroidism     Insomnia     Lumbago     Mood disorder     Neck pain     R/T CANCER    Palpitations     last time a few months ago    Precordial pain     R/T SPINE CANCER    S/P Laparoscopic Hand-Assisted Colostomy Closure with End to End Anastomosis Open Parastomal Hernia Repair 12/08/2022    Sleep disturbance     SOB (shortness of breath)     at times at rest    SOBOE (shortness of breath on exertion)      Past Surgical History:   Procedure Laterality Date    BREAST BIOPSY Left     CARDIAC CATHETERIZATION Left 1959    CARDIAC CATHETERIZATION N/A 04/06/2018    Procedure: Coronary angiography;  Surgeon: Art Licea MD;  Location:  [facility];  Service: Cardiovascular    CARDIAC CATHETERIZATION N/A 04/06/2018    Procedure: Left heart cath;  Surgeon: Art Licea MD;  Location:  [facility];  Service: Cardiovascular    CARDIAC CATHETERIZATION N/A 04/06/2018    Procedure: Left ventriculography;  Surgeon: Art Licea MD;  Location:  [facility];  Service: Cardiovascular    CHOLECYSTECTOMY      COLON SURGERY      colostomy bag    COLONOSCOPY  11/08/2006     COLONOSCOPY N/A 06/08/2023    Procedure: COLONOSCOPY;  Surgeon: Alfred Castañeda MD;  Location: [facility] END[facility]COPY;  Service: General;  Laterality: N/A;  same as preop    EXPLORATORY LAPAROTOMY N/A 12/06/2022    Procedure: LAPAROTOMY EXPLORATORY sigmoid resection hartmans procedure colostomy;  Surgeon: Alfred Castañeda MD;  Location: [facility] MAIN OR;  Service: General;  Laterality: N/A;    GANGLION CYST EXCISION Bilateral     HYSTERECTOMY  05/2005    ILEOSTOMY CLOSURE N/A 6/26/2023    Procedure: Laparoscopic Hand-Assisted Colostomy Closure with End to End Anastomosis;  Surgeon: Alfred Castañeda MD;  Location: [facility] MAIN OR;  Service: General;  Laterality: N/A;    LAPAROSCOPIC GASTRIC BANDING      BAND REMOVED 2020    PARASTOMAL HERNIA REPAIR N/A 6/26/2023    Procedure: Open Parastomal Hernia Repair;  Surgeon: Alfred Castañeda MD;  Location: [facility] MAIN OR;  Service: General;  Laterality: N/A;    TONSILLECTOMY      VENOUS ACCESS DEVICE (PORT) INSERTION N/A 01/09/2023    Procedure: INSERTION VENOUS ACCESS DEVICE;  Surgeon: Alfred Castañeda MD;  Location: [facility] MAIN OR;  Service: General;  Laterality: N/A;     Social History     Socioeconomic History    Marital status:    Tobacco Use    Smoking status: Every Day     Packs/day: 1.50     Years: 40.00     Additional pack years: 0.00     Total pack years: 60.00     Types: Cigarettes     Start date: 1974    Smokeless tobacco: Never    Tobacco comments:          INST PER ANESTHESIA PROTOCOL, LAST SMOKED TODAY   Vaping Use    Vaping Use: Never used   Substance and Sexual Activity    Alcohol use: No    Drug use: Yes     Types: Marijuana     Comment: LAST USE 6/19/23    Sexual activity: Defer     Partners: Male     Birth control/protection: None     Family History   Problem Relation Age of Onset    Hypertension Mother     Rheum arthritis Mother     Heart disease Mother     Breast cancer Mother     Diabetes Father     Cancer Maternal  "Grandmother         colon    Colon cancer Maternal Grandmother     Aneurysm Paternal Grandfather     Diabetes Other     Fibromyalgia Other     Malig Hyperthermia Neg Hx        Objective   Physical Exam  Vitals reviewed. Exam conducted with a chaperone present.   Constitutional:       General: She is not in acute distress.     Appearance: Normal appearance.   Cardiovascular:      Rate and Rhythm: Normal rate and regular rhythm.      Heart sounds: Normal heart sounds. No murmur heard.     No gallop.   Pulmonary:      Effort: Pulmonary effort is normal.      Breath sounds: Normal breath sounds.   Abdominal:      General: Abdomen is flat. Bowel sounds are normal.      Palpations: Abdomen is soft.   Musculoskeletal:      Cervical back: Neck supple.      Right lower leg: No edema.      Left lower leg: No edema.   Lymphadenopathy:      Cervical: No cervical adenopathy.   Neurological:      Mental Status: She is alert and oriented to person, place, and time.   Psychiatric:         Mood and Affect: Mood normal.         Behavior: Behavior normal.         Vitals:    01/10/24 1356   BP: 147/69   Pulse: 52   Resp: 16   Temp: 98.2 °F (36.8 °C)   SpO2: 100%   Weight: 80.2 kg (176 lb 12.9 oz)   Height: 167.6 cm (65.98\")   PainSc:   7   PainLoc: Back     ECOG score: 1         PHQ-9 Total Score:                    Result Review :   The following data was reviewed by: Donald Barker MD on 01/10/2024:  Lab Results   Component Value Date    HGB 10.4 (L) 01/10/2024    HCT 32.5 (L) 01/10/2024    .2 (H) 01/10/2024     01/10/2024    WBC 3.66 01/10/2024    NEUTROABS 2.13 01/10/2024    LYMPHSABS 1.08 01/10/2024    MONOSABS 0.32 01/10/2024    EOSABS 0.09 01/10/2024    BASOSABS 0.03 01/10/2024     Lab Results   Component Value Date    GLUCOSE 122 (H) 01/10/2024    BUN 15 01/10/2024    CREATININE 0.73 01/10/2024     01/10/2024    K 3.9 01/10/2024     01/10/2024    CO2 31.9 (H) 01/10/2024    CALCIUM 9.2 01/10/2024    " "PROTEINTOT 6.6 01/10/2024    ALBUMIN 4.1 01/10/2024    BILITOT 0.3 01/10/2024    ALKPHOS 83 01/10/2024    AST 12 01/10/2024    ALT 11 01/10/2024     Lab Results   Component Value Date    MG 1.8 01/10/2024    PHOS 2.9 11/30/2023    FREET4 1.01 01/17/2022    TSH 1.470 11/12/2019     Lab Results   Component Value Date    IRON 45 11/02/2023    LABIRON 11 (L) 11/02/2023    TRANSFERRIN 276 11/02/2023    TIBC 411 11/02/2023     Lab Results   Component Value Date    FERRITIN 44.12 11/02/2023    YFZJRNQA34 390 04/23/2019    FOLATE 9.93 04/23/2019     No results found for: \"PSA\", \"CEA\", \"AFP\", \"\", \"\"          Assessment and Plan    Diagnoses and all orders for this visit:    1. Malignant neoplasm of upper-outer quadrant of left breast in female, estrogen receptor positive [C50.412, Z17.0] (Primary)  Assessment & Plan:  Metastatic.  Hormone receptor positive.  Patient is on palliative treatment letrozole, ribociclib and denosumab for bony involvement.  She had a little mixup with her last cycle of ribociclib and has been on hold.  I will resume ribociclib day 1 today.  Take for 3 weeks and then off 1.  She has mild cytopenias related to the ribociclib but not enough to require dose adjustment.  Continue letrozole daily.  Refill provided today.  I will see her back in 1 month for ongoing treatment with lab work prior to monitor for toxicities and restaging CT/bone scan to assess response to therapy.    Orders:  -     CT chest w contrast; Future  -     CT abdomen pelvis w contrast; Future  -     NM Bone Scan Whole Body; Future  -     letrozole (FEMARA) 2.5 MG tablet; Take 1 tablet by mouth Daily.  Dispense: 90 tablet; Refill: 1    2. Malignant neoplasm metastatic to bone  Assessment & Plan:  Patient is on monthly Xgeva.  Tolerating well.  No dental or jaw pain.  Electrolytes are adequate for treatment.  Proceed with Xgeva today monthly.  Bone scan before next visit.      3. Cancer related pain  Assessment & " Plan:  Patient reports adequate pain control with her current regimen of MS Contin with oxycodone as needed for breakthrough.  She also takes Cymbalta and Neurontin as an adjunct for neuropathic component.  Wyatt reviewed and no discrepancies.  Refill of oxycodone provided today.  Reassess next visit.    Orders:  -     oxyCODONE-acetaminophen (PERCOCET)  MG per tablet; Take 1 tablet by mouth Every 6 (Six) Hours As Needed for Moderate Pain. for pain  Dispense: 60 tablet; Refill: 0    Other orders  -     denosumab (XGEVA) injection 120 mg            Patient Follow Up: 1 m    Patient was given instructions and counseling regarding her condition or for health maintenance advice. Please see specific information pulled into the AVS if appropriate.     Donald Barker MD    1/10/2024

## 2024-01-10 NOTE — ASSESSMENT & PLAN NOTE
Patient reports adequate pain control with her current regimen of MS Contin with oxycodone as needed for breakthrough.  She also takes Cymbalta and Neurontin as an adjunct for neuropathic component.  yWatt reviewed and no discrepancies.  Refill of oxycodone provided today.  Reassess next visit.

## 2024-01-11 ENCOUNTER — SPECIALTY PHARMACY (OUTPATIENT)
Dept: PHARMACY | Facility: HOSPITAL | Age: 64
End: 2024-01-11
Payer: MEDICARE

## 2024-01-29 DIAGNOSIS — G89.3 CANCER RELATED PAIN: ICD-10-CM

## 2024-01-29 RX ORDER — OXYCODONE AND ACETAMINOPHEN 10; 325 MG/1; MG/1
1 TABLET ORAL EVERY 6 HOURS PRN
Qty: 60 TABLET | Refills: 0 | Status: SHIPPED | OUTPATIENT
Start: 2024-01-29

## 2024-01-29 RX ORDER — MORPHINE SULFATE 15 MG/1
45 TABLET, FILM COATED, EXTENDED RELEASE ORAL 2 TIMES DAILY
Qty: 180 TABLET | Refills: 0 | Status: SHIPPED | OUTPATIENT
Start: 2024-01-29

## 2024-02-03 DIAGNOSIS — Z17.0 MALIGNANT NEOPLASM OF LEFT BREAST IN FEMALE, ESTROGEN RECEPTOR POSITIVE, UNSPECIFIED SITE OF BREAST: ICD-10-CM

## 2024-02-03 DIAGNOSIS — C50.912 MALIGNANT NEOPLASM OF LEFT BREAST IN FEMALE, ESTROGEN RECEPTOR POSITIVE, UNSPECIFIED SITE OF BREAST: ICD-10-CM

## 2024-02-05 ENCOUNTER — HOSPITAL ENCOUNTER (OUTPATIENT)
Dept: NUCLEAR MEDICINE | Facility: HOSPITAL | Age: 64
Discharge: HOME | End: 2024-02-05
Payer: MEDICARE

## 2024-02-05 ENCOUNTER — HOSPITAL ENCOUNTER (OUTPATIENT)
Dept: CT IMAGING | Facility: HOSPITAL | Age: 64
Discharge: HOME | End: 2024-02-05
Admitting: INTERNAL MEDICINE
Payer: MEDICARE

## 2024-02-05 DIAGNOSIS — C50.412 MALIGNANT NEOPLASM OF UPPER-OUTER QUADRANT OF LEFT BREAST IN FEMALE, ESTROGEN RECEPTOR POSITIVE: ICD-10-CM

## 2024-02-05 DIAGNOSIS — Z17.0 MALIGNANT NEOPLASM OF UPPER-OUTER QUADRANT OF LEFT BREAST IN FEMALE, ESTROGEN RECEPTOR POSITIVE: ICD-10-CM

## 2024-02-05 PROCEDURE — 71260 CT THORAX DX C+: CPT

## 2024-02-05 PROCEDURE — 78306 BONE IMAGING WHOLE BODY: CPT

## 2024-02-05 PROCEDURE — A9503 TC99M MEDRONATE: HCPCS | Performed by: INTERNAL MEDICINE

## 2024-02-05 PROCEDURE — 74177 CT ABD & PELVIS W/CONTRAST: CPT

## 2024-02-05 PROCEDURE — 25510000001 IOPAMIDOL PER 1 ML: Performed by: INTERNAL MEDICINE

## 2024-02-05 PROCEDURE — 0 TECHNETIUM MEDRONATE KIT: Performed by: INTERNAL MEDICINE

## 2024-02-05 RX ORDER — TC 99M MEDRONATE 20 MG/10ML
22 INJECTION, POWDER, LYOPHILIZED, FOR SOLUTION INTRAVENOUS
Status: COMPLETED | OUTPATIENT
Start: 2024-02-05 | End: 2024-02-05

## 2024-02-05 RX ORDER — HEPARIN SODIUM (PORCINE) LOCK FLUSH IV SOLN 100 UNIT/ML 100 UNIT/ML
SOLUTION INTRAVENOUS
Status: DISCONTINUED
Start: 2024-02-05 | End: 2024-02-06 | Stop reason: HOSPADM

## 2024-02-05 RX ORDER — ONDANSETRON HYDROCHLORIDE 8 MG/1
TABLET, FILM COATED ORAL
Qty: 30 TABLET | Refills: 5 | Status: SHIPPED | OUTPATIENT
Start: 2024-02-05

## 2024-02-05 RX ADMIN — TC 99M MEDRONATE 22 MILLICURIE: 20 INJECTION, POWDER, LYOPHILIZED, FOR SOLUTION INTRAVENOUS at 13:25

## 2024-02-05 RX ADMIN — IOPAMIDOL 100 ML: 755 INJECTION, SOLUTION INTRAVENOUS at 13:45

## 2024-02-06 ENCOUNTER — OFFICE VISIT (OUTPATIENT)
Dept: SURGERY | Facility: CLINIC | Age: 64
End: 2024-02-06
Payer: MEDICARE

## 2024-02-06 VITALS — HEIGHT: 65 IN | WEIGHT: 180 LBS | RESPIRATION RATE: 16 BRPM | BODY MASS INDEX: 29.99 KG/M2

## 2024-02-06 DIAGNOSIS — K59.09 OTHER CONSTIPATION: Primary | ICD-10-CM

## 2024-02-06 NOTE — LETTER
February 7, 2024       No Recipients    Patient: Derek Keller   YOB: 1960   Date of Visit: 2/6/2024     Dear Haile Monique MD:       Thank you for referring Derek Keller to me for evaluation. Below are the relevant portions of my assessment and plan of care.    If you have questions, please do not hesitate to call me. I look forward to following Derek along with you.         Sincerely,        Alfred Castañeda MD        CC:   No Recipients    Alfred Castañeda MD  02/07/24 1301  Sign when Signing Visit  General Surgery/Colorectal Surgery Note    Patient Name:  Derek Keller  YOB: 1960  [id number]    Referring Provider: No ref. provider found      Patient Care Team:  Haile Monique MD as PCP - General (Family Medicine)  Julien Cardona DO as Consulting Physician (Pain Medicine)  Joel Castillo MD as Consulting Physician (Gastroenterology)  Remington Russell MD as Surgeon (General Surgery)  Ahsan Salas MD as Consulting Physician (Sports Medicine)  Donald Barker MD as Consulting Physician (Hematology and Oncology)  Sandy Ricci MD as Consulting Physician (General Surgery)  Alfred Castañeda MD as Consulting Physician (General Surgery)    Chief complaint abdominal pain    Subjective.     History of present illness:    Status post laparoscopic hand-assisted colostomy closure with resection, open parastomal hernia repair 6/26/2023  Pathology with metastatic lobular carcinoma to portion of descending colon and anastomotic rings    Colonoscopy 6/8/2023 with no evidence of recurrence.    She comes in for evaluation of left mid quadrant abdominal pain that is worsened over the past month.  She has a history of the same.  She says she has been more constipated and her abdomen will become rockhard at times.  No significant improvement with bowel movements.  History of chronic narcotic use.  No alleviating factors.    CT abdomen pelvis 2/5/2024 with no evidence  of intra-abdominal metastatic disease, constipation noted, incisional hernia at previous colostomy site noted with no bowel contained in the defect    History:  Past Medical History:   Diagnosis Date   • Abdominal pain     OSTOMY SITE   • Allergic rhinitis    • Anemia     NO S/S   • Anxiety    • Arteriosclerosis     Coronary, follows with Dr. Núñez   • Arthritis    • Bone metastases 10/14/2022   • Bowen's disease     SKIN CANCER   • Breast cancer     NO SURGERY WAS DONE DUE TO METS TO BONE/COLON/LYMPHNODE   • Cancer related pain 10/13/2022   • Depression    • Disorder associated with Helicobacter species 10/12/2022   • Dysphoric mood    • Fatigue    • Fibromyalgia    • Frequent urination     NO S/S INFECTION   • Herpes simplex vulvovaginitis 07/26/2018   • History of colon polyps    • History of IBS    • History of kidney stones    • Hyperlipidemia    • Hypertension    • Hypothyroidism    • Insomnia    • Lumbago    • Mood disorder    • Neck pain     R/T CANCER   • Palpitations     last time a few months ago   • Precordial pain     R/T SPINE CANCER   • S/P Laparoscopic Hand-Assisted Colostomy Closure with End to End Anastomosis Open Parastomal Hernia Repair 12/08/2022   • Sleep disturbance    • SOB (shortness of breath)     at times at rest   • SOBOE (shortness of breath on exertion)        Past Surgical History:   Procedure Laterality Date   • BREAST BIOPSY Left    • CARDIAC CATHETERIZATION Left 1960   • CARDIAC CATHETERIZATION N/A 04/06/2018    Procedure: Coronary angiography;  Surgeon: Art Licea MD;  Location: [facility] CATH INVASIVE LOCATION;  Service: Cardiovascular   • CARDIAC CATHETERIZATION N/A 04/06/2018    Procedure: Left heart cath;  Surgeon: Art Licea MD;  Location: [facility] CATH INVASIVE LOCATION;  Service: Cardiovascular   • CARDIAC CATHETERIZATION N/A 04/06/2018    Procedure: Left ventriculography;  Surgeon: Art Licea MD;  Location: [facility] CATH INVASIVE LOCATION;   Service: Cardiovascular   • CHOLECYSTECTOMY     • COLON SURGERY      colostomy bag   • COLONOSCOPY  11/08/2006   • COLONOSCOPY N/A 06/08/2023    Procedure: COLONOSCOPY;  Surgeon: Alfred Castañeda MD;  Location: [facility] ENDOSCOPY;  Service: General;  Laterality: N/A;  same as preop   • EXPLORATORY LAPAROTOMY N/A 12/06/2022    Procedure: LAPAROTOMY EXPLORATORY sigmoid resection hartmans procedure colostomy;  Surgeon: Alfred Castañeda MD;  Location: [facility] MAIN OR;  Service: General;  Laterality: N/A;   • GANGLION CYST EXCISION Bilateral    • HYSTERECTOMY  05/2005   • ILEOSTOMY CLOSURE N/A 6/26/2023    Procedure: Laparoscopic Hand-Assisted Colostomy Closure with End to End Anastomosis;  Surgeon: Alfred Castañeda MD;  Location: [facility] MAIN OR;  Service: General;  Laterality: N/A;   • LAPAROSCOPIC GASTRIC BANDING      BAND REMOVED 2020   • PARASTOMAL HERNIA REPAIR N/A 6/26/2023    Procedure: Open Parastomal Hernia Repair;  Surgeon: Alfred Castañeda MD;  Location: [facility] MAIN OR;  Service: General;  Laterality: N/A;   • TONSILLECTOMY     • VENOUS ACCESS DEVICE (PORT) INSERTION N/A 01/09/2023    Procedure: INSERTION VENOUS ACCESS DEVICE;  Surgeon: Alfred Castañeda MD;  Location: [facility] MAIN OR;  Service: General;  Laterality: N/A;       Family History   Problem Relation Age of Onset   • Hypertension Mother    • Rheum arthritis Mother    • Heart disease Mother    • Breast cancer Mother    • Diabetes Father    • Cancer Maternal Grandmother         colon   • Colon cancer Maternal Grandmother    • Aneurysm Paternal Grandfather    • Diabetes Other    • Fibromyalgia Other    • Malig Hyperthermia Neg Hx        Social History     Tobacco Use   • Smoking status: Every Day     Packs/day: 1.50     Years: 40.00     Additional pack years: 0.00     Total pack years: 60.00     Types: Cigarettes     Start date: 1974   • Smokeless tobacco: Never   • Tobacco comments:          INST PER ANESTHESIA PROTOCOL, LAST  SMOKED TODAY   Vaping Use   • Vaping Use: Never used   Substance Use Topics   • Alcohol use: No   • Drug use: Yes     Types: Marijuana     Comment: LAST USE 6/19/23       Review of Systems  All systems were reviewed and negative except for:   Review of Systems   Constitutional: Negative for chills, fever and unexpected weight loss.   HENT: Negative for congestion, nosebleeds and voice change.    Eyes: Negative for blurred vision, double vision and discharge.   Respiratory: Negative for apnea, chest tightness and shortness of breath.    Cardiovascular: Negative for chest pain and leg swelling.   Gastrointestinal:        See HPI   Endocrine: Negative for cold intolerance and heat intolerance.   Genitourinary: Negative for dysuria, hematuria and urgency.   Musculoskeletal: Negative for back pain, joint swelling and neck pain.   Skin: Negative for color change and dry skin.   Neurological: Negative for dizziness and confusion.   Hematological: Negative for adenopathy.   Psychiatric/Behavioral: Negative for agitation and behavioral problems.     MEDS:  Prior to Admission medications    Medication Sig Start Date End Date Taking? Authorizing Provider   atorvastatin (LIPITOR) 20 MG tablet TAKE 1 TABLET BY MOUTH EVERY DAY  Patient taking differently: Take 1 tablet by mouth Daily. 10/1/19  Yes Yudelka Manning MD   cyproheptadine (PERIACTIN) 4 MG tablet Take 1 tablet by mouth Every 12 (Twelve) Hours. 10/30/23  Yes ProviderBessie MD   donepezil (ARICEPT) 10 MG tablet Take 1 tablet by mouth Daily. 10/28/22  Yes ProviderBessie MD   DULoxetine (CYMBALTA) 60 MG capsule Take 1 capsule by mouth Daily. 11/30/23  Yes Donald Barker MD   ferrous sulfate 324 (65 Fe) MG tablet delayed-release EC tablet Take 1 tablet by mouth Daily With Breakfast.   Yes Provider, MD Bessie   gabapentin (NEURONTIN) 600 MG tablet TAKE 1 TABLET BY MOUTH AT BEDTIME  Patient taking differently: Take 1 tablet by mouth 2 (Two) Times a  Day As Needed. 7/30/20  Yes Yudelka Manning MD   guaiFENesin (MUCINEX) 600 MG 12 hr tablet Take 2 tablets by mouth 2 (Two) Times a Day.   Yes Bessie Lopez MD   hydroCHLOROthiazide (HYDRODIURIL) 12.5 MG tablet Take 1 tablet by mouth Daily. 5/17/23  Yes Donald Barker MD   letrozole (FEMARA) 2.5 MG tablet Take 1 tablet by mouth Daily. 1/10/24  Yes Donald Barker MD   levothyroxine (SYNTHROID, LEVOTHROID) 50 MCG tablet TAKE 1 TABLET BY MOUTH EVERY DAY  Patient taking differently: Take 1 tablet by mouth Daily. 7/19/19  Yes Yudelka Manning MD   LORazepam (ATIVAN) 0.5 MG tablet Take 1 tablet by mouth Every 6 (Six) Hours As Needed.   Yes Bessie Lopez MD   losartan (COZAAR) 100 MG tablet Take 1 tablet by mouth Daily. INST PER ANESTHESIA PROTOCOL   Yes Bessie Lopez MD   montelukast (SINGULAIR) 10 MG tablet Take 1 tablet by mouth Daily. 1/17/22  Yes Bessie Lopez MD   Morphine (MS CONTIN) 15 MG 12 hr tablet TAKE 3 TABLETS BY MOUTH TWICE DAILY 1/29/24  Yes Donald Barker MD   ondansetron (ZOFRAN) 8 MG tablet TAKE 1 TABLET BY MOUTH THREE TIMES DAILY AS NEEDED FOR NAUSEA AND VOMITING 2/5/24  Yes Donald Barker MD   oxybutynin XL (DITROPAN XL) 15 MG 24 hr tablet TAKE 1 TABLET BY MOUTH DAILY 12/6/23  Yes Donald Braker MD   oxyCODONE-acetaminophen (PERCOCET)  MG per tablet TAKE 1 TABLET BY MOUTH EVERY 6 HOURS AS NEEDED FOR MODERATE PAIN 1/29/24  Yes Donald Barker MD   polyethylene glycol (MIRALAX) 17 g packet Take 17 g by mouth Daily. 1/9/23  Yes Alfred Castañeda MD   potassium chloride (MICRO-K) 10 MEQ CR capsule Take 1 capsule by mouth Every 12 (Twelve) Hours. 12/30/23  Yes Bessie Lopez MD   prochlorperazine (COMPAZINE) 10 MG tablet  9/29/23  Yes Bessie Lopez MD   ribociclib succinate 200 MG tablet therapy pack tablet Take 3 tablets by mouth Take As Directed. Take 3 tablets by mouth daily for 21 days then off 7 days on a 28 day cycle.  8/1/23  Yes Donald Barker MD   rOPINIRole (REQUIP) 3 MG tablet Take 1 tablet by mouth 2 (Two) Times a Day. 6/29/22  Yes Bessie Lopez MD   SudoGest 60 MG tablet Take 1 tablet by mouth Every 8 (Eight) Hours. 11/13/23  Yes Bessie Lopez MD   SUMAtriptan (IMITREX) 100 MG tablet Take 1 tablet by mouth 1 (One) Time As Needed. 10/7/22  Yes Bessie Lopez MD   traZODone (DESYREL) 150 MG tablet TAKE 1 TABLET BY MOUTH ONCE nightly  Patient taking differently: Take 1 tablet by mouth Every Night. 11/12/19  Yes Yudelka Manning MD   amoxicillin (AMOXIL) 875 MG tablet  11/7/23   ProviderBessie MD        Allergies:  Azithromycin and Erythromycin    Objective    Vital Signs        Physical Exam:     General Appearance:    Alert, cooperative, in no acute distress   Head:    Normocephalic, without obvious abnormality, atraumatic   Eyes:          Conjunctivae and sclerae normal, no icterus,     Ears:    Ears appear intact with no abnormalities noted   Throat:   No oral lesions, no thrush, oral mucosa moist   Neck:   No adenopathy, supple, trachea midline, no thyromegaly   Back:     No kyphosis present, no scoliosis present, no skin lesions,      erythema or scars, no tenderness to percussion or                   palpation,   range of motion normal   Lungs:     Clear to auscultation,respirations regular, even and                  unlabored    Heart:    Regular rhythm and normal rate, normal S1 and S2, no            murmur, no gallop, no rub, no click   Chest Wall:    No abnormalities observed   Abdomen:     Normal bowel sounds, no masses, no organomegaly, soft        non-tender, non-distended, no guarding, no rebound                tenderness, unable to appreciate hernia at the former colostomy site   Rectal:        Extremities:   Moves all extremities well, no edema, no cyanosis, no             redness   Pulses:   Pulses palpable and equal bilaterally   Skin:   No bleeding, bruising or rash  "  Lymph nodes:   No palpable adenopathy   Neurologic:   A/o x 4 with no deficits       Results Review:   {Results Review:13534::\"I reviewed the patient's new clinical results.\"    LABS/IMAGING:  Results for orders placed or performed during the hospital encounter of 01/10/24   Comprehensive metabolic panel    Specimen: Blood   Result Value Ref Range    Glucose 122 (H) 65 - 99 mg/dL    BUN 15 8 - 23 mg/dL    Creatinine 0.73 0.57 - 1.00 mg/dL    Sodium 139 136 - 145 mmol/L    Potassium 3.9 3.5 - 5.2 mmol/L    Chloride 101 98 - 107 mmol/L    CO2 31.9 (H) 22.0 - 29.0 mmol/L    Calcium 9.2 8.6 - 10.5 mg/dL    Total Protein 6.6 6.0 - 8.5 g/dL    Albumin 4.1 3.5 - 5.2 g/dL    ALT (SGPT) 11 1 - 33 U/L    AST (SGOT) 12 1 - 32 U/L    Alkaline Phosphatase 83 39 - 117 U/L    Total Bilirubin 0.3 0.0 - 1.2 mg/dL    Globulin 2.5 gm/dL    A/G Ratio 1.6 g/dL    BUN/Creatinine Ratio 20.5 7.0 - 25.0    Anion Gap 6.1 5.0 - 15.0 mmol/L    eGFR 92.0 >60.0 mL/min/1.73   Magnesium    Specimen: Blood   Result Value Ref Range    Magnesium 1.8 1.6 - 2.4 mg/dL   Phosphorus    Specimen: Blood   Result Value Ref Range    Phosphorus 3.0 2.5 - 4.5 mg/dL   CBC Auto Differential    Specimen: Blood   Result Value Ref Range    WBC 3.66 3.40 - 10.80 10*3/mm3    RBC 3.06 (L) 3.77 - 5.28 10*6/mm3    Hemoglobin 10.4 (L) 12.0 - 15.9 g/dL    Hematocrit 32.5 (L) 34.0 - 46.6 %    .2 (H) 79.0 - 97.0 fL    MCH 34.0 (H) 26.6 - 33.0 pg    MCHC 32.0 31.5 - 35.7 g/dL    RDW 13.9 12.3 - 15.4 %    RDW-SD 55.6 (H) 37.0 - 54.0 fl    MPV 9.5 6.0 - 12.0 fL    Platelets 153 140 - 450 10*3/mm3    Neutrophil % 58.2 42.7 - 76.0 %    Lymphocyte % 29.5 19.6 - 45.3 %    Monocyte % 8.7 5.0 - 12.0 %    Eosinophil % 2.5 0.3 - 6.2 %    Basophil % 0.8 0.0 - 1.5 %    Immature Grans % 0.3 0.0 - 0.5 %    Neutrophils, Absolute 2.13 1.70 - 7.00 10*3/mm3    Lymphocytes, Absolute 1.08 0.70 - 3.10 10*3/mm3    Monocytes, Absolute 0.32 0.10 - 0.90 10*3/mm3    Eosinophils, Absolute 0.09 " 0.00 - 0.40 10*3/mm3    Basophils, Absolute 0.03 0.00 - 0.20 10*3/mm3    Immature Grans, Absolute 0.01 0.00 - 0.05 10*3/mm3        Result Review:     Assessment & Plan    Status post laparoscopic hand-assisted colostomy closure with resection, open parastomal hernia repair 6/26/2023  Pathology with metastatic lobular carcinoma to portion of descending colon and anastomotic rings  Left mid quadrant abdominal pain likely secondary to constipation, chronic narcotic use  Left mid quadrant incisional hernia without evidence of obstruction or gangrene    Discussion with patient.  I reviewed her imaging with results mentioned above.  I discussed the warning signs of incarceration and strangulation.  She was instructed go to emergency department for any concern.  Colonoscopy up-to-date.  I explained her I think her symptoms are related to constipation and instructed her to increase her MiraLAX to twice daily.  I encouraged her to discuss bowel regimen with Dr. Barker as well.  I do not recommend hernia repair at this time.  All questions answered.  She agrees with the plan.  Follow-up as needed.  Thank you for the consult.           This document has been electronically signed by Alfred Castañeda MD  February 7, 2024 12:57 EST

## 2024-02-07 ENCOUNTER — DOCUMENTATION (OUTPATIENT)
Dept: ONCOLOGY | Facility: HOSPITAL | Age: 64
End: 2024-02-07
Payer: MEDICARE

## 2024-02-07 ENCOUNTER — OFFICE VISIT (OUTPATIENT)
Dept: ONCOLOGY | Facility: HOSPITAL | Age: 64
End: 2024-02-07
Payer: MEDICARE

## 2024-02-07 ENCOUNTER — HOSPITAL ENCOUNTER (OUTPATIENT)
Dept: ONCOLOGY | Facility: HOSPITAL | Age: 64
Discharge: HOME | End: 2024-02-07
Payer: MEDICARE

## 2024-02-07 ENCOUNTER — SPECIALTY PHARMACY (OUTPATIENT)
Dept: PHARMACY | Facility: HOSPITAL | Age: 64
End: 2024-02-07
Payer: MEDICARE

## 2024-02-07 VITALS
SYSTOLIC BLOOD PRESSURE: 135 MMHG | HEART RATE: 60 BPM | WEIGHT: 179.9 LBS | TEMPERATURE: 98.7 F | OXYGEN SATURATION: 100 % | BODY MASS INDEX: 28.91 KG/M2 | HEIGHT: 66 IN | RESPIRATION RATE: 18 BRPM | DIASTOLIC BLOOD PRESSURE: 60 MMHG

## 2024-02-07 DIAGNOSIS — C50.412 MALIGNANT NEOPLASM OF UPPER-OUTER QUADRANT OF LEFT BREAST IN FEMALE, ESTROGEN RECEPTOR POSITIVE: ICD-10-CM

## 2024-02-07 DIAGNOSIS — C79.51 MALIGNANT NEOPLASM METASTATIC TO BONE: ICD-10-CM

## 2024-02-07 DIAGNOSIS — Z17.0 MALIGNANT NEOPLASM OF UPPER-OUTER QUADRANT OF LEFT BREAST IN FEMALE, ESTROGEN RECEPTOR POSITIVE: Primary | ICD-10-CM

## 2024-02-07 DIAGNOSIS — G89.3 CANCER RELATED PAIN: ICD-10-CM

## 2024-02-07 DIAGNOSIS — C50.412 MALIGNANT NEOPLASM OF UPPER-OUTER QUADRANT OF LEFT BREAST IN FEMALE, ESTROGEN RECEPTOR POSITIVE: Primary | ICD-10-CM

## 2024-02-07 DIAGNOSIS — Z45.2 ENCOUNTER FOR ADJUSTMENT OR MANAGEMENT OF VASCULAR ACCESS DEVICE: Primary | ICD-10-CM

## 2024-02-07 DIAGNOSIS — F32.A DEPRESSION, UNSPECIFIED DEPRESSION TYPE: ICD-10-CM

## 2024-02-07 DIAGNOSIS — Z17.0 MALIGNANT NEOPLASM OF UPPER-OUTER QUADRANT OF LEFT BREAST IN FEMALE, ESTROGEN RECEPTOR POSITIVE: ICD-10-CM

## 2024-02-07 LAB
ALBUMIN SERPL-MCNC: 4.2 G/DL (ref 3.5–5.2)
ALBUMIN/GLOB SERPL: 1.8 G/DL
ALP SERPL-CCNC: 86 U/L (ref 39–117)
ALT SERPL W P-5'-P-CCNC: 7 U/L (ref 1–33)
ANION GAP SERPL CALCULATED.3IONS-SCNC: 6.3 MMOL/L (ref 5–15)
AST SERPL-CCNC: 8 U/L (ref 1–32)
BASOPHILS # BLD AUTO: 0.03 10*3/MM3 (ref 0–0.2)
BASOPHILS NFR BLD AUTO: 0.6 % (ref 0–1.5)
BILIRUB SERPL-MCNC: 0.2 MG/DL (ref 0–1.2)
BUN SERPL-MCNC: 13 MG/DL (ref 8–23)
BUN/CREAT SERPL: 18.3 (ref 7–25)
CALCIUM SPEC-SCNC: 9.4 MG/DL (ref 8.6–10.5)
CHLORIDE SERPL-SCNC: 104 MMOL/L (ref 98–107)
CO2 SERPL-SCNC: 30.7 MMOL/L (ref 22–29)
CREAT SERPL-MCNC: 0.71 MG/DL (ref 0.57–1)
DEPRECATED RDW RBC AUTO: 57 FL (ref 37–54)
EGFRCR SERPLBLD CKD-EPI 2021: 95.1 ML/MIN/1.73
EOSINOPHIL # BLD AUTO: 0.02 10*3/MM3 (ref 0–0.4)
EOSINOPHIL NFR BLD AUTO: 0.4 % (ref 0.3–6.2)
ERYTHROCYTE [DISTWIDTH] IN BLOOD BY AUTOMATED COUNT: 14.8 % (ref 12.3–15.4)
GLOBULIN UR ELPH-MCNC: 2.4 GM/DL
GLUCOSE SERPL-MCNC: 110 MG/DL (ref 65–99)
HCT VFR BLD AUTO: 33 % (ref 34–46.6)
HGB BLD-MCNC: 10.6 G/DL (ref 12–15.9)
IMM GRANULOCYTES # BLD AUTO: 0.01 10*3/MM3 (ref 0–0.05)
IMM GRANULOCYTES NFR BLD AUTO: 0.2 % (ref 0–0.5)
LYMPHOCYTES # BLD AUTO: 1.23 10*3/MM3 (ref 0.7–3.1)
LYMPHOCYTES NFR BLD AUTO: 26.3 % (ref 19.6–45.3)
MAGNESIUM SERPL-MCNC: 1.9 MG/DL (ref 1.6–2.4)
MCH RBC QN AUTO: 34.3 PG (ref 26.6–33)
MCHC RBC AUTO-ENTMCNC: 32.1 G/DL (ref 31.5–35.7)
MCV RBC AUTO: 106.8 FL (ref 79–97)
MONOCYTES # BLD AUTO: 0.49 10*3/MM3 (ref 0.1–0.9)
MONOCYTES NFR BLD AUTO: 10.5 % (ref 5–12)
NEUTROPHILS NFR BLD AUTO: 2.89 10*3/MM3 (ref 1.7–7)
NEUTROPHILS NFR BLD AUTO: 62 % (ref 42.7–76)
PHOSPHATE SERPL-MCNC: 4.1 MG/DL (ref 2.5–4.5)
PLATELET # BLD AUTO: 193 10*3/MM3 (ref 140–450)
PMV BLD AUTO: 9.8 FL (ref 6–12)
POTASSIUM SERPL-SCNC: 4 MMOL/L (ref 3.5–5.2)
PROT SERPL-MCNC: 6.6 G/DL (ref 6–8.5)
RBC # BLD AUTO: 3.09 10*6/MM3 (ref 3.77–5.28)
SODIUM SERPL-SCNC: 141 MMOL/L (ref 136–145)
WBC NRBC COR # BLD AUTO: 4.67 10*3/MM3 (ref 3.4–10.8)

## 2024-02-07 PROCEDURE — 36591 DRAW BLOOD OFF VENOUS DEVICE: CPT

## 2024-02-07 PROCEDURE — 84100 ASSAY OF PHOSPHORUS: CPT | Performed by: INTERNAL MEDICINE

## 2024-02-07 PROCEDURE — 25010000002 HEPARIN LOCK FLUSH PER 10 UNITS: Performed by: INTERNAL MEDICINE

## 2024-02-07 PROCEDURE — 96372 THER/PROPH/DIAG INJ SC/IM: CPT

## 2024-02-07 PROCEDURE — 25010000002 DENOSUMAB 120 MG/1.7ML SOLUTION: Performed by: INTERNAL MEDICINE

## 2024-02-07 PROCEDURE — 80053 COMPREHEN METABOLIC PANEL: CPT | Performed by: INTERNAL MEDICINE

## 2024-02-07 PROCEDURE — 85025 COMPLETE CBC W/AUTO DIFF WBC: CPT | Performed by: INTERNAL MEDICINE

## 2024-02-07 PROCEDURE — G0463 HOSPITAL OUTPT CLINIC VISIT: HCPCS | Performed by: INTERNAL MEDICINE

## 2024-02-07 PROCEDURE — 83735 ASSAY OF MAGNESIUM: CPT | Performed by: INTERNAL MEDICINE

## 2024-02-07 RX ORDER — HEPARIN SODIUM (PORCINE) LOCK FLUSH IV SOLN 100 UNIT/ML 100 UNIT/ML
500 SOLUTION INTRAVENOUS AS NEEDED
Status: DISCONTINUED | OUTPATIENT
Start: 2024-02-07 | End: 2024-02-08 | Stop reason: HOSPADM

## 2024-02-07 RX ORDER — SODIUM CHLORIDE 0.9 % (FLUSH) 0.9 %
20 SYRINGE (ML) INJECTION AS NEEDED
Status: DISCONTINUED | OUTPATIENT
Start: 2024-02-07 | End: 2024-02-08 | Stop reason: HOSPADM

## 2024-02-07 RX ORDER — HEPARIN SODIUM (PORCINE) LOCK FLUSH IV SOLN 100 UNIT/ML 100 UNIT/ML
500 SOLUTION INTRAVENOUS AS NEEDED
OUTPATIENT
Start: 2024-02-07

## 2024-02-07 RX ORDER — SODIUM CHLORIDE 0.9 % (FLUSH) 0.9 %
20 SYRINGE (ML) INJECTION AS NEEDED
OUTPATIENT
Start: 2024-02-07

## 2024-02-07 RX ADMIN — HEPARIN SODIUM (PORCINE) LOCK FLUSH IV SOLN 100 UNIT/ML 500 UNITS: 100 SOLUTION at 13:48

## 2024-02-07 RX ADMIN — Medication 20 ML: at 13:48

## 2024-02-07 RX ADMIN — DENOSUMAB 120 MG: 120 INJECTION SUBCUTANEOUS at 15:27

## 2024-02-07 NOTE — ASSESSMENT & PLAN NOTE
Patient is on monthly Xgeva.  Tolerating well.  No dental pain or mouth ulcers.  Electrolytes are adequate for treatment.  Xgeva today monthly.

## 2024-02-07 NOTE — ASSESSMENT & PLAN NOTE
Metastatic.  Patient is on palliative treatment with letrozole, ribociclib, denosumab for bony involvement.  Restaging CT and bone scans continue to demonstrate widespread bony metastatic involvement but this is all stable with no new or worsening findings on imaging.  Bone scan would suggest treated disease again with no worsening findings.  She is tolerating her regimen well.  CBC shows anemia related to her ribociclib but not enough to require dose adjustment.  Good response to therapy thus far.  I would continue letrozole daily.  Continue ribociclib 3 weeks out of 4.  She will continue this regimen until progression or intolerance.  I will see her back in 1 month for ongoing treatment with lab work prior to monitor for toxicities.

## 2024-02-07 NOTE — PROGRESS NOTES
Chief Complaint  Malignant neoplasm of upper-outer quadrant of left breast Haile San MD Patel, Saagar, MD    Subjective          Derek Keller presents to [facility] for ongoing treatment of her metastatic breast cancer.  She is on letrozole, ribociclib, denosumab for bony involvement.  She is tolerating those treatments well.  She denies new masses or adenopathy.  She continues to have pain especially in the bones related to her involvement.  She is on narcotic medications.  She notes adequate control of her symptoms.  She is having more constipation despite using MiraLAX.  She notes she has not been drinking as much fluids or eating vegetables as she should.  She notes increased depression with tearfulness, anhedonia, fatigue.  She is taking her Cymbalta daily.    Oncology/Hematology History   Malignant neoplasm of upper-outer quadrant of left breast in female, estrogen receptor positive   10/13/2022 Initial Diagnosis    Malignant neoplasm of left breast in female, estrogen receptor positive (HCC)     10/14/2022 -  Chemotherapy    OP BREAST Letrozole / Ribociclib     10/14/2022 Cancer Staged    Staging form: Breast, AJCC 8th Edition  - Clinical: Stage IV (cT1c, cN1, cM1, ER+, MD+, HER2-) - Signed by Donald Barker MD on 10/14/2022     10/28/2022 -  Chemotherapy    OP SUPPORTIVE Denosumab (Xgeva) Q28D     Malignant neoplasm metastatic to bone   10/14/2022 Initial Diagnosis    Bone metastases (HCC)     10/28/2022 -  Chemotherapy    OP SUPPORTIVE Denosumab (Xgeva) Q28D     Secondary malignant neoplasm of bone (Resolved)   11/14/2022 Initial Diagnosis    Secondary malignant neoplasm of bone (HCC)     11/17/2022 - 4/19/2023 Radiation    RADIATION THERAPY Treatment Details (11/14/2022 - 4/19/2023)  Site: Spine - Lumbar  Technique: 3D CRT  Goal: No goal specified  Planned Treatment Start Date: 11/17/2022         Review of Systems   Constitutional:  Positive for  fatigue. Negative for appetite change, diaphoresis, fever, unexpected weight gain and unexpected weight loss.   HENT:  Positive for dental problem (Jaw pain). Negative for hearing loss, mouth sores, sore throat, swollen glands, trouble swallowing and voice change.    Eyes:  Negative for blurred vision.   Respiratory:  Negative for cough, shortness of breath and wheezing.    Cardiovascular:  Negative for chest pain and palpitations.   Gastrointestinal:  Positive for abdominal pain, constipation and nausea. Negative for blood in stool, diarrhea and vomiting.   Endocrine: Negative for cold intolerance and heat intolerance.   Genitourinary:  Negative for difficulty urinating, dysuria, frequency, hematuria and urinary incontinence.   Musculoskeletal:  Positive for back pain. Negative for arthralgias and myalgias.   Skin:  Negative for rash, skin lesions and wound.   Neurological:  Positive for numbness (ROSITA leg). Negative for dizziness, seizures, weakness and headache.   Hematological:  Does not bruise/bleed easily.   Psychiatric/Behavioral:  Negative for depressed mood. The patient is not nervous/anxious.      Current Outpatient Medications on File Prior to Visit   Medication Sig Dispense Refill    atorvastatin (LIPITOR) 20 MG tablet TAKE 1 TABLET BY MOUTH EVERY DAY (Patient taking differently: Take 1 tablet by mouth Daily.) 30 tablet 6    cyproheptadine (PERIACTIN) 4 MG tablet Take 1 tablet by mouth Every 12 (Twelve) Hours.      donepezil (ARICEPT) 10 MG tablet Take 1 tablet by mouth Daily.      DULoxetine (CYMBALTA) 60 MG capsule Take 1 capsule by mouth Daily. 30 capsule 2    gabapentin (NEURONTIN) 600 MG tablet TAKE 1 TABLET BY MOUTH AT BEDTIME (Patient taking differently: Take 1 tablet by mouth 2 (Two) Times a Day As Needed.) 90 tablet 0    hydroCHLOROthiazide (HYDRODIURIL) 12.5 MG tablet Take 1 tablet by mouth Daily. 90 tablet 2    levothyroxine (SYNTHROID, LEVOTHROID) 50 MCG tablet TAKE 1 TABLET BY MOUTH EVERY DAY  (Patient taking differently: Take 1 tablet by mouth Daily.) 90 tablet 1    LORazepam (ATIVAN) 0.5 MG tablet Take 1 tablet by mouth Every 6 (Six) Hours As Needed.      losartan (COZAAR) 100 MG tablet Take 0.5 tablets by mouth Daily. INST PER ANESTHESIA PROTOCOL      montelukast (SINGULAIR) 10 MG tablet Take 1 tablet by mouth Daily.      Morphine (MS CONTIN) 15 MG 12 hr tablet TAKE 3 TABLETS BY MOUTH TWICE DAILY 180 tablet 0    ondansetron (ZOFRAN) 8 MG tablet TAKE 1 TABLET BY MOUTH THREE TIMES DAILY AS NEEDED FOR NAUSEA AND VOMITING 30 tablet 5    oxybutynin XL (DITROPAN XL) 15 MG 24 hr tablet TAKE 1 TABLET BY MOUTH DAILY 30 tablet 1    oxyCODONE-acetaminophen (PERCOCET)  MG per tablet TAKE 1 TABLET BY MOUTH EVERY 6 HOURS AS NEEDED FOR MODERATE PAIN 60 tablet 0    polyethylene glycol (MIRALAX) 17 g packet Take 17 g by mouth Daily. 5 packet 0    potassium chloride (MICRO-K) 10 MEQ CR capsule Take 1 capsule by mouth Every 12 (Twelve) Hours.      ribociclib succinate 200 MG tablet therapy pack tablet Take 3 tablets by mouth Take As Directed. Take 3 tablets by mouth daily for 21 days then off 7 days on a 28 day cycle. 63 tablet 11    rOPINIRole (REQUIP) 3 MG tablet Take 1 tablet by mouth 2 (Two) Times a Day.      SudoGest 60 MG tablet Take 1 tablet by mouth Every 8 (Eight) Hours.      SUMAtriptan (IMITREX) 100 MG tablet Take 1 tablet by mouth 1 (One) Time As Needed.      traZODone (DESYREL) 150 MG tablet TAKE 1 TABLET BY MOUTH ONCE nightly (Patient taking differently: Take 1 tablet by mouth Every Night.) 90 tablet 2    ferrous sulfate 324 (65 Fe) MG tablet delayed-release EC tablet Take 1 tablet by mouth Daily With Breakfast. (Patient not taking: Reported on 2/7/2024)      letrozole (FEMARA) 2.5 MG tablet Take 1 tablet by mouth Daily. 90 tablet 1    prochlorperazine (COMPAZINE) 10 MG tablet  (Patient not taking: Reported on 2/7/2024)      [DISCONTINUED] amoxicillin (AMOXIL) 875 MG tablet  (Patient not taking:  Reported on 11/30/2023)      [DISCONTINUED] guaiFENesin (MUCINEX) 600 MG 12 hr tablet Take 2 tablets by mouth 2 (Two) Times a Day. (Patient not taking: Reported on 2/7/2024)       Current Facility-Administered Medications on File Prior to Visit   Medication Dose Route Frequency Provider Last Rate Last Admin    heparin injection 500 Units  500 Units Intravenous PRN Donald Barker MD   500 Units at 02/07/24 1348    sodium chloride 0.9 % flush 20 mL  20 mL Intravenous PRN Donald Barker MD   20 mL at 02/07/24 1348       Allergies   Allergen Reactions    Azithromycin Itching    Erythromycin Itching     Past Medical History:   Diagnosis Date    Abdominal pain     OSTOMY SITE    Allergic rhinitis     Anemia     NO S/S    Anxiety     Arteriosclerosis     Coronary, follows with Dr. Núñez    Arthritis     Bone metastases 10/14/2022    Bowen's disease     SKIN CANCER    Breast cancer     NO SURGERY WAS DONE DUE TO METS TO BONE/COLON/LYMPHNODE    Cancer related pain 10/13/2022    Depression     Disorder associated with Helicobacter species 10/12/2022    Dysphoric mood     Fatigue     Fibromyalgia     Frequent urination     NO S/S INFECTION    Herpes simplex vulvovaginitis 07/26/2018    History of colon polyps     History of IBS     History of kidney stones     Hyperlipidemia     Hypertension     Hypothyroidism     Insomnia     Lumbago     Mood disorder     Neck pain     R/T CANCER    Palpitations     last time a few months ago    Precordial pain     R/T SPINE CANCER    S/P Laparoscopic Hand-Assisted Colostomy Closure with End to End Anastomosis Open Parastomal Hernia Repair 12/08/2022    Sleep disturbance     SOB (shortness of breath)     at times at rest    SOBOE (shortness of breath on exertion)      Past Surgical History:   Procedure Laterality Date    BREAST BIOPSY Left     CARDIAC CATHETERIZATION Left 1959    CARDIAC CATHETERIZATION N/A 04/06/2018    Procedure: Coronary angiography;  Surgeon: Art MEDINA  MD Anuja;  Location: [facility] CATH INVASIVE LOCATION;  Service: Cardiovascular    CARDIAC CATHETERIZATION N/A 04/06/2018    Procedure: Left heart cath;  Surgeon: Art Licea MD;  [facility];  Service: Cardiovascular    CARDIAC CATHETERIZATION N/A 04/06/2018    Procedure: Left ventriculography;  Surgeon: Art Licea MD;  Location: [facility] CATH INVASIVE LOCATION;  Service: Cardiovascular    CHOLECYSTECTOMY      COLON SURGERY      colostomy bag    COLONOSCOPY  11/08/2006    COLONOSCOPY N/A 06/08/2023    Procedure: COLONOSCOPY;  Surgeon: Alfred Castañeda MD;  Location: [facility] ENDOSCOPY;  Service: General;  Laterality: N/A;  same as preop    EXPLORATORY LAPAROTOMY N/A 12/06/2022    Procedure: LAPAROTOMY EXPLORATORY sigmoid resection hartmans procedure colostomy;  Surgeon: Alfred Castañeda MD;  Location: [facility] MAIN OR;  Service: General;  Laterality: N/A;    GANGLION CYST EXCISION Bilateral     HYSTERECTOMY  05/2005    ILEOSTOMY CLOSURE N/A 6/26/2023    Procedure: Laparoscopic Hand-Assisted Colostomy Closure with End to End Anastomosis;  Surgeon: Alfred Castañeda MD;  Location: [facility] MAIN OR;  Service: General;  Laterality: N/A;    LAPAROSCOPIC GASTRIC BANDING      BAND REMOVED 2020    PARASTOMAL HERNIA REPAIR N/A 6/26/2023    Procedure: Open Parastomal Hernia Repair;  Surgeon: Alfred Castañeda MD;  Location: [facility] MAIN OR;  Service: General;  Laterality: N/A;    TONSILLECTOMY      VENOUS ACCESS DEVICE (PORT) INSERTION N/A 01/09/2023    Procedure: INSERTION VENOUS ACCESS DEVICE;  Surgeon: Alfred Castañeda MD;  Location: [facility] MAIN OR;  Service: General;  Laterality: N/A;     Social History     Socioeconomic History    Marital status:    Tobacco Use    Smoking status: Every Day     Packs/day: 1.00     Years: 40.00     Additional pack years: 0.00     Total pack years: 40.00     Types: Cigarettes     Start date: 1974    Smokeless tobacco:  "Never    Tobacco comments:        Vaping Use    Vaping Use: Never used   Substance and Sexual Activity    Alcohol use: No    Drug use: Never     Types: Marijuana     Comment: LAST USE 6/19/23    Sexual activity: Defer     Partners: Male     Birth control/protection: None     Family History   Problem Relation Age of Onset    Hypertension Mother     Rheum arthritis Mother     Heart disease Mother     Breast cancer Mother     Diabetes Father     Cancer Maternal Grandmother         colon    Colon cancer Maternal Grandmother     Aneurysm Paternal Grandfather     Diabetes Other     Fibromyalgia Other     Malig Hyperthermia Neg Hx        Objective   Physical Exam  Vitals reviewed. Exam conducted with a chaperone present.   Constitutional:       General: She is not in acute distress.     Appearance: Normal appearance.   Cardiovascular:      Rate and Rhythm: Normal rate and regular rhythm.      Heart sounds: Normal heart sounds. No murmur heard.     No gallop.   Pulmonary:      Effort: Pulmonary effort is normal.      Breath sounds: Normal breath sounds.   Abdominal:      General: Abdomen is flat. Bowel sounds are normal.      Palpations: Abdomen is soft.      Tenderness: There is abdominal tenderness (Mild midepigastric tenderness without rebound or guarding).   Musculoskeletal:      Right lower leg: No edema.      Left lower leg: No edema.   Neurological:      Mental Status: She is alert.   Psychiatric:      Comments: Tearful, depressed affect         Vitals:    02/07/24 1402   BP: 135/60   Pulse: 60   Resp: 18   Temp: 98.7 °F (37.1 °C)   SpO2: 100%   Weight: 81.6 kg (179 lb 14.3 oz)   Height: 167.6 cm (65.98\")   PainSc:   8   PainLoc: Back     ECOG score: 1         PHQ-9 Total Score:                    Result Review :   The following data was reviewed by: Donald Barker MD on 02/07/2024:  Lab Results   Component Value Date    HGB 10.6 (L) 02/07/2024    HCT 33.0 (L) 02/07/2024    .8 (H) 02/07/2024     " "02/07/2024    WBC 4.67 02/07/2024    NEUTROABS 2.89 02/07/2024    LYMPHSABS 1.23 02/07/2024    MONOSABS 0.49 02/07/2024    EOSABS 0.02 02/07/2024    BASOSABS 0.03 02/07/2024     Lab Results   Component Value Date    GLUCOSE 110 (H) 02/07/2024    BUN 13 02/07/2024    CREATININE 0.71 02/07/2024     02/07/2024    K 4.0 02/07/2024     02/07/2024    CO2 30.7 (H) 02/07/2024    CALCIUM 9.4 02/07/2024    PROTEINTOT 6.6 02/07/2024    ALBUMIN 4.2 02/07/2024    BILITOT 0.2 02/07/2024    ALKPHOS 86 02/07/2024    AST 8 02/07/2024    ALT 7 02/07/2024     Lab Results   Component Value Date    MG 1.9 02/07/2024    PHOS 4.1 02/07/2024    FREET4 1.01 01/17/2022    TSH 1.470 11/12/2019     Lab Results   Component Value Date    IRON 45 11/02/2023    LABIRON 11 (L) 11/02/2023    TRANSFERRIN 276 11/02/2023    TIBC 411 11/02/2023     Lab Results   Component Value Date    FERRITIN 44.12 11/02/2023    ZXAKWUUN06 390 04/23/2019    FOLATE 9.93 04/23/2019     No results found for: \"PSA\", \"CEA\", \"AFP\", \"\", \"\"    Data reviewed : Radiologic studies CT chest, abdomen, pelvis and bone scan reviewed       Assessment and Plan    Diagnoses and all orders for this visit:    1. Malignant neoplasm of upper-outer quadrant of left breast in female, estrogen receptor positive (Primary)  Assessment & Plan:  Metastatic.  Patient is on palliative treatment with letrozole, ribociclib, denosumab for bony involvement.  Restaging CT and bone scans continue to demonstrate widespread bony metastatic involvement but this is all stable with no new or worsening findings on imaging.  Bone scan would suggest treated disease again with no worsening findings.  She is tolerating her regimen well.  CBC shows anemia related to her ribociclib but not enough to require dose adjustment.  Good response to therapy thus far.  I would continue letrozole daily.  Continue ribociclib 3 weeks out of 4.  She will continue this regimen until progression or " intolerance.  I will see her back in 1 month for ongoing treatment with lab work prior to monitor for toxicities.    Orders:  -     CBC and Differential; Future  -     Comprehensive metabolic panel; Future  -     Magnesium; Future  -     Phosphorus; Future  -     Ambulatory Referral to Psychiatry    2. Malignant neoplasm metastatic to bone  Assessment & Plan:  Patient is on monthly Xgeva.  Tolerating well.  No dental pain or mouth ulcers.  Electrolytes are adequate for treatment.  Xgeva today monthly.    Orders:  -     CBC and Differential; Future  -     Comprehensive metabolic panel; Future  -     Magnesium; Future  -     Phosphorus; Future  -     Ambulatory Referral to Psychiatry    3. Depression, unspecified depression type  Assessment & Plan:  Patient reports increased depressive symptoms despite Cymbalta 60 mg daily.  She has had longstanding mental health issues and I will refer her to psychiatry for further treatment.  I will ask our LCSW to see her today.    Orders:  -     Ambulatory Referral to Psychiatry    4. Cancer related pain  Assessment & Plan:  Patient reports adequate pain control with her current regimen.  Continue same.  Wyatt reviewed and no discrepancies.  Reassess next visit.      Other orders  -     Cancel: denosumab (XGEVA) injection 120 mg            Patient Follow Up: 1 month    Patient was given instructions and counseling regarding her condition or for health maintenance advice. Please see specific information pulled into the AVS if appropriate.     Donald Barker MD    2/7/2024

## 2024-02-07 NOTE — ASSESSMENT & PLAN NOTE
Patient reports increased depressive symptoms despite Cymbalta 60 mg daily.  She has had longstanding mental health issues and I will refer her to psychiatry for further treatment.  I will ask our LCSW to see her today.

## 2024-02-07 NOTE — ASSESSMENT & PLAN NOTE
Patient reports adequate pain control with her current regimen.  Continue same.  Wyatt reviewed and no discrepancies.  Reassess next visit.

## 2024-02-07 NOTE — PROGRESS NOTES
Diagnosis: Metastatic breast cancer    Reason for Referral: Financial and medication assistance    Content of Visit: OSW assistance requested by medical oncologist to meet with Ms. Keller this afternoon to address her financial concerns. OSW met with Ms. Keller and her brother, who is in from out of town from [place], in the medical oncology clinic. Ms. Keller's brother helped advocate on her behalf, explaining she was initially receiving Medicaid benefits during the pandemic and these terminated around June of last year. She was then receiving extra help with Medicare prescription costs, but this also terminated in December due to being slightly over income. They've spoken with Diana and were advised unfortunately she does not qualify for any alternative assistance. Ms. Keller receives social security and a small nursing home pension. She is specifically looking for assistance with her MS Contin and Percocet prescription costs. OSW contacted Ms. Keller's pharmacy and was advised her recent MS Contin prescription was $47 and her Percocet prescription was $23.49. Ms. Keller may obtain the MS Contin at [facility] pharmacy for $36 with a goodrx coupon, however, she does not have a [employer] local to her. Discussed opportunity to apply for assistance through [employer] to see if they can assist with these medication expenses and/or monthly living expenses. In addition to Ms. Keller's increased prescription costs, her rent has increased. She is also traveling over 80 miles roundtrip to her monthly medical oncology appointment. OSW offered to provide her with a $15 fuel voucher through [employer] today, however, her brother respectfully declined, but acknowledged this may be helpful when their other siblings assist with her transportation needs. Encouraged Ms. Keller to notify OSW anytime she is here and can use a fuel voucher. OSW will re-refer her to the [place] [facility] for additional gas assistance in the interim. [place]  [employer] confirmed Ms. Keller is eligible for additional assistance and will pull her file. Also discussed opportunity for OSW to reach out to Loreta Baron and see if Ms. Keller is eligible to reapply for their treatment assistance program. Ms. Keller is agreeable. OSW contacted Loreta HAILEE Tod and confirmed Ms. Keller is eligible to reapply. Ms. Keller expressed interest in applying again for  financial assistance program to aid with her medical expenses, scans, etc. Provided her with a copy of the application and highlighted the required documentation. Our speciality pharmacy coordinator, Maria Luz ATKINS, was able to get Ms. Keller approved for patient assistance with her oral chemo for a $0 copay. OSW will also consult with Bhavya at [facility] to see if there is co-pay assistance available with the Xgeva injections. Provided Ms. Keller with my business card again, encouraging OSW support remains available and will plan to touch base with her shortly.     Update 2/8/24: OSW contacted Ms. Keller regarding her [employer] treatment assistance, advising she can continue to reapply every 12 months. She prefers for the willian to be provided in the form of a check via mail. OSW successfully faxed her completed application in this afternoon. Also advised Ms. Keller that [place] [employer] will be providing her with additional gas assistance. OSW is waiting to hear back from [employer], but did compete Ms. Keller's online request yesterday and submitted the signed physician verification form this afternoon. OSW support remains available. Ms. Keller expressed gratitude.    Update 2/9/24: Andrew Owusu inquired which pharmacy Ms. Keller uses. Advised she is using [facility] in [place] and provided them with their telephone number.    Resources/Referrals Provided: Financial/medication/transportation/monthly living expenses assistance -  [facility] speciality pharmacy coordinator, [facility] financial assistance  application, Andrew Owusu, [employer] Baron, [place] [facility], [facility] fuel voucher program

## 2024-02-07 NOTE — PROGRESS NOTES
General Surgery/Colorectal Surgery Note    Patient Name:  Derek Keller  YOB: 1960  [id number]    Referring Provider: No ref. provider found      Patient Care Team:  Haile Monique MD as PCP - General (Family Medicine)  Julien Cardona DO as Consulting Physician (Pain Medicine)  Joel Castillo MD as Consulting Physician (Gastroenterology)  Remington Russell MD as Surgeon (General Surgery)  Ahsan Salas MD as Consulting Physician (Sports Medicine)  Donald Barker MD as Consulting Physician (Hematology and Oncology)  Sandy Ricci MD as Consulting Physician (General Surgery)  Alfred Castañeda MD as Consulting Physician (General Surgery)    Chief complaint abdominal pain    Subjective .     History of present illness:    Status post laparoscopic hand-assisted colostomy closure with resection, open parastomal hernia repair 6/26/2023  Pathology with metastatic lobular carcinoma to portion of descending colon and anastomotic rings    Colonoscopy 6/8/2023 with no evidence of recurrence.    She comes in for evaluation of left mid quadrant abdominal pain that is worsened over the past month.  She has a history of the same.  She says she has been more constipated and her abdomen will become rockhard at times.  No significant improvement with bowel movements.  History of chronic narcotic use.  No alleviating factors.    CT abdomen pelvis 2/5/2024 with no evidence of intra-abdominal metastatic disease, constipation noted, incisional hernia at previous colostomy site noted with no bowel contained in the defect    History:  Past Medical History:   Diagnosis Date    Abdominal pain     OSTOMY SITE    Allergic rhinitis     Anemia     NO S/S    Anxiety     Arteriosclerosis     Coronary, follows with Dr. Núñez    Arthritis     Bone metastases 10/14/2022    Bowen's disease     SKIN CANCER    Breast cancer     NO SURGERY WAS DONE DUE TO METS TO BONE/COLON/LYMPHNODE    Cancer related pain  10/13/2022    Depression     Disorder associated with Helicobacter species 10/12/2022    Dysphoric mood     Fatigue     Fibromyalgia     Frequent urination     NO S/S INFECTION    Herpes simplex vulvovaginitis 07/26/2018    History of colon polyps     History of IBS     History of kidney stones     Hyperlipidemia     Hypertension     Hypothyroidism     Insomnia     Lumbago     Mood disorder     Neck pain     R/T CANCER    Palpitations     last time a few months ago    Precordial pain     R/T SPINE CANCER    S/P Laparoscopic Hand-Assisted Colostomy Closure with End to End Anastomosis Open Parastomal Hernia Repair 12/08/2022    Sleep disturbance     SOB (shortness of breath)     at times at rest    SOBOE (shortness of breath on exertion)        Past Surgical History:   Procedure Laterality Date    BREAST BIOPSY Left     CARDIAC CATHETERIZATION Left 1960    CARDIAC CATHETERIZATION N/A 04/06/2018    Procedure: Coronary angiography;  Surgeon: Art Licea MD;  Location: [facility] INVASIVE LOCATION;  Service: Cardiovascular    CARDIAC CATHETERIZATION N/A 04/06/2018    Procedure: Left heart cath;  Surgeon: Art Licea MD;  Location: [facility] INVASIVE LOCATION;  Service: Cardiovascular    CARDIAC CATHETERIZATION N/A 04/06/2018    Procedure: Left ventriculography;  Surgeon: Art Licea MD;  Location: [facility] INVASIVE LOCATION;  Service: Cardiovascular    CHOLECYSTECTOMY      COLON SURGERY      colostomy bag    COLONOSCOPY  11/08/2006    COLONOSCOPY N/A 06/08/2023    Procedure: COLONOSCOPY;  Surgeon: Alfred Castañeda MD;  Location: [facility] ENDOSCOPY;  Service: General;  Laterality: N/A;  same as preop    EXPLORATORY LAPAROTOMY N/A 12/06/2022    Procedure: LAPAROTOMY EXPLORATORY sigmoid resection hartmans procedure colostomy;  Surgeon: Alfred Castañeda MD;  Location: [facility] MAIN OR;  Service: General;  Laterality: N/A;    GANGLION CYST EXCISION Bilateral     HYSTERECTOMY   05/2005    ILEOSTOMY CLOSURE N/A 6/26/2023    Procedure: Laparoscopic Hand-Assisted Colostomy Closure with End to End Anastomosis;  Surgeon: Alfred Castañeda MD;  Location: [facility] MAIN OR;  Service: General;  Laterality: N/A;    LAPAROSCOPIC GASTRIC BANDING      BAND REMOVED 2020    PARASTOMAL HERNIA REPAIR N/A 6/26/2023    Procedure: Open Parastomal Hernia Repair;  Surgeon: Alfred Castañeda MD;  Location: [facility] MAIN OR;  Service: General;  Laterality: N/A;    TONSILLECTOMY      VENOUS ACCESS DEVICE (PORT) INSERTION N/A 01/09/2023    Procedure: INSERTION VENOUS ACCESS DEVICE;  Surgeon: Alfred Castañeda MD;  Location: [facility] MAIN OR;  Service: General;  Laterality: N/A;       Family History   Problem Relation Age of Onset    Hypertension Mother     Rheum arthritis Mother     Heart disease Mother     Breast cancer Mother     Diabetes Father     Cancer Maternal Grandmother         colon    Colon cancer Maternal Grandmother     Aneurysm Paternal Grandfather     Diabetes Other     Fibromyalgia Other     Malig Hyperthermia Neg Hx        Social History     Tobacco Use    Smoking status: Every Day     Packs/day: 1.50     Years: 40.00     Additional pack years: 0.00     Total pack years: 60.00     Types: Cigarettes     Start date: 1974    Smokeless tobacco: Never    Tobacco comments:          INST PER ANESTHESIA PROTOCOL, LAST SMOKED TODAY   Vaping Use    Vaping Use: Never used   Substance Use Topics    Alcohol use: No    Drug use: Yes     Types: Marijuana     Comment: LAST USE 6/19/23       Review of Systems  All systems were reviewed and negative except for:   Review of Systems   Constitutional: Negative for chills, fever and unexpected weight loss.   HENT: Negative for congestion, nosebleeds and voice change.    Eyes: Negative for blurred vision, double vision and discharge.   Respiratory: Negative for apnea, chest tightness and shortness of breath.    Cardiovascular: Negative for chest pain and leg  swelling.   Gastrointestinal:        See HPI   Endocrine: Negative for cold intolerance and heat intolerance.   Genitourinary: Negative for dysuria, hematuria and urgency.   Musculoskeletal: Negative for back pain, joint swelling and neck pain.   Skin: Negative for color change and dry skin.   Neurological: Negative for dizziness and confusion.   Hematological: Negative for adenopathy.   Psychiatric/Behavioral: Negative for agitation and behavioral problems.     MEDS:  Prior to Admission medications    Medication Sig Start Date End Date Taking? Authorizing Provider   atorvastatin (LIPITOR) 20 MG tablet TAKE 1 TABLET BY MOUTH EVERY DAY  Patient taking differently: Take 1 tablet by mouth Daily. 10/1/19  Yes Yudelka Manning MD   cyproheptadine (PERIACTIN) 4 MG tablet Take 1 tablet by mouth Every 12 (Twelve) Hours. 10/30/23  Yes Bessie Lopez MD   donepezil (ARICEPT) 10 MG tablet Take 1 tablet by mouth Daily. 10/28/22  Yes ProviderBessie MD   DULoxetine (CYMBALTA) 60 MG capsule Take 1 capsule by mouth Daily. 11/30/23  Yes Donald Barker MD   ferrous sulfate 324 (65 Fe) MG tablet delayed-release EC tablet Take 1 tablet by mouth Daily With Breakfast.   Yes Provider, MD Bessie   gabapentin (NEURONTIN) 600 MG tablet TAKE 1 TABLET BY MOUTH AT BEDTIME  Patient taking differently: Take 1 tablet by mouth 2 (Two) Times a Day As Needed. 7/30/20  Yes Yudelka Manning MD   guaiFENesin (MUCINEX) 600 MG 12 hr tablet Take 2 tablets by mouth 2 (Two) Times a Day.   Yes ProviderBessie MD   hydroCHLOROthiazide (HYDRODIURIL) 12.5 MG tablet Take 1 tablet by mouth Daily. 5/17/23  Yes Donald Barker MD   letrozole (FEMARA) 2.5 MG tablet Take 1 tablet by mouth Daily. 1/10/24  Yes Donald Barker MD   levothyroxine (SYNTHROID, LEVOTHROID) 50 MCG tablet TAKE 1 TABLET BY MOUTH EVERY DAY  Patient taking differently: Take 1 tablet by mouth Daily. 7/19/19  Yes Yudelka Manning MD   LORazepam (ATIVAN) 0.5  MG tablet Take 1 tablet by mouth Every 6 (Six) Hours As Needed.   Yes Bessie Lopez MD   losartan (COZAAR) 100 MG tablet Take 1 tablet by mouth Daily. INST PER ANESTHESIA PROTOCOL   Yes Bessie Lopez MD   montelukast (SINGULAIR) 10 MG tablet Take 1 tablet by mouth Daily. 1/17/22  Yes Bessie Lopez MD   Morphine (MS CONTIN) 15 MG 12 hr tablet TAKE 3 TABLETS BY MOUTH TWICE DAILY 1/29/24  Yes Donald Barker MD   ondansetron (ZOFRAN) 8 MG tablet TAKE 1 TABLET BY MOUTH THREE TIMES DAILY AS NEEDED FOR NAUSEA AND VOMITING 2/5/24  Yes Donald Barker MD   oxybutynin XL (DITROPAN XL) 15 MG 24 hr tablet TAKE 1 TABLET BY MOUTH DAILY 12/6/23  Yes Donald Barker MD   oxyCODONE-acetaminophen (PERCOCET)  MG per tablet TAKE 1 TABLET BY MOUTH EVERY 6 HOURS AS NEEDED FOR MODERATE PAIN 1/29/24  Yes Donald Barker MD   polyethylene glycol (MIRALAX) 17 g packet Take 17 g by mouth Daily. 1/9/23  Yes Alfred Castañeda MD   potassium chloride (MICRO-K) 10 MEQ CR capsule Take 1 capsule by mouth Every 12 (Twelve) Hours. 12/30/23  Yes Bessie Lopez MD   prochlorperazine (COMPAZINE) 10 MG tablet  9/29/23  Yes Bessie Lopez MD   ribociclib succinate 200 MG tablet therapy pack tablet Take 3 tablets by mouth Take As Directed. Take 3 tablets by mouth daily for 21 days then off 7 days on a 28 day cycle. 8/1/23  Yes Donald Barker MD   rOPINIRole (REQUIP) 3 MG tablet Take 1 tablet by mouth 2 (Two) Times a Day. 6/29/22  Yes Bessie Lopez MD   SudoGest 60 MG tablet Take 1 tablet by mouth Every 8 (Eight) Hours. 11/13/23  Yes Bessie Lopez MD   SUMAtriptan (IMITREX) 100 MG tablet Take 1 tablet by mouth 1 (One) Time As Needed. 10/7/22  Yes Bessie Lopez MD   traZODone (DESYREL) 150 MG tablet TAKE 1 TABLET BY MOUTH ONCE nightly  Patient taking differently: Take 1 tablet by mouth Every Night. 11/12/19  Yes Yudelka Manning MD   amoxicillin (AMOXIL) 875 MG tablet   "11/7/23   Provider, Bessie, MD        Allergies:  Azithromycin and Erythromycin    Objective     Vital Signs        Physical Exam:     General Appearance:    Alert, cooperative, in no acute distress   Head:    Normocephalic, without obvious abnormality, atraumatic   Eyes:          Conjunctivae and sclerae normal, no icterus,     Ears:    Ears appear intact with no abnormalities noted   Throat:   No oral lesions, no thrush, oral mucosa moist   Neck:   No adenopathy, supple, trachea midline, no thyromegaly   Back:     No kyphosis present, no scoliosis present, no skin lesions,      erythema or scars, no tenderness to percussion or                   palpation,   range of motion normal   Lungs:     Clear to auscultation,respirations regular, even and                  unlabored    Heart:    Regular rhythm and normal rate, normal S1 and S2, no            murmur, no gallop, no rub, no click   Chest Wall:    No abnormalities observed   Abdomen:     Normal bowel sounds, no masses, no organomegaly, soft        non-tender, non-distended, no guarding, no rebound                tenderness, unable to appreciate hernia at the former colostomy site   Rectal:        Extremities:   Moves all extremities well, no edema, no cyanosis, no             redness   Pulses:   Pulses palpable and equal bilaterally   Skin:   No bleeding, bruising or rash   Lymph nodes:   No palpable adenopathy   Neurologic:   A/o x 4 with no deficits       Results Review:   {Results Review:86259::\"I reviewed the patient's new clinical results.\"    LABS/IMAGING:  Results for orders placed or performed during the hospital encounter of 01/10/24   Comprehensive metabolic panel    Specimen: Blood   Result Value Ref Range    Glucose 122 (H) 65 - 99 mg/dL    BUN 15 8 - 23 mg/dL    Creatinine 0.73 0.57 - 1.00 mg/dL    Sodium 139 136 - 145 mmol/L    Potassium 3.9 3.5 - 5.2 mmol/L    Chloride 101 98 - 107 mmol/L    CO2 31.9 (H) 22.0 - 29.0 mmol/L    Calcium 9.2 8.6 - " 10.5 mg/dL    Total Protein 6.6 6.0 - 8.5 g/dL    Albumin 4.1 3.5 - 5.2 g/dL    ALT (SGPT) 11 1 - 33 U/L    AST (SGOT) 12 1 - 32 U/L    Alkaline Phosphatase 83 39 - 117 U/L    Total Bilirubin 0.3 0.0 - 1.2 mg/dL    Globulin 2.5 gm/dL    A/G Ratio 1.6 g/dL    BUN/Creatinine Ratio 20.5 7.0 - 25.0    Anion Gap 6.1 5.0 - 15.0 mmol/L    eGFR 92.0 >60.0 mL/min/1.73   Magnesium    Specimen: Blood   Result Value Ref Range    Magnesium 1.8 1.6 - 2.4 mg/dL   Phosphorus    Specimen: Blood   Result Value Ref Range    Phosphorus 3.0 2.5 - 4.5 mg/dL   CBC Auto Differential    Specimen: Blood   Result Value Ref Range    WBC 3.66 3.40 - 10.80 10*3/mm3    RBC 3.06 (L) 3.77 - 5.28 10*6/mm3    Hemoglobin 10.4 (L) 12.0 - 15.9 g/dL    Hematocrit 32.5 (L) 34.0 - 46.6 %    .2 (H) 79.0 - 97.0 fL    MCH 34.0 (H) 26.6 - 33.0 pg    MCHC 32.0 31.5 - 35.7 g/dL    RDW 13.9 12.3 - 15.4 %    RDW-SD 55.6 (H) 37.0 - 54.0 fl    MPV 9.5 6.0 - 12.0 fL    Platelets 153 140 - 450 10*3/mm3    Neutrophil % 58.2 42.7 - 76.0 %    Lymphocyte % 29.5 19.6 - 45.3 %    Monocyte % 8.7 5.0 - 12.0 %    Eosinophil % 2.5 0.3 - 6.2 %    Basophil % 0.8 0.0 - 1.5 %    Immature Grans % 0.3 0.0 - 0.5 %    Neutrophils, Absolute 2.13 1.70 - 7.00 10*3/mm3    Lymphocytes, Absolute 1.08 0.70 - 3.10 10*3/mm3    Monocytes, Absolute 0.32 0.10 - 0.90 10*3/mm3    Eosinophils, Absolute 0.09 0.00 - 0.40 10*3/mm3    Basophils, Absolute 0.03 0.00 - 0.20 10*3/mm3    Immature Grans, Absolute 0.01 0.00 - 0.05 10*3/mm3        Result Review :     Assessment & Plan     Status post laparoscopic hand-assisted colostomy closure with resection, open parastomal hernia repair 6/26/2023  Pathology with metastatic lobular carcinoma to portion of descending colon and anastomotic rings  Left mid quadrant abdominal pain likely secondary to constipation, chronic narcotic use  Left mid quadrant incisional hernia without evidence of obstruction or gangrene    Discussion with patient.  I reviewed her  imaging with results mentioned above.  I discussed the warning signs of incarceration and strangulation.  She was instructed go to emergency department for any concern.  Colonoscopy up-to-date.  I explained her I think her symptoms are related to constipation and instructed her to increase her MiraLAX to twice daily.  I encouraged her to discuss bowel regimen with Dr. Barker as well.  I do not recommend hernia repair at this time.  All questions answered.  She agrees with the plan.  Follow-up as needed.  Thank you for the consult.           This document has been electronically signed by Alfred Castañeda MD  February 7, 2024 12:57 EST

## 2024-02-08 ENCOUNTER — SPECIALTY PHARMACY (OUTPATIENT)
Dept: PHARMACY | Facility: HOSPITAL | Age: 64
End: 2024-02-08
Payer: MEDICARE

## 2024-02-12 DIAGNOSIS — G89.3 CANCER RELATED PAIN: ICD-10-CM

## 2024-02-13 ENCOUNTER — TELEPHONE (OUTPATIENT)
Dept: ONCOLOGY | Facility: HOSPITAL | Age: 64
End: 2024-02-13
Payer: MEDICARE

## 2024-02-13 RX ORDER — OXYCODONE AND ACETAMINOPHEN 10; 325 MG/1; MG/1
1 TABLET ORAL EVERY 6 HOURS PRN
Qty: 60 TABLET | Refills: 0 | Status: SHIPPED | OUTPATIENT
Start: 2024-02-13

## 2024-02-16 ENCOUNTER — TELEPHONE (OUTPATIENT)
Dept: ONCOLOGY | Facility: HOSPITAL | Age: 64
End: 2024-02-16
Payer: MEDICARE

## 2024-02-16 DIAGNOSIS — E87.6 HYPOKALEMIA: ICD-10-CM

## 2024-02-16 DIAGNOSIS — R23.2 HOT FLASHES: ICD-10-CM

## 2024-02-17 RX ORDER — POTASSIUM CHLORIDE 750 MG/1
10 CAPSULE, EXTENDED RELEASE ORAL EVERY 12 HOURS SCHEDULED
Qty: 60 CAPSULE | Refills: 1 | Status: SHIPPED | OUTPATIENT
Start: 2024-02-17

## 2024-02-17 RX ORDER — OXYBUTYNIN CHLORIDE 15 MG/1
TABLET, EXTENDED RELEASE ORAL
Qty: 30 TABLET | Refills: 1 | Status: SHIPPED | OUTPATIENT
Start: 2024-02-17

## 2024-02-19 RX ORDER — POTASSIUM CHLORIDE 750 MG/1
TABLET, FILM COATED, EXTENDED RELEASE ORAL
Qty: 30 TABLET | Refills: 1 | OUTPATIENT
Start: 2024-02-19

## 2024-02-20 DIAGNOSIS — R60.9 PERIPHERAL EDEMA: ICD-10-CM

## 2024-02-20 RX ORDER — HYDROCHLOROTHIAZIDE 12.5 MG/1
12.5 TABLET ORAL DAILY
Qty: 90 TABLET | Refills: 2 | Status: SHIPPED | OUTPATIENT
Start: 2024-02-20

## 2024-02-21 DIAGNOSIS — G89.3 CANCER RELATED PAIN: ICD-10-CM

## 2024-02-22 RX ORDER — MORPHINE SULFATE 15 MG/1
45 TABLET, FILM COATED, EXTENDED RELEASE ORAL 2 TIMES DAILY
Qty: 180 TABLET | Refills: 0 | OUTPATIENT
Start: 2024-02-22

## 2024-02-27 ENCOUNTER — DOCUMENTATION (OUTPATIENT)
Dept: ONCOLOGY | Facility: HOSPITAL | Age: 64
End: 2024-02-27
Payer: MEDICARE

## 2024-02-27 NOTE — PROGRESS NOTES
[employer] received Ms. Keller's  financial assistance program application via fax this morning. [employer] submitted this to the financial counselors department to review and process. [employer] support remains available.

## 2024-03-04 DIAGNOSIS — G89.3 CANCER RELATED PAIN: ICD-10-CM

## 2024-03-04 RX ORDER — MORPHINE SULFATE 15 MG/1
45 TABLET, FILM COATED, EXTENDED RELEASE ORAL 2 TIMES DAILY
Qty: 180 TABLET | Refills: 0 | Status: SHIPPED | OUTPATIENT
Start: 2024-03-04

## 2024-03-04 RX ORDER — OXYCODONE AND ACETAMINOPHEN 10; 325 MG/1; MG/1
1 TABLET ORAL EVERY 6 HOURS PRN
Qty: 60 TABLET | Refills: 0 | Status: SHIPPED | OUTPATIENT
Start: 2024-03-04

## 2024-03-04 NOTE — TELEPHONE ENCOUNTER
Caller: Derek Keller    Relationship: Self    Best call back number: [phone]    Requested Prescriptions:   Requested Prescriptions     Pending Prescriptions Disp Refills    oxyCODONE-acetaminophen (PERCOCET)  MG per tablet 60 tablet 0     Sig: Take 1 tablet by mouth Every 6 (Six) Hours As Needed for Moderate Pain.    Morphine (MS CONTIN) 15 MG 12 hr tablet 180 tablet 0     Sig: Take 3 tablets by mouth 2 (Two) Times a Day.        Pharmacy where request should be sent: [facility], [facility], [place] - [address] - [phone]  - [phone] FX     Last office visit with prescribing clinician: 2/7/2024   Last telemedicine visit with prescribing clinician: Visit date not found   Next office visit with prescribing clinician: 3/6/2024       Does the patient have less than a 3 day supply:  [x] Yes  [] No    Would you like a call back once the refill request has been completed: [x] Yes [] No    If the office needs to give you a call back, can they leave a voicemail: [x] Yes [] No    Abby Cota Rep   03/04/24 08:30 EST

## 2024-03-06 ENCOUNTER — HOSPITAL ENCOUNTER (OUTPATIENT)
Dept: ONCOLOGY | Facility: HOSPITAL | Age: 64
Discharge: HOME | End: 2024-03-06
Payer: MEDICARE

## 2024-03-06 ENCOUNTER — OFFICE VISIT (OUTPATIENT)
Dept: ONCOLOGY | Facility: HOSPITAL | Age: 64
End: 2024-03-06
Payer: MEDICARE

## 2024-03-06 VITALS
TEMPERATURE: 97.6 F | SYSTOLIC BLOOD PRESSURE: 156 MMHG | HEART RATE: 70 BPM | DIASTOLIC BLOOD PRESSURE: 57 MMHG | WEIGHT: 187.4 LBS | RESPIRATION RATE: 16 BRPM | BODY MASS INDEX: 30.26 KG/M2 | OXYGEN SATURATION: 94 %

## 2024-03-06 DIAGNOSIS — Z17.0 MALIGNANT NEOPLASM OF UPPER-OUTER QUADRANT OF LEFT BREAST IN FEMALE, ESTROGEN RECEPTOR POSITIVE: ICD-10-CM

## 2024-03-06 DIAGNOSIS — Z45.2 ENCOUNTER FOR ADJUSTMENT OR MANAGEMENT OF VASCULAR ACCESS DEVICE: Primary | ICD-10-CM

## 2024-03-06 DIAGNOSIS — C50.412 MALIGNANT NEOPLASM OF UPPER-OUTER QUADRANT OF LEFT BREAST IN FEMALE, ESTROGEN RECEPTOR POSITIVE: ICD-10-CM

## 2024-03-06 DIAGNOSIS — C50.412 MALIGNANT NEOPLASM OF UPPER-OUTER QUADRANT OF LEFT BREAST IN FEMALE, ESTROGEN RECEPTOR POSITIVE: Primary | ICD-10-CM

## 2024-03-06 DIAGNOSIS — T40.2X5A THERAPEUTIC OPIOID INDUCED CONSTIPATION: ICD-10-CM

## 2024-03-06 DIAGNOSIS — G89.3 CANCER RELATED PAIN: ICD-10-CM

## 2024-03-06 DIAGNOSIS — Z17.0 MALIGNANT NEOPLASM OF UPPER-OUTER QUADRANT OF LEFT BREAST IN FEMALE, ESTROGEN RECEPTOR POSITIVE: Primary | ICD-10-CM

## 2024-03-06 DIAGNOSIS — C79.51 MALIGNANT NEOPLASM METASTATIC TO BONE: ICD-10-CM

## 2024-03-06 DIAGNOSIS — K59.03 THERAPEUTIC OPIOID INDUCED CONSTIPATION: ICD-10-CM

## 2024-03-06 LAB
ALBUMIN SERPL-MCNC: 4.4 G/DL (ref 3.5–5.2)
ALBUMIN/GLOB SERPL: 1.8 G/DL
ALP SERPL-CCNC: 89 U/L (ref 39–117)
ALT SERPL W P-5'-P-CCNC: 9 U/L (ref 1–33)
ANION GAP SERPL CALCULATED.3IONS-SCNC: 1 MMOL/L (ref 5–15)
AST SERPL-CCNC: 11 U/L (ref 1–32)
BASOPHILS # BLD AUTO: 0.06 10*3/MM3 (ref 0–0.2)
BASOPHILS NFR BLD AUTO: 1.6 % (ref 0–1.5)
BILIRUB SERPL-MCNC: 0.3 MG/DL (ref 0–1.2)
BUN SERPL-MCNC: 15 MG/DL (ref 8–23)
BUN/CREAT SERPL: 22.4 (ref 7–25)
CALCIUM SPEC-SCNC: 9.2 MG/DL (ref 8.6–10.5)
CHLORIDE SERPL-SCNC: 101 MMOL/L (ref 98–107)
CO2 SERPL-SCNC: 38 MMOL/L (ref 22–29)
CREAT SERPL-MCNC: 0.67 MG/DL (ref 0.57–1)
DEPRECATED RDW RBC AUTO: 60 FL (ref 37–54)
EGFRCR SERPLBLD CKD-EPI 2021: 97.7 ML/MIN/1.73
EOSINOPHIL # BLD AUTO: 0.04 10*3/MM3 (ref 0–0.4)
EOSINOPHIL NFR BLD AUTO: 1 % (ref 0.3–6.2)
ERYTHROCYTE [DISTWIDTH] IN BLOOD BY AUTOMATED COUNT: 14.9 % (ref 12.3–15.4)
GLOBULIN UR ELPH-MCNC: 2.5 GM/DL
GLUCOSE SERPL-MCNC: 113 MG/DL (ref 65–99)
HCT VFR BLD AUTO: 33.2 % (ref 34–46.6)
HGB BLD-MCNC: 10.5 G/DL (ref 12–15.9)
IMM GRANULOCYTES # BLD AUTO: 0.01 10*3/MM3 (ref 0–0.05)
IMM GRANULOCYTES NFR BLD AUTO: 0.3 % (ref 0–0.5)
LYMPHOCYTES # BLD AUTO: 1.2 10*3/MM3 (ref 0.7–3.1)
LYMPHOCYTES NFR BLD AUTO: 31.4 % (ref 19.6–45.3)
MAGNESIUM SERPL-MCNC: 2 MG/DL (ref 1.6–2.4)
MCH RBC QN AUTO: 34.2 PG (ref 26.6–33)
MCHC RBC AUTO-ENTMCNC: 31.6 G/DL (ref 31.5–35.7)
MCV RBC AUTO: 108.1 FL (ref 79–97)
MONOCYTES # BLD AUTO: 0.5 10*3/MM3 (ref 0.1–0.9)
MONOCYTES NFR BLD AUTO: 13.1 % (ref 5–12)
NEUTROPHILS NFR BLD AUTO: 2.01 10*3/MM3 (ref 1.7–7)
NEUTROPHILS NFR BLD AUTO: 52.6 % (ref 42.7–76)
PHOSPHATE SERPL-MCNC: 3.7 MG/DL (ref 2.5–4.5)
PLATELET # BLD AUTO: 197 10*3/MM3 (ref 140–450)
PMV BLD AUTO: 9.8 FL (ref 6–12)
POTASSIUM SERPL-SCNC: 4 MMOL/L (ref 3.5–5.2)
PROT SERPL-MCNC: 6.9 G/DL (ref 6–8.5)
RBC # BLD AUTO: 3.07 10*6/MM3 (ref 3.77–5.28)
SODIUM SERPL-SCNC: 140 MMOL/L (ref 136–145)
WBC NRBC COR # BLD AUTO: 3.82 10*3/MM3 (ref 3.4–10.8)

## 2024-03-06 PROCEDURE — 25010000002 DENOSUMAB 120 MG/1.7ML SOLUTION: Performed by: INTERNAL MEDICINE

## 2024-03-06 PROCEDURE — 96372 THER/PROPH/DIAG INJ SC/IM: CPT

## 2024-03-06 PROCEDURE — 84100 ASSAY OF PHOSPHORUS: CPT | Performed by: INTERNAL MEDICINE

## 2024-03-06 PROCEDURE — 83735 ASSAY OF MAGNESIUM: CPT | Performed by: INTERNAL MEDICINE

## 2024-03-06 PROCEDURE — 85025 COMPLETE CBC W/AUTO DIFF WBC: CPT | Performed by: INTERNAL MEDICINE

## 2024-03-06 PROCEDURE — 25010000002 HEPARIN LOCK FLUSH PER 10 UNITS: Performed by: INTERNAL MEDICINE

## 2024-03-06 PROCEDURE — 80053 COMPREHEN METABOLIC PANEL: CPT | Performed by: INTERNAL MEDICINE

## 2024-03-06 RX ORDER — HEPARIN SODIUM (PORCINE) LOCK FLUSH IV SOLN 100 UNIT/ML 100 UNIT/ML
500 SOLUTION INTRAVENOUS AS NEEDED
OUTPATIENT
Start: 2024-03-06

## 2024-03-06 RX ORDER — SODIUM CHLORIDE 0.9 % (FLUSH) 0.9 %
20 SYRINGE (ML) INJECTION AS NEEDED
Status: DISCONTINUED | OUTPATIENT
Start: 2024-03-06 | End: 2024-03-07 | Stop reason: HOSPADM

## 2024-03-06 RX ORDER — FLUTICASONE PROPIONATE 50 MCG
2 SPRAY, SUSPENSION (ML) NASAL DAILY
COMMUNITY
Start: 2024-02-12

## 2024-03-06 RX ORDER — SODIUM CHLORIDE 0.9 % (FLUSH) 0.9 %
20 SYRINGE (ML) INJECTION AS NEEDED
OUTPATIENT
Start: 2024-03-06

## 2024-03-06 RX ORDER — HEPARIN SODIUM (PORCINE) LOCK FLUSH IV SOLN 100 UNIT/ML 100 UNIT/ML
500 SOLUTION INTRAVENOUS AS NEEDED
Status: DISCONTINUED | OUTPATIENT
Start: 2024-03-06 | End: 2024-03-07 | Stop reason: HOSPADM

## 2024-03-06 RX ADMIN — DENOSUMAB 120 MG: 120 INJECTION SUBCUTANEOUS at 15:01

## 2024-03-06 RX ADMIN — Medication 20 ML: at 13:55

## 2024-03-06 RX ADMIN — HEPARIN 500 UNITS: 100 SYRINGE at 13:55

## 2024-03-06 NOTE — PROGRESS NOTES
Chief Complaint  Malignant neoplasm of upper-outer quadrant of left breast i    Haile Monique MD Patel, Saagar, MD    Subjective     {Problem List  Visit Diagnosis   Encounters  Notes  Medications  Labs  Result Review Imaging  Media :23}     Derek Keller presents to [facility] HEMATOLOGY [facility]OGY for ***    Oncology/Hematology History   Malignant neoplasm of upper-outer quadrant of left breast in female, estrogen receptor positive   10/13/2022 Initial Diagnosis    Malignant neoplasm of left breast in female, estrogen receptor positive (HCC)     10/14/2022 -  Chemotherapy    OP BREAST Letrozole / Ribociclib     10/14/2022 Cancer Staged    Staging form: Breast, AJCC 8th Edition  - Clinical: Stage IV (cT1c, cN1, cM1, ER+, OR+, HER2-) - Signed by Donald Barker MD on 10/14/2022     10/28/2022 -  Chemotherapy    OP SUPPORTIVE Denosumab (Xgeva) Q28D     Malignant neoplasm metastatic to bone   10/14/2022 Initial Diagnosis    Bone metastases (HCC)     10/28/2022 -  Chemotherapy    OP SUPPORTIVE Denosumab (Xgeva) Q28D     Secondary malignant neoplasm of bone (Resolved)   11/14/2022 Initial Diagnosis    Secondary malignant neoplasm of bone (HCC)     11/17/2022 - 4/19/2023 Radiation    RADIATION THERAPY Treatment Details (11/14/2022 - 4/19/2023)  Site: Spine - Lumbar  Technique: 3D CRT  Goal: No goal specified  Planned Treatment Start Date: 11/17/2022         Review of Systems  Current Outpatient Medications on File Prior to Visit   Medication Sig Dispense Refill    fluticasone (FLONASE) 50 MCG/ACT nasal spray 2 sprays by Each Nare route Daily.      atorvastatin (LIPITOR) 20 MG tablet TAKE 1 TABLET BY MOUTH EVERY DAY (Patient taking differently: Take 1 tablet by mouth Daily.) 30 tablet 6    cyproheptadine (PERIACTIN) 4 MG tablet Take 1 tablet by mouth Every 12 (Twelve) Hours.      donepezil (ARICEPT) 10 MG tablet Take 1 tablet by mouth Daily.      DULoxetine (CYMBALTA) 60 MG capsule Take 1  capsule by mouth Daily. 30 capsule 2    ferrous sulfate 324 (65 Fe) MG tablet delayed-release EC tablet Take 1 tablet by mouth Daily With Breakfast. (Patient not taking: Reported on 2/7/2024)      gabapentin (NEURONTIN) 600 MG tablet TAKE 1 TABLET BY MOUTH AT BEDTIME (Patient taking differently: Take 1 tablet by mouth 2 (Two) Times a Day As Needed.) 90 tablet 0    hydroCHLOROthiazide 12.5 MG tablet TAKE 1 TABLET BY MOUTH DAILY for swelling 90 tablet 2    letrozole (FEMARA) 2.5 MG tablet Take 1 tablet by mouth Daily. 90 tablet 1    levothyroxine (SYNTHROID, LEVOTHROID) 50 MCG tablet TAKE 1 TABLET BY MOUTH EVERY DAY (Patient taking differently: Take 1 tablet by mouth Daily.) 90 tablet 1    LORazepam (ATIVAN) 0.5 MG tablet Take 1 tablet by mouth Every 6 (Six) Hours As Needed.      losartan (COZAAR) 100 MG tablet Take 0.5 tablets by mouth Daily. INST PER ANESTHESIA PROTOCOL      montelukast (SINGULAIR) 10 MG tablet Take 1 tablet by mouth Daily.      Morphine (MS CONTIN) 15 MG 12 hr tablet Take 3 tablets by mouth 2 (Two) Times a Day. 180 tablet 0    ondansetron (ZOFRAN) 8 MG tablet TAKE 1 TABLET BY MOUTH THREE TIMES DAILY AS NEEDED FOR NAUSEA AND VOMITING 30 tablet 5    oxybutynin XL (DITROPAN XL) 15 MG 24 hr tablet TAKE 1 TABLET BY MOUTH DAILY for bladder 30 tablet 1    oxyCODONE-acetaminophen (PERCOCET)  MG per tablet Take 1 tablet by mouth Every 6 (Six) Hours As Needed for Moderate Pain. 60 tablet 0    polyethylene glycol (MIRALAX) 17 g packet Take 17 g by mouth Daily. 5 packet 0    potassium chloride (MICRO-K) 10 MEQ CR capsule Take 1 capsule by mouth Every 12 (Twelve) Hours. 60 capsule 1    prochlorperazine (COMPAZINE) 10 MG tablet  (Patient not taking: Reported on 2/7/2024)      ribociclib succinate 200 MG tablet therapy pack tablet Take 3 tablets by mouth Take As Directed. Take 3 tablets by mouth daily for 21 days then off 7 days on a 28 day cycle. 63 tablet 11    rOPINIRole (REQUIP) 3 MG tablet Take 1  tablet by mouth 2 (Two) Times a Day.      SudoGest 60 MG tablet Take 1 tablet by mouth Every 8 (Eight) Hours.      SUMAtriptan (IMITREX) 100 MG tablet Take 1 tablet by mouth 1 (One) Time As Needed.      traZODone (DESYREL) 150 MG tablet TAKE 1 TABLET BY MOUTH ONCE nightly (Patient taking differently: Take 1 tablet by mouth Every Night.) 90 tablet 2     Current Facility-Administered Medications on File Prior to Visit   Medication Dose Route Frequency Provider Last Rate Last Admin    heparin injection 500 Units  500 Units Intravenous PRN Donald Barker MD   500 Units at 03/06/24 1355    sodium chloride 0.9 % flush 20 mL  20 mL Intravenous PRN Donald Barker MD   20 mL at 03/06/24 1355       Allergies   Allergen Reactions    Azithromycin Itching    Erythromycin Itching     Past Medical History:   Diagnosis Date    Abdominal pain     OSTOMY SITE    Allergic rhinitis     Anemia     NO S/S    Anxiety     Arteriosclerosis     Coronary, follows with Dr. Núñez    Arthritis     Bone metastases 10/14/2022    Bowen's disease     SKIN CANCER    Breast cancer     NO SURGERY WAS DONE DUE TO METS TO BONE/COLON/LYMPHNODE    Cancer related pain 10/13/2022    Depression     Disorder associated with Helicobacter species 10/12/2022    Dysphoric mood     Fatigue     Fibromyalgia     Frequent urination     NO S/S INFECTION    Herpes simplex vulvovaginitis 07/26/2018    History of colon polyps     History of IBS     History of kidney stones     Hyperlipidemia     Hypertension     Hypothyroidism     Insomnia     Lumbago     Mood disorder     Neck pain     R/T CANCER    Palpitations     last time a few months ago    Precordial pain     R/T SPINE CANCER    S/P Laparoscopic Hand-Assisted Colostomy Closure with End to End Anastomosis Open Parastomal Hernia Repair 12/08/2022    Sleep disturbance     SOB (shortness of breath)     at times at rest    SOBOE (shortness of breath on exertion)      Past Surgical History:   Procedure  Laterality Date    BREAST BIOPSY Left     CARDIAC CATHETERIZATION Left 1959    CARDIAC CATHETERIZATION N/A 04/06/2018    Procedure: Coronary angiography;  Surgeon: Art Licea MD;  Location: [facility] CATH INVASIVE LOCATION;  Service: Cardiovascular    CARDIAC CATHETERIZATION N/A 04/06/2018    Procedure: Left heart cath;  Surgeon: Art Licea MD;  Location: [facility] CATH INVASIVE LOCATION;  Service: Cardiovascular    CARDIAC CATHETERIZATION N/A 04/06/2018    Procedure: Left ventriculography;  Surgeon: Art Licea MD;  Location: [facility] CATH INVASIVE LOCATION;  Service: Cardiovascular    CHOLECYSTECTOMY      COLON SURGERY      colostomy bag    COLONOSCOPY  11/08/2006    COLONOSCOPY N/A 06/08/2023    Procedure: COLONOSCOPY;  Surgeon: Alfred Castañeda MD;  Location: [facility] ENDOSCOPY;  Service: General;  Laterality: N/A;  same as preop    EXPLORATORY LAPAROTOMY N/A 12/06/2022    Procedure: LAPAROTOMY EXPLORATORY sigmoid resection hartmans procedure colostomy;  Surgeon: Alfred Castañeda MD;  Location: [facility] MAIN OR;  Service: General;  Laterality: N/A;    GANGLION CYST EXCISION Bilateral     HYSTERECTOMY  05/2005    ILEOSTOMY CLOSURE N/A 6/26/2023    Procedure: Laparoscopic Hand-Assisted Colostomy Closure with End to End Anastomosis;  Surgeon: Alfred Castañeda MD;  Location: [facility] MAIN OR;  Service: General;  Laterality: N/A;    LAPAROSCOPIC GASTRIC BANDING      BAND REMOVED 2020    PARASTOMAL HERNIA REPAIR N/A 6/26/2023    Procedure: Open Parastomal Hernia Repair;  Surgeon: Alfred Castañeda MD;  Location: [facility] OR;  Service: General;  Laterality: N/A;    TONSILLECTOMY      VENOUS ACCESS DEVICE (PORT) INSERTION N/A 01/09/2023    Procedure: INSERTION VENOUS ACCESS DEVICE;  Surgeon: Alfred Castañeda MD;  Location: [facility] MAIN OR;  Service: General;  Laterality: N/A;     Social History     Socioeconomic History    Marital status:    Tobacco Use    Smoking  status: Every Day     Current packs/day: 1.00     Average packs/day: 1 pack/day for 50.2 years (50.2 ttl pk-yrs)     Types: Cigarettes     Start date: 1974    Smokeless tobacco: Never    Tobacco comments:        Vaping Use    Vaping status: Never Used   Substance and Sexual Activity    Alcohol use: No    Drug use: Never     Types: Marijuana     Comment: LAST USE 6/19/23    Sexual activity: Defer     Partners: Male     Birth control/protection: None     Family History   Problem Relation Age of Onset    Hypertension Mother     Rheum arthritis Mother     Heart disease Mother     Breast cancer Mother     Diabetes Father     Cancer Maternal Grandmother         colon    Colon cancer Maternal Grandmother     Aneurysm Paternal Grandfather     Diabetes Other     Fibromyalgia Other     Malig Hyperthermia Neg Hx        Objective   Physical Exam    There were no vitals filed for this visit.            PHQ-9 Total Score:         {The patient is/is not experiencing fatigue. (Optional):90158}   {PT fx screen 1 (Optional):31804}  {Speech Functional Screening (Optional):41605}  {Rehab to be ordered (Optional):45581}    Result Review :{Labs  Result Review  Imaging  Med Tab  Media  Procedures :23}   The following data was reviewed by: Maddy Arambula MA on 03/06/2024:  Lab Results   Component Value Date    HGB 10.6 (L) 02/07/2024    HCT 33.0 (L) 02/07/2024    .8 (H) 02/07/2024     02/07/2024    WBC 4.67 02/07/2024    NEUTROABS 2.89 02/07/2024    LYMPHSABS 1.23 02/07/2024    MONOSABS 0.49 02/07/2024    EOSABS 0.02 02/07/2024    BASOSABS 0.03 02/07/2024     Lab Results   Component Value Date    GLUCOSE 110 (H) 02/07/2024    BUN 13 02/07/2024    CREATININE 0.71 02/07/2024     02/07/2024    K 4.0 02/07/2024     02/07/2024    CO2 30.7 (H) 02/07/2024    CALCIUM 9.4 02/07/2024    PROTEINTOT 6.6 02/07/2024    ALBUMIN 4.2 02/07/2024    BILITOT 0.2 02/07/2024    ALKPHOS 86 02/07/2024    AST 8 02/07/2024    ALT 7  "02/07/2024     Lab Results   Component Value Date    MG 1.9 02/07/2024    PHOS 4.1 02/07/2024    FREET4 1.01 01/17/2022    TSH 1.470 11/12/2019     Lab Results   Component Value Date    IRON 45 11/02/2023    LABIRON 11 (L) 11/02/2023    TRANSFERRIN 276 11/02/2023    TIBC 411 11/02/2023     Lab Results   Component Value Date    FERRITIN 44.12 11/02/2023    POWKHNJE51 390 04/23/2019    FOLATE 9.93 04/23/2019     No results found for: \"PSA\", \"CEA\", \"AFP\", \"\", \"\"    {Data reviewed (Optional):25837:::1}      Assessment and Plan {CC Problem List  Visit Diagnosis   ROS  Review (Popup)  Health Maintenance  Quality  BestPractice  Medications  SmartSets  SnapShot Encounters  Media :23}   Diagnoses and all orders for this visit:    1. Malignant neoplasm metastatic to bone (Primary)  -     CBC and Differential; Future  -     Comprehensive metabolic panel; Future  -     Magnesium; Future  -     Phosphorus; Future    2. Malignant neoplasm of upper-outer quadrant of left breast in female, estrogen receptor positive  -     CBC and Differential; Future  -     Comprehensive metabolic panel; Future  -     Magnesium; Future  -     Phosphorus; Future        {Time Spent (Optional):15823}    Patient Follow Up: ***  Patient was given instructions and counseling regarding her condition or for health maintenance advice. Please see specific information pulled into the AVS if appropriate.     Maddy Arambula MA    3/6/2024            "

## 2024-03-06 NOTE — ASSESSMENT & PLAN NOTE
Metastatic.  Patient is on palliative therapy with letrozole, ribociclib along with denosumab for bony involvement.  Tolerating her regimen well.  CBC is notable for mild cytopenias but not enough to require dose adjustment.  Continue letrozole daily.  Continue ribociclib 3 weeks out of 4.  Continue monthly Xgeva for bone involvement.  I will see her back in 1 month for ongoing treatment with lab work prior.

## 2024-03-06 NOTE — PROGRESS NOTES
Chief Complaint  Malignant neoplasm of upper-outer quadrant of left breast Haile San MD Patel, Saagar, MD    Subjective          Derek Keller presents to [facility] for ongoing treatment of her metastatic breast cancer.  She is on letrozole, ribociclib, denosumab for bone involvement.  She reports tolerating them well.  She has fatigue but she is able to perform her ADLs.  She has had some intermittent trouble with constipation but magnesium citrate helped.  She has not been using her MiraLAX daily on a consistent basis.  She does take her pain medications routinely for her cancer related pain.  She reports her pain control is adequate.  She has a ventral hernia but recently saw Dr. Castañeda with surgery who did not feel that it was in need of surgical repair    Oncology/Hematology History   Malignant neoplasm of upper-outer quadrant of left breast in female, estrogen receptor positive   10/13/2022 Initial Diagnosis    Malignant neoplasm of left breast in female, estrogen receptor positive (HCC)     10/14/2022 -  Chemotherapy    OP BREAST Letrozole / Ribociclib     10/14/2022 Cancer Staged    Staging form: Breast, AJCC 8th Edition  - Clinical: Stage IV (cT1c, cN1, cM1, ER+, MD+, HER2-) - Signed by Donald Barker MD on 10/14/2022     10/28/2022 -  Chemotherapy    OP SUPPORTIVE Denosumab (Xgeva) Q28D     Malignant neoplasm metastatic to bone   10/14/2022 Initial Diagnosis    Bone metastases (HCC)     10/28/2022 -  Chemotherapy    OP SUPPORTIVE Denosumab (Xgeva) Q28D     Secondary malignant neoplasm of bone (Resolved)   11/14/2022 Initial Diagnosis    Secondary malignant neoplasm of bone (HCC)     11/17/2022 - 4/19/2023 Radiation    RADIATION THERAPY Treatment Details (11/14/2022 - 4/19/2023)  Site: Spine - Lumbar  Technique: 3D CRT  Goal: No goal specified  Planned Treatment Start Date: 11/17/2022         Review of Systems   Constitutional:  Positive for fatigue.  Negative for appetite change, diaphoresis, fever, unexpected weight gain and unexpected weight loss.   HENT:  Negative for hearing loss, sore throat and voice change.    Eyes:  Negative for blurred vision, double vision, pain, redness and visual disturbance.   Respiratory:  Negative for cough, shortness of breath and wheezing.    Cardiovascular:  Negative for chest pain, palpitations and leg swelling.   Endocrine: Negative for cold intolerance, heat intolerance, polydipsia and polyuria.   Genitourinary:  Negative for decreased urine volume, difficulty urinating, frequency and urinary incontinence.   Musculoskeletal:  Positive for back pain. Negative for arthralgias, joint swelling and myalgias.   Skin:  Negative for color change, rash, skin lesions and wound.   Neurological:  Negative for dizziness, seizures, numbness and headache.   Hematological:  Negative for adenopathy. Does not bruise/bleed easily.   Psychiatric/Behavioral:  Negative for depressed mood. The patient is not nervous/anxious.      Current Outpatient Medications on File Prior to Visit   Medication Sig Dispense Refill    atorvastatin (LIPITOR) 20 MG tablet TAKE 1 TABLET BY MOUTH EVERY DAY (Patient taking differently: Take 1 tablet by mouth Daily.) 30 tablet 6    cyproheptadine (PERIACTIN) 4 MG tablet Take 1 tablet by mouth Every 12 (Twelve) Hours.      donepezil (ARICEPT) 10 MG tablet Take 1 tablet by mouth Daily.      DULoxetine (CYMBALTA) 60 MG capsule Take 1 capsule by mouth Daily. 30 capsule 2    fluticasone (FLONASE) 50 MCG/ACT nasal spray 2 sprays by Each Nare route Daily.      gabapentin (NEURONTIN) 600 MG tablet TAKE 1 TABLET BY MOUTH AT BEDTIME (Patient taking differently: Take 1 tablet by mouth 2 (Two) Times a Day As Needed.) 90 tablet 0    hydroCHLOROthiazide 12.5 MG tablet TAKE 1 TABLET BY MOUTH DAILY for swelling 90 tablet 2    letrozole (FEMARA) 2.5 MG tablet Take 1 tablet by mouth Daily. 90 tablet 1    levothyroxine (SYNTHROID,  LEVOTHROID) 50 MCG tablet TAKE 1 TABLET BY MOUTH EVERY DAY (Patient taking differently: Take 1 tablet by mouth Daily.) 90 tablet 1    LORazepam (ATIVAN) 0.5 MG tablet Take 1 tablet by mouth Every 6 (Six) Hours As Needed.      losartan (COZAAR) 100 MG tablet Take 0.5 tablets by mouth Daily. INST PER ANESTHESIA PROTOCOL      montelukast (SINGULAIR) 10 MG tablet Take 1 tablet by mouth Daily.      Morphine (MS CONTIN) 15 MG 12 hr tablet Take 3 tablets by mouth 2 (Two) Times a Day. 180 tablet 0    ondansetron (ZOFRAN) 8 MG tablet TAKE 1 TABLET BY MOUTH THREE TIMES DAILY AS NEEDED FOR NAUSEA AND VOMITING 30 tablet 5    oxybutynin XL (DITROPAN XL) 15 MG 24 hr tablet TAKE 1 TABLET BY MOUTH DAILY for bladder 30 tablet 1    oxyCODONE-acetaminophen (PERCOCET)  MG per tablet Take 1 tablet by mouth Every 6 (Six) Hours As Needed for Moderate Pain. 60 tablet 0    polyethylene glycol (MIRALAX) 17 g packet Take 17 g by mouth Daily. 5 packet 0    potassium chloride (MICRO-K) 10 MEQ CR capsule Take 1 capsule by mouth Every 12 (Twelve) Hours. 60 capsule 1    ribociclib succinate 200 MG tablet therapy pack tablet Take 3 tablets by mouth Take As Directed. Take 3 tablets by mouth daily for 21 days then off 7 days on a 28 day cycle. 63 tablet 11    rOPINIRole (REQUIP) 3 MG tablet Take 1 tablet by mouth 2 (Two) Times a Day.      SudoGest 60 MG tablet Take 1 tablet by mouth Every 8 (Eight) Hours.      SUMAtriptan (IMITREX) 100 MG tablet Take 1 tablet by mouth 1 (One) Time As Needed.      traZODone (DESYREL) 150 MG tablet TAKE 1 TABLET BY MOUTH ONCE nightly (Patient taking differently: Take 1 tablet by mouth Every Night.) 90 tablet 2    ferrous sulfate 324 (65 Fe) MG tablet delayed-release EC tablet Take 1 tablet by mouth Daily With Breakfast. (Patient not taking: Reported on 2/7/2024)      prochlorperazine (COMPAZINE) 10 MG tablet  (Patient not taking: Reported on 2/7/2024)       Current Facility-Administered Medications on File Prior  to Visit   Medication Dose Route Frequency Provider Last Rate Last Admin    [COMPLETED] denosumab (XGEVA) injection 120 mg  120 mg Subcutaneous Once Donald Barker MD   120 mg at 03/06/24 1501    heparin injection 500 Units  500 Units Intravenous PRN Donald Barker MD   500 Units at 03/06/24 1355    sodium chloride 0.9 % flush 20 mL  20 mL Intravenous PRN Donald Barker MD   20 mL at 03/06/24 1355       Allergies   Allergen Reactions    Azithromycin Itching    Erythromycin Itching     Past Medical History:   Diagnosis Date    Abdominal pain     OSTOMY SITE    Allergic rhinitis     Anemia     NO S/S    Anxiety     Arteriosclerosis     Coronary, follows with Dr. Núñez    Arthritis     Bone metastases 10/14/2022    Bowen's disease     SKIN CANCER    Breast cancer     NO SURGERY WAS DONE DUE TO METS TO BONE/COLON/LYMPHNODE    Cancer related pain 10/13/2022    Depression     Disorder associated with Helicobacter species 10/12/2022    Dysphoric mood     Fatigue     Fibromyalgia     Frequent urination     NO S/S INFECTION    Herpes simplex vulvovaginitis 07/26/2018    History of colon polyps     History of IBS     History of kidney stones     Hyperlipidemia     Hypertension     Hypothyroidism     Insomnia     Lumbago     Mood disorder     Neck pain     R/T CANCER    Palpitations     last time a few months ago    Precordial pain     R/T SPINE CANCER    S/P Laparoscopic Hand-Assisted Colostomy Closure with End to End Anastomosis Open Parastomal Hernia Repair 12/08/2022    Sleep disturbance     SOB (shortness of breath)     at times at rest    SOBOE (shortness of breath on exertion)      Past Surgical History:   Procedure Laterality Date    BREAST BIOPSY Left     CARDIAC CATHETERIZATION Left 1959    CARDIAC CATHETERIZATION N/A 04/06/2018    Procedure: Coronary angiography;  Surgeon: Art Licea MD;  Location: [facility] INVASIVE LOCATION;  Service: Cardiovascular    CARDIAC CATHETERIZATION N/A  04/06/2018    Procedure: Left heart cath;  Surgeon: Art Licea MD;  Location: [facility] CATH INVASIVE LOCATION;  Service: Cardiovascular    CARDIAC CATHETERIZATION N/A 04/06/2018    Procedure: Left ventriculography;  Surgeon: Art Licea MD;  [facility];  Service: Cardiovascular    CHOLECYSTECTOMY      COLON SURGERY      colostomy bag    COLONOSCOPY  11/08/2006    COLONOSCOPY N/A 06/08/2023    Procedure: COLONOSCOPY;  Surgeon: Alfred Castañeda MD;  Location: [facility] ENDOSCOPY;  Service: General;  Laterality: N/A;  same as preop    EXPLORATORY LAPAROTOMY N/A 12/06/2022    Procedure: LAPAROTOMY EXPLORATORY sigmoid resection hartmans procedure colostomy;  Surgeon: Alfred Castañeda MD;  Location: [facility] MAIN OR;  Service: General;  Laterality: N/A;    GANGLION CYST EXCISION Bilateral     HYSTERECTOMY  05/2005    ILEOSTOMY CLOSURE N/A 6/26/2023    Procedure: Laparoscopic Hand-Assisted Colostomy Closure with End to End Anastomosis;  Surgeon: Alfred Castañeda MD;  Location: [facility] MAIN OR;  Service: General;  Laterality: N/A;    LAPAROSCOPIC GASTRIC BANDING      BAND REMOVED 2020    PARASTOMAL HERNIA REPAIR N/A 6/26/2023    Procedure: Open Parastomal Hernia Repair;  Surgeon: Alfred Castañeda MD;  Location: [facility] MAIN OR;  Service: General;  Laterality: N/A;    TONSILLECTOMY      VENOUS ACCESS DEVICE (PORT) INSERTION N/A 01/09/2023    Procedure: INSERTION VENOUS ACCESS DEVICE;  Surgeon: Alfred Castañeda MD;  Location: [facility] [place];  Service: General;  Laterality: N/A;     Social History     Socioeconomic History    Marital status:    Tobacco Use    Smoking status: Every Day     Current packs/day: 1.00     Average packs/day: 1 pack/day for 50.2 years (50.2 ttl pk-yrs)     Types: Cigarettes     Start date: 1974    Smokeless tobacco: Never    Tobacco comments:        Vaping Use    Vaping status: Never Used   Substance and Sexual Activity     Alcohol use: No    Drug use: Never     Types: Marijuana     Comment: LAST USE 6/19/23    Sexual activity: Defer     Partners: Male     Birth control/protection: None     Family History   Problem Relation Age of Onset    Hypertension Mother     Rheum arthritis Mother     Heart disease Mother     Breast cancer Mother     Diabetes Father     Cancer Maternal Grandmother         colon    Colon cancer Maternal Grandmother     Aneurysm Paternal Grandfather     Diabetes Other     Fibromyalgia Other     Malig Hyperthermia Neg Hx        Objective   Physical Exam  Vitals reviewed. Exam conducted with a chaperone present.   Constitutional:       General: She is not in acute distress.     Appearance: Normal appearance.   Cardiovascular:      Rate and Rhythm: Normal rate and regular rhythm.      Heart sounds: Normal heart sounds. No murmur heard.     No gallop.   Pulmonary:      Effort: Pulmonary effort is normal.      Breath sounds: Normal breath sounds.      Comments: Port-A-Cath  Abdominal:      General: Abdomen is flat. Bowel sounds are normal.      Palpations: Abdomen is soft.   Musculoskeletal:      Cervical back: Neck supple.   Lymphadenopathy:      Cervical: No cervical adenopathy.   Neurological:      Mental Status: She is alert.   Psychiatric:         Mood and Affect: Mood normal.         Behavior: Behavior normal.         Vitals:    03/06/24 1423   BP: 156/57   Pulse: 70   Resp: 16   Temp: 97.6 °F (36.4 °C)   TempSrc: Temporal   SpO2: 94%   Weight: 85 kg (187 lb 6.4 oz)   PainSc:   8   PainLoc: Back     ECOG score: 1         PHQ-9 Total Score:                    Result Review :   The following data was reviewed by: Donald Barker MD on 03/06/2024:  Lab Results   Component Value Date    HGB 10.5 (L) 03/06/2024    HCT 33.2 (L) 03/06/2024    .1 (H) 03/06/2024     03/06/2024    WBC 3.82 03/06/2024    NEUTROABS 2.01 03/06/2024    LYMPHSABS 1.20 03/06/2024    MONOSABS 0.50 03/06/2024    EOSABS 0.04  "03/06/2024    BASOSABS 0.06 03/06/2024     Lab Results   Component Value Date    GLUCOSE 113 (H) 03/06/2024    BUN 15 03/06/2024    CREATININE 0.67 03/06/2024     03/06/2024    K 4.0 03/06/2024     03/06/2024    CO2 38.0 (H) 03/06/2024    CALCIUM 9.2 03/06/2024    PROTEINTOT 6.9 03/06/2024    ALBUMIN 4.4 03/06/2024    BILITOT 0.3 03/06/2024    ALKPHOS 89 03/06/2024    AST 11 03/06/2024    ALT 9 03/06/2024     Lab Results   Component Value Date    MG 2.0 03/06/2024    PHOS 3.7 03/06/2024    FREET4 1.01 01/17/2022    TSH 1.470 11/12/2019     Lab Results   Component Value Date    IRON 45 11/02/2023    LABIRON 11 (L) 11/02/2023    TRANSFERRIN 276 11/02/2023    TIBC 411 11/02/2023     Lab Results   Component Value Date    FERRITIN 44.12 11/02/2023    XCYPXBJF19 390 04/23/2019    FOLATE 9.93 04/23/2019     No results found for: \"PSA\", \"CEA\", \"AFP\", \"\", \"\"          Assessment and Plan    Diagnoses and all orders for this visit:    1. Malignant neoplasm of upper-outer quadrant of left breast in female, estrogen receptor positive (Primary)  Assessment & Plan:  Metastatic.  Patient is on palliative therapy with letrozole, ribociclib along with denosumab for bony involvement.  Tolerating her regimen well.  CBC is notable for mild cytopenias but not enough to require dose adjustment.  Continue letrozole daily.  Continue ribociclib 3 weeks out of 4.  Continue monthly Xgeva for bone involvement.  I will see her back in 1 month for ongoing treatment with lab work prior.    Orders:  -     CBC and Differential; Future  -     Comprehensive metabolic panel; Future  -     Magnesium; Future  -     Phosphorus; Future    2. Malignant neoplasm metastatic to bone  Assessment & Plan:  Patient is on monthly Xgeva.  Tolerating her injections well.  No dental or jaw pain.  Electrolytes are adequate.  Xgeva today monthly.    Orders:  -     CBC and Differential; Future  -     Comprehensive metabolic panel; Future  -     " Magnesium; Future  -     Phosphorus; Future    3. Cancer related pain  Assessment & Plan:  Patient reports adequate pain control.  Wyatt reviewed and no discrepancies.  Continue same.      4. Therapeutic opioid induced constipation  Assessment & Plan:  Patient was having more problems previously but reports the bowels have been doing better.  She has not been taking MiraLAX consistently.  I recommended MiraLAX once or twice daily along with stool softener, increasing fluids and fiber in the diet to help maintain regularity while on pain medications.  We discussed the use of medication such as Amitiza if she has further constipation in the future.      Other orders  -     Cancel: denosumab (XGEVA) injection 120 mg            Patient Follow Up: 1 month    Patient was given instructions and counseling regarding her condition or for health maintenance advice. Please see specific information pulled into the AVS if appropriate.     Donald Barker MD    3/6/2024

## 2024-03-06 NOTE — ASSESSMENT & PLAN NOTE
Patient is on monthly Xgeva.  Tolerating her injections well.  No dental or jaw pain.  Electrolytes are adequate.  Xgeva today monthly.

## 2024-03-06 NOTE — ASSESSMENT & PLAN NOTE
Patient was having more problems previously but reports the bowels have been doing better.  She has not been taking MiraLAX consistently.  I recommended MiraLAX once or twice daily along with stool softener, increasing fluids and fiber in the diet to help maintain regularity while on pain medications.  We discussed the use of medication such as Amitiza if she has further constipation in the future.

## 2024-03-07 ENCOUNTER — SPECIALTY PHARMACY (OUTPATIENT)
Dept: PHARMACY | Facility: HOSPITAL | Age: 64
End: 2024-03-07
Payer: MEDICARE

## 2024-03-20 DIAGNOSIS — Z17.0 MALIGNANT NEOPLASM OF UPPER-OUTER QUADRANT OF LEFT BREAST IN FEMALE, ESTROGEN RECEPTOR POSITIVE: ICD-10-CM

## 2024-03-20 DIAGNOSIS — G89.3 CANCER RELATED PAIN: ICD-10-CM

## 2024-03-20 DIAGNOSIS — F33.9 MONOPOLAR DEPRESSION: ICD-10-CM

## 2024-03-20 DIAGNOSIS — C50.412 MALIGNANT NEOPLASM OF UPPER-OUTER QUADRANT OF LEFT BREAST IN FEMALE, ESTROGEN RECEPTOR POSITIVE: ICD-10-CM

## 2024-03-20 RX ORDER — DULOXETIN HYDROCHLORIDE 60 MG/1
CAPSULE, DELAYED RELEASE ORAL
Qty: 30 CAPSULE | Refills: 2 | Status: SHIPPED | OUTPATIENT
Start: 2024-03-20

## 2024-03-22 ENCOUNTER — TELEPHONE (OUTPATIENT)
Dept: ONCOLOGY | Facility: HOSPITAL | Age: 64
End: 2024-03-22
Payer: MEDICARE

## 2024-03-22 NOTE — TELEPHONE ENCOUNTER
OSW received a VM from Ms. Keller yesterday requesting a status update on her  financial assistance program application. OSW relayed this inquiry to the financial counselors to please advise. OSW received a confirmation from Pari MAJOR that Ms. Keller was approved for full assistance at 100%. OSW returned Ms. Keller's call this afternoon to make her aware. She v/u and expressed relief and gratitude. Encouraged OSW support remains available.

## 2024-03-25 DIAGNOSIS — G89.3 CANCER RELATED PAIN: ICD-10-CM

## 2024-03-25 NOTE — TELEPHONE ENCOUNTER
Caller: Derek Keller    Relationship: Self    Best call back number: [phone]    Requested Prescriptions:   Requested Prescriptions     Pending Prescriptions Disp Refills    oxyCODONE-acetaminophen (PERCOCET)  MG per tablet 60 tablet 0     Sig: Take 1 tablet by mouth Every 6 (Six) Hours As Needed for Moderate Pain.        Pharmacy where request should be sent: [facility], [facility], & [facility], [place] - [address] - [phone] [facility] [phone] FX     Last office visit with prescribing clinician: 3/6/2024   Last telemedicine visit with prescribing clinician: Visit date not found   Next office visit with prescribing clinician: 4/3/2024     Does the patient have less than a 3 day supply:  [x] Yes  [] No    Would you like a call back once the refill request has been completed: [] Yes [x] No    If the office needs to give you a call back, can they leave a voicemail: [] Yes [x] No

## 2024-03-25 NOTE — TELEPHONE ENCOUNTER
JERONIMO IS CALLING STATES SHE DOES A PROGRAM WITH [facility]   SHE STATES IN ORDER FOR THEM TO COVER HER MEDICATION, SHE NEEDS IT CALLED IN TODAY      PLEASE ADVISE

## 2024-03-26 RX ORDER — OXYCODONE AND ACETAMINOPHEN 10; 325 MG/1; MG/1
1 TABLET ORAL EVERY 6 HOURS PRN
Qty: 60 TABLET | Refills: 0 | Status: SHIPPED | OUTPATIENT
Start: 2024-03-26

## 2024-04-03 ENCOUNTER — HOSPITAL ENCOUNTER (OUTPATIENT)
Dept: ONCOLOGY | Facility: HOSPITAL | Age: 64
Discharge: HOME | End: 2024-04-03
Payer: MEDICARE

## 2024-04-03 ENCOUNTER — OFFICE VISIT (OUTPATIENT)
Dept: ONCOLOGY | Facility: HOSPITAL | Age: 64
End: 2024-04-03
Payer: MEDICARE

## 2024-04-03 VITALS
OXYGEN SATURATION: 100 % | TEMPERATURE: 97.2 F | RESPIRATION RATE: 16 BRPM | SYSTOLIC BLOOD PRESSURE: 149 MMHG | DIASTOLIC BLOOD PRESSURE: 72 MMHG | HEART RATE: 54 BPM

## 2024-04-03 DIAGNOSIS — C79.51 MALIGNANT NEOPLASM METASTATIC TO BONE: ICD-10-CM

## 2024-04-03 DIAGNOSIS — G89.3 CANCER RELATED PAIN: ICD-10-CM

## 2024-04-03 DIAGNOSIS — Z17.0 MALIGNANT NEOPLASM OF UPPER-OUTER QUADRANT OF LEFT BREAST IN FEMALE, ESTROGEN RECEPTOR POSITIVE: Primary | ICD-10-CM

## 2024-04-03 DIAGNOSIS — Z45.2 ENCOUNTER FOR ADJUSTMENT OR MANAGEMENT OF VASCULAR ACCESS DEVICE: Primary | ICD-10-CM

## 2024-04-03 DIAGNOSIS — Z17.0 MALIGNANT NEOPLASM OF UPPER-OUTER QUADRANT OF LEFT BREAST IN FEMALE, ESTROGEN RECEPTOR POSITIVE: ICD-10-CM

## 2024-04-03 DIAGNOSIS — C50.412 MALIGNANT NEOPLASM OF UPPER-OUTER QUADRANT OF LEFT BREAST IN FEMALE, ESTROGEN RECEPTOR POSITIVE: ICD-10-CM

## 2024-04-03 DIAGNOSIS — C50.412 MALIGNANT NEOPLASM OF UPPER-OUTER QUADRANT OF LEFT BREAST IN FEMALE, ESTROGEN RECEPTOR POSITIVE: Primary | ICD-10-CM

## 2024-04-03 LAB
ALBUMIN SERPL-MCNC: 4.5 G/DL (ref 3.5–5.2)
ALBUMIN/GLOB SERPL: 1.8 G/DL
ALP SERPL-CCNC: 91 U/L (ref 39–117)
ALT SERPL W P-5'-P-CCNC: 9 U/L (ref 1–33)
ANION GAP SERPL CALCULATED.3IONS-SCNC: 3.3 MMOL/L (ref 5–15)
AST SERPL-CCNC: 13 U/L (ref 1–32)
BASOPHILS # BLD AUTO: 0.04 10*3/MM3 (ref 0–0.2)
BASOPHILS NFR BLD AUTO: 0.8 % (ref 0–1.5)
BILIRUB SERPL-MCNC: 0.3 MG/DL (ref 0–1.2)
BUN SERPL-MCNC: 12 MG/DL (ref 8–23)
BUN/CREAT SERPL: 14.6 (ref 7–25)
CALCIUM SPEC-SCNC: 9 MG/DL (ref 8.6–10.5)
CHLORIDE SERPL-SCNC: 103 MMOL/L (ref 98–107)
CO2 SERPL-SCNC: 32.7 MMOL/L (ref 22–29)
CREAT SERPL-MCNC: 0.82 MG/DL (ref 0.57–1)
DEPRECATED RDW RBC AUTO: 55.6 FL (ref 37–54)
EGFRCR SERPLBLD CKD-EPI 2021: 80 ML/MIN/1.73
EOSINOPHIL # BLD AUTO: 0.04 10*3/MM3 (ref 0–0.4)
EOSINOPHIL NFR BLD AUTO: 0.8 % (ref 0.3–6.2)
ERYTHROCYTE [DISTWIDTH] IN BLOOD BY AUTOMATED COUNT: 14.2 % (ref 12.3–15.4)
GLOBULIN UR ELPH-MCNC: 2.5 GM/DL
GLUCOSE SERPL-MCNC: 113 MG/DL (ref 65–99)
HCT VFR BLD AUTO: 34.3 % (ref 34–46.6)
HGB BLD-MCNC: 10.6 G/DL (ref 12–15.9)
IMM GRANULOCYTES # BLD AUTO: 0.02 10*3/MM3 (ref 0–0.05)
IMM GRANULOCYTES NFR BLD AUTO: 0.4 % (ref 0–0.5)
LYMPHOCYTES # BLD AUTO: 1.3 10*3/MM3 (ref 0.7–3.1)
LYMPHOCYTES NFR BLD AUTO: 26.4 % (ref 19.6–45.3)
MACROCYTES BLD QL SMEAR: NORMAL
MAGNESIUM SERPL-MCNC: 2.1 MG/DL (ref 1.6–2.4)
MCH RBC QN AUTO: 32.8 PG (ref 26.6–33)
MCHC RBC AUTO-ENTMCNC: 30.9 G/DL (ref 31.5–35.7)
MCV RBC AUTO: 106.2 FL (ref 79–97)
MONOCYTES # BLD AUTO: 0.51 10*3/MM3 (ref 0.1–0.9)
MONOCYTES NFR BLD AUTO: 10.4 % (ref 5–12)
NEUTROPHILS NFR BLD AUTO: 3.01 10*3/MM3 (ref 1.7–7)
NEUTROPHILS NFR BLD AUTO: 61.2 % (ref 42.7–76)
NRBC BLD AUTO-RTO: 0 /100 WBC (ref 0–0.2)
PHOSPHATE SERPL-MCNC: 2.6 MG/DL (ref 2.5–4.5)
PLAT MORPH BLD: NORMAL
PLATELET # BLD AUTO: 186 10*3/MM3 (ref 140–450)
PMV BLD AUTO: 10 FL (ref 6–12)
POTASSIUM SERPL-SCNC: 3.4 MMOL/L (ref 3.5–5.2)
PROT SERPL-MCNC: 7 G/DL (ref 6–8.5)
RBC # BLD AUTO: 3.23 10*6/MM3 (ref 3.77–5.28)
SODIUM SERPL-SCNC: 139 MMOL/L (ref 136–145)
WBC MORPH BLD: NORMAL
WBC NRBC COR # BLD AUTO: 4.92 10*3/MM3 (ref 3.4–10.8)

## 2024-04-03 PROCEDURE — 84100 ASSAY OF PHOSPHORUS: CPT | Performed by: INTERNAL MEDICINE

## 2024-04-03 PROCEDURE — 25010000002 DENOSUMAB 120 MG/1.7ML SOLUTION: Performed by: INTERNAL MEDICINE

## 2024-04-03 PROCEDURE — 36591 DRAW BLOOD OFF VENOUS DEVICE: CPT

## 2024-04-03 PROCEDURE — 85007 BL SMEAR W/DIFF WBC COUNT: CPT | Performed by: INTERNAL MEDICINE

## 2024-04-03 PROCEDURE — 25010000002 HEPARIN LOCK FLUSH PER 10 UNITS: Performed by: INTERNAL MEDICINE

## 2024-04-03 PROCEDURE — 83735 ASSAY OF MAGNESIUM: CPT | Performed by: INTERNAL MEDICINE

## 2024-04-03 PROCEDURE — G0463 HOSPITAL OUTPT CLINIC VISIT: HCPCS | Performed by: INTERNAL MEDICINE

## 2024-04-03 PROCEDURE — 96372 THER/PROPH/DIAG INJ SC/IM: CPT

## 2024-04-03 PROCEDURE — 80053 COMPREHEN METABOLIC PANEL: CPT | Performed by: INTERNAL MEDICINE

## 2024-04-03 PROCEDURE — 85025 COMPLETE CBC W/AUTO DIFF WBC: CPT | Performed by: INTERNAL MEDICINE

## 2024-04-03 RX ORDER — HEPARIN SODIUM (PORCINE) LOCK FLUSH IV SOLN 100 UNIT/ML 100 UNIT/ML
500 SOLUTION INTRAVENOUS AS NEEDED
OUTPATIENT
Start: 2024-04-03

## 2024-04-03 RX ORDER — HEPARIN SODIUM (PORCINE) LOCK FLUSH IV SOLN 100 UNIT/ML 100 UNIT/ML
500 SOLUTION INTRAVENOUS AS NEEDED
Status: DISCONTINUED | OUTPATIENT
Start: 2024-04-03 | End: 2024-04-04 | Stop reason: HOSPADM

## 2024-04-03 RX ORDER — SODIUM CHLORIDE 0.9 % (FLUSH) 0.9 %
20 SYRINGE (ML) INJECTION AS NEEDED
Status: DISCONTINUED | OUTPATIENT
Start: 2024-04-03 | End: 2024-04-04 | Stop reason: HOSPADM

## 2024-04-03 RX ORDER — MORPHINE SULFATE 60 MG/1
60 TABLET, FILM COATED, EXTENDED RELEASE ORAL 2 TIMES DAILY
Qty: 60 TABLET | Refills: 0 | Status: SHIPPED | OUTPATIENT
Start: 2024-04-03

## 2024-04-03 RX ORDER — SODIUM CHLORIDE 0.9 % (FLUSH) 0.9 %
20 SYRINGE (ML) INJECTION AS NEEDED
OUTPATIENT
Start: 2024-04-03

## 2024-04-03 RX ADMIN — Medication 20 ML: at 13:31

## 2024-04-03 RX ADMIN — DENOSUMAB 120 MG: 120 INJECTION SUBCUTANEOUS at 15:35

## 2024-04-03 RX ADMIN — HEPARIN 500 UNITS: 100 SYRINGE at 13:31

## 2024-04-03 NOTE — ASSESSMENT & PLAN NOTE
Metastatic.  Hormone receptor positive.  Patient is on letrozole plus ribociclib.  Tolerating well.  She shows mild cytopenias from the ribociclib but not enough to require dose adjustment.  Continue letrozole daily.  Continue ribociclib 3 weeks out of 4.  I will see her back in 1 month for ongoing treatment with lab work prior to monitor for toxicities.  Restaging scans to assess response to treatment.

## 2024-04-03 NOTE — ASSESSMENT & PLAN NOTE
Patient reports suboptimal pain control.  I will increase MS Contin to 60 mg twice daily.  Continue breakthrough as needed.  I will ask [facility] service to help with her pain control

## 2024-04-03 NOTE — PROGRESS NOTES
Chief Complaint  Malignant neoplasm of upper-outer quadrant of left breast Haile San MD Patel, Saagar, MD    Subjective          Derek Keller presents to [facility] for ongoing treatment of her breast cancer.  She is on letrozole, ribociclib, denosumab for bony involvement.  She is compliant with the regimen.  She notes chronic pain related to her bony metastatic disease.  She is on MS Contin with breakthrough.  She feels like it is not controlling her symptoms very well.  She does have some constipation from her pain medications but she is able to move her bowels.  She reports adequate appetite.  She denies new masses or adenopathy.    Oncology/Hematology History   Malignant neoplasm of upper-outer quadrant of left breast in female, estrogen receptor positive   10/13/2022 Initial Diagnosis    Malignant neoplasm of left breast in female, estrogen receptor positive (HCC)     10/14/2022 -  Chemotherapy    OP BREAST Letrozole / Ribociclib     10/14/2022 Cancer Staged    Staging form: Breast, AJCC 8th Edition  - Clinical: Stage IV (cT1c, cN1, cM1, ER+, MN+, HER2-) - Signed by Donald Barker MD on 10/14/2022     10/28/2022 -  Chemotherapy    OP SUPPORTIVE Denosumab (Xgeva) Q28D     Malignant neoplasm metastatic to bone   10/14/2022 Initial Diagnosis    Bone metastases (HCC)     10/28/2022 -  Chemotherapy    OP SUPPORTIVE Denosumab (Xgeva) Q28D     Secondary malignant neoplasm of bone (Resolved)   11/14/2022 Initial Diagnosis    Secondary malignant neoplasm of bone (HCC)     11/17/2022 - 4/19/2023 Radiation    RADIATION THERAPY Treatment Details (11/14/2022 - 4/19/2023)  Site: Spine - Lumbar  Technique: 3D CRT  Goal: No goal specified  Planned Treatment Start Date: 11/17/2022         Review of Systems   Constitutional:  Positive for fatigue. Negative for appetite change, diaphoresis, fever, unexpected weight gain and unexpected weight loss.   HENT:  Negative for  hearing loss, mouth sores, sore throat, swollen glands, trouble swallowing and voice change.    Eyes:  Negative for blurred vision.   Respiratory:  Negative for cough, shortness of breath and wheezing.    Cardiovascular:  Negative for chest pain and palpitations.   Gastrointestinal:  Negative for abdominal pain, blood in stool, constipation, diarrhea, nausea and vomiting.   Endocrine: Negative for cold intolerance and heat intolerance.   Genitourinary:  Negative for difficulty urinating, dysuria, frequency, hematuria and urinary incontinence.   Musculoskeletal:  Positive for back pain and gait problem (Unsteady gait). Negative for arthralgias and myalgias.   Skin:  Negative for rash, skin lesions and wound.   Neurological:  Negative for dizziness, seizures, weakness, numbness and headache.   Hematological:  Does not bruise/bleed easily.   Psychiatric/Behavioral:  Negative for depressed mood. The patient is not nervous/anxious.      Current Outpatient Medications on File Prior to Visit   Medication Sig Dispense Refill    atorvastatin (LIPITOR) 20 MG tablet TAKE 1 TABLET BY MOUTH EVERY DAY (Patient taking differently: Take 1 tablet by mouth Daily.) 30 tablet 6    cyproheptadine (PERIACTIN) 4 MG tablet Take 1 tablet by mouth Every 12 (Twelve) Hours.      donepezil (ARICEPT) 10 MG tablet Take 1 tablet by mouth Daily.      DULoxetine (CYMBALTA) 60 MG capsule TAKE 1 capsule BY MOUTH DAILY for mood elevation 30 capsule 2    gabapentin (NEURONTIN) 600 MG tablet TAKE 1 TABLET BY MOUTH AT BEDTIME (Patient taking differently: Take 1 tablet by mouth 2 (Two) Times a Day As Needed.) 90 tablet 0    hydroCHLOROthiazide 12.5 MG tablet TAKE 1 TABLET BY MOUTH DAILY for swelling 90 tablet 2    letrozole (FEMARA) 2.5 MG tablet Take 1 tablet by mouth Daily. 90 tablet 1    levothyroxine (SYNTHROID, LEVOTHROID) 50 MCG tablet TAKE 1 TABLET BY MOUTH EVERY DAY (Patient taking differently: Take 1 tablet by mouth Daily.) 90 tablet 1     LORazepam (ATIVAN) 0.5 MG tablet Take 1 tablet by mouth Every 6 (Six) Hours As Needed.      losartan (COZAAR) 100 MG tablet Take 0.5 tablets by mouth Daily. INST PER ANESTHESIA PROTOCOL      montelukast (SINGULAIR) 10 MG tablet Take 1 tablet by mouth Daily.      ondansetron (ZOFRAN) 8 MG tablet TAKE 1 TABLET BY MOUTH THREE TIMES DAILY AS NEEDED FOR NAUSEA AND VOMITING 30 tablet 5    oxybutynin XL (DITROPAN XL) 15 MG 24 hr tablet TAKE 1 TABLET BY MOUTH DAILY for bladder 30 tablet 1    polyethylene glycol (MIRALAX) 17 g packet Take 17 g by mouth Daily. 5 packet 0    potassium chloride (MICRO-K) 10 MEQ CR capsule Take 1 capsule by mouth Every 12 (Twelve) Hours. 60 capsule 1    ribociclib succinate 200 MG tablet therapy pack tablet Take 3 tablets by mouth Take As Directed. Take 3 tablets by mouth daily for 21 days then off 7 days on a 28 day cycle. 63 tablet 11    rOPINIRole (REQUIP) 3 MG tablet Take 1 tablet by mouth 2 (Two) Times a Day.      SUMAtriptan (IMITREX) 100 MG tablet Take 1 tablet by mouth 1 (One) Time As Needed.      traZODone (DESYREL) 150 MG tablet TAKE 1 TABLET BY MOUTH ONCE nightly (Patient taking differently: Take 1 tablet by mouth Every Night.) 90 tablet 2    [DISCONTINUED] Morphine (MS CONTIN) 15 MG 12 hr tablet Take 3 tablets by mouth 2 (Two) Times a Day. 180 tablet 0    ferrous sulfate 324 (65 Fe) MG tablet delayed-release EC tablet Take 1 tablet by mouth Daily With Breakfast. (Patient not taking: Reported on 2/7/2024)      fluticasone (FLONASE) 50 MCG/ACT nasal spray 2 sprays by Each Nare route Daily. (Patient not taking: Reported on 4/3/2024)      oxyCODONE-acetaminophen (PERCOCET)  MG per tablet Take 1 tablet by mouth Every 6 (Six) Hours As Needed for Moderate Pain. 60 tablet 0    prochlorperazine (COMPAZINE) 10 MG tablet  (Patient not taking: Reported on 2/7/2024)      SudoGest 60 MG tablet Take 1 tablet by mouth Every 8 (Eight) Hours.       No current facility-administered medications on  file prior to visit.       Allergies   Allergen Reactions    Azithromycin Itching    Erythromycin Itching     Past Medical History:   Diagnosis Date    Abdominal pain     OSTOMY SITE    Allergic rhinitis     Anemia     NO S/S    Anxiety     Arteriosclerosis     Coronary, follows with Dr. Núñez    Arthritis     Bone metastases 10/14/2022    Bowen's disease     SKIN CANCER    Breast cancer     NO SURGERY WAS DONE DUE TO METS TO BONE/COLON/LYMPHNODE    Cancer related pain 10/13/2022    Depression     Disorder associated with Helicobacter species 10/12/2022    Dysphoric mood     Fatigue     Fibromyalgia     Frequent urination     NO S/S INFECTION    Herpes simplex vulvovaginitis 07/26/2018    History of colon polyps     History of IBS     History of kidney stones     Hyperlipidemia     Hypertension     Hypothyroidism     Insomnia     Lumbago     Mood disorder     Neck pain     R/T CANCER    Palpitations     last time a few months ago    Precordial pain     R/T SPINE CANCER    S/P Laparoscopic Hand-Assisted Colostomy Closure with End to End Anastomosis Open Parastomal Hernia Repair 12/08/2022    Sleep disturbance     SOB (shortness of breath)     at times at rest    SOBOE (shortness of breath on exertion)      Past Surgical History:   Procedure Laterality Date    BREAST BIOPSY Left     CARDIAC CATHETERIZATION Left 1959    CARDIAC CATHETERIZATION N/A 04/06/2018    Procedure: Coronary angiography;  Surgeon: Art Licea MD;  Location:  [facility];  Service: Cardiovascular    CARDIAC CATHETERIZATION N/A 04/06/2018    Procedure: Left heart cath;  Surgeon: Art Licea MD;  Location:  [facility];  Service: Cardiovascular    CARDIAC CATHETERIZATION N/A 04/06/2018    Procedure: Left ventriculography;  Surgeon: Art Licea MD;  Location:  [facility];  Service: Cardiovascular    CHOLECYSTECTOMY      COLON SURGERY      colostomy bag     COLONOSCOPY  11/08/2006    COLONOSCOPY N/A 06/08/2023    Procedure: COLONOSCOPY;  Surgeon: Alfred Castañeda MD;  Location: [facility];  Service: General;  Laterality: N/A;  same as preop    EXPLORATORY LAPAROTOMY N/A 12/06/2022    Procedure: LAPAROTOMY EXPLORATORY sigmoid resection hartmans procedure colostomy;  Surgeon: Alfred Castañeda MD;  Location: [facility] MAIN OR;  Service: General;  Laterality: N/A;    GANGLION CYST EXCISION Bilateral     HYSTERECTOMY  05/2005    ILEOSTOMY CLOSURE N/A 6/26/2023    Procedure: Laparoscopic Hand-Assisted Colostomy Closure with End to End Anastomosis;  Surgeon: Alfred Castañeda MD;  Location: [facility] MAIN OR;  Service: General;  Laterality: N/A;    LAPAROSCOPIC GASTRIC BANDING      BAND REMOVED 2020    PARASTOMAL HERNIA REPAIR N/A 6/26/2023    Procedure: Open Parastomal Hernia Repair;  Surgeon: Alfred Castañeda MD;  Location: [facility] MA[facility] OR;  Service: General;  Laterality: N/A;    TONSILLECTOMY      VENOUS ACCESS DEVICE (PORT) INSERTION N/A 01/09/2023    Procedure: INSERTION VENOUS ACCESS DEVICE;  Surgeon: Alfred Castañeda MD;  Location: [facility] MA[facility] OR;  Service: General;  Laterality: N/A;     Social History     Socioeconomic History    Marital status:    Tobacco Use    Smoking status: Every Day     Current packs/day: 1.00     Average packs/day: 1 pack/day for 50.3 years (50.3 ttl pk-yrs)     Types: Cigarettes     Start date: 1974    Smokeless tobacco: Never    Tobacco comments:        Vaping Use    Vaping status: Never Used   Substance and Sexual Activity    Alcohol use: No    Drug use: Never     Types: Marijuana     Comment: LAST USE 6/19/23    Sexual activity: Defer     Partners: Male     Birth control/protection: None     Family History   Problem Relation Age of Onset    Hypertension Mother     Rheum arthritis Mother     Heart disease Mother     Breast cancer Mother     Diabetes Father     Cancer Maternal Grandmother         colon     Colon cancer Maternal Grandmother     Aneurysm Paternal Grandfather     Diabetes Other     Fibromyalgia Other     Malig Hyperthermia Neg Hx        Objective   Physical Exam  Vitals reviewed. Exam conducted with a chaperone present.   Constitutional:       General: She is not in acute distress.     Appearance: Normal appearance.   Cardiovascular:      Rate and Rhythm: Normal rate and regular rhythm.      Heart sounds: Normal heart sounds. No murmur heard.     No gallop.   Pulmonary:      Effort: Pulmonary effort is normal.      Breath sounds: Normal breath sounds.   Abdominal:      General: Abdomen is flat. Bowel sounds are normal.      Palpations: Abdomen is soft.   Neurological:      Mental Status: She is alert.   Psychiatric:         Mood and Affect: Mood normal.         Behavior: Behavior normal.         Vitals:    04/03/24 1415   BP: 149/72   Pulse: 54   Resp: 16   Temp: 97.2 °F (36.2 °C)   SpO2: 100%   Weight: Comment: pt denied weight at this time, unstable gait   PainSc:   9   PainLoc: Back     ECOG score: 1         PHQ-9 Total Score:                    Result Review :   The following data was reviewed by: Donald Barker MD on 04/03/2024:  Lab Results   Component Value Date    HGB 10.6 (L) 04/03/2024    HCT 34.3 04/03/2024    .2 (H) 04/03/2024     04/03/2024    WBC 4.92 04/03/2024    NEUTROABS 3.01 04/03/2024    LYMPHSABS 1.30 04/03/2024    MONOSABS 0.51 04/03/2024    EOSABS 0.04 04/03/2024    BASOSABS 0.04 04/03/2024     Lab Results   Component Value Date    GLUCOSE 113 (H) 04/03/2024    BUN 12 04/03/2024    CREATININE 0.82 04/03/2024     04/03/2024    K 3.4 (L) 04/03/2024     04/03/2024    CO2 32.7 (H) 04/03/2024    CALCIUM 9.0 04/03/2024    PROTEINTOT 7.0 04/03/2024    ALBUMIN 4.5 04/03/2024    BILITOT 0.3 04/03/2024    ALKPHOS 91 04/03/2024    AST 13 04/03/2024    ALT 9 04/03/2024     Lab Results   Component Value Date    MG 2.1 04/03/2024    PHOS 2.6 04/03/2024    FREET4 1.01  "01/17/2022    TSH 1.470 11/12/2019     Lab Results   Component Value Date    IRON 45 11/02/2023    LABIRON 11 (L) 11/02/2023    TRANSFERRIN 276 11/02/2023    TIBC 411 11/02/2023     Lab Results   Component Value Date    FERRITIN 44.12 11/02/2023    KFYMEHGD39 390 04/23/2019    FOLATE 9.93 04/23/2019     No results found for: \"PSA\", \"CEA\", \"AFP\", \"\", \"\"          Assessment and Plan    Diagnoses and all orders for this visit:    1. Malignant neoplasm of upper-outer quadrant of left breast in female, estrogen receptor positive (Primary)  Assessment & Plan:  Metastatic.  Hormone receptor positive.  Patient is on letrozole plus ribociclib.  Tolerating well.  She shows mild cytopenias from the ribociclib but not enough to require dose adjustment.  Continue letrozole daily.  Continue ribociclib 3 weeks out of 4.  I will see her back in 1 month for ongoing treatment with lab work prior to monitor for toxicities.  Restaging scans to assess response to treatment.    Orders:  -     CBC and Differential; Future  -     Comprehensive metabolic panel; Future  -     Magnesium; Future  -     Phosphorus; Future  -     CT chest w contrast; Future  -     CT abdomen pelvis w contrast; Future    2. Malignant neoplasm metastatic to bone  Assessment & Plan:  Patient is on monthly Xgeva.  Tolerating well.  No dental or jaw pain.  Electrolytes are adequate for treatment.  Xgeva today monthly.      Orders:  -     CBC and Differential; Future  -     Comprehensive metabolic panel; Future  -     Magnesium; Future  -     Phosphorus; Future    3. Cancer related pain  Assessment & Plan:  Patient reports suboptimal pain control.  I will increase MS Contin to 60 mg twice daily.  Continue breakthrough as needed.  I will ask [facility] service to help with her pain control    Orders:  -     Morphine (MS CONTIN) 60 MG 12 hr tablet; Take 1 tablet by mouth 2 (Two) Times a Day.  Dispense: 60 tablet; Refill: 0    Other orders  -     Cancel: " denosumab (XGEVA) injection 120 mg            Patient Follow Up: 1 month    Patient was given instructions and counseling regarding her condition or for health maintenance advice. Please see specific information pulled into the AVS if appropriate.     Donald Barker MD    4/3/2024

## 2024-04-03 NOTE — ASSESSMENT & PLAN NOTE
Patient is on monthly Xgeva.  Tolerating well.  No dental or jaw pain.  Electrolytes are adequate for treatment.  Xgeva today monthly.

## 2024-04-04 ENCOUNTER — SPECIALTY PHARMACY (OUTPATIENT)
Dept: PHARMACY | Facility: HOSPITAL | Age: 64
End: 2024-04-04
Payer: MEDICARE

## 2024-04-05 ENCOUNTER — TELEPHONE (OUTPATIENT)
Dept: SURGERY | Facility: CLINIC | Age: 64
End: 2024-04-05
Payer: MEDICARE

## 2024-04-05 NOTE — TELEPHONE ENCOUNTER
Spoke to patient to see if she is still needing home health to come out to her home and she states she does not. Asking provider if okay to close referral.

## 2024-04-12 DIAGNOSIS — G89.3 CANCER RELATED PAIN: ICD-10-CM

## 2024-04-12 RX ORDER — OXYCODONE AND ACETAMINOPHEN 10; 325 MG/1; MG/1
1 TABLET ORAL EVERY 6 HOURS PRN
Qty: 60 TABLET | Refills: 0 | Status: SHIPPED | OUTPATIENT
Start: 2024-04-12

## 2024-04-12 NOTE — TELEPHONE ENCOUNTER
Caller: Derek Keller    Relationship: Self    Best call back number: [phone]    Requested Prescriptions:   Requested Prescriptions     Pending Prescriptions Disp Refills    oxyCODONE-acetaminophen (PERCOCET)  MG per tablet 60 tablet 0     Sig: Take 1 tablet by mouth Every 6 (Six) Hours As Needed for Moderate Pain.        Pharmacy where request should be sent: [facility], [facility], & [facility]  [facility], [place] - [address] - [phone] [facility] [phone] FX     Last office visit with prescribing clinician: 4/3/2024   Last telemedicine visit with prescribing clinician: Visit date not found   Next office visit with prescribing clinician: 5/1/2024     Does the patient have less than a 3 day supply:  [x] Yes  [] No    Would you like a call back once the refill request has been completed: [] Yes [x] No    If the office needs to give you a call back, can they leave a voicemail: [] Yes [x] No

## 2024-04-26 ENCOUNTER — HOSPITAL ENCOUNTER (OUTPATIENT)
Dept: CT IMAGING | Facility: HOSPITAL | Age: 64
Discharge: HOME | End: 2024-04-26
Payer: MEDICARE

## 2024-04-26 DIAGNOSIS — C50.412 MALIGNANT NEOPLASM OF UPPER-OUTER QUADRANT OF LEFT BREAST IN FEMALE, ESTROGEN RECEPTOR POSITIVE: ICD-10-CM

## 2024-04-26 DIAGNOSIS — Z17.0 MALIGNANT NEOPLASM OF UPPER-OUTER QUADRANT OF LEFT BREAST IN FEMALE, ESTROGEN RECEPTOR POSITIVE: ICD-10-CM

## 2024-04-26 PROCEDURE — 74177 CT ABD & PELVIS W/CONTRAST: CPT

## 2024-04-26 PROCEDURE — 71260 CT THORAX DX C+: CPT

## 2024-04-26 PROCEDURE — 25510000001 IOPAMIDOL PER 1 ML: Performed by: INTERNAL MEDICINE

## 2024-04-26 RX ADMIN — IOPAMIDOL 100 ML: 755 INJECTION, SOLUTION INTRAVENOUS at 12:21

## 2024-04-29 DIAGNOSIS — R23.2 HOT FLASHES: ICD-10-CM

## 2024-04-29 RX ORDER — OXYBUTYNIN CHLORIDE 15 MG/1
TABLET, EXTENDED RELEASE ORAL
Qty: 30 TABLET | Refills: 1 | Status: SHIPPED | OUTPATIENT
Start: 2024-04-29

## 2024-05-01 ENCOUNTER — HOSPITAL ENCOUNTER (OUTPATIENT)
Dept: ONCOLOGY | Facility: HOSPITAL | Age: 64
Discharge: HOME | End: 2024-05-01
Payer: MEDICARE

## 2024-05-01 ENCOUNTER — OFFICE VISIT (OUTPATIENT)
Dept: ONCOLOGY | Facility: HOSPITAL | Age: 64
End: 2024-05-01
Payer: MEDICARE

## 2024-05-01 VITALS
SYSTOLIC BLOOD PRESSURE: 112 MMHG | HEART RATE: 61 BPM | WEIGHT: 187.61 LBS | OXYGEN SATURATION: 100 % | RESPIRATION RATE: 18 BRPM | TEMPERATURE: 97.7 F | DIASTOLIC BLOOD PRESSURE: 44 MMHG | HEIGHT: 66 IN | BODY MASS INDEX: 30.15 KG/M2

## 2024-05-01 DIAGNOSIS — Z17.0 MALIGNANT NEOPLASM OF UPPER-OUTER QUADRANT OF LEFT BREAST IN FEMALE, ESTROGEN RECEPTOR POSITIVE: Primary | ICD-10-CM

## 2024-05-01 DIAGNOSIS — C50.412 MALIGNANT NEOPLASM OF UPPER-OUTER QUADRANT OF LEFT BREAST IN FEMALE, ESTROGEN RECEPTOR POSITIVE: Primary | ICD-10-CM

## 2024-05-01 DIAGNOSIS — G89.3 CANCER RELATED PAIN: ICD-10-CM

## 2024-05-01 DIAGNOSIS — C79.51 MALIGNANT NEOPLASM METASTATIC TO BONE: ICD-10-CM

## 2024-05-01 DIAGNOSIS — C50.412 MALIGNANT NEOPLASM OF UPPER-OUTER QUADRANT OF LEFT BREAST IN FEMALE, ESTROGEN RECEPTOR POSITIVE: ICD-10-CM

## 2024-05-01 DIAGNOSIS — Z45.2 ENCOUNTER FOR ADJUSTMENT OR MANAGEMENT OF VASCULAR ACCESS DEVICE: Primary | ICD-10-CM

## 2024-05-01 DIAGNOSIS — Z17.0 MALIGNANT NEOPLASM OF UPPER-OUTER QUADRANT OF LEFT BREAST IN FEMALE, ESTROGEN RECEPTOR POSITIVE: ICD-10-CM

## 2024-05-01 LAB
ALBUMIN SERPL-MCNC: 4.2 G/DL (ref 3.5–5.2)
ALBUMIN/GLOB SERPL: 1.9 G/DL
ALP SERPL-CCNC: 86 U/L (ref 39–117)
ALT SERPL W P-5'-P-CCNC: 9 U/L (ref 1–33)
ANION GAP SERPL CALCULATED.3IONS-SCNC: 1.8 MMOL/L (ref 5–15)
AST SERPL-CCNC: 13 U/L (ref 1–32)
BASOPHILS # BLD AUTO: 0.02 10*3/MM3 (ref 0–0.2)
BASOPHILS NFR BLD AUTO: 0.5 % (ref 0–1.5)
BILIRUB SERPL-MCNC: 0.3 MG/DL (ref 0–1.2)
BUN SERPL-MCNC: 16 MG/DL (ref 8–23)
BUN/CREAT SERPL: 17.8 (ref 7–25)
CALCIUM SPEC-SCNC: 9.1 MG/DL (ref 8.6–10.5)
CHLORIDE SERPL-SCNC: 102 MMOL/L (ref 98–107)
CO2 SERPL-SCNC: 36.2 MMOL/L (ref 22–29)
CREAT SERPL-MCNC: 0.9 MG/DL (ref 0.57–1)
DEPRECATED RDW RBC AUTO: 57.4 FL (ref 37–54)
EGFRCR SERPLBLD CKD-EPI 2021: 71.5 ML/MIN/1.73
EOSINOPHIL # BLD AUTO: 0.02 10*3/MM3 (ref 0–0.4)
EOSINOPHIL NFR BLD AUTO: 0.5 % (ref 0.3–6.2)
ERYTHROCYTE [DISTWIDTH] IN BLOOD BY AUTOMATED COUNT: 14.5 % (ref 12.3–15.4)
GLOBULIN UR ELPH-MCNC: 2.2 GM/DL
GLUCOSE SERPL-MCNC: 121 MG/DL (ref 65–99)
HCT VFR BLD AUTO: 29.7 % (ref 34–46.6)
HGB BLD-MCNC: 9.3 G/DL (ref 12–15.9)
IMM GRANULOCYTES # BLD AUTO: 0 10*3/MM3 (ref 0–0.05)
IMM GRANULOCYTES NFR BLD AUTO: 0 % (ref 0–0.5)
LYMPHOCYTES # BLD AUTO: 0.91 10*3/MM3 (ref 0.7–3.1)
LYMPHOCYTES NFR BLD AUTO: 21.9 % (ref 19.6–45.3)
MAGNESIUM SERPL-MCNC: 2.1 MG/DL (ref 1.6–2.4)
MCH RBC QN AUTO: 33.5 PG (ref 26.6–33)
MCHC RBC AUTO-ENTMCNC: 31.3 G/DL (ref 31.5–35.7)
MCV RBC AUTO: 106.8 FL (ref 79–97)
MONOCYTES # BLD AUTO: 0.4 10*3/MM3 (ref 0.1–0.9)
MONOCYTES NFR BLD AUTO: 9.6 % (ref 5–12)
NEUTROPHILS NFR BLD AUTO: 2.81 10*3/MM3 (ref 1.7–7)
NEUTROPHILS NFR BLD AUTO: 67.5 % (ref 42.7–76)
PHOSPHATE SERPL-MCNC: 3.3 MG/DL (ref 2.5–4.5)
PLATELET # BLD AUTO: 175 10*3/MM3 (ref 140–450)
PMV BLD AUTO: 10.1 FL (ref 6–12)
POTASSIUM SERPL-SCNC: 3.6 MMOL/L (ref 3.5–5.2)
PROT SERPL-MCNC: 6.4 G/DL (ref 6–8.5)
RBC # BLD AUTO: 2.78 10*6/MM3 (ref 3.77–5.28)
SODIUM SERPL-SCNC: 140 MMOL/L (ref 136–145)
WBC NRBC COR # BLD AUTO: 4.16 10*3/MM3 (ref 3.4–10.8)

## 2024-05-01 PROCEDURE — 25010000002 HEPARIN LOCK FLUSH PER 10 UNITS: Performed by: INTERNAL MEDICINE

## 2024-05-01 PROCEDURE — 25010000002 DENOSUMAB 120 MG/1.7ML SOLUTION: Performed by: INTERNAL MEDICINE

## 2024-05-01 PROCEDURE — G0463 HOSPITAL OUTPT CLINIC VISIT: HCPCS | Performed by: INTERNAL MEDICINE

## 2024-05-01 PROCEDURE — 84100 ASSAY OF PHOSPHORUS: CPT | Performed by: INTERNAL MEDICINE

## 2024-05-01 PROCEDURE — 83735 ASSAY OF MAGNESIUM: CPT | Performed by: INTERNAL MEDICINE

## 2024-05-01 PROCEDURE — 36591 DRAW BLOOD OFF VENOUS DEVICE: CPT

## 2024-05-01 PROCEDURE — 85025 COMPLETE CBC W/AUTO DIFF WBC: CPT | Performed by: INTERNAL MEDICINE

## 2024-05-01 PROCEDURE — 96372 THER/PROPH/DIAG INJ SC/IM: CPT

## 2024-05-01 PROCEDURE — 80053 COMPREHEN METABOLIC PANEL: CPT | Performed by: INTERNAL MEDICINE

## 2024-05-01 RX ORDER — HEPARIN SODIUM (PORCINE) LOCK FLUSH IV SOLN 100 UNIT/ML 100 UNIT/ML
500 SOLUTION INTRAVENOUS AS NEEDED
Status: DISCONTINUED | OUTPATIENT
Start: 2024-05-01 | End: 2024-05-02 | Stop reason: HOSPADM

## 2024-05-01 RX ORDER — OXYCODONE AND ACETAMINOPHEN 10; 325 MG/1; MG/1
1 TABLET ORAL EVERY 6 HOURS PRN
Qty: 60 TABLET | Refills: 0 | Status: SHIPPED | OUTPATIENT
Start: 2024-05-01

## 2024-05-01 RX ORDER — SODIUM CHLORIDE 0.9 % (FLUSH) 0.9 %
20 SYRINGE (ML) INJECTION AS NEEDED
OUTPATIENT
Start: 2024-05-01

## 2024-05-01 RX ORDER — SODIUM CHLORIDE 0.9 % (FLUSH) 0.9 %
20 SYRINGE (ML) INJECTION AS NEEDED
Status: DISCONTINUED | OUTPATIENT
Start: 2024-05-01 | End: 2024-05-02 | Stop reason: HOSPADM

## 2024-05-01 RX ORDER — MORPHINE SULFATE 60 MG/1
60 TABLET, FILM COATED, EXTENDED RELEASE ORAL 2 TIMES DAILY
Qty: 60 TABLET | Refills: 0 | Status: SHIPPED | OUTPATIENT
Start: 2024-05-01

## 2024-05-01 RX ORDER — HEPARIN SODIUM (PORCINE) LOCK FLUSH IV SOLN 100 UNIT/ML 100 UNIT/ML
500 SOLUTION INTRAVENOUS AS NEEDED
OUTPATIENT
Start: 2024-05-01

## 2024-05-01 RX ADMIN — DENOSUMAB 120 MG: 120 INJECTION SUBCUTANEOUS at 15:12

## 2024-05-01 RX ADMIN — HEPARIN 500 UNITS: 100 SYRINGE at 13:56

## 2024-05-01 RX ADMIN — Medication 20 ML: at 13:55

## 2024-05-01 NOTE — ASSESSMENT & PLAN NOTE
Patient is on MS Contin and Percocet as needed for breakthrough.  She reports adequate control with her current regimen.  Wyatt reviewed and no discrepancies.  Refills provided today.  Reassess next visit.

## 2024-05-01 NOTE — ASSESSMENT & PLAN NOTE
Metastatic.  Patient is on treatment with letrozole, ribociclib, denosumab for bony involvement.  Tolerating well.  Restaging CT scans show no evidence of new or worsening disease.  CBC notable for hemoglobin 9.3 g/dL.  Likely related to ribociclib therapy not enough to require dose adjustment at this time.  Continue letrozole daily.  Continue ribociclib 3 weeks out of 4.  I will see her back in 1 month for ongoing treatment.

## 2024-05-01 NOTE — PROGRESS NOTES
Chief Complaint  Malignant neoplasm of upper-outer quadrant of left breast Haile San MD Patel, Saagar, MD    Subjective          Derek Keller presents to [facility] for ongoing treatment of her metastatic breast cancer.  She is on ribociclib, letrozole and denosumab for bony involvement.  Tolerating well.  She also has chronic pain related to her breast cancer.  She reports her pain regimen is adequate for her needs.  She reports good appetite.  She does have an abdominal hernia that gives her problems occasionally with twinges of discomfort but they are short.    Oncology/Hematology History   Malignant neoplasm of upper-outer quadrant of left breast in female, estrogen receptor positive   10/13/2022 Initial Diagnosis    Malignant neoplasm of left breast in female, estrogen receptor positive (HCC)     10/14/2022 -  Chemotherapy    OP BREAST Letrozole / Ribociclib     10/14/2022 Cancer Staged    Staging form: Breast, AJCC 8th Edition  - Clinical: Stage IV (cT1c, cN1, cM1, ER+, IN+, HER2-) - Signed by Donald Barker MD on 10/14/2022     10/28/2022 -  Chemotherapy    OP SUPPORTIVE Denosumab (Xgeva) Q28D     Malignant neoplasm metastatic to bone   10/14/2022 Initial Diagnosis    Bone metastases (HCC)     10/28/2022 -  Chemotherapy    OP SUPPORTIVE Denosumab (Xgeva) Q28D     Secondary malignant neoplasm of bone (Resolved)   11/14/2022 Initial Diagnosis    Secondary malignant neoplasm of bone (HCC)     11/17/2022 - 4/19/2023 Radiation    RADIATION THERAPY Treatment Details (11/14/2022 - 4/19/2023)  Site: Spine - Lumbar  Technique: 3D CRT  Goal: No goal specified  Planned Treatment Start Date: 11/17/2022         Review of Systems   Constitutional:  Positive for fatigue. Negative for appetite change, diaphoresis, fever, unexpected weight gain and unexpected weight loss.   HENT:  Negative for hearing loss, mouth sores, sore throat, swollen glands, trouble swallowing and  voice change.    Eyes:  Negative for blurred vision.   Respiratory:  Negative for cough, shortness of breath and wheezing.    Cardiovascular:  Negative for chest pain and palpitations.   Gastrointestinal:  Positive for abdominal pain (Sharp pain 1-2x daily). Negative for blood in stool, constipation, diarrhea, nausea and vomiting.   Endocrine: Negative for cold intolerance and heat intolerance.   Genitourinary:  Negative for difficulty urinating, dysuria, frequency, hematuria and urinary incontinence.   Musculoskeletal:  Negative for arthralgias, back pain and myalgias.   Skin:  Negative for rash, skin lesions and wound.   Neurological:  Positive for headache (Thumping from BP). Negative for dizziness, seizures, weakness and numbness.   Hematological:  Does not bruise/bleed easily.   Psychiatric/Behavioral:  Negative for depressed mood. The patient is not nervous/anxious.      Current Outpatient Medications on File Prior to Visit   Medication Sig Dispense Refill    atorvastatin (LIPITOR) 20 MG tablet TAKE 1 TABLET BY MOUTH EVERY DAY (Patient taking differently: Take 1 tablet by mouth Daily.) 30 tablet 6    donepezil (ARICEPT) 10 MG tablet Take 1 tablet by mouth Daily.      DULoxetine (CYMBALTA) 60 MG capsule TAKE 1 capsule BY MOUTH DAILY for mood elevation 30 capsule 2    gabapentin (NEURONTIN) 600 MG tablet TAKE 1 TABLET BY MOUTH AT BEDTIME (Patient taking differently: Take 1 tablet by mouth 2 (Two) Times a Day As Needed.) 90 tablet 0    hydroCHLOROthiazide 12.5 MG tablet TAKE 1 TABLET BY MOUTH DAILY for swelling 90 tablet 2    letrozole (FEMARA) 2.5 MG tablet Take 1 tablet by mouth Daily. 90 tablet 1    levothyroxine (SYNTHROID, LEVOTHROID) 50 MCG tablet TAKE 1 TABLET BY MOUTH EVERY DAY (Patient taking differently: Take 1 tablet by mouth Daily.) 90 tablet 1    LORazepam (ATIVAN) 0.5 MG tablet Take 1 tablet by mouth Every 6 (Six) Hours As Needed.      losartan (COZAAR) 100 MG tablet Take 0.5 tablets by mouth Daily.  INST PER ANESTHESIA PROTOCOL      montelukast (SINGULAIR) 10 MG tablet Take 1 tablet by mouth Daily.      ondansetron (ZOFRAN) 8 MG tablet TAKE 1 TABLET BY MOUTH THREE TIMES DAILY AS NEEDED FOR NAUSEA AND VOMITING 30 tablet 5    oxybutynin XL (DITROPAN XL) 15 MG 24 hr tablet TAKE 1 TABLET BY MOUTH DAILY for bladder 30 tablet 1    polyethylene glycol (MIRALAX) 17 g packet Take 17 g by mouth Daily. 5 packet 0    potassium chloride (MICRO-K) 10 MEQ CR capsule Take 1 capsule by mouth Every 12 (Twelve) Hours. 60 capsule 1    ribociclib succinate 200 MG tablet therapy pack tablet Take 3 tablets by mouth Take As Directed. Take 3 tablets by mouth daily for 21 days then off 7 days on a 28 day cycle. 63 tablet 11    rOPINIRole (REQUIP) 3 MG tablet Take 1 tablet by mouth 2 (Two) Times a Day.      SUMAtriptan (IMITREX) 100 MG tablet Take 1 tablet by mouth 1 (One) Time As Needed.      traZODone (DESYREL) 150 MG tablet TAKE 1 TABLET BY MOUTH ONCE nightly (Patient taking differently: Take 1 tablet by mouth Every Night.) 90 tablet 2    [DISCONTINUED] Morphine (MS CONTIN) 60 MG 12 hr tablet Take 1 tablet by mouth 2 (Two) Times a Day. 60 tablet 0    [DISCONTINUED] oxyCODONE-acetaminophen (PERCOCET)  MG per tablet Take 1 tablet by mouth Every 6 (Six) Hours As Needed for Moderate Pain. 60 tablet 0    cyproheptadine (PERIACTIN) 4 MG tablet Take 1 tablet by mouth Every 12 (Twelve) Hours.      ferrous sulfate 324 (65 Fe) MG tablet delayed-release EC tablet Take 1 tablet by mouth Daily With Breakfast. (Patient not taking: Reported on 2/7/2024)      fluticasone (FLONASE) 50 MCG/ACT nasal spray 2 sprays by Each Nare route Daily. (Patient not taking: Reported on 4/3/2024)      prochlorperazine (COMPAZINE) 10 MG tablet  (Patient not taking: Reported on 2/7/2024)      SudoGest 60 MG tablet Take 1 tablet by mouth Every 8 (Eight) Hours. (Patient not taking: Reported on 5/1/2024)       Current Facility-Administered Medications on File Prior  to Visit   Medication Dose Route Frequency Provider Last Rate Last Admin    heparin injection 500 Units  500 Units Intravenous PRN Donald Barker MD   500 Units at 05/01/24 1356    sodium chloride 0.9 % flush 20 mL  20 mL Intravenous PRN Donald Barker MD   20 mL at 05/01/24 1355       Allergies   Allergen Reactions    Azithromycin Itching    Erythromycin Itching     Past Medical History:   Diagnosis Date    Abdominal pain     OSTOMY SITE    Allergic rhinitis     Anemia     NO S/S    Anxiety     Arteriosclerosis     Coronary, follows with Dr. Núñez    Arthritis     Bone metastases 10/14/2022    Bowen's disease     SKIN CANCER    Breast cancer     NO SURGERY WAS DONE DUE TO METS TO BONE/COLON/LYMPHNODE    Cancer related pain 10/13/2022    Depression     Disorder associated with Helicobacter species 10/12/2022    Dysphoric mood     Fatigue     Fibromyalgia     Frequent urination     NO S/S INFECTION    Herpes simplex vulvovaginitis 07/26/2018    History of colon polyps     History of IBS     History of kidney stones     Hyperlipidemia     Hypertension     Hypothyroidism     Insomnia     Lumbago     Mood disorder     Neck pain     R/T CANCER    Palpitations     last time a few months ago    Precordial pain     R/T SPINE CANCER    S/P Laparoscopic Hand-Assisted Colostomy Closure with End to End Anastomosis Open Parastomal Hernia Repair 12/08/2022    Sleep disturbance     SOB (shortness of breath)     at times at rest    SOBOE (shortness of breath on exertion)      Past Surgical History:   Procedure Laterality Date    BREAST BIOPSY Left     CARDIAC CATHETERIZATION Left 1959    CARDIAC CATHETERIZATION N/A 04/06/2018    Procedure: Coronary angiography;  Surgeon: Art Licea MD;  Location: [facility] INVASIVE LOCATION;  Service: Cardiovascular    CARDIAC CATHETERIZATION N/A 04/06/2018    Procedure: Left heart cath;  Surgeon: Art Licea MD;  Location: [facility] INVASIVE LOCATION;  Service:  Cardiovascular    CARDIAC CATHETERIZATION N/A 04/06/2018    Procedure: Left ventriculography;  Surgeon: Art Licea MD;  Location: [facility] CATH INVASIVE LOCATION;  Service: Cardiovascular    CHOLECYSTECTOMY      COLON SURGERY      colostomy bag    COLONOSCOPY  11/08/2006    COLONOSCOPY N/A 06/08/2023    Procedure: COLONOSCOPY;  Surgeon: Alfred Castañeda MD;  Location: [facility] ENDOSCOPY;  Service: General;  Laterality: N/A;  same as preop    EXPLORATORY LAPAROTOMY N/A 12/06/2022    Procedure: LAPAROTOMY EXPLORATORY sigmoid resection hartmans procedure colostomy;  Surgeon: Alfred Castañeda MD;  Location: [facility] MAIN OR;  Service: General;  Laterality: N/A;    GANGLION CYST EXCISION Bilateral     HYSTERECTOMY  05/2005    ILEOSTOMY CLOSURE N/A 6/26/2023    Procedure: Laparoscopic Hand-Assisted Colostomy Closure with End to End Anastomosis;  Surgeon: Alfred Castañeda MD;  Location: [facility] MAIN OR;  Service: General;  Laterality: N/A;    LAPAROSCOPIC GASTRIC BANDING      BAND REMOVED 2020    PARASTOMAL HERNIA REPAIR N/A 6/26/2023    Procedure: Open Parastomal Hernia Repair;  Surgeon: Alfred Castañeda MD;  Location: [facility] MAIN OR;  Service: General;  Laterality: N/A;    TONSILLECTOMY      VENOUS ACCESS DEVICE (PORT) INSERTION N/A 01/09/2023    Procedure: INSERTION VENOUS ACCESS DEVICE;  Surgeon: Alfred Castañeda MD;  Location: [facility] MAIN OR;  Service: General;  Laterality: N/A;     Social History     Socioeconomic History    Marital status:    Tobacco Use    Smoking status: Every Day     Current packs/day: 1.00     Average packs/day: 1 pack/day for 50.3 years (50.3 ttl pk-yrs)     Types: Cigarettes     Start date: 1974    Smokeless tobacco: Never    Tobacco comments:        Vaping Use    Vaping status: Never Used   Substance and Sexual Activity    Alcohol use: No    Drug use: Never     Types: Marijuana     Comment: LAST USE 6/19/23    Sexual activity: Defer     Partners: Male  "    Birth control/protection: None     Family History   Problem Relation Age of Onset    Hypertension Mother     Rheum arthritis Mother     Heart disease Mother     Breast cancer Mother     Diabetes Father     Cancer Maternal Grandmother         colon    Colon cancer Maternal Grandmother     Aneurysm Paternal Grandfather     Diabetes Other     Fibromyalgia Other     Malig Hyperthermia Neg Hx        Objective   Physical Exam  Vitals reviewed. Exam conducted with a chaperone present.   Constitutional:       General: She is not in acute distress.     Appearance: Normal appearance.   Cardiovascular:      Rate and Rhythm: Normal rate and regular rhythm.      Heart sounds: Normal heart sounds. No murmur heard.     No gallop.   Pulmonary:      Effort: Pulmonary effort is normal.      Breath sounds: Normal breath sounds.   Abdominal:      General: Abdomen is flat. Bowel sounds are normal.      Palpations: Abdomen is soft.   Neurological:      Mental Status: She is alert.   Psychiatric:         Mood and Affect: Mood normal.         Behavior: Behavior normal.         Thought Content: Thought content normal.         Vitals:    05/01/24 1409   BP: 112/44   Pulse: 61   Resp: 18   Temp: 97.7 °F (36.5 °C)   SpO2: 100%   Weight: 85.1 kg (187 lb 9.8 oz)   Height: 167.6 cm (65.98\")   PainSc: 0-No pain     ECOG score: 1         PHQ-9 Total Score:                    Result Review :   The following data was reviewed by: Donald Barker MD on 05/01/2024:  Lab Results   Component Value Date    HGB 9.3 (L) 05/01/2024    HCT 29.7 (L) 05/01/2024    .8 (H) 05/01/2024     05/01/2024    WBC 4.16 05/01/2024    NEUTROABS 2.81 05/01/2024    LYMPHSABS 0.91 05/01/2024    MONOSABS 0.40 05/01/2024    EOSABS 0.02 05/01/2024    BASOSABS 0.02 05/01/2024     Lab Results   Component Value Date    GLUCOSE 121 (H) 05/01/2024    BUN 16 05/01/2024    CREATININE 0.90 05/01/2024     05/01/2024    K 3.6 05/01/2024     05/01/2024    CO2 " "36.2 (H) 05/01/2024    CALCIUM 9.1 05/01/2024    PROTEINTOT 6.4 05/01/2024    ALBUMIN 4.2 05/01/2024    BILITOT 0.3 05/01/2024    ALKPHOS 86 05/01/2024    AST 13 05/01/2024    ALT 9 05/01/2024     Lab Results   Component Value Date    MG 2.1 05/01/2024    PHOS 3.3 05/01/2024    FREET4 1.01 01/17/2022    TSH 1.470 11/12/2019     Lab Results   Component Value Date    IRON 45 11/02/2023    LABIRON 11 (L) 11/02/2023    TRANSFERRIN 276 11/02/2023    TIBC 411 11/02/2023     Lab Results   Component Value Date    FERRITIN 44.12 11/02/2023    NFJZBBWJ64 390 04/23/2019    FOLATE 9.93 04/23/2019     No results found for: \"PSA\", \"CEA\", \"AFP\", \"\", \"\"    Data reviewed : Radiologic studies CT chest, abdomen, pelvis reviewed       Assessment and Plan    Diagnoses and all orders for this visit:    1. Malignant neoplasm of upper-outer quadrant of left breast in female, estrogen receptor positive (Primary)  Assessment & Plan:  Metastatic.  Patient is on treatment with letrozole, ribociclib, denosumab for bony involvement.  Tolerating well.  Restaging CT scans show no evidence of new or worsening disease.  CBC notable for hemoglobin 9.3 g/dL.  Likely related to ribociclib therapy not enough to require dose adjustment at this time.  Continue letrozole daily.  Continue ribociclib 3 weeks out of 4.  I will see her back in 1 month for ongoing treatment.    Orders:  -     CBC and Differential; Future  -     Comprehensive metabolic panel; Future  -     Magnesium; Future  -     Phosphorus; Future    2. Malignant neoplasm metastatic to bone  Assessment & Plan:  Patient is on monthly Xgeva.  Tolerating well.  No dental or jaw pain.  Electrolytes are adequate for treatment Xgeva today monthly.    Orders:  -     CBC and Differential; Future  -     Comprehensive metabolic panel; Future  -     Magnesium; Future  -     Phosphorus; Future    3. Cancer related pain  Assessment & Plan:  Patient is on MS Contin and Percocet as needed for " breakthrough.  She reports adequate control with her current regimen.  Wyatt reviewed and no discrepancies.  Refills provided today.  Reassess next visit.    Orders:  -     Morphine (MS CONTIN) 60 MG 12 hr tablet; Take 1 tablet by mouth 2 (Two) Times a Day.  Dispense: 60 tablet; Refill: 0  -     oxyCODONE-acetaminophen (PERCOCET)  MG per tablet; Take 1 tablet by mouth Every 6 (Six) Hours As Needed for Moderate Pain.  Dispense: 60 tablet; Refill: 0    Other orders  -     Cancel: denosumab (XGEVA) injection 120 mg            Patient Follow Up: 1 month    Patient was given instructions and counseling regarding her condition or for health maintenance advice. Please see specific information pulled into the AVS if appropriate.     Donald Barker MD    5/1/2024

## 2024-05-01 NOTE — ASSESSMENT & PLAN NOTE
Patient is on monthly Xgeva.  Tolerating well.  No dental or jaw pain.  Electrolytes are adequate for treatment Xgeva today monthly.

## 2024-05-02 ENCOUNTER — SPECIALTY PHARMACY (OUTPATIENT)
Dept: PHARMACY | Facility: HOSPITAL | Age: 64
End: 2024-05-02
Payer: MEDICARE

## 2024-05-10 NOTE — PROGRESS NOTES
[facility]                                Progress note    Subjective:    The patient is awake and alert.    Sitting up in chair visiting with family  No complaints on assessment stating she feels better and is hopeful for discharge home    Objective:    BP (!) 134/91   Pulse 61   Temp 97.4 °F (36.3 °C) (Oral)   Resp 18   Ht 1.676 m (5' 6\")   Wt 59 kg (130 lb 1.1 oz)   LMP  (LMP Unknown)   SpO2 100%   BMI 20.99 kg/m²     In: 1440 [P.O.:1440]  Out: 2900   In: 1440   Out: 2900 [Urine:2900]    General appearance: NAD, conversant  HEENT: AT/NC, MMM  Neck: FROM, supple  Lungs: Clear to auscultation  CV: RRR, no MRGs  Vasc: Radial pulses 2+  Abdomen: Soft, non-tender; no masses or HSM  Extremities: No peripheral edema or digital cyanosis  Skin: no rash, lesions or ulcers  Psych: Alert and oriented to person, place and time  Neuro: Alert and interactive     Recent Labs     05/08/24  1312 05/09/24  0758 05/10/24  0445   WBC 7.8 7.6 6.7   HGB 11.5 11.0* 10.5*   HCT 36.7 35.6 33.8*    217 187       Recent Labs     05/08/24  1312 05/09/24  0758 05/10/24  0445    134 135   K 4.3 3.8 3.9    101 103   CO2 20* 21* 21*   BUN 41* 41* 42*   CREATININE 3.6* 3.9* 3.7*   CALCIUM 8.9 8.7 8.7       Assessment:    Principal Problem:    Hypertensive urgency  Resolved Problems:    * No resolved hospital problems. *      Plan:  Patient is a 64-year-old female admitted to [facility] for  Hypertensive urgency  -Monitor labs  -Monitor vital signs closely  -Nephrology following  -Continue home Cardizem 180 mg daily with hydralazine 100 mg 3 times a day, labetalol 300 mg twice daily  -Urine studies     Hypothyroidism  -Continue home levothyroxine       5/10/24  -Started on clonidine 0.23 times a day with Aldactone 12.5 daily per nephrology  -Improvement in BPs today, 134/91  -Patient on assessment states that both nephrology and hematology/oncology states she is good for discharge, should this be  S/W PATIENT AND GAVE HER DATE, TIME AND LOCATION OF HER APPT FOR BREAST CLINIC   true patient can discharge home from a medicine standpoint    Code status: Full  Consultants: Nephro and hem/onc     DVT Prophylaxis Heparin   PT/OT  Discharge planning       I have spent a total time of 30 minutes of this patient encounter reviewing chart, labs, coordinating care with interdisciplinary teams, face to face encounter with patient, providing counseling/education to patient/family.      BOOM Santoyo - CNP,  2:26 PM  5/10/2024

## 2024-05-13 ENCOUNTER — TELEPHONE (OUTPATIENT)
Dept: SURGERY | Facility: CLINIC | Age: 64
End: 2024-05-13

## 2024-05-13 ENCOUNTER — HOSPITAL ENCOUNTER (EMERGENCY)
Facility: HOSPITAL | Age: 64
Discharge: HOME | End: 2024-05-13
Attending: EMERGENCY MEDICINE
Payer: MEDICARE

## 2024-05-13 ENCOUNTER — TELEPHONE (OUTPATIENT)
Dept: ONCOLOGY | Facility: HOSPITAL | Age: 64
End: 2024-05-13
Payer: MEDICARE

## 2024-05-13 VITALS
DIASTOLIC BLOOD PRESSURE: 43 MMHG | HEART RATE: 52 BPM | RESPIRATION RATE: 13 BRPM | TEMPERATURE: 97.8 F | SYSTOLIC BLOOD PRESSURE: 113 MMHG | OXYGEN SATURATION: 93 % | HEIGHT: 66 IN | BODY MASS INDEX: 30.08 KG/M2 | WEIGHT: 187.17 LBS

## 2024-05-13 DIAGNOSIS — G89.3 CANCER ASSOCIATED PAIN: Primary | ICD-10-CM

## 2024-05-13 LAB
ALBUMIN SERPL-MCNC: 4 G/DL (ref 3.5–5.2)
ALBUMIN/GLOB SERPL: 1.6 G/DL
ALP SERPL-CCNC: 96 U/L (ref 39–117)
ALT SERPL W P-5'-P-CCNC: 8 U/L (ref 1–33)
ANION GAP SERPL CALCULATED.3IONS-SCNC: 8.5 MMOL/L (ref 5–15)
AST SERPL-CCNC: 14 U/L (ref 1–32)
BASOPHILS # BLD AUTO: 0.03 10*3/MM3 (ref 0–0.2)
BASOPHILS NFR BLD AUTO: 0.9 % (ref 0–1.5)
BILIRUB SERPL-MCNC: 0.3 MG/DL (ref 0–1.2)
BUN SERPL-MCNC: 16 MG/DL (ref 8–23)
BUN/CREAT SERPL: 22.5 (ref 7–25)
CALCIUM SPEC-SCNC: 9.6 MG/DL (ref 8.6–10.5)
CHLORIDE SERPL-SCNC: 102 MMOL/L (ref 98–107)
CO2 SERPL-SCNC: 32.5 MMOL/L (ref 22–29)
CREAT SERPL-MCNC: 0.71 MG/DL (ref 0.57–1)
D-LACTATE SERPL-SCNC: 0.6 MMOL/L (ref 0.5–2)
DEPRECATED RDW RBC AUTO: 54.6 FL (ref 37–54)
EGFRCR SERPLBLD CKD-EPI 2021: 95.1 ML/MIN/1.73
EOSINOPHIL # BLD AUTO: 0.02 10*3/MM3 (ref 0–0.4)
EOSINOPHIL NFR BLD AUTO: 0.6 % (ref 0.3–6.2)
ERYTHROCYTE [DISTWIDTH] IN BLOOD BY AUTOMATED COUNT: 14.1 % (ref 12.3–15.4)
GLOBULIN UR ELPH-MCNC: 2.5 GM/DL
GLUCOSE SERPL-MCNC: 109 MG/DL (ref 65–99)
HCT VFR BLD AUTO: 31.2 % (ref 34–46.6)
HGB BLD-MCNC: 10 G/DL (ref 12–15.9)
HOLD SPECIMEN: NORMAL
HOLD SPECIMEN: NORMAL
IMM GRANULOCYTES # BLD AUTO: 0.01 10*3/MM3 (ref 0–0.05)
IMM GRANULOCYTES NFR BLD AUTO: 0.3 % (ref 0–0.5)
LIPASE SERPL-CCNC: 21 U/L (ref 13–60)
LYMPHOCYTES # BLD AUTO: 1.14 10*3/MM3 (ref 0.7–3.1)
LYMPHOCYTES NFR BLD AUTO: 34.3 % (ref 19.6–45.3)
MCH RBC QN AUTO: 33.7 PG (ref 26.6–33)
MCHC RBC AUTO-ENTMCNC: 32.1 G/DL (ref 31.5–35.7)
MCV RBC AUTO: 105.1 FL (ref 79–97)
MONOCYTES # BLD AUTO: 0.27 10*3/MM3 (ref 0.1–0.9)
MONOCYTES NFR BLD AUTO: 8.1 % (ref 5–12)
NEUTROPHILS NFR BLD AUTO: 1.85 10*3/MM3 (ref 1.7–7)
NEUTROPHILS NFR BLD AUTO: 55.8 % (ref 42.7–76)
NRBC BLD AUTO-RTO: 0 /100 WBC (ref 0–0.2)
PLATELET # BLD AUTO: 167 10*3/MM3 (ref 140–450)
PMV BLD AUTO: 10.1 FL (ref 6–12)
POTASSIUM SERPL-SCNC: 3.6 MMOL/L (ref 3.5–5.2)
PROT SERPL-MCNC: 6.5 G/DL (ref 6–8.5)
RBC # BLD AUTO: 2.97 10*6/MM3 (ref 3.77–5.28)
SODIUM SERPL-SCNC: 143 MMOL/L (ref 136–145)
WBC NRBC COR # BLD AUTO: 3.32 10*3/MM3 (ref 3.4–10.8)
WHOLE BLOOD HOLD COAG: NORMAL
WHOLE BLOOD HOLD SPECIMEN: NORMAL

## 2024-05-13 PROCEDURE — 85025 COMPLETE CBC W/AUTO DIFF WBC: CPT | Performed by: EMERGENCY MEDICINE

## 2024-05-13 PROCEDURE — 80053 COMPREHEN METABOLIC PANEL: CPT | Performed by: EMERGENCY MEDICINE

## 2024-05-13 PROCEDURE — 99283 EMERGENCY DEPT VISIT LOW MDM: CPT

## 2024-05-13 PROCEDURE — 36415 COLL VENOUS BLD VENIPUNCTURE: CPT

## 2024-05-13 PROCEDURE — 25010000002 HEPARIN LOCK FLUSH PER 10 UNITS: Performed by: EMERGENCY MEDICINE

## 2024-05-13 PROCEDURE — 83690 ASSAY OF LIPASE: CPT | Performed by: EMERGENCY MEDICINE

## 2024-05-13 PROCEDURE — 83605 ASSAY OF LACTIC ACID: CPT | Performed by: EMERGENCY MEDICINE

## 2024-05-13 RX ORDER — HEPARIN SODIUM (PORCINE) LOCK FLUSH IV SOLN 100 UNIT/ML 100 UNIT/ML
500 SOLUTION INTRAVENOUS ONCE
Status: COMPLETED | OUTPATIENT
Start: 2024-05-13 | End: 2024-05-13

## 2024-05-13 RX ORDER — SODIUM CHLORIDE 0.9 % (FLUSH) 0.9 %
10 SYRINGE (ML) INJECTION AS NEEDED
Status: DISCONTINUED | OUTPATIENT
Start: 2024-05-13 | End: 2024-05-13 | Stop reason: HOSPADM

## 2024-05-13 RX ORDER — LIDOCAINE 50 MG/G
1 PATCH TOPICAL EVERY 24 HOURS
COMMUNITY

## 2024-05-13 RX ADMIN — HEPARIN 500 UNITS: 100 SYRINGE at 18:20

## 2024-05-13 NOTE — TELEPHONE ENCOUNTER
Caller: Derek Keller    Relationship to patient: Self    Best call back number: [phone]     Patient is needing: PT WANTED DR HESS TO BE AWARE SHE IS CURRENTLY AT [facility] ED FOR PAIN AT THE COLOSTOMY SITE AND CONSTIPATION

## 2024-05-13 NOTE — TELEPHONE ENCOUNTER
Family/friend called and left a vm. They stated that they were taking the pt to the ER for severe pain.

## 2024-05-13 NOTE — TELEPHONE ENCOUNTER
The pt called requesting to speak to Georges PATEL. When questioned the pt states that she has been in extreme pain for over a week. When questioned the pt voices her pain is all over her body, 10/10. The pt is sobbing while on the phone. The pt is questing her POC. Georges PATEL was not at his desk and in a room with a pt. This nurse informed the pt that Georges would call her back.

## 2024-05-13 NOTE — ED PROVIDER NOTES
Time: 2:17 PM EDT  Date of encounter:  5/13/2024  Independent Historian/Clinical History and Information was obtained by:   Patient    History is limited by: N/A    Chief Complaint: Generalized pain      History of Present Illness:  Patient is a 63 y.o. year old female who presents to the emergency department for evaluation of generalized pain the patient describes as being from head to toe.  No recent fall.  Patient has known cancer and is undergoing chemotherapy.  She is already prescribed strong pain medicines at home and states she did take 1 extra dose today.  She is tearful and upset with herself for doing this.  She denies any specific area of pain and states that it is generalized in nature.  She also states that her whole body feels numb intermittently.  Afebrile.    HPI    Patient Care Team  Primary Care Provider: Haile Monique MD    Past Medical History:     Allergies   Allergen Reactions    Azithromycin Itching    Erythromycin Itching     Past Medical History:   Diagnosis Date    Abdominal pain     OSTOMY SITE    Allergic rhinitis     Anemia     NO S/S    Anxiety     Arteriosclerosis     Coronary, follows with Dr. Núñez    Arthritis     Bone metastases 10/14/2022    Bowen's disease     SKIN CANCER    Breast cancer     NO SURGERY WAS DONE DUE TO METS TO BONE/COLON/LYMPHNODE    Cancer related pain 10/13/2022    Depression     Disorder associated with Helicobacter species 10/12/2022    Dysphoric mood     Fatigue     Fibromyalgia     Frequent urination     NO S/S INFECTION    Herpes simplex vulvovaginitis 07/26/2018    History of colon polyps     History of IBS     History of kidney stones     Hyperlipidemia     Hypertension     Hypothyroidism     Insomnia     Lumbago     Mood disorder     Neck pain     R/T CANCER    Palpitations     last time a few months ago    Precordial pain     R/T SPINE CANCER    S/P Laparoscopic Hand-Assisted Colostomy Closure with End to End Anastomosis Open Parastomal Hernia  Repair 12/08/2022    Sleep disturbance     SOB (shortness of breath)     at times at rest    SOBOE (shortness of breath on exertion)      Past Surgical History:   Procedure Laterality Date    BREAST BIOPSY Left     CARDIAC CATHETERIZATION Left 1959    CARDIAC CATHETERIZATION N/A 04/06/2018    Procedure: Coronary angiography;  Surgeon: Art Licea MD;  Location: [facility] CATH INVASIVE LOCATION;  Service: Cardiovascular    CARDIAC CATHETERIZATION N/A 04/06/2018    Procedure: Left heart cath;  Surgeon: Art Licea MD;  Location: [facility] CATH INVASIVE LOCATION;  Service: Cardiovascular    CARDIAC CATHETERIZATION N/A 04/06/2018    Procedure: Left ventriculography;  Surgeon: Art Licea MD;  Location: [facility] CATH INVASIVE LOCATION;  Service: Cardiovascular    CHOLECYSTECTOMY      COLON SURGERY      colostomy bag    COLONOSCOPY  11/08/2006    COLONOSCOPY N/A 06/08/2023    Procedure: COLONOSCOPY;  Surgeon: Alfred Castañeda MD;  Location: [facility] ENDOSCOPY;  Service: General;  Laterality: N/A;  same as preop    EXPLORATORY LAPAROTOMY N/A 12/06/2022    Procedure: LAPAROTOMY EXPLORATORY sigmoid resection hartmans procedure colostomy;  Surgeon: Alfred Castañeda MD;  Location: [facility] OR;  Service: General;  Laterality: N/A;    GANGLION CYST EXCISION Bilateral     HYSTERECTOMY  05/2005    ILEOSTOMY CLOSURE N/A 6/26/2023    Procedure: Laparoscopic Hand-Assisted Colostomy Closure with End to End Anastomosis;  Surgeon: Alfred Castañeda MD;  Location: [facility] MAIN OR;  Service: General;  Laterality: N/A;    LAPAROSCOPIC GASTRIC BANDING      BAND REMOVED 2020    PARASTOMAL HERNIA REPAIR N/A 6/26/2023    Procedure: Open Parastomal Hernia Repair;  Surgeon: Alfred Castañeda MD;  Location: [facility] MAIN OR;  Service: General;  Laterality: N/A;    TONSILLECTOMY      VENOUS ACCESS DEVICE (PORT) INSERTION N/A 01/09/2023    Procedure: INSERTION VENOUS ACCESS DEVICE;  Surgeon: Alfred Castañeda  MD George;  Location: [facility] MAIN OR;  Service: General;  Laterality: N/A;     Family History   Problem Relation Age of Onset    Hypertension Mother     Rheum arthritis Mother     Heart disease Mother     Breast cancer Mother     Diabetes Father     Cancer Maternal Grandmother         colon    Colon cancer Maternal Grandmother     Aneurysm Paternal Grandfather     Diabetes Other     Fibromyalgia Other     Malig Hyperthermia Neg Hx        Home Medications:  Prior to Admission medications    Medication Sig Start Date End Date Taking? Authorizing Provider   atorvastatin (LIPITOR) 20 MG tablet TAKE 1 TABLET BY MOUTH EVERY DAY  Patient taking differently: Take 1 tablet by mouth Daily. 10/1/19   Yudelka Manning MD   cyproheptadine (PERIACTIN) 4 MG tablet Take 1 tablet by mouth Every 12 (Twelve) Hours. 10/30/23   Bessie Lopez MD   donepezil (ARICEPT) 10 MG tablet Take 1 tablet by mouth Daily. 10/28/22   Bessie Lopez MD   DULoxetine (CYMBALTA) 60 MG capsule TAKE 1 capsule BY MOUTH DAILY for mood elevation 3/20/24   Donald Barker MD   ferrous sulfate 324 (65 Fe) MG tablet delayed-release EC tablet Take 1 tablet by mouth Daily With Breakfast.  Patient not taking: Reported on 2/7/2024    Bessie Lopez MD   fluticasone (FLONASE) 50 MCG/ACT nasal spray 2 sprays by Each Nare route Daily.  Patient not taking: Reported on 4/3/2024 2/12/24   Bessie Lopez MD   gabapentin (NEURONTIN) 600 MG tablet TAKE 1 TABLET BY MOUTH AT BEDTIME  Patient taking differently: Take 1 tablet by mouth 2 (Two) Times a Day As Needed. 7/30/20   Yudelka Manning MD   hydroCHLOROthiazide 12.5 MG tablet TAKE 1 TABLET BY MOUTH DAILY for swelling 2/20/24   Donald Barker MD   letrozole (FEMARA) 2.5 MG tablet Take 1 tablet by mouth Daily. 1/10/24   Donald Barker MD   levothyroxine (SYNTHROID, LEVOTHROID) 50 MCG tablet TAKE 1 TABLET BY MOUTH EVERY DAY  Patient taking differently: Take 1 tablet by mouth Daily.  7/19/19   Yudelka Manning MD   lidocaine (LIDODERM) 5 % Place 1 patch on the skin as directed by provider Daily. Remove & Discard patch within 12 hours or as directed by Bessie Reeder MD   LORazepam (ATIVAN) 0.5 MG tablet Take 1 tablet by mouth Every 6 (Six) Hours As Needed.    Bessie Lopez MD   losartan (COZAAR) 100 MG tablet Take 0.5 tablets by mouth Daily. INST PER ANESTHESIA PROTOCOL    Bessie Lopez MD   montelukast (SINGULAIR) 10 MG tablet Take 1 tablet by mouth Daily. 1/17/22   Bessie Lopez MD   Morphine (MS CONTIN) 60 MG 12 hr tablet Take 1 tablet by mouth 2 (Two) Times a Day. 5/1/24   Donald Barker MD   ondansetron (ZOFRAN) 8 MG tablet TAKE 1 TABLET BY MOUTH THREE TIMES DAILY AS NEEDED FOR NAUSEA AND VOMITING 2/5/24   Donadl Barker MD   oxybutynin XL (DITROPAN XL) 15 MG 24 hr tablet TAKE 1 TABLET BY MOUTH DAILY for bladder 4/29/24   Donald Barker MD   oxyCODONE-acetaminophen (PERCOCET)  MG per tablet Take 1 tablet by mouth Every 6 (Six) Hours As Needed for Moderate Pain. 5/1/24   Donald Barker MD   polyethylene glycol (MIRALAX) 17 g packet Take 17 g by mouth Daily. 1/9/23   Alfred Castañeda MD   potassium chloride (MICRO-K) 10 MEQ CR capsule Take 1 capsule by mouth Every 12 (Twelve) Hours. 2/17/24   Donlad Barker MD   prochlorperazine (COMPAZINE) 10 MG tablet  9/29/23   Bessie Lopez MD   ribociclib succinate 200 MG tablet therapy pack tablet Take 3 tablets by mouth Take As Directed. Take 3 tablets by mouth daily for 21 days then off 7 days on a 28 day cycle. 8/1/23   Donald Barker MD   rOPINIRole (REQUIP) 3 MG tablet Take 1 tablet by mouth 2 (Two) Times a Day. 6/29/22   Bessie Lopez MD   SudoGest 60 MG tablet Take 1 tablet by mouth Every 8 (Eight) Hours.  Patient not taking: Reported on 5/1/2024 11/13/23   Provider, Historical, MD   SUMAtriptan (IMITREX) 100 MG tablet Take 1 tablet by mouth 1 (One) Time As  "Needed. 10/7/22   Provider, MD Bessie   traZODone (DESYREL) 150 MG tablet TAKE 1 TABLET BY MOUTH ONCE nightly  Patient taking differently: Take 1 tablet by mouth Every Night. 11/12/19   Yudelka Manning MD        Social History:   Social History     Tobacco Use    Smoking status: Every Day     Current packs/day: 1.00     Average packs/day: 1 pack/day for 50.4 years (50.4 ttl pk-yrs)     Types: Cigarettes     Start date: 1974    Smokeless tobacco: Never    Tobacco comments:        Vaping Use    Vaping status: Never Used   Substance Use Topics    Alcohol use: No    Drug use: Never     Types: Marijuana     Comment: LAST USE 6/19/23         Review of Systems:  Review of Systems   Constitutional:  Negative for chills and fever.   HENT:  Negative for congestion, rhinorrhea and sore throat.    Eyes:  Negative for photophobia.   Respiratory:  Negative for apnea, cough, chest tightness and shortness of breath.    Cardiovascular:  Negative for chest pain and palpitations.   Gastrointestinal:  Negative for abdominal pain, diarrhea, nausea and vomiting.   Endocrine: Negative.    Genitourinary:  Negative for difficulty urinating and dysuria.   Musculoskeletal:  Positive for myalgias. Negative for back pain and joint swelling.   Skin:  Negative for color change and wound.   Allergic/Immunologic: Negative.    Neurological:  Positive for numbness. Negative for seizures and headaches.   Psychiatric/Behavioral: Negative.     All other systems reviewed and are negative.       Physical Exam:  /43 (BP Location: Right arm)   Pulse 52   Temp 97.8 °F (36.6 °C) (Oral)   Resp 13   Ht 167.6 cm (66\")   Wt 84.9 kg (187 lb 2.7 oz)   LMP  (LMP Unknown)   SpO2 93%   BMI 30.21 kg/m²     Physical Exam  Vitals and nursing note reviewed.   Constitutional:       General: She is awake.      Appearance: Normal appearance.      Comments: Mildly drowsy.  Never in any visible pain.   HENT:      Head: Normocephalic and atraumatic.      " Nose: Nose normal.      Mouth/Throat:      Mouth: Mucous membranes are moist.   Eyes:      Extraocular Movements: Extraocular movements intact.      Pupils: Pupils are equal, round, and reactive to light.   Cardiovascular:      Rate and Rhythm: Normal rate and regular rhythm.      Heart sounds: Normal heart sounds.   Pulmonary:      Effort: Pulmonary effort is normal. No respiratory distress.      Breath sounds: Normal breath sounds. No wheezing, rhonchi or rales.   Abdominal:      General: Bowel sounds are normal.      Palpations: Abdomen is soft.      Tenderness: There is no abdominal tenderness. There is no guarding or rebound.      Comments: No rigidity   Musculoskeletal:         General: No tenderness. Normal range of motion.      Cervical back: Normal range of motion and neck supple.   Skin:     General: Skin is warm and dry.      Coloration: Skin is not jaundiced.   Neurological:      General: No focal deficit present.      Mental Status: Mental status is at baseline.   Psychiatric:         Mood and Affect: Mood normal.                  Procedures:  Procedures      Medical Decision Making:      Comorbidities that affect care:    Breast cancer    External Notes reviewed:    Previous Clinic Note: Oncology office visit 5/1/2024.  Description: Malignant neoplasm of left breast.  Assessment plan as follows:  Assessment and Plan    Diagnoses and all orders for this visit:     1. Malignant neoplasm of upper-outer quadrant of left breast in female, estrogen receptor positive (Primary)  Assessment & Plan:  Metastatic.  Patient is on treatment with letrozole, ribociclib, denosumab for bony involvement.  Tolerating well.  Restaging CT scans show no evidence of new or worsening disease.  CBC notable for hemoglobin 9.3 g/dL.  Likely related to ribociclib therapy not enough to require dose adjustment at this time.  Continue letrozole daily.  Continue ribociclib 3 weeks out of 4.  I will see her back in 1 month for ongoing  treatment.     Orders:  -     CBC and Differential; Future  -     Comprehensive metabolic panel; Future  -     Magnesium; Future  -     Phosphorus; Future     2. Malignant neoplasm metastatic to bone  Assessment & Plan:  Patient is on monthly Xgeva.  Tolerating well.  No dental or jaw pain.  Electrolytes are adequate for treatment Xgeva today monthly.     Orders:  -     CBC and Differential; Future  -     Comprehensive metabolic panel; Future  -     Magnesium; Future  -     Phosphorus; Future     3. Cancer related pain  Assessment & Plan:  Patient is on MS Contin and Percocet as needed for breakthrough.  She reports adequate control with her current regimen.  Wyatt reviewed and no discrepancies.  Refills provided today.  Reassess next visit.     Orders:  -     Morphine (MS CONTIN) 60 MG 12 hr tablet; Take 1 tablet by mouth 2 (Two) Times a Day.  Dispense: 60 tablet; Refill: 0  -     oxyCODONE-acetaminophen (PERCOCET)  MG per tablet; Take 1 tablet by mouth Every 6 (Six) Hours As Needed for Moderate Pain.  Dispense: 60 tablet; Refill: 0     Other orders  -     Cancel: denosumab (XGEVA) injection 120 mg                 Patient Follow Up: 1 month     Patient was given instructions and counseling regarding her condition or for health maintenance advice. Please see specific information pulled into the AVS if appropriate.      Donald Barker MD     5/1/2024    The following orders were placed and all results were independently analyzed by me:  Orders Placed This Encounter   Procedures    [place] Draw    Comprehensive Metabolic Panel    Lipase    Urinalysis With Microscopic If Indicated (No Culture) - Urine, Clean Catch    Lactic Acid, Plasma    CBC Auto Differential    NPO Diet NPO Type: Strict NPO    Undress & Gown    Insert Peripheral IV    CBC & Differential    Green Top (Gel)    Lavender Top    Gold Top - SST    Light Blue Top       Medications Given in the Emergency Department:  Medications   sodium chloride  0.9 % flush 10 mL (has no administration in time range)   heparin injection 500 Units (500 Units Intravenous Given 5/13/24 1820)        ED Course:    ED Course as of 05/13/24 1909   Mon May 13, 2024   1740 Asleep on reevaluation: Patient voices that she is comfortable with plan for discharge home. [RP]      ED Course User Index  [RP] Scott Monahan MD       Labs:    Lab Results (last 24 hours)       Procedure Component Value Units Date/Time    CBC & Differential [[id number]]  (Abnormal) Collected: 05/13/24 1515    Specimen: Blood Updated: 05/13/24 1524    Narrative:      The following orders were created for panel order CBC & Differential.  Procedure                               Abnormality         Status                     ---------                               -----------         ------                     CBC Auto Differential[[id number]]        Abnormal            Final result                 Please view results for these tests on the individual orders.    Comprehensive Metabolic Panel [[id number]]  (Abnormal) Collected: 05/13/24 1515    Specimen: Blood Updated: 05/13/24 1541     Glucose 109 mg/dL      BUN 16 mg/dL      Creatinine 0.71 mg/dL      Sodium 143 mmol/L      Potassium 3.6 mmol/L      Chloride 102 mmol/L      CO2 32.5 mmol/L      Calcium 9.6 mg/dL      Total Protein 6.5 g/dL      Albumin 4.0 g/dL      ALT (SGPT) 8 U/L      AST (SGOT) 14 U/L      Alkaline Phosphatase 96 U/L      Total Bilirubin 0.3 mg/dL      Globulin 2.5 gm/dL      A/G Ratio 1.6 g/dL      BUN/Creatinine Ratio 22.5     Anion Gap 8.5 mmol/L      eGFR 95.1 mL/min/1.73     Narrative:      GFR Normal >60  Chronic Kidney Disease <60  Kidney Failure <15      Lipase [[id number]]  (Normal) Collected: 05/13/24 1515    Specimen: Blood Updated: 05/13/24 1541     Lipase 21 U/L     Lactic Acid, Plasma [[id number]]  (Normal) Collected: 05/13/24 1515    Specimen: Blood Updated: 05/13/24 1538     Lactate 0.6 mmol/L     CBC Auto Differential  [[id number]]  (Abnormal) Collected: 05/13/24 1515    Specimen: Blood Updated: 05/13/24 1524     WBC 3.32 10*3/mm3      RBC 2.97 10*6/mm3      Hemoglobin 10.0 g/dL      Hematocrit 31.2 %      .1 fL      MCH 33.7 pg      MCHC 32.1 g/dL      RDW 14.1 %      RDW-SD 54.6 fl      MPV 10.1 fL      Platelets 167 10*3/mm3      Neutrophil % 55.8 %      Lymphocyte % 34.3 %      Monocyte % 8.1 %      Eosinophil % 0.6 %      Basophil % 0.9 %      Immature Grans % 0.3 %      Neutrophils, Absolute 1.85 10*3/mm3      Lymphocytes, Absolute 1.14 10*3/mm3      Monocytes, Absolute 0.27 10*3/mm3      Eosinophils, Absolute 0.02 10*3/mm3      Basophils, Absolute 0.03 10*3/mm3      Immature Grans, Absolute 0.01 10*3/mm3      nRBC 0.0 /100 WBC              Imaging:    No Radiology Exams Resulted Within Past 24 Hours      Differential Diagnosis and Discussion:    Abdominal Pain: Based on the patient's signs and symptoms, I considered abdominal aortic aneurysm, small bowel obstruction, pancreatitis, acute cholecystitis, acute appendecitis, peptic ulcer disease, gastritis, colitis, endocrine disorders, irritable bowel syndrome and other differential diagnosis an etiology of the patient's abdominal pain.  Weakness: Based on the patient's history, signs, and symptoms, the diffential diagnosis includes but is not limited to meningitis, stroke, sepsis, subarachnoid hemorrhage, intracranial bleeding, encephalitis, acute uti, dehydration, MS, myasthenia gravis, Guillan Pep, migraine variant, neuromuscular disorders vertigo, electrolyte imbalance, and metabolic disorders.    All labs were reviewed and interpreted by me.    MDM     Amount and/or Complexity of Data Reviewed  Decide to obtain previous medical records or to obtain history from someone other than the patient: yes                 Patient Care Considerations:    CT ABDOMEN AND PELVIS: I considered ordering a CT scan of the abdomen and pelvis however patient states her abdominal pain  has resolved here in the emergency department.  She has a benign abdominal exam and is nontender to palpation.      Consultants/Shared Management Plan:    None    Social Determinants of Health:    Patient is independent, reliable, and has access to care.       Disposition and Care Coordination:    Discharged: The patient is suitable and stable for discharge with no need for consideration of admission.    I have explained the patient´s condition, diagnoses and treatment plan based on the information available to me at this time. I have answered questions and addressed any concerns. The patient has a good  understanding of the patient´s diagnosis, condition, and treatment plan as can be expected at this point. The vital signs have been stable. The patient´s condition is stable and appropriate for discharge from the emergency department.      The patient will pursue further outpatient evaluation with the primary care physician or other designated or consulting physician as outlined in the discharge instructions. They are agreeable to this plan of care and follow-up instructions have been explained in detail. The patient has received these instructions in written format and has expressed an understanding of the discharge instructions. The patient is aware that any significant change in condition or worsening of symptoms should prompt an immediate return to this or the closest emergency department or call to 911.    Final diagnoses:   Cancer associated pain        ED Disposition       ED Disposition   Discharge    Condition   Stable    Comment   --               This medical record created using voice recognition software.             Scott Monahan MD  05/13/24 6061

## 2024-05-16 DIAGNOSIS — G89.3 CANCER RELATED PAIN: ICD-10-CM

## 2024-05-16 NOTE — TELEPHONE ENCOUNTER
Caller: Derek Keller    Relationship: Self    Best call back number: [phone]    Requested Prescriptions:   Requested Prescriptions     Pending Prescriptions Disp Refills    Morphine (MS CONTIN) 60 MG 12 hr tablet 60 tablet 0     Sig: Take 1 tablet by mouth 2 (Two) Times a Day.        Pharmacy where request should be sent: [facility], [facility], & [facility]  [facility], [place] - [address] - [phone] [facility] [phone] FX     Last office visit with prescribing clinician: 5/1/2024   Last telemedicine visit with prescribing clinician: Visit date not found   Next office visit with prescribing clinician: 5/29/2024     Additional details provided by patient:     Does the patient have less than a 3 day supply:  [x] Yes  [] No    Would you like a call back once the refill request has been completed: [] Yes [x] No    If the office needs to give you a call back, can they leave a voicemail: [x] Yes [] No    Abby Perez Rep   05/16/24 15:32 EDT

## 2024-05-20 ENCOUNTER — TELEPHONE (OUTPATIENT)
Dept: ONCOLOGY | Facility: HOSPITAL | Age: 64
End: 2024-05-20
Payer: MEDICARE

## 2024-05-20 DIAGNOSIS — G89.3 CANCER RELATED PAIN: ICD-10-CM

## 2024-05-20 RX ORDER — OXYCODONE AND ACETAMINOPHEN 10; 325 MG/1; MG/1
1 TABLET ORAL EVERY 6 HOURS PRN
Qty: 60 TABLET | Refills: 0 | OUTPATIENT
Start: 2024-05-20

## 2024-05-20 RX ORDER — OXYCODONE AND ACETAMINOPHEN 10; 325 MG/1; MG/1
1 TABLET ORAL EVERY 6 HOURS PRN
Qty: 60 TABLET | Refills: 0 | Status: SHIPPED | OUTPATIENT
Start: 2024-05-20

## 2024-05-20 NOTE — TELEPHONE ENCOUNTER
Caller: Derek Keller    Relationship: Self    Best call back number: [phone]    Requested Prescriptions:   oxyCODONE-acetaminophen (PERCOCET)  MG per tablet        Pharmacy where request should be sent:    [facility], [facility], & [facility]  [facility], [place] - [address] - [phone]  - [phone] FX     Last office visit with prescribing clinician: 5/1/2024   Last telemedicine visit with prescribing clinician: Visit date not found   Next office visit with prescribing clinician: 5/29/2024       Does the patient have less than a 3 day supply:  [] Yes  [x] No    Abby Cota Rep   05/20/24 13:56 EDT

## 2024-05-22 ENCOUNTER — OFFICE VISIT (OUTPATIENT)
Dept: SURGERY | Facility: CLINIC | Age: 64
End: 2024-05-22
Payer: MEDICARE

## 2024-05-22 VITALS — HEIGHT: 66 IN | RESPIRATION RATE: 14 BRPM | BODY MASS INDEX: 30.47 KG/M2 | WEIGHT: 189.6 LBS

## 2024-05-22 DIAGNOSIS — F17.200 NICOTINE DEPENDENCE, UNCOMPLICATED, UNSPECIFIED NICOTINE PRODUCT TYPE: ICD-10-CM

## 2024-05-22 DIAGNOSIS — K43.2 INCISIONAL HERNIA, WITHOUT OBSTRUCTION OR GANGRENE: Primary | ICD-10-CM

## 2024-05-22 RX ORDER — BACLOFEN 20 MG/1
TABLET ORAL
COMMUNITY
Start: 2024-05-01

## 2024-05-22 RX ORDER — NICOTINE 21 MG/24HR
1 PATCH, TRANSDERMAL 24 HOURS TRANSDERMAL EVERY 24 HOURS
Qty: 30 PATCH | Refills: 3 | Status: SHIPPED | OUTPATIENT
Start: 2024-05-22

## 2024-05-22 RX ORDER — LIDOCAINE PAIN RELIEF 40 MG/1000MG
PATCH TOPICAL
COMMUNITY
Start: 2024-05-10

## 2024-05-22 RX ORDER — MORPHINE SULFATE 60 MG/1
60 TABLET, FILM COATED, EXTENDED RELEASE ORAL 2 TIMES DAILY
Qty: 60 TABLET | Refills: 0 | OUTPATIENT
Start: 2024-05-22

## 2024-05-22 RX ORDER — IBUPROFEN 600 MG/1
600 TABLET ORAL EVERY 8 HOURS PRN
COMMUNITY
Start: 2024-05-10

## 2024-05-22 RX ORDER — LOSARTAN POTASSIUM 50 MG/1
50 TABLET ORAL DAILY
COMMUNITY
Start: 2024-05-06

## 2024-05-23 NOTE — PROGRESS NOTES
General Surgery/Colorectal Surgery Note    Patient Name:  Derek Keller  YOB: 1960  [id number]    Referring Provider: No ref. provider found      Patient Care Team:  Haile Monique MD as PCP - General (Family Medicine)  Julien Cardona DO as Consulting Physician (Pain Medicine)  Joel Castillo MD as Consulting Physician (Gastroenterology)  Remington Russell MD as Surgeon (General Surgery)  Ahsan Salas MD as Consulting Physician (Sports Medicine)  Donald Barker MD as Consulting Physician (Hematology and Oncology)  Sandy Ricci MD as Consulting Physician (General Surgery)  Alfred Castañeda MD as Consulting Physician (General Surgery)    Chief complaint follow-up    Subjective .     History of present illness:    History of metastatic breast cancer  Status post ex lap with Lynn's procedure for perforated sigmoid colon 12/6/2022.  Pathology with metastatic lobular breast carcinoma    Status post laparoscopic hand-assisted colostomy closure with resection, open parastomal hernia repair 6/26/2023  Pathology with metastatic lobular carcinoma to portion of descending colon and anastomotic rings    Colonoscopy 6/8/2023 with no evidence of recurrence.    CT abdomen pelvis 2/5/2024 with no evidence of intra-abdominal metastatic disease, constipation noted, incisional   hernia at previous colostomy site noted with no bowel contained in the defect    CT abdomen pelvis April 2024 with left-sided abdominal wall defect with new herniation of portion of transverse colon.    She comes in for evaluation of a painful bulge at her previous colostomy site.  She is unable to reduce this.  No signs and symptoms of strangulation.  No bowel blockage.  No changes in health or medications since last seen.  She is smoking 1 pack a day.        History:  Past Medical History:   Diagnosis Date    Abdominal pain     OSTOMY SITE    Allergic rhinitis     Anemia     NO S/S    Anxiety     Arteriosclerosis      [facility], follows with Dr. Núñez    Arthritis     Bone metastases 10/14/2022    Bowen's disease     SKIN CANCER    Breast cancer     NO SURGERY WAS DONE DUE TO METS TO BONE/COLON/LYMPHNODE    Cancer related pain 10/13/2022    Depression     Disorder associated with Helicobacter species 10/12/2022    Dysphoric mood     Fatigue     Fibromyalgia     Frequent urination     NO S/S INFECTION    Herpes simplex vulvovaginitis 07/26/2018    History of colon polyps     History of IBS     History of kidney stones     Hyperlipidemia     Hypertension     Hypothyroidism     Insomnia     Lumbago     Mood disorder     Neck pain     R/T CANCER    Palpitations     last time a few months ago    Precordial pain     R/T SPINE CANCER    S/P Laparoscopic Hand-Assisted Colostomy Closure with End to End Anastomosis Open Parastomal Hernia Repair 12/08/2022    Sleep disturbance     SOB (shortness of breath)     at times at rest    SOBOE (shortness of breath on exertion)        Past Surgical History:   Procedure Laterality Date    BREAST BIOPSY Left     CARDIAC CATHETERIZATION Left 1960    CARDIAC CATHETERIZATION N/A 04/06/2018    Procedure: Coronary angiography;  Surgeon: Art Licea MD;  Location: [facility] INVASIVE LOCATION;  Service: Cardiovascular    CARDIAC CATHETERIZATION N/A 04/06/2018    Procedure: Left heart cath;  Surgeon: Art Licea MD;  Location: [facility] CATH INVASIVE LOCATION;  Service: Cardiovascular    CARDIAC CATHETERIZATION N/A 04/06/2018    Procedure: Left ventriculography;  Surgeon: Art Licea MD;  Location: [facility] INVASIVE LOCATION;  Service: Cardiovascular    CHOLECYSTECTOMY      COLON SURGERY      colostomy bag    COLONOSCOPY  11/08/2006    COLONOSCOPY N/A 06/08/2023    Procedure: COLONOSCOPY;  Surgeon: Alfred Castañeda MD;  Location: [facility] ENDOSCOPY;  Service: General;  Laterality: N/A;  same as preop    EXPLORATORY LAPAROTOMY N/A 12/06/2022    Procedure: LAPAROTOMY  EXPLORATORY sigmoid resection hartmans procedure colostomy;  Surgeon: Alfred Castañeda MD;  Location: [facility] MAIN OR;  Service: General;  Laterality: N/A;    GANGLION CYST EXCISION Bilateral     HYSTERECTOMY  05/2005    ILEOSTOMY CLOSURE N/A 6/26/2023    Procedure: Laparoscopic Hand-Assisted Colostomy Closure with End to End Anastomosis;  Surgeon: Alfred Castañeda MD;  Location: [facility] MAIN OR;  Service: General;  Laterality: N/A;    LAPAROSCOPIC GASTRIC BANDING      BAND REMOVED 2020    PARASTOMAL HERNIA REPAIR N/A 6/26/2023    Procedure: Open Parastomal Hernia Repair;  Surgeon: Alfred Castañeda MD;  Location: [facility] MAIN OR;  Service: General;  Laterality: N/A;    TONSILLECTOMY      VENOUS ACCESS DEVICE (PORT) INSERTION N/A 01/09/2023    Procedure: INSERTION VENOUS ACCESS DEVICE;  Surgeon: Alfred Castañeda MD;  Location: [facility] MAIN OR;  Service: General;  Laterality: N/A;       Family History   Problem Relation Age of Onset    Hypertension Mother     Rheum arthritis Mother     Heart disease Mother     Breast cancer Mother     Diabetes Father     Cancer Maternal Grandmother         colon    Colon cancer Maternal Grandmother     Aneurysm Paternal Grandfather     Diabetes Other     Fibromyalgia Other     Malig Hyperthermia Neg Hx        Social History     Tobacco Use    Smoking status: Every Day     Current packs/day: 1.00     Average packs/day: 1 pack/day for 50.4 years (50.4 ttl pk-yrs)     Types: Cigarettes     Start date: 1974    Smokeless tobacco: Never    Tobacco comments:        Vaping Use    Vaping status: Never Used   Substance Use Topics    Alcohol use: No    Drug use: Never     Types: Marijuana     Comment: LAST USE 6/19/23       Review of Systems  All systems were reviewed and negative except for:   Review of Systems   Constitutional: Negative for chills, fever and unexpected weight loss.   HENT: Negative for congestion, nosebleeds and voice change.    Eyes: Negative for blurred  vision, double vision and discharge.   Respiratory: Negative for apnea, chest tightness and shortness of breath.    Cardiovascular: Negative for chest pain and leg swelling.   Gastrointestinal:        See HPI   Endocrine: Negative for cold intolerance and heat intolerance.   Genitourinary: Negative for dysuria, hematuria and urgency.   Musculoskeletal: Negative for back pain, joint swelling and neck pain.   Skin: Negative for color change and dry skin.   Neurological: Negative for dizziness and confusion.   Hematological: Negative for adenopathy.   Psychiatric/Behavioral: Negative for agitation and behavioral problems.     MEDS:  Prior to Admission medications    Medication Sig Start Date End Date Taking? Authorizing Provider   atorvastatin (LIPITOR) 20 MG tablet TAKE 1 TABLET BY MOUTH EVERY DAY  Patient taking differently: Take 1 tablet by mouth Daily. 10/1/19  Yes Yudelka Manning MD   baclofen (LIORESAL) 20 MG tablet TAKE 1 TABLET BY MOUTH THREE TIMES DAILY FOR MUSCLE SPASMS 5/1/24  Yes Provider, MD Bessie   cyproheptadine (PERIACTIN) 4 MG tablet Take 1 tablet by mouth Every 12 (Twelve) Hours. 10/30/23  Yes ProviderBessie MD   donepezil (ARICEPT) 10 MG tablet Take 1 tablet by mouth Daily. 10/28/22  Yes Provider, MD Bessie   DULoxetine (CYMBALTA) 60 MG capsule TAKE 1 capsule BY MOUTH DAILY for mood elevation 3/20/24  Yes Donald Barker MD   gabapentin (NEURONTIN) 600 MG tablet TAKE 1 TABLET BY MOUTH AT BEDTIME  Patient taking differently: Take 1 tablet by mouth 2 (Two) Times a Day As Needed. 7/30/20  Yes Yudelka Manning MD   hydroCHLOROthiazide 12.5 MG tablet TAKE 1 TABLET BY MOUTH DAILY for swelling 2/20/24  Yes Donald Barker MD   ibuprofen (ADVIL,MOTRIN) 600 MG tablet Take 1 tablet by mouth Every 8 (Eight) Hours As Needed. for pain 5/10/24  Yes Provider, MD Bessie   letrozole (FEMARA) 2.5 MG tablet Take 1 tablet by mouth Daily. 1/10/24  Yes Donald Barker MD   levothyroxine  (SYNTHROID, LEVOTHROID) 50 MCG tablet TAKE 1 TABLET BY MOUTH EVERY DAY  Patient taking differently: Take 1 tablet by mouth Daily. 7/19/19  Yes Yudelka Manning MD   lidocaine (LIDODERM) 5 % Place 1 patch on the skin as directed by provider Daily. Remove & Discard patch within 12 hours or as directed by MD   Yes Bessie Lopez MD   Lidocaine Pain Relief 4 % apply 1 patch to skin daily. leave on for 12 hours, and remove for 12 hours. 5/10/24  Yes Bessie Lopez MD   LORazepam (ATIVAN) 0.5 MG tablet Take 1 tablet by mouth Every 6 (Six) Hours As Needed.   Yes Bessie Lopze MD   losartan (COZAAR) 100 MG tablet Take 0.5 tablets by mouth Daily. INST PER ANESTHESIA PROTOCOL   Yes Bessie Lopez MD   losartan (COZAAR) 50 MG tablet Take 1 tablet by mouth Daily. for blood pressure 5/6/24  Yes Bessie Lopez MD   montelukast (SINGULAIR) 10 MG tablet Take 1 tablet by mouth Daily. 1/17/22  Yes Bessie Lopez MD   Morphine (MS CONTIN) 60 MG 12 hr tablet Take 1 tablet by mouth 2 (Two) Times a Day. 5/1/24  Yes Donald Barker MD   ondansetron (ZOFRAN) 8 MG tablet TAKE 1 TABLET BY MOUTH THREE TIMES DAILY AS NEEDED FOR NAUSEA AND VOMITING 2/5/24  Yes Donald Barker MD   oxybutynin XL (DITROPAN XL) 15 MG 24 hr tablet TAKE 1 TABLET BY MOUTH DAILY for bladder 4/29/24  Yes Donald Barker MD   oxyCODONE-acetaminophen (PERCOCET)  MG per tablet Take 1 tablet by mouth Every 6 (Six) Hours As Needed for Moderate Pain. 5/20/24  Yes Donald Barker MD   polyethylene glycol (MIRALAX) 17 g packet Take 17 g by mouth Daily. 1/9/23  Yes Alfred Castañeda MD   potassium chloride (MICRO-K) 10 MEQ CR capsule Take 1 capsule by mouth Every 12 (Twelve) Hours. 2/17/24  Yes Donald Barker MD   prochlorperazine (COMPAZINE) 10 MG tablet  9/29/23  Yes ProviderBessie MD   ribociclib succinate 200 MG tablet therapy pack tablet Take 3 tablets by mouth Take As Directed. Take 3 tablets by  mouth daily for 21 days then off 7 days on a 28 day cycle. 8/1/23  Yes Donald Barker MD   rOPINIRole (REQUIP) 3 MG tablet Take 1 tablet by mouth 2 (Two) Times a Day. 6/29/22  Yes Bessie Lopez MD   SudoGest 60 MG tablet Take 1 tablet by mouth Every 8 (Eight) Hours. 11/13/23  Yes Bessie Lopez MD   SUMAtriptan (IMITREX) 100 MG tablet Take 1 tablet by mouth 1 (One) Time As Needed. 10/7/22  Yes Bessie Lopez MD   traZODone (DESYREL) 150 MG tablet TAKE 1 TABLET BY MOUTH ONCE nightly  Patient taking differently: Take 1 tablet by mouth Every Night. 11/12/19  Yes Yudelka Manning MD   ferrous sulfate 324 (65 Fe) MG tablet delayed-release EC tablet Take 1 tablet by mouth Daily With Breakfast.  Patient not taking: Reported on 2/7/2024    Bessie Lopez MD   fluticasone (FLONASE) 50 MCG/ACT nasal spray 2 sprays by Each Nare route Daily.  Patient not taking: Reported on 4/3/2024 2/12/24   Bessie Lopez MD   nicotine (NICODERM CQ) 21 MG/24HR patch Place 1 patch on the skin as directed by provider Daily. 5/22/24   Alfred Castañeda MD        Allergies:  Azithromycin and Erythromycin    Objective     Vital Signs   Resp:  [14] 14    Physical Exam:     General Appearance:    Alert, cooperative, in no acute distress   Head:    Normocephalic, without obvious abnormality, atraumatic   Eyes:          Conjunctivae and sclerae normal, no icterus,     Ears:    Ears appear intact with no abnormalities noted   Throat:   No oral lesions, no thrush, oral mucosa moist   Neck:   No adenopathy, supple, trachea midline, no thyromegaly   Back:     No kyphosis present, no scoliosis present, no skin lesions,      erythema or scars, no tenderness to percussion or                   palpation,   range of motion normal   Lungs:     Clear to auscultation,respirations regular, even and                  unlabored    Heart:    Regular rhythm and normal rate, normal S1 and S2, no            murmur, no  "gallop, no rub, no click   Chest Wall:    No abnormalities observed   Abdomen:     Normal bowel sounds, no masses, no organomegaly, soft        non-tender, non-distended, no guarding, no rebound                tenderness, left mid quadrant nonreducible hernia without evidence of strangulation, obstruction or gangrene   Rectal:        Extremities:   Moves all extremities well, no edema, no cyanosis, no             redness   Pulses:   Pulses palpable and equal bilaterally   Skin:   No bleeding, bruising or rash   Lymph nodes:   No palpable adenopathy   Neurologic:   A/o x 4 with no deficits       Results Review:   {Results Review:58214::\"I reviewed the patient's new clinical results.\"    LABS/IMAGING:  Results for orders placed or performed during the hospital encounter of 05/13/24   Comprehensive Metabolic Panel    Specimen: Blood   Result Value Ref Range    Glucose 109 (H) 65 - 99 mg/dL    BUN 16 8 - 23 mg/dL    Creatinine 0.71 0.57 - 1.00 mg/dL    Sodium 143 136 - 145 mmol/L    Potassium 3.6 3.5 - 5.2 mmol/L    Chloride 102 98 - 107 mmol/L    CO2 32.5 (H) 22.0 - 29.0 mmol/L    Calcium 9.6 8.6 - 10.5 mg/dL    Total Protein 6.5 6.0 - 8.5 g/dL    Albumin 4.0 3.5 - 5.2 g/dL    ALT (SGPT) 8 1 - 33 U/L    AST (SGOT) 14 1 - 32 U/L    Alkaline Phosphatase 96 39 - 117 U/L    Total Bilirubin 0.3 0.0 - 1.2 mg/dL    Globulin 2.5 gm/dL    A/G Ratio 1.6 g/dL    BUN/Creatinine Ratio 22.5 7.0 - 25.0    Anion Gap 8.5 5.0 - 15.0 mmol/L    eGFR 95.1 >60.0 mL/min/1.73   Lipase    Specimen: Blood   Result Value Ref Range    Lipase 21 13 - 60 U/L   Lactic Acid, Plasma    Specimen: Blood   Result Value Ref Range    Lactate 0.6 0.5 - 2.0 mmol/L   CBC Auto Differential    Specimen: Blood   Result Value Ref Range    WBC 3.32 (L) 3.40 - 10.80 10*3/mm3    RBC 2.97 (L) 3.77 - 5.28 10*6/mm3    Hemoglobin 10.0 (L) 12.0 - 15.9 g/dL    Hematocrit 31.2 (L) 34.0 - 46.6 %    .1 (H) 79.0 - 97.0 fL    MCH 33.7 (H) 26.6 - 33.0 pg    MCHC 32.1 " 31.5 - 35.7 g/dL    RDW 14.1 12.3 - 15.4 %    RDW-SD 54.6 (H) 37.0 - 54.0 fl    MPV 10.1 6.0 - 12.0 fL    Platelets 167 140 - 450 10*3/mm3    Neutrophil % 55.8 42.7 - 76.0 %    Lymphocyte % 34.3 19.6 - 45.3 %    Monocyte % 8.1 5.0 - 12.0 %    Eosinophil % 0.6 0.3 - 6.2 %    Basophil % 0.9 0.0 - 1.5 %    Immature Grans % 0.3 0.0 - 0.5 %    Neutrophils, Absolute 1.85 1.70 - 7.00 10*3/mm3    Lymphocytes, Absolute 1.14 0.70 - 3.10 10*3/mm3    Monocytes, Absolute 0.27 0.10 - 0.90 10*3/mm3    Eosinophils, Absolute 0.02 0.00 - 0.40 10*3/mm3    Basophils, Absolute 0.03 0.00 - 0.20 10*3/mm3    Immature Grans, Absolute 0.01 0.00 - 0.05 10*3/mm3    nRBC 0.0 0.0 - 0.2 /100 WBC   Green Top (Gel)   Result Value Ref Range    Extra Tube Hold for add-ons.    Lavender Top   Result Value Ref Range    Extra Tube hold for add-on    Gold Top - SST   Result Value Ref Range    Extra Tube Hold for add-ons.    Light Blue Top   Result Value Ref Range    Extra Tube Hold for add-ons.         Result Review :{Labs  Result Review  Imaging  Med Tab  Media     Assessment & Plan     History of metastatic breast cancer  Status post ex lap with Lynn's procedure for perforated sigmoid colon 12/6/2022.  Pathology with metastatic lobular breast carcinoma    Status post laparoscopic hand-assisted colostomy closure with resection, open parastomal hernia repair 6/26/2023  Pathology with metastatic lobular carcinoma to portion of descending colon and anastomotic rings    Colonoscopy 6/8/2023 with no evidence of recurrence.    CT abdomen pelvis 2/5/2024 with no evidence of intra-abdominal metastatic disease, constipation noted, incisional   hernia at previous colostomy site noted with no bowel contained in the defect    CT abdomen pelvis April 2024 with left-sided abdominal wall defect with new herniation of portion of transverse colon.    Initial nonreducible incisional hernia without evidence of obstruction or gangrene  Nicotine dependence, tobacco  abuse    Discussion with patient.  I explained her what a hernia was.  I discussed warning signs of strangulation.  She was instructed to the emergency department for any concern.  I explained her she would need to quit smoking prior to elective hernia repair with mesh.  I will give her nicotine patches.  Smoking cessation counseling performed.  She is agreeable.  Follow-up with me in 1 month to check the progress of her quitting smoking.  All questions answered.  She agrees with the plan.  Thank you for the consult.    Derek Keller  reports that she has been smoking cigarettes. She started smoking about 50 years ago. She has a 50.4 pack-year smoking history. She has never used smokeless tobacco. I have educated her on the risk of diseases from using tobacco products such as cancer, COPD, and heart disease.     I advised her to quit and she is willing to quit. We have discussed the following method/s for tobacco cessation:  Counseling Prescription Medicaiton.  Together we have set a quit date for 1 week from today.  She will follow up with me in 1 day or sooner to check on her progress.    I spent 3  minutes counseling the patient.                This document has been electronically signed by Alfred Castañeda MD  May 23, 2024 09:47 EDT

## 2024-05-28 ENCOUNTER — TELEPHONE (OUTPATIENT)
Dept: SURGERY | Facility: CLINIC | Age: 64
End: 2024-05-28
Payer: MEDICARE

## 2024-05-28 NOTE — TELEPHONE ENCOUNTER
Caller: Derek Keller    Relationship to patient: Self    Best call back number: [phone]     Patient is needing: PT WANTING DR HESS TO KNOW SHE IS NOW 2 DAYS FREE OF SMOKING AND IS USING NICOTINE PATCH. SHE ALSO FEELS SHE HAS ANOTHER HERNIA UNDER HER RIGHT BREAST AND IS WANTING TO KNOW IF SHE SHOULD HAVE ANOTHER CT SCAN BEFORE HER NEXT VISIT.

## 2024-05-29 ENCOUNTER — HOSPITAL ENCOUNTER (OUTPATIENT)
Dept: ONCOLOGY | Facility: HOSPITAL | Age: 64
Discharge: HOME | End: 2024-05-29
Payer: MEDICARE

## 2024-05-29 ENCOUNTER — OFFICE VISIT (OUTPATIENT)
Dept: ONCOLOGY | Facility: HOSPITAL | Age: 64
End: 2024-05-29
Payer: MEDICARE

## 2024-05-29 VITALS
OXYGEN SATURATION: 100 % | TEMPERATURE: 98 F | SYSTOLIC BLOOD PRESSURE: 129 MMHG | HEART RATE: 59 BPM | BODY MASS INDEX: 30.83 KG/M2 | DIASTOLIC BLOOD PRESSURE: 64 MMHG | WEIGHT: 191 LBS | RESPIRATION RATE: 18 BRPM

## 2024-05-29 DIAGNOSIS — G89.3 CANCER RELATED PAIN: ICD-10-CM

## 2024-05-29 DIAGNOSIS — Z45.2 ENCOUNTER FOR ADJUSTMENT OR MANAGEMENT OF VASCULAR ACCESS DEVICE: Primary | ICD-10-CM

## 2024-05-29 DIAGNOSIS — Z17.0 MALIGNANT NEOPLASM OF UPPER-OUTER QUADRANT OF LEFT BREAST IN FEMALE, ESTROGEN RECEPTOR POSITIVE: Primary | ICD-10-CM

## 2024-05-29 DIAGNOSIS — C50.412 MALIGNANT NEOPLASM OF UPPER-OUTER QUADRANT OF LEFT BREAST IN FEMALE, ESTROGEN RECEPTOR POSITIVE: Primary | ICD-10-CM

## 2024-05-29 DIAGNOSIS — D64.9 ANEMIA, UNSPECIFIED TYPE: ICD-10-CM

## 2024-05-29 DIAGNOSIS — R68.84 PAIN IN LOWER JAW: ICD-10-CM

## 2024-05-29 DIAGNOSIS — C79.51 MALIGNANT NEOPLASM METASTATIC TO BONE: ICD-10-CM

## 2024-05-29 DIAGNOSIS — Z17.0 MALIGNANT NEOPLASM OF UPPER-OUTER QUADRANT OF LEFT BREAST IN FEMALE, ESTROGEN RECEPTOR POSITIVE: ICD-10-CM

## 2024-05-29 DIAGNOSIS — C79.51 MALIGNANT NEOPLASM METASTATIC TO BONE: Primary | ICD-10-CM

## 2024-05-29 DIAGNOSIS — C50.412 MALIGNANT NEOPLASM OF UPPER-OUTER QUADRANT OF LEFT BREAST IN FEMALE, ESTROGEN RECEPTOR POSITIVE: ICD-10-CM

## 2024-05-29 LAB
ALBUMIN SERPL-MCNC: 4 G/DL (ref 3.5–5.2)
ALBUMIN SERPL-MCNC: 4 G/DL (ref 3.5–5.2)
ALBUMIN/GLOB SERPL: 1.5 G/DL
ALBUMIN/GLOB SERPL: 1.7 G/DL
ALP SERPL-CCNC: 94 U/L (ref 39–117)
ALP SERPL-CCNC: 95 U/L (ref 39–117)
ALT SERPL W P-5'-P-CCNC: 11 U/L (ref 1–33)
ALT SERPL W P-5'-P-CCNC: 9 U/L (ref 1–33)
ANION GAP SERPL CALCULATED.3IONS-SCNC: 6 MMOL/L (ref 5–15)
ANION GAP SERPL CALCULATED.3IONS-SCNC: 9.7 MMOL/L (ref 5–15)
AST SERPL-CCNC: 14 U/L (ref 1–32)
AST SERPL-CCNC: 14 U/L (ref 1–32)
BASOPHILS # BLD AUTO: 0.04 10*3/MM3 (ref 0–0.2)
BASOPHILS NFR BLD AUTO: 0.9 % (ref 0–1.5)
BILIRUB SERPL-MCNC: 0.2 MG/DL (ref 0–1.2)
BILIRUB SERPL-MCNC: 0.2 MG/DL (ref 0–1.2)
BUN SERPL-MCNC: 14 MG/DL (ref 8–23)
BUN SERPL-MCNC: 15 MG/DL (ref 8–23)
BUN/CREAT SERPL: 14.3 (ref 7–25)
BUN/CREAT SERPL: 16 (ref 7–25)
CALCIUM SPEC-SCNC: 8.3 MG/DL (ref 8.6–10.5)
CALCIUM SPEC-SCNC: 8.5 MG/DL (ref 8.6–10.5)
CHLORIDE SERPL-SCNC: 101 MMOL/L (ref 98–107)
CHLORIDE SERPL-SCNC: 103 MMOL/L (ref 98–107)
CO2 SERPL-SCNC: 29.3 MMOL/L (ref 22–29)
CO2 SERPL-SCNC: 33 MMOL/L (ref 22–29)
CREAT SERPL-MCNC: 0.94 MG/DL (ref 0.57–1)
CREAT SERPL-MCNC: 0.98 MG/DL (ref 0.57–1)
DEPRECATED RDW RBC AUTO: 54.6 FL (ref 37–54)
EGFRCR SERPLBLD CKD-EPI 2021: 64.6 ML/MIN/1.73
EGFRCR SERPLBLD CKD-EPI 2021: 67.9 ML/MIN/1.73
EOSINOPHIL # BLD AUTO: 0 10*3/MM3 (ref 0–0.4)
EOSINOPHIL NFR BLD AUTO: 0 % (ref 0.3–6.2)
ERYTHROCYTE [DISTWIDTH] IN BLOOD BY AUTOMATED COUNT: 13.9 % (ref 12.3–15.4)
FERRITIN SERPL-MCNC: 56.02 NG/ML (ref 13–150)
GLOBULIN UR ELPH-MCNC: 2.4 GM/DL
GLOBULIN UR ELPH-MCNC: 2.7 GM/DL
GLUCOSE SERPL-MCNC: 118 MG/DL (ref 65–99)
GLUCOSE SERPL-MCNC: 118 MG/DL (ref 65–99)
HCT VFR BLD AUTO: 30.9 % (ref 34–46.6)
HGB BLD-MCNC: 9.7 G/DL (ref 12–15.9)
IMM GRANULOCYTES # BLD AUTO: 0.01 10*3/MM3 (ref 0–0.05)
IMM GRANULOCYTES NFR BLD AUTO: 0.2 % (ref 0–0.5)
IRON 24H UR-MRATE: 28 MCG/DL (ref 37–145)
IRON SATN MFR SERPL: 7 % (ref 20–50)
LYMPHOCYTES # BLD AUTO: 1.03 10*3/MM3 (ref 0.7–3.1)
LYMPHOCYTES NFR BLD AUTO: 22.6 % (ref 19.6–45.3)
MAGNESIUM SERPL-MCNC: 2 MG/DL (ref 1.6–2.4)
MAGNESIUM SERPL-MCNC: 2.2 MG/DL (ref 1.6–2.4)
MCH RBC QN AUTO: 33.4 PG (ref 26.6–33)
MCHC RBC AUTO-ENTMCNC: 31.4 G/DL (ref 31.5–35.7)
MCV RBC AUTO: 106.6 FL (ref 79–97)
MONOCYTES # BLD AUTO: 0.59 10*3/MM3 (ref 0.1–0.9)
MONOCYTES NFR BLD AUTO: 13 % (ref 5–12)
NEUTROPHILS NFR BLD AUTO: 2.88 10*3/MM3 (ref 1.7–7)
NEUTROPHILS NFR BLD AUTO: 63.3 % (ref 42.7–76)
PHOSPHATE SERPL-MCNC: 3 MG/DL (ref 2.5–4.5)
PLATELET # BLD AUTO: 171 10*3/MM3 (ref 140–450)
PMV BLD AUTO: 10.3 FL (ref 6–12)
POTASSIUM SERPL-SCNC: 3.4 MMOL/L (ref 3.5–5.2)
POTASSIUM SERPL-SCNC: 3.4 MMOL/L (ref 3.5–5.2)
PROT SERPL-MCNC: 6.4 G/DL (ref 6–8.5)
PROT SERPL-MCNC: 6.7 G/DL (ref 6–8.5)
RBC # BLD AUTO: 2.9 10*6/MM3 (ref 3.77–5.28)
SODIUM SERPL-SCNC: 140 MMOL/L (ref 136–145)
SODIUM SERPL-SCNC: 142 MMOL/L (ref 136–145)
TIBC SERPL-MCNC: 416 MCG/DL (ref 298–536)
TRANSFERRIN SERPL-MCNC: 279 MG/DL (ref 200–360)
WBC NRBC COR # BLD AUTO: 4.55 10*3/MM3 (ref 3.4–10.8)

## 2024-05-29 PROCEDURE — 25010000002 HEPARIN LOCK FLUSH PER 10 UNITS: Performed by: INTERNAL MEDICINE

## 2024-05-29 PROCEDURE — 85025 COMPLETE CBC W/AUTO DIFF WBC: CPT | Performed by: INTERNAL MEDICINE

## 2024-05-29 PROCEDURE — 83735 ASSAY OF MAGNESIUM: CPT | Performed by: INTERNAL MEDICINE

## 2024-05-29 PROCEDURE — 80053 COMPREHEN METABOLIC PANEL: CPT | Performed by: INTERNAL MEDICINE

## 2024-05-29 PROCEDURE — 82728 ASSAY OF FERRITIN: CPT | Performed by: INTERNAL MEDICINE

## 2024-05-29 PROCEDURE — 84100 ASSAY OF PHOSPHORUS: CPT | Performed by: INTERNAL MEDICINE

## 2024-05-29 PROCEDURE — 36591 DRAW BLOOD OFF VENOUS DEVICE: CPT

## 2024-05-29 PROCEDURE — 83540 ASSAY OF IRON: CPT | Performed by: INTERNAL MEDICINE

## 2024-05-29 PROCEDURE — 84466 ASSAY OF TRANSFERRIN: CPT | Performed by: INTERNAL MEDICINE

## 2024-05-29 RX ORDER — HEPARIN SODIUM (PORCINE) LOCK FLUSH IV SOLN 100 UNIT/ML 100 UNIT/ML
500 SOLUTION INTRAVENOUS AS NEEDED
OUTPATIENT
Start: 2024-05-29

## 2024-05-29 RX ORDER — SODIUM CHLORIDE 0.9 % (FLUSH) 0.9 %
20 SYRINGE (ML) INJECTION AS NEEDED
OUTPATIENT
Start: 2024-05-29

## 2024-05-29 RX ORDER — HEPARIN SODIUM (PORCINE) LOCK FLUSH IV SOLN 100 UNIT/ML 100 UNIT/ML
500 SOLUTION INTRAVENOUS AS NEEDED
Status: DISCONTINUED | OUTPATIENT
Start: 2024-05-29 | End: 2024-05-30 | Stop reason: HOSPADM

## 2024-05-29 RX ORDER — SODIUM CHLORIDE 0.9 % (FLUSH) 0.9 %
20 SYRINGE (ML) INJECTION AS NEEDED
Status: DISCONTINUED | OUTPATIENT
Start: 2024-05-29 | End: 2024-05-30 | Stop reason: HOSPADM

## 2024-05-29 RX ADMIN — Medication 20 ML: at 13:26

## 2024-05-29 RX ADMIN — HEPARIN 500 UNITS: 100 SYRINGE at 13:26

## 2024-05-29 NOTE — ASSESSMENT & PLAN NOTE
Patient is on monthly denosumab.  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  I will hold Xgeva for now.  She will be referred to oral surgery for evaluation.  Bone scan before next visit

## 2024-05-29 NOTE — ADDENDUM NOTE
Encounter addended by: Breana Blas RN on: 5/29/2024 2:47 PM   Actions taken: Charge Capture section accepted

## 2024-05-29 NOTE — ASSESSMENT & PLAN NOTE
Metastatic.  Patient is on letrozole plus ribociclib.  Tolerating well.  Her last scans showed good response to therapy.  CBC is notable for mild anemia related to the ribociclib but not enough to require dose adjustment.  Continue letrozole daily.  Continue ribociclib 3 weeks out of 4.  I will see her back in 2 months for ongoing treatment with lab work prior to monitor for toxicities and restaging scans to assess response to therapy including CT and bone scans.

## 2024-05-29 NOTE — PROGRESS NOTES
Chief Complaint  Malignant neoplasm of upper-outer quadrant of left breast Haile San MD Patel, Saagar, MD    Subjective          Derek Keller presents to [facility] for ongoing treatment of her metastatic breast cancer.  She is on letrozole plus ribociclib along with denosumab for bony involvement.  She notes ongoing fatigue but can perform her ADLs.  She has diffuse pain from her bones.  She notes that her pain medications are efficacious.  She notes ongoing depression but is using Cymbalta.  She notes constipation related to her narcotic use.  She feels like she is eating and drinking adequately.  She has a hernia in the lower abdomen.  She recently saw Dr. Castañeda with colorectal surgery who has tentative plans for hernia repair in July.  He started her on nicotine replacement and patient reports that she stopped smoking earlier in the month.  She reports pain in her right lower jaw    Oncology/Hematology History   Malignant neoplasm of upper-outer quadrant of left breast in female, estrogen receptor positive   10/13/2022 Initial Diagnosis    Malignant neoplasm of left breast in female, estrogen receptor positive (HCC)     10/14/2022 -  Chemotherapy    OP BREAST Letrozole / Ribociclib     10/14/2022 Cancer Staged    Staging form: Breast, AJCC 8th Edition  - Clinical: Stage IV (cT1c, cN1, cM1, ER+, NV+, HER2-) - Signed by Donald Barker MD on 10/14/2022     10/28/2022 -  Chemotherapy    OP SUPPORTIVE Denosumab (Xgeva) Q28D     Malignant neoplasm metastatic to bone   10/14/2022 Initial Diagnosis    Bone metastases (HCC)     10/28/2022 -  Chemotherapy    OP SUPPORTIVE Denosumab (Xgeva) Q28D     Secondary malignant neoplasm of bone (Resolved)   11/14/2022 Initial Diagnosis    Secondary malignant neoplasm of bone (HCC)     11/17/2022 - 4/19/2023 Radiation    RADIATION THERAPY Treatment Details (11/14/2022 - 4/19/2023)  Site: Spine - Lumbar  Technique: 3D  CRT  Goal: No goal specified  Planned Treatment Start Date: 11/17/2022         Review of Systems  Current Outpatient Medications on File Prior to Visit   Medication Sig Dispense Refill    atorvastatin (LIPITOR) 20 MG tablet TAKE 1 TABLET BY MOUTH EVERY DAY (Patient taking differently: Take 1 tablet by mouth Daily.) 30 tablet 6    baclofen (LIORESAL) 20 MG tablet TAKE 1 TABLET BY MOUTH THREE TIMES DAILY FOR MUSCLE SPASMS      cyproheptadine (PERIACTIN) 4 MG tablet Take 1 tablet by mouth Every 12 (Twelve) Hours.      donepezil (ARICEPT) 10 MG tablet Take 1 tablet by mouth Daily.      DULoxetine (CYMBALTA) 60 MG capsule TAKE 1 capsule BY MOUTH DAILY for mood elevation 30 capsule 2    ferrous sulfate 324 (65 Fe) MG tablet delayed-release EC tablet Take 1 tablet by mouth Daily With Breakfast. (Patient not taking: Reported on 2/7/2024)      fluticasone (FLONASE) 50 MCG/ACT nasal spray 2 sprays by Each Nare route Daily. (Patient not taking: Reported on 4/3/2024)      gabapentin (NEURONTIN) 600 MG tablet TAKE 1 TABLET BY MOUTH AT BEDTIME (Patient taking differently: Take 1 tablet by mouth 2 (Two) Times a Day As Needed.) 90 tablet 0    hydroCHLOROthiazide 12.5 MG tablet TAKE 1 TABLET BY MOUTH DAILY for swelling 90 tablet 2    ibuprofen (ADVIL,MOTRIN) 600 MG tablet Take 1 tablet by mouth Every 8 (Eight) Hours As Needed. for pain      letrozole (FEMARA) 2.5 MG tablet Take 1 tablet by mouth Daily. 90 tablet 1    levothyroxine (SYNTHROID, LEVOTHROID) 50 MCG tablet TAKE 1 TABLET BY MOUTH EVERY DAY (Patient taking differently: Take 1 tablet by mouth Daily.) 90 tablet 1    lidocaine (LIDODERM) 5 % Place 1 patch on the skin as directed by provider Daily. Remove & Discard patch within 12 hours or as directed by MD      Lidocaine Pain Relief 4 % apply 1 patch to skin daily. leave on for 12 hours, and remove for 12 hours.      LORazepam (ATIVAN) 0.5 MG tablet Take 1 tablet by mouth Every 6 (Six) Hours As Needed.      losartan (COZAAR)  100 MG tablet Take 0.5 tablets by mouth Daily. INST PER ANESTHESIA PROTOCOL      losartan (COZAAR) 50 MG tablet Take 1 tablet by mouth Daily. for blood pressure      montelukast (SINGULAIR) 10 MG tablet Take 1 tablet by mouth Daily.      Morphine (MS CONTIN) 60 MG 12 hr tablet Take 1 tablet by mouth 2 (Two) Times a Day. 60 tablet 0    nicotine (NICODERM CQ) 21 MG/24HR patch Place 1 patch on the skin as directed by provider Daily. 30 patch 3    ondansetron (ZOFRAN) 8 MG tablet TAKE 1 TABLET BY MOUTH THREE TIMES DAILY AS NEEDED FOR NAUSEA AND VOMITING 30 tablet 5    oxybutynin XL (DITROPAN XL) 15 MG 24 hr tablet TAKE 1 TABLET BY MOUTH DAILY for bladder 30 tablet 1    oxyCODONE-acetaminophen (PERCOCET)  MG per tablet Take 1 tablet by mouth Every 6 (Six) Hours As Needed for Moderate Pain. 60 tablet 0    polyethylene glycol (MIRALAX) 17 g packet Take 17 g by mouth Daily. 5 packet 0    potassium chloride (MICRO-K) 10 MEQ CR capsule Take 1 capsule by mouth Every 12 (Twelve) Hours. 60 capsule 1    prochlorperazine (COMPAZINE) 10 MG tablet       ribociclib succinate 200 MG tablet therapy pack tablet Take 3 tablets by mouth Take As Directed. Take 3 tablets by mouth daily for 21 days then off 7 days on a 28 day cycle. 63 tablet 11    rOPINIRole (REQUIP) 3 MG tablet Take 1 tablet by mouth 2 (Two) Times a Day.      SudoGest 60 MG tablet Take 1 tablet by mouth Every 8 (Eight) Hours.      SUMAtriptan (IMITREX) 100 MG tablet Take 1 tablet by mouth 1 (One) Time As Needed.      traZODone (DESYREL) 150 MG tablet TAKE 1 TABLET BY MOUTH ONCE nightly (Patient taking differently: Take 1 tablet by mouth Every Night.) 90 tablet 2     Current Facility-Administered Medications on File Prior to Visit   Medication Dose Route Frequency Provider Last Rate Last Admin    heparin injection 500 Units  500 Units Intravenous PRN Donald Barker MD   500 Units at 05/29/24 1326    sodium chloride 0.9 % flush 20 mL  20 mL Intravenous PRN Lucero  Donald JOE MD   20 mL at 05/29/24 1326       Allergies   Allergen Reactions    Azithromycin Itching    Erythromycin Itching     Past Medical History:   Diagnosis Date    Abdominal pain     OSTOMY SITE    Allergic rhinitis     Anemia     NO S/S    Anxiety     Arteriosclerosis     Coronary, follows with Dr. Núñez    Arthritis     Bone metastases 10/14/2022    Bowen's disease     SKIN CANCER    Breast cancer     NO SURGERY WAS DONE DUE TO METS TO BONE/COLON/LYMPHNODE    Cancer related pain 10/13/2022    Depression     Disorder associated with Helicobacter species 10/12/2022    Dysphoric mood     Fatigue     Fibromyalgia     Frequent urination     NO S/S INFECTION    Herpes simplex vulvovaginitis 07/26/2018    History of colon polyps     History of IBS     History of kidney stones     Hyperlipidemia     Hypertension     Hypothyroidism     Insomnia     Lumbago     Mood disorder     Neck pain     R/T CANCER    Palpitations     last time a few months ago    Precordial pain     R/T SPINE CANCER    S/P Laparoscopic Hand-Assisted Colostomy Closure with End to End Anastomosis Open Parastomal Hernia Repair 12/08/2022    Sleep disturbance     SOB (shortness of breath)     at times at rest    SOBOE (shortness of breath on exertion)      Past Surgical History:   Procedure Laterality Date    BREAST BIOPSY Left     CARDIAC CATHETERIZATION Left 1959    CARDIAC CATHETERIZATION N/A 04/06/2018    Procedure: Coronary angiography;  Surgeon: Art Licea MD;  Location:  [facility];  Service: Cardiovascular    CARDIAC CATHETERIZATION N/A 04/06/2018    Procedure: Left heart cath;  Surgeon: Art Licea MD;  Location:  [facility];  Service: Cardiovascular    CARDIAC CATHETERIZATION N/A 04/06/2018    Procedure: Left ventriculography;  Surgeon: Art Licea MD;  Location:  [facility];  Service: Cardiovascular    CHOLECYSTECTOMY      COLON SURGERY      colostomy  bag    COLONOSCOPY  11/08/2006    COLONOSCOPY N/A 06/08/2023    Procedure: COLONOSCOPY;  Surgeon: Alfred Castañeda MD;  Location: [facility] END[facility]COPY;  Service: General;  Laterality: N/A;  same as preop    EXPLORATORY LAPAROTOMY N/A 12/06/2022    Procedure: LAPAROTOMY EXPLORATORY sigmoid resection hartmans procedure colostomy;  Surgeon: Alfred Castañeda MD;  Location: [facility] MAIN OR;  Service: General;  Laterality: N/A;    GANGLION CYST EXCISION Bilateral     HYSTERECTOMY  05/2005    ILEOSTOMY CLOSURE N/A 6/26/2023    Procedure: Laparoscopic Hand-Assisted Colostomy Closure with End to End Anastomosis;  Surgeon: Alfred Castañeda MD;  Location: [facility] MAIN OR;  Service: General;  Laterality: N/A;    LAPAROSCOPIC GASTRIC BANDING      BAND REMOVED 2020    PARASTOMAL HERNIA REPAIR N/A 6/26/2023    Procedure: Open Parastomal Hernia Repair;  Surgeon: Alfred Castañeda MD;  Location: [facility] MAIN OR;  Service: General;  Laterality: N/A;    TONSILLECTOMY      VENOUS ACCESS DEVICE (PORT) INSERTION N/A 01/09/2023    Procedure: INSERTION VENOUS ACCESS DEVICE;  Surgeon: Alfred Castañeda MD;  Location: [facility] MAIN OR;  Service: General;  Laterality: N/A;     Social History     Socioeconomic History    Marital status:    Tobacco Use    Smoking status: Every Day     Current packs/day: 1.00     Average packs/day: 1 pack/day for 50.4 years (50.4 ttl pk-yrs)     Types: Cigarettes     Start date: 1974    Smokeless tobacco: Never    Tobacco comments:        Vaping Use    Vaping status: Never Used   Substance and Sexual Activity    Alcohol use: No    Drug use: Never     Types: Marijuana     Comment: LAST USE 6/19/23    Sexual activity: Defer     Partners: Male     Birth control/protection: None     Family History   Problem Relation Age of Onset    Hypertension Mother     Rheum arthritis Mother     Heart disease Mother     Breast cancer Mother     Diabetes Father     Cancer Maternal Grandmother          colon    Colon cancer Maternal Grandmother     Aneurysm Paternal Grandfather     Diabetes Other     Fibromyalgia Other     Malig Hyperthermia Neg Hx        Objective   Physical Exam  Vitals reviewed. Exam conducted with a chaperone present.   HENT:      Mouth/Throat:      Comments: Ulceration on the right mandible  Cardiovascular:      Rate and Rhythm: Normal rate and regular rhythm.      Heart sounds: Normal heart sounds. No murmur heard.     No gallop.   Pulmonary:      Effort: Pulmonary effort is normal.      Breath sounds: Normal breath sounds.   Abdominal:      General: Abdomen is flat. Bowel sounds are normal.      Palpations: Abdomen is soft.      Comments: Left lower quadrant hernia without incarceration   Lymphadenopathy:      Cervical: No cervical adenopathy.   Psychiatric:         Mood and Affect: Mood normal.         Behavior: Behavior normal.         Vitals:    05/29/24 1326   BP: 129/64   Pulse: 59   Resp: 18   Temp: 98 °F (36.7 °C)   TempSrc: Temporal   SpO2: 100%   Weight: 86.6 kg (191 lb)   PainSc:   8   PainLoc: Abdomen     ECOG score: 1         PHQ-9 Total Score:                    Result Review :   The following data was reviewed by: Donald Barker MD on 05/29/2024:  Lab Results   Component Value Date    HGB 9.7 (L) 05/29/2024    HCT 30.9 (L) 05/29/2024    .6 (H) 05/29/2024     05/29/2024    WBC 4.55 05/29/2024    NEUTROABS 2.88 05/29/2024    LYMPHSABS 1.03 05/29/2024    MONOSABS 0.59 05/29/2024    EOSABS 0.00 05/29/2024    BASOSABS 0.04 05/29/2024     Lab Results   Component Value Date    GLUCOSE 118 (H) 05/29/2024    GLUCOSE 118 (H) 05/29/2024    BUN 14 05/29/2024    BUN 15 05/29/2024    CREATININE 0.98 05/29/2024    CREATININE 0.94 05/29/2024     05/29/2024     05/29/2024    K 3.4 (L) 05/29/2024    K 3.4 (L) 05/29/2024     05/29/2024     05/29/2024    CO2 33.0 (H) 05/29/2024    CO2 29.3 (H) 05/29/2024    CALCIUM 8.5 (L) 05/29/2024    CALCIUM 8.3 (L)  "05/29/2024    PROTEINTOT 6.7 05/29/2024    PROTEINTOT 6.4 05/29/2024    ALBUMIN 4.0 05/29/2024    ALBUMIN 4.0 05/29/2024    BILITOT 0.2 05/29/2024    BILITOT 0.2 05/29/2024    ALKPHOS 94 05/29/2024    ALKPHOS 95 05/29/2024    AST 14 05/29/2024    AST 14 05/29/2024    ALT 11 05/29/2024    ALT 9 05/29/2024     Lab Results   Component Value Date    MG 2.2 05/29/2024    MG 2.0 05/29/2024    PHOS 3.0 05/29/2024    FREET4 1.01 01/17/2022    TSH 1.470 11/12/2019     Lab Results   Component Value Date    IRON 28 (L) 05/29/2024    LABIRON 7 (L) 05/29/2024    TRANSFERRIN 279 05/29/2024    TIBC 416 05/29/2024     Lab Results   Component Value Date    FERRITIN 56.02 05/29/2024    FCIKZYPG08 390 04/23/2019    FOLATE 9.93 04/23/2019     No results found for: \"PSA\", \"CEA\", \"AFP\", \"\", \"\"          Assessment and Plan    Diagnoses and all orders for this visit:    1. Malignant neoplasm of upper-outer quadrant of left breast in female, estrogen receptor positive (Primary)  Assessment & Plan:  Metastatic.  Patient is on letrozole plus ribociclib.  Tolerating well.  Her last scans showed good response to therapy.  CBC is notable for mild anemia related to the ribociclib but not enough to require dose adjustment.  Continue letrozole daily.  Continue ribociclib 3 weeks out of 4.  I will see her back in 2 months for ongoing treatment with lab work prior to monitor for toxicities and restaging scans to assess response to therapy including CT and bone scans.    Orders:  -     CBC and Differential; Future  -     Comprehensive metabolic panel; Future  -     Magnesium; Future  -     Phosphorus; Future  -     Ferritin; Future  -     Iron Profile; Future  -     CBC & Differential; Future  -     Comprehensive Metabolic Panel; Future  -     Magnesium; Future  -     Phosphorus; Future  -     CT chest w contrast; Future  -     CT abdomen pelvis w contrast; Future  -     XR Bone Survey Complete; Future    2. Malignant neoplasm metastatic to " bone  Assessment & Plan:  Patient is on monthly denosumab.  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  I will hold Xgeva for now.  She will be referred to oral surgery for evaluation.  Bone scan before next visit    Orders:  -     CBC and Differential; Future  -     Comprehensive metabolic panel; Future  -     Magnesium; Future  -     Phosphorus; Future  -     XR Bone Survey Complete; Future    3. Anemia, unspecified type  Assessment & Plan:  Continue iron daily.  Repeat CBC next visit    Orders:  -     Ferritin; Future  -     Iron Profile; Future  -     CBC & Differential; Future    4. Pain in lower jaw  -     Ambulatory Referral to Oral Maxillofacial Surgery  -     Ambulatory Referral to Oral Maxillofacial Surgery    5. Cancer related pain  Assessment & Plan:  Patient reports adequate pain control with her current regimen.  Wyatt reviewed and no discrepancies.  Continue same.  Reassess next visit.              Patient Follow Up: 2 months    Patient was given instructions and counseling regarding her condition or for health maintenance advice. Please see specific information pulled into the AVS if appropriate.     Donald Barker MD    5/29/2024

## 2024-05-30 ENCOUNTER — SPECIALTY PHARMACY (OUTPATIENT)
Dept: PHARMACY | Facility: HOSPITAL | Age: 64
End: 2024-05-30
Payer: MEDICARE

## 2024-06-06 ENCOUNTER — PREP FOR SURGERY (OUTPATIENT)
Dept: OTHER | Facility: HOSPITAL | Age: 64
End: 2024-06-06
Payer: MEDICARE

## 2024-06-06 ENCOUNTER — OFFICE VISIT (OUTPATIENT)
Dept: SURGERY | Facility: CLINIC | Age: 64
End: 2024-06-06
Payer: MEDICARE

## 2024-06-06 VITALS — HEIGHT: 66 IN | RESPIRATION RATE: 14 BRPM | WEIGHT: 192 LBS | BODY MASS INDEX: 30.86 KG/M2

## 2024-06-06 DIAGNOSIS — K43.2 INCISIONAL HERNIA, WITHOUT OBSTRUCTION OR GANGRENE: Primary | ICD-10-CM

## 2024-06-06 RX ORDER — SODIUM CHLORIDE 0.9 % (FLUSH) 0.9 %
10 SYRINGE (ML) INJECTION EVERY 12 HOURS SCHEDULED
OUTPATIENT
Start: 2024-06-06

## 2024-06-06 RX ORDER — SODIUM CHLORIDE 0.9 % (FLUSH) 0.9 %
10 SYRINGE (ML) INJECTION AS NEEDED
OUTPATIENT
Start: 2024-06-06

## 2024-06-06 RX ORDER — SODIUM CHLORIDE, SODIUM LACTATE, POTASSIUM CHLORIDE, CALCIUM CHLORIDE 600; 310; 30; 20 MG/100ML; MG/100ML; MG/100ML; MG/100ML
50 INJECTION, SOLUTION INTRAVENOUS CONTINUOUS
OUTPATIENT
Start: 2024-06-06

## 2024-06-06 RX ORDER — SODIUM CHLORIDE 9 MG/ML
40 INJECTION, SOLUTION INTRAVENOUS AS NEEDED
OUTPATIENT
Start: 2024-06-06

## 2024-06-07 NOTE — H&P (VIEW-ONLY)
General Surgery/Colorectal Surgery Note    Patient Name:  Derek Keller  YOB: 1960  [id number]    Referring Provider: No ref. provider found      Patient Care Team:  Haile Monique MD as PCP - General (Family Medicine)  Julien Cardona DO as Consulting Physician (Pain Medicine)  Joel Castillo MD as Consulting Physician (Gastroenterology)  Remington Russell MD as Surgeon (General Surgery)  Ahsan Salas MD as Consulting Physician (Sports Medicine)  Donald Barker MD as Consulting Physician (Hematology and Oncology)  Sandy Ricci MD as Consulting Physician (General Surgery)  Alfred Castañeda MD as Consulting Physician (General Surgery)    Chief complaint follow-up    Subjective .     History of present illness:    History of metastatic breast cancer  Status post ex lap with Lynn's procedure for perforated sigmoid colon 12/6/2022.  Pathology with metastatic lobular breast carcinoma    Status post laparoscopic hand-assisted colostomy closure with resection, open parastomal hernia repair 6/26/2023  Pathology with metastatic lobular carcinoma to portion of descending colon and anastomotic rings    Colonoscopy 6/8/2023 with no evidence of recurrence.    CT abdomen pelvis 2/5/2024 with no evidence of intra-abdominal metastatic disease, constipation noted, incisional   hernia at previous colostomy site noted with no bowel contained in the defect    CT abdomen pelvis April 2024 with left-sided abdominal wall defect with new herniation of portion of transverse colon.    Seen for evaluation of a painful bulge at her previous colostomy site.  She is unable to reduce this.     She comes in for follow-up.  Since she was last seen she has quit smoking.  She wishes to have her hernia repaired.  No changes in health or medications otherwise since last seen.  No recent chest pain.  No blood thinner use.      History:  Past Medical History:   Diagnosis Date    Abdominal pain     OSTOMY SITE     Allergic rhinitis     Anemia     NO S/S    Anxiety     Arteriosclerosis     Coronary, follows with Dr. Núñez    Arthritis     Bone metastases 10/14/2022    Bowen's disease     SKIN CANCER    Breast cancer     NO SURGERY WAS DONE DUE TO METS TO BONE/COLON/LYMPHNODE    Cancer related pain 10/13/2022    Depression     Disorder associated with Helicobacter species 10/12/2022    Dysphoric mood     Fatigue     Fibromyalgia     Frequent urination     NO S/S INFECTION    Herpes simplex vulvovaginitis 07/26/2018    History of colon polyps     History of IBS     History of kidney stones     Hyperlipidemia     Hypertension     Hypothyroidism     Insomnia     Lumbago     Mood disorder     Neck pain     R/T CANCER    Palpitations     last time a few months ago    Precordial pain     R/T SPINE CANCER    S/P Laparoscopic Hand-Assisted Colostomy Closure with End to End Anastomosis Open Parastomal Hernia Repair 12/08/2022    Sleep disturbance     SOB (shortness of breath)     at times at rest    SOBOE (shortness of breath on exertion)        Past Surgical History:   Procedure Laterality Date    BREAST BIOPSY Left     CARDIAC CATHETERIZATION Left 1960    CARDIAC CATHETERIZATION N/A 04/06/2018    Procedure: Coronary angiography;  Surgeon: Art Licea MD;  Location: [facility] INVASIVE LOCATION;  Service: Cardiovascular    CARDIAC CATHETERIZATION N/A 04/06/2018    Procedure: Left heart cath;  Surgeon: Art Licea MD;  Location: [facility] CATH INVASIVE LOCATION;  Service: Cardiovascular    CARDIAC CATHETERIZATION N/A 04/06/2018    Procedure: Left ventriculography;  Surgeon: Art Licea MD;  Location: [facility] INVASIVE LOCATION;  Service: Cardiovascular    CHOLECYSTECTOMY      COLON SURGERY      colostomy bag    COLONOSCOPY  11/08/2006    COLONOSCOPY N/A 06/08/2023    Procedure: COLONOSCOPY;  Surgeon: Alfred Castañeda MD;  Location: [facility] ENDOSCOPY;  Service: General;  Laterality: N/A;  same  as preop    EXPLORATORY LAPAROTOMY N/A 12/06/2022    Procedure: LAPAROTOMY EXPLORATORY sigmoid resection hartmans procedure colostomy;  Surgeon: Alfred Castañeda MD;  Location: [facility] MAIN OR;  Service: General;  Laterality: N/A;    GANGLION CYST EXCISION Bilateral     HYSTERECTOMY  05/2005    ILEOSTOMY CLOSURE N/A 6/26/2023    Procedure: Laparoscopic Hand-Assisted Colostomy Closure with End to End Anastomosis;  Surgeon: Alfred Castañeda MD;  Location: [facility] MAIN OR;  Service: General;  Laterality: N/A;    LAPAROSCOPIC GASTRIC BANDING      BAND REMOVED 2020    PARASTOMAL HERNIA REPAIR N/A 6/26/2023    Procedure: Open Parastomal Hernia Repair;  Surgeon: Alfred Castañeda MD;  Location: [facility] MAIN OR;  Service: General;  Laterality: N/A;    TONSILLECTOMY      VENOUS ACCESS DEVICE (PORT) INSERTION N/A 01/09/2023    Procedure: INSERTION VENOUS ACCESS DEVICE;  Surgeon: Alfred Castañeda MD;  Location: [facility] MAIN OR;  Service: General;  Laterality: N/A;       Family History   Problem Relation Age of Onset    Hypertension Mother     Rheum arthritis Mother     Heart disease Mother     Breast cancer Mother     Diabetes Father     Cancer Maternal Grandmother         colon    Colon cancer Maternal Grandmother     Aneurysm Paternal Grandfather     Diabetes Other     Fibromyalgia Other     Malig Hyperthermia Neg Hx        Social History     Tobacco Use    Smoking status: Former     Current packs/day: 1.00     Average packs/day: 1 pack/day for 50.4 years (50.4 ttl pk-yrs)     Types: Cigarettes     Start date: 1974    Smokeless tobacco: Never    Tobacco comments:        Vaping Use    Vaping status: Never Used   Substance Use Topics    Alcohol use: No    Drug use: Never     Types: Marijuana     Comment: LAST USE 6/19/23       Review of Systems  All systems were reviewed and negative except for:   Review of Systems   Constitutional: Negative for chills, fever and unexpected weight loss.   HENT: Negative for  congestion, nosebleeds and voice change.    Eyes: Negative for blurred vision, double vision and discharge.   Respiratory: Negative for apnea, chest tightness and shortness of breath.    Cardiovascular: Negative for chest pain and leg swelling.   Gastrointestinal:        See HPI   Endocrine: Negative for cold intolerance and heat intolerance.   Genitourinary: Negative for dysuria, hematuria and urgency.   Musculoskeletal: Negative for back pain, joint swelling and neck pain.   Skin: Negative for color change and dry skin.   Neurological: Negative for dizziness and confusion.   Hematological: Negative for adenopathy.   Psychiatric/Behavioral: Negative for agitation and behavioral problems.     MEDS:  Prior to Admission medications    Medication Sig Start Date End Date Taking? Authorizing Provider   atorvastatin (LIPITOR) 20 MG tablet TAKE 1 TABLET BY MOUTH EVERY DAY  Patient taking differently: Take 1 tablet by mouth Daily. 10/1/19  Yes Yudelka Manning MD   baclofen (LIORESAL) 20 MG tablet TAKE 1 TABLET BY MOUTH THREE TIMES DAILY FOR MUSCLE SPASMS 5/1/24  Yes ProviderBessie MD   cyproheptadine (PERIACTIN) 4 MG tablet Take 1 tablet by mouth Every 12 (Twelve) Hours. 10/30/23  Yes ProviderBessie MD   donepezil (ARICEPT) 10 MG tablet Take 1 tablet by mouth Daily. 10/28/22  Yes ProviderBessie MD   DULoxetine (CYMBALTA) 60 MG capsule TAKE 1 capsule BY MOUTH DAILY for mood elevation 3/20/24  Yes Donald Barker MD   gabapentin (NEURONTIN) 600 MG tablet TAKE 1 TABLET BY MOUTH AT BEDTIME  Patient taking differently: Take 1 tablet by mouth 2 (Two) Times a Day As Needed. 7/30/20  Yes Yudelka Manning MD   hydroCHLOROthiazide 12.5 MG tablet TAKE 1 TABLET BY MOUTH DAILY for swelling 2/20/24  Yes Donald Barker MD   ibuprofen (ADVIL,MOTRIN) 600 MG tablet Take 1 tablet by mouth Every 8 (Eight) Hours As Needed. for pain 5/10/24  Yes ProviderBessie MD   letrozole (FEMARA) 2.5 MG tablet Take 1  tablet by mouth Daily. 1/10/24  Yes Donald Barker MD   levothyroxine (SYNTHROID, LEVOTHROID) 50 MCG tablet TAKE 1 TABLET BY MOUTH EVERY DAY  Patient taking differently: Take 1 tablet by mouth Daily. 7/19/19  Yes Yudelka Manning MD   lidocaine (LIDODERM) 5 % Place 1 patch on the skin as directed by provider Daily. Remove & Discard patch within 12 hours or as directed by MD   Yes Bessie Lopez MD   Lidocaine Pain Relief 4 % apply 1 patch to skin daily. leave on for 12 hours, and remove for 12 hours. 5/10/24  Yes Bessie Lopez MD   LORazepam (ATIVAN) 0.5 MG tablet Take 1 tablet by mouth Every 6 (Six) Hours As Needed.   Yes Bessie Lopez MD   losartan (COZAAR) 100 MG tablet Take 0.5 tablets by mouth Daily. INST PER ANESTHESIA PROTOCOL   Yes Bessie Lopez MD   losartan (COZAAR) 50 MG tablet Take 1 tablet by mouth Daily. for blood pressure 5/6/24  Yes Bessie Lopez MD   montelukast (SINGULAIR) 10 MG tablet Take 1 tablet by mouth Daily. 1/17/22  Yes Bessie Lopez MD   Morphine (MS CONTIN) 60 MG 12 hr tablet Take 1 tablet by mouth 2 (Two) Times a Day. 5/1/24  Yes Donald Barker MD   nicotine (NICODERM CQ) 21 MG/24HR patch Place 1 patch on the skin as directed by provider Daily. 5/22/24  Yes Alfred Castañeda MD   ondansetron (ZOFRAN) 8 MG tablet TAKE 1 TABLET BY MOUTH THREE TIMES DAILY AS NEEDED FOR NAUSEA AND VOMITING 2/5/24  Yes Donald Barker MD   oxybutynin XL (DITROPAN XL) 15 MG 24 hr tablet TAKE 1 TABLET BY MOUTH DAILY for bladder 4/29/24  Yes Donald Barker MD   oxyCODONE-acetaminophen (PERCOCET)  MG per tablet Take 1 tablet by mouth Every 6 (Six) Hours As Needed for Moderate Pain. 5/20/24  Yes Donald Barker MD   potassium chloride (MICRO-K) 10 MEQ CR capsule Take 1 capsule by mouth Every 12 (Twelve) Hours. 2/17/24  Yes Donald Barker MD   prochlorperazine (COMPAZINE) 10 MG tablet  9/29/23  Yes Provider, Bessie, MD   ribociclib  succinate 200 MG tablet therapy pack tablet Take 3 tablets by mouth Take As Directed. Take 3 tablets by mouth daily for 21 days then off 7 days on a 28 day cycle. 8/1/23  Yes Donald Barker MD   rOPINIRole (REQUIP) 3 MG tablet Take 1 tablet by mouth 2 (Two) Times a Day. 6/29/22  Yes Bessie Lopez MD   SudoGest 60 MG tablet Take 1 tablet by mouth Every 8 (Eight) Hours. 11/13/23  Yes Bessie Lopez MD   SUMAtriptan (IMITREX) 100 MG tablet Take 1 tablet by mouth 1 (One) Time As Needed. 10/7/22  Yes Bessie Lopez MD   traZODone (DESYREL) 150 MG tablet TAKE 1 TABLET BY MOUTH ONCE nightly  Patient taking differently: Take 1 tablet by mouth Every Night. 11/12/19  Yes Yudelka Manning MD   ferrous sulfate 324 (65 Fe) MG tablet delayed-release EC tablet Take 1 tablet by mouth Daily With Breakfast.  Patient not taking: Reported on 2/7/2024    Bessie Lopez MD   fluticasone (FLONASE) 50 MCG/ACT nasal spray 2 sprays by Each Nare route Daily.  Patient not taking: Reported on 4/3/2024 2/12/24   Bessie Lopez MD   polyethylene glycol (MIRALAX) 17 g packet Take 17 g by mouth Daily.  Patient not taking: Reported on 6/6/2024 1/9/23   Alfred Castañeda MD        Allergies:  Azithromycin and Erythromycin    Objective     Vital Signs   Resp:  [14] 14    Physical Exam:     General Appearance:    Alert, cooperative, in no acute distress   Head:    Normocephalic, without obvious abnormality, atraumatic   Eyes:          Conjunctivae and sclerae normal, no icterus,     Ears:    Ears appear intact with no abnormalities noted   Throat:   No oral lesions, no thrush, oral mucosa moist   Neck:   No adenopathy, supple, trachea midline, no thyromegaly   Back:     No kyphosis present, no scoliosis present, no skin lesions,      erythema or scars, no tenderness to percussion or                   palpation,   range of motion normal   Lungs:     Clear to auscultation,respirations regular, even and        "           unlabored    Heart:    Regular rhythm and normal rate, normal S1 and S2, no            murmur, no gallop, no rub, no click   Chest Wall:    No abnormalities observed   Abdomen:     Normal bowel sounds, no masses, no organomegaly, soft        non-tender, non-distended, no guarding, no rebound                tenderness, nonreducible left mid quadrant incisional hernia without evidence of obstruction or gangrene   Rectal:        Extremities:   Moves all extremities well, no edema, no cyanosis, no             redness   Pulses:   Pulses palpable and equal bilaterally   Skin:   No bleeding, bruising or rash   Lymph nodes:   No palpable adenopathy   Neurologic:   A/o x 4 with no deficits       Results Review:   {Results Review:69133::\"I reviewed the patient's new clinical results.\"    LABS/IMAGING:  Results for orders placed or performed during the hospital encounter of 05/29/24   Comprehensive metabolic panel    Specimen: Blood   Result Value Ref Range    Glucose 118 (H) 65 - 99 mg/dL    BUN 14 8 - 23 mg/dL    Creatinine 0.98 0.57 - 1.00 mg/dL    Sodium 142 136 - 145 mmol/L    Potassium 3.4 (L) 3.5 - 5.2 mmol/L    Chloride 103 98 - 107 mmol/L    CO2 33.0 (H) 22.0 - 29.0 mmol/L    Calcium 8.5 (L) 8.6 - 10.5 mg/dL    Total Protein 6.7 6.0 - 8.5 g/dL    Albumin 4.0 3.5 - 5.2 g/dL    ALT (SGPT) 11 1 - 33 U/L    AST (SGOT) 14 1 - 32 U/L    Alkaline Phosphatase 94 39 - 117 U/L    Total Bilirubin 0.2 0.0 - 1.2 mg/dL    Globulin 2.7 gm/dL    A/G Ratio 1.5 g/dL    BUN/Creatinine Ratio 14.3 7.0 - 25.0    Anion Gap 6.0 5.0 - 15.0 mmol/L    eGFR 64.6 >60.0 mL/min/1.73   Magnesium    Specimen: Blood   Result Value Ref Range    Magnesium 2.2 1.6 - 2.4 mg/dL   Phosphorus    Specimen: Blood   Result Value Ref Range    Phosphorus 3.0 2.5 - 4.5 mg/dL   CBC Auto Differential    Specimen: Blood   Result Value Ref Range    WBC 4.55 3.40 - 10.80 10*3/mm3    RBC 2.90 (L) 3.77 - 5.28 10*6/mm3    Hemoglobin 9.7 (L) 12.0 - 15.9 g/dL    " Hematocrit 30.9 (L) 34.0 - 46.6 %    .6 (H) 79.0 - 97.0 fL    MCH 33.4 (H) 26.6 - 33.0 pg    MCHC 31.4 (L) 31.5 - 35.7 g/dL    RDW 13.9 12.3 - 15.4 %    RDW-SD 54.6 (H) 37.0 - 54.0 fl    MPV 10.3 6.0 - 12.0 fL    Platelets 171 140 - 450 10*3/mm3    Neutrophil % 63.3 42.7 - 76.0 %    Lymphocyte % 22.6 19.6 - 45.3 %    Monocyte % 13.0 (H) 5.0 - 12.0 %    Eosinophil % 0.0 (L) 0.3 - 6.2 %    Basophil % 0.9 0.0 - 1.5 %    Immature Grans % 0.2 0.0 - 0.5 %    Neutrophils, Absolute 2.88 1.70 - 7.00 10*3/mm3    Lymphocytes, Absolute 1.03 0.70 - 3.10 10*3/mm3    Monocytes, Absolute 0.59 0.10 - 0.90 10*3/mm3    Eosinophils, Absolute 0.00 0.00 - 0.40 10*3/mm3    Basophils, Absolute 0.04 0.00 - 0.20 10*3/mm3    Immature Grans, Absolute 0.01 0.00 - 0.05 10*3/mm3   Comprehensive Metabolic Panel    Specimen: Blood   Result Value Ref Range    Glucose 118 (H) 65 - 99 mg/dL    BUN 15 8 - 23 mg/dL    Creatinine 0.94 0.57 - 1.00 mg/dL    Sodium 140 136 - 145 mmol/L    Potassium 3.4 (L) 3.5 - 5.2 mmol/L    Chloride 101 98 - 107 mmol/L    CO2 29.3 (H) 22.0 - 29.0 mmol/L    Calcium 8.3 (L) 8.6 - 10.5 mg/dL    Total Protein 6.4 6.0 - 8.5 g/dL    Albumin 4.0 3.5 - 5.2 g/dL    ALT (SGPT) 9 1 - 33 U/L    AST (SGOT) 14 1 - 32 U/L    Alkaline Phosphatase 95 39 - 117 U/L    Total Bilirubin 0.2 0.0 - 1.2 mg/dL    Globulin 2.4 gm/dL    A/G Ratio 1.7 g/dL    BUN/Creatinine Ratio 16.0 7.0 - 25.0    Anion Gap 9.7 5.0 - 15.0 mmol/L    eGFR 67.9 >60.0 mL/min/1.73   Ferritin    Specimen: Blood   Result Value Ref Range    Ferritin 56.02 13.00 - 150.00 ng/mL   Iron Profile    Specimen: Blood   Result Value Ref Range    Iron 28 (L) 37 - 145 mcg/dL    Iron Saturation (TSAT) 7 (L) 20 - 50 %    Transferrin 279 200 - 360 mg/dL    TIBC 416 298 - 536 mcg/dL   Magnesium    Specimen: Blood   Result Value Ref Range    Magnesium 2.0 1.6 - 2.4 mg/dL        Result Review :     Assessment & Plan     Status post ex lap with Lynn's procedure for perforated  sigmoid colon 12/6/2022.  Pathology with metastatic lobular breast carcinoma    Status post laparoscopic hand-assisted colostomy closure with resection, open parastomal hernia repair 6/26/2023  Pathology with metastatic lobular carcinoma to portion of descending colon and anastomotic rings    Colonoscopy 6/8/2023 with no evidence of recurrence.    CT abdomen pelvis 2/5/2024 with no evidence of intra-abdominal metastatic disease, constipation noted, incisional   hernia at previous colostomy site noted with no bowel contained in the defect    CT abdomen pelvis April 2024 with left-sided abdominal wall defect with new herniation of portion of transverse colon.      Initial nonreducible left mid quadrant incisional hernia without evidence of obstruction or gangrene discussion with patient.  We reviewed her CT images.  She wishes to have this repaired.  I recommended robotic possible open incisional hernia repair with mesh.  I explained her it would be a difficult procedure and there is a chance I would have to make an open incision.  I explained the procedure and recovery.  Benefits and alternatives discussed.  Risk procedure including risk of anesthesia, bleeding, infection, conversion open, damage to surrounding structures including bowel, hernia recurrence, heart attack, stroke, blood clot, pneumonia, pain discussed.  All questions answered.  She agrees with the plan.  Orders placed.  She was instructed to use chlorhexidine the night before surgery.  Continue not smoking.  Thank you for the consult.          This document has been electronically signed by Alfred Castañeda MD  June 7, 2024 08:57 EDT

## 2024-06-07 NOTE — H&P (VIEW-ONLY)
General Surgery/Colorectal Surgery Note    Patient Name:  Derek Keller  YOB: 1960  [id number]    Referring Provider: No ref. provider found      Patient Care Team:  Haile Monique MD as PCP - General (Family Medicine)  Julien Cardona DO as Consulting Physician (Pain Medicine)  Joel Castillo MD as Consulting Physician (Gastroenterology)  Remington Russell MD as Surgeon (General Surgery)  Ahsan Salas MD as Consulting Physician (Sports Medicine)  Donald Barker MD as Consulting Physician (Hematology and Oncology)  Sadny Ricci MD as Consulting Physician (General Surgery)  Alfred Castañeda MD as Consulting Physician (General Surgery)    Chief complaint follow-up    Subjective .     History of present illness:    History of metastatic breast cancer  Status post ex lap with Lynn's procedure for perforated sigmoid colon 12/6/2022.  Pathology with metastatic lobular breast carcinoma    Status post laparoscopic hand-assisted colostomy closure with resection, open parastomal hernia repair 6/26/2023  Pathology with metastatic lobular carcinoma to portion of descending colon and anastomotic rings    Colonoscopy 6/8/2023 with no evidence of recurrence.    CT abdomen pelvis 2/5/2024 with no evidence of intra-abdominal metastatic disease, constipation noted, incisional   hernia at previous colostomy site noted with no bowel contained in the defect    CT abdomen pelvis April 2024 with left-sided abdominal wall defect with new herniation of portion of transverse colon.    Seen for evaluation of a painful bulge at her previous colostomy site.  She is unable to reduce this.     She comes in for follow-up.  Since she was last seen she has quit smoking.  She wishes to have her hernia repaired.  No changes in health or medications otherwise since last seen.  No recent chest pain.  No blood thinner use.      History:  Past Medical History:   Diagnosis Date    Abdominal pain     OSTOMY SITE     Allergic rhinitis     Anemia     NO S/S    Anxiety     Arteriosclerosis     Coronary, follows with Dr. Núñez    Arthritis     Bone metastases 10/14/2022    Bowen's disease     SKIN CANCER    Breast cancer     NO SURGERY WAS DONE DUE TO METS TO BONE/COLON/LYMPHNODE    Cancer related pain 10/13/2022    Depression     Disorder associated with Helicobacter species 10/12/2022    Dysphoric mood     Fatigue     Fibromyalgia     Frequent urination     NO S/S INFECTION    Herpes simplex vulvovaginitis 07/26/2018    History of colon polyps     History of IBS     History of kidney stones     Hyperlipidemia     Hypertension     Hypothyroidism     Insomnia     Lumbago     Mood disorder     Neck pain     R/T CANCER    Palpitations     last time a few months ago    Precordial pain     R/T SPINE CANCER    S/P Laparoscopic Hand-Assisted Colostomy Closure with End to End Anastomosis Open Parastomal Hernia Repair 12/08/2022    Sleep disturbance     SOB (shortness of breath)     at times at rest    SOBOE (shortness of breath on exertion)        Past Surgical History:   Procedure Laterality Date    BREAST BIOPSY Left     CARDIAC CATHETERIZATION Left 1960    CARDIAC CATHETERIZATION N/A 04/06/2018    Procedure: Coronary angiography;  Surgeon: Art Licea MD;  Location: [facility] INVASIVE LOCATION;  Service: Cardiovascular    CARDIAC CATHETERIZATION N/A 04/06/2018    Procedure: Left heart cath;  Surgeon: Art Licea MD;  Location: [facility] CATH INVASIVE LOCATION;  Service: Cardiovascular    CARDIAC CATHETERIZATION N/A 04/06/2018    Procedure: Left ventriculography;  Surgeon: Art Licea MD;  Location: [facility] INVASIVE LOCATION;  Service: Cardiovascular    CHOLECYSTECTOMY      COLON SURGERY      colostomy bag    COLONOSCOPY  11/08/2006    COLONOSCOPY N/A 06/08/2023    Procedure: COLONOSCOPY;  Surgeon: Alfred Castañeda MD;  Location: [facility] ENDOSCOPY;  Service: General;  Laterality: N/A;  same  as preop    EXPLORATORY LAPAROTOMY N/A 12/06/2022    Procedure: LAPAROTOMY EXPLORATORY sigmoid resection hartmans procedure colostomy;  Surgeon: Alfred Castañeda MD;  Location: [facility] MAIN OR;  Service: General;  Laterality: N/A;    GANGLION CYST EXCISION Bilateral     HYSTERECTOMY  05/2005    ILEOSTOMY CLOSURE N/A 6/26/2023    Procedure: Laparoscopic Hand-Assisted Colostomy Closure with End to End Anastomosis;  Surgeon: Alfred Castañeda MD;  Location: [facility] MAIN OR;  Service: General;  Laterality: N/A;    LAPAROSCOPIC GASTRIC BANDING      BAND REMOVED 2020    PARASTOMAL HERNIA REPAIR N/A 6/26/2023    Procedure: Open Parastomal Hernia Repair;  Surgeon: Alfred Castañeda MD;  Location: [facility] MAIN OR;  Service: General;  Laterality: N/A;    TONSILLECTOMY      VENOUS ACCESS DEVICE (PORT) INSERTION N/A 01/09/2023    Procedure: INSERTION VENOUS ACCESS DEVICE;  Surgeon: Alfred Castañeda MD;  Location: [facility] MAIN OR;  Service: General;  Laterality: N/A;       Family History   Problem Relation Age of Onset    Hypertension Mother     Rheum arthritis Mother     Heart disease Mother     Breast cancer Mother     Diabetes Father     Cancer Maternal Grandmother         colon    Colon cancer Maternal Grandmother     Aneurysm Paternal Grandfather     Diabetes Other     Fibromyalgia Other     Malig Hyperthermia Neg Hx        Social History     Tobacco Use    Smoking status: Former     Current packs/day: 1.00     Average packs/day: 1 pack/day for 50.4 years (50.4 ttl pk-yrs)     Types: Cigarettes     Start date: 1974    Smokeless tobacco: Never    Tobacco comments:        Vaping Use    Vaping status: Never Used   Substance Use Topics    Alcohol use: No    Drug use: Never     Types: Marijuana     Comment: LAST USE 6/19/23       Review of Systems  All systems were reviewed and negative except for:   Review of Systems   Constitutional: Negative for chills, fever and unexpected weight loss.   HENT: Negative for  congestion, nosebleeds and voice change.    Eyes: Negative for blurred vision, double vision and discharge.   Respiratory: Negative for apnea, chest tightness and shortness of breath.    Cardiovascular: Negative for chest pain and leg swelling.   Gastrointestinal:        See HPI   Endocrine: Negative for cold intolerance and heat intolerance.   Genitourinary: Negative for dysuria, hematuria and urgency.   Musculoskeletal: Negative for back pain, joint swelling and neck pain.   Skin: Negative for color change and dry skin.   Neurological: Negative for dizziness and confusion.   Hematological: Negative for adenopathy.   Psychiatric/Behavioral: Negative for agitation and behavioral problems.     MEDS:  Prior to Admission medications    Medication Sig Start Date End Date Taking? Authorizing Provider   atorvastatin (LIPITOR) 20 MG tablet TAKE 1 TABLET BY MOUTH EVERY DAY  Patient taking differently: Take 1 tablet by mouth Daily. 10/1/19  Yes Yudelka Manning MD   baclofen (LIORESAL) 20 MG tablet TAKE 1 TABLET BY MOUTH THREE TIMES DAILY FOR MUSCLE SPASMS 5/1/24  Yes ProviderBessie MD   cyproheptadine (PERIACTIN) 4 MG tablet Take 1 tablet by mouth Every 12 (Twelve) Hours. 10/30/23  Yes ProviderBessie MD   donepezil (ARICEPT) 10 MG tablet Take 1 tablet by mouth Daily. 10/28/22  Yes ProviderBessie MD   DULoxetine (CYMBALTA) 60 MG capsule TAKE 1 capsule BY MOUTH DAILY for mood elevation 3/20/24  Yes Donald Barker MD   gabapentin (NEURONTIN) 600 MG tablet TAKE 1 TABLET BY MOUTH AT BEDTIME  Patient taking differently: Take 1 tablet by mouth 2 (Two) Times a Day As Needed. 7/30/20  Yes Yudelka Manning MD   hydroCHLOROthiazide 12.5 MG tablet TAKE 1 TABLET BY MOUTH DAILY for swelling 2/20/24  Yes Donald Barker MD   ibuprofen (ADVIL,MOTRIN) 600 MG tablet Take 1 tablet by mouth Every 8 (Eight) Hours As Needed. for pain 5/10/24  Yes ProviderBessie MD   letrozole (FEMARA) 2.5 MG tablet Take 1  tablet by mouth Daily. 1/10/24  Yes Donald Barker MD   levothyroxine (SYNTHROID, LEVOTHROID) 50 MCG tablet TAKE 1 TABLET BY MOUTH EVERY DAY  Patient taking differently: Take 1 tablet by mouth Daily. 7/19/19  Yes Yudelka Manning MD   lidocaine (LIDODERM) 5 % Place 1 patch on the skin as directed by provider Daily. Remove & Discard patch within 12 hours or as directed by MD   Yes Bessie Lopez MD   Lidocaine Pain Relief 4 % apply 1 patch to skin daily. leave on for 12 hours, and remove for 12 hours. 5/10/24  Yes Bessie Lopez MD   LORazepam (ATIVAN) 0.5 MG tablet Take 1 tablet by mouth Every 6 (Six) Hours As Needed.   Yes Bessie Lopez MD   losartan (COZAAR) 100 MG tablet Take 0.5 tablets by mouth Daily. INST PER ANESTHESIA PROTOCOL   Yes Bessie Lopez MD   losartan (COZAAR) 50 MG tablet Take 1 tablet by mouth Daily. for blood pressure 5/6/24  Yes Bessie Lopez MD   montelukast (SINGULAIR) 10 MG tablet Take 1 tablet by mouth Daily. 1/17/22  Yes Bessie Lopez MD   Morphine (MS CONTIN) 60 MG 12 hr tablet Take 1 tablet by mouth 2 (Two) Times a Day. 5/1/24  Yes Donald Barker MD   nicotine (NICODERM CQ) 21 MG/24HR patch Place 1 patch on the skin as directed by provider Daily. 5/22/24  Yes Alrfed Castañeda MD   ondansetron (ZOFRAN) 8 MG tablet TAKE 1 TABLET BY MOUTH THREE TIMES DAILY AS NEEDED FOR NAUSEA AND VOMITING 2/5/24  Yes Donald Barker MD   oxybutynin XL (DITROPAN XL) 15 MG 24 hr tablet TAKE 1 TABLET BY MOUTH DAILY for bladder 4/29/24  Yes Donald Barker MD   oxyCODONE-acetaminophen (PERCOCET)  MG per tablet Take 1 tablet by mouth Every 6 (Six) Hours As Needed for Moderate Pain. 5/20/24  Yes Donald Barker MD   potassium chloride (MICRO-K) 10 MEQ CR capsule Take 1 capsule by mouth Every 12 (Twelve) Hours. 2/17/24  Yes Donald Barker MD   prochlorperazine (COMPAZINE) 10 MG tablet  9/29/23  Yes Provider, Bessie, MD   ribociclib  succinate 200 MG tablet therapy pack tablet Take 3 tablets by mouth Take As Directed. Take 3 tablets by mouth daily for 21 days then off 7 days on a 28 day cycle. 8/1/23  Yes Donald Barker MD   rOPINIRole (REQUIP) 3 MG tablet Take 1 tablet by mouth 2 (Two) Times a Day. 6/29/22  Yes Bessie Lopez MD   SudoGest 60 MG tablet Take 1 tablet by mouth Every 8 (Eight) Hours. 11/13/23  Yes Bessie Lopez MD   SUMAtriptan (IMITREX) 100 MG tablet Take 1 tablet by mouth 1 (One) Time As Needed. 10/7/22  Yes Bessie Lopez MD   traZODone (DESYREL) 150 MG tablet TAKE 1 TABLET BY MOUTH ONCE nightly  Patient taking differently: Take 1 tablet by mouth Every Night. 11/12/19  Yes Yudelka Manning MD   ferrous sulfate 324 (65 Fe) MG tablet delayed-release EC tablet Take 1 tablet by mouth Daily With Breakfast.  Patient not taking: Reported on 2/7/2024    Bessie Lopez MD   fluticasone (FLONASE) 50 MCG/ACT nasal spray 2 sprays by Each Nare route Daily.  Patient not taking: Reported on 4/3/2024 2/12/24   Bessie Lopez MD   polyethylene glycol (MIRALAX) 17 g packet Take 17 g by mouth Daily.  Patient not taking: Reported on 6/6/2024 1/9/23   Alfred Castañeda MD        Allergies:  Azithromycin and Erythromycin    Objective     Vital Signs   Resp:  [14] 14    Physical Exam:     General Appearance:    Alert, cooperative, in no acute distress   Head:    Normocephalic, without obvious abnormality, atraumatic   Eyes:          Conjunctivae and sclerae normal, no icterus,     Ears:    Ears appear intact with no abnormalities noted   Throat:   No oral lesions, no thrush, oral mucosa moist   Neck:   No adenopathy, supple, trachea midline, no thyromegaly   Back:     No kyphosis present, no scoliosis present, no skin lesions,      erythema or scars, no tenderness to percussion or                   palpation,   range of motion normal   Lungs:     Clear to auscultation,respirations regular, even and        "           unlabored    Heart:    Regular rhythm and normal rate, normal S1 and S2, no            murmur, no gallop, no rub, no click   Chest Wall:    No abnormalities observed   Abdomen:     Normal bowel sounds, no masses, no organomegaly, soft        non-tender, non-distended, no guarding, no rebound                tenderness, nonreducible left mid quadrant incisional hernia without evidence of obstruction or gangrene   Rectal:        Extremities:   Moves all extremities well, no edema, no cyanosis, no             redness   Pulses:   Pulses palpable and equal bilaterally   Skin:   No bleeding, bruising or rash   Lymph nodes:   No palpable adenopathy   Neurologic:   A/o x 4 with no deficits       Results Review:   {Results Review:92452::\"I reviewed the patient's new clinical results.\"    LABS/IMAGING:  Results for orders placed or performed during the hospital encounter of 05/29/24   Comprehensive metabolic panel    Specimen: Blood   Result Value Ref Range    Glucose 118 (H) 65 - 99 mg/dL    BUN 14 8 - 23 mg/dL    Creatinine 0.98 0.57 - 1.00 mg/dL    Sodium 142 136 - 145 mmol/L    Potassium 3.4 (L) 3.5 - 5.2 mmol/L    Chloride 103 98 - 107 mmol/L    CO2 33.0 (H) 22.0 - 29.0 mmol/L    Calcium 8.5 (L) 8.6 - 10.5 mg/dL    Total Protein 6.7 6.0 - 8.5 g/dL    Albumin 4.0 3.5 - 5.2 g/dL    ALT (SGPT) 11 1 - 33 U/L    AST (SGOT) 14 1 - 32 U/L    Alkaline Phosphatase 94 39 - 117 U/L    Total Bilirubin 0.2 0.0 - 1.2 mg/dL    Globulin 2.7 gm/dL    A/G Ratio 1.5 g/dL    BUN/Creatinine Ratio 14.3 7.0 - 25.0    Anion Gap 6.0 5.0 - 15.0 mmol/L    eGFR 64.6 >60.0 mL/min/1.73   Magnesium    Specimen: Blood   Result Value Ref Range    Magnesium 2.2 1.6 - 2.4 mg/dL   Phosphorus    Specimen: Blood   Result Value Ref Range    Phosphorus 3.0 2.5 - 4.5 mg/dL   CBC Auto Differential    Specimen: Blood   Result Value Ref Range    WBC 4.55 3.40 - 10.80 10*3/mm3    RBC 2.90 (L) 3.77 - 5.28 10*6/mm3    Hemoglobin 9.7 (L) 12.0 - 15.9 g/dL    " Hematocrit 30.9 (L) 34.0 - 46.6 %    .6 (H) 79.0 - 97.0 fL    MCH 33.4 (H) 26.6 - 33.0 pg    MCHC 31.4 (L) 31.5 - 35.7 g/dL    RDW 13.9 12.3 - 15.4 %    RDW-SD 54.6 (H) 37.0 - 54.0 fl    MPV 10.3 6.0 - 12.0 fL    Platelets 171 140 - 450 10*3/mm3    Neutrophil % 63.3 42.7 - 76.0 %    Lymphocyte % 22.6 19.6 - 45.3 %    Monocyte % 13.0 (H) 5.0 - 12.0 %    Eosinophil % 0.0 (L) 0.3 - 6.2 %    Basophil % 0.9 0.0 - 1.5 %    Immature Grans % 0.2 0.0 - 0.5 %    Neutrophils, Absolute 2.88 1.70 - 7.00 10*3/mm3    Lymphocytes, Absolute 1.03 0.70 - 3.10 10*3/mm3    Monocytes, Absolute 0.59 0.10 - 0.90 10*3/mm3    Eosinophils, Absolute 0.00 0.00 - 0.40 10*3/mm3    Basophils, Absolute 0.04 0.00 - 0.20 10*3/mm3    Immature Grans, Absolute 0.01 0.00 - 0.05 10*3/mm3   Comprehensive Metabolic Panel    Specimen: Blood   Result Value Ref Range    Glucose 118 (H) 65 - 99 mg/dL    BUN 15 8 - 23 mg/dL    Creatinine 0.94 0.57 - 1.00 mg/dL    Sodium 140 136 - 145 mmol/L    Potassium 3.4 (L) 3.5 - 5.2 mmol/L    Chloride 101 98 - 107 mmol/L    CO2 29.3 (H) 22.0 - 29.0 mmol/L    Calcium 8.3 (L) 8.6 - 10.5 mg/dL    Total Protein 6.4 6.0 - 8.5 g/dL    Albumin 4.0 3.5 - 5.2 g/dL    ALT (SGPT) 9 1 - 33 U/L    AST (SGOT) 14 1 - 32 U/L    Alkaline Phosphatase 95 39 - 117 U/L    Total Bilirubin 0.2 0.0 - 1.2 mg/dL    Globulin 2.4 gm/dL    A/G Ratio 1.7 g/dL    BUN/Creatinine Ratio 16.0 7.0 - 25.0    Anion Gap 9.7 5.0 - 15.0 mmol/L    eGFR 67.9 >60.0 mL/min/1.73   Ferritin    Specimen: Blood   Result Value Ref Range    Ferritin 56.02 13.00 - 150.00 ng/mL   Iron Profile    Specimen: Blood   Result Value Ref Range    Iron 28 (L) 37 - 145 mcg/dL    Iron Saturation (TSAT) 7 (L) 20 - 50 %    Transferrin 279 200 - 360 mg/dL    TIBC 416 298 - 536 mcg/dL   Magnesium    Specimen: Blood   Result Value Ref Range    Magnesium 2.0 1.6 - 2.4 mg/dL        Result Review :     Assessment & Plan     Status post ex lap with Lynn's procedure for perforated  sigmoid colon 12/6/2022.  Pathology with metastatic lobular breast carcinoma    Status post laparoscopic hand-assisted colostomy closure with resection, open parastomal hernia repair 6/26/2023  Pathology with metastatic lobular carcinoma to portion of descending colon and anastomotic rings    Colonoscopy 6/8/2023 with no evidence of recurrence.    CT abdomen pelvis 2/5/2024 with no evidence of intra-abdominal metastatic disease, constipation noted, incisional   hernia at previous colostomy site noted with no bowel contained in the defect    CT abdomen pelvis April 2024 with left-sided abdominal wall defect with new herniation of portion of transverse colon.      Initial nonreducible left mid quadrant incisional hernia without evidence of obstruction or gangrene discussion with patient.  We reviewed her CT images.  She wishes to have this repaired.  I recommended robotic possible open incisional hernia repair with mesh.  I explained her it would be a difficult procedure and there is a chance I would have to make an open incision.  I explained the procedure and recovery.  Benefits and alternatives discussed.  Risk procedure including risk of anesthesia, bleeding, infection, conversion open, damage to surrounding structures including bowel, hernia recurrence, heart attack, stroke, blood clot, pneumonia, pain discussed.  All questions answered.  She agrees with the plan.  Orders placed.  She was instructed to use chlorhexidine the night before surgery.  Continue not smoking.  Thank you for the consult.          This document has been electronically signed by Alfred Castañeda MD  June 7, 2024 08:57 EDT

## 2024-06-07 NOTE — PROGRESS NOTES
General Surgery/Colorectal Surgery Note    Patient Name:  Derek Keller  YOB: 1960  [id number]    Referring Provider: No ref. provider found      Patient Care Team:  Haile Monique MD as PCP - General (Family Medicine)  Julien Cardona DO as Consulting Physician (Pain Medicine)  Joel Castillo MD as Consulting Physician (Gastroenterology)  Remington Russell MD as Surgeon (General Surgery)  Ahsan Salas MD as Consulting Physician (Sports Medicine)  Donald Barker MD as Consulting Physician (Hematology and Oncology)  Sandy Ricci MD as Consulting Physician (General Surgery)  Alfred Castañeda MD as Consulting Physician (General Surgery)    Chief complaint follow-up    Subjective .     History of present illness:    History of metastatic breast cancer  Status post ex lap with Lynn's procedure for perforated sigmoid colon 12/6/2022.  Pathology with metastatic lobular breast carcinoma    Status post laparoscopic hand-assisted colostomy closure with resection, open parastomal hernia repair 6/26/2023  Pathology with metastatic lobular carcinoma to portion of descending colon and anastomotic rings    Colonoscopy 6/8/2023 with no evidence of recurrence.    CT abdomen pelvis 2/5/2024 with no evidence of intra-abdominal metastatic disease, constipation noted, incisional   hernia at previous colostomy site noted with no bowel contained in the defect    CT abdomen pelvis April 2024 with left-sided abdominal wall defect with new herniation of portion of transverse colon.    Seen for evaluation of a painful bulge at her previous colostomy site.  She is unable to reduce this.     She comes in for follow-up.  Since she was last seen she has quit smoking.  She wishes to have her hernia repaired.  No changes in health or medications otherwise since last seen.  No recent chest pain.  No blood thinner use.      History:  Past Medical History:   Diagnosis Date    Abdominal pain     OSTOMY SITE     Allergic rhinitis     Anemia     NO S/S    Anxiety     Arteriosclerosis     Coronary, follows with Dr. Núñez    Arthritis     Bone metastases 10/14/2022    Bowen's disease     SKIN CANCER    Breast cancer     NO SURGERY WAS DONE DUE TO METS TO BONE/COLON/LYMPHNODE    Cancer related pain 10/13/2022    Depression     Disorder associated with Helicobacter species 10/12/2022    Dysphoric mood     Fatigue     Fibromyalgia     Frequent urination     NO S/S INFECTION    Herpes simplex vulvovaginitis 07/26/2018    History of colon polyps     History of IBS     History of kidney stones     Hyperlipidemia     Hypertension     Hypothyroidism     Insomnia     Lumbago     Mood disorder     Neck pain     R/T CANCER    Palpitations     last time a few months ago    Precordial pain     R/T SPINE CANCER    S/P Laparoscopic Hand-Assisted Colostomy Closure with End to End Anastomosis Open Parastomal Hernia Repair 12/08/2022    Sleep disturbance     SOB (shortness of breath)     at times at rest    SOBOE (shortness of breath on exertion)        Past Surgical History:   Procedure Laterality Date    BREAST BIOPSY Left     CARDIAC CATHETERIZATION Left 1960    CARDIAC CATHETERIZATION N/A 04/06/2018    Procedure: Coronary angiography;  Surgeon: Art Licea MD;  Location: [facility] INVASIVE LOCATION;  Service: Cardiovascular    CARDIAC CATHETERIZATION N/A 04/06/2018    Procedure: Left heart cath;  Surgeon: Art Licea MD;  Location: [facility] CATH INVASIVE LOCATION;  Service: Cardiovascular    CARDIAC CATHETERIZATION N/A 04/06/2018    Procedure: Left ventriculography;  Surgeon: Art Licea MD;  Location: [facility] INVASIVE LOCATION;  Service: Cardiovascular    CHOLECYSTECTOMY      COLON SURGERY      colostomy bag    COLONOSCOPY  11/08/2006    COLONOSCOPY N/A 06/08/2023    Procedure: COLONOSCOPY;  Surgeon: Alfred Castañeda MD;  Location: [facility] ENDOSCOPY;  Service: General;  Laterality: N/A;  same  as preop    EXPLORATORY LAPAROTOMY N/A 12/06/2022    Procedure: LAPAROTOMY EXPLORATORY sigmoid resection hartmans procedure colostomy;  Surgeon: Alfred Castañeda MD;  Location: [facility] MAIN OR;  Service: General;  Laterality: N/A;    GANGLION CYST EXCISION Bilateral     HYSTERECTOMY  05/2005    ILEOSTOMY CLOSURE N/A 6/26/2023    Procedure: Laparoscopic Hand-Assisted Colostomy Closure with End to End Anastomosis;  Surgeon: Alfred Castañeda MD;  Location: [facility] MAIN OR;  Service: General;  Laterality: N/A;    LAPAROSCOPIC GASTRIC BANDING      BAND REMOVED 2020    PARASTOMAL HERNIA REPAIR N/A 6/26/2023    Procedure: Open Parastomal Hernia Repair;  Surgeon: Alfred Castañeda MD;  Location: [facility] MAIN OR;  Service: General;  Laterality: N/A;    TONSILLECTOMY      VENOUS ACCESS DEVICE (PORT) INSERTION N/A 01/09/2023    Procedure: INSERTION VENOUS ACCESS DEVICE;  Surgeon: Alfred Castañeda MD;  Location: [facility] MAIN OR;  Service: General;  Laterality: N/A;       Family History   Problem Relation Age of Onset    Hypertension Mother     Rheum arthritis Mother     Heart disease Mother     Breast cancer Mother     Diabetes Father     Cancer Maternal Grandmother         colon    Colon cancer Maternal Grandmother     Aneurysm Paternal Grandfather     Diabetes Other     Fibromyalgia Other     Malig Hyperthermia Neg Hx        Social History     Tobacco Use    Smoking status: Former     Current packs/day: 1.00     Average packs/day: 1 pack/day for 50.4 years (50.4 ttl pk-yrs)     Types: Cigarettes     Start date: 1974    Smokeless tobacco: Never    Tobacco comments:        Vaping Use    Vaping status: Never Used   Substance Use Topics    Alcohol use: No    Drug use: Never     Types: Marijuana     Comment: LAST USE 6/19/23       Review of Systems  All systems were reviewed and negative except for:   Review of Systems   Constitutional: Negative for chills, fever and unexpected weight loss.   HENT: Negative for  congestion, nosebleeds and voice change.    Eyes: Negative for blurred vision, double vision and discharge.   Respiratory: Negative for apnea, chest tightness and shortness of breath.    Cardiovascular: Negative for chest pain and leg swelling.   Gastrointestinal:        See HPI   Endocrine: Negative for cold intolerance and heat intolerance.   Genitourinary: Negative for dysuria, hematuria and urgency.   Musculoskeletal: Negative for back pain, joint swelling and neck pain.   Skin: Negative for color change and dry skin.   Neurological: Negative for dizziness and confusion.   Hematological: Negative for adenopathy.   Psychiatric/Behavioral: Negative for agitation and behavioral problems.     MEDS:  Prior to Admission medications    Medication Sig Start Date End Date Taking? Authorizing Provider   atorvastatin (LIPITOR) 20 MG tablet TAKE 1 TABLET BY MOUTH EVERY DAY  Patient taking differently: Take 1 tablet by mouth Daily. 10/1/19  Yes Yudelka Manning MD   baclofen (LIORESAL) 20 MG tablet TAKE 1 TABLET BY MOUTH THREE TIMES DAILY FOR MUSCLE SPASMS 5/1/24  Yes ProviderBessie MD   cyproheptadine (PERIACTIN) 4 MG tablet Take 1 tablet by mouth Every 12 (Twelve) Hours. 10/30/23  Yes ProviderBessie MD   donepezil (ARICEPT) 10 MG tablet Take 1 tablet by mouth Daily. 10/28/22  Yes ProviderBessie MD   DULoxetine (CYMBALTA) 60 MG capsule TAKE 1 capsule BY MOUTH DAILY for mood elevation 3/20/24  Yes Donald Barker MD   gabapentin (NEURONTIN) 600 MG tablet TAKE 1 TABLET BY MOUTH AT BEDTIME  Patient taking differently: Take 1 tablet by mouth 2 (Two) Times a Day As Needed. 7/30/20  Yes Yudelka Manning MD   hydroCHLOROthiazide 12.5 MG tablet TAKE 1 TABLET BY MOUTH DAILY for swelling 2/20/24  Yes Donald Barker MD   ibuprofen (ADVIL,MOTRIN) 600 MG tablet Take 1 tablet by mouth Every 8 (Eight) Hours As Needed. for pain 5/10/24  Yes ProviderBessie MD   letrozole (FEMARA) 2.5 MG tablet Take 1  tablet by mouth Daily. 1/10/24  Yes Donald Barker MD   levothyroxine (SYNTHROID, LEVOTHROID) 50 MCG tablet TAKE 1 TABLET BY MOUTH EVERY DAY  Patient taking differently: Take 1 tablet by mouth Daily. 7/19/19  Yes Yudelka Manning MD   lidocaine (LIDODERM) 5 % Place 1 patch on the skin as directed by provider Daily. Remove & Discard patch within 12 hours or as directed by MD   Yes Bessie Lopez MD   Lidocaine Pain Relief 4 % apply 1 patch to skin daily. leave on for 12 hours, and remove for 12 hours. 5/10/24  Yes Bessie Lopez MD   LORazepam (ATIVAN) 0.5 MG tablet Take 1 tablet by mouth Every 6 (Six) Hours As Needed.   Yes Bessie Lopez MD   losartan (COZAAR) 100 MG tablet Take 0.5 tablets by mouth Daily. INST PER ANESTHESIA PROTOCOL   Yes Bessie Lopez MD   losartan (COZAAR) 50 MG tablet Take 1 tablet by mouth Daily. for blood pressure 5/6/24  Yes Bessie Lopez MD   montelukast (SINGULAIR) 10 MG tablet Take 1 tablet by mouth Daily. 1/17/22  Yes Bessie Lopez MD   Morphine (MS CONTIN) 60 MG 12 hr tablet Take 1 tablet by mouth 2 (Two) Times a Day. 5/1/24  Yes Donald Barker MD   nicotine (NICODERM CQ) 21 MG/24HR patch Place 1 patch on the skin as directed by provider Daily. 5/22/24  Yes Alfred Castañeda MD   ondansetron (ZOFRAN) 8 MG tablet TAKE 1 TABLET BY MOUTH THREE TIMES DAILY AS NEEDED FOR NAUSEA AND VOMITING 2/5/24  Yes Donald Barker MD   oxybutynin XL (DITROPAN XL) 15 MG 24 hr tablet TAKE 1 TABLET BY MOUTH DAILY for bladder 4/29/24  Yes Donald Barker MD   oxyCODONE-acetaminophen (PERCOCET)  MG per tablet Take 1 tablet by mouth Every 6 (Six) Hours As Needed for Moderate Pain. 5/20/24  Yes Donald Barker MD   potassium chloride (MICRO-K) 10 MEQ CR capsule Take 1 capsule by mouth Every 12 (Twelve) Hours. 2/17/24  Yes Donald Barker MD   prochlorperazine (COMPAZINE) 10 MG tablet  9/29/23  Yes Provider, Bessie, MD   ribociclib  succinate 200 MG tablet therapy pack tablet Take 3 tablets by mouth Take As Directed. Take 3 tablets by mouth daily for 21 days then off 7 days on a 28 day cycle. 8/1/23  Yes Donald Barker MD   rOPINIRole (REQUIP) 3 MG tablet Take 1 tablet by mouth 2 (Two) Times a Day. 6/29/22  Yes Bessie Lopez MD   SudoGest 60 MG tablet Take 1 tablet by mouth Every 8 (Eight) Hours. 11/13/23  Yes Bessie Lopez MD   SUMAtriptan (IMITREX) 100 MG tablet Take 1 tablet by mouth 1 (One) Time As Needed. 10/7/22  Yes Bessie Lopez MD   traZODone (DESYREL) 150 MG tablet TAKE 1 TABLET BY MOUTH ONCE nightly  Patient taking differently: Take 1 tablet by mouth Every Night. 11/12/19  Yes Yudelka Manning MD   ferrous sulfate 324 (65 Fe) MG tablet delayed-release EC tablet Take 1 tablet by mouth Daily With Breakfast.  Patient not taking: Reported on 2/7/2024    Bessie Lopez MD   fluticasone (FLONASE) 50 MCG/ACT nasal spray 2 sprays by Each Nare route Daily.  Patient not taking: Reported on 4/3/2024 2/12/24   Bessie Lopez MD   polyethylene glycol (MIRALAX) 17 g packet Take 17 g by mouth Daily.  Patient not taking: Reported on 6/6/2024 1/9/23   Alfred Castañeda MD        Allergies:  Azithromycin and Erythromycin    Objective     Vital Signs   Resp:  [14] 14    Physical Exam:     General Appearance:    Alert, cooperative, in no acute distress   Head:    Normocephalic, without obvious abnormality, atraumatic   Eyes:          Conjunctivae and sclerae normal, no icterus,     Ears:    Ears appear intact with no abnormalities noted   Throat:   No oral lesions, no thrush, oral mucosa moist   Neck:   No adenopathy, supple, trachea midline, no thyromegaly   Back:     No kyphosis present, no scoliosis present, no skin lesions,      erythema or scars, no tenderness to percussion or                   palpation,   range of motion normal   Lungs:     Clear to auscultation,respirations regular, even and        "           unlabored    Heart:    Regular rhythm and normal rate, normal S1 and S2, no            murmur, no gallop, no rub, no click   Chest Wall:    No abnormalities observed   Abdomen:     Normal bowel sounds, no masses, no organomegaly, soft        non-tender, non-distended, no guarding, no rebound                tenderness, nonreducible left mid quadrant incisional hernia without evidence of obstruction or gangrene   Rectal:        Extremities:   Moves all extremities well, no edema, no cyanosis, no             redness   Pulses:   Pulses palpable and equal bilaterally   Skin:   No bleeding, bruising or rash   Lymph nodes:   No palpable adenopathy   Neurologic:   A/o x 4 with no deficits       Results Review:   {Results Review:14596::\"I reviewed the patient's new clinical results.\"    LABS/IMAGING:  Results for orders placed or performed during the hospital encounter of 05/29/24   Comprehensive metabolic panel    Specimen: Blood   Result Value Ref Range    Glucose 118 (H) 65 - 99 mg/dL    BUN 14 8 - 23 mg/dL    Creatinine 0.98 0.57 - 1.00 mg/dL    Sodium 142 136 - 145 mmol/L    Potassium 3.4 (L) 3.5 - 5.2 mmol/L    Chloride 103 98 - 107 mmol/L    CO2 33.0 (H) 22.0 - 29.0 mmol/L    Calcium 8.5 (L) 8.6 - 10.5 mg/dL    Total Protein 6.7 6.0 - 8.5 g/dL    Albumin 4.0 3.5 - 5.2 g/dL    ALT (SGPT) 11 1 - 33 U/L    AST (SGOT) 14 1 - 32 U/L    Alkaline Phosphatase 94 39 - 117 U/L    Total Bilirubin 0.2 0.0 - 1.2 mg/dL    Globulin 2.7 gm/dL    A/G Ratio 1.5 g/dL    BUN/Creatinine Ratio 14.3 7.0 - 25.0    Anion Gap 6.0 5.0 - 15.0 mmol/L    eGFR 64.6 >60.0 mL/min/1.73   Magnesium    Specimen: Blood   Result Value Ref Range    Magnesium 2.2 1.6 - 2.4 mg/dL   Phosphorus    Specimen: Blood   Result Value Ref Range    Phosphorus 3.0 2.5 - 4.5 mg/dL   CBC Auto Differential    Specimen: Blood   Result Value Ref Range    WBC 4.55 3.40 - 10.80 10*3/mm3    RBC 2.90 (L) 3.77 - 5.28 10*6/mm3    Hemoglobin 9.7 (L) 12.0 - 15.9 g/dL    " Hematocrit 30.9 (L) 34.0 - 46.6 %    .6 (H) 79.0 - 97.0 fL    MCH 33.4 (H) 26.6 - 33.0 pg    MCHC 31.4 (L) 31.5 - 35.7 g/dL    RDW 13.9 12.3 - 15.4 %    RDW-SD 54.6 (H) 37.0 - 54.0 fl    MPV 10.3 6.0 - 12.0 fL    Platelets 171 140 - 450 10*3/mm3    Neutrophil % 63.3 42.7 - 76.0 %    Lymphocyte % 22.6 19.6 - 45.3 %    Monocyte % 13.0 (H) 5.0 - 12.0 %    Eosinophil % 0.0 (L) 0.3 - 6.2 %    Basophil % 0.9 0.0 - 1.5 %    Immature Grans % 0.2 0.0 - 0.5 %    Neutrophils, Absolute 2.88 1.70 - 7.00 10*3/mm3    Lymphocytes, Absolute 1.03 0.70 - 3.10 10*3/mm3    Monocytes, Absolute 0.59 0.10 - 0.90 10*3/mm3    Eosinophils, Absolute 0.00 0.00 - 0.40 10*3/mm3    Basophils, Absolute 0.04 0.00 - 0.20 10*3/mm3    Immature Grans, Absolute 0.01 0.00 - 0.05 10*3/mm3   Comprehensive Metabolic Panel    Specimen: Blood   Result Value Ref Range    Glucose 118 (H) 65 - 99 mg/dL    BUN 15 8 - 23 mg/dL    Creatinine 0.94 0.57 - 1.00 mg/dL    Sodium 140 136 - 145 mmol/L    Potassium 3.4 (L) 3.5 - 5.2 mmol/L    Chloride 101 98 - 107 mmol/L    CO2 29.3 (H) 22.0 - 29.0 mmol/L    Calcium 8.3 (L) 8.6 - 10.5 mg/dL    Total Protein 6.4 6.0 - 8.5 g/dL    Albumin 4.0 3.5 - 5.2 g/dL    ALT (SGPT) 9 1 - 33 U/L    AST (SGOT) 14 1 - 32 U/L    Alkaline Phosphatase 95 39 - 117 U/L    Total Bilirubin 0.2 0.0 - 1.2 mg/dL    Globulin 2.4 gm/dL    A/G Ratio 1.7 g/dL    BUN/Creatinine Ratio 16.0 7.0 - 25.0    Anion Gap 9.7 5.0 - 15.0 mmol/L    eGFR 67.9 >60.0 mL/min/1.73   Ferritin    Specimen: Blood   Result Value Ref Range    Ferritin 56.02 13.00 - 150.00 ng/mL   Iron Profile    Specimen: Blood   Result Value Ref Range    Iron 28 (L) 37 - 145 mcg/dL    Iron Saturation (TSAT) 7 (L) 20 - 50 %    Transferrin 279 200 - 360 mg/dL    TIBC 416 298 - 536 mcg/dL   Magnesium    Specimen: Blood   Result Value Ref Range    Magnesium 2.0 1.6 - 2.4 mg/dL        Result Review :     Assessment & Plan     Status post ex lap with Lynn's procedure for perforated  sigmoid colon 12/6/2022.  Pathology with metastatic lobular breast carcinoma    Status post laparoscopic hand-assisted colostomy closure with resection, open parastomal hernia repair 6/26/2023  Pathology with metastatic lobular carcinoma to portion of descending colon and anastomotic rings    Colonoscopy 6/8/2023 with no evidence of recurrence.    CT abdomen pelvis 2/5/2024 with no evidence of intra-abdominal metastatic disease, constipation noted, incisional   hernia at previous colostomy site noted with no bowel contained in the defect    CT abdomen pelvis April 2024 with left-sided abdominal wall defect with new herniation of portion of transverse colon.      Initial nonreducible left mid quadrant incisional hernia without evidence of obstruction or gangrene discussion with patient.  We reviewed her CT images.  She wishes to have this repaired.  I recommended robotic possible open incisional hernia repair with mesh.  I explained her it would be a difficult procedure and there is a chance I would have to make an open incision.  I explained the procedure and recovery.  Benefits and alternatives discussed.  Risk procedure including risk of anesthesia, bleeding, infection, conversion open, damage to surrounding structures including bowel, hernia recurrence, heart attack, stroke, blood clot, pneumonia, pain discussed.  All questions answered.  She agrees with the plan.  Orders placed.  She was instructed to use chlorhexidine the night before surgery.  Continue not smoking.  Thank you for the consult.          This document has been electronically signed by Alfred Castaeñda MD  June 7, 2024 08:57 EDT

## 2024-06-11 ENCOUNTER — TELEPHONE (OUTPATIENT)
Dept: SURGERY | Facility: CLINIC | Age: 64
End: 2024-06-11
Payer: MEDICARE

## 2024-06-11 DIAGNOSIS — G89.3 CANCER RELATED PAIN: ICD-10-CM

## 2024-06-11 RX ORDER — MORPHINE SULFATE 60 MG/1
60 TABLET, FILM COATED, EXTENDED RELEASE ORAL 2 TIMES DAILY
Qty: 60 TABLET | Refills: 0 | Status: SHIPPED | OUTPATIENT
Start: 2024-06-11

## 2024-06-11 RX ORDER — OXYCODONE AND ACETAMINOPHEN 10; 325 MG/1; MG/1
1 TABLET ORAL EVERY 6 HOURS PRN
Qty: 60 TABLET | Refills: 0 | Status: SHIPPED | OUTPATIENT
Start: 2024-06-11

## 2024-06-11 NOTE — TELEPHONE ENCOUNTER
Caller: Krishna Derek J    Relationship: Self    Best call back number: [phone]     Requested Prescriptions:   Requested Prescriptions     Pending Prescriptions Disp Refills    Morphine (MS CONTIN) 60 MG 12 hr tablet 60 tablet 0     Sig: Take 1 tablet by mouth 2 (Two) Times a Day.    oxyCODONE-acetaminophen (PERCOCET)  MG per tablet 60 tablet 0     Sig: Take 1 tablet by mouth Every 6 (Six) Hours As Needed for Moderate Pain.        Pharmacy where request should be sent: [facility], [facility], & [facility] [place], [place] - [address] - [phone]  - [phone] FX     Last office visit with prescribing clinician: 5/29/2024   Last telemedicine visit with prescribing clinician: Visit date not found   Next office visit with prescribing clinician: 8/1/2024         Does the patient have less than a 3 day supply:  [x] Yes  [] No    Would you like a call back once the refill request has been completed: [] Yes [x] No    If the office needs to give you a call back, can they leave a voicemail: [] Yes [x] No    Abby Burnett Rep   06/11/24 14:00 EDT

## 2024-06-11 NOTE — TELEPHONE ENCOUNTER
Patient was last seen on 6/6/24. She is scheduled for surgery with Dr. Castañeda on 6/28/24. She is also scheduled for another appointment with Dr. Castañeda on 6/26/24. She wants to know if this appointment is necessary as she has seen him recently and is scheduled for surgery 2 days later. Please call patient

## 2024-06-12 DIAGNOSIS — G89.3 CANCER RELATED PAIN: ICD-10-CM

## 2024-06-12 DIAGNOSIS — Z17.0 MALIGNANT NEOPLASM OF UPPER-OUTER QUADRANT OF LEFT BREAST IN FEMALE, ESTROGEN RECEPTOR POSITIVE: ICD-10-CM

## 2024-06-12 DIAGNOSIS — C50.412 MALIGNANT NEOPLASM OF UPPER-OUTER QUADRANT OF LEFT BREAST IN FEMALE, ESTROGEN RECEPTOR POSITIVE: ICD-10-CM

## 2024-06-12 DIAGNOSIS — F33.9 MONOPOLAR DEPRESSION: ICD-10-CM

## 2024-06-12 DIAGNOSIS — R23.2 HOT FLASHES: ICD-10-CM

## 2024-06-12 RX ORDER — DULOXETIN HYDROCHLORIDE 60 MG/1
CAPSULE, DELAYED RELEASE ORAL
Qty: 30 CAPSULE | Refills: 1 | Status: SHIPPED | OUTPATIENT
Start: 2024-06-12

## 2024-06-12 RX ORDER — OXYBUTYNIN CHLORIDE 15 MG/1
TABLET, EXTENDED RELEASE ORAL
Qty: 30 TABLET | Refills: 1 | Status: SHIPPED | OUTPATIENT
Start: 2024-06-12

## 2024-06-17 ENCOUNTER — TELEPHONE (OUTPATIENT)
Dept: SURGERY | Facility: CLINIC | Age: 64
End: 2024-06-17
Payer: MEDICARE

## 2024-06-17 NOTE — TELEPHONE ENCOUNTER
I lmom for Pt to call the office back. Please let the Pt know that Dr. Castañeda is out of the office and have the Pt call her PCP. HUB ok to give the PT the message.

## 2024-06-17 NOTE — TELEPHONE ENCOUNTER
PATIENT CALLED AND SAID SHE HAS SURGERY WITH DR. HESS ON 06/28/24.    HE PRESCRIBED NICODERM 21 MG PATCHES FOR HER.  SHE SAID THERE WERE 14 PATCHES.  SHE HAS TAKEN FOR 10 DAYS, AND HASN'T SMOKED FOR 10 DAYS.    SHE SAID HER URGE TO SMOKE IS GETTING STRONGER, AND SHE ASKED IF SHE CAN BE PRESCRIBED SOMETHING STRONGER.    PHARMACY VERIFIED.    DR. HESS IS OUT THIS WEEK.  APRIL, IS THIS SOMETHING YOU MIGHT ADDRESS?    #[phone]

## 2024-06-17 NOTE — TELEPHONE ENCOUNTER
PATIENT CALLED AND I TOLD HER, PER APRIL:  Patient will need to speak with her PCP about this.

## 2024-06-19 ENCOUNTER — TELEPHONE (OUTPATIENT)
Dept: SURGERY | Facility: CLINIC | Age: 64
End: 2024-06-19
Payer: MEDICARE

## 2024-06-19 NOTE — TELEPHONE ENCOUNTER
Patient can proceed to the ER if she feels necessary. I can not be sure what is going on without me doing an assessment.

## 2024-06-19 NOTE — TELEPHONE ENCOUNTER
"PATIENT CALLED AND SAID SHE HAS A FUNNY FEELING AT THE HERNIA SITE.  SHE SAID IT IS A \"CRAWLING\" , HORRIBLE FEELING.  IT IS ITCHING ON THE OUTSIDE.   IT FEELS WARM, BUT NO MORE THAN USUAL.  SHE WANTS TO KNOW IF THIS IS NORMAL.    SHE IS SCHEDULED FOR SURGERY WITH DR. HESS ON 06/28/24.    SHE SAID HER COLON BURST A COUPLE OF YEARS AGO.  SHE HAS HAD SURGERY IN THIS AREA BEFORE, WHEN SHE GOT THE COLOSTOMY BAG.    APRIL, IS THIS SOMETHING YOU MIGHT ADDRESS?  "

## 2024-06-21 ENCOUNTER — TELEPHONE (OUTPATIENT)
Dept: ONCOLOGY | Facility: HOSPITAL | Age: 64
End: 2024-06-21
Payer: MEDICARE

## 2024-06-21 NOTE — TELEPHONE ENCOUNTER
LEFT PATIENT DETAILED MESSAGE LETTING HER KNOW THAT I HAVE COMBINED HER APPTS PER REQUEST AND THAT SHE WILL NEED TO BE HERE AT 1: 15 PM FOR LABS.

## 2024-06-21 NOTE — TELEPHONE ENCOUNTER
Caller: Derek Keller    Relationship: Self    Best call back number: [phone]      Who are you requesting to speak with (clinical staff, provider,  specific staff member): SCHEDULING    What was the call regarding: PT REQUESTING HER 7/30 AND 8/1 APPTS BE COMBINED ON THE SAME DAY    PLEASE CALL TO ADVISE

## 2024-06-27 ENCOUNTER — ANESTHESIA EVENT (OUTPATIENT)
Dept: PERIOP | Facility: HOSPITAL | Age: 64
End: 2024-06-27

## 2024-06-28 ENCOUNTER — TELEPHONE (OUTPATIENT)
Dept: SURGERY | Facility: CLINIC | Age: 64
End: 2024-06-28
Payer: MEDICARE

## 2024-06-28 ENCOUNTER — ANESTHESIA (OUTPATIENT)
Dept: PERIOP | Facility: HOSPITAL | Age: 64
End: 2024-06-28

## 2024-06-28 ENCOUNTER — PREP FOR SURGERY (OUTPATIENT)
Dept: OTHER | Facility: HOSPITAL | Age: 64
End: 2024-06-28
Payer: MEDICARE

## 2024-06-28 ENCOUNTER — HOSPITAL ENCOUNTER (OUTPATIENT)
Facility: HOSPITAL | Age: 64
Setting detail: HOSPITAL OUTPATIENT SURGERY
Discharge: HOME | End: 2024-06-28
Attending: SURGERY | Admitting: SURGERY
Payer: MEDICARE

## 2024-06-28 VITALS
RESPIRATION RATE: 18 BRPM | WEIGHT: 189.6 LBS | DIASTOLIC BLOOD PRESSURE: 48 MMHG | OXYGEN SATURATION: 99 % | SYSTOLIC BLOOD PRESSURE: 136 MMHG | TEMPERATURE: 97.6 F | HEIGHT: 66 IN | HEART RATE: 53 BPM | BODY MASS INDEX: 30.47 KG/M2

## 2024-06-28 DIAGNOSIS — K43.2 INCISIONAL HERNIA, WITHOUT OBSTRUCTION OR GANGRENE: Primary | ICD-10-CM

## 2024-06-28 DIAGNOSIS — K43.2 INCISIONAL HERNIA, WITHOUT OBSTRUCTION OR GANGRENE: ICD-10-CM

## 2024-06-28 DIAGNOSIS — I44.7 LEFT BUNDLE BRANCH BLOCK: Primary | ICD-10-CM

## 2024-06-28 LAB
ANION GAP SERPL CALCULATED.3IONS-SCNC: 9.8 MMOL/L (ref 5–15)
BASOPHILS # BLD AUTO: 0.04 10*3/MM3 (ref 0–0.2)
BASOPHILS NFR BLD AUTO: 1.1 % (ref 0–1.5)
BUN SERPL-MCNC: 23 MG/DL (ref 8–23)
BUN/CREAT SERPL: 22.8 (ref 7–25)
CALCIUM SPEC-SCNC: 9.3 MG/DL (ref 8.6–10.5)
CHLORIDE SERPL-SCNC: 101 MMOL/L (ref 98–107)
CO2 SERPL-SCNC: 30.2 MMOL/L (ref 22–29)
CREAT SERPL-MCNC: 1.01 MG/DL (ref 0.57–1)
DEPRECATED RDW RBC AUTO: 55.7 FL (ref 37–54)
EGFRCR SERPLBLD CKD-EPI 2021: 62.3 ML/MIN/1.73
EOSINOPHIL # BLD AUTO: 0.02 10*3/MM3 (ref 0–0.4)
EOSINOPHIL NFR BLD AUTO: 0.5 % (ref 0.3–6.2)
ERYTHROCYTE [DISTWIDTH] IN BLOOD BY AUTOMATED COUNT: 14.4 % (ref 12.3–15.4)
GLUCOSE SERPL-MCNC: 82 MG/DL (ref 65–99)
HCT VFR BLD AUTO: 30 % (ref 34–46.6)
HGB BLD-MCNC: 9.4 G/DL (ref 12–15.9)
IMM GRANULOCYTES # BLD AUTO: 0.01 10*3/MM3 (ref 0–0.05)
IMM GRANULOCYTES NFR BLD AUTO: 0.3 % (ref 0–0.5)
LYMPHOCYTES # BLD AUTO: 1.22 10*3/MM3 (ref 0.7–3.1)
LYMPHOCYTES NFR BLD AUTO: 32.6 % (ref 19.6–45.3)
MCH RBC QN AUTO: 32.9 PG (ref 26.6–33)
MCHC RBC AUTO-ENTMCNC: 31.3 G/DL (ref 31.5–35.7)
MCV RBC AUTO: 104.9 FL (ref 79–97)
MONOCYTES # BLD AUTO: 0.4 10*3/MM3 (ref 0.1–0.9)
MONOCYTES NFR BLD AUTO: 10.7 % (ref 5–12)
NEUTROPHILS NFR BLD AUTO: 2.05 10*3/MM3 (ref 1.7–7)
NEUTROPHILS NFR BLD AUTO: 54.8 % (ref 42.7–76)
NRBC BLD AUTO-RTO: 0 /100 WBC (ref 0–0.2)
PLATELET # BLD AUTO: 187 10*3/MM3 (ref 140–450)
PMV BLD AUTO: 9.9 FL (ref 6–12)
POTASSIUM SERPL-SCNC: 4 MMOL/L (ref 3.5–5.2)
RBC # BLD AUTO: 2.86 10*6/MM3 (ref 3.77–5.28)
SODIUM SERPL-SCNC: 141 MMOL/L (ref 136–145)
WBC NRBC COR # BLD AUTO: 3.74 10*3/MM3 (ref 3.4–10.8)

## 2024-06-28 PROCEDURE — 80048 BASIC METABOLIC PNL TOTAL CA: CPT | Performed by: ANESTHESIOLOGY

## 2024-06-28 PROCEDURE — 85025 COMPLETE CBC W/AUTO DIFF WBC: CPT | Performed by: ANESTHESIOLOGY

## 2024-06-28 PROCEDURE — G0463 HOSPITAL OUTPT CLINIC VISIT: HCPCS | Performed by: SURGERY

## 2024-06-28 RX ORDER — SODIUM CHLORIDE 0.9 % (FLUSH) 0.9 %
10 SYRINGE (ML) INJECTION AS NEEDED
Status: DISCONTINUED | OUTPATIENT
Start: 2024-06-28 | End: 2024-07-01 | Stop reason: HOSPADM

## 2024-06-28 RX ORDER — SODIUM CHLORIDE 0.9 % (FLUSH) 0.9 %
10 SYRINGE (ML) INJECTION AS NEEDED
OUTPATIENT
Start: 2024-06-28

## 2024-06-28 RX ORDER — SODIUM CHLORIDE, SODIUM LACTATE, POTASSIUM CHLORIDE, CALCIUM CHLORIDE 600; 310; 30; 20 MG/100ML; MG/100ML; MG/100ML; MG/100ML
50 INJECTION, SOLUTION INTRAVENOUS CONTINUOUS
OUTPATIENT
Start: 2024-06-28

## 2024-06-28 RX ORDER — SODIUM CHLORIDE, SODIUM LACTATE, POTASSIUM CHLORIDE, CALCIUM CHLORIDE 600; 310; 30; 20 MG/100ML; MG/100ML; MG/100ML; MG/100ML
50 INJECTION, SOLUTION INTRAVENOUS CONTINUOUS
Status: DISCONTINUED | OUTPATIENT
Start: 2024-06-28 | End: 2024-07-01 | Stop reason: HOSPADM

## 2024-06-28 RX ORDER — SODIUM CHLORIDE 9 MG/ML
40 INJECTION, SOLUTION INTRAVENOUS AS NEEDED
OUTPATIENT
Start: 2024-06-28

## 2024-06-28 RX ORDER — SODIUM CHLORIDE 0.9 % (FLUSH) 0.9 %
10 SYRINGE (ML) INJECTION EVERY 12 HOURS SCHEDULED
Status: DISCONTINUED | OUTPATIENT
Start: 2024-06-28 | End: 2024-07-01 | Stop reason: HOSPADM

## 2024-06-28 RX ORDER — SODIUM CHLORIDE 9 MG/ML
40 INJECTION, SOLUTION INTRAVENOUS AS NEEDED
Status: DISCONTINUED | OUTPATIENT
Start: 2024-06-28 | End: 2024-07-01 | Stop reason: HOSPADM

## 2024-06-28 RX ORDER — SODIUM CHLORIDE 0.9 % (FLUSH) 0.9 %
10 SYRINGE (ML) INJECTION EVERY 12 HOURS SCHEDULED
OUTPATIENT
Start: 2024-06-28

## 2024-06-28 NOTE — TELEPHONE ENCOUNTER
Please sign pended referral. I put her diagnosis as left bundle branch block. Please let me know if this is correct

## 2024-06-28 NOTE — TELEPHONE ENCOUNTER
Patient has been rescheduled for 07-03-24, scheduled with Rachelle in surgery scheduling. Patient aware of date/time and voiced understanding.

## 2024-06-28 NOTE — ANESTHESIA PREPROCEDURE EVALUATION
Anesthesia Evaluation     Patient summary reviewed and Nursing notes reviewed   no history of anesthetic complications:   NPO Solid Status: > 8 hours  NPO Liquid Status: > 2 hours           Airway   Mallampati: II  TM distance: >3 FB  Neck ROM: full  No difficulty expected  Dental    (+) upper dentures    Pulmonary - normal exam    breath sounds clear to auscultation  (+) a smoker Current, Smoked day of surgery, COPD mild, asthma,shortness of breath  Cardiovascular - normal exam  Exercise tolerance: poor (<4 METS)    Rhythm: regular  Rate: normal    (+) hypertension well controlled less than 2 medications, NAVARRETE, hyperlipidemia      Neuro/Psych  (+) psychiatric history Depression  GI/Hepatic/Renal/Endo    (+) renal disease- stones, thyroid problem hypothyroidism    ROS Comment: Nausea this morning    Musculoskeletal     (+) back pain, neck pain, radiculopathy Left upper extremity  Abdominal    Substance History - negative use     OB/GYN negative ob/gyn ROS         Other   arthritis,   history of cancer (bone/spine mets from breast ca) active    ROS/Med Hx Other: <4mets, occas soa w/rest. HTN, palpitation, smoker, breast ca w/mets to bone, colon and lymphnodes. No CP/SOA at PAT appt. WB WNL. EKG 12/2022 SR nonsp T abn. Stress test 11/2020 neg ischemia. Cath 2018. Takes MS Contin 45 mg bid/Percocet 10/325 q6h. LMA     Case cancelled, pt with new onset LBBB on EKG that wasn't present on previous EKG (2022)                    Anesthesia Plan    ASA 4     general     (Patient understands anesthesia not responsible for dental damage.)  intravenous induction     Anesthetic plan, risks, benefits, and alternatives have been provided, discussed and informed consent has been obtained with: patient.  Pre-procedure education provided  Use of blood products discussed with patient  Consented to blood products.    Plan discussed with CRNA.        CODE STATUS:

## 2024-07-01 ENCOUNTER — ANESTHESIA EVENT (OUTPATIENT)
Dept: PERIOP | Facility: HOSPITAL | Age: 64
End: 2024-07-01
Payer: MEDICARE

## 2024-07-02 NOTE — PRE-PROCEDURE INSTRUCTIONS
IMPORTANT INSTRUCTIONS - PRE-ADMISSION TESTING  DO NOT EAT/DRINK OR CHEW anything after midnight the night before your procedure.      Take the following medications the morning of your procedure with JUST A SIP OF WATER:  _MORPHINE, ATIVAN, PERCOCET, BACLOFEN, CYMBALTA, OXYBUTYNIN, REQUIP ______________________________________________________________________________________________________________________________________________________________________________________    DO NOT BRING your medications to the hospital with you, UNLESS something has changed since your PRE-Admission Testing appointment.  Hold all vitamins, supplements, and NSAIDS (Non- steroidal anti-inflammatory meds) for one week prior to surgery (you MAY take Tylenol or Acetaminophen).  If you are diabetic, check your blood sugar the morning of your procedure. If it is less than 70 or if you are feeling symptomatic, call the following number for further instructions: [phone]  Use your inhalers/nebulizers as usual, the morning of your procedure. BRING YOUR INHALERS with you.   Bring your CPAP or BIPAP to hospital, ONLY IF YOU WILL BE SPENDING THE NIGHT.   Make sure you have a ride home and have someone who will stay with you the day of your procedure after you go home.  If you have any questions, please call your Pre-Admission Testing Nurse, KATHERYN__ at [phone]____.   Per anesthesia request, do not smoke for 24 hours before your procedure or as instructed by your surgeon.    PREOPERATIVE (BEFORE SURGERY)              BATHING INSTRUCTIONS  Instructions:    You will need to shower 1 times utilizing the soap provided; at the times indicated   below:     07/3/24     Wash your hair and face with normal shampoo and soap, rinse it well before using the surgical soap.      In the shower, wet the skin completely with water from your neck to your feet. Apply the cleanser to your   body ONLY FROM THE NECK TO YOUR FEET.     Do NOT USE THE CLEANSER  ON YOUR FACE, HEAD, OR GENITAL (PRIVATE) AREAS.   Keep it out of your eyes, ears, and mouth because of the risk of injury to those areas.      Scrub with a clean washcloth for each bath utilizing the soap provided from the top of your body to the   bottom starting at the neck area.      Pay close attention to your armpits, groin area, and the site of surgery.      Wash your body gently for 5 minutes. Stand outside the stream or turn off the water while scrubbing your   body. Do NOT wash with your regular soap after the surgical cleanser is used.      RINSE THE CLEANSER OFF COMPLETELY with plenty of water. Rinse the area again thoroughly.      Dry off with a clean towel. The surgical soap can cause dryness; however do NOT APPLY LOTION,   CREAM, POWDER, and/or DEODORANT AFTER SHOWERING.     Be sure to where clean clothes after showering.      Ensure CLEAN BED LINENS AFTER FIRST wash with the surgical soap.      NO PETS ALLOWED IN THE BED with you after utilizing the surgical soap.

## 2024-07-03 ENCOUNTER — ANESTHESIA (OUTPATIENT)
Dept: PERIOP | Facility: HOSPITAL | Age: 64
End: 2024-07-03
Payer: MEDICARE

## 2024-07-03 ENCOUNTER — HOSPITAL ENCOUNTER (OUTPATIENT)
Facility: HOSPITAL | Age: 64
Setting detail: HOSPITAL OUTPATIENT SURGERY
Discharge: HOME | End: 2024-07-03
Attending: SURGERY | Admitting: SURGERY
Payer: MEDICARE

## 2024-07-03 VITALS
WEIGHT: 186.73 LBS | HEIGHT: 66 IN | DIASTOLIC BLOOD PRESSURE: 64 MMHG | SYSTOLIC BLOOD PRESSURE: 130 MMHG | RESPIRATION RATE: 16 BRPM | BODY MASS INDEX: 30.01 KG/M2 | HEART RATE: 69 BPM | OXYGEN SATURATION: 99 % | TEMPERATURE: 97.8 F

## 2024-07-03 DIAGNOSIS — K43.2 INCISIONAL HERNIA, WITHOUT OBSTRUCTION OR GANGRENE: ICD-10-CM

## 2024-07-03 LAB
QT INTERVAL: 497 MS
QTC INTERVAL: 471 MS

## 2024-07-03 PROCEDURE — 25010000002 ONDANSETRON PER 1 MG: Performed by: NURSE ANESTHETIST, CERTIFIED REGISTERED

## 2024-07-03 PROCEDURE — 25010000002 DEXAMETHASONE SODIUM PHOSPHATE 100 MG/10ML SOLUTION: Performed by: SURGERY

## 2024-07-03 PROCEDURE — 25010000002 DEXAMETHASONE PER 1 MG: Performed by: NURSE ANESTHETIST, CERTIFIED REGISTERED

## 2024-07-03 PROCEDURE — 49616 RPR AA HRN RCR 3-10 NCR/STRN: CPT

## 2024-07-03 PROCEDURE — 88312 SPECIAL STAINS GROUP 1: CPT | Performed by: SURGERY

## 2024-07-03 PROCEDURE — 25810000003 LACTATED RINGERS PER 1000 ML: Performed by: ANESTHESIOLOGY

## 2024-07-03 PROCEDURE — 25010000002 BUPRENORPHINE PER 0.1 MG: Performed by: SURGERY

## 2024-07-03 PROCEDURE — 88305 TISSUE EXAM BY PATHOLOGIST: CPT | Performed by: SURGERY

## 2024-07-03 PROCEDURE — 25010000002 MIDAZOLAM PER 1MG: Performed by: ANESTHESIOLOGY

## 2024-07-03 PROCEDURE — 25010000002 SUGAMMADEX 200 MG/2ML SOLUTION: Performed by: NURSE ANESTHETIST, CERTIFIED REGISTERED

## 2024-07-03 PROCEDURE — 25010000002 GLYCOPYRROLATE 0.2 MG/ML SOLUTION: Performed by: NURSE ANESTHETIST, CERTIFIED REGISTERED

## 2024-07-03 PROCEDURE — 25010000002 HYDROMORPHONE 1 MG/ML SOLUTION: Performed by: NURSE ANESTHETIST, CERTIFIED REGISTERED

## 2024-07-03 PROCEDURE — 25010000002 PROPOFOL 10 MG/ML EMULSION: Performed by: NURSE ANESTHETIST, CERTIFIED REGISTERED

## 2024-07-03 PROCEDURE — 25010000002 BUPIVACAINE (PF) 0.25 % SOLUTION: Performed by: SURGERY

## 2024-07-03 PROCEDURE — 25010000002 FENTANYL CITRATE (PF) 50 MCG/ML SOLUTION: Performed by: NURSE ANESTHETIST, CERTIFIED REGISTERED

## 2024-07-03 PROCEDURE — 49616 RPR AA HRN RCR 3-10 NCR/STRN: CPT | Performed by: SURGERY

## 2024-07-03 PROCEDURE — 25010000002 HEPARIN LOCK FLUSH PER 10 UNITS: Performed by: SURGERY

## 2024-07-03 PROCEDURE — C1781 MESH (IMPLANTABLE): HCPCS | Performed by: SURGERY

## 2024-07-03 PROCEDURE — 25010000002 MEPERIDINE PER 100 MG: Performed by: NURSE ANESTHETIST, CERTIFIED REGISTERED

## 2024-07-03 PROCEDURE — 25010000002 CEFAZOLIN PER 500 MG: Performed by: SURGERY

## 2024-07-03 PROCEDURE — 93005 ELECTROCARDIOGRAM TRACING: CPT | Performed by: ANESTHESIOLOGY

## 2024-07-03 DEVICE — ABSORBABLE WOUND CLOSURE DEVICE
Type: IMPLANTABLE DEVICE | Site: ABDOMEN | Status: FUNCTIONAL
Brand: SYNETURE

## 2024-07-03 DEVICE — DEV CONTRL TISS STRATAFIX SPIRAL MNCRYL PLS SH 3/0 9IN UD: Type: IMPLANTABLE DEVICE | Site: ABDOMEN | Status: FUNCTIONAL

## 2024-07-03 DEVICE — VENTRALIGHT ST MESH WITH ECHO PS POSITONING SYSTEM
Type: IMPLANTABLE DEVICE | Site: ABDOMEN | Status: FUNCTIONAL
Brand: VENTRALIGHT ST MESH WITH ECHO PS POSITONING SYSTEM

## 2024-07-03 RX ORDER — MEPERIDINE HYDROCHLORIDE 25 MG/ML
12.5 INJECTION INTRAMUSCULAR; INTRAVENOUS; SUBCUTANEOUS
Status: DISCONTINUED | OUTPATIENT
Start: 2024-07-03 | End: 2024-07-03 | Stop reason: HOSPADM

## 2024-07-03 RX ORDER — PROPOFOL 10 MG/ML
VIAL (ML) INTRAVENOUS AS NEEDED
Status: DISCONTINUED | OUTPATIENT
Start: 2024-07-03 | End: 2024-07-03 | Stop reason: SURG

## 2024-07-03 RX ORDER — FENTANYL CITRATE 50 UG/ML
INJECTION, SOLUTION INTRAMUSCULAR; INTRAVENOUS AS NEEDED
Status: DISCONTINUED | OUTPATIENT
Start: 2024-07-03 | End: 2024-07-03 | Stop reason: SURG

## 2024-07-03 RX ORDER — OXYCODONE HYDROCHLORIDE 5 MG/1
5 TABLET ORAL
Status: COMPLETED | OUTPATIENT
Start: 2024-07-03 | End: 2024-07-03

## 2024-07-03 RX ORDER — ROCURONIUM BROMIDE 10 MG/ML
INJECTION, SOLUTION INTRAVENOUS AS NEEDED
Status: DISCONTINUED | OUTPATIENT
Start: 2024-07-03 | End: 2024-07-03 | Stop reason: SURG

## 2024-07-03 RX ORDER — SODIUM CHLORIDE 0.9 % (FLUSH) 0.9 %
10 SYRINGE (ML) INJECTION AS NEEDED
Status: DISCONTINUED | OUTPATIENT
Start: 2024-07-03 | End: 2024-07-03 | Stop reason: HOSPADM

## 2024-07-03 RX ORDER — ONDANSETRON 2 MG/ML
INJECTION INTRAMUSCULAR; INTRAVENOUS AS NEEDED
Status: DISCONTINUED | OUTPATIENT
Start: 2024-07-03 | End: 2024-07-03 | Stop reason: SURG

## 2024-07-03 RX ORDER — ONDANSETRON 2 MG/ML
4 INJECTION INTRAMUSCULAR; INTRAVENOUS ONCE AS NEEDED
Status: DISCONTINUED | OUTPATIENT
Start: 2024-07-03 | End: 2024-07-03 | Stop reason: HOSPADM

## 2024-07-03 RX ORDER — MIDAZOLAM HYDROCHLORIDE 2 MG/2ML
2 INJECTION, SOLUTION INTRAMUSCULAR; INTRAVENOUS ONCE
Status: COMPLETED | OUTPATIENT
Start: 2024-07-03 | End: 2024-07-03

## 2024-07-03 RX ORDER — PROMETHAZINE HYDROCHLORIDE 25 MG/1
25 SUPPOSITORY RECTAL ONCE AS NEEDED
Status: DISCONTINUED | OUTPATIENT
Start: 2024-07-03 | End: 2024-07-03 | Stop reason: HOSPADM

## 2024-07-03 RX ORDER — POLYETHYLENE GLYCOL 3350 17 G/17G
17 POWDER, FOR SOLUTION ORAL DAILY
Qty: 5 PACKET | Refills: 0 | Status: SHIPPED | OUTPATIENT
Start: 2024-07-03

## 2024-07-03 RX ORDER — DEXAMETHASONE SODIUM PHOSPHATE 4 MG/ML
INJECTION, SOLUTION INTRA-ARTICULAR; INTRALESIONAL; INTRAMUSCULAR; INTRAVENOUS; SOFT TISSUE AS NEEDED
Status: DISCONTINUED | OUTPATIENT
Start: 2024-07-03 | End: 2024-07-03 | Stop reason: SURG

## 2024-07-03 RX ORDER — SODIUM CHLORIDE, SODIUM LACTATE, POTASSIUM CHLORIDE, CALCIUM CHLORIDE 600; 310; 30; 20 MG/100ML; MG/100ML; MG/100ML; MG/100ML
9 INJECTION, SOLUTION INTRAVENOUS CONTINUOUS PRN
Status: DISCONTINUED | OUTPATIENT
Start: 2024-07-03 | End: 2024-07-03 | Stop reason: HOSPADM

## 2024-07-03 RX ORDER — EPHEDRINE SULFATE 50 MG/ML
INJECTION INTRAVENOUS AS NEEDED
Status: DISCONTINUED | OUTPATIENT
Start: 2024-07-03 | End: 2024-07-03 | Stop reason: SURG

## 2024-07-03 RX ORDER — SODIUM CHLORIDE 9 MG/ML
40 INJECTION, SOLUTION INTRAVENOUS AS NEEDED
Status: DISCONTINUED | OUTPATIENT
Start: 2024-07-03 | End: 2024-07-03 | Stop reason: HOSPADM

## 2024-07-03 RX ORDER — SODIUM CHLORIDE 0.9 % (FLUSH) 0.9 %
10 SYRINGE (ML) INJECTION EVERY 12 HOURS SCHEDULED
Status: DISCONTINUED | OUTPATIENT
Start: 2024-07-03 | End: 2024-07-03 | Stop reason: HOSPADM

## 2024-07-03 RX ORDER — LIDOCAINE HYDROCHLORIDE 20 MG/ML
INJECTION, SOLUTION EPIDURAL; INFILTRATION; INTRACAUDAL; PERINEURAL AS NEEDED
Status: DISCONTINUED | OUTPATIENT
Start: 2024-07-03 | End: 2024-07-03 | Stop reason: SURG

## 2024-07-03 RX ORDER — HEPARIN SODIUM (PORCINE) LOCK FLUSH IV SOLN 100 UNIT/ML 100 UNIT/ML
5 SOLUTION INTRAVENOUS AS NEEDED
Status: DISCONTINUED | OUTPATIENT
Start: 2024-07-03 | End: 2024-07-03 | Stop reason: HOSPADM

## 2024-07-03 RX ORDER — GLYCOPYRROLATE 0.2 MG/ML
INJECTION INTRAMUSCULAR; INTRAVENOUS AS NEEDED
Status: DISCONTINUED | OUTPATIENT
Start: 2024-07-03 | End: 2024-07-03 | Stop reason: SURG

## 2024-07-03 RX ORDER — SODIUM CHLORIDE, SODIUM LACTATE, POTASSIUM CHLORIDE, CALCIUM CHLORIDE 600; 310; 30; 20 MG/100ML; MG/100ML; MG/100ML; MG/100ML
50 INJECTION, SOLUTION INTRAVENOUS CONTINUOUS
Status: DISCONTINUED | OUTPATIENT
Start: 2024-07-03 | End: 2024-07-03 | Stop reason: HOSPADM

## 2024-07-03 RX ORDER — NALOXONE HYDROCHLORIDE 4 MG/.1ML
SPRAY NASAL
Qty: 2 EACH | Refills: 0 | Status: SHIPPED | OUTPATIENT
Start: 2024-07-03

## 2024-07-03 RX ORDER — KETAMINE HCL IN NACL, ISO-OSM 100MG/10ML
SYRINGE (ML) INJECTION AS NEEDED
Status: DISCONTINUED | OUTPATIENT
Start: 2024-07-03 | End: 2024-07-03 | Stop reason: SURG

## 2024-07-03 RX ORDER — PROMETHAZINE HYDROCHLORIDE 12.5 MG/1
25 TABLET ORAL ONCE AS NEEDED
Status: DISCONTINUED | OUTPATIENT
Start: 2024-07-03 | End: 2024-07-03 | Stop reason: HOSPADM

## 2024-07-03 RX ORDER — ACETAMINOPHEN 500 MG
1000 TABLET ORAL ONCE
Status: COMPLETED | OUTPATIENT
Start: 2024-07-03 | End: 2024-07-03

## 2024-07-03 RX ORDER — OXYCODONE HYDROCHLORIDE 5 MG/1
5 TABLET ORAL EVERY 8 HOURS PRN
Qty: 8 TABLET | Refills: 0 | Status: SHIPPED | OUTPATIENT
Start: 2024-07-03 | End: 2025-07-03

## 2024-07-03 RX ADMIN — HYDROMORPHONE HYDROCHLORIDE 0.5 MG: 1 INJECTION, SOLUTION INTRAMUSCULAR; INTRAVENOUS; SUBCUTANEOUS at 09:51

## 2024-07-03 RX ADMIN — Medication 10 ML: at 11:43

## 2024-07-03 RX ADMIN — GLYCOPYRROLATE 0.2 MG: 0.2 INJECTION INTRAMUSCULAR; INTRAVENOUS at 07:53

## 2024-07-03 RX ADMIN — HYDROMORPHONE HYDROCHLORIDE 0.5 MG: 1 INJECTION, SOLUTION INTRAMUSCULAR; INTRAVENOUS; SUBCUTANEOUS at 08:17

## 2024-07-03 RX ADMIN — HEPARIN 500 UNITS: 100 SYRINGE at 11:43

## 2024-07-03 RX ADMIN — PROPOFOL 40 MG: 10 INJECTION, EMULSION INTRAVENOUS at 07:41

## 2024-07-03 RX ADMIN — SODIUM CHLORIDE 2000 MG: 9 INJECTION, SOLUTION INTRAVENOUS at 07:42

## 2024-07-03 RX ADMIN — ONDANSETRON 4 MG: 2 INJECTION INTRAMUSCULAR; INTRAVENOUS at 09:41

## 2024-07-03 RX ADMIN — FENTANYL CITRATE 25 MCG: 50 INJECTION, SOLUTION INTRAMUSCULAR; INTRAVENOUS at 09:15

## 2024-07-03 RX ADMIN — OXYCODONE 5 MG: 5 TABLET ORAL at 09:41

## 2024-07-03 RX ADMIN — HYDROMORPHONE HYDROCHLORIDE 0.5 MG: 1 INJECTION, SOLUTION INTRAMUSCULAR; INTRAVENOUS; SUBCUTANEOUS at 09:41

## 2024-07-03 RX ADMIN — LIDOCAINE HYDROCHLORIDE 100 MG: 20 INJECTION, SOLUTION INTRAVENOUS at 07:38

## 2024-07-03 RX ADMIN — SODIUM CHLORIDE, POTASSIUM CHLORIDE, SODIUM LACTATE AND CALCIUM CHLORIDE: 600; 310; 30; 20 INJECTION, SOLUTION INTRAVENOUS at 08:26

## 2024-07-03 RX ADMIN — Medication 10 MG: at 08:15

## 2024-07-03 RX ADMIN — MEPERIDINE HYDROCHLORIDE 12.5 MG: 25 INJECTION INTRAMUSCULAR; INTRAVENOUS; SUBCUTANEOUS at 09:56

## 2024-07-03 RX ADMIN — Medication 10 MG: at 09:07

## 2024-07-03 RX ADMIN — SUGAMMADEX 200 MG: 100 INJECTION, SOLUTION INTRAVENOUS at 09:16

## 2024-07-03 RX ADMIN — FENTANYL CITRATE 25 MCG: 50 INJECTION, SOLUTION INTRAMUSCULAR; INTRAVENOUS at 08:16

## 2024-07-03 RX ADMIN — FENTANYL CITRATE 50 MCG: 50 INJECTION, SOLUTION INTRAMUSCULAR; INTRAVENOUS at 07:38

## 2024-07-03 RX ADMIN — HYDROMORPHONE HYDROCHLORIDE 0.25 MG: 1 INJECTION, SOLUTION INTRAMUSCULAR; INTRAVENOUS; SUBCUTANEOUS at 10:19

## 2024-07-03 RX ADMIN — EPHEDRINE SULFATE 10 MG: 50 INJECTION INTRAVENOUS at 07:53

## 2024-07-03 RX ADMIN — ACETAMINOPHEN 1000 MG: 500 TABLET ORAL at 07:16

## 2024-07-03 RX ADMIN — ONDANSETRON HYDROCHLORIDE 4 MG: 2 SOLUTION INTRAMUSCULAR; INTRAVENOUS at 08:23

## 2024-07-03 RX ADMIN — SODIUM CHLORIDE, POTASSIUM CHLORIDE, SODIUM LACTATE AND CALCIUM CHLORIDE 9 ML/HR: 600; 310; 30; 20 INJECTION, SOLUTION INTRAVENOUS at 07:10

## 2024-07-03 RX ADMIN — MIDAZOLAM HYDROCHLORIDE 2 MG: 1 INJECTION, SOLUTION INTRAMUSCULAR; INTRAVENOUS at 07:20

## 2024-07-03 RX ADMIN — PROPOFOL 100 MG: 10 INJECTION, EMULSION INTRAVENOUS at 07:40

## 2024-07-03 RX ADMIN — Medication 10 MG: at 07:59

## 2024-07-03 RX ADMIN — ROCURONIUM BROMIDE 40 MG: 10 INJECTION, SOLUTION INTRAVENOUS at 07:40

## 2024-07-03 RX ADMIN — EPHEDRINE SULFATE 20 MG: 50 INJECTION INTRAVENOUS at 07:55

## 2024-07-03 RX ADMIN — OXYCODONE 5 MG: 5 TABLET ORAL at 10:41

## 2024-07-03 RX ADMIN — Medication 10 MG: at 07:42

## 2024-07-03 RX ADMIN — Medication 10 MG: at 07:39

## 2024-07-03 RX ADMIN — DEXAMETHASONE SODIUM PHOSPHATE 4 MG: 4 INJECTION, SOLUTION INTRAMUSCULAR; INTRAVENOUS at 07:44

## 2024-07-03 RX ADMIN — HYDROMORPHONE HYDROCHLORIDE 0.25 MG: 1 INJECTION, SOLUTION INTRAMUSCULAR; INTRAVENOUS; SUBCUTANEOUS at 10:04

## 2024-07-03 NOTE — DISCHARGE INSTRUCTIONS
DISCHARGE INSTRUCTIONS  HERNIA      For your surgery you had:  General anesthesia (you may have a sore throat for the first 24 hours)  Local anesthesia  You may experience dizziness, drowsiness, or light-headedness for several hours following surgery/procedure.  Do not stay alone today or tonight.  Limit your activity for 24 hours.  Resume your diet slowly.  Follow whatever special dietary instructions you may have been given by your doctor.  You should not drive, operate machinery, drink alcohol, or sign legally binding documents for 24 hours or while you are taking pain medication.    NOTIFY YOUR DOCTOR IF YOU EXPERIENCE ANY OF THE FOLLOWING:  Temperature greater than 101 degrees Fahrenheit  Shaking Chills  Redness or excessive drainage from incision  Nausea, vomiting and/or pain that is not controlled by prescribed medications  Increase in bleeding or bleeding that is excessive  Unable to urinate in 6 hours after surgery  If unable to reach your doctor, please go to the closest Emergency Room [x] Skin adhesive flakes off in 10-14 days.   [x] You may shower Saturday, no submerging of incisions for 2 weeks.  Apply an ice pack for 24-48 hours.  Do not do any heavy lifting, pushing or pulling.  You may walk up and down stairs.  You may ride in a car but do not drive until instructed by your physician.  Avoid constipation.  If unable to urinate in 6 to 8 hours after surgery or urinating frequently in small amounts, notify your doctor or go to the nearest Emergency Room.    Last dose of pain medication was given at:   Tylenol (1000mg) last at 7:16am. Do not exceed 4000mg of tylenol in a 24 hour period.  Oxycodone 5mg last at 9:51am and 10:51am. May take oxycodone next at 6:51pm if needed.  May take home ibuprofen at anytime.    SPECIAL INSTRUCTIONS:  No lifting greater than 5 to 10 pounds for 6 weeks  Call for fever, erythema, purulent drainage.    May shower starting Saturday.    Wear abdominal binder during the  day for 2 weeks, may take off at night.    Gentle stretching.    Ibuprofen with food as needed for a few days.

## 2024-07-03 NOTE — ANESTHESIA PREPROCEDURE EVALUATION
Anesthesia Evaluation     Patient summary reviewed and Nursing notes reviewed   no history of anesthetic complications:   NPO Solid Status: > 8 hours  NPO Liquid Status: > 2 hours           Airway   Mallampati: I  TM distance: >3 FB  Neck ROM: full  No difficulty expected  Dental    (+) edentulous and upper dentures    Pulmonary - normal exam    breath sounds clear to auscultation  (+) a smoker Former, COPD,  Cardiovascular - normal exam  Exercise tolerance: poor (<4 METS)    ECG reviewed  Rhythm: regular  Rate: normal    (+) hypertension, hyperlipidemia      Neuro/Psych- negative ROS  GI/Hepatic/Renal/Endo    (+) renal disease- stones, thyroid problem     Musculoskeletal     (+) chronic pain (bone mets), neck pain  Abdominal    Substance History      OB/GYN          Other   arthritis,   history of cancer active    ROS/Med Hx Other: <4mets, occas soa w/rest. HTN, palpitation, smoker, breast ca w/mets to bone, colon and lymphnodes.     No CP/SOA at PAT appt. WB WNL.     Stress test 11/2020 neg ischemia.     Takes MS Contin 45 mg bid/Percocet 10/325 q6h. Took this am                Anesthesia Plan    ASA 4     general     (Patient understands anesthesia not responsible for dental damage.)  intravenous induction     Anesthetic plan, risks, benefits, and alternatives have been provided, discussed and informed consent has been obtained with: patient.    Plan discussed with CRNA.    CODE STATUS:

## 2024-07-03 NOTE — ANESTHESIA POSTPROCEDURE EVALUATION
Patient: Derek Keller    Procedure Summary       Date: 07/03/24 Room / Location: [facility] OR 08 / [facility] MAIN OR    Anesthesia Start: 0730 Anesthesia Stop: 0930    Procedure: VENTRAL / INCISIONAL HERNIA REPAIR LAPAROSCOPIC WITH DAVINCI ROBOT with mesh, possible open (Abdomen) Diagnosis:       Incisional hernia, without obstruction or gangrene      (Incisional hernia, without obstruction or gangrene [K43.2])    Surgeons: Alfred Castañeda MD Provider: Willie Gao MD    Anesthesia Type: general ASA Status: 4            Anesthesia Type: general    Vitals  Vitals Value Taken Time   /85 07/03/24 1014   Temp 35.9 °C (96.6 °F) 07/03/24 0928   Pulse 73 07/03/24 1019   Resp 16 07/03/24 1003   SpO2 96 % 07/03/24 1019   Vitals shown include unfiled device data.        Post Anesthesia Care and Evaluation    Patient location during evaluation: bedside  Patient participation: complete - patient participated  Level of consciousness: awake  Pain management: adequate    Airway patency: patent  PONV Status: none  Cardiovascular status: acceptable and stable  Respiratory status: acceptable  Hydration status: acceptable

## 2024-07-03 NOTE — OP NOTE
OP NOTE  VENTRAL / INCISIONAL HERNIA REPAIR LAPAROSCOPIC WITH DAVINCI ROBOT  Procedure Report    Patient Name:  Derek Keller  YOB: 1960  [id number]    Date of Surgery:  7/3/2024     Indications: See last clinic note for indications, discussion of risk benefits and alternatives    Pre-op Diagnosis:   Incisional hernia, without obstruction or gangrene [K43.2], recurrent, nonreducible without evidence of obstruction or gangrene       Post-Op Diagnosis Codes:     * Incisional hernia, without obstruction or gangrene [K43.2], recurrent, nonreducible without evidence of obstruction or gangrene    Procedure/CPT® Codes:      Procedure(s): Robotic repair of recurrent nonreducible left mid quadrant incisional hernia with mesh, robotic biopsy of peritoneal nodule    Staff:  Surgeon(s):  Alfred Castañeda MD    Assistant: Jada Koroma CSA    Anesthesia: General, Local    Estimated Blood Loss: 25 mL    Implants:    Implant Name Type Inv. Item Serial No.  Lot No. LRB No. Used Action   DEV CLS WND VLOC/PBT JO NONABS 1/2CIR SZ0 37MM 23CM RAYA - [id number] Implant DEV CLS WND VLOC/PBT JO NONABS 1/2CIR SZ0 37MM 23CM RAYA  COVIDIEN [id number] N/A 2 Implanted   DEV CONTRL TISS STRATAFIX SPIRAL MNCRYL PLS SH 3/0 9IN UD - [id number] Implant DEV CONTRL TISS STRATAFIX SPIRAL MNCRYL PLS SH 3/0 9IN UD  [employer]  DIV OF J AND J [id number] N/A 4 Implanted   MESH VENTRALIGHT ST ECHO PS ELLIPSE 4X6IN - [id number] Implant MESH VENTRALIGHT ST ECHO PS ELLIPSE 4X6IN  DAVOL  (DIV OF CR [employer]) [id number] N/A 1 Implanted       Specimen:          Specimens       ID Source Type Tests Collected By Collected At Frozen?    A Peritoneum Tissue TISSUE PATHOLOGY EXAM   Alfred Castañeda MD 7/3/24 8905     Description: PERITONEAL NODULE              Findings: 30 minutes of robotic lysis of adhesions performed sharply.  7 x 7 cm incarcerated recurrent left mid quadrant incisional hernia repaired with V-Loc suture  followed by 10 x 15 cm Ventralight ST mesh placed in underlay fashion secured with STRATAFIX sutures.  Several white peritoneal nodules noted in the left upper quadrant with biopsy taken and the remainder fulgurated.    Complications: None    Description of Procedure:   After all questions were answered, consent was verified.  Patient brought to the operating room per stretcher placed in supine position arms out all extremities padded.  Bilateral lower extremity SCDs placed.  Anesthesia induced.  Preoperative IV antibiotics administered.  Bilateral upper extremities padded and tucked.  Patient's abdomen prepped with ChloraPrep.  Waited 3 minutes.  Draped in usual sterile fashion.  Critical timeout taken.  Began the procedure with a stab incision at Mead's point.  I placed a Veress needle.  Positive saline drop test.  I obtain pneumoperitoneum with CO2 insufflation.  I rotated the patient to the left.  I made a transverse incision in the right mid quadrant placing robotic 8 trocar and Optiview fashion without injury to viscera below.  I then placed a robotic 8 trocar in the right upper quadrant under direct vision.  I placed a 12 robotic trocar in the right lower quadrant under direct vision.  I infiltrated bilateral upper quadrants in a tap block fashion.  We docked the robot and placed instruments.  I then performed 30 minutes of robotic lysis of adhesions including an anterior abdominal wall of bowel as well as reducing bowel from an incarcerated recurrent left mid quadrant incisional hernia.  Several white peritoneal nodules noted in the left upper quadrant.  1 of these were excised sharply and sent to pathology for permanent.  The remainder were fulgurated with scissor electrocautery.  I scored the fascial edges of the hernia defect.  The hernia defect measured 7 x 7 cm.  The fascia was reapproximated with V-Loc suture x 2.  I then placed a 10 x 15 cm Ventralight ST mesh in underlay fashion using the echo  system securing this with multiple STRATAFIX sutures.  We removed needles and obtained a correct count.  We removed the insufflation echo system.  We removed instruments and undocked the robot.  I scrubbed back in.  I closed the 12 trocar fascial site with Reese Harper device Vicryl suture.  We desufflated the abdomen remove the trocars.  We closed the skin with Monocryl and skin glue.  At the end of the procedure all counts were correct.  I was present for the procedure.    Assistant: Jada Koroma CSA was responsible for performing the following activities: Suturing, Closing, Placing Dressing, and docking, undocking the robot, passing instruments, needles, mesh  and their skilled assistance was necessary for the success of this case.    Alfred Castañeda MD     Date: 7/3/2024  Time: 09:20 EDT

## 2024-07-05 DIAGNOSIS — C50.412 MALIGNANT NEOPLASM OF UPPER-OUTER QUADRANT OF LEFT BREAST IN FEMALE, ESTROGEN RECEPTOR POSITIVE: ICD-10-CM

## 2024-07-05 DIAGNOSIS — Z17.0 MALIGNANT NEOPLASM OF UPPER-OUTER QUADRANT OF LEFT BREAST IN FEMALE, ESTROGEN RECEPTOR POSITIVE: ICD-10-CM

## 2024-07-08 ENCOUNTER — TELEPHONE (OUTPATIENT)
Dept: SURGERY | Facility: CLINIC | Age: 64
End: 2024-07-08
Payer: MEDICARE

## 2024-07-08 DIAGNOSIS — G89.3 CANCER RELATED PAIN: ICD-10-CM

## 2024-07-08 DIAGNOSIS — K43.2 INCISIONAL HERNIA, WITHOUT OBSTRUCTION OR GANGRENE: ICD-10-CM

## 2024-07-08 RX ORDER — LETROZOLE 2.5 MG/1
2.5 TABLET, FILM COATED ORAL DAILY
Qty: 90 TABLET | Refills: 1 | Status: SHIPPED | OUTPATIENT
Start: 2024-07-08

## 2024-07-08 RX ORDER — OXYCODONE AND ACETAMINOPHEN 10; 325 MG/1; MG/1
1 TABLET ORAL EVERY 6 HOURS PRN
Qty: 60 TABLET | Refills: 0 | Status: SHIPPED | OUTPATIENT
Start: 2024-07-08

## 2024-07-08 RX ORDER — OXYCODONE AND ACETAMINOPHEN 10; 325 MG/1; MG/1
1 TABLET ORAL EVERY 6 HOURS PRN
Qty: 60 TABLET | Refills: 0 | OUTPATIENT
Start: 2024-07-08

## 2024-07-08 NOTE — TELEPHONE ENCOUNTER
----- Message from Alfred Castañeda sent at 7/8/2024  8:16 AM EDT -----  Call patient with results.  Biopsy of peritoneal nodule with no cancer.  ----- Message -----  From: Lab, Background User  Sent: 7/8/2024   8:09 AM EDT  To: Alfred Castañeda MD

## 2024-07-08 NOTE — TELEPHONE ENCOUNTER
Caller: Derek Keller    Relationship: Self    Best call back number: [phone]    Requested Prescriptions:   Requested Prescriptions     Pending Prescriptions Disp Refills    oxyCODONE-acetaminophen (PERCOCET)  MG per tablet 60 tablet 0     Sig: Take 1 tablet by mouth Every 6 (Six) Hours As Needed for Moderate Pain.        Pharmacy where request should be sent: [facility], [facility], [facility]  [facility] [place], [place] - [address], [address] - [phone] [facility] [phone] FX     Last office visit with prescribing clinician: 5/29/2024   Last telemedicine visit with prescribing clinician: Visit date not found   Next office visit with prescribing clinician: 8/1/2024     Additional details provided by patient:     Does the patient have less than a 3 day supply:  [x] Yes  [] No    Would you like a call back once the refill request has been completed: [] Yes [x] No    If the office needs to give you a call back, can they leave a voicemail: [x] Yes [] No    Abby Perez Rep   07/08/24 10:56 EDT

## 2024-07-08 NOTE — TELEPHONE ENCOUNTER
Caller: Derek Keller    Relationship: Self    Best call back number: [phone]    Requested Prescriptions:   PATIENT WANTS TO KNOW IF YOU CAN SEND A SCRIPT FOR HER TO GET A ROLLATOR WITH A SEAT, THE ONE SHE HAS IS OLD AND A HAND ME DOWN.       Pharmacy where request should be sent: [facility], , & [facility]  [facility], [place] - [address] - [phone] [facility] [phone] FX     Last office visit with prescribing clinician: 5/29/2024   Last telemedicine visit with prescribing clinician: Visit date not found   Next office visit with prescribing clinician: 7/8/2024     Additional details provided by patient:     Does the patient have less than a 3 day supply:  [x] Yes  [] No    Would you like a call back once the refill request has been completed: [] Yes [x] No    If the office needs to give you a call back, can they leave a voicemail: [x] Yes [] No    Abby Perez Rep   07/08/24 15:18 EDT

## 2024-07-08 NOTE — TELEPHONE ENCOUNTER
Patient informed of tissue pathology results and voiced understanding.     Dr. Castañeda, patient thought you should know she has had a couple falls in the last couple days, though she has not fallen for a couple days. States she did not have to do to doctors and no injuries other than some bruising.

## 2024-07-12 DIAGNOSIS — F17.200 NICOTINE DEPENDENCE, UNCOMPLICATED, UNSPECIFIED NICOTINE PRODUCT TYPE: Primary | ICD-10-CM

## 2024-07-12 RX ORDER — NICOTINE 21 MG/24HR
1 PATCH, TRANSDERMAL 24 HOURS TRANSDERMAL EVERY 24 HOURS
Qty: 30 PATCH | Refills: 3 | Status: SHIPPED | OUTPATIENT
Start: 2024-07-12

## 2024-07-12 NOTE — TELEPHONE ENCOUNTER
PATIENT CALLED AND ASKED FOR REFILLS ON NICOTINE PATCHES.  SHE IS AFRAID SHE WILL RUN OUT OVER THE WEEKEND.   - Patient with fever, tachycardia, and leukocytosis with positive UA  - Received ceftriaxone in ED, also received azithroymcin for possible PNA but CXR clear, will c/w ceftriaxone for now  - Has a history of nephrolithiasis but currently without complaints or physical exam findings suggestive of nephrolithiasis

## 2024-07-17 ENCOUNTER — OFFICE VISIT (OUTPATIENT)
Dept: SURGERY | Facility: CLINIC | Age: 64
End: 2024-07-17
Payer: MEDICARE

## 2024-07-17 VITALS — HEIGHT: 66 IN | RESPIRATION RATE: 14 BRPM | BODY MASS INDEX: 29.51 KG/M2 | WEIGHT: 183.6 LBS

## 2024-07-17 DIAGNOSIS — Z87.19 HISTORY OF INCISIONAL HERNIA REPAIR: ICD-10-CM

## 2024-07-17 DIAGNOSIS — G57.01 MONONEUROPATHY OF RIGHT SCIATIC NERVE: Primary | ICD-10-CM

## 2024-07-17 DIAGNOSIS — Z98.890 HISTORY OF INCISIONAL HERNIA REPAIR: ICD-10-CM

## 2024-07-17 PROCEDURE — 1159F MED LIST DOCD IN RCRD: CPT | Performed by: SURGERY

## 2024-07-17 PROCEDURE — 99212 OFFICE O/P EST SF 10 MIN: CPT | Performed by: SURGERY

## 2024-07-17 PROCEDURE — 1160F RVW MEDS BY RX/DR IN RCRD: CPT | Performed by: SURGERY

## 2024-07-17 RX ORDER — LACTULOSE 10 G/15ML
SOLUTION ORAL
COMMUNITY
Start: 2024-07-05

## 2024-07-17 NOTE — LETTER
July 20, 2024       No Recipients    Patient: Derek Keller   YOB: 1960   Date of Visit: 7/17/2024     Dear Haile Monique MD:       Thank you for referring Derek Keller to me for evaluation. Below are the relevant portions of my assessment and plan of care.    If you have questions, please do not hesitate to call me. I look forward to following Derek along with you.         Sincerely,        Alfred Castañeda MD        CC:   No Recipients    Alfred Castañeda MD  07/20/24 1615  Sign when Signing Visit  General Surgery/Colorectal Surgery   Post -op Follow up Note    Patient Name:  Derek Keller  YOB: 1960  [id number]    Referring Provider: No ref. provider found    Patient Care Team:  Haile Monique MD as PCP - General (Family Medicine)  Julien Cardona DO as Consulting Physician (Pain Medicine)  Joel Castillo MD as Consulting Physician (Gastroenterology)  Remington Russell MD as Surgeon (General Surgery)  Ahsan Salas MD as Consulting Physician (Sports Medicine)  Donald Barker MD as Consulting Physician (Hematology and Oncology)  Sandy Ricci MD as Consulting Physician (General Surgery)  Alfred Castañeda MD as Consulting Physician (General Surgery)    Chief complaint follow-up    Subjective.     History of present illness:    History of metastatic breast cancer  Status post ex lap with Lynn's procedure for perforated sigmoid colon 12/6/2022.  Pathology with metastatic lobular breast carcinoma    Status post laparoscopic hand-assisted colostomy closure with resection, open parastomal hernia repair 6/26/2023  Pathology with metastatic lobular carcinoma to portion of descending colon and anastomotic rings    Colonoscopy 6/8/2023 with no evidence of recurrence.    CT abdomen pelvis 2/5/2024 with no evidence of intra-abdominal metastatic disease, constipation noted, incisional   hernia at previous colostomy site noted with no bowel contained in the  defect    CT abdomen pelvis April 2024 with left-sided abdominal wall defect with new herniation of portion of transverse colon.    Seen for evaluation of a painful bulge at her previous colostomy site.    Status post robotic lysis of adhesions, repair of incisional hernia with mesh, biopsy of peritoneal nodule 7/3/2024.  Pathology with benign nodule suggestive of granuloma.    She comes in for follow-up.  No fever, erythema.  She is pleased with her surgery.  She has been having pain radiating down her right lower extremity described as sciatic pain per the patient.  She has a history of the same.    History:  Past Medical History:   Diagnosis Date   • Abdominal pain     OSTOMY SITE   • Allergic rhinitis    • Anemia     NO S/S   • Anxiety    • Arteriosclerosis     Coronary, follows with Dr. Núñez   • Arthritis    • Bone cancer    • Bone metastases 10/14/2022   • Bowen's disease     SKIN CANCER   • Breast cancer     NO SURGERY WAS DONE DUE TO METS TO BONE/COLON/LYMPHNODE   • Cancer related pain 10/13/2022   • Depression    • Disorder associated with Helicobacter species 10/12/2022   • Dysphoric mood    • Fatigue    • Fibromyalgia    • Frequent urination     NO S/S INFECTION   • Herpes simplex vulvovaginitis 07/26/2018   • History of colon polyps    • History of IBS    • History of kidney stones    • Hyperlipidemia    • Hypertension    • Hypothyroidism    • Insomnia    • Lumbago    • Mood disorder    • Neck pain     R/T CANCER   • Palpitations     last time a few months ago   • Precordial pain     R/T SPINE CANCER   • S/P Laparoscopic Hand-Assisted Colostomy Closure with End to End Anastomosis Open Parastomal Hernia Repair 12/08/2022   • Sleep disturbance    • SOB (shortness of breath)     at times at rest   • SOBOE (shortness of breath on exertion)        Past Surgical History:   Procedure Laterality Date   • BREAST BIOPSY Left    • CARDIAC CATHETERIZATION Left 1960   • CARDIAC CATHETERIZATION N/A  04/06/2018    Procedure: Coronary angiography;  Surgeon: Art Licea MD;  Location: [facility] CATH INVASIVE LOCATION;  Service: Cardiovascular   • CARDIAC CATHETERIZATION N/A 04/06/2018    Procedure: Left heart cath;  Surgeon: Art Licea MD;  Location: [facility] CATH INVASIVE LOCATION;  Service: Cardiovascular   • CARDIAC CATHETERIZATION N/A 04/06/2018    Procedure: Left ventriculography;  Surgeon: Art Licea MD;  Location: [facility] CATH INVASIVE LOCATION;  Service: Cardiovascular   • CHOLECYSTECTOMY     • COLON SURGERY      colostomy bag, and colostomy reversal   • COLONOSCOPY  11/08/2006   • COLONOSCOPY N/A 06/08/2023    Procedure: COLONOSCOPY;  Surgeon: Alfred Castañeda MD;  Location: [facility] ENDOSCOPY;  Service: General;  Laterality: N/A;  same as preop   • EXPLORATORY LAPAROTOMY N/A 12/06/2022    Procedure: LAPAROTOMY EXPLORATORY sigmoid resection hartmans procedure colostomy;  Surgeon: Alfred Castañeda MD;  Location: [facility] MAIN OR;  Service: General;  Laterality: N/A;   • GANGLION CYST EXCISION Bilateral    • HYSTERECTOMY  05/2005   • ILEOSTOMY CLOSURE N/A 06/26/2023    Procedure: Laparoscopic Hand-Assisted Colostomy Closure with End to End Anastomosis;  Surgeon: Alfred Castañeda MD;  Location: [facility] MAIN OR;  Service: General;  Laterality: N/A;   • LAPAROSCOPIC GASTRIC BANDING      BAND REMOVED 2020   • PARASTOMAL HERNIA REPAIR N/A 06/26/2023    Procedure: Open Parastomal Hernia Repair;  Surgeon: Alfred Castañeda MD;  Location: [facility] MAIN OR;  Service: General;  Laterality: N/A;   • TONSILLECTOMY     • VENOUS ACCESS DEVICE (PORT) INSERTION N/A 01/09/2023    Procedure: INSERTION VENOUS ACCESS DEVICE;  Surgeon: Alfred Castañeda MD;  Location: [facility] MAIN OR;  Service: General;  Laterality: N/A;   • VENTRAL HERNIA REPAIR N/A 7/3/2024    Procedure: VENTRAL / INCISIONAL HERNIA REPAIR LAPAROSCOPIC WITH DAVINCI ROBOT with mesh;  Surgeon: Alfred Castañeda MD;   Location: [facility] MAIN OR;  Service: Robotics - DaVinci;  Laterality: N/A;       Family History   Problem Relation Age of Onset   • Hypertension Mother    • Rheum arthritis Mother    • Heart disease Mother    • Breast cancer Mother    • Diabetes Father    • Cancer Maternal Grandmother         colon   • Colon cancer Maternal Grandmother    • Aneurysm Paternal Grandfather    • Diabetes Other    • Fibromyalgia Other    • Malig Hyperthermia Neg Hx        Social History     Tobacco Use   • Smoking status: Former     Current packs/day: 1.00     Average packs/day: 1 pack/day for 50.5 years (50.5 ttl pk-yrs)     Types: Cigarettes     Start date: 1974   • Smokeless tobacco: Never   • Tobacco comments:          Has not used tobacco for 28 days    Vaping Use   • Vaping status: Never Used   Substance Use Topics   • Alcohol use: No   • Drug use: Never     Types: Marijuana     Comment: LAST USE 7-2-24       MEDS:  Prior to Admission medications    Medication Sig Start Date End Date Taking? Authorizing Provider   atorvastatin (LIPITOR) 20 MG tablet TAKE 1 TABLET BY MOUTH EVERY DAY  Patient taking differently: Take 1 tablet by mouth Daily. 10/1/19  Yes Yudelka Manning MD   baclofen (LIORESAL) 20 MG tablet TAKE 1 TABLET BY MOUTH THREE TIMES DAILY FOR MUSCLE SPASMS 5/1/24  Yes ProviderBessie MD   cyproheptadine (PERIACTIN) 4 MG tablet Take 1 tablet by mouth Every 12 (Twelve) Hours. 10/30/23  Yes Bessie Lopez MD   donepezil (ARICEPT) 10 MG tablet Take 1 tablet by mouth Daily. 10/28/22  Yes Bessie Lopez MD   DULoxetine (CYMBALTA) 60 MG capsule TAKE 1 capsule BY MOUTH DAILY for mood elevation 6/12/24  Yes Donald Barker MD   gabapentin (NEURONTIN) 600 MG tablet TAKE 1 TABLET BY MOUTH AT BEDTIME  Patient taking differently: Take 1 tablet by mouth 2 (Two) Times a Day As Needed. 7/30/20  Yes Yudelka Manning MD   hydroCHLOROthiazide 12.5 MG tablet TAKE 1 TABLET BY MOUTH DAILY for swelling 2/20/24  Yes  Donald Barker MD   ibuprofen (ADVIL,MOTRIN) 600 MG tablet Take 1 tablet by mouth Every 8 (Eight) Hours As Needed. for pain 5/10/24  Yes Bessie Lopez MD   lactulose (CHRONULAC) 10 GM/15ML solution take 30 mls BY MOUTH TWICE DAILY 7/5/24  Yes Bessie Lopez MD   letrozole (FEMARA) 2.5 MG tablet TAKE 1 TABLET BY MOUTH DAILY 7/8/24  Yes Donald Barker MD   levothyroxine (SYNTHROID, LEVOTHROID) 50 MCG tablet TAKE 1 TABLET BY MOUTH EVERY DAY  Patient taking differently: Take 1 tablet by mouth Daily. 7/19/19  Yes Yudelka Manning MD   lidocaine (LIDODERM) 5 % Place 1 patch on the skin as directed by provider Daily. Remove & Discard patch within 12 hours or as directed by MD   Yes Bessie Lopez MD   LORazepam (ATIVAN) 0.5 MG tablet Take 1 tablet by mouth Every 6 (Six) Hours As Needed.   Yes Bessie Lopez MD   losartan (COZAAR) 50 MG tablet Take 1 tablet by mouth Daily. for blood pressure 5/6/24  Yes Bessie Lopez MD   montelukast (SINGULAIR) 10 MG tablet Take 1 tablet by mouth Daily. 1/17/22  Yes Bessie Lopez MD   Morphine (MS CONTIN) 60 MG 12 hr tablet Take 1 tablet by mouth 2 (Two) Times a Day. 6/11/24  Yes Donald Barker MD   naloxone (NARCAN) 4 MG/0.1ML nasal spray Call 911. Don't prime. [place] in 1 nostril for overdose. Repeat in 2-3 minutes in other nostril if no or minimal breathing/responsiveness. 7/3/24  Yes Alfred Castañeda MD   nicotine (NICODERM CQ) 21 MG/24HR patch Place 1 patch on the skin as directed by provider Daily. 7/12/24  Yes Alfred Castañeda MD   ondansetron (ZOFRAN) 8 MG tablet TAKE 1 TABLET BY MOUTH THREE TIMES DAILY AS NEEDED FOR NAUSEA AND VOMITING 2/5/24  Yes Donald Barker MD   oxybutynin XL (DITROPAN XL) 15 MG 24 hr tablet TAKE 1 TABLET BY MOUTH DAILY for bladder 6/12/24  Yes Donald Barker MD   oxyCODONE-acetaminophen (PERCOCET)  MG per tablet Take 1 tablet by mouth Every 6 (Six) Hours As Needed for Moderate  Pain. 7/8/24  Yes Donald Barker MD   potassium chloride (MICRO-K) 10 MEQ CR capsule Take 1 capsule by mouth Every 12 (Twelve) Hours. 2/17/24  Yes Donald Barker MD   prochlorperazine (COMPAZINE) 10 MG tablet  9/29/23  Yes Bessie Lopez MD   ribociclib succinate 200 MG tablet therapy pack tablet Take 3 tablets by mouth Take As Directed. Take 3 tablets by mouth daily for 21 days then off 7 days on a 28 day cycle. 8/1/23  Yes Donald Barker MD   rOPINIRole (REQUIP) 3 MG tablet Take 1 tablet by mouth 2 (Two) Times a Day. 6/29/22  Yes Bessie Lopez MD   SudoGest 60 MG tablet Take 1 tablet by mouth Every 8 (Eight) Hours. 11/13/23  Yes Bessie Lopez MD   SUMAtriptan (IMITREX) 100 MG tablet Take 1 tablet by mouth 1 (One) Time As Needed. 10/7/22  Yes Bessie Lopez MD   traZODone (DESYREL) 150 MG tablet TAKE 1 TABLET BY MOUTH ONCE nightly  Patient taking differently: Take 1 tablet by mouth Every Night. 11/12/19  Yes Yudelka Manning MD   Lidocaine Pain Relief 4 % apply 1 patch to skin daily. leave on for 12 hours, and remove for 12 hours.  Patient not taking: Reported on 7/17/2024 5/10/24   Bessie Lopez MD   oxyCODONE (Roxicodone) 5 MG immediate release tablet Take 1 tablet by mouth Every 8 (Eight) Hours As Needed for Moderate Pain.  Patient not taking: Reported on 7/17/2024 7/3/24 7/3/25  Alfred Castañeda MD   polyethylene glycol (MIRALAX) 17 g packet Take 17 g by mouth Daily.  Patient not taking: Reported on 6/6/2024 1/9/23   Alfred Castañeda MD   polyethylene glycol (MIRALAX) 17 g packet Take 17 g by mouth Daily.  Patient not taking: Reported on 7/17/2024 7/3/24   Alfred Castañeda MD             No current facility-administered medications for this visit.       Allergies:  Azithromycin and Erythromycin    Objective    Vital Signs        Physical Exam:     Incision without evidence of infection abdomen soft, nontender, no hernia    Results Review: I  have reviewed the patient's labs and imaging    LABS/IMAGING:    Imaging Results (Last 72 Hours)       ** No results found for the last 72 hours. **             Lab Results (last 72 hours)       ** No results found for the last 72 hours. **               Result Review:     Assessment & Plan    * No active hospital problems. *     Status post robotic lysis of adhesions, repair of incisional hernia with mesh, biopsy of peritoneal nodule 7/3/2024.  Pathology with benign nodule suggestive of granuloma.    Back pain that radiates down her right lower extremity unknown etiology    Discussed with patient.  I reviewed the details of the surgery with the patient.  No lifting greater than 10 pounds for 6 weeks from the time of surgery.  I encouraged her to stretch more, use a heating pad.  Referral made to neurosurgery as well as pain management for evaluation and treatment of her back pain that radiates down her right lower extremity.  Follow-up with me as needed.  All questions answered.  She agrees with the plan.  Thank you for the consult.          Alfred Castañeda MD  07/20/24 16:12 EDT

## 2024-07-20 LAB
QT INTERVAL: 497 MS
QTC INTERVAL: 471 MS

## 2024-07-20 NOTE — PROGRESS NOTES
General Surgery/Colorectal Surgery   Post -op Follow up Note    Patient Name:  Derek Keller  YOB: 1960  [id number]    Referring Provider: No ref. provider found    Patient Care Team:  Haile Monique MD as PCP - General (Family Medicine)  Julien Cardona DO as Consulting Physician (Pain Medicine)  Joel Castillo MD as Consulting Physician (Gastroenterology)  Remington Russell MD as Surgeon (General Surgery)  Ahsan Salas MD as Consulting Physician (Sports Medicine)  Donald Barker MD as Consulting Physician (Hematology and Oncology)  Sandy Ricci MD as Consulting Physician (General Surgery)  Alfred Castañeda MD as Consulting Physician (General Surgery)    Chief complaint follow-up    Subjective .     History of present illness:    History of metastatic breast cancer  Status post ex lap with Lynn's procedure for perforated sigmoid colon 12/6/2022.  Pathology with metastatic lobular breast carcinoma    Status post laparoscopic hand-assisted colostomy closure with resection, open parastomal hernia repair 6/26/2023  Pathology with metastatic lobular carcinoma to portion of descending colon and anastomotic rings    Colonoscopy 6/8/2023 with no evidence of recurrence.    CT abdomen pelvis 2/5/2024 with no evidence of intra-abdominal metastatic disease, constipation noted, incisional   hernia at previous colostomy site noted with no bowel contained in the defect    CT abdomen pelvis April 2024 with left-sided abdominal wall defect with new herniation of portion of transverse colon.    Seen for evaluation of a painful bulge at her previous colostomy site.    Status post robotic lysis of adhesions, repair of incisional hernia with mesh, biopsy of peritoneal nodule 7/3/2024.  Pathology with benign nodule suggestive of granuloma.    She comes in for follow-up.  No fever, erythema.  She is pleased with her surgery.  She has been having pain radiating down her right lower extremity  described as sciatic pain per the patient.  She has a history of the same.    History:  Past Medical History:   Diagnosis Date    Abdominal pain     OSTOMY SITE    Allergic rhinitis     Anemia     NO S/S    Anxiety     Arteriosclerosis     Coronary, follows with Dr. Núñez    Arthritis     Bone cancer     Bone metastases 10/14/2022    Bowen's disease     SKIN CANCER    Breast cancer     NO SURGERY WAS DONE DUE TO METS TO BONE/COLON/LYMPHNODE    Cancer related pain 10/13/2022    Depression     Disorder associated with Helicobacter species 10/12/2022    Dysphoric mood     Fatigue     Fibromyalgia     Frequent urination     NO S/S INFECTION    Herpes simplex vulvovaginitis 07/26/2018    History of colon polyps     History of IBS     History of kidney stones     Hyperlipidemia     Hypertension     Hypothyroidism     Insomnia     Lumbago     Mood disorder     Neck pain     R/T CANCER    Palpitations     last time a few months ago    Precordial pain     R/T SPINE CANCER    S/P Laparoscopic Hand-Assisted Colostomy Closure with End to End Anastomosis Open Parastomal Hernia Repair 12/08/2022    Sleep disturbance     SOB (shortness of breath)     at times at rest    SOBOE (shortness of breath on exertion)        Past Surgical History:   Procedure Laterality Date    BREAST BIOPSY Left     CARDIAC CATHETERIZATION Left 1960    CARDIAC CATHETERIZATION N/A 04/06/2018    Procedure: Coronary angiography;  Surgeon: Art Licea MD;  [facility];  Service: Cardiovascular    CARDIAC CATHETERIZATION N/A 04/06/2018    Procedure: Left heart cath;  Surgeon: Art Licea MD;  [facility] LOC[facility];  Service: Cardiovascular    CARDIAC CATHETERIZATION N/A 04/06/2018    Procedure: Left ventriculography;  Surgeon: Art Licea MD;  Location: [facility] CATH INVASIVE LOCATION;  Service: Cardiovascular    CHOLECYSTECTOMY      COLON SURGERY      colostomy bag, and colostomy  reversal    COLONOSCOPY  11/08/2006    COLONOSCOPY N/A 06/08/2023    Procedure: COLONOSCOPY;  Surgeon: Alfred Castañeda MD;  Location: [facility] ENDOSCOPY;  Service: General;  Laterality: N/A;  same as preop    EXPLORATORY LAPAROTOMY N/A 12/06/2022    Procedure: LAPAROTOMY EXPLORATORY sigmoid resection hartmans procedure colostomy;  Surgeon: Alfred Castañeda MD;  Location: [facility] MAIN OR;  Service: General;  Laterality: N/A;    GANGLION CYST EXCISION Bilateral     HYSTERECTOMY  05/2005    ILEOSTOMY CLOSURE N/A 06/26/2023    Procedure: Laparoscopic Hand-Assisted Colostomy Closure with End to End Anastomosis;  Surgeon: Alfred Castañeda MD;  Location: [facility] MAIN OR;  Service: General;  Laterality: N/A;    LAPAROSCOPIC GASTRIC BANDING      BAND REMOVED 2020    PARASTOMAL HERNIA REPAIR N/A 06/26/2023    Procedure: Open Parastomal Hernia Repair;  Surgeon: Alfred Castañeda MD;  Location: [facility] MAIN OR;  Service: General;  Laterality: N/A;    TONSILLECTOMY      VENOUS ACCESS DEVICE (PORT) INSERTION N/A 01/09/2023    Procedure: INSERTION VENOUS ACCESS DEVICE;  Surgeon: Alfred Castañeda MD;  Location: [facility] MAIN OR;  Service: General;  Laterality: N/A;    VENTRAL HERNIA REPAIR N/A 7/3/2024    Procedure: VENTRAL / INCISIONAL HERNIA REPAIR LAPAROSCOPIC WITH DAVINCI ROBOT with mesh;  Surgeon: Alfred Castañeda MD;  Location: [facility] MAIN OR;  Service: Robotics - DaVinci;  Laterality: N/A;       Family History   Problem Relation Age of Onset    Hypertension Mother     Rheum arthritis Mother     Heart disease Mother     Breast cancer Mother     Diabetes Father     Cancer Maternal Grandmother         colon    Colon cancer Maternal Grandmother     Aneurysm Paternal Grandfather     Diabetes Other     Fibromyalgia Other     Malig Hyperthermia Neg Hx        Social History     Tobacco Use    Smoking status: Former     Current packs/day: 1.00     Average packs/day: 1 pack/day for 50.5 years (50.5 ttl  pk-yrs)     Types: Cigarettes     Start date: 1974    Smokeless tobacco: Never    Tobacco comments:          Has not used tobacco for 28 days    Vaping Use    Vaping status: Never Used   Substance Use Topics    Alcohol use: No    Drug use: Never     Types: Marijuana     Comment: LAST USE 7-2-24       MEDS:  Prior to Admission medications    Medication Sig Start Date End Date Taking? Authorizing Provider   atorvastatin (LIPITOR) 20 MG tablet TAKE 1 TABLET BY MOUTH EVERY DAY  Patient taking differently: Take 1 tablet by mouth Daily. 10/1/19  Yes Yudelka Manning MD   baclofen (LIORESAL) 20 MG tablet TAKE 1 TABLET BY MOUTH THREE TIMES DAILY FOR MUSCLE SPASMS 5/1/24  Yes ProviderBessie MD   cyproheptadine (PERIACTIN) 4 MG tablet Take 1 tablet by mouth Every 12 (Twelve) Hours. 10/30/23  Yes ProviderBessie MD   donepezil (ARICEPT) 10 MG tablet Take 1 tablet by mouth Daily. 10/28/22  Yes Bessie Lopez MD   DULoxetine (CYMBALTA) 60 MG capsule TAKE 1 capsule BY MOUTH DAILY for mood elevation 6/12/24  Yes Donald Barker MD   gabapentin (NEURONTIN) 600 MG tablet TAKE 1 TABLET BY MOUTH AT BEDTIME  Patient taking differently: Take 1 tablet by mouth 2 (Two) Times a Day As Needed. 7/30/20  Yes Yudelka Manning MD   hydroCHLOROthiazide 12.5 MG tablet TAKE 1 TABLET BY MOUTH DAILY for swelling 2/20/24  Yes Donald Barker MD   ibuprofen (ADVIL,MOTRIN) 600 MG tablet Take 1 tablet by mouth Every 8 (Eight) Hours As Needed. for pain 5/10/24  Yes Provider, MD Bessie   lactulose (CHRONULAC) 10 GM/15ML solution take 30 mls BY MOUTH TWICE DAILY 7/5/24  Yes Provider, MD Bessie   letrozole (FEMARA) 2.5 MG tablet TAKE 1 TABLET BY MOUTH DAILY 7/8/24  Yes Donald Barker MD   levothyroxine (SYNTHROID, LEVOTHROID) 50 MCG tablet TAKE 1 TABLET BY MOUTH EVERY DAY  Patient taking differently: Take 1 tablet by mouth Daily. 7/19/19  Yes Yudelka Manning MD   lidocaine (LIDODERM) 5 % Place 1 patch on the  skin as directed by provider Daily. Remove & Discard patch within 12 hours or as directed by MD   Yes Bessie Lopez MD   LORazepam (ATIVAN) 0.5 MG tablet Take 1 tablet by mouth Every 6 (Six) Hours As Needed.   Yes Bessie Lopez MD   losartan (COZAAR) 50 MG tablet Take 1 tablet by mouth Daily. for blood pressure 5/6/24  Yes Bessie Lopez MD   montelukast (SINGULAIR) 10 MG tablet Take 1 tablet by mouth Daily. 1/17/22  Yes Bessie Lopez MD   Morphine (MS CONTIN) 60 MG 12 hr tablet Take 1 tablet by mouth 2 (Two) Times a Day. 6/11/24  Yes Donald Barker MD   naloxone (NARCAN) 4 MG/0.1ML nasal spray Call 911. Don't prime. [place] in 1 nostril for overdose. Repeat in 2-3 minutes in other nostril if no or minimal breathing/responsiveness. 7/3/24  Yes Alfred Castañeda MD   nicotine (NICODERM CQ) 21 MG/24HR patch Place 1 patch on the skin as directed by provider Daily. 7/12/24  Yes Alfred Castañeda MD   ondansetron (ZOFRAN) 8 MG tablet TAKE 1 TABLET BY MOUTH THREE TIMES DAILY AS NEEDED FOR NAUSEA AND VOMITING 2/5/24  Yes Donald Barker MD   oxybutynin XL (DITROPAN XL) 15 MG 24 hr tablet TAKE 1 TABLET BY MOUTH DAILY for bladder 6/12/24  Yes Donald Barker MD   oxyCODONE-acetaminophen (PERCOCET)  MG per tablet Take 1 tablet by mouth Every 6 (Six) Hours As Needed for Moderate Pain. 7/8/24  Yes Donald Barker MD   potassium chloride (MICRO-K) 10 MEQ CR capsule Take 1 capsule by mouth Every 12 (Twelve) Hours. 2/17/24  Yes Donald Barker MD   prochlorperazine (COMPAZINE) 10 MG tablet  9/29/23  Yes Bessie Lopez MD   ribociclib succinate 200 MG tablet therapy pack tablet Take 3 tablets by mouth Take As Directed. Take 3 tablets by mouth daily for 21 days then off 7 days on a 28 day cycle. 8/1/23  Yes Donald Barker MD   rOPINIRole (REQUIP) 3 MG tablet Take 1 tablet by mouth 2 (Two) Times a Day. 6/29/22  Yes Provider, Bessie, MD   SudoGest 60 MG tablet Take  1 tablet by mouth Every 8 (Eight) Hours. 11/13/23  Yes Bessie Lopez MD   SUMAtriptan (IMITREX) 100 MG tablet Take 1 tablet by mouth 1 (One) Time As Needed. 10/7/22  Yes Bessie Lopez MD   traZODone (DESYREL) 150 MG tablet TAKE 1 TABLET BY MOUTH ONCE nightly  Patient taking differently: Take 1 tablet by mouth Every Night. 11/12/19  Yes Yudelka Manning MD   Lidocaine Pain Relief 4 % apply 1 patch to skin daily. leave on for 12 hours, and remove for 12 hours.  Patient not taking: Reported on 7/17/2024 5/10/24   Bessie Lopez MD   oxyCODONE (Roxicodone) 5 MG immediate release tablet Take 1 tablet by mouth Every 8 (Eight) Hours As Needed for Moderate Pain.  Patient not taking: Reported on 7/17/2024 7/3/24 7/3/25  Alfred Castañeda MD   polyethylene glycol (MIRALAX) 17 g packet Take 17 g by mouth Daily.  Patient not taking: Reported on 6/6/2024 1/9/23   Alfred Castañeda MD   polyethylene glycol (MIRALAX) 17 g packet Take 17 g by mouth Daily.  Patient not taking: Reported on 7/17/2024 7/3/24   Alfred Castañeda MD             No current facility-administered medications for this visit.       Allergies:  Azithromycin and Erythromycin    Objective     Vital Signs        Physical Exam:     Incision without evidence of infection abdomen soft, nontender, no hernia    Results Review: I have reviewed the patient's labs and imaging    LABS/IMAGING:    Imaging Results (Last 72 Hours)       ** No results found for the last 72 hours. **             Lab Results (last 72 hours)       ** No results found for the last 72 hours. **               Result Review :     Assessment & Plan     * No active hospital problems. *     Status post robotic lysis of adhesions, repair of incisional hernia with mesh, biopsy of peritoneal nodule 7/3/2024.  Pathology with benign nodule suggestive of granuloma.    Back pain that radiates down her right lower extremity unknown etiology    Discussed with patient.  I  reviewed the details of the surgery with the patient.  No lifting greater than 10 pounds for 6 weeks from the time of surgery.  I encouraged her to stretch more, use a heating pad.  Referral made to neurosurgery as well as pain management for evaluation and treatment of her back pain that radiates down her right lower extremity.  Follow-up with me as needed.  All questions answered.  She agrees with the plan.  Thank you for the consult.          Alfred Castañeda MD  07/20/24 16:12 EDT

## 2024-07-22 DIAGNOSIS — G89.3 CANCER RELATED PAIN: ICD-10-CM

## 2024-07-22 NOTE — TELEPHONE ENCOUNTER
Caller: Derek Keller    Relationship: Self    Best call back number: [phone]    Requested Prescriptions:   Requested Prescriptions     Pending Prescriptions Disp Refills    Morphine (MS CONTIN) 60 MG 12 hr tablet 60 tablet 0     Sig: Take 1 tablet by mouth 2 (Two) Times a Day.    oxyCODONE-acetaminophen (PERCOCET)  MG per tablet 60 tablet 0     Sig: Take 1 tablet by mouth Every 6 (Six) Hours As Needed for Moderate Pain.        Pharmacy where request should be sent: [facility], [facility], [facility] [place], [place] - [address] - [phone]  - [phone] FX     Last office visit with prescribing clinician: 5/29/2024   Last telemedicine visit with prescribing clinician: Visit date not found   Next office visit with prescribing clinician: 8/1/2024     Additional details provided by patient: WOULD LIKE TO SPEAK TO MARGOTH ABOUT GETTING A ORDER PUT IN FOR HER TO GET A ROLLATOR WALKER FIXED OR TO GET A NEW ONE. CALL HER BACK [phone]    Does the patient have less than a 3 day supply:  [x] Yes  [] No    Would you like a call back once the refill request has been completed: [] Yes [x] No    If the office needs to give you a call back, can they leave a voicemail: [x] Yes [] No    Abby Perez Rep   07/22/24 10:45 EDT

## 2024-07-23 RX ORDER — MORPHINE SULFATE 60 MG/1
60 TABLET, FILM COATED, EXTENDED RELEASE ORAL 2 TIMES DAILY
Qty: 60 TABLET | Refills: 0 | Status: SHIPPED | OUTPATIENT
Start: 2024-07-23

## 2024-07-23 RX ORDER — OXYCODONE AND ACETAMINOPHEN 10; 325 MG/1; MG/1
1 TABLET ORAL EVERY 6 HOURS PRN
Qty: 60 TABLET | Refills: 0 | Status: SHIPPED | OUTPATIENT
Start: 2024-07-23

## 2024-07-28 DIAGNOSIS — R23.2 HOT FLASHES: ICD-10-CM

## 2024-07-29 ENCOUNTER — HOSPITAL ENCOUNTER (OUTPATIENT)
Dept: CT IMAGING | Facility: HOSPITAL | Age: 64
Discharge: HOME | End: 2024-07-29
Admitting: INTERNAL MEDICINE
Payer: MEDICARE

## 2024-07-29 ENCOUNTER — TELEPHONE (OUTPATIENT)
Dept: UROLOGY | Facility: CLINIC | Age: 64
End: 2024-07-29
Payer: MEDICARE

## 2024-07-29 DIAGNOSIS — C79.51 MALIGNANT NEOPLASM METASTATIC TO BONE: ICD-10-CM

## 2024-07-29 DIAGNOSIS — Z17.0 MALIGNANT NEOPLASM OF UPPER-OUTER QUADRANT OF LEFT BREAST IN FEMALE, ESTROGEN RECEPTOR POSITIVE: ICD-10-CM

## 2024-07-29 DIAGNOSIS — C50.412 MALIGNANT NEOPLASM OF UPPER-OUTER QUADRANT OF LEFT BREAST IN FEMALE, ESTROGEN RECEPTOR POSITIVE: ICD-10-CM

## 2024-07-29 LAB
ALBUMIN SERPL-MCNC: 3.6 G/DL (ref 3.5–5.2)
ALBUMIN/GLOB SERPL: 1.6 G/DL
ALP SERPL-CCNC: 82 U/L (ref 39–117)
ALT SERPL W P-5'-P-CCNC: 6 U/L (ref 1–33)
ANION GAP SERPL CALCULATED.3IONS-SCNC: 9.7 MMOL/L (ref 5–15)
AST SERPL-CCNC: 10 U/L (ref 1–32)
BASOPHILS # BLD AUTO: 0.04 10*3/MM3 (ref 0–0.2)
BASOPHILS NFR BLD AUTO: 1.2 % (ref 0–1.5)
BILIRUB SERPL-MCNC: 0.3 MG/DL (ref 0–1.2)
BUN SERPL-MCNC: 24 MG/DL (ref 8–23)
BUN/CREAT SERPL: 25.8 (ref 7–25)
CALCIUM SPEC-SCNC: 8.5 MG/DL (ref 8.6–10.5)
CHLORIDE SERPL-SCNC: 97 MMOL/L (ref 98–107)
CO2 SERPL-SCNC: 30.3 MMOL/L (ref 22–29)
CREAT SERPL-MCNC: 0.93 MG/DL (ref 0.57–1)
DEPRECATED RDW RBC AUTO: 50.9 FL (ref 37–54)
EGFRCR SERPLBLD CKD-EPI 2021: 68.8 ML/MIN/1.73
EOSINOPHIL # BLD AUTO: 0.12 10*3/MM3 (ref 0–0.4)
EOSINOPHIL NFR BLD AUTO: 3.6 % (ref 0.3–6.2)
ERYTHROCYTE [DISTWIDTH] IN BLOOD BY AUTOMATED COUNT: 13.7 % (ref 12.3–15.4)
GLOBULIN UR ELPH-MCNC: 2.3 GM/DL
GLUCOSE SERPL-MCNC: 99 MG/DL (ref 65–99)
HCT VFR BLD AUTO: 25.5 % (ref 34–46.6)
HGB BLD-MCNC: 8.1 G/DL (ref 12–15.9)
IMM GRANULOCYTES # BLD AUTO: 0 10*3/MM3 (ref 0–0.05)
IMM GRANULOCYTES NFR BLD AUTO: 0 % (ref 0–0.5)
LYMPHOCYTES # BLD AUTO: 0.91 10*3/MM3 (ref 0.7–3.1)
LYMPHOCYTES NFR BLD AUTO: 27.3 % (ref 19.6–45.3)
MAGNESIUM SERPL-MCNC: 1.7 MG/DL (ref 1.6–2.4)
MCH RBC QN AUTO: 32.5 PG (ref 26.6–33)
MCHC RBC AUTO-ENTMCNC: 31.8 G/DL (ref 31.5–35.7)
MCV RBC AUTO: 102.4 FL (ref 79–97)
MONOCYTES # BLD AUTO: 0.29 10*3/MM3 (ref 0.1–0.9)
MONOCYTES NFR BLD AUTO: 8.7 % (ref 5–12)
NEUTROPHILS NFR BLD AUTO: 1.97 10*3/MM3 (ref 1.7–7)
NEUTROPHILS NFR BLD AUTO: 59.2 % (ref 42.7–76)
NRBC BLD AUTO-RTO: 0 /100 WBC (ref 0–0.2)
PHOSPHATE SERPL-MCNC: 3.4 MG/DL (ref 2.5–4.5)
PLATELET # BLD AUTO: 202 10*3/MM3 (ref 140–450)
PMV BLD AUTO: 10.1 FL (ref 6–12)
POTASSIUM SERPL-SCNC: 3.2 MMOL/L (ref 3.5–5.2)
PROT SERPL-MCNC: 5.9 G/DL (ref 6–8.5)
RBC # BLD AUTO: 2.49 10*6/MM3 (ref 3.77–5.28)
SODIUM SERPL-SCNC: 137 MMOL/L (ref 136–145)
WBC NRBC COR # BLD AUTO: 3.33 10*3/MM3 (ref 3.4–10.8)

## 2024-07-29 PROCEDURE — 25510000001 IOPAMIDOL PER 1 ML: Performed by: INTERNAL MEDICINE

## 2024-07-29 PROCEDURE — 85025 COMPLETE CBC W/AUTO DIFF WBC: CPT | Performed by: INTERNAL MEDICINE

## 2024-07-29 PROCEDURE — 74177 CT ABD & PELVIS W/CONTRAST: CPT

## 2024-07-29 PROCEDURE — 71260 CT THORAX DX C+: CPT

## 2024-07-29 PROCEDURE — 83735 ASSAY OF MAGNESIUM: CPT | Performed by: INTERNAL MEDICINE

## 2024-07-29 PROCEDURE — 84100 ASSAY OF PHOSPHORUS: CPT | Performed by: INTERNAL MEDICINE

## 2024-07-29 PROCEDURE — 80053 COMPREHEN METABOLIC PANEL: CPT | Performed by: INTERNAL MEDICINE

## 2024-07-29 RX ORDER — OXYBUTYNIN CHLORIDE 15 MG/1
TABLET, EXTENDED RELEASE ORAL
Qty: 30 TABLET | Refills: 1 | Status: SHIPPED | OUTPATIENT
Start: 2024-07-29

## 2024-07-29 RX ORDER — HEPARIN SODIUM (PORCINE) LOCK FLUSH IV SOLN 100 UNIT/ML 100 UNIT/ML
SOLUTION INTRAVENOUS
Status: DISCONTINUED
Start: 2024-07-29 | End: 2024-07-30 | Stop reason: HOSPADM

## 2024-07-29 RX ADMIN — IOPAMIDOL 100 ML: 755 INJECTION, SOLUTION INTRAVENOUS at 14:28

## 2024-07-29 NOTE — TELEPHONE ENCOUNTER
LMOM     CALLED TO INFORM PATIENT PER DR HESS:    It is probably a burn from electrocautery at the time of surgery.  Just keep an eye on it.  Nothing to do different at this time.  Call for fever, worsening redness.

## 2024-07-29 NOTE — TELEPHONE ENCOUNTER
Patient called and stated that since her surgery on 7/3/24 she has had a bright red ring at her incision sight and its white inside the ring with a hole, she wanted to know if ut was normal. She also states she has been putting antibacterial cream and a Band-Aid on it. Please advise!

## 2024-08-01 ENCOUNTER — OFFICE VISIT (OUTPATIENT)
Dept: ONCOLOGY | Facility: HOSPITAL | Age: 64
End: 2024-08-01
Payer: MEDICARE

## 2024-08-01 ENCOUNTER — APPOINTMENT (OUTPATIENT)
Dept: ONCOLOGY | Facility: HOSPITAL | Age: 64
End: 2024-08-01
Payer: MEDICARE

## 2024-08-01 VITALS
RESPIRATION RATE: 18 BRPM | HEART RATE: 54 BPM | WEIGHT: 185.63 LBS | BODY MASS INDEX: 29.83 KG/M2 | OXYGEN SATURATION: 97 % | HEIGHT: 66 IN | TEMPERATURE: 98 F | DIASTOLIC BLOOD PRESSURE: 44 MMHG | SYSTOLIC BLOOD PRESSURE: 129 MMHG

## 2024-08-01 DIAGNOSIS — G89.3 CANCER RELATED PAIN: ICD-10-CM

## 2024-08-01 DIAGNOSIS — Z17.0 MALIGNANT NEOPLASM OF UPPER-OUTER QUADRANT OF LEFT BREAST IN FEMALE, ESTROGEN RECEPTOR POSITIVE: ICD-10-CM

## 2024-08-01 DIAGNOSIS — C50.412 MALIGNANT NEOPLASM OF UPPER-OUTER QUADRANT OF LEFT BREAST IN FEMALE, ESTROGEN RECEPTOR POSITIVE: ICD-10-CM

## 2024-08-01 DIAGNOSIS — R68.84 PAIN IN LOWER JAW: ICD-10-CM

## 2024-08-01 DIAGNOSIS — D64.9 ANEMIA, UNSPECIFIED TYPE: ICD-10-CM

## 2024-08-01 DIAGNOSIS — C79.51 MALIGNANT NEOPLASM METASTATIC TO BONE: Primary | ICD-10-CM

## 2024-08-01 DIAGNOSIS — F33.9 MONOPOLAR DEPRESSION: ICD-10-CM

## 2024-08-01 PROCEDURE — G0463 HOSPITAL OUTPT CLINIC VISIT: HCPCS | Performed by: INTERNAL MEDICINE

## 2024-08-01 RX ORDER — POTASSIUM CHLORIDE 750 MG/1
CAPSULE, EXTENDED RELEASE ORAL
Qty: 60 CAPSULE | Refills: 1 | Status: SHIPPED | OUTPATIENT
Start: 2024-08-01

## 2024-08-01 NOTE — PROGRESS NOTES
Chief Complaint   Metastatic breast cancer--2 MO FOLLOW UP    Haile Monique MD Patel, Saagar, MD    Subjective          Derek Keller presents to [facility] for ongoing treatment of her metastatic breast cancer.  She is on letrozole, ribociclib, denosumab for bony involvement.  She is compliant with the regimen.  She does notes ulceration on her mandibular gums.  She missed prior appointments with oral surgery for evaluation.  She has ongoing pain related to her metastatic breast cancer.  She is on MS Contin and oxycodone.  She states she lost her oxycodone so has had more pain recently.  She has been referred to pain management but has not yet made that appointment.  She notes she has been having more trouble with her depression despite Cymbalta use.  She request a referral to behavioral health.    Oncology/Hematology History   Malignant neoplasm of upper-outer quadrant of left breast in female, estrogen receptor positive   10/13/2022 Initial Diagnosis    Malignant neoplasm of left breast in female, estrogen receptor positive (HCC)     10/14/2022 -  Chemotherapy    OP BREAST Letrozole / Ribociclib     10/14/2022 Cancer Staged    Staging form: Breast, AJCC 8th Edition  - Clinical: Stage IV (cT1c, cN1, cM1, ER+, [place]+, HER2-) - Signed by Donald Barker MD on 10/14/2022     10/28/2022 -  Chemotherapy    OP SUPPORTIVE Denosumab (Xgeva) Q28D     Malignant neoplasm metastatic to bone   10/14/2022 Initial Diagnosis    Bone metastases (HCC)     10/28/2022 -  Chemotherapy    OP SUPPORTIVE Denosumab (Xgeva) Q28D     Secondary malignant neoplasm of bone (Resolved)   11/14/2022 Initial Diagnosis    Secondary malignant neoplasm of bone (HCC)     11/17/2022 - 4/19/2023 Radiation    RADIATION THERAPY Treatment Details (11/14/2022 - 4/19/2023)  Site: Spine - Lumbar  Technique: 3D CRT  Goal: No goal specified  Planned Treatment Start Date: 11/17/2022         Review of Systems    Constitutional:  Positive for fatigue.   All other systems reviewed and are negative.    Current Outpatient Medications on File Prior to Visit   Medication Sig Dispense Refill    atorvastatin (LIPITOR) 20 MG tablet TAKE 1 TABLET BY MOUTH EVERY DAY (Patient taking differently: Take 1 tablet by mouth Daily.) 30 tablet 6    donepezil (ARICEPT) 10 MG tablet Take 1 tablet by mouth Daily.      DULoxetine (CYMBALTA) 60 MG capsule TAKE 1 capsule BY MOUTH DAILY for mood elevation 30 capsule 1    gabapentin (NEURONTIN) 600 MG tablet TAKE 1 TABLET BY MOUTH AT BEDTIME (Patient taking differently: Take 1 tablet by mouth 2 (Two) Times a Day As Needed.) 90 tablet 0    hydroCHLOROthiazide 12.5 MG tablet TAKE 1 TABLET BY MOUTH DAILY for swelling 90 tablet 2    ibuprofen (ADVIL,MOTRIN) 600 MG tablet Take 1 tablet by mouth Every 8 (Eight) Hours As Needed. for pain      lactulose (CHRONULAC) 10 GM/15ML solution take 30 mls BY MOUTH TWICE DAILY      letrozole (FEMARA) 2.5 MG tablet TAKE 1 TABLET BY MOUTH DAILY 90 tablet 1    levothyroxine (SYNTHROID, LEVOTHROID) 50 MCG tablet TAKE 1 TABLET BY MOUTH EVERY DAY (Patient taking differently: Take 1 tablet by mouth Daily.) 90 tablet 1    lidocaine (LIDODERM) 5 % Place 1 patch on the skin as directed by provider Daily. Remove & Discard patch within 12 hours or as directed by MD      LORazepam (ATIVAN) 0.5 MG tablet Take 1 tablet by mouth Every 6 (Six) Hours As Needed.      losartan (COZAAR) 50 MG tablet Take 1 tablet by mouth Daily. for blood pressure      montelukast (SINGULAIR) 10 MG tablet Take 1 tablet by mouth Daily.      Morphine (MS CONTIN) 60 MG 12 hr tablet Take 1 tablet by mouth 2 (Two) Times a Day. 60 tablet 0    nicotine (NICODERM CQ) 21 MG/24HR patch Place 1 patch on the skin as directed by provider Daily. 30 patch 3    ondansetron (ZOFRAN) 8 MG tablet TAKE 1 TABLET BY MOUTH THREE TIMES DAILY AS NEEDED FOR NAUSEA AND VOMITING 30 tablet 5    oxybutynin XL (DITROPAN XL) 15 MG 24  hr tablet TAKE 1 TABLET BY MOUTH DAILY for bladder 30 tablet 1    oxyCODONE-acetaminophen (PERCOCET)  MG per tablet Take 1 tablet by mouth Every 6 (Six) Hours As Needed for Moderate Pain. 60 tablet 0    potassium chloride (MICRO-K) 10 MEQ CR capsule TAKE 1 CAPSULE BY MOUTH EVERY TWELVE HOURS 60 capsule 1    rOPINIRole (REQUIP) 3 MG tablet Take 1 tablet by mouth 2 (Two) Times a Day.      traZODone (DESYREL) 150 MG tablet TAKE 1 TABLET BY MOUTH ONCE nightly (Patient taking differently: Take 1 tablet by mouth Every Night.) 90 tablet 2    baclofen (LIORESAL) 20 MG tablet TAKE 1 TABLET BY MOUTH THREE TIMES DAILY FOR MUSCLE SPASMS (Patient not taking: Reported on 8/1/2024)      cyproheptadine (PERIACTIN) 4 MG tablet Take 1 tablet by mouth Every 12 (Twelve) Hours. (Patient not taking: Reported on 8/1/2024)      Lidocaine Pain Relief 4 % apply 1 patch to skin daily. leave on for 12 hours, and remove for 12 hours. (Patient not taking: Reported on 7/17/2024)      naloxone (NARCAN) 4 MG/0.1ML nasal spray Call 911. Don't prime. [place] in 1 nostril for overdose. Repeat in 2-3 minutes in other nostril if no or minimal breathing/responsiveness. (Patient not taking: Reported on 8/1/2024) 2 each 0    oxyCODONE (Roxicodone) 5 MG immediate release tablet Take 1 tablet by mouth Every 8 (Eight) Hours As Needed for Moderate Pain. (Patient not taking: Reported on 7/17/2024) 8 tablet 0    polyethylene glycol (MIRALAX) 17 g packet Take 17 g by mouth Daily. (Patient not taking: Reported on 6/6/2024) 5 packet 0    polyethylene glycol (MIRALAX) 17 g packet Take 17 g by mouth Daily. (Patient not taking: Reported on 7/17/2024) 5 packet 0    prochlorperazine (COMPAZINE) 10 MG tablet  (Patient not taking: Reported on 8/1/2024)      ribociclib succinate 200 MG tablet therapy pack tablet Take 3 tablets by mouth Take As Directed. Take 3 tablets by mouth daily for 21 days then off 7 days on a 28 day cycle. (Patient not taking: Reported on  8/1/2024) 63 tablet 11    SudoGest 60 MG tablet Take 1 tablet by mouth Every 8 (Eight) Hours. (Patient not taking: Reported on 8/1/2024)      SUMAtriptan (IMITREX) 100 MG tablet Take 1 tablet by mouth 1 (One) Time As Needed. (Patient not taking: Reported on 8/1/2024)       No current facility-administered medications on file prior to visit.       Allergies   Allergen Reactions    Azithromycin Itching    Erythromycin Itching     Past Medical History:   Diagnosis Date    Abdominal pain     OSTOMY SITE    Allergic rhinitis     Anemia     NO S/S    Anxiety     Arteriosclerosis     Coronary, follows with Dr. Núñez    Arthritis     Bone cancer     Bone metastases 10/14/2022    Bowen's disease     SKIN CANCER    Breast cancer     NO SURGERY WAS DONE DUE TO METS TO BONE/COLON/LYMPHNODE    Cancer related pain 10/13/2022    Depression     Disorder associated with Helicobacter species 10/12/2022    Dysphoric mood     Fatigue     Fibromyalgia     Frequent urination     NO S/S INFECTION    Herpes simplex vulvovaginitis 07/26/2018    History of colon polyps     History of IBS     History of kidney stones     Hyperlipidemia     Hypertension     Hypothyroidism     Insomnia     Lumbago     Mood disorder     Neck pain     R/T CANCER    Palpitations     last time a few months ago    Precordial pain     R/T SPINE CANCER    S/P Laparoscopic Hand-Assisted Colostomy Closure with End to End Anastomosis Open Parastomal Hernia Repair 12/08/2022    Sleep disturbance     SOB (shortness of breath)     at times at rest    SOBOE (shortness of breath on exertion)      Past Surgical History:   Procedure Laterality Date    BREAST BIOPSY Left     CARDIAC CATHETERIZATION Left 1959    CARDIAC CATHETERIZATION N/A 04/06/2018    Procedure: Coronary angiography;  Surgeon: Art Licea MD;  Location: [facility] INVASIVE LOCATION;  Service: Cardiovascular    CARDIAC CATHETERIZATION N/A 04/06/2018    Procedure: Left heart cath;  Surgeon:  Art Licea MD;  Location: [facility] CATH INVASIVE LOCATION;  Service: Cardiovascular    CARDIAC CATHETERIZATION N/A 04/06/2018    Procedure: Left ventriculography;  Surgeon: Art Licea MD;  Location: [facility] CATH INVASIVE LOCATION;  Service: Cardiovascular    CHOLECYSTECTOMY      COLON SURGERY      colostomy bag, and colostomy reversal    COLONOSCOPY  11/08/2006    COLONOSCOPY N/A 06/08/2023    Procedure: COLONOSCOPY;  Surgeon: Alfred Castañeda MD;  Location: [facility] ENDOSCOPY;  Service: General;  Laterality: N/A;  same as preop    EXPLORATORY LAPAROTOMY N/A 12/06/2022    Procedure: LAPAROTOMY EXPLORATORY sigmoid resection hartmans procedure colostomy;  Surgeon: Alfred Castañeda MD;  Location: [facility] MAIN OR;  Service: General;  Laterality: N/A;    GANGLION CYST EXCISION Bilateral     HYSTERECTOMY  05/2005    ILEOSTOMY CLOSURE N/A 06/26/2023    Procedure: Laparoscopic Hand-Assisted Colostomy Closure with End to End Anastomosis;  Surgeon: Alfred Castañeda MD;  Location: [facility] MAIN OR;  Service: General;  Laterality: N/A;    LAPAROSCOPIC GASTRIC BANDING      BAND REMOVED 2020    PARASTOMAL HERNIA REPAIR N/A 06/26/2023    Procedure: Open Parastomal Hernia Repair;  Surgeon: Alfred Castañeda MD;  Location: [facility] MAIN OR;  Service: General;  Laterality: N/A;    TONSILLECTOMY      VENOUS ACCESS DEVICE (PORT) INSERTION N/A 01/09/2023    Procedure: INSERTION VENOUS ACCESS DEVICE;  Surgeon: Alfred Castañeda MD;  Location: [facility] MAIN OR;  Service: General;  Laterality: N/A;    VENTRAL HERNIA REPAIR N/A 7/3/2024    Procedure: VENTRAL / INCISIONAL HERNIA REPAIR LAPAROSCOPIC WITH DAVINCI ROBOT with mesh;  Surgeon: Alfred Castañeda MD;  Location: [facility] MAIN OR;  Service: Robotics - DaVinci;  Laterality: N/A;     Social History     Socioeconomic History    Marital status:    Tobacco Use    Smoking status: Former     Current packs/day: 1.00     Average packs/day: 1  "pack/day for 50.6 years (50.6 ttl pk-yrs)     Types: Cigarettes     Start date: 1974    Smokeless tobacco: Never    Tobacco comments:          Has not used tobacco for 28 days    Vaping Use    Vaping status: Never Used   Substance and Sexual Activity    Alcohol use: No    Drug use: Never     Types: Marijuana     Comment: LAST USE 7-2-24    Sexual activity: Defer     Partners: Male     Birth control/protection: None     Family History   Problem Relation Age of Onset    Hypertension Mother     Rheum arthritis Mother     Heart disease Mother     Breast cancer Mother     Diabetes Father     Cancer Maternal Grandmother         colon    Colon cancer Maternal Grandmother     Aneurysm Paternal Grandfather     Diabetes Other     Fibromyalgia Other     Malig Hyperthermia Neg Hx        Objective   Physical Exam  Vitals reviewed. Exam conducted with a chaperone present.   Cardiovascular:      Rate and Rhythm: Normal rate and regular rhythm.      Heart sounds: Normal heart sounds. No murmur heard.     No gallop.   Pulmonary:      Effort: Pulmonary effort is normal.      Breath sounds: Normal breath sounds.   Abdominal:      General: Bowel sounds are normal.   Musculoskeletal:      Right lower leg: No edema.      Left lower leg: No edema.   Lymphadenopathy:      Cervical: No cervical adenopathy.   Psychiatric:         Mood and Affect: Mood normal.         Behavior: Behavior normal.         Vitals:    08/01/24 1404   BP: 129/44   Pulse: 54   Resp: 18   Temp: 98 °F (36.7 °C)   TempSrc: Temporal   SpO2: 97%   Weight: 84.2 kg (185 lb 10 oz)   Height: 167.6 cm (65.98\")   PainSc:   9   PainLoc: Generalized     ECOG score: 1         PHQ-9 Total Score:                    Result Review :   The following data was reviewed by: Donald Barker MD on 08/01/2024:  Lab Results   Component Value Date    HGB 8.1 (L) 07/29/2024    HCT 25.5 (L) 07/29/2024    .4 (H) 07/29/2024     07/29/2024    WBC 3.33 (L) 07/29/2024    NEUTROABS " "1.97 07/29/2024    LYMPHSABS 0.91 07/29/2024    MONOSABS 0.29 07/29/2024    EOSABS 0.12 07/29/2024    BASOSABS 0.04 07/29/2024     Lab Results   Component Value Date    GLUCOSE 99 07/29/2024    BUN 24 (H) 07/29/2024    CREATININE 0.93 07/29/2024     07/29/2024    K 3.2 (L) 07/29/2024    CL 97 (L) 07/29/2024    CO2 30.3 (H) 07/29/2024    CALCIUM 8.5 (L) 07/29/2024    PROTEINTOT 5.9 (L) 07/29/2024    ALBUMIN 3.6 07/29/2024    BILITOT 0.3 07/29/2024    ALKPHOS 82 07/29/2024    AST 10 07/29/2024    ALT 6 07/29/2024     Lab Results   Component Value Date    MG 1.7 07/29/2024    PHOS 3.4 07/29/2024    FREET4 1.01 01/17/2022    TSH 1.470 11/12/2019     Lab Results   Component Value Date    IRON 28 (L) 05/29/2024    LABIRON 7 (L) 05/29/2024    TRANSFERRIN 279 05/29/2024    TIBC 416 05/29/2024     Lab Results   Component Value Date    FERRITIN 56.02 05/29/2024    AAQZCVOS45 390 04/23/2019    FOLATE 9.93 04/23/2019     No results found for: \"PSA\", \"CEA\", \"AFP\", \"\", \"\"    Data reviewed : Radiologic studies CT chest, abdomen, pelvis, bone scan reviewed       Assessment and Plan    Diagnoses and all orders for this visit:    1. Malignant neoplasm metastatic to bone (Primary)  Assessment & Plan:  Patient has been on Xgeva.  She has a small lesion in her mouth concerning for osteonecrosis of the jaw.  I will hold Xgeva for the time being.  She will be referred to oral surgery having missed prior appointments.    Orders:  -     CBC and Differential; Future  -     Comprehensive metabolic panel; Future  -     Magnesium; Future  -     Phosphorus; Future  -     Ambulatory Referral to Oral Maxillofacial Surgery    2. Malignant neoplasm of upper-outer quadrant of left breast in female, estrogen receptor positive  Assessment & Plan:  Metastatic.  Patient is on treatment letrozole, ribociclib and denosumab for bony involvement.  Restaging scans show stable disease with no new or worsening findings.  Blood counts are " adequate for treatment.  Continue letrozole daily.  Continue ribociclib 3 weeks out of 4.  Denosumab will be held until oral surgery evaluation.  I will see her back in 2 months for ongoing treatment with lab work prior.    Orders:  -     CBC and Differential; Future  -     Comprehensive metabolic panel; Future  -     Magnesium; Future  -     Phosphorus; Future    3. Anemia, unspecified type  Assessment & Plan:  Likely related to ribociclib.  Not enough for dose adjustment.  Repeat CBC next visit.      4. Pain in lower jaw  Assessment & Plan:  Patient will be referred to oral surgery for evaluation.    Orders:  -     Ambulatory Referral to Oral Maxillofacial Surgery    5. Monopolar depression  Assessment & Plan:  Patient reports increased symptoms despite Cymbalta.  She will be referred to behavioral health for evaluation.    Orders:  -     Ambulatory Referral to Behavioral Health    6. Cancer related pain  Assessment & Plan:  Patient is on MS Contin with oxycodone as needed for breakthrough.  She has had more pain recently as she lost her oxycodone prescription.  She is taking her other medications.  She is also on gabapentin and Cymbalta as adjuncts for pain control.  She has an upcoming appoint with pain management for consideration of interventional pain options.          I spent 50 minutes caring for Derek on this date of service. This time includes time spent by me in the following activities:preparing for the visit, reviewing tests, obtaining and/or reviewing a separately obtained history, performing a medically appropriate examination and/or evaluation , counseling and educating the patient/family/caregiver, ordering medications, tests, or procedures, referring and communicating with other health care professionals , documenting information in the medical record, and care coordination    Patient Follow Up: 2 months    Patient was given instructions and counseling regarding her condition or for health  maintenance advice. Please see specific information pulled into the AVS if appropriate.     Donald Barker MD    8/2/2024

## 2024-08-02 ENCOUNTER — TELEPHONE (OUTPATIENT)
Dept: SURGERY | Facility: CLINIC | Age: 64
End: 2024-08-02
Payer: MEDICARE

## 2024-08-02 PROBLEM — R68.84 PAIN IN LOWER JAW: Status: ACTIVE | Noted: 2024-08-02

## 2024-08-02 NOTE — ASSESSMENT & PLAN NOTE
Patient is on MS Contin with oxycodone as needed for breakthrough.  She has had more pain recently as she lost her oxycodone prescription.  She is taking her other medications.  She is also on gabapentin and Cymbalta as adjuncts for pain control.  She has an upcoming appoint with pain management for consideration of interventional pain options.

## 2024-08-02 NOTE — ASSESSMENT & PLAN NOTE
Metastatic.  Patient is on treatment letrozole, ribociclib and denosumab for bony involvement.  Restaging scans show stable disease with no new or worsening findings.  Blood counts are adequate for treatment.  Continue letrozole daily.  Continue ribociclib 3 weeks out of 4.  Denosumab will be held until oral surgery evaluation.  I will see her back in 2 months for ongoing treatment with lab work prior.

## 2024-08-02 NOTE — ASSESSMENT & PLAN NOTE
Patient has been on Xgeva.  She has a small lesion in her mouth concerning for osteonecrosis of the jaw.  I will hold Xgeva for the time being.  She will be referred to oral surgery having missed prior appointments.

## 2024-08-02 NOTE — ASSESSMENT & PLAN NOTE
Patient reports increased symptoms despite Cymbalta.  She will be referred to behavioral health for evaluation.

## 2024-08-02 NOTE — TELEPHONE ENCOUNTER
PT WAS REFERRED TO PAIN MANAGEMENT AND IT WAS SCHEDULED IN [place]. SHE NEEDS SOMETHING IN [place]. CAN YOU CHECK ON THIS FOR HER.

## 2024-08-03 DIAGNOSIS — Z17.0 MALIGNANT NEOPLASM OF UPPER-OUTER QUADRANT OF LEFT BREAST IN FEMALE, ESTROGEN RECEPTOR POSITIVE: ICD-10-CM

## 2024-08-03 DIAGNOSIS — G89.3 CANCER RELATED PAIN: ICD-10-CM

## 2024-08-03 DIAGNOSIS — C50.412 MALIGNANT NEOPLASM OF UPPER-OUTER QUADRANT OF LEFT BREAST IN FEMALE, ESTROGEN RECEPTOR POSITIVE: ICD-10-CM

## 2024-08-03 DIAGNOSIS — F33.9 MONOPOLAR DEPRESSION: ICD-10-CM

## 2024-08-05 ENCOUNTER — SPECIALTY PHARMACY (OUTPATIENT)
Facility: HOSPITAL | Age: 64
End: 2024-08-05
Payer: MEDICARE

## 2024-08-05 RX ORDER — OXYCODONE AND ACETAMINOPHEN 10; 325 MG/1; MG/1
1 TABLET ORAL EVERY 6 HOURS PRN
Qty: 60 TABLET | Refills: 0 | Status: SHIPPED | OUTPATIENT
Start: 2024-08-05

## 2024-08-05 RX ORDER — DULOXETIN HYDROCHLORIDE 60 MG/1
CAPSULE, DELAYED RELEASE ORAL
Qty: 30 CAPSULE | Refills: 1 | Status: SHIPPED | OUTPATIENT
Start: 2024-08-05

## 2024-08-05 NOTE — TELEPHONE ENCOUNTER
I AM GOING TO SEND HER INFORMATION TO [facility] IN [place] ONCE THEY OPEN UP AND I CAN GET THEIR FAX NUMBER

## 2024-08-08 NOTE — TELEPHONE ENCOUNTER
INFORMED PATIENT THAT I HAVE FAXED HER INFORMATION TO [facility] IN [facility] AND THAT THEY SHOULD GET IN TOUCH WITH HER SHORTLY. TOLD HER IF THEY DO NOT GET IN TOUCH WITH HER TO LET ME KNOW AND I WOULD FOLLOW UP ON IT. PATIENT VOICED UNDERSTANDING.

## 2024-08-14 DIAGNOSIS — C50.912 MALIGNANT NEOPLASM OF LEFT BREAST IN FEMALE, ESTROGEN RECEPTOR POSITIVE, UNSPECIFIED SITE OF BREAST: ICD-10-CM

## 2024-08-14 DIAGNOSIS — Z17.0 MALIGNANT NEOPLASM OF LEFT BREAST IN FEMALE, ESTROGEN RECEPTOR POSITIVE, UNSPECIFIED SITE OF BREAST: ICD-10-CM

## 2024-08-15 ENCOUNTER — TELEPHONE (OUTPATIENT)
Dept: ONCOLOGY | Facility: HOSPITAL | Age: 64
End: 2024-08-15
Payer: MEDICARE

## 2024-08-15 RX ORDER — ONDANSETRON HYDROCHLORIDE 8 MG/1
TABLET, FILM COATED ORAL
Qty: 30 TABLET | Refills: 5 | Status: SHIPPED | OUTPATIENT
Start: 2024-08-15

## 2024-08-15 NOTE — TELEPHONE ENCOUNTER
Caller: Derek Keller     Relationship: SELF     Best call back number: [phone]    What is your medical concern?     WANTS TO DISCUSS PAIN MGMT  WENT TO SEE DR. JAGDEEP VERONICA - ORAL SURGEON - TODAY & NO SURGERY IS NECESSARY, GAVE HER AN ANTIBIOTIC & SOME GEL TO RUB ON THE AREA - THEY STATE IT IS JUST BONE, DID WE RECEIVE A CLEARANCE LETTER FROM THEM SO PT CAN CONTINUE TO GET XGEVA INJECTIONS?  IF SO SHE NEEDS TO SCHEDULE.    Is your provider already aware of this issue? YES

## 2024-08-16 NOTE — TELEPHONE ENCOUNTER
Spoke with patient to let her know that we would continue to hold the xgeva until we see her back in October to allow for healing in her mouth. Patient v/u.

## 2024-08-20 DIAGNOSIS — G89.3 CANCER RELATED PAIN: ICD-10-CM

## 2024-08-20 RX ORDER — OXYCODONE AND ACETAMINOPHEN 10; 325 MG/1; MG/1
1 TABLET ORAL EVERY 6 HOURS PRN
Qty: 60 TABLET | Refills: 0 | Status: SHIPPED | OUTPATIENT
Start: 2024-08-20

## 2024-08-20 RX ORDER — MORPHINE SULFATE 60 MG/1
60 TABLET, FILM COATED, EXTENDED RELEASE ORAL 2 TIMES DAILY
Qty: 60 TABLET | Refills: 0 | Status: SHIPPED | OUTPATIENT
Start: 2024-08-20

## 2024-08-20 NOTE — TELEPHONE ENCOUNTER
Caller: Derek Keller    Relationship: Self    Best call back number: [phone]     Requested Prescriptions:   Requested Prescriptions     Pending Prescriptions Disp Refills    oxyCODONE-acetaminophen (PERCOCET)  MG per tablet 60 tablet 0     Sig: Take 1 tablet by mouth Every 6 (Six) Hours As Needed for Moderate Pain.    Morphine (MS CONTIN) 60 MG 12 hr tablet 60 tablet 0     Sig: Take 1 tablet by mouth 2 (Two) Times a Day.        Pharmacy where request should be sent: [facility], [facility] [place] [facility], [place] - [address] - [phone]  - [phone] FX     Last office visit with prescribing clinician: 8/1/2024   Last telemedicine visit with prescribing clinician: Visit date not found   Next office visit with prescribing clinician: 10/2/2024     Additional details provided by patient:     Does the patient have less than a 3 day supply:  [x] Yes  [] No    Would you like a call back once the refill request has been completed: [] Yes [x] No    If the office needs to give you a call back, can they leave a voicemail: [] Yes [x] No

## 2024-09-03 DIAGNOSIS — G89.3 CANCER RELATED PAIN: ICD-10-CM

## 2024-09-03 RX ORDER — OXYCODONE AND ACETAMINOPHEN 10; 325 MG/1; MG/1
1 TABLET ORAL EVERY 6 HOURS PRN
Qty: 60 TABLET | Refills: 0 | OUTPATIENT
Start: 2024-09-03

## 2024-09-03 RX ORDER — MORPHINE SULFATE 60 MG/1
60 TABLET, FILM COATED, EXTENDED RELEASE ORAL 2 TIMES DAILY
Qty: 60 TABLET | Refills: 0 | OUTPATIENT
Start: 2024-09-03

## 2024-09-04 DIAGNOSIS — G89.3 CANCER RELATED PAIN: ICD-10-CM

## 2024-09-04 RX ORDER — OXYCODONE AND ACETAMINOPHEN 10; 325 MG/1; MG/1
1 TABLET ORAL EVERY 6 HOURS PRN
Qty: 60 TABLET | Refills: 0 | Status: SHIPPED | OUTPATIENT
Start: 2024-09-04

## 2024-09-04 NOTE — TELEPHONE ENCOUNTER
Caller: Krishna Derek J    Relationship: Self    Best call back number: [phone]     Requested Prescriptions:   Requested Prescriptions     Pending Prescriptions Disp Refills    oxyCODONE-acetaminophen (PERCOCET)  MG per tablet 60 tablet 0     Sig: Take 1 tablet by mouth Every 6 (Six) Hours As Needed for Moderate Pain.        Pharmacy where request should be sent: [facility], [facility], & [place]  [facility], [place] - [address] - [phone] [facility] [phone] FX     Last office visit with prescribing clinician: 8/1/2024   Last telemedicine visit with prescribing clinician: Visit date not found   Next office visit with prescribing clinician: 10/2/2024     Additional details provided by patient: PHARMACY REQUESTED THIS AND MORPHINE YESTERDAY, IT WAS DENIED SINCE IT IS NOT TIME FOR THE MORPHINE.  SHE ONLY NEEDS THE OXYCODONE-ACETAMINOPHEN RIGHT NOW, ONLY HAS 2 PILLS LEFT ON THAT.  SHE HAS PLENTY OF THE MORPHINE.      Does the patient have less than a 3 day supply:  [x] Yes  [] No    Would you like a call back once the refill request has been completed: [] Yes [x] No    If the office needs to give you a call back, can they leave a voicemail: [] Yes [x] No    Abby Burnett   09/04/24 09:48 EDT

## 2024-09-09 ENCOUNTER — HOSPITAL ENCOUNTER (OUTPATIENT)
Dept: ONCOLOGY | Facility: HOSPITAL | Age: 64
Discharge: HOME | End: 2024-09-09
Admitting: INTERNAL MEDICINE
Payer: MEDICARE

## 2024-09-09 DIAGNOSIS — Z45.2 ENCOUNTER FOR ADJUSTMENT OR MANAGEMENT OF VASCULAR ACCESS DEVICE: Primary | ICD-10-CM

## 2024-09-09 PROCEDURE — 96523 IRRIG DRUG DELIVERY DEVICE: CPT

## 2024-09-09 PROCEDURE — 25010000002 HEPARIN LOCK FLUSH PER 10 UNITS: Performed by: INTERNAL MEDICINE

## 2024-09-09 RX ORDER — HEPARIN SODIUM (PORCINE) LOCK FLUSH IV SOLN 100 UNIT/ML 100 UNIT/ML
500 SOLUTION INTRAVENOUS AS NEEDED
Status: DISCONTINUED | OUTPATIENT
Start: 2024-09-09 | End: 2024-09-10 | Stop reason: HOSPADM

## 2024-09-09 RX ORDER — SODIUM CHLORIDE 0.9 % (FLUSH) 0.9 %
20 SYRINGE (ML) INJECTION AS NEEDED
Status: DISCONTINUED | OUTPATIENT
Start: 2024-09-09 | End: 2024-09-10 | Stop reason: HOSPADM

## 2024-09-09 RX ORDER — SODIUM CHLORIDE 0.9 % (FLUSH) 0.9 %
20 SYRINGE (ML) INJECTION AS NEEDED
OUTPATIENT
Start: 2024-09-09

## 2024-09-09 RX ORDER — HEPARIN SODIUM (PORCINE) LOCK FLUSH IV SOLN 100 UNIT/ML 100 UNIT/ML
500 SOLUTION INTRAVENOUS AS NEEDED
OUTPATIENT
Start: 2024-09-09

## 2024-09-09 RX ADMIN — HEPARIN 500 UNITS: 100 SYRINGE at 14:51

## 2024-09-09 RX ADMIN — Medication 20 ML: at 14:51

## 2024-09-23 DIAGNOSIS — G89.3 CANCER RELATED PAIN: ICD-10-CM

## 2024-09-23 RX ORDER — MORPHINE SULFATE 60 MG/1
60 TABLET, FILM COATED, EXTENDED RELEASE ORAL 2 TIMES DAILY
Qty: 60 TABLET | Refills: 0 | Status: SHIPPED | OUTPATIENT
Start: 2024-09-23

## 2024-09-23 RX ORDER — OXYCODONE AND ACETAMINOPHEN 10; 325 MG/1; MG/1
1 TABLET ORAL EVERY 6 HOURS PRN
Qty: 60 TABLET | Refills: 0 | Status: SHIPPED | OUTPATIENT
Start: 2024-09-23

## 2024-09-24 NOTE — ASSESSMENT & PLAN NOTE
Blood pressure today is on the low side and she does note increased fatigue.  She is on several medications that can affect the blood pressure including losartan as well as her pain medications.  I recommended she follow-up with her PCP regarding adjustment/discontinuation of the losartan in the setting of hypotension.  She will hold that medicine until she talks with them.  
Metastatic.  Hormone receptor positive.  Patient is on palliative therapy with letrozole plus ribociclib.  Tolerating both well.  CBC demonstrates cytopenias related to the ribociclib but not enough to require dose adjustment.  Continue current treatment.  I will see her back in 1 month for ongoing therapy with lab work prior.  
Patient is on monthly denosumab.  Tolerating well.  No dental or jaw pain.  Electrolytes are adequate.  Xgeva today monthly.  
Patient reports adequate pain control with her current regimen of MS Contin and oxycodone as needed for breakthrough.  [place] reviewed and no discrepancies.  Continue same.  Reassess next visit.  
I do not need help with these

## 2024-09-27 DIAGNOSIS — C50.412 MALIGNANT NEOPLASM OF UPPER-OUTER QUADRANT OF LEFT BREAST IN FEMALE, ESTROGEN RECEPTOR POSITIVE: Primary | ICD-10-CM

## 2024-09-27 DIAGNOSIS — Z17.0 MALIGNANT NEOPLASM OF UPPER-OUTER QUADRANT OF LEFT BREAST IN FEMALE, ESTROGEN RECEPTOR POSITIVE: Primary | ICD-10-CM

## 2024-09-27 DIAGNOSIS — C79.51 MALIGNANT NEOPLASM METASTATIC TO BONE: ICD-10-CM

## 2024-10-02 ENCOUNTER — OFFICE VISIT (OUTPATIENT)
Dept: ONCOLOGY | Facility: HOSPITAL | Age: 64
End: 2024-10-02
Payer: MEDICARE

## 2024-10-02 ENCOUNTER — HOSPITAL ENCOUNTER (OUTPATIENT)
Dept: ONCOLOGY | Facility: HOSPITAL | Age: 64
Discharge: HOME | End: 2024-10-02
Payer: MEDICARE

## 2024-10-02 VITALS
HEART RATE: 54 BPM | WEIGHT: 192 LBS | SYSTOLIC BLOOD PRESSURE: 165 MMHG | BODY MASS INDEX: 30.86 KG/M2 | DIASTOLIC BLOOD PRESSURE: 45 MMHG | HEIGHT: 66 IN | TEMPERATURE: 97.7 F | RESPIRATION RATE: 20 BRPM | OXYGEN SATURATION: 100 %

## 2024-10-02 DIAGNOSIS — C50.412 MALIGNANT NEOPLASM OF UPPER-OUTER QUADRANT OF LEFT BREAST IN FEMALE, ESTROGEN RECEPTOR POSITIVE: ICD-10-CM

## 2024-10-02 DIAGNOSIS — C79.51 MALIGNANT NEOPLASM METASTATIC TO BONE: ICD-10-CM

## 2024-10-02 DIAGNOSIS — F33.9 MONOPOLAR DEPRESSION: ICD-10-CM

## 2024-10-02 DIAGNOSIS — R23.2 HOT FLASHES: ICD-10-CM

## 2024-10-02 DIAGNOSIS — G89.3 CANCER RELATED PAIN: ICD-10-CM

## 2024-10-02 DIAGNOSIS — Z45.2 ENCOUNTER FOR ADJUSTMENT OR MANAGEMENT OF VASCULAR ACCESS DEVICE: Primary | ICD-10-CM

## 2024-10-02 DIAGNOSIS — Z17.0 MALIGNANT NEOPLASM OF UPPER-OUTER QUADRANT OF LEFT BREAST IN FEMALE, ESTROGEN RECEPTOR POSITIVE: ICD-10-CM

## 2024-10-02 DIAGNOSIS — C50.412 MALIGNANT NEOPLASM OF UPPER-OUTER QUADRANT OF LEFT BREAST IN FEMALE, ESTROGEN RECEPTOR POSITIVE: Primary | ICD-10-CM

## 2024-10-02 DIAGNOSIS — Z17.0 MALIGNANT NEOPLASM OF UPPER-OUTER QUADRANT OF LEFT BREAST IN FEMALE, ESTROGEN RECEPTOR POSITIVE: Primary | ICD-10-CM

## 2024-10-02 LAB
ALBUMIN SERPL-MCNC: 4.1 G/DL (ref 3.5–5.2)
ALBUMIN/GLOB SERPL: 1.6 G/DL
ALP SERPL-CCNC: 91 U/L (ref 39–117)
ALT SERPL W P-5'-P-CCNC: 13 U/L (ref 1–33)
ANION GAP SERPL CALCULATED.3IONS-SCNC: 3.9 MMOL/L (ref 5–15)
AST SERPL-CCNC: 11 U/L (ref 1–32)
BASOPHILS # BLD AUTO: 0.02 10*3/MM3 (ref 0–0.2)
BASOPHILS NFR BLD AUTO: 0.3 % (ref 0–1.5)
BILIRUB SERPL-MCNC: 0.4 MG/DL (ref 0–1.2)
BUN SERPL-MCNC: 17 MG/DL (ref 8–23)
BUN/CREAT SERPL: 19.8 (ref 7–25)
CALCIUM SPEC-SCNC: 9.6 MG/DL (ref 8.6–10.5)
CHLORIDE SERPL-SCNC: 102 MMOL/L (ref 98–107)
CO2 SERPL-SCNC: 35.1 MMOL/L (ref 22–29)
CREAT SERPL-MCNC: 0.86 MG/DL (ref 0.57–1)
DEPRECATED RDW RBC AUTO: 55.4 FL (ref 37–54)
EGFRCR SERPLBLD CKD-EPI 2021: 75.1 ML/MIN/1.73
EOSINOPHIL # BLD AUTO: 0.07 10*3/MM3 (ref 0–0.4)
EOSINOPHIL NFR BLD AUTO: 1 % (ref 0.3–6.2)
ERYTHROCYTE [DISTWIDTH] IN BLOOD BY AUTOMATED COUNT: 14.2 % (ref 12.3–15.4)
GLOBULIN UR ELPH-MCNC: 2.5 GM/DL
GLUCOSE SERPL-MCNC: 126 MG/DL (ref 65–99)
HCT VFR BLD AUTO: 30.9 % (ref 34–46.6)
HGB BLD-MCNC: 9.7 G/DL (ref 12–15.9)
IMM GRANULOCYTES # BLD AUTO: 0.01 10*3/MM3 (ref 0–0.05)
IMM GRANULOCYTES NFR BLD AUTO: 0.1 % (ref 0–0.5)
LYMPHOCYTES # BLD AUTO: 1.43 10*3/MM3 (ref 0.7–3.1)
LYMPHOCYTES NFR BLD AUTO: 21.3 % (ref 19.6–45.3)
MAGNESIUM SERPL-MCNC: 2.2 MG/DL (ref 1.6–2.4)
MCH RBC QN AUTO: 33.1 PG (ref 26.6–33)
MCHC RBC AUTO-ENTMCNC: 31.4 G/DL (ref 31.5–35.7)
MCV RBC AUTO: 105.5 FL (ref 79–97)
MONOCYTES # BLD AUTO: 0.31 10*3/MM3 (ref 0.1–0.9)
MONOCYTES NFR BLD AUTO: 4.6 % (ref 5–12)
NEUTROPHILS NFR BLD AUTO: 4.87 10*3/MM3 (ref 1.7–7)
NEUTROPHILS NFR BLD AUTO: 72.7 % (ref 42.7–76)
PHOSPHATE SERPL-MCNC: 3.7 MG/DL (ref 2.5–4.5)
PLATELET # BLD AUTO: 252 10*3/MM3 (ref 140–450)
PMV BLD AUTO: 9.5 FL (ref 6–12)
POTASSIUM SERPL-SCNC: 3.7 MMOL/L (ref 3.5–5.2)
PROT SERPL-MCNC: 6.6 G/DL (ref 6–8.5)
RBC # BLD AUTO: 2.93 10*6/MM3 (ref 3.77–5.28)
SODIUM SERPL-SCNC: 141 MMOL/L (ref 136–145)
WBC NRBC COR # BLD AUTO: 6.71 10*3/MM3 (ref 3.4–10.8)

## 2024-10-02 PROCEDURE — 80053 COMPREHEN METABOLIC PANEL: CPT | Performed by: INTERNAL MEDICINE

## 2024-10-02 PROCEDURE — 25010000002 HEPARIN LOCK FLUSH PER 10 UNITS: Performed by: INTERNAL MEDICINE

## 2024-10-02 PROCEDURE — 84100 ASSAY OF PHOSPHORUS: CPT | Performed by: INTERNAL MEDICINE

## 2024-10-02 PROCEDURE — 83735 ASSAY OF MAGNESIUM: CPT | Performed by: INTERNAL MEDICINE

## 2024-10-02 PROCEDURE — 36591 DRAW BLOOD OFF VENOUS DEVICE: CPT

## 2024-10-02 PROCEDURE — 85025 COMPLETE CBC W/AUTO DIFF WBC: CPT | Performed by: INTERNAL MEDICINE

## 2024-10-02 RX ORDER — SODIUM CHLORIDE 0.9 % (FLUSH) 0.9 %
20 SYRINGE (ML) INJECTION AS NEEDED
Status: DISCONTINUED | OUTPATIENT
Start: 2024-10-02 | End: 2024-10-03 | Stop reason: HOSPADM

## 2024-10-02 RX ORDER — SODIUM CHLORIDE 0.9 % (FLUSH) 0.9 %
20 SYRINGE (ML) INJECTION AS NEEDED
OUTPATIENT
Start: 2024-10-02

## 2024-10-02 RX ORDER — HEPARIN SODIUM (PORCINE) LOCK FLUSH IV SOLN 100 UNIT/ML 100 UNIT/ML
500 SOLUTION INTRAVENOUS AS NEEDED
Status: DISCONTINUED | OUTPATIENT
Start: 2024-10-02 | End: 2024-10-03 | Stop reason: HOSPADM

## 2024-10-02 RX ORDER — HEPARIN SODIUM (PORCINE) LOCK FLUSH IV SOLN 100 UNIT/ML 100 UNIT/ML
500 SOLUTION INTRAVENOUS AS NEEDED
OUTPATIENT
Start: 2024-10-02

## 2024-10-02 RX ADMIN — HEPARIN 500 UNITS: 100 SYRINGE at 13:39

## 2024-10-02 RX ADMIN — Medication 20 ML: at 13:38

## 2024-10-02 NOTE — ASSESSMENT & PLAN NOTE
Metastatic.  Patient is on treatment with letrozole, ribociclib.  Overall tolerating well she does note occasional diarrhea which may be from her regimen but not severe.  She reports some crampiness in her legs at night.  We discussed adequate hydration, stretching at bedtime.  Continue letrozole daily.  Continue ribociclib 3 weeks out of 4.  I will see her back in 1 month for ongoing treatment with lab work prior to monitor for toxicities and restaging CT/bone scan to assess response to therapy.

## 2024-10-02 NOTE — PROGRESS NOTES
Chief Complaint  Malignant neoplasm metastatic to bone    Haile Monique MD Patel, Saagar, MD    Subjective          Derek Keller presents to [facility] for ongoing treatment of her metastatic breast cancer.  She is on letrozole, ribociclib.  Tolerating her treatment well.  She does report some occasional diarrhea.  She does have constipation related to her pain medication and feels like she overdid laxatives.  She denies new masses, adenopathy.  She reports adequate appetite.  She had some cramping in her legs at night    Oncology/Hematology History   Malignant neoplasm of upper-outer quadrant of left breast in female, estrogen receptor positive   10/13/2022 Initial Diagnosis    Malignant neoplasm of left breast in female, estrogen receptor positive (HCC)     10/14/2022 -  Chemotherapy    OP BREAST Letrozole / Ribociclib     10/14/2022 Cancer Staged    Staging form: Breast, AJCC 8th Edition  - Clinical: Stage IV (cT1c, cN1, cM1, ER+, WI+, HER2-) - Signed by Donald Barker MD on 10/14/2022     10/28/2022 -  Chemotherapy    OP SUPPORTIVE Denosumab (Xgeva) Q28D     Malignant neoplasm metastatic to bone   10/14/2022 Initial Diagnosis    Bone metastases (HCC)     10/28/2022 -  Chemotherapy    OP SUPPORTIVE Denosumab (Xgeva) Q28D     Secondary malignant neoplasm of bone (Resolved)   11/14/2022 Initial Diagnosis    Secondary malignant neoplasm of bone (HCC)     11/17/2022 - 4/19/2023 Radiation    RADIATION THERAPY Treatment Details (11/14/2022 - 4/19/2023)  Site: Spine - Lumbar  Technique: 3D CRT  Goal: No goal specified  Planned Treatment Start Date: 11/17/2022           Current Outpatient Medications on File Prior to Visit   Medication Sig Dispense Refill    atorvastatin (LIPITOR) 20 MG tablet TAKE 1 TABLET BY MOUTH EVERY DAY (Patient taking differently: Take 1 tablet by mouth Daily.) 30 tablet 6    baclofen (LIORESAL) 20 MG tablet TAKE 1 TABLET BY MOUTH THREE TIMES DAILY  FOR MUSCLE SPASMS (Patient not taking: Reported on 8/1/2024)      cyproheptadine (PERIACTIN) 4 MG tablet Take 1 tablet by mouth Every 12 (Twelve) Hours. (Patient not taking: Reported on 8/1/2024)      donepezil (ARICEPT) 10 MG tablet Take 1 tablet by mouth Daily.      DULoxetine (CYMBALTA) 60 MG capsule TAKE 1 capsule BY MOUTH DAILY for mood elevation 30 capsule 1    gabapentin (NEURONTIN) 600 MG tablet TAKE 1 TABLET BY MOUTH AT BEDTIME (Patient taking differently: Take 1 tablet by mouth 2 (Two) Times a Day As Needed.) 90 tablet 0    hydroCHLOROthiazide 12.5 MG tablet TAKE 1 TABLET BY MOUTH DAILY for swelling 90 tablet 2    ibuprofen (ADVIL,MOTRIN) 600 MG tablet Take 1 tablet by mouth Every 8 (Eight) Hours As Needed. for pain      lactulose (CHRONULAC) 10 GM/15ML solution take 30 mls BY MOUTH TWICE DAILY      letrozole (FEMARA) 2.5 MG tablet TAKE 1 TABLET BY MOUTH DAILY 90 tablet 1    levothyroxine (SYNTHROID, LEVOTHROID) 50 MCG tablet TAKE 1 TABLET BY MOUTH EVERY DAY (Patient taking differently: Take 1 tablet by mouth Daily.) 90 tablet 1    lidocaine (LIDODERM) 5 % Place 1 patch on the skin as directed by provider Daily. Remove & Discard patch within 12 hours or as directed by MD      Lidocaine Pain Relief 4 % apply 1 patch to skin daily. leave on for 12 hours, and remove for 12 hours. (Patient not taking: Reported on 7/17/2024)      LORazepam (ATIVAN) 0.5 MG tablet Take 1 tablet by mouth Every 6 (Six) Hours As Needed.      losartan (COZAAR) 50 MG tablet Take 1 tablet by mouth Daily. for blood pressure      montelukast (SINGULAIR) 10 MG tablet Take 1 tablet by mouth Daily.      Morphine (MS CONTIN) 60 MG 12 hr tablet Take 1 tablet by mouth 2 (Two) Times a Day. 60 tablet 0    naloxone (NARCAN) 4 MG/0.1ML nasal spray Call 911. Don't prime. [place] in 1 nostril for overdose. Repeat in 2-3 minutes in other nostril if no or minimal breathing/responsiveness. (Patient not taking: Reported on 8/1/2024) 2 each 0    nicotine  (NICODERM CQ) 21 MG/24HR patch Place 1 patch on the skin as directed by provider Daily. 30 patch 3    ondansetron (ZOFRAN) 8 MG tablet TAKE 1 TABLET BY MOUTH THREE TIMES DAILY AS NEEDED FOR NAUSEA AND VOMITING 30 tablet 5    oxybutynin XL (DITROPAN XL) 15 MG 24 hr tablet TAKE 1 TABLET BY MOUTH DAILY for bladder 30 tablet 1    oxyCODONE (Roxicodone) 5 MG immediate release tablet Take 1 tablet by mouth Every 8 (Eight) Hours As Needed for Moderate Pain. (Patient not taking: Reported on 7/17/2024) 8 tablet 0    oxyCODONE-acetaminophen (PERCOCET)  MG per tablet Take 1 tablet by mouth Every 6 (Six) Hours As Needed for Moderate Pain. 60 tablet 0    polyethylene glycol (MIRALAX) 17 g packet Take 17 g by mouth Daily. (Patient not taking: Reported on 6/6/2024) 5 packet 0    polyethylene glycol (MIRALAX) 17 g packet Take 17 g by mouth Daily. (Patient not taking: Reported on 7/17/2024) 5 packet 0    potassium chloride (MICRO-K) 10 MEQ CR capsule TAKE 1 CAPSULE BY MOUTH EVERY TWELVE HOURS 60 capsule 1    prochlorperazine (COMPAZINE) 10 MG tablet  (Patient not taking: Reported on 8/1/2024)      ribociclib succinate 200 MG tablet therapy pack tablet Take 3 tablets by mouth Take As Directed. Take 3 tablets by mouth daily for 21 days then off 7 days on a 28 day cycle. (Patient not taking: Reported on 8/1/2024) 63 tablet 11    rOPINIRole (REQUIP) 3 MG tablet Take 1 tablet by mouth 2 (Two) Times a Day.      SudoGest 60 MG tablet Take 1 tablet by mouth Every 8 (Eight) Hours. (Patient not taking: Reported on 8/1/2024)      SUMAtriptan (IMITREX) 100 MG tablet Take 1 tablet by mouth 1 (One) Time As Needed. (Patient not taking: Reported on 8/1/2024)      traZODone (DESYREL) 150 MG tablet TAKE 1 TABLET BY MOUTH ONCE nightly (Patient taking differently: Take 1 tablet by mouth Every Night.) 90 tablet 2     Current Facility-Administered Medications on File Prior to Visit   Medication Dose Route Frequency Provider Last Rate Last Admin     heparin injection 500 Units  500 Units Intravenous PRN Donald Barker MD   500 Units at 10/02/24 1339    sodium chloride 0.9 % flush 20 mL  20 mL Intravenous PRN Donald Barker MD   20 mL at 10/02/24 1338       Allergies   Allergen Reactions    Azithromycin Itching    Erythromycin Itching     Past Medical History:   Diagnosis Date    Abdominal pain     OSTOMY SITE    Allergic rhinitis     Anemia     NO S/S    Anxiety     Arteriosclerosis     Coronary, follows with Dr. Núñez    Arthritis     Bone cancer     Bone metastases 10/14/2022    Bowen's disease     SKIN CANCER    Breast cancer     NO SURGERY WAS DONE DUE TO METS TO BONE/COLON/LYMPHNODE    Cancer related pain 10/13/2022    Depression     Disorder associated with Helicobacter species 10/12/2022    Dysphoric mood     Fatigue     Fibromyalgia     Frequent urination     NO S/S INFECTION    Herpes simplex vulvovaginitis 07/26/2018    History of colon polyps     History of IBS     History of kidney stones     Hyperlipidemia     Hypertension     Hypothyroidism     Insomnia     Lumbago     Mood disorder     Neck pain     R/T CANCER    Palpitations     last time a few months ago    Precordial pain     R/T SPINE CANCER    S/P Laparoscopic Hand-Assisted Colostomy Closure with End to End Anastomosis Open Parastomal Hernia Repair 12/08/2022    Sleep disturbance     SOB (shortness of breath)     at times at rest    SOBOE (shortness of breath on exertion)      Past Surgical History:   Procedure Laterality Date    BREAST BIOPSY Left     CARDIAC CATHETERIZATION Left 1959    CARDIAC CATHETERIZATION N/A 04/06/2018    Procedure: Coronary angiography;  Surgeon: Art Licea MD;  Location: [facility] CATH INVASIVE LOCATION;  Service: Cardiovascular    CARDIAC CATHETERIZATION N/A 04/06/2018    Procedure: Left heart cath;  Surgeon: Art Licea MD;  Location: [facility] CATH INVASIVE LOCATION;  Service: Cardiovascular    CARDIAC CATHETERIZATION N/A 04/06/2018     Procedure: Left ventriculography;  Surgeon: Art Licea MD;  Location: [facility] INVASIVE LOCATION;  Service: Cardiovascular    CHOLECYSTECTOMY      COLON SURGERY      colostomy bag, and colostomy reversal    COLONOSCOPY  11/08/2006    COLONOSCOPY N/A 06/08/2023    Procedure: COLONOSCOPY;  Surgeon: Alfred Castañeda MD;  Location: [facility] ENDOSCOPY;  Service: General;  Laterality: N/A;  same as preop    EXPLORATORY LAPAROTOMY N/A 12/06/2022    Procedure: LAPAROTOMY EXPLORATORY sigmoid resection hartmans procedure colostomy;  Surgeon: Alfred Castañeda MD;  Location: [facility] MAIN OR;  Service: General;  Laterality: N/A;    GANGLION CYST EXCISION Bilateral     HYSTERECTOMY  05/2005    ILEOSTOMY CLOSURE N/A 06/26/2023    Procedure: Laparoscopic Hand-Assisted Colostomy Closure with End to End Anastomosis;  Surgeon: Alfred Castañeda MD;  Location: [facility] MAIN OR;  Service: General;  Laterality: N/A;    LAPAROSCOPIC GASTRIC BANDING      BAND REMOVED 2020    PARASTOMAL HERNIA REPAIR N/A 06/26/2023    Procedure: Open Parastomal Hernia Repair;  Surgeon: Alfred Castañeda MD;  Location: [facility] MAIN OR;  Service: General;  Laterality: N/A;    TONSILLECTOMY      VENOUS ACCESS DEVICE (PORT) INSERTION N/A 01/09/2023    Procedure: INSERTION VENOUS ACCESS DEVICE;  Surgeon: Alfred Castañeda MD;  Location: [facility] MAIN OR;  Service: General;  Laterality: N/A;    VENTRAL HERNIA REPAIR N/A 7/3/2024    Procedure: VENTRAL / INCISIONAL HERNIA REPAIR LAPAROSCOPIC WITH DAVINCI ROBOT with mesh;  Surgeon: Alfred Castañeda MD;  Location: [facility] MAIN OR;  Service: Robotics - DaVinci;  Laterality: N/A;     Social History     Socioeconomic History    Marital status:    Tobacco Use    Smoking status: Former     Current packs/day: 1.00     Average packs/day: 1 pack/day for 50.8 years (50.8 ttl pk-yrs)     Types: Cigarettes     Start date: 1974    Smokeless tobacco: Never    Tobacco comments:           "Has not used tobacco for 28 days    Vaping Use    Vaping status: Never Used   Substance and Sexual Activity    Alcohol use: No    Drug use: Never     Types: Marijuana     Comment: LAST USE 7-2-24    Sexual activity: Defer     Partners: Male     Birth control/protection: None     Family History   Problem Relation Age of Onset    Hypertension Mother     Rheum arthritis Mother     Heart disease Mother     Breast cancer Mother     Diabetes Father     Cancer Maternal Grandmother         colon    Colon cancer Maternal Grandmother     Aneurysm Paternal Grandfather     Diabetes Other     Fibromyalgia Other     Malig Hyperthermia Neg Hx        Objective   Physical Exam  Vitals reviewed. Exam conducted with a chaperone present.   Cardiovascular:      Rate and Rhythm: Normal rate and regular rhythm.      Heart sounds: Normal heart sounds. No murmur heard.     No gallop.   Pulmonary:      Effort: Pulmonary effort is normal.      Breath sounds: Normal breath sounds.      Comments: Port-A-Cath  Abdominal:      General: Bowel sounds are normal.   Lymphadenopathy:      Cervical: No cervical adenopathy.   Psychiatric:         Mood and Affect: Mood normal.         Behavior: Behavior normal.         Vitals:    10/02/24 1354   BP: 165/45   Pulse: 54   Resp: 20   Temp: 97.7 °F (36.5 °C)   TempSrc: Temporal   SpO2: 100%   Weight: 87.1 kg (192 lb)   Height: 167.6 cm (66\")   PainSc:   5               PHQ-9 Total Score:                    Result Review :   The following data was reviewed by: Donald Barker MD on 10/02/2024:  Lab Results   Component Value Date    HGB 9.7 (L) 10/02/2024    HCT 30.9 (L) 10/02/2024    .5 (H) 10/02/2024     10/02/2024    WBC 6.71 10/02/2024    NEUTROABS 4.87 10/02/2024    LYMPHSABS 1.43 10/02/2024    MONOSABS 0.31 10/02/2024    EOSABS 0.07 10/02/2024    BASOSABS 0.02 10/02/2024     Lab Results   Component Value Date    GLUCOSE 126 (H) 10/02/2024    BUN 17 10/02/2024    CREATININE 0.86 10/02/2024 " "    10/02/2024    K 3.7 10/02/2024     10/02/2024    CO2 35.1 (H) 10/02/2024    CALCIUM 9.6 10/02/2024    PROTEINTOT 6.6 10/02/2024    ALBUMIN 4.1 10/02/2024    BILITOT 0.4 10/02/2024    ALKPHOS 91 10/02/2024    AST 11 10/02/2024    ALT 13 10/02/2024     Lab Results   Component Value Date    MG 2.2 10/02/2024    PHOS 3.7 10/02/2024    FREET4 1.01 01/17/2022    TSH 1.470 11/12/2019     Lab Results   Component Value Date    IRON 28 (L) 05/29/2024    LABIRON 7 (L) 05/29/2024    TRANSFERRIN 279 05/29/2024    TIBC 416 05/29/2024     Lab Results   Component Value Date    FERRITIN 56.02 05/29/2024    PIUULCQQ14 390 04/23/2019    FOLATE 9.93 04/23/2019     No results found for: \"PSA\", \"CEA\", \"AFP\", \"\", \"\"          Assessment and Plan    Diagnoses and all orders for this visit:    1. Malignant neoplasm of upper-outer quadrant of left breast in female, estrogen receptor positive (Primary)  Assessment & Plan:  Metastatic.  Patient is on treatment with letrozole, ribociclib.  Overall tolerating well she does note occasional diarrhea which may be from her regimen but not severe.  She reports some crampiness in her legs at night.  We discussed adequate hydration, stretching at bedtime.  Continue letrozole daily.  Continue ribociclib 3 weeks out of 4.  I will see her back in 1 month for ongoing treatment with lab work prior to monitor for toxicities and restaging CT/bone scan to assess response to therapy.    Orders:  -     CT chest w contrast; Future  -     CT abdomen pelvis w contrast; Future  -     CBC & Differential; Future  -     Comprehensive Metabolic Panel; Future  -     Magnesium; Future  -     Phosphorus; Future    2. Malignant neoplasm metastatic to bone  Assessment & Plan:  Patient's Xgeva is on hold until she has been evaluated by dentistry for possible osteonecrosis.    Orders:  -     NM Bone Scan Whole Body; Future            Patient Follow Up: 1 month    Patient was given instructions and " counseling regarding her condition or for health maintenance advice. Please see specific information pulled into the AVS if appropriate.     Donald Barker MD    10/2/2024

## 2024-10-03 ENCOUNTER — SPECIALTY PHARMACY (OUTPATIENT)
Dept: PHARMACY | Facility: HOSPITAL | Age: 64
End: 2024-10-03
Payer: MEDICARE

## 2024-10-03 RX ORDER — POTASSIUM CHLORIDE 750 MG/1
CAPSULE, EXTENDED RELEASE ORAL
Qty: 60 CAPSULE | Refills: 1 | Status: SHIPPED | OUTPATIENT
Start: 2024-10-03

## 2024-10-03 RX ORDER — DULOXETIN HYDROCHLORIDE 60 MG/1
CAPSULE, DELAYED RELEASE ORAL
Qty: 30 CAPSULE | Refills: 1 | Status: SHIPPED | OUTPATIENT
Start: 2024-10-03

## 2024-10-03 RX ORDER — OXYBUTYNIN CHLORIDE 15 MG/1
TABLET, EXTENDED RELEASE ORAL
Qty: 30 TABLET | Refills: 1 | Status: SHIPPED | OUTPATIENT
Start: 2024-10-03

## 2024-10-04 ENCOUNTER — HOSPITAL ENCOUNTER (EMERGENCY)
Facility: HOSPITAL | Age: 64
Discharge: HOME | End: 2024-10-04
Attending: EMERGENCY MEDICINE
Payer: MEDICARE

## 2024-10-04 VITALS
WEIGHT: 192.02 LBS | OXYGEN SATURATION: 98 % | RESPIRATION RATE: 16 BRPM | SYSTOLIC BLOOD PRESSURE: 138 MMHG | TEMPERATURE: 99 F | HEIGHT: 66 IN | DIASTOLIC BLOOD PRESSURE: 65 MMHG | HEART RATE: 62 BPM | BODY MASS INDEX: 30.86 KG/M2

## 2024-10-04 DIAGNOSIS — M62.838 MUSCLE SPASM OF BOTH LOWER LEGS: Primary | ICD-10-CM

## 2024-10-04 LAB
ALBUMIN SERPL-MCNC: 3.6 G/DL (ref 3.5–5.2)
ALBUMIN/GLOB SERPL: 1.3 G/DL
ALP SERPL-CCNC: 87 U/L (ref 39–117)
ALT SERPL W P-5'-P-CCNC: 11 U/L (ref 1–33)
ANION GAP SERPL CALCULATED.3IONS-SCNC: 8 MMOL/L (ref 5–15)
AST SERPL-CCNC: 11 U/L (ref 1–32)
BASOPHILS # BLD AUTO: 0.05 10*3/MM3 (ref 0–0.2)
BASOPHILS NFR BLD AUTO: 0.9 % (ref 0–1.5)
BILIRUB SERPL-MCNC: 0.4 MG/DL (ref 0–1.2)
BILIRUB UR QL STRIP: NEGATIVE
BUN SERPL-MCNC: 21 MG/DL (ref 8–23)
BUN/CREAT SERPL: 24.4 (ref 7–25)
CALCIUM SPEC-SCNC: 8.8 MG/DL (ref 8.6–10.5)
CHLORIDE SERPL-SCNC: 99 MMOL/L (ref 98–107)
CLARITY UR: CLEAR
CO2 SERPL-SCNC: 31 MMOL/L (ref 22–29)
COLOR UR: YELLOW
CREAT SERPL-MCNC: 0.86 MG/DL (ref 0.57–1)
D-LACTATE SERPL-SCNC: 0.7 MMOL/L (ref 0.5–2)
DEPRECATED RDW RBC AUTO: 55 FL (ref 37–54)
EGFRCR SERPLBLD CKD-EPI 2021: 75.1 ML/MIN/1.73
EOSINOPHIL # BLD AUTO: 0.07 10*3/MM3 (ref 0–0.4)
EOSINOPHIL NFR BLD AUTO: 1.3 % (ref 0.3–6.2)
ERYTHROCYTE [DISTWIDTH] IN BLOOD BY AUTOMATED COUNT: 14.4 % (ref 12.3–15.4)
GLOBULIN UR ELPH-MCNC: 2.8 GM/DL
GLUCOSE SERPL-MCNC: 121 MG/DL (ref 65–99)
GLUCOSE UR STRIP-MCNC: NEGATIVE MG/DL
HCT VFR BLD AUTO: 28.5 % (ref 34–46.6)
HGB BLD-MCNC: 8.9 G/DL (ref 12–15.9)
HGB UR QL STRIP.AUTO: NEGATIVE
HOLD SPECIMEN: NORMAL
HOLD SPECIMEN: NORMAL
IMM GRANULOCYTES # BLD AUTO: 0.03 10*3/MM3 (ref 0–0.05)
IMM GRANULOCYTES NFR BLD AUTO: 0.6 % (ref 0–0.5)
KETONES UR QL STRIP: NEGATIVE
LEUKOCYTE ESTERASE UR QL STRIP.AUTO: NEGATIVE
LIPASE SERPL-CCNC: 10 U/L (ref 13–60)
LYMPHOCYTES # BLD AUTO: 0.98 10*3/MM3 (ref 0.7–3.1)
LYMPHOCYTES NFR BLD AUTO: 18.1 % (ref 19.6–45.3)
MAGNESIUM SERPL-MCNC: 2.1 MG/DL (ref 1.6–2.4)
MCH RBC QN AUTO: 32.6 PG (ref 26.6–33)
MCHC RBC AUTO-ENTMCNC: 31.2 G/DL (ref 31.5–35.7)
MCV RBC AUTO: 104.4 FL (ref 79–97)
MONOCYTES # BLD AUTO: 0.22 10*3/MM3 (ref 0.1–0.9)
MONOCYTES NFR BLD AUTO: 4.1 % (ref 5–12)
NEUTROPHILS NFR BLD AUTO: 4.05 10*3/MM3 (ref 1.7–7)
NEUTROPHILS NFR BLD AUTO: 75 % (ref 42.7–76)
NITRITE UR QL STRIP: NEGATIVE
NRBC BLD AUTO-RTO: 0 /100 WBC (ref 0–0.2)
PH UR STRIP.AUTO: 7.5 [PH] (ref 5–8)
PLATELET # BLD AUTO: 236 10*3/MM3 (ref 140–450)
PMV BLD AUTO: 9.5 FL (ref 6–12)
POTASSIUM SERPL-SCNC: 3.8 MMOL/L (ref 3.5–5.2)
PROT SERPL-MCNC: 6.4 G/DL (ref 6–8.5)
PROT UR QL STRIP: NEGATIVE
RBC # BLD AUTO: 2.73 10*6/MM3 (ref 3.77–5.28)
SODIUM SERPL-SCNC: 138 MMOL/L (ref 136–145)
SP GR UR STRIP: 1.01 (ref 1–1.03)
UROBILINOGEN UR QL STRIP: NORMAL
WBC NRBC COR # BLD AUTO: 5.4 10*3/MM3 (ref 3.4–10.8)
WHOLE BLOOD HOLD COAG: NORMAL
WHOLE BLOOD HOLD SPECIMEN: NORMAL

## 2024-10-04 PROCEDURE — 83605 ASSAY OF LACTIC ACID: CPT | Performed by: EMERGENCY MEDICINE

## 2024-10-04 PROCEDURE — 80053 COMPREHEN METABOLIC PANEL: CPT | Performed by: EMERGENCY MEDICINE

## 2024-10-04 PROCEDURE — 85025 COMPLETE CBC W/AUTO DIFF WBC: CPT | Performed by: EMERGENCY MEDICINE

## 2024-10-04 PROCEDURE — 25810000003 SODIUM CHLORIDE 0.9 % SOLUTION: Performed by: EMERGENCY MEDICINE

## 2024-10-04 PROCEDURE — 83690 ASSAY OF LIPASE: CPT | Performed by: EMERGENCY MEDICINE

## 2024-10-04 PROCEDURE — 96360 HYDRATION IV INFUSION INIT: CPT

## 2024-10-04 PROCEDURE — 99283 EMERGENCY DEPT VISIT LOW MDM: CPT

## 2024-10-04 PROCEDURE — 83735 ASSAY OF MAGNESIUM: CPT | Performed by: EMERGENCY MEDICINE

## 2024-10-04 PROCEDURE — 81003 URINALYSIS AUTO W/O SCOPE: CPT | Performed by: EMERGENCY MEDICINE

## 2024-10-04 RX ORDER — SODIUM CHLORIDE 0.9 % (FLUSH) 0.9 %
10 SYRINGE (ML) INJECTION AS NEEDED
Status: DISCONTINUED | OUTPATIENT
Start: 2024-10-04 | End: 2024-10-04 | Stop reason: HOSPADM

## 2024-10-04 RX ADMIN — SODIUM CHLORIDE 1000 ML: 9 INJECTION, SOLUTION INTRAVENOUS at 10:14

## 2024-10-04 NOTE — ED PROVIDER NOTES
Time: 9:51 AM EDT  Date of encounter:  10/4/2024  Independent Historian/Clinical History and Information was obtained by:   Patient    History is limited by: N/A    Chief Complaint: Dehydration, muscle spasms      History of Present Illness:  Patient is a 64 y.o. year old female who presents to the emergency department for evaluation of dehydration and muscle spasms.  Patient has a history of cancer and states that she is just not felt well for the last couple days.  States that she has had muscle cramps in bilateral lower extremities as well as in her hands.  Spoke with her oncologist who recommended she come to the hospital and get some fluids.  Does report that she has chronic nausea as well as chronic fatigue.  Denies any vomiting, chest pain, shortness of breath, diarrhea, dysuria.  No other complaints this time.      Patient Care Team  Primary Care Provider: Haile Monique MD    Past Medical History:     Allergies   Allergen Reactions    Azithromycin Itching    Erythromycin Itching     Past Medical History:   Diagnosis Date    Abdominal pain     OSTOMY SITE    Allergic rhinitis     Anemia     NO S/S    Anxiety     Arteriosclerosis     Coronary, follows with Dr. Núñez    Arthritis     Bone cancer     Bone metastases 10/14/2022    Bowen's disease     SKIN CANCER    Breast cancer     NO SURGERY WAS DONE DUE TO METS TO BONE/COLON/LYMPHNODE    Cancer related pain 10/13/2022    Depression     Disorder associated with Helicobacter species 10/12/2022    Dysphoric mood     Fatigue     Fibromyalgia     Frequent urination     NO S/S INFECTION    Herpes simplex vulvovaginitis 07/26/2018    History of colon polyps     History of IBS     History of kidney stones     Hyperlipidemia     Hypertension     Hypothyroidism     Insomnia     Lumbago     Mood disorder     Neck pain     R/T CANCER    Palpitations     last time a few months ago    Precordial pain     R/T SPINE CANCER    S/P Laparoscopic Hand-Assisted Colostomy  Closure with End to End Anastomosis Open Parastomal Hernia Repair 12/08/2022    Sleep disturbance     SOB (shortness of breath)     at times at rest    SOBOE (shortness of breath on exertion)      Past Surgical History:   Procedure Laterality Date    BREAST BIOPSY Left     CARDIAC CATHETERIZATION Left 1959    CARDIAC CATHETERIZATION N/A 04/06/2018    Procedure: Coronary angiography;  Surgeon: Art Licea MD;  Location: [facility] INVASIVE LOCATION;  Service: Cardiovascular    CARDIAC CATHETERIZATION N/A 04/06/2018    Procedure: Left heart cath;  Surgeon: Art Licea MD;  Location: [facility] CATH INVASIVE LOCATION;  Service: Cardiovascular    CARDIAC CATHETERIZATION N/A 04/06/2018    Procedure: Left ventriculography;  Surgeon: Art Licea MD;  Location: [facility] CATH INVASIVE LOCATION;  Service: Cardiovascular    CHOLECYSTECTOMY      COLON SURGERY      colostomy bag, and colostomy reversal    COLONOSCOPY  11/08/2006    COLONOSCOPY N/A 06/08/2023    Procedure: COLONOSCOPY;  Surgeon: Alfred Castañeda MD;  Location: [facility] ENDOSCOPY;  Service: General;  Laterality: N/A;  same as preop    EXPLORATORY LAPAROTOMY N/A 12/06/2022    Procedure: LAPAROTOMY EXPLORATORY sigmoid resection hartmans procedure colostomy;  Surgeon: Alfred Castañeda MD;  Location: [facility] MAIN OR;  Service: General;  Laterality: N/A;    GANGLION CYST EXCISION Bilateral     HYSTERECTOMY  05/2005    ILEOSTOMY CLOSURE N/A 06/26/2023    Procedure: Laparoscopic Hand-Assisted Colostomy Closure with End to End Anastomosis;  Surgeon: Alfred Castañeda MD;  Location: [facility] MAIN OR;  Service: General;  Laterality: N/A;    LAPAROSCOPIC GASTRIC BANDING      BAND REMOVED 2020    PARASTOMAL HERNIA REPAIR N/A 06/26/2023    Procedure: Open Parastomal Hernia Repair;  Surgeon: Alfred Castañeda MD;  Location: [facility] MAIN OR;  Service: General;  Laterality: N/A;    TONSILLECTOMY      VENOUS ACCESS DEVICE (PORT) INSERTION  N/A 01/09/2023    Procedure: INSERTION VENOUS ACCESS DEVICE;  Surgeon: Alfred Castañeda MD;  Location: [facility] MAIN OR;  Service: General;  Laterality: N/A;    VENTRAL HERNIA REPAIR N/A 7/3/2024    Procedure: VENTRAL / INCISIONAL HERNIA REPAIR LAPAROSCOPIC WITH DAVINCI ROBOT with mesh;  Surgeon: Alfred Castañeda MD;  Location: [facility] MAIN OR;  Service: Robotics - DaVinci;  Laterality: N/A;     Family History   Problem Relation Age of Onset    Hypertension Mother     Rheum arthritis Mother     Heart disease Mother     Breast cancer Mother     Diabetes Father     Cancer Maternal Grandmother         colon    Colon cancer Maternal Grandmother     Aneurysm Paternal Grandfather     Diabetes Other     Fibromyalgia Other     Malig Hyperthermia Neg Hx        Home Medications:  Prior to Admission medications    Medication Sig Start Date End Date Taking? Authorizing Provider   atorvastatin (LIPITOR) 20 MG tablet TAKE 1 TABLET BY MOUTH EVERY DAY  Patient taking differently: Take 1 tablet by mouth Daily. 10/1/19   Yudelka Manning MD   baclofen (LIORESAL) 20 MG tablet TAKE 1 TABLET BY MOUTH THREE TIMES DAILY FOR MUSCLE SPASMS  Patient not taking: Reported on 8/1/2024 5/1/24   Bessie Lopez MD   cyproheptadine (PERIACTIN) 4 MG tablet Take 1 tablet by mouth Every 12 (Twelve) Hours.  Patient not taking: Reported on 8/1/2024 10/30/23   Bessie Lopez MD   donepezil (ARICEPT) 10 MG tablet Take 1 tablet by mouth Daily. 10/28/22   Bessie Lopez MD   DULoxetine (CYMBALTA) 60 MG capsule TAKE 1 capsule BY MOUTH DAILY for mood elevation 10/3/24   Donald Barker MD   gabapentin (NEURONTIN) 600 MG tablet TAKE 1 TABLET BY MOUTH AT BEDTIME  Patient taking differently: Take 1 tablet by mouth 2 (Two) Times a Day As Needed. 7/30/20   Yudelka Manning MD   hydroCHLOROthiazide 12.5 MG tablet TAKE 1 TABLET BY MOUTH DAILY for swelling 2/20/24   Donald Barker MD   ibuprofen (ADVIL,MOTRIN) 600 MG tablet  Take 1 tablet by mouth Every 8 (Eight) Hours As Needed. for pain 5/10/24   Bessie Lopez MD   lactulose (CHRONULAC) 10 GM/15ML solution take 30 mls BY MOUTH TWICE DAILY 7/5/24   Bessie Lopez MD   letrozole (FEMARA) 2.5 MG tablet TAKE 1 TABLET BY MOUTH DAILY 7/8/24   Donald Barker MD   levothyroxine (SYNTHROID, LEVOTHROID) 50 MCG tablet TAKE 1 TABLET BY MOUTH EVERY DAY  Patient taking differently: Take 1 tablet by mouth Daily. 7/19/19   Yudelka Manning MD   lidocaine (LIDODERM) 5 % Place 1 patch on the skin as directed by provider Daily. Remove & Discard patch within 12 hours or as directed by MD    Bessie Lopez MD   Lidocaine Pain Relief 4 % apply 1 patch to skin daily. leave on for 12 hours, and remove for 12 hours.  Patient not taking: Reported on 7/17/2024 5/10/24   Bessie Lopez MD   LORazepam (ATIVAN) 0.5 MG tablet Take 1 tablet by mouth Every 6 (Six) Hours As Needed.    Bessie Lopez MD   losartan (COZAAR) 50 MG tablet Take 1 tablet by mouth Daily. for blood pressure 5/6/24   Bessie Lopez MD   montelukast (SINGULAIR) 10 MG tablet Take 1 tablet by mouth Daily. 1/17/22   Bessie Lopez MD   Morphine (MS CONTIN) 60 MG 12 hr tablet Take 1 tablet by mouth 2 (Two) Times a Day. 9/23/24   Donald Barker MD   naloxone (NARCAN) 4 MG/0.1ML nasal spray Call 911. Don't prime. [place] in 1 nostril for overdose. Repeat in 2-3 minutes in other nostril if no or minimal breathing/responsiveness.  Patient not taking: Reported on 8/1/2024 7/3/24   Alfred Castañeda MD   nicotine (NICODERM CQ) 21 MG/24HR patch Place 1 patch on the skin as directed by provider Daily. 7/12/24   Alfred Castañeda MD   ondansetron (ZOFRAN) 8 MG tablet TAKE 1 TABLET BY MOUTH THREE TIMES DAILY AS NEEDED FOR NAUSEA AND VOMITING 8/15/24   Donald Barker MD   oxybutynin XL (DITROPAN XL) 15 MG 24 hr tablet TAKE 1 TABLET BY MOUTH DAILY for bladder 10/3/24   Donald Barker MD    oxyCODONE (Roxicodone) 5 MG immediate release tablet Take 1 tablet by mouth Every 8 (Eight) Hours As Needed for Moderate Pain.  Patient not taking: Reported on 7/17/2024 7/3/24 7/3/25  Alfred Castañeda MD   oxyCODONE-acetaminophen (PERCOCET)  MG per tablet Take 1 tablet by mouth Every 6 (Six) Hours As Needed for Moderate Pain. 9/23/24   Donald Barker MD   polyethylene glycol (MIRALAX) 17 g packet Take 17 g by mouth Daily.  Patient not taking: Reported on 6/6/2024 1/9/23   Alfred Castañeda MD   polyethylene glycol (MIRALAX) 17 g packet Take 17 g by mouth Daily.  Patient not taking: Reported on 7/17/2024 7/3/24   Alfred Castañeda MD   potassium chloride (MICRO-K) 10 MEQ CR capsule TAKE 1 CAPSULE BY MOUTH EVERY TWELVE HOURS 10/3/24   Donald Barker MD   prochlorperazine (COMPAZINE) 10 MG tablet  9/29/23   Bessie Lopez MD   ribociclib succinate 200 MG tablet therapy pack tablet Take 3 tablets by mouth Take As Directed. Take 3 tablets by mouth daily for 21 days then off 7 days on a 28 day cycle.  Patient not taking: Reported on 8/1/2024 8/1/23   Donald Barker MD   rOPINIRole (REQUIP) 3 MG tablet Take 1 tablet by mouth 2 (Two) Times a Day. 6/29/22   Bessie Lopez MD   SudoGest 60 MG tablet Take 1 tablet by mouth Every 8 (Eight) Hours.  Patient not taking: Reported on 8/1/2024 11/13/23   Bessie Lopez MD   SUMAtriptan (IMITREX) 100 MG tablet Take 1 tablet by mouth 1 (One) Time As Needed.  Patient not taking: Reported on 8/1/2024 10/7/22   Bessie Lopez MD   traZODone (DESYREL) 150 MG tablet TAKE 1 TABLET BY MOUTH ONCE nightly  Patient taking differently: Take 1 tablet by mouth Every Night. 11/12/19   Yudelka Manning MD        Social History:   Social History     Tobacco Use    Smoking status: Former     Current packs/day: 1.00     Average packs/day: 1 pack/day for 50.8 years (50.8 ttl pk-yrs)     Types: Cigarettes     Start date: 1974    Smokeless  "tobacco: Never    Tobacco comments:          Has not used tobacco for 28 days    Vaping Use    Vaping status: Never Used   Substance Use Topics    Alcohol use: No    Drug use: Never     Types: Marijuana     Comment: LAST USE 7-2-24         Review of Systems:  Review of Systems     Physical Exam:  /54 (BP Location: Left arm, Patient Position: Sitting)   Pulse 58   Temp 99 °F (37.2 °C)   Resp 18   Ht 167.6 cm (66\")   Wt 87.1 kg (192 lb 0.3 oz)   LMP  (LMP Unknown)   SpO2 99%   BMI 30.99 kg/m²     Physical Exam  Vitals and nursing note reviewed.   Constitutional:       Appearance: Normal appearance. She is obese.   HENT:      Head: Normocephalic and atraumatic.   Eyes:      General: No scleral icterus.  Cardiovascular:      Rate and Rhythm: Normal rate and regular rhythm.      Heart sounds: Normal heart sounds.   Pulmonary:      Effort: Pulmonary effort is normal.      Breath sounds: Normal breath sounds.   Abdominal:      Palpations: Abdomen is soft.      Tenderness: There is no abdominal tenderness.   Musculoskeletal:         General: Normal range of motion.      Cervical back: Normal range of motion.   Skin:     Findings: No rash.   Neurological:      General: No focal deficit present.      Mental Status: She is alert.                  Procedures:  Procedures      Medical Decision Making:      Comorbidities that affect care:    Cancer, Hypertension, Obesity    External Notes reviewed:    Reviewed note from 10-24      The following orders were placed and all results were independently analyzed by me:  Orders Placed This Encounter   Procedures    [place] Draw    Comprehensive Metabolic Panel    Lipase    Urinalysis With Microscopic If Indicated (No Culture) - Urine, Clean Catch    Lactic Acid, Plasma    CBC Auto Differential    Magnesium    NPO Diet NPO Type: Strict NPO    Undress & Gown    Insert Peripheral IV    CBC & Differential    Green Top (Gel)    Lavender Top    Gold Top - SST    Light Blue Top "       Medications Given in the Emergency Department:  Medications   sodium chloride 0.9 % flush 10 mL (has no administration in time range)   sodium chloride 0.9 % bolus 1,000 mL (has no administration in time range)        ED Course:         Labs:    Labs Reviewed   COMPREHENSIVE METABOLIC PANEL - Abnormal; Notable for the following components:       Result Value    Glucose 121 (*)     CO2 31.0 (*)     All other components within normal limits    Narrative:     GFR Normal >60  Chronic Kidney Disease <60  Kidney Failure <15     LIPASE - Abnormal; Notable for the following components:    Lipase 10 (*)     All other components within normal limits   CBC WITH AUTO DIFFERENTIAL - Abnormal; Notable for the following components:    RBC 2.73 (*)     Hemoglobin 8.9 (*)     Hematocrit 28.5 (*)     .4 (*)     MCHC 31.2 (*)     RDW-SD 55.0 (*)     Lymphocyte % 18.1 (*)     Monocyte % 4.1 (*)     Immature Grans % 0.6 (*)     All other components within normal limits   URINALYSIS W/ MICROSCOPIC IF INDICATED (NO CULTURE) - Normal    Narrative:     Urine microscopic not indicated.   LACTIC ACID, PLASMA - Normal   MAGNESIUM - Normal   RAINBOW DRAW    Narrative:     The following orders were created for panel order [place] Draw.  Procedure                               Abnormality         Status                     ---------                               -----------         ------                     Green Top (Gel)[[id number]]                                  Final result               Lavender Top[[id number]]                                     Final result               Gold Top - SST[[id number]]                                   Final result               Light Blue Top[[id number]]                                   Final result                 Please view results for these tests on the individual orders.   CBC AND DIFFERENTIAL    Narrative:     The following orders were created for panel order CBC & Differential.  Procedure                                Abnormality         Status                     ---------                               -----------         ------                     CBC Auto Differential[[id number]]        Abnormal            Final result                 Please view results for these tests on the individual orders.   GREEN TOP   LAVENDER TOP   GOLD TOP - SST   LIGHT BLUE TOP          Imaging:    No Radiology Exams Resulted Within Past 24 Hours      Differential Diagnosis and Discussion:    Metabolic: Differential diagnosis includes but is not limited to hypertension, hyperglycemia, hyperkalemia, hypocalcemia, metabolic acidosis, hypokalemia, hypoglycemia, malnutrition, hypothyroidism, hyperthyroidism, and adrenal insufficiency.     All labs were reviewed and interpreted by me.    MDM     Amount and/or Complexity of Data Reviewed  Clinical lab tests: reviewed  Decide to obtain previous medical records or to obtain history from someone other than the patient: yes         Patient is a 64-year-old femaleWho presents with possible dehydration and muscle spasms.  Patient has a history of cancer and was sent in by her cancer doctor because of the muscle spasm.  Has been having these for a while.  Labs here are unremarkable.  Has had no significant spasms while she has been here.  She is otherwise well-appearing.  Will DC.              Patient Care Considerations:          Consultants/Shared Management Plan:    None    Social Determinants of Health:    Patient is independent, reliable, and has access to care.       Disposition and Care Coordination:    Discharged: The patient is suitable and stable for discharge with no need for consideration of admission.    I have explained the patient´s condition, diagnoses and treatment plan based on the information available to me at this time. I have answered questions and addressed any concerns. The patient has a good  understanding of the patient´s diagnosis, condition, and treatment  plan as can be expected at this point. The vital signs have been stable. The patient´s condition is stable and appropriate for discharge from the emergency department.      The patient will pursue further outpatient evaluation with the primary care physician or other designated or consulting physician as outlined in the discharge instructions. They are agreeable to this plan of care and follow-up instructions have been explained in detail. The patient has received these instructions in written format and have expressed an understanding of the discharge instructions. The patient is aware that any significant change in condition or worsening of symptoms should prompt an immediate return to this or the closest emergency department or call to 911.      Final diagnoses:   Muscle spasm of both lower legs        ED Disposition       ED Disposition   Discharge    Condition   Stable    Comment   --               This medical record created using voice recognition software.             Alfred Chambers MD  10/06/24 0917

## 2024-10-14 ENCOUNTER — TELEPHONE (OUTPATIENT)
Dept: ONCOLOGY | Facility: HOSPITAL | Age: 64
End: 2024-10-14
Payer: MEDICARE

## 2024-10-14 DIAGNOSIS — G89.3 CANCER RELATED PAIN: ICD-10-CM

## 2024-10-14 RX ORDER — OXYCODONE AND ACETAMINOPHEN 10; 325 MG/1; MG/1
1 TABLET ORAL EVERY 6 HOURS PRN
Qty: 60 TABLET | Refills: 0 | Status: SHIPPED | OUTPATIENT
Start: 2024-10-14

## 2024-10-14 NOTE — TELEPHONE ENCOUNTER
Spoke with patient to inform her that the medications that she is on should not cause the mouth issues and that she should follow up with her dentist for that. Also told her that she could do baking soda and salt water rinses to help with the mouth soreness. Patient v/u.

## 2024-10-14 NOTE — TELEPHONE ENCOUNTER
Caller: Derek Keller    Relationship: Self    Best call back number: [phone]    Requested Prescriptions:   Requested Prescriptions     Pending Prescriptions Disp Refills    oxyCODONE-acetaminophen (PERCOCET)  MG per tablet 60 tablet 0     Sig: Take 1 tablet by mouth Every 6 (Six) Hours As Needed for Moderate Pain.        Pharmacy where request should be sent: [facility], [facility], [facility]  [facility], [place] - [address] - [phone] [facility] [phone] FX     Last office visit with prescribing clinician: 10/2/2024   Last telemedicine visit with prescribing clinician: Visit date not found   Next office visit with prescribing clinician: 10/30/2024     Additional details provided by patient:     HAS LESS THEN 3 DAY SUPPLY , WOULD LIKE TO HAVE SENT IN TODAY IF CAN TO      Does the patient have less than a 3 day supply:  [x] Yes  [] No    Would you like a call back once the refill request has been completed: [] Yes [x] No    If the office needs to give you a call back, can they leave a voicemail: [] Yes [x] No

## 2024-10-14 NOTE — TELEPHONE ENCOUNTER
Provider: DR DIANE    Caller: JERONIMO HODGSON     Relationship to Patient: SELF        Phone Number: [phone]    Reason for Call: HAVING MOUTH SORES NOT INDIVIDUAL BUT ALL OVER , FEELS LIKE THRUSH AND ON SIDES OF TONGUE, AND THEN KNOT AT THE BOTTOM OF GUMS, AND BOTTOM OF CHIN FEELS KIND OF NUMB      WANTED TO KNOW IF ANY OF THE MEDICATIONS CURRENTLY ON  WOULD CAUSE THIS.     When was the patient last seen: 10/02/24    When did it start: OVER A MONTH AGO     Where is it located: MOUTH ALL OVER, SIDES OF TONGUE , BOTTOM OF GUMS  , AND CHIN     Characteristics of symptom/severity: SORES, , KNOT ON BOTTOM OF GUMS, ALSO AROUND CHIN FEELS KIND OF NUMB     Timing- Is it constant or intermittent: CONSTANT     What makes it worse: NOTHING    What makes it better: NOTHING

## 2024-10-21 ENCOUNTER — HOSPITAL ENCOUNTER (OUTPATIENT)
Dept: NUCLEAR MEDICINE | Facility: HOSPITAL | Age: 64
Discharge: HOME | End: 2024-10-21
Payer: MEDICARE

## 2024-10-21 DIAGNOSIS — C79.51 MALIGNANT NEOPLASM METASTATIC TO BONE: ICD-10-CM

## 2024-10-21 PROCEDURE — A9503 TC99M MEDRONATE: HCPCS | Performed by: INTERNAL MEDICINE

## 2024-10-21 PROCEDURE — 0 TECHNETIUM MEDRONATE KIT: Performed by: INTERNAL MEDICINE

## 2024-10-21 PROCEDURE — 78306 BONE IMAGING WHOLE BODY: CPT

## 2024-10-21 RX ORDER — TC 99M MEDRONATE 20 MG/10ML
21.5 INJECTION, POWDER, LYOPHILIZED, FOR SOLUTION INTRAVENOUS
Status: COMPLETED | OUTPATIENT
Start: 2024-10-21 | End: 2024-10-21

## 2024-10-21 RX ADMIN — TC 99M MEDRONATE 21.5 MILLICURIE: 20 INJECTION, POWDER, LYOPHILIZED, FOR SOLUTION INTRAVENOUS at 13:10

## 2024-10-23 ENCOUNTER — TELEPHONE (OUTPATIENT)
Dept: ONCOLOGY | Facility: HOSPITAL | Age: 64
End: 2024-10-23

## 2024-10-23 NOTE — TELEPHONE ENCOUNTER
Caller: Derek Keller    Relationship: Self    Best call back number: [phone]    Caller requesting test results: PATIENT    What test was performed: WHOLE BODY SCAN    When was the test performed: 10-21-24    Where was the test performed:

## 2024-10-24 ENCOUNTER — TELEPHONE (OUTPATIENT)
Dept: ONCOLOGY | Facility: HOSPITAL | Age: 64
End: 2024-10-24
Payer: MEDICARE

## 2024-10-24 DIAGNOSIS — G89.3 CANCER RELATED PAIN: ICD-10-CM

## 2024-10-24 RX ORDER — MORPHINE SULFATE 60 MG/1
60 TABLET, FILM COATED, EXTENDED RELEASE ORAL 2 TIMES DAILY
Qty: 60 TABLET | Refills: 0 | Status: SHIPPED | OUTPATIENT
Start: 2024-10-24

## 2024-10-24 NOTE — TELEPHONE ENCOUNTER
Caller: Derek Keller    Relationship: Self    Best call back number: [phone]    Requested Prescriptions:   Requested Prescriptions     Pending Prescriptions Disp Refills    Morphine (MS CONTIN) 60 MG 12 hr tablet 60 tablet 0     Sig: Take 1 tablet by mouth 2 (Two) Times a Day.        Pharmacy where request should be sent: [facility], [facility], & [facility]  [facility] [place] - [address] - [phone] [facility] [phone] FX     Last office visit with prescribing clinician: 10/2/2024   Last telemedicine visit with prescribing clinician: Visit date not found   Next office visit with prescribing clinician: 10/30/2024     Additional details provided by patient:     Does the patient have less than a 3 day supply:  [x] Yes  [] No    Would you like a call back once the refill request has been completed: [] Yes [x] No    If the office needs to give you a call back, can they leave a voicemail: [] Yes [x] No

## 2024-10-24 NOTE — TELEPHONE ENCOUNTER
Caller: Derek Keller    Relationship: Self    Best call back number: [phone]    What test was performed: BONE SCAN    When was the test performed: 10/21    Where was the test performed: [facility]

## 2024-10-24 NOTE — TELEPHONE ENCOUNTER
Spoke to patient to let her know that Dr Barker had reviewed the bone scan and wanted to get the ct scan results to further evaluate the area of concern. Let her know that her refill had been sent and that she would follow up with us as scheduled. Patient v/u.

## 2024-10-28 ENCOUNTER — HOSPITAL ENCOUNTER (OUTPATIENT)
Dept: CT IMAGING | Facility: HOSPITAL | Age: 64
Discharge: HOME | End: 2024-10-28
Admitting: INTERNAL MEDICINE
Payer: MEDICARE

## 2024-10-28 DIAGNOSIS — Z17.0 MALIGNANT NEOPLASM OF UPPER-OUTER QUADRANT OF LEFT BREAST IN FEMALE, ESTROGEN RECEPTOR POSITIVE: ICD-10-CM

## 2024-10-28 DIAGNOSIS — C50.412 MALIGNANT NEOPLASM OF UPPER-OUTER QUADRANT OF LEFT BREAST IN FEMALE, ESTROGEN RECEPTOR POSITIVE: ICD-10-CM

## 2024-10-28 DIAGNOSIS — C79.51 MALIGNANT NEOPLASM METASTATIC TO BONE: ICD-10-CM

## 2024-10-28 LAB
ALBUMIN SERPL-MCNC: 3.6 G/DL (ref 3.5–5.2)
ALBUMIN/GLOB SERPL: 1 G/DL
ALP SERPL-CCNC: 114 U/L (ref 39–117)
ALT SERPL W P-5'-P-CCNC: 9 U/L (ref 1–33)
ANION GAP SERPL CALCULATED.3IONS-SCNC: 9.7 MMOL/L (ref 5–15)
AST SERPL-CCNC: 13 U/L (ref 1–32)
BASOPHILS # BLD AUTO: 0.03 10*3/MM3 (ref 0–0.2)
BASOPHILS NFR BLD AUTO: 0.6 % (ref 0–1.5)
BILIRUB SERPL-MCNC: 0.4 MG/DL (ref 0–1.2)
BUN SERPL-MCNC: 24 MG/DL (ref 8–23)
BUN/CREAT SERPL: 25.5 (ref 7–25)
CALCIUM SPEC-SCNC: 8.9 MG/DL (ref 8.6–10.5)
CHLORIDE SERPL-SCNC: 97 MMOL/L (ref 98–107)
CO2 SERPL-SCNC: 31.3 MMOL/L (ref 22–29)
CREAT SERPL-MCNC: 0.94 MG/DL (ref 0.57–1)
DEPRECATED RDW RBC AUTO: 52.3 FL (ref 37–54)
EGFRCR SERPLBLD CKD-EPI 2021: 67.5 ML/MIN/1.73
EOSINOPHIL # BLD AUTO: 0.03 10*3/MM3 (ref 0–0.4)
EOSINOPHIL NFR BLD AUTO: 0.6 % (ref 0.3–6.2)
ERYTHROCYTE [DISTWIDTH] IN BLOOD BY AUTOMATED COUNT: 14.1 % (ref 12.3–15.4)
GLOBULIN UR ELPH-MCNC: 3.7 GM/DL
GLUCOSE SERPL-MCNC: 111 MG/DL (ref 65–99)
HCT VFR BLD AUTO: 30.4 % (ref 34–46.6)
HGB BLD-MCNC: 9.7 G/DL (ref 12–15.9)
IMM GRANULOCYTES # BLD AUTO: 0.02 10*3/MM3 (ref 0–0.05)
IMM GRANULOCYTES NFR BLD AUTO: 0.4 % (ref 0–0.5)
LYMPHOCYTES # BLD AUTO: 0.84 10*3/MM3 (ref 0.7–3.1)
LYMPHOCYTES NFR BLD AUTO: 16 % (ref 19.6–45.3)
MAGNESIUM SERPL-MCNC: 1.9 MG/DL (ref 1.6–2.4)
MCH RBC QN AUTO: 32.6 PG (ref 26.6–33)
MCHC RBC AUTO-ENTMCNC: 31.9 G/DL (ref 31.5–35.7)
MCV RBC AUTO: 102 FL (ref 79–97)
MONOCYTES # BLD AUTO: 0.35 10*3/MM3 (ref 0.1–0.9)
MONOCYTES NFR BLD AUTO: 6.7 % (ref 5–12)
NEUTROPHILS NFR BLD AUTO: 3.97 10*3/MM3 (ref 1.7–7)
NEUTROPHILS NFR BLD AUTO: 75.7 % (ref 42.7–76)
NRBC BLD AUTO-RTO: 0 /100 WBC (ref 0–0.2)
PHOSPHATE SERPL-MCNC: 3 MG/DL (ref 2.5–4.5)
PLATELET # BLD AUTO: 281 10*3/MM3 (ref 140–450)
PMV BLD AUTO: 10.1 FL (ref 6–12)
POTASSIUM SERPL-SCNC: 3.9 MMOL/L (ref 3.5–5.2)
PROT SERPL-MCNC: 7.3 G/DL (ref 6–8.5)
RBC # BLD AUTO: 2.98 10*6/MM3 (ref 3.77–5.28)
SODIUM SERPL-SCNC: 138 MMOL/L (ref 136–145)
WBC NRBC COR # BLD AUTO: 5.24 10*3/MM3 (ref 3.4–10.8)

## 2024-10-28 PROCEDURE — 83735 ASSAY OF MAGNESIUM: CPT | Performed by: INTERNAL MEDICINE

## 2024-10-28 PROCEDURE — 74177 CT ABD & PELVIS W/CONTRAST: CPT

## 2024-10-28 PROCEDURE — 25510000001 IOPAMIDOL PER 1 ML: Performed by: INTERNAL MEDICINE

## 2024-10-28 PROCEDURE — 85025 COMPLETE CBC W/AUTO DIFF WBC: CPT | Performed by: INTERNAL MEDICINE

## 2024-10-28 PROCEDURE — 71260 CT THORAX DX C+: CPT

## 2024-10-28 PROCEDURE — 84100 ASSAY OF PHOSPHORUS: CPT | Performed by: INTERNAL MEDICINE

## 2024-10-28 PROCEDURE — 80053 COMPREHEN METABOLIC PANEL: CPT | Performed by: INTERNAL MEDICINE

## 2024-10-28 RX ORDER — HEPARIN SODIUM (PORCINE) LOCK FLUSH IV SOLN 100 UNIT/ML 100 UNIT/ML
SOLUTION INTRAVENOUS
Status: DISPENSED
Start: 2024-10-28 | End: 2024-10-28

## 2024-10-28 RX ORDER — IOPAMIDOL 755 MG/ML
100 INJECTION, SOLUTION INTRAVASCULAR
Status: COMPLETED | OUTPATIENT
Start: 2024-10-28 | End: 2024-10-28

## 2024-10-28 RX ADMIN — IOPAMIDOL 100 ML: 755 INJECTION, SOLUTION INTRAVENOUS at 10:29

## 2024-10-30 ENCOUNTER — HOSPITAL ENCOUNTER (OUTPATIENT)
Dept: ONCOLOGY | Facility: HOSPITAL | Age: 64
Discharge: HOME | End: 2024-10-30
Payer: MEDICARE

## 2024-10-30 ENCOUNTER — SPECIALTY PHARMACY (OUTPATIENT)
Dept: PHARMACY | Facility: HOSPITAL | Age: 64
End: 2024-10-30
Payer: MEDICARE

## 2024-10-30 ENCOUNTER — OFFICE VISIT (OUTPATIENT)
Dept: ONCOLOGY | Facility: HOSPITAL | Age: 64
End: 2024-10-30
Payer: MEDICARE

## 2024-10-30 VITALS
HEIGHT: 66 IN | DIASTOLIC BLOOD PRESSURE: 47 MMHG | TEMPERATURE: 97.7 F | OXYGEN SATURATION: 97 % | SYSTOLIC BLOOD PRESSURE: 120 MMHG | WEIGHT: 189.38 LBS | HEART RATE: 56 BPM | RESPIRATION RATE: 18 BRPM | BODY MASS INDEX: 30.44 KG/M2

## 2024-10-30 DIAGNOSIS — Z17.0 MALIGNANT NEOPLASM OF UPPER-OUTER QUADRANT OF LEFT BREAST IN FEMALE, ESTROGEN RECEPTOR POSITIVE: Primary | ICD-10-CM

## 2024-10-30 DIAGNOSIS — C50.412 MALIGNANT NEOPLASM OF UPPER-OUTER QUADRANT OF LEFT BREAST IN FEMALE, ESTROGEN RECEPTOR POSITIVE: Primary | ICD-10-CM

## 2024-10-30 DIAGNOSIS — C79.51 MALIGNANT NEOPLASM METASTATIC TO BONE: ICD-10-CM

## 2024-10-30 PROCEDURE — G0463 HOSPITAL OUTPT CLINIC VISIT: HCPCS | Performed by: INTERNAL MEDICINE

## 2024-10-30 RX ORDER — MIRTAZAPINE 15 MG/1
15 TABLET, FILM COATED ORAL NIGHTLY
COMMUNITY
Start: 2024-10-23

## 2024-10-30 NOTE — PROGRESS NOTES
Chief Complaint   FOLLOW UP 1    Haile Monique MD Patel, Saagar, MD    Subjective          Derek Keller presents to [facility] for ongoing treatment of her metastatic breast cancer.  She is on letrozole, ribociclib, denosumab for bony involvement.  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  She is seeing her dentist.  She does report adequate appetite and fluid intake.  She has diffuse pain which is unchanged from previous.  Pain medications do help.  She notes more anxiety and depression and will be seeing behavioral health in the near future.  Her energy level is low but adequate for her ADLs.  She reports increased cough occasionally productive of purulent sputum.  She denies fever.    Oncology/Hematology History   Malignant neoplasm of upper-outer quadrant of left breast in female, estrogen receptor positive   10/13/2022 Initial Diagnosis    Malignant neoplasm of left breast in female, estrogen receptor positive (HCC)     10/14/2022 -  Chemotherapy    OP BREAST Letrozole / Ribociclib     10/14/2022 Cancer Staged    Staging form: Breast, AJCC 8th Edition  - Clinical: Stage IV (cT1c, cN1, cM1, ER+, ID+, HER2-) - Signed by Donald Barker MD on 10/14/2022     10/28/2022 -  Chemotherapy    OP SUPPORTIVE Denosumab (Xgeva) Q28D     Malignant neoplasm metastatic to bone   10/14/2022 Initial Diagnosis    Bone metastases (HCC)     10/28/2022 -  Chemotherapy    OP SUPPORTIVE Denosumab (Xgeva) Q28D     Secondary malignant neoplasm of bone (Resolved)   11/14/2022 Initial Diagnosis    Secondary malignant neoplasm of bone (HCC)     11/17/2022 - 4/19/2023 Radiation    RADIATION THERAPY Treatment Details (11/14/2022 - 4/19/2023)  Site: Spine - Lumbar  Technique: 3D CRT  Goal: No goal specified  Planned Treatment Start Date: 11/17/2022           Current Outpatient Medications on File Prior to Visit   Medication Sig Dispense Refill    atorvastatin (LIPITOR) 20 MG  tablet TAKE 1 TABLET BY MOUTH EVERY DAY (Patient taking differently: Take 1 tablet by mouth Daily.) 30 tablet 6    Diclofenac Sodium (VOLTAREN) 1 % gel gel APPLY TO THE AFFECTED AREA(S) ON THE SKIN FOUR TIMES DAILY FOR 10 DAYS      donepezil (ARICEPT) 10 MG tablet Take 1 tablet by mouth Daily.      DULoxetine (CYMBALTA) 60 MG capsule TAKE 1 capsule BY MOUTH DAILY for mood elevation 30 capsule 1    gabapentin (NEURONTIN) 600 MG tablet TAKE 1 TABLET BY MOUTH AT BEDTIME (Patient taking differently: Take 1 tablet by mouth 2 (Two) Times a Day As Needed.) 90 tablet 0    hydroCHLOROthiazide 12.5 MG tablet TAKE 1 TABLET BY MOUTH DAILY for swelling 90 tablet 2    ibuprofen (ADVIL,MOTRIN) 600 MG tablet Take 1 tablet by mouth Every 8 (Eight) Hours As Needed. for pain      lactulose (CHRONULAC) 10 GM/15ML solution take 30 mls BY MOUTH TWICE DAILY      letrozole (FEMARA) 2.5 MG tablet TAKE 1 TABLET BY MOUTH DAILY 90 tablet 1    levothyroxine (SYNTHROID, LEVOTHROID) 50 MCG tablet TAKE 1 TABLET BY MOUTH EVERY DAY (Patient taking differently: Take 1 tablet by mouth Daily.) 90 tablet 1    lidocaine (LIDODERM) 5 % Place 1 patch on the skin as directed by provider Daily. Remove & Discard patch within 12 hours or as directed by MD      LORazepam (ATIVAN) 0.5 MG tablet Take 1 tablet by mouth Every 6 (Six) Hours As Needed.      losartan (COZAAR) 50 MG tablet Take 1 tablet by mouth Daily. for blood pressure      mirtazapine (REMERON) 15 MG tablet Take 1 tablet by mouth Every Night.      montelukast (SINGULAIR) 10 MG tablet Take 1 tablet by mouth Daily.      Morphine (MS CONTIN) 60 MG 12 hr tablet Take 1 tablet by mouth 2 (Two) Times a Day. 60 tablet 0    nicotine (NICODERM CQ) 21 MG/24HR patch Place 1 patch on the skin as directed by provider Daily. 30 patch 3    ondansetron (ZOFRAN) 8 MG tablet TAKE 1 TABLET BY MOUTH THREE TIMES DAILY AS NEEDED FOR NAUSEA AND VOMITING 30 tablet 5    oxybutynin XL (DITROPAN XL) 15 MG 24 hr tablet TAKE 1  TABLET BY MOUTH DAILY for bladder 30 tablet 1    oxyCODONE-acetaminophen (PERCOCET)  MG per tablet Take 1 tablet by mouth Every 6 (Six) Hours As Needed for Moderate Pain. 60 tablet 0    potassium chloride (MICRO-K) 10 MEQ CR capsule TAKE 1 CAPSULE BY MOUTH EVERY TWELVE HOURS 60 capsule 1    rOPINIRole (REQUIP) 3 MG tablet Take 1 tablet by mouth 2 (Two) Times a Day.      traZODone (DESYREL) 150 MG tablet TAKE 1 TABLET BY MOUTH ONCE nightly (Patient taking differently: Take 1 tablet by mouth Every Night.) 90 tablet 2    baclofen (LIORESAL) 20 MG tablet TAKE 1 TABLET BY MOUTH THREE TIMES DAILY FOR MUSCLE SPASMS (Patient not taking: Reported on 10/30/2024)      cyproheptadine (PERIACTIN) 4 MG tablet Take 1 tablet by mouth Every 12 (Twelve) Hours. (Patient not taking: Reported on 10/30/2024)      Lidocaine Pain Relief 4 % apply 1 patch to skin daily. leave on for 12 hours, and remove for 12 hours. (Patient not taking: Reported on 10/30/2024)      naloxone (NARCAN) 4 MG/0.1ML nasal spray Call 911. Don't prime. Viola in 1 nostril for overdose. Repeat in 2-3 minutes in other nostril if no or minimal breathing/responsiveness. (Patient not taking: Reported on 10/30/2024) 2 each 0    oxyCODONE (Roxicodone) 5 MG immediate release tablet Take 1 tablet by mouth Every 8 (Eight) Hours As Needed for Moderate Pain. (Patient not taking: Reported on 10/30/2024) 8 tablet 0    polyethylene glycol (MIRALAX) 17 g packet Take 17 g by mouth Daily. (Patient not taking: Reported on 10/30/2024) 5 packet 0    polyethylene glycol (MIRALAX) 17 g packet Take 17 g by mouth Daily. (Patient not taking: Reported on 10/30/2024) 5 packet 0    prochlorperazine (COMPAZINE) 10 MG tablet  (Patient not taking: Reported on 10/30/2024)      ribociclib succinate 200 MG tablet therapy pack tablet Take 3 tablets by mouth Take As Directed. Take 3 tablets by mouth daily for 21 days then off 7 days on a 28 day cycle. (Patient not taking: Reported on 10/30/2024)  63 tablet 11    SudoGest 60 MG tablet Take 1 tablet by mouth Every 8 (Eight) Hours. (Patient not taking: Reported on 10/30/2024)      SUMAtriptan (IMITREX) 100 MG tablet Take 1 tablet by mouth 1 (One) Time As Needed. (Patient not taking: Reported on 8/1/2024)       No current facility-administered medications on file prior to visit.       Allergies   Allergen Reactions    Azithromycin Itching    Erythromycin Itching     Past Medical History:   Diagnosis Date    Abdominal pain     OSTOMY SITE    Allergic rhinitis     Anemia     NO S/S    Anxiety     Arteriosclerosis     Coronary, follows with Dr. Núñez    Arthritis     Bone cancer     Bone metastases 10/14/2022    Bowen's disease     SKIN CANCER    Breast cancer     NO SURGERY WAS DONE DUE TO METS TO BONE/COLON/LYMPHNODE    Cancer related pain 10/13/2022    Depression     Disorder associated with Helicobacter species 10/12/2022    Dysphoric mood     Fatigue     Fibromyalgia     Frequent urination     NO S/S INFECTION    Herpes simplex vulvovaginitis 07/26/2018    History of colon polyps     History of IBS     History of kidney stones     Hyperlipidemia     Hypertension     Hypothyroidism     Insomnia     Lumbago     Mood disorder     Neck pain     R/T CANCER    Palpitations     last time a few months ago    Precordial pain     R/T SPINE CANCER    S/P Laparoscopic Hand-Assisted Colostomy Closure with End to End Anastomosis Open Parastomal Hernia Repair 12/08/2022    Sleep disturbance     SOB (shortness of breath)     at times at rest    SOBOE (shortness of breath on exertion)      Past Surgical History:   Procedure Laterality Date    BREAST BIOPSY Left     CARDIAC CATHETERIZATION Left 1959    CARDIAC CATHETERIZATION N/A 04/06/2018    Procedure: Coronary angiography;  Surgeon: Art Licea MD;  Location: [facility] INVASIVE LOCATION;  Service: Cardiovascular    CARDIAC CATHETERIZATION N/A 04/06/2018    Procedure: Left heart cath;  Surgeon: Art MEDINA  MD Anuja;  Location: [facility] CATH INVASIVE LOCATION;  Service: Cardiovascular    CARDIAC CATHETERIZATION N/A 04/06/2018    Procedure: Left ventriculography;  Surgeon: Art Licea MD;  Location: [facility] CATH INVASIVE LOCATION;  Service: Cardiovascular    CHOLECYSTECTOMY      COLON SURGERY      colostomy bag, and colostomy reversal    COLONOSCOPY  11/08/2006    COLONOSCOPY N/A 06/08/2023    Procedure: COLONOSCOPY;  Surgeon: Alfred Castañeda MD;  Location: [facility] ENDOSCOPY;  Service: General;  Laterality: N/A;  same as preop    EXPLORATORY LAPAROTOMY N/A 12/06/2022    Procedure: LAPAROTOMY EXPLORATORY sigmoid resection hartmans procedure colostomy;  Surgeon: Alfred Castañeda MD;  Location: [facility] MAIN OR;  Service: General;  Laterality: N/A;    GANGLION CYST EXCISION Bilateral     HYSTERECTOMY  05/2005    ILEOSTOMY CLOSURE N/A 06/26/2023    Procedure: Laparoscopic Hand-Assisted Colostomy Closure with End to End Anastomosis;  Surgeon: Alfred Castañeda MD;  Location: [facility] MAIN OR;  Service: General;  Laterality: N/A;    LAPAROSCOPIC GASTRIC BANDING      BAND REMOVED 2020    PARASTOMAL HERNIA REPAIR N/A 06/26/2023    Procedure: Open Parastomal Hernia Repair;  Surgeon: Alfred Castañeda MD;  Location: [facility] MAIN OR;  Service: General;  Laterality: N/A;    TONSILLECTOMY      VENOUS ACCESS DEVICE (PORT) INSERTION N/A 01/09/2023    Procedure: INSERTION VENOUS ACCESS DEVICE;  Surgeon: Alfred Castañeda MD;  Location: [facility] MAIN OR;  Service: General;  Laterality: N/A;    VENTRAL HERNIA REPAIR N/A 7/3/2024    Procedure: VENTRAL / INCISIONAL HERNIA REPAIR LAPAROSCOPIC WITH DAVINCI ROBOT with mesh;  Surgeon: Alfred Castañeda MD;  Location: [facility] MAIN OR;  Service: Robotics - DaVinci;  Laterality: N/A;     Social History     Socioeconomic History    Marital status:    Tobacco Use    Smoking status: Former     Current packs/day: 1.00     Average packs/day: 1 pack/day for 50.8  "years (50.8 ttl pk-yrs)     Types: Cigarettes     Start date: 1974    Smokeless tobacco: Never    Tobacco comments:          Has not used tobacco for 28 days    Vaping Use    Vaping status: Never Used   Substance and Sexual Activity    Alcohol use: No    Drug use: Never     Types: Marijuana     Comment: LAST USE 7-2-24    Sexual activity: Defer     Partners: Male     Birth control/protection: None     Family History   Problem Relation Age of Onset    Hypertension Mother     Rheum arthritis Mother     Heart disease Mother     Breast cancer Mother     Diabetes Father     Cancer Maternal Grandmother         colon    Colon cancer Maternal Grandmother     Aneurysm Paternal Grandfather     Diabetes Other     Fibromyalgia Other     Malig Hyperthermia Neg Hx        Objective   Physical Exam  Vitals reviewed. Exam conducted with a chaperone present.   Cardiovascular:      Rate and Rhythm: Normal rate and regular rhythm.      Heart sounds: Normal heart sounds. No murmur heard.     No gallop.   Pulmonary:      Effort: Pulmonary effort is normal.      Breath sounds: Normal breath sounds.   Abdominal:      General: Bowel sounds are normal.   Musculoskeletal:      Right lower leg: No edema.      Left lower leg: No edema.   Lymphadenopathy:      Cervical: No cervical adenopathy.   Psychiatric:         Mood and Affect: Mood normal.         Behavior: Behavior normal.         Vitals:    10/30/24 1421   BP: 120/47   Pulse: 56   Resp: 18   Temp: 97.7 °F (36.5 °C)   TempSrc: Temporal   SpO2: 97%   Weight: 85.9 kg (189 lb 6 oz)   Height: 167.6 cm (65.98\")   PainSc:   8     ECOG score: 1         PHQ-9 Total Score:                    Result Review :   The following data was reviewed by: Donald Barker MD on 10/30/2024:  Lab Results   Component Value Date    HGB 9.7 (L) 10/28/2024    HCT 30.4 (L) 10/28/2024    .0 (H) 10/28/2024     10/28/2024    WBC 5.24 10/28/2024    NEUTROABS 3.97 10/28/2024    LYMPHSABS 0.84 10/28/2024 " "   MONOSABS 0.35 10/28/2024    EOSABS 0.03 10/28/2024    BASOSABS 0.03 10/28/2024     Lab Results   Component Value Date    GLUCOSE 111 (H) 10/28/2024    BUN 24 (H) 10/28/2024    CREATININE 0.94 10/28/2024     10/28/2024    K 3.9 10/28/2024    CL 97 (L) 10/28/2024    CO2 31.3 (H) 10/28/2024    CALCIUM 8.9 10/28/2024    PROTEINTOT 7.3 10/28/2024    ALBUMIN 3.6 10/28/2024    BILITOT 0.4 10/28/2024    ALKPHOS 114 10/28/2024    AST 13 10/28/2024    ALT 9 10/28/2024     Lab Results   Component Value Date    MG 1.9 10/28/2024    PHOS 3.0 10/28/2024    FREET4 1.01 01/17/2022    TSH 1.470 11/12/2019     Lab Results   Component Value Date    IRON 28 (L) 05/29/2024    LABIRON 7 (L) 05/29/2024    TRANSFERRIN 279 05/29/2024    TIBC 416 05/29/2024     Lab Results   Component Value Date    FERRITIN 56.02 05/29/2024    DRERHZDM27 390 04/23/2019    FOLATE 9.93 04/23/2019     No results found for: \"PSA\", \"CEA\", \"AFP\", \"\", \"\"    Data reviewed : Radiologic studies CT chest, abdomen, pelvis and bone scan reviewed       Assessment and Plan    Diagnoses and all orders for this visit:    1. Malignant neoplasm metastatic to bone (Primary)  Assessment & Plan:  Patient continues to have ulceration on the mandible.  Follow-up with dentistry as scheduled.  Continue to hold Xgeva.  Recent bone scan showed questionable areas of activity that was new.  I reviewed the bone scan and CT scans with Dr. Leung, radiology.  No clear CT correlate for the bone scan.  At this point I would not change intervention.    Orders:  -     CBC and Differential; Future  -     Comprehensive metabolic panel; Future  -     Magnesium; Future  -     Phosphorus; Future  -     CBC & Differential; Future  -     Comprehensive Metabolic Panel; Future    2. Malignant neoplasm of upper-outer quadrant of left breast in female, estrogen receptor positive  Assessment & Plan:  Metastatic.  Patient on treatment with letrozole, ribociclib, denosumab (currently being " held for ONJ).  Restaging CT and bone scans reviewed in detail which are overall stable.  2 small areas on the bone scan appear new but no CT correlate.  I would not change therapy based on that at this time.  She is otherwise tolerating well.  Continue letrozole daily.  Continue ribociclib 3 weeks out of 4.  I will see her back in 1 month for ongoing treatment.    Orders:  -     CBC and Differential; Future  -     Comprehensive metabolic panel; Future  -     Magnesium; Future  -     Phosphorus; Future  -     CBC & Differential; Future  -     Comprehensive Metabolic Panel; Future            Patient Follow Up: 1 m    Patient was given instructions and counseling regarding her condition or for health maintenance advice. Please see specific information pulled into the AVS if appropriate.     Donald Barker MD    10/30/2024

## 2024-10-30 NOTE — ASSESSMENT & PLAN NOTE
Patient continues to have ulceration on the mandible.  Follow-up with dentistry as scheduled.  Continue to hold Xgeva.  Recent bone scan showed questionable areas of activity that was new.  I reviewed the bone scan and CT scans with Dr. Leung, radiology.  No clear CT correlate for the bone scan.  At this point I would not change intervention.

## 2024-10-30 NOTE — ASSESSMENT & PLAN NOTE
Metastatic.  Patient on treatment with letrozole, ribociclib, denosumab (currently being held for ONJ).  Restaging CT and bone scans reviewed in detail which are overall stable.  2 small areas on the bone scan appear new but no CT correlate.  I would not change therapy based on that at this time.  She is otherwise tolerating well.  Continue letrozole daily.  Continue ribociclib 3 weeks out of 4.  I will see her back in 1 month for ongoing treatment.

## 2024-10-31 DIAGNOSIS — G89.3 CANCER RELATED PAIN: ICD-10-CM

## 2024-10-31 RX ORDER — OXYCODONE AND ACETAMINOPHEN 10; 325 MG/1; MG/1
1 TABLET ORAL EVERY 6 HOURS PRN
Qty: 60 TABLET | Refills: 0 | Status: SHIPPED | OUTPATIENT
Start: 2024-10-31

## 2024-11-11 NOTE — PATIENT INSTRUCTIONS
1.  Please return to clinic at your next scheduled visit.  Contact the clinic ([phone]) at least 24 hours prior in the event you need to cancel.  2.  Do no harm to yourself or others.    3.  Avoid alcohol and drugs.    4.  Take all medications as prescribed.  Please contact the clinic with any concerns. If you are in need of medication refills, please call the clinic at [phone].    5. Should you want to get in touch with your provider, Dr. Megha Jose, please utilize [employer] or contact the office ([phone]), and staff will be able to page Dr. Jose directly.  6.  In the event you have personal crisis, contact the following crisis numbers: Suicide Prevention Hotline 1-875.332.5245; ELIAS Helpline 7-158-066-ELIAS; [facility] Emergency Room [phone]; text HELLO to 219289; or 389.

## 2024-11-11 NOTE — PROGRESS NOTES
"Subjective   Derek Keller is a 64 y.o. female who presents today for initial evaluation     Referring Provider:  Donald Barker MD  [address] [place] [address]    Chief Complaint:  depression    History of Present Illness:     2024: INITIAL VISIT Chart review:     [place]: On Ativan, oxycodone, and morphine chronically. She also used to be on gabapentin.  Care Everywhere: several non behavioral health notes    Psychotropic medication chart review:  Present:  Mirtazapine 15 mg at night  Cymbalta 60 mg a day    Previously:  Trazodone 150 mg at night  Gabapentin 600 mg nightly  Abilify 5, 10, 15 mg a day  Wellbutrin  mg a day  Cymbalta 20, 30 mg a day  Lexapro 5, 20 mg a day  Lamictal 25 mg twice daily, 200 mg at night  Effexor 75, 150 mg a day    EKG: 2024: Rate 54, sinus, QTc 471  Procedures: none  Head imagin MRI of the brain: No acute, no evidence of metastatic disease.  Labs: 2024: Abnormal CMP CO2 31.3 chloride 97 low glucose 111; abnormal CBC hemoglobin 9.7 other abnormalities; CT of the abdomen and pelvis with contrast shows diffuse sclerotic osseous metastatic disease.  Initial Chart Review Notes: Referred to us in February by oncology.  Patient has metastatic breast cancer.  More anxiety and depression recently.      Chart Review By Dates:      Planning:      VISITS/APPOINTMENTS (BELOW):    \"Derek\"  Significant metastatic disease    2024:   In person.  Interview:  His/Her Story: \"It's stage 4.\"  P17, G12  They thought they would be able to maintain and control it with my chemo, but it's spreading now.  \"It's scary. It's the fear of not knowing.\"  Denies anhedonia  Lives by herself  Best friend passed this year, Beth Neff,  of a blood clot in August. \"We were shocked.\"  I have other friends, Pepper and her BF   3x,  3x  No kids, lost a child to abruption at 9 mos  Sleeping? Maintenance insomnia q2 hours  Eating? " "stable  Energy? Down  Just started mirtazapine this week. May be helping her sleep.  Depression/Mood:  Depressed mood, hopelessness  \"I miss things the way they used to be.\"  poor energy, poor concentration, insomnia.  Seasonal pattern: def  Severity: Moderate  Duration: years  Anxiety:  Uncontrolled worrying, muscle tension, fatigue, poor concentration, feeling on edge or restless, irritability, insomnia.  Severity: Moderate  Duration: years  PTSD: previously dx'd  avoidance, negative view of the world, hypervigilance.  Inciting event: losing her child at 9 mo (abruption)  Duration: many years  AIMS if on antipsychotic: na  Psych ROS: Positive for depression, anxiety.  Negative for psychosis and ebony.  ADHD: def  No SI HI AVH.  Medication compliant: y    Access to Firearms: yes, locked away    PHQ-9 Depression Screening  PHQ-9 Total Score: 17   Little interest or pleasure in doing things? Several days   Feeling down, depressed, or hopeless? Almost all   PHQ-2 Total Score 4   Trouble falling or staying asleep, or sleeping too much? Almost all   Feeling tired or having little energy? Almost all   Poor appetite or overeating? Almost all   Feeling bad about yourself - or that you are a failure or have let yourself or your family down? Several days   Trouble concentrating on things, such as reading the newspaper or watching television? Over half   Moving or speaking so slowly that other people could have noticed? Or the opposite - being so fidgety or restless that you have been moving around a lot more than usual? Several days   Thoughts that you would be better off dead, or of hurting yourself in some way? Not at all   PHQ-9 Total Score 17   If you checked off any problems, how difficult have these problems made it for you to do your work, take care of things at home, or get along with other people? Somewhat difficult            NATE-7  Feeling nervous, anxious or on edge: Nearly every day  Not being able to stop or " control worrying: Several days  Worrying too much about different things: Several days  Trouble Relaxing: More than half the days  Being so restless that it is hard to sit still: Several days  Feeling afraid as if something awful might happen: More than half the days  Becoming easily annoyed or irritable: More than half the days  NATE 7 Total Score: 12  If you checked any problems, how difficult have these problems made it for you to do your work, take care of things at home, or get along with other people: Somewhat difficult    Past Surgical History:  Past Surgical History:   Procedure Laterality Date    BREAST BIOPSY Left     CARDIAC CATHETERIZATION Left 1959    CARDIAC CATHETERIZATION N/A 04/06/2018    Procedure: Coronary angiography;  Surgeon: Art Licea MD;  Location: [facility] INVASIVE LOCATION;  Service: Cardiovascular    CARDIAC CATHETERIZATION N/A 04/06/2018    Procedure: Left heart cath;  Surgeon: Art Licea MD;  Location: [facility] CATH INVASIVE LOCATION;  Service: Cardiovascular    CARDIAC CATHETERIZATION N/A 04/06/2018    Procedure: Left ventriculography;  Surgeon: Art Licea MD;  Location: [facility] INVASIVE LOCATION;  Service: Cardiovascular    CHOLECYSTECTOMY      COLON SURGERY      colostomy bag, and colostomy reversal    COLONOSCOPY  11/08/2006    COLONOSCOPY N/A 06/08/2023    Procedure: COLONOSCOPY;  Surgeon: Alfred Castañeda MD;  Location: [facility] ENDOSCOPY;  Service: General;  Laterality: N/A;  same as preop    EXPLORATORY LAPAROTOMY N/A 12/06/2022    Procedure: LAPAROTOMY EXPLORATORY sigmoid resection hartmans procedure colostomy;  Surgeon: Alfred Castañeda MD;  Location: [facility] MAIN OR;  Service: General;  Laterality: N/A;    GANGLION CYST EXCISION Bilateral     HYSTERECTOMY  05/2005    ILEOSTOMY CLOSURE N/A 06/26/2023    Procedure: Laparoscopic Hand-Assisted Colostomy Closure with End to End Anastomosis;  Surgeon: Alfred Castañeda MD;   Location: [facility] MAIN OR;  Service: General;  Laterality: N/A;    LAPAROSCOPIC GASTRIC BANDING      BAND REMOVED 2020    PARASTOMAL HERNIA REPAIR N/A 06/26/2023    Procedure: Open Parastomal Hernia Repair;  Surgeon: Alfred Castañeda MD;  Location: [facility] MAIN OR;  Service: General;  Laterality: N/A;    TONSILLECTOMY      VENOUS ACCESS DEVICE (PORT) INSERTION N/A 01/09/2023    Procedure: INSERTION VENOUS ACCESS DEVICE;  Surgeon: Alfred Castañeda MD;  Location: [facility] MAIN OR;  Service: General;  Laterality: N/A;    VENTRAL HERNIA REPAIR N/A 7/3/2024    Procedure: VENTRAL / INCISIONAL HERNIA REPAIR LAPAROSCOPIC WITH DAVINCI ROBOT with mesh;  Surgeon: Alfred Castañeda MD;  Location: [facility] MAIN OR;  Service: Robotics - DaVinci;  Laterality: N/A;       Problem List:  Patient Active Problem List   Diagnosis    Hypertension    Hyperlipidemia    Allergic rhinitis    Hypothyroidism    Chronic pain disorder    Anxiety    Monopolar depression    Malaise and fatigue    Tobacco abuse    Fibromyalgia    Vitamin B 12 deficiency    Primary osteoarthritis of left knee    Restless leg syndrome    Primary insomnia    Chronic fatigue    Asthma    Body mass index (BMI) 26.0-26.9, adult    Degeneration of lumbar intervertebral disc    Hearing loss    Inappropriate diet and eating habits    Cervical spondylosis    Obesity with body mass index 30 or greater    Renal stone    Malignant neoplasm of upper-outer quadrant of left breast in female, estrogen receptor positive    Cancer related pain    Malignant neoplasm metastatic to bone    Peripheral edema    Peritonitis    Leukopenia    S/P Laparoscopic Hand-Assisted Colostomy Closure with End to End Anastomosis Open Parastomal Hernia Repair    Encounter for adjustment or management of vascular access device    Pulmonary emphysema    History of colon polyps    Colostomy in place    Anemia    Hypokalemia    Therapeutic opioid induced constipation    Incisional hernia, without  obstruction or gangrene    Pain in lower jaw       Allergy:   Allergies   Allergen Reactions    Azithromycin Itching    Erythromycin Itching        Discontinued Medications:  There are no discontinued medications.    Current Medications:   Current Outpatient Medications   Medication Sig Dispense Refill    amoxicillin (AMOXIL) 250 MG capsule Take 1 capsule by mouth 3 (Three) Times a Day. For tooth      atorvastatin (LIPITOR) 20 MG tablet TAKE 1 TABLET BY MOUTH EVERY DAY (Patient taking differently: Take 1 tablet by mouth Daily.) 30 tablet 6    baclofen (LIORESAL) 20 MG tablet       cyproheptadine (PERIACTIN) 4 MG tablet Take 1 tablet by mouth Every 12 (Twelve) Hours.      Diclofenac Sodium (VOLTAREN) 1 % gel gel APPLY TO THE AFFECTED AREA(S) ON THE SKIN FOUR TIMES DAILY FOR 10 DAYS      donepezil (ARICEPT) 10 MG tablet Take 1 tablet by mouth Daily.      DULoxetine (CYMBALTA) 60 MG capsule TAKE 1 capsule BY MOUTH DAILY for mood elevation 30 capsule 1    gabapentin (NEURONTIN) 600 MG tablet TAKE 1 TABLET BY MOUTH AT BEDTIME (Patient taking differently: Take 1 tablet by mouth 2 (Two) Times a Day As Needed.) 90 tablet 0    hydroCHLOROthiazide 12.5 MG tablet TAKE 1 TABLET BY MOUTH DAILY for swelling 90 tablet 2    ibuprofen (ADVIL,MOTRIN) 600 MG tablet Take 1 tablet by mouth Every 8 (Eight) Hours As Needed. for pain      lactulose (CHRONULAC) 10 GM/15ML solution take 30 mls BY MOUTH TWICE DAILY      letrozole (FEMARA) 2.5 MG tablet TAKE 1 TABLET BY MOUTH DAILY 90 tablet 1    levothyroxine (SYNTHROID, LEVOTHROID) 50 MCG tablet TAKE 1 TABLET BY MOUTH EVERY DAY (Patient taking differently: Take 1 tablet by mouth Daily.) 90 tablet 1    lidocaine (LIDODERM) 5 % Place 1 patch on the skin as directed by provider Daily. Remove & Discard patch within 12 hours or as directed by MD      Lidocaine Pain Relief 4 %       LORazepam (ATIVAN) 0.5 MG tablet Take 1 tablet by mouth Every 6 (Six) Hours As Needed.      losartan (COZAAR) 50 MG  tablet Take 1 tablet by mouth Daily. for blood pressure      mirtazapine (REMERON) 15 MG tablet Take 1 tablet by mouth Every Night.      montelukast (SINGULAIR) 10 MG tablet Take 1 tablet by mouth Daily.      Morphine (MS CONTIN) 60 MG 12 hr tablet Take 1 tablet by mouth 2 (Two) Times a Day. 60 tablet 0    naloxone (NARCAN) 4 MG/0.1ML nasal spray Call 911. Don't prime. [place] in 1 nostril for overdose. Repeat in 2-3 minutes in other nostril if no or minimal breathing/responsiveness. 2 each 0    nicotine (NICODERM CQ) 21 MG/24HR patch Place 1 patch on the skin as directed by provider Daily. 30 patch 3    ondansetron (ZOFRAN) 8 MG tablet TAKE 1 TABLET BY MOUTH THREE TIMES DAILY AS NEEDED FOR NAUSEA AND VOMITING 30 tablet 5    oxybutynin XL (DITROPAN XL) 15 MG 24 hr tablet TAKE 1 TABLET BY MOUTH DAILY for bladder 30 tablet 1    oxyCODONE (Roxicodone) 5 MG immediate release tablet Take 1 tablet by mouth Every 8 (Eight) Hours As Needed for Moderate Pain. 8 tablet 0    oxyCODONE-acetaminophen (PERCOCET)  MG per tablet TAKE 1 TABLET BY MOUTH EVERY 6 HOURS AS NEEDED FOR MODERATE PAIN 60 tablet 0    polyethylene glycol (MIRALAX) 17 g packet Take 17 g by mouth Daily. 5 packet 0    polyethylene glycol (MIRALAX) 17 g packet Take 17 g by mouth Daily. 5 packet 0    potassium chloride (MICRO-K) 10 MEQ CR capsule TAKE 1 CAPSULE BY MOUTH EVERY TWELVE HOURS 60 capsule 1    prochlorperazine (COMPAZINE) 10 MG tablet       ribociclib succinate 200 MG tablet therapy pack tablet Take 3 tablets by mouth Take As Directed. Take 3 tablets by mouth daily for 21 days then off 7 days on a 28 day cycle. 63 tablet 11    rOPINIRole (REQUIP) 3 MG tablet Take 1 tablet by mouth 2 (Two) Times a Day.      SudoGest 60 MG tablet Take 1 tablet by mouth Every 8 (Eight) Hours.      SUMAtriptan (IMITREX) 100 MG tablet Take 1 tablet by mouth 1 (One) Time As Needed.      traZODone (DESYREL) 150 MG tablet TAKE 1 TABLET BY MOUTH ONCE nightly (Patient taking  "differently: Take 1 tablet by mouth Every Night.) 90 tablet 2    DULoxetine (CYMBALTA) 30 MG capsule Take 1 capsule by mouth Daily. 90 capsule 1     No current facility-administered medications for this visit.       Past Medical History:  Past Medical History:   Diagnosis Date    Abdominal pain     OSTOMY SITE    Allergic rhinitis     Anemia     NO S/S    Anxiety     Arteriosclerosis     Coronary, follows with Dr. Núñez    Arthritis     Bone cancer     Bone metastases 10/14/2022    Bowen's disease     SKIN CANCER    Breast cancer     NO SURGERY WAS DONE DUE TO METS TO BONE/COLON/LYMPHNODE    Cancer related pain 10/13/2022    Depression     Disorder associated with Helicobacter species 10/12/2022    Dysphoric mood     Fatigue     Fibromyalgia     Frequent urination     NO S/S INFECTION    Herpes simplex vulvovaginitis 2018    History of colon polyps     History of IBS     History of kidney stones     Hyperlipidemia     Hypertension     Hypothyroidism     Insomnia     Lumbago     Mood disorder     Neck pain     R/T CANCER    Palpitations     last time a few months ago    Precordial pain     R/T SPINE CANCER    S/P Laparoscopic Hand-Assisted Colostomy Closure with End to End Anastomosis Open Parastomal Hernia Repair 2022    Sleep disturbance     SOB (shortness of breath)     at times at rest    SOBOE (shortness of breath on exertion)        Past Psychiatric History:  Began Treatment: early   Diagnoses: MDD, NATE, panic attacks  Psychiatrist: multiple  Therapist: in the past  Admission History:    Late , Edith, admitted for medication changes    Medication Trials:    \"I think I've been on every medication\"    Recently stopped trazodone    Self Harm: Denies  Suicide Attempts:Denies   Psychosis, Anxiety, Depression: PPD    Substance Abuse History:   Types: former smoker; occasional cannabis use  Withdrawal Symptoms:Denies  Longest Period Sober:Not Applicable   AA: Not applicable     Social " "History:  Martial Status:   Employed:No  Kids:No  House:Lives in a house   History: Denies    Social History     Socioeconomic History    Marital status:    Tobacco Use    Smoking status: Former     Current packs/day: 0.00     Average packs/day: 1 pack/day for 50.5 years (50.5 ttl pk-yrs)     Types: Cigarettes     Start date:      Quit date: 2024     Years since quittin.3    Smokeless tobacco: Never    Tobacco comments:          Has not used tobacco for 28 days    Vaping Use    Vaping status: Never Used   Substance and Sexual Activity    Alcohol use: No    Drug use: Never     Types: Marijuana     Comment: LAST USE 24    Sexual activity: Defer     Partners: Male     Birth control/protection: None       Family History:   Suicide Attempts: Denies  Suicide Completions:Denies      Family History   Problem Relation Age of Onset    Hypertension Mother     Rheum arthritis Mother     Heart disease Mother     Breast cancer Mother     Diabetes Father     Cancer Maternal Grandmother         colon    Colon cancer Maternal Grandmother     Aneurysm Paternal Grandfather     Diabetes Other     Fibromyalgia Other     Malig Hyperthermia Neg Hx        Developmental History:     Childhood: Denies Abuse  High School:Completed  College: [employer]     Mental Status Exam  Appearance  : groomed, good eye contact, normal street clothes  Behavior  : pleasant and cooperative  Motor  : No abnormal  Speech  :normal rhythm, rate, volume, tone, not hyperverbal, not pressured, normal prosidy  Mood  : \"depressed\"  Affect  : mild depression, briefly tearful, mood congruent, good variability  Thought Content  : negative suicidal ideations, negative homicidal ideations, negative obsessions  Perceptions  : negative auditory hallucinations, negative visual hallucinations  Thought Process  : linear  Insight/Judgement  : Fair/fair  Cognition  : grossly intact  Attention   : intact        Review of Systems:  Review of Systems " "  Psychiatric/Behavioral:  Positive for agitation. The patient is nervous/anxious.        Physical Exam:  Physical Exam    Vital Signs:   /56   Pulse 68   Ht 167.6 cm (65.98\")   Wt 85.3 kg (188 lb)   SpO2 90%   BMI 30.36 kg/m²      Lab Results:   Hospital Outpatient Visit on 10/28/2024   Component Date Value Ref Range Status    Glucose 10/28/2024 111 (H)  65 - 99 mg/dL Final    BUN 10/28/2024 24 (H)  8 - 23 mg/dL Final    Creatinine 10/28/2024 0.94  0.57 - 1.00 mg/dL Final    Sodium 10/28/2024 138  136 - 145 mmol/L Final    Potassium 10/28/2024 3.9  3.5 - 5.2 mmol/L Final    Chloride 10/28/2024 97 (L)  98 - 107 mmol/L Final    CO2 10/28/2024 31.3 (H)  22.0 - 29.0 mmol/L Final    Calcium 10/28/2024 8.9  8.6 - 10.5 mg/dL Final    Total Protein 10/28/2024 7.3  6.0 - 8.5 g/dL Final    Albumin 10/28/2024 3.6  3.5 - 5.2 g/dL Final    ALT (SGPT) 10/28/2024 9  1 - 33 U/L Final    AST (SGOT) 10/28/2024 13  1 - 32 U/L Final    Alkaline Phosphatase 10/28/2024 114  39 - 117 U/L Final    Total Bilirubin 10/28/2024 0.4  0.0 - 1.2 mg/dL Final    Globulin 10/28/2024 3.7  gm/dL Final    A/G Ratio 10/28/2024 1.0  g/dL Final    BUN/Creatinine Ratio 10/28/2024 25.5 (H)  7.0 - 25.0 Final    Anion Gap 10/28/2024 9.7  5.0 - 15.0 mmol/L Final    eGFR 10/28/2024 67.5  >60.0 mL/min/1.73 Final    Magnesium 10/28/2024 1.9  1.6 - 2.4 mg/dL Final    Phosphorus 10/28/2024 3.0  2.5 - 4.5 mg/dL Final    WBC 10/28/2024 5.24  3.40 - 10.80 10*3/mm3 Final    RBC 10/28/2024 2.98 (L)  3.77 - 5.28 10*6/mm3 Final    Hemoglobin 10/28/2024 9.7 (L)  12.0 - 15.9 g/dL Final    Hematocrit 10/28/2024 30.4 (L)  34.0 - 46.6 % Final    MCV 10/28/2024 102.0 (H)  79.0 - 97.0 fL Final    MCH 10/28/2024 32.6  26.6 - 33.0 pg Final    MCHC 10/28/2024 31.9  31.5 - 35.7 g/dL Final    RDW 10/28/2024 14.1  12.3 - 15.4 % Final    RDW-SD 10/28/2024 52.3  37.0 - 54.0 fl Final    MPV 10/28/2024 10.1  6.0 - 12.0 fL Final    Platelets 10/28/2024 281  140 - 450 10*3/mm3 " Final    Neutrophil % 10/28/2024 75.7  42.7 - 76.0 % Final    Lymphocyte % 10/28/2024 16.0 (L)  19.6 - 45.3 % Final    Monocyte % 10/28/2024 6.7  5.0 - 12.0 % Final    Eosinophil % 10/28/2024 0.6  0.3 - 6.2 % Final    Basophil % 10/28/2024 0.6  0.0 - 1.5 % Final    Immature Grans % 10/28/2024 0.4  0.0 - 0.5 % Final    Neutrophils, Absolute 10/28/2024 3.97  1.70 - 7.00 10*3/mm3 Final    Lymphocytes, Absolute 10/28/2024 0.84  0.70 - 3.10 10*3/mm3 Final    Monocytes, Absolute 10/28/2024 0.35  0.10 - 0.90 10*3/mm3 Final    Eosinophils, Absolute 10/28/2024 0.03  0.00 - 0.40 10*3/mm3 Final    Basophils, Absolute 10/28/2024 0.03  0.00 - 0.20 10*3/mm3 Final    Immature Grans, Absolute 10/28/2024 0.02  0.00 - 0.05 10*3/mm3 Final    nRBC 10/28/2024 0.0  0.0 - 0.2 /100 WBC Final   Admission on 10/04/2024, Discharged on 10/04/2024   Component Date Value Ref Range Status    Glucose 10/04/2024 121 (H)  65 - 99 mg/dL Final    BUN 10/04/2024 21  8 - 23 mg/dL Final    Creatinine 10/04/2024 0.86  0.57 - 1.00 mg/dL Final    Sodium 10/04/2024 138  136 - 145 mmol/L Final    Potassium 10/04/2024 3.8  3.5 - 5.2 mmol/L Final    Chloride 10/04/2024 99  98 - 107 mmol/L Final    CO2 10/04/2024 31.0 (H)  22.0 - 29.0 mmol/L Final    Calcium 10/04/2024 8.8  8.6 - 10.5 mg/dL Final    Total Protein 10/04/2024 6.4  6.0 - 8.5 g/dL Final    Albumin 10/04/2024 3.6  3.5 - 5.2 g/dL Final    ALT (SGPT) 10/04/2024 11  1 - 33 U/L Final    AST (SGOT) 10/04/2024 11  1 - 32 U/L Final    Alkaline Phosphatase 10/04/2024 87  39 - 117 U/L Final    Total Bilirubin 10/04/2024 0.4  0.0 - 1.2 mg/dL Final    Globulin 10/04/2024 2.8  gm/dL Final    A/G Ratio 10/04/2024 1.3  g/dL Final    BUN/Creatinine Ratio 10/04/2024 24.4  7.0 - 25.0 Final    Anion Gap 10/04/2024 8.0  5.0 - 15.0 mmol/L Final    eGFR 10/04/2024 75.1  >60.0 mL/min/1.73 Final    Lipase 10/04/2024 10 (L)  13 - 60 U/L Final    Color, UA 10/04/2024 Yellow  Yellow, Straw Final    Appearance, UA 10/04/2024  Clear  Clear Final    pH, UA 10/04/2024 7.5  5.0 - 8.0 Final    Specific Gravity, UA 10/04/2024 1.010  1.005 - 1.030 Final    Glucose, UA 10/04/2024 Negative  Negative Final    Ketones, UA 10/04/2024 Negative  Negative Final    Bilirubin, UA 10/04/2024 Negative  Negative Final    Blood, UA 10/04/2024 Negative  Negative Final    Protein, UA 10/04/2024 Negative  Negative Final    Leuk Esterase, UA 10/04/2024 Negative  Negative Final    Nitrite, UA 10/04/2024 Negative  Negative Final    Urobilinogen, UA 10/04/2024 0.2 E.U./dL  0.2 - 1.0 E.U./dL Final    Lactate 10/04/2024 0.7  0.5 - 2.0 mmol/L Final    Extra Tube 10/04/2024 Hold for add-ons.   Final    Auto resulted.    Extra Tube 10/04/2024 hold for add-on   Final    Auto resulted    Extra Tube 10/04/2024 Hold for add-ons.   Final    Auto resulted.    Extra Tube 10/04/2024 Hold for add-ons.   Final    Auto resulted    WBC 10/04/2024 5.40  3.40 - 10.80 10*3/mm3 Final    RBC 10/04/2024 2.73 (L)  3.77 - 5.28 10*6/mm3 Final    Hemoglobin 10/04/2024 8.9 (L)  12.0 - 15.9 g/dL Final    Hematocrit 10/04/2024 28.5 (L)  34.0 - 46.6 % Final    MCV 10/04/2024 104.4 (H)  79.0 - 97.0 fL Final    MCH 10/04/2024 32.6  26.6 - 33.0 pg Final    MCHC 10/04/2024 31.2 (L)  31.5 - 35.7 g/dL Final    RDW 10/04/2024 14.4  12.3 - 15.4 % Final    RDW-SD 10/04/2024 55.0 (H)  37.0 - 54.0 fl Final    MPV 10/04/2024 9.5  6.0 - 12.0 fL Final    Platelets 10/04/2024 236  140 - 450 10*3/mm3 Final    Neutrophil % 10/04/2024 75.0  42.7 - 76.0 % Final    Lymphocyte % 10/04/2024 18.1 (L)  19.6 - 45.3 % Final    Monocyte % 10/04/2024 4.1 (L)  5.0 - 12.0 % Final    Eosinophil % 10/04/2024 1.3  0.3 - 6.2 % Final    Basophil % 10/04/2024 0.9  0.0 - 1.5 % Final    Immature Grans % 10/04/2024 0.6 (H)  0.0 - 0.5 % Final    Neutrophils, Absolute 10/04/2024 4.05  1.70 - 7.00 10*3/mm3 Final    Lymphocytes, Absolute 10/04/2024 0.98  0.70 - 3.10 10*3/mm3 Final    Monocytes, Absolute 10/04/2024 0.22  0.10 - 0.90  10*3/mm3 Final    Eosinophils, Absolute 10/04/2024 0.07  0.00 - 0.40 10*3/mm3 Final    Basophils, Absolute 10/04/2024 0.05  0.00 - 0.20 10*3/mm3 Final    Immature Grans, Absolute 10/04/2024 0.03  0.00 - 0.05 10*3/mm3 Final    nRBC 10/04/2024 0.0  0.0 - 0.2 /100 WBC Final    Magnesium 10/04/2024 2.1  1.6 - 2.4 mg/dL Final   Hospital Outpatient Visit on 10/02/2024   Component Date Value Ref Range Status    Glucose 10/02/2024 126 (H)  65 - 99 mg/dL Final    BUN 10/02/2024 17  8 - 23 mg/dL Final    Creatinine 10/02/2024 0.86  0.57 - 1.00 mg/dL Final    Sodium 10/02/2024 141  136 - 145 mmol/L Final    Potassium 10/02/2024 3.7  3.5 - 5.2 mmol/L Final    Chloride 10/02/2024 102  98 - 107 mmol/L Final    CO2 10/02/2024 35.1 (H)  22.0 - 29.0 mmol/L Final    Calcium 10/02/2024 9.6  8.6 - 10.5 mg/dL Final    Total Protein 10/02/2024 6.6  6.0 - 8.5 g/dL Final    Albumin 10/02/2024 4.1  3.5 - 5.2 g/dL Final    ALT (SGPT) 10/02/2024 13  1 - 33 U/L Final    AST (SGOT) 10/02/2024 11  1 - 32 U/L Final    Alkaline Phosphatase 10/02/2024 91  39 - 117 U/L Final    Total Bilirubin 10/02/2024 0.4  0.0 - 1.2 mg/dL Final    Globulin 10/02/2024 2.5  gm/dL Final    A/G Ratio 10/02/2024 1.6  g/dL Final    BUN/Creatinine Ratio 10/02/2024 19.8  7.0 - 25.0 Final    Anion Gap 10/02/2024 3.9 (L)  5.0 - 15.0 mmol/L Final    eGFR 10/02/2024 75.1  >60.0 mL/min/1.73 Final    Magnesium 10/02/2024 2.2  1.6 - 2.4 mg/dL Final    Phosphorus 10/02/2024 3.7  2.5 - 4.5 mg/dL Final    WBC 10/02/2024 6.71  3.40 - 10.80 10*3/mm3 Final    RBC 10/02/2024 2.93 (L)  3.77 - 5.28 10*6/mm3 Final    Hemoglobin 10/02/2024 9.7 (L)  12.0 - 15.9 g/dL Final    Hematocrit 10/02/2024 30.9 (L)  34.0 - 46.6 % Final    MCV 10/02/2024 105.5 (H)  79.0 - 97.0 fL Final    MCH 10/02/2024 33.1 (H)  26.6 - 33.0 pg Final    MCHC 10/02/2024 31.4 (L)  31.5 - 35.7 g/dL Final    RDW 10/02/2024 14.2  12.3 - 15.4 % Final    RDW-SD 10/02/2024 55.4 (H)  37.0 - 54.0 fl Final    MPV 10/02/2024 9.5   6.0 - 12.0 fL Final    Platelets 10/02/2024 252  140 - 450 10*3/mm3 Final    Neutrophil % 10/02/2024 72.7  42.7 - 76.0 % Final    Lymphocyte % 10/02/2024 21.3  19.6 - 45.3 % Final    Monocyte % 10/02/2024 4.6 (L)  5.0 - 12.0 % Final    Eosinophil % 10/02/2024 1.0  0.3 - 6.2 % Final    Basophil % 10/02/2024 0.3  0.0 - 1.5 % Final    Immature Grans % 10/02/2024 0.1  0.0 - 0.5 % Final    Neutrophils, Absolute 10/02/2024 4.87  1.70 - 7.00 10*3/mm3 Final    Lymphocytes, Absolute 10/02/2024 1.43  0.70 - 3.10 10*3/mm3 Final    Monocytes, Absolute 10/02/2024 0.31  0.10 - 0.90 10*3/mm3 Final    Eosinophils, Absolute 10/02/2024 0.07  0.00 - 0.40 10*3/mm3 Final    Basophils, Absolute 10/02/2024 0.02  0.00 - 0.20 10*3/mm3 Final    Immature Grans, Absolute 10/02/2024 0.01  0.00 - 0.05 10*3/mm3 Final   Hospital Outpatient Visit on 07/29/2024   Component Date Value Ref Range Status    Glucose 07/29/2024 99  65 - 99 mg/dL Final    BUN 07/29/2024 24 (H)  8 - 23 mg/dL Final    Creatinine 07/29/2024 0.93  0.57 - 1.00 mg/dL Final    Sodium 07/29/2024 137  136 - 145 mmol/L Final    Potassium 07/29/2024 3.2 (L)  3.5 - 5.2 mmol/L Final    Chloride 07/29/2024 97 (L)  98 - 107 mmol/L Final    CO2 07/29/2024 30.3 (H)  22.0 - 29.0 mmol/L Final    Calcium 07/29/2024 8.5 (L)  8.6 - 10.5 mg/dL Final    Total Protein 07/29/2024 5.9 (L)  6.0 - 8.5 g/dL Final    Albumin 07/29/2024 3.6  3.5 - 5.2 g/dL Final    ALT (SGPT) 07/29/2024 6  1 - 33 U/L Final    AST (SGOT) 07/29/2024 10  1 - 32 U/L Final    Alkaline Phosphatase 07/29/2024 82  39 - 117 U/L Final    Total Bilirubin 07/29/2024 0.3  0.0 - 1.2 mg/dL Final    Globulin 07/29/2024 2.3  gm/dL Final    A/G Ratio 07/29/2024 1.6  g/dL Final    BUN/Creatinine Ratio 07/29/2024 25.8 (H)  7.0 - 25.0 Final    Anion Gap 07/29/2024 9.7  5.0 - 15.0 mmol/L Final    eGFR 07/29/2024 68.8  >60.0 mL/min/1.73 Final    Magnesium 07/29/2024 1.7  1.6 - 2.4 mg/dL Final    Phosphorus 07/29/2024 3.4  2.5 - 4.5 mg/dL  Final    WBC 07/29/2024 3.33 (L)  3.40 - 10.80 10*3/mm3 Final    RBC 07/29/2024 2.49 (L)  3.77 - 5.28 10*6/mm3 Final    Hemoglobin 07/29/2024 8.1 (L)  12.0 - 15.9 g/dL Final    Hematocrit 07/29/2024 25.5 (L)  34.0 - 46.6 % Final    MCV 07/29/2024 102.4 (H)  79.0 - 97.0 fL Final    MCH 07/29/2024 32.5  26.6 - 33.0 pg Final    MCHC 07/29/2024 31.8  31.5 - 35.7 g/dL Final    RDW 07/29/2024 13.7  12.3 - 15.4 % Final    RDW-SD 07/29/2024 50.9  37.0 - 54.0 fl Final    MPV 07/29/2024 10.1  6.0 - 12.0 fL Final    Platelets 07/29/2024 202  140 - 450 10*3/mm3 Final    Neutrophil % 07/29/2024 59.2  42.7 - 76.0 % Final    Lymphocyte % 07/29/2024 27.3  19.6 - 45.3 % Final    Monocyte % 07/29/2024 8.7  5.0 - 12.0 % Final    Eosinophil % 07/29/2024 3.6  0.3 - 6.2 % Final    Basophil % 07/29/2024 1.2  0.0 - 1.5 % Final    Immature Grans % 07/29/2024 0.0  0.0 - 0.5 % Final    Neutrophils, Absolute 07/29/2024 1.97  1.70 - 7.00 10*3/mm3 Final    Lymphocytes, Absolute 07/29/2024 0.91  0.70 - 3.10 10*3/mm3 Final    Monocytes, Absolute 07/29/2024 0.29  0.10 - 0.90 10*3/mm3 Final    Eosinophils, Absolute 07/29/2024 0.12  0.00 - 0.40 10*3/mm3 Final    Basophils, Absolute 07/29/2024 0.04  0.00 - 0.20 10*3/mm3 Final    Immature Grans, Absolute 07/29/2024 0.00  0.00 - 0.05 10*3/mm3 Final    nRBC 07/29/2024 0.0  0.0 - 0.2 /100 WBC Final   Admission on 07/03/2024, Discharged on 07/03/2024   Component Date Value Ref Range Status    QT Interval 07/03/2024 497  ms Final    QTC Interval 07/03/2024 471  ms Final    Case Report 07/03/2024    Final                    Value:Surgical Pathology Report                         Case: [id number]                                  Authorizing Provider:  Alfred Castañeda MD  Collected:           07/03/2024 08:38 AM          Ordering Location:     [facility] MAIN Received:            07/03/2024 11:10 AM                                 OR                                                                            Pathologist:           Jasmine Jones MD                                                     Specimen:    Peritoneum, PERITONEAL NODULE                                                              Clinical Information 07/03/2024    Final                    Value:This result contains rich text formatting which cannot be displayed here.    Final Diagnosis 07/03/2024    Final                    Value:This result contains rich text formatting which cannot be displayed here.    Gross Description 07/03/2024    Final                    Value:This result contains rich text formatting which cannot be displayed here.    Special Stains 07/03/2024    Final                    Value:This result contains rich text formatting which cannot be displayed here.    Microscopic Description 07/03/2024    Final                    Value:This result contains rich text formatting which cannot be displayed here.   Admission on 06/28/2024, Discharged on 06/28/2024   Component Date Value Ref Range Status    Glucose 06/28/2024 82  65 - 99 mg/dL Final    BUN 06/28/2024 23  8 - 23 mg/dL Final    Creatinine 06/28/2024 1.01 (H)  0.57 - 1.00 mg/dL Final    Sodium 06/28/2024 141  136 - 145 mmol/L Final    Potassium 06/28/2024 4.0  3.5 - 5.2 mmol/L Final    Chloride 06/28/2024 101  98 - 107 mmol/L Final    CO2 06/28/2024 30.2 (H)  22.0 - 29.0 mmol/L Final    Calcium 06/28/2024 9.3  8.6 - 10.5 mg/dL Final    BUN/Creatinine Ratio 06/28/2024 22.8  7.0 - 25.0 Final    Anion Gap 06/28/2024 9.8  5.0 - 15.0 mmol/L Final    eGFR 06/28/2024 62.3  >60.0 mL/min/1.73 Final    WBC 06/28/2024 3.74  3.40 - 10.80 10*3/mm3 Final    RBC 06/28/2024 2.86 (L)  3.77 - 5.28 10*6/mm3 Final    Hemoglobin 06/28/2024 9.4 (L)  12.0 - 15.9 g/dL Final    Hematocrit 06/28/2024 30.0 (L)  34.0 - 46.6 % Final    MCV 06/28/2024 104.9 (H)  79.0 - 97.0 fL Final    MCH 06/28/2024 32.9  26.6 - 33.0 pg Final    MCHC 06/28/2024 31.3 (L)  31.5 - 35.7 g/dL Final    RDW  06/28/2024 14.4  12.3 - 15.4 % Final    RDW-SD 06/28/2024 55.7 (H)  37.0 - 54.0 fl Final    MPV 06/28/2024 9.9  6.0 - 12.0 fL Final    Platelets 06/28/2024 187  140 - 450 10*3/mm3 Final    Neutrophil % 06/28/2024 54.8  42.7 - 76.0 % Final    Lymphocyte % 06/28/2024 32.6  19.6 - 45.3 % Final    Monocyte % 06/28/2024 10.7  5.0 - 12.0 % Final    Eosinophil % 06/28/2024 0.5  0.3 - 6.2 % Final    Basophil % 06/28/2024 1.1  0.0 - 1.5 % Final    Immature Grans % 06/28/2024 0.3  0.0 - 0.5 % Final    Neutrophils, Absolute 06/28/2024 2.05  1.70 - 7.00 10*3/mm3 Final    Lymphocytes, Absolute 06/28/2024 1.22  0.70 - 3.10 10*3/mm3 Final    Monocytes, Absolute 06/28/2024 0.40  0.10 - 0.90 10*3/mm3 Final    Eosinophils, Absolute 06/28/2024 0.02  0.00 - 0.40 10*3/mm3 Final    Basophils, Absolute 06/28/2024 0.04  0.00 - 0.20 10*3/mm3 Final    Immature Grans, Absolute 06/28/2024 0.01  0.00 - 0.05 10*3/mm3 Final    nRBC 06/28/2024 0.0  0.0 - 0.2 /100 WBC Final   Hospital Outpatient Visit on 05/29/2024   Component Date Value Ref Range Status    Glucose 05/29/2024 118 (H)  65 - 99 mg/dL Final    BUN 05/29/2024 14  8 - 23 mg/dL Final    Creatinine 05/29/2024 0.98  0.57 - 1.00 mg/dL Final    Sodium 05/29/2024 142  136 - 145 mmol/L Final    Potassium 05/29/2024 3.4 (L)  3.5 - 5.2 mmol/L Final    Chloride 05/29/2024 103  98 - 107 mmol/L Final    CO2 05/29/2024 33.0 (H)  22.0 - 29.0 mmol/L Final    Calcium 05/29/2024 8.5 (L)  8.6 - 10.5 mg/dL Final    Total Protein 05/29/2024 6.7  6.0 - 8.5 g/dL Final    Albumin 05/29/2024 4.0  3.5 - 5.2 g/dL Final    ALT (SGPT) 05/29/2024 11  1 - 33 U/L Final    AST (SGOT) 05/29/2024 14  1 - 32 U/L Final    Alkaline Phosphatase 05/29/2024 94  39 - 117 U/L Final    Total Bilirubin 05/29/2024 0.2  0.0 - 1.2 mg/dL Final    Globulin 05/29/2024 2.7  gm/dL Final    A/G Ratio 05/29/2024 1.5  g/dL Final    BUN/Creatinine Ratio 05/29/2024 14.3  7.0 - 25.0 Final    Anion Gap 05/29/2024 6.0  5.0 - 15.0 mmol/L Final     eGFR 05/29/2024 64.6  >60.0 mL/min/1.73 Final    Magnesium 05/29/2024 2.2  1.6 - 2.4 mg/dL Final    Phosphorus 05/29/2024 3.0  2.5 - 4.5 mg/dL Final    WBC 05/29/2024 4.55  3.40 - 10.80 10*3/mm3 Final    RBC 05/29/2024 2.90 (L)  3.77 - 5.28 10*6/mm3 Final    Hemoglobin 05/29/2024 9.7 (L)  12.0 - 15.9 g/dL Final    Hematocrit 05/29/2024 30.9 (L)  34.0 - 46.6 % Final    MCV 05/29/2024 106.6 (H)  79.0 - 97.0 fL Final    MCH 05/29/2024 33.4 (H)  26.6 - 33.0 pg Final    MCHC 05/29/2024 31.4 (L)  31.5 - 35.7 g/dL Final    RDW 05/29/2024 13.9  12.3 - 15.4 % Final    RDW-SD 05/29/2024 54.6 (H)  37.0 - 54.0 fl Final    MPV 05/29/2024 10.3  6.0 - 12.0 fL Final    Platelets 05/29/2024 171  140 - 450 10*3/mm3 Final    Neutrophil % 05/29/2024 63.3  42.7 - 76.0 % Final    Lymphocyte % 05/29/2024 22.6  19.6 - 45.3 % Final    Monocyte % 05/29/2024 13.0 (H)  5.0 - 12.0 % Final    Eosinophil % 05/29/2024 0.0 (L)  0.3 - 6.2 % Final    Basophil % 05/29/2024 0.9  0.0 - 1.5 % Final    Immature Grans % 05/29/2024 0.2  0.0 - 0.5 % Final    Neutrophils, Absolute 05/29/2024 2.88  1.70 - 7.00 10*3/mm3 Final    Lymphocytes, Absolute 05/29/2024 1.03  0.70 - 3.10 10*3/mm3 Final    Monocytes, Absolute 05/29/2024 0.59  0.10 - 0.90 10*3/mm3 Final    Eosinophils, Absolute 05/29/2024 0.00  0.00 - 0.40 10*3/mm3 Final    Basophils, Absolute 05/29/2024 0.04  0.00 - 0.20 10*3/mm3 Final    Immature Grans, Absolute 05/29/2024 0.01  0.00 - 0.05 10*3/mm3 Final    Glucose 05/29/2024 118 (H)  65 - 99 mg/dL Final    BUN 05/29/2024 15  8 - 23 mg/dL Final    Creatinine 05/29/2024 0.94  0.57 - 1.00 mg/dL Final    Sodium 05/29/2024 140  136 - 145 mmol/L Final    Potassium 05/29/2024 3.4 (L)  3.5 - 5.2 mmol/L Final    Chloride 05/29/2024 101  98 - 107 mmol/L Final    CO2 05/29/2024 29.3 (H)  22.0 - 29.0 mmol/L Final    Calcium 05/29/2024 8.3 (L)  8.6 - 10.5 mg/dL Final    Total Protein 05/29/2024 6.4  6.0 - 8.5 g/dL Final    Albumin 05/29/2024 4.0  3.5 - 5.2 g/dL  Final    ALT (SGPT) 05/29/2024 9  1 - 33 U/L Final    AST (SGOT) 05/29/2024 14  1 - 32 U/L Final    Alkaline Phosphatase 05/29/2024 95  39 - 117 U/L Final    Total Bilirubin 05/29/2024 0.2  0.0 - 1.2 mg/dL Final    Globulin 05/29/2024 2.4  gm/dL Final    A/G Ratio 05/29/2024 1.7  g/dL Final    BUN/Creatinine Ratio 05/29/2024 16.0  7.0 - 25.0 Final    Anion Gap 05/29/2024 9.7  5.0 - 15.0 mmol/L Final    eGFR 05/29/2024 67.9  >60.0 mL/min/1.73 Final    Ferritin 05/29/2024 56.02  13.00 - 150.00 ng/mL Final    Iron 05/29/2024 28 (L)  37 - 145 mcg/dL Final    Iron Saturation (TSAT) 05/29/2024 7 (L)  20 - 50 % Final    Transferrin 05/29/2024 279  200 - 360 mg/dL Final    TIBC 05/29/2024 416  298 - 536 mcg/dL Final    Magnesium 05/29/2024 2.0  1.6 - 2.4 mg/dL Final   Admission on 05/13/2024, Discharged on 05/13/2024   Component Date Value Ref Range Status    Glucose 05/13/2024 109 (H)  65 - 99 mg/dL Final    BUN 05/13/2024 16  8 - 23 mg/dL Final    Creatinine 05/13/2024 0.71  0.57 - 1.00 mg/dL Final    Sodium 05/13/2024 143  136 - 145 mmol/L Final    Potassium 05/13/2024 3.6  3.5 - 5.2 mmol/L Final    Chloride 05/13/2024 102  98 - 107 mmol/L Final    CO2 05/13/2024 32.5 (H)  22.0 - 29.0 mmol/L Final    Calcium 05/13/2024 9.6  8.6 - 10.5 mg/dL Final    Total Protein 05/13/2024 6.5  6.0 - 8.5 g/dL Final    Albumin 05/13/2024 4.0  3.5 - 5.2 g/dL Final    ALT (SGPT) 05/13/2024 8  1 - 33 U/L Final    AST (SGOT) 05/13/2024 14  1 - 32 U/L Final    Alkaline Phosphatase 05/13/2024 96  39 - 117 U/L Final    Total Bilirubin 05/13/2024 0.3  0.0 - 1.2 mg/dL Final    Globulin 05/13/2024 2.5  gm/dL Final    A/G Ratio 05/13/2024 1.6  g/dL Final    BUN/Creatinine Ratio 05/13/2024 22.5  7.0 - 25.0 Final    Anion Gap 05/13/2024 8.5  5.0 - 15.0 mmol/L Final    eGFR 05/13/2024 95.1  >60.0 mL/min/1.73 Final    Lipase 05/13/2024 21  13 - 60 U/L Final    Lactate 05/13/2024 0.6  0.5 - 2.0 mmol/L Final    Extra Tube 05/13/2024 Hold for add-ons.    Final    Auto resulted.    Extra Tube 05/13/2024 hold for add-on   Final    Auto resulted    Extra Tube 05/13/2024 Hold for add-ons.   Final    Auto resulted.    Extra Tube 05/13/2024 Hold for add-ons.   Final    Auto resulted    WBC 05/13/2024 3.32 (L)  3.40 - 10.80 10*3/mm3 Final    RBC 05/13/2024 2.97 (L)  3.77 - 5.28 10*6/mm3 Final    Hemoglobin 05/13/2024 10.0 (L)  12.0 - 15.9 g/dL Final    Hematocrit 05/13/2024 31.2 (L)  34.0 - 46.6 % Final    MCV 05/13/2024 105.1 (H)  79.0 - 97.0 fL Final    MCH 05/13/2024 33.7 (H)  26.6 - 33.0 pg Final    MCHC 05/13/2024 32.1  31.5 - 35.7 g/dL Final    RDW 05/13/2024 14.1  12.3 - 15.4 % Final    RDW-SD 05/13/2024 54.6 (H)  37.0 - 54.0 fl Final    MPV 05/13/2024 10.1  6.0 - 12.0 fL Final    Platelets 05/13/2024 167  140 - 450 10*3/mm3 Final    Neutrophil % 05/13/2024 55.8  42.7 - 76.0 % Final    Lymphocyte % 05/13/2024 34.3  19.6 - 45.3 % Final    Monocyte % 05/13/2024 8.1  5.0 - 12.0 % Final    Eosinophil % 05/13/2024 0.6  0.3 - 6.2 % Final    Basophil % 05/13/2024 0.9  0.0 - 1.5 % Final    Immature Grans % 05/13/2024 0.3  0.0 - 0.5 % Final    Neutrophils, Absolute 05/13/2024 1.85  1.70 - 7.00 10*3/mm3 Final    Lymphocytes, Absolute 05/13/2024 1.14  0.70 - 3.10 10*3/mm3 Final    Monocytes, Absolute 05/13/2024 0.27  0.10 - 0.90 10*3/mm3 Final    Eosinophils, Absolute 05/13/2024 0.02  0.00 - 0.40 10*3/mm3 Final    Basophils, Absolute 05/13/2024 0.03  0.00 - 0.20 10*3/mm3 Final    Immature Grans, Absolute 05/13/2024 0.01  0.00 - 0.05 10*3/mm3 Final    nRBC 05/13/2024 0.0  0.0 - 0.2 /100 WBC Final       EKG Results:  No orders to display       Imaging Results:  CT Chest With Contrast Diagnostic    Result Date: 10/29/2024  Impression: 1. Stable diffuse sclerotic osseous metastatic disease. 2. New bronchial wall thickening and endobronchial secretion in the right lower lobe likely secondary to bronchitis. Mild airspace consolidation in the right lower lobe may represent  atelectasis or developing pneumonia. Electronically Signed: Floyd Leung MD  10/29/2024 10:48 AM EDT  Workstation ID: [id number]    CT Abdomen Pelvis With Contrast    Result Date: 10/29/2024  Impression: Stable exam. Diffuse sclerotic osseous metastatic disease. Electronically Signed: Floyd Leung MD  10/29/2024 10:31 AM EDT  Workstation ID: [id number]    NM Bone Scan Whole Body    Result Date: 10/22/2024  1.New sternal and right lower rib cage radiotracer uptake concerning for new sites of active metastatic disease. Electronically Signed: Donn Daigle MD  10/22/2024 4:25 PM EDT  Workstation ID: [id number]    CT Chest With Contrast Diagnostic    Result Date: 7/30/2024  Impression: Stable appearance of the innumerable osseous metastases. No new suspicious pulmonary nodule or mass. No evidence of new or additional metastatic disease in the chest. Coronary artery calcifications. Electronically Signed: Fox Schaeffer MD  7/30/2024 11:42 AM EDT  Workstation ID: [id number]    CT Abdomen Pelvis With Contrast    Result Date: 7/30/2024  Impression: 1. Extensive osteosclerotic metastatic disease does not appear appreciably changed within the abdomen or pelvis. 2. No indication of soft tissue organ metastasis in the abdomen or pelvis. 3. Interval ventral abdominal hernia repair with small superficial soft tissue seroma. Electronically Signed: Aicha Bonilla MD  7/30/2024 11:35 AM EDT  Workstation ID: [id number]        Assessment & Plan   Diagnoses and all orders for this visit:    1. Major depressive disorder, recurrent episode, moderate (Primary)  -     DULoxetine (CYMBALTA) 30 MG capsule; Take 1 capsule by mouth Daily.  Dispense: 90 capsule; Refill: 1    2. Generalized anxiety disorder  -     DULoxetine (CYMBALTA) 30 MG capsule; Take 1 capsule by mouth Daily.  Dispense: 90 capsule; Refill: 1    3. Panic attacks  -     DULoxetine (CYMBALTA) 30 MG capsule; Take 1 capsule by mouth Daily.  Dispense: 90 capsule; Refill: 1    4. Insomnia  due to mental condition  -     DULoxetine (CYMBALTA) 30 MG capsule; Take 1 capsule by mouth Daily.  Dispense: 90 capsule; Refill: 1    5. Post traumatic stress disorder (PTSD)  -     DULoxetine (CYMBALTA) 30 MG capsule; Take 1 capsule by mouth Daily.  Dispense: 90 capsule; Refill: 1        Visit Diagnoses:    ICD-10-CM ICD-9-CM   1. Major depressive disorder, recurrent episode, moderate  F33.1 296.32   2. Generalized anxiety disorder  F41.1 300.02   3. Panic attacks  F41.0 300.01   4. Insomnia due to mental condition  F51.05 300.9     327.02   5. Post traumatic stress disorder (PTSD)  F43.10 309.81     11/12/2024: Increase cymbalta for MDD. Pt just started on mirtazapine. Would want to find her a therapist. 6w    PLAN:  Safety: No acute safety concerns  Therapy:  recommended, but can't use mychart  Risk Assessment: Risk of self-harm acutely is moderate.  Risk factors include anxiety disorder, mood disorder, access to firearms, and recent psychosocial stressors (pandemic). Protective factors include no family history, no present SI, no history of suicide attempts or self-harm in the past, minimal AODA, healthcare seeking, future orientation, willingness to engage in care.  Risk of self-harm chronically is also moderate, but could be further elevated in the event of treatment noncompliance and/or AODA.  Meds:  INCREASE cymbalta 60 to 90 mg qday. Risks, benefits, alternatives discussed with patient including GI upset, nausea vomiting diarrhea, theoretical decrease of seizure threshold predisposing the patient to a slightly higher seizure risk, headaches, sexual dysfunction, serotonin syndrome, bleeding risk, increased suicidality in patients 24 years and younger, switching to ebony/hypomania.  Also constipation and urinary retention.  After discussion of these risks and benefits, the patient voiced understanding and agreed to proceed.  CONTINUE mirtazapine 15 mg qday. Risks, benefits, alternatives discussed with  patient including GI upset, sedation, dizziness with falls risk, increased appetite.  Do not use before operating vehicle, vessel, or machine. After discussion of these risks and benefits, the patient voiced understanding and agreed to proceed.  S/P:  Caitlyn did nothing  Abilify can't remember  Labs: none      Patient screened positive for depression based on a PHQ-9 score of 17 on 11/12/2024. Follow-up recommendations include: Prescribed antidepressant medication treatment and Suicide Risk Assessment performed.           TREATMENT PLAN/GOALS: Continue supportive psychotherapy efforts and medications as indicated. Treatment and medication options discussed during today's visit. Patient acknowledged and verbally consented to continue with current treatment plan and was educated on the importance of compliance with treatment and follow-up appointments.    MEDICATION ISSUES:  ALEXEI reviewed as expected.  Discussed medication options and treatment plan of prescribed medication as well as the risks, benefits, and side effects including potential falls, possible impaired driving and metabolic adversities among others. Patient is agreeable to call the office with any worsening of symptoms or onset of side effects. Patient is agreeable to call 911 or go to the nearest ER should he/she begin having SI/HI. No medication side effects or related complaints today.     MEDS ORDERED DURING VISIT:  New Medications Ordered This Visit   Medications    DULoxetine (CYMBALTA) 30 MG capsule     Sig: Take 1 capsule by mouth Daily.     Dispense:  90 capsule     Refill:  1     Increasing dose to 60 + 30 = 90 mg daily. Thank you for the help. Please call with questions: [phone].       Return in about 6 weeks (around 12/24/2024).         This document has been electronically signed by Megha Jose MD  November 12, 2024 09:46 EST    Dictated Utilizing Dragon Dictation: Part of this note may be an electronic transcription/translation of  spoken language to printed text using the Dragon Dictation System.

## 2024-11-12 ENCOUNTER — OFFICE VISIT (OUTPATIENT)
Dept: PSYCHIATRY | Facility: CLINIC | Age: 64
End: 2024-11-12
Payer: MEDICARE

## 2024-11-12 VITALS
DIASTOLIC BLOOD PRESSURE: 56 MMHG | OXYGEN SATURATION: 90 % | HEIGHT: 66 IN | WEIGHT: 188 LBS | HEART RATE: 68 BPM | SYSTOLIC BLOOD PRESSURE: 148 MMHG | BODY MASS INDEX: 30.22 KG/M2

## 2024-11-12 DIAGNOSIS — F41.0 PANIC ATTACKS: ICD-10-CM

## 2024-11-12 DIAGNOSIS — F33.1 MAJOR DEPRESSIVE DISORDER, RECURRENT EPISODE, MODERATE: Primary | ICD-10-CM

## 2024-11-12 DIAGNOSIS — F51.05 INSOMNIA DUE TO MENTAL CONDITION: ICD-10-CM

## 2024-11-12 DIAGNOSIS — F43.10 POST TRAUMATIC STRESS DISORDER (PTSD): ICD-10-CM

## 2024-11-12 DIAGNOSIS — F41.1 GENERALIZED ANXIETY DISORDER: ICD-10-CM

## 2024-11-12 RX ORDER — AMOXICILLIN 250 MG/1
250 CAPSULE ORAL 3 TIMES DAILY
COMMUNITY

## 2024-11-12 RX ORDER — DULOXETIN HYDROCHLORIDE 30 MG/1
30 CAPSULE, DELAYED RELEASE ORAL DAILY
Qty: 90 CAPSULE | Refills: 1 | Status: SHIPPED | OUTPATIENT
Start: 2024-11-12

## 2024-11-12 NOTE — TREATMENT PLAN
Multi-Disciplinary Problems (from Behavioral Health Treatment Plan)      Active Problems       Problem: Anxiety  Start Date: 11/12/24      Problem Details: The patient self-scales this problem as a 6 with 10 being the worst.  medical conditions, grief        Goal Priority Start Date Expected End Date End Date    Patient will develop and implement behavioral and cognitive strategies to reduce anxiety and irrational fears. -- 11/12/24 05/13/25 --    Goal Details: Progress toward goal:  Not appropriate to rate progress toward goal since this is the initial treatment plan.          Goal Intervention Frequency Start Date End Date    Help patient explore past emotional issues in relation to present anxiety. Q Month 11/12/24 --    Intervention Details: Duration of treatment until remission of symptoms.          Goal Intervention Frequency Start Date End Date    Help patient develop an awareness of their cognitive and physical responses to anxiety. Q Month 11/12/24 --    Intervention Details: Duration of treatment until remission of symptoms.                  Problem: Depression  Start Date: 11/12/24      Problem Details: The patient self-scales this problem as a 7 with 10 being the worst.  medical conditions, grief        Goal Priority Start Date Expected End Date End Date    Patient will demonstrate the ability to initiate new constructive life skills outside of sessions on a consistent basis. -- 11/12/24 05/13/25 --    Goal Details: Progress toward goal:  Not appropriate to rate progress toward goal since this is the initial treatment plan.          Goal Intervention Frequency Start Date End Date    Assist patient in setting attainable activities of daily living goals. PRN 11/12/24 --      Goal Intervention Frequency Start Date End Date    Provide education about depression Q Month 11/12/24 --    Intervention Details: Duration of treatment until remission of symptoms.          Goal Intervention Frequency Start Date End  Date    Assist patient in developing healthy coping strategies. Q Month 11/12/24 --    Intervention Details: Duration of treatment until remission of symptoms.                          Reviewed By       Megha Jose MD 11/12/24 [id number]                     I have discussed and reviewed this treatment plan with the patient.

## 2024-11-18 DIAGNOSIS — G89.3 CANCER RELATED PAIN: ICD-10-CM

## 2024-11-18 RX ORDER — OXYCODONE AND ACETAMINOPHEN 10; 325 MG/1; MG/1
1 TABLET ORAL EVERY 6 HOURS PRN
Qty: 60 TABLET | Refills: 0 | Status: SHIPPED | OUTPATIENT
Start: 2024-11-18

## 2024-11-18 NOTE — TELEPHONE ENCOUNTER
Caller: Derek Keller    Relationship: Self    Best call back number: [phone]    Requested Prescriptions:   Requested Prescriptions     Pending Prescriptions Disp Refills    oxyCODONE-acetaminophen (PERCOCET)  MG per tablet 60 tablet 0     Sig: Take 1 tablet by mouth Every 6 (Six) Hours As Needed for Moderate Pain.      Pharmacy where request should be sent: [facility], [facility], & [facility]  [facility], [place] - [address] - [phone] [facility] [phone] FX     Last office visit with prescribing clinician: 10/30/2024   Last telemedicine visit with prescribing clinician: Visit date not found   Next office visit with prescribing clinician: 12/3/2024     Does the patient have less than a 3 day supply:  [x] Yes  [] No    Would you like a call back once the refill request has been completed: [] Yes [x] No    If the office needs to give you a call back, can they leave a voicemail: [] Yes [x] No

## 2024-11-26 DIAGNOSIS — G89.3 CANCER RELATED PAIN: ICD-10-CM

## 2024-11-26 RX ORDER — MORPHINE SULFATE 60 MG/1
60 TABLET, FILM COATED, EXTENDED RELEASE ORAL 2 TIMES DAILY
Qty: 60 TABLET | Refills: 0 | Status: SHIPPED | OUTPATIENT
Start: 2024-11-26

## 2024-11-27 ENCOUNTER — TELEPHONE (OUTPATIENT)
Dept: ONCOLOGY | Facility: HOSPITAL | Age: 64
End: 2024-11-27
Payer: MEDICARE

## 2024-11-27 NOTE — TELEPHONE ENCOUNTER
Diagnosis: Metastatic breast cancer    Reason for Referral: Medicare Part D extra help program    Content of Visit: OSW received a VM from Ms. Keller yesterday requesting a call back to receive assistance with applying for the Medicare Part D extra help program. OSW returned Ms. Keller's call this afternoon. She advised she received a letter in the mail regarding this program and believes she meets the income guidelines. OSW assisted Ms. Keller in submitting an online application today. Instructed her to please notify OSW or the oral pharmacy coordinator once she receives an approval or denial letter so that we may notify [employer] regarding her [facility] patient assistance. She v/u. Additionally, Ms. Keller inquired about help with her Medicare premium. OSW provided education regarding the Medicare Savings Programs and facilitated a three way call to the [employer] office to get applied. Unfortunately, the system denied her application due to being slightly over income. OSW support remains available.    Resources/Referrals Provided: Medicare Part D extra help via [employer], Medicare Savings Program via DCBS

## 2024-12-03 ENCOUNTER — HOSPITAL ENCOUNTER (OUTPATIENT)
Dept: ONCOLOGY | Facility: HOSPITAL | Age: 64
Discharge: HOME | End: 2024-12-03
Payer: MEDICARE

## 2024-12-03 ENCOUNTER — DOCUMENTATION (OUTPATIENT)
Dept: PSYCHIATRY | Facility: CLINIC | Age: 64
End: 2024-12-03
Payer: MEDICARE

## 2024-12-03 ENCOUNTER — DOCUMENTATION (OUTPATIENT)
Dept: ONCOLOGY | Facility: HOSPITAL | Age: 64
End: 2024-12-03
Payer: MEDICARE

## 2024-12-03 ENCOUNTER — OFFICE VISIT (OUTPATIENT)
Dept: ONCOLOGY | Facility: HOSPITAL | Age: 64
End: 2024-12-03
Payer: MEDICARE

## 2024-12-03 VITALS
RESPIRATION RATE: 20 BRPM | SYSTOLIC BLOOD PRESSURE: 147 MMHG | DIASTOLIC BLOOD PRESSURE: 65 MMHG | WEIGHT: 190 LBS | HEART RATE: 77 BPM | OXYGEN SATURATION: 97 % | TEMPERATURE: 98.8 F | BODY MASS INDEX: 30.69 KG/M2

## 2024-12-03 VITALS — DIASTOLIC BLOOD PRESSURE: 65 MMHG | SYSTOLIC BLOOD PRESSURE: 147 MMHG | OXYGEN SATURATION: 97 % | HEART RATE: 66 BPM

## 2024-12-03 DIAGNOSIS — F41.9 ANXIETY: ICD-10-CM

## 2024-12-03 DIAGNOSIS — C50.412 MALIGNANT NEOPLASM OF UPPER-OUTER QUADRANT OF LEFT BREAST IN FEMALE, ESTROGEN RECEPTOR POSITIVE: ICD-10-CM

## 2024-12-03 DIAGNOSIS — Z17.0 MALIGNANT NEOPLASM OF UPPER-OUTER QUADRANT OF LEFT BREAST IN FEMALE, ESTROGEN RECEPTOR POSITIVE: ICD-10-CM

## 2024-12-03 DIAGNOSIS — Z45.2 ENCOUNTER FOR ADJUSTMENT OR MANAGEMENT OF VASCULAR ACCESS DEVICE: Primary | ICD-10-CM

## 2024-12-03 DIAGNOSIS — C79.51 MALIGNANT NEOPLASM METASTATIC TO BONE: ICD-10-CM

## 2024-12-03 DIAGNOSIS — C50.412 MALIGNANT NEOPLASM OF UPPER-OUTER QUADRANT OF LEFT BREAST IN FEMALE, ESTROGEN RECEPTOR POSITIVE: Primary | ICD-10-CM

## 2024-12-03 DIAGNOSIS — Z17.0 MALIGNANT NEOPLASM OF UPPER-OUTER QUADRANT OF LEFT BREAST IN FEMALE, ESTROGEN RECEPTOR POSITIVE: Primary | ICD-10-CM

## 2024-12-03 DIAGNOSIS — C79.51 MALIGNANT NEOPLASM METASTATIC TO BONE: Primary | ICD-10-CM

## 2024-12-03 LAB
ALBUMIN SERPL-MCNC: 4 G/DL (ref 3.5–5.2)
ALBUMIN/GLOB SERPL: 1.3 G/DL
ALP SERPL-CCNC: 103 U/L (ref 39–117)
ALT SERPL W P-5'-P-CCNC: 12 U/L (ref 1–33)
ANION GAP SERPL CALCULATED.3IONS-SCNC: 9.2 MMOL/L (ref 5–15)
AST SERPL-CCNC: 13 U/L (ref 1–32)
BASOPHILS # BLD AUTO: 0.07 10*3/MM3 (ref 0–0.2)
BASOPHILS NFR BLD AUTO: 1.2 % (ref 0–1.5)
BILIRUB SERPL-MCNC: 0.4 MG/DL (ref 0–1.2)
BUN SERPL-MCNC: 16 MG/DL (ref 8–23)
BUN/CREAT SERPL: 17.4 (ref 7–25)
CALCIUM SPEC-SCNC: 9.2 MG/DL (ref 8.6–10.5)
CHLORIDE SERPL-SCNC: 102 MMOL/L (ref 98–107)
CO2 SERPL-SCNC: 29.8 MMOL/L (ref 22–29)
CREAT SERPL-MCNC: 0.92 MG/DL (ref 0.57–1)
DEPRECATED RDW RBC AUTO: 61.7 FL (ref 37–54)
EGFRCR SERPLBLD CKD-EPI 2021: 69.2 ML/MIN/1.73
EOSINOPHIL # BLD AUTO: 0.01 10*3/MM3 (ref 0–0.4)
EOSINOPHIL NFR BLD AUTO: 0.2 % (ref 0.3–6.2)
ERYTHROCYTE [DISTWIDTH] IN BLOOD BY AUTOMATED COUNT: 16 % (ref 12.3–15.4)
GLOBULIN UR ELPH-MCNC: 3 GM/DL
GLUCOSE SERPL-MCNC: 111 MG/DL (ref 65–99)
HCT VFR BLD AUTO: 32.1 % (ref 34–46.6)
HGB BLD-MCNC: 10 G/DL (ref 12–15.9)
IMM GRANULOCYTES # BLD AUTO: 0.03 10*3/MM3 (ref 0–0.05)
IMM GRANULOCYTES NFR BLD AUTO: 0.5 % (ref 0–0.5)
LYMPHOCYTES # BLD AUTO: 1.21 10*3/MM3 (ref 0.7–3.1)
LYMPHOCYTES NFR BLD AUTO: 21.6 % (ref 19.6–45.3)
MAGNESIUM SERPL-MCNC: 1.9 MG/DL (ref 1.6–2.4)
MCH RBC QN AUTO: 32.4 PG (ref 26.6–33)
MCHC RBC AUTO-ENTMCNC: 31.2 G/DL (ref 31.5–35.7)
MCV RBC AUTO: 103.9 FL (ref 79–97)
MONOCYTES # BLD AUTO: 0.27 10*3/MM3 (ref 0.1–0.9)
MONOCYTES NFR BLD AUTO: 4.8 % (ref 5–12)
NEUTROPHILS NFR BLD AUTO: 4.02 10*3/MM3 (ref 1.7–7)
NEUTROPHILS NFR BLD AUTO: 71.7 % (ref 42.7–76)
NRBC BLD AUTO-RTO: 0 /100 WBC (ref 0–0.2)
PHOSPHATE SERPL-MCNC: 2 MG/DL (ref 2.5–4.5)
PLATELET # BLD AUTO: 245 10*3/MM3 (ref 140–450)
PMV BLD AUTO: 10.1 FL (ref 6–12)
POTASSIUM SERPL-SCNC: 3.7 MMOL/L (ref 3.5–5.2)
PROT SERPL-MCNC: 7 G/DL (ref 6–8.5)
RBC # BLD AUTO: 3.09 10*6/MM3 (ref 3.77–5.28)
SODIUM SERPL-SCNC: 141 MMOL/L (ref 136–145)
WBC NRBC COR # BLD AUTO: 5.61 10*3/MM3 (ref 3.4–10.8)

## 2024-12-03 PROCEDURE — 25010000002 HEPARIN LOCK FLUSH PER 10 UNITS: Performed by: INTERNAL MEDICINE

## 2024-12-03 PROCEDURE — 80053 COMPREHEN METABOLIC PANEL: CPT | Performed by: INTERNAL MEDICINE

## 2024-12-03 PROCEDURE — 85025 COMPLETE CBC W/AUTO DIFF WBC: CPT | Performed by: INTERNAL MEDICINE

## 2024-12-03 PROCEDURE — 84100 ASSAY OF PHOSPHORUS: CPT | Performed by: INTERNAL MEDICINE

## 2024-12-03 PROCEDURE — 83735 ASSAY OF MAGNESIUM: CPT | Performed by: INTERNAL MEDICINE

## 2024-12-03 PROCEDURE — 36591 DRAW BLOOD OFF VENOUS DEVICE: CPT

## 2024-12-03 RX ORDER — SODIUM CHLORIDE 0.9 % (FLUSH) 0.9 %
20 SYRINGE (ML) INJECTION AS NEEDED
Status: DISCONTINUED | OUTPATIENT
Start: 2024-12-03 | End: 2024-12-04 | Stop reason: HOSPADM

## 2024-12-03 RX ORDER — SODIUM CHLORIDE 0.9 % (FLUSH) 0.9 %
20 SYRINGE (ML) INJECTION AS NEEDED
OUTPATIENT
Start: 2024-12-03

## 2024-12-03 RX ORDER — HEPARIN SODIUM (PORCINE) LOCK FLUSH IV SOLN 100 UNIT/ML 100 UNIT/ML
500 SOLUTION INTRAVENOUS AS NEEDED
Status: DISCONTINUED | OUTPATIENT
Start: 2024-12-03 | End: 2024-12-04 | Stop reason: HOSPADM

## 2024-12-03 RX ORDER — HEPARIN SODIUM (PORCINE) LOCK FLUSH IV SOLN 100 UNIT/ML 100 UNIT/ML
500 SOLUTION INTRAVENOUS AS NEEDED
OUTPATIENT
Start: 2024-12-03

## 2024-12-03 RX ADMIN — HEPARIN 500 UNITS: 100 SYRINGE at 13:48

## 2024-12-03 RX ADMIN — Medication 20 ML: at 13:48

## 2024-12-03 NOTE — ASSESSMENT & PLAN NOTE
Patient's Xgeva will continue to be held.  The area on the mandible is healing and will hold off on further bone modifying agents until cleared by dentistry.

## 2024-12-03 NOTE — ASSESSMENT & PLAN NOTE
Patient is being followed by psychiatry, Dr. Jose.  She has more symptoms today but admits to new vape use with both CBD and nicotine vapes.  This is the likely source of her increased agitation symptoms.  She will stop the vapes.  Our LCSW, Kenzie met with the patient today and will contact her tomorrow to ensure that she is feeling better.  If she has any worsening symptoms she should present to the emergency room.  Follow-up with psychiatry as scheduled.

## 2024-12-03 NOTE — PROGRESS NOTES
Chief Complaint  No chief complaint on file.    Haile Monique MD Patel, Saagar, MD    Subjective          Derek Keller presents to [facility] for ongoing treatment of her breast cancer.  She is on letrozole, ribociclib and denosumab for bony involvement (currently being held due to ONJ).  Patient states she is tolerating her breast cancer treatments well.  She denies new masses, adenopathy, unusual aches or pains.  She reports that she has been using CBD vape multiple times yesterday.  She feels very jittery, anxious, shaky inside.  I discussed the case with Dr. Jose, psychiatry who has seen the patient in the past.  He recommended stopping the vapes    Oncology/Hematology History   Malignant neoplasm of upper-outer quadrant of left breast in female, estrogen receptor positive   10/13/2022 Initial Diagnosis    Malignant neoplasm of left breast in female, estrogen receptor positive (HCC)     10/14/2022 -  Chemotherapy    OP BREAST Letrozole / Ribociclib     10/14/2022 Cancer Staged    Staging form: Breast, AJCC 8th Edition  - Clinical: Stage IV (cT1c, cN1, cM1, ER+, WY+, HER2-) - Signed by Donald Barker MD on 10/14/2022     10/28/2022 -  Chemotherapy    OP SUPPORTIVE Denosumab (Xgeva) Q28D     Malignant neoplasm metastatic to bone   10/14/2022 Initial Diagnosis    Bone metastases (HCC)     10/28/2022 -  Chemotherapy    OP SUPPORTIVE Denosumab (Xgeva) Q28D     Secondary malignant neoplasm of bone (Resolved)   11/14/2022 Initial Diagnosis    Secondary malignant neoplasm of bone (HCC)     11/17/2022 - 4/19/2023 Radiation    RADIATION THERAPY Treatment Details (11/14/2022 - 4/19/2023)  Site: Spine - Lumbar  Technique: 3D CRT  Goal: No goal specified  Planned Treatment Start Date: 11/17/2022           Current Outpatient Medications on File Prior to Visit   Medication Sig Dispense Refill    amoxicillin (AMOXIL) 250 MG capsule Take 1 capsule by mouth 3 (Three) Times a Day.  For tooth      atorvastatin (LIPITOR) 20 MG tablet TAKE 1 TABLET BY MOUTH EVERY DAY (Patient taking differently: Take 1 tablet by mouth Daily.) 30 tablet 6    baclofen (LIORESAL) 20 MG tablet       cyproheptadine (PERIACTIN) 4 MG tablet Take 1 tablet by mouth Every 12 (Twelve) Hours.      Diclofenac Sodium (VOLTAREN) 1 % gel gel APPLY TO THE AFFECTED AREA(S) ON THE SKIN FOUR TIMES DAILY FOR 10 DAYS      donepezil (ARICEPT) 10 MG tablet Take 1 tablet by mouth Daily.      DULoxetine (CYMBALTA) 30 MG capsule Take 1 capsule by mouth Daily. 90 capsule 1    DULoxetine (CYMBALTA) 60 MG capsule TAKE 1 capsule BY MOUTH DAILY for mood elevation 30 capsule 1    gabapentin (NEURONTIN) 600 MG tablet TAKE 1 TABLET BY MOUTH AT BEDTIME (Patient taking differently: Take 1 tablet by mouth 2 (Two) Times a Day As Needed.) 90 tablet 0    hydroCHLOROthiazide 12.5 MG tablet TAKE 1 TABLET BY MOUTH DAILY for swelling 90 tablet 2    ibuprofen (ADVIL,MOTRIN) 600 MG tablet Take 1 tablet by mouth Every 8 (Eight) Hours As Needed. for pain      lactulose (CHRONULAC) 10 GM/15ML solution take 30 mls BY MOUTH TWICE DAILY      letrozole (FEMARA) 2.5 MG tablet TAKE 1 TABLET BY MOUTH DAILY 90 tablet 1    levothyroxine (SYNTHROID, LEVOTHROID) 50 MCG tablet TAKE 1 TABLET BY MOUTH EVERY DAY (Patient taking differently: Take 1 tablet by mouth Daily.) 90 tablet 1    lidocaine (LIDODERM) 5 % Place 1 patch on the skin as directed by provider Daily. Remove & Discard patch within 12 hours or as directed by MD      Lidocaine Pain Relief 4 %       LORazepam (ATIVAN) 0.5 MG tablet Take 1 tablet by mouth Every 6 (Six) Hours As Needed.      losartan (COZAAR) 50 MG tablet Take 1 tablet by mouth Daily. for blood pressure      mirtazapine (REMERON) 15 MG tablet Take 1 tablet by mouth Every Night.      montelukast (SINGULAIR) 10 MG tablet Take 1 tablet by mouth Daily.      Morphine (MS CONTIN) 60 MG 12 hr tablet TAKE 1 TABLET BY MOUTH TWICE DAILY 60 tablet 0    naloxone  (NARCAN) 4 MG/0.1ML nasal spray Call 911. Don't prime. [place] in 1 nostril for overdose. Repeat in 2-3 minutes in other nostril if no or minimal breathing/responsiveness. 2 each 0    nicotine (NICODERM CQ) 21 MG/24HR patch Place 1 patch on the skin as directed by provider Daily. 30 patch 3    ondansetron (ZOFRAN) 8 MG tablet TAKE 1 TABLET BY MOUTH THREE TIMES DAILY AS NEEDED FOR NAUSEA AND VOMITING 30 tablet 5    oxybutynin XL (DITROPAN XL) 15 MG 24 hr tablet TAKE 1 TABLET BY MOUTH DAILY for bladder 30 tablet 1    oxyCODONE (Roxicodone) 5 MG immediate release tablet Take 1 tablet by mouth Every 8 (Eight) Hours As Needed for Moderate Pain. 8 tablet 0    oxyCODONE-acetaminophen (PERCOCET)  MG per tablet Take 1 tablet by mouth Every 6 (Six) Hours As Needed for Moderate Pain. 60 tablet 0    polyethylene glycol (MIRALAX) 17 g packet Take 17 g by mouth Daily. 5 packet 0    polyethylene glycol (MIRALAX) 17 g packet Take 17 g by mouth Daily. 5 packet 0    potassium chloride (MICRO-K) 10 MEQ CR capsule TAKE 1 CAPSULE BY MOUTH EVERY TWELVE HOURS 60 capsule 1    prochlorperazine (COMPAZINE) 10 MG tablet       ribociclib succinate 200 MG tablet therapy pack tablet Take 3 tablets by mouth Take As Directed. Take 3 tablets by mouth daily for 21 days then off 7 days on a 28 day cycle. 63 tablet 11    rOPINIRole (REQUIP) 3 MG tablet Take 1 tablet by mouth 2 (Two) Times a Day.      SudoGest 60 MG tablet Take 1 tablet by mouth Every 8 (Eight) Hours.      SUMAtriptan (IMITREX) 100 MG tablet Take 1 tablet by mouth 1 (One) Time As Needed.      traZODone (DESYREL) 150 MG tablet TAKE 1 TABLET BY MOUTH ONCE nightly (Patient taking differently: Take 1 tablet by mouth Every Night.) 90 tablet 2     Current Facility-Administered Medications on File Prior to Visit   Medication Dose Route Frequency Provider Last Rate Last Admin    heparin injection 500 Units  500 Units Intravenous PRN Donald Barker MD   500 Units at 12/03/24 6270     sodium chloride 0.9 % flush 20 mL  20 mL Intravenous PRN Donald Barker MD   20 mL at 12/03/24 1348       Allergies   Allergen Reactions    Azithromycin Itching    Erythromycin Itching     Past Medical History:   Diagnosis Date    Abdominal pain     OSTOMY SITE    Allergic rhinitis     Anemia     NO S/S    Anxiety     Arteriosclerosis     Coronary, follows with Dr. Núñez    Arthritis     Bone cancer     Bone metastases 10/14/2022    Bowen's disease     SKIN CANCER    Breast cancer     NO SURGERY WAS DONE DUE TO METS TO BONE/COLON/LYMPHNODE    Cancer related pain 10/13/2022    Depression     Disorder associated with Helicobacter species 10/12/2022    Dysphoric mood     Fatigue     Fibromyalgia     Frequent urination     NO S/S INFECTION    Herpes simplex vulvovaginitis 07/26/2018    History of colon polyps     History of IBS     History of kidney stones     Hyperlipidemia     Hypertension     Hypothyroidism     Insomnia     Lumbago     Mood disorder     Neck pain     R/T CANCER    Palpitations     last time a few months ago    Precordial pain     R/T SPINE CANCER    S/P Laparoscopic Hand-Assisted Colostomy Closure with End to End Anastomosis Open Parastomal Hernia Repair 12/08/2022    Sleep disturbance     SOB (shortness of breath)     at times at rest    SOBOE (shortness of breath on exertion)      Past Surgical History:   Procedure Laterality Date    BREAST BIOPSY Left     CARDIAC CATHETERIZATION Left 1959    CARDIAC CATHETERIZATION N/A 04/06/2018    Procedure: Coronary angiography;  Surgeon: Art Licea MD;  [facility];  Service: Cardiovascular    CARDIAC CATHETERIZATION N/A 04/06/2018    Procedure: Left heart cath;  Surgeon: Art Licea MD;  Location: [facility] CATH INVASIVE LOCATION;  Service: Cardiovascular    CARDIAC CATHETERIZATION N/A 04/06/2018    Procedure: Left ventriculography;  Surgeon: Art Licea MD;  Location: [facility] CATH INVASIVE  LOCATION;  Service: Cardiovascular    CHOLECYSTECTOMY      COLON SURGERY      colostomy bag, and colostomy reversal    COLONOSCOPY  2006    COLONOSCOPY N/A 2023    Procedure: COLONOSCOPY;  Surgeon: Alfred Castañeda MD;  Location: [facility] ENDOSCOPY;  Service: General;  Laterality: N/A;  same as preop    EXPLORATORY LAPAROTOMY N/A 2022    Procedure: LAPAROTOMY EXPLORATORY sigmoid resection hartmans procedure colostomy;  Surgeon: Alfred Castañeda MD;  Location: [facility] MAIN OR;  Service: General;  Laterality: N/A;    GANGLION CYST EXCISION Bilateral     HYSTERECTOMY  2005    ILEOSTOMY CLOSURE N/A 2023    Procedure: Laparoscopic Hand-Assisted Colostomy Closure with End to End Anastomosis;  Surgeon: Alfred Castañeda MD;  Location: [facility] MAIN OR;  Service: General;  Laterality: N/A;    LAPAROSCOPIC GASTRIC BANDING      BAND REMOVED 2020    PARASTOMAL HERNIA REPAIR N/A 2023    Procedure: Open Parastomal Hernia Repair;  Surgeon: Alfred Castañeda MD;  Location: [facility] MAIN OR;  Service: General;  Laterality: N/A;    TONSILLECTOMY      VENOUS ACCESS DEVICE (PORT) INSERTION N/A 2023    Procedure: INSERTION VENOUS ACCESS DEVICE;  Surgeon: Alfred Castañeda MD;  Location: [facility] MAIN OR;  Service: General;  Laterality: N/A;    VENTRAL HERNIA REPAIR N/A 7/3/2024    Procedure: VENTRAL / INCISIONAL HERNIA REPAIR LAPAROSCOPIC WITH DAVINCI ROBOT with mesh;  Surgeon: Alfred Castañeda MD;  Location: [facility] MAIN OR;  Service: Robotics - DaVinci;  Laterality: N/A;     Social History     Socioeconomic History    Marital status:    Tobacco Use    Smoking status: Former     Current packs/day: 0.00     Average packs/day: 1 pack/day for 50.5 years (50.5 ttl pk-yrs)     Types: Cigarettes     Start date:      Quit date: 2024     Years since quittin.4    Smokeless tobacco: Never    Tobacco comments:          Has not used tobacco for 28 days    Vaping Use     Vaping status: Never Used   Substance and Sexual Activity    Alcohol use: No    Drug use: Never     Types: Marijuana     Comment: LAST USE 7-2-24    Sexual activity: Defer     Partners: Male     Birth control/protection: None     Family History   Problem Relation Age of Onset    Hypertension Mother     Rheum arthritis Mother     Heart disease Mother     Breast cancer Mother     Diabetes Father     Cancer Maternal Grandmother         colon    Colon cancer Maternal Grandmother     Aneurysm Paternal Grandfather     Diabetes Other     Fibromyalgia Other     Malig Hyperthermia Neg Hx        Objective   Physical Exam  Vitals reviewed. Exam conducted with a chaperone present.   Cardiovascular:      Rate and Rhythm: Normal rate and regular rhythm.      Heart sounds: Normal heart sounds. No murmur heard.     No gallop.   Pulmonary:      Effort: Pulmonary effort is normal.      Breath sounds: Normal breath sounds.   Abdominal:      General: Bowel sounds are normal.   Musculoskeletal:      Right lower leg: No edema.      Left lower leg: No edema.   Lymphadenopathy:      Cervical: No cervical adenopathy.   Psychiatric:         Mood and Affect: Mood normal.         Behavior: Behavior normal.         Vitals:    12/03/24 1422   BP: 147/65   Pulse: 77   Resp: 20   Temp: 98.8 °F (37.1 °C)   TempSrc: Temporal   SpO2: 97%   Weight: 86.2 kg (190 lb)   PainSc:   4     ECOG score: 2         PHQ-9 Total Score:                    Result Review :   The following data was reviewed by: Donald Barker MD on 12/03/2024:  Lab Results   Component Value Date    HGB 10.0 (L) 12/03/2024    HCT 32.1 (L) 12/03/2024    .9 (H) 12/03/2024     12/03/2024    WBC 5.61 12/03/2024    NEUTROABS 4.02 12/03/2024    LYMPHSABS 1.21 12/03/2024    MONOSABS 0.27 12/03/2024    EOSABS 0.01 12/03/2024    BASOSABS 0.07 12/03/2024     Lab Results   Component Value Date    GLUCOSE 111 (H) 12/03/2024    BUN 16 12/03/2024    CREATININE 0.92 12/03/2024     " 12/03/2024    K 3.7 12/03/2024     12/03/2024    CO2 29.8 (H) 12/03/2024    CALCIUM 9.2 12/03/2024    PROTEINTOT 7.0 12/03/2024    ALBUMIN 4.0 12/03/2024    BILITOT 0.4 12/03/2024    ALKPHOS 103 12/03/2024    AST 13 12/03/2024    ALT 12 12/03/2024     Lab Results   Component Value Date    MG 1.9 12/03/2024    PHOS 2.0 (L) 12/03/2024    FREET4 1.01 01/17/2022    TSH 1.470 11/12/2019     Lab Results   Component Value Date    IRON 28 (L) 05/29/2024    LABIRON 7 (L) 05/29/2024    TRANSFERRIN 279 05/29/2024    TIBC 416 05/29/2024     Lab Results   Component Value Date    FERRITIN 56.02 05/29/2024    OGLCEDWM00 390 04/23/2019    FOLATE 9.93 04/23/2019     No results found for: \"PSA\", \"CEA\", \"AFP\", \"\", \"\"          Assessment and Plan    Diagnoses and all orders for this visit:    1. Malignant neoplasm of upper-outer quadrant of left breast in female, estrogen receptor positive (Primary)  Assessment & Plan:  Metastatic.  Patient is on letrozole plus ribociclib.  Tolerating those treatments well.  She demonstrates mild anemia related to ribociclib but not enough to require dose adjustment.  Continue letrozole daily.  Continue ribociclib 3 weeks out of 4.  I will see her back in 1 month for continued treatment lab work prior to monitor for toxicities.      2. Malignant neoplasm metastatic to bone  Assessment & Plan:  Patient's Xgeva will continue to be held.  The area on the mandible is healing and will hold off on further bone modifying agents until cleared by dentistry.      3. Anxiety  Assessment & Plan:  Patient is being followed by psychiatry, Dr. Jose.  She has more symptoms today but admits to new vape use with both CBD and nicotine vapes.  This is the likely source of her increased agitation symptoms.  She will stop the vapes.  Our LCSW, Kenzie met with the patient today and will contact her tomorrow to ensure that she is feeling better.  If she has any worsening symptoms she should present to " the emergency room.  Follow-up with psychiatry as scheduled.              Patient Follow Up: 1 month    Patient was given instructions and counseling regarding her condition or for health maintenance advice. Please see specific information pulled into the AVS if appropriate.     Donald Barker MD    12/3/2024

## 2024-12-03 NOTE — ASSESSMENT & PLAN NOTE
Metastatic.  Patient is on letrozole plus ribociclib.  Tolerating those treatments well.  She demonstrates mild anemia related to ribociclib but not enough to require dose adjustment.  Continue letrozole daily.  Continue ribociclib 3 weeks out of 4.  I will see her back in 1 month for continued treatment lab work prior to monitor for toxicities.

## 2024-12-03 NOTE — PROGRESS NOTES
OSW met with patient after referral from clinical team due to patient having reported intense nervousness, tearful, and feeling sick. OSW reviewed what substances patient had entered into her system differently in the past few days. Patient pulled two vapes that she stated held nicotine in one and CBD oil in the other. OSW educated patient on the side effects that people can experience from using CBD vapes and Nicotine. Patient stated she started using the CBD vape around Thanksgiving Day. Patient stated that was the only new thing she has put in her body in the past 3-4 days. Patient stated she thought it would be the same as smoking a joint which only mellowed her not make her insides feel like they are crawling. OSW asked patient if she were vaping 1-5, 6-8 or more times a day. Patient stated it is considerably more than 8 times a day for the past couple of days. Patient expressed concerns that the medical team would think negative of her and OSW reassured this patient there is no judgment and anyone can have these side effects if using a vape more than 8-10 times a day. OSW educated patient on the differences in how they are processed which produces unknown side effects depending on the person. Patient was advised to drink enough water to use the restroom every hour to get this passed through her system. Patient was advised if she were to have difficulty with breathing, heart palpitations, chest pains, or vomiting to go the ER for an evaluation. Patient stated she has none of those symptoms and voiced understanding to seek care if she does. Patient stated she was scared that she has never felt so sick and fearful her cancer was making her feel this way. Patient did not disclose any suicidal or homicidal thoughts. OSW educated patient that sudden onset of her symptoms indicated she has entered a substance in her body she is not used to taking. Patient stated when her psychiatric meds were increased a month ago  she did not have any of these side effects. Patient agreed to go to the ER if symptoms worsen and OSW will plan to call to check in on her at 10am EST in the morning to assess if she has improved. Patient expressed appreciation for the emotional support and education provided at today's visit. Patient was supported by her mother who removed the vapes from patient's purse so she would not be tempted to use. OSW related her interaction with the patient to her oncologist who referred her to OSW today for a behavioral octavio assessment. OSW will continue to provide emotional support as needed.

## 2024-12-03 NOTE — PROGRESS NOTES
Received call from Oncology. Pt is agitated and crawling out of her skin. This has been going on since yesterday. Of note, she also admits to vaping and smoking tobacco yesterday. Dr. Barker was initially concerned that it could be serotonin syndrome, but given the temporal nature of her symptoms we both agreed that this is likely related to the substances the patient used yesterday. Discussed possible options, including low dose seroquel for a few days versus going to the ER. Dr. Barker will discuss with the patient and let us know what she decides.

## 2024-12-04 ENCOUNTER — TELEPHONE (OUTPATIENT)
Dept: ONCOLOGY | Facility: HOSPITAL | Age: 64
End: 2024-12-04
Payer: MEDICARE

## 2024-12-04 ENCOUNTER — SPECIALTY PHARMACY (OUTPATIENT)
Dept: PHARMACY | Facility: HOSPITAL | Age: 64
End: 2024-12-04
Payer: MEDICARE

## 2024-12-04 NOTE — TELEPHONE ENCOUNTER
OSW contacted patient to follow up on her side effects of CBD vaping. Patient stated she was drinking water and using the restroom on the hour. Patient stated she is no longer experiencing the feeling of bugs crawling in her insides but still feels jittery. Patient stated she feels much better and her sister removed her vapes so she is not tempted to vape. Patient expressed appreciation for OSW following up on her care. OSW notified patient's oncologist that patient was feeling better and jittery was the only symptom remaining. Patient was not tearful on the call. OSW will follow up with patient when indicated.

## 2024-12-06 DIAGNOSIS — G89.3 CANCER RELATED PAIN: ICD-10-CM

## 2024-12-06 DIAGNOSIS — R60.0 PERIPHERAL EDEMA: ICD-10-CM

## 2024-12-06 DIAGNOSIS — F33.9 MONOPOLAR DEPRESSION: ICD-10-CM

## 2024-12-06 DIAGNOSIS — C50.412 MALIGNANT NEOPLASM OF UPPER-OUTER QUADRANT OF LEFT BREAST IN FEMALE, ESTROGEN RECEPTOR POSITIVE: ICD-10-CM

## 2024-12-06 DIAGNOSIS — R23.2 HOT FLASHES: ICD-10-CM

## 2024-12-06 DIAGNOSIS — Z17.0 MALIGNANT NEOPLASM OF UPPER-OUTER QUADRANT OF LEFT BREAST IN FEMALE, ESTROGEN RECEPTOR POSITIVE: ICD-10-CM

## 2024-12-06 DIAGNOSIS — G89.3 CANCER RELATED PAIN: Primary | ICD-10-CM

## 2024-12-06 RX ORDER — OXYCODONE AND ACETAMINOPHEN 10; 325 MG/1; MG/1
1 TABLET ORAL EVERY 6 HOURS PRN
Qty: 60 TABLET | Refills: 0 | Status: CANCELLED | OUTPATIENT
Start: 2024-12-06

## 2024-12-06 RX ORDER — OXYCODONE AND ACETAMINOPHEN 10; 325 MG/1; MG/1
1 TABLET ORAL EVERY 6 HOURS PRN
Qty: 60 TABLET | Refills: 0 | Status: SHIPPED | OUTPATIENT
Start: 2024-12-06 | End: 2024-12-09 | Stop reason: SDUPTHER

## 2024-12-06 NOTE — TELEPHONE ENCOUNTER
Caller: Derek Keller    Relationship: Self    Best call back number: [phone]     Requested Prescriptions:   Requested Prescriptions     Pending Prescriptions Disp Refills    oxyCODONE-acetaminophen (PERCOCET)  MG per tablet 60 tablet 0     Sig: Take 1 tablet by mouth Every 6 (Six) Hours As Needed for Moderate Pain.        Pharmacy where request should be sent: [facility], [facility], [facility] [place], [place] - [address] - [phone] [facility] [phone] FX     Last office visit with prescribing clinician: 12/3/2024   Last telemedicine visit with prescribing clinician: Visit date not found   Next office visit with prescribing clinician: 12/31/2024     Additional details provided by patient: PT DOES NOT HAVE ENOUGH FOR THE WEEKEND    Does the patient have less than a 3 day supply:  [x] Yes  [] No    Would you like a call back once the refill request has been completed: [] Yes [x] No    If the office needs to give you a call back, can they leave a voicemail: [] Yes [x] No    Abby Burnett Rep   12/06/24 13:30 EST

## 2024-12-09 RX ORDER — OXYCODONE AND ACETAMINOPHEN 10; 325 MG/1; MG/1
1 TABLET ORAL EVERY 6 HOURS PRN
Qty: 60 TABLET | Refills: 0 | Status: SHIPPED | OUTPATIENT
Start: 2024-12-09

## 2024-12-09 RX ORDER — POTASSIUM CHLORIDE 750 MG/1
CAPSULE, EXTENDED RELEASE ORAL
Qty: 60 CAPSULE | Refills: 1 | Status: SHIPPED | OUTPATIENT
Start: 2024-12-09

## 2024-12-09 RX ORDER — HYDROCHLOROTHIAZIDE 12.5 MG/1
12.5 TABLET ORAL DAILY
Qty: 90 TABLET | Refills: 1 | Status: SHIPPED | OUTPATIENT
Start: 2024-12-09

## 2024-12-09 RX ORDER — OXYBUTYNIN CHLORIDE 15 MG/1
TABLET, EXTENDED RELEASE ORAL
Qty: 30 TABLET | Refills: 1 | Status: SHIPPED | OUTPATIENT
Start: 2024-12-09

## 2024-12-09 RX ORDER — DULOXETIN HYDROCHLORIDE 60 MG/1
CAPSULE, DELAYED RELEASE ORAL
Qty: 30 CAPSULE | Refills: 1 | Status: SHIPPED | OUTPATIENT
Start: 2024-12-09

## 2024-12-12 ENCOUNTER — TELEPHONE (OUTPATIENT)
Dept: ONCOLOGY | Facility: HOSPITAL | Age: 64
End: 2024-12-12
Payer: MEDICARE

## 2024-12-12 NOTE — TELEPHONE ENCOUNTER
Caller: Derek Keller    Relationship to patient: Self    Best call back number: [phone]     Chief complaint: PATIENT TO RESCHEDULE 12/31/24 APPT    Type of visit: PORT FLUSH, FU, INJECTION     Requested date: PATIENT REQUESTING 12/30/24 OR AFTER THE NEW YEAR

## 2024-12-19 ENCOUNTER — TELEPHONE (OUTPATIENT)
Dept: SURGERY | Facility: CLINIC | Age: 64
End: 2024-12-19
Payer: MEDICARE

## 2024-12-19 ENCOUNTER — TELEPHONE (OUTPATIENT)
Dept: ONCOLOGY | Facility: HOSPITAL | Age: 64
End: 2024-12-19

## 2024-12-19 NOTE — TELEPHONE ENCOUNTER
Caller: Derek Keller    Relationship to patient: Self    Best call back number: [phone]    Chief complaint: HAVING A LOT OF STOMACH PAIN AND IS WANTING TO BE SEEN SOONER THAN 01/08/2025. PLEASE CALL TO GET IN.

## 2024-12-19 NOTE — TELEPHONE ENCOUNTER
PATIENT CALLING BACK TODAY 12/19/2024 AT 10:33 AM. SHE CAN KEEP HER APPTS ON 1/7/2025.  DO NOT CANCEL OR CHANGE.    CALL BACK NUMBER IF NEEDED -[phone]

## 2024-12-19 NOTE — TELEPHONE ENCOUNTER
SPOKE TO PT AND MOVED HER APPT TO 12/31/24 WHEN JIMENA IS BACK IN OFFICE TOLD PT THAT IF SHE STARTS HAVING SEVERE PAIN TO GO TO THE ER, SHE VOICED UNDERSTANDING.

## 2024-12-19 NOTE — TELEPHONE ENCOUNTER
Caller: Derek Keller    Relationship to patient: Self    Best call back number: [phone]    Chief complaint: SCHEDULING    Type of visit: PORT DRAW, FOLLOW UP 1, INJECTION    Requested date: NEXT AVLIABLE      If rescheduling, when is the original appointment: 1-7-2025

## 2024-12-20 DIAGNOSIS — G89.3 CANCER RELATED PAIN: ICD-10-CM

## 2024-12-20 RX ORDER — MORPHINE SULFATE 60 MG/1
60 TABLET, FILM COATED, EXTENDED RELEASE ORAL 2 TIMES DAILY
Qty: 60 TABLET | Refills: 0 | OUTPATIENT
Start: 2024-12-20

## 2024-12-20 RX ORDER — OXYCODONE AND ACETAMINOPHEN 10; 325 MG/1; MG/1
1 TABLET ORAL EVERY 6 HOURS PRN
Qty: 60 TABLET | Refills: 0 | OUTPATIENT
Start: 2024-12-20

## 2024-12-20 NOTE — TELEPHONE ENCOUNTER
Caller: Derek Keller    Relationship: Self    Best call back number: [phone]    Requested Prescriptions:   Requested Prescriptions     Pending Prescriptions Disp Refills    Morphine (MS CONTIN) 60 MG 12 hr tablet 60 tablet 0     Sig: Take 1 tablet by mouth 2 (Two) Times a Day.    oxyCODONE-acetaminophen (PERCOCET)  MG per tablet 60 tablet 0     Sig: Take 1 tablet by mouth Every 6 (Six) Hours As Needed for Moderate Pain.        Pharmacy where request should be sent: [facility], [facility], [facility] [place], [place] - [address] - [phone]  - [phone] FX     Last office visit with prescribing clinician: 12/3/2024   Last telemedicine visit with prescribing clinician: Visit date not found   Next office visit with prescribing clinician: 1/7/2025     Does the patient have less than a 3 day supply:  [x] Yes  [] No    Would you like a call back once the refill request has been completed: [] Yes [x] No    If the office needs to give you a call back, can they leave a voicemail: [] Yes [x] No

## 2024-12-22 ENCOUNTER — APPOINTMENT (OUTPATIENT)
Dept: CT IMAGING | Facility: HOSPITAL | Age: 64
End: 2024-12-22
Payer: MEDICARE

## 2024-12-22 ENCOUNTER — HOSPITAL ENCOUNTER (EMERGENCY)
Facility: HOSPITAL | Age: 64
Discharge: HOME | End: 2024-12-22
Attending: EMERGENCY MEDICINE | Admitting: EMERGENCY MEDICINE
Payer: MEDICARE

## 2024-12-22 VITALS
BODY MASS INDEX: 32.88 KG/M2 | SYSTOLIC BLOOD PRESSURE: 101 MMHG | WEIGHT: 204.59 LBS | TEMPERATURE: 98.5 F | OXYGEN SATURATION: 94 % | DIASTOLIC BLOOD PRESSURE: 47 MMHG | RESPIRATION RATE: 18 BRPM | HEART RATE: 68 BPM | HEIGHT: 66 IN

## 2024-12-22 DIAGNOSIS — K59.00 CONSTIPATION, UNSPECIFIED CONSTIPATION TYPE: Primary | ICD-10-CM

## 2024-12-22 LAB
ALBUMIN SERPL-MCNC: 3.7 G/DL (ref 3.5–5.2)
ALBUMIN/GLOB SERPL: 1.3 G/DL
ALP SERPL-CCNC: 103 U/L (ref 39–117)
ALT SERPL W P-5'-P-CCNC: 11 U/L (ref 1–33)
ANION GAP SERPL CALCULATED.3IONS-SCNC: 11.2 MMOL/L (ref 5–15)
AST SERPL-CCNC: 15 U/L (ref 1–32)
BASOPHILS # BLD AUTO: 0.06 10*3/MM3 (ref 0–0.2)
BASOPHILS NFR BLD AUTO: 1.3 % (ref 0–1.5)
BILIRUB SERPL-MCNC: 0.3 MG/DL (ref 0–1.2)
BILIRUB UR QL STRIP: NEGATIVE
BUN SERPL-MCNC: 20 MG/DL (ref 8–23)
BUN/CREAT SERPL: 25.3 (ref 7–25)
CALCIUM SPEC-SCNC: 8.6 MG/DL (ref 8.6–10.5)
CHLORIDE SERPL-SCNC: 99 MMOL/L (ref 98–107)
CLARITY UR: CLEAR
CO2 SERPL-SCNC: 28.8 MMOL/L (ref 22–29)
COLOR UR: YELLOW
CREAT SERPL-MCNC: 0.79 MG/DL (ref 0.57–1)
D-LACTATE SERPL-SCNC: 1.7 MMOL/L (ref 0.5–2)
DEPRECATED RDW RBC AUTO: 61.7 FL (ref 37–54)
EGFRCR SERPLBLD CKD-EPI 2021: 83.1 ML/MIN/1.73
EOSINOPHIL # BLD AUTO: 0.02 10*3/MM3 (ref 0–0.4)
EOSINOPHIL NFR BLD AUTO: 0.4 % (ref 0.3–6.2)
ERYTHROCYTE [DISTWIDTH] IN BLOOD BY AUTOMATED COUNT: 15.9 % (ref 12.3–15.4)
GLOBULIN UR ELPH-MCNC: 2.8 GM/DL
GLUCOSE SERPL-MCNC: 155 MG/DL (ref 65–99)
GLUCOSE UR STRIP-MCNC: NEGATIVE MG/DL
HCT VFR BLD AUTO: 30.1 % (ref 34–46.6)
HGB BLD-MCNC: 9.3 G/DL (ref 12–15.9)
HGB UR QL STRIP.AUTO: NEGATIVE
HOLD SPECIMEN: NORMAL
HOLD SPECIMEN: NORMAL
IMM GRANULOCYTES # BLD AUTO: 0.01 10*3/MM3 (ref 0–0.05)
IMM GRANULOCYTES NFR BLD AUTO: 0.2 % (ref 0–0.5)
KETONES UR QL STRIP: NEGATIVE
LEUKOCYTE ESTERASE UR QL STRIP.AUTO: NEGATIVE
LIPASE SERPL-CCNC: 12 U/L (ref 13–60)
LYMPHOCYTES # BLD AUTO: 1.25 10*3/MM3 (ref 0.7–3.1)
LYMPHOCYTES NFR BLD AUTO: 27.7 % (ref 19.6–45.3)
MCH RBC QN AUTO: 32.1 PG (ref 26.6–33)
MCHC RBC AUTO-ENTMCNC: 30.9 G/DL (ref 31.5–35.7)
MCV RBC AUTO: 103.8 FL (ref 79–97)
MONOCYTES # BLD AUTO: 0.51 10*3/MM3 (ref 0.1–0.9)
MONOCYTES NFR BLD AUTO: 11.3 % (ref 5–12)
NEUTROPHILS NFR BLD AUTO: 2.67 10*3/MM3 (ref 1.7–7)
NEUTROPHILS NFR BLD AUTO: 59.1 % (ref 42.7–76)
NITRITE UR QL STRIP: NEGATIVE
NRBC BLD AUTO-RTO: 0 /100 WBC (ref 0–0.2)
PH UR STRIP.AUTO: 5.5 [PH] (ref 5–8)
PLATELET # BLD AUTO: 194 10*3/MM3 (ref 140–450)
PMV BLD AUTO: 9.5 FL (ref 6–12)
POTASSIUM SERPL-SCNC: 3.3 MMOL/L (ref 3.5–5.2)
PROT SERPL-MCNC: 6.5 G/DL (ref 6–8.5)
PROT UR QL STRIP: NEGATIVE
RBC # BLD AUTO: 2.9 10*6/MM3 (ref 3.77–5.28)
SODIUM SERPL-SCNC: 139 MMOL/L (ref 136–145)
SP GR UR STRIP: 1.01 (ref 1–1.03)
UROBILINOGEN UR QL STRIP: NORMAL
WBC NRBC COR # BLD AUTO: 4.52 10*3/MM3 (ref 3.4–10.8)
WHOLE BLOOD HOLD COAG: NORMAL
WHOLE BLOOD HOLD SPECIMEN: NORMAL

## 2024-12-22 PROCEDURE — 80053 COMPREHEN METABOLIC PANEL: CPT | Performed by: EMERGENCY MEDICINE

## 2024-12-22 PROCEDURE — 74177 CT ABD & PELVIS W/CONTRAST: CPT

## 2024-12-22 PROCEDURE — 81003 URINALYSIS AUTO W/O SCOPE: CPT | Performed by: EMERGENCY MEDICINE

## 2024-12-22 PROCEDURE — 25010000002 ONDANSETRON PER 1 MG: Performed by: EMERGENCY MEDICINE

## 2024-12-22 PROCEDURE — 25010000002 MORPHINE PER 10 MG: Performed by: EMERGENCY MEDICINE

## 2024-12-22 PROCEDURE — 99285 EMERGENCY DEPT VISIT HI MDM: CPT

## 2024-12-22 PROCEDURE — 96374 THER/PROPH/DIAG INJ IV PUSH: CPT

## 2024-12-22 PROCEDURE — 25510000001 IOPAMIDOL PER 1 ML: Performed by: EMERGENCY MEDICINE

## 2024-12-22 PROCEDURE — 25010000002 HEPARIN LOCK FLUSH PER 10 UNITS: Performed by: EMERGENCY MEDICINE

## 2024-12-22 PROCEDURE — 83690 ASSAY OF LIPASE: CPT | Performed by: EMERGENCY MEDICINE

## 2024-12-22 PROCEDURE — 96375 TX/PRO/DX INJ NEW DRUG ADDON: CPT

## 2024-12-22 PROCEDURE — 25810000003 SODIUM CHLORIDE 0.9 % SOLUTION: Performed by: EMERGENCY MEDICINE

## 2024-12-22 PROCEDURE — 85025 COMPLETE CBC W/AUTO DIFF WBC: CPT | Performed by: EMERGENCY MEDICINE

## 2024-12-22 PROCEDURE — 83605 ASSAY OF LACTIC ACID: CPT | Performed by: EMERGENCY MEDICINE

## 2024-12-22 RX ORDER — SODIUM CHLORIDE 0.9 % (FLUSH) 0.9 %
10 SYRINGE (ML) INJECTION AS NEEDED
Status: DISCONTINUED | OUTPATIENT
Start: 2024-12-22 | End: 2024-12-22 | Stop reason: HOSPADM

## 2024-12-22 RX ORDER — ONDANSETRON 2 MG/ML
4 INJECTION INTRAMUSCULAR; INTRAVENOUS ONCE
Status: COMPLETED | OUTPATIENT
Start: 2024-12-22 | End: 2024-12-22

## 2024-12-22 RX ORDER — IOPAMIDOL 755 MG/ML
100 INJECTION, SOLUTION INTRAVASCULAR
Status: COMPLETED | OUTPATIENT
Start: 2024-12-22 | End: 2024-12-22

## 2024-12-22 RX ORDER — HEPARIN SODIUM (PORCINE) LOCK FLUSH IV SOLN 100 UNIT/ML 100 UNIT/ML
500 SOLUTION INTRAVENOUS ONCE
Status: COMPLETED | OUTPATIENT
Start: 2024-12-22 | End: 2024-12-22

## 2024-12-22 RX ADMIN — ONDANSETRON 4 MG: 2 INJECTION INTRAMUSCULAR; INTRAVENOUS at 13:37

## 2024-12-22 RX ADMIN — SODIUM CHLORIDE 1000 ML: 9 INJECTION, SOLUTION INTRAVENOUS at 13:37

## 2024-12-22 RX ADMIN — MORPHINE SULFATE 4 MG: 4 INJECTION, SOLUTION INTRAMUSCULAR; INTRAVENOUS at 13:38

## 2024-12-22 RX ADMIN — HEPARIN 500 UNITS: 100 SYRINGE at 17:09

## 2024-12-22 RX ADMIN — IOPAMIDOL 100 ML: 755 INJECTION, SOLUTION INTRAVENOUS at 15:33

## 2024-12-22 NOTE — ED PROVIDER NOTES
Time: 4:47 PM EST  Date of encounter:  12/22/2024  Independent Historian/Clinical History and Information was obtained by:   Patient    History is limited by: N/A    Chief Complaint: Abdominal pain      History of Present Illness:  Patient is a 64 y.o. year old female who presents to the emergency department for evaluation of abdominal pain and decreased bowel movements since Monday.  Patient has no fever or chills.  Patient has no chest pain or shortness of breath.  Patient denies cough hemoptysis.  Patient denies dysuria and urinary frequency.      Patient Care Team  Primary Care Provider: Haile Monique MD    Past Medical History:     Allergies   Allergen Reactions    Azithromycin Itching    Erythromycin Itching     Past Medical History:   Diagnosis Date    Abdominal pain     OSTOMY SITE    Allergic rhinitis     Anemia     NO S/S    Anxiety     Arteriosclerosis     Coronary, follows with Dr. Núñez    Arthritis     Bone cancer     Bone metastases 10/14/2022    Bowen's disease     SKIN CANCER    Breast cancer     NO SURGERY WAS DONE DUE TO METS TO BONE/COLON/LYMPHNODE    Cancer related pain 10/13/2022    Depression     Disorder associated with Helicobacter species 10/12/2022    Dysphoric mood     Fatigue     Fibromyalgia     Frequent urination     NO S/S INFECTION    Herpes simplex vulvovaginitis 07/26/2018    History of colon polyps     History of IBS     History of kidney stones     Hyperlipidemia     Hypertension     Hypothyroidism     Insomnia     Lumbago     Mood disorder     Neck pain     R/T CANCER    Palpitations     last time a few months ago    Precordial pain     R/T SPINE CANCER    S/P Laparoscopic Hand-Assisted Colostomy Closure with End to End Anastomosis Open Parastomal Hernia Repair 12/08/2022    Sleep disturbance     SOB (shortness of breath)     at times at rest    SOBOE (shortness of breath on exertion)      Past Surgical History:   Procedure Laterality Date    BREAST BIOPSY Left     CARDIAC  CATHETERIZATION Left 1959    CARDIAC CATHETERIZATION N/A 04/06/2018    Procedure: Coronary angiography;  Surgeon: Art Licea MD;  Location: [facility] CATH INVASIVE LOCATION;  Service: Cardiovascular    CARDIAC CATHETERIZATION N/A 04/06/2018    Procedure: Left heart cath;  Surgeon: Art Licea MD;  Location[facility]ASIVE LOC[facility];  Service: Cardiovascular    CARDIAC CATHETERIZATION N/A 04/06/2018    Procedure: Left ventriculography;  Surgeon: Art Licea MD;  Location: [facility] CATH INVASIVE LOCATION;  Service: Cardiovascular    CHOLECYSTECTOMY      COLON SURGERY      colostomy bag, and colostomy reversal    COLONOSCOPY  11/08/2006    COLONOSCOPY N/A 06/08/2023    Procedure: COLONOSCOPY;  Surgeon: Alfred Castañeda MD;  Location: [facility] ENDOSCOPY;  Service: General;  Laterality: N/A;  same as preop    EXPLORATORY LAPAROTOMY N/A 12/06/2022    Procedure: LAPAROTOMY EXPLORATORY sigmoid resection hartmans procedure colostomy;  Surgeon: Alfred Castañeda MD;  Location: [facility] MAIN OR;  Service: General;  Laterality: N/A;    GANGLION CYST EXCISION Bilateral     HYSTERECTOMY  05/2005    ILEOSTOMY CLOSURE N/A 06/26/2023    Procedure: Laparoscopic Hand-Assisted Colostomy Closure with End to End Anastomosis;  Surgeon: Alfred Castañeda MD;  Location: [facility] MAIN OR;  Service: General;  Laterality: N/A;    LAPAROSCOPIC GASTRIC BANDING      BAND REMOVED 2020    PARASTOMAL HERNIA REPAIR N/A 06/26/2023    Procedure: Open Parastomal Hernia Repair;  Surgeon: Alfred Castañeda MD;  Location: [facility] MAIN OR;  Service: General;  Laterality: N/A;    TONSILLECTOMY      VENOUS ACCESS DEVICE (PORT) INSERTION N/A 01/09/2023    Procedure: INSERTION VENOUS ACCESS DEVICE;  Surgeon: Alfred Castañeda MD;  Location: [facility] MAIN OR;  Service: General;  Laterality: N/A;    VENTRAL HERNIA REPAIR N/A 7/3/2024    Procedure: VENTRAL / INCISIONAL HERNIA REPAIR LAPAROSCOPIC WITH DAVINCI ROBOT  with mesh;  Surgeon: Alfred Castañeda MD;  Location: [facility] MAIN OR;  Service: Robotics - DaVinci;  Laterality: N/A;     Family History   Problem Relation Age of Onset    Hypertension Mother     Rheum arthritis Mother     Heart disease Mother     Breast cancer Mother     Diabetes Father     Cancer Maternal Grandmother         colon    Colon cancer Maternal Grandmother     Aneurysm Paternal Grandfather     Diabetes Other     Fibromyalgia Other     Malig Hyperthermia Neg Hx        Home Medications:  Prior to Admission medications    Medication Sig Start Date End Date Taking? Authorizing Provider   atorvastatin (LIPITOR) 20 MG tablet TAKE 1 TABLET BY MOUTH EVERY DAY  Patient taking differently: Take 1 tablet by mouth Daily. 10/1/19   Yudelka Manning MD   baclofen (LIORESAL) 20 MG tablet  5/1/24   Bessie Lopez MD   cyproheptadine (PERIACTIN) 4 MG tablet Take 1 tablet by mouth Every 12 (Twelve) Hours. 10/30/23   Bessie Lopez MD   Diclofenac Sodium (VOLTAREN) 1 % gel gel APPLY TO THE AFFECTED AREA(S) ON THE SKIN FOUR TIMES DAILY FOR 10 DAYS    Bessie Lopez MD   donepezil (ARICEPT) 10 MG tablet Take 1 tablet by mouth Daily. 10/28/22   Bessie Lopez MD   DULoxetine (CYMBALTA) 30 MG capsule Take 1 capsule by mouth Daily. 11/12/24   Megha Jose MD   DULoxetine (CYMBALTA) 60 MG capsule TAKE 1 capsule BY MOUTH DAILY for mood elevation 12/9/24   Donald Barker MD   gabapentin (NEURONTIN) 600 MG tablet TAKE 1 TABLET BY MOUTH AT BEDTIME  Patient taking differently: Take 1 tablet by mouth 2 (Two) Times a Day As Needed. 7/30/20   Yudelka Manning MD   hydroCHLOROthiazide 12.5 MG tablet TAKE 1 TABLET BY MOUTH DAILY for swelling 12/9/24   Donald Barker MD   ibuprofen (ADVIL,MOTRIN) 600 MG tablet Take 1 tablet by mouth Every 8 (Eight) Hours As Needed. for pain 5/10/24   Bessie Lopez MD   lactulose (CHRONULAC) 10 GM/15ML solution take 30 mls BY MOUTH TWICE DAILY 7/5/24    Bessie Lopez MD   letrozole (FEMARA) 2.5 MG tablet TAKE 1 TABLET BY MOUTH DAILY 7/8/24   Donald Barker MD   levothyroxine (SYNTHROID, LEVOTHROID) 50 MCG tablet TAKE 1 TABLET BY MOUTH EVERY DAY  Patient taking differently: Take 1 tablet by mouth Daily. 7/19/19   Yudelka Manning MD   lidocaine (LIDODERM) 5 % Place 1 patch on the skin as directed by provider Daily. Remove & Discard patch within 12 hours or as directed by MD    Bessie Lopez MD   Lidocaine Pain Relief 4 %  5/10/24   Bessie Lopez MD   LORazepam (ATIVAN) 0.5 MG tablet Take 1 tablet by mouth Every 6 (Six) Hours As Needed.    Bessie Lopez MD   losartan (COZAAR) 50 MG tablet Take 1 tablet by mouth Daily. for blood pressure 5/6/24   Bessie Lopez MD   mirtazapine (REMERON) 15 MG tablet Take 1 tablet by mouth Every Night. 10/23/24   Bessie Lopez MD   montelukast (SINGULAIR) 10 MG tablet Take 1 tablet by mouth Daily. 1/17/22   Bessie Lopez MD   Morphine (MS CONTIN) 60 MG 12 hr tablet TAKE 1 TABLET BY MOUTH TWICE DAILY 11/26/24   Donald Barker MD   naloxone (NARCAN) 4 MG/0.1ML nasal spray Call 911. Don't prime. [place] in 1 nostril for overdose. Repeat in 2-3 minutes in other nostril if no or minimal breathing/responsiveness. 7/3/24   Alfred Castañeda MD   nicotine (NICODERM CQ) 21 MG/24HR patch Place 1 patch on the skin as directed by provider Daily. 7/12/24   Alfred Castañeda MD   ondansetron (ZOFRAN) 8 MG tablet TAKE 1 TABLET BY MOUTH THREE TIMES DAILY AS NEEDED FOR NAUSEA AND VOMITING 8/15/24   Donald Barker MD   oxybutynin XL (DITROPAN XL) 15 MG 24 hr tablet TAKE 1 TABLET BY MOUTH DAILY for bladder 12/9/24   Donald Barker MD   oxyCODONE (Roxicodone) 5 MG immediate release tablet Take 1 tablet by mouth Every 8 (Eight) Hours As Needed for Moderate Pain. 7/3/24 7/3/25  Alfred Castañeda MD   polyethylene glycol (GoLYTELY) 236 g solution Take 4,000 mL by mouth 1  (One) Time for 1 dose. 24  Mich Vale MD   polyethylene glycol (MIRALAX) 17 g packet Take 17 g by mouth Daily. 7/3/24   Alfred Castañeda MD   potassium chloride (MICRO-K) 10 MEQ CR capsule TAKE 1 CAPSULE BY MOUTH EVERY TWELVE HOURS 24   Donald Barker MD   prochlorperazine (COMPAZINE) 10 MG tablet  23   Bessie Lopez MD   ribociclib succinate 200 MG tablet therapy pack tablet Take 3 tablets by mouth Take As Directed. Take 3 tablets by mouth daily for 21 days then off 7 days on a 28 day cycle. 23   Donald Barker MD   rOPINIRole (REQUIP) 3 MG tablet Take 1 tablet by mouth 2 (Two) Times a Day. 22   Bessie Lopez MD   SudoGest 60 MG tablet Take 1 tablet by mouth Every 8 (Eight) Hours. 23   Bessie Lopez MD   SUMAtriptan (IMITREX) 100 MG tablet Take 1 tablet by mouth 1 (One) Time As Needed. 10/7/22   Bessie Lopez MD   traZODone (DESYREL) 150 MG tablet TAKE 1 TABLET BY MOUTH ONCE nightly  Patient taking differently: Take 1 tablet by mouth Every Night. 19   Yudelka Manning MD   amoxicillin (AMOXIL) 250 MG capsule Take 1 capsule by mouth 3 (Three) Times a Day. For tooth  24  Bessie Lopez MD   oxyCODONE-acetaminophen (PERCOCET)  MG per tablet Take 1 tablet by mouth Every 6 (Six) Hours As Needed for Moderate Pain. 24  Donald Barker MD   polyethylene glycol (MIRALAX) 17 g packet Take 17 g by mouth Daily. 23  Alfred Castañeda MD        Social History:   Social History     Tobacco Use    Smoking status: Former     Current packs/day: 0.00     Average packs/day: 1 pack/day for 50.5 years (50.5 ttl pk-yrs)     Types: Cigarettes     Start date:      Quit date: 2024     Years since quittin.4    Smokeless tobacco: Never    Tobacco comments:          Has not used tobacco for 28 days    Vaping Use    Vaping status: Never Used   Substance Use Topics    Alcohol use:  "No    Drug use: Never     Types: Marijuana     Comment: LAST USE 7-2-24         Review of Systems:  Review of Systems   Constitutional:  Negative for chills and fever.   HENT:  Negative for congestion, rhinorrhea and sore throat.    Eyes:  Negative for pain and visual disturbance.   Respiratory:  Negative for apnea, cough, chest tightness and shortness of breath.    Cardiovascular:  Negative for chest pain and palpitations.   Gastrointestinal:  Positive for abdominal pain. Negative for diarrhea, nausea and vomiting.   Genitourinary:  Negative for difficulty urinating and dysuria.   Musculoskeletal:  Negative for joint swelling and myalgias.   Skin:  Negative for color change.   Neurological:  Negative for seizures and headaches.   Psychiatric/Behavioral: Negative.     All other systems reviewed and are negative.       Physical Exam:  /47 (BP Location: Left arm, Patient Position: Lying)   Pulse 68   Temp 98.5 °F (36.9 °C) (Oral)   Resp 18   Ht 167.6 cm (66\")   Wt 92.8 kg (204 lb 9.4 oz)   LMP  (LMP Unknown)   SpO2 94%   BMI 33.02 kg/m²     Physical Exam  Vitals and nursing note reviewed.   Constitutional:       General: She is not in acute distress.     Appearance: Normal appearance. She is not toxic-appearing.   HENT:      Head: Normocephalic and atraumatic.      Jaw: There is normal jaw occlusion.   Eyes:      General: Lids are normal.      Extraocular Movements: Extraocular movements intact.      Conjunctiva/sclera: Conjunctivae normal.      Pupils: Pupils are equal, round, and reactive to light.   Cardiovascular:      Rate and Rhythm: Normal rate and regular rhythm.      Pulses: Normal pulses.      Heart sounds: Normal heart sounds.   Pulmonary:      Effort: Pulmonary effort is normal. No respiratory distress.      Breath sounds: Normal breath sounds. No wheezing or rhonchi.   Abdominal:      General: Abdomen is flat.      Palpations: Abdomen is soft.      Tenderness: There is no abdominal " tenderness. There is no guarding or rebound.   Musculoskeletal:         General: Normal range of motion.      Cervical back: Normal range of motion and neck supple.      Right lower leg: No edema.      Left lower leg: No edema.   Skin:     General: Skin is warm and dry.   Neurological:      Mental Status: She is alert and oriented to person, place, and time. Mental status is at baseline.   Psychiatric:         Mood and Affect: Mood normal.                  Medical Decision Making:      Comorbidities that affect care:    Hypertension    External Notes reviewed:    Previous Clinic Note: Patient was last seen in clinic for feeling the sensation she is crawling out of her skin.      The following orders were placed and all results were independently analyzed by me:  Orders Placed This Encounter   Procedures    CT Abdomen Pelvis With Contrast    [place] Draw    Comprehensive Metabolic Panel    Lipase    Urinalysis With Microscopic If Indicated (No Culture) - Urine, Clean Catch    Lactic Acid, Plasma    CBC Auto Differential    Undress & Gown    CBC & Differential    Green Top (Gel)    Lavender Top    Gold Top - SST    Light Blue Top       Medications Given in the Emergency Department:  Medications   sodium chloride 0.9 % bolus 1,000 mL (0 mL Intravenous Stopped 12/22/24 1407)   ondansetron (ZOFRAN) injection 4 mg (4 mg Intravenous Given 12/22/24 1337)   morphine injection 4 mg (4 mg Intravenous Given 12/22/24 1338)   iopamidol (ISOVUE-370) 76 % injection 100 mL (100 mL Intravenous Given 12/22/24 1533)   heparin injection 500 Units (500 Units Intravenous Given 12/22/24 1709)        ED Course:         Labs:    Lab Results (last 24 hours)       ** No results found for the last 24 hours. **             Imaging:    No Radiology Exams Resulted Within Past 24 Hours      Differential Diagnosis and Discussion:    Abdominal Pain: Based on the patient's signs and symptoms, I considered abdominal aortic aneurysm, small bowel  obstruction, pancreatitis, acute cholecystitis, acute appendecitis, peptic ulcer disease, gastritis, colitis, endocrine disorders, irritable bowel syndrome and other differential diagnosis an etiology of the patient's abdominal pain.    PROCEDURES:    Labs were collected in the emergency department and all labs were reviewed and interpreted by me.  CT scan was performed in the emergency department and the CT scan radiology impression was interpreted by me.    No orders to display       Procedures    MDM     The patient is resting comfortably and feels better, is alert and in no distress.  The patient´s CBC that was reviewed and interpreted by me shows no abnormalities of critical concern. Of note, there is no anemia requiring a blood transfusion and the platelet count is acceptable.  The patient´s CMP that was reviewed and interpretted by me shows no abnormalities of critical concern. Of note, the patient´s sodium and potassium are acceptable. The patient´s liver enzymes are unremarkable. The patient´s renal function (creatinine) is preserved. The patient has a normal anion gap.  CT scan is negative for acute intra-abdominal pathology.  Repeat examination is unremarkable and benign; in particular, there's no discomfort at McBurney's point and there is no pulsatile mass. The history, exam, diagnostic testing, and current condition does not suggest acute appendicitis, bowel obstruction, acute cholecystitis, bowel perforation, major gastrointestinal bleeding, severe diverticulitis, abdominal aortic aneurysm, mesenteric ischemia, volvulus, sepsis, or other significant pathology that warrants further testing, continued ED treatment, admission, for surgical evaluation at this point. The vital signs have been stable. The patient does not have uncontrollable pain, intractable vomiting, or other significant symptoms. The patient's condition is stable and appropriate for discharge from the emergency  department.                Patient Care Considerations:    ANTIBIOTICS: I considered prescribing antibiotics as an outpatient however no bacterial focus of infection was found.      Consultants/Shared Management Plan:    None    Social Determinants of Health:    Patient is independent, reliable, and has access to care.       Disposition and Care Coordination:    Discharged: I considered escalation of care by admitting this patient to the hospital, however patient is stable and suitable for discharge.    I have explained the patient´s condition, diagnoses and treatment plan based on the information available to me at this time. I have answered questions and addressed any concerns. The patient has a good  understanding of the patient´s diagnosis, condition, and treatment plan as can be expected at this point. The vital signs have been stable. The patient´s condition is stable and appropriate for discharge from the emergency department.      The patient will pursue further outpatient evaluation with the primary care physician or other designated or consulting physician as outlined in the discharge instructions. They are agreeable to this plan of care and follow-up instructions have been explained in detail. The patient has received these instructions in written format and has expressed an understanding of the discharge instructions. The patient is aware that any significant change in condition or worsening of symptoms should prompt an immediate return to this or the closest emergency department or call to 911.  I have explained discharge medications and the need for follow up with the patient/caretakers. This was also printed in the discharge instructions. Patient was discharged with the following medications and follow up:      Medication List        Changed      gabapentin 600 MG tablet  Commonly known as: NEURONTIN  TAKE 1 TABLET BY MOUTH AT BEDTIME  What changed:   how much to take  how to take this  when to take  this  reasons to take this  additional instructions            ASK your doctor about these medications      polyethylene glycol 236 g solution  Commonly known as: GoLYTELY  Take 4,000 mL by mouth 1 (One) Time for 1 dose.  Ask about: Should I take this medication?               Where to Get Your Medications        These medications were sent to [facility], , [facility] [place], [place] - [address] - [phone] [facility] [phone] [facility] [address] [address]      Phone: [phone]   polyethylene glycol 236 g solution      Haile Monique MD  [address]  [facility] [address] [phone]    In 2 days         Final diagnoses:   Constipation, unspecified constipation type        ED Disposition       ED Disposition   Discharge    Condition   Stable    Comment   --               This medical record created using voice recognition software.             Mich Vale MD  12/24/24 5090

## 2024-12-26 DIAGNOSIS — Z17.0 MALIGNANT NEOPLASM OF LEFT BREAST IN FEMALE, ESTROGEN RECEPTOR POSITIVE, UNSPECIFIED SITE OF BREAST: ICD-10-CM

## 2024-12-26 DIAGNOSIS — C50.912 MALIGNANT NEOPLASM OF LEFT BREAST IN FEMALE, ESTROGEN RECEPTOR POSITIVE, UNSPECIFIED SITE OF BREAST: ICD-10-CM

## 2024-12-26 DIAGNOSIS — G89.3 CANCER RELATED PAIN: ICD-10-CM

## 2024-12-26 RX ORDER — ONDANSETRON 8 MG/1
8 TABLET, FILM COATED ORAL EVERY 8 HOURS PRN
Qty: 30 TABLET | Refills: 5 | Status: SHIPPED | OUTPATIENT
Start: 2024-12-26

## 2024-12-26 RX ORDER — MORPHINE SULFATE 60 MG/1
60 TABLET, FILM COATED, EXTENDED RELEASE ORAL 2 TIMES DAILY
Qty: 60 TABLET | Refills: 0 | Status: SHIPPED | OUTPATIENT
Start: 2024-12-26

## 2024-12-26 NOTE — TELEPHONE ENCOUNTER
Caller: Derek Keller    Relationship: Self    Best call back number: [phone]     Requested Prescriptions:   Requested Prescriptions     Pending Prescriptions Disp Refills    Morphine (MS CONTIN) 60 MG 12 hr tablet 60 tablet 0     Sig: Take 1 tablet by mouth 2 (Two) Times a Day.    ondansetron (ZOFRAN) 8 MG tablet 30 tablet 5        Pharmacy where request should be sent: [facility], [facility], [facility], [place] - [address] - [phone]  - [phone] FX     Last office visit with prescribing clinician: 12/3/2024   Last telemedicine visit with prescribing clinician: Visit date not found   Next office visit with prescribing clinician: 1/7/2025     Additional details provided by patient: PATIENT REQUESTING INCREASED QUANTITY FOR ZOFRAN    Does the patient have less than a 3 day supply:  [x] Yes  [] No    Would you like a call back once the refill request has been completed: [x] Yes [] No    If the office needs to give you a call back, can they leave a voicemail: [x] Yes [] No    Abby Benítez Rep   12/26/24 11:05 EST

## 2024-12-27 NOTE — PROGRESS NOTES
"Subjective   Derek Keller is a 64 y.o. female who presents today for initial evaluation     Referring Provider:  No referring provider defined for this encounter.    Chief Complaint:  depression    History of Present Illness:     2024: INITIAL VISIT Chart review:     [place]: On Ativan, oxycodone, and morphine chronically. She also used to be on gabapentin.  Care Everywhere: several non behavioral health notes    Psychotropic medication chart review:  Present:  Mirtazapine 15 mg at night  Cymbalta 60 mg a day    Previously:  Trazodone 150 mg at night  Gabapentin 600 mg nightly  Abilify 5, 10, 15 mg a day  Wellbutrin  mg a day  Cymbalta 20, 30 mg a day  Lexapro 5, 20 mg a day  Lamictal 25 mg twice daily, 200 mg at night  Effexor 75, 150 mg a day    EKG: 2024: Rate 54, sinus, QTc 471  Procedures: none  Head imagin MRI of the brain: No acute, no evidence of metastatic disease.  Labs: 2024: Abnormal CMP CO2 31.3 chloride 97 low glucose 111; abnormal CBC hemoglobin 9.7 other abnormalities; CT of the abdomen and pelvis with contrast shows diffuse sclerotic osseous metastatic disease.  Initial Chart Review Notes: Referred to us in February by oncology.  Patient has metastatic breast cancer.  More anxiety and depression recently.      Chart Review By Dates:  2024: received a call from Onc after pt appeared agitated during her visit after CBD vaping and smoking tobacco 12/3. Unknown x1. ED for constip ; abnl cmp, cbc, low phos.    Plannin2024: Increase cymbalta for MDD. Pt just started on mirtazapine. Would want to find her a therapist. 6w    VISITS/APPOINTMENTS (BELOW):    \"Derek\"  Significant metastatic disease  Dad  on 12/10, Mom  on  (both years ago)    2024:   In person interview:  \"Yes it was the vaping.\"  I had quit smoking so I tried the vape. I tried them both at the same time and it was an overload on me. They saw me when I was at my very " "worst.  I'm not on the mirtazapine. I'm on trazodone now. Still not sleeping.  \"The worst part is the pain\"  MDD: improving  NATE: improving  Panic attacks: stable  Energy: improving, \"I stay busy\"  Concentration: not at goal  Insomnia: initial insomnia  AIMS if on antipsychotic: na  Eating/Weight: 204 lbs  Refills: y  Substances: CBD stopped. Tobacco? Yes, smoking. Vaping? No.  Therapy: n  Medication compliant: y  SE: n  No SI HI AVH.      2024:   In person.  Interview:  His/Her Story: \"It's stage 4.\"  P17, G12  They thought they would be able to maintain and control it with my chemo, but it's spreading now.  \"It's scary. It's the fear of not knowing.\"  Denies anhedonia  Lives by herself  Best friend passed this year, Beth Neff,  of a blood clot in August. \"We were shocked.\"  I have other friends, Pepper and her BF   3x,  3x  No kids, lost a child to abruption at 9 mos  Sleeping? Maintenance insomnia q2 hours  Eating? stable  Energy? Down  Just started mirtazapine this week. May be helping her sleep.  Depression/Mood:  Depressed mood, hopelessness  \"I miss things the way they used to be.\"  poor energy, poor concentration, insomnia.  Seasonal pattern: def  Severity: Moderate  Duration: years  Anxiety:  Uncontrolled worrying, muscle tension, fatigue, poor concentration, feeling on edge or restless, irritability, insomnia.  Severity: Moderate  Duration: years  PTSD: previously dx'd  avoidance, negative view of the world, hypervigilance.  Inciting event: losing her child at 9 mo (abruption)  Duration: many years  AIMS if on antipsychotic: na  Psych ROS: Positive for depression, anxiety.  Negative for psychosis and ebony.  ADHD: def  No SI HI AVH.  Medication compliant: y    Access to Firearms: yes, locked away    PHQ-9 Depression Screening  PHQ-9 Total Score:     Little interest or pleasure in doing things?     Feeling down, depressed, or hopeless?     PHQ-2 Total Score     Trouble falling " or staying asleep, or sleeping too much?     Feeling tired or having little energy?     Poor appetite or overeating?     Feeling bad about yourself - or that you are a failure or have let yourself or your family down?     Trouble concentrating on things, such as reading the newspaper or watching television?     Moving or speaking so slowly that other people could have noticed? Or the opposite - being so fidgety or restless that you have been moving around a lot more than usual?     Thoughts that you would be better off dead, or of hurting yourself in some way?     PHQ-9 Total Score     If you checked off any problems, how difficult have these problems made it for you to do your work, take care of things at home, or get along with other people?              NATE-7       Past Surgical History:  Past Surgical History:   Procedure Laterality Date    BREAST BIOPSY Left     CARDIAC CATHETERIZATION Left 1959    CARDIAC CATHETERIZATION N/A 04/06/2018    Procedure: Coronary angiography;  Surgeon: Art Licea MD;  Location: [facility] INVASIVE LOCATION;  Service: Cardiovascular    CARDIAC CATHETERIZATION N/A 04/06/2018    Procedure: Left heart cath;  Surgeon: Art Licea MD;  Location: [facility] CATH INVASIVE LOCATION;  Service: Cardiovascular    CARDIAC CATHETERIZATION N/A 04/06/2018    Procedure: Left ventriculography;  Surgeon: Art Licea MD;  Location: [facility] INVASIVE LOCATION;  Service: Cardiovascular    CHOLECYSTECTOMY      COLON SURGERY      colostomy bag, and colostomy reversal    COLONOSCOPY  11/08/2006    COLONOSCOPY N/A 06/08/2023    Procedure: COLONOSCOPY;  Surgeon: Alfred Castañeda MD;  Location: [facility] ENDOSCOPY;  Service: General;  Laterality: N/A;  same as preop    EXPLORATORY LAPAROTOMY N/A 12/06/2022    Procedure: LAPAROTOMY EXPLORATORY sigmoid resection hartmans procedure colostomy;  Surgeon: Alfred Castañeda MD;  Location: [facility] MAIN OR;  Service: General;   Laterality: N/A;    GANGLION CYST EXCISION Bilateral     HYSTERECTOMY  05/2005    ILEOSTOMY CLOSURE N/A 06/26/2023    Procedure: Laparoscopic Hand-Assisted Colostomy Closure with End to End Anastomosis;  Surgeon: Alfred Castañeda MD;  Location: [facility] MAIN OR;  Service: General;  Laterality: N/A;    LAPAROSCOPIC GASTRIC BANDING      BAND REMOVED 2020    PARASTOMAL HERNIA REPAIR N/A 06/26/2023    Procedure: Open Parastomal Hernia Repair;  Surgeon: Alfred Castañeda MD;  Location: [facility] MAIN OR;  Service: General;  Laterality: N/A;    TONSILLECTOMY      VENOUS ACCESS DEVICE (PORT) INSERTION N/A 01/09/2023    Procedure: INSERTION VENOUS ACCESS DEVICE;  Surgeon: Alfred Castañeda MD;  Location: [facility] MAIN OR;  Service: General;  Laterality: N/A;    VENTRAL HERNIA REPAIR N/A 7/3/2024    Procedure: VENTRAL / INCISIONAL HERNIA REPAIR LAPAROSCOPIC WITH DAVINCI ROBOT with mesh;  Surgeon: Alfred Castañeda MD;  Location: [facility] MAIN OR;  Service: Robotics - DaVinci;  Laterality: N/A;       Problem List:  Patient Active Problem List   Diagnosis    Hypertension    Hyperlipidemia    Allergic rhinitis    Hypothyroidism    Chronic pain disorder    Anxiety    Monopolar depression    Malaise and fatigue    Tobacco abuse    Fibromyalgia    Vitamin B 12 deficiency    Primary osteoarthritis of left knee    Restless leg syndrome    Primary insomnia    Chronic fatigue    Asthma    Body mass index (BMI) 26.0-26.9, adult    Degeneration of lumbar intervertebral disc    Hearing loss    Inappropriate diet and eating habits    Cervical spondylosis    Obesity with body mass index 30 or greater    Renal stone    Malignant neoplasm of upper-outer quadrant of left breast in female, estrogen receptor positive    Cancer related pain    Malignant neoplasm metastatic to bone    Peripheral edema    Peritonitis    Leukopenia    S/P Laparoscopic Hand-Assisted Colostomy Closure with End to End Anastomosis Open Parastomal Hernia  Repair    Encounter for adjustment or management of vascular access device    Pulmonary emphysema    History of colon polyps    Colostomy in place    Anemia    Hypokalemia    Therapeutic opioid induced constipation    Incisional hernia, without obstruction or gangrene    Pain in lower jaw       Allergy:   Allergies   Allergen Reactions    Azithromycin Itching    Erythromycin Itching        Discontinued Medications:  Medications Discontinued During This Encounter   Medication Reason    mirtazapine (REMERON) 15 MG tablet Reorder       Current Medications:   Current Outpatient Medications   Medication Sig Dispense Refill    atorvastatin (LIPITOR) 20 MG tablet TAKE 1 TABLET BY MOUTH EVERY DAY (Patient taking differently: Take 1 tablet by mouth Daily.) 30 tablet 6    baclofen (LIORESAL) 20 MG tablet       cyproheptadine (PERIACTIN) 4 MG tablet Take 1 tablet by mouth Every 12 (Twelve) Hours.      Diclofenac Sodium (VOLTAREN) 1 % gel gel APPLY TO THE AFFECTED AREA(S) ON THE SKIN FOUR TIMES DAILY FOR 10 DAYS      donepezil (ARICEPT) 10 MG tablet Take 1 tablet by mouth Daily.      DULoxetine (CYMBALTA) 30 MG capsule Take 1 capsule by mouth Daily. 90 capsule 1    DULoxetine (CYMBALTA) 60 MG capsule TAKE 1 capsule BY MOUTH DAILY for mood elevation 30 capsule 1    fluticasone (FLONASE) 50 MCG/ACT nasal spray 2 sprays by Each Nare route Daily.      gabapentin (NEURONTIN) 600 MG tablet TAKE 1 TABLET BY MOUTH AT BEDTIME (Patient taking differently: Take 1 tablet by mouth 2 (Two) Times a Day As Needed.) 90 tablet 0    GaviLyte-G 236 g solution take 4000ml BY MOUTH once for 1 dose      hydroCHLOROthiazide 12.5 MG tablet TAKE 1 TABLET BY MOUTH DAILY for swelling 90 tablet 1    ibuprofen (ADVIL,MOTRIN) 600 MG tablet Take 1 tablet by mouth Every 8 (Eight) Hours As Needed. for pain      lactulose (CHRONULAC) 10 GM/15ML solution take 30 mls BY MOUTH TWICE DAILY      letrozole (FEMARA) 2.5 MG tablet TAKE 1 TABLET BY MOUTH DAILY 90 tablet  1    levothyroxine (SYNTHROID, LEVOTHROID) 50 MCG tablet TAKE 1 TABLET BY MOUTH EVERY DAY (Patient taking differently: Take 1 tablet by mouth Daily.) 90 tablet 1    lidocaine (LIDODERM) 5 % Place 1 patch on the skin as directed by provider Daily. Remove & Discard patch within 12 hours or as directed by MD      Lidocaine Pain Relief 4 %       LORazepam (ATIVAN) 0.5 MG tablet Take 1 tablet by mouth Every 6 (Six) Hours As Needed.      losartan (COZAAR) 50 MG tablet Take 1 tablet by mouth Daily. for blood pressure      mirtazapine (REMERON) 15 MG tablet Take 1 tablet by mouth Every Night. 90 tablet 1    montelukast (SINGULAIR) 10 MG tablet Take 1 tablet by mouth Daily.      Morphine (MS CONTIN) 60 MG 12 hr tablet Take 1 tablet by mouth 2 (Two) Times a Day. 60 tablet 0    nicotine (NICODERM CQ) 21 MG/24HR patch Place 1 patch on the skin as directed by provider Daily. 30 patch 3    ondansetron (ZOFRAN) 8 MG tablet Take 1 tablet by mouth Every 8 (Eight) Hours As Needed for Nausea or Vomiting. 30 tablet 5    oxybutynin XL (DITROPAN XL) 15 MG 24 hr tablet TAKE 1 TABLET BY MOUTH DAILY for bladder 30 tablet 1    oxyCODONE (Roxicodone) 5 MG immediate release tablet Take 1 tablet by mouth Every 8 (Eight) Hours As Needed for Moderate Pain. (Patient taking differently: Take 1 tablet by mouth Every 8 (Eight) Hours As Needed for Moderate Pain. Pt reports 10mg) 8 tablet 0    polyethylene glycol (MIRALAX) 17 g packet Take 17 g by mouth Daily. 5 packet 0    potassium chloride (MICRO-K) 10 MEQ CR capsule TAKE 1 CAPSULE BY MOUTH EVERY TWELVE HOURS 60 capsule 1    ribociclib succinate 200 MG tablet therapy pack tablet Take 3 tablets by mouth Take As Directed. Take 3 tablets by mouth daily for 21 days then off 7 days on a 28 day cycle. 63 tablet 11    rOPINIRole (REQUIP) 3 MG tablet Take 1 tablet by mouth 2 (Two) Times a Day.      SUMAtriptan (IMITREX) 100 MG tablet Take 1 tablet by mouth 1 (One) Time As Needed.      traZODone (DESYREL)  150 MG tablet TAKE 1 TABLET BY MOUTH ONCE nightly (Patient taking differently: Take 1 tablet by mouth Every Night.) 90 tablet 2    naloxone (NARCAN) 4 MG/0.1ML nasal spray Call 911. Don't prime. [place] in 1 nostril for overdose. Repeat in 2-3 minutes in other nostril if no or minimal breathing/responsiveness. (Patient not taking: Reported on 12/30/2024) 2 each 0    prochlorperazine (COMPAZINE) 10 MG tablet       SudoGest 60 MG tablet Take 1 tablet by mouth Every 8 (Eight) Hours. (Patient not taking: Reported on 12/30/2024)       No current facility-administered medications for this visit.       Past Medical History:  Past Medical History:   Diagnosis Date    Abdominal pain     OSTOMY SITE    Allergic rhinitis     Anemia     NO S/S    Anxiety     Arteriosclerosis     Coronary, follows with Dr. Núñez    Arthritis     Bone cancer     Bone metastases 10/14/2022    Bowen's disease     SKIN CANCER    Breast cancer     NO SURGERY WAS DONE DUE TO METS TO BONE/COLON/LYMPHNODE    Cancer related pain 10/13/2022    Depression     Disorder associated with Helicobacter species 10/12/2022    Dysphoric mood     Fatigue     Fibromyalgia     Frequent urination     NO S/S INFECTION    Herpes simplex vulvovaginitis 07/26/2018    History of colon polyps     History of IBS     History of kidney stones     Hyperlipidemia     Hypertension     Hypothyroidism     Insomnia     Lumbago     Mood disorder     Neck pain     R/T CANCER    Palpitations     last time a few months ago    Precordial pain     R/T SPINE CANCER    S/P Laparoscopic Hand-Assisted Colostomy Closure with End to End Anastomosis Open Parastomal Hernia Repair 12/08/2022    Sleep disturbance     SOB (shortness of breath)     at times at rest    SOBOE (shortness of breath on exertion)        Past Psychiatric History:  Began Treatment: early 20's  Diagnoses: MDD, NATE, panic attacks  Psychiatrist: multiple  Therapist: in the past  Admission History:    Late 90's, [facility], admitted  "for medication changes    Medication Trials:    \"I think I've been on every medication\"    Recently stopped trazodone    Self Harm: Denies  Suicide Attempts:Denies   Psychosis, Anxiety, Depression: PPD    Substance Abuse History:   Types: former smoker; occasional cannabis use  Withdrawal Symptoms:Denies  Longest Period Sober:Not Applicable   AA: Not applicable     Social History:  Martial Status:   Employed:No  Kids:No  House:Lives in a house   History: Denies    Social History     Socioeconomic History    Marital status:    Tobacco Use    Smoking status: Former     Current packs/day: 0.00     Average packs/day: 1 pack/day for 50.5 years (50.5 ttl pk-yrs)     Types: Cigarettes     Start date:      Quit date: 2024     Years since quittin.4    Smokeless tobacco: Never    Tobacco comments:          Has not used tobacco for 28 days    Vaping Use    Vaping status: Never Used   Substance and Sexual Activity    Alcohol use: No    Drug use: Never     Types: Marijuana     Comment: LAST USE 24    Sexual activity: Defer     Partners: Male     Birth control/protection: None       Family History:   Suicide Attempts: Denies  Suicide Completions:Denies      Family History   Problem Relation Age of Onset    Hypertension Mother     Rheum arthritis Mother     Heart disease Mother     Breast cancer Mother     Diabetes Father     Cancer Maternal Grandmother         colon    Colon cancer Maternal Grandmother     Aneurysm Paternal Grandfather     Diabetes Other     Fibromyalgia Other     Malig Hyperthermia Neg Hx        Developmental History:     Childhood: Denies Abuse  High School:Completed  College: [employer]     Mental Status Exam  Appearance  : groomed, good eye contact, normal street clothes  Behavior  : pleasant and cooperative  Motor  : No abnormal  Speech  :normal rhythm, rate, volume, tone, not hyperverbal, not pressured, normal prosidy  Mood  : \"I'm doing pretty good\"  Affect  : " "Improved, mild constriction without tears, mood congruent, good variability  Thought Content  : negative suicidal ideations, negative homicidal ideations, negative obsessions  Perceptions  : negative auditory hallucinations, negative visual hallucinations  Thought Process  : linear  Insight/Judgement  : Fair/fair  Cognition  : grossly intact  Attention   : intact        Review of Systems:  Review of Systems   Constitutional:  Positive for diaphoresis and fatigue.   HENT:  Negative for drooling.    Eyes:  Positive for visual disturbance.   Respiratory:  Positive for cough and shortness of breath.    Cardiovascular:  Positive for leg swelling. Negative for chest pain and palpitations.   Gastrointestinal:  Positive for nausea. Negative for vomiting.   Endocrine: Positive for cold intolerance and heat intolerance.   Genitourinary:  Negative for difficulty urinating.   Musculoskeletal:  Positive for joint swelling.   Allergic/Immunologic: Negative for immunocompromised state.   Neurological:  Positive for dizziness and numbness. Negative for seizures and speech difficulty.   Psychiatric/Behavioral:  Negative for agitation. The patient is nervous/anxious.        Physical Exam:  Physical Exam    Vital Signs:   /44   Pulse 78   Ht 168.9 cm (66.5\")   Wt 92.5 kg (204 lb)   BMI 32.43 kg/m²      Lab Results:   Admission on 12/22/2024, Discharged on 12/22/2024   Component Date Value Ref Range Status    Glucose 12/22/2024 155 (H)  65 - 99 mg/dL Final    BUN 12/22/2024 20  8 - 23 mg/dL Final    Creatinine 12/22/2024 0.79  0.57 - 1.00 mg/dL Final    Sodium 12/22/2024 139  136 - 145 mmol/L Final    Potassium 12/22/2024 3.3 (L)  3.5 - 5.2 mmol/L Final    Chloride 12/22/2024 99  98 - 107 mmol/L Final    CO2 12/22/2024 28.8  22.0 - 29.0 mmol/L Final    Calcium 12/22/2024 8.6  8.6 - 10.5 mg/dL Final    Total Protein 12/22/2024 6.5  6.0 - 8.5 g/dL Final    Albumin 12/22/2024 3.7  3.5 - 5.2 g/dL Final    ALT (SGPT) 12/22/2024 11 "  1 - 33 U/L Final    AST (SGOT) 12/22/2024 15  1 - 32 U/L Final    Alkaline Phosphatase 12/22/2024 103  39 - 117 U/L Final    Total Bilirubin 12/22/2024 0.3  0.0 - 1.2 mg/dL Final    Globulin 12/22/2024 2.8  gm/dL Final    A/G Ratio 12/22/2024 1.3  g/dL Final    BUN/Creatinine Ratio 12/22/2024 25.3 (H)  7.0 - 25.0 Final    Anion Gap 12/22/2024 11.2  5.0 - 15.0 mmol/L Final    eGFR 12/22/2024 83.1  >60.0 mL/min/1.73 Final    Lipase 12/22/2024 12 (L)  13 - 60 U/L Final    Color, UA 12/22/2024 Yellow  Yellow, Straw Final    Appearance, UA 12/22/2024 Clear  Clear Final    pH, UA 12/22/2024 5.5  5.0 - 8.0 Final    Specific Gravity, UA 12/22/2024 1.010  1.005 - 1.030 Final    Glucose, UA 12/22/2024 Negative  Negative Final    Ketones, UA 12/22/2024 Negative  Negative Final    Bilirubin, UA 12/22/2024 Negative  Negative Final    Blood, UA 12/22/2024 Negative  Negative Final    Protein, UA 12/22/2024 Negative  Negative Final    Leuk Esterase, UA 12/22/2024 Negative  Negative Final    Nitrite, UA 12/22/2024 Negative  Negative Final    Urobilinogen, UA 12/22/2024 0.2 E.U./dL  0.2 - 1.0 E.U./dL Final    Lactate 12/22/2024 1.7  0.5 - 2.0 mmol/L Final    Extra Tube 12/22/2024 Hold for add-ons.   Final    Auto resulted.    Extra Tube 12/22/2024 hold for add-on   Final    Auto resulted    Extra Tube 12/22/2024 Hold for add-ons.   Final    Auto resulted.    Extra Tube 12/22/2024 Hold for add-ons.   Final    Auto resulted    WBC 12/22/2024 4.52  3.40 - 10.80 10*3/mm3 Final    RBC 12/22/2024 2.90 (L)  3.77 - 5.28 10*6/mm3 Final    Hemoglobin 12/22/2024 9.3 (L)  12.0 - 15.9 g/dL Final    Hematocrit 12/22/2024 30.1 (L)  34.0 - 46.6 % Final    MCV 12/22/2024 103.8 (H)  79.0 - 97.0 fL Final    MCH 12/22/2024 32.1  26.6 - 33.0 pg Final    MCHC 12/22/2024 30.9 (L)  31.5 - 35.7 g/dL Final    RDW 12/22/2024 15.9 (H)  12.3 - 15.4 % Final    RDW-SD 12/22/2024 61.7 (H)  37.0 - 54.0 fl Final    MPV 12/22/2024 9.5  6.0 - 12.0 fL Final     Platelets 12/22/2024 194  140 - 450 10*3/mm3 Final    Neutrophil % 12/22/2024 59.1  42.7 - 76.0 % Final    Lymphocyte % 12/22/2024 27.7  19.6 - 45.3 % Final    Monocyte % 12/22/2024 11.3  5.0 - 12.0 % Final    Eosinophil % 12/22/2024 0.4  0.3 - 6.2 % Final    Basophil % 12/22/2024 1.3  0.0 - 1.5 % Final    Immature Grans % 12/22/2024 0.2  0.0 - 0.5 % Final    Neutrophils, Absolute 12/22/2024 2.67  1.70 - 7.00 10*3/mm3 Final    Lymphocytes, Absolute 12/22/2024 1.25  0.70 - 3.10 10*3/mm3 Final    Monocytes, Absolute 12/22/2024 0.51  0.10 - 0.90 10*3/mm3 Final    Eosinophils, Absolute 12/22/2024 0.02  0.00 - 0.40 10*3/mm3 Final    Basophils, Absolute 12/22/2024 0.06  0.00 - 0.20 10*3/mm3 Final    Immature Grans, Absolute 12/22/2024 0.01  0.00 - 0.05 10*3/mm3 Final    nRBC 12/22/2024 0.0  0.0 - 0.2 /100 WBC Final   Hospital Outpatient Visit on 12/03/2024   Component Date Value Ref Range Status    Glucose 12/03/2024 111 (H)  65 - 99 mg/dL Final    BUN 12/03/2024 16  8 - 23 mg/dL Final    Creatinine 12/03/2024 0.92  0.57 - 1.00 mg/dL Final    Sodium 12/03/2024 141  136 - 145 mmol/L Final    Potassium 12/03/2024 3.7  3.5 - 5.2 mmol/L Final    Chloride 12/03/2024 102  98 - 107 mmol/L Final    CO2 12/03/2024 29.8 (H)  22.0 - 29.0 mmol/L Final    Calcium 12/03/2024 9.2  8.6 - 10.5 mg/dL Final    Total Protein 12/03/2024 7.0  6.0 - 8.5 g/dL Final    Albumin 12/03/2024 4.0  3.5 - 5.2 g/dL Final    ALT (SGPT) 12/03/2024 12  1 - 33 U/L Final    AST (SGOT) 12/03/2024 13  1 - 32 U/L Final    Alkaline Phosphatase 12/03/2024 103  39 - 117 U/L Final    Total Bilirubin 12/03/2024 0.4  0.0 - 1.2 mg/dL Final    Globulin 12/03/2024 3.0  gm/dL Final    A/G Ratio 12/03/2024 1.3  g/dL Final    BUN/Creatinine Ratio 12/03/2024 17.4  7.0 - 25.0 Final    Anion Gap 12/03/2024 9.2  5.0 - 15.0 mmol/L Final    eGFR 12/03/2024 69.2  >60.0 mL/min/1.73 Final    Magnesium 12/03/2024 1.9  1.6 - 2.4 mg/dL Final    Phosphorus 12/03/2024 2.0 (L)  2.5 - 4.5  mg/dL Final    WBC 12/03/2024 5.61  3.40 - 10.80 10*3/mm3 Final    RBC 12/03/2024 3.09 (L)  3.77 - 5.28 10*6/mm3 Final    Hemoglobin 12/03/2024 10.0 (L)  12.0 - 15.9 g/dL Final    Hematocrit 12/03/2024 32.1 (L)  34.0 - 46.6 % Final    MCV 12/03/2024 103.9 (H)  79.0 - 97.0 fL Final    MCH 12/03/2024 32.4  26.6 - 33.0 pg Final    MCHC 12/03/2024 31.2 (L)  31.5 - 35.7 g/dL Final    RDW 12/03/2024 16.0 (H)  12.3 - 15.4 % Final    RDW-SD 12/03/2024 61.7 (H)  37.0 - 54.0 fl Final    MPV 12/03/2024 10.1  6.0 - 12.0 fL Final    Platelets 12/03/2024 245  140 - 450 10*3/mm3 Final    Neutrophil % 12/03/2024 71.7  42.7 - 76.0 % Final    Lymphocyte % 12/03/2024 21.6  19.6 - 45.3 % Final    Monocyte % 12/03/2024 4.8 (L)  5.0 - 12.0 % Final    Eosinophil % 12/03/2024 0.2 (L)  0.3 - 6.2 % Final    Basophil % 12/03/2024 1.2  0.0 - 1.5 % Final    Immature Grans % 12/03/2024 0.5  0.0 - 0.5 % Final    Neutrophils, Absolute 12/03/2024 4.02  1.70 - 7.00 10*3/mm3 Final    Lymphocytes, Absolute 12/03/2024 1.21  0.70 - 3.10 10*3/mm3 Final    Monocytes, Absolute 12/03/2024 0.27  0.10 - 0.90 10*3/mm3 Final    Eosinophils, Absolute 12/03/2024 0.01  0.00 - 0.40 10*3/mm3 Final    Basophils, Absolute 12/03/2024 0.07  0.00 - 0.20 10*3/mm3 Final    Immature Grans, Absolute 12/03/2024 0.03  0.00 - 0.05 10*3/mm3 Final    nRBC 12/03/2024 0.0  0.0 - 0.2 /100 WBC Final   Hospital Outpatient Visit on 10/28/2024   Component Date Value Ref Range Status    Glucose 10/28/2024 111 (H)  65 - 99 mg/dL Final    BUN 10/28/2024 24 (H)  8 - 23 mg/dL Final    Creatinine 10/28/2024 0.94  0.57 - 1.00 mg/dL Final    Sodium 10/28/2024 138  136 - 145 mmol/L Final    Potassium 10/28/2024 3.9  3.5 - 5.2 mmol/L Final    Chloride 10/28/2024 97 (L)  98 - 107 mmol/L Final    CO2 10/28/2024 31.3 (H)  22.0 - 29.0 mmol/L Final    Calcium 10/28/2024 8.9  8.6 - 10.5 mg/dL Final    Total Protein 10/28/2024 7.3  6.0 - 8.5 g/dL Final    Albumin 10/28/2024 3.6  3.5 - 5.2 g/dL Final     ALT (SGPT) 10/28/2024 9  1 - 33 U/L Final    AST (SGOT) 10/28/2024 13  1 - 32 U/L Final    Alkaline Phosphatase 10/28/2024 114  39 - 117 U/L Final    Total Bilirubin 10/28/2024 0.4  0.0 - 1.2 mg/dL Final    Globulin 10/28/2024 3.7  gm/dL Final    A/G Ratio 10/28/2024 1.0  g/dL Final    BUN/Creatinine Ratio 10/28/2024 25.5 (H)  7.0 - 25.0 Final    Anion Gap 10/28/2024 9.7  5.0 - 15.0 mmol/L Final    eGFR 10/28/2024 67.5  >60.0 mL/min/1.73 Final    Magnesium 10/28/2024 1.9  1.6 - 2.4 mg/dL Final    Phosphorus 10/28/2024 3.0  2.5 - 4.5 mg/dL Final    WBC 10/28/2024 5.24  3.40 - 10.80 10*3/mm3 Final    RBC 10/28/2024 2.98 (L)  3.77 - 5.28 10*6/mm3 Final    Hemoglobin 10/28/2024 9.7 (L)  12.0 - 15.9 g/dL Final    Hematocrit 10/28/2024 30.4 (L)  34.0 - 46.6 % Final    MCV 10/28/2024 102.0 (H)  79.0 - 97.0 fL Final    MCH 10/28/2024 32.6  26.6 - 33.0 pg Final    MCHC 10/28/2024 31.9  31.5 - 35.7 g/dL Final    RDW 10/28/2024 14.1  12.3 - 15.4 % Final    RDW-SD 10/28/2024 52.3  37.0 - 54.0 fl Final    MPV 10/28/2024 10.1  6.0 - 12.0 fL Final    Platelets 10/28/2024 281  140 - 450 10*3/mm3 Final    Neutrophil % 10/28/2024 75.7  42.7 - 76.0 % Final    Lymphocyte % 10/28/2024 16.0 (L)  19.6 - 45.3 % Final    Monocyte % 10/28/2024 6.7  5.0 - 12.0 % Final    Eosinophil % 10/28/2024 0.6  0.3 - 6.2 % Final    Basophil % 10/28/2024 0.6  0.0 - 1.5 % Final    Immature Grans % 10/28/2024 0.4  0.0 - 0.5 % Final    Neutrophils, Absolute 10/28/2024 3.97  1.70 - 7.00 10*3/mm3 Final    Lymphocytes, Absolute 10/28/2024 0.84  0.70 - 3.10 10*3/mm3 Final    Monocytes, Absolute 10/28/2024 0.35  0.10 - 0.90 10*3/mm3 Final    Eosinophils, Absolute 10/28/2024 0.03  0.00 - 0.40 10*3/mm3 Final    Basophils, Absolute 10/28/2024 0.03  0.00 - 0.20 10*3/mm3 Final    Immature Grans, Absolute 10/28/2024 0.02  0.00 - 0.05 10*3/mm3 Final    nRBC 10/28/2024 0.0  0.0 - 0.2 /100 WBC Final   Admission on 10/04/2024, Discharged on 10/04/2024   Component Date  Value Ref Range Status    Glucose 10/04/2024 121 (H)  65 - 99 mg/dL Final    BUN 10/04/2024 21  8 - 23 mg/dL Final    Creatinine 10/04/2024 0.86  0.57 - 1.00 mg/dL Final    Sodium 10/04/2024 138  136 - 145 mmol/L Final    Potassium 10/04/2024 3.8  3.5 - 5.2 mmol/L Final    Chloride 10/04/2024 99  98 - 107 mmol/L Final    CO2 10/04/2024 31.0 (H)  22.0 - 29.0 mmol/L Final    Calcium 10/04/2024 8.8  8.6 - 10.5 mg/dL Final    Total Protein 10/04/2024 6.4  6.0 - 8.5 g/dL Final    Albumin 10/04/2024 3.6  3.5 - 5.2 g/dL Final    ALT (SGPT) 10/04/2024 11  1 - 33 U/L Final    AST (SGOT) 10/04/2024 11  1 - 32 U/L Final    Alkaline Phosphatase 10/04/2024 87  39 - 117 U/L Final    Total Bilirubin 10/04/2024 0.4  0.0 - 1.2 mg/dL Final    Globulin 10/04/2024 2.8  gm/dL Final    A/G Ratio 10/04/2024 1.3  g/dL Final    BUN/Creatinine Ratio 10/04/2024 24.4  7.0 - 25.0 Final    Anion Gap 10/04/2024 8.0  5.0 - 15.0 mmol/L Final    eGFR 10/04/2024 75.1  >60.0 mL/min/1.73 Final    Lipase 10/04/2024 10 (L)  13 - 60 U/L Final    Color, UA 10/04/2024 Yellow  Yellow, Straw Final    Appearance, UA 10/04/2024 Clear  Clear Final    pH, UA 10/04/2024 7.5  5.0 - 8.0 Final    Specific Gravity, UA 10/04/2024 1.010  1.005 - 1.030 Final    Glucose, UA 10/04/2024 Negative  Negative Final    Ketones, UA 10/04/2024 Negative  Negative Final    Bilirubin, UA 10/04/2024 Negative  Negative Final    Blood, UA 10/04/2024 Negative  Negative Final    Protein, UA 10/04/2024 Negative  Negative Final    Leuk Esterase, UA 10/04/2024 Negative  Negative Final    Nitrite, UA 10/04/2024 Negative  Negative Final    Urobilinogen, UA 10/04/2024 0.2 E.U./dL  0.2 - 1.0 E.U./dL Final    Lactate 10/04/2024 0.7  0.5 - 2.0 mmol/L Final    Extra Tube 10/04/2024 Hold for add-ons.   Final    Auto resulted.    Extra Tube 10/04/2024 hold for add-on   Final    Auto resulted    Extra Tube 10/04/2024 Hold for add-ons.   Final    Auto resulted.    Extra Tube 10/04/2024 Hold for  add-ons.   Final    Auto resulted    WBC 10/04/2024 5.40  3.40 - 10.80 10*3/mm3 Final    RBC 10/04/2024 2.73 (L)  3.77 - 5.28 10*6/mm3 Final    Hemoglobin 10/04/2024 8.9 (L)  12.0 - 15.9 g/dL Final    Hematocrit 10/04/2024 28.5 (L)  34.0 - 46.6 % Final    MCV 10/04/2024 104.4 (H)  79.0 - 97.0 fL Final    MCH 10/04/2024 32.6  26.6 - 33.0 pg Final    MCHC 10/04/2024 31.2 (L)  31.5 - 35.7 g/dL Final    RDW 10/04/2024 14.4  12.3 - 15.4 % Final    RDW-SD 10/04/2024 55.0 (H)  37.0 - 54.0 fl Final    MPV 10/04/2024 9.5  6.0 - 12.0 fL Final    Platelets 10/04/2024 236  140 - 450 10*3/mm3 Final    Neutrophil % 10/04/2024 75.0  42.7 - 76.0 % Final    Lymphocyte % 10/04/2024 18.1 (L)  19.6 - 45.3 % Final    Monocyte % 10/04/2024 4.1 (L)  5.0 - 12.0 % Final    Eosinophil % 10/04/2024 1.3  0.3 - 6.2 % Final    Basophil % 10/04/2024 0.9  0.0 - 1.5 % Final    Immature Grans % 10/04/2024 0.6 (H)  0.0 - 0.5 % Final    Neutrophils, Absolute 10/04/2024 4.05  1.70 - 7.00 10*3/mm3 Final    Lymphocytes, Absolute 10/04/2024 0.98  0.70 - 3.10 10*3/mm3 Final    Monocytes, Absolute 10/04/2024 0.22  0.10 - 0.90 10*3/mm3 Final    Eosinophils, Absolute 10/04/2024 0.07  0.00 - 0.40 10*3/mm3 Final    Basophils, Absolute 10/04/2024 0.05  0.00 - 0.20 10*3/mm3 Final    Immature Grans, Absolute 10/04/2024 0.03  0.00 - 0.05 10*3/mm3 Final    nRBC 10/04/2024 0.0  0.0 - 0.2 /100 WBC Final    Magnesium 10/04/2024 2.1  1.6 - 2.4 mg/dL Final   Hospital Outpatient Visit on 10/02/2024   Component Date Value Ref Range Status    Glucose 10/02/2024 126 (H)  65 - 99 mg/dL Final    BUN 10/02/2024 17  8 - 23 mg/dL Final    Creatinine 10/02/2024 0.86  0.57 - 1.00 mg/dL Final    Sodium 10/02/2024 141  136 - 145 mmol/L Final    Potassium 10/02/2024 3.7  3.5 - 5.2 mmol/L Final    Chloride 10/02/2024 102  98 - 107 mmol/L Final    CO2 10/02/2024 35.1 (H)  22.0 - 29.0 mmol/L Final    Calcium 10/02/2024 9.6  8.6 - 10.5 mg/dL Final    Total Protein 10/02/2024 6.6  6.0 -  8.5 g/dL Final    Albumin 10/02/2024 4.1  3.5 - 5.2 g/dL Final    ALT (SGPT) 10/02/2024 13  1 - 33 U/L Final    AST (SGOT) 10/02/2024 11  1 - 32 U/L Final    Alkaline Phosphatase 10/02/2024 91  39 - 117 U/L Final    Total Bilirubin 10/02/2024 0.4  0.0 - 1.2 mg/dL Final    Globulin 10/02/2024 2.5  gm/dL Final    A/G Ratio 10/02/2024 1.6  g/dL Final    BUN/Creatinine Ratio 10/02/2024 19.8  7.0 - 25.0 Final    Anion Gap 10/02/2024 3.9 (L)  5.0 - 15.0 mmol/L Final    eGFR 10/02/2024 75.1  >60.0 mL/min/1.73 Final    Magnesium 10/02/2024 2.2  1.6 - 2.4 mg/dL Final    Phosphorus 10/02/2024 3.7  2.5 - 4.5 mg/dL Final    WBC 10/02/2024 6.71  3.40 - 10.80 10*3/mm3 Final    RBC 10/02/2024 2.93 (L)  3.77 - 5.28 10*6/mm3 Final    Hemoglobin 10/02/2024 9.7 (L)  12.0 - 15.9 g/dL Final    Hematocrit 10/02/2024 30.9 (L)  34.0 - 46.6 % Final    MCV 10/02/2024 105.5 (H)  79.0 - 97.0 fL Final    MCH 10/02/2024 33.1 (H)  26.6 - 33.0 pg Final    MCHC 10/02/2024 31.4 (L)  31.5 - 35.7 g/dL Final    RDW 10/02/2024 14.2  12.3 - 15.4 % Final    RDW-SD 10/02/2024 55.4 (H)  37.0 - 54.0 fl Final    MPV 10/02/2024 9.5  6.0 - 12.0 fL Final    Platelets 10/02/2024 252  140 - 450 10*3/mm3 Final    Neutrophil % 10/02/2024 72.7  42.7 - 76.0 % Final    Lymphocyte % 10/02/2024 21.3  19.6 - 45.3 % Final    Monocyte % 10/02/2024 4.6 (L)  5.0 - 12.0 % Final    Eosinophil % 10/02/2024 1.0  0.3 - 6.2 % Final    Basophil % 10/02/2024 0.3  0.0 - 1.5 % Final    Immature Grans % 10/02/2024 0.1  0.0 - 0.5 % Final    Neutrophils, Absolute 10/02/2024 4.87  1.70 - 7.00 10*3/mm3 Final    Lymphocytes, Absolute 10/02/2024 1.43  0.70 - 3.10 10*3/mm3 Final    Monocytes, Absolute 10/02/2024 0.31  0.10 - 0.90 10*3/mm3 Final    Eosinophils, Absolute 10/02/2024 0.07  0.00 - 0.40 10*3/mm3 Final    Basophils, Absolute 10/02/2024 0.02  0.00 - 0.20 10*3/mm3 Final    Immature Grans, Absolute 10/02/2024 0.01  0.00 - 0.05 10*3/mm3 Final   Hospital Outpatient Visit on 07/29/2024    Component Date Value Ref Range Status    Glucose 07/29/2024 99  65 - 99 mg/dL Final    BUN 07/29/2024 24 (H)  8 - 23 mg/dL Final    Creatinine 07/29/2024 0.93  0.57 - 1.00 mg/dL Final    Sodium 07/29/2024 137  136 - 145 mmol/L Final    Potassium 07/29/2024 3.2 (L)  3.5 - 5.2 mmol/L Final    Chloride 07/29/2024 97 (L)  98 - 107 mmol/L Final    CO2 07/29/2024 30.3 (H)  22.0 - 29.0 mmol/L Final    Calcium 07/29/2024 8.5 (L)  8.6 - 10.5 mg/dL Final    Total Protein 07/29/2024 5.9 (L)  6.0 - 8.5 g/dL Final    Albumin 07/29/2024 3.6  3.5 - 5.2 g/dL Final    ALT (SGPT) 07/29/2024 6  1 - 33 U/L Final    AST (SGOT) 07/29/2024 10  1 - 32 U/L Final    Alkaline Phosphatase 07/29/2024 82  39 - 117 U/L Final    Total Bilirubin 07/29/2024 0.3  0.0 - 1.2 mg/dL Final    Globulin 07/29/2024 2.3  gm/dL Final    A/G Ratio 07/29/2024 1.6  g/dL Final    BUN/Creatinine Ratio 07/29/2024 25.8 (H)  7.0 - 25.0 Final    Anion Gap 07/29/2024 9.7  5.0 - 15.0 mmol/L Final    eGFR 07/29/2024 68.8  >60.0 mL/min/1.73 Final    Magnesium 07/29/2024 1.7  1.6 - 2.4 mg/dL Final    Phosphorus 07/29/2024 3.4  2.5 - 4.5 mg/dL Final    WBC 07/29/2024 3.33 (L)  3.40 - 10.80 10*3/mm3 Final    RBC 07/29/2024 2.49 (L)  3.77 - 5.28 10*6/mm3 Final    Hemoglobin 07/29/2024 8.1 (L)  12.0 - 15.9 g/dL Final    Hematocrit 07/29/2024 25.5 (L)  34.0 - 46.6 % Final    MCV 07/29/2024 102.4 (H)  79.0 - 97.0 fL Final    MCH 07/29/2024 32.5  26.6 - 33.0 pg Final    MCHC 07/29/2024 31.8  31.5 - 35.7 g/dL Final    RDW 07/29/2024 13.7  12.3 - 15.4 % Final    RDW-SD 07/29/2024 50.9  37.0 - 54.0 fl Final    MPV 07/29/2024 10.1  6.0 - 12.0 fL Final    Platelets 07/29/2024 202  140 - 450 10*3/mm3 Final    Neutrophil % 07/29/2024 59.2  42.7 - 76.0 % Final    Lymphocyte % 07/29/2024 27.3  19.6 - 45.3 % Final    Monocyte % 07/29/2024 8.7  5.0 - 12.0 % Final    Eosinophil % 07/29/2024 3.6  0.3 - 6.2 % Final    Basophil % 07/29/2024 1.2  0.0 - 1.5 % Final    Immature Grans % 07/29/2024  "0.0  0.0 - 0.5 % Final    Neutrophils, Absolute 07/29/2024 1.97  1.70 - 7.00 10*3/mm3 Final    Lymphocytes, Absolute 07/29/2024 0.91  0.70 - 3.10 10*3/mm3 Final    Monocytes, Absolute 07/29/2024 0.29  0.10 - 0.90 10*3/mm3 Final    Eosinophils, Absolute 07/29/2024 0.12  0.00 - 0.40 10*3/mm3 Final    Basophils, Absolute 07/29/2024 0.04  0.00 - 0.20 10*3/mm3 Final    Immature Grans, Absolute 07/29/2024 0.00  0.00 - 0.05 10*3/mm3 Final    nRBC 07/29/2024 0.0  0.0 - 0.2 /100 WBC Final   Admission on 07/03/2024, Discharged on 07/03/2024   Component Date Value Ref Range Status    QT Interval 07/03/2024 497  ms Final    QTC Interval 07/03/2024 471  ms Final    Case Report 07/03/2024    Final                    Value:Surgical Pathology Report                         Case: [id number]                                  Authorizing Provider:  Alfred Castañeda MD  Collected:           07/03/2024 08:38 AM          Ordering Location:     [facility] MAIN Received:            07/03/2024 11:10 AM                                 OR                                                                           Pathologist:           Jasmine Jones MD                                                     Specimen:    Peritoneum, PERITONEAL NODULE                                                              Clinical Information 07/03/2024    Final                    Value:Incisional hernia, without obstruction or gangrene    Final Diagnosis 07/03/2024    Final                    Value:Peritoneal nodule, biopsy:                           -Benign hyalinized nodule, suggestive of old granuloma                           -AFB stain, negative for acid-fast microorganisms                           -GMS stain, negative for fungal forms                              Gross Description 07/03/2024    Final                    Value:1. Peritoneum.                          Received in formalin and labeled \" peritoneal nodule\" is a 0.6 cm " fragment                           of tan soft tissue. The specimen is entirely submitted in one cassette.                           NARESH    Special Stains 07/03/2024    Final                    Value:Special stains for AFB and GMS were performed to aid in the detection of                           acid-fast microorganisms and fungal forms, respectively. Controls are                           appropriate.                               Microscopic Description 07/03/2024    Final                    Value:Microscopic examination performed.       EKG Results:  No orders to display       Imaging Results:  CT Chest With Contrast Diagnostic    Result Date: 10/29/2024  Impression: 1. Stable diffuse sclerotic osseous metastatic disease. 2. New bronchial wall thickening and endobronchial secretion in the right lower lobe likely secondary to bronchitis. Mild airspace consolidation in the right lower lobe may represent atelectasis or developing pneumonia. Electronically Signed: Floyd Leung MD  10/29/2024 10:48 AM EDT  Workstation ID: [id number]    CT Abdomen Pelvis With Contrast    Result Date: 10/29/2024  Impression: Stable exam. Diffuse sclerotic osseous metastatic disease. Electronically Signed: Floyd Leung MD  10/29/2024 10:31 AM EDT  Workstation ID: [id number]    NM Bone Scan Whole Body    Result Date: 10/22/2024  1.New sternal and right lower rib cage radiotracer uptake concerning for new sites of active metastatic disease. Electronically Signed: Donn Daigle MD  10/22/2024 4:25 PM EDT  Workstation ID: [id number]    CT Chest With Contrast Diagnostic    Result Date: 7/30/2024  Impression: Stable appearance of the innumerable osseous metastases. No new suspicious pulmonary nodule or mass. No evidence of new or additional metastatic disease in the chest. Coronary artery calcifications. Electronically Signed: Fox Schaeffer MD  7/30/2024 11:42 AM EDT  Workstation ID: [id number]    CT Abdomen Pelvis With Contrast    Result Date:  7/30/2024  Impression: 1. Extensive osteosclerotic metastatic disease does not appear appreciably changed within the abdomen or pelvis. 2. No indication of soft tissue organ metastasis in the abdomen or pelvis. 3. Interval ventral abdominal hernia repair with small superficial soft tissue seroma. Electronically Signed: Aicha Bonilla MD  7/30/2024 11:35 AM EDT  Workstation ID: [id number]        Assessment & Plan   Diagnoses and all orders for this visit:    1. Major depressive disorder, recurrent episode, moderate (Primary)  -     mirtazapine (REMERON) 15 MG tablet; Take 1 tablet by mouth Every Night.  Dispense: 90 tablet; Refill: 1    2. Generalized anxiety disorder  -     mirtazapine (REMERON) 15 MG tablet; Take 1 tablet by mouth Every Night.  Dispense: 90 tablet; Refill: 1    3. Panic attacks  -     mirtazapine (REMERON) 15 MG tablet; Take 1 tablet by mouth Every Night.  Dispense: 90 tablet; Refill: 1    4. Insomnia due to mental condition  -     mirtazapine (REMERON) 15 MG tablet; Take 1 tablet by mouth Every Night.  Dispense: 90 tablet; Refill: 1    5. Post traumatic stress disorder (PTSD)  -     mirtazapine (REMERON) 15 MG tablet; Take 1 tablet by mouth Every Night.  Dispense: 90 tablet; Refill: 1        Visit Diagnoses:    ICD-10-CM ICD-9-CM   1. Major depressive disorder, recurrent episode, moderate  F33.1 296.32   2. Generalized anxiety disorder  F41.1 300.02   3. Panic attacks  F41.0 300.01   4. Insomnia due to mental condition  F51.05 300.9     327.02   5. Post traumatic stress disorder (PTSD)  F43.10 309.81     12/30/2024: Watch weight. Improving. Restart mirtazapine for MDD, NATE, insomnia.     Acknowledged and normalized patient's thoughts, feelings, and concerns. Allowed patient to freely discuss and process issues, such as:  Anxiety and depression regarding medical conditions, pain.  ... using Rogerian psychotherapeutic techniques including unconditional positive regard, reflective listening, and  demonstrating clear empathy, with the goal of ameliorating symptoms and maintaining, restoring, or improving self-esteem, adaptive skills, and ego or psychological functions (Garry et al. 1991), the long-term goal of which is to develop a better, healthier perspective and help the patient bear their circumstances more easily.  Time (minutes) spent providing supportive psychotherapy: 16  (This time is exclusive to the therapy session and separate from the time spent on activities used to meet the criteria for the E/M service (history, exam, medical decision-making).)  Start: 9:42  Stop: 9:58  Functional status: mild impairment  Treatment plan: Medication management and supportive psychotherapy  Prognosis: good  Progress: improving  4w    11/12/2024: Increase cymbalta for MDD. Pt just started on mirtazapine. Would want to find her a therapist. 6w    PLAN:  Safety: No acute safety concerns  Therapy:  recommended, but can't use mychart  Risk Assessment: Risk of self-harm acutely is moderate.  Risk factors include anxiety disorder, mood disorder, access to firearms, and recent psychosocial stressors (pandemic). Protective factors include no family history, no present SI, no history of suicide attempts or self-harm in the past, minimal AODA, healthcare seeking, future orientation, willingness to engage in care.  Risk of self-harm chronically is also moderate, but could be further elevated in the event of treatment noncompliance and/or AODA.  Meds:  CONTINUE cymbalta 90 mg qday. Risks, benefits, alternatives discussed with patient including GI upset, nausea vomiting diarrhea, theoretical decrease of seizure threshold predisposing the patient to a slightly higher seizure risk, headaches, sexual dysfunction, serotonin syndrome, bleeding risk, increased suicidality in patients 24 years and younger, switching to ebony/hypomania.  Also constipation and urinary retention.  After discussion of these risks and benefits, the  patient voiced understanding and agreed to proceed.  RESTART mirtazapine 15 mg qday. Risks, benefits, alternatives discussed with patient including GI upset, sedation, dizziness with falls risk, increased appetite.  Do not use before operating vehicle, vessel, or machine. After discussion of these risks and benefits, the patient voiced understanding and agreed to proceed.  ALSO on trazodone 150 mg qhs. Risks, benefits, side effects discussed with patient including GI upset, sedation, dizziness/falls risk, grogginess the following day, prolongation of the QTc interval.  Do not use before operating vehicle, vessel, or machine. After discussion of these risks and benefits, the patient voiced understanding and agreed to proceed.    S/P:  Buspar did nothing  Abilify can't remember  Labs: none      Patient screened positive for depression based on a PHQ-9 score of 17 on 11/12/2024. Follow-up recommendations include: Prescribed antidepressant medication treatment and Suicide Risk Assessment performed.           TREATMENT PLAN/GOALS: Continue supportive psychotherapy efforts and medications as indicated. Treatment and medication options discussed during today's visit. Patient acknowledged and verbally consented to continue with current treatment plan and was educated on the importance of compliance with treatment and follow-up appointments.    MEDICATION ISSUES:  ALEXEI reviewed as expected.  Discussed medication options and treatment plan of prescribed medication as well as the risks, benefits, and side effects including potential falls, possible impaired driving and metabolic adversities among others. Patient is agreeable to call the office with any worsening of symptoms or onset of side effects. Patient is agreeable to call 911 or go to the nearest ER should he/she begin having SI/HI. No medication side effects or related complaints today.     MEDS ORDERED DURING VISIT:  New Medications Ordered This Visit   Medications     mirtazapine (REMERON) 15 MG tablet     Sig: Take 1 tablet by mouth Every Night.     Dispense:  90 tablet     Refill:  1     Restarting. Thank you for the help. Please call with questions: [phone].       Return in about 4 weeks (around 1/27/2025).         This document has been electronically signed by Megha Jose MD  December 30, 2024 10:22 EST    Dictated Utilizing Dragon Dictation: Part of this note may be an electronic transcription/translation of spoken language to printed text using the Dragon Dictation System.

## 2024-12-30 ENCOUNTER — OFFICE VISIT (OUTPATIENT)
Dept: PSYCHIATRY | Facility: CLINIC | Age: 64
End: 2024-12-30
Payer: MEDICARE

## 2024-12-30 VITALS
HEIGHT: 67 IN | SYSTOLIC BLOOD PRESSURE: 145 MMHG | DIASTOLIC BLOOD PRESSURE: 44 MMHG | HEART RATE: 78 BPM | BODY MASS INDEX: 32.02 KG/M2 | WEIGHT: 204 LBS

## 2024-12-30 DIAGNOSIS — F43.10 POST TRAUMATIC STRESS DISORDER (PTSD): ICD-10-CM

## 2024-12-30 DIAGNOSIS — Z17.0 MALIGNANT NEOPLASM OF UPPER-OUTER QUADRANT OF LEFT BREAST IN FEMALE, ESTROGEN RECEPTOR POSITIVE: ICD-10-CM

## 2024-12-30 DIAGNOSIS — F41.0 PANIC ATTACKS: ICD-10-CM

## 2024-12-30 DIAGNOSIS — C50.412 MALIGNANT NEOPLASM OF UPPER-OUTER QUADRANT OF LEFT BREAST IN FEMALE, ESTROGEN RECEPTOR POSITIVE: ICD-10-CM

## 2024-12-30 DIAGNOSIS — F51.05 INSOMNIA DUE TO MENTAL CONDITION: ICD-10-CM

## 2024-12-30 DIAGNOSIS — F41.1 GENERALIZED ANXIETY DISORDER: ICD-10-CM

## 2024-12-30 DIAGNOSIS — F33.1 MAJOR DEPRESSIVE DISORDER, RECURRENT EPISODE, MODERATE: Primary | ICD-10-CM

## 2024-12-30 RX ORDER — POLYETHYLENE GLYCOL-3350 AND ELECTROLYTES 236; 6.74; 5.86; 2.97; 22.74 G/274.31G; G/274.31G; G/274.31G; G/274.31G; G/274.31G
POWDER, FOR SOLUTION ORAL
COMMUNITY
Start: 2024-12-23

## 2024-12-30 RX ORDER — MIRTAZAPINE 15 MG/1
15 TABLET, FILM COATED ORAL NIGHTLY
Qty: 90 TABLET | Refills: 1 | Status: SHIPPED | OUTPATIENT
Start: 2024-12-30

## 2024-12-30 RX ORDER — FLUTICASONE PROPIONATE 50 MCG
2 SPRAY, SUSPENSION (ML) NASAL DAILY
COMMUNITY
Start: 2024-12-11

## 2024-12-30 NOTE — PATIENT INSTRUCTIONS
1.  Please return to clinic at your next scheduled visit.  Contact the clinic ([phone]) at least 24 hours prior in the event you need to cancel.  2.  Do no harm to yourself or others.    3.  Avoid alcohol and drugs.    4.  Take all medications as prescribed.  Please contact the clinic with any concerns. If you are in need of medication refills, please call the clinic at [phone].    5. Should you want to get in touch with your provider, Dr. Megha Jose, please utilize [employer] or contact the office ([phone]), and staff will be able to page Dr. Jose directly.  6.  In the event you have personal crisis, contact the following crisis numbers: Suicide Prevention Hotline 1-723.649.3204; ELIAS Helpline 9-940-872-ELIAS; [facility] Emergency Room [phone]; text HELLO to 318351; or 707.     Please take BOTH trazodone and mirtazapine at night.

## 2024-12-31 ENCOUNTER — OFFICE VISIT (OUTPATIENT)
Dept: SURGERY | Facility: CLINIC | Age: 64
End: 2024-12-31
Payer: MEDICARE

## 2024-12-31 ENCOUNTER — TELEPHONE (OUTPATIENT)
Dept: SURGERY | Facility: CLINIC | Age: 64
End: 2024-12-31

## 2024-12-31 VITALS — WEIGHT: 202 LBS | BODY MASS INDEX: 31.71 KG/M2 | HEIGHT: 67 IN | RESPIRATION RATE: 18 BRPM

## 2024-12-31 DIAGNOSIS — K43.2 INCISIONAL HERNIA, WITHOUT OBSTRUCTION OR GANGRENE: Primary | ICD-10-CM

## 2024-12-31 RX ORDER — LETROZOLE 2.5 MG/1
2.5 TABLET, FILM COATED ORAL DAILY
Qty: 90 TABLET | Refills: 1 | Status: SHIPPED | OUTPATIENT
Start: 2024-12-31

## 2024-12-31 RX ORDER — SENNOSIDES A AND B 8.6 MG/1
1 TABLET, FILM COATED ORAL DAILY
Qty: 30 TABLET | Refills: 3 | Status: SHIPPED | OUTPATIENT
Start: 2024-12-31

## 2024-12-31 NOTE — TELEPHONE ENCOUNTER
CALLED XRAY FOR THEM TO PUT IMAGING ON DISC FOR PATIENT. PATIENT AWARE SHE CAN  IMAGES ARE HER CONVENIENCE AND SHE VOICED UNDERSTANDING.

## 2024-12-31 NOTE — LETTER
January 3, 2025     Haile Monique MD  [address]  [place] [address]    Patient: Derek Keller   YOB: 1960   Date of Visit: 12/31/2024     Dear Haile Monique MD:       Thank you for referring Derek Keller to me for evaluation. Below are the relevant portions of my assessment and plan of care.    If you have questions, please do not hesitate to call me. I look forward to following Derek along with you.         Sincerely,        Alfred Castañeda MD        CC: No Recipients    Alfred Castañeda MD  01/03/25 0959  Sign when Signing Visit  General Surgery/Colorectal Surgery Note    Patient Name:  Derek Keller  YOB: 1960  [id number]    Referring Provider: No ref. provider found      Patient Care Team:  Haile Monique MD as PCP - General (Family Medicine)  Julien Cardona DO as Consulting Physician (Pain Medicine)  Joel Castillo MD as Consulting Physician (Gastroenterology)  Remington Russell MD as Surgeon (General Surgery)  Ahsan Salas MD as Consulting Physician (Sports Medicine)  Donald Barker MD as Consulting Physician (Hematology and Oncology)  Sandy Ricci MD as Consulting Physician (General Surgery)  Alfred Castañeda MD as Consulting Physician (General Surgery)    Chief complaint follow-up    Subjective.     History of present illness:    History of metastatic breast cancer  Status post ex lap with Lynn's procedure for perforated sigmoid colon 12/6/2022.  Pathology with metastatic lobular breast carcinoma    Status post laparoscopic hand-assisted colostomy closure with resection, open parastomal hernia repair 6/26/2023  Pathology with metastatic lobular carcinoma to portion of descending colon and anastomotic rings    Colonoscopy 6/8/2023 with no evidence of recurrence.    CT abdomen pelvis 2/5/2024 with no evidence of intra-abdominal metastatic disease, constipation noted, incisional   hernia at previous colostomy site noted with no bowel  contained in the defect    CT abdomen pelvis April 2024 with left-sided abdominal wall defect with new herniation of portion of transverse colon.    Seen for evaluation of a painful bulge at her previous colostomy site.    Status post robotic lysis of adhesions, repair of incisional hernia with mesh, biopsy of peritoneal nodule 7/3/2024.  Pathology with benign nodule suggestive of granuloma.    She comes in for follow-up.  She has had constant generalized abdominal pain for the past several weeks.  No fever.  She is smoking.  CT abdomen pelvis 12/22/2024 with recurrent incisional hernia noted      History:  Past Medical History:   Diagnosis Date   • Abdominal pain     OSTOMY SITE   • Allergic rhinitis    • Anemia     NO S/S   • Anxiety    • Arteriosclerosis     Coronary, follows with Dr. Núñez   • Arthritis    • Bone cancer    • Bone metastases 10/14/2022   • Bowen's disease     SKIN CANCER   • Breast cancer     NO SURGERY WAS DONE DUE TO METS TO BONE/COLON/LYMPHNODE   • Cancer related pain 10/13/2022   • Depression    • Disorder associated with Helicobacter species 10/12/2022   • Dysphoric mood    • Fatigue    • Fibromyalgia    • Frequent urination     NO S/S INFECTION   • Herpes simplex vulvovaginitis 07/26/2018   • History of colon polyps    • History of IBS    • History of kidney stones    • Hyperlipidemia    • Hypertension    • Hypothyroidism    • Insomnia    • Lumbago    • Mood disorder    • Neck pain     R/T CANCER   • Palpitations     last time a few months ago   • Precordial pain     R/T SPINE CANCER   • S/P Laparoscopic Hand-Assisted Colostomy Closure with End to End Anastomosis Open Parastomal Hernia Repair 12/08/2022   • Sleep disturbance    • SOB (shortness of breath)     at times at rest   • SOBOE (shortness of breath on exertion)        Past Surgical History:   Procedure Laterality Date   • BREAST BIOPSY Left    • CARDIAC CATHETERIZATION Left 1960   • CARDIAC CATHETERIZATION N/A 04/06/2018     Procedure: Coronary angiography;  Surgeon: Art Licea MD;  Location: [facility] CATH INVASIVE LOCATION;  Service: Cardiovascular   • CARDIAC CATHETERIZATION N/A 04/06/2018    Procedure: Left heart cath;  Surgeon: Art Licea MD;  Location: [facility] CATH INVASIVE LOCATION;  Service: Cardiovascular   • CARDIAC CATHETERIZATION N/A 04/06/2018    Procedure: Left ventriculography;  Surgeon: Art Licea MD;  Location: [facility] CATH INVASIVE LOCATION;  Service: Cardiovascular   • CHOLECYSTECTOMY     • COLON SURGERY      colostomy bag, and colostomy reversal   • COLONOSCOPY  11/08/2006   • COLONOSCOPY N/A 06/08/2023    Procedure: COLONOSCOPY;  Surgeon: Alfred Castañeda MD;  Location: [facility] ENDOSCOPY;  Service: General;  Laterality: N/A;  same as preop   • EXPLORATORY LAPAROTOMY N/A 12/06/2022    Procedure: LAPAROTOMY EXPLORATORY sigmoid resection hartmans procedure colostomy;  Surgeon: Alfred Castañeda MD;  Location: [facility] MAIN OR;  Service: General;  Laterality: N/A;   • GANGLION CYST EXCISION Bilateral    • HYSTERECTOMY  05/2005   • ILEOSTOMY CLOSURE N/A 06/26/2023    Procedure: Laparoscopic Hand-Assisted Colostomy Closure with End to End Anastomosis;  Surgeon: Alfred Castañeda MD;  Location: [facility] OR;  Service: General;  Laterality: N/A;   • LAPAROSCOPIC GASTRIC BANDING      BAND REMOVED 2020   • PARASTOMAL HERNIA REPAIR N/A 06/26/2023    Procedure: Open Parastomal Hernia Repair;  Surgeon: Alfred Castañeda MD;  Location: [facility] MAIN OR;  Service: General;  Laterality: N/A;   • TONSILLECTOMY     • VENOUS ACCESS DEVICE (PORT) INSERTION N/A 01/09/2023    Procedure: INSERTION VENOUS ACCESS DEVICE;  Surgeon: Alfred Castañeda MD;  Location: [facility] MAIN OR;  Service: General;  Laterality: N/A;   • VENTRAL HERNIA REPAIR N/A 7/3/2024    Procedure: VENTRAL / INCISIONAL HERNIA REPAIR LAPAROSCOPIC WITH DAVINCI ROBOT with mesh;  Surgeon: Alfred Castañeda MD;  Location:   [facility] MAIN OR;  Service: Robotics - DaVinci;  Laterality: N/A;       Family History   Problem Relation Age of Onset   • Hypertension Mother    • Rheum arthritis Mother    • Heart disease Mother    • Breast cancer Mother    • Diabetes Father    • Cancer Maternal Grandmother         colon   • Colon cancer Maternal Grandmother    • Aneurysm Paternal Grandfather    • Diabetes Other    • Fibromyalgia Other    • Malig Hyperthermia Neg Hx        Social History     Tobacco Use   • Smoking status: Former     Current packs/day: 0.00     Average packs/day: 1 pack/day for 50.5 years (50.5 ttl pk-yrs)     Types: Cigarettes     Start date:      Quit date: 2024     Years since quittin.4   • Smokeless tobacco: Never   • Tobacco comments:          Has not used tobacco for 28 days    Vaping Use   • Vaping status: Never Used   Substance Use Topics   • Alcohol use: No   • Drug use: Never     Types: Marijuana     Comment: LAST USE 24       Review of Systems  All systems were reviewed and negative except for:   Review of Systems   Constitutional: Negative for chills, fever and unexpected weight loss.   HENT: Negative for congestion, nosebleeds and voice change.    Eyes: Negative for blurred vision, double vision and discharge.   Respiratory: Negative for apnea, chest tightness and shortness of breath.    Cardiovascular: Negative for chest pain and leg swelling.   Gastrointestinal:        See HPI   Endocrine: Negative for cold intolerance and heat intolerance.   Genitourinary: Negative for dysuria, hematuria and urgency.   Musculoskeletal: Negative for back pain, joint swelling and neck pain.   Skin: Negative for color change and dry skin.   Neurological: Negative for dizziness and confusion.   Hematological: Negative for adenopathy.   Psychiatric/Behavioral: Negative for agitation and behavioral problems.     MEDS:  Prior to Admission medications    Medication Sig Start Date End Date Taking? Authorizing Provider    atorvastatin (LIPITOR) 20 MG tablet TAKE 1 TABLET BY MOUTH EVERY DAY  Patient taking differently: Take 1 tablet by mouth Daily. 10/1/19  Yes Yudelka Manning MD   baclofen (LIORESAL) 20 MG tablet  5/1/24  Yes ProviderBessie MD   cyproheptadine (PERIACTIN) 4 MG tablet Take 1 tablet by mouth Every 12 (Twelve) Hours. 10/30/23  Yes Bessie Lopez MD   Diclofenac Sodium (VOLTAREN) 1 % gel gel APPLY TO THE AFFECTED AREA(S) ON THE SKIN FOUR TIMES DAILY FOR 10 DAYS   Yes Provider, MD Bessie   donepezil (ARICEPT) 10 MG tablet Take 1 tablet by mouth Daily. 10/28/22  Yes Bessie Lopez MD   DULoxetine (CYMBALTA) 30 MG capsule Take 1 capsule by mouth Daily. 11/12/24  Yes Megha Jose MD   DULoxetine (CYMBALTA) 60 MG capsule TAKE 1 capsule BY MOUTH DAILY for mood elevation 12/9/24  Yes Donald Barker MD   fluticasone (FLONASE) 50 MCG/ACT nasal spray 2 sprays by Each Nare route Daily. 12/11/24  Yes Provider, MD Bessie   gabapentin (NEURONTIN) 600 MG tablet TAKE 1 TABLET BY MOUTH AT BEDTIME  Patient taking differently: Take 1 tablet by mouth 2 (Two) Times a Day As Needed. 7/30/20  Yes Yudelka Manning MD   GaviLyte-G 236 g solution take 4000ml BY MOUTH once for 1 dose 12/23/24  Yes Provider, MD Bessie   hydroCHLOROthiazide 12.5 MG tablet TAKE 1 TABLET BY MOUTH DAILY for swelling 12/9/24  Yes Donald Barker MD   ibuprofen (ADVIL,MOTRIN) 600 MG tablet Take 1 tablet by mouth Every 8 (Eight) Hours As Needed. for pain 5/10/24  Yes Provider, MD Bessie   lactulose (CHRONULAC) 10 GM/15ML solution take 30 mls BY MOUTH TWICE DAILY 7/5/24  Yes ProviderBessie MD   levothyroxine (SYNTHROID, LEVOTHROID) 50 MCG tablet TAKE 1 TABLET BY MOUTH EVERY DAY  Patient taking differently: Take 1 tablet by mouth Daily. 7/19/19  Yes Yudelka Manning MD   lidocaine (LIDODERM) 5 % Place 1 patch on the skin as directed by provider Daily. Remove & Discard patch within 12 hours or as directed by  MD   Yes Bessie Lopez MD   Lidocaine Pain Relief 4 %  5/10/24  Yes Bessie Lopez MD   LORazepam (ATIVAN) 0.5 MG tablet Take 1 tablet by mouth Every 6 (Six) Hours As Needed.   Yes Bessie Lopez MD   losartan (COZAAR) 50 MG tablet Take 1 tablet by mouth Daily. for blood pressure 5/6/24  Yes Bessie Lopez MD   mirtazapine (REMERON) 15 MG tablet Take 1 tablet by mouth Every Night. 12/30/24  Yes Megha Jose MD   montelukast (SINGULAIR) 10 MG tablet Take 1 tablet by mouth Daily. 1/17/22  Yes Bessie Lopez MD   Morphine (MS CONTIN) 60 MG 12 hr tablet Take 1 tablet by mouth 2 (Two) Times a Day. 12/26/24  Yes Curtis Swift MD   naloxone (NARCAN) 4 MG/0.1ML nasal spray Call 911. Don't prime. [place] in 1 nostril for overdose. Repeat in 2-3 minutes in other nostril if no or minimal breathing/responsiveness. 7/3/24  Yes Alfred Castañeda MD   nicotine (NICODERM CQ) 21 MG/24HR patch Place 1 patch on the skin as directed by provider Daily. 7/12/24  Yes Alfred Castañeda MD   ondansetron (ZOFRAN) 8 MG tablet Take 1 tablet by mouth Every 8 (Eight) Hours As Needed for Nausea or Vomiting. 12/26/24  Yes Curtis Swift MD   oxybutynin XL (DITROPAN XL) 15 MG 24 hr tablet TAKE 1 TABLET BY MOUTH DAILY for bladder 12/9/24  Yes Donald Barker MD   oxyCODONE (Roxicodone) 5 MG immediate release tablet Take 1 tablet by mouth Every 8 (Eight) Hours As Needed for Moderate Pain.  Patient taking differently: Take 1 tablet by mouth Every 8 (Eight) Hours As Needed for Moderate Pain. Pt reports 10mg 7/3/24 7/3/25 Yes Alfred Castañeda MD   polyethylene glycol (MIRALAX) 17 g packet Take 17 g by mouth Daily. 7/3/24  Yes Alfred Castañeda MD   potassium chloride (MICRO-K) 10 MEQ CR capsule TAKE 1 CAPSULE BY MOUTH EVERY TWELVE HOURS 12/9/24  Yes Donald Barker MD   prochlorperazine (COMPAZINE) 10 MG tablet  9/29/23  Yes Provider, MD Bessie   ribociclib succinate 200 MG tablet  therapy pack tablet Take 3 tablets by mouth Take As Directed. Take 3 tablets by mouth daily for 21 days then off 7 days on a 28 day cycle. 8/1/23  Yes Donald Barker MD   rOPINIRole (REQUIP) 3 MG tablet Take 1 tablet by mouth 2 (Two) Times a Day. 6/29/22  Yes Bessie Lopez MD   SudoGest 60 MG tablet Take 1 tablet by mouth Every 8 (Eight) Hours. 11/13/23  Yes Bessie Lopez MD   SUMAtriptan (IMITREX) 100 MG tablet Take 1 tablet by mouth 1 (One) Time As Needed. 10/7/22  Yes Bessie Lopez MD   traZODone (DESYREL) 150 MG tablet TAKE 1 TABLET BY MOUTH ONCE nightly  Patient taking differently: Take 1 tablet by mouth Every Night. 11/12/19  Yes Yudelka Manning MD   letrozole (FEMARA) 2.5 MG tablet TAKE 1 TABLET BY MOUTH DAILY 12/31/24   Donald Barker MD   senna (Senokot) 8.6 MG tablet Take 1 tablet by mouth Daily. 12/31/24   Alfred Castañeda MD        Allergies:  Azithromycin and Erythromycin    Objective    Vital Signs        Physical Exam:     General Appearance:    Alert, cooperative, in no acute distress   Head:    Normocephalic, without obvious abnormality, atraumatic   Eyes:          Conjunctivae and sclerae normal, no icterus,     Ears:    Ears appear intact with no abnormalities noted   Throat:   No oral lesions, no thrush, oral mucosa moist   Neck:   No adenopathy, supple, trachea midline, no thyromegaly   Back:     No kyphosis present, no scoliosis present, no skin lesions,      erythema or scars, no tenderness to percussion or                   palpation,   range of motion normal   Lungs:     Clear to auscultation,respirations regular, even and                  unlabored    Heart:    Regular rhythm and normal rate, normal S1 and S2, no            murmur, no gallop, no rub, no click   Chest Wall:    No abnormalities observed   Abdomen:     Normal bowel sounds, no masses, no organomegaly, soft       mild central abdominal tenderness to palpation, non-distended, no guarding,  "no rebound                tenderness   Rectal:        Extremities:   Moves all extremities well, no edema, no cyanosis, no             redness   Pulses:   Pulses palpable and equal bilaterally   Skin:   No bleeding, bruising or rash   Lymph nodes:   No palpable adenopathy   Neurologic:   A/o x 4 with no deficits       Results Review:   {Results Review:[id number]::\"I reviewed the patient's new clinical results.\"    LABS/IMAGING:  Results for orders placed or performed during the hospital encounter of 12/22/24   Comprehensive Metabolic Panel    Collection Time: 12/22/24 12:59 PM    Specimen: Blood   Result Value Ref Range    Glucose 155 (H) 65 - 99 mg/dL    BUN 20 8 - 23 mg/dL    Creatinine 0.79 0.57 - 1.00 mg/dL    Sodium 139 136 - 145 mmol/L    Potassium 3.3 (L) 3.5 - 5.2 mmol/L    Chloride 99 98 - 107 mmol/L    CO2 28.8 22.0 - 29.0 mmol/L    Calcium 8.6 8.6 - 10.5 mg/dL    Total Protein 6.5 6.0 - 8.5 g/dL    Albumin 3.7 3.5 - 5.2 g/dL    ALT (SGPT) 11 1 - 33 U/L    AST (SGOT) 15 1 - 32 U/L    Alkaline Phosphatase 103 39 - 117 U/L    Total Bilirubin 0.3 0.0 - 1.2 mg/dL    Globulin 2.8 gm/dL    A/G Ratio 1.3 g/dL    BUN/Creatinine Ratio 25.3 (H) 7.0 - 25.0    Anion Gap 11.2 5.0 - 15.0 mmol/L    eGFR 83.1 >60.0 mL/min/1.73   Lipase    Collection Time: 12/22/24 12:59 PM    Specimen: Blood   Result Value Ref Range    Lipase 12 (L) 13 - 60 U/L   Lactic Acid, Plasma    Collection Time: 12/22/24 12:59 PM    Specimen: Blood   Result Value Ref Range    Lactate 1.7 0.5 - 2.0 mmol/L   CBC Auto Differential    Collection Time: 12/22/24 12:59 PM    Specimen: Blood   Result Value Ref Range    WBC 4.52 3.40 - 10.80 10*3/mm3    RBC 2.90 (L) 3.77 - 5.28 10*6/mm3    Hemoglobin 9.3 (L) 12.0 - 15.9 g/dL    Hematocrit 30.1 (L) 34.0 - 46.6 %    .8 (H) 79.0 - 97.0 fL    MCH 32.1 26.6 - 33.0 pg    MCHC 30.9 (L) 31.5 - 35.7 g/dL    RDW 15.9 (H) 12.3 - 15.4 %    RDW-SD 61.7 (H) 37.0 - 54.0 fl    MPV 9.5 6.0 - 12.0 fL    Platelets 194 140 - " 450 10*3/mm3    Neutrophil % 59.1 42.7 - 76.0 %    Lymphocyte % 27.7 19.6 - 45.3 %    Monocyte % 11.3 5.0 - 12.0 %    Eosinophil % 0.4 0.3 - 6.2 %    Basophil % 1.3 0.0 - 1.5 %    Immature Grans % 0.2 0.0 - 0.5 %    Neutrophils, Absolute 2.67 1.70 - 7.00 10*3/mm3    Lymphocytes, Absolute 1.25 0.70 - 3.10 10*3/mm3    Monocytes, Absolute 0.51 0.10 - 0.90 10*3/mm3    Eosinophils, Absolute 0.02 0.00 - 0.40 10*3/mm3    Basophils, Absolute 0.06 0.00 - 0.20 10*3/mm3    Immature Grans, Absolute 0.01 0.00 - 0.05 10*3/mm3    nRBC 0.0 0.0 - 0.2 /100 WBC   Green Top (Gel)    Collection Time: 12/22/24 12:59 PM   Result Value Ref Range    Extra Tube Hold for add-ons.    Lavender Top    Collection Time: 12/22/24 12:59 PM   Result Value Ref Range    Extra Tube hold for add-on    Gold Top - SST    Collection Time: 12/22/24 12:59 PM   Result Value Ref Range    Extra Tube Hold for add-ons.    Light Blue Top    Collection Time: 12/22/24 12:59 PM   Result Value Ref Range    Extra Tube Hold for add-ons.    Urinalysis With Microscopic If Indicated (No Culture) - Urine, Clean Catch    Collection Time: 12/22/24  2:49 PM    Specimen: Urine, Clean Catch   Result Value Ref Range    Color, UA Yellow Yellow, Straw    Appearance, UA Clear Clear    pH, UA 5.5 5.0 - 8.0    Specific Gravity, UA 1.010 1.005 - 1.030    Glucose, UA Negative Negative    Ketones, UA Negative Negative    Bilirubin, UA Negative Negative    Blood, UA Negative Negative    Protein, UA Negative Negative    Leuk Esterase, UA Negative Negative    Nitrite, UA Negative Negative    Urobilinogen, UA 0.2 E.U./dL 0.2 - 1.0 E.U./dL     *Note: Due to a large number of results and/or encounters for the requested time period, some results have not been displayed. A complete set of results can be found in Results Review.        Result Review: Labs  Result Review  Imaging  Med Tab  Media     Assessment & Plan    History of metastatic breast cancer  Status post ex lap with Leah's  procedure for perforated sigmoid colon 12/6/2022.  Pathology with metastatic lobular breast carcinoma    Status post laparoscopic hand-assisted colostomy closure with resection, open parastomal hernia repair 6/26/2023  Pathology with metastatic lobular carcinoma to portion of descending colon and anastomotic rings    Colonoscopy 6/8/2023 with no evidence of recurrence.    Status post robotic lysis of adhesions, repair of incisional hernia with mesh, biopsy of peritoneal nodule 7/3/2024.  Pathology with benign nodule suggestive of granuloma.    CT abdomen pelvis 12/22/2024 with recurrent incisional hernia noted  Recurrent incisional hernia without evidence of obstruction or gangrene  Tobacco abuse    Discussion with patient.  We reviewed the CT images.  I explained her she would likely need open repair of her recurrent incisional hernia and recommend referral to a larger facility for repair.  Referral made to [facility].  I explained her she would need to quit smoking and offered to help her quit.  She may wear an abdominal binder.  Prescription given for senna for constipation.  We will try to help her obtain a copy of the CT images to take to her appointment.  Follow-up with me as needed.  All questions answered.  She agrees with the plan.           This document has been electronically signed by Alfred Castañeda MD  January 3, 2025 09:56 EST

## 2024-12-31 NOTE — TELEPHONE ENCOUNTER
----- Message from Emily STARR sent at 12/31/2024 11:21 AM EST -----  CALL XRAY FOR PATIENT TO  IMAGES

## 2025-01-03 NOTE — PROGRESS NOTES
General Surgery/Colorectal Surgery Note    Patient Name:  Derek Keller  YOB: 1960  [id number]    Referring Provider: No ref. provider found      Patient Care Team:  Haile Monique MD as PCP - General (Family Medicine)  Julien Cardona DO as Consulting Physician (Pain Medicine)  Joel Castillo MD as Consulting Physician (Gastroenterology)  Remington Russell MD as Surgeon (General Surgery)  Ahsan Salas MD as Consulting Physician (Sports Medicine)  Donald Barker MD as Consulting Physician (Hematology and Oncology)  Sandy Ricci MD as Consulting Physician (General Surgery)  Alfred Castañeda MD as Consulting Physician (General Surgery)    Chief complaint follow-up    Subjective .     History of present illness:    History of metastatic breast cancer  Status post ex lap with Lynn's procedure for perforated sigmoid colon 12/6/2022.  Pathology with metastatic lobular breast carcinoma    Status post laparoscopic hand-assisted colostomy closure with resection, open parastomal hernia repair 6/26/2023  Pathology with metastatic lobular carcinoma to portion of descending colon and anastomotic rings    Colonoscopy 6/8/2023 with no evidence of recurrence.    CT abdomen pelvis 2/5/2024 with no evidence of intra-abdominal metastatic disease, constipation noted, incisional   hernia at previous colostomy site noted with no bowel contained in the defect    CT abdomen pelvis April 2024 with left-sided abdominal wall defect with new herniation of portion of transverse colon.    Seen for evaluation of a painful bulge at her previous colostomy site.    Status post robotic lysis of adhesions, repair of incisional hernia with mesh, biopsy of peritoneal nodule 7/3/2024.  Pathology with benign nodule suggestive of granuloma.    She comes in for follow-up.  She has had constant generalized abdominal pain for the past several weeks.  No fever.  She is smoking.  CT abdomen pelvis 12/22/2024 with  recurrent incisional hernia noted      History:  Past Medical History:   Diagnosis Date    Abdominal pain     OSTOMY SITE    Allergic rhinitis     Anemia     NO S/S    Anxiety     Arteriosclerosis     Coronary, follows with Dr. Núñez    Arthritis     Bone cancer     Bone metastases 10/14/2022    Bowen's disease     SKIN CANCER    Breast cancer     NO SURGERY WAS DONE DUE TO METS TO BONE/COLON/LYMPHNODE    Cancer related pain 10/13/2022    Depression     Disorder associated with Helicobacter species 10/12/2022    Dysphoric mood     Fatigue     Fibromyalgia     Frequent urination     NO S/S INFECTION    Herpes simplex vulvovaginitis 07/26/2018    History of colon polyps     History of IBS     History of kidney stones     Hyperlipidemia     Hypertension     Hypothyroidism     Insomnia     Lumbago     Mood disorder     Neck pain     R/T CANCER    Palpitations     last time a few months ago    Precordial pain     R/T SPINE CANCER    S/P Laparoscopic Hand-Assisted Colostomy Closure with End to End Anastomosis Open Parastomal Hernia Repair 12/08/2022    Sleep disturbance     SOB (shortness of breath)     at times at rest    SOBOE (shortness of breath on exertion)        Past Surgical History:   Procedure Laterality Date    BREAST BIOPSY Left     CARDIAC CATHETERIZATION Left 1960    CARDIAC CATHETERIZATION N/A 04/06/2018    Procedure: Coronary angiography;  Surgeon: Art Licea MD;  Location:  [facility];  Service: Cardiovascular    CARDIAC CATHETERIZATION N/A 04/06/2018    Procedure: Left heart cath;  Surgeon: Art Licea MD;  Location:  [facility];  Service: Cardiovascular    CARDIAC CATHETERIZATION N/A 04/06/2018    Procedure: Left ventriculography;  Surgeon: Art Licea MD;  Location:  [facility];  Service: Cardiovascular    CHOLECYSTECTOMY      COLON SURGERY      colostomy bag, and colostomy reversal    COLONOSCOPY  11/08/2006     COLONOSCOPY N/A 06/08/2023    Procedure: COLONOSCOPY;  Surgeon: Alfred Castañeda MD;  Location: [facility] ENDOSCOPY;  Service: General;  Laterality: N/A;  same as preop    EXPLORATORY LAPAROTOMY N/A 12/06/2022    Procedure: LAPAROTOMY EXPLORATORY sigmoid resection hartmans procedure colostomy;  Surgeon: Alfred Castañeda MD;  Location: [facility] MAIN OR;  Service: General;  Laterality: N/A;    GANGLION CYST EXCISION Bilateral     HYSTERECTOMY  05/2005    ILEOSTOMY CLOSURE N/A 06/26/2023    Procedure: Laparoscopic Hand-Assisted Colostomy Closure with End to End Anastomosis;  Surgeon: Alfred Castañeda MD;  Location: [facility] MAIN OR;  Service: General;  Laterality: N/A;    LAPAROSCOPIC GASTRIC BANDING      BAND REMOVED 2020    PARASTOMAL HERNIA REPAIR N/A 06/26/2023    Procedure: Open Parastomal Hernia Repair;  Surgeon: Alfred Castañeda MD;  Location: [facility] MAIN OR;  Service: General;  Laterality: N/A;    TONSILLECTOMY      VENOUS ACCESS DEVICE (PORT) INSERTION N/A 01/09/2023    Procedure: INSERTION VENOUS ACCESS DEVICE;  Surgeon: Alfred Castañeda MD;  Location: [facility] MAIN OR;  Service: General;  Laterality: N/A;    VENTRAL HERNIA REPAIR N/A 7/3/2024    Procedure: VENTRAL / INCISIONAL HERNIA REPAIR LAPAROSCOPIC WITH DAVINCI ROBOT with mesh;  Surgeon: Alfred Castañeda MD;  Location: [facility] MAIN OR;  Service: Robotics - DaVinci;  Laterality: N/A;       Family History   Problem Relation Age of Onset    Hypertension Mother     Rheum arthritis Mother     Heart disease Mother     Breast cancer Mother     Diabetes Father     Cancer Maternal Grandmother         colon    Colon cancer Maternal Grandmother     Aneurysm Paternal Grandfather     Diabetes Other     Fibromyalgia Other     Malig Hyperthermia Neg Hx        Social History     Tobacco Use    Smoking status: Former     Current packs/day: 0.00     Average packs/day: 1 pack/day for 50.5 years (50.5 ttl pk-yrs)     Types: Cigarettes     Start  date:      Quit date: 2024     Years since quittin.4    Smokeless tobacco: Never    Tobacco comments:          Has not used tobacco for 28 days    Vaping Use    Vaping status: Never Used   Substance Use Topics    Alcohol use: No    Drug use: Never     Types: Marijuana     Comment: LAST USE 24       Review of Systems  All systems were reviewed and negative except for:   Review of Systems   Constitutional: Negative for chills, fever and unexpected weight loss.   HENT: Negative for congestion, nosebleeds and voice change.    Eyes: Negative for blurred vision, double vision and discharge.   Respiratory: Negative for apnea, chest tightness and shortness of breath.    Cardiovascular: Negative for chest pain and leg swelling.   Gastrointestinal:        See HPI   Endocrine: Negative for cold intolerance and heat intolerance.   Genitourinary: Negative for dysuria, hematuria and urgency.   Musculoskeletal: Negative for back pain, joint swelling and neck pain.   Skin: Negative for color change and dry skin.   Neurological: Negative for dizziness and confusion.   Hematological: Negative for adenopathy.   Psychiatric/Behavioral: Negative for agitation and behavioral problems.     MEDS:  Prior to Admission medications    Medication Sig Start Date End Date Taking? Authorizing Provider   atorvastatin (LIPITOR) 20 MG tablet TAKE 1 TABLET BY MOUTH EVERY DAY  Patient taking differently: Take 1 tablet by mouth Daily. 10/1/19  Yes Yudelka Manning MD   baclofen (LIORESAL) 20 MG tablet  24  Yes ProviderBessie MD   cyproheptadine (PERIACTIN) 4 MG tablet Take 1 tablet by mouth Every 12 (Twelve) Hours. 10/30/23  Yes ProviderBessie MD   Diclofenac Sodium (VOLTAREN) 1 % gel gel APPLY TO THE AFFECTED AREA(S) ON THE SKIN FOUR TIMES DAILY FOR 10 DAYS   Yes Provider, MD Bessie   donepezil (ARICEPT) 10 MG tablet Take 1 tablet by mouth Daily. 10/28/22  Yes ProviderBessie MD   DULoxetine (CYMBALTA) 30  MG capsule Take 1 capsule by mouth Daily. 11/12/24  Yes Megha Jose MD   DULoxetine (CYMBALTA) 60 MG capsule TAKE 1 capsule BY MOUTH DAILY for mood elevation 12/9/24  Yes Donald Barker MD   fluticasone (FLONASE) 50 MCG/ACT nasal spray 2 sprays by Each Nare route Daily. 12/11/24  Yes Bessie Lopez MD   gabapentin (NEURONTIN) 600 MG tablet TAKE 1 TABLET BY MOUTH AT BEDTIME  Patient taking differently: Take 1 tablet by mouth 2 (Two) Times a Day As Needed. 7/30/20  Yes Yudelka Manning MD   GaviLyte-G 236 g solution take 4000ml BY MOUTH once for 1 dose 12/23/24  Yes Bessie Lopez MD   hydroCHLOROthiazide 12.5 MG tablet TAKE 1 TABLET BY MOUTH DAILY for swelling 12/9/24  Yes Donald Barker MD   ibuprofen (ADVIL,MOTRIN) 600 MG tablet Take 1 tablet by mouth Every 8 (Eight) Hours As Needed. for pain 5/10/24  Yes ProviderBessie MD   lactulose (CHRONULAC) 10 GM/15ML solution take 30 mls BY MOUTH TWICE DAILY 7/5/24  Yes ProviderBessie MD   levothyroxine (SYNTHROID, LEVOTHROID) 50 MCG tablet TAKE 1 TABLET BY MOUTH EVERY DAY  Patient taking differently: Take 1 tablet by mouth Daily. 7/19/19  Yes Yudelka Manning MD   lidocaine (LIDODERM) 5 % Place 1 patch on the skin as directed by provider Daily. Remove & Discard patch within 12 hours or as directed by MD   Yes Provider, MD Bessie   Lidocaine Pain Relief 4 %  5/10/24  Yes Provider, MD Bessie   LORazepam (ATIVAN) 0.5 MG tablet Take 1 tablet by mouth Every 6 (Six) Hours As Needed.   Yes ProviderBessie MD   losartan (COZAAR) 50 MG tablet Take 1 tablet by mouth Daily. for blood pressure 5/6/24  Yes ProviderBessie MD   mirtazapine (REMERON) 15 MG tablet Take 1 tablet by mouth Every Night. 12/30/24  Yes Megha Jose MD   montelukast (SINGULAIR) 10 MG tablet Take 1 tablet by mouth Daily. 1/17/22  Yes ProviderBessie MD   Morphine (MS CONTIN) 60 MG 12 hr tablet Take 1 tablet by mouth 2 (Two) Times a Day.  12/26/24  Yes Curtis Swift MD   naloxone (NARCAN) 4 MG/0.1ML nasal spray Call 911. Don't prime. [place] in 1 nostril for overdose. Repeat in 2-3 minutes in other nostril if no or minimal breathing/responsiveness. 7/3/24  Yes Alfred Castañeda MD   nicotine (NICODERM CQ) 21 MG/24HR patch Place 1 patch on the skin as directed by provider Daily. 7/12/24  Yes Alfred Castañeda MD   ondansetron (ZOFRAN) 8 MG tablet Take 1 tablet by mouth Every 8 (Eight) Hours As Needed for Nausea or Vomiting. 12/26/24  Yes Curtis Swift MD   oxybutynin XL (DITROPAN XL) 15 MG 24 hr tablet TAKE 1 TABLET BY MOUTH DAILY for bladder 12/9/24  Yes Donald Barker MD   oxyCODONE (Roxicodone) 5 MG immediate release tablet Take 1 tablet by mouth Every 8 (Eight) Hours As Needed for Moderate Pain.  Patient taking differently: Take 1 tablet by mouth Every 8 (Eight) Hours As Needed for Moderate Pain. Pt reports 10mg 7/3/24 7/3/25 Yes Alfred Castañeda MD   polyethylene glycol (MIRALAX) 17 g packet Take 17 g by mouth Daily. 7/3/24  Yes Alfred Castañeda MD   potassium chloride (MICRO-K) 10 MEQ CR capsule TAKE 1 CAPSULE BY MOUTH EVERY TWELVE HOURS 12/9/24  Yes Donald Barker MD   prochlorperazine (COMPAZINE) 10 MG tablet  9/29/23  Yes Bessie Lopez MD   ribociclib succinate 200 MG tablet therapy pack tablet Take 3 tablets by mouth Take As Directed. Take 3 tablets by mouth daily for 21 days then off 7 days on a 28 day cycle. 8/1/23  Yes Donald Barker MD   rOPINIRole (REQUIP) 3 MG tablet Take 1 tablet by mouth 2 (Two) Times a Day. 6/29/22  Yes Bessie Lopez MD   SudoGest 60 MG tablet Take 1 tablet by mouth Every 8 (Eight) Hours. 11/13/23  Yes Bessie Lopez MD   SUMAtriptan (IMITREX) 100 MG tablet Take 1 tablet by mouth 1 (One) Time As Needed. 10/7/22  Yes Provider, MD Bessie   traZODone (DESYREL) 150 MG tablet TAKE 1 TABLET BY MOUTH ONCE nightly  Patient taking differently: Take 1 tablet by mouth  "Every Night. 11/12/19  Yes Yudelka Manning MD   letrozole (FEMARA) 2.5 MG tablet TAKE 1 TABLET BY MOUTH DAILY 12/31/24   Donald Barker MD   senna (Senokot) 8.6 MG tablet Take 1 tablet by mouth Daily. 12/31/24   Alfred Castañeda MD        Allergies:  Azithromycin and Erythromycin    Objective     Vital Signs        Physical Exam:     General Appearance:    Alert, cooperative, in no acute distress   Head:    Normocephalic, without obvious abnormality, atraumatic   Eyes:          Conjunctivae and sclerae normal, no icterus,     Ears:    Ears appear intact with no abnormalities noted   Throat:   No oral lesions, no thrush, oral mucosa moist   Neck:   No adenopathy, supple, trachea midline, no thyromegaly   Back:     No kyphosis present, no scoliosis present, no skin lesions,      erythema or scars, no tenderness to percussion or                   palpation,   range of motion normal   Lungs:     Clear to auscultation,respirations regular, even and                  unlabored    Heart:    Regular rhythm and normal rate, normal S1 and S2, no            murmur, no gallop, no rub, no click   Chest Wall:    No abnormalities observed   Abdomen:     Normal bowel sounds, no masses, no organomegaly, soft       mild central abdominal tenderness to palpation, non-distended, no guarding, no rebound                tenderness   Rectal:        Extremities:   Moves all extremities well, no edema, no cyanosis, no             redness   Pulses:   Pulses palpable and equal bilaterally   Skin:   No bleeding, bruising or rash   Lymph nodes:   No palpable adenopathy   Neurologic:   A/o x 4 with no deficits       Results Review:   {Results Review:20703::\"I reviewed the patient's new clinical results.\"    LABS/IMAGING:  Results for orders placed or performed during the hospital encounter of 12/22/24   Comprehensive Metabolic Panel    Collection Time: 12/22/24 12:59 PM    Specimen: Blood   Result Value Ref Range    Glucose 155 (H) 65 " - 99 mg/dL    BUN 20 8 - 23 mg/dL    Creatinine 0.79 0.57 - 1.00 mg/dL    Sodium 139 136 - 145 mmol/L    Potassium 3.3 (L) 3.5 - 5.2 mmol/L    Chloride 99 98 - 107 mmol/L    CO2 28.8 22.0 - 29.0 mmol/L    Calcium 8.6 8.6 - 10.5 mg/dL    Total Protein 6.5 6.0 - 8.5 g/dL    Albumin 3.7 3.5 - 5.2 g/dL    ALT (SGPT) 11 1 - 33 U/L    AST (SGOT) 15 1 - 32 U/L    Alkaline Phosphatase 103 39 - 117 U/L    Total Bilirubin 0.3 0.0 - 1.2 mg/dL    Globulin 2.8 gm/dL    A/G Ratio 1.3 g/dL    BUN/Creatinine Ratio 25.3 (H) 7.0 - 25.0    Anion Gap 11.2 5.0 - 15.0 mmol/L    eGFR 83.1 >60.0 mL/min/1.73   Lipase    Collection Time: 12/22/24 12:59 PM    Specimen: Blood   Result Value Ref Range    Lipase 12 (L) 13 - 60 U/L   Lactic Acid, Plasma    Collection Time: 12/22/24 12:59 PM    Specimen: Blood   Result Value Ref Range    Lactate 1.7 0.5 - 2.0 mmol/L   CBC Auto Differential    Collection Time: 12/22/24 12:59 PM    Specimen: Blood   Result Value Ref Range    WBC 4.52 3.40 - 10.80 10*3/mm3    RBC 2.90 (L) 3.77 - 5.28 10*6/mm3    Hemoglobin 9.3 (L) 12.0 - 15.9 g/dL    Hematocrit 30.1 (L) 34.0 - 46.6 %    .8 (H) 79.0 - 97.0 fL    MCH 32.1 26.6 - 33.0 pg    MCHC 30.9 (L) 31.5 - 35.7 g/dL    RDW 15.9 (H) 12.3 - 15.4 %    RDW-SD 61.7 (H) 37.0 - 54.0 fl    MPV 9.5 6.0 - 12.0 fL    Platelets 194 140 - 450 10*3/mm3    Neutrophil % 59.1 42.7 - 76.0 %    Lymphocyte % 27.7 19.6 - 45.3 %    Monocyte % 11.3 5.0 - 12.0 %    Eosinophil % 0.4 0.3 - 6.2 %    Basophil % 1.3 0.0 - 1.5 %    Immature Grans % 0.2 0.0 - 0.5 %    Neutrophils, Absolute 2.67 1.70 - 7.00 10*3/mm3    Lymphocytes, Absolute 1.25 0.70 - 3.10 10*3/mm3    Monocytes, Absolute 0.51 0.10 - 0.90 10*3/mm3    Eosinophils, Absolute 0.02 0.00 - 0.40 10*3/mm3    Basophils, Absolute 0.06 0.00 - 0.20 10*3/mm3    Immature Grans, Absolute 0.01 0.00 - 0.05 10*3/mm3    nRBC 0.0 0.0 - 0.2 /100 WBC   Green Top (Gel)    Collection Time: 12/22/24 12:59 PM   Result Value Ref Range    Extra Tube  Hold for add-ons.    Lavender Top    Collection Time: 12/22/24 12:59 PM   Result Value Ref Range    Extra Tube hold for add-on    Gold Top - SST    Collection Time: 12/22/24 12:59 PM   Result Value Ref Range    Extra Tube Hold for add-ons.    Light Blue Top    Collection Time: 12/22/24 12:59 PM   Result Value Ref Range    Extra Tube Hold for add-ons.    Urinalysis With Microscopic If Indicated (No Culture) - Urine, Clean Catch    Collection Time: 12/22/24  2:49 PM    Specimen: Urine, Clean Catch   Result Value Ref Range    Color, UA Yellow Yellow, Straw    Appearance, UA Clear Clear    pH, UA 5.5 5.0 - 8.0    Specific Gravity, UA 1.010 1.005 - 1.030    Glucose, UA Negative Negative    Ketones, UA Negative Negative    Bilirubin, UA Negative Negative    Blood, UA Negative Negative    Protein, UA Negative Negative    Leuk Esterase, UA Negative Negative    Nitrite, UA Negative Negative    Urobilinogen, UA 0.2 E.U./dL 0.2 - 1.0 E.U./dL     *Note: Due to a large number of results and/or encounters for the requested time period, some results have not been displayed. A complete set of results can be found in Results Review.        Result Review : Labs  Result Review  Imaging  Med Tab  Media     Assessment & Plan     History of metastatic breast cancer  Status post ex lap with Lynn's procedure for perforated sigmoid colon 12/6/2022.  Pathology with metastatic lobular breast carcinoma    Status post laparoscopic hand-assisted colostomy closure with resection, open parastomal hernia repair 6/26/2023  Pathology with metastatic lobular carcinoma to portion of descending colon and anastomotic rings    Colonoscopy 6/8/2023 with no evidence of recurrence.    Status post robotic lysis of adhesions, repair of incisional hernia with mesh, biopsy of peritoneal nodule 7/3/2024.  Pathology with benign nodule suggestive of granuloma.    CT abdomen pelvis 12/22/2024 with recurrent incisional hernia noted  Recurrent incisional hernia  without evidence of obstruction or gangrene  Tobacco abuse    Discussion with patient.  We reviewed the CT images.  I explained her she would likely need open repair of her recurrent incisional hernia and recommend referral to a larger facility for repair.  Referral made to [facility].  I explained her she would need to quit smoking and offered to help her quit.  She may wear an abdominal binder.  Prescription given for senna for constipation.  We will try to help her obtain a copy of the CT images to take to her appointment.  Follow-up with me as needed.  All questions answered.  She agrees with the plan.           This document has been electronically signed by Alfred Castañeda MD  January 3, 2025 09:56 EST

## 2025-01-07 ENCOUNTER — HOSPITAL ENCOUNTER (OUTPATIENT)
Dept: ONCOLOGY | Facility: HOSPITAL | Age: 65
Discharge: HOME | End: 2025-01-07
Payer: MEDICARE

## 2025-01-07 ENCOUNTER — TELEPHONE (OUTPATIENT)
Dept: SURGERY | Facility: CLINIC | Age: 65
End: 2025-01-07
Payer: MEDICARE

## 2025-01-07 ENCOUNTER — HOSPITAL ENCOUNTER (OUTPATIENT)
Dept: ONCOLOGY | Facility: HOSPITAL | Age: 65
End: 2025-01-07
Payer: MEDICARE

## 2025-01-07 ENCOUNTER — OFFICE VISIT (OUTPATIENT)
Dept: ONCOLOGY | Facility: HOSPITAL | Age: 65
End: 2025-01-07
Payer: MEDICARE

## 2025-01-07 VITALS
RESPIRATION RATE: 18 BRPM | HEIGHT: 66 IN | TEMPERATURE: 97.7 F | HEART RATE: 79 BPM | OXYGEN SATURATION: 97 % | BODY MASS INDEX: 32.3 KG/M2 | DIASTOLIC BLOOD PRESSURE: 53 MMHG | SYSTOLIC BLOOD PRESSURE: 148 MMHG | WEIGHT: 201 LBS

## 2025-01-07 VITALS
DIASTOLIC BLOOD PRESSURE: 58 MMHG | HEART RATE: 79 BPM | OXYGEN SATURATION: 94 % | RESPIRATION RATE: 17 BRPM | TEMPERATURE: 98.6 F | SYSTOLIC BLOOD PRESSURE: 152 MMHG

## 2025-01-07 DIAGNOSIS — E78.2 MIXED HYPERLIPIDEMIA: ICD-10-CM

## 2025-01-07 DIAGNOSIS — C79.51 MALIGNANT NEOPLASM METASTATIC TO BONE: ICD-10-CM

## 2025-01-07 DIAGNOSIS — Z17.0 MALIGNANT NEOPLASM OF UPPER-OUTER QUADRANT OF LEFT BREAST IN FEMALE, ESTROGEN RECEPTOR POSITIVE: Primary | ICD-10-CM

## 2025-01-07 DIAGNOSIS — C50.412 MALIGNANT NEOPLASM OF UPPER-OUTER QUADRANT OF LEFT BREAST IN FEMALE, ESTROGEN RECEPTOR POSITIVE: Primary | ICD-10-CM

## 2025-01-07 DIAGNOSIS — K43.2 INCISIONAL HERNIA, WITHOUT OBSTRUCTION OR GANGRENE: ICD-10-CM

## 2025-01-07 DIAGNOSIS — C50.412 MALIGNANT NEOPLASM OF UPPER-OUTER QUADRANT OF LEFT BREAST IN FEMALE, ESTROGEN RECEPTOR POSITIVE: ICD-10-CM

## 2025-01-07 DIAGNOSIS — G89.3 CANCER RELATED PAIN: ICD-10-CM

## 2025-01-07 DIAGNOSIS — Z45.2 ENCOUNTER FOR ADJUSTMENT OR MANAGEMENT OF VASCULAR ACCESS DEVICE: Primary | ICD-10-CM

## 2025-01-07 DIAGNOSIS — Z17.0 MALIGNANT NEOPLASM OF UPPER-OUTER QUADRANT OF LEFT BREAST IN FEMALE, ESTROGEN RECEPTOR POSITIVE: ICD-10-CM

## 2025-01-07 LAB
ALBUMIN SERPL-MCNC: 4 G/DL (ref 3.5–5.2)
ALBUMIN/GLOB SERPL: 1.3 G/DL
ALP SERPL-CCNC: 108 U/L (ref 39–117)
ALT SERPL W P-5'-P-CCNC: 8 U/L (ref 1–33)
ANION GAP SERPL CALCULATED.3IONS-SCNC: 8.3 MMOL/L (ref 5–15)
AST SERPL-CCNC: 12 U/L (ref 1–32)
BASOPHILS # BLD AUTO: 0.02 10*3/MM3 (ref 0–0.2)
BASOPHILS NFR BLD AUTO: 0.4 % (ref 0–1.5)
BILIRUB SERPL-MCNC: 0.3 MG/DL (ref 0–1.2)
BUN SERPL-MCNC: 20 MG/DL (ref 8–23)
BUN/CREAT SERPL: 23.3 (ref 7–25)
CALCIUM SPEC-SCNC: 9.5 MG/DL (ref 8.6–10.5)
CHLORIDE SERPL-SCNC: 97 MMOL/L (ref 98–107)
CHOLEST SERPL-MCNC: 118 MG/DL (ref 0–200)
CO2 SERPL-SCNC: 31.7 MMOL/L (ref 22–29)
CREAT SERPL-MCNC: 0.86 MG/DL (ref 0.57–1)
DEPRECATED RDW RBC AUTO: 57.3 FL (ref 37–54)
EGFRCR SERPLBLD CKD-EPI 2021: 75.1 ML/MIN/1.73
EOSINOPHIL # BLD AUTO: 0.07 10*3/MM3 (ref 0–0.4)
EOSINOPHIL NFR BLD AUTO: 1.5 % (ref 0.3–6.2)
ERYTHROCYTE [DISTWIDTH] IN BLOOD BY AUTOMATED COUNT: 15.5 % (ref 12.3–15.4)
GLOBULIN UR ELPH-MCNC: 3.2 GM/DL
GLUCOSE SERPL-MCNC: 147 MG/DL (ref 65–99)
HCT VFR BLD AUTO: 31.8 % (ref 34–46.6)
HDLC SERPL-MCNC: 49 MG/DL (ref 40–60)
HGB BLD-MCNC: 9.8 G/DL (ref 12–15.9)
IMM GRANULOCYTES # BLD AUTO: 0.02 10*3/MM3 (ref 0–0.05)
IMM GRANULOCYTES NFR BLD AUTO: 0.4 % (ref 0–0.5)
LDLC SERPL CALC-MCNC: 49 MG/DL (ref 0–100)
LDLC/HDLC SERPL: 0.98 {RATIO}
LYMPHOCYTES # BLD AUTO: 1.49 10*3/MM3 (ref 0.7–3.1)
LYMPHOCYTES NFR BLD AUTO: 32.5 % (ref 19.6–45.3)
MAGNESIUM SERPL-MCNC: 1.6 MG/DL (ref 1.6–2.4)
MCH RBC QN AUTO: 31.9 PG (ref 26.6–33)
MCHC RBC AUTO-ENTMCNC: 30.8 G/DL (ref 31.5–35.7)
MCV RBC AUTO: 103.6 FL (ref 79–97)
MONOCYTES # BLD AUTO: 0.3 10*3/MM3 (ref 0.1–0.9)
MONOCYTES NFR BLD AUTO: 6.5 % (ref 5–12)
NEUTROPHILS NFR BLD AUTO: 2.69 10*3/MM3 (ref 1.7–7)
NEUTROPHILS NFR BLD AUTO: 58.7 % (ref 42.7–76)
NRBC BLD AUTO-RTO: 0 /100 WBC (ref 0–0.2)
PHOSPHATE SERPL-MCNC: 3.9 MG/DL (ref 2.5–4.5)
PLATELET # BLD AUTO: 206 10*3/MM3 (ref 140–450)
PMV BLD AUTO: 10.2 FL (ref 6–12)
POTASSIUM SERPL-SCNC: 3.5 MMOL/L (ref 3.5–5.2)
PROT SERPL-MCNC: 7.2 G/DL (ref 6–8.5)
RBC # BLD AUTO: 3.07 10*6/MM3 (ref 3.77–5.28)
SODIUM SERPL-SCNC: 137 MMOL/L (ref 136–145)
TRIGL SERPL-MCNC: 106 MG/DL (ref 0–150)
VLDLC SERPL-MCNC: 20 MG/DL (ref 5–40)
WBC NRBC COR # BLD AUTO: 4.59 10*3/MM3 (ref 3.4–10.8)

## 2025-01-07 PROCEDURE — 99214 OFFICE O/P EST MOD 30 MIN: CPT | Performed by: INTERNAL MEDICINE

## 2025-01-07 PROCEDURE — 80061 LIPID PANEL: CPT | Performed by: INTERNAL MEDICINE

## 2025-01-07 PROCEDURE — 1125F AMNT PAIN NOTED PAIN PRSNT: CPT | Performed by: INTERNAL MEDICINE

## 2025-01-07 PROCEDURE — 25010000002 HEPARIN LOCK FLUSH PER 10 UNITS: Performed by: INTERNAL MEDICINE

## 2025-01-07 PROCEDURE — 80053 COMPREHEN METABOLIC PANEL: CPT | Performed by: INTERNAL MEDICINE

## 2025-01-07 PROCEDURE — 1160F RVW MEDS BY RX/DR IN RCRD: CPT | Performed by: INTERNAL MEDICINE

## 2025-01-07 PROCEDURE — 3078F DIAST BP <80 MM HG: CPT | Performed by: INTERNAL MEDICINE

## 2025-01-07 PROCEDURE — 36591 DRAW BLOOD OFF VENOUS DEVICE: CPT

## 2025-01-07 PROCEDURE — 83735 ASSAY OF MAGNESIUM: CPT | Performed by: INTERNAL MEDICINE

## 2025-01-07 PROCEDURE — G2211 COMPLEX E/M VISIT ADD ON: HCPCS | Performed by: INTERNAL MEDICINE

## 2025-01-07 PROCEDURE — 3077F SYST BP >= 140 MM HG: CPT | Performed by: INTERNAL MEDICINE

## 2025-01-07 PROCEDURE — 1159F MED LIST DOCD IN RCRD: CPT | Performed by: INTERNAL MEDICINE

## 2025-01-07 PROCEDURE — 84100 ASSAY OF PHOSPHORUS: CPT | Performed by: INTERNAL MEDICINE

## 2025-01-07 PROCEDURE — 85025 COMPLETE CBC W/AUTO DIFF WBC: CPT | Performed by: INTERNAL MEDICINE

## 2025-01-07 RX ORDER — OXYCODONE HYDROCHLORIDE 5 MG/1
5 TABLET ORAL EVERY 8 HOURS PRN
Qty: 8 TABLET | Refills: 0 | OUTPATIENT
Start: 2025-01-07

## 2025-01-07 RX ORDER — HEPARIN SODIUM (PORCINE) LOCK FLUSH IV SOLN 100 UNIT/ML 100 UNIT/ML
500 SOLUTION INTRAVENOUS AS NEEDED
OUTPATIENT
Start: 2025-01-07

## 2025-01-07 RX ORDER — OXYCODONE HYDROCHLORIDE 5 MG/1
5 TABLET ORAL EVERY 8 HOURS PRN
Qty: 8 TABLET | Refills: 0 | Status: SHIPPED | OUTPATIENT
Start: 2025-01-07 | End: 2025-01-09 | Stop reason: SDUPTHER

## 2025-01-07 RX ORDER — SODIUM CHLORIDE 0.9 % (FLUSH) 0.9 %
20 SYRINGE (ML) INJECTION AS NEEDED
Status: DISCONTINUED | OUTPATIENT
Start: 2025-01-07 | End: 2025-01-08 | Stop reason: HOSPADM

## 2025-01-07 RX ORDER — SODIUM CHLORIDE 0.9 % (FLUSH) 0.9 %
20 SYRINGE (ML) INJECTION AS NEEDED
OUTPATIENT
Start: 2025-01-07

## 2025-01-07 RX ORDER — HEPARIN SODIUM (PORCINE) LOCK FLUSH IV SOLN 100 UNIT/ML 100 UNIT/ML
500 SOLUTION INTRAVENOUS AS NEEDED
Status: DISCONTINUED | OUTPATIENT
Start: 2025-01-07 | End: 2025-01-08 | Stop reason: HOSPADM

## 2025-01-07 RX ADMIN — HEPARIN 500 UNITS: 100 SYRINGE at 13:12

## 2025-01-07 RX ADMIN — Medication 20 ML: at 13:11

## 2025-01-07 NOTE — TELEPHONE ENCOUNTER
SPOKE WITH PATIENT AND INFORMED HER I AM NOT SURE IF THEY HAVE TRIED TO REACH OUT TO HER BUT I DID GIVE HER THEIR OFFICE NUMBER SO SHE CAN REACH OUT TO THEM. PATIENT VOICED UNDERSTANDING.

## 2025-01-07 NOTE — ASSESSMENT & PLAN NOTE
Metastatic.  Patient is on therapy with letrozole, ribociclib.  Tolerating well.  Mild cytopenias related to ribociclib but not enough to require dose adjustment.  She had CT abdomen pelvis in the emergency room recently which showed no new or worsened disease.  Continue letrozole daily.  Continue ribociclib 3 weeks out of 4.  I will see her back in 1 month for ongoing treatment lab work prior along with CT chest and bone scan to assess response to treatment.

## 2025-01-07 NOTE — PROGRESS NOTES
Chief Complaint  Malignant neoplasm of upper-outer quadrant of left breast Haile San MD Patel, Saagar, MD    Subjective          Derek Keller presents to [facility] for ongoing treatment of her cancer.  She is on letrozole, ribociclib.  She has been on denosumab for bony involvement but currently held due to osteonecrosis.  She notes the area in the lower jaw still unchanged and she has not seen her dentist lately.  She has had some more trouble from an incisional hernia on the abdomen and was in the emergency room recently-those records reviewed.  CT at that point showed the hernia but no new or progressive disease with regard to her cancer.  She denies issues from her letrozole or ribociclib.  Patient reports adequate pain control with her current regimen of MS Contin and  Oxycodone.  Oncology/Hematology History   Malignant neoplasm of upper-outer quadrant of left breast in female, estrogen receptor positive   10/13/2022 Initial Diagnosis    Malignant neoplasm of left breast in female, estrogen receptor positive (HCC)     10/14/2022 -  Chemotherapy    OP BREAST Letrozole / Ribociclib     10/14/2022 Cancer Staged    Staging form: Breast, AJCC 8th Edition  - Clinical: Stage IV (cT1c, cN1, cM1, ER+, VA+, HER2-) - Signed by Donald Barker MD on 10/14/2022     10/28/2022 -  Chemotherapy    OP SUPPORTIVE Denosumab (Xgeva) Q28D     Malignant neoplasm metastatic to bone   10/14/2022 Initial Diagnosis    Bone metastases (HCC)     10/28/2022 -  Chemotherapy    OP SUPPORTIVE Denosumab (Xgeva) Q28D     Secondary malignant neoplasm of bone (Resolved)   11/14/2022 Initial Diagnosis    Secondary malignant neoplasm of bone (HCC)     11/17/2022 - 4/19/2023 Radiation    RADIATION THERAPY Treatment Details (11/14/2022 - 4/19/2023)  Site: Spine - Lumbar  Technique: 3D CRT  Goal: No goal specified  Planned Treatment Start Date: 11/17/2022           Current Outpatient Medications  on File Prior to Visit   Medication Sig Dispense Refill    atorvastatin (LIPITOR) 20 MG tablet TAKE 1 TABLET BY MOUTH EVERY DAY (Patient taking differently: Take 1 tablet by mouth Daily.) 30 tablet 6    baclofen (LIORESAL) 20 MG tablet       cyproheptadine (PERIACTIN) 4 MG tablet Take 1 tablet by mouth Every 12 (Twelve) Hours.      Diclofenac Sodium (VOLTAREN) 1 % gel gel APPLY TO THE AFFECTED AREA(S) ON THE SKIN FOUR TIMES DAILY FOR 10 DAYS      donepezil (ARICEPT) 10 MG tablet Take 1 tablet by mouth Daily.      DULoxetine (CYMBALTA) 30 MG capsule Take 1 capsule by mouth Daily. 90 capsule 1    DULoxetine (CYMBALTA) 60 MG capsule TAKE 1 capsule BY MOUTH DAILY for mood elevation 30 capsule 1    fluticasone (FLONASE) 50 MCG/ACT nasal spray 2 sprays by Each Nare route Daily.      gabapentin (NEURONTIN) 600 MG tablet TAKE 1 TABLET BY MOUTH AT BEDTIME (Patient taking differently: Take 1 tablet by mouth 2 (Two) Times a Day As Needed.) 90 tablet 0    GaviLyte-G 236 g solution take 4000ml BY MOUTH once for 1 dose      hydroCHLOROthiazide 12.5 MG tablet TAKE 1 TABLET BY MOUTH DAILY for swelling 90 tablet 1    ibuprofen (ADVIL,MOTRIN) 600 MG tablet Take 1 tablet by mouth Every 8 (Eight) Hours As Needed. for pain      lactulose (CHRONULAC) 10 GM/15ML solution take 30 mls BY MOUTH TWICE DAILY      letrozole (FEMARA) 2.5 MG tablet TAKE 1 TABLET BY MOUTH DAILY 90 tablet 1    levothyroxine (SYNTHROID, LEVOTHROID) 50 MCG tablet TAKE 1 TABLET BY MOUTH EVERY DAY (Patient taking differently: Take 1 tablet by mouth Daily.) 90 tablet 1    lidocaine (LIDODERM) 5 % Place 1 patch on the skin as directed by provider Daily. Remove & Discard patch within 12 hours or as directed by MD      Lidocaine Pain Relief 4 %       LORazepam (ATIVAN) 0.5 MG tablet Take 1 tablet by mouth Every 6 (Six) Hours As Needed.      losartan (COZAAR) 50 MG tablet Take 1 tablet by mouth Daily. for blood pressure      mirtazapine (REMERON) 15 MG tablet Take 1 tablet  by mouth Every Night. 90 tablet 1    montelukast (SINGULAIR) 10 MG tablet Take 1 tablet by mouth Daily.      Morphine (MS CONTIN) 60 MG 12 hr tablet Take 1 tablet by mouth 2 (Two) Times a Day. 60 tablet 0    naloxone (NARCAN) 4 MG/0.1ML nasal spray Call 911. Don't prime. [place] in 1 nostril for overdose. Repeat in 2-3 minutes in other nostril if no or minimal breathing/responsiveness. 2 each 0    nicotine (NICODERM CQ) 21 MG/24HR patch Place 1 patch on the skin as directed by provider Daily. 30 patch 3    ondansetron (ZOFRAN) 8 MG tablet Take 1 tablet by mouth Every 8 (Eight) Hours As Needed for Nausea or Vomiting. 30 tablet 5    oxybutynin XL (DITROPAN XL) 15 MG 24 hr tablet TAKE 1 TABLET BY MOUTH DAILY for bladder 30 tablet 1    oxyCODONE (Roxicodone) 5 MG immediate release tablet Take 1 tablet by mouth Every 8 (Eight) Hours As Needed for Moderate Pain. (Patient taking differently: Take 1 tablet by mouth Every 8 (Eight) Hours As Needed for Moderate Pain. Pt reports 10mg) 8 tablet 0    polyethylene glycol (MIRALAX) 17 g packet Take 17 g by mouth Daily. 5 packet 0    potassium chloride (MICRO-K) 10 MEQ CR capsule TAKE 1 CAPSULE BY MOUTH EVERY TWELVE HOURS 60 capsule 1    prochlorperazine (COMPAZINE) 10 MG tablet       ribociclib succinate 200 MG tablet therapy pack tablet Take 3 tablets by mouth Take As Directed. Take 3 tablets by mouth daily for 21 days then off 7 days on a 28 day cycle. 63 tablet 11    rOPINIRole (REQUIP) 3 MG tablet Take 1 tablet by mouth 2 (Two) Times a Day.      senna (Senokot) 8.6 MG tablet Take 1 tablet by mouth Daily. 30 tablet 3    SudoGest 60 MG tablet Take 1 tablet by mouth Every 8 (Eight) Hours.      SUMAtriptan (IMITREX) 100 MG tablet Take 1 tablet by mouth 1 (One) Time As Needed.      traZODone (DESYREL) 150 MG tablet TAKE 1 TABLET BY MOUTH ONCE nightly (Patient taking differently: Take 1 tablet by mouth Every Night.) 90 tablet 2     Current Facility-Administered Medications on File  Prior to Visit   Medication Dose Route Frequency Provider Last Rate Last Admin    heparin injection 500 Units  500 Units Intravenous PRN Donald Barker MD   500 Units at 01/07/25 1312    sodium chloride 0.9 % flush 20 mL  20 mL Intravenous PRN Donald Barker MD   20 mL at 01/07/25 1311       Allergies   Allergen Reactions    Azithromycin Itching    Erythromycin Itching     Past Medical History:   Diagnosis Date    Abdominal pain     OSTOMY SITE    Allergic rhinitis     Anemia     NO S/S    Anxiety     Arteriosclerosis     Coronary, follows with Dr. Núñez    Arthritis     Bone cancer     Bone metastases 10/14/2022    Bowen's disease     SKIN CANCER    Breast cancer     NO SURGERY WAS DONE DUE TO METS TO BONE/COLON/LYMPHNODE    Cancer related pain 10/13/2022    Depression     Disorder associated with Helicobacter species 10/12/2022    Dysphoric mood     Fatigue     Fibromyalgia     Frequent urination     NO S/S INFECTION    Herpes simplex vulvovaginitis 07/26/2018    History of colon polyps     History of IBS     History of kidney stones     Hyperlipidemia     Hypertension     Hypothyroidism     Insomnia     Lumbago     Mood disorder     Neck pain     R/T CANCER    Palpitations     last time a few months ago    Precordial pain     R/T SPINE CANCER    S/P Laparoscopic Hand-Assisted Colostomy Closure with End to End Anastomosis Open Parastomal Hernia Repair 12/08/2022    Sleep disturbance     SOB (shortness of breath)     at times at rest    SOBOE (shortness of breath on exertion)      Past Surgical History:   Procedure Laterality Date    BREAST BIOPSY Left     CARDIAC CATHETERIZATION Left 1959    CARDIAC CATHETERIZATION N/A 04/06/2018    Procedure: Coronary angiography;  Surgeon: Art Licea MD;  Location: [facility] INVASIVE LOCATION;  Service: Cardiovascular    CARDIAC CATHETERIZATION N/A 04/06/2018    Procedure: Left heart cath;  Surgeon: Art Licea MD;  Location: [facility]  INVASIVE LOCATION;  Service: Cardiovascular    CARDIAC CATHETERIZATION N/A 04/06/2018    Procedure: Left ventriculography;  Surgeon: Art Licea MD;  Location:  [facility] INVASIVE [facility];  Service: Cardiovascular    CHOLECYSTECTOMY      COLON SURGERY      colostomy bag, and colostomy reversal    COLONOSCOPY  11/08/2006    COLONOSCOPY N/A 06/08/2023    Procedure: COLONOSCOPY;  Surgeon: Alfred Castañeda MD;  Location: [facility] ENDOSCOPY;  Service: General;  Laterality: N/A;  same as preop    EXPLORATORY LAPAROTOMY N/A 12/06/2022    Procedure: LAPAROTOMY EXPLORATORY sigmoid resection hartmans procedure colostomy;  Surgeon: Alfred Castañeda MD;  Location: [facility] MAIN OR;  Service: General;  Laterality: N/A;    GANGLION CYST EXCISION Bilateral     HYSTERECTOMY  05/2005    ILEOSTOMY CLOSURE N/A 06/26/2023    Procedure: Laparoscopic Hand-Assisted Colostomy Closure with End to End Anastomosis;  Surgeon: Alfred Castañeda MD;  Location: [facility] MAIN OR;  Service: General;  Laterality: N/A;    LAPAROSCOPIC GASTRIC BANDING      BAND REMOVED 2020    PARASTOMAL HERNIA REPAIR N/A 06/26/2023    Procedure: Open Parastomal Hernia Repair;  Surgeon: Alfred Castañeda MD;  Location: [facility] MAIN OR;  Service: General;  Laterality: N/A;    TONSILLECTOMY      VENOUS ACCESS DEVICE (PORT) INSERTION N/A 01/09/2023    Procedure: INSERTION VENOUS ACCESS DEVICE;  Surgeon: Alfred Castañeda MD;  Location: [facility] MAIN OR;  Service: General;  Laterality: N/A;    VENTRAL HERNIA REPAIR N/A 7/3/2024    Procedure: VENTRAL / INCISIONAL HERNIA REPAIR LAPAROSCOPIC WITH DAVINCI ROBOT with mesh;  Surgeon: Alfred Castañeda MD;  Location: [facility] MAIN OR;  Service: Robotics - DaVinci;  Laterality: N/A;     Social History     Socioeconomic History    Marital status:    Tobacco Use    Smoking status: Former     Current packs/day: 0.00     Average packs/day: 1 pack/day for 50.5 years (50.5 ttl pk-yrs)     Types:  "Cigarettes     Start date:      Quit date: 2024     Years since quittin.5    Smokeless tobacco: Never    Tobacco comments:          Has not used tobacco for 28 days    Vaping Use    Vaping status: Never Used   Substance and Sexual Activity    Alcohol use: No    Drug use: Never     Types: Marijuana     Comment: LAST USE 24    Sexual activity: Defer     Partners: Male     Birth control/protection: None     Family History   Problem Relation Age of Onset    Hypertension Mother     Rheum arthritis Mother     Heart disease Mother     Breast cancer Mother     Diabetes Father     Cancer Maternal Grandmother         colon    Colon cancer Maternal Grandmother     Aneurysm Paternal Grandfather     Diabetes Other     Fibromyalgia Other     Malig Hyperthermia Neg Hx        Objective   Physical Exam  Vitals reviewed. Exam conducted with a chaperone present.   Cardiovascular:      Rate and Rhythm: Normal rate and regular rhythm.      Pulses: Normal pulses.      Heart sounds: Normal heart sounds.   Pulmonary:      Effort: Pulmonary effort is normal.      Breath sounds: Normal breath sounds.   Abdominal:      General: Bowel sounds are normal.   Lymphadenopathy:      Cervical: No cervical adenopathy.   Psychiatric:         Mood and Affect: Mood normal.         Behavior: Behavior normal.         Vitals:    25 1333   BP: 148/53   Pulse: 79   Resp: 18   Temp: 97.7 °F (36.5 °C)   TempSrc: Temporal   SpO2: 97%   Weight: 91.2 kg (201 lb)   Height: 167.6 cm (66\")   PainSc:   8   PainLoc: Comment: lower back               PHQ-9 Total Score:                    Result Review :   The following data was reviewed by: Donald Barker MD on 2025:  Lab Results   Component Value Date    HGB 9.8 (L) 2025    HCT 31.8 (L) 2025    .6 (H) 2025     2025    WBC 4.59 2025    NEUTROABS 2.69 2025    LYMPHSABS 1.49 2025    MONOSABS 0.30 2025    EOSABS 0.07 2025    " "BASOSABS 0.02 01/07/2025     Lab Results   Component Value Date    GLUCOSE 147 (H) 01/07/2025    BUN 20 01/07/2025    CREATININE 0.86 01/07/2025     01/07/2025    K 3.5 01/07/2025    CL 97 (L) 01/07/2025    CO2 31.7 (H) 01/07/2025    CALCIUM 9.5 01/07/2025    PROTEINTOT 7.2 01/07/2025    ALBUMIN 4.0 01/07/2025    BILITOT 0.3 01/07/2025    ALKPHOS 108 01/07/2025    AST 12 01/07/2025    ALT 8 01/07/2025     Lab Results   Component Value Date    MG 1.6 01/07/2025    PHOS 3.9 01/07/2025    FREET4 1.01 01/17/2022    TSH 1.470 11/12/2019     Lab Results   Component Value Date    IRON 28 (L) 05/29/2024    LABIRON 7 (L) 05/29/2024    TRANSFERRIN 279 05/29/2024    TIBC 416 05/29/2024     Lab Results   Component Value Date    FERRITIN 56.02 05/29/2024    ITLMJXUT44 390 04/23/2019    FOLATE 9.93 04/23/2019     No results found for: \"PSA\", \"CEA\", \"AFP\", \"\", \"\"          Assessment and Plan    Diagnoses and all orders for this visit:    1. Malignant neoplasm of upper-outer quadrant of left breast in female, estrogen receptor positive (Primary)  Assessment & Plan:  Metastatic.  Patient is on therapy with letrozole, ribociclib.  Tolerating well.  Mild cytopenias related to ribociclib but not enough to require dose adjustment.  She had CT abdomen pelvis in the emergency room recently which showed no new or worsened disease.  Continue letrozole daily.  Continue ribociclib 3 weeks out of 4.  I will see her back in 1 month for ongoing treatment lab work prior along with CT chest and bone scan to assess response to treatment.    Orders:  -     NM Bone Scan Whole Body; Future  -     CT Chest With Contrast; Future  -     CBC & Differential; Future  -     Comprehensive Metabolic Panel; Future  -     Magnesium; Future  -     Phosphorus; Future  -     Lipid Panel; Future    2. Malignant neoplasm metastatic to bone  Assessment & Plan:  Continue to hold Xgeva due to osteonecrosis of the jaw.  Recommended patient follow-up with " her dentist.  Bone scan to reassess before next visit    Orders:  -     NM Bone Scan Whole Body; Future  -     CT Chest With Contrast; Future  -     CBC & Differential; Future  -     Comprehensive Metabolic Panel; Future  -     Magnesium; Future  -     Phosphorus; Future  -     Lipid Panel; Future    3. Cancer related pain  Assessment & Plan:  Patient reports adequate pain control with her current regimen.  Wyatt reviewed and no discrepancies.  Oxycodone refilled today.  Reassess next visit.              Patient Follow Up: 1 month    Patient was given instructions and counseling regarding her condition or for health maintenance advice. Please see specific information pulled into the AVS if appropriate.     Donald Barker MD    1/7/2025

## 2025-01-07 NOTE — TELEPHONE ENCOUNTER
PATIENT CALLED AND SAID DR. HESS IS REFERRING HER TO ANOTHER SURGERY FOR SURGERY ON HER HERNIA.  SHE WANTS TO KNOW IF THEY HAVE TRIED TO CALL HER YET.  SHE HAS A FILTER ON HER PHONE, AND MAY NOT GET THE CALL.  IF WE DON'T KNOW, WHO WOULD SHE NEED TO CALL?    PATIENT WILL BE IN APPOINTMENT FROM 1:00 PM - 3:00 PM TODAY, SO WILL NOT BE ABLE TO TAKE THE CALL THEN.    #[phone]

## 2025-01-07 NOTE — ASSESSMENT & PLAN NOTE
Continue to hold Xgeva due to osteonecrosis of the jaw.  Recommended patient follow-up with her dentist.  Bone scan to reassess before next visit

## 2025-01-07 NOTE — ASSESSMENT & PLAN NOTE
Patient reports adequate pain control with her current regimen.  Wyatt reviewed and no discrepancies.  Oxycodone refilled today.  Reassess next visit.

## 2025-01-08 ENCOUNTER — SPECIALTY PHARMACY (OUTPATIENT)
Dept: PHARMACY | Facility: HOSPITAL | Age: 65
End: 2025-01-08
Payer: MEDICARE

## 2025-01-08 ENCOUNTER — TELEPHONE (OUTPATIENT)
Dept: ONCOLOGY | Facility: HOSPITAL | Age: 65
End: 2025-01-08
Payer: MEDICARE

## 2025-01-08 DIAGNOSIS — Z17.0 MALIGNANT NEOPLASM OF LEFT BREAST IN FEMALE, ESTROGEN RECEPTOR POSITIVE, UNSPECIFIED SITE OF BREAST: ICD-10-CM

## 2025-01-08 DIAGNOSIS — C50.912 MALIGNANT NEOPLASM OF LEFT BREAST IN FEMALE, ESTROGEN RECEPTOR POSITIVE, UNSPECIFIED SITE OF BREAST: ICD-10-CM

## 2025-01-08 RX ORDER — ONDANSETRON 8 MG/1
8 TABLET, FILM COATED ORAL EVERY 8 HOURS PRN
Qty: 30 TABLET | Refills: 5 | OUTPATIENT
Start: 2025-01-08

## 2025-01-08 NOTE — TELEPHONE ENCOUNTER
Caller: Krishna Derek J    Relationship: Self    Best call back number: [phone]     Requested Prescriptions:   Requested Prescriptions     Pending Prescriptions Disp Refills    ondansetron (ZOFRAN) 8 MG tablet 30 tablet 5     Sig: Take 1 tablet by mouth Every 8 (Eight) Hours As Needed for Nausea or Vomiting.    ALSO NEEDS OXYCODONE-ACETAMINOPHEN      Pharmacy where request should be sent: [facility], [facility], & [facility], [place] - [address] - [phone] [facility] [phone] FX     Last office visit with prescribing clinician: 1/7/2025   Last telemedicine visit with prescribing clinician: Visit date not found   Next office visit with prescribing clinician: 2/6/2025     Additional details provided by patient: PT SAYS SHE ALSO NEEDS OXYCODONE-ACETAMINOPHEN , BUT THIS IS NOT SHOWING IN MEDS LIST . SHE ONLY HAS 3 LEFT ON THE ZOFRAN.      Does the patient have less than a 3 day supply:  [x] Yes  [] No    Would you like a call back once the refill request has been completed: [] Yes [x] No    If the office needs to give you a call back, can they leave a voicemail: [] Yes [x] No    Abby Burnett Rep   01/08/25 10:04 EST

## 2025-01-08 NOTE — TELEPHONE ENCOUNTER
OSW contacted Ms. Keller this morning to facilitate a three way call to the [employer] to request a status update on her Extra Help application. The [employer] advised she was approved 12/6/24 and they mailed out the approval letter on 12/11/24. Ms. Keller v/u and will look for this letter to produce a copy to the specialty pharmacy coordinator. If she cannot locate it, she will plan to stop by the [facility] in [place] to  a copy. OSW support remains available.

## 2025-01-09 ENCOUNTER — TELEPHONE (OUTPATIENT)
Dept: ONCOLOGY | Facility: HOSPITAL | Age: 65
End: 2025-01-09
Payer: MEDICARE

## 2025-01-09 DIAGNOSIS — G89.3 CANCER RELATED PAIN: Primary | ICD-10-CM

## 2025-01-09 DIAGNOSIS — K43.2 INCISIONAL HERNIA, WITHOUT OBSTRUCTION OR GANGRENE: ICD-10-CM

## 2025-01-09 RX ORDER — OXYCODONE AND ACETAMINOPHEN 10; 325 MG/1; MG/1
1 TABLET ORAL EVERY 6 HOURS PRN
Qty: 60 TABLET | Refills: 0 | Status: SHIPPED | OUTPATIENT
Start: 2025-01-09

## 2025-01-09 RX ORDER — OXYCODONE HYDROCHLORIDE 5 MG/1
5 TABLET ORAL EVERY 8 HOURS PRN
Qty: 8 TABLET | Refills: 0 | Status: SHIPPED | OUTPATIENT
Start: 2025-01-09 | End: 2025-01-09 | Stop reason: DRUGHIGH

## 2025-01-09 NOTE — TELEPHONE ENCOUNTER
Caller: Derek Keller    Relationship: Self    Best call back number: [phone]    What is the best time to reach you: ANYTIME    Who are you requesting to speak with (clinical staff, provider,  specific staff member): CLINICAL    What was the call regarding: PT IS REQUESTING A CALL BACK IN REGARDS TO HER OXYCODONE MEDICATION, THE MEDICATION WAS REDUCED TO 5MG AND 8 PILLS IN TOTAL    PLEASE ADVISE

## 2025-01-09 NOTE — TELEPHONE ENCOUNTER
END OF SHIFT NOTE: 
 
Intake/Output 01/02 1901 - 01/03 0700 In: 669 [I.V.:669] Out: 700 [Urine:700] Voiding: YES Catheter: NO 
Drain:   
 
 
 
 
Stool:  1 occurrences. Stool Assessment Stool Color: Brown;Green (01/02/19 2159) Stool Appearance: Loose; Soft (01/02/19 2159) Stool Amount: Medium (01/02/19 2159) Stool Source/Status: Rectum (01/02/19 2159) Emesis:  0 occurrences. VITAL SIGNS Patient Vitals for the past 12 hrs: 
 Temp Pulse Resp BP SpO2  
01/03/19 0335 97.4 °F (36.3 °C) 98 20 92/57 98 % 01/03/19 0213     100 % 01/03/19 0012 98.2 °F (36.8 °C) 60 22 119/50 98 % 01/02/19 1950 98.4 °F (36.9 °C) 60 18 121/55 96 % 01/02/19 1941     95 % 01/02/19 1913     94 % Pain Assessment Pain 1 Pain Scale 1: Numeric (0 - 10) (01/03/19 0335) Pain Intensity 1: 0 (01/03/19 0335) Patient Stated Pain Goal: 0 (01/03/19 0335) Pain Reassessment 1: Yes (01/03/19 0335) Pain Onset 1: today (01/02/19 1716) Pain Location 1: Head (01/02/19 1716) Pain Orientation 1: Anterior (01/02/19 1716) Pain Description 1: Aching (01/02/19 1716) Pain Intervention(s) 1: Medication (see MAR) (01/02/19 1716) Ambulating Yes Additional Information: pt 02 sat remained stable throughout shift with the hiflow oxygen. Changed dressing on left calf tear. Breathing remained unlabored. No other complaints. Slept majority of the shift. Shift report given to oncoming nurse at the bedside.  
 
Christina Mcmahon RN 
 
 
 
 This nurse called the pt and made her aware of the new script sent into her pharm.

## 2025-01-17 ENCOUNTER — SPECIALTY PHARMACY (OUTPATIENT)
Dept: PHARMACY | Facility: HOSPITAL | Age: 65
End: 2025-01-17
Payer: MEDICARE

## 2025-01-17 DIAGNOSIS — Z17.0 MALIGNANT NEOPLASM OF LEFT BREAST IN FEMALE, ESTROGEN RECEPTOR POSITIVE, UNSPECIFIED SITE OF BREAST: ICD-10-CM

## 2025-01-17 DIAGNOSIS — C50.912 MALIGNANT NEOPLASM OF LEFT BREAST IN FEMALE, ESTROGEN RECEPTOR POSITIVE, UNSPECIFIED SITE OF BREAST: ICD-10-CM

## 2025-01-17 NOTE — TELEPHONE ENCOUNTER
This nurse called the pt. The pt is requesting a refill for Kispali be sent to [facility]. Please review and sign script.

## 2025-01-17 NOTE — TELEPHONE ENCOUNTER
Caller: Derek Keller    Relationship: Self    Best call back number: [phone]      What was the call regarding: PATIENT NEEDS TO TALK TO MARGOTH REGARDING HER   KISPALI SCRIPT. SHE IS OUT AND RESTARTS ON 1/20/2025.    PLEASE CALL TO DISCUSS

## 2025-01-21 ENCOUNTER — SPECIALTY PHARMACY (OUTPATIENT)
Dept: PHARMACY | Facility: HOSPITAL | Age: 65
End: 2025-01-21
Payer: MEDICARE

## 2025-01-22 ENCOUNTER — SPECIALTY PHARMACY (OUTPATIENT)
Dept: PHARMACY | Facility: HOSPITAL | Age: 65
End: 2025-01-22
Payer: MEDICARE

## 2025-01-22 DIAGNOSIS — Z17.0 MALIGNANT NEOPLASM OF LEFT BREAST IN FEMALE, ESTROGEN RECEPTOR POSITIVE, UNSPECIFIED SITE OF BREAST: ICD-10-CM

## 2025-01-22 DIAGNOSIS — C50.912 MALIGNANT NEOPLASM OF LEFT BREAST IN FEMALE, ESTROGEN RECEPTOR POSITIVE, UNSPECIFIED SITE OF BREAST: ICD-10-CM

## 2025-01-22 NOTE — PROGRESS NOTES
Specialty Pharmacy Patient Management Program  Per Protocol Prescription Order or Refill     Patient will be filling or currently fills medications at [facility]  Specialty Pharmacy and is enrolled in the Patient Management Program.    Requested Prescriptions     Signed Prescriptions Disp Refills    ribociclib succinate 200 MG tablet therapy pack tablet 63 tablet 11     Sig: Take 3 tablets by mouth Take As Directed. Take 3 tablets by mouth daily for 21 days then off 7 days on a 28 day cycle.     Prescription orders above were sent to the pharmacy per Collaborative Care Agreement Protocol.     Last Office Visit: 1/7/25  Next Office Visit: 2/6/25    Drug-Drug Interactions: The current medication list was reviewed and there are some relevant drug-drug interactions with the oral specialty medication that will be discussed during education, including: Ribociclib may increase concentration of atorvastatin, oxycodone, trazodone, and Vesicare. Ribociclib may increase risk of Qtc prolongation with ondansetron  Medication Allergies: The patient has no relevant allergies as it relates to their oral specialty medication  Review of Labs/Dose Adjustments: NO DOSE CHANGE - I reviewed the most recent note and labs and the patient will continue without any dose changes.  I sent refills as described below.    Suzette Luke PharmD  Oncology Clinical Specialty Pharmacist   1/22/2025  08:38 EST

## 2025-01-23 ENCOUNTER — TELEPHONE (OUTPATIENT)
Dept: ONCOLOGY | Facility: HOSPITAL | Age: 65
End: 2025-01-23
Payer: MEDICARE

## 2025-01-23 DIAGNOSIS — Z17.0 MALIGNANT NEOPLASM OF LEFT BREAST IN FEMALE, ESTROGEN RECEPTOR POSITIVE, UNSPECIFIED SITE OF BREAST: ICD-10-CM

## 2025-01-23 DIAGNOSIS — G89.3 CANCER RELATED PAIN: ICD-10-CM

## 2025-01-23 DIAGNOSIS — C50.912 MALIGNANT NEOPLASM OF LEFT BREAST IN FEMALE, ESTROGEN RECEPTOR POSITIVE, UNSPECIFIED SITE OF BREAST: ICD-10-CM

## 2025-01-23 NOTE — TELEPHONE ENCOUNTER
Caller: Derek Keller    Relationship: Self    Best call back number:  Telephone Information:   Mobile [phone]       Requested Prescriptions:   Requested Prescriptions     Pending Prescriptions Disp Refills    Morphine (MS CONTIN) 60 MG 12 hr tablet 60 tablet 0     Sig: Take 1 tablet by mouth 2 (Two) Times a Day.    oxyCODONE-acetaminophen (PERCOCET)  MG per tablet 60 tablet 0     Sig: Take 1 tablet by mouth Every 6 (Six) Hours As Needed for Moderate Pain.    ondansetron (ZOFRAN) 8 MG tablet 30 tablet 5     Sig: Take 1 tablet by mouth Every 8 (Eight) Hours As Needed for Nausea or Vomiting.        Pharmacy where request should be sent: [facility], , & [facility] [place]  [place], [place] - [address] - [phone] [facility] [phone] FX     Last office visit with prescribing clinician: 1/7/2025   Last telemedicine visit with prescribing clinician: Visit date not found   Next office visit with prescribing clinician: 2/6/2025     Additional details provided by patient: KISQALI WAS DELIVERED TODAY 1/23/2025 A ONE MONTH SUPPLY     Does the patient have less than a 3 day supply:  [x] Yes  [] No    If the office needs to give you a call back, can they leave a voicemail: [x] Yes [] No

## 2025-01-23 NOTE — TELEPHONE ENCOUNTER
Caller: Derek Keller    Relationship: Self    Best call back number: [phone]    What is the best time to reach you: ANYTIME    Who are you requesting to speak with (clinical staff, provider,  specific staff member): DR DIANE    What was the call regarding: PT IS REQUESTING A CALL BACK FROM DR DIANE, SHE WOULD LIKE TO DISCUSS HER PROGNOSIS.

## 2025-01-23 NOTE — TELEPHONE ENCOUNTER
Spoke to patient regarding request to speak to Dr. Barker about her prognosis. Informed patient that Dr. Barker is out of office until next Monday. Patient stated understanding and said she will call the office next week.

## 2025-01-24 RX ORDER — OXYCODONE AND ACETAMINOPHEN 10; 325 MG/1; MG/1
1 TABLET ORAL EVERY 6 HOURS PRN
Qty: 60 TABLET | Refills: 0 | Status: SHIPPED | OUTPATIENT
Start: 2025-01-24

## 2025-01-24 RX ORDER — ONDANSETRON 8 MG/1
8 TABLET, FILM COATED ORAL EVERY 8 HOURS PRN
Qty: 30 TABLET | Refills: 5 | Status: SHIPPED | OUTPATIENT
Start: 2025-01-24

## 2025-01-24 RX ORDER — MORPHINE SULFATE 60 MG/1
60 TABLET, FILM COATED, EXTENDED RELEASE ORAL 2 TIMES DAILY
Qty: 60 TABLET | Refills: 0 | Status: SHIPPED | OUTPATIENT
Start: 2025-01-24

## 2025-01-29 ENCOUNTER — TELEPHONE (OUTPATIENT)
Dept: SURGERY | Facility: CLINIC | Age: 65
End: 2025-01-29
Payer: MEDICARE

## 2025-01-29 NOTE — TELEPHONE ENCOUNTER
LMOM     CALLED PATIENT TO SEE IF SHE HAD ANY SUCCESS GETTING IN CONTACT WITH [facility] FOR HER HERNIA.

## 2025-01-31 ENCOUNTER — SPECIALTY PHARMACY (OUTPATIENT)
Dept: PHARMACY | Facility: HOSPITAL | Age: 65
End: 2025-01-31
Payer: MEDICARE

## 2025-02-03 ENCOUNTER — HOSPITAL ENCOUNTER (OUTPATIENT)
Dept: CT IMAGING | Facility: HOSPITAL | Age: 65
Discharge: HOME | End: 2025-02-03
Admitting: INTERNAL MEDICINE
Payer: MEDICARE

## 2025-02-03 ENCOUNTER — HOSPITAL ENCOUNTER (OUTPATIENT)
Dept: NUCLEAR MEDICINE | Facility: HOSPITAL | Age: 65
Discharge: HOME | End: 2025-02-03
Payer: MEDICARE

## 2025-02-03 DIAGNOSIS — Z17.0 MALIGNANT NEOPLASM OF UPPER-OUTER QUADRANT OF LEFT BREAST IN FEMALE, ESTROGEN RECEPTOR POSITIVE: ICD-10-CM

## 2025-02-03 DIAGNOSIS — F33.9 MONOPOLAR DEPRESSION: ICD-10-CM

## 2025-02-03 DIAGNOSIS — C50.412 MALIGNANT NEOPLASM OF UPPER-OUTER QUADRANT OF LEFT BREAST IN FEMALE, ESTROGEN RECEPTOR POSITIVE: ICD-10-CM

## 2025-02-03 DIAGNOSIS — C79.51 MALIGNANT NEOPLASM METASTATIC TO BONE: ICD-10-CM

## 2025-02-03 DIAGNOSIS — G89.3 CANCER RELATED PAIN: ICD-10-CM

## 2025-02-03 DIAGNOSIS — R23.2 HOT FLASHES: ICD-10-CM

## 2025-02-03 LAB
ALBUMIN SERPL-MCNC: 4 G/DL (ref 3.5–5.2)
ALBUMIN/GLOB SERPL: 1.2 G/DL
ALP SERPL-CCNC: 108 U/L (ref 39–117)
ALT SERPL W P-5'-P-CCNC: 7 U/L (ref 1–33)
ANION GAP SERPL CALCULATED.3IONS-SCNC: 7.8 MMOL/L (ref 5–15)
AST SERPL-CCNC: 12 U/L (ref 1–32)
BASOPHILS # BLD AUTO: 0.05 10*3/MM3 (ref 0–0.2)
BASOPHILS NFR BLD AUTO: 1 % (ref 0–1.5)
BILIRUB SERPL-MCNC: 0.3 MG/DL (ref 0–1.2)
BUN SERPL-MCNC: 16 MG/DL (ref 8–23)
BUN/CREAT SERPL: 17.4 (ref 7–25)
CALCIUM SPEC-SCNC: 9.8 MG/DL (ref 8.6–10.5)
CHLORIDE SERPL-SCNC: 101 MMOL/L (ref 98–107)
CO2 SERPL-SCNC: 34.2 MMOL/L (ref 22–29)
CREAT SERPL-MCNC: 0.92 MG/DL (ref 0.57–1)
DEPRECATED RDW RBC AUTO: 60.4 FL (ref 37–54)
EGFRCR SERPLBLD CKD-EPI 2021: 69.2 ML/MIN/1.73
EOSINOPHIL # BLD AUTO: 0.02 10*3/MM3 (ref 0–0.4)
EOSINOPHIL NFR BLD AUTO: 0.4 % (ref 0.3–6.2)
ERYTHROCYTE [DISTWIDTH] IN BLOOD BY AUTOMATED COUNT: 15.8 % (ref 12.3–15.4)
GLOBULIN UR ELPH-MCNC: 3.3 GM/DL
GLUCOSE SERPL-MCNC: 130 MG/DL (ref 65–99)
HCT VFR BLD AUTO: 31.8 % (ref 34–46.6)
HGB BLD-MCNC: 9.7 G/DL (ref 12–15.9)
IMM GRANULOCYTES # BLD AUTO: 0.02 10*3/MM3 (ref 0–0.05)
IMM GRANULOCYTES NFR BLD AUTO: 0.4 % (ref 0–0.5)
LYMPHOCYTES # BLD AUTO: 1.5 10*3/MM3 (ref 0.7–3.1)
LYMPHOCYTES NFR BLD AUTO: 29.6 % (ref 19.6–45.3)
MAGNESIUM SERPL-MCNC: 1.8 MG/DL (ref 1.6–2.4)
MCH RBC QN AUTO: 31.7 PG (ref 26.6–33)
MCHC RBC AUTO-ENTMCNC: 30.5 G/DL (ref 31.5–35.7)
MCV RBC AUTO: 103.9 FL (ref 79–97)
MONOCYTES # BLD AUTO: 0.24 10*3/MM3 (ref 0.1–0.9)
MONOCYTES NFR BLD AUTO: 4.7 % (ref 5–12)
NEUTROPHILS NFR BLD AUTO: 3.23 10*3/MM3 (ref 1.7–7)
NEUTROPHILS NFR BLD AUTO: 63.9 % (ref 42.7–76)
NRBC BLD AUTO-RTO: 0 /100 WBC (ref 0–0.2)
PHOSPHATE SERPL-MCNC: 5 MG/DL (ref 2.5–4.5)
PLATELET # BLD AUTO: 277 10*3/MM3 (ref 140–450)
PMV BLD AUTO: 10.1 FL (ref 6–12)
POTASSIUM SERPL-SCNC: 4 MMOL/L (ref 3.5–5.2)
PROT SERPL-MCNC: 7.3 G/DL (ref 6–8.5)
RBC # BLD AUTO: 3.06 10*6/MM3 (ref 3.77–5.28)
SODIUM SERPL-SCNC: 143 MMOL/L (ref 136–145)
WBC NRBC COR # BLD AUTO: 5.06 10*3/MM3 (ref 3.4–10.8)

## 2025-02-03 PROCEDURE — 85025 COMPLETE CBC W/AUTO DIFF WBC: CPT | Performed by: INTERNAL MEDICINE

## 2025-02-03 PROCEDURE — 25510000001 IOPAMIDOL PER 1 ML: Performed by: INTERNAL MEDICINE

## 2025-02-03 PROCEDURE — 80053 COMPREHEN METABOLIC PANEL: CPT | Performed by: INTERNAL MEDICINE

## 2025-02-03 PROCEDURE — 78306 BONE IMAGING WHOLE BODY: CPT

## 2025-02-03 PROCEDURE — 34310000005 TECHNETIUM MEDRONATE KIT: Performed by: INTERNAL MEDICINE

## 2025-02-03 PROCEDURE — 84100 ASSAY OF PHOSPHORUS: CPT | Performed by: INTERNAL MEDICINE

## 2025-02-03 PROCEDURE — 83735 ASSAY OF MAGNESIUM: CPT | Performed by: INTERNAL MEDICINE

## 2025-02-03 PROCEDURE — A9503 TC99M MEDRONATE: HCPCS | Performed by: INTERNAL MEDICINE

## 2025-02-03 PROCEDURE — 71260 CT THORAX DX C+: CPT

## 2025-02-03 RX ORDER — IOPAMIDOL 755 MG/ML
100 INJECTION, SOLUTION INTRAVASCULAR
Status: COMPLETED | OUTPATIENT
Start: 2025-02-03 | End: 2025-02-03

## 2025-02-03 RX ORDER — HEPARIN SODIUM (PORCINE) LOCK FLUSH IV SOLN 100 UNIT/ML 100 UNIT/ML
SOLUTION INTRAVENOUS
Status: DISPENSED
Start: 2025-02-03 | End: 2025-02-03

## 2025-02-03 RX ORDER — TC 99M MEDRONATE 20 MG/10ML
23 INJECTION, POWDER, LYOPHILIZED, FOR SOLUTION INTRAVENOUS
Status: COMPLETED | OUTPATIENT
Start: 2025-02-03 | End: 2025-02-03

## 2025-02-03 RX ADMIN — TC 99M MEDRONATE 23 MILLICURIE: 20 INJECTION, POWDER, LYOPHILIZED, FOR SOLUTION INTRAVENOUS at 11:14

## 2025-02-03 RX ADMIN — IOPAMIDOL 70 ML: 755 INJECTION, SOLUTION INTRAVENOUS at 11:12

## 2025-02-05 RX ORDER — DULOXETIN HYDROCHLORIDE 60 MG/1
CAPSULE, DELAYED RELEASE ORAL
Qty: 30 CAPSULE | Refills: 1 | Status: SHIPPED | OUTPATIENT
Start: 2025-02-05

## 2025-02-05 RX ORDER — POTASSIUM CHLORIDE 750 MG/1
CAPSULE, EXTENDED RELEASE ORAL
Qty: 60 CAPSULE | Refills: 1 | Status: SHIPPED | OUTPATIENT
Start: 2025-02-05

## 2025-02-05 RX ORDER — OXYBUTYNIN CHLORIDE 15 MG/1
TABLET, EXTENDED RELEASE ORAL
Qty: 30 TABLET | Refills: 1 | Status: SHIPPED | OUTPATIENT
Start: 2025-02-05

## 2025-02-06 ENCOUNTER — HOSPITAL ENCOUNTER (OUTPATIENT)
Dept: ONCOLOGY | Facility: HOSPITAL | Age: 65
Discharge: HOME | End: 2025-02-06
Payer: MEDICARE

## 2025-02-06 ENCOUNTER — OFFICE VISIT (OUTPATIENT)
Dept: ONCOLOGY | Facility: HOSPITAL | Age: 65
End: 2025-02-06
Payer: MEDICARE

## 2025-02-06 VITALS
OXYGEN SATURATION: 95 % | RESPIRATION RATE: 18 BRPM | WEIGHT: 202 LBS | SYSTOLIC BLOOD PRESSURE: 145 MMHG | DIASTOLIC BLOOD PRESSURE: 46 MMHG | HEART RATE: 60 BPM | BODY MASS INDEX: 32.6 KG/M2 | TEMPERATURE: 96.8 F

## 2025-02-06 DIAGNOSIS — C50.412 MALIGNANT NEOPLASM OF UPPER-OUTER QUADRANT OF LEFT BREAST IN FEMALE, ESTROGEN RECEPTOR POSITIVE: ICD-10-CM

## 2025-02-06 DIAGNOSIS — C50.412 MALIGNANT NEOPLASM OF UPPER-OUTER QUADRANT OF LEFT BREAST IN FEMALE, ESTROGEN RECEPTOR POSITIVE: Primary | ICD-10-CM

## 2025-02-06 DIAGNOSIS — Z45.2 ENCOUNTER FOR ADJUSTMENT OR MANAGEMENT OF VASCULAR ACCESS DEVICE: ICD-10-CM

## 2025-02-06 DIAGNOSIS — Z17.0 MALIGNANT NEOPLASM OF UPPER-OUTER QUADRANT OF LEFT BREAST IN FEMALE, ESTROGEN RECEPTOR POSITIVE: Primary | ICD-10-CM

## 2025-02-06 DIAGNOSIS — Z17.0 MALIGNANT NEOPLASM OF UPPER-OUTER QUADRANT OF LEFT BREAST IN FEMALE, ESTROGEN RECEPTOR POSITIVE: ICD-10-CM

## 2025-02-06 DIAGNOSIS — C79.51 MALIGNANT NEOPLASM METASTATIC TO BONE: Primary | ICD-10-CM

## 2025-02-06 DIAGNOSIS — C79.51 MALIGNANT NEOPLASM METASTATIC TO BONE: ICD-10-CM

## 2025-02-06 LAB
ALBUMIN SERPL-MCNC: 4 G/DL (ref 3.5–5.2)
ALBUMIN/GLOB SERPL: 1.3 G/DL
ALP SERPL-CCNC: 108 U/L (ref 39–117)
ALT SERPL W P-5'-P-CCNC: 6 U/L (ref 1–33)
ANION GAP SERPL CALCULATED.3IONS-SCNC: 9 MMOL/L (ref 5–15)
AST SERPL-CCNC: 14 U/L (ref 1–32)
BASOPHILS # BLD AUTO: 0.05 10*3/MM3 (ref 0–0.2)
BASOPHILS NFR BLD AUTO: 1.1 % (ref 0–1.5)
BILIRUB SERPL-MCNC: 0.3 MG/DL (ref 0–1.2)
BUN SERPL-MCNC: 17 MG/DL (ref 8–23)
BUN/CREAT SERPL: 18.1 (ref 7–25)
CALCIUM SPEC-SCNC: 9.4 MG/DL (ref 8.6–10.5)
CHLORIDE SERPL-SCNC: 101 MMOL/L (ref 98–107)
CO2 SERPL-SCNC: 31 MMOL/L (ref 22–29)
CREAT SERPL-MCNC: 0.94 MG/DL (ref 0.57–1)
DEPRECATED RDW RBC AUTO: 59.7 FL (ref 37–54)
EGFRCR SERPLBLD CKD-EPI 2021: 67.5 ML/MIN/1.73
EOSINOPHIL # BLD AUTO: 0.05 10*3/MM3 (ref 0–0.4)
EOSINOPHIL NFR BLD AUTO: 1.1 % (ref 0.3–6.2)
ERYTHROCYTE [DISTWIDTH] IN BLOOD BY AUTOMATED COUNT: 15.5 % (ref 12.3–15.4)
GLOBULIN UR ELPH-MCNC: 3.1 GM/DL
GLUCOSE SERPL-MCNC: 154 MG/DL (ref 65–99)
HCT VFR BLD AUTO: 32.2 % (ref 34–46.6)
HGB BLD-MCNC: 9.8 G/DL (ref 12–15.9)
IMM GRANULOCYTES # BLD AUTO: 0.01 10*3/MM3 (ref 0–0.05)
IMM GRANULOCYTES NFR BLD AUTO: 0.2 % (ref 0–0.5)
LYMPHOCYTES # BLD AUTO: 1.41 10*3/MM3 (ref 0.7–3.1)
LYMPHOCYTES NFR BLD AUTO: 30.7 % (ref 19.6–45.3)
MAGNESIUM SERPL-MCNC: 1.8 MG/DL (ref 1.6–2.4)
MCH RBC QN AUTO: 31.9 PG (ref 26.6–33)
MCHC RBC AUTO-ENTMCNC: 30.4 G/DL (ref 31.5–35.7)
MCV RBC AUTO: 104.9 FL (ref 79–97)
MONOCYTES # BLD AUTO: 0.17 10*3/MM3 (ref 0.1–0.9)
MONOCYTES NFR BLD AUTO: 3.7 % (ref 5–12)
NEUTROPHILS NFR BLD AUTO: 2.9 10*3/MM3 (ref 1.7–7)
NEUTROPHILS NFR BLD AUTO: 63.2 % (ref 42.7–76)
NRBC BLD AUTO-RTO: 0 /100 WBC (ref 0–0.2)
PHOSPHATE SERPL-MCNC: 4.2 MG/DL (ref 2.5–4.5)
PLATELET # BLD AUTO: 210 10*3/MM3 (ref 140–450)
PMV BLD AUTO: 10 FL (ref 6–12)
POTASSIUM SERPL-SCNC: 3.7 MMOL/L (ref 3.5–5.2)
PROT SERPL-MCNC: 7.1 G/DL (ref 6–8.5)
RBC # BLD AUTO: 3.07 10*6/MM3 (ref 3.77–5.28)
SODIUM SERPL-SCNC: 141 MMOL/L (ref 136–145)
WBC NRBC COR # BLD AUTO: 4.59 10*3/MM3 (ref 3.4–10.8)

## 2025-02-06 PROCEDURE — 83735 ASSAY OF MAGNESIUM: CPT | Performed by: INTERNAL MEDICINE

## 2025-02-06 PROCEDURE — 85025 COMPLETE CBC W/AUTO DIFF WBC: CPT | Performed by: INTERNAL MEDICINE

## 2025-02-06 PROCEDURE — 36591 DRAW BLOOD OFF VENOUS DEVICE: CPT

## 2025-02-06 PROCEDURE — 80053 COMPREHEN METABOLIC PANEL: CPT | Performed by: INTERNAL MEDICINE

## 2025-02-06 PROCEDURE — 84100 ASSAY OF PHOSPHORUS: CPT | Performed by: INTERNAL MEDICINE

## 2025-02-06 PROCEDURE — 25010000002 HEPARIN LOCK FLUSH PER 10 UNITS: Performed by: INTERNAL MEDICINE

## 2025-02-06 RX ORDER — HEPARIN SODIUM (PORCINE) LOCK FLUSH IV SOLN 100 UNIT/ML 100 UNIT/ML
500 SOLUTION INTRAVENOUS AS NEEDED
Status: DISCONTINUED | OUTPATIENT
Start: 2025-02-06 | End: 2025-02-07 | Stop reason: HOSPADM

## 2025-02-06 RX ORDER — SODIUM CHLORIDE 0.9 % (FLUSH) 0.9 %
20 SYRINGE (ML) INJECTION AS NEEDED
Status: DISCONTINUED | OUTPATIENT
Start: 2025-02-06 | End: 2025-02-07 | Stop reason: HOSPADM

## 2025-02-06 RX ADMIN — HEPARIN 500 UNITS: 100 SYRINGE at 13:04

## 2025-02-06 RX ADMIN — Medication 20 ML: at 13:04

## 2025-02-06 NOTE — PROGRESS NOTES
Chief Complaint  Malignant neoplasm of upper-outer quadrant of left breast Haile San MD Patel, Saagar, MD    Subjective          Derek Keller presents to [facility] for ongoing treatment of her metastatic breast cancer.  She is on letrozole, ribociclib and denosumab for bony involvement-on hold due to ONJ.  Following with dentist.  She denies new masses,, new sites or pains.  She does report some discomfort from a ventral hernia.  She saw surgery recently and they do not feel that she would be a candidate for hernia repair-those records reviewed.  She notes adequate appetite.  Her energy level sufficient for her ADLs.    Oncology/Hematology History   Malignant neoplasm of upper-outer quadrant of left breast in female, estrogen receptor positive   10/13/2022 Initial Diagnosis    Malignant neoplasm of left breast in female, estrogen receptor positive (HCC)     10/14/2022 -  Chemotherapy    OP BREAST Letrozole / Ribociclib     10/14/2022 Cancer Staged    Staging form: Breast, AJCC 8th Edition  - Clinical: Stage IV (cT1c, cN1, cM1, ER+, GA+, HER2-) - Signed by Donald Barker MD on 10/14/2022     10/28/2022 -  Chemotherapy    OP SUPPORTIVE Denosumab (Xgeva) Q28D     Malignant neoplasm metastatic to bone   10/14/2022 Initial Diagnosis    Bone metastases (HCC)     10/28/2022 -  Chemotherapy    OP SUPPORTIVE Denosumab (Xgeva) Q28D     Secondary malignant neoplasm of bone (Resolved)   11/14/2022 Initial Diagnosis    Secondary malignant neoplasm of bone (HCC)     11/17/2022 - 4/19/2023 Radiation    RADIATION THERAPY Treatment Details (11/14/2022 - 4/19/2023)  Site: Spine - Lumbar  Technique: 3D CRT  Goal: No goal specified  Planned Treatment Start Date: 11/17/2022           Current Outpatient Medications on File Prior to Visit   Medication Sig Dispense Refill    atorvastatin (LIPITOR) 20 MG tablet TAKE 1 TABLET BY MOUTH EVERY DAY (Patient taking differently: Take 1  tablet by mouth Daily.) 30 tablet 6    baclofen (LIORESAL) 20 MG tablet       cyproheptadine (PERIACTIN) 4 MG tablet Take 1 tablet by mouth Every 12 (Twelve) Hours.      Diclofenac Sodium (VOLTAREN) 1 % gel gel APPLY TO THE AFFECTED AREA(S) ON THE SKIN FOUR TIMES DAILY FOR 10 DAYS      donepezil (ARICEPT) 10 MG tablet Take 1 tablet by mouth Daily.      DULoxetine (CYMBALTA) 30 MG capsule Take 1 capsule by mouth Daily. 90 capsule 1    DULoxetine (CYMBALTA) 60 MG capsule TAKE 1 capsule BY MOUTH DAILY for mood elevation 30 capsule 1    fluticasone (FLONASE) 50 MCG/ACT nasal spray 2 sprays by Each Nare route Daily.      gabapentin (NEURONTIN) 600 MG tablet TAKE 1 TABLET BY MOUTH AT BEDTIME (Patient taking differently: Take 1 tablet by mouth 2 (Two) Times a Day As Needed.) 90 tablet 0    GaviLyte-G 236 g solution take 4000ml BY MOUTH once for 1 dose      hydroCHLOROthiazide 12.5 MG tablet TAKE 1 TABLET BY MOUTH DAILY for swelling 90 tablet 1    ibuprofen (ADVIL,MOTRIN) 600 MG tablet Take 1 tablet by mouth Every 8 (Eight) Hours As Needed. for pain      lactulose (CHRONULAC) 10 GM/15ML solution take 30 mls BY MOUTH TWICE DAILY      letrozole (FEMARA) 2.5 MG tablet TAKE 1 TABLET BY MOUTH DAILY 90 tablet 1    levothyroxine (SYNTHROID, LEVOTHROID) 50 MCG tablet TAKE 1 TABLET BY MOUTH EVERY DAY (Patient taking differently: Take 1 tablet by mouth Daily.) 90 tablet 1    lidocaine (LIDODERM) 5 % Place 1 patch on the skin as directed by provider Daily. Remove & Discard patch within 12 hours or as directed by MD      Lidocaine Pain Relief 4 %       LORazepam (ATIVAN) 0.5 MG tablet Take 1 tablet by mouth Every 6 (Six) Hours As Needed.      losartan (COZAAR) 50 MG tablet Take 1 tablet by mouth Daily. for blood pressure      mirtazapine (REMERON) 15 MG tablet Take 1 tablet by mouth Every Night. 90 tablet 1    montelukast (SINGULAIR) 10 MG tablet Take 1 tablet by mouth Daily.      Morphine (MS CONTIN) 60 MG 12 hr tablet Take 1 tablet  by mouth 2 (Two) Times a Day. 60 tablet 0    naloxone (NARCAN) 4 MG/0.1ML nasal spray Call 911. Don't prime. Salmon in 1 nostril for overdose. Repeat in 2-3 minutes in other nostril if no or minimal breathing/responsiveness. 2 each 0    nicotine (NICODERM CQ) 21 MG/24HR patch Place 1 patch on the skin as directed by provider Daily. 30 patch 3    oxybutynin XL (DITROPAN XL) 15 MG 24 hr tablet TAKE 1 TABLET BY MOUTH DAILY for bladder 30 tablet 1    polyethylene glycol (MIRALAX) 17 g packet Take 17 g by mouth Daily. 5 packet 0    potassium chloride (MICRO-K) 10 MEQ CR capsule TAKE 1 CAPSULE BY MOUTH EVERY TWELVE HOURS 60 capsule 1    prochlorperazine (COMPAZINE) 10 MG tablet       ribociclib succinate 200 MG tablet therapy pack tablet Take 3 tablets by mouth Take As Directed. Take 3 tablets by mouth daily for 21 days then off 7 days on a 28 day cycle. 63 tablet 11    rOPINIRole (REQUIP) 3 MG tablet Take 1 tablet by mouth 2 (Two) Times a Day.      senna (Senokot) 8.6 MG tablet Take 1 tablet by mouth Daily. 30 tablet 3    SudoGest 60 MG tablet Take 1 tablet by mouth Every 8 (Eight) Hours.      SUMAtriptan (IMITREX) 100 MG tablet Take 1 tablet by mouth 1 (One) Time As Needed.      traZODone (DESYREL) 150 MG tablet TAKE 1 TABLET BY MOUTH ONCE nightly (Patient taking differently: Take 1 tablet by mouth Every Night.) 90 tablet 2    [DISCONTINUED] ondansetron (ZOFRAN) 8 MG tablet Take 1 tablet by mouth Every 8 (Eight) Hours As Needed for Nausea or Vomiting. 30 tablet 5    [DISCONTINUED] oxyCODONE-acetaminophen (PERCOCET)  MG per tablet Take 1 tablet by mouth Every 6 (Six) Hours As Needed for Moderate Pain. 60 tablet 0     Current Facility-Administered Medications on File Prior to Visit   Medication Dose Route Frequency Provider Last Rate Last Admin    [DISCONTINUED] heparin injection 500 Units  500 Units Intravenous PRN Donald Barker MD   500 Units at 02/06/25 1304    [DISCONTINUED] sodium chloride 0.9 % flush 20 mL   20 mL Intravenous PRN Donald Barker MD   20 mL at 02/06/25 1304       Allergies   Allergen Reactions    Azithromycin Itching    Erythromycin Itching     Past Medical History:   Diagnosis Date    Abdominal pain     OSTOMY SITE    Allergic rhinitis     Anemia     NO S/S    Anxiety     Arteriosclerosis     Coronary, follows with Dr. Núñez    Arthritis     Bone cancer     Bone metastases 10/14/2022    Bowen's disease     SKIN CANCER    Breast cancer     NO SURGERY WAS DONE DUE TO METS TO BONE/COLON/LYMPHNODE    Cancer related pain 10/13/2022    Depression     Disorder associated with Helicobacter species 10/12/2022    Dysphoric mood     Fatigue     Fibromyalgia     Frequent urination     NO S/S INFECTION    Herpes simplex vulvovaginitis 07/26/2018    History of colon polyps     History of IBS     History of kidney stones     Hyperlipidemia     Hypertension     Hypothyroidism     Insomnia     Lumbago     Mood disorder     Neck pain     R/T CANCER    Palpitations     last time a few months ago    Precordial pain     R/T SPINE CANCER    S/P Laparoscopic Hand-Assisted Colostomy Closure with End to End Anastomosis Open Parastomal Hernia Repair 12/08/2022    Sleep disturbance     SOB (shortness of breath)     at times at rest    SOBOE (shortness of breath on exertion)      Past Surgical History:   Procedure Laterality Date    BREAST BIOPSY Left     CARDIAC CATHETERIZATION Left 1959    CARDIAC CATHETERIZATION N/A 04/06/2018    Procedure: Coronary angiography;  Surgeon: Art Licea MD;  Location: [facility] CATH INVASIVE LOCATION;  Service: Cardiovascular    CARDIAC CATHETERIZATION N/A 04/06/2018    Procedure: Left heart cath;  Surgeon: Art Licea MD;  Location: [facility] CATH INVASIVE LOCATION;  Service: Cardiovascular    CARDIAC CATHETERIZATION N/A 04/06/2018    Procedure: Left ventriculography;  Surgeon: Art iLcea MD;  Location: [facility] CATH INVASIVE LOCATION;  Service: Cardiovascular     CHOLECYSTECTOMY      COLON SURGERY      colostomy bag, and colostomy reversal    COLONOSCOPY  2006    COLONOSCOPY N/A 2023    Procedure: COLONOSCOPY;  Surgeon: Alfred Castañeda MD;  Location: [facility] ENDOSCOPY;  Service: General;  Laterality: N/A;  same as preop    EXPLORATORY LAPAROTOMY N/A 2022    Procedure: LAPAROTOMY EXPLORATORY sigmoid resection hartmans procedure colostomy;  Surgeon: Alfred Castañeda MD;  Location: [facility] MAIN OR;  Service: General;  Laterality: N/A;    GANGLION CYST EXCISION Bilateral     HYSTERECTOMY  2005    ILEOSTOMY CLOSURE N/A 2023    Procedure: Laparoscopic Hand-Assisted Colostomy Closure with End to End Anastomosis;  Surgeon: Alfred Castañeda MD;  Location: [facility] MAIN OR;  Service: General;  Laterality: N/A;    LAPAROSCOPIC GASTRIC BANDING      BAND REMOVED     PARASTOMAL HERNIA REPAIR N/A 2023    Procedure: Open Parastomal Hernia Repair;  Surgeon: Alfred Castañeda MD;  Location: [facility] MAIN OR;  Service: General;  Laterality: N/A;    TONSILLECTOMY      VENOUS ACCESS DEVICE (PORT) INSERTION N/A 2023    Procedure: INSERTION VENOUS ACCESS DEVICE;  Surgeon: Alfred Castañeda MD;  Location: [facility] MAIN OR;  Service: General;  Laterality: N/A;    VENTRAL HERNIA REPAIR N/A 7/3/2024    Procedure: VENTRAL / INCISIONAL HERNIA REPAIR LAPAROSCOPIC WITH DAVINCI ROBOT with mesh;  Surgeon: Alfred Castañeda MD;  Location: [facility] MAIN OR;  Service: Robotics - DaVinci;  Laterality: N/A;     Social History     Socioeconomic History    Marital status:    Tobacco Use    Smoking status: Former     Current packs/day: 0.00     Average packs/day: 1 pack/day for 50.5 years (50.5 ttl pk-yrs)     Types: Cigarettes     Start date:      Quit date: 2024     Years since quittin.5    Smokeless tobacco: Never    Tobacco comments:          Has not used tobacco for 28 days    Vaping Use    Vaping status: Never Used    Substance and Sexual Activity    Alcohol use: No    Drug use: Never     Types: Marijuana     Comment: LAST USE 7-2-24    Sexual activity: Defer     Partners: Male     Birth control/protection: None     Family History   Problem Relation Age of Onset    Hypertension Mother     Rheum arthritis Mother     Heart disease Mother     Breast cancer Mother     Diabetes Father     Cancer Maternal Grandmother         colon    Colon cancer Maternal Grandmother     Aneurysm Paternal Grandfather     Diabetes Other     Fibromyalgia Other     Malig Hyperthermia Neg Hx        Objective   Physical Exam  Vitals reviewed. Exam conducted with a chaperone present.   Cardiovascular:      Rate and Rhythm: Normal rate and regular rhythm.      Heart sounds: Normal heart sounds. No murmur heard.     No gallop.   Pulmonary:      Effort: Pulmonary effort is normal.      Breath sounds: Normal breath sounds.   Abdominal:      General: Abdomen is flat. Bowel sounds are normal.      Hernia: A hernia (Ventral hernia without incarceration) is present.   Musculoskeletal:      Right lower leg: No edema.      Left lower leg: No edema.   Lymphadenopathy:      Cervical: No cervical adenopathy.         Vitals:    02/06/25 1324   BP: 145/46   Pulse: 60   Resp: 18   Temp: 96.8 °F (36 °C)   TempSrc: Temporal   SpO2: 95%   Weight: 91.6 kg (202 lb)   PainSc:   9   PainLoc: Comment: lower back     ECOG score: 0         PHQ-9 Total Score:                    Result Review :   The following data was reviewed by: Donald Barker MD on 02/06/2025:  Lab Results   Component Value Date    HGB 9.8 (L) 02/06/2025    HCT 32.2 (L) 02/06/2025    .9 (H) 02/06/2025     02/06/2025    WBC 4.59 02/06/2025    NEUTROABS 2.90 02/06/2025    LYMPHSABS 1.41 02/06/2025    MONOSABS 0.17 02/06/2025    EOSABS 0.05 02/06/2025    BASOSABS 0.05 02/06/2025     Lab Results   Component Value Date    GLUCOSE 154 (H) 02/06/2025    BUN 17 02/06/2025    CREATININE 0.94 02/06/2025     " 02/06/2025    K 3.7 02/06/2025     02/06/2025    CO2 31.0 (H) 02/06/2025    CALCIUM 9.4 02/06/2025    PROTEINTOT 7.1 02/06/2025    ALBUMIN 4.0 02/06/2025    BILITOT 0.3 02/06/2025    ALKPHOS 108 02/06/2025    AST 14 02/06/2025    ALT 6 02/06/2025     Lab Results   Component Value Date    MG 1.8 02/06/2025    PHOS 4.2 02/06/2025    FREET4 1.01 01/17/2022    TSH 1.470 11/12/2019     Lab Results   Component Value Date    IRON 28 (L) 05/29/2024    LABIRON 7 (L) 05/29/2024    TRANSFERRIN 279 05/29/2024    TIBC 416 05/29/2024     Lab Results   Component Value Date    FERRITIN 56.02 05/29/2024    SLTGGAXA96 390 04/23/2019    FOLATE 9.93 04/23/2019     No results found for: \"PSA\", \"CEA\", \"AFP\", \"\", \"\"    Data reviewed : Radiologic studies CT chest, abdomen, pelvis and bone scan reviewed       Assessment and Plan    Diagnoses and all orders for this visit:    1. Malignant neoplasm of upper-outer quadrant of left breast in female, estrogen receptor positive (Primary)  Assessment & Plan:  Metastatic.  Patient is on treatment with letrozole and ribociclib along with denosumab for bony involvement (on hold currently for ONJ).  Tolerating her regimen fairly well.  Restaging CT and bone scans reviewed demonstrating improvement in her disease with less noted activity in the bones.  No new or worsening findings identified.  CBC is notable for mild anemia related to her regimen but not enough to require dose adjustment.  Continue with letrozole daily.  Continue ribociclib 3 weeks out of 4.  I will see her back in 1 month for ongoing treatment.    Orders:  -     CBC & Differential; Future  -     Comprehensive Metabolic Panel; Future  -     Magnesium; Future  -     Phosphorus; Future    2. 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  She had recent follow-up with her dentist.  I reviewed with that " office.  They state that she can resume treatment.  I will plan to initiate at her next visit    Orders:  -     CBC & Differential; Future  -     Comprehensive Metabolic Panel; Future  -     Magnesium; Future  -     Phosphorus; Future            Patient Follow Up: 1 month    Patient was given instructions and counseling regarding her condition or for health maintenance advice. Please see specific information pulled into the AVS if appropriate.     Donald Barker MD    2/7/2025

## 2025-02-06 NOTE — ADDENDUM NOTE
Encounter addended by: Breana Blas RN on: 2/6/2025 1:06 PM   Actions taken: Diagnosis association updated, Child order released for a procedure order

## 2025-02-07 ENCOUNTER — SPECIALTY PHARMACY (OUTPATIENT)
Dept: PHARMACY | Facility: HOSPITAL | Age: 65
End: 2025-02-07
Payer: MEDICARE

## 2025-02-07 DIAGNOSIS — G89.3 CANCER RELATED PAIN: ICD-10-CM

## 2025-02-07 DIAGNOSIS — Z17.0 MALIGNANT NEOPLASM OF LEFT BREAST IN FEMALE, ESTROGEN RECEPTOR POSITIVE, UNSPECIFIED SITE OF BREAST: ICD-10-CM

## 2025-02-07 DIAGNOSIS — C50.912 MALIGNANT NEOPLASM OF LEFT BREAST IN FEMALE, ESTROGEN RECEPTOR POSITIVE, UNSPECIFIED SITE OF BREAST: ICD-10-CM

## 2025-02-07 RX ORDER — ONDANSETRON 8 MG/1
8 TABLET, FILM COATED ORAL EVERY 8 HOURS PRN
Qty: 30 TABLET | Refills: 5 | Status: SHIPPED | OUTPATIENT
Start: 2025-02-07

## 2025-02-07 RX ORDER — OXYCODONE AND ACETAMINOPHEN 10; 325 MG/1; MG/1
1 TABLET ORAL EVERY 6 HOURS PRN
Qty: 60 TABLET | Refills: 0 | Status: SHIPPED | OUTPATIENT
Start: 2025-02-07

## 2025-02-07 NOTE — ASSESSMENT & PLAN NOTE
Patient has been on Xgeva for bony metastatic disease but on hold due to osteonecrosis of the jaw.  She had recent follow-up with her dentist.  I reviewed with that office.  They state that she can resume treatment.  I will plan to initiate at her next visit

## 2025-02-07 NOTE — TELEPHONE ENCOUNTER
Caller: Derek Keller    Relationship: Self    Best call back number:   Telephone Information:   Mobile [phone]     Requested Prescriptions:   Requested Prescriptions     Pending Prescriptions Disp Refills    oxyCODONE-acetaminophen (PERCOCET)  MG per tablet 60 tablet 0     Sig: Take 1 tablet by mouth Every 6 (Six) Hours As Needed for Moderate Pain.    ondansetron (ZOFRAN) 8 MG tablet 30 tablet 5     Sig: Take 1 tablet by mouth Every 8 (Eight) Hours As Needed for Nausea or Vomiting.        Pharmacy where request should be sent: [facility], [facility], & [facility]  [facility], [place] - [address] - [phone]  - [phone] FX     Last office visit with prescribing clinician: 2/6/2025   Last telemedicine visit with prescribing clinician: Visit date not found   Next office visit with prescribing clinician: 3/4/2025     Does the patient have less than a 3 day supply:  [x] Yes  [] No    If the office needs to give you a call back, can they leave a voicemail: [x] Yes [] No

## 2025-02-07 NOTE — ASSESSMENT & PLAN NOTE
Metastatic.  Patient is on treatment with letrozole and ribociclib along with denosumab for bony involvement (on hold currently for ONJ).  Tolerating her regimen fairly well.  Restaging CT and bone scans reviewed demonstrating improvement in her disease with less noted activity in the bones.  No new or worsening findings identified.  CBC is notable for mild anemia related to her regimen but not enough to require dose adjustment.  Continue with letrozole daily.  Continue ribociclib 3 weeks out of 4.  I will see her back in 1 month for ongoing treatment.

## 2025-02-14 ENCOUNTER — SPECIALTY PHARMACY (OUTPATIENT)
Dept: PHARMACY | Facility: HOSPITAL | Age: 65
End: 2025-02-14
Payer: MEDICARE

## 2025-02-14 NOTE — PROGRESS NOTES
" Specialty Pharmacy Patient Management Program  Phone Call     Derek Keller is a 64 y.o. female with Breast Cancer. Spoke with the patient via the telephone regarding refill for Kisqali. Patient has ~2 months worth of medication at this time and is currently on her \"off\" week so plan to move out refill until next month. Patient did not have any further questions or concerns at this time.     Suzette Luke PharmD  Oncology Clinical Specialty Pharmacist   2/14/2025  11:27 EST  "

## 2025-02-19 DIAGNOSIS — G89.3 CANCER RELATED PAIN: ICD-10-CM

## 2025-02-19 RX ORDER — OXYCODONE AND ACETAMINOPHEN 10; 325 MG/1; MG/1
1 TABLET ORAL EVERY 6 HOURS PRN
Qty: 60 TABLET | Refills: 0 | Status: SHIPPED | OUTPATIENT
Start: 2025-02-19

## 2025-02-19 NOTE — TELEPHONE ENCOUNTER
Caller: Derek Keller    Relationship: Self    Best call back number: [phone]    Requested Prescriptions:   Requested Prescriptions     Pending Prescriptions Disp Refills    oxyCODONE-acetaminophen (PERCOCET)  MG per tablet 60 tablet 0     Sig: Take 1 tablet by mouth Every 6 (Six) Hours As Needed for Moderate Pain.      Pharmacy where request should be sent: [facility], [facility], & [facility]  [facility], [place] - [address] [facility] [address] - [phone] [facility] [phone] FX     Last office visit with prescribing clinician: 2/6/2025   Last telemedicine visit with prescribing clinician: Visit date not found   Next office visit with prescribing clinician: 3/4/2025     Does the patient have less than a 3 day supply:  [x] Yes  [] No    Would you like a call back once the refill request has been completed: [] Yes [x] No    If the office needs to give you a call back, can they leave a voicemail: [] Yes [x] No

## 2025-02-26 NOTE — PROGRESS NOTES
"Subjective   Derek Keller is a 64 y.o. female who presents today for initial evaluation     Referring Provider:  No referring provider defined for this encounter.    Chief Complaint:  depression    History of Present Illness:     2024: INITIAL VISIT Chart review:     [place]: On Ativan, oxycodone, and morphine chronically. She also used to be on gabapentin.  Care Everywhere: several non behavioral health notes    Psychotropic medication chart review:  Present:  Mirtazapine 15 mg at night  Cymbalta 60 mg a day    Previously:  Trazodone 150 mg at night  Gabapentin 600 mg nightly  Abilify 5, 10, 15 mg a day  Wellbutrin  mg a day  Cymbalta 20, 30 mg a day  Lexapro 5, 20 mg a day  Lamictal 25 mg twice daily, 200 mg at night  Effexor 75, 150 mg a day    EKG: 2024: Rate 54, sinus, QTc 471  Procedures: none  Head imagin MRI of the brain: No acute, no evidence of metastatic disease.  Labs: 2024: Abnormal CMP CO2 31.3 chloride 97 low glucose 111; abnormal CBC hemoglobin 9.7 other abnormalities; CT of the abdomen and pelvis with contrast shows diffuse sclerotic osseous metastatic disease.  Initial Chart Review Notes: Referred to us in February by oncology.  Patient has metastatic breast cancer.  More anxiety and depression recently.      Chart Review By Dates:  Gen surg, onc x4; abnl cmp cbc gluc 154 co2 31  2024: received a call from Onc after pt appeared agitated during her visit after CBD vaping and smoking tobacco 12/3. Unknown x1. ED for constip ; abnl cmp, cbc, low phos.    Plannin2024: Watch weight. Improving. Restart mirtazapine for MDD, NATE, insomnia.   2024: Increase cymbalta for MDD. Pt just started on mirtazapine. Would want to find her a therapist. 6w    VISITS/APPOINTMENTS (BELOW):    \"Derek\"  Significant metastatic disease  Dad  on 12/10, Mom  on  (both years ago)    2025:   In person interview:  \"I was in [place], having " "fun.\"  That's why I missed my last appointment  I have 4 very close friends. One  in August, the other  last week.  Mirtazapine? started  Keeps in touch with last ex-  Snores at night  MDD: fairly stable  Grief: her two friends  NATE: improving, still present  Panic attacks: stable  Energy: low  Concentration: not at goal  Insomnia: initial  AIMS if on antipsychotic: na  Eating/Weight: 204x2 lbs  Refills: y  Substances: Smoking. Not vaping; has a medical marijuana card but hasn't received it yet.  Therapy: n  Medication compliant: y  SE: n  No SI HI AVH.      2024:   In person interview:  \"Yes it was the vaping.\"  I had quit smoking so I tried the vape. I tried them both at the same time and it was an overload on me. They saw me when I was at my very worst.  I'm not on the mirtazapine. I'm on trazodone now. Still not sleeping.  \"The worst part is the pain\"  MDD: improving  NATE: improving  Panic attacks: stable  Energy: improving, \"I stay busy\"  Concentration: not at goal  Insomnia: initial insomnia  AIMS if on antipsychotic: na  Eating/Weight: 204 lbs  Refills: y  Substances: CBD stopped. Tobacco? Yes, smoking. Vaping? No.  Therapy: n  Medication compliant: y  SE: n  No SI HI AVH.      2024:   In person.  Interview:  His/Her Story: \"It's stage 4.\"  P17, G12  They thought they would be able to maintain and control it with my chemo, but it's spreading now.  \"It's scary. It's the fear of not knowing.\"  Denies anhedonia  Lives by herself  Best friend passed this year, Beth Neff,  of a blood clot in August. \"We were shocked.\"  I have other friends, Pepper and her BF   3x,  3x  No kids, lost a child to abruption at 9 mos  Sleeping? Maintenance insomnia q2 hours  Eating? stable  Energy? Down  Just started mirtazapine this week. May be helping her sleep.  Depression/Mood:  Depressed mood, hopelessness  \"I miss things the way they used to be.\"  poor energy, poor " concentration, insomnia.  Seasonal pattern: def  Severity: Moderate  Duration: years  Anxiety:  Uncontrolled worrying, muscle tension, fatigue, poor concentration, feeling on edge or restless, irritability, insomnia.  Severity: Moderate  Duration: years  PTSD: previously dx'd  avoidance, negative view of the world, hypervigilance.  Inciting event: losing her child at 9 mo (abruption)  Duration: many years  AIMS if on antipsychotic: na  Psych ROS: Positive for depression, anxiety.  Negative for psychosis and ebony.  ADHD: def  No SI HI AVH.  Medication compliant: y    Access to Firearms: yes, locked away    PHQ-9 Depression Screening  PHQ-9 Total Score:     Little interest or pleasure in doing things?     Feeling down, depressed, or hopeless?     PHQ-2 Total Score     Trouble falling or staying asleep, or sleeping too much?     Feeling tired or having little energy?     Poor appetite or overeating?     Feeling bad about yourself - or that you are a failure or have let yourself or your family down?     Trouble concentrating on things, such as reading the newspaper or watching television?     Moving or speaking so slowly that other people could have noticed? Or the opposite - being so fidgety or restless that you have been moving around a lot more than usual?     Thoughts that you would be better off dead, or of hurting yourself in some way?     PHQ-9 Total Score     If you checked off any problems, how difficult have these problems made it for you to do your work, take care of things at home, or get along with other people?              NTAE-7       Past Surgical History:  Past Surgical History:   Procedure Laterality Date    BREAST BIOPSY Left     CARDIAC CATHETERIZATION Left 1959    CARDIAC CATHETERIZATION N/A 04/06/2018    Procedure: Coronary angiography;  Surgeon: Art Licea MD;  Location: [facility] CATH INVASIVE LOCATION;  Service: Cardiovascular    CARDIAC CATHETERIZATION N/A 04/06/2018    Procedure:  Left heart cath;  Surgeon: Art Licea MD;  Location: [facility] CATH INVASIVE LOCATION;  Service: Cardiovascular    CARDIAC CATHETERIZATION N/A 04/06/2018    Procedure: Left ventriculography;  Surgeon: Art Licea MD;  Location: [facility] CATH INVASIVE LOCATION;  Service: Cardiovascular    CHOLECYSTECTOMY      COLON SURGERY      colostomy bag, and colostomy reversal    COLONOSCOPY  11/08/2006    COLONOSCOPY N/A 06/08/2023    Procedure: COLONOSCOPY;  Surgeon: Alfred Castañeda MD;  Location: [facility] ENDOSCOPY;  Service: General;  Laterality: N/A;  same as preop    EXPLORATORY LAPAROTOMY N/A 12/06/2022    Procedure: LAPAROTOMY EXPLORATORY sigmoid resection hartmans procedure colostomy;  Surgeon: Alfred Castañeda MD;  Location: [facility] MAIN OR;  Service: General;  Laterality: N/A;    GANGLION CYST EXCISION Bilateral     HYSTERECTOMY  05/2005    ILEOSTOMY CLOSURE N/A 06/26/2023    Procedure: Laparoscopic Hand-Assisted Colostomy Closure with End to End Anastomosis;  Surgeon: Alfred Castañeda MD;  Location: [facility] MAIN OR;  Service: General;  Laterality: N/A;    LAPAROSCOPIC GASTRIC BANDING      BAND REMOVED 2020    PARASTOMAL HERNIA REPAIR N/A 06/26/2023    Procedure: Open Parastomal Hernia Repair;  Surgeon: Alfred Castañeda MD;  Location: [facility] MAIN OR;  Service: General;  Laterality: N/A;    TONSILLECTOMY      VENOUS ACCESS DEVICE (PORT) INSERTION N/A 01/09/2023    Procedure: INSERTION VENOUS ACCESS DEVICE;  Surgeon: Alfred Castañeda MD;  Location: [facility] MAIN OR;  Service: General;  Laterality: N/A;    VENTRAL HERNIA REPAIR N/A 7/3/2024    Procedure: VENTRAL / INCISIONAL HERNIA REPAIR LAPAROSCOPIC WITH DAVINCI ROBOT with mesh;  Surgeon: Alfred Castañeda MD;  Location: [facility] MAIN OR;  Service: Robotics - DaVinci;  Laterality: N/A;       Problem List:  Patient Active Problem List   Diagnosis    Hypertension    Hyperlipidemia    Allergic rhinitis    Hypothyroidism    Chronic  pain disorder    Anxiety    Monopolar depression    Malaise and fatigue    Tobacco abuse    Fibromyalgia    Vitamin B 12 deficiency    Primary osteoarthritis of left knee    Restless leg syndrome    Primary insomnia    Chronic fatigue    Asthma    Body mass index (BMI) 26.0-26.9, adult    Degeneration of lumbar intervertebral disc    Hearing loss    Inappropriate diet and eating habits    Cervical spondylosis    Obesity with body mass index 30 or greater    Renal stone    Malignant neoplasm of upper-outer quadrant of left breast in female, estrogen receptor positive    Cancer related pain    Malignant neoplasm metastatic to bone    Peripheral edema    Peritonitis    Leukopenia    S/P Laparoscopic Hand-Assisted Colostomy Closure with End to End Anastomosis Open Parastomal Hernia Repair    Encounter for adjustment or management of vascular access device    Pulmonary emphysema    History of colon polyps    Colostomy in place    Anemia    Hypokalemia    Therapeutic opioid induced constipation    Incisional hernia, without obstruction or gangrene    Pain in lower jaw       Allergy:   Allergies   Allergen Reactions    Azithromycin Itching    Erythromycin Itching        Discontinued Medications:  Medications Discontinued During This Encounter   Medication Reason    mirtazapine (REMERON) 15 MG tablet Reorder         Current Medications:   Current Outpatient Medications   Medication Sig Dispense Refill    atorvastatin (LIPITOR) 20 MG tablet TAKE 1 TABLET BY MOUTH EVERY DAY (Patient taking differently: Take 1 tablet by mouth Daily.) 30 tablet 6    baclofen (LIORESAL) 20 MG tablet       cyproheptadine (PERIACTIN) 4 MG tablet Take 1 tablet by mouth Every 12 (Twelve) Hours.      Diclofenac Sodium (VOLTAREN) 1 % gel gel APPLY TO THE AFFECTED AREA(S) ON THE SKIN FOUR TIMES DAILY FOR 10 DAYS      donepezil (ARICEPT) 10 MG tablet Take 1 tablet by mouth Daily.      DULoxetine (CYMBALTA) 30 MG capsule Take 1 capsule by mouth Daily. 90  capsule 1    DULoxetine (CYMBALTA) 60 MG capsule TAKE 1 capsule BY MOUTH DAILY for mood elevation 30 capsule 1    fluticasone (FLONASE) 50 MCG/ACT nasal spray 2 sprays by Each Nare route Daily.      gabapentin (NEURONTIN) 600 MG tablet TAKE 1 TABLET BY MOUTH AT BEDTIME (Patient taking differently: Take 1 tablet by mouth 2 (Two) Times a Day As Needed.) 90 tablet 0    hydroCHLOROthiazide 12.5 MG tablet TAKE 1 TABLET BY MOUTH DAILY for swelling 90 tablet 1    ibuprofen (ADVIL,MOTRIN) 600 MG tablet Take 1 tablet by mouth Every 8 (Eight) Hours As Needed. for pain      lactulose (CHRONULAC) 10 GM/15ML solution take 30 mls BY MOUTH TWICE DAILY      letrozole (FEMARA) 2.5 MG tablet TAKE 1 TABLET BY MOUTH DAILY 90 tablet 1    levothyroxine (SYNTHROID, LEVOTHROID) 50 MCG tablet TAKE 1 TABLET BY MOUTH EVERY DAY (Patient taking differently: Take 1 tablet by mouth Daily.) 90 tablet 1    lidocaine (LIDODERM) 5 % Place 1 patch on the skin as directed by provider Daily. Remove & Discard patch within 12 hours or as directed by MD      Lidocaine Pain Relief 4 %       LORazepam (ATIVAN) 0.5 MG tablet Take 1 tablet by mouth Every 6 (Six) Hours As Needed.      losartan (COZAAR) 50 MG tablet Take 1 tablet by mouth Daily. for blood pressure      mirtazapine (REMERON) 30 MG tablet Take 1 tablet by mouth Every Night. 90 tablet 1    montelukast (SINGULAIR) 10 MG tablet Take 1 tablet by mouth Daily.      Morphine (MS CONTIN) 60 MG 12 hr tablet Take 1 tablet by mouth 2 (Two) Times a Day. 60 tablet 0    nicotine (NICODERM CQ) 21 MG/24HR patch Place 1 patch on the skin as directed by provider Daily. 30 patch 3    ondansetron (ZOFRAN) 8 MG tablet Take 1 tablet by mouth Every 8 (Eight) Hours As Needed for Nausea or Vomiting. 30 tablet 5    oxybutynin XL (DITROPAN XL) 15 MG 24 hr tablet TAKE 1 TABLET BY MOUTH DAILY for bladder 30 tablet 1    oxyCODONE-acetaminophen (PERCOCET)  MG per tablet Take 1 tablet by mouth Every 6 (Six) Hours As Needed  for Moderate Pain. 60 tablet 0    polyethylene glycol (MIRALAX) 17 g packet Take 17 g by mouth Daily. 5 packet 0    potassium chloride (MICRO-K) 10 MEQ CR capsule TAKE 1 CAPSULE BY MOUTH EVERY TWELVE HOURS 60 capsule 1    ribociclib succinate 200 MG tablet therapy pack tablet Take 3 tablets by mouth Take As Directed. Take 3 tablets by mouth daily for 21 days then off 7 days on a 28 day cycle. 63 tablet 11    rOPINIRole (REQUIP) 3 MG tablet Take 1 tablet by mouth 2 (Two) Times a Day.      senna (Senokot) 8.6 MG tablet Take 1 tablet by mouth Daily. 30 tablet 3    SUMAtriptan (IMITREX) 100 MG tablet Take 1 tablet by mouth 1 (One) Time As Needed.      traZODone (DESYREL) 150 MG tablet TAKE 1 TABLET BY MOUTH ONCE nightly (Patient taking differently: Take 1 tablet by mouth Every Night.) 90 tablet 2    GaviLyte-G 236 g solution take 4000ml BY MOUTH once for 1 dose      naloxone (NARCAN) 4 MG/0.1ML nasal spray Call 911. Don't prime. [place] in 1 nostril for overdose. Repeat in 2-3 minutes in other nostril if no or minimal breathing/responsiveness. 2 each 0    prochlorperazine (COMPAZINE) 10 MG tablet       SudoGest 60 MG tablet Take 1 tablet by mouth Every 8 (Eight) Hours. (Patient not taking: Reported on 2/27/2025)       No current facility-administered medications for this visit.       Past Medical History:  Past Medical History:   Diagnosis Date    Abdominal pain     OSTOMY SITE    Allergic rhinitis     Anemia     NO S/S    Anxiety     Arteriosclerosis     Coronary, follows with Dr. Núñez    Arthritis     Bone cancer     Bone metastases 10/14/2022    Bowen's disease     SKIN CANCER    Breast cancer     NO SURGERY WAS DONE DUE TO METS TO BONE/COLON/LYMPHNODE    Cancer related pain 10/13/2022    Depression     Disorder associated with Helicobacter species 10/12/2022    Dysphoric mood     Fatigue     Fibromyalgia     Frequent urination     NO S/S INFECTION    Herpes simplex vulvovaginitis 07/26/2018    History of colon  "polyps     History of IBS     History of kidney stones     Hyperlipidemia     Hypertension     Hypothyroidism     Insomnia     Lumbago     Mood disorder     Neck pain     R/T CANCER    Palpitations     last time a few months ago    Precordial pain     R/T SPINE CANCER    S/P Laparoscopic Hand-Assisted Colostomy Closure with End to End Anastomosis Open Parastomal Hernia Repair 2022    Sleep disturbance     SOB (shortness of breath)     at times at rest    SOBOE (shortness of breath on exertion)        Past Psychiatric History:  Began Treatment: early   Diagnoses: MDD, NATE, panic attacks  Psychiatrist: multiple  Therapist: in the past  Admission History:    Late , Edith, admitted for medication changes    Medication Trials:    \"I think I've been on every medication\"    Recently stopped trazodone    Self Harm: Denies  Suicide Attempts:Denies   Psychosis, Anxiety, Depression: PPD    Substance Abuse History:   Types: former smoker; occasional cannabis use  Withdrawal Symptoms:Denies  Longest Period Sober:Not Applicable   AA: Not applicable     Social History:  Martial Status:   Employed:No  Kids:No  House:Lives in a house   History: Denies    Social History     Socioeconomic History    Marital status:    Tobacco Use    Smoking status: Former     Current packs/day: 0.00     Average packs/day: 1 pack/day for 50.5 years (50.5 ttl pk-yrs)     Types: Cigarettes     Start date:      Quit date: 2024     Years since quittin.6    Smokeless tobacco: Never    Tobacco comments:          Has not used tobacco for 28 days    Vaping Use    Vaping status: Never Used   Substance and Sexual Activity    Alcohol use: No    Drug use: Never     Types: Marijuana     Comment: LAST USE 24    Sexual activity: Defer     Partners: Male     Birth control/protection: None       Family History:   Suicide Attempts: Denies  Suicide Completions:Denies      Family History   Problem Relation Age " "of Onset    Hypertension Mother     Rheum arthritis Mother     Heart disease Mother     Breast cancer Mother     Diabetes Father     Cancer Maternal Grandmother         colon    Colon cancer Maternal Grandmother     Aneurysm Paternal Grandfather     Diabetes Other     Fibromyalgia Other     Malig Hyperthermia Neg Hx        Developmental History:     Childhood: Denies Abuse  High School:Completed  College: [employer]     Mental Status Exam  Appearance  : groomed, good eye contact, normal street clothes  Behavior  : pleasant and cooperative  Motor  : No abnormal  Speech  :normal rhythm, rate, volume, tone, not hyperverbal, not pressured, normal prosidy  Mood  : \"I'm sad, and anxiety is high\"  Affect  : mild depression, mood congruent, good variability  Thought Content  : negative suicidal ideations, negative homicidal ideations, negative obsessions  Perceptions  : negative auditory hallucinations, negative visual hallucinations  Thought Process  : linear  Insight/Judgement  : Fair/fair  Cognition  : grossly intact  Attention   : intact        Review of Systems:  Review of Systems   Constitutional:  Positive for diaphoresis and fatigue.   HENT:  Negative for drooling.    Eyes:  Negative for visual disturbance.   Respiratory:  Positive for cough and shortness of breath.    Cardiovascular:  Positive for palpitations and leg swelling. Negative for chest pain.   Gastrointestinal:  Positive for nausea. Negative for vomiting.   Endocrine: Positive for cold intolerance and heat intolerance.   Genitourinary:  Negative for difficulty urinating.   Musculoskeletal:  Positive for joint swelling.   Allergic/Immunologic: Positive for immunocompromised state.   Neurological:  Positive for dizziness and numbness. Negative for seizures and speech difficulty.   Psychiatric/Behavioral:  Negative for agitation. The patient is nervous/anxious.        Physical Exam:  Physical Exam    Vital Signs:   /52   Pulse 56   Ht 168.9 cm (66.5\")   Wt " 92.5 kg (204 lb)   SpO2 95%   BMI 32.43 kg/m²      Lab Results:   Hospital Outpatient Visit on 02/06/2025   Component Date Value Ref Range Status    Glucose 02/06/2025 154 (H)  65 - 99 mg/dL Final    BUN 02/06/2025 17  8 - 23 mg/dL Final    Creatinine 02/06/2025 0.94  0.57 - 1.00 mg/dL Final    Sodium 02/06/2025 141  136 - 145 mmol/L Final    Potassium 02/06/2025 3.7  3.5 - 5.2 mmol/L Final    Chloride 02/06/2025 101  98 - 107 mmol/L Final    CO2 02/06/2025 31.0 (H)  22.0 - 29.0 mmol/L Final    Calcium 02/06/2025 9.4  8.6 - 10.5 mg/dL Final    Total Protein 02/06/2025 7.1  6.0 - 8.5 g/dL Final    Albumin 02/06/2025 4.0  3.5 - 5.2 g/dL Final    ALT (SGPT) 02/06/2025 6  1 - 33 U/L Final    AST (SGOT) 02/06/2025 14  1 - 32 U/L Final    Alkaline Phosphatase 02/06/2025 108  39 - 117 U/L Final    Total Bilirubin 02/06/2025 0.3  0.0 - 1.2 mg/dL Final    Globulin 02/06/2025 3.1  gm/dL Final    A/G Ratio 02/06/2025 1.3  g/dL Final    BUN/Creatinine Ratio 02/06/2025 18.1  7.0 - 25.0 Final    Anion Gap 02/06/2025 9.0  5.0 - 15.0 mmol/L Final    eGFR 02/06/2025 67.5  >60.0 mL/min/1.73 Final    Magnesium 02/06/2025 1.8  1.6 - 2.4 mg/dL Final    Phosphorus 02/06/2025 4.2  2.5 - 4.5 mg/dL Final    WBC 02/06/2025 4.59  3.40 - 10.80 10*3/mm3 Final    RBC 02/06/2025 3.07 (L)  3.77 - 5.28 10*6/mm3 Final    Hemoglobin 02/06/2025 9.8 (L)  12.0 - 15.9 g/dL Final    Hematocrit 02/06/2025 32.2 (L)  34.0 - 46.6 % Final    MCV 02/06/2025 104.9 (H)  79.0 - 97.0 fL Final    MCH 02/06/2025 31.9  26.6 - 33.0 pg Final    MCHC 02/06/2025 30.4 (L)  31.5 - 35.7 g/dL Final    RDW 02/06/2025 15.5 (H)  12.3 - 15.4 % Final    RDW-SD 02/06/2025 59.7 (H)  37.0 - 54.0 fl Final    MPV 02/06/2025 10.0  6.0 - 12.0 fL Final    Platelets 02/06/2025 210  140 - 450 10*3/mm3 Final    Neutrophil % 02/06/2025 63.2  42.7 - 76.0 % Final    Lymphocyte % 02/06/2025 30.7  19.6 - 45.3 % Final    Monocyte % 02/06/2025 3.7 (L)  5.0 - 12.0 % Final    Eosinophil %  02/06/2025 1.1  0.3 - 6.2 % Final    Basophil % 02/06/2025 1.1  0.0 - 1.5 % Final    Immature Grans % 02/06/2025 0.2  0.0 - 0.5 % Final    Neutrophils, Absolute 02/06/2025 2.90  1.70 - 7.00 10*3/mm3 Final    Lymphocytes, Absolute 02/06/2025 1.41  0.70 - 3.10 10*3/mm3 Final    Monocytes, Absolute 02/06/2025 0.17  0.10 - 0.90 10*3/mm3 Final    Eosinophils, Absolute 02/06/2025 0.05  0.00 - 0.40 10*3/mm3 Final    Basophils, Absolute 02/06/2025 0.05  0.00 - 0.20 10*3/mm3 Final    Immature Grans, Absolute 02/06/2025 0.01  0.00 - 0.05 10*3/mm3 Final    nRBC 02/06/2025 0.0  0.0 - 0.2 /100 WBC Final   Hospital Outpatient Visit on 02/03/2025   Component Date Value Ref Range Status    Glucose 02/03/2025 130 (H)  65 - 99 mg/dL Final    BUN 02/03/2025 16  8 - 23 mg/dL Final    Creatinine 02/03/2025 0.92  0.57 - 1.00 mg/dL Final    Sodium 02/03/2025 143  136 - 145 mmol/L Final    Potassium 02/03/2025 4.0  3.5 - 5.2 mmol/L Final    Chloride 02/03/2025 101  98 - 107 mmol/L Final    CO2 02/03/2025 34.2 (H)  22.0 - 29.0 mmol/L Final    Calcium 02/03/2025 9.8  8.6 - 10.5 mg/dL Final    Total Protein 02/03/2025 7.3  6.0 - 8.5 g/dL Final    Albumin 02/03/2025 4.0  3.5 - 5.2 g/dL Final    ALT (SGPT) 02/03/2025 7  1 - 33 U/L Final    AST (SGOT) 02/03/2025 12  1 - 32 U/L Final    Alkaline Phosphatase 02/03/2025 108  39 - 117 U/L Final    Total Bilirubin 02/03/2025 0.3  0.0 - 1.2 mg/dL Final    Globulin 02/03/2025 3.3  gm/dL Final    A/G Ratio 02/03/2025 1.2  g/dL Final    BUN/Creatinine Ratio 02/03/2025 17.4  7.0 - 25.0 Final    Anion Gap 02/03/2025 7.8  5.0 - 15.0 mmol/L Final    eGFR 02/03/2025 69.2  >60.0 mL/min/1.73 Final    Magnesium 02/03/2025 1.8  1.6 - 2.4 mg/dL Final    Phosphorus 02/03/2025 5.0 (H)  2.5 - 4.5 mg/dL Final    WBC 02/03/2025 5.06  3.40 - 10.80 10*3/mm3 Final    RBC 02/03/2025 3.06 (L)  3.77 - 5.28 10*6/mm3 Final    Hemoglobin 02/03/2025 9.7 (L)  12.0 - 15.9 g/dL Final    Hematocrit 02/03/2025 31.8 (L)  34.0 - 46.6 %  Final    MCV 02/03/2025 103.9 (H)  79.0 - 97.0 fL Final    MCH 02/03/2025 31.7  26.6 - 33.0 pg Final    MCHC 02/03/2025 30.5 (L)  31.5 - 35.7 g/dL Final    RDW 02/03/2025 15.8 (H)  12.3 - 15.4 % Final    RDW-SD 02/03/2025 60.4 (H)  37.0 - 54.0 fl Final    MPV 02/03/2025 10.1  6.0 - 12.0 fL Final    Platelets 02/03/2025 277  140 - 450 10*3/mm3 Final    Neutrophil % 02/03/2025 63.9  42.7 - 76.0 % Final    Lymphocyte % 02/03/2025 29.6  19.6 - 45.3 % Final    Monocyte % 02/03/2025 4.7 (L)  5.0 - 12.0 % Final    Eosinophil % 02/03/2025 0.4  0.3 - 6.2 % Final    Basophil % 02/03/2025 1.0  0.0 - 1.5 % Final    Immature Grans % 02/03/2025 0.4  0.0 - 0.5 % Final    Neutrophils, Absolute 02/03/2025 3.23  1.70 - 7.00 10*3/mm3 Final    Lymphocytes, Absolute 02/03/2025 1.50  0.70 - 3.10 10*3/mm3 Final    Monocytes, Absolute 02/03/2025 0.24  0.10 - 0.90 10*3/mm3 Final    Eosinophils, Absolute 02/03/2025 0.02  0.00 - 0.40 10*3/mm3 Final    Basophils, Absolute 02/03/2025 0.05  0.00 - 0.20 10*3/mm3 Final    Immature Grans, Absolute 02/03/2025 0.02  0.00 - 0.05 10*3/mm3 Final    nRBC 02/03/2025 0.0  0.0 - 0.2 /100 WBC Final   Hospital Outpatient Visit on 01/07/2025   Component Date Value Ref Range Status    Magnesium 01/07/2025 1.6  1.6 - 2.4 mg/dL Final    Phosphorus 01/07/2025 3.9  2.5 - 4.5 mg/dL Final    Glucose 01/07/2025 147 (H)  65 - 99 mg/dL Final    BUN 01/07/2025 20  8 - 23 mg/dL Final    Creatinine 01/07/2025 0.86  0.57 - 1.00 mg/dL Final    Sodium 01/07/2025 137  136 - 145 mmol/L Final    Potassium 01/07/2025 3.5  3.5 - 5.2 mmol/L Final    Chloride 01/07/2025 97 (L)  98 - 107 mmol/L Final    CO2 01/07/2025 31.7 (H)  22.0 - 29.0 mmol/L Final    Calcium 01/07/2025 9.5  8.6 - 10.5 mg/dL Final    Total Protein 01/07/2025 7.2  6.0 - 8.5 g/dL Final    Albumin 01/07/2025 4.0  3.5 - 5.2 g/dL Final    ALT (SGPT) 01/07/2025 8  1 - 33 U/L Final    AST (SGOT) 01/07/2025 12  1 - 32 U/L Final    Alkaline Phosphatase 01/07/2025 108  39  - 117 U/L Final    Total Bilirubin 01/07/2025 0.3  0.0 - 1.2 mg/dL Final    Globulin 01/07/2025 3.2  gm/dL Final    A/G Ratio 01/07/2025 1.3  g/dL Final    BUN/Creatinine Ratio 01/07/2025 23.3  7.0 - 25.0 Final    Anion Gap 01/07/2025 8.3  5.0 - 15.0 mmol/L Final    eGFR 01/07/2025 75.1  >60.0 mL/min/1.73 Final    WBC 01/07/2025 4.59  3.40 - 10.80 10*3/mm3 Final    RBC 01/07/2025 3.07 (L)  3.77 - 5.28 10*6/mm3 Final    Hemoglobin 01/07/2025 9.8 (L)  12.0 - 15.9 g/dL Final    Hematocrit 01/07/2025 31.8 (L)  34.0 - 46.6 % Final    MCV 01/07/2025 103.6 (H)  79.0 - 97.0 fL Final    MCH 01/07/2025 31.9  26.6 - 33.0 pg Final    MCHC 01/07/2025 30.8 (L)  31.5 - 35.7 g/dL Final    RDW 01/07/2025 15.5 (H)  12.3 - 15.4 % Final    RDW-SD 01/07/2025 57.3 (H)  37.0 - 54.0 fl Final    MPV 01/07/2025 10.2  6.0 - 12.0 fL Final    Platelets 01/07/2025 206  140 - 450 10*3/mm3 Final    Neutrophil % 01/07/2025 58.7  42.7 - 76.0 % Final    Lymphocyte % 01/07/2025 32.5  19.6 - 45.3 % Final    Monocyte % 01/07/2025 6.5  5.0 - 12.0 % Final    Eosinophil % 01/07/2025 1.5  0.3 - 6.2 % Final    Basophil % 01/07/2025 0.4  0.0 - 1.5 % Final    Immature Grans % 01/07/2025 0.4  0.0 - 0.5 % Final    Neutrophils, Absolute 01/07/2025 2.69  1.70 - 7.00 10*3/mm3 Final    Lymphocytes, Absolute 01/07/2025 1.49  0.70 - 3.10 10*3/mm3 Final    Monocytes, Absolute 01/07/2025 0.30  0.10 - 0.90 10*3/mm3 Final    Eosinophils, Absolute 01/07/2025 0.07  0.00 - 0.40 10*3/mm3 Final    Basophils, Absolute 01/07/2025 0.02  0.00 - 0.20 10*3/mm3 Final    Immature Grans, Absolute 01/07/2025 0.02  0.00 - 0.05 10*3/mm3 Final    nRBC 01/07/2025 0.0  0.0 - 0.2 /100 WBC Final    Total Cholesterol 01/07/2025 118  0 - 200 mg/dL Final    Triglycerides 01/07/2025 106  0 - 150 mg/dL Final    HDL Cholesterol 01/07/2025 49  40 - 60 mg/dL Final    LDL Cholesterol  01/07/2025 49  0 - 100 mg/dL Final    VLDL Cholesterol 01/07/2025 20  5 - 40 mg/dL Final    LDL/HDL Ratio 01/07/2025  0.98   Final   Admission on 12/22/2024, Discharged on 12/22/2024   Component Date Value Ref Range Status    Glucose 12/22/2024 155 (H)  65 - 99 mg/dL Final    BUN 12/22/2024 20  8 - 23 mg/dL Final    Creatinine 12/22/2024 0.79  0.57 - 1.00 mg/dL Final    Sodium 12/22/2024 139  136 - 145 mmol/L Final    Potassium 12/22/2024 3.3 (L)  3.5 - 5.2 mmol/L Final    Chloride 12/22/2024 99  98 - 107 mmol/L Final    CO2 12/22/2024 28.8  22.0 - 29.0 mmol/L Final    Calcium 12/22/2024 8.6  8.6 - 10.5 mg/dL Final    Total Protein 12/22/2024 6.5  6.0 - 8.5 g/dL Final    Albumin 12/22/2024 3.7  3.5 - 5.2 g/dL Final    ALT (SGPT) 12/22/2024 11  1 - 33 U/L Final    AST (SGOT) 12/22/2024 15  1 - 32 U/L Final    Alkaline Phosphatase 12/22/2024 103  39 - 117 U/L Final    Total Bilirubin 12/22/2024 0.3  0.0 - 1.2 mg/dL Final    Globulin 12/22/2024 2.8  gm/dL Final    A/G Ratio 12/22/2024 1.3  g/dL Final    BUN/Creatinine Ratio 12/22/2024 25.3 (H)  7.0 - 25.0 Final    Anion Gap 12/22/2024 11.2  5.0 - 15.0 mmol/L Final    eGFR 12/22/2024 83.1  >60.0 mL/min/1.73 Final    Lipase 12/22/2024 12 (L)  13 - 60 U/L Final    Color, UA 12/22/2024 Yellow  Yellow, Straw Final    Appearance, UA 12/22/2024 Clear  Clear Final    pH, UA 12/22/2024 5.5  5.0 - 8.0 Final    Specific Gravity, UA 12/22/2024 1.010  1.005 - 1.030 Final    Glucose, UA 12/22/2024 Negative  Negative Final    Ketones, UA 12/22/2024 Negative  Negative Final    Bilirubin, UA 12/22/2024 Negative  Negative Final    Blood, UA 12/22/2024 Negative  Negative Final    Protein, UA 12/22/2024 Negative  Negative Final    Leuk Esterase, UA 12/22/2024 Negative  Negative Final    Nitrite, UA 12/22/2024 Negative  Negative Final    Urobilinogen, UA 12/22/2024 0.2 E.U./dL  0.2 - 1.0 E.U./dL Final    Lactate 12/22/2024 1.7  0.5 - 2.0 mmol/L Final    Extra Tube 12/22/2024 Hold for add-ons.   Final    Auto resulted.    Extra Tube 12/22/2024 hold for add-on   Final    Auto resulted    Extra Tube  12/22/2024 Hold for add-ons.   Final    Auto resulted.    Extra Tube 12/22/2024 Hold for add-ons.   Final    Auto resulted    WBC 12/22/2024 4.52  3.40 - 10.80 10*3/mm3 Final    RBC 12/22/2024 2.90 (L)  3.77 - 5.28 10*6/mm3 Final    Hemoglobin 12/22/2024 9.3 (L)  12.0 - 15.9 g/dL Final    Hematocrit 12/22/2024 30.1 (L)  34.0 - 46.6 % Final    MCV 12/22/2024 103.8 (H)  79.0 - 97.0 fL Final    MCH 12/22/2024 32.1  26.6 - 33.0 pg Final    MCHC 12/22/2024 30.9 (L)  31.5 - 35.7 g/dL Final    RDW 12/22/2024 15.9 (H)  12.3 - 15.4 % Final    RDW-SD 12/22/2024 61.7 (H)  37.0 - 54.0 fl Final    MPV 12/22/2024 9.5  6.0 - 12.0 fL Final    Platelets 12/22/2024 194  140 - 450 10*3/mm3 Final    Neutrophil % 12/22/2024 59.1  42.7 - 76.0 % Final    Lymphocyte % 12/22/2024 27.7  19.6 - 45.3 % Final    Monocyte % 12/22/2024 11.3  5.0 - 12.0 % Final    Eosinophil % 12/22/2024 0.4  0.3 - 6.2 % Final    Basophil % 12/22/2024 1.3  0.0 - 1.5 % Final    Immature Grans % 12/22/2024 0.2  0.0 - 0.5 % Final    Neutrophils, Absolute 12/22/2024 2.67  1.70 - 7.00 10*3/mm3 Final    Lymphocytes, Absolute 12/22/2024 1.25  0.70 - 3.10 10*3/mm3 Final    Monocytes, Absolute 12/22/2024 0.51  0.10 - 0.90 10*3/mm3 Final    Eosinophils, Absolute 12/22/2024 0.02  0.00 - 0.40 10*3/mm3 Final    Basophils, Absolute 12/22/2024 0.06  0.00 - 0.20 10*3/mm3 Final    Immature Grans, Absolute 12/22/2024 0.01  0.00 - 0.05 10*3/mm3 Final    nRBC 12/22/2024 0.0  0.0 - 0.2 /100 WBC Final   Hospital Outpatient Visit on 12/03/2024   Component Date Value Ref Range Status    Glucose 12/03/2024 111 (H)  65 - 99 mg/dL Final    BUN 12/03/2024 16  8 - 23 mg/dL Final    Creatinine 12/03/2024 0.92  0.57 - 1.00 mg/dL Final    Sodium 12/03/2024 141  136 - 145 mmol/L Final    Potassium 12/03/2024 3.7  3.5 - 5.2 mmol/L Final    Chloride 12/03/2024 102  98 - 107 mmol/L Final    CO2 12/03/2024 29.8 (H)  22.0 - 29.0 mmol/L Final    Calcium 12/03/2024 9.2  8.6 - 10.5 mg/dL Final    Total  Protein 12/03/2024 7.0  6.0 - 8.5 g/dL Final    Albumin 12/03/2024 4.0  3.5 - 5.2 g/dL Final    ALT (SGPT) 12/03/2024 12  1 - 33 U/L Final    AST (SGOT) 12/03/2024 13  1 - 32 U/L Final    Alkaline Phosphatase 12/03/2024 103  39 - 117 U/L Final    Total Bilirubin 12/03/2024 0.4  0.0 - 1.2 mg/dL Final    Globulin 12/03/2024 3.0  gm/dL Final    A/G Ratio 12/03/2024 1.3  g/dL Final    BUN/Creatinine Ratio 12/03/2024 17.4  7.0 - 25.0 Final    Anion Gap 12/03/2024 9.2  5.0 - 15.0 mmol/L Final    eGFR 12/03/2024 69.2  >60.0 mL/min/1.73 Final    Magnesium 12/03/2024 1.9  1.6 - 2.4 mg/dL Final    Phosphorus 12/03/2024 2.0 (L)  2.5 - 4.5 mg/dL Final    WBC 12/03/2024 5.61  3.40 - 10.80 10*3/mm3 Final    RBC 12/03/2024 3.09 (L)  3.77 - 5.28 10*6/mm3 Final    Hemoglobin 12/03/2024 10.0 (L)  12.0 - 15.9 g/dL Final    Hematocrit 12/03/2024 32.1 (L)  34.0 - 46.6 % Final    MCV 12/03/2024 103.9 (H)  79.0 - 97.0 fL Final    MCH 12/03/2024 32.4  26.6 - 33.0 pg Final    MCHC 12/03/2024 31.2 (L)  31.5 - 35.7 g/dL Final    RDW 12/03/2024 16.0 (H)  12.3 - 15.4 % Final    RDW-SD 12/03/2024 61.7 (H)  37.0 - 54.0 fl Final    MPV 12/03/2024 10.1  6.0 - 12.0 fL Final    Platelets 12/03/2024 245  140 - 450 10*3/mm3 Final    Neutrophil % 12/03/2024 71.7  42.7 - 76.0 % Final    Lymphocyte % 12/03/2024 21.6  19.6 - 45.3 % Final    Monocyte % 12/03/2024 4.8 (L)  5.0 - 12.0 % Final    Eosinophil % 12/03/2024 0.2 (L)  0.3 - 6.2 % Final    Basophil % 12/03/2024 1.2  0.0 - 1.5 % Final    Immature Grans % 12/03/2024 0.5  0.0 - 0.5 % Final    Neutrophils, Absolute 12/03/2024 4.02  1.70 - 7.00 10*3/mm3 Final    Lymphocytes, Absolute 12/03/2024 1.21  0.70 - 3.10 10*3/mm3 Final    Monocytes, Absolute 12/03/2024 0.27  0.10 - 0.90 10*3/mm3 Final    Eosinophils, Absolute 12/03/2024 0.01  0.00 - 0.40 10*3/mm3 Final    Basophils, Absolute 12/03/2024 0.07  0.00 - 0.20 10*3/mm3 Final    Immature Grans, Absolute 12/03/2024 0.03  0.00 - 0.05 10*3/mm3 Final    nRBC  12/03/2024 0.0  0.0 - 0.2 /100 WBC Final   Hospital Outpatient Visit on 10/28/2024   Component Date Value Ref Range Status    Glucose 10/28/2024 111 (H)  65 - 99 mg/dL Final    BUN 10/28/2024 24 (H)  8 - 23 mg/dL Final    Creatinine 10/28/2024 0.94  0.57 - 1.00 mg/dL Final    Sodium 10/28/2024 138  136 - 145 mmol/L Final    Potassium 10/28/2024 3.9  3.5 - 5.2 mmol/L Final    Chloride 10/28/2024 97 (L)  98 - 107 mmol/L Final    CO2 10/28/2024 31.3 (H)  22.0 - 29.0 mmol/L Final    Calcium 10/28/2024 8.9  8.6 - 10.5 mg/dL Final    Total Protein 10/28/2024 7.3  6.0 - 8.5 g/dL Final    Albumin 10/28/2024 3.6  3.5 - 5.2 g/dL Final    ALT (SGPT) 10/28/2024 9  1 - 33 U/L Final    AST (SGOT) 10/28/2024 13  1 - 32 U/L Final    Alkaline Phosphatase 10/28/2024 114  39 - 117 U/L Final    Total Bilirubin 10/28/2024 0.4  0.0 - 1.2 mg/dL Final    Globulin 10/28/2024 3.7  gm/dL Final    A/G Ratio 10/28/2024 1.0  g/dL Final    BUN/Creatinine Ratio 10/28/2024 25.5 (H)  7.0 - 25.0 Final    Anion Gap 10/28/2024 9.7  5.0 - 15.0 mmol/L Final    eGFR 10/28/2024 67.5  >60.0 mL/min/1.73 Final    Magnesium 10/28/2024 1.9  1.6 - 2.4 mg/dL Final    Phosphorus 10/28/2024 3.0  2.5 - 4.5 mg/dL Final    WBC 10/28/2024 5.24  3.40 - 10.80 10*3/mm3 Final    RBC 10/28/2024 2.98 (L)  3.77 - 5.28 10*6/mm3 Final    Hemoglobin 10/28/2024 9.7 (L)  12.0 - 15.9 g/dL Final    Hematocrit 10/28/2024 30.4 (L)  34.0 - 46.6 % Final    MCV 10/28/2024 102.0 (H)  79.0 - 97.0 fL Final    MCH 10/28/2024 32.6  26.6 - 33.0 pg Final    MCHC 10/28/2024 31.9  31.5 - 35.7 g/dL Final    RDW 10/28/2024 14.1  12.3 - 15.4 % Final    RDW-SD 10/28/2024 52.3  37.0 - 54.0 fl Final    MPV 10/28/2024 10.1  6.0 - 12.0 fL Final    Platelets 10/28/2024 281  140 - 450 10*3/mm3 Final    Neutrophil % 10/28/2024 75.7  42.7 - 76.0 % Final    Lymphocyte % 10/28/2024 16.0 (L)  19.6 - 45.3 % Final    Monocyte % 10/28/2024 6.7  5.0 - 12.0 % Final    Eosinophil % 10/28/2024 0.6  0.3 - 6.2 % Final     Basophil % 10/28/2024 0.6  0.0 - 1.5 % Final    Immature Grans % 10/28/2024 0.4  0.0 - 0.5 % Final    Neutrophils, Absolute 10/28/2024 3.97  1.70 - 7.00 10*3/mm3 Final    Lymphocytes, Absolute 10/28/2024 0.84  0.70 - 3.10 10*3/mm3 Final    Monocytes, Absolute 10/28/2024 0.35  0.10 - 0.90 10*3/mm3 Final    Eosinophils, Absolute 10/28/2024 0.03  0.00 - 0.40 10*3/mm3 Final    Basophils, Absolute 10/28/2024 0.03  0.00 - 0.20 10*3/mm3 Final    Immature Grans, Absolute 10/28/2024 0.02  0.00 - 0.05 10*3/mm3 Final    nRBC 10/28/2024 0.0  0.0 - 0.2 /100 WBC Final   Admission on 10/04/2024, Discharged on 10/04/2024   Component Date Value Ref Range Status    Glucose 10/04/2024 121 (H)  65 - 99 mg/dL Final    BUN 10/04/2024 21  8 - 23 mg/dL Final    Creatinine 10/04/2024 0.86  0.57 - 1.00 mg/dL Final    Sodium 10/04/2024 138  136 - 145 mmol/L Final    Potassium 10/04/2024 3.8  3.5 - 5.2 mmol/L Final    Chloride 10/04/2024 99  98 - 107 mmol/L Final    CO2 10/04/2024 31.0 (H)  22.0 - 29.0 mmol/L Final    Calcium 10/04/2024 8.8  8.6 - 10.5 mg/dL Final    Total Protein 10/04/2024 6.4  6.0 - 8.5 g/dL Final    Albumin 10/04/2024 3.6  3.5 - 5.2 g/dL Final    ALT (SGPT) 10/04/2024 11  1 - 33 U/L Final    AST (SGOT) 10/04/2024 11  1 - 32 U/L Final    Alkaline Phosphatase 10/04/2024 87  39 - 117 U/L Final    Total Bilirubin 10/04/2024 0.4  0.0 - 1.2 mg/dL Final    Globulin 10/04/2024 2.8  gm/dL Final    A/G Ratio 10/04/2024 1.3  g/dL Final    BUN/Creatinine Ratio 10/04/2024 24.4  7.0 - 25.0 Final    Anion Gap 10/04/2024 8.0  5.0 - 15.0 mmol/L Final    eGFR 10/04/2024 75.1  >60.0 mL/min/1.73 Final    Lipase 10/04/2024 10 (L)  13 - 60 U/L Final    Color, UA 10/04/2024 Yellow  Yellow, Straw Final    Appearance, UA 10/04/2024 Clear  Clear Final    pH, UA 10/04/2024 7.5  5.0 - 8.0 Final    Specific Gravity, UA 10/04/2024 1.010  1.005 - 1.030 Final    Glucose, UA 10/04/2024 Negative  Negative Final    Ketones, UA 10/04/2024 Negative   Negative Final    Bilirubin, UA 10/04/2024 Negative  Negative Final    Blood, UA 10/04/2024 Negative  Negative Final    Protein, UA 10/04/2024 Negative  Negative Final    Leuk Esterase, UA 10/04/2024 Negative  Negative Final    Nitrite, UA 10/04/2024 Negative  Negative Final    Urobilinogen, UA 10/04/2024 0.2 E.U./dL  0.2 - 1.0 E.U./dL Final    Lactate 10/04/2024 0.7  0.5 - 2.0 mmol/L Final    Extra Tube 10/04/2024 Hold for add-ons.   Final    Auto resulted.    Extra Tube 10/04/2024 hold for add-on   Final    Auto resulted    Extra Tube 10/04/2024 Hold for add-ons.   Final    Auto resulted.    Extra Tube 10/04/2024 Hold for add-ons.   Final    Auto resulted    WBC 10/04/2024 5.40  3.40 - 10.80 10*3/mm3 Final    RBC 10/04/2024 2.73 (L)  3.77 - 5.28 10*6/mm3 Final    Hemoglobin 10/04/2024 8.9 (L)  12.0 - 15.9 g/dL Final    Hematocrit 10/04/2024 28.5 (L)  34.0 - 46.6 % Final    MCV 10/04/2024 104.4 (H)  79.0 - 97.0 fL Final    MCH 10/04/2024 32.6  26.6 - 33.0 pg Final    MCHC 10/04/2024 31.2 (L)  31.5 - 35.7 g/dL Final    RDW 10/04/2024 14.4  12.3 - 15.4 % Final    RDW-SD 10/04/2024 55.0 (H)  37.0 - 54.0 fl Final    MPV 10/04/2024 9.5  6.0 - 12.0 fL Final    Platelets 10/04/2024 236  140 - 450 10*3/mm3 Final    Neutrophil % 10/04/2024 75.0  42.7 - 76.0 % Final    Lymphocyte % 10/04/2024 18.1 (L)  19.6 - 45.3 % Final    Monocyte % 10/04/2024 4.1 (L)  5.0 - 12.0 % Final    Eosinophil % 10/04/2024 1.3  0.3 - 6.2 % Final    Basophil % 10/04/2024 0.9  0.0 - 1.5 % Final    Immature Grans % 10/04/2024 0.6 (H)  0.0 - 0.5 % Final    Neutrophils, Absolute 10/04/2024 4.05  1.70 - 7.00 10*3/mm3 Final    Lymphocytes, Absolute 10/04/2024 0.98  0.70 - 3.10 10*3/mm3 Final    Monocytes, Absolute 10/04/2024 0.22  0.10 - 0.90 10*3/mm3 Final    Eosinophils, Absolute 10/04/2024 0.07  0.00 - 0.40 10*3/mm3 Final    Basophils, Absolute 10/04/2024 0.05  0.00 - 0.20 10*3/mm3 Final    Immature Grans, Absolute 10/04/2024 0.03  0.00 - 0.05  10*3/mm3 Final    nRBC 10/04/2024 0.0  0.0 - 0.2 /100 WBC Final    Magnesium 10/04/2024 2.1  1.6 - 2.4 mg/dL Final   Hospital Outpatient Visit on 10/02/2024   Component Date Value Ref Range Status    Glucose 10/02/2024 126 (H)  65 - 99 mg/dL Final    BUN 10/02/2024 17  8 - 23 mg/dL Final    Creatinine 10/02/2024 0.86  0.57 - 1.00 mg/dL Final    Sodium 10/02/2024 141  136 - 145 mmol/L Final    Potassium 10/02/2024 3.7  3.5 - 5.2 mmol/L Final    Chloride 10/02/2024 102  98 - 107 mmol/L Final    CO2 10/02/2024 35.1 (H)  22.0 - 29.0 mmol/L Final    Calcium 10/02/2024 9.6  8.6 - 10.5 mg/dL Final    Total Protein 10/02/2024 6.6  6.0 - 8.5 g/dL Final    Albumin 10/02/2024 4.1  3.5 - 5.2 g/dL Final    ALT (SGPT) 10/02/2024 13  1 - 33 U/L Final    AST (SGOT) 10/02/2024 11  1 - 32 U/L Final    Alkaline Phosphatase 10/02/2024 91  39 - 117 U/L Final    Total Bilirubin 10/02/2024 0.4  0.0 - 1.2 mg/dL Final    Globulin 10/02/2024 2.5  gm/dL Final    A/G Ratio 10/02/2024 1.6  g/dL Final    BUN/Creatinine Ratio 10/02/2024 19.8  7.0 - 25.0 Final    Anion Gap 10/02/2024 3.9 (L)  5.0 - 15.0 mmol/L Final    eGFR 10/02/2024 75.1  >60.0 mL/min/1.73 Final    Magnesium 10/02/2024 2.2  1.6 - 2.4 mg/dL Final    Phosphorus 10/02/2024 3.7  2.5 - 4.5 mg/dL Final    WBC 10/02/2024 6.71  3.40 - 10.80 10*3/mm3 Final    RBC 10/02/2024 2.93 (L)  3.77 - 5.28 10*6/mm3 Final    Hemoglobin 10/02/2024 9.7 (L)  12.0 - 15.9 g/dL Final    Hematocrit 10/02/2024 30.9 (L)  34.0 - 46.6 % Final    MCV 10/02/2024 105.5 (H)  79.0 - 97.0 fL Final    MCH 10/02/2024 33.1 (H)  26.6 - 33.0 pg Final    MCHC 10/02/2024 31.4 (L)  31.5 - 35.7 g/dL Final    RDW 10/02/2024 14.2  12.3 - 15.4 % Final    RDW-SD 10/02/2024 55.4 (H)  37.0 - 54.0 fl Final    MPV 10/02/2024 9.5  6.0 - 12.0 fL Final    Platelets 10/02/2024 252  140 - 450 10*3/mm3 Final    Neutrophil % 10/02/2024 72.7  42.7 - 76.0 % Final    Lymphocyte % 10/02/2024 21.3  19.6 - 45.3 % Final    Monocyte % 10/02/2024  4.6 (L)  5.0 - 12.0 % Final    Eosinophil % 10/02/2024 1.0  0.3 - 6.2 % Final    Basophil % 10/02/2024 0.3  0.0 - 1.5 % Final    Immature Grans % 10/02/2024 0.1  0.0 - 0.5 % Final    Neutrophils, Absolute 10/02/2024 4.87  1.70 - 7.00 10*3/mm3 Final    Lymphocytes, Absolute 10/02/2024 1.43  0.70 - 3.10 10*3/mm3 Final    Monocytes, Absolute 10/02/2024 0.31  0.10 - 0.90 10*3/mm3 Final    Eosinophils, Absolute 10/02/2024 0.07  0.00 - 0.40 10*3/mm3 Final    Basophils, Absolute 10/02/2024 0.02  0.00 - 0.20 10*3/mm3 Final    Immature Grans, Absolute 10/02/2024 0.01  0.00 - 0.05 10*3/mm3 Final       EKG Results:  No orders to display       Imaging Results:  CT Chest With Contrast Diagnostic    Result Date: 10/29/2024  Impression: 1. Stable diffuse sclerotic osseous metastatic disease. 2. New bronchial wall thickening and endobronchial secretion in the right lower lobe likely secondary to bronchitis. Mild airspace consolidation in the right lower lobe may represent atelectasis or developing pneumonia. Electronically Signed: Floyd Leung MD  10/29/2024 10:48 AM EDT  Workstation ID: [id number]    CT Abdomen Pelvis With Contrast    Result Date: 10/29/2024  Impression: Stable exam. Diffuse sclerotic osseous metastatic disease. Electronically Signed: Floyd Leung MD  10/29/2024 10:31 AM EDT  Workstation ID: [id number]    NM Bone Scan Whole Body    Result Date: 10/22/2024  1.New sternal and right lower rib cage radiotracer uptake concerning for new sites of active metastatic disease. Electronically Signed: Donn Daigle MD  10/22/2024 4:25 PM EDT  Workstation ID: [id number]    CT Chest With Contrast Diagnostic    Result Date: 7/30/2024  Impression: Stable appearance of the innumerable osseous metastases. No new suspicious pulmonary nodule or mass. No evidence of new or additional metastatic disease in the chest. Coronary artery calcifications. Electronically Signed: Fox Schaeffer MD  7/30/2024 11:42 AM EDT  Workstation ID:  [id number]    CT Abdomen Pelvis With Contrast    Result Date: 7/30/2024  Impression: 1. Extensive osteosclerotic metastatic disease does not appear appreciably changed within the abdomen or pelvis. 2. No indication of soft tissue organ metastasis in the abdomen or pelvis. 3. Interval ventral abdominal hernia repair with small superficial soft tissue seroma. Electronically Signed: Aicha Bonilla MD  7/30/2024 11:35 AM EDT  Workstation ID: [id number]        Assessment & Plan   Diagnoses and all orders for this visit:    1. Bereavement (Primary)    2. Major depressive disorder, recurrent episode, moderate  -     mirtazapine (REMERON) 30 MG tablet; Take 1 tablet by mouth Every Night.  Dispense: 90 tablet; Refill: 1    3. Generalized anxiety disorder  -     mirtazapine (REMERON) 30 MG tablet; Take 1 tablet by mouth Every Night.  Dispense: 90 tablet; Refill: 1    4. Panic attacks  -     mirtazapine (REMERON) 30 MG tablet; Take 1 tablet by mouth Every Night.  Dispense: 90 tablet; Refill: 1    5. Insomnia due to mental condition  -     mirtazapine (REMERON) 30 MG tablet; Take 1 tablet by mouth Every Night.  Dispense: 90 tablet; Refill: 1    6. Post traumatic stress disorder (PTSD)  -     mirtazapine (REMERON) 30 MG tablet; Take 1 tablet by mouth Every Night.  Dispense: 90 tablet; Refill: 1    7. Snoring  -     Ambulatory Referral to Sleep Medicine        Visit Diagnoses:    ICD-10-CM ICD-9-CM   1. Bereavement  Z63.4 V62.82   2. Major depressive disorder, recurrent episode, moderate  F33.1 296.32   3. Generalized anxiety disorder  F41.1 300.02   4. Panic attacks  F41.0 300.01   5. Insomnia due to mental condition  F51.05 300.9     327.02   6. Post traumatic stress disorder (PTSD)  F43.10 309.81   7. Snoring  R06.83 786.09     02/27/2025: Sleep medicine for snoring, increase mirtazaine for NATE, MDD. Grieving the loss of 2 friends.    Acknowledged and normalized patient's thoughts, feelings, and concerns. Allowed patient to freely  discuss and process issues, such as:  Anxiety and depression regarding medical conditions, pain.  ... using Rogerian psychotherapeutic techniques including unconditional positive regard, reflective listening, and demonstrating clear empathy, with the goal of ameliorating symptoms and maintaining, restoring, or improving self-esteem, adaptive skills, and ego or psychological functions (Garry et al. 1991), the long-term goal of which is to develop a better, healthier perspective and help the patient bear their circumstances more easily.  Time (minutes) spent providing supportive psychotherapy: 16  (This time is exclusive to the therapy session and separate from the time spent on activities used to meet the criteria for the E/M service (history, exam, medical decision-making).)  Start: 8:03  Stop: 8:19  Functional status: mild impairment  Treatment plan: Medication management and supportive psychotherapy  Prognosis: good  Progress: grieving  6w    12/30/2024: Watch weight. Improving. Restart mirtazapine for MDD, NATE, insomnia.     11/12/2024: Increase cymbalta for MDD. Pt just started on mirtazapine. Would want to find her a therapist. 6w    PLAN:  Safety: No acute safety concerns  Therapy:  recommended, but can't use mychart  Risk Assessment: Risk of self-harm acutely is moderate.  Risk factors include anxiety disorder, mood disorder, access to firearms, and recent psychosocial stressors (pandemic). Protective factors include no family history, no present SI, no history of suicide attempts or self-harm in the past, minimal AODA, healthcare seeking, future orientation, willingness to engage in care.  Risk of self-harm chronically is also moderate, but could be further elevated in the event of treatment noncompliance and/or AODA.  Meds:  CONTINUE cymbalta 90 mg qday. Risks, benefits, alternatives discussed with patient including GI upset, nausea vomiting diarrhea, theoretical decrease of seizure threshold predisposing  the patient to a slightly higher seizure risk, headaches, sexual dysfunction, serotonin syndrome, bleeding risk, increased suicidality in patients 24 years and younger, switching to ebony/hypomania.  Also constipation and urinary retention.  After discussion of these risks and benefits, the patient voiced understanding and agreed to proceed.  INCREASE mirtazapine 15 to 30 mg qday. Risks, benefits, alternatives discussed with patient including GI upset, sedation, dizziness with falls risk, increased appetite.  Do not use before operating vehicle, vessel, or machine. After discussion of these risks and benefits, the patient voiced understanding and agreed to proceed.  ALSO on trazodone 150 mg qhs. Risks, benefits, side effects discussed with patient including GI upset, sedation, dizziness/falls risk, grogginess the following day, prolongation of the QTc interval.  Do not use before operating vehicle, vessel, or machine. After discussion of these risks and benefits, the patient voiced understanding and agreed to proceed.    S/P:  Buspar did nothing  Abilify can't remember  Labs: none      Patient screened positive for depression based on a PHQ-9 score of 17 on 11/12/2024. Follow-up recommendations include: Prescribed antidepressant medication treatment and Suicide Risk Assessment performed.           TREATMENT PLAN/GOALS: Continue supportive psychotherapy efforts and medications as indicated. Treatment and medication options discussed during today's visit. Patient acknowledged and verbally consented to continue with current treatment plan and was educated on the importance of compliance with treatment and follow-up appointments.    MEDICATION ISSUES:  ALEXEI reviewed as expected.  Discussed medication options and treatment plan of prescribed medication as well as the risks, benefits, and side effects including potential falls, possible impaired driving and metabolic adversities among others. Patient is agreeable to  call the office with any worsening of symptoms or onset of side effects. Patient is agreeable to call 911 or go to the nearest ER should he/she begin having SI/HI. No medication side effects or related complaints today.     MEDS ORDERED DURING VISIT:  New Medications Ordered This Visit   Medications    mirtazapine (REMERON) 30 MG tablet     Sig: Take 1 tablet by mouth Every Night.     Dispense:  90 tablet     Refill:  1     Replaces 15 mg dose. Thank you for the help. Please call with questions: [phone].       Return in about 6 weeks (around 4/10/2025).         This document has been electronically signed by Megha Jose MD  February 27, 2025 08:26 EST    Dictated Utilizing Dragon Dictation: Part of this note may be an electronic transcription/translation of spoken language to printed text using the Dragon Dictation System.

## 2025-02-27 ENCOUNTER — OFFICE VISIT (OUTPATIENT)
Dept: PSYCHIATRY | Facility: CLINIC | Age: 65
End: 2025-02-27
Payer: MEDICARE

## 2025-02-27 VITALS
HEIGHT: 67 IN | OXYGEN SATURATION: 95 % | HEART RATE: 56 BPM | SYSTOLIC BLOOD PRESSURE: 106 MMHG | BODY MASS INDEX: 32.02 KG/M2 | WEIGHT: 204 LBS | DIASTOLIC BLOOD PRESSURE: 52 MMHG

## 2025-02-27 DIAGNOSIS — F41.1 GENERALIZED ANXIETY DISORDER: ICD-10-CM

## 2025-02-27 DIAGNOSIS — R06.83 SNORING: ICD-10-CM

## 2025-02-27 DIAGNOSIS — F41.0 PANIC ATTACKS: ICD-10-CM

## 2025-02-27 DIAGNOSIS — F51.05 INSOMNIA DUE TO MENTAL CONDITION: ICD-10-CM

## 2025-02-27 DIAGNOSIS — F43.10 POST TRAUMATIC STRESS DISORDER (PTSD): ICD-10-CM

## 2025-02-27 DIAGNOSIS — F33.1 MAJOR DEPRESSIVE DISORDER, RECURRENT EPISODE, MODERATE: ICD-10-CM

## 2025-02-27 DIAGNOSIS — Z63.4 BEREAVEMENT: Primary | ICD-10-CM

## 2025-02-27 RX ORDER — MIRTAZAPINE 30 MG/1
30 TABLET, FILM COATED ORAL NIGHTLY
Qty: 90 TABLET | Refills: 1 | Status: SHIPPED | OUTPATIENT
Start: 2025-02-27

## 2025-02-27 SDOH — SOCIAL STABILITY - SOCIAL INSECURITY: DISSAPEARANCE AND DEATH OF FAMILY MEMBER: Z63.4

## 2025-02-27 NOTE — TREATMENT PLAN
Anxiety:  2/10 progressing    Goals:  Patient will develop and implement behavioral and cognitive strategies to reduce anxiety and irrational fears, monthly, using Rogerian psychotherapy and CBT where appropriate.  Help patient explore past emotional issues in relation to present anxiety, monthly, until remission of symptoms, using Rogerian psychotherapy and CBT where appropriate.  Help patient develop an awareness of their cognitive and physical responses to anxiety, monthly, until remission of symptoms, using Rogerian psychotherapy and CBT where appropriate.          Depression:  3/10 progressing    Goals:  Patient will demonstrate the ability to initiate new constructive life skills outside of sessions on a consistent basis, monthly, using Rogerian psychotherapy and CBT where appropriate.  Assist patient in setting attainable activities of daily living goals, monthly, using Rogerian psychotherapy and CBT where appropriate.  Provide education about depression, monthly, using Rogerian psychotherapy and CBT where appropriate.  Assist patient in developing healthy coping strategies, monthly, using Rogerian psychotherapy and CBT where appropriate.    Rogerian psychotherapy and CBT will be used to help accomplish the above goals and manage depression and anxiety related to medical conditions, grief       I have discussed and reviewed this treatment plan with the patient.      Reviewed by Megha Jose MD   02/27/2025

## 2025-02-28 DIAGNOSIS — G89.3 CANCER RELATED PAIN: ICD-10-CM

## 2025-02-28 RX ORDER — MORPHINE SULFATE 60 MG/1
60 TABLET, FILM COATED, EXTENDED RELEASE ORAL 2 TIMES DAILY
Qty: 60 TABLET | Refills: 0 | Status: SHIPPED | OUTPATIENT
Start: 2025-02-28

## 2025-02-28 NOTE — TELEPHONE ENCOUNTER
Caller: Derek Keller    Relationship: Self    Best call back number: [phone]    Requested Prescriptions:   Requested Prescriptions     Pending Prescriptions Disp Refills    Morphine (MS CONTIN) 60 MG 12 hr tablet 60 tablet 0     Sig: Take 1 tablet by mouth 2 (Two) Times a Day.        Pharmacy where request should be sent: [facility], [facility], & [facility], [place] - [address] [facility] [address] - [phone] [facility] [phone] FX     Last office visit with prescribing clinician: 2/6/2025   Last telemedicine visit with prescribing clinician: Visit date not found   Next office visit with prescribing clinician: 3/4/2025     Does the patient have less than a 3 day supply:  [x] Yes  [] No      Abby Cota Rep   02/28/25 11:40 EST

## 2025-03-04 ENCOUNTER — HOSPITAL ENCOUNTER (OUTPATIENT)
Dept: ONCOLOGY | Facility: HOSPITAL | Age: 65
Discharge: HOME | End: 2025-03-04
Payer: MEDICARE

## 2025-03-04 ENCOUNTER — OFFICE VISIT (OUTPATIENT)
Dept: ONCOLOGY | Facility: HOSPITAL | Age: 65
End: 2025-03-04
Payer: MEDICARE

## 2025-03-04 VITALS
HEART RATE: 68 BPM | TEMPERATURE: 97.2 F | OXYGEN SATURATION: 95 % | DIASTOLIC BLOOD PRESSURE: 43 MMHG | WEIGHT: 210.32 LBS | SYSTOLIC BLOOD PRESSURE: 141 MMHG | BODY MASS INDEX: 33.44 KG/M2 | RESPIRATION RATE: 18 BRPM

## 2025-03-04 DIAGNOSIS — Z17.0 MALIGNANT NEOPLASM OF UPPER-OUTER QUADRANT OF LEFT BREAST IN FEMALE, ESTROGEN RECEPTOR POSITIVE: ICD-10-CM

## 2025-03-04 DIAGNOSIS — Z17.0 MALIGNANT NEOPLASM OF UPPER-OUTER QUADRANT OF LEFT BREAST IN FEMALE, ESTROGEN RECEPTOR POSITIVE: Primary | ICD-10-CM

## 2025-03-04 DIAGNOSIS — C50.412 MALIGNANT NEOPLASM OF UPPER-OUTER QUADRANT OF LEFT BREAST IN FEMALE, ESTROGEN RECEPTOR POSITIVE: ICD-10-CM

## 2025-03-04 DIAGNOSIS — C50.412 MALIGNANT NEOPLASM OF UPPER-OUTER QUADRANT OF LEFT BREAST IN FEMALE, ESTROGEN RECEPTOR POSITIVE: Primary | ICD-10-CM

## 2025-03-04 DIAGNOSIS — C79.51 MALIGNANT NEOPLASM METASTATIC TO BONE: Primary | ICD-10-CM

## 2025-03-04 DIAGNOSIS — Z45.2 ENCOUNTER FOR ADJUSTMENT OR MANAGEMENT OF VASCULAR ACCESS DEVICE: Primary | ICD-10-CM

## 2025-03-04 DIAGNOSIS — C79.51 MALIGNANT NEOPLASM METASTATIC TO BONE: ICD-10-CM

## 2025-03-04 LAB
ALBUMIN SERPL-MCNC: 3.9 G/DL (ref 3.5–5.2)
ALBUMIN/GLOB SERPL: 1.3 G/DL
ALP SERPL-CCNC: 116 U/L (ref 39–117)
ALT SERPL W P-5'-P-CCNC: 10 U/L (ref 1–33)
ANION GAP SERPL CALCULATED.3IONS-SCNC: 8.4 MMOL/L (ref 5–15)
AST SERPL-CCNC: 11 U/L (ref 1–32)
BASOPHILS # BLD AUTO: 0.05 10*3/MM3 (ref 0–0.2)
BASOPHILS NFR BLD AUTO: 1.2 % (ref 0–1.5)
BILIRUB SERPL-MCNC: 0.3 MG/DL (ref 0–1.2)
BUN SERPL-MCNC: 19 MG/DL (ref 8–23)
BUN/CREAT SERPL: 20.2 (ref 7–25)
CALCIUM SPEC-SCNC: 9.7 MG/DL (ref 8.6–10.5)
CHLORIDE SERPL-SCNC: 102 MMOL/L (ref 98–107)
CO2 SERPL-SCNC: 31.6 MMOL/L (ref 22–29)
CREAT SERPL-MCNC: 0.94 MG/DL (ref 0.57–1)
DEPRECATED RDW RBC AUTO: 58.6 FL (ref 37–54)
EGFRCR SERPLBLD CKD-EPI 2021: 67.5 ML/MIN/1.73
EOSINOPHIL # BLD AUTO: 0.02 10*3/MM3 (ref 0–0.4)
EOSINOPHIL NFR BLD AUTO: 0.5 % (ref 0.3–6.2)
ERYTHROCYTE [DISTWIDTH] IN BLOOD BY AUTOMATED COUNT: 15.6 % (ref 12.3–15.4)
GLOBULIN UR ELPH-MCNC: 3.1 GM/DL
GLUCOSE SERPL-MCNC: 139 MG/DL (ref 65–99)
HCT VFR BLD AUTO: 30.9 % (ref 34–46.6)
HGB BLD-MCNC: 9.6 G/DL (ref 12–15.9)
IMM GRANULOCYTES # BLD AUTO: 0.02 10*3/MM3 (ref 0–0.05)
IMM GRANULOCYTES NFR BLD AUTO: 0.5 % (ref 0–0.5)
LYMPHOCYTES # BLD AUTO: 1.13 10*3/MM3 (ref 0.7–3.1)
LYMPHOCYTES NFR BLD AUTO: 27.4 % (ref 19.6–45.3)
MAGNESIUM SERPL-MCNC: 1.6 MG/DL (ref 1.6–2.4)
MCH RBC QN AUTO: 32.1 PG (ref 26.6–33)
MCHC RBC AUTO-ENTMCNC: 31.1 G/DL (ref 31.5–35.7)
MCV RBC AUTO: 103.3 FL (ref 79–97)
MONOCYTES # BLD AUTO: 0.25 10*3/MM3 (ref 0.1–0.9)
MONOCYTES NFR BLD AUTO: 6.1 % (ref 5–12)
NEUTROPHILS NFR BLD AUTO: 2.65 10*3/MM3 (ref 1.7–7)
NEUTROPHILS NFR BLD AUTO: 64.3 % (ref 42.7–76)
NRBC BLD AUTO-RTO: 0 /100 WBC (ref 0–0.2)
PHOSPHATE SERPL-MCNC: 3.4 MG/DL (ref 2.5–4.5)
PLATELET # BLD AUTO: 223 10*3/MM3 (ref 140–450)
PMV BLD AUTO: 10.3 FL (ref 6–12)
POTASSIUM SERPL-SCNC: 4 MMOL/L (ref 3.5–5.2)
PROT SERPL-MCNC: 7 G/DL (ref 6–8.5)
RBC # BLD AUTO: 2.99 10*6/MM3 (ref 3.77–5.28)
SODIUM SERPL-SCNC: 142 MMOL/L (ref 136–145)
WBC NRBC COR # BLD AUTO: 4.12 10*3/MM3 (ref 3.4–10.8)

## 2025-03-04 PROCEDURE — 25010000002 HEPARIN LOCK FLUSH PER 10 UNITS: Performed by: INTERNAL MEDICINE

## 2025-03-04 PROCEDURE — 25010000002 DENOSUMAB 120 MG/1.7ML SOLUTION: Performed by: INTERNAL MEDICINE

## 2025-03-04 PROCEDURE — 85025 COMPLETE CBC W/AUTO DIFF WBC: CPT | Performed by: INTERNAL MEDICINE

## 2025-03-04 PROCEDURE — 84100 ASSAY OF PHOSPHORUS: CPT | Performed by: INTERNAL MEDICINE

## 2025-03-04 PROCEDURE — 83735 ASSAY OF MAGNESIUM: CPT | Performed by: INTERNAL MEDICINE

## 2025-03-04 PROCEDURE — 80053 COMPREHEN METABOLIC PANEL: CPT | Performed by: INTERNAL MEDICINE

## 2025-03-04 PROCEDURE — 36591 DRAW BLOOD OFF VENOUS DEVICE: CPT

## 2025-03-04 PROCEDURE — 96372 THER/PROPH/DIAG INJ SC/IM: CPT

## 2025-03-04 RX ORDER — HEPARIN SODIUM (PORCINE) LOCK FLUSH IV SOLN 100 UNIT/ML 100 UNIT/ML
500 SOLUTION INTRAVENOUS AS NEEDED
Status: DISCONTINUED | OUTPATIENT
Start: 2025-03-04 | End: 2025-03-05 | Stop reason: HOSPADM

## 2025-03-04 RX ORDER — SODIUM CHLORIDE 0.9 % (FLUSH) 0.9 %
20 SYRINGE (ML) INJECTION AS NEEDED
Status: DISCONTINUED | OUTPATIENT
Start: 2025-03-04 | End: 2025-03-05 | Stop reason: HOSPADM

## 2025-03-04 RX ADMIN — HEPARIN 500 UNITS: 100 SYRINGE at 13:19

## 2025-03-04 RX ADMIN — DENOSUMAB 120 MG: 120 INJECTION SUBCUTANEOUS at 14:27

## 2025-03-04 RX ADMIN — Medication 20 ML: at 13:19

## 2025-03-04 NOTE — ASSESSMENT & PLAN NOTE
Patient has been on Xgeva but on hold for dental issues.  She has been released by her dentist and we have faxed confirming that.  Resume Xgeva today monthly.

## 2025-03-04 NOTE — ASSESSMENT & PLAN NOTE
Metastatic.  Patient is on treatment with letrozole, ribociclib.  Tolerating well.  CBC demonstrates mild anemia from her cancer treatment but stable.  Good response to therapy based on her most recent imaging.  Continue letrozole daily.  Continue ribociclib 3 weeks out of 4.  I will see her back in 1 month for ongoing treatment with lab work prior to monitor for toxicities.

## 2025-03-04 NOTE — PROGRESS NOTES
Chief Complaint  Malignant neoplasm of upper-outer quadrant of left breast i    Haile Monique MD Patel, Saagar, MD    Subjective          Derek Keller presents to [facility] for     Oncology/Hematology History   Malignant neoplasm of upper-outer quadrant of left breast in female, estrogen receptor positive   10/13/2022 Initial Diagnosis    Malignant neoplasm of left breast in female, estrogen receptor positive (HCC)     10/14/2022 -  Chemotherapy    OP BREAST Letrozole / Ribociclib     10/14/2022 Cancer Staged    Staging form: Breast, AJCC 8th Edition  - Clinical: Stage IV (cT1c, cN1, cM1, ER+, AZ+, HER2-) - Signed by Donald Barker MD on 10/14/2022     10/28/2022 -  Chemotherapy    OP SUPPORTIVE Denosumab (Xgeva) Q28D     Malignant neoplasm metastatic to bone   10/14/2022 Initial Diagnosis    Bone metastases (HCC)     10/28/2022 -  Chemotherapy    OP SUPPORTIVE Denosumab (Xgeva) Q28D     Secondary malignant neoplasm of bone (Resolved)   11/14/2022 Initial Diagnosis    Secondary malignant neoplasm of bone (HCC)     11/17/2022 - 4/19/2023 Radiation    RADIATION THERAPY Treatment Details (11/14/2022 - 4/19/2023)  Site: Spine - Lumbar  Technique: 3D CRT  Goal: No goal specified  Planned Treatment Start Date: 11/17/2022           Current Outpatient Medications on File Prior to Visit   Medication Sig Dispense Refill    atorvastatin (LIPITOR) 20 MG tablet TAKE 1 TABLET BY MOUTH EVERY DAY (Patient taking differently: Take 1 tablet by mouth Daily.) 30 tablet 6    baclofen (LIORESAL) 20 MG tablet       cyproheptadine (PERIACTIN) 4 MG tablet Take 1 tablet by mouth Every 12 (Twelve) Hours.      Diclofenac Sodium (VOLTAREN) 1 % gel gel APPLY TO THE AFFECTED AREA(S) ON THE SKIN FOUR TIMES DAILY FOR 10 DAYS      donepezil (ARICEPT) 10 MG tablet Take 1 tablet by mouth Daily.      DULoxetine (CYMBALTA) 30 MG capsule Take 1 capsule by mouth Daily. 90 capsule 1    DULoxetine (CYMBALTA)  60 MG capsule TAKE 1 capsule BY MOUTH DAILY for mood elevation 30 capsule 1    fluticasone (FLONASE) 50 MCG/ACT nasal spray 2 sprays by Each Nare route Daily.      gabapentin (NEURONTIN) 600 MG tablet TAKE 1 TABLET BY MOUTH AT BEDTIME (Patient taking differently: Take 1 tablet by mouth 2 (Two) Times a Day As Needed.) 90 tablet 0    GaviLyte-G 236 g solution take 4000ml BY MOUTH once for 1 dose      hydroCHLOROthiazide 12.5 MG tablet TAKE 1 TABLET BY MOUTH DAILY for swelling 90 tablet 1    ibuprofen (ADVIL,MOTRIN) 600 MG tablet Take 1 tablet by mouth Every 8 (Eight) Hours As Needed. for pain      lactulose (CHRONULAC) 10 GM/15ML solution take 30 mls BY MOUTH TWICE DAILY      letrozole (FEMARA) 2.5 MG tablet TAKE 1 TABLET BY MOUTH DAILY 90 tablet 1    levothyroxine (SYNTHROID, LEVOTHROID) 50 MCG tablet TAKE 1 TABLET BY MOUTH EVERY DAY (Patient taking differently: Take 1 tablet by mouth Daily.) 90 tablet 1    lidocaine (LIDODERM) 5 % Place 1 patch on the skin as directed by provider Daily. Remove & Discard patch within 12 hours or as directed by MD      Lidocaine Pain Relief 4 %       LORazepam (ATIVAN) 0.5 MG tablet Take 1 tablet by mouth Every 6 (Six) Hours As Needed.      losartan (COZAAR) 50 MG tablet Take 1 tablet by mouth Daily. for blood pressure      mirtazapine (REMERON) 30 MG tablet Take 1 tablet by mouth Every Night. 90 tablet 1    montelukast (SINGULAIR) 10 MG tablet Take 1 tablet by mouth Daily.      Morphine (MS CONTIN) 60 MG 12 hr tablet Take 1 tablet by mouth 2 (Two) Times a Day. 60 tablet 0    naloxone (NARCAN) 4 MG/0.1ML nasal spray Call 911. Don't prime. [place] in 1 nostril for overdose. Repeat in 2-3 minutes in other nostril if no or minimal breathing/responsiveness. 2 each 0    nicotine (NICODERM CQ) 21 MG/24HR patch Place 1 patch on the skin as directed by provider Daily. 30 patch 3    ondansetron (ZOFRAN) 8 MG tablet Take 1 tablet by mouth Every 8 (Eight) Hours As Needed for Nausea or Vomiting. 30  tablet 5    oxybutynin XL (DITROPAN XL) 15 MG 24 hr tablet TAKE 1 TABLET BY MOUTH DAILY for bladder 30 tablet 1    oxyCODONE-acetaminophen (PERCOCET)  MG per tablet Take 1 tablet by mouth Every 6 (Six) Hours As Needed for Moderate Pain. 60 tablet 0    polyethylene glycol (MIRALAX) 17 g packet Take 17 g by mouth Daily. 5 packet 0    potassium chloride (MICRO-K) 10 MEQ CR capsule TAKE 1 CAPSULE BY MOUTH EVERY TWELVE HOURS 60 capsule 1    prochlorperazine (COMPAZINE) 10 MG tablet       ribociclib succinate 200 MG tablet therapy pack tablet Take 3 tablets by mouth Take As Directed. Take 3 tablets by mouth daily for 21 days then off 7 days on a 28 day cycle. 63 tablet 11    rOPINIRole (REQUIP) 3 MG tablet Take 1 tablet by mouth 2 (Two) Times a Day.      senna (Senokot) 8.6 MG tablet Take 1 tablet by mouth Daily. 30 tablet 3    SudoGest 60 MG tablet Take 1 tablet by mouth Every 8 (Eight) Hours.      SUMAtriptan (IMITREX) 100 MG tablet Take 1 tablet by mouth 1 (One) Time As Needed.      traZODone (DESYREL) 150 MG tablet TAKE 1 TABLET BY MOUTH ONCE nightly (Patient taking differently: Take 1 tablet by mouth Every Night.) 90 tablet 2     No current facility-administered medications on file prior to visit.       Allergies   Allergen Reactions    Azithromycin Itching    Erythromycin Itching     Past Medical History:   Diagnosis Date    Abdominal pain     OSTOMY SITE    Allergic rhinitis     Anemia     NO S/S    Anxiety     Arteriosclerosis     Coronary, follows with Dr. Núñez    Arthritis     Bone cancer     Bone metastases 10/14/2022    Bowen's disease     SKIN CANCER    Breast cancer     NO SURGERY WAS DONE DUE TO METS TO BONE/COLON/LYMPHNODE    Cancer related pain 10/13/2022    Depression     Disorder associated with Helicobacter species 10/12/2022    Dysphoric mood     Fatigue     Fibromyalgia     Frequent urination     NO S/S INFECTION    Herpes simplex vulvovaginitis 07/26/2018    History of colon polyps      History of IBS     History of kidney stones     Hyperlipidemia     Hypertension     Hypothyroidism     Insomnia     Lumbago     Mood disorder     Neck pain     R/T CANCER    Palpitations     last time a few months ago    Precordial pain     R/T SPINE CANCER    S/P Laparoscopic Hand-Assisted Colostomy Closure with End to End Anastomosis Open Parastomal Hernia Repair 12/08/2022    Sleep disturbance     SOB (shortness of breath)     at times at rest    SOBOE (shortness of breath on exertion)      Past Surgical History:   Procedure Laterality Date    BREAST BIOPSY Left     CARDIAC CATHETERIZATION Left 1959    CARDIAC CATHETERIZATION N/A 04/06/2018    Procedure: Coronary angiography;  Surgeon: Art Licea MD;  Location: [facility] CATH INVASIVE LOCATION;  Service: Cardiovascular    CARDIAC CATHETERIZATION N/A 04/06/2018    Procedure: Left heart cath;  Surgeon: Art Licea MD;  Location: [facility] CATH INVASIVE LOCATION;  Service: Cardiovascular    CARDIAC CATHETERIZATION N/A 04/06/2018    Procedure: Left ventriculography;  Surgeon: Art Licea MD;  Location: [facility] CATH INVASIVE LOCATION;  Service: Cardiovascular    CHOLECYSTECTOMY      COLON SURGERY      colostomy bag, and colostomy reversal    COLONOSCOPY  11/08/2006    COLONOSCOPY N/A 06/08/2023    Procedure: COLONOSCOPY;  Surgeon: Alfred Castañeda MD;  Location: [facility] ENDOSCOPY;  Service: General;  Laterality: N/A;  same as preop    EXPLORATORY LAPAROTOMY N/A 12/06/2022    Procedure: LAPAROTOMY EXPLORATORY sigmoid resection hartmans procedure colostomy;  Surgeon: Alfred Castañeda MD;  Location: [facility] MAIN OR;  Service: General;  Laterality: N/A;    GANGLION CYST EXCISION Bilateral     HYSTERECTOMY  05/2005    ILEOSTOMY CLOSURE N/A 06/26/2023    Procedure: Laparoscopic Hand-Assisted Colostomy Closure with End to End Anastomosis;  Surgeon: Alfred Castañeda MD;  Location: [facility] MAIN OR;  Service: General;  Laterality: N/A;     LAPAROSCOPIC GASTRIC BANDING      BAND REMOVED     PARASTOMAL HERNIA REPAIR N/A 2023    Procedure: Open Parastomal Hernia Repair;  Surgeon: Alfred Castañeda MD;  Location: [facility] MAIN OR;  Service: General;  Laterality: N/A;    TONSILLECTOMY      VENOUS ACCESS DEVICE (PORT) INSERTION N/A 2023    Procedure: INSERTION VENOUS ACCESS DEVICE;  Surgeon: Alfred Castañeda MD;  Location: [facility] MAIN OR;  Service: General;  Laterality: N/A;    VENTRAL HERNIA REPAIR N/A 7/3/2024    Procedure: VENTRAL / INCISIONAL HERNIA REPAIR LAPAROSCOPIC WITH DAVINCI ROBOT with mesh;  Surgeon: Alfred Castañeda MD;  Location: [facility] MAIN OR;  Service: Robotics - DaVinci;  Laterality: N/A;     Social History     Socioeconomic History    Marital status:    Tobacco Use    Smoking status: Former     Current packs/day: 0.00     Average packs/day: 1 pack/day for 50.5 years (50.5 ttl pk-yrs)     Types: Cigarettes     Start date:      Quit date: 2024     Years since quittin.6    Smokeless tobacco: Never    Tobacco comments:          Has not used tobacco for 28 days    Vaping Use    Vaping status: Never Used   Substance and Sexual Activity    Alcohol use: No    Drug use: Never     Types: Marijuana     Comment: LAST USE 24    Sexual activity: Defer     Partners: Male     Birth control/protection: None     Family History   Problem Relation Age of Onset    Hypertension Mother     Rheum arthritis Mother     Heart disease Mother     Breast cancer Mother     Diabetes Father     Cancer Maternal Grandmother         colon    Colon cancer Maternal Grandmother     Aneurysm Paternal Grandfather     Diabetes Other     Fibromyalgia Other     Malig Hyperthermia Neg Hx        Objective   Physical Exam  Vitals reviewed. Exam conducted with a chaperone present.   Cardiovascular:      Rate and Rhythm: Normal rate and regular rhythm.      Heart sounds: Normal heart sounds. No murmur heard.     No gallop.  "  Pulmonary:      Effort: Pulmonary effort is normal.      Breath sounds: Normal breath sounds.   Abdominal:      General: Bowel sounds are normal. There is no distension.      Tenderness: There is no abdominal tenderness.   Lymphadenopathy:      Cervical: No cervical adenopathy.   Psychiatric:         Mood and Affect: Mood normal.         Behavior: Behavior normal.         Vitals:    03/04/25 1335   BP: 141/43   Pulse: 68   Resp: 18   Temp: 97.2 °F (36.2 °C)   TempSrc: Temporal   SpO2: 95%   Weight: 95.4 kg (210 lb 5.1 oz)   PainSc: 8      ECOG score: 0         PHQ-9 Total Score:                    Result Review :   The following data was reviewed by: Donald Barker MD on 03/04/2025:  Lab Results   Component Value Date    HGB 9.6 (L) 03/04/2025    HCT 30.9 (L) 03/04/2025    .3 (H) 03/04/2025     03/04/2025    WBC 4.12 03/04/2025    NEUTROABS 2.65 03/04/2025    LYMPHSABS 1.13 03/04/2025    MONOSABS 0.25 03/04/2025    EOSABS 0.02 03/04/2025    BASOSABS 0.05 03/04/2025     Lab Results   Component Value Date    GLUCOSE 139 (H) 03/04/2025    BUN 19 03/04/2025    CREATININE 0.94 03/04/2025     03/04/2025    K 4.0 03/04/2025     03/04/2025    CO2 31.6 (H) 03/04/2025    CALCIUM 9.7 03/04/2025    PROTEINTOT 7.0 03/04/2025    ALBUMIN 3.9 03/04/2025    BILITOT 0.3 03/04/2025    ALKPHOS 116 03/04/2025    AST 11 03/04/2025    ALT 10 03/04/2025     Lab Results   Component Value Date    MG 1.6 03/04/2025    PHOS 3.4 03/04/2025    FREET4 1.01 01/17/2022    TSH 1.470 11/12/2019     Lab Results   Component Value Date    IRON 28 (L) 05/29/2024    LABIRON 7 (L) 05/29/2024    TRANSFERRIN 279 05/29/2024    TIBC 416 05/29/2024     Lab Results   Component Value Date    FERRITIN 56.02 05/29/2024    DOVOMHYC01 390 04/23/2019    FOLATE 9.93 04/23/2019     No results found for: \"PSA\", \"CEA\", \"AFP\", \"\", \"\"          Assessment and Plan    Diagnoses and all orders for this visit:    1. Malignant neoplasm of " upper-outer quadrant of left breast in female, estrogen receptor positive (Primary)  Assessment & Plan:  Metastatic.  Patient is on treatment with letrozole, ribociclib.  Tolerating well.  CBC demonstrates mild anemia from her cancer treatment but stable.  Good response to therapy based on her most recent imaging.  Continue letrozole daily.  Continue ribociclib 3 weeks out of 4.  I will see her back in 1 month for ongoing treatment with lab work prior to monitor for toxicities.      2. Malignant neoplasm metastatic to bone  Assessment & Plan:  Patient has been on Xgeva but on hold for dental issues.  She has been released by her dentist and we have faxed confirming that.  Resume Xgeva today monthly.      Other orders  -     Cancel: denosumab (XGEVA) injection 120 mg            Patient Follow Up: 1 m    Patient was given instructions and counseling regarding her condition or for health maintenance advice. Please see specific information pulled into the AVS if appropriate.     Donald Barker MD    3/4/2025

## 2025-03-05 ENCOUNTER — SPECIALTY PHARMACY (OUTPATIENT)
Dept: PHARMACY | Facility: HOSPITAL | Age: 65
End: 2025-03-05
Payer: MEDICARE

## 2025-03-05 NOTE — PROGRESS NOTES
Specialty Pharmacy Patient Management Program  Chart Review/Lab Monitoring     Derek Keller is a 64 y.o. female with L Lobular HR+/HER2- Breast Cancer who presented for lab check and Oncology provider appointment. [facility] reviewed labs and providers note to assess continued therapy appropriateness of Kisqali (ribociclib)    Oncology Interval History:  Evelyn Barker  Diagnosis: Stage IV (10/2022)  Biomarkers: ER+ 95%, NH+ 40%, HER2- 1+, Ki-67 15%     Current Tx: Kisqali (ribociclib) 600mg daily for 21 days on, 7 days off (10/22/22-now) + Letrozole 2.5mg daily + Xgeva [id number]  Most Recent Imagin/3/25 CT & Bone Scan No evidence of progression of disease  Previous Tx: XRT Spine (22-    Fills at: [facility]  Last seen in person by Specialty Pharmacy: 10/5/23    3/4/25: Patient continues to tolerate therapy well. No changes.     Labs:  Lab Results   Component Value Date     2025    K 4.0 2025    CO2 31.6 (H) 2025     2025    BUN 19 2025    GLUCOSE 139 (H) 2025    CALCIUM 9.7 2025    CREATININE 0.94 2025    EGFRIFNONA 75 2019    BCR 20.2 2025    ANIONGAP 8.4 2025    AST 11 2025    ALT 10 2025    BILITOT 0.3 2025    ALKPHOS 116 2025     Lab Results   Component Value Date    WBC 4.12 2025    RBC 2.99 (L) 2025    HGB 9.6 (L) 2025    HCT 30.9 (L) 2025     2025    NEUTROABS 2.65 2025    LYMPHSABS 1.13 2025    MONOSABS 0.25 2025    EOSABS 0.02 2025    BASOSABS 0.05 2025     PLAN  Specialty pharmacy will continue to follow patient. Continue with current regimen as planned. Next Specialty Assessment due 25.    Suzette Luke PharmD  Oncology Clinical Specialty Pharmacist   3/5/2025 08:36 EST

## 2025-03-06 DIAGNOSIS — G89.3 CANCER RELATED PAIN: ICD-10-CM

## 2025-03-06 RX ORDER — OXYCODONE AND ACETAMINOPHEN 10; 325 MG/1; MG/1
1 TABLET ORAL EVERY 6 HOURS PRN
Qty: 60 TABLET | Refills: 0 | Status: SHIPPED | OUTPATIENT
Start: 2025-03-06

## 2025-03-06 NOTE — TELEPHONE ENCOUNTER
Caller: Derek Kelelr    Relationship: Self    Best call back number: [phone]     Requested Prescriptions:   Requested Prescriptions     Pending Prescriptions Disp Refills    oxyCODONE-acetaminophen (PERCOCET)  MG per tablet 60 tablet 0     Sig: Take 1 tablet by mouth Every 6 (Six) Hours As Needed for Moderate Pain.        Pharmacy where request should be sent: [facility], [facility], & [facility], [place] - [address] [facility] [address] - [phone] [facility] [phone] FX     Last office visit with prescribing clinician: 3/4/2025   Last telemedicine visit with prescribing clinician: Visit date not found   Next office visit with prescribing clinician: 4/1/2025     Additional details provided by patient:     Does the patient have less than a 3 day supply:  [x] Yes  [] No    Would you like a call back once the refill request has been completed: [] Yes [x] No    If the office needs to give you a call back, can they leave a voicemail: [] Yes [x] No

## 2025-03-14 ENCOUNTER — TELEPHONE (OUTPATIENT)
Dept: ONCOLOGY | Facility: HOSPITAL | Age: 65
End: 2025-03-14
Payer: MEDICARE

## 2025-03-14 NOTE — TELEPHONE ENCOUNTER
The pt called and states that she is numb form her head to her toes. The pt states that she woke up feeling numb today. This nurse informed the pt that none of the meds that Dr Barker has prescribed her will cause this side effect. The pt asked if the Remeron tien  cause it. This nurse instructed the pt to call the prescribing MD, Dr Jose, and ask him about the meds that he is prescribing her. This nurse instructed the pt to that it is urgent for her to go to the ER if Dr Jose's meds do not cause the numbness. The pt voiced understanding.

## 2025-03-17 ENCOUNTER — TELEPHONE (OUTPATIENT)
Dept: ONCOLOGY | Facility: HOSPITAL | Age: 65
End: 2025-03-17
Payer: MEDICARE

## 2025-03-17 ENCOUNTER — SPECIALTY PHARMACY (OUTPATIENT)
Dept: PHARMACY | Facility: HOSPITAL | Age: 65
End: 2025-03-17
Payer: MEDICARE

## 2025-03-17 NOTE — TELEPHONE ENCOUNTER
OSW received a notification from the speciality pharmacist that Ms. Keller is requesting to speak with OSW. OSW contacted Ms. Keller this morning. She is requesting assistance in getting reapplied for the  financial assistance program. OSW reviewed the required documents needed to accompany this application. She will plan to bring these in with her to her next appointment on 4/1 and OSW will further assist her during that time. OSW support remains available.

## 2025-03-17 NOTE — PROGRESS NOTES
Specialty Pharmacy Patient Management Program  Phone Call     Derek Keller is a 64 y.o. female with HR+/HER2- Breast Cancer. Spoke with the patient via the telephone regarding her next refill. She is about to start her next cycle on Wednesday, so we are going to move out her next refill by ~2 weeks.     Suzette Luke PharmD  Oncology Clinical Specialty Pharmacist   3/17/2025  10:43 EDT

## 2025-03-18 ENCOUNTER — HOSPITAL ENCOUNTER (EMERGENCY)
Facility: HOSPITAL | Age: 65
Discharge: HOME | End: 2025-03-19
Attending: EMERGENCY MEDICINE | Admitting: EMERGENCY MEDICINE
Payer: MEDICARE

## 2025-03-18 ENCOUNTER — APPOINTMENT (OUTPATIENT)
Dept: CT IMAGING | Facility: HOSPITAL | Age: 65
End: 2025-03-18
Payer: MEDICARE

## 2025-03-18 DIAGNOSIS — C79.51 MALIGNANT NEOPLASM METASTATIC TO BONE: ICD-10-CM

## 2025-03-18 DIAGNOSIS — R10.32 LEFT LOWER QUADRANT ABDOMINAL PAIN: Primary | ICD-10-CM

## 2025-03-18 LAB
ALBUMIN SERPL-MCNC: 4.2 G/DL (ref 3.5–5.2)
ALBUMIN/GLOB SERPL: 1.2 G/DL
ALP SERPL-CCNC: 120 U/L (ref 39–117)
ALT SERPL W P-5'-P-CCNC: 9 U/L (ref 1–33)
ANION GAP SERPL CALCULATED.3IONS-SCNC: 10.7 MMOL/L (ref 5–15)
AST SERPL-CCNC: 13 U/L (ref 1–32)
BACTERIA UR QL AUTO: NORMAL /HPF
BASOPHILS # BLD AUTO: 0.03 10*3/MM3 (ref 0–0.2)
BASOPHILS NFR BLD AUTO: 0.8 % (ref 0–1.5)
BILIRUB SERPL-MCNC: 0.3 MG/DL (ref 0–1.2)
BILIRUB UR QL STRIP: NEGATIVE
BUN SERPL-MCNC: 15 MG/DL (ref 8–23)
BUN/CREAT SERPL: 17.2 (ref 7–25)
CALCIUM SPEC-SCNC: 9.3 MG/DL (ref 8.6–10.5)
CHLORIDE SERPL-SCNC: 102 MMOL/L (ref 98–107)
CLARITY UR: CLEAR
CO2 SERPL-SCNC: 26.3 MMOL/L (ref 22–29)
COLOR UR: YELLOW
CREAT SERPL-MCNC: 0.87 MG/DL (ref 0.57–1)
D-LACTATE SERPL-SCNC: 2.3 MMOL/L (ref 0.5–2)
D-LACTATE SERPL-SCNC: 3.2 MMOL/L (ref 0.5–2)
D-LACTATE SERPL-SCNC: 3.4 MMOL/L (ref 0.5–2)
DEPRECATED RDW RBC AUTO: 62.8 FL (ref 37–54)
EGFRCR SERPLBLD CKD-EPI 2021: 74 ML/MIN/1.73
EOSINOPHIL # BLD AUTO: 0 10*3/MM3 (ref 0–0.4)
EOSINOPHIL NFR BLD AUTO: 0 % (ref 0.3–6.2)
ERYTHROCYTE [DISTWIDTH] IN BLOOD BY AUTOMATED COUNT: 16.1 % (ref 12.3–15.4)
GLOBULIN UR ELPH-MCNC: 3.4 GM/DL
GLUCOSE SERPL-MCNC: 137 MG/DL (ref 65–99)
GLUCOSE UR STRIP-MCNC: NEGATIVE MG/DL
HCT VFR BLD AUTO: 34.2 % (ref 34–46.6)
HGB BLD-MCNC: 10.4 G/DL (ref 12–15.9)
HGB UR QL STRIP.AUTO: ABNORMAL
HOLD SPECIMEN: NORMAL
HOLD SPECIMEN: NORMAL
HYALINE CASTS UR QL AUTO: NORMAL /LPF
IMM GRANULOCYTES # BLD AUTO: 0 10*3/MM3 (ref 0–0.05)
IMM GRANULOCYTES NFR BLD AUTO: 0 % (ref 0–0.5)
KETONES UR QL STRIP: NEGATIVE
LEUKOCYTE ESTERASE UR QL STRIP.AUTO: ABNORMAL
LIPASE SERPL-CCNC: 18 U/L (ref 13–60)
LYMPHOCYTES # BLD AUTO: 1.09 10*3/MM3 (ref 0.7–3.1)
LYMPHOCYTES NFR BLD AUTO: 27.5 % (ref 19.6–45.3)
MCH RBC QN AUTO: 31.7 PG (ref 26.6–33)
MCHC RBC AUTO-ENTMCNC: 30.4 G/DL (ref 31.5–35.7)
MCV RBC AUTO: 104.3 FL (ref 79–97)
MONOCYTES # BLD AUTO: 0.48 10*3/MM3 (ref 0.1–0.9)
MONOCYTES NFR BLD AUTO: 12.1 % (ref 5–12)
NEUTROPHILS NFR BLD AUTO: 2.36 10*3/MM3 (ref 1.7–7)
NEUTROPHILS NFR BLD AUTO: 59.6 % (ref 42.7–76)
NITRITE UR QL STRIP: NEGATIVE
NRBC BLD AUTO-RTO: 0 /100 WBC (ref 0–0.2)
PH UR STRIP.AUTO: 5.5 [PH] (ref 5–8)
PLATELET # BLD AUTO: 180 10*3/MM3 (ref 140–450)
PMV BLD AUTO: 10.6 FL (ref 6–12)
POTASSIUM SERPL-SCNC: 3.8 MMOL/L (ref 3.5–5.2)
PROT SERPL-MCNC: 7.6 G/DL (ref 6–8.5)
PROT UR QL STRIP: ABNORMAL
RBC # BLD AUTO: 3.28 10*6/MM3 (ref 3.77–5.28)
RBC # UR STRIP: NORMAL /HPF
REF LAB TEST METHOD: NORMAL
SODIUM SERPL-SCNC: 139 MMOL/L (ref 136–145)
SP GR UR STRIP: 1.02 (ref 1–1.03)
SQUAMOUS #/AREA URNS HPF: NORMAL /HPF
UROBILINOGEN UR QL STRIP: ABNORMAL
WBC # UR STRIP: NORMAL /HPF
WBC NRBC COR # BLD AUTO: 3.96 10*3/MM3 (ref 3.4–10.8)
WHOLE BLOOD HOLD COAG: NORMAL
WHOLE BLOOD HOLD SPECIMEN: NORMAL

## 2025-03-18 PROCEDURE — 85025 COMPLETE CBC W/AUTO DIFF WBC: CPT

## 2025-03-18 PROCEDURE — 83605 ASSAY OF LACTIC ACID: CPT | Performed by: EMERGENCY MEDICINE

## 2025-03-18 PROCEDURE — 80053 COMPREHEN METABOLIC PANEL: CPT

## 2025-03-18 PROCEDURE — 25810000003 SODIUM CHLORIDE 0.9 % SOLUTION

## 2025-03-18 PROCEDURE — 36415 COLL VENOUS BLD VENIPUNCTURE: CPT | Performed by: EMERGENCY MEDICINE

## 2025-03-18 PROCEDURE — 25010000002 ONDANSETRON PER 1 MG: Performed by: EMERGENCY MEDICINE

## 2025-03-18 PROCEDURE — 83690 ASSAY OF LIPASE: CPT

## 2025-03-18 PROCEDURE — 96361 HYDRATE IV INFUSION ADD-ON: CPT

## 2025-03-18 PROCEDURE — 74177 CT ABD & PELVIS W/CONTRAST: CPT

## 2025-03-18 PROCEDURE — 99285 EMERGENCY DEPT VISIT HI MDM: CPT

## 2025-03-18 PROCEDURE — 25010000002 HYDROMORPHONE 1 MG/ML SOLUTION: Performed by: EMERGENCY MEDICINE

## 2025-03-18 PROCEDURE — 83605 ASSAY OF LACTIC ACID: CPT

## 2025-03-18 PROCEDURE — 81001 URINALYSIS AUTO W/SCOPE: CPT

## 2025-03-18 PROCEDURE — 25510000001 IOPAMIDOL PER 1 ML: Performed by: EMERGENCY MEDICINE

## 2025-03-18 PROCEDURE — 96374 THER/PROPH/DIAG INJ IV PUSH: CPT

## 2025-03-18 PROCEDURE — 25810000003 SODIUM CHLORIDE 0.9 % SOLUTION: Performed by: EMERGENCY MEDICINE

## 2025-03-18 PROCEDURE — 96375 TX/PRO/DX INJ NEW DRUG ADDON: CPT

## 2025-03-18 RX ORDER — ONDANSETRON 2 MG/ML
4 INJECTION INTRAMUSCULAR; INTRAVENOUS ONCE
Status: COMPLETED | OUTPATIENT
Start: 2025-03-18 | End: 2025-03-18

## 2025-03-18 RX ORDER — IOPAMIDOL 755 MG/ML
100 INJECTION, SOLUTION INTRAVASCULAR
Status: COMPLETED | OUTPATIENT
Start: 2025-03-18 | End: 2025-03-18

## 2025-03-18 RX ORDER — SODIUM CHLORIDE 0.9 % (FLUSH) 0.9 %
10 SYRINGE (ML) INJECTION AS NEEDED
Status: DISCONTINUED | OUTPATIENT
Start: 2025-03-18 | End: 2025-03-19 | Stop reason: HOSPADM

## 2025-03-18 RX ADMIN — ONDANSETRON 4 MG: 2 INJECTION INTRAMUSCULAR; INTRAVENOUS at 19:53

## 2025-03-18 RX ADMIN — SODIUM CHLORIDE 1000 ML: 0.9 INJECTION, SOLUTION INTRAVENOUS at 20:43

## 2025-03-18 RX ADMIN — IOPAMIDOL 100 ML: 755 INJECTION, SOLUTION INTRAVENOUS at 19:48

## 2025-03-18 RX ADMIN — HYDROMORPHONE HYDROCHLORIDE 1 MG: 1 INJECTION, SOLUTION INTRAMUSCULAR; INTRAVENOUS; SUBCUTANEOUS at 19:53

## 2025-03-18 RX ADMIN — SODIUM CHLORIDE 1000 ML: 0.9 INJECTION, SOLUTION INTRAVENOUS at 19:56

## 2025-03-18 NOTE — ED PROVIDER NOTES
Time: 4:37 PM EDT  Date of encounter:  3/18/2025  Independent Historian/Clinical History and Information was obtained by:   Patient    History is limited by: N/A    Chief Complaint   Patient presents with    Abdominal Pain    Flank Pain         History of Present Illness:  Patient is a 64 y.o. year old female who presents to the emergency department for evaluation of left lower quadrant pain it has been going on for 2 days.  The patient has had nausea.  Patient denies painful urination or frequency, diarrhea or bloody stools.  Patient states that pain radiates into the leg and left hip and also left lower back.  Patient's history significant for malignancy BRCA positive breast malignancy with metastasis.    Patient Care Team  Primary Care Provider: Haile Monique MD    Past Medical History:     Allergies   Allergen Reactions    Azithromycin Itching    Erythromycin Itching     Past Medical History:   Diagnosis Date    Abdominal pain     OSTOMY SITE    Allergic rhinitis     Anemia     NO S/S    Anxiety     Arteriosclerosis     Coronary, follows with Dr. Núñez    Arthritis     Bone cancer     Bone metastases 10/14/2022    Bowen's disease     SKIN CANCER    Breast cancer     NO SURGERY WAS DONE DUE TO METS TO BONE/COLON/LYMPHNODE    Cancer related pain 10/13/2022    Depression     Disorder associated with Helicobacter species 10/12/2022    Dysphoric mood     Fatigue     Fibromyalgia     Frequent urination     NO S/S INFECTION    Herpes simplex vulvovaginitis 07/26/2018    History of colon polyps     History of IBS     History of kidney stones     Hyperlipidemia     Hypertension     Hypothyroidism     Insomnia     Lumbago     Mood disorder     Neck pain     R/T CANCER    Palpitations     last time a few months ago    Precordial pain     R/T SPINE CANCER    S/P Laparoscopic Hand-Assisted Colostomy Closure with End to End Anastomosis Open Parastomal Hernia Repair 12/08/2022    Sleep disturbance     SOB (shortness of  breath)     at times at rest    SOBOE (shortness of breath on exertion)      Past Surgical History:   Procedure Laterality Date    BREAST BIOPSY Left     CARDIAC CATHETERIZATION Left 1959    CARDIAC CATHETERIZATION N/A 04/06/2018    Procedure: Coronary angiography;  Surgeon: Art Licea MD;  Location: [facility] CATH INVASIVE LOCATION;  Service: Cardiovascular    CARDIAC CATHETERIZATION N/A 04/06/2018    Procedure: Left heart cath;  Surgeon: Art Licea MD;  Location: [facility] CATH INVASIVE LOCATION;  Service: Cardiovascular    CARDIAC CATHETERIZATION N/A 04/06/2018    Procedure: Left ventriculography;  Surgeon: Art Licea MD;  Location: [facility] CATH INVASIVE LOCATION;  Service: Cardiovascular    CHOLECYSTECTOMY      COLON SURGERY      colostomy bag, and colostomy reversal    COLONOSCOPY  11/08/2006    COLONOSCOPY N/A 06/08/2023    Procedure: COLONOSCOPY;  Surgeon: Alfred Castañeda MD;  Location: [facility] ENDOSCOPY;  Service: General;  Laterality: N/A;  same as preop    EXPLORATORY LAPAROTOMY N/A 12/06/2022    Procedure: LAPAROTOMY EXPLORATORY sigmoid resection hartmans procedure colostomy;  Surgeon: Alfred Castañeda MD;  Location: [facility] OR;  Service: General;  Laterality: N/A;    GANGLION CYST EXCISION Bilateral     HYSTERECTOMY  05/2005    ILEOSTOMY CLOSURE N/A 06/26/2023    Procedure: Laparoscopic Hand-Assisted Colostomy Closure with End to End Anastomosis;  Surgeon: Alfred Castañeda MD;  Location: [facility] OR;  Service: General;  Laterality: N/A;    LAPAROSCOPIC GASTRIC BANDING      BAND REMOVED 2020    PARASTOMAL HERNIA REPAIR N/A 06/26/2023    Procedure: Open Parastomal Hernia Repair;  Surgeon: Alfred Castañeda MD;  Location: [facility] OR;  Service: General;  Laterality: N/A;    TONSILLECTOMY      VENOUS ACCESS DEVICE (PORT) INSERTION N/A 01/09/2023    Procedure: INSERTION VENOUS ACCESS DEVICE;  Surgeon: Alfred Castañeda MD;  Location: [facility]  OR;  Service: General;  Laterality: N/A;    VENTRAL HERNIA REPAIR N/A 7/3/2024    Procedure: VENTRAL / INCISIONAL HERNIA REPAIR LAPAROSCOPIC WITH DAVINCI ROBOT with mesh;  Surgeon: Alfred Castañeda MD;  Location: [facility] MAIN OR;  Service: Robotics - DaVinci;  Laterality: N/A;     Family History   Problem Relation Age of Onset    Hypertension Mother     Rheum arthritis Mother     Heart disease Mother     Breast cancer Mother     Diabetes Father     Cancer Maternal Grandmother         colon    Colon cancer Maternal Grandmother     Aneurysm Paternal Grandfather     Diabetes Other     Fibromyalgia Other     Malig Hyperthermia Neg Hx        Home Medications:  Prior to Admission medications    Medication Sig Start Date End Date Taking? Authorizing Provider   atorvastatin (LIPITOR) 20 MG tablet TAKE 1 TABLET BY MOUTH EVERY DAY  Patient taking differently: Take 1 tablet by mouth Daily. 10/1/19   Yudelka Manning MD   baclofen (LIORESAL) 20 MG tablet  5/1/24   Bessie Lopez MD   cyproheptadine (PERIACTIN) 4 MG tablet Take 1 tablet by mouth Every 12 (Twelve) Hours. 10/30/23   Bessie Lopez MD   Diclofenac Sodium (VOLTAREN) 1 % gel gel APPLY TO THE AFFECTED AREA(S) ON THE SKIN FOUR TIMES DAILY FOR 10 DAYS    Bessie Lopez MD   donepezil (ARICEPT) 10 MG tablet Take 1 tablet by mouth Daily. 10/28/22   Bessie Lopez MD   DULoxetine (CYMBALTA) 30 MG capsule Take 1 capsule by mouth Daily. 11/12/24   Megha Jose MD   DULoxetine (CYMBALTA) 60 MG capsule TAKE 1 capsule BY MOUTH DAILY for mood elevation 2/5/25   oDnald Barker MD   fluticasone (FLONASE) 50 MCG/ACT nasal spray 2 sprays by Each Nare route Daily. 12/11/24   Bessie Lopez MD   gabapentin (NEURONTIN) 600 MG tablet TAKE 1 TABLET BY MOUTH AT BEDTIME  Patient taking differently: Take 1 tablet by mouth 2 (Two) Times a Day As Needed. 7/30/20   Yudelka Manning MD   GaviLyte-G 236 g solution take 4000ml BY MOUTH once for  1 dose 12/23/24   Bessie Lopez MD   hydroCHLOROthiazide 12.5 MG tablet TAKE 1 TABLET BY MOUTH DAILY for swelling 12/9/24   Donald Barker MD   ibuprofen (ADVIL,MOTRIN) 600 MG tablet Take 1 tablet by mouth Every 8 (Eight) Hours As Needed. for pain 5/10/24   Bessie Lopez MD   lactulose (CHRONULAC) 10 GM/15ML solution take 30 mls BY MOUTH TWICE DAILY 7/5/24   Bessie Lopez MD   letrozole (FEMARA) 2.5 MG tablet TAKE 1 TABLET BY MOUTH DAILY 12/31/24   Donald Barker MD   levothyroxine (SYNTHROID, LEVOTHROID) 50 MCG tablet TAKE 1 TABLET BY MOUTH EVERY DAY  Patient taking differently: Take 1 tablet by mouth Daily. 7/19/19   Yudelka Manning MD   lidocaine (LIDODERM) 5 % Place 1 patch on the skin as directed by provider Daily. Remove & Discard patch within 12 hours or as directed by MD    Bessie Lopez MD   Lidocaine Pain Relief 4 %  5/10/24   Bessie Lopez MD   LORazepam (ATIVAN) 0.5 MG tablet Take 1 tablet by mouth Every 6 (Six) Hours As Needed.    Bessie Lopez MD   losartan (COZAAR) 50 MG tablet Take 1 tablet by mouth Daily. for blood pressure 5/6/24   Bessie Lopez MD   mirtazapine (REMERON) 30 MG tablet Take 1 tablet by mouth Every Night. 2/27/25   Megha Jose MD   montelukast (SINGULAIR) 10 MG tablet Take 1 tablet by mouth Daily. 1/17/22   Bessie Lopez MD   Morphine (MS CONTIN) 60 MG 12 hr tablet Take 1 tablet by mouth 2 (Two) Times a Day. 2/28/25   Donald Barker MD   naloxone (NARCAN) 4 MG/0.1ML nasal spray Call 911. Don't prime. [place] in 1 nostril for overdose. Repeat in 2-3 minutes in other nostril if no or minimal breathing/responsiveness. 7/3/24   Alfred Castañeda MD   nicotine (NICODERM CQ) 21 MG/24HR patch Place 1 patch on the skin as directed by provider Daily. 7/12/24   Alfred Castañeda MD   ondansetron (ZOFRAN) 8 MG tablet Take 1 tablet by mouth Every 8 (Eight) Hours As Needed for Nausea or Vomiting. 2/7/25    Donald Barker MD   oxybutynin XL (DITROPAN XL) 15 MG 24 hr tablet TAKE 1 TABLET BY MOUTH DAILY for bladder 25   Donald Barker MD   oxyCODONE-acetaminophen (PERCOCET)  MG per tablet Take 1 tablet by mouth Every 6 (Six) Hours As Needed for Moderate Pain. 3/6/25   Donald Barker MD   polyethylene glycol (MIRALAX) 17 g packet Take 17 g by mouth Daily. 7/3/24   Alfred Castañeda MD   potassium chloride (MICRO-K) 10 MEQ CR capsule TAKE 1 CAPSULE BY MOUTH EVERY TWELVE HOURS 25   Donald Barker MD   prochlorperazine (COMPAZINE) 10 MG tablet  23   Bessie Lopez MD   ribociclib succinate 200 MG tablet therapy pack tablet Take 3 tablets by mouth Take As Directed. Take 3 tablets by mouth daily for 21 days then off 7 days on a 28 day cycle. 25   Donald Barker MD   rOPINIRole (REQUIP) 3 MG tablet Take 1 tablet by mouth 2 (Two) Times a Day. 22   Bessie Lopez MD   senna (Senokot) 8.6 MG tablet Take 1 tablet by mouth Daily. 24   Alfred Castañeda MD   SudoGest 60 MG tablet Take 1 tablet by mouth Every 8 (Eight) Hours. 23   Bessie Lopez MD   SUMAtriptan (IMITREX) 100 MG tablet Take 1 tablet by mouth 1 (One) Time As Needed. 10/7/22   Bessie Lopez MD   traZODone (DESYREL) 150 MG tablet TAKE 1 TABLET BY MOUTH ONCE nightly  Patient taking differently: Take 1 tablet by mouth Every Night. 19   Yudelka Manning MD        Social History:   Social History     Tobacco Use    Smoking status: Former     Current packs/day: 0.00     Average packs/day: 1 pack/day for 50.5 years (50.5 ttl pk-yrs)     Types: Cigarettes     Start date:      Quit date: 2024     Years since quittin.6    Smokeless tobacco: Never    Tobacco comments:          Has not used tobacco for 28 days    Vaping Use    Vaping status: Never Used   Substance Use Topics    Alcohol use: No    Drug use: Never     Types: Marijuana     Comment: LAST USE 24  "        Review of Systems:  Review of Systems   Gastrointestinal:  Positive for abdominal pain and nausea.        Physical Exam:  /58 (BP Location: Left arm, Patient Position: Sitting)   Pulse 68   Temp 98.1 °F (36.7 °C) (Oral)   Resp 20   Ht 167.6 cm (66\")   Wt 94.1 kg (207 lb 7.3 oz)   LMP  (LMP Unknown)   SpO2 100%   BMI 33.48 kg/m²         Physical Exam  HENT:      Head: Normocephalic.      Mouth/Throat:      Mouth: Mucous membranes are moist.   Eyes:      Pupils: Pupils are equal, round, and reactive to light.   Pulmonary:      Effort: Pulmonary effort is normal.   Abdominal:      General: There is no distension.      Tenderness: There is abdominal tenderness in the left lower quadrant. There is guarding. Negative signs include Gates's sign, Rovsing's sign and McBurney's sign.   Musculoskeletal:      Cervical back: Neck supple.   Skin:     General: Skin is warm and dry.   Neurological:      General: No focal deficit present.      Mental Status: She is alert and oriented to person, place, and time.   Psychiatric:         Mood and Affect: Mood normal.         Behavior: Behavior normal.                            Medical Decision Making:      Comorbidities that affect care:    Cancer    External Notes reviewed:    Previous Clinic Note: Previous clinic note on 3/4/2025 reviewed      The following orders were placed and all results were independently analyzed by me:  Orders Placed This Encounter   Procedures    [place] Draw    Comprehensive Metabolic Panel    Lipase    Urinalysis With Microscopic If Indicated (No Culture) - Urine, Clean Catch    Lactic Acid, Plasma    CBC Auto Differential    STAT Lactic Acid, Reflex    NPO Diet NPO Type: Strict NPO    Undress & Gown    Insert Peripheral IV    CBC & Differential    Green Top (Gel)    Lavender Top    Gold Top - SST    Light Blue Top       Medications Given in the Emergency Department:  Medications   sodium chloride 0.9 % flush 10 mL (has no " administration in time range)        ED Course:    The patient was initially evaluated in the triage area where orders were placed. The patient was later dispositioned by HIEU Napoles.      The patient was advised to stay for completion of workup which includes but is not limited to communication of labs and radiological results, reassessment and plan. The patient was advised that leaving prior to disposition by a provider could result in critical findings that are not communicated to the patient.     ED Course as of 03/19/25 0223 Tue Mar 18, 2025   2023 CBC reviewed.  Evidence of macrocytic anemia with hemoglobin of 10.4.  No other acute findings. [CB]   2023 CMP reviewed.  Evidence of mild hyperglycemia 137.  Isolated ALP elevation of 120 no other acute findings. [CB]   2024 Urinalysis reviewed.  Evidence of trace blood trace protein trace leukocytes. [CB]   2024 Lactate reviewed.  Evidence of elevated lactic of 2.3 with a stat of 3.4.  Fluids were unable to be administered prior to repeat drawing as patient was unable to get line started. [CB]   2053 CT abdomen pelvis reviewed.  Evidence of diffuse osseous metastatic disease similar with previous findings. [CB]   Wed Mar 19, 2025   0057 Repeat lactic reviewed.  No acute findings. [CB]      ED Course User Index  [CB] Nishant Martin, CHAR       Labs:    Lab Results (last 24 hours)       Procedure Component Value Units Date/Time    CBC & Differential [[id number]]  (Abnormal) Collected: 03/18/25 1446    Specimen: Blood from Arm, Right Updated: 03/18/25 1452    Narrative:      The following orders were created for panel order CBC & Differential.  Procedure                               Abnormality         Status                     ---------                               -----------         ------                     CBC Auto Differential[[id number]]        Abnormal            Final result                 Please view results for these tests on the  individual orders.    Comprehensive Metabolic Panel [[id number]]  (Abnormal) Collected: 03/18/25 1446    Specimen: Blood from Arm, Right Updated: 03/18/25 1523     Glucose 137 mg/dL      BUN 15 mg/dL      Creatinine 0.87 mg/dL      Sodium 139 mmol/L      Potassium 3.8 mmol/L      Chloride 102 mmol/L      CO2 26.3 mmol/L      Calcium 9.3 mg/dL      Total Protein 7.6 g/dL      Albumin 4.2 g/dL      ALT (SGPT) 9 U/L      AST (SGOT) 13 U/L      Alkaline Phosphatase 120 U/L      Total Bilirubin 0.3 mg/dL      Globulin 3.4 gm/dL      A/G Ratio 1.2 g/dL      BUN/Creatinine Ratio 17.2     Anion Gap 10.7 mmol/L      eGFR 74.0 mL/min/1.73     Narrative:      GFR Categories in Chronic Kidney Disease (CKD)      GFR Category          GFR (mL/min/1.73)    Interpretation  G1                     90 or greater         Normal or high (1)  G2                      60-89                Mild decrease (1)  G3a                   45-59                Mild to moderate decrease  G3b                   30-44                Moderate to severe decrease  G4                    15-29                Severe decrease  G5                    14 or less           Kidney failure          (1)In the absence of evidence of kidney disease, neither GFR category G1 or G2 fulfill the criteria for CKD.    eGFR calculation 2021 CKD-EPI creatinine equation, which does not include race as a factor    Lipase [[id number]]  (Normal) Collected: 03/18/25 1446    Specimen: Blood from Arm, Right Updated: 03/18/25 1523     Lipase 18 U/L     Lactic Acid, Plasma [[id number]]  (Abnormal) Collected: 03/18/25 1446    Specimen: Blood from Arm, Right Updated: 03/18/25 1605     Lactate 2.3 mmol/L     CBC Auto Differential [[id number]]  (Abnormal) Collected: 03/18/25 1446    Specimen: Blood from Arm, Right Updated: 03/18/25 1452     WBC 3.96 10*3/mm3      RBC 3.28 10*6/mm3      Hemoglobin 10.4 g/dL      Hematocrit 34.2 %      .3 fL      MCH 31.7 pg      MCHC 30.4 g/dL      RDW  16.1 %      RDW-SD 62.8 fl      MPV 10.6 fL      Platelets 180 10*3/mm3      Neutrophil % 59.6 %      Lymphocyte % 27.5 %      Monocyte % 12.1 %      Eosinophil % 0.0 %      Basophil % 0.8 %      Immature Grans % 0.0 %      Neutrophils, Absolute 2.36 10*3/mm3      Lymphocytes, Absolute 1.09 10*3/mm3      Monocytes, Absolute 0.48 10*3/mm3      Eosinophils, Absolute 0.00 10*3/mm3      Basophils, Absolute 0.03 10*3/mm3      Immature Grans, Absolute 0.00 10*3/mm3      nRBC 0.0 /100 WBC              Imaging:    No Radiology Exams Resulted Within Past 24 Hours      Differential Diagnosis and Discussion:      Abdominal Pain: Based on the patient's signs and symptoms, I considered abdominal aortic aneurysm, small bowel obstruction, pancreatitis, acute cholecystitis, acute appendecitis, peptic ulcer disease, gastritis, colitis, endocrine disorders, irritable bowel syndrome and other differential diagnosis an etiology of the patient's abdominal pain.    PROCEDURES:    Labs were collected in the emergency department and all labs were reviewed and interpreted by me.    No orders to display        Procedures    MDM     Amount and/or Complexity of Data Reviewed  Clinical lab tests: reviewed  Tests in the radiology section of CPT®: reviewed  Decide to obtain previous medical records or to obtain history from someone other than the patient: yes    The patient is resting comfortably and feels better, is alert and in no distress. Repeat examination is unremarkable and benign; in particular, there's no discomfort at McBurney's point and there is no pulsatile mass. The history, exam, diagnostic testing, and current condition does not suggest acute appendicitis, bowel obstruction, acute cholecystitis, bowel perforation, major gastrointestinal bleeding, severe diverticulitis, abdominal aortic aneurysm, mesenteric ischemia, volvulus, sepsis, or other significant pathology that warrants further testing, continued ED treatment, admission,  for surgical evaluation at this point. The vital signs have been stable. The patient does not have uncontrollable pain, intractable vomiting, or other significant symptoms. The patient's condition is stable and appropriate for discharge from the emergency department.  Patient's findings today are significant for likely metastatic pains related to the patient's history of breast cancer malignancy.  CT imaging revealed evidence of ongoing metastatic findings on numerous bony sites.  Discussed with the patient his findings.  I discussed outpatient pain management with previously prescribed pain meds from oncology.  Patient voiced understanding agreement plan at this time.                Patient Care Considerations:    SEPSIS was considered but is NOT present in the emergency department as SIRS criteria is not present. ANTIBIOTICS: I considered prescribing antibiotics as an outpatient however no bacterial focus of infection was found.      Consultants/Shared Management Plan:    None    Social Determinants of Health:    Patient is independent, reliable, and has access to care.       Disposition and Care Coordination:    Discharged: I considered escalation of care by admitting this patient to the hospital, however patient not meet sepsis or criteria    I have explained the patient´s condition, diagnoses and treatment plan based on the information available to me at this time. I have answered questions and addressed any concerns. The patient has a good  understanding of the patient´s diagnosis, condition, and treatment plan as can be expected at this point. The vital signs have been stable. The patient´s condition is stable and appropriate for discharge from the emergency department.      The patient will pursue further outpatient evaluation with the primary care physician or other designated or consulting physician as outlined in the discharge instructions. They are agreeable to this plan of care and follow-up  instructions have been explained in detail. The patient has received these instructions in written format and has expressed an understanding of the discharge instructions. The patient is aware that any significant change in condition or worsening of symptoms should prompt an immediate return to this or the closest emergency department or call to 911.  I have explained discharge medications and the need for follow up with the patient/caretakers. This was also printed in the discharge instructions. Patient was discharged with the following medications and follow up:      Medication List        Changed      gabapentin 600 MG tablet  Commonly known as: NEURONTIN  TAKE 1 TABLET BY MOUTH AT BEDTIME  What changed:   how much to take  how to take this  when to take this  reasons to take this  additional instructions           Donald Barker MD  [address]  [facility] [address]  [facility] [address] [phone]      Follow-up as scheduled, contact to see if he wants to see you sooner    Haile Monique MD  [address]  [facility] [address] [phone]    Schedule an appointment as soon as possible for a visit         Final diagnoses:   None        ED Disposition       None            This medical record created using voice recognition software.             Nishant Martin PA-C  03/19/25 0224

## 2025-03-19 VITALS
DIASTOLIC BLOOD PRESSURE: 54 MMHG | BODY MASS INDEX: 33.34 KG/M2 | OXYGEN SATURATION: 96 % | RESPIRATION RATE: 15 BRPM | TEMPERATURE: 98.1 F | HEIGHT: 66 IN | HEART RATE: 65 BPM | WEIGHT: 207.45 LBS | SYSTOLIC BLOOD PRESSURE: 147 MMHG

## 2025-03-19 LAB — D-LACTATE SERPL-SCNC: 1.8 MMOL/L (ref 0.5–2)

## 2025-03-19 PROCEDURE — 83605 ASSAY OF LACTIC ACID: CPT | Performed by: EMERGENCY MEDICINE

## 2025-03-19 PROCEDURE — 96376 TX/PRO/DX INJ SAME DRUG ADON: CPT

## 2025-03-19 PROCEDURE — 25010000002 HYDROMORPHONE 1 MG/ML SOLUTION: Performed by: EMERGENCY MEDICINE

## 2025-03-19 RX ADMIN — HYDROMORPHONE HYDROCHLORIDE 0.5 MG: 1 INJECTION, SOLUTION INTRAMUSCULAR; INTRAVENOUS; SUBCUTANEOUS at 00:49

## 2025-03-19 NOTE — DISCHARGE INSTRUCTIONS
Thank you for allowing us provide care for you today.  Your workup today revealed no acute findings on your blood work or imaging.  Your imaging today revealed ongoing evidence of metastatic findings consistent with your breast cancer and your previous findings.  Please continue your outpatient pain regimen.  Follow-up with your PCP in the next 5 to 7 days along with your oncologist.  Thank you

## 2025-03-24 DIAGNOSIS — G89.3 CANCER RELATED PAIN: ICD-10-CM

## 2025-03-24 RX ORDER — OXYCODONE AND ACETAMINOPHEN 10; 325 MG/1; MG/1
1 TABLET ORAL EVERY 6 HOURS PRN
Qty: 60 TABLET | Refills: 0 | Status: SHIPPED | OUTPATIENT
Start: 2025-03-24

## 2025-03-24 NOTE — TELEPHONE ENCOUNTER
Caller: Derek Keller    Relationship: Self    Best call back number: [phone]    Requested Prescriptions:   Requested Prescriptions     Pending Prescriptions Disp Refills    oxyCODONE-acetaminophen (PERCOCET)  MG per tablet 60 tablet 0     Sig: Take 1 tablet by mouth Every 6 (Six) Hours As Needed for Moderate Pain.        Pharmacy where request should be sent:  [facility]     Last office visit with prescribing clinician: 3/4/2025   Last telemedicine visit with prescribing clinician: Visit date not found   Next office visit with prescribing clinician: 4/1/2025     Additional details provided by patient: NA    Does the patient have less than a 3 day supply:  [x] Yes  [] No    Would you like a call back once the refill request has been completed: [] Yes [] No    If the office needs to give you a call back, can they leave a voicemail: [] Yes [] No    Abby Willis Rep   03/24/25 09:36 EDT

## 2025-03-26 ENCOUNTER — OFFICE VISIT (OUTPATIENT)
Dept: SLEEP MEDICINE | Facility: HOSPITAL | Age: 65
End: 2025-03-26
Payer: MEDICARE

## 2025-03-26 VITALS
OXYGEN SATURATION: 95 % | HEART RATE: 65 BPM | WEIGHT: 208 LBS | DIASTOLIC BLOOD PRESSURE: 49 MMHG | SYSTOLIC BLOOD PRESSURE: 110 MMHG | BODY MASS INDEX: 33.43 KG/M2 | HEIGHT: 66 IN

## 2025-03-26 DIAGNOSIS — J43.9 PULMONARY EMPHYSEMA, UNSPECIFIED EMPHYSEMA TYPE: ICD-10-CM

## 2025-03-26 DIAGNOSIS — Z72.821 INADEQUATE SLEEP HYGIENE: ICD-10-CM

## 2025-03-26 DIAGNOSIS — R06.83 SNORING: ICD-10-CM

## 2025-03-26 DIAGNOSIS — G47.00 INSOMNIA, UNSPECIFIED TYPE: ICD-10-CM

## 2025-03-26 DIAGNOSIS — R29.818 SUSPECTED SLEEP APNEA: Primary | ICD-10-CM

## 2025-03-26 DIAGNOSIS — F43.10 PTSD (POST-TRAUMATIC STRESS DISORDER): ICD-10-CM

## 2025-03-26 DIAGNOSIS — R35.1 NOCTURIA: ICD-10-CM

## 2025-03-26 DIAGNOSIS — G47.10 HYPERSOMNIA: ICD-10-CM

## 2025-03-26 DIAGNOSIS — G25.81 RESTLESS LEGS SYNDROME (RLS): ICD-10-CM

## 2025-03-26 DIAGNOSIS — E66.9 OBESITY (BMI 30-39.9): ICD-10-CM

## 2025-03-26 PROCEDURE — 3074F SYST BP LT 130 MM HG: CPT | Performed by: INTERNAL MEDICINE

## 2025-03-26 PROCEDURE — G0463 HOSPITAL OUTPT CLINIC VISIT: HCPCS

## 2025-03-26 PROCEDURE — 3078F DIAST BP <80 MM HG: CPT | Performed by: INTERNAL MEDICINE

## 2025-03-26 NOTE — PROGRESS NOTES
Sleep Consultation    Patient Name: Derek Keller  Age/Sex: 64 y.o. female  : 1960  MRN: [id number]    Date of Encounter Visit: 2025  Encounter Provider: Laverne Scott MD  Referring Provider: Megha Jose MD  Place of Service: [facility]  Patient Care Team:  Haile Monique MD as PCP - General (Family Medicine)  Julien Cardona DO as Consulting Physician (Pain Medicine)  Joel Castillo MD as Consulting Physician (Gastroenterology)  Remington Russell MD as Surgeon (General Surgery)  Ahsan Salas MD as Consulting Physician (Sports Medicine)  Donald Barker MD as Consulting Physician (Hematology and Oncology)  Sandy Ricci MD as Consulting Physician (General Surgery)  Alfred Castañeda MD as Consulting Physician (General Surgery)    Subjective:     Reason for Consult: Sleep maintenance insomnia and snoring    History of Present Illness:  Derek Keller is a 64 y.o. female is here for evaluation of MATTHEW due to concern about the snoring.  Patient was seen by her therapist on 2025, the note was reviewed, patient was already started on behavioral cognitive therapy to help with the anxiety reduction and was referred for the sleep lab to evaluate for any underlying sleep apnea that be contributing to her improper sleep efficiency.  Patient complains of daytime fatigue and sleepiness with an Diboll Sleepiness Scale (ESS) of 13.  Patient complains of loud snoring in all positions and waking up coughing or choking  Patient is frequently drinking at night with discomfort in the leg consistent with restless leg syndrome, she is on baclofen  Denies any symptoms of restless leg syndrome. She is already on Requip 3 mg tablets  Patient denies any cataplexy, sleep paralysis or other symptoms to suggest narcolepsy.  Patient denies any parasomnias.  Denies any history of seizure disorder or recent head trauma.  Patient has improper sleep hygiene with variable  bedtime wake-up time which can be contributing and worsening her poor sleep efficiency.  No problem with the sleep onset she has a sleep maintenance issues mainly  Caffeine intake is 3 caffeinated beverages per day, she also reported some recreational drug use history.  Patient is active smoker and reports minimal alcohol consumption    Comorbidities include: Hypertension, anxiety and depression, restless leg syndrome, COPD, arthritis, tobacco abuse    Review of Systems:   A twelve-system review was conducted and was negative except for the following: Frequent urination, nasal congestion with postnasal drainage, neck pain, fatigue, swollen ankles, cough and shortness of breath with wheezing, dizziness anxiety and depression, bloating, and swollen glands.        Past Medical History:  Past Medical History:   Diagnosis Date    Abdominal pain     OSTOMY SITE    Allergic rhinitis     Anemia     NO S/S    Anxiety     Arteriosclerosis     Coronary, follows with Dr. Núñez    Arthritis     Bone cancer     Bone metastases 10/14/2022    Bowen's disease     SKIN CANCER    Breast cancer     NO SURGERY WAS DONE DUE TO METS TO BONE/COLON/LYMPHNODE    Cancer related pain 10/13/2022    Depression     Disorder associated with Helicobacter species 10/12/2022    Dysphoric mood     Emphysema lung     Fatigue     Fibromyalgia     Frequent urination     NO S/S INFECTION    Herpes simplex vulvovaginitis 07/26/2018    History of colon polyps     History of IBS     History of kidney stones     Hyperlipidemia     Hypertension     Hypothyroidism     Insomnia     Lumbago     Mood disorder     Neck pain     R/T CANCER    Palpitations     last time a few months ago    Precordial pain     R/T SPINE CANCER    RLS (restless legs syndrome)     S/P Laparoscopic Hand-Assisted Colostomy Closure with End to End Anastomosis Open Parastomal Hernia Repair 12/08/2022    Sleep disturbance     SOB (shortness of breath)     at times at rest    SOBOE  (shortness of breath on exertion)        Past Surgical History:   Procedure Laterality Date    BREAST BIOPSY Left     CARDIAC CATHETERIZATION Left 1960    CARDIAC CATHETERIZATION N/A 04/06/2018    Procedure: Coronary angiography;  Surgeon: Art Licea MD;  Location: [facility] CATH INVASIVE LOCATION;  Service: Cardiovascular    CARDIAC CATHETERIZATION N/A 04/06/2018    Procedure: Left heart cath;  Surgeon: Art Licea MD;  Location: [facility] CATH INVASIVE LOCATION;  Service: Cardiovascular    CARDIAC CATHETERIZATION N/A 04/06/2018    Procedure: Left ventriculography;  Surgeon: Art Licea MD;  Location: [facility] CATH INVASIVE LOCATION;  Service: Cardiovascular    CHOLECYSTECTOMY      COLON SURGERY      colostomy bag, and colostomy reversal    COLONOSCOPY  11/08/2006    COLONOSCOPY N/A 06/08/2023    Procedure: COLONOSCOPY;  Surgeon: Alfred Castañeda MD;  Location: [facility] ENDOSCOPY;  Service: General;  Laterality: N/A;  same as preop    EXPLORATORY LAPAROTOMY N/A 12/06/2022    Procedure: LAPAROTOMY EXPLORATORY sigmoid resection hartmans procedure colostomy;  Surgeon: Alfred Castañeda MD;  Location: [facility] MAIN OR;  Service: General;  Laterality: N/A;    GANGLION CYST EXCISION Bilateral     HYSTERECTOMY  05/2005    ILEOSTOMY CLOSURE N/A 06/26/2023    Procedure: Laparoscopic Hand-Assisted Colostomy Closure with End to End Anastomosis;  Surgeon: Alfred Castañeda MD;  Location: [facility] MAIN OR;  Service: General;  Laterality: N/A;    LAPAROSCOPIC GASTRIC BANDING      BAND REMOVED 2020    PARASTOMAL HERNIA REPAIR N/A 06/26/2023    Procedure: Open Parastomal Hernia Repair;  Surgeon: Alfred Castañeda MD;  Location: [facility] MAIN OR;  Service: General;  Laterality: N/A;    TONSILLECTOMY      VENOUS ACCESS DEVICE (PORT) INSERTION N/A 01/09/2023    Procedure: INSERTION VENOUS ACCESS DEVICE;  Surgeon: Alfred Castañeda MD;  Location: [facility] MAIN OR;  Service: General;  Laterality:  N/A;    VENTRAL HERNIA REPAIR N/A 7/3/2024    Procedure: VENTRAL / INCISIONAL HERNIA REPAIR LAPAROSCOPIC WITH DAVINCI ROBOT with mesh;  Surgeon: Alfred Castañeda MD;  Location: [facility] MAIN OR;  Service: Robotics - DaVinci;  Laterality: N/A;       Home Medications:     Current Outpatient Medications:     atorvastatin (LIPITOR) 20 MG tablet, TAKE 1 TABLET BY MOUTH EVERY DAY (Patient taking differently: Take 1 tablet by mouth Daily.), Disp: 30 tablet, Rfl: 6    baclofen (LIORESAL) 20 MG tablet, , Disp: , Rfl:     cyproheptadine (PERIACTIN) 4 MG tablet, Take 1 tablet by mouth Every 12 (Twelve) Hours., Disp: , Rfl:     donepezil (ARICEPT) 10 MG tablet, Take 1 tablet by mouth Daily., Disp: , Rfl:     DULoxetine (CYMBALTA) 30 MG capsule, Take 1 capsule by mouth Daily., Disp: 90 capsule, Rfl: 1    DULoxetine (CYMBALTA) 60 MG capsule, TAKE 1 capsule BY MOUTH DAILY for mood elevation, Disp: 30 capsule, Rfl: 1    fluticasone (FLONASE) 50 MCG/ACT nasal spray, 2 sprays by Each Nare route Daily., Disp: , Rfl:     gabapentin (NEURONTIN) 600 MG tablet, TAKE 1 TABLET BY MOUTH AT BEDTIME (Patient taking differently: Take 1 tablet by mouth 2 (Two) Times a Day As Needed.), Disp: 90 tablet, Rfl: 0    GaviLyte-G 236 g solution, take 4000ml BY MOUTH once for 1 dose, Disp: , Rfl:     hydroCHLOROthiazide 12.5 MG tablet, TAKE 1 TABLET BY MOUTH DAILY for swelling, Disp: 90 tablet, Rfl: 1    ibuprofen (ADVIL,MOTRIN) 600 MG tablet, Take 1 tablet by mouth Every 8 (Eight) Hours As Needed. for pain, Disp: , Rfl:     lactulose (CHRONULAC) 10 GM/15ML solution, take 30 mls BY MOUTH TWICE DAILY, Disp: , Rfl:     letrozole (FEMARA) 2.5 MG tablet, TAKE 1 TABLET BY MOUTH DAILY, Disp: 90 tablet, Rfl: 1    levothyroxine (SYNTHROID, LEVOTHROID) 50 MCG tablet, TAKE 1 TABLET BY MOUTH EVERY DAY (Patient taking differently: Take 1 tablet by mouth Daily.), Disp: 90 tablet, Rfl: 1    lidocaine (LIDODERM) 5 %, Place 1 patch on the skin as directed by  provider Daily. Remove & Discard patch within 12 hours or as directed by MD, Disp: , Rfl:     Lidocaine Pain Relief 4 %, , Disp: , Rfl:     LORazepam (ATIVAN) 0.5 MG tablet, Take 1 tablet by mouth Every 6 (Six) Hours As Needed., Disp: , Rfl:     losartan (COZAAR) 50 MG tablet, Take 1 tablet by mouth Daily. for blood pressure, Disp: , Rfl:     mirtazapine (REMERON) 30 MG tablet, Take 1 tablet by mouth Every Night., Disp: 90 tablet, Rfl: 1    montelukast (SINGULAIR) 10 MG tablet, Take 1 tablet by mouth Daily., Disp: , Rfl:     Morphine (MS CONTIN) 60 MG 12 hr tablet, Take 1 tablet by mouth 2 (Two) Times a Day., Disp: 60 tablet, Rfl: 0    naloxone (NARCAN) 4 MG/0.1ML nasal spray, Call 911. Don't prime. [place] in 1 nostril for overdose. Repeat in 2-3 minutes in other nostril if no or minimal breathing/responsiveness., Disp: 2 each, Rfl: 0    nicotine (NICODERM CQ) 21 MG/24HR patch, Place 1 patch on the skin as directed by provider Daily., Disp: 30 patch, Rfl: 3    ondansetron (ZOFRAN) 8 MG tablet, Take 1 tablet by mouth Every 8 (Eight) Hours As Needed for Nausea or Vomiting., Disp: 30 tablet, Rfl: 5    oxybutynin XL (DITROPAN XL) 15 MG 24 hr tablet, TAKE 1 TABLET BY MOUTH DAILY for bladder, Disp: 30 tablet, Rfl: 1    oxyCODONE-acetaminophen (PERCOCET)  MG per tablet, Take 1 tablet by mouth Every 6 (Six) Hours As Needed for Moderate Pain., Disp: 60 tablet, Rfl: 0    polyethylene glycol (MIRALAX) 17 g packet, Take 17 g by mouth Daily., Disp: 5 packet, Rfl: 0    potassium chloride (MICRO-K) 10 MEQ CR capsule, TAKE 1 CAPSULE BY MOUTH EVERY TWELVE HOURS, Disp: 60 capsule, Rfl: 1    ribociclib succinate 200 MG tablet therapy pack tablet, Take 3 tablets by mouth Take As Directed. Take 3 tablets by mouth daily for 21 days then off 7 days on a 28 day cycle., Disp: 63 tablet, Rfl: 11    rOPINIRole (REQUIP) 3 MG tablet, Take 1 tablet by mouth 2 (Two) Times a Day., Disp: , Rfl:     senna (Senokot) 8.6 MG tablet, Take 1 tablet  by mouth Daily., Disp: 30 tablet, Rfl: 3    SudoGest 60 MG tablet, Take 1 tablet by mouth Every 8 (Eight) Hours., Disp: , Rfl:     traZODone (DESYREL) 150 MG tablet, TAKE 1 TABLET BY MOUTH ONCE nightly (Patient taking differently: Take 1 tablet by mouth Every Night.), Disp: 90 tablet, Rfl: 2    Diclofenac Sodium (VOLTAREN) 1 % gel gel, APPLY TO THE AFFECTED AREA(S) ON THE SKIN FOUR TIMES DAILY FOR 10 DAYS (Patient not taking: Reported on 3/26/2025), Disp: , Rfl:     prochlorperazine (COMPAZINE) 10 MG tablet, , Disp: , Rfl:     SUMAtriptan (IMITREX) 100 MG tablet, Take 1 tablet by mouth 1 (One) Time As Needed. (Patient not taking: Reported on 3/18/2025), Disp: , Rfl:     Allergies:  Allergies   Allergen Reactions    Azithromycin Itching    Erythromycin Itching       Past Social History:  Social History     Socioeconomic History    Marital status:    Tobacco Use    Smoking status: Every Day     Current packs/day: 1.00     Average packs/day: 1 pack/day for 50.7 years (50.7 ttl pk-yrs)     Types: Cigarettes     Start date: 1974     Last attempt to quit: 7/7/2024    Smokeless tobacco: Never    Tobacco comments:          Has not used tobacco for 28 days    Vaping Use    Vaping status: Never Used   Substance and Sexual Activity    Alcohol use: No    Drug use: Never     Types: Marijuana     Comment: LAST USE 7-2-24    Sexual activity: Defer     Partners: Male     Birth control/protection: None       Past Family History:  Family History   Problem Relation Age of Onset    Hypertension Mother     Rheum arthritis Mother     Heart disease Mother     Breast cancer Mother     Diabetes Father     Cancer Maternal Grandmother         colon    Colon cancer Maternal Grandmother     Aneurysm Paternal Grandfather     Diabetes Other     Fibromyalgia Other     Malig Hyperthermia Neg Hx      No known family history of sleep apnea positive family history of restless leg  Objective:        Vital Signs:   Visit Vitals  /49 (BP  "Location: Left arm, Patient Position: Sitting)   Pulse 65   Ht 167.6 cm (65.98\")   Wt 94.3 kg (208 lb)   LMP  (LMP Unknown)   SpO2 95%   BMI 33.59 kg/m²     Wt Readings from Last 3 Encounters:   03/26/25 94.3 kg (208 lb)   03/18/25 94.1 kg (207 lb 7.3 oz)   03/04/25 95.4 kg (210 lb 5.1 oz)     Neck Circumference: 12.5 inches    Physical Exam:   GEN:  No acute distress, alert, cooperative, well developed   EYES:   Sclerae clear. No icterus. PERRL. Normal EOM  ENT:   External ears/nose normal, no oral lesions, no thrush, mucous membranes moist, Septum midline. Mallampati I airway.    NECK:  Supple, midline trachea, no JVD  LUNGS: Normal chest on inspection, mild expiratory wheeze. No rhonchi. No crackles. Respirations regular, even and unlabored.   CV:  Regular rhythm and rate. Normal S1/S2. No murmurs, gallops, or rubs noted.  ABD:  Soft, nontender and nondistended. Normal bowel sounds. No guarding  EXT:  Moves all extremities well. No cyanosis. No redness. 1+ pitting edema.   Skin: Dry, intact, no bleeding      Diagnostic Data:  No prior sleep studies performed     Assessment and Plan:       ICD-10-CM ICD-9-CM   1. Suspected sleep apnea  R29.818 781.99   2. Hypersomnia  G47.10 780.54   3. Snoring  R06.83 786.09   4. Obesity (BMI 30-39.9)  E66.9 278.00   5. Restless legs syndrome (RLS)  G25.81 333.94   6. Pulmonary emphysema, unspecified emphysema type  J43.9 492.8   7. Nocturia  R35.1 788.43   8. PTSD (post-traumatic stress disorder)  F43.10 309.81   9. Insomnia, unspecified type  G47.00 780.52   10. Inadequate sleep hygiene  Z72.821 307.49       Recommendations:     Patient is a good candidate for the home sleep study which will be ordered today  If the home sleep study is inconclusive or nondiagnostic, we will consider the in-lab sleep study  Patient is agreeable with the CPAP therapy and will be initiated accordingly  We did talk about the improper sleep hygiene measures and the need to work on a fixed " bedtime/wake up time, she also has slightly increased caffeine intake, she does smoke late in the evening and all of these can affect the sleep efficiency.  Patient was skeptical whether the CPAP is going to help specially that she cannot tolerate anything on her face but she is willing to give it a try if indicated      Patient was educated in depth about MATTHEW and cardiovascular consequences if left untreated, including but not limited to CHF, CAD, arrhythmias, CVA, and/or HTN. Education also provided about the diagnostic tools for MATTHEW, including the polysomnography and the treatment modalities, including the CPAP.     If patient has obstructive sleep apnea the recommend treatment is CPAP and will start CPAP and patient will follow up within 31-90 days after starting CPAP for compliance review.   Will address alternative treatment option if intolerant to CPAP     Adherence to the CPAP is a key factor in successful treatment of MATTHEW and the patient was encouraged to contact us in case of problem with the CPAP or the mask that can limit the tolerance of the compliance with the therapy.    Patient was educated about the impact of obesity on sleep apnea and the benefit of weight loss and weight loss was recommended    Orders Placed This Encounter   Procedures    Home Sleep Study     No orders of the defined types were placed in this encounter.     Return in about 3 months (around 6/26/2025).    Laverne Scott MD   [facility]   03/26/25  10:59 EDT    Dictated utilizing Dragon dictation

## 2025-03-28 NOTE — TELEPHONE ENCOUNTER
Called in   History and Physical      Richard Godinez  YOB: 1968    Date of Service:  3/28/2025    Chief Complaint:   Richard Godinez is a 57 y.o. male who presents for complete physical examination.    HPI: Presents to clinic today for annual physical exam and follow up on chronic health conditions.    Oral Leukoplakia  Previously followed by ENT.  Generally speaking things improved.  He was monitored originally for about a year.  Continues to vape - with nicotine - has decreased amount.  No longer dipping.     Diet: Poor - doesn't eat fruits and vegetables daily.  Doesn't limit junk and sugar.  Exercise: no regular exercise - stays active at work.   Occupation: [employer]  -  - ; [employer] - desk job. [employer] full-time.  Enjoys jobs in general.  Likes co-workers.   Hobbies: Motorcycle, corn hole.    Family life: , 1 child, 3 step children, 7 grandchildren - gets to spend time with them.  Patients daughter lives in [place] - gets to visit on occasion. 1 dog, lives in house.  Low stress.  No concerns for anxiety or depression.   Last eye exam: 2024 - wears reading glasses.   Last dental exam: 2025    Preventive Care:  Health Maintenance   Topic Date Due    Hepatitis C screen  Never done    Hepatitis B vaccine (1 of 3 - 19+ 3-dose series) Never done    Shingles vaccine (1 of 2) Never done    Pneumococcal 50+ years Vaccine (1 of 1 - PCV) Never done    COVID-19 Vaccine (4 - 2024-25 season) 03/28/2026 (Originally 9/1/2024)    Depression Screen  03/28/2026    Lipids  03/18/2028    DTaP/Tdap/Td vaccine (3 - Td or Tdap) 07/12/2029    Colorectal Cancer Screen  03/15/2034    Flu vaccine  Completed    HIV screen  Completed    Hepatitis A vaccine  Aged Out    Hib vaccine  Aged Out    Polio vaccine  Aged Out    Meningococcal (ACWY) vaccine  Aged Out    Meningococcal B vaccine  Aged Out    Prostate Specific Antigen (PSA) Screening or Monitoring  Discontinued        Family

## 2025-04-01 ENCOUNTER — SPECIALTY PHARMACY (OUTPATIENT)
Dept: PHARMACY | Facility: HOSPITAL | Age: 65
End: 2025-04-01
Payer: MEDICARE

## 2025-04-01 ENCOUNTER — HOSPITAL ENCOUNTER (OUTPATIENT)
Dept: ONCOLOGY | Facility: HOSPITAL | Age: 65
Discharge: HOME | End: 2025-04-01
Payer: MEDICARE

## 2025-04-01 ENCOUNTER — OFFICE VISIT (OUTPATIENT)
Dept: ONCOLOGY | Facility: HOSPITAL | Age: 65
End: 2025-04-01
Payer: MEDICARE

## 2025-04-01 VITALS
BODY MASS INDEX: 34.43 KG/M2 | SYSTOLIC BLOOD PRESSURE: 146 MMHG | WEIGHT: 213.19 LBS | DIASTOLIC BLOOD PRESSURE: 61 MMHG | HEART RATE: 58 BPM | TEMPERATURE: 97.7 F | RESPIRATION RATE: 18 BRPM | OXYGEN SATURATION: 96 %

## 2025-04-01 DIAGNOSIS — Z45.2 ENCOUNTER FOR ADJUSTMENT OR MANAGEMENT OF VASCULAR ACCESS DEVICE: ICD-10-CM

## 2025-04-01 DIAGNOSIS — Z17.0 MALIGNANT NEOPLASM OF UPPER-OUTER QUADRANT OF LEFT BREAST IN FEMALE, ESTROGEN RECEPTOR POSITIVE: ICD-10-CM

## 2025-04-01 DIAGNOSIS — C50.412 MALIGNANT NEOPLASM OF UPPER-OUTER QUADRANT OF LEFT BREAST IN FEMALE, ESTROGEN RECEPTOR POSITIVE: Primary | ICD-10-CM

## 2025-04-01 DIAGNOSIS — Z17.0 MALIGNANT NEOPLASM OF UPPER-OUTER QUADRANT OF LEFT BREAST IN FEMALE, ESTROGEN RECEPTOR POSITIVE: Primary | ICD-10-CM

## 2025-04-01 DIAGNOSIS — G89.3 CANCER RELATED PAIN: ICD-10-CM

## 2025-04-01 DIAGNOSIS — Z17.0 MALIGNANT NEOPLASM OF LEFT BREAST IN FEMALE, ESTROGEN RECEPTOR POSITIVE, UNSPECIFIED SITE OF BREAST: Primary | ICD-10-CM

## 2025-04-01 DIAGNOSIS — C79.51 MALIGNANT NEOPLASM METASTATIC TO BONE: ICD-10-CM

## 2025-04-01 DIAGNOSIS — C50.912 MALIGNANT NEOPLASM OF LEFT BREAST IN FEMALE, ESTROGEN RECEPTOR POSITIVE, UNSPECIFIED SITE OF BREAST: Primary | ICD-10-CM

## 2025-04-01 DIAGNOSIS — C79.51 MALIGNANT NEOPLASM METASTATIC TO BONE: Primary | ICD-10-CM

## 2025-04-01 DIAGNOSIS — C50.412 MALIGNANT NEOPLASM OF UPPER-OUTER QUADRANT OF LEFT BREAST IN FEMALE, ESTROGEN RECEPTOR POSITIVE: ICD-10-CM

## 2025-04-01 LAB
ALBUMIN SERPL-MCNC: 4 G/DL (ref 3.5–5.2)
ALBUMIN/GLOB SERPL: 1.2 G/DL
ALP SERPL-CCNC: 106 U/L (ref 39–117)
ALT SERPL W P-5'-P-CCNC: 10 U/L (ref 1–33)
ANION GAP SERPL CALCULATED.3IONS-SCNC: 9.2 MMOL/L (ref 5–15)
AST SERPL-CCNC: 14 U/L (ref 1–32)
BASOPHILS # BLD AUTO: 0.08 10*3/MM3 (ref 0–0.2)
BASOPHILS NFR BLD AUTO: 1.8 % (ref 0–1.5)
BILIRUB SERPL-MCNC: 0.3 MG/DL (ref 0–1.2)
BUN SERPL-MCNC: 20 MG/DL (ref 8–23)
BUN/CREAT SERPL: 21.3 (ref 7–25)
CALCIUM SPEC-SCNC: 8.7 MG/DL (ref 8.6–10.5)
CHLORIDE SERPL-SCNC: 102 MMOL/L (ref 98–107)
CO2 SERPL-SCNC: 29.8 MMOL/L (ref 22–29)
CREAT SERPL-MCNC: 0.94 MG/DL (ref 0.57–1)
DEPRECATED RDW RBC AUTO: 61 FL (ref 37–54)
EGFRCR SERPLBLD CKD-EPI 2021: 67.5 ML/MIN/1.73
EOSINOPHIL # BLD AUTO: 0.01 10*3/MM3 (ref 0–0.4)
EOSINOPHIL NFR BLD AUTO: 0.2 % (ref 0.3–6.2)
ERYTHROCYTE [DISTWIDTH] IN BLOOD BY AUTOMATED COUNT: 15.9 % (ref 12.3–15.4)
GLOBULIN UR ELPH-MCNC: 3.3 GM/DL
GLUCOSE SERPL-MCNC: 134 MG/DL (ref 65–99)
HCT VFR BLD AUTO: 30.8 % (ref 34–46.6)
HGB BLD-MCNC: 9.6 G/DL (ref 12–15.9)
IMM GRANULOCYTES # BLD AUTO: 0.01 10*3/MM3 (ref 0–0.05)
IMM GRANULOCYTES NFR BLD AUTO: 0.2 % (ref 0–0.5)
LYMPHOCYTES # BLD AUTO: 1.34 10*3/MM3 (ref 0.7–3.1)
LYMPHOCYTES NFR BLD AUTO: 29.5 % (ref 19.6–45.3)
MAGNESIUM SERPL-MCNC: 2.1 MG/DL (ref 1.6–2.4)
MCH RBC QN AUTO: 32.2 PG (ref 26.6–33)
MCHC RBC AUTO-ENTMCNC: 31.2 G/DL (ref 31.5–35.7)
MCV RBC AUTO: 103.4 FL (ref 79–97)
MONOCYTES # BLD AUTO: 0.25 10*3/MM3 (ref 0.1–0.9)
MONOCYTES NFR BLD AUTO: 5.5 % (ref 5–12)
NEUTROPHILS NFR BLD AUTO: 2.86 10*3/MM3 (ref 1.7–7)
NEUTROPHILS NFR BLD AUTO: 62.8 % (ref 42.7–76)
NRBC BLD AUTO-RTO: 0 /100 WBC (ref 0–0.2)
PHOSPHATE SERPL-MCNC: 3.4 MG/DL (ref 2.5–4.5)
PLATELET # BLD AUTO: 226 10*3/MM3 (ref 140–450)
PMV BLD AUTO: 10.2 FL (ref 6–12)
POTASSIUM SERPL-SCNC: 4 MMOL/L (ref 3.5–5.2)
PROT SERPL-MCNC: 7.3 G/DL (ref 6–8.5)
RBC # BLD AUTO: 2.98 10*6/MM3 (ref 3.77–5.28)
SODIUM SERPL-SCNC: 141 MMOL/L (ref 136–145)
WBC NRBC COR # BLD AUTO: 4.55 10*3/MM3 (ref 3.4–10.8)

## 2025-04-01 PROCEDURE — 36591 DRAW BLOOD OFF VENOUS DEVICE: CPT

## 2025-04-01 PROCEDURE — 96372 THER/PROPH/DIAG INJ SC/IM: CPT

## 2025-04-01 PROCEDURE — 25010000002 HEPARIN LOCK FLUSH PER 10 UNITS: Performed by: INTERNAL MEDICINE

## 2025-04-01 PROCEDURE — 83735 ASSAY OF MAGNESIUM: CPT | Performed by: INTERNAL MEDICINE

## 2025-04-01 PROCEDURE — 80053 COMPREHEN METABOLIC PANEL: CPT | Performed by: INTERNAL MEDICINE

## 2025-04-01 PROCEDURE — 25010000002 DENOSUMAB 120 MG/1.7ML SOLUTION: Performed by: INTERNAL MEDICINE

## 2025-04-01 PROCEDURE — 84100 ASSAY OF PHOSPHORUS: CPT | Performed by: INTERNAL MEDICINE

## 2025-04-01 PROCEDURE — 85025 COMPLETE CBC W/AUTO DIFF WBC: CPT | Performed by: INTERNAL MEDICINE

## 2025-04-01 RX ORDER — OXYCODONE AND ACETAMINOPHEN 10; 325 MG/1; MG/1
1 TABLET ORAL EVERY 6 HOURS PRN
Qty: 60 TABLET | Refills: 0 | Status: SHIPPED | OUTPATIENT
Start: 2025-04-01

## 2025-04-01 RX ORDER — MORPHINE SULFATE 60 MG/1
60 TABLET, FILM COATED, EXTENDED RELEASE ORAL 2 TIMES DAILY
Qty: 60 TABLET | Refills: 0 | Status: SHIPPED | OUTPATIENT
Start: 2025-04-01

## 2025-04-01 RX ORDER — SODIUM CHLORIDE 0.9 % (FLUSH) 0.9 %
20 SYRINGE (ML) INJECTION AS NEEDED
Status: DISCONTINUED | OUTPATIENT
Start: 2025-04-01 | End: 2025-04-02 | Stop reason: HOSPADM

## 2025-04-01 RX ORDER — SODIUM CHLORIDE 0.9 % (FLUSH) 0.9 %
20 SYRINGE (ML) INJECTION AS NEEDED
OUTPATIENT
Start: 2025-04-01

## 2025-04-01 RX ORDER — HEPARIN SODIUM (PORCINE) LOCK FLUSH IV SOLN 100 UNIT/ML 100 UNIT/ML
500 SOLUTION INTRAVENOUS AS NEEDED
OUTPATIENT
Start: 2025-04-01

## 2025-04-01 RX ORDER — HEPARIN SODIUM (PORCINE) LOCK FLUSH IV SOLN 100 UNIT/ML 100 UNIT/ML
500 SOLUTION INTRAVENOUS AS NEEDED
Status: DISCONTINUED | OUTPATIENT
Start: 2025-04-01 | End: 2025-04-02 | Stop reason: HOSPADM

## 2025-04-01 RX ADMIN — HEPARIN 500 UNITS: 100 SYRINGE at 10:37

## 2025-04-01 RX ADMIN — DENOSUMAB 120 MG: 120 INJECTION SUBCUTANEOUS at 11:45

## 2025-04-01 RX ADMIN — Medication 20 ML: at 10:36

## 2025-04-01 NOTE — ADDENDUM NOTE
Encounter addended by: Juli Marrero, RN on: 4/1/2025 11:50 AM   Actions taken: In Basket message sent, Order list changed, Visit diagnoses modified, Diagnosis association updated, MAR administration accepted, Flowsheet accepted, Charge Capture section accepted, Treatment plan modified

## 2025-04-01 NOTE — ASSESSMENT & PLAN NOTE
Patient reports adequate pain control with her current regimen.  She has intermittent constipation related to her pain meds.  She is using senna.  We discussed increasing fluid and fiber intake in the diet.  She can use MiraLAX daily.  If no bowel movement for more than 24 hours, can use milk of magnesia or magnesium citrate.  Reviewed and no discrepancies.  Refills of MS Contin and Percocet for breakthrough.  Reassess next visit.

## 2025-04-01 NOTE — PROGRESS NOTES
Chief Complaint  FOLLOW UP 1     Haile Monique MD Patel, Saagar, MD    Subjective          Derek HUMA Krishna presents to [facility] for     Oncology/Hematology History   Malignant neoplasm of upper-outer quadrant of left breast in female, estrogen receptor positive   10/13/2022 Initial Diagnosis    Malignant neoplasm of left breast in female, estrogen receptor positive (HCC)     10/14/2022 -  Chemotherapy    OP BREAST Letrozole / Ribociclib     10/14/2022 Cancer Staged    Staging form: Breast, AJCC 8th Edition  - Clinical: Stage IV (cT1c, cN1, cM1, ER+, VA+, HER2-) - Signed by Donald Barker MD on 10/14/2022     10/28/2022 -  Chemotherapy    OP SUPPORTIVE Denosumab (Xgeva) Q28D     Malignant neoplasm metastatic to bone   10/14/2022 Initial Diagnosis    Bone metastases (HCC)     10/28/2022 -  Chemotherapy    OP SUPPORTIVE Denosumab (Xgeva) Q28D     Secondary malignant neoplasm of bone (Resolved)   11/14/2022 Initial Diagnosis    Secondary malignant neoplasm of bone (HCC)     11/17/2022 - 4/19/2023 Radiation    RADIATION THERAPY Treatment Details (11/14/2022 - 4/19/2023)  Site: Spine - Lumbar  Technique: 3D CRT  Goal: No goal specified  Planned Treatment Start Date: 11/17/2022           Current Outpatient Medications on File Prior to Visit   Medication Sig Dispense Refill    atorvastatin (LIPITOR) 20 MG tablet TAKE 1 TABLET BY MOUTH EVERY DAY 30 tablet 6    baclofen (LIORESAL) 20 MG tablet Take 1 tablet by mouth 3 (Three) Times a Day.      donepezil (ARICEPT) 10 MG tablet Take 1 tablet by mouth Daily.      DULoxetine (CYMBALTA) 30 MG capsule Take 1 capsule by mouth Daily. 90 capsule 1    DULoxetine (CYMBALTA) 60 MG capsule TAKE 1 capsule BY MOUTH DAILY for mood elevation 30 capsule 1    fluticasone (FLONASE) 50 MCG/ACT nasal spray Administer 2 sprays into the nostril(s) as directed by provider Daily.      gabapentin (NEURONTIN) 600 MG tablet TAKE 1 TABLET BY MOUTH AT BEDTIME  (Patient taking differently: Take 0.5 tablets by mouth 2 (Two) Times a Day As Needed (pain).) 90 tablet 0    hydroCHLOROthiazide 12.5 MG tablet TAKE 1 TABLET BY MOUTH DAILY for swelling 90 tablet 1    letrozole (FEMARA) 2.5 MG tablet TAKE 1 TABLET BY MOUTH DAILY 90 tablet 1    levothyroxine (SYNTHROID, LEVOTHROID) 50 MCG tablet TAKE 1 TABLET BY MOUTH EVERY DAY 90 tablet 1    lidocaine (LIDODERM) 5 % Place 1 patch on the skin as directed by provider Daily As Needed for Moderate Pain or Severe Pain. Remove & Discard patch within 12 hours or as directed by MD      LORazepam (ATIVAN) 0.5 MG tablet Take 1 tablet by mouth Daily.      losartan (COZAAR) 50 MG tablet Take 1 tablet by mouth Daily.      mirtazapine (REMERON) 30 MG tablet Take 1 tablet by mouth Every Night. 90 tablet 1    montelukast (SINGULAIR) 10 MG tablet Take 1 tablet by mouth Daily.      naloxone (NARCAN) 4 MG/0.1ML nasal spray Call 911. Don't prime. Beebe in 1 nostril for overdose. Repeat in 2-3 minutes in other nostril if no or minimal breathing/responsiveness. (Patient not taking: Reported on 4/1/2025) 2 each 0    nicotine (NICODERM CQ) 21 MG/24HR patch Place 1 patch on the skin as directed by provider Daily. (Patient not taking: Reported on 4/1/2025) 30 patch 3    ondansetron (ZOFRAN) 8 MG tablet Take 1 tablet by mouth Every 8 (Eight) Hours As Needed for Nausea or Vomiting. 30 tablet 5    oxybutynin XL (DITROPAN XL) 15 MG 24 hr tablet TAKE 1 TABLET BY MOUTH DAILY for bladder 30 tablet 1    polyethylene glycol (MIRALAX) 17 g packet Take 17 g by mouth Daily. 5 packet 0    potassium chloride (MICRO-K) 10 MEQ CR capsule TAKE 1 CAPSULE BY MOUTH EVERY TWELVE HOURS 60 capsule 1    ribociclib succinate 200 MG tablet therapy pack tablet Take 3 tablets by mouth Take As Directed. Take 3 tablets by mouth daily for 21 days then off 7 days on a 28 day cycle. 63 tablet 11    rOPINIRole (REQUIP) 3 MG tablet Take 1 tablet by mouth 2 (Two) Times a Day.      senna  (Senokot) 8.6 MG tablet Take 1 tablet by mouth Daily. 30 tablet 3    traZODone (DESYREL) 150 MG tablet TAKE 1 TABLET BY MOUTH ONCE nightly 90 tablet 2    [DISCONTINUED] cyproheptadine (PERIACTIN) 4 MG tablet Take 1 tablet by mouth Every 12 (Twelve) Hours.      [DISCONTINUED] GaviLyte-G 236 g solution take 4000ml BY MOUTH once for 1 dose      [DISCONTINUED] ibuprofen (ADVIL,MOTRIN) 600 MG tablet Take 1 tablet by mouth Every 8 (Eight) Hours As Needed. for pain      [DISCONTINUED] lactulose (CHRONULAC) 10 GM/15ML solution take 30 mls BY MOUTH TWICE DAILY      [DISCONTINUED] Lidocaine Pain Relief 4 %       [DISCONTINUED] Morphine (MS CONTIN) 60 MG 12 hr tablet Take 1 tablet by mouth 2 (Two) Times a Day. 60 tablet 0    [DISCONTINUED] oxyCODONE-acetaminophen (PERCOCET)  MG per tablet Take 1 tablet by mouth Every 6 (Six) Hours As Needed for Moderate Pain. 60 tablet 0    [DISCONTINUED] SudoGest 60 MG tablet Take 1 tablet by mouth Every 8 (Eight) Hours.      SUMAtriptan (IMITREX) 100 MG tablet Take 1 tablet by mouth 1 (One) Time As Needed for Migraine.      [DISCONTINUED] Diclofenac Sodium (VOLTAREN) 1 % gel gel APPLY TO THE AFFECTED AREA(S) ON THE SKIN FOUR TIMES DAILY FOR 10 DAYS (Patient not taking: Reported on 3/18/2025)      [DISCONTINUED] prochlorperazine (COMPAZINE) 10 MG tablet  (Patient not taking: Reported on 3/18/2025)       Current Facility-Administered Medications on File Prior to Visit   Medication Dose Route Frequency Provider Last Rate Last Admin    [COMPLETED] denosumab (XGEVA) injection 120 mg  120 mg Subcutaneous Once Donald Barker MD   120 mg at 04/01/25 1145    heparin injection 500 Units  500 Units Intravenous PRN Donald Barker MD   500 Units at 04/01/25 1037    sodium chloride 0.9 % flush 20 mL  20 mL Intravenous PRN Donald Barker MD   20 mL at 04/01/25 1036       Allergies   Allergen Reactions    Azithromycin Itching    Erythromycin Itching     Past Medical History:   Diagnosis Date     Abdominal pain     OSTOMY SITE    Allergic rhinitis     Anemia     NO S/S    Anxiety     Arteriosclerosis     Coronary, follows with Dr. Núñez    Arthritis     Bone cancer     Bone metastases 10/14/2022    Bowen's disease     SKIN CANCER    Breast cancer     NO SURGERY WAS DONE DUE TO METS TO BONE/COLON/LYMPHNODE    Cancer related pain 10/13/2022    Depression     Disorder associated with Helicobacter species 10/12/2022    Dysphoric mood     Emphysema lung     Fatigue     Fibromyalgia     Frequent urination     NO S/S INFECTION    Herpes simplex vulvovaginitis 07/26/2018    History of colon polyps     History of IBS     History of kidney stones     Hyperlipidemia     Hypertension     Hypothyroidism     Insomnia     Lumbago     Mood disorder     Neck pain     R/T CANCER    Palpitations     last time a few months ago    Precordial pain     R/T SPINE CANCER    RLS (restless legs syndrome)     S/P Laparoscopic Hand-Assisted Colostomy Closure with End to End Anastomosis Open Parastomal Hernia Repair 12/08/2022    Sleep disturbance     SOB (shortness of breath)     at times at rest    SOBOE (shortness of breath on exertion)      Past Surgical History:   Procedure Laterality Date    BREAST BIOPSY Left     CARDIAC CATHETERIZATION Left 1959    CARDIAC CATHETERIZATION N/A 04/06/2018    Procedure: Coronary angiography;  Surgeon: Art Licea MD;  Location:  [facility];  Service: Cardiovascular    CARDIAC CATHETERIZATION N/A 04/06/2018    Procedure: Left heart cath;  Surgeon: Art Licea MD;  Location:  [facility];  Service: Cardiovascular    CARDIAC CATHETERIZATION N/A 04/06/2018    Procedure: Left ventriculography;  Surgeon: Art Licea MD;  Location:  [facility];  Service: Cardiovascular    CHOLECYSTECTOMY      COLON SURGERY      colostomy bag, and colostomy reversal    COLONOSCOPY  11/08/2006    COLONOSCOPY N/A 06/08/2023    Procedure:  COLONOSCOPY;  Surgeon: Alfred Castañeda MD;  Location: [facility] ENDOSCOPY;  Service: General;  Laterality: N/A;  same as preop    EXPLORATORY LAPAROTOMY N/A 12/06/2022    Procedure: LAPAROTOMY EXPLORATORY sigmoid resection hartmans procedure colostomy;  Surgeon: Alfred Castañeda MD;  Location: [facility] MAIN OR;  Service: General;  Laterality: N/A;    GANGLION CYST EXCISION Bilateral     HYSTERECTOMY  05/2005    ILEOSTOMY CLOSURE N/A 06/26/2023    Procedure: Laparoscopic Hand-Assisted Colostomy Closure with End to End Anastomosis;  Surgeon: Alfred Castañeda MD;  Location: [facility] MAIN OR;  Service: General;  Laterality: N/A;    LAPAROSCOPIC GASTRIC BANDING      BAND REMOVED 2020    PARASTOMAL HERNIA REPAIR N/A 06/26/2023    Procedure: Open Parastomal Hernia Repair;  Surgeon: Alfred Castañeda MD;  Location: [facility] MAIN OR;  Service: General;  Laterality: N/A;    TONSILLECTOMY      VENOUS ACCESS DEVICE (PORT) INSERTION N/A 01/09/2023    Procedure: INSERTION VENOUS ACCESS DEVICE;  Surgeon: Alfred Castañeda MD;  Location: [facility] MAIN OR;  Service: General;  Laterality: N/A;    VENTRAL HERNIA REPAIR N/A 7/3/2024    Procedure: VENTRAL / INCISIONAL HERNIA REPAIR LAPAROSCOPIC WITH DAVINCI ROBOT with mesh;  Surgeon: Alfred Castañeda MD;  Location: [facility] MAIN OR;  Service: Robotics - DaVinci;  Laterality: N/A;     Social History     Socioeconomic History    Marital status:    Tobacco Use    Smoking status: Every Day     Current packs/day: 1.00     Average packs/day: 1 pack/day for 50.8 years (50.8 ttl pk-yrs)     Types: Cigarettes     Start date: 1974     Last attempt to quit: 7/7/2024    Smokeless tobacco: Never    Tobacco comments:          Has not used tobacco for 28 days    Vaping Use    Vaping status: Never Used   Substance and Sexual Activity    Alcohol use: No    Drug use: Never     Types: Marijuana     Comment: LAST USE 7-2-24    Sexual activity: Defer     Partners: Male      "Birth control/protection: None     Family History   Problem Relation Age of Onset    Hypertension Mother     Rheum arthritis Mother     Heart disease Mother     Breast cancer Mother     Diabetes Father     Cancer Maternal Grandmother         colon    Colon cancer Maternal Grandmother     Aneurysm Paternal Grandfather     Diabetes Other     Fibromyalgia Other     Malig Hyperthermia Neg Hx        Objective   Physical Exam    Vitals:    04/01/25 1049   BP: 146/61   Pulse: 58   Resp: 18   Temp: 97.7 °F (36.5 °C)   TempSrc: Temporal   SpO2: 96%   Weight: 96.7 kg (213 lb 3 oz)   PainSc: 8    PainLoc: Back     ECOG score: 0         PHQ-9 Total Score:                    Result Review :   The following data was reviewed by: Donald Barker MD on 04/01/2025:  Lab Results   Component Value Date    HGB 9.6 (L) 04/01/2025    HCT 30.8 (L) 04/01/2025    .4 (H) 04/01/2025     04/01/2025    WBC 4.55 04/01/2025    NEUTROABS 2.86 04/01/2025    LYMPHSABS 1.34 04/01/2025    MONOSABS 0.25 04/01/2025    EOSABS 0.01 04/01/2025    BASOSABS 0.08 04/01/2025     Lab Results   Component Value Date    GLUCOSE 134 (H) 04/01/2025    BUN 20 04/01/2025    CREATININE 0.94 04/01/2025     04/01/2025    K 4.0 04/01/2025     04/01/2025    CO2 29.8 (H) 04/01/2025    CALCIUM 8.7 04/01/2025    PROTEINTOT 7.3 04/01/2025    ALBUMIN 4.0 04/01/2025    BILITOT 0.3 04/01/2025    ALKPHOS 106 04/01/2025    AST 14 04/01/2025    ALT 10 04/01/2025     Lab Results   Component Value Date    MG 2.1 04/01/2025    PHOS 3.4 04/01/2025    FREET4 1.01 01/17/2022    TSH 1.470 11/12/2019     Lab Results   Component Value Date    IRON 28 (L) 05/29/2024    LABIRON 7 (L) 05/29/2024    TRANSFERRIN 279 05/29/2024    TIBC 416 05/29/2024     Lab Results   Component Value Date    FERRITIN 56.02 05/29/2024    GCTELFXY48 390 04/23/2019    FOLATE 9.93 04/23/2019     No results found for: \"PSA\", \"CEA\", \"AFP\", \"\", \"\"    Data reviewed : Radiologic " studies CT abdomen pelvis reviewed    Emergency room note reviewed     Assessment and Plan    Diagnoses and all orders for this visit:    1. Malignant neoplasm of upper-outer quadrant of left breast in female, estrogen receptor positive (Primary)  Assessment & Plan:  Metastatic.  Hormone receptor positive.  Patient is on letrozole, ribociclib, denosumab for bony involvement.  Tolerating her regimen well overall.  Blood work shows mild anemia related to her ribociclib but not enough for dose adjustment.  She had recent CT scan through the emergency room showing no evidence of progressive disease.  Continue with current therapy.  I will see her back in 1 month for ongoing treatment with lab work prior.      2. Malignant neoplasm metastatic to bone  Assessment & Plan:  Patient is on monthly denosumab for bony involvement.  Tolerating well.  She is following with her dentist.  Electrolytes are adequate.  Denosumab today monthly.      3. Cancer related pain  Assessment & Plan:  Patient reports adequate pain control with her current regimen.  She has intermittent constipation related to her pain meds.  She is using senna.  We discussed increasing fluid and fiber intake in the diet.  She can use MiraLAX daily.  If no bowel movement for more than 24 hours, can use milk of magnesia or magnesium citrate.  Reviewed and no discrepancies.  Refills of MS Contin and Percocet for breakthrough.  Reassess next visit.    Orders:  -     Morphine (MS CONTIN) 60 MG 12 hr tablet; Take 1 tablet by mouth 2 (Two) Times a Day.  Dispense: 60 tablet; Refill: 0  -     oxyCODONE-acetaminophen (PERCOCET)  MG per tablet; Take 1 tablet by mouth Every 6 (Six) Hours As Needed for Moderate Pain.  Dispense: 60 tablet; Refill: 0    Other orders  -     Cancel: denosumab (XGEVA) injection 120 mg            Patient Follow Up: 1 month    Patient was given instructions and counseling regarding her condition or for health maintenance advice. Please see  specific information pulled into the AVS if appropriate.     Donald Barker MD    4/1/2025

## 2025-04-01 NOTE — PROGRESS NOTES
Specialty Pharmacy Patient Management Program  Chart Review/Lab Monitoring     Derek Keller is a 64 y.o. female with L Lobular HR+/HER2- Breast Cancer who presented for lab check and Oncology provider appointment. [facility] reviewed labs and providers note to assess continued therapy appropriateness of Kisqali (ribociclib)    Oncology Interval History:  Evelyn Barker  Diagnosis: Stage IV bone (10/2022)  Biomarkers: ER+ 95%, TX+ 40%, HER2- 1+, Ki-67 15%     Current Tx: Kisqali (ribociclib) 600mg daily for 21 days on, 7 days off (10/22/22-now) + Letrozole 2.5mg daily + Xgeva q04gctm  Most Recent Imagin/3/25 CT & Bone Scan No evidence of progression of disease  Previous Tx: XRT Spine (22)    Fills at: [facility]  Last seen in person by Specialty Pharmacy: 10/5/23    4/1/25: Patient continues to tolerate therapy well. Her only complaint was cancer related pain likely secondary to her bone mets for which her Xgeva had been held d/t concern for ONJ - now resolved and cleared per dentistry to resume. No changes to plan.    Labs:  Lab Results   Component Value Date     2025    K 4.0 2025    CO2 29.8 (H) 2025     2025    BUN 20 2025    GLUCOSE 134 (H) 2025    CALCIUM 8.7 2025    CREATININE 0.94 2025    EGFRIFNONA 75 2019    BCR 21.3 2025    ANIONGAP 9.2 2025    AST 14 2025    ALT 10 2025    BILITOT 0.3 2025    ALKPHOS 106 2025     Lab Results   Component Value Date    WBC 4.55 2025    RBC 2.98 (L) 2025    HGB 9.6 (L) 2025    HCT 30.8 (L) 2025     2025    NEUTROABS 2.86 2025    LYMPHSABS 1.34 2025    MONOSABS 0.25 2025    EOSABS 0.01 2025    BASOSABS 0.08 2025     PLAN  Specialty pharmacy will continue to follow patient. Continue with current regimen as planned. Next Specialty Assessment  due 9/26/25.    Eugene PalomaresD  Oncology Clinical Specialty Pharmacist   4/1/2025 13:27 EDT

## 2025-04-01 NOTE — ASSESSMENT & PLAN NOTE
Metastatic.  Hormone receptor positive.  Patient is on letrozole, ribociclib, denosumab for bony involvement.  Tolerating her regimen well overall.  Blood work shows mild anemia related to her ribociclib but not enough for dose adjustment.  She had recent CT scan through the emergency room showing no evidence of progressive disease.  Continue with current therapy.  I will see her back in 1 month for ongoing treatment with lab work prior.

## 2025-04-01 NOTE — PROGRESS NOTES
Specialty Pharmacy Patient Management Program  Oncology Reassessment     Derek Keller was referred by their provider to the Oncology Patient Management program offered by [facility] for Metastatic HR+/HER2- Breast Cancer. A follow-up outreach was conducted in person, face-to-face, including assessment of continued therapy appropriateness, medication adherence, and side effect incidence and management for Kisqali (ribociclib).    Relevant Past Medical History and Comorbidities  Relevant medical history and concomitant health conditions were discussed with the patient. The patient's chart has been reviewed for relevant past medical history and comorbid health conditions and updated as necessary.   Past Medical History:   Diagnosis Date    Abdominal pain     OSTOMY SITE    Allergic rhinitis     Anemia     NO S/S    Anxiety     Arteriosclerosis     Coronary, follows with Dr. Núñez    Arthritis     Bone cancer     Bone metastases 10/14/2022    Bowen's disease     SKIN CANCER    Breast cancer     NO SURGERY WAS DONE DUE TO METS TO BONE/COLON/LYMPHNODE    Cancer related pain 10/13/2022    Depression     Disorder associated with Helicobacter species 10/12/2022    Dysphoric mood     Emphysema lung     Fatigue     Fibromyalgia     Frequent urination     NO S/S INFECTION    Herpes simplex vulvovaginitis 07/26/2018    History of colon polyps     History of IBS     History of kidney stones     Hyperlipidemia     Hypertension     Hypothyroidism     Insomnia     Lumbago     Mood disorder     Neck pain     R/T CANCER    Palpitations     last time a few months ago    Precordial pain     R/T SPINE CANCER    RLS (restless legs syndrome)     S/P Laparoscopic Hand-Assisted Colostomy Closure with End to End Anastomosis Open Parastomal Hernia Repair 12/08/2022    Sleep disturbance     SOB (shortness of breath)     at times at rest    SOBOE (shortness of breath on exertion)      Social History      Socioeconomic History    Marital status:    Tobacco Use    Smoking status: Every Day     Current packs/day: 1.00     Average packs/day: 1 pack/day for 50.8 years (50.8 ttl pk-yrs)     Types: Cigarettes     Start date: 1974     Last attempt to quit: 7/7/2024    Smokeless tobacco: Never    Tobacco comments:          Has not used tobacco for 28 days    Vaping Use    Vaping status: Never Used   Substance and Sexual Activity    Alcohol use: No    Drug use: Never     Types: Marijuana     Comment: LAST USE 7-2-24    Sexual activity: Defer     Partners: Male     Birth control/protection: None     Problem list reviewed by Suzette Luke, PharmD on 4/1/2025 at  1:11 PM  Problem list reviewed by Suzette Luke PharmARELIS on 4/1/2025 at  1:11 PM    Allergies  Known allergies and reactions were discussed with the patient. The patient's chart has been reviewed for allergy information and updated as necessary.   Allergies   Allergen Reactions    Azithromycin Itching    Erythromycin Itching     Allergies reviewed by Suzette Luke, PharmD on 4/1/2025 at  1:06 PM    Relevant Laboratory Values  Lab Results   Component Value Date    GLUCOSE 134 (H) 04/01/2025    CALCIUM 8.7 04/01/2025     04/01/2025    K 4.0 04/01/2025    CO2 29.8 (H) 04/01/2025     04/01/2025    BUN 20 04/01/2025    CREATININE 0.94 04/01/2025    EGFRIFNONA 75 11/12/2019    BCR 21.3 04/01/2025    ANIONGAP 9.2 04/01/2025     Lab Results   Component Value Date    WBC 4.55 04/01/2025    RBC 2.98 (L) 04/01/2025    HGB 9.6 (L) 04/01/2025    HCT 30.8 (L) 04/01/2025    .4 (H) 04/01/2025    MCH 32.2 04/01/2025    MCHC 31.2 (L) 04/01/2025    RDW 15.9 (H) 04/01/2025    RDWSD 61.0 (H) 04/01/2025    MPV 10.2 04/01/2025     04/01/2025    NEUTRORELPCT 62.8 04/01/2025    LYMPHORELPCT 29.5 04/01/2025    MONORELPCT 5.5 04/01/2025    EOSRELPCT 0.2 (L) 04/01/2025    BASORELPCT 1.8 (H) 04/01/2025    AUTOIGPER 0.2 04/01/2025    NEUTROABS 2.86  04/01/2025    LYMPHSABS 1.34 04/01/2025    MONOSABS 0.25 04/01/2025    EOSABS 0.01 04/01/2025    BASOSABS 0.08 04/01/2025    AUTOIGNUM 0.01 04/01/2025    NRBC 0.0 04/01/2025      The above labs have been reviewed. No dose adjustments are needed for the oral specialty medication(s) based on the labs.    Current Medication List  This medication list has been reviewed with the patient and evaluated for any interactions or necessary modifications/recommendations, and updated to include all prescription medications, OTC medications, and supplements the patient is currently taking.  This list reflects what is contained in the patient's profile, which has also been marked as reviewed to communicate to other providers it is the most up to date version of the patient's current medication therapy.     Current Outpatient Medications:     atorvastatin (LIPITOR) 20 MG tablet, TAKE 1 TABLET BY MOUTH EVERY DAY, Disp: 30 tablet, Rfl: 6    baclofen (LIORESAL) 20 MG tablet, Take 1 tablet by mouth 3 (Three) Times a Day., Disp: , Rfl:     donepezil (ARICEPT) 10 MG tablet, Take 1 tablet by mouth Daily., Disp: , Rfl:     DULoxetine (CYMBALTA) 30 MG capsule, Take 1 capsule by mouth Daily., Disp: 90 capsule, Rfl: 1    DULoxetine (CYMBALTA) 60 MG capsule, TAKE 1 capsule BY MOUTH DAILY for mood elevation, Disp: 30 capsule, Rfl: 1    fluticasone (FLONASE) 50 MCG/ACT nasal spray, Administer 2 sprays into the nostril(s) as directed by provider Daily., Disp: , Rfl:     gabapentin (NEURONTIN) 600 MG tablet, TAKE 1 TABLET BY MOUTH AT BEDTIME (Patient taking differently: Take 0.5 tablets by mouth 2 (Two) Times a Day As Needed (pain).), Disp: 90 tablet, Rfl: 0    hydroCHLOROthiazide 12.5 MG tablet, TAKE 1 TABLET BY MOUTH DAILY for swelling, Disp: 90 tablet, Rfl: 1    letrozole (FEMARA) 2.5 MG tablet, TAKE 1 TABLET BY MOUTH DAILY, Disp: 90 tablet, Rfl: 1    levothyroxine (SYNTHROID, LEVOTHROID) 50 MCG tablet, TAKE 1 TABLET BY MOUTH EVERY DAY, Disp:  90 tablet, Rfl: 1    lidocaine (LIDODERM) 5 %, Place 1 patch on the skin as directed by provider Daily As Needed for Moderate Pain or Severe Pain. Remove & Discard patch within 12 hours or as directed by MD, Disp: , Rfl:     LORazepam (ATIVAN) 0.5 MG tablet, Take 1 tablet by mouth Daily., Disp: , Rfl:     losartan (COZAAR) 50 MG tablet, Take 1 tablet by mouth Daily., Disp: , Rfl:     mirtazapine (REMERON) 30 MG tablet, Take 1 tablet by mouth Every Night., Disp: 90 tablet, Rfl: 1    montelukast (SINGULAIR) 10 MG tablet, Take 1 tablet by mouth Daily., Disp: , Rfl:     Morphine (MS CONTIN) 60 MG 12 hr tablet, Take 1 tablet by mouth 2 (Two) Times a Day., Disp: 60 tablet, Rfl: 0    ondansetron (ZOFRAN) 8 MG tablet, Take 1 tablet by mouth Every 8 (Eight) Hours As Needed for Nausea or Vomiting., Disp: 30 tablet, Rfl: 5    oxybutynin XL (DITROPAN XL) 15 MG 24 hr tablet, TAKE 1 TABLET BY MOUTH DAILY for bladder, Disp: 30 tablet, Rfl: 1    oxyCODONE-acetaminophen (PERCOCET)  MG per tablet, Take 1 tablet by mouth Every 6 (Six) Hours As Needed for Moderate Pain., Disp: 60 tablet, Rfl: 0    polyethylene glycol (MIRALAX) 17 g packet, Take 17 g by mouth Daily., Disp: 5 packet, Rfl: 0    potassium chloride (MICRO-K) 10 MEQ CR capsule, TAKE 1 CAPSULE BY MOUTH EVERY TWELVE HOURS, Disp: 60 capsule, Rfl: 1    ribociclib succinate 200 MG tablet therapy pack tablet, Take 3 tablets by mouth Take As Directed. Take 3 tablets by mouth daily for 21 days then off 7 days on a 28 day cycle., Disp: 63 tablet, Rfl: 11    rOPINIRole (REQUIP) 3 MG tablet, Take 1 tablet by mouth 2 (Two) Times a Day., Disp: , Rfl:     senna (Senokot) 8.6 MG tablet, Take 1 tablet by mouth Daily., Disp: 30 tablet, Rfl: 3    SUMAtriptan (IMITREX) 100 MG tablet, Take 1 tablet by mouth 1 (One) Time As Needed for Migraine., Disp: , Rfl:     traZODone (DESYREL) 150 MG tablet, TAKE 1 TABLET BY MOUTH ONCE nightly, Disp: 90 tablet, Rfl: 2    naloxone (NARCAN) 4 MG/0.1ML  nasal spray, Call 911. Don't prime. [place] in 1 nostril for overdose. Repeat in 2-3 minutes in other nostril if no or minimal breathing/responsiveness. (Patient not taking: Reported on 4/1/2025), Disp: 2 each, Rfl: 0    nicotine (NICODERM CQ) 21 MG/24HR patch, Place 1 patch on the skin as directed by provider Daily. (Patient not taking: Reported on 4/1/2025), Disp: 30 patch, Rfl: 3  No current facility-administered medications for this visit.    Facility-Administered Medications Ordered in Other Visits:     heparin injection 500 Units, 500 Units, Intravenous, PRN, Donald Barker MD, 500 Units at 04/01/25 1037    sodium chloride 0.9 % flush 20 mL, 20 mL, Intravenous, PRN, Donald Barker MD, 20 mL at 04/01/25 1036    Medicines reviewed by Suzette Luke, PharmD on 4/1/2025 at  1:08 PM  Medicines reviewed by Suzette Luke, PharmD on 4/1/2025 at  1:11 PM    Drug Interactions  The current medication list was reviewed and there are some relevant drug-drug interactions with the oral specialty medication that will be discussed during education, including:  Ribociclib may increase concentration of atorvastatin, oxycodone, trazodone, and mirtazapine. Ribociclib may increase risk of Qtc prolongation with ondansetron. Patient does not report any cardiac symptoms or muscle pain. She does have occasional dizziness. She will continue to monitor and alert me if this were to worsen.    Adverse Drug Reactions  Medication tolerability: Tolerating with no to minimal ADRs  Medication plan: Continue therapy with normal follow-up  Adverse Reactions Experienced: Non   Plan for ADR Management: Not Required    Hospitalizations and Urgent Care Since Last Assessment  ED Visits, Admissions, or Hospitalizations: None  Urgent Office Visits: None    Adherence and Self-Administration  Adherence related to the patient's specialty therapy was discussed with the patient. The Adherence segment of this outreach has been reviewed and updated.      Adherence Questions  Linked Medication(s) Assessed: Ribociclib Succinate (KISQALI)  On average, how many doses/injections does the patient miss per month?: 0  What are the identified reasons for non-adherence or missed doses? : no problems identified  What is the estimated medication adherence level?: % (PDC showing 8%, but patient had been getting free drug in 2024 and has been converted back to  now.)  Based on the patient/caregiver response and refill history, does this patient require an MTP to track adherence improvements?: no    Approximate Number of Doses Missed Since Last Assessment: 0  Ongoing or New Barriers to Patient Adherence and/or Self-Administration: no ongoing or new barriers  Methods for Supporting Patient Adherence and/or Self-Administration: continue current method    Open Medication Therapy Problems  No medication therapy recommendations to display    Goals of Therapy  Goals related to the patient's specialty therapy were discussed with the patient. The Patient Goals segment of this outreach has been reviewed and updated.   Goals Addressed Today        Specialty Pharmacy General Goal      Clinical goal/therapeutic target: disease control, per the recent oncology clinic notes and labs.  Patient-identified goal of therapy: Patient will adhere to medication regimen by %    2/3/25 Bone Scan: Persistent mild activity in the anterior right second rib consistent with metastatic disease. Overall improvement in number of active lesions seen in the interval.   4/1/25: Patient started Kisqali in October 2022 and has been tolerating therapy well overall since that time. She has had her Xgeva on hold for ONJ concern, but has since been cleared by dentistry to resume for her bone mets.              Quality of Life Assessment   Quality of Life related to the patient's enrollment in the patient management program and services provided was discussed with the patient. The QOL segment of this  outreach has been reviewed and updated.  Quality of Life Improvement Scale: 8-Moderately better  Quality of Life Score: 8    Reassessment Plan & Follow-Up  Pharmacist to continue to perform regular reassessments no more than 6 months from the previous assessment.  Care Coordinator to facilitate future refill outreaches, coordinate prescription delivery, and escalate clinical questions to pharmacist.     Additional Plans, Therapy Recommendations or Therapy Problems to Be Addressed: none at this time    Attestation  Therapeutic appropriateness: Appropriate   I attest the patient was actively involved in and has agreed to the above plan of care.  If the prescribed therapy is at any point deemed not appropriate based on the current or future assessments, a consultation will be initiated with the patient's specialty care provider to determine the best course of action. The revised plan of therapy will be documented along with any required assessments and/or additional patient education provided.     Suzette Luke PharmD  Oncology Clinical Specialty Pharmacist   4/1/2025  13:31 EDT

## 2025-04-01 NOTE — ASSESSMENT & PLAN NOTE
Patient is on monthly denosumab for bony involvement.  Tolerating well.  She is following with her dentist.  Electrolytes are adequate.  Denosumab today monthly.

## 2025-04-03 DIAGNOSIS — Z17.0 MALIGNANT NEOPLASM OF UPPER-OUTER QUADRANT OF LEFT BREAST IN FEMALE, ESTROGEN RECEPTOR POSITIVE: ICD-10-CM

## 2025-04-03 DIAGNOSIS — G89.3 CANCER RELATED PAIN: ICD-10-CM

## 2025-04-03 DIAGNOSIS — R23.2 HOT FLASHES: ICD-10-CM

## 2025-04-03 DIAGNOSIS — C50.412 MALIGNANT NEOPLASM OF UPPER-OUTER QUADRANT OF LEFT BREAST IN FEMALE, ESTROGEN RECEPTOR POSITIVE: ICD-10-CM

## 2025-04-03 DIAGNOSIS — F33.9 MONOPOLAR DEPRESSION: ICD-10-CM

## 2025-04-03 RX ORDER — POTASSIUM CHLORIDE 750 MG/1
CAPSULE, EXTENDED RELEASE ORAL
Qty: 60 CAPSULE | Refills: 1 | Status: SHIPPED | OUTPATIENT
Start: 2025-04-03

## 2025-04-03 RX ORDER — DULOXETIN HYDROCHLORIDE 60 MG/1
CAPSULE, DELAYED RELEASE ORAL
Qty: 30 CAPSULE | Refills: 1 | Status: SHIPPED | OUTPATIENT
Start: 2025-04-03

## 2025-04-03 RX ORDER — OXYBUTYNIN CHLORIDE 15 MG/1
TABLET, EXTENDED RELEASE ORAL
Qty: 30 TABLET | Refills: 1 | Status: SHIPPED | OUTPATIENT
Start: 2025-04-03

## 2025-04-04 ENCOUNTER — SPECIALTY PHARMACY (OUTPATIENT)
Dept: PHARMACY | Facility: HOSPITAL | Age: 65
End: 2025-04-04
Payer: MEDICARE

## 2025-04-04 NOTE — PROGRESS NOTES
Specialty Pharmacy Patient Management Program  Oncology Refill Outreach      Derek is a 64 y.o. female contacted today regarding refills of her medication(s). [facility] will ship medications when ready to be dispensed. Patient will not be home on Tuesday for delivery, so I will set up a reminder to ship out her KISQALI on Monday through [facility]    Specialty medication(s) and dose(s) confirmed: Y  Other medications being refilled: N    Refill Questions      Flowsheet Row Most Recent Value   Changes to allergies? No   Changes to medications? No   New conditions or infections since last clinic visit No   Unplanned office visit, urgent care, ED, or hospital admission in the last 4 weeks  Yes  [went to ER for pain,  already spoke with Suzette about this visit]   How does patient/caregiver feel medication is working? Very good   Financial problems or insurance changes  No   Since the previous refill, were any specialty medication doses or scheduled injections missed or delayed?  No   Does this patient require a clinical escalation to a pharmacist? No            Delivery Questions      Flowsheet Row Most Recent Value   Delivery method [employer]   Delivery address verified with patient/caregiver? Yes   Delivery address Prescription   Number of medications in delivery 1   Medication(s) being filled and delivered Ribociclib Succinate (KISQALI)   Doses left of specialty medications 1 WEEK   Copay verified? Yes   Copay amount $0.00   Copay form of payment No copayment ($0)   Delivery Date Selection 04/09/25  [Patient said that she wouldn't be home on Tuesday for delivery so she wants it delivered on Wednesday]   Signature Required No   Do you consent to receive electronic handouts?  Yes                   Follow-up: 21 DAYS      Frieda Harper  Oncology Care Coordinator  4/4/2025  09:36 EDT

## 2025-04-10 ENCOUNTER — OFFICE VISIT (OUTPATIENT)
Dept: PSYCHIATRY | Facility: CLINIC | Age: 65
End: 2025-04-10
Payer: MEDICARE

## 2025-04-10 VITALS
SYSTOLIC BLOOD PRESSURE: 116 MMHG | WEIGHT: 214.6 LBS | HEIGHT: 66 IN | HEART RATE: 60 BPM | BODY MASS INDEX: 34.49 KG/M2 | DIASTOLIC BLOOD PRESSURE: 46 MMHG

## 2025-04-10 DIAGNOSIS — F51.05 INSOMNIA DUE TO MENTAL CONDITION: ICD-10-CM

## 2025-04-10 DIAGNOSIS — Z63.4 BEREAVEMENT: Primary | ICD-10-CM

## 2025-04-10 DIAGNOSIS — F41.0 PANIC ATTACKS: ICD-10-CM

## 2025-04-10 DIAGNOSIS — F33.1 MAJOR DEPRESSIVE DISORDER, RECURRENT EPISODE, MODERATE: ICD-10-CM

## 2025-04-10 DIAGNOSIS — R06.83 SNORING: ICD-10-CM

## 2025-04-10 DIAGNOSIS — F41.1 GENERALIZED ANXIETY DISORDER: ICD-10-CM

## 2025-04-10 DIAGNOSIS — F43.10 POST TRAUMATIC STRESS DISORDER (PTSD): ICD-10-CM

## 2025-04-10 SDOH — SOCIAL STABILITY - SOCIAL INSECURITY: DISSAPEARANCE AND DEATH OF FAMILY MEMBER: Z63.4

## 2025-04-10 NOTE — PROGRESS NOTES
Subjective   Derek Keller is a 64 y.o. female who presents today for initial evaluation     Referring Provider:  No referring provider defined for this encounter.    Chief Complaint:  depression    History of Present Illness:     2024: INITIAL VISIT Chart review:     [place]: On Ativan, oxycodone, and morphine chronically. She also used to be on gabapentin.  Care Everywhere: several non behavioral health notes    Psychotropic medication chart review:  Present:  Mirtazapine 15 mg at night  Cymbalta 60 mg a day    Previously:  Trazodone 150 mg at night  Gabapentin 600 mg nightly  Abilify 5, 10, 15 mg a day  Wellbutrin  mg a day  Cymbalta 20, 30 mg a day  Lexapro 5, 20 mg a day  Lamictal 25 mg twice daily, 200 mg at night  Effexor 75, 150 mg a day    EKG: 2024: Rate 54, sinus, QTc 471  Procedures: none  Head imagin MRI of the brain: No acute, no evidence of metastatic disease.  Labs: 2024: Abnormal CMP CO2 31.3 chloride 97 low glucose 111; abnormal CBC hemoglobin 9.7 other abnormalities; CT of the abdomen and pelvis with contrast shows diffuse sclerotic osseous metastatic disease.  Initial Chart Review Notes: Referred to us in February by oncology.  Patient has metastatic breast cancer.  More anxiety and depression recently.      Chart Review By Dates:  04/10/2025: ED for LLQ pain, onc x6, sleep med; abnl cmp c02 29.8 gluc 134 cbc; reassuring mg phos  Gen surg, onc x4; abnl cmp cbc gluc 154 co2 31  2024: received a call from Onc after pt appeared agitated during her visit after CBD vaping and smoking tobacco 12/3. Unknown x1. ED for constip ; abnl cmp, cbc, low phos.    Plannin2025: Sleep medicine for snoring, increase mirtazaine for NATE, MDD. Grieving the loss of 2 friends.  2024: Watch weight. Improving. Restart mirtazapine for MDD, NATE, insomnia.   2024: Increase cymbalta for MDD. Pt just started on mirtazapine. Would want to find her a therapist.  "6w    VISITS/APPOINTMENTS (BELOW):    \"Derek\"  Significant metastatic disease  Dad  on 12/10, Mom  on  (both years ago)    04/10/2025:   In person interview:  \"Health wise still having trouble with my stomach.\"  ROSITA was in the hospital, but he's fine. Had heart surgery.  \"I guess I'm doing fine.\"  Sleep study consult   Keeps in touch with last ex-, as well as her other 2 close friends, and neighbor  Mirtazapine helping with dep and anx  Pain is \"bad\" because she's been moving around a lot  Depressed due to grief not MDD  MDD: stable  Grief: her two friends, appropriate  NATE: improving  Panic attacks: stable  Energy: low  Concentration: not at goal  Insomnia: initial, snoring  AIMS if on antipsychotic: na  Eating/Weight: 214, 204x2 lbs  Refills: y  Substances: Smoking. Not vaping; has a medical marijuana card but hasn't received it yet.  Therapy: n  Medication compliant: y  SE: n  No SI HI AVH.      2025:   In person interview:  \"I was in [place], having fun.\"  That's why I missed my last appointment  I have 4 very close friends. One  in August, the other  last week.  Mirtazapine? started  Keeps in touch with last ex-  Snores at night  MDD: fairly stable  Grief: her two friends  NATE: improving, still present  Panic attacks: stable  Energy: low  Concentration: not at goal  Insomnia: initial  AIMS if on antipsychotic: na  Eating/Weight: 204x2 lbs  Refills: y  Substances: Smoking. Not vaping; has a medical marijuana card but hasn't received it yet.  Therapy: n  Medication compliant: y  SE: n  No SI HI AVH.      2024:   In person interview:  \"Yes it was the vaping.\"  I had quit smoking so I tried the vape. I tried them both at the same time and it was an overload on me. They saw me when I was at my very worst.  I'm not on the mirtazapine. I'm on trazodone now. Still not sleeping.  \"The worst part is the pain\"  MDD: improving  NATE: improving  Panic attacks: " "stable  Energy: improving, \"I stay busy\"  Concentration: not at goal  Insomnia: initial insomnia  AIMS if on antipsychotic: na  Eating/Weight: 204 lbs  Refills: y  Substances: CBD stopped. Tobacco? Yes, smoking. Vaping? No.  Therapy: n  Medication compliant: y  SE: n  No DAVE COWANH.      2024:   In person.  Interview:  His/Her Story: \"It's stage 4.\"  P17, G12  They thought they would be able to maintain and control it with my chemo, but it's spreading now.  \"It's scary. It's the fear of not knowing.\"  Denies anhedonia  Lives by herself  Best friend passed this year, Beth Neff,  of a blood clot in August. \"We were shocked.\"  I have other friends, Pepper and her BF   3x,  3x  No kids, lost a child to abruption at 9 mos  Sleeping? Maintenance insomnia q2 hours  Eating? stable  Energy? Down  Just started mirtazapine this week. May be helping her sleep.  Depression/Mood:  Depressed mood, hopelessness  \"I miss things the way they used to be.\"  poor energy, poor concentration, insomnia.  Seasonal pattern: def  Severity: Moderate  Duration: years  Anxiety:  Uncontrolled worrying, muscle tension, fatigue, poor concentration, feeling on edge or restless, irritability, insomnia.  Severity: Moderate  Duration: years  PTSD: previously dx'd  avoidance, negative view of the world, hypervigilance.  Inciting event: losing her child at 9 mo (abruption)  Duration: many years  AIMS if on antipsychotic: na  Psych ROS: Positive for depression, anxiety.  Negative for psychosis and ebony.  ADHD: def  No SI HI AVH.  Medication compliant: y    Access to Firearms: yes, locked away    PHQ-9 Depression Screening  PHQ-9 Total Score:     Little interest or pleasure in doing things?     Feeling down, depressed, or hopeless?     PHQ-2 Total Score     Trouble falling or staying asleep, or sleeping too much?     Feeling tired or having little energy?     Poor appetite or overeating?     Feeling bad about yourself - or " that you are a failure or have let yourself or your family down?     Trouble concentrating on things, such as reading the newspaper or watching television?     Moving or speaking so slowly that other people could have noticed? Or the opposite - being so fidgety or restless that you have been moving around a lot more than usual?     Thoughts that you would be better off dead, or of hurting yourself in some way?     PHQ-9 Total Score     If you checked off any problems, how difficult have these problems made it for you to do your work, take care of things at home, or get along with other people?              NATE-7       Past Surgical History:  Past Surgical History:   Procedure Laterality Date    BREAST BIOPSY Left     CARDIAC CATHETERIZATION Left 1959    CARDIAC CATHETERIZATION N/A 04/06/2018    Procedure: Coronary angiography;  Surgeon: Art Licea MD;  Location: [facility] INVASIVE LOCATION;  Service: Cardiovascular    CARDIAC CATHETERIZATION N/A 04/06/2018    Procedure: Left heart cath;  Surgeon: Art Licea MD;  Location: [facility] INVASIVE LOCATION;  Service: Cardiovascular    CARDIAC CATHETERIZATION N/A 04/06/2018    Procedure: Left ventriculography;  Surgeon: Art Licea MD;  Location: [facility] INVASIVE LOCATION;  Service: Cardiovascular    CHOLECYSTECTOMY      COLON SURGERY      colostomy bag, and colostomy reversal    COLONOSCOPY  11/08/2006    COLONOSCOPY N/A 06/08/2023    Procedure: COLONOSCOPY;  Surgeon: Alfred Castañeda MD;  Location: [facility] ENDOSCOPY;  Service: General;  Laterality: N/A;  same as preop    EXPLORATORY LAPAROTOMY N/A 12/06/2022    Procedure: LAPAROTOMY EXPLORATORY sigmoid resection hartmans procedure colostomy;  Surgeon: Alfred Castañeda MD;  Location: [facility] MAIN OR;  Service: General;  Laterality: N/A;    GANGLION CYST EXCISION Bilateral     HYSTERECTOMY  05/2005    ILEOSTOMY CLOSURE N/A 06/26/2023    Procedure: Laparoscopic Hand-Assisted  Colostomy Closure with End to End Anastomosis;  Surgeon: Alfred Castañeda MD;  Location: [facility] MAIN OR;  Service: General;  Laterality: N/A;    LAPAROSCOPIC GASTRIC BANDING      BAND REMOVED 2020    PARASTOMAL HERNIA REPAIR N/A 06/26/2023    Procedure: Open Parastomal Hernia Repair;  Surgeon: Alfred Castañeda MD;  Location: [facility] MAIN OR;  Service: General;  Laterality: N/A;    TONSILLECTOMY      VENOUS ACCESS DEVICE (PORT) INSERTION N/A 01/09/2023    Procedure: INSERTION VENOUS ACCESS DEVICE;  Surgeon: Alfred Castañeda MD;  Location: [facility] MAIN OR;  Service: General;  Laterality: N/A;    VENTRAL HERNIA REPAIR N/A 7/3/2024    Procedure: VENTRAL / INCISIONAL HERNIA REPAIR LAPAROSCOPIC WITH DAVINCI ROBOT with mesh;  Surgeon: Alfred Castañeda MD;  Location: [facility] MAIN OR;  Service: Robotics - DaVinci;  Laterality: N/A;       Problem List:  Patient Active Problem List   Diagnosis    Hypertension    Hyperlipidemia    Allergic rhinitis    Hypothyroidism    Chronic pain disorder    Anxiety    Monopolar depression    Malaise and fatigue    Tobacco abuse    Fibromyalgia    Vitamin B 12 deficiency    Primary osteoarthritis of left knee    Restless legs syndrome (RLS)    Insomnia    Chronic fatigue    Asthma    Body mass index (BMI) 26.0-26.9, adult    Degeneration of lumbar intervertebral disc    Hearing loss    Inappropriate diet and eating habits    Cervical spondylosis    Obesity (BMI 30-39.9)    Renal stone    Malignant neoplasm of upper-outer quadrant of left breast in female, estrogen receptor positive    Cancer related pain    Malignant neoplasm metastatic to bone    Peripheral edema    Peritonitis    Leukopenia    S/P Laparoscopic Hand-Assisted Colostomy Closure with End to End Anastomosis Open Parastomal Hernia Repair    Encounter for adjustment or management of vascular access device    Pulmonary emphysema    History of colon polyps    Colostomy in place    Anemia    Hypokalemia     Therapeutic opioid induced constipation    Incisional hernia, without obstruction or gangrene    Pain in lower jaw    Suspected sleep apnea    Snoring    Hypersomnia    Nocturia    PTSD (post-traumatic stress disorder)    Inadequate sleep hygiene       Allergy:   Allergies   Allergen Reactions    Azithromycin Itching    Erythromycin Itching        Discontinued Medications:  There are no discontinued medications.        Current Medications:   Current Outpatient Medications   Medication Sig Dispense Refill    atorvastatin (LIPITOR) 20 MG tablet TAKE 1 TABLET BY MOUTH EVERY DAY 30 tablet 6    baclofen (LIORESAL) 20 MG tablet Take 1 tablet by mouth 3 (Three) Times a Day.      donepezil (ARICEPT) 10 MG tablet Take 1 tablet by mouth Daily.      DULoxetine (CYMBALTA) 30 MG capsule Take 1 capsule by mouth Daily. 90 capsule 1    DULoxetine (CYMBALTA) 60 MG capsule TAKE 1 capsule BY MOUTH DAILY for mood elevation 30 capsule 1    fluticasone (FLONASE) 50 MCG/ACT nasal spray Administer 2 sprays into the nostril(s) as directed by provider Daily.      gabapentin (NEURONTIN) 600 MG tablet TAKE 1 TABLET BY MOUTH AT BEDTIME (Patient taking differently: Take 0.5 tablets by mouth 2 (Two) Times a Day As Needed (pain).) 90 tablet 0    hydroCHLOROthiazide 12.5 MG tablet TAKE 1 TABLET BY MOUTH DAILY for swelling 90 tablet 1    letrozole (FEMARA) 2.5 MG tablet TAKE 1 TABLET BY MOUTH DAILY 90 tablet 1    levothyroxine (SYNTHROID, LEVOTHROID) 50 MCG tablet TAKE 1 TABLET BY MOUTH EVERY DAY 90 tablet 1    lidocaine (LIDODERM) 5 % Place 1 patch on the skin as directed by provider Daily As Needed for Moderate Pain or Severe Pain. Remove & Discard patch within 12 hours or as directed by MD      LORazepam (ATIVAN) 0.5 MG tablet Take 1 tablet by mouth Daily.      losartan (COZAAR) 50 MG tablet Take 1 tablet by mouth Daily.      mirtazapine (REMERON) 30 MG tablet Take 1 tablet by mouth Every Night. 90 tablet 1    montelukast (SINGULAIR) 10 MG tablet  Take 1 tablet by mouth Daily.      Morphine (MS CONTIN) 60 MG 12 hr tablet Take 1 tablet by mouth 2 (Two) Times a Day. 60 tablet 0    naloxone (NARCAN) 4 MG/0.1ML nasal spray Call 911. Don't prime. Wells in 1 nostril for overdose. Repeat in 2-3 minutes in other nostril if no or minimal breathing/responsiveness. 2 each 0    nicotine (NICODERM CQ) 21 MG/24HR patch Place 1 patch on the skin as directed by provider Daily. 30 patch 3    ondansetron (ZOFRAN) 8 MG tablet Take 1 tablet by mouth Every 8 (Eight) Hours As Needed for Nausea or Vomiting. 30 tablet 5    oxybutynin XL (DITROPAN XL) 15 MG 24 hr tablet TAKE 1 TABLET BY MOUTH DAILY for bladder 30 tablet 1    oxyCODONE-acetaminophen (PERCOCET)  MG per tablet Take 1 tablet by mouth Every 6 (Six) Hours As Needed for Moderate Pain. 60 tablet 0    polyethylene glycol (MIRALAX) 17 g packet Take 17 g by mouth Daily. 5 packet 0    potassium chloride (MICRO-K) 10 MEQ CR capsule TAKE 1 CAPSULE BY MOUTH EVERY TWELVE HOURS for potassium supplement 60 capsule 1    ribociclib succinate, 600 MG Dose, (KISQALI) 200 MG tablet therapy pack tablet Take 3 tablets by mouth Take As Directed. Take 3 tablets by mouth daily for 21 days then off 7 days on a 28 day cycle. 63 tablet 11    rOPINIRole (REQUIP) 3 MG tablet Take 1 tablet by mouth 2 (Two) Times a Day.      senna (Senokot) 8.6 MG tablet Take 1 tablet by mouth Daily. 30 tablet 3    SUMAtriptan (IMITREX) 100 MG tablet Take 1 tablet by mouth 1 (One) Time As Needed for Migraine.      traZODone (DESYREL) 150 MG tablet TAKE 1 TABLET BY MOUTH ONCE nightly 90 tablet 2     No current facility-administered medications for this visit.       Past Medical History:  Past Medical History:   Diagnosis Date    Abdominal pain     OSTOMY SITE    Allergic rhinitis     Anemia     NO S/S    Anxiety     Arteriosclerosis     Coronary, follows with Dr. Núñez    Arthritis     Bone cancer     Bone metastases 10/14/2022    Bowen's disease     SKIN  "CANCER    Breast cancer     NO SURGERY WAS DONE DUE TO METS TO BONE/COLON/LYMPHNODE    Cancer related pain 10/13/2022    Depression     Disorder associated with Helicobacter species 10/12/2022    Dysphoric mood     Emphysema lung     Fatigue     Fibromyalgia     Frequent urination     NO S/S INFECTION    Herpes simplex vulvovaginitis 2018    History of colon polyps     History of IBS     History of kidney stones     Hyperlipidemia     Hypertension     Hypothyroidism     Insomnia     Lumbago     Mood disorder     Neck pain     R/T CANCER    Palpitations     last time a few months ago    Precordial pain     R/T SPINE CANCER    RLS (restless legs syndrome)     S/P Laparoscopic Hand-Assisted Colostomy Closure with End to End Anastomosis Open Parastomal Hernia Repair 2022    Sleep disturbance     SOB (shortness of breath)     at times at rest    SOBOE (shortness of breath on exertion)        Past Psychiatric History:  Began Treatment: early   Diagnoses: MDD, NATE, panic attacks  Psychiatrist: multiple  Therapist: in the past  Admission History:    Late , Edith, admitted for medication changes    Medication Trials:    \"I think I've been on every medication\"    Recently stopped trazodone    Self Harm: Denies  Suicide Attempts:Denies   Psychosis, Anxiety, Depression: PPD    Substance Abuse History:   Types: former smoker; occasional cannabis use  Withdrawal Symptoms:Denies  Longest Period Sober:Not Applicable   AA: Not applicable     Social History:  Martial Status:   Employed:No  Kids:No  House:Lives in a house   History: Denies    Social History     Socioeconomic History    Marital status:    Tobacco Use    Smoking status: Every Day     Current packs/day: 1.00     Average packs/day: 1 pack/day for 50.8 years (50.8 ttl pk-yrs)     Types: Cigarettes     Start date:      Last attempt to quit: 2024    Smokeless tobacco: Never    Tobacco comments:          Has not " "used tobacco for 28 days    Vaping Use    Vaping status: Never Used   Substance and Sexual Activity    Alcohol use: No    Drug use: Never     Types: Marijuana     Comment: LAST USE 7-2-24    Sexual activity: Defer     Partners: Male     Birth control/protection: None       Family History:   Suicide Attempts: Denies  Suicide Completions:Denies      Family History   Problem Relation Age of Onset    Hypertension Mother     Rheum arthritis Mother     Heart disease Mother     Breast cancer Mother     Diabetes Father     Cancer Maternal Grandmother         colon    Colon cancer Maternal Grandmother     Aneurysm Paternal Grandfather     Diabetes Other     Fibromyalgia Other     Malig Hyperthermia Neg Hx        Developmental History:     Childhood: Denies Abuse  High School:Completed  College: [employer]     Mental Status Exam  Appearance  : groomed, good eye contact, normal street clothes  Behavior  : pleasant and cooperative  Motor  : No abnormal  Speech  :normal rhythm, rate, volume, tone, not hyperverbal, not pressured, normal prosidy  Mood  : \"I guess I'm doing ok\"  Affect  : nearly euthymic, mood congruent, good variability  Thought Content  : negative suicidal ideations, negative homicidal ideations, negative obsessions  Perceptions  : negative auditory hallucinations, negative visual hallucinations  Thought Process  : linear  Insight/Judgement  : Fair/fair  Cognition  : grossly intact  Attention   : intact        Review of Systems:  Review of Systems   Constitutional:  Positive for diaphoresis and fatigue.   HENT:  Negative for drooling.    Eyes:  Negative for visual disturbance.   Respiratory:  Positive for cough and shortness of breath.    Cardiovascular:  Positive for leg swelling. Negative for chest pain and palpitations.   Gastrointestinal:  Positive for nausea. Negative for vomiting.   Endocrine: Positive for cold intolerance and heat intolerance.   Genitourinary:  Negative for difficulty urinating.   Musculoskeletal: " " Positive for joint swelling.   Allergic/Immunologic: Positive for immunocompromised state.   Neurological:  Positive for dizziness and numbness. Negative for seizures and speech difficulty.   Psychiatric/Behavioral:  Negative for agitation. The patient is not nervous/anxious.        Physical Exam:  Physical Exam    Vital Signs:   /46   Pulse 60   Ht 167.6 cm (66\")   Wt 97.3 kg (214 lb 9.6 oz)   BMI 34.64 kg/m²      Lab Results:   Hospital Outpatient Visit on 04/01/2025   Component Date Value Ref Range Status    Glucose 04/01/2025 134 (H)  65 - 99 mg/dL Final    BUN 04/01/2025 20  8 - 23 mg/dL Final    Creatinine 04/01/2025 0.94  0.57 - 1.00 mg/dL Final    Sodium 04/01/2025 141  136 - 145 mmol/L Final    Potassium 04/01/2025 4.0  3.5 - 5.2 mmol/L Final    Chloride 04/01/2025 102  98 - 107 mmol/L Final    CO2 04/01/2025 29.8 (H)  22.0 - 29.0 mmol/L Final    Calcium 04/01/2025 8.7  8.6 - 10.5 mg/dL Final    Total Protein 04/01/2025 7.3  6.0 - 8.5 g/dL Final    Albumin 04/01/2025 4.0  3.5 - 5.2 g/dL Final    ALT (SGPT) 04/01/2025 10  1 - 33 U/L Final    AST (SGOT) 04/01/2025 14  1 - 32 U/L Final    Alkaline Phosphatase 04/01/2025 106  39 - 117 U/L Final    Total Bilirubin 04/01/2025 0.3  0.0 - 1.2 mg/dL Final    Globulin 04/01/2025 3.3  gm/dL Final    A/G Ratio 04/01/2025 1.2  g/dL Final    BUN/Creatinine Ratio 04/01/2025 21.3  7.0 - 25.0 Final    Anion Gap 04/01/2025 9.2  5.0 - 15.0 mmol/L Final    eGFR 04/01/2025 67.5  >60.0 mL/min/1.73 Final    Magnesium 04/01/2025 2.1  1.6 - 2.4 mg/dL Final    Phosphorus 04/01/2025 3.4  2.5 - 4.5 mg/dL Final    WBC 04/01/2025 4.55  3.40 - 10.80 10*3/mm3 Final    RBC 04/01/2025 2.98 (L)  3.77 - 5.28 10*6/mm3 Final    Hemoglobin 04/01/2025 9.6 (L)  12.0 - 15.9 g/dL Final    Hematocrit 04/01/2025 30.8 (L)  34.0 - 46.6 % Final    MCV 04/01/2025 103.4 (H)  79.0 - 97.0 fL Final    MCH 04/01/2025 32.2  26.6 - 33.0 pg Final    MCHC 04/01/2025 31.2 (L)  31.5 - 35.7 g/dL Final    " RDW 04/01/2025 15.9 (H)  12.3 - 15.4 % Final    RDW-SD 04/01/2025 61.0 (H)  37.0 - 54.0 fl Final    MPV 04/01/2025 10.2  6.0 - 12.0 fL Final    Platelets 04/01/2025 226  140 - 450 10*3/mm3 Final    Neutrophil % 04/01/2025 62.8  42.7 - 76.0 % Final    Lymphocyte % 04/01/2025 29.5  19.6 - 45.3 % Final    Monocyte % 04/01/2025 5.5  5.0 - 12.0 % Final    Eosinophil % 04/01/2025 0.2 (L)  0.3 - 6.2 % Final    Basophil % 04/01/2025 1.8 (H)  0.0 - 1.5 % Final    Immature Grans % 04/01/2025 0.2  0.0 - 0.5 % Final    Neutrophils, Absolute 04/01/2025 2.86  1.70 - 7.00 10*3/mm3 Final    Lymphocytes, Absolute 04/01/2025 1.34  0.70 - 3.10 10*3/mm3 Final    Monocytes, Absolute 04/01/2025 0.25  0.10 - 0.90 10*3/mm3 Final    Eosinophils, Absolute 04/01/2025 0.01  0.00 - 0.40 10*3/mm3 Final    Basophils, Absolute 04/01/2025 0.08  0.00 - 0.20 10*3/mm3 Final    Immature Grans, Absolute 04/01/2025 0.01  0.00 - 0.05 10*3/mm3 Final    nRBC 04/01/2025 0.0  0.0 - 0.2 /100 WBC Final   Admission on 03/18/2025, Discharged on 03/19/2025   Component Date Value Ref Range Status    Glucose 03/18/2025 137 (H)  65 - 99 mg/dL Final    BUN 03/18/2025 15  8 - 23 mg/dL Final    Creatinine 03/18/2025 0.87  0.57 - 1.00 mg/dL Final    Sodium 03/18/2025 139  136 - 145 mmol/L Final    Potassium 03/18/2025 3.8  3.5 - 5.2 mmol/L Final    Chloride 03/18/2025 102  98 - 107 mmol/L Final    CO2 03/18/2025 26.3  22.0 - 29.0 mmol/L Final    Calcium 03/18/2025 9.3  8.6 - 10.5 mg/dL Final    Total Protein 03/18/2025 7.6  6.0 - 8.5 g/dL Final    Albumin 03/18/2025 4.2  3.5 - 5.2 g/dL Final    ALT (SGPT) 03/18/2025 9  1 - 33 U/L Final    AST (SGOT) 03/18/2025 13  1 - 32 U/L Final    Alkaline Phosphatase 03/18/2025 120 (H)  39 - 117 U/L Final    Total Bilirubin 03/18/2025 0.3  0.0 - 1.2 mg/dL Final    Globulin 03/18/2025 3.4  gm/dL Final    A/G Ratio 03/18/2025 1.2  g/dL Final    BUN/Creatinine Ratio 03/18/2025 17.2  7.0 - 25.0 Final    Anion Gap 03/18/2025 10.7  5.0 -  15.0 mmol/L Final    eGFR 03/18/2025 74.0  >60.0 mL/min/1.73 Final    Lipase 03/18/2025 18  13 - 60 U/L Final    Color, UA 03/18/2025 Yellow  Yellow, Straw Final    Appearance, UA 03/18/2025 Clear  Clear Final    pH, UA 03/18/2025 5.5  5.0 - 8.0 Final    Specific Gravity, UA 03/18/2025 1.022  1.005 - 1.030 Final    Glucose, UA 03/18/2025 Negative  Negative Final    Ketones, UA 03/18/2025 Negative  Negative Final    Bilirubin, UA 03/18/2025 Negative  Negative Final    Blood, UA 03/18/2025 Small (1+) (A)  Negative Final    Protein, UA 03/18/2025 Trace (A)  Negative Final    Leuk Esterase, UA 03/18/2025 Trace (A)  Negative Final    Nitrite, UA 03/18/2025 Negative  Negative Final    Urobilinogen, UA 03/18/2025 1.0 E.U./dL  0.2 - 1.0 E.U./dL Final    Lactate 03/18/2025 2.3 (C)  0.5 - 2.0 mmol/L Final    Extra Tube 03/18/2025 Hold for add-ons.   Final    Auto resulted.    Extra Tube 03/18/2025 hold for add-on   Final    Auto resulted    Extra Tube 03/18/2025 Hold for add-ons.   Final    Auto resulted.    Extra Tube 03/18/2025 Hold for add-ons.   Final    Auto resulted    WBC 03/18/2025 3.96  3.40 - 10.80 10*3/mm3 Final    RBC 03/18/2025 3.28 (L)  3.77 - 5.28 10*6/mm3 Final    Hemoglobin 03/18/2025 10.4 (L)  12.0 - 15.9 g/dL Final    Hematocrit 03/18/2025 34.2  34.0 - 46.6 % Final    MCV 03/18/2025 104.3 (H)  79.0 - 97.0 fL Final    MCH 03/18/2025 31.7  26.6 - 33.0 pg Final    MCHC 03/18/2025 30.4 (L)  31.5 - 35.7 g/dL Final    RDW 03/18/2025 16.1 (H)  12.3 - 15.4 % Final    RDW-SD 03/18/2025 62.8 (H)  37.0 - 54.0 fl Final    MPV 03/18/2025 10.6  6.0 - 12.0 fL Final    Platelets 03/18/2025 180  140 - 450 10*3/mm3 Final    Neutrophil % 03/18/2025 59.6  42.7 - 76.0 % Final    Lymphocyte % 03/18/2025 27.5  19.6 - 45.3 % Final    Monocyte % 03/18/2025 12.1 (H)  5.0 - 12.0 % Final    Eosinophil % 03/18/2025 0.0 (L)  0.3 - 6.2 % Final    Basophil % 03/18/2025 0.8  0.0 - 1.5 % Final    Immature Grans % 03/18/2025 0.0  0.0 - 0.5 %  Final    Neutrophils, Absolute 03/18/2025 2.36  1.70 - 7.00 10*3/mm3 Final    Lymphocytes, Absolute 03/18/2025 1.09  0.70 - 3.10 10*3/mm3 Final    Monocytes, Absolute 03/18/2025 0.48  0.10 - 0.90 10*3/mm3 Final    Eosinophils, Absolute 03/18/2025 0.00  0.00 - 0.40 10*3/mm3 Final    Basophils, Absolute 03/18/2025 0.03  0.00 - 0.20 10*3/mm3 Final    Immature Grans, Absolute 03/18/2025 0.00  0.00 - 0.05 10*3/mm3 Final    nRBC 03/18/2025 0.0  0.0 - 0.2 /100 WBC Final    Lactate 03/18/2025 3.4 (C)  0.5 - 2.0 mmol/L Final    RBC, UA 03/18/2025 0-2  None Seen, 0-2 /HPF Final    WBC, UA 03/18/2025 0-2  None Seen, 0-2 /HPF Final    Bacteria, UA 03/18/2025 None Seen  None Seen /HPF Final    Squamous Epithelial Cells, UA 03/18/2025 0-2  None Seen, 0-2 /HPF Final    Hyaline Casts, UA 03/18/2025 None Seen  None Seen /LPF Final    Methodology 03/18/2025 Automated Microscopy   Final    Lactate 03/18/2025 3.2 (C)  0.5 - 2.0 mmol/L Final    Lactate 03/19/2025 1.8  0.5 - 2.0 mmol/L Final   Hospital Outpatient Visit on 03/04/2025   Component Date Value Ref Range Status    Glucose 03/04/2025 139 (H)  65 - 99 mg/dL Final    BUN 03/04/2025 19  8 - 23 mg/dL Final    Creatinine 03/04/2025 0.94  0.57 - 1.00 mg/dL Final    Sodium 03/04/2025 142  136 - 145 mmol/L Final    Potassium 03/04/2025 4.0  3.5 - 5.2 mmol/L Final    Chloride 03/04/2025 102  98 - 107 mmol/L Final    CO2 03/04/2025 31.6 (H)  22.0 - 29.0 mmol/L Final    Calcium 03/04/2025 9.7  8.6 - 10.5 mg/dL Final    Total Protein 03/04/2025 7.0  6.0 - 8.5 g/dL Final    Albumin 03/04/2025 3.9  3.5 - 5.2 g/dL Final    ALT (SGPT) 03/04/2025 10  1 - 33 U/L Final    AST (SGOT) 03/04/2025 11  1 - 32 U/L Final    Alkaline Phosphatase 03/04/2025 116  39 - 117 U/L Final    Total Bilirubin 03/04/2025 0.3  0.0 - 1.2 mg/dL Final    Globulin 03/04/2025 3.1  gm/dL Final    A/G Ratio 03/04/2025 1.3  g/dL Final    BUN/Creatinine Ratio 03/04/2025 20.2  7.0 - 25.0 Final    Anion Gap 03/04/2025 8.4  5.0  - 15.0 mmol/L Final    eGFR 03/04/2025 67.5  >60.0 mL/min/1.73 Final    Magnesium 03/04/2025 1.6  1.6 - 2.4 mg/dL Final    Phosphorus 03/04/2025 3.4  2.5 - 4.5 mg/dL Final    WBC 03/04/2025 4.12  3.40 - 10.80 10*3/mm3 Final    RBC 03/04/2025 2.99 (L)  3.77 - 5.28 10*6/mm3 Final    Hemoglobin 03/04/2025 9.6 (L)  12.0 - 15.9 g/dL Final    Hematocrit 03/04/2025 30.9 (L)  34.0 - 46.6 % Final    MCV 03/04/2025 103.3 (H)  79.0 - 97.0 fL Final    MCH 03/04/2025 32.1  26.6 - 33.0 pg Final    MCHC 03/04/2025 31.1 (L)  31.5 - 35.7 g/dL Final    RDW 03/04/2025 15.6 (H)  12.3 - 15.4 % Final    RDW-SD 03/04/2025 58.6 (H)  37.0 - 54.0 fl Final    MPV 03/04/2025 10.3  6.0 - 12.0 fL Final    Platelets 03/04/2025 223  140 - 450 10*3/mm3 Final    Neutrophil % 03/04/2025 64.3  42.7 - 76.0 % Final    Lymphocyte % 03/04/2025 27.4  19.6 - 45.3 % Final    Monocyte % 03/04/2025 6.1  5.0 - 12.0 % Final    Eosinophil % 03/04/2025 0.5  0.3 - 6.2 % Final    Basophil % 03/04/2025 1.2  0.0 - 1.5 % Final    Immature Grans % 03/04/2025 0.5  0.0 - 0.5 % Final    Neutrophils, Absolute 03/04/2025 2.65  1.70 - 7.00 10*3/mm3 Final    Lymphocytes, Absolute 03/04/2025 1.13  0.70 - 3.10 10*3/mm3 Final    Monocytes, Absolute 03/04/2025 0.25  0.10 - 0.90 10*3/mm3 Final    Eosinophils, Absolute 03/04/2025 0.02  0.00 - 0.40 10*3/mm3 Final    Basophils, Absolute 03/04/2025 0.05  0.00 - 0.20 10*3/mm3 Final    Immature Grans, Absolute 03/04/2025 0.02  0.00 - 0.05 10*3/mm3 Final    nRBC 03/04/2025 0.0  0.0 - 0.2 /100 WBC Final   Hospital Outpatient Visit on 02/06/2025   Component Date Value Ref Range Status    Glucose 02/06/2025 154 (H)  65 - 99 mg/dL Final    BUN 02/06/2025 17  8 - 23 mg/dL Final    Creatinine 02/06/2025 0.94  0.57 - 1.00 mg/dL Final    Sodium 02/06/2025 141  136 - 145 mmol/L Final    Potassium 02/06/2025 3.7  3.5 - 5.2 mmol/L Final    Chloride 02/06/2025 101  98 - 107 mmol/L Final    CO2 02/06/2025 31.0 (H)  22.0 - 29.0 mmol/L Final    Calcium  02/06/2025 9.4  8.6 - 10.5 mg/dL Final    Total Protein 02/06/2025 7.1  6.0 - 8.5 g/dL Final    Albumin 02/06/2025 4.0  3.5 - 5.2 g/dL Final    ALT (SGPT) 02/06/2025 6  1 - 33 U/L Final    AST (SGOT) 02/06/2025 14  1 - 32 U/L Final    Alkaline Phosphatase 02/06/2025 108  39 - 117 U/L Final    Total Bilirubin 02/06/2025 0.3  0.0 - 1.2 mg/dL Final    Globulin 02/06/2025 3.1  gm/dL Final    A/G Ratio 02/06/2025 1.3  g/dL Final    BUN/Creatinine Ratio 02/06/2025 18.1  7.0 - 25.0 Final    Anion Gap 02/06/2025 9.0  5.0 - 15.0 mmol/L Final    eGFR 02/06/2025 67.5  >60.0 mL/min/1.73 Final    Magnesium 02/06/2025 1.8  1.6 - 2.4 mg/dL Final    Phosphorus 02/06/2025 4.2  2.5 - 4.5 mg/dL Final    WBC 02/06/2025 4.59  3.40 - 10.80 10*3/mm3 Final    RBC 02/06/2025 3.07 (L)  3.77 - 5.28 10*6/mm3 Final    Hemoglobin 02/06/2025 9.8 (L)  12.0 - 15.9 g/dL Final    Hematocrit 02/06/2025 32.2 (L)  34.0 - 46.6 % Final    MCV 02/06/2025 104.9 (H)  79.0 - 97.0 fL Final    MCH 02/06/2025 31.9  26.6 - 33.0 pg Final    MCHC 02/06/2025 30.4 (L)  31.5 - 35.7 g/dL Final    RDW 02/06/2025 15.5 (H)  12.3 - 15.4 % Final    RDW-SD 02/06/2025 59.7 (H)  37.0 - 54.0 fl Final    MPV 02/06/2025 10.0  6.0 - 12.0 fL Final    Platelets 02/06/2025 210  140 - 450 10*3/mm3 Final    Neutrophil % 02/06/2025 63.2  42.7 - 76.0 % Final    Lymphocyte % 02/06/2025 30.7  19.6 - 45.3 % Final    Monocyte % 02/06/2025 3.7 (L)  5.0 - 12.0 % Final    Eosinophil % 02/06/2025 1.1  0.3 - 6.2 % Final    Basophil % 02/06/2025 1.1  0.0 - 1.5 % Final    Immature Grans % 02/06/2025 0.2  0.0 - 0.5 % Final    Neutrophils, Absolute 02/06/2025 2.90  1.70 - 7.00 10*3/mm3 Final    Lymphocytes, Absolute 02/06/2025 1.41  0.70 - 3.10 10*3/mm3 Final    Monocytes, Absolute 02/06/2025 0.17  0.10 - 0.90 10*3/mm3 Final    Eosinophils, Absolute 02/06/2025 0.05  0.00 - 0.40 10*3/mm3 Final    Basophils, Absolute 02/06/2025 0.05  0.00 - 0.20 10*3/mm3 Final    Immature Grans, Absolute 02/06/2025  0.01  0.00 - 0.05 10*3/mm3 Final    nRBC 02/06/2025 0.0  0.0 - 0.2 /100 WBC Final   Hospital Outpatient Visit on 02/03/2025   Component Date Value Ref Range Status    Glucose 02/03/2025 130 (H)  65 - 99 mg/dL Final    BUN 02/03/2025 16  8 - 23 mg/dL Final    Creatinine 02/03/2025 0.92  0.57 - 1.00 mg/dL Final    Sodium 02/03/2025 143  136 - 145 mmol/L Final    Potassium 02/03/2025 4.0  3.5 - 5.2 mmol/L Final    Chloride 02/03/2025 101  98 - 107 mmol/L Final    CO2 02/03/2025 34.2 (H)  22.0 - 29.0 mmol/L Final    Calcium 02/03/2025 9.8  8.6 - 10.5 mg/dL Final    Total Protein 02/03/2025 7.3  6.0 - 8.5 g/dL Final    Albumin 02/03/2025 4.0  3.5 - 5.2 g/dL Final    ALT (SGPT) 02/03/2025 7  1 - 33 U/L Final    AST (SGOT) 02/03/2025 12  1 - 32 U/L Final    Alkaline Phosphatase 02/03/2025 108  39 - 117 U/L Final    Total Bilirubin 02/03/2025 0.3  0.0 - 1.2 mg/dL Final    Globulin 02/03/2025 3.3  gm/dL Final    A/G Ratio 02/03/2025 1.2  g/dL Final    BUN/Creatinine Ratio 02/03/2025 17.4  7.0 - 25.0 Final    Anion Gap 02/03/2025 7.8  5.0 - 15.0 mmol/L Final    eGFR 02/03/2025 69.2  >60.0 mL/min/1.73 Final    Magnesium 02/03/2025 1.8  1.6 - 2.4 mg/dL Final    Phosphorus 02/03/2025 5.0 (H)  2.5 - 4.5 mg/dL Final    WBC 02/03/2025 5.06  3.40 - 10.80 10*3/mm3 Final    RBC 02/03/2025 3.06 (L)  3.77 - 5.28 10*6/mm3 Final    Hemoglobin 02/03/2025 9.7 (L)  12.0 - 15.9 g/dL Final    Hematocrit 02/03/2025 31.8 (L)  34.0 - 46.6 % Final    MCV 02/03/2025 103.9 (H)  79.0 - 97.0 fL Final    MCH 02/03/2025 31.7  26.6 - 33.0 pg Final    MCHC 02/03/2025 30.5 (L)  31.5 - 35.7 g/dL Final    RDW 02/03/2025 15.8 (H)  12.3 - 15.4 % Final    RDW-SD 02/03/2025 60.4 (H)  37.0 - 54.0 fl Final    MPV 02/03/2025 10.1  6.0 - 12.0 fL Final    Platelets 02/03/2025 277  140 - 450 10*3/mm3 Final    Neutrophil % 02/03/2025 63.9  42.7 - 76.0 % Final    Lymphocyte % 02/03/2025 29.6  19.6 - 45.3 % Final    Monocyte % 02/03/2025 4.7 (L)  5.0 - 12.0 % Final     Eosinophil % 02/03/2025 0.4  0.3 - 6.2 % Final    Basophil % 02/03/2025 1.0  0.0 - 1.5 % Final    Immature Grans % 02/03/2025 0.4  0.0 - 0.5 % Final    Neutrophils, Absolute 02/03/2025 3.23  1.70 - 7.00 10*3/mm3 Final    Lymphocytes, Absolute 02/03/2025 1.50  0.70 - 3.10 10*3/mm3 Final    Monocytes, Absolute 02/03/2025 0.24  0.10 - 0.90 10*3/mm3 Final    Eosinophils, Absolute 02/03/2025 0.02  0.00 - 0.40 10*3/mm3 Final    Basophils, Absolute 02/03/2025 0.05  0.00 - 0.20 10*3/mm3 Final    Immature Grans, Absolute 02/03/2025 0.02  0.00 - 0.05 10*3/mm3 Final    nRBC 02/03/2025 0.0  0.0 - 0.2 /100 WBC Final   Hospital Outpatient Visit on 01/07/2025   Component Date Value Ref Range Status    Magnesium 01/07/2025 1.6  1.6 - 2.4 mg/dL Final    Phosphorus 01/07/2025 3.9  2.5 - 4.5 mg/dL Final    Glucose 01/07/2025 147 (H)  65 - 99 mg/dL Final    BUN 01/07/2025 20  8 - 23 mg/dL Final    Creatinine 01/07/2025 0.86  0.57 - 1.00 mg/dL Final    Sodium 01/07/2025 137  136 - 145 mmol/L Final    Potassium 01/07/2025 3.5  3.5 - 5.2 mmol/L Final    Chloride 01/07/2025 97 (L)  98 - 107 mmol/L Final    CO2 01/07/2025 31.7 (H)  22.0 - 29.0 mmol/L Final    Calcium 01/07/2025 9.5  8.6 - 10.5 mg/dL Final    Total Protein 01/07/2025 7.2  6.0 - 8.5 g/dL Final    Albumin 01/07/2025 4.0  3.5 - 5.2 g/dL Final    ALT (SGPT) 01/07/2025 8  1 - 33 U/L Final    AST (SGOT) 01/07/2025 12  1 - 32 U/L Final    Alkaline Phosphatase 01/07/2025 108  39 - 117 U/L Final    Total Bilirubin 01/07/2025 0.3  0.0 - 1.2 mg/dL Final    Globulin 01/07/2025 3.2  gm/dL Final    A/G Ratio 01/07/2025 1.3  g/dL Final    BUN/Creatinine Ratio 01/07/2025 23.3  7.0 - 25.0 Final    Anion Gap 01/07/2025 8.3  5.0 - 15.0 mmol/L Final    eGFR 01/07/2025 75.1  >60.0 mL/min/1.73 Final    WBC 01/07/2025 4.59  3.40 - 10.80 10*3/mm3 Final    RBC 01/07/2025 3.07 (L)  3.77 - 5.28 10*6/mm3 Final    Hemoglobin 01/07/2025 9.8 (L)  12.0 - 15.9 g/dL Final    Hematocrit 01/07/2025 31.8 (L)   34.0 - 46.6 % Final    MCV 01/07/2025 103.6 (H)  79.0 - 97.0 fL Final    MCH 01/07/2025 31.9  26.6 - 33.0 pg Final    MCHC 01/07/2025 30.8 (L)  31.5 - 35.7 g/dL Final    RDW 01/07/2025 15.5 (H)  12.3 - 15.4 % Final    RDW-SD 01/07/2025 57.3 (H)  37.0 - 54.0 fl Final    MPV 01/07/2025 10.2  6.0 - 12.0 fL Final    Platelets 01/07/2025 206  140 - 450 10*3/mm3 Final    Neutrophil % 01/07/2025 58.7  42.7 - 76.0 % Final    Lymphocyte % 01/07/2025 32.5  19.6 - 45.3 % Final    Monocyte % 01/07/2025 6.5  5.0 - 12.0 % Final    Eosinophil % 01/07/2025 1.5  0.3 - 6.2 % Final    Basophil % 01/07/2025 0.4  0.0 - 1.5 % Final    Immature Grans % 01/07/2025 0.4  0.0 - 0.5 % Final    Neutrophils, Absolute 01/07/2025 2.69  1.70 - 7.00 10*3/mm3 Final    Lymphocytes, Absolute 01/07/2025 1.49  0.70 - 3.10 10*3/mm3 Final    Monocytes, Absolute 01/07/2025 0.30  0.10 - 0.90 10*3/mm3 Final    Eosinophils, Absolute 01/07/2025 0.07  0.00 - 0.40 10*3/mm3 Final    Basophils, Absolute 01/07/2025 0.02  0.00 - 0.20 10*3/mm3 Final    Immature Grans, Absolute 01/07/2025 0.02  0.00 - 0.05 10*3/mm3 Final    nRBC 01/07/2025 0.0  0.0 - 0.2 /100 WBC Final    Total Cholesterol 01/07/2025 118  0 - 200 mg/dL Final    Triglycerides 01/07/2025 106  0 - 150 mg/dL Final    HDL Cholesterol 01/07/2025 49  40 - 60 mg/dL Final    LDL Cholesterol  01/07/2025 49  0 - 100 mg/dL Final    VLDL Cholesterol 01/07/2025 20  5 - 40 mg/dL Final    LDL/HDL Ratio 01/07/2025 0.98   Final   Admission on 12/22/2024, Discharged on 12/22/2024   Component Date Value Ref Range Status    Glucose 12/22/2024 155 (H)  65 - 99 mg/dL Final    BUN 12/22/2024 20  8 - 23 mg/dL Final    Creatinine 12/22/2024 0.79  0.57 - 1.00 mg/dL Final    Sodium 12/22/2024 139  136 - 145 mmol/L Final    Potassium 12/22/2024 3.3 (L)  3.5 - 5.2 mmol/L Final    Chloride 12/22/2024 99  98 - 107 mmol/L Final    CO2 12/22/2024 28.8  22.0 - 29.0 mmol/L Final    Calcium 12/22/2024 8.6  8.6 - 10.5 mg/dL Final    Total  Protein 12/22/2024 6.5  6.0 - 8.5 g/dL Final    Albumin 12/22/2024 3.7  3.5 - 5.2 g/dL Final    ALT (SGPT) 12/22/2024 11  1 - 33 U/L Final    AST (SGOT) 12/22/2024 15  1 - 32 U/L Final    Alkaline Phosphatase 12/22/2024 103  39 - 117 U/L Final    Total Bilirubin 12/22/2024 0.3  0.0 - 1.2 mg/dL Final    Globulin 12/22/2024 2.8  gm/dL Final    A/G Ratio 12/22/2024 1.3  g/dL Final    BUN/Creatinine Ratio 12/22/2024 25.3 (H)  7.0 - 25.0 Final    Anion Gap 12/22/2024 11.2  5.0 - 15.0 mmol/L Final    eGFR 12/22/2024 83.1  >60.0 mL/min/1.73 Final    Lipase 12/22/2024 12 (L)  13 - 60 U/L Final    Color, UA 12/22/2024 Yellow  Yellow, Straw Final    Appearance, UA 12/22/2024 Clear  Clear Final    pH, UA 12/22/2024 5.5  5.0 - 8.0 Final    Specific Gravity, UA 12/22/2024 1.010  1.005 - 1.030 Final    Glucose, UA 12/22/2024 Negative  Negative Final    Ketones, UA 12/22/2024 Negative  Negative Final    Bilirubin, UA 12/22/2024 Negative  Negative Final    Blood, UA 12/22/2024 Negative  Negative Final    Protein, UA 12/22/2024 Negative  Negative Final    Leuk Esterase, UA 12/22/2024 Negative  Negative Final    Nitrite, UA 12/22/2024 Negative  Negative Final    Urobilinogen, UA 12/22/2024 0.2 E.U./dL  0.2 - 1.0 E.U./dL Final    Lactate 12/22/2024 1.7  0.5 - 2.0 mmol/L Final    Extra Tube 12/22/2024 Hold for add-ons.   Final    Auto resulted.    Extra Tube 12/22/2024 hold for add-on   Final    Auto resulted    Extra Tube 12/22/2024 Hold for add-ons.   Final    Auto resulted.    Extra Tube 12/22/2024 Hold for add-ons.   Final    Auto resulted    WBC 12/22/2024 4.52  3.40 - 10.80 10*3/mm3 Final    RBC 12/22/2024 2.90 (L)  3.77 - 5.28 10*6/mm3 Final    Hemoglobin 12/22/2024 9.3 (L)  12.0 - 15.9 g/dL Final    Hematocrit 12/22/2024 30.1 (L)  34.0 - 46.6 % Final    MCV 12/22/2024 103.8 (H)  79.0 - 97.0 fL Final    MCH 12/22/2024 32.1  26.6 - 33.0 pg Final    MCHC 12/22/2024 30.9 (L)  31.5 - 35.7 g/dL Final    RDW 12/22/2024 15.9 (H)  12.3  - 15.4 % Final    RDW-SD 12/22/2024 61.7 (H)  37.0 - 54.0 fl Final    MPV 12/22/2024 9.5  6.0 - 12.0 fL Final    Platelets 12/22/2024 194  140 - 450 10*3/mm3 Final    Neutrophil % 12/22/2024 59.1  42.7 - 76.0 % Final    Lymphocyte % 12/22/2024 27.7  19.6 - 45.3 % Final    Monocyte % 12/22/2024 11.3  5.0 - 12.0 % Final    Eosinophil % 12/22/2024 0.4  0.3 - 6.2 % Final    Basophil % 12/22/2024 1.3  0.0 - 1.5 % Final    Immature Grans % 12/22/2024 0.2  0.0 - 0.5 % Final    Neutrophils, Absolute 12/22/2024 2.67  1.70 - 7.00 10*3/mm3 Final    Lymphocytes, Absolute 12/22/2024 1.25  0.70 - 3.10 10*3/mm3 Final    Monocytes, Absolute 12/22/2024 0.51  0.10 - 0.90 10*3/mm3 Final    Eosinophils, Absolute 12/22/2024 0.02  0.00 - 0.40 10*3/mm3 Final    Basophils, Absolute 12/22/2024 0.06  0.00 - 0.20 10*3/mm3 Final    Immature Grans, Absolute 12/22/2024 0.01  0.00 - 0.05 10*3/mm3 Final    nRBC 12/22/2024 0.0  0.0 - 0.2 /100 WBC Final   Hospital Outpatient Visit on 12/03/2024   Component Date Value Ref Range Status    Glucose 12/03/2024 111 (H)  65 - 99 mg/dL Final    BUN 12/03/2024 16  8 - 23 mg/dL Final    Creatinine 12/03/2024 0.92  0.57 - 1.00 mg/dL Final    Sodium 12/03/2024 141  136 - 145 mmol/L Final    Potassium 12/03/2024 3.7  3.5 - 5.2 mmol/L Final    Chloride 12/03/2024 102  98 - 107 mmol/L Final    CO2 12/03/2024 29.8 (H)  22.0 - 29.0 mmol/L Final    Calcium 12/03/2024 9.2  8.6 - 10.5 mg/dL Final    Total Protein 12/03/2024 7.0  6.0 - 8.5 g/dL Final    Albumin 12/03/2024 4.0  3.5 - 5.2 g/dL Final    ALT (SGPT) 12/03/2024 12  1 - 33 U/L Final    AST (SGOT) 12/03/2024 13  1 - 32 U/L Final    Alkaline Phosphatase 12/03/2024 103  39 - 117 U/L Final    Total Bilirubin 12/03/2024 0.4  0.0 - 1.2 mg/dL Final    Globulin 12/03/2024 3.0  gm/dL Final    A/G Ratio 12/03/2024 1.3  g/dL Final    BUN/Creatinine Ratio 12/03/2024 17.4  7.0 - 25.0 Final    Anion Gap 12/03/2024 9.2  5.0 - 15.0 mmol/L Final    eGFR 12/03/2024 69.2  >60.0  mL/min/1.73 Final    Magnesium 12/03/2024 1.9  1.6 - 2.4 mg/dL Final    Phosphorus 12/03/2024 2.0 (L)  2.5 - 4.5 mg/dL Final    WBC 12/03/2024 5.61  3.40 - 10.80 10*3/mm3 Final    RBC 12/03/2024 3.09 (L)  3.77 - 5.28 10*6/mm3 Final    Hemoglobin 12/03/2024 10.0 (L)  12.0 - 15.9 g/dL Final    Hematocrit 12/03/2024 32.1 (L)  34.0 - 46.6 % Final    MCV 12/03/2024 103.9 (H)  79.0 - 97.0 fL Final    MCH 12/03/2024 32.4  26.6 - 33.0 pg Final    MCHC 12/03/2024 31.2 (L)  31.5 - 35.7 g/dL Final    RDW 12/03/2024 16.0 (H)  12.3 - 15.4 % Final    RDW-SD 12/03/2024 61.7 (H)  37.0 - 54.0 fl Final    MPV 12/03/2024 10.1  6.0 - 12.0 fL Final    Platelets 12/03/2024 245  140 - 450 10*3/mm3 Final    Neutrophil % 12/03/2024 71.7  42.7 - 76.0 % Final    Lymphocyte % 12/03/2024 21.6  19.6 - 45.3 % Final    Monocyte % 12/03/2024 4.8 (L)  5.0 - 12.0 % Final    Eosinophil % 12/03/2024 0.2 (L)  0.3 - 6.2 % Final    Basophil % 12/03/2024 1.2  0.0 - 1.5 % Final    Immature Grans % 12/03/2024 0.5  0.0 - 0.5 % Final    Neutrophils, Absolute 12/03/2024 4.02  1.70 - 7.00 10*3/mm3 Final    Lymphocytes, Absolute 12/03/2024 1.21  0.70 - 3.10 10*3/mm3 Final    Monocytes, Absolute 12/03/2024 0.27  0.10 - 0.90 10*3/mm3 Final    Eosinophils, Absolute 12/03/2024 0.01  0.00 - 0.40 10*3/mm3 Final    Basophils, Absolute 12/03/2024 0.07  0.00 - 0.20 10*3/mm3 Final    Immature Grans, Absolute 12/03/2024 0.03  0.00 - 0.05 10*3/mm3 Final    nRBC 12/03/2024 0.0  0.0 - 0.2 /100 WBC Final   Hospital Outpatient Visit on 10/28/2024   Component Date Value Ref Range Status    Glucose 10/28/2024 111 (H)  65 - 99 mg/dL Final    BUN 10/28/2024 24 (H)  8 - 23 mg/dL Final    Creatinine 10/28/2024 0.94  0.57 - 1.00 mg/dL Final    Sodium 10/28/2024 138  136 - 145 mmol/L Final    Potassium 10/28/2024 3.9  3.5 - 5.2 mmol/L Final    Chloride 10/28/2024 97 (L)  98 - 107 mmol/L Final    CO2 10/28/2024 31.3 (H)  22.0 - 29.0 mmol/L Final    Calcium 10/28/2024 8.9  8.6 - 10.5  mg/dL Final    Total Protein 10/28/2024 7.3  6.0 - 8.5 g/dL Final    Albumin 10/28/2024 3.6  3.5 - 5.2 g/dL Final    ALT (SGPT) 10/28/2024 9  1 - 33 U/L Final    AST (SGOT) 10/28/2024 13  1 - 32 U/L Final    Alkaline Phosphatase 10/28/2024 114  39 - 117 U/L Final    Total Bilirubin 10/28/2024 0.4  0.0 - 1.2 mg/dL Final    Globulin 10/28/2024 3.7  gm/dL Final    A/G Ratio 10/28/2024 1.0  g/dL Final    BUN/Creatinine Ratio 10/28/2024 25.5 (H)  7.0 - 25.0 Final    Anion Gap 10/28/2024 9.7  5.0 - 15.0 mmol/L Final    eGFR 10/28/2024 67.5  >60.0 mL/min/1.73 Final    Magnesium 10/28/2024 1.9  1.6 - 2.4 mg/dL Final    Phosphorus 10/28/2024 3.0  2.5 - 4.5 mg/dL Final    WBC 10/28/2024 5.24  3.40 - 10.80 10*3/mm3 Final    RBC 10/28/2024 2.98 (L)  3.77 - 5.28 10*6/mm3 Final    Hemoglobin 10/28/2024 9.7 (L)  12.0 - 15.9 g/dL Final    Hematocrit 10/28/2024 30.4 (L)  34.0 - 46.6 % Final    MCV 10/28/2024 102.0 (H)  79.0 - 97.0 fL Final    MCH 10/28/2024 32.6  26.6 - 33.0 pg Final    MCHC 10/28/2024 31.9  31.5 - 35.7 g/dL Final    RDW 10/28/2024 14.1  12.3 - 15.4 % Final    RDW-SD 10/28/2024 52.3  37.0 - 54.0 fl Final    MPV 10/28/2024 10.1  6.0 - 12.0 fL Final    Platelets 10/28/2024 281  140 - 450 10*3/mm3 Final    Neutrophil % 10/28/2024 75.7  42.7 - 76.0 % Final    Lymphocyte % 10/28/2024 16.0 (L)  19.6 - 45.3 % Final    Monocyte % 10/28/2024 6.7  5.0 - 12.0 % Final    Eosinophil % 10/28/2024 0.6  0.3 - 6.2 % Final    Basophil % 10/28/2024 0.6  0.0 - 1.5 % Final    Immature Grans % 10/28/2024 0.4  0.0 - 0.5 % Final    Neutrophils, Absolute 10/28/2024 3.97  1.70 - 7.00 10*3/mm3 Final    Lymphocytes, Absolute 10/28/2024 0.84  0.70 - 3.10 10*3/mm3 Final    Monocytes, Absolute 10/28/2024 0.35  0.10 - 0.90 10*3/mm3 Final    Eosinophils, Absolute 10/28/2024 0.03  0.00 - 0.40 10*3/mm3 Final    Basophils, Absolute 10/28/2024 0.03  0.00 - 0.20 10*3/mm3 Final    Immature Grans, Absolute 10/28/2024 0.02  0.00 - 0.05 10*3/mm3 Final     nRBC 10/28/2024 0.0  0.0 - 0.2 /100 WBC Final       EKG Results:  No orders to display       Imaging Results:  CT Chest With Contrast Diagnostic    Result Date: 10/29/2024  Impression: 1. Stable diffuse sclerotic osseous metastatic disease. 2. New bronchial wall thickening and endobronchial secretion in the right lower lobe likely secondary to bronchitis. Mild airspace consolidation in the right lower lobe may represent atelectasis or developing pneumonia. Electronically Signed: Floyd Leung MD  10/29/2024 10:48 AM EDT  Workstation ID: [id number]    CT Abdomen Pelvis With Contrast    Result Date: 10/29/2024  Impression: Stable exam. Diffuse sclerotic osseous metastatic disease. Electronically Signed: Floyd Leung MD  10/29/2024 10:31 AM EDT  Workstation ID: [id number]    NM Bone Scan Whole Body    Result Date: 10/22/2024  1.New sternal and right lower rib cage radiotracer uptake concerning for new sites of active metastatic disease. Electronically Signed: Donn Daigle MD  10/22/2024 4:25 PM EDT  Workstation ID: [id number]    CT Chest With Contrast Diagnostic    Result Date: 7/30/2024  Impression: Stable appearance of the innumerable osseous metastases. No new suspicious pulmonary nodule or mass. No evidence of new or additional metastatic disease in the chest. Coronary artery calcifications. Electronically Signed: Fox Schaeffer MD  7/30/2024 11:42 AM EDT  Workstation ID: [id number]    CT Abdomen Pelvis With Contrast    Result Date: 7/30/2024  Impression: 1. Extensive osteosclerotic metastatic disease does not appear appreciably changed within the abdomen or pelvis. 2. No indication of soft tissue organ metastasis in the abdomen or pelvis. 3. Interval ventral abdominal hernia repair with small superficial soft tissue seroma. Electronically Signed: Aicha Bonilla MD  7/30/2024 11:35 AM EDT  Workstation ID: [id number]        Assessment & Plan   Diagnoses and all orders for this visit:    1. Bereavement (Primary)    2. Major  depressive disorder, recurrent episode, moderate    3. Generalized anxiety disorder    4. Panic attacks    5. Insomnia due to mental condition    6. Post traumatic stress disorder (PTSD)    7. Snoring        Visit Diagnoses:    ICD-10-CM ICD-9-CM   1. Bereavement  Z63.4 V62.82   2. Major depressive disorder, recurrent episode, moderate  F33.1 296.32   3. Generalized anxiety disorder  F41.1 300.02   4. Panic attacks  F41.0 300.01   5. Insomnia due to mental condition  F51.05 300.9     327.02   6. Post traumatic stress disorder (PTSD)  F43.10 309.81   7. Snoring  R06.83 786.09     04/10/2025: WG, mirtazapine helping with MDD, NATE. If continued WG, stop and trial bupropion, which helped in the past. Follow for sleep consult.    Acknowledged and normalized patient's thoughts, feelings, and concerns. Allowed patient to freely discuss and process issues, such as:  Anxiety and depression regarding medical conditions, pain.  Grieving the loss of a loved one, her two friends.  ... using Rogerian psychotherapeutic techniques including unconditional positive regard, reflective listening, and demonstrating clear empathy, with the goal of ameliorating symptoms and maintaining, restoring, or improving self-esteem, adaptive skills, and ego or psychological functions (Garry et al. 1991), the long-term goal of which is to develop a better, healthier perspective and help the patient bear their circumstances more easily.  Time (minutes) spent providing supportive psychotherapy: 16  (This time is exclusive to the therapy session and separate from the time spent on activities used to meet the criteria for the E/M service (history, exam, medical decision-making).)  Start: 11:13  Stop: 11:29  Functional status: mild impairment  Treatment plan: Medication management and supportive psychotherapy  Prognosis: good  Progress: grieving, insomnia. MDD, NATE improving  6w    02/27/2025: Sleep medicine for snoring, increase mirtazaine for NATE,  MDD. Grieving the loss of 2 friends.    12/30/2024: Watch weight. Improving. Restart mirtazapine for MDD, NATE, insomnia.     11/12/2024: Increase cymbalta for MDD. Pt just started on mirtazapine. Would want to find her a therapist. 6w    PLAN:  Safety: No acute safety concerns  Therapy:  recommended, but can't use mychart  Risk Assessment: Risk of self-harm acutely is moderate.  Risk factors include anxiety disorder, mood disorder, access to firearms, and recent psychosocial stressors (pandemic). Protective factors include no family history, no present SI, no history of suicide attempts or self-harm in the past, minimal AODA, healthcare seeking, future orientation, willingness to engage in care.  Risk of self-harm chronically is also moderate, but could be further elevated in the event of treatment noncompliance and/or AODA.  Meds:  CONTINUE cymbalta 90 mg qday. Risks, benefits, alternatives discussed with patient including GI upset, nausea vomiting diarrhea, theoretical decrease of seizure threshold predisposing the patient to a slightly higher seizure risk, headaches, sexual dysfunction, serotonin syndrome, bleeding risk, increased suicidality in patients 24 years and younger, switching to ebony/hypomania.  Also constipation and urinary retention.  After discussion of these risks and benefits, the patient voiced understanding and agreed to proceed.  CONTINUE mirtazapine 30 mg qday. Risks, benefits, alternatives discussed with patient including GI upset, sedation, dizziness with falls risk, increased appetite.  Do not use before operating vehicle, vessel, or machine. After discussion of these risks and benefits, the patient voiced understanding and agreed to proceed.  ALSO on trazodone 150 mg qhs. Risks, benefits, side effects discussed with patient including GI upset, sedation, dizziness/falls risk, grogginess the following day, prolongation of the QTc interval.  Do not use before operating vehicle, vessel, or  machine. After discussion of these risks and benefits, the patient voiced understanding and agreed to proceed.    S/P:  Buspar did nothing  Abilify can't remember  Labs: none      Patient screened positive for depression based on a PHQ-9 score of 17 on 11/12/2024. Follow-up recommendations include: Prescribed antidepressant medication treatment and Suicide Risk Assessment performed.           TREATMENT PLAN/GOALS: Continue supportive psychotherapy efforts and medications as indicated. Treatment and medication options discussed during today's visit. Patient acknowledged and verbally consented to continue with current treatment plan and was educated on the importance of compliance with treatment and follow-up appointments.    MEDICATION ISSUES:  ALEXEI reviewed as expected.  Discussed medication options and treatment plan of prescribed medication as well as the risks, benefits, and side effects including potential falls, possible impaired driving and metabolic adversities among others. Patient is agreeable to call the office with any worsening of symptoms or onset of side effects. Patient is agreeable to call 911 or go to the nearest ER should he/she begin having SI/HI. No medication side effects or related complaints today.     MEDS ORDERED DURING VISIT:  No orders of the defined types were placed in this encounter.      Return in about 6 weeks (around 5/22/2025).         This document has been electronically signed by Megha Jose MD  April 10, 2025 11:31 EDT    Dictated Utilizing Dragon Dictation: Part of this note may be an electronic transcription/translation of spoken language to printed text using the Dragon Dictation System.

## 2025-04-16 ENCOUNTER — DOCUMENTATION (OUTPATIENT)
Dept: ONCOLOGY | Facility: HOSPITAL | Age: 65
End: 2025-04-16
Payer: MEDICARE

## 2025-04-16 NOTE — PROGRESS NOTES
Ms. Keller provided a copy of her financial documents to [employer] and also submitted a  FAP application at the hospital this afternoon with Lida LOWERY, financial counselor. [employer] support remains available.

## 2025-04-21 DIAGNOSIS — G89.3 CANCER RELATED PAIN: ICD-10-CM

## 2025-04-21 RX ORDER — OXYCODONE AND ACETAMINOPHEN 10; 325 MG/1; MG/1
1 TABLET ORAL EVERY 6 HOURS PRN
Qty: 60 TABLET | Refills: 0 | Status: SHIPPED | OUTPATIENT
Start: 2025-04-21

## 2025-04-21 NOTE — TELEPHONE ENCOUNTER
Caller: Derek Keller    Relationship: Self    Best call back number:   Telephone Information:   Mobile [phone]       Requested Prescriptions:   Requested Prescriptions     Pending Prescriptions Disp Refills    oxyCODONE-acetaminophen (PERCOCET)  MG per tablet 60 tablet 0     Sig: Take 1 tablet by mouth Every 6 (Six) Hours As Needed for Moderate Pain.        Pharmacy where request should be sent: [facility], [facility], & [facility] [place] [facility] [address] - [phone] [facility] [phone] FX     Last office visit with prescribing clinician: 4/1/2025   Last telemedicine visit with prescribing clinician: Visit date not found   Next office visit with prescribing clinician: 4/29/2025         Does the patient have less than a 3 day supply:  [x] Yes  [] No      If the office needs to give you a call back, can they leave a voicemail: [x] Yes [] No

## 2025-04-28 RX ORDER — SENNOSIDES 8.6 MG/1
TABLET, COATED ORAL
Qty: 30 TABLET | Refills: 3 | Status: SHIPPED | OUTPATIENT
Start: 2025-04-28

## 2025-04-29 ENCOUNTER — HOSPITAL ENCOUNTER (OUTPATIENT)
Dept: ONCOLOGY | Facility: HOSPITAL | Age: 65
Discharge: HOME | End: 2025-04-29
Payer: MEDICARE

## 2025-04-29 ENCOUNTER — OFFICE VISIT (OUTPATIENT)
Dept: ONCOLOGY | Facility: HOSPITAL | Age: 65
End: 2025-04-29
Payer: MEDICARE

## 2025-04-29 VITALS
SYSTOLIC BLOOD PRESSURE: 147 MMHG | OXYGEN SATURATION: 98 % | TEMPERATURE: 96.7 F | HEIGHT: 66 IN | RESPIRATION RATE: 18 BRPM | WEIGHT: 210.54 LBS | HEART RATE: 55 BPM | BODY MASS INDEX: 33.84 KG/M2 | DIASTOLIC BLOOD PRESSURE: 66 MMHG

## 2025-04-29 DIAGNOSIS — Z17.0 MALIGNANT NEOPLASM OF UPPER-OUTER QUADRANT OF LEFT BREAST IN FEMALE, ESTROGEN RECEPTOR POSITIVE: ICD-10-CM

## 2025-04-29 DIAGNOSIS — C79.51 MALIGNANT NEOPLASM METASTATIC TO BONE: ICD-10-CM

## 2025-04-29 DIAGNOSIS — C50.412 MALIGNANT NEOPLASM OF UPPER-OUTER QUADRANT OF LEFT BREAST IN FEMALE, ESTROGEN RECEPTOR POSITIVE: ICD-10-CM

## 2025-04-29 DIAGNOSIS — G47.10 HYPERSOMNIA: ICD-10-CM

## 2025-04-29 DIAGNOSIS — C50.412 MALIGNANT NEOPLASM OF UPPER-OUTER QUADRANT OF LEFT BREAST IN FEMALE, ESTROGEN RECEPTOR POSITIVE: Primary | ICD-10-CM

## 2025-04-29 DIAGNOSIS — Z17.0 MALIGNANT NEOPLASM OF UPPER-OUTER QUADRANT OF LEFT BREAST IN FEMALE, ESTROGEN RECEPTOR POSITIVE: Primary | ICD-10-CM

## 2025-04-29 DIAGNOSIS — R29.818 SUSPECTED SLEEP APNEA: Primary | ICD-10-CM

## 2025-04-29 DIAGNOSIS — C79.51 MALIGNANT NEOPLASM METASTATIC TO BONE: Primary | ICD-10-CM

## 2025-04-29 DIAGNOSIS — Z45.2 ENCOUNTER FOR ADJUSTMENT OR MANAGEMENT OF VASCULAR ACCESS DEVICE: Primary | ICD-10-CM

## 2025-04-29 LAB
ALBUMIN SERPL-MCNC: 4.2 G/DL (ref 3.5–5.2)
ALBUMIN/GLOB SERPL: 1.5 G/DL
ALP SERPL-CCNC: 103 U/L (ref 39–117)
ALT SERPL W P-5'-P-CCNC: 8 U/L (ref 1–33)
ANION GAP SERPL CALCULATED.3IONS-SCNC: 8.8 MMOL/L (ref 5–15)
AST SERPL-CCNC: 15 U/L (ref 1–32)
BASOPHILS # BLD AUTO: 0.04 10*3/MM3 (ref 0–0.2)
BASOPHILS NFR BLD AUTO: 0.9 % (ref 0–1.5)
BILIRUB SERPL-MCNC: 0.4 MG/DL (ref 0–1.2)
BUN SERPL-MCNC: 16 MG/DL (ref 8–23)
BUN/CREAT SERPL: 17.6 (ref 7–25)
CALCIUM SPEC-SCNC: 9 MG/DL (ref 8.6–10.5)
CHLORIDE SERPL-SCNC: 102 MMOL/L (ref 98–107)
CO2 SERPL-SCNC: 30.2 MMOL/L (ref 22–29)
CREAT SERPL-MCNC: 0.91 MG/DL (ref 0.57–1)
DEPRECATED RDW RBC AUTO: 62.9 FL (ref 37–54)
EGFRCR SERPLBLD CKD-EPI 2021: 70.2 ML/MIN/1.73
EOSINOPHIL # BLD AUTO: 0 10*3/MM3 (ref 0–0.4)
EOSINOPHIL NFR BLD AUTO: 0 % (ref 0.3–6.2)
ERYTHROCYTE [DISTWIDTH] IN BLOOD BY AUTOMATED COUNT: 16.5 % (ref 12.3–15.4)
GLOBULIN UR ELPH-MCNC: 2.8 GM/DL
GLUCOSE SERPL-MCNC: 113 MG/DL (ref 65–99)
HCT VFR BLD AUTO: 30 % (ref 34–46.6)
HGB BLD-MCNC: 9.3 G/DL (ref 12–15.9)
IMM GRANULOCYTES # BLD AUTO: 0.03 10*3/MM3 (ref 0–0.05)
IMM GRANULOCYTES NFR BLD AUTO: 0.6 % (ref 0–0.5)
LYMPHOCYTES # BLD AUTO: 0.89 10*3/MM3 (ref 0.7–3.1)
LYMPHOCYTES NFR BLD AUTO: 19.2 % (ref 19.6–45.3)
MAGNESIUM SERPL-MCNC: 2.4 MG/DL (ref 1.6–2.4)
MCH RBC QN AUTO: 32.4 PG (ref 26.6–33)
MCHC RBC AUTO-ENTMCNC: 31 G/DL (ref 31.5–35.7)
MCV RBC AUTO: 104.5 FL (ref 79–97)
MONOCYTES # BLD AUTO: 0.27 10*3/MM3 (ref 0.1–0.9)
MONOCYTES NFR BLD AUTO: 5.8 % (ref 5–12)
NEUTROPHILS NFR BLD AUTO: 3.4 10*3/MM3 (ref 1.7–7)
NEUTROPHILS NFR BLD AUTO: 73.5 % (ref 42.7–76)
NRBC BLD AUTO-RTO: 0 /100 WBC (ref 0–0.2)
PHOSPHATE SERPL-MCNC: 3.4 MG/DL (ref 2.5–4.5)
PLATELET # BLD AUTO: 228 10*3/MM3 (ref 140–450)
PMV BLD AUTO: 10.2 FL (ref 6–12)
POTASSIUM SERPL-SCNC: 4.2 MMOL/L (ref 3.5–5.2)
PROT SERPL-MCNC: 7 G/DL (ref 6–8.5)
RBC # BLD AUTO: 2.87 10*6/MM3 (ref 3.77–5.28)
SODIUM SERPL-SCNC: 141 MMOL/L (ref 136–145)
WBC NRBC COR # BLD AUTO: 4.63 10*3/MM3 (ref 3.4–10.8)

## 2025-04-29 PROCEDURE — 83735 ASSAY OF MAGNESIUM: CPT | Performed by: INTERNAL MEDICINE

## 2025-04-29 PROCEDURE — 3078F DIAST BP <80 MM HG: CPT | Performed by: INTERNAL MEDICINE

## 2025-04-29 PROCEDURE — 84100 ASSAY OF PHOSPHORUS: CPT | Performed by: INTERNAL MEDICINE

## 2025-04-29 PROCEDURE — 1125F AMNT PAIN NOTED PAIN PRSNT: CPT | Performed by: INTERNAL MEDICINE

## 2025-04-29 PROCEDURE — 85025 COMPLETE CBC W/AUTO DIFF WBC: CPT | Performed by: INTERNAL MEDICINE

## 2025-04-29 PROCEDURE — 80053 COMPREHEN METABOLIC PANEL: CPT | Performed by: INTERNAL MEDICINE

## 2025-04-29 PROCEDURE — 99214 OFFICE O/P EST MOD 30 MIN: CPT | Performed by: INTERNAL MEDICINE

## 2025-04-29 PROCEDURE — G2211 COMPLEX E/M VISIT ADD ON: HCPCS | Performed by: INTERNAL MEDICINE

## 2025-04-29 PROCEDURE — 96372 THER/PROPH/DIAG INJ SC/IM: CPT

## 2025-04-29 PROCEDURE — 3077F SYST BP >= 140 MM HG: CPT | Performed by: INTERNAL MEDICINE

## 2025-04-29 PROCEDURE — 25010000002 DENOSUMAB 120 MG/1.7ML SOLUTION: Performed by: INTERNAL MEDICINE

## 2025-04-29 PROCEDURE — 25010000002 HEPARIN LOCK FLUSH PER 10 UNITS: Performed by: INTERNAL MEDICINE

## 2025-04-29 RX ORDER — HEPARIN SODIUM (PORCINE) LOCK FLUSH IV SOLN 100 UNIT/ML 100 UNIT/ML
500 SOLUTION INTRAVENOUS AS NEEDED
OUTPATIENT
Start: 2025-04-29

## 2025-04-29 RX ORDER — SODIUM CHLORIDE 0.9 % (FLUSH) 0.9 %
20 SYRINGE (ML) INJECTION AS NEEDED
OUTPATIENT
Start: 2025-04-29

## 2025-04-29 RX ORDER — SODIUM CHLORIDE 0.9 % (FLUSH) 0.9 %
20 SYRINGE (ML) INJECTION AS NEEDED
Status: DISCONTINUED | OUTPATIENT
Start: 2025-04-29 | End: 2025-04-30 | Stop reason: HOSPADM

## 2025-04-29 RX ORDER — HEPARIN SODIUM (PORCINE) LOCK FLUSH IV SOLN 100 UNIT/ML 100 UNIT/ML
500 SOLUTION INTRAVENOUS AS NEEDED
Status: DISCONTINUED | OUTPATIENT
Start: 2025-04-29 | End: 2025-04-30 | Stop reason: HOSPADM

## 2025-04-29 RX ADMIN — DENOSUMAB 120 MG: 120 INJECTION SUBCUTANEOUS at 15:07

## 2025-04-29 RX ADMIN — Medication 20 ML: at 14:03

## 2025-04-29 RX ADMIN — HEPARIN 500 UNITS: 100 SYRINGE at 14:03

## 2025-04-29 NOTE — PROGRESS NOTES
Chief Complaint  Metastatic breast cancer    Haile Monique MD Patel, Saagar, MD    Subjective          Derek Keller presents to [facility] for ongoing treatment of her breast cancer.  She is on letrozole, ribociclib, denosumab.  Tolerating her treatments well.  She denies any dental or jaw pain.  She has pain in her bones from her metastatic disease.  Pain medications are efficacious.  She notes occasional issues from her abdomen-ventral hernias.  Seen by surgery who did not feel that hernia repair was appropriate at this time.  She notes occasional constipation if she does not take her bowel regimen.    Oncology/Hematology History   Malignant neoplasm of upper-outer quadrant of left breast in female, estrogen receptor positive   10/13/2022 Initial Diagnosis    Malignant neoplasm of left breast in female, estrogen receptor positive (HCC)     10/14/2022 -  Chemotherapy    OP BREAST Letrozole / Ribociclib     10/14/2022 Cancer Staged    Staging form: Breast, AJCC 8th Edition  - Clinical: Stage IV (cT1c, cN1, cM1, ER+, NM+, HER2-) - Signed by Donald Barker MD on 10/14/2022     10/28/2022 -  Chemotherapy    OP SUPPORTIVE Denosumab (Xgeva) Q28D     Malignant neoplasm metastatic to bone   10/14/2022 Initial Diagnosis    Bone metastases (HCC)     10/28/2022 -  Chemotherapy    OP SUPPORTIVE Denosumab (Xgeva) Q28D     Secondary malignant neoplasm of bone (Resolved)   11/14/2022 Initial Diagnosis    Secondary malignant neoplasm of bone (HCC)     11/17/2022 - 4/19/2023 Radiation    RADIATION THERAPY Treatment Details (11/14/2022 - 4/19/2023)  Site: Spine - Lumbar  Technique: 3D CRT  Goal: No goal specified  Planned Treatment Start Date: 11/17/2022           Current Outpatient Medications on File Prior to Visit   Medication Sig Dispense Refill    atorvastatin (LIPITOR) 20 MG tablet TAKE 1 TABLET BY MOUTH EVERY DAY 30 tablet 6    baclofen (LIORESAL) 20 MG tablet Take 1 tablet by  mouth 3 (Three) Times a Day.      donepezil (ARICEPT) 10 MG tablet Take 1 tablet by mouth Daily.      DULoxetine (CYMBALTA) 30 MG capsule Take 1 capsule by mouth Daily. 90 capsule 1    DULoxetine (CYMBALTA) 60 MG capsule TAKE 1 capsule BY MOUTH DAILY for mood elevation 30 capsule 1    fluticasone (FLONASE) 50 MCG/ACT nasal spray Administer 2 sprays into the nostril(s) as directed by provider Daily.      gabapentin (NEURONTIN) 600 MG tablet TAKE 1 TABLET BY MOUTH AT BEDTIME (Patient taking differently: Take 0.5 tablets by mouth 2 (Two) Times a Day As Needed (pain).) 90 tablet 0    hydroCHLOROthiazide 12.5 MG tablet TAKE 1 TABLET BY MOUTH DAILY for swelling 90 tablet 1    letrozole (FEMARA) 2.5 MG tablet TAKE 1 TABLET BY MOUTH DAILY 90 tablet 1    levothyroxine (SYNTHROID, LEVOTHROID) 50 MCG tablet TAKE 1 TABLET BY MOUTH EVERY DAY 90 tablet 1    lidocaine (LIDODERM) 5 % Place 1 patch on the skin as directed by provider Daily As Needed for Moderate Pain or Severe Pain. Remove & Discard patch within 12 hours or as directed by MD      LORazepam (ATIVAN) 0.5 MG tablet Take 1 tablet by mouth Daily.      losartan (COZAAR) 50 MG tablet Take 1 tablet by mouth Daily.      mirtazapine (REMERON) 30 MG tablet Take 1 tablet by mouth Every Night. 90 tablet 1    montelukast (SINGULAIR) 10 MG tablet Take 1 tablet by mouth Daily.      Morphine (MS CONTIN) 60 MG 12 hr tablet Take 1 tablet by mouth 2 (Two) Times a Day. 60 tablet 0    naloxone (NARCAN) 4 MG/0.1ML nasal spray Call 911. Don't prime. [place] in 1 nostril for overdose. Repeat in 2-3 minutes in other nostril if no or minimal breathing/responsiveness. 2 each 0    nicotine (NICODERM CQ) 21 MG/24HR patch Place 1 patch on the skin as directed by provider Daily. 30 patch 3    ondansetron (ZOFRAN) 8 MG tablet Take 1 tablet by mouth Every 8 (Eight) Hours As Needed for Nausea or Vomiting. 30 tablet 5    oxybutynin XL (DITROPAN XL) 15 MG 24 hr tablet TAKE 1 TABLET BY MOUTH DAILY for  bladder 30 tablet 1    oxyCODONE-acetaminophen (PERCOCET)  MG per tablet Take 1 tablet by mouth Every 6 (Six) Hours As Needed for Moderate Pain. 60 tablet 0    polyethylene glycol (MIRALAX) 17 g packet Take 17 g by mouth Daily. 5 packet 0    potassium chloride (MICRO-K) 10 MEQ CR capsule TAKE 1 CAPSULE BY MOUTH EVERY TWELVE HOURS for potassium supplement 60 capsule 1    ribociclib succinate, 600 MG Dose, (KISQALI) 200 MG tablet therapy pack tablet Take 3 tablets by mouth Take As Directed. Take 3 tablets by mouth daily for 21 days then off 7 days on a 28 day cycle. 63 tablet 11    rOPINIRole (REQUIP) 3 MG tablet Take 1 tablet by mouth 2 (Two) Times a Day.      Senna-Time 8.6 MG tablet TAKE 1 TABLET BY MOUTH DAILY *for constipation* 30 tablet 3    SUMAtriptan (IMITREX) 100 MG tablet Take 1 tablet by mouth 1 (One) Time As Needed for Migraine.      traZODone (DESYREL) 150 MG tablet TAKE 1 TABLET BY MOUTH ONCE nightly 90 tablet 2     Current Facility-Administered Medications on File Prior to Visit   Medication Dose Route Frequency Provider Last Rate Last Admin    [COMPLETED] denosumab (XGEVA) injection 120 mg  120 mg Subcutaneous Once Donald Barker MD   120 mg at 04/29/25 1507    heparin injection 500 Units  500 Units Intravenous PRN Donald Barker MD   500 Units at 04/29/25 1403    sodium chloride 0.9 % flush 20 mL  20 mL Intravenous PRN Donald Barker MD   20 mL at 04/29/25 1403       Allergies   Allergen Reactions    Azithromycin Itching    Erythromycin Itching     Past Medical History:   Diagnosis Date    Abdominal pain     OSTOMY SITE    Allergic rhinitis     Anemia     NO S/S    Anxiety     Arteriosclerosis     Coronary, follows with Dr. Núñez    Arthritis     Bone cancer     Bone metastases 10/14/2022    Bowen's disease     SKIN CANCER    Breast cancer     NO SURGERY WAS DONE DUE TO METS TO BONE/COLON/LYMPHNODE    Cancer related pain 10/13/2022    Depression     Disorder associated with  Helicobacter species 10/12/2022    Dysphoric mood     Emphysema lung     Fatigue     Fibromyalgia     Frequent urination     NO S/S INFECTION    Herpes simplex vulvovaginitis 07/26/2018    History of colon polyps     History of IBS     History of kidney stones     Hyperlipidemia     Hypertension     Hypothyroidism     Insomnia     Lumbago     Mood disorder     Neck pain     R/T CANCER    Palpitations     last time a few months ago    Precordial pain     R/T SPINE CANCER    RLS (restless legs syndrome)     S/P Laparoscopic Hand-Assisted Colostomy Closure with End to End Anastomosis Open Parastomal Hernia Repair 12/08/2022    Sleep disturbance     SOB (shortness of breath)     at times at rest    SOBOE (shortness of breath on exertion)      Past Surgical History:   Procedure Laterality Date    BREAST BIOPSY Left     CARDIAC CATHETERIZATION Left 1959    CARDIAC CATHETERIZATION N/A 04/06/2018    Procedure: Coronary angiography;  Surgeon: Art Licea MD;  Location: [facility] INVASIVE LOCATION;  Service: Cardiovascular    CARDIAC CATHETERIZATION N/A 04/06/2018    Procedure: Left heart cath;  Surgeon: Art Licea MD;  Location: [facility] INVASIVE LOCATION;  Service: Cardiovascular    CARDIAC CATHETERIZATION N/A 04/06/2018    Procedure: Left ventriculography;  Surgeon: Art Licea MD;  Location: [facility] INVASIVE LOCATION;  Service: Cardiovascular    CHOLECYSTECTOMY      COLON SURGERY      colostomy bag, and colostomy reversal    COLONOSCOPY  11/08/2006    COLONOSCOPY N/A 06/08/2023    Procedure: COLONOSCOPY;  Surgeon: Alfred Castañeda MD;  Location: [facility] ENDOSCOPY;  Service: General;  Laterality: N/A;  same as preop    EXPLORATORY LAPAROTOMY N/A 12/06/2022    Procedure: LAPAROTOMY EXPLORATORY sigmoid resection hartmans procedure colostomy;  Surgeon: Alfred Castañeda MD;  Location: [facility] MAIN OR;  Service: General;  Laterality: N/A;    GANGLION CYST EXCISION Bilateral      HYSTERECTOMY  05/2005    ILEOSTOMY CLOSURE N/A 06/26/2023    Procedure: Laparoscopic Hand-Assisted Colostomy Closure with End to End Anastomosis;  Surgeon: Alfred Castañeda MD;  Location: [facility] MAIN OR;  Service: General;  Laterality: N/A;    LAPAROSCOPIC GASTRIC BANDING      BAND REMOVED 2020    PARASTOMAL HERNIA REPAIR N/A 06/26/2023    Procedure: Open Parastomal Hernia Repair;  Surgeon: Alfred Castañead MD;  Location: [facility] MAIN OR;  Service: General;  Laterality: N/A;    TONSILLECTOMY      VENOUS ACCESS DEVICE (PORT) INSERTION N/A 01/09/2023    Procedure: INSERTION VENOUS ACCESS DEVICE;  Surgeon: Alfred Castañeda MD;  Location: [facility] MAIN OR;  Service: General;  Laterality: N/A;    VENTRAL HERNIA REPAIR N/A 7/3/2024    Procedure: VENTRAL / INCISIONAL HERNIA REPAIR LAPAROSCOPIC WITH DAVINCI ROBOT with mesh;  Surgeon: Alfred Castañeda MD;  Location: [facility] MAIN OR;  Service: Robotics - DaVinci;  Laterality: N/A;     Social History     Socioeconomic History    Marital status:    Tobacco Use    Smoking status: Every Day     Current packs/day: 1.00     Average packs/day: 1 pack/day for 50.8 years (50.8 ttl pk-yrs)     Types: Cigarettes     Start date: 1974     Last attempt to quit: 7/7/2024    Smokeless tobacco: Never    Tobacco comments:          Has not used tobacco for 28 days    Vaping Use    Vaping status: Never Used   Substance and Sexual Activity    Alcohol use: No    Drug use: Never     Types: Marijuana     Comment: LAST USE 7-2-24    Sexual activity: Defer     Partners: Male     Birth control/protection: None     Family History   Problem Relation Age of Onset    Hypertension Mother     Rheum arthritis Mother     Heart disease Mother     Breast cancer Mother     Diabetes Father     Cancer Maternal Grandmother         colon    Colon cancer Maternal Grandmother     Aneurysm Paternal Grandfather     Diabetes Other     Fibromyalgia Other     Malig Hyperthermia Neg Hx   "      Objective   Physical Exam  Vitals reviewed. Exam conducted with a chaperone present.   Cardiovascular:      Rate and Rhythm: Normal rate and regular rhythm.      Heart sounds: Normal heart sounds. No murmur heard.     No gallop.   Pulmonary:      Effort: Pulmonary effort is normal.      Breath sounds: Normal breath sounds.   Abdominal:      General: Bowel sounds are normal.      Hernia: A hernia (Multiple ventral hernias without incarceration) is present.   Lymphadenopathy:      Cervical: No cervical adenopathy.   Psychiatric:         Mood and Affect: Mood normal.         Behavior: Behavior normal.         Vitals:    04/29/25 1405   BP: 147/66   Pulse: 55   Resp: 18   Temp: 96.7 °F (35.9 °C)   TempSrc: Temporal   SpO2: 98%   Weight: 95.5 kg (210 lb 8.6 oz)   Height: 167.6 cm (65.98\")   PainSc: 10-Worst pain ever   PainLoc: Neck     ECOG score: 1         PHQ-9 Total Score:                    Result Review :   The following data was reviewed by: Donald Barker MD on 04/29/2025:  Lab Results   Component Value Date    HGB 9.3 (L) 04/29/2025    HCT 30.0 (L) 04/29/2025    .5 (H) 04/29/2025     04/29/2025    WBC 4.63 04/29/2025    NEUTROABS 3.40 04/29/2025    LYMPHSABS 0.89 04/29/2025    MONOSABS 0.27 04/29/2025    EOSABS 0.00 04/29/2025    BASOSABS 0.04 04/29/2025     Lab Results   Component Value Date    GLUCOSE 113 (H) 04/29/2025    BUN 16 04/29/2025    CREATININE 0.91 04/29/2025     04/29/2025    K 4.2 04/29/2025     04/29/2025    CO2 30.2 (H) 04/29/2025    CALCIUM 9.0 04/29/2025    PROTEINTOT 7.0 04/29/2025    ALBUMIN 4.2 04/29/2025    BILITOT 0.4 04/29/2025    ALKPHOS 103 04/29/2025    AST 15 04/29/2025    ALT 8 04/29/2025     Lab Results   Component Value Date    MG 2.4 04/29/2025    PHOS 3.4 04/29/2025    FREET4 1.01 01/17/2022    TSH 1.470 11/12/2019     Lab Results   Component Value Date    IRON 28 (L) 05/29/2024    LABIRON 7 (L) 05/29/2024    TRANSFERRIN 279 05/29/2024    TIBC " "416 05/29/2024     Lab Results   Component Value Date    FERRITIN 56.02 05/29/2024    HKCBSXBF99 390 04/23/2019    FOLATE 9.93 04/23/2019     No results found for: \"PSA\", \"CEA\", \"AFP\", \"\", \"\"          Assessment and Plan    Diagnoses and all orders for this visit:    1. Malignant neoplasm of upper-outer quadrant of left breast in female, estrogen receptor positive (Primary)  Assessment & Plan:  Static.  Patient is on treatment with letrozole plus ribociclib along with denosumab for bony involvement.  Tolerating well overall.  She continues to have some pain from her disease but her pain medications are efficacious.  She also notes occasional issues from ventral hernias.  She has been seen by surgery who did not feel that repair is appropriate at this time.  Lab work today looks good.  Continue letrozole daily.  Continue ribociclib as prescribed.  I will see her back in 2 months for ongoing treatment with lab work prior to monitor for toxicities and restaging CT/bone scan to assess response to therapy.    Orders:  -     CT chest w contrast; Future  -     CT abdomen pelvis w contrast; Future  -     NM Bone Scan Whole Body; Future    2. Malignant neoplasm metastatic to bone  Assessment & Plan:  Patient is receiving monthly denosumab.  Tolerating well.  No dental or jaw pain.  Electrolytes are adequate for treatment.  Xgeva today monthly.  Bone scan before next follow-up visit.    Orders:  -     CT chest w contrast; Future  -     CT abdomen pelvis w contrast; Future  -     NM Bone Scan Whole Body; Future    Other orders  -     Cancel: denosumab (XGEVA) injection 120 mg            Patient Follow Up: 2 months    Patient was given instructions and counseling regarding her condition or for health maintenance advice. Please see specific information pulled into the AVS if appropriate.     Donald Barker MD    4/29/2025            "

## 2025-04-29 NOTE — ASSESSMENT & PLAN NOTE
Static.  Patient is on treatment with letrozole plus ribociclib along with denosumab for bony involvement.  Tolerating well overall.  She continues to have some pain from her disease but her pain medications are efficacious.  She also notes occasional issues from ventral hernias.  She has been seen by surgery who did not feel that repair is appropriate at this time.  Lab work today looks good.  Continue letrozole daily.  Continue ribociclib as prescribed.  I will see her back in 2 months for ongoing treatment with lab work prior to monitor for toxicities and restaging CT/bone scan to assess response to therapy.

## 2025-04-29 NOTE — ASSESSMENT & PLAN NOTE
Patient is receiving monthly denosumab.  Tolerating well.  No dental or jaw pain.  Electrolytes are adequate for treatment.  Xgeva today monthly.  Bone scan before next follow-up visit.

## 2025-04-30 ENCOUNTER — SPECIALTY PHARMACY (OUTPATIENT)
Dept: PHARMACY | Facility: HOSPITAL | Age: 65
End: 2025-04-30
Payer: MEDICARE

## 2025-04-30 NOTE — PROGRESS NOTES
Specialty Pharmacy Patient Management Program  Chart Review/Lab Monitoring     Derek Keller is a 64 y.o. female with L Lobular HR+/HER2- Breast Cancer who presented for lab check and Oncology provider appointment. [facility] reviewed labs and providers note to assess continued therapy appropriateness of Kisqali (ribociclib)    Oncology Interval History:  Evelyn Barker  Diagnosis: Stage IV bone (10/2022)  Biomarkers: ER+ 95%, OK+ 40%, HER2- 1+, Ki-67 15%     Current Tx: Kisqali (ribociclib) 600mg daily for 21 days on, 7 days off (10/22/22-now) + Letrozole 2.5mg daily + Xgeva p86xyml (10/28/22-now)  Most Recent Imagin/3/25 CT & Bone Scan No evidence of progression of disease  Previous Tx: XRT Spine (22)    Fills at: [facility]  Last seen in person by Specialty Pharmacy: 10/5/23    4/1/25: Patient continues to tolerate therapy well. Her only complaint was cancer related pain likely secondary to her bone mets for which her Xgeva had been held d/t concern for ONJ - now resolved and cleared per dentistry to resume. No changes to plan.  25: Ms. Keller is has no new complaints. Pain seemingly is improved with her current regimen. Labs reviewed and appropriate for continuation of therapy.     Labs:  Lab Results   Component Value Date     2025    K 4.2 2025    CO2 30.2 (H) 2025     2025    BUN 16 2025    GLUCOSE 113 (H) 2025    CALCIUM 9.0 2025    CREATININE 0.91 2025    EGFRIFNONA 75 2019    BCR 17.6 2025    ANIONGAP 8.8 2025    AST 15 2025    ALT 8 2025    BILITOT 0.4 2025    ALKPHOS 103 2025     Lab Results   Component Value Date    WBC 4.63 2025    RBC 2.87 (L) 2025    HGB 9.3 (L) 2025    HCT 30.0 (L) 2025     2025    NEUTROABS 3.40 2025    LYMPHSABS 0.89 2025    MONOSABS 0.27 2025    EOSABS  0.00 04/29/2025    ANNELISE 0.04 04/29/2025     PLAN  Specialty pharmacy will continue to follow patient. Continue with current regimen as planned. Next Specialty Assessment due 9/26/25.    Eugene PalomaresD  Oncology Clinical Specialty Pharmacist   4/30/2025 10:07 EDT

## 2025-05-05 ENCOUNTER — SPECIALTY PHARMACY (OUTPATIENT)
Dept: PHARMACY | Facility: HOSPITAL | Age: 65
End: 2025-05-05
Payer: MEDICARE

## 2025-05-05 NOTE — PROGRESS NOTES
Specialty Pharmacy Patient Management Program  Oncology Refill Outreach      Derek is a 64 y.o. female contacted today regarding refills of her medication(s). [facility] will ship medications when ready to be dispensed.     Specialty medication(s) and dose(s) confirmed: Y  Other medications being refilled: N    Refill Questions      Flowsheet Row Most Recent Value   Changes to allergies? No   Changes to medications? Yes  [starting a steroid for sciata]   New conditions or infections since last clinic visit No   Unplanned office visit, urgent care, ED, or hospital admission in the last 4 weeks  No   How does patient/caregiver feel medication is working? Very good   Financial problems or insurance changes  No   Since the previous refill, were any specialty medication doses or scheduled injections missed or delayed?  No   Does this patient require a clinical escalation to a pharmacist? No            Delivery Questions      Flowsheet Row Most Recent Value   Delivery method [employer]   Delivery address verified with patient/caregiver? Yes   Delivery address Prescription   Number of medications in delivery 1   Medication(s) being filled and delivered Ribociclib Succinate (KISQALI)   Doses left of specialty medications 2 DAY AND THEN HER WEEK OFF   Copay verified? Yes   Copay amount $0.00   Copay form of payment No copayment ($0)   Delivery Date Selection 05/06/25   Signature Required No   Do you consent to receive electronic handouts?  No                   Follow-up: 21 DAYS      Frieda Harper  Oncology Care Coordinator  5/5/2025  11:05 EDT

## 2025-05-05 NOTE — PROGRESS NOTES
Specialty Pharmacy Patient Management Program  Pharmacist Escalation     Derek Keller is seen by an their provider for HR+/HER2- Breast Cancer and is enrolled in the Oncology Patient Management program offered by [facility].      Patient recently reported starting a steroid (Medrol Dosepak per dispense history) for sciatica. Category C interaction with Medrol + Kisqali where Kisqali may increase serum concentration of MethylPREDNISolone. Patient only on a 6 day course so not overly concerning, but patient will monitor for adverse reactions.     Suzette Luke, PharmD  5/5/2025  13:27 EDT

## 2025-05-06 DIAGNOSIS — G89.3 CANCER RELATED PAIN: ICD-10-CM

## 2025-05-06 RX ORDER — OXYCODONE AND ACETAMINOPHEN 10; 325 MG/1; MG/1
1 TABLET ORAL EVERY 6 HOURS PRN
Qty: 60 TABLET | Refills: 0 | Status: SHIPPED | OUTPATIENT
Start: 2025-05-06

## 2025-05-06 RX ORDER — MORPHINE SULFATE 60 MG/1
60 TABLET, FILM COATED, EXTENDED RELEASE ORAL 2 TIMES DAILY
Qty: 60 TABLET | Refills: 0 | Status: SHIPPED | OUTPATIENT
Start: 2025-05-06

## 2025-05-06 NOTE — TELEPHONE ENCOUNTER
Caller: Christiano Kellerfortunato FINN    Relationship: Self    Best call back number: [phone]    Requested Prescriptions:   Requested Prescriptions     Pending Prescriptions Disp Refills    Morphine (MS CONTIN) 60 MG 12 hr tablet 60 tablet 0     Sig: Take 1 tablet by mouth 2 (Two) Times a Day.    oxyCODONE-acetaminophen (PERCOCET)  MG per tablet 60 tablet 0     Sig: Take 1 tablet by mouth Every 6 (Six) Hours As Needed for Moderate Pain.        Pharmacy where request should be sent:  [facility]     Last office visit with prescribing clinician: 4/29/2025   Last telemedicine visit with prescribing clinician: Visit date not found   Next office visit with prescribing clinician: 6/24/2025     Additional details provided by patient: DEREK WANTED DR DIANE TO KNOW THAT SHE HAD A STEROID INJECTION YESTERDAY AND IS ON A STEROID PACK FOR HER SCIATICA     Does the patient have less than a 3 day supply:  [x] Yes  [] No    Would you like a call back once the refill request has been completed: [] Yes [] No    If the office needs to give you a call back, can they leave a voicemail: [] Yes [] No    Abby Willis Rep   05/06/25 08:18 EDT

## 2025-05-20 DIAGNOSIS — G89.3 CANCER RELATED PAIN: ICD-10-CM

## 2025-05-20 RX ORDER — OXYCODONE AND ACETAMINOPHEN 10; 325 MG/1; MG/1
1 TABLET ORAL EVERY 6 HOURS PRN
Qty: 60 TABLET | Refills: 0 | Status: SHIPPED | OUTPATIENT
Start: 2025-05-20

## 2025-05-21 NOTE — PROGRESS NOTES
"Subjective   Derek Keller is a 64 y.o. female who presents today for initial evaluation     Referring Provider:  No referring provider defined for this encounter.    Chief Complaint:  depression    History of Present Illness:     Chart Review By Dates:  2025: onc x3, sleep med; abnl cmp gluc 113 co2 30.2; abnl cbc hgb 9.3, reassuring mg phos.  04/10/2025: ED for LLQ pain, onc x6, sleep med; abnl cmp c02 29.8 gluc 134 cbc; reassuring mg phos  Gen surg, onc x4; abnl cmp cbc gluc 154 co2 31  2024: received a call from Onc after pt appeared agitated during her visit after CBD vaping and smoking tobacco 12/3. Unknown x1. ED for constip ; abnl cmp, cbc, low phos.    VISITS/APPOINTMENTS (BELOW):    \"Derek\" and \"Jamila\" her sister  Significant metastatic disease  Dad  on 12/10, Mom  on  (both years ago)    2025:   In person interview:  \"I think I failed the sleep study.\"  Jamila:  She snores a LOT  Pt is right: her recording for her home sleep study was only 2.3 minutes  I just wake up as soon as I get to sleep  Also have RLS  Grieving her two friends  Worried that she may develop blood clots  Also in pain  Worried about the bone cancer  MDD: Depressed mood  Grief: her two friends  NATE: Worrying, on edge  Panic attacks: stable  Energy: low  Concentration: not at goal  Insomnia: initial, snoring  AIMS if on antipsychotic: na  Eating/Weight: 211, 214, 204x2 lbs  Refills: y  Substances: Smoking. Not vaping; has a medical marijuana card but hasn't received it yet.  Therapy: n  Medication compliant: y  SE: n  No SI HI AVH.      04/10/2025:   In person interview:  \"Health wise still having trouble with my stomach.\"  ROSITA was in the hospital, but he's fine. Had heart surgery.  \"I guess I'm doing fine.\"  Sleep study consult   Keeps in touch with last ex-, as well as her other 2 close friends, and neighbor  Mirtazapine helping with dep and anx  Pain is \"bad\" because she's been moving " "around a lot  Depressed due to grief not MDD  MDD: stable  Grief: her two friends, appropriate  NATE: improving  Panic attacks: stable  Energy: low  Concentration: not at goal  Insomnia: initial, snoring  AIMS if on antipsychotic: na  Eating/Weight: 214, 204x2 lbs  Refills: y  Substances: Smoking. Not vaping; has a medical marijuana card but hasn't received it yet.  Therapy: n  Medication compliant: y  SE: n  No SI HI AVH.      2025:   In person interview:  \"I was in [place], having fun.\"  That's why I missed my last appointment  I have 4 very close friends. One  in August, the other  last week.  Mirtazapine? started  Keeps in touch with last ex-  Snores at night  MDD: fairly stable  Grief: her two friends  NATE: improving, still present  Panic attacks: stable  Energy: low  Concentration: not at goal  Insomnia: initial  AIMS if on antipsychotic: na  Eating/Weight: 204x2 lbs  Refills: y  Substances: Smoking. Not vaping; has a medical marijuana card but hasn't received it yet.  Therapy: n  Medication compliant: y  SE: n  No SI HI AVH.      2024:   In person interview:  \"Yes it was the vaping.\"  I had quit smoking so I tried the vape. I tried them both at the same time and it was an overload on me. They saw me when I was at my very worst.  I'm not on the mirtazapine. I'm on trazodone now. Still not sleeping.  \"The worst part is the pain\"  MDD: improving  NATE: improving  Panic attacks: stable  Energy: improving, \"I stay busy\"  Concentration: not at goal  Insomnia: initial insomnia  AIMS if on antipsychotic: na  Eating/Weight: 204 lbs  Refills: y  Substances: CBD stopped. Tobacco? Yes, smoking. Vaping? No.  Therapy: n  Medication compliant: y  SE: n  No SI HI AVH.    ...    2024: INITIAL VISIT Chart review:     [place]: On Ativan, oxycodone, and morphine chronically. She also used to be on gabapentin.  Care Everywhere: several non behavioral health notes    Psychotropic medication chart " "review:  Present:  Mirtazapine 15 mg at night  Cymbalta 60 mg a day    Previously:  Trazodone 150 mg at night  Gabapentin 600 mg nightly  Abilify 5, 10, 15 mg a day  Wellbutrin  mg a day  Cymbalta 20, 30 mg a day  Lexapro 5, 20 mg a day  Lamictal 25 mg twice daily, 200 mg at night  Effexor 75, 150 mg a day    EKG: 2024: Rate 54, sinus, QTc 471  Procedures: none  Head imagin MRI of the brain: No acute, no evidence of metastatic disease.  Labs: 2024: Abnormal CMP CO2 31.3 chloride 97 low glucose 111; abnormal CBC hemoglobin 9.7 other abnormalities; CT of the abdomen and pelvis with contrast shows diffuse sclerotic osseous metastatic disease.  Initial Chart Review Notes: Referred to us in February by oncology.  Patient has metastatic breast cancer.  More anxiety and depression recently.    2024:   In person.  Interview:  His/Her Story: \"It's stage 4.\"  P17, G12  They thought they would be able to maintain and control it with my chemo, but it's spreading now.  \"It's scary. It's the fear of not knowing.\"  Denies anhedonia  Lives by herself  Best friend passed this year, Beth Neff,  of a blood clot in August. \"We were shocked.\"  I have other friends, Pepper and her BF   3x,  3x  No kids, lost a child to abruption at 9 mos  Sleeping? Maintenance insomnia q2 hours  Eating? stable  Energy? Down  Just started mirtazapine this week. May be helping her sleep.  Depression/Mood:  Depressed mood, hopelessness  \"I miss things the way they used to be.\"  poor energy, poor concentration, insomnia.  Seasonal pattern: def  Severity: Moderate  Duration: years  Anxiety:  Uncontrolled worrying, muscle tension, fatigue, poor concentration, feeling on edge or restless, irritability, insomnia.  Severity: Moderate  Duration: years  PTSD: previously dx'd  avoidance, negative view of the world, hypervigilance.  Inciting event: losing her child at 9 mo (abruption)  Duration: many " years  AIMS if on antipsychotic: na  Psych ROS: Positive for depression, anxiety.  Negative for psychosis and ebony.  ADHD: def  No SI HI AVH.  Medication compliant: y    Access to Firearms: yes, locked away    PHQ-9 Depression Screening  PHQ-9 Total Score:     Little interest or pleasure in doing things?     Feeling down, depressed, or hopeless?     PHQ-2 Total Score     Trouble falling or staying asleep, or sleeping too much?     Feeling tired or having little energy?     Poor appetite or overeating?     Feeling bad about yourself - or that you are a failure or have let yourself or your family down?     Trouble concentrating on things, such as reading the newspaper or watching television?     Moving or speaking so slowly that other people could have noticed? Or the opposite - being so fidgety or restless that you have been moving around a lot more than usual?     Thoughts that you would be better off dead, or of hurting yourself in some way?     PHQ-9 Total Score     If you checked off any problems, how difficult have these problems made it for you to do your work, take care of things at home, or get along with other people?              NATE-7       Past Surgical History:  Past Surgical History:   Procedure Laterality Date    BREAST BIOPSY Left     CARDIAC CATHETERIZATION Left 1959    CARDIAC CATHETERIZATION N/A 04/06/2018    Procedure: Coronary angiography;  Surgeon: Art Licea MD;  Location: [facility] CATH INVASIVE LOCATION;  Service: Cardiovascular    CARDIAC CATHETERIZATION N/A 04/06/2018    Procedure: Left heart cath;  Surgeon: Art Licea MD;  Location: [facility] CATH INVASIVE LOCATION;  Service: Cardiovascular    CARDIAC CATHETERIZATION N/A 04/06/2018    Procedure: Left ventriculography;  Surgeon: Art Licea MD;  Location: [facility] CATH INVASIVE LOCATION;  Service: Cardiovascular    CHOLECYSTECTOMY      COLON SURGERY      colostomy bag, and colostomy reversal    COLONOSCOPY   11/08/2006    COLONOSCOPY N/A 06/08/2023    Procedure: COLONOSCOPY;  Surgeon: Alfred Castañeda MD;  Location: [facility] ENDOSCOPY;  Service: General;  Laterality: N/A;  same as preop    EXPLORATORY LAPAROTOMY N/A 12/06/2022    Procedure: LAPAROTOMY EXPLORATORY sigmoid resection hartmans procedure colostomy;  Surgeon: Alfred Castañeda MD;  Location: [facility] MAIN OR;  Service: General;  Laterality: N/A;    GANGLION CYST EXCISION Bilateral     HYSTERECTOMY  05/2005    ILEOSTOMY CLOSURE N/A 06/26/2023    Procedure: Laparoscopic Hand-Assisted Colostomy Closure with End to End Anastomosis;  Surgeon: Alfred Castañeda MD;  Location: [facility] MAIN OR;  Service: General;  Laterality: N/A;    LAPAROSCOPIC GASTRIC BANDING      BAND REMOVED 2020    PARASTOMAL HERNIA REPAIR N/A 06/26/2023    Procedure: Open Parastomal Hernia Repair;  Surgeon: Alfred Castañeda MD;  Location: [facility] MAIN OR;  Service: General;  Laterality: N/A;    TONSILLECTOMY      VENOUS ACCESS DEVICE (PORT) INSERTION N/A 01/09/2023    Procedure: INSERTION VENOUS ACCESS DEVICE;  Surgeon: Alfred Castañeda MD;  Location: [facility] MAIN OR;  Service: General;  Laterality: N/A;    VENTRAL HERNIA REPAIR N/A 7/3/2024    Procedure: VENTRAL / INCISIONAL HERNIA REPAIR LAPAROSCOPIC WITH DAVINCI ROBOT with mesh;  Surgeon: Alfred Castañeda MD;  Location: [facility] MAIN OR;  Service: Robotics - DaVinci;  Laterality: N/A;       Problem List:  Patient Active Problem List   Diagnosis    Hypertension    Hyperlipidemia    Allergic rhinitis    Hypothyroidism    Chronic pain disorder    Anxiety    Monopolar depression    Malaise and fatigue    Tobacco abuse    Fibromyalgia    Vitamin B 12 deficiency    Primary osteoarthritis of left knee    Restless legs syndrome (RLS)    Insomnia    Chronic fatigue    Asthma    Body mass index (BMI) 26.0-26.9, adult    Degeneration of lumbar intervertebral disc    Hearing loss    Inappropriate diet and eating habits     Cervical spondylosis    Obesity (BMI 30-39.9)    Renal stone    Malignant neoplasm of upper-outer quadrant of left breast in female, estrogen receptor positive    Cancer related pain    Malignant neoplasm metastatic to bone    Peripheral edema    Peritonitis    Leukopenia    S/P Laparoscopic Hand-Assisted Colostomy Closure with End to End Anastomosis Open Parastomal Hernia Repair    Encounter for adjustment or management of vascular access device    Pulmonary emphysema    History of colon polyps    Colostomy in place    Anemia    Hypokalemia    Therapeutic opioid induced constipation    Incisional hernia, without obstruction or gangrene    Pain in lower jaw    Suspected sleep apnea    Snoring    Hypersomnia    Nocturia    PTSD (post-traumatic stress disorder)    Inadequate sleep hygiene       Allergy:   Allergies   Allergen Reactions    Azithromycin Itching    Erythromycin Itching        Discontinued Medications:  Medications Discontinued During This Encounter   Medication Reason    DULoxetine (CYMBALTA) 30 MG capsule Reorder    DULoxetine (CYMBALTA) 60 MG capsule Reorder           Current Medications:   Current Outpatient Medications   Medication Sig Dispense Refill    atorvastatin (LIPITOR) 20 MG tablet TAKE 1 TABLET BY MOUTH EVERY DAY 30 tablet 6    baclofen (LIORESAL) 20 MG tablet Take 1 tablet by mouth 3 (Three) Times a Day.      cyproheptadine (PERIACTIN) 4 MG tablet TAKE 1 TABLET BY MOUTH TWICE DAILY for itching      donepezil (ARICEPT) 10 MG tablet Take 1 tablet by mouth Daily.      DULoxetine (CYMBALTA) 30 MG capsule Take 1 capsule by mouth Daily. 90 capsule 1    DULoxetine (CYMBALTA) 60 MG capsule Take 1 capsule by mouth Daily. 90 capsule 1    fluticasone (FLONASE) 50 MCG/ACT nasal spray Administer 2 sprays into the nostril(s) as directed by provider Daily.      furosemide (LASIX) 40 MG tablet       gabapentin (NEURONTIN) 600 MG tablet TAKE 1 TABLET BY MOUTH AT BEDTIME (Patient taking differently: Take 0.5  tablets by mouth 2 (Two) Times a Day As Needed (pain).) 90 tablet 0    hydroCHLOROthiazide 12.5 MG tablet TAKE 1 TABLET BY MOUTH DAILY for swelling 90 tablet 1    letrozole (FEMARA) 2.5 MG tablet TAKE 1 TABLET BY MOUTH DAILY 90 tablet 1    levothyroxine (SYNTHROID, LEVOTHROID) 50 MCG tablet TAKE 1 TABLET BY MOUTH EVERY DAY 90 tablet 1    lidocaine (LIDODERM) 5 % Place 1 patch on the skin as directed by provider Daily As Needed for Moderate Pain or Severe Pain. Remove & Discard patch within 12 hours or as directed by MD      loratadine (CLARITIN) 10 MG tablet Take 1 tablet by mouth Daily.      LORazepam (ATIVAN) 0.5 MG tablet Take 1 tablet by mouth Daily.      losartan (COZAAR) 50 MG tablet Take 1 tablet by mouth Daily.      mirtazapine (REMERON) 30 MG tablet Take 1 tablet by mouth Every Night. 90 tablet 1    montelukast (SINGULAIR) 10 MG tablet Take 1 tablet by mouth Daily.      Morphine (MS CONTIN) 60 MG 12 hr tablet Take 1 tablet by mouth 2 (Two) Times a Day. 60 tablet 0    naloxone (NARCAN) 4 MG/0.1ML nasal spray Call 911. Don't prime. [place] in 1 nostril for overdose. Repeat in 2-3 minutes in other nostril if no or minimal breathing/responsiveness. 2 each 0    nicotine (NICODERM CQ) 21 MG/24HR patch Place 1 patch on the skin as directed by provider Daily. 30 patch 3    ondansetron (ZOFRAN) 8 MG tablet Take 1 tablet by mouth Every 8 (Eight) Hours As Needed for Nausea or Vomiting. 30 tablet 5    oxybutynin XL (DITROPAN XL) 15 MG 24 hr tablet TAKE 1 TABLET BY MOUTH DAILY for bladder 30 tablet 1    oxyCODONE-acetaminophen (PERCOCET)  MG per tablet Take 1 tablet by mouth Every 6 (Six) Hours As Needed for Moderate Pain. 60 tablet 0    polyethylene glycol (MIRALAX) 17 g packet Take 17 g by mouth Daily. 5 packet 0    potassium chloride (MICRO-K) 10 MEQ CR capsule TAKE 1 CAPSULE BY MOUTH EVERY TWELVE HOURS for potassium supplement 60 capsule 1    rOPINIRole (REQUIP) 3 MG tablet Take 1 tablet by mouth 2 (Two) Times a  Day.      Senna-Time 8.6 MG tablet TAKE 1 TABLET BY MOUTH DAILY *for constipation* 30 tablet 3    SUMAtriptan (IMITREX) 100 MG tablet Take 1 tablet by mouth 1 (One) Time As Needed for Migraine.      traZODone (DESYREL) 150 MG tablet TAKE 1 TABLET BY MOUTH ONCE nightly 90 tablet 2    ARIPiprazole (Abilify) 2 MG tablet Take 1 tablet by mouth Daily. 30 tablet 2    methylPREDNISolone (MEDROL) 4 MG dose pack TAKE AS DIRECTED ON BLISTER PACK INSIDE BOX -TAKE WITH FOOD (Patient not taking: Reported on 5/22/2025)      ribociclib succinate, 600 MG Dose, (KISQALI) 200 MG tablet therapy pack tablet Take 3 tablets by mouth Take As Directed. Take 3 tablets by mouth daily for 21 days then off 7 days on a 28 day cycle. (Patient not taking: Reported on 5/22/2025) 63 tablet 11     No current facility-administered medications for this visit.       Past Medical History:  Past Medical History:   Diagnosis Date    Abdominal pain     OSTOMY SITE    Allergic rhinitis     Anemia     NO S/S    Anxiety     Arteriosclerosis     Coronary, follows with Dr. Núñez    Arthritis     Bone cancer     Bone metastases 10/14/2022    Bowen's disease     SKIN CANCER    Breast cancer     NO SURGERY WAS DONE DUE TO METS TO BONE/COLON/LYMPHNODE    Cancer related pain 10/13/2022    Depression     Disorder associated with Helicobacter species 10/12/2022    Dysphoric mood     Emphysema lung     Fatigue     Fibromyalgia     Frequent urination     NO S/S INFECTION    Herpes simplex vulvovaginitis 07/26/2018    History of colon polyps     History of IBS     History of kidney stones     Hyperlipidemia     Hypertension     Hypothyroidism     Insomnia     Lumbago     Mood disorder     Neck pain     R/T CANCER    Palpitations     last time a few months ago    Precordial pain     R/T SPINE CANCER    RLS (restless legs syndrome)     S/P Laparoscopic Hand-Assisted Colostomy Closure with End to End Anastomosis Open Parastomal Hernia Repair 12/08/2022    Sleep  "disturbance     SOB (shortness of breath)     at times at rest    SOBOE (shortness of breath on exertion)        Past Psychiatric History:  Began Treatment: early   Diagnoses: MDD, NATE, panic attacks  Psychiatrist: multiple  Therapist: in the past  Admission History:    Late , Edith, admitted for medication changes    Medication Trials:    \"I think I've been on every medication\"    Recently stopped trazodone    Self Harm: Denies  Suicide Attempts:Denies   Psychosis, Anxiety, Depression: PPD    Substance Abuse History:   Types: former smoker; occasional cannabis use  Withdrawal Symptoms:Denies  Longest Period Sober:Not Applicable   AA: Not applicable     Social History:  Martial Status:   Employed:No  Kids:No  House:Lives in a house   History: Denies    Social History     Socioeconomic History    Marital status:    Tobacco Use    Smoking status: Every Day     Current packs/day: 1.00     Average packs/day: 1 pack/day for 50.9 years (50.9 ttl pk-yrs)     Types: Cigarettes     Start date:      Last attempt to quit: 2024    Smokeless tobacco: Never    Tobacco comments:          Has not used tobacco for 28 days    Vaping Use    Vaping status: Never Used   Substance and Sexual Activity    Alcohol use: No    Drug use: Not Currently     Types: Marijuana     Comment: LAST USE 24    Sexual activity: Defer     Partners: Male     Birth control/protection: None       Family History:   Suicide Attempts: Denies  Suicide Completions:Denies      Family History   Problem Relation Age of Onset    Hypertension Mother     Rheum arthritis Mother     Heart disease Mother     Breast cancer Mother     Diabetes Father     Cancer Maternal Grandmother         colon    Colon cancer Maternal Grandmother     Aneurysm Paternal Grandfather     Diabetes Other     Fibromyalgia Other     Malig Hyperthermia Neg Hx        Developmental History:     Childhood: Denies Abuse  High " "School:Completed  College: [employer]     Mental Status Exam  Appearance  : groomed, good eye contact, normal street clothes  Behavior  : pleasant and cooperative  Motor  : No abnormal  Speech  :normal rhythm, rate, volume, tone, not hyperverbal, not pressured, normal prosidy  Mood  : \"I could cry\"  Affect  : depressed, briefly tearful, mood congruent, good variability  Thought Content  : negative suicidal ideations, negative homicidal ideations, negative obsessions  Perceptions  : negative auditory hallucinations, negative visual hallucinations  Thought Process  : linear  Insight/Judgement  : Fair/fair  Cognition  : grossly intact  Attention   : intact        Review of Systems:  Review of Systems   Constitutional:  Positive for diaphoresis and fatigue.   HENT:  Negative for drooling.    Eyes:  Negative for visual disturbance.   Respiratory:  Positive for cough and shortness of breath.    Cardiovascular:  Positive for leg swelling. Negative for chest pain and palpitations.   Gastrointestinal:  Positive for nausea. Negative for vomiting.   Endocrine: Positive for cold intolerance and heat intolerance.   Genitourinary:  Positive for difficulty urinating.   Musculoskeletal:  Positive for joint swelling.   Allergic/Immunologic: Negative for immunocompromised state.   Neurological:  Positive for dizziness and numbness. Negative for seizures and speech difficulty.   Psychiatric/Behavioral:  Negative for agitation. The patient is not nervous/anxious.        Physical Exam:  Physical Exam    Vital Signs:   /44   Pulse 66   Ht 167.6 cm (65.98\")   Wt 95.9 kg (211 lb 6.4 oz)   SpO2 90% Comment: O2 is not reading  BMI 34.14 kg/m²      Lab Results:   Hospital Outpatient Visit on 04/29/2025   Component Date Value Ref Range Status    Glucose 04/29/2025 113 (H)  65 - 99 mg/dL Final    BUN 04/29/2025 16  8 - 23 mg/dL Final    Creatinine 04/29/2025 0.91  0.57 - 1.00 mg/dL Final    Sodium 04/29/2025 141  136 - 145 mmol/L Final    " Potassium 04/29/2025 4.2  3.5 - 5.2 mmol/L Final    Chloride 04/29/2025 102  98 - 107 mmol/L Final    CO2 04/29/2025 30.2 (H)  22.0 - 29.0 mmol/L Final    Calcium 04/29/2025 9.0  8.6 - 10.5 mg/dL Final    Total Protein 04/29/2025 7.0  6.0 - 8.5 g/dL Final    Albumin 04/29/2025 4.2  3.5 - 5.2 g/dL Final    ALT (SGPT) 04/29/2025 8  1 - 33 U/L Final    AST (SGOT) 04/29/2025 15  1 - 32 U/L Final    Alkaline Phosphatase 04/29/2025 103  39 - 117 U/L Final    Total Bilirubin 04/29/2025 0.4  0.0 - 1.2 mg/dL Final    Globulin 04/29/2025 2.8  gm/dL Final    A/G Ratio 04/29/2025 1.5  g/dL Final    BUN/Creatinine Ratio 04/29/2025 17.6  7.0 - 25.0 Final    Anion Gap 04/29/2025 8.8  5.0 - 15.0 mmol/L Final    eGFR 04/29/2025 70.2  >60.0 mL/min/1.73 Final    Magnesium 04/29/2025 2.4  1.6 - 2.4 mg/dL Final    Phosphorus 04/29/2025 3.4  2.5 - 4.5 mg/dL Final    WBC 04/29/2025 4.63  3.40 - 10.80 10*3/mm3 Final    RBC 04/29/2025 2.87 (L)  3.77 - 5.28 10*6/mm3 Final    Hemoglobin 04/29/2025 9.3 (L)  12.0 - 15.9 g/dL Final    Hematocrit 04/29/2025 30.0 (L)  34.0 - 46.6 % Final    MCV 04/29/2025 104.5 (H)  79.0 - 97.0 fL Final    MCH 04/29/2025 32.4  26.6 - 33.0 pg Final    MCHC 04/29/2025 31.0 (L)  31.5 - 35.7 g/dL Final    RDW 04/29/2025 16.5 (H)  12.3 - 15.4 % Final    RDW-SD 04/29/2025 62.9 (H)  37.0 - 54.0 fl Final    MPV 04/29/2025 10.2  6.0 - 12.0 fL Final    Platelets 04/29/2025 228  140 - 450 10*3/mm3 Final    Neutrophil % 04/29/2025 73.5  42.7 - 76.0 % Final    Lymphocyte % 04/29/2025 19.2 (L)  19.6 - 45.3 % Final    Monocyte % 04/29/2025 5.8  5.0 - 12.0 % Final    Eosinophil % 04/29/2025 0.0 (L)  0.3 - 6.2 % Final    Basophil % 04/29/2025 0.9  0.0 - 1.5 % Final    Immature Grans % 04/29/2025 0.6 (H)  0.0 - 0.5 % Final    Neutrophils, Absolute 04/29/2025 3.40  1.70 - 7.00 10*3/mm3 Final    Lymphocytes, Absolute 04/29/2025 0.89  0.70 - 3.10 10*3/mm3 Final    Monocytes, Absolute 04/29/2025 0.27  0.10 - 0.90 10*3/mm3 Final     Eosinophils, Absolute 04/29/2025 0.00  0.00 - 0.40 10*3/mm3 Final    Basophils, Absolute 04/29/2025 0.04  0.00 - 0.20 10*3/mm3 Final    Immature Grans, Absolute 04/29/2025 0.03  0.00 - 0.05 10*3/mm3 Final    nRBC 04/29/2025 0.0  0.0 - 0.2 /100 WBC Final   Hospital Outpatient Visit on 04/01/2025   Component Date Value Ref Range Status    Glucose 04/01/2025 134 (H)  65 - 99 mg/dL Final    BUN 04/01/2025 20  8 - 23 mg/dL Final    Creatinine 04/01/2025 0.94  0.57 - 1.00 mg/dL Final    Sodium 04/01/2025 141  136 - 145 mmol/L Final    Potassium 04/01/2025 4.0  3.5 - 5.2 mmol/L Final    Chloride 04/01/2025 102  98 - 107 mmol/L Final    CO2 04/01/2025 29.8 (H)  22.0 - 29.0 mmol/L Final    Calcium 04/01/2025 8.7  8.6 - 10.5 mg/dL Final    Total Protein 04/01/2025 7.3  6.0 - 8.5 g/dL Final    Albumin 04/01/2025 4.0  3.5 - 5.2 g/dL Final    ALT (SGPT) 04/01/2025 10  1 - 33 U/L Final    AST (SGOT) 04/01/2025 14  1 - 32 U/L Final    Alkaline Phosphatase 04/01/2025 106  39 - 117 U/L Final    Total Bilirubin 04/01/2025 0.3  0.0 - 1.2 mg/dL Final    Globulin 04/01/2025 3.3  gm/dL Final    A/G Ratio 04/01/2025 1.2  g/dL Final    BUN/Creatinine Ratio 04/01/2025 21.3  7.0 - 25.0 Final    Anion Gap 04/01/2025 9.2  5.0 - 15.0 mmol/L Final    eGFR 04/01/2025 67.5  >60.0 mL/min/1.73 Final    Magnesium 04/01/2025 2.1  1.6 - 2.4 mg/dL Final    Phosphorus 04/01/2025 3.4  2.5 - 4.5 mg/dL Final    WBC 04/01/2025 4.55  3.40 - 10.80 10*3/mm3 Final    RBC 04/01/2025 2.98 (L)  3.77 - 5.28 10*6/mm3 Final    Hemoglobin 04/01/2025 9.6 (L)  12.0 - 15.9 g/dL Final    Hematocrit 04/01/2025 30.8 (L)  34.0 - 46.6 % Final    MCV 04/01/2025 103.4 (H)  79.0 - 97.0 fL Final    MCH 04/01/2025 32.2  26.6 - 33.0 pg Final    MCHC 04/01/2025 31.2 (L)  31.5 - 35.7 g/dL Final    RDW 04/01/2025 15.9 (H)  12.3 - 15.4 % Final    RDW-SD 04/01/2025 61.0 (H)  37.0 - 54.0 fl Final    MPV 04/01/2025 10.2  6.0 - 12.0 fL Final    Platelets 04/01/2025 226  140 - 450 10*3/mm3  Final    Neutrophil % 04/01/2025 62.8  42.7 - 76.0 % Final    Lymphocyte % 04/01/2025 29.5  19.6 - 45.3 % Final    Monocyte % 04/01/2025 5.5  5.0 - 12.0 % Final    Eosinophil % 04/01/2025 0.2 (L)  0.3 - 6.2 % Final    Basophil % 04/01/2025 1.8 (H)  0.0 - 1.5 % Final    Immature Grans % 04/01/2025 0.2  0.0 - 0.5 % Final    Neutrophils, Absolute 04/01/2025 2.86  1.70 - 7.00 10*3/mm3 Final    Lymphocytes, Absolute 04/01/2025 1.34  0.70 - 3.10 10*3/mm3 Final    Monocytes, Absolute 04/01/2025 0.25  0.10 - 0.90 10*3/mm3 Final    Eosinophils, Absolute 04/01/2025 0.01  0.00 - 0.40 10*3/mm3 Final    Basophils, Absolute 04/01/2025 0.08  0.00 - 0.20 10*3/mm3 Final    Immature Grans, Absolute 04/01/2025 0.01  0.00 - 0.05 10*3/mm3 Final    nRBC 04/01/2025 0.0  0.0 - 0.2 /100 WBC Final   Admission on 03/18/2025, Discharged on 03/19/2025   Component Date Value Ref Range Status    Glucose 03/18/2025 137 (H)  65 - 99 mg/dL Final    BUN 03/18/2025 15  8 - 23 mg/dL Final    Creatinine 03/18/2025 0.87  0.57 - 1.00 mg/dL Final    Sodium 03/18/2025 139  136 - 145 mmol/L Final    Potassium 03/18/2025 3.8  3.5 - 5.2 mmol/L Final    Chloride 03/18/2025 102  98 - 107 mmol/L Final    CO2 03/18/2025 26.3  22.0 - 29.0 mmol/L Final    Calcium 03/18/2025 9.3  8.6 - 10.5 mg/dL Final    Total Protein 03/18/2025 7.6  6.0 - 8.5 g/dL Final    Albumin 03/18/2025 4.2  3.5 - 5.2 g/dL Final    ALT (SGPT) 03/18/2025 9  1 - 33 U/L Final    AST (SGOT) 03/18/2025 13  1 - 32 U/L Final    Alkaline Phosphatase 03/18/2025 120 (H)  39 - 117 U/L Final    Total Bilirubin 03/18/2025 0.3  0.0 - 1.2 mg/dL Final    Globulin 03/18/2025 3.4  gm/dL Final    A/G Ratio 03/18/2025 1.2  g/dL Final    BUN/Creatinine Ratio 03/18/2025 17.2  7.0 - 25.0 Final    Anion Gap 03/18/2025 10.7  5.0 - 15.0 mmol/L Final    eGFR 03/18/2025 74.0  >60.0 mL/min/1.73 Final    Lipase 03/18/2025 18  13 - 60 U/L Final    Color, UA 03/18/2025 Yellow  Yellow, Straw Final    Appearance, UA 03/18/2025  Clear  Clear Final    pH, UA 03/18/2025 5.5  5.0 - 8.0 Final    Specific Gravity, UA 03/18/2025 1.022  1.005 - 1.030 Final    Glucose, UA 03/18/2025 Negative  Negative Final    Ketones, UA 03/18/2025 Negative  Negative Final    Bilirubin, UA 03/18/2025 Negative  Negative Final    Blood, UA 03/18/2025 Small (1+) (A)  Negative Final    Protein, UA 03/18/2025 Trace (A)  Negative Final    Leuk Esterase, UA 03/18/2025 Trace (A)  Negative Final    Nitrite, UA 03/18/2025 Negative  Negative Final    Urobilinogen, UA 03/18/2025 1.0 E.U./dL  0.2 - 1.0 E.U./dL Final    Lactate 03/18/2025 2.3 (C)  0.5 - 2.0 mmol/L Final    Extra Tube 03/18/2025 Hold for add-ons.   Final    Auto resulted.    Extra Tube 03/18/2025 hold for add-on   Final    Auto resulted    Extra Tube 03/18/2025 Hold for add-ons.   Final    Auto resulted.    Extra Tube 03/18/2025 Hold for add-ons.   Final    Auto resulted    WBC 03/18/2025 3.96  3.40 - 10.80 10*3/mm3 Final    RBC 03/18/2025 3.28 (L)  3.77 - 5.28 10*6/mm3 Final    Hemoglobin 03/18/2025 10.4 (L)  12.0 - 15.9 g/dL Final    Hematocrit 03/18/2025 34.2  34.0 - 46.6 % Final    MCV 03/18/2025 104.3 (H)  79.0 - 97.0 fL Final    MCH 03/18/2025 31.7  26.6 - 33.0 pg Final    MCHC 03/18/2025 30.4 (L)  31.5 - 35.7 g/dL Final    RDW 03/18/2025 16.1 (H)  12.3 - 15.4 % Final    RDW-SD 03/18/2025 62.8 (H)  37.0 - 54.0 fl Final    MPV 03/18/2025 10.6  6.0 - 12.0 fL Final    Platelets 03/18/2025 180  140 - 450 10*3/mm3 Final    Neutrophil % 03/18/2025 59.6  42.7 - 76.0 % Final    Lymphocyte % 03/18/2025 27.5  19.6 - 45.3 % Final    Monocyte % 03/18/2025 12.1 (H)  5.0 - 12.0 % Final    Eosinophil % 03/18/2025 0.0 (L)  0.3 - 6.2 % Final    Basophil % 03/18/2025 0.8  0.0 - 1.5 % Final    Immature Grans % 03/18/2025 0.0  0.0 - 0.5 % Final    Neutrophils, Absolute 03/18/2025 2.36  1.70 - 7.00 10*3/mm3 Final    Lymphocytes, Absolute 03/18/2025 1.09  0.70 - 3.10 10*3/mm3 Final    Monocytes, Absolute 03/18/2025 0.48  0.10 -  0.90 10*3/mm3 Final    Eosinophils, Absolute 03/18/2025 0.00  0.00 - 0.40 10*3/mm3 Final    Basophils, Absolute 03/18/2025 0.03  0.00 - 0.20 10*3/mm3 Final    Immature Grans, Absolute 03/18/2025 0.00  0.00 - 0.05 10*3/mm3 Final    nRBC 03/18/2025 0.0  0.0 - 0.2 /100 WBC Final    Lactate 03/18/2025 3.4 (C)  0.5 - 2.0 mmol/L Final    RBC, UA 03/18/2025 0-2  None Seen, 0-2 /HPF Final    WBC, UA 03/18/2025 0-2  None Seen, 0-2 /HPF Final    Bacteria, UA 03/18/2025 None Seen  None Seen /HPF Final    Squamous Epithelial Cells, UA 03/18/2025 0-2  None Seen, 0-2 /HPF Final    Hyaline Casts, UA 03/18/2025 None Seen  None Seen /LPF Final    Methodology 03/18/2025 Automated Microscopy   Final    Lactate 03/18/2025 3.2 (C)  0.5 - 2.0 mmol/L Final    Lactate 03/19/2025 1.8  0.5 - 2.0 mmol/L Final   Hospital Outpatient Visit on 03/04/2025   Component Date Value Ref Range Status    Glucose 03/04/2025 139 (H)  65 - 99 mg/dL Final    BUN 03/04/2025 19  8 - 23 mg/dL Final    Creatinine 03/04/2025 0.94  0.57 - 1.00 mg/dL Final    Sodium 03/04/2025 142  136 - 145 mmol/L Final    Potassium 03/04/2025 4.0  3.5 - 5.2 mmol/L Final    Chloride 03/04/2025 102  98 - 107 mmol/L Final    CO2 03/04/2025 31.6 (H)  22.0 - 29.0 mmol/L Final    Calcium 03/04/2025 9.7  8.6 - 10.5 mg/dL Final    Total Protein 03/04/2025 7.0  6.0 - 8.5 g/dL Final    Albumin 03/04/2025 3.9  3.5 - 5.2 g/dL Final    ALT (SGPT) 03/04/2025 10  1 - 33 U/L Final    AST (SGOT) 03/04/2025 11  1 - 32 U/L Final    Alkaline Phosphatase 03/04/2025 116  39 - 117 U/L Final    Total Bilirubin 03/04/2025 0.3  0.0 - 1.2 mg/dL Final    Globulin 03/04/2025 3.1  gm/dL Final    A/G Ratio 03/04/2025 1.3  g/dL Final    BUN/Creatinine Ratio 03/04/2025 20.2  7.0 - 25.0 Final    Anion Gap 03/04/2025 8.4  5.0 - 15.0 mmol/L Final    eGFR 03/04/2025 67.5  >60.0 mL/min/1.73 Final    Magnesium 03/04/2025 1.6  1.6 - 2.4 mg/dL Final    Phosphorus 03/04/2025 3.4  2.5 - 4.5 mg/dL Final    WBC 03/04/2025  4.12  3.40 - 10.80 10*3/mm3 Final    RBC 03/04/2025 2.99 (L)  3.77 - 5.28 10*6/mm3 Final    Hemoglobin 03/04/2025 9.6 (L)  12.0 - 15.9 g/dL Final    Hematocrit 03/04/2025 30.9 (L)  34.0 - 46.6 % Final    MCV 03/04/2025 103.3 (H)  79.0 - 97.0 fL Final    MCH 03/04/2025 32.1  26.6 - 33.0 pg Final    MCHC 03/04/2025 31.1 (L)  31.5 - 35.7 g/dL Final    RDW 03/04/2025 15.6 (H)  12.3 - 15.4 % Final    RDW-SD 03/04/2025 58.6 (H)  37.0 - 54.0 fl Final    MPV 03/04/2025 10.3  6.0 - 12.0 fL Final    Platelets 03/04/2025 223  140 - 450 10*3/mm3 Final    Neutrophil % 03/04/2025 64.3  42.7 - 76.0 % Final    Lymphocyte % 03/04/2025 27.4  19.6 - 45.3 % Final    Monocyte % 03/04/2025 6.1  5.0 - 12.0 % Final    Eosinophil % 03/04/2025 0.5  0.3 - 6.2 % Final    Basophil % 03/04/2025 1.2  0.0 - 1.5 % Final    Immature Grans % 03/04/2025 0.5  0.0 - 0.5 % Final    Neutrophils, Absolute 03/04/2025 2.65  1.70 - 7.00 10*3/mm3 Final    Lymphocytes, Absolute 03/04/2025 1.13  0.70 - 3.10 10*3/mm3 Final    Monocytes, Absolute 03/04/2025 0.25  0.10 - 0.90 10*3/mm3 Final    Eosinophils, Absolute 03/04/2025 0.02  0.00 - 0.40 10*3/mm3 Final    Basophils, Absolute 03/04/2025 0.05  0.00 - 0.20 10*3/mm3 Final    Immature Grans, Absolute 03/04/2025 0.02  0.00 - 0.05 10*3/mm3 Final    nRBC 03/04/2025 0.0  0.0 - 0.2 /100 WBC Final   Hospital Outpatient Visit on 02/06/2025   Component Date Value Ref Range Status    Glucose 02/06/2025 154 (H)  65 - 99 mg/dL Final    BUN 02/06/2025 17  8 - 23 mg/dL Final    Creatinine 02/06/2025 0.94  0.57 - 1.00 mg/dL Final    Sodium 02/06/2025 141  136 - 145 mmol/L Final    Potassium 02/06/2025 3.7  3.5 - 5.2 mmol/L Final    Chloride 02/06/2025 101  98 - 107 mmol/L Final    CO2 02/06/2025 31.0 (H)  22.0 - 29.0 mmol/L Final    Calcium 02/06/2025 9.4  8.6 - 10.5 mg/dL Final    Total Protein 02/06/2025 7.1  6.0 - 8.5 g/dL Final    Albumin 02/06/2025 4.0  3.5 - 5.2 g/dL Final    ALT (SGPT) 02/06/2025 6  1 - 33 U/L Final     AST (SGOT) 02/06/2025 14  1 - 32 U/L Final    Alkaline Phosphatase 02/06/2025 108  39 - 117 U/L Final    Total Bilirubin 02/06/2025 0.3  0.0 - 1.2 mg/dL Final    Globulin 02/06/2025 3.1  gm/dL Final    A/G Ratio 02/06/2025 1.3  g/dL Final    BUN/Creatinine Ratio 02/06/2025 18.1  7.0 - 25.0 Final    Anion Gap 02/06/2025 9.0  5.0 - 15.0 mmol/L Final    eGFR 02/06/2025 67.5  >60.0 mL/min/1.73 Final    Magnesium 02/06/2025 1.8  1.6 - 2.4 mg/dL Final    Phosphorus 02/06/2025 4.2  2.5 - 4.5 mg/dL Final    WBC 02/06/2025 4.59  3.40 - 10.80 10*3/mm3 Final    RBC 02/06/2025 3.07 (L)  3.77 - 5.28 10*6/mm3 Final    Hemoglobin 02/06/2025 9.8 (L)  12.0 - 15.9 g/dL Final    Hematocrit 02/06/2025 32.2 (L)  34.0 - 46.6 % Final    MCV 02/06/2025 104.9 (H)  79.0 - 97.0 fL Final    MCH 02/06/2025 31.9  26.6 - 33.0 pg Final    MCHC 02/06/2025 30.4 (L)  31.5 - 35.7 g/dL Final    RDW 02/06/2025 15.5 (H)  12.3 - 15.4 % Final    RDW-SD 02/06/2025 59.7 (H)  37.0 - 54.0 fl Final    MPV 02/06/2025 10.0  6.0 - 12.0 fL Final    Platelets 02/06/2025 210  140 - 450 10*3/mm3 Final    Neutrophil % 02/06/2025 63.2  42.7 - 76.0 % Final    Lymphocyte % 02/06/2025 30.7  19.6 - 45.3 % Final    Monocyte % 02/06/2025 3.7 (L)  5.0 - 12.0 % Final    Eosinophil % 02/06/2025 1.1  0.3 - 6.2 % Final    Basophil % 02/06/2025 1.1  0.0 - 1.5 % Final    Immature Grans % 02/06/2025 0.2  0.0 - 0.5 % Final    Neutrophils, Absolute 02/06/2025 2.90  1.70 - 7.00 10*3/mm3 Final    Lymphocytes, Absolute 02/06/2025 1.41  0.70 - 3.10 10*3/mm3 Final    Monocytes, Absolute 02/06/2025 0.17  0.10 - 0.90 10*3/mm3 Final    Eosinophils, Absolute 02/06/2025 0.05  0.00 - 0.40 10*3/mm3 Final    Basophils, Absolute 02/06/2025 0.05  0.00 - 0.20 10*3/mm3 Final    Immature Grans, Absolute 02/06/2025 0.01  0.00 - 0.05 10*3/mm3 Final    nRBC 02/06/2025 0.0  0.0 - 0.2 /100 WBC Final   Hospital Outpatient Visit on 02/03/2025   Component Date Value Ref Range Status    Glucose 02/03/2025 130  (H)  65 - 99 mg/dL Final    BUN 02/03/2025 16  8 - 23 mg/dL Final    Creatinine 02/03/2025 0.92  0.57 - 1.00 mg/dL Final    Sodium 02/03/2025 143  136 - 145 mmol/L Final    Potassium 02/03/2025 4.0  3.5 - 5.2 mmol/L Final    Chloride 02/03/2025 101  98 - 107 mmol/L Final    CO2 02/03/2025 34.2 (H)  22.0 - 29.0 mmol/L Final    Calcium 02/03/2025 9.8  8.6 - 10.5 mg/dL Final    Total Protein 02/03/2025 7.3  6.0 - 8.5 g/dL Final    Albumin 02/03/2025 4.0  3.5 - 5.2 g/dL Final    ALT (SGPT) 02/03/2025 7  1 - 33 U/L Final    AST (SGOT) 02/03/2025 12  1 - 32 U/L Final    Alkaline Phosphatase 02/03/2025 108  39 - 117 U/L Final    Total Bilirubin 02/03/2025 0.3  0.0 - 1.2 mg/dL Final    Globulin 02/03/2025 3.3  gm/dL Final    A/G Ratio 02/03/2025 1.2  g/dL Final    BUN/Creatinine Ratio 02/03/2025 17.4  7.0 - 25.0 Final    Anion Gap 02/03/2025 7.8  5.0 - 15.0 mmol/L Final    eGFR 02/03/2025 69.2  >60.0 mL/min/1.73 Final    Magnesium 02/03/2025 1.8  1.6 - 2.4 mg/dL Final    Phosphorus 02/03/2025 5.0 (H)  2.5 - 4.5 mg/dL Final    WBC 02/03/2025 5.06  3.40 - 10.80 10*3/mm3 Final    RBC 02/03/2025 3.06 (L)  3.77 - 5.28 10*6/mm3 Final    Hemoglobin 02/03/2025 9.7 (L)  12.0 - 15.9 g/dL Final    Hematocrit 02/03/2025 31.8 (L)  34.0 - 46.6 % Final    MCV 02/03/2025 103.9 (H)  79.0 - 97.0 fL Final    MCH 02/03/2025 31.7  26.6 - 33.0 pg Final    MCHC 02/03/2025 30.5 (L)  31.5 - 35.7 g/dL Final    RDW 02/03/2025 15.8 (H)  12.3 - 15.4 % Final    RDW-SD 02/03/2025 60.4 (H)  37.0 - 54.0 fl Final    MPV 02/03/2025 10.1  6.0 - 12.0 fL Final    Platelets 02/03/2025 277  140 - 450 10*3/mm3 Final    Neutrophil % 02/03/2025 63.9  42.7 - 76.0 % Final    Lymphocyte % 02/03/2025 29.6  19.6 - 45.3 % Final    Monocyte % 02/03/2025 4.7 (L)  5.0 - 12.0 % Final    Eosinophil % 02/03/2025 0.4  0.3 - 6.2 % Final    Basophil % 02/03/2025 1.0  0.0 - 1.5 % Final    Immature Grans % 02/03/2025 0.4  0.0 - 0.5 % Final    Neutrophils, Absolute 02/03/2025 3.23   1.70 - 7.00 10*3/mm3 Final    Lymphocytes, Absolute 02/03/2025 1.50  0.70 - 3.10 10*3/mm3 Final    Monocytes, Absolute 02/03/2025 0.24  0.10 - 0.90 10*3/mm3 Final    Eosinophils, Absolute 02/03/2025 0.02  0.00 - 0.40 10*3/mm3 Final    Basophils, Absolute 02/03/2025 0.05  0.00 - 0.20 10*3/mm3 Final    Immature Grans, Absolute 02/03/2025 0.02  0.00 - 0.05 10*3/mm3 Final    nRBC 02/03/2025 0.0  0.0 - 0.2 /100 WBC Final   Hospital Outpatient Visit on 01/07/2025   Component Date Value Ref Range Status    Magnesium 01/07/2025 1.6  1.6 - 2.4 mg/dL Final    Phosphorus 01/07/2025 3.9  2.5 - 4.5 mg/dL Final    Glucose 01/07/2025 147 (H)  65 - 99 mg/dL Final    BUN 01/07/2025 20  8 - 23 mg/dL Final    Creatinine 01/07/2025 0.86  0.57 - 1.00 mg/dL Final    Sodium 01/07/2025 137  136 - 145 mmol/L Final    Potassium 01/07/2025 3.5  3.5 - 5.2 mmol/L Final    Chloride 01/07/2025 97 (L)  98 - 107 mmol/L Final    CO2 01/07/2025 31.7 (H)  22.0 - 29.0 mmol/L Final    Calcium 01/07/2025 9.5  8.6 - 10.5 mg/dL Final    Total Protein 01/07/2025 7.2  6.0 - 8.5 g/dL Final    Albumin 01/07/2025 4.0  3.5 - 5.2 g/dL Final    ALT (SGPT) 01/07/2025 8  1 - 33 U/L Final    AST (SGOT) 01/07/2025 12  1 - 32 U/L Final    Alkaline Phosphatase 01/07/2025 108  39 - 117 U/L Final    Total Bilirubin 01/07/2025 0.3  0.0 - 1.2 mg/dL Final    Globulin 01/07/2025 3.2  gm/dL Final    A/G Ratio 01/07/2025 1.3  g/dL Final    BUN/Creatinine Ratio 01/07/2025 23.3  7.0 - 25.0 Final    Anion Gap 01/07/2025 8.3  5.0 - 15.0 mmol/L Final    eGFR 01/07/2025 75.1  >60.0 mL/min/1.73 Final    WBC 01/07/2025 4.59  3.40 - 10.80 10*3/mm3 Final    RBC 01/07/2025 3.07 (L)  3.77 - 5.28 10*6/mm3 Final    Hemoglobin 01/07/2025 9.8 (L)  12.0 - 15.9 g/dL Final    Hematocrit 01/07/2025 31.8 (L)  34.0 - 46.6 % Final    MCV 01/07/2025 103.6 (H)  79.0 - 97.0 fL Final    MCH 01/07/2025 31.9  26.6 - 33.0 pg Final    MCHC 01/07/2025 30.8 (L)  31.5 - 35.7 g/dL Final    RDW 01/07/2025 15.5  (H)  12.3 - 15.4 % Final    RDW-SD 01/07/2025 57.3 (H)  37.0 - 54.0 fl Final    MPV 01/07/2025 10.2  6.0 - 12.0 fL Final    Platelets 01/07/2025 206  140 - 450 10*3/mm3 Final    Neutrophil % 01/07/2025 58.7  42.7 - 76.0 % Final    Lymphocyte % 01/07/2025 32.5  19.6 - 45.3 % Final    Monocyte % 01/07/2025 6.5  5.0 - 12.0 % Final    Eosinophil % 01/07/2025 1.5  0.3 - 6.2 % Final    Basophil % 01/07/2025 0.4  0.0 - 1.5 % Final    Immature Grans % 01/07/2025 0.4  0.0 - 0.5 % Final    Neutrophils, Absolute 01/07/2025 2.69  1.70 - 7.00 10*3/mm3 Final    Lymphocytes, Absolute 01/07/2025 1.49  0.70 - 3.10 10*3/mm3 Final    Monocytes, Absolute 01/07/2025 0.30  0.10 - 0.90 10*3/mm3 Final    Eosinophils, Absolute 01/07/2025 0.07  0.00 - 0.40 10*3/mm3 Final    Basophils, Absolute 01/07/2025 0.02  0.00 - 0.20 10*3/mm3 Final    Immature Grans, Absolute 01/07/2025 0.02  0.00 - 0.05 10*3/mm3 Final    nRBC 01/07/2025 0.0  0.0 - 0.2 /100 WBC Final    Total Cholesterol 01/07/2025 118  0 - 200 mg/dL Final    Triglycerides 01/07/2025 106  0 - 150 mg/dL Final    HDL Cholesterol 01/07/2025 49  40 - 60 mg/dL Final    LDL Cholesterol  01/07/2025 49  0 - 100 mg/dL Final    VLDL Cholesterol 01/07/2025 20  5 - 40 mg/dL Final    LDL/HDL Ratio 01/07/2025 0.98   Final   Admission on 12/22/2024, Discharged on 12/22/2024   Component Date Value Ref Range Status    Glucose 12/22/2024 155 (H)  65 - 99 mg/dL Final    BUN 12/22/2024 20  8 - 23 mg/dL Final    Creatinine 12/22/2024 0.79  0.57 - 1.00 mg/dL Final    Sodium 12/22/2024 139  136 - 145 mmol/L Final    Potassium 12/22/2024 3.3 (L)  3.5 - 5.2 mmol/L Final    Chloride 12/22/2024 99  98 - 107 mmol/L Final    CO2 12/22/2024 28.8  22.0 - 29.0 mmol/L Final    Calcium 12/22/2024 8.6  8.6 - 10.5 mg/dL Final    Total Protein 12/22/2024 6.5  6.0 - 8.5 g/dL Final    Albumin 12/22/2024 3.7  3.5 - 5.2 g/dL Final    ALT (SGPT) 12/22/2024 11  1 - 33 U/L Final    AST (SGOT) 12/22/2024 15  1 - 32 U/L Final     Alkaline Phosphatase 12/22/2024 103  39 - 117 U/L Final    Total Bilirubin 12/22/2024 0.3  0.0 - 1.2 mg/dL Final    Globulin 12/22/2024 2.8  gm/dL Final    A/G Ratio 12/22/2024 1.3  g/dL Final    BUN/Creatinine Ratio 12/22/2024 25.3 (H)  7.0 - 25.0 Final    Anion Gap 12/22/2024 11.2  5.0 - 15.0 mmol/L Final    eGFR 12/22/2024 83.1  >60.0 mL/min/1.73 Final    Lipase 12/22/2024 12 (L)  13 - 60 U/L Final    Color, UA 12/22/2024 Yellow  Yellow, Straw Final    Appearance, UA 12/22/2024 Clear  Clear Final    pH, UA 12/22/2024 5.5  5.0 - 8.0 Final    Specific Gravity, UA 12/22/2024 1.010  1.005 - 1.030 Final    Glucose, UA 12/22/2024 Negative  Negative Final    Ketones, UA 12/22/2024 Negative  Negative Final    Bilirubin, UA 12/22/2024 Negative  Negative Final    Blood, UA 12/22/2024 Negative  Negative Final    Protein, UA 12/22/2024 Negative  Negative Final    Leuk Esterase, UA 12/22/2024 Negative  Negative Final    Nitrite, UA 12/22/2024 Negative  Negative Final    Urobilinogen, UA 12/22/2024 0.2 E.U./dL  0.2 - 1.0 E.U./dL Final    Lactate 12/22/2024 1.7  0.5 - 2.0 mmol/L Final    Extra Tube 12/22/2024 Hold for add-ons.   Final    Auto resulted.    Extra Tube 12/22/2024 hold for add-on   Final    Auto resulted    Extra Tube 12/22/2024 Hold for add-ons.   Final    Auto resulted.    Extra Tube 12/22/2024 Hold for add-ons.   Final    Auto resulted    WBC 12/22/2024 4.52  3.40 - 10.80 10*3/mm3 Final    RBC 12/22/2024 2.90 (L)  3.77 - 5.28 10*6/mm3 Final    Hemoglobin 12/22/2024 9.3 (L)  12.0 - 15.9 g/dL Final    Hematocrit 12/22/2024 30.1 (L)  34.0 - 46.6 % Final    MCV 12/22/2024 103.8 (H)  79.0 - 97.0 fL Final    MCH 12/22/2024 32.1  26.6 - 33.0 pg Final    MCHC 12/22/2024 30.9 (L)  31.5 - 35.7 g/dL Final    RDW 12/22/2024 15.9 (H)  12.3 - 15.4 % Final    RDW-SD 12/22/2024 61.7 (H)  37.0 - 54.0 fl Final    MPV 12/22/2024 9.5  6.0 - 12.0 fL Final    Platelets 12/22/2024 194  140 - 450 10*3/mm3 Final    Neutrophil %  12/22/2024 59.1  42.7 - 76.0 % Final    Lymphocyte % 12/22/2024 27.7  19.6 - 45.3 % Final    Monocyte % 12/22/2024 11.3  5.0 - 12.0 % Final    Eosinophil % 12/22/2024 0.4  0.3 - 6.2 % Final    Basophil % 12/22/2024 1.3  0.0 - 1.5 % Final    Immature Grans % 12/22/2024 0.2  0.0 - 0.5 % Final    Neutrophils, Absolute 12/22/2024 2.67  1.70 - 7.00 10*3/mm3 Final    Lymphocytes, Absolute 12/22/2024 1.25  0.70 - 3.10 10*3/mm3 Final    Monocytes, Absolute 12/22/2024 0.51  0.10 - 0.90 10*3/mm3 Final    Eosinophils, Absolute 12/22/2024 0.02  0.00 - 0.40 10*3/mm3 Final    Basophils, Absolute 12/22/2024 0.06  0.00 - 0.20 10*3/mm3 Final    Immature Grans, Absolute 12/22/2024 0.01  0.00 - 0.05 10*3/mm3 Final    nRBC 12/22/2024 0.0  0.0 - 0.2 /100 WBC Final   Hospital Outpatient Visit on 12/03/2024   Component Date Value Ref Range Status    Glucose 12/03/2024 111 (H)  65 - 99 mg/dL Final    BUN 12/03/2024 16  8 - 23 mg/dL Final    Creatinine 12/03/2024 0.92  0.57 - 1.00 mg/dL Final    Sodium 12/03/2024 141  136 - 145 mmol/L Final    Potassium 12/03/2024 3.7  3.5 - 5.2 mmol/L Final    Chloride 12/03/2024 102  98 - 107 mmol/L Final    CO2 12/03/2024 29.8 (H)  22.0 - 29.0 mmol/L Final    Calcium 12/03/2024 9.2  8.6 - 10.5 mg/dL Final    Total Protein 12/03/2024 7.0  6.0 - 8.5 g/dL Final    Albumin 12/03/2024 4.0  3.5 - 5.2 g/dL Final    ALT (SGPT) 12/03/2024 12  1 - 33 U/L Final    AST (SGOT) 12/03/2024 13  1 - 32 U/L Final    Alkaline Phosphatase 12/03/2024 103  39 - 117 U/L Final    Total Bilirubin 12/03/2024 0.4  0.0 - 1.2 mg/dL Final    Globulin 12/03/2024 3.0  gm/dL Final    A/G Ratio 12/03/2024 1.3  g/dL Final    BUN/Creatinine Ratio 12/03/2024 17.4  7.0 - 25.0 Final    Anion Gap 12/03/2024 9.2  5.0 - 15.0 mmol/L Final    eGFR 12/03/2024 69.2  >60.0 mL/min/1.73 Final    Magnesium 12/03/2024 1.9  1.6 - 2.4 mg/dL Final    Phosphorus 12/03/2024 2.0 (L)  2.5 - 4.5 mg/dL Final    WBC 12/03/2024 5.61  3.40 - 10.80 10*3/mm3 Final     RBC 12/03/2024 3.09 (L)  3.77 - 5.28 10*6/mm3 Final    Hemoglobin 12/03/2024 10.0 (L)  12.0 - 15.9 g/dL Final    Hematocrit 12/03/2024 32.1 (L)  34.0 - 46.6 % Final    MCV 12/03/2024 103.9 (H)  79.0 - 97.0 fL Final    MCH 12/03/2024 32.4  26.6 - 33.0 pg Final    MCHC 12/03/2024 31.2 (L)  31.5 - 35.7 g/dL Final    RDW 12/03/2024 16.0 (H)  12.3 - 15.4 % Final    RDW-SD 12/03/2024 61.7 (H)  37.0 - 54.0 fl Final    MPV 12/03/2024 10.1  6.0 - 12.0 fL Final    Platelets 12/03/2024 245  140 - 450 10*3/mm3 Final    Neutrophil % 12/03/2024 71.7  42.7 - 76.0 % Final    Lymphocyte % 12/03/2024 21.6  19.6 - 45.3 % Final    Monocyte % 12/03/2024 4.8 (L)  5.0 - 12.0 % Final    Eosinophil % 12/03/2024 0.2 (L)  0.3 - 6.2 % Final    Basophil % 12/03/2024 1.2  0.0 - 1.5 % Final    Immature Grans % 12/03/2024 0.5  0.0 - 0.5 % Final    Neutrophils, Absolute 12/03/2024 4.02  1.70 - 7.00 10*3/mm3 Final    Lymphocytes, Absolute 12/03/2024 1.21  0.70 - 3.10 10*3/mm3 Final    Monocytes, Absolute 12/03/2024 0.27  0.10 - 0.90 10*3/mm3 Final    Eosinophils, Absolute 12/03/2024 0.01  0.00 - 0.40 10*3/mm3 Final    Basophils, Absolute 12/03/2024 0.07  0.00 - 0.20 10*3/mm3 Final    Immature Grans, Absolute 12/03/2024 0.03  0.00 - 0.05 10*3/mm3 Final    nRBC 12/03/2024 0.0  0.0 - 0.2 /100 WBC Final       EKG Results:  No orders to display       Imaging Results:  CT Chest With Contrast Diagnostic    Result Date: 10/29/2024  Impression: 1. Stable diffuse sclerotic osseous metastatic disease. 2. New bronchial wall thickening and endobronchial secretion in the right lower lobe likely secondary to bronchitis. Mild airspace consolidation in the right lower lobe may represent atelectasis or developing pneumonia. Electronically Signed: Floyd Leung MD  10/29/2024 10:48 AM EDT  Workstation ID: [id number]    CT Abdomen Pelvis With Contrast    Result Date: 10/29/2024  Impression: Stable exam. Diffuse sclerotic osseous metastatic disease. Electronically Signed:  Floyd Leung MD  10/29/2024 10:31 AM EDT  Workstation ID: [id number]    NM Bone Scan Whole Body    Result Date: 10/22/2024  1.New sternal and right lower rib cage radiotracer uptake concerning for new sites of active metastatic disease. Electronically Signed: Donn Daigle MD  10/22/2024 4:25 PM EDT  Workstation ID: [id number]    CT Chest With Contrast Diagnostic    Result Date: 7/30/2024  Impression: Stable appearance of the innumerable osseous metastases. No new suspicious pulmonary nodule or mass. No evidence of new or additional metastatic disease in the chest. Coronary artery calcifications. Electronically Signed: Fox Schaeffer MD  7/30/2024 11:42 AM EDT  Workstation ID: [id number]    CT Abdomen Pelvis With Contrast    Result Date: 7/30/2024  Impression: 1. Extensive osteosclerotic metastatic disease does not appear appreciably changed within the abdomen or pelvis. 2. No indication of soft tissue organ metastasis in the abdomen or pelvis. 3. Interval ventral abdominal hernia repair with small superficial soft tissue seroma. Electronically Signed: Aicha Bonilla MD  7/30/2024 11:35 AM EDT  Workstation ID: [id number]        Assessment & Plan   Diagnoses and all orders for this visit:    1. Major depressive disorder, recurrent episode, moderate (Primary)  -     DULoxetine (CYMBALTA) 60 MG capsule; Take 1 capsule by mouth Daily.  Dispense: 90 capsule; Refill: 1  -     DULoxetine (CYMBALTA) 30 MG capsule; Take 1 capsule by mouth Daily.  Dispense: 90 capsule; Refill: 1  -     ARIPiprazole (Abilify) 2 MG tablet; Take 1 tablet by mouth Daily.  Dispense: 30 tablet; Refill: 2    2. Bereavement  -     DULoxetine (CYMBALTA) 60 MG capsule; Take 1 capsule by mouth Daily.  Dispense: 90 capsule; Refill: 1  -     DULoxetine (CYMBALTA) 30 MG capsule; Take 1 capsule by mouth Daily.  Dispense: 90 capsule; Refill: 1    3. Generalized anxiety disorder  -     DULoxetine (CYMBALTA) 60 MG capsule; Take 1 capsule by mouth Daily.  Dispense: 90  capsule; Refill: 1  -     DULoxetine (CYMBALTA) 30 MG capsule; Take 1 capsule by mouth Daily.  Dispense: 90 capsule; Refill: 1  -     ARIPiprazole (Abilify) 2 MG tablet; Take 1 tablet by mouth Daily.  Dispense: 30 tablet; Refill: 2    4. Panic attacks  -     DULoxetine (CYMBALTA) 60 MG capsule; Take 1 capsule by mouth Daily.  Dispense: 90 capsule; Refill: 1  -     DULoxetine (CYMBALTA) 30 MG capsule; Take 1 capsule by mouth Daily.  Dispense: 90 capsule; Refill: 1  -     ARIPiprazole (Abilify) 2 MG tablet; Take 1 tablet by mouth Daily.  Dispense: 30 tablet; Refill: 2    5. Insomnia due to mental condition  -     DULoxetine (CYMBALTA) 60 MG capsule; Take 1 capsule by mouth Daily.  Dispense: 90 capsule; Refill: 1  -     DULoxetine (CYMBALTA) 30 MG capsule; Take 1 capsule by mouth Daily.  Dispense: 90 capsule; Refill: 1  -     ARIPiprazole (Abilify) 2 MG tablet; Take 1 tablet by mouth Daily.  Dispense: 30 tablet; Refill: 2    6. Post traumatic stress disorder (PTSD)  -     DULoxetine (CYMBALTA) 60 MG capsule; Take 1 capsule by mouth Daily.  Dispense: 90 capsule; Refill: 1  -     DULoxetine (CYMBALTA) 30 MG capsule; Take 1 capsule by mouth Daily.  Dispense: 90 capsule; Refill: 1  -     ARIPiprazole (Abilify) 2 MG tablet; Take 1 tablet by mouth Daily.  Dispense: 30 tablet; Refill: 2    7. Snoring    8. Cancer related pain  -     DULoxetine (CYMBALTA) 60 MG capsule; Take 1 capsule by mouth Daily.  Dispense: 90 capsule; Refill: 1        Visit Diagnoses:    ICD-10-CM ICD-9-CM   1. Major depressive disorder, recurrent episode, moderate  F33.1 296.32   2. Bereavement  Z63.4 V62.82   3. Generalized anxiety disorder  F41.1 300.02   4. Panic attacks  F41.0 300.01   5. Insomnia due to mental condition  F51.05 300.9     327.02   6. Post traumatic stress disorder (PTSD)  F43.10 309.81   7. Snoring  R06.83 786.09   8. Cancer related pain  G89.3 338.3     05/22/2025: MDD, grief. Add abilify. Pt will likely go to sleep lab for her  sleep study.    Acknowledged and normalized patient's thoughts, feelings, and concerns. Allowed patient to freely discuss and process issues, such as:  Anxiety and depression regarding medical conditions, pain.  Grieving the loss of a loved one, her two friends.  Anxiety regarding finances.  ... using Rogerian psychotherapeutic techniques including unconditional positive regard, reflective listening, and demonstrating clear empathy, with the goal of ameliorating symptoms and maintaining, restoring, or improving self-esteem, adaptive skills, and ego or psychological functions (Garry et al. 1991), the long-term goal of which is to develop a better, healthier perspective and help the patient bear their circumstances more easily.  Time (minutes) spent providing supportive psychotherapy: 16  (This time is exclusive to the therapy session and separate from the time spent on activities used to meet the criteria for the E/M service (history, exam, medical decision-making).)  Start: 11:12  Stop: 11:28  Functional status: mild impairment  Treatment plan: Medication management and supportive psychotherapy  Prognosis: good  Progress: grieving, insomnia; MDD, NATE improving  7w    04/10/2025: WG, mirtazapine helping with MDD, NATE. If continued WG, stop and trial bupropion, which helped in the past. Follow for sleep consult.    02/27/2025: Sleep medicine for snoring, increase mirtazaine for NATE, MDD. Grieving the loss of 2 friends.    12/30/2024: Watch weight. Improving. Restart mirtazapine for MDD, NATE, insomnia.     11/12/2024: Increase cymbalta for MDD. Pt just started on mirtazapine. Would want to find her a therapist. 6w    PLAN:  Safety: No acute safety concerns  Therapy:  recommended, but can't use mychart  Risk Assessment: Risk of self-harm acutely is moderate.  Risk factors include anxiety disorder, mood disorder, access to firearms, and recent psychosocial stressors (pandemic). Protective factors include no family  history, no present SI, no history of suicide attempts or self-harm in the past, minimal AODA, healthcare seeking, future orientation, willingness to engage in care.  Risk of self-harm chronically is also moderate, but could be further elevated in the event of treatment noncompliance and/or AODA.  Meds:  RESTART abilify 2 mg qhs. Risks, benefits, alternatives discussed with patient including increased energy, exacerbation of irritability, akathisia, movement issues, GI upset, orthostatic hypotension, increased appetite, tardive dyskinesia.  Use care when operating vehicle, vessel, or machine. After discussion of these risks and benefits, the patient voiced understanding and agreed to proceed.  CONTINUE cymbalta 90 mg qday. Risks, benefits, alternatives discussed with patient including GI upset, nausea vomiting diarrhea, theoretical decrease of seizure threshold predisposing the patient to a slightly higher seizure risk, headaches, sexual dysfunction, serotonin syndrome, bleeding risk, increased suicidality in patients 24 years and younger, switching to ebony/hypomania.  Also constipation and urinary retention.  After discussion of these risks and benefits, the patient voiced understanding and agreed to proceed.  CONTINUE mirtazapine 30 mg qday. Risks, benefits, alternatives discussed with patient including GI upset, sedation, dizziness with falls risk, increased appetite.  Do not use before operating vehicle, vessel, or machine. After discussion of these risks and benefits, the patient voiced understanding and agreed to proceed.  ALSO on trazodone 150 mg qhs. Risks, benefits, side effects discussed with patient including GI upset, sedation, dizziness/falls risk, grogginess the following day, prolongation of the QTc interval.  Do not use before operating vehicle, vessel, or machine. After discussion of these risks and benefits, the patient voiced understanding and agreed to proceed.    S/P:  Buspar did  nothing  Abilify can't remember  Labs: none      Patient screened positive for depression based on a PHQ-9 score of 17 on 11/12/2024. Follow-up recommendations include: Prescribed antidepressant medication treatment and Suicide Risk Assessment performed.           TREATMENT PLAN/GOALS: Continue supportive psychotherapy efforts and medications as indicated. Treatment and medication options discussed during today's visit. Patient acknowledged and verbally consented to continue with current treatment plan and was educated on the importance of compliance with treatment and follow-up appointments.    MEDICATION ISSUES:  ALEXEI reviewed as expected.  Discussed medication options and treatment plan of prescribed medication as well as the risks, benefits, and side effects including potential falls, possible impaired driving and metabolic adversities among others. Patient is agreeable to call the office with any worsening of symptoms or onset of side effects. Patient is agreeable to call 911 or go to the nearest ER should he/she begin having SI/HI. No medication side effects or related complaints today.     MEDS ORDERED DURING VISIT:  New Medications Ordered This Visit   Medications    DULoxetine (CYMBALTA) 60 MG capsule     Sig: Take 1 capsule by mouth Daily.     Dispense:  90 capsule     Refill:  1     Dose now 60 + 30 = 90 mg daily. Thank you for the help. Please call with questions: [phone].    DULoxetine (CYMBALTA) 30 MG capsule     Sig: Take 1 capsule by mouth Daily.     Dispense:  90 capsule     Refill:  1     Increasing dose to 60 + 30 = 90 mg daily. Thank you for the help. Please call with questions: [phone].    ARIPiprazole (Abilify) 2 MG tablet     Sig: Take 1 tablet by mouth Daily.     Dispense:  30 tablet     Refill:  2       Return in about 7 weeks (around 7/10/2025).         This document has been electronically signed by Megha Jose MD  May 22, 2025 11:33 EDT    Dictated Utilizing Dragon  Dictation: Part of this note may be an electronic transcription/translation of spoken language to printed text using the Dragon Dictation System.

## 2025-05-22 ENCOUNTER — OFFICE VISIT (OUTPATIENT)
Dept: PSYCHIATRY | Facility: CLINIC | Age: 65
End: 2025-05-22
Payer: MEDICARE

## 2025-05-22 VITALS
HEART RATE: 66 BPM | BODY MASS INDEX: 33.97 KG/M2 | SYSTOLIC BLOOD PRESSURE: 101 MMHG | OXYGEN SATURATION: 90 % | DIASTOLIC BLOOD PRESSURE: 44 MMHG | WEIGHT: 211.4 LBS | HEIGHT: 66 IN

## 2025-05-22 DIAGNOSIS — G89.3 CANCER RELATED PAIN: ICD-10-CM

## 2025-05-22 DIAGNOSIS — F41.1 GENERALIZED ANXIETY DISORDER: ICD-10-CM

## 2025-05-22 DIAGNOSIS — F43.10 POST TRAUMATIC STRESS DISORDER (PTSD): ICD-10-CM

## 2025-05-22 DIAGNOSIS — F51.05 INSOMNIA DUE TO MENTAL CONDITION: ICD-10-CM

## 2025-05-22 DIAGNOSIS — Z63.4 BEREAVEMENT: ICD-10-CM

## 2025-05-22 DIAGNOSIS — F33.1 MAJOR DEPRESSIVE DISORDER, RECURRENT EPISODE, MODERATE: Primary | ICD-10-CM

## 2025-05-22 DIAGNOSIS — R06.83 SNORING: ICD-10-CM

## 2025-05-22 DIAGNOSIS — F41.0 PANIC ATTACKS: ICD-10-CM

## 2025-05-22 RX ORDER — METHYLPREDNISOLONE 4 MG/1
TABLET ORAL
COMMUNITY
Start: 2025-05-05

## 2025-05-22 RX ORDER — FUROSEMIDE 40 MG/1
TABLET ORAL
COMMUNITY
Start: 2025-05-19

## 2025-05-22 RX ORDER — CYPROHEPTADINE HYDROCHLORIDE 4 MG/1
TABLET ORAL
COMMUNITY
Start: 2025-05-05

## 2025-05-22 RX ORDER — DULOXETIN HYDROCHLORIDE 30 MG/1
30 CAPSULE, DELAYED RELEASE ORAL DAILY
Qty: 90 CAPSULE | Refills: 1 | Status: SHIPPED | OUTPATIENT
Start: 2025-05-22

## 2025-05-22 RX ORDER — DULOXETIN HYDROCHLORIDE 60 MG/1
60 CAPSULE, DELAYED RELEASE ORAL DAILY
Qty: 90 CAPSULE | Refills: 1 | Status: SHIPPED | OUTPATIENT
Start: 2025-05-22

## 2025-05-22 RX ORDER — LORATADINE 10 MG/1
1 TABLET ORAL DAILY
COMMUNITY
Start: 2025-04-14

## 2025-05-22 RX ORDER — ARIPIPRAZOLE 2 MG/1
2 TABLET ORAL DAILY
Qty: 30 TABLET | Refills: 2 | Status: SHIPPED | OUTPATIENT
Start: 2025-05-22

## 2025-05-22 SDOH — SOCIAL STABILITY - SOCIAL INSECURITY: DISSAPEARANCE AND DEATH OF FAMILY MEMBER: Z63.4

## 2025-05-27 ENCOUNTER — TELEPHONE (OUTPATIENT)
Dept: ONCOLOGY | Facility: HOSPITAL | Age: 65
End: 2025-05-27
Payer: MEDICARE

## 2025-05-27 NOTE — TELEPHONE ENCOUNTER
LEFT MESSAGE FOR PATIENT IN REGARDS TO APPOINTMENTS FOR LABS/INJECTION SCHEDULED FOR TODAY, ASKED FOR PATIENT TO CALL OFFICE BACK TO RESCHEDULE.

## 2025-05-28 ENCOUNTER — SPECIALTY PHARMACY (OUTPATIENT)
Dept: PHARMACY | Facility: HOSPITAL | Age: 65
End: 2025-05-28
Payer: MEDICARE

## 2025-05-28 DIAGNOSIS — R23.2 HOT FLASHES: ICD-10-CM

## 2025-05-28 DIAGNOSIS — R60.0 PERIPHERAL EDEMA: ICD-10-CM

## 2025-05-28 RX ORDER — HYDROCHLOROTHIAZIDE 12.5 MG/1
12.5 TABLET ORAL DAILY
Qty: 90 TABLET | Refills: 1 | Status: SHIPPED | OUTPATIENT
Start: 2025-05-28

## 2025-05-28 RX ORDER — POTASSIUM CHLORIDE 750 MG/1
CAPSULE, EXTENDED RELEASE ORAL
Qty: 60 CAPSULE | Refills: 1 | Status: SHIPPED | OUTPATIENT
Start: 2025-05-28

## 2025-05-28 RX ORDER — OXYBUTYNIN CHLORIDE 15 MG/1
TABLET, EXTENDED RELEASE ORAL
Qty: 30 TABLET | Refills: 1 | Status: SHIPPED | OUTPATIENT
Start: 2025-05-28

## 2025-05-28 NOTE — PROGRESS NOTES
Specialty Pharmacy Patient Management Program  Oncology Refill Outreach      Derek is a 64 y.o. female contacted today regarding refills of her medication(s). [facility] will ship medications when ready to be dispensed.     Specialty medication(s) and dose(s) confirmed: Y  Other medications being refilled: N    Refill Questions      Flowsheet Row Most Recent Value   Changes to allergies? No   Changes to medications? No   New conditions or infections since last clinic visit No   Unplanned office visit, urgent care, ED, or hospital admission in the last 4 weeks  No   How does patient/caregiver feel medication is working? Very good   Financial problems or insurance changes  No   Since the previous refill, were any specialty medication doses or scheduled injections missed or delayed?  Yes   If yes, please provide the amount 1 dose   Why were doses missed? forgot   Does this patient require a clinical escalation to a pharmacist? No            Delivery Questions      Flowsheet Row Most Recent Value   Delivery method [employer]   Delivery address verified with patient/caregiver? Yes   Delivery address Prescription   Number of medications in delivery 1   Medication(s) being filled and delivered Ribociclib Succinate (KISQALI)   Doses left of specialty medications 1 week and a half   Copay verified? Yes   Copay amount $0.00   Copay form of payment No copayment ($0)   Delivery Date Selection 05/29/25   Signature Required No   Do you consent to receive electronic handouts?  No                   Follow-up: 21 DAYS      Frieda Harper  Oncology Care Coordinator  5/28/2025  10:28 EDT

## 2025-06-03 DIAGNOSIS — C50.912 MALIGNANT NEOPLASM OF LEFT BREAST IN FEMALE, ESTROGEN RECEPTOR POSITIVE, UNSPECIFIED SITE OF BREAST: ICD-10-CM

## 2025-06-03 DIAGNOSIS — Z17.0 MALIGNANT NEOPLASM OF LEFT BREAST IN FEMALE, ESTROGEN RECEPTOR POSITIVE, UNSPECIFIED SITE OF BREAST: ICD-10-CM

## 2025-06-03 DIAGNOSIS — G89.3 CANCER RELATED PAIN: ICD-10-CM

## 2025-06-03 RX ORDER — ONDANSETRON 8 MG/1
8 TABLET, FILM COATED ORAL EVERY 8 HOURS PRN
Qty: 30 TABLET | Refills: 5 | Status: SHIPPED | OUTPATIENT
Start: 2025-06-03

## 2025-06-03 RX ORDER — OXYCODONE AND ACETAMINOPHEN 10; 325 MG/1; MG/1
1 TABLET ORAL EVERY 6 HOURS PRN
Qty: 60 TABLET | Refills: 0 | Status: SHIPPED | OUTPATIENT
Start: 2025-06-03

## 2025-06-03 NOTE — TELEPHONE ENCOUNTER
Caller: Derek Keller    Relationship: Self    Best call back number: [phone]    Requested Prescriptions:   Requested Prescriptions     Pending Prescriptions Disp Refills    oxyCODONE-acetaminophen (PERCOCET)  MG per tablet 60 tablet 0     Sig: Take 1 tablet by mouth Every 6 (Six) Hours As Needed for Moderate Pain.    ondansetron (ZOFRAN) 8 MG tablet 30 tablet 5     Sig: Take 1 tablet by mouth Every 8 (Eight) Hours As Needed for Nausea or Vomiting.        Pharmacy where request should be sent: [facility], [facility], [facility] [address]  [facility], [place] - [address] - [phone]  - [phone] FX     Last office visit with prescribing clinician: 4/29/2025   Last telemedicine visit with prescribing clinician: Visit date not found   Next office visit with prescribing clinician: 7/3/2025       Does the patient have less than a 3 day supply:  [x] Yes  [] No    Abby Benítez Rep   06/03/25 14:14 EDT

## 2025-06-05 ENCOUNTER — HOSPITAL ENCOUNTER (OUTPATIENT)
Dept: ONCOLOGY | Facility: HOSPITAL | Age: 65
Discharge: HOME | End: 2025-06-05
Payer: MEDICARE

## 2025-06-05 VITALS
SYSTOLIC BLOOD PRESSURE: 103 MMHG | TEMPERATURE: 97.8 F | DIASTOLIC BLOOD PRESSURE: 51 MMHG | OXYGEN SATURATION: 97 % | HEART RATE: 61 BPM | RESPIRATION RATE: 18 BRPM

## 2025-06-05 DIAGNOSIS — Z17.0 MALIGNANT NEOPLASM OF UPPER-OUTER QUADRANT OF LEFT BREAST IN FEMALE, ESTROGEN RECEPTOR POSITIVE: ICD-10-CM

## 2025-06-05 DIAGNOSIS — C79.51 MALIGNANT NEOPLASM METASTATIC TO BONE: ICD-10-CM

## 2025-06-05 DIAGNOSIS — C50.412 MALIGNANT NEOPLASM OF UPPER-OUTER QUADRANT OF LEFT BREAST IN FEMALE, ESTROGEN RECEPTOR POSITIVE: Primary | ICD-10-CM

## 2025-06-05 DIAGNOSIS — Z45.2 ENCOUNTER FOR ADJUSTMENT OR MANAGEMENT OF VASCULAR ACCESS DEVICE: Primary | ICD-10-CM

## 2025-06-05 DIAGNOSIS — C50.412 MALIGNANT NEOPLASM OF UPPER-OUTER QUADRANT OF LEFT BREAST IN FEMALE, ESTROGEN RECEPTOR POSITIVE: ICD-10-CM

## 2025-06-05 DIAGNOSIS — Z17.0 MALIGNANT NEOPLASM OF UPPER-OUTER QUADRANT OF LEFT BREAST IN FEMALE, ESTROGEN RECEPTOR POSITIVE: Primary | ICD-10-CM

## 2025-06-05 LAB
ALBUMIN SERPL-MCNC: 4.1 G/DL (ref 3.5–5.2)
ALBUMIN/GLOB SERPL: 1.3 G/DL
ALP SERPL-CCNC: 96 U/L (ref 39–117)
ALT SERPL W P-5'-P-CCNC: 8 U/L (ref 1–33)
ANION GAP SERPL CALCULATED.3IONS-SCNC: 9.8 MMOL/L (ref 5–15)
ANISOCYTOSIS BLD QL: NORMAL
AST SERPL-CCNC: 15 U/L (ref 1–32)
BASOPHILS # BLD AUTO: 0.04 10*3/MM3 (ref 0–0.2)
BASOPHILS NFR BLD AUTO: 1 % (ref 0–1.5)
BILIRUB SERPL-MCNC: 0.2 MG/DL (ref 0–1.2)
BUN SERPL-MCNC: 10.8 MG/DL (ref 8–23)
BUN/CREAT SERPL: 11.9 (ref 7–25)
CALCIUM SPEC-SCNC: 9.3 MG/DL (ref 8.6–10.5)
CHLORIDE SERPL-SCNC: 105 MMOL/L (ref 98–107)
CO2 SERPL-SCNC: 28.2 MMOL/L (ref 22–29)
CREAT SERPL-MCNC: 0.91 MG/DL (ref 0.57–1)
DACRYOCYTES BLD QL SMEAR: NORMAL
DEPRECATED RDW RBC AUTO: 61.7 FL (ref 37–54)
EGFRCR SERPLBLD CKD-EPI 2021: 70.2 ML/MIN/1.73
EOSINOPHIL # BLD AUTO: 0.02 10*3/MM3 (ref 0–0.4)
EOSINOPHIL NFR BLD AUTO: 0.5 % (ref 0.3–6.2)
ERYTHROCYTE [DISTWIDTH] IN BLOOD BY AUTOMATED COUNT: 15.8 % (ref 12.3–15.4)
GLOBULIN UR ELPH-MCNC: 3.1 GM/DL
GLUCOSE SERPL-MCNC: 99 MG/DL (ref 65–99)
HCT VFR BLD AUTO: 31.3 % (ref 34–46.6)
HGB BLD-MCNC: 9.6 G/DL (ref 12–15.9)
HYPOCHROMIA BLD QL: NORMAL
IMM GRANULOCYTES # BLD AUTO: 0.01 10*3/MM3 (ref 0–0.05)
IMM GRANULOCYTES NFR BLD AUTO: 0.2 % (ref 0–0.5)
LYMPHOCYTES # BLD AUTO: 1.21 10*3/MM3 (ref 0.7–3.1)
LYMPHOCYTES NFR BLD AUTO: 29.6 % (ref 19.6–45.3)
MACROCYTES BLD QL SMEAR: NORMAL
MAGNESIUM SERPL-MCNC: 1.9 MG/DL (ref 1.6–2.4)
MCH RBC QN AUTO: 32.9 PG (ref 26.6–33)
MCHC RBC AUTO-ENTMCNC: 30.7 G/DL (ref 31.5–35.7)
MCV RBC AUTO: 107.2 FL (ref 79–97)
MONOCYTES # BLD AUTO: 0.4 10*3/MM3 (ref 0.1–0.9)
MONOCYTES NFR BLD AUTO: 9.8 % (ref 5–12)
NEUTROPHILS NFR BLD AUTO: 2.41 10*3/MM3 (ref 1.7–7)
NEUTROPHILS NFR BLD AUTO: 58.9 % (ref 42.7–76)
NRBC BLD AUTO-RTO: 0 /100 WBC (ref 0–0.2)
PHOSPHATE SERPL-MCNC: 5.7 MG/DL (ref 2.5–4.5)
PLATELET # BLD AUTO: 190 10*3/MM3 (ref 140–450)
PMV BLD AUTO: 10.1 FL (ref 6–12)
POIKILOCYTOSIS BLD QL SMEAR: NORMAL
POTASSIUM SERPL-SCNC: 4.1 MMOL/L (ref 3.5–5.2)
PROT SERPL-MCNC: 7.2 G/DL (ref 6–8.5)
RBC # BLD AUTO: 2.92 10*6/MM3 (ref 3.77–5.28)
SMALL PLATELETS BLD QL SMEAR: ADEQUATE
SODIUM SERPL-SCNC: 143 MMOL/L (ref 136–145)
WBC MORPH BLD: NORMAL
WBC NRBC COR # BLD AUTO: 4.09 10*3/MM3 (ref 3.4–10.8)

## 2025-06-05 PROCEDURE — 85007 BL SMEAR W/DIFF WBC COUNT: CPT | Performed by: INTERNAL MEDICINE

## 2025-06-05 PROCEDURE — 25010000002 DENOSUMAB 120 MG/1.7ML SOLUTION: Performed by: INTERNAL MEDICINE

## 2025-06-05 PROCEDURE — 36591 DRAW BLOOD OFF VENOUS DEVICE: CPT

## 2025-06-05 PROCEDURE — 96372 THER/PROPH/DIAG INJ SC/IM: CPT

## 2025-06-05 PROCEDURE — 85025 COMPLETE CBC W/AUTO DIFF WBC: CPT | Performed by: INTERNAL MEDICINE

## 2025-06-05 PROCEDURE — 80053 COMPREHEN METABOLIC PANEL: CPT | Performed by: INTERNAL MEDICINE

## 2025-06-05 PROCEDURE — 83735 ASSAY OF MAGNESIUM: CPT | Performed by: INTERNAL MEDICINE

## 2025-06-05 PROCEDURE — 25010000002 HEPARIN LOCK FLUSH PER 10 UNITS: Performed by: INTERNAL MEDICINE

## 2025-06-05 PROCEDURE — 84100 ASSAY OF PHOSPHORUS: CPT | Performed by: INTERNAL MEDICINE

## 2025-06-05 RX ORDER — SODIUM CHLORIDE 0.9 % (FLUSH) 0.9 %
20 SYRINGE (ML) INJECTION AS NEEDED
OUTPATIENT
Start: 2025-06-05

## 2025-06-05 RX ORDER — SODIUM CHLORIDE 0.9 % (FLUSH) 0.9 %
20 SYRINGE (ML) INJECTION AS NEEDED
Status: DISCONTINUED | OUTPATIENT
Start: 2025-06-05 | End: 2025-06-06 | Stop reason: HOSPADM

## 2025-06-05 RX ORDER — HEPARIN SODIUM (PORCINE) LOCK FLUSH IV SOLN 100 UNIT/ML 100 UNIT/ML
500 SOLUTION INTRAVENOUS AS NEEDED
Status: DISCONTINUED | OUTPATIENT
Start: 2025-06-05 | End: 2025-06-06 | Stop reason: HOSPADM

## 2025-06-05 RX ORDER — HEPARIN SODIUM (PORCINE) LOCK FLUSH IV SOLN 100 UNIT/ML 100 UNIT/ML
500 SOLUTION INTRAVENOUS AS NEEDED
OUTPATIENT
Start: 2025-06-05

## 2025-06-05 RX ADMIN — HEPARIN 500 UNITS: 100 SYRINGE at 13:19

## 2025-06-05 RX ADMIN — DENOSUMAB 120 MG: 120 INJECTION SUBCUTANEOUS at 14:09

## 2025-06-05 RX ADMIN — Medication 20 ML: at 13:19

## 2025-06-18 DIAGNOSIS — G89.3 CANCER RELATED PAIN: ICD-10-CM

## 2025-06-18 RX ORDER — OXYCODONE AND ACETAMINOPHEN 10; 325 MG/1; MG/1
1 TABLET ORAL EVERY 6 HOURS PRN
Qty: 60 TABLET | Refills: 0 | Status: SHIPPED | OUTPATIENT
Start: 2025-06-18

## 2025-06-18 RX ORDER — MORPHINE SULFATE 60 MG/1
60 TABLET, FILM COATED, EXTENDED RELEASE ORAL 2 TIMES DAILY
Qty: 60 TABLET | Refills: 0 | Status: SHIPPED | OUTPATIENT
Start: 2025-06-18

## 2025-06-18 NOTE — TELEPHONE ENCOUNTER
Caller: Derek Keller    Relationship: Self    Best call back number:   Telephone Information:   Mobile [phone]     Requested Prescriptions:   Requested Prescriptions     Pending Prescriptions Disp Refills    oxyCODONE-acetaminophen (PERCOCET)  MG per tablet 60 tablet 0     Sig: Take 1 tablet by mouth Every 6 (Six) Hours As Needed for Moderate Pain.    Morphine (MS CONTIN) 60 MG 12 hr tablet 60 tablet 0     Sig: Take 1 tablet by mouth 2 (Two) Times a Day.        Pharmacy where request should be sent: [facility], [facility], [address]  [facility] [place], [place] - [address] - [phone]  - [phone] FX     Last office visit with prescribing clinician: 4/29/2025   Last telemedicine visit with prescribing clinician: Visit date not found   Next office visit with prescribing clinician: 7/3/2025     Does the patient have less than a 3 day supply:  [x] Yes  [] No    If the office needs to give you a call back, can they leave a voicemail: [x] Yes [] No

## 2025-06-19 ENCOUNTER — HOSPITAL ENCOUNTER (OUTPATIENT)
Dept: NUCLEAR MEDICINE | Facility: HOSPITAL | Age: 65
Discharge: HOME | End: 2025-06-19
Payer: MEDICARE

## 2025-06-19 ENCOUNTER — APPOINTMENT (OUTPATIENT)
Dept: CT IMAGING | Facility: HOSPITAL | Age: 65
End: 2025-06-19
Payer: MEDICARE

## 2025-06-19 ENCOUNTER — HOSPITAL ENCOUNTER (OUTPATIENT)
Dept: CT IMAGING | Facility: HOSPITAL | Age: 65
Discharge: HOME | End: 2025-06-19
Payer: MEDICARE

## 2025-06-19 DIAGNOSIS — C79.51 MALIGNANT NEOPLASM METASTATIC TO BONE: ICD-10-CM

## 2025-06-19 DIAGNOSIS — C50.412 MALIGNANT NEOPLASM OF UPPER-OUTER QUADRANT OF LEFT BREAST IN FEMALE, ESTROGEN RECEPTOR POSITIVE: ICD-10-CM

## 2025-06-19 DIAGNOSIS — Z17.0 MALIGNANT NEOPLASM OF UPPER-OUTER QUADRANT OF LEFT BREAST IN FEMALE, ESTROGEN RECEPTOR POSITIVE: ICD-10-CM

## 2025-06-19 PROCEDURE — 25510000001 IOPAMIDOL PER 1 ML: Performed by: INTERNAL MEDICINE

## 2025-06-19 PROCEDURE — 78306 BONE IMAGING WHOLE BODY: CPT

## 2025-06-19 PROCEDURE — 34310000005 TECHNETIUM MEDRONATE KIT: Performed by: INTERNAL MEDICINE

## 2025-06-19 PROCEDURE — A9503 TC99M MEDRONATE: HCPCS | Performed by: INTERNAL MEDICINE

## 2025-06-19 PROCEDURE — 74177 CT ABD & PELVIS W/CONTRAST: CPT

## 2025-06-19 PROCEDURE — 71260 CT THORAX DX C+: CPT

## 2025-06-19 RX ORDER — HEPARIN SODIUM (PORCINE) LOCK FLUSH IV SOLN 100 UNIT/ML 100 UNIT/ML
SOLUTION INTRAVENOUS
Status: DISCONTINUED
Start: 2025-06-19 | End: 2025-06-20 | Stop reason: HOSPADM

## 2025-06-19 RX ORDER — IOPAMIDOL 755 MG/ML
100 INJECTION, SOLUTION INTRAVASCULAR
Status: COMPLETED | OUTPATIENT
Start: 2025-06-19 | End: 2025-06-19

## 2025-06-19 RX ORDER — TC 99M MEDRONATE 20 MG/10ML
22 INJECTION, POWDER, LYOPHILIZED, FOR SOLUTION INTRAVENOUS
Status: COMPLETED | OUTPATIENT
Start: 2025-06-19 | End: 2025-06-19

## 2025-06-19 RX ADMIN — TC 99M MEDRONATE 22 MILLICURIE: 20 INJECTION, POWDER, LYOPHILIZED, FOR SOLUTION INTRAVENOUS at 12:45

## 2025-06-19 RX ADMIN — IOPAMIDOL 90 ML: 755 INJECTION, SOLUTION INTRAVENOUS at 12:51

## 2025-06-25 ENCOUNTER — TELEPHONE (OUTPATIENT)
Dept: ONCOLOGY | Facility: HOSPITAL | Age: 65
End: 2025-06-25
Payer: MEDICARE

## 2025-06-25 NOTE — TELEPHONE ENCOUNTER
Caller: Derek Keller    Relationship: Self    Best call back number: [phone]     Caller requesting test results: PATIENT    What test was performed: FULL BODY BONE SCAN    When was the test performed: 06/19/25    Where was the test performed:  [facility]    Additional notes: PT HAS SEEN THE RESULTS IN HER MYCHART AND HAS SOME QUESTIONS ABOUT WHAT IT SAYS AND WOULD LIKE SOMEONE TO CALL AND GO OVER THOSE RESULTS WITH HER.

## 2025-06-26 DIAGNOSIS — C50.412 MALIGNANT NEOPLASM OF UPPER-OUTER QUADRANT OF LEFT BREAST IN FEMALE, ESTROGEN RECEPTOR POSITIVE: ICD-10-CM

## 2025-06-26 DIAGNOSIS — Z17.0 MALIGNANT NEOPLASM OF UPPER-OUTER QUADRANT OF LEFT BREAST IN FEMALE, ESTROGEN RECEPTOR POSITIVE: ICD-10-CM

## 2025-06-26 RX ORDER — LETROZOLE 2.5 MG/1
TABLET, FILM COATED ORAL
Qty: 90 TABLET | Refills: 1 | Status: SHIPPED | OUTPATIENT
Start: 2025-06-26

## 2025-06-26 NOTE — TELEPHONE ENCOUNTER
Caller: Derek Keller    Relationship: Self    Best call back number:   Telephone Information:   Mobile [phone]       What is the best time to reach you: anytime      What was the call regarding: pt still req cb about the results of the bone scan. Please cb to advise

## 2025-06-26 NOTE — TELEPHONE ENCOUNTER
This nurse called the pt and relayed Dr Barker's message. The pt voiced understanding. The pt asked this nurse to read the results of the bone scan to hear. This nurse read the results. The pt voiced understanding.

## 2025-07-01 ENCOUNTER — TELEPHONE (OUTPATIENT)
Dept: ONCOLOGY | Facility: HOSPITAL | Age: 65
End: 2025-07-01
Payer: MEDICARE

## 2025-07-01 NOTE — TELEPHONE ENCOUNTER
LEFT MESSAGE FOR PATIENT IN REGARDS TO LABS, PATIENT STILL NEEDS THEM DRAWN, ASKED FOR PATIENT TO ARRIVE AT 1:30 PM, ON THURSDAY 07/03/2025. ASKED FOR PATIENT TO CALL OFFICE BACK TO CONFIRM APPOINTMENT.

## 2025-07-03 ENCOUNTER — HOSPITAL ENCOUNTER (OUTPATIENT)
Dept: ONCOLOGY | Facility: HOSPITAL | Age: 65
Discharge: HOME | End: 2025-07-03
Payer: MEDICARE

## 2025-07-03 ENCOUNTER — TELEPHONE (OUTPATIENT)
Dept: ONCOLOGY | Facility: HOSPITAL | Age: 65
End: 2025-07-03
Payer: MEDICARE

## 2025-07-03 ENCOUNTER — OFFICE VISIT (OUTPATIENT)
Dept: ONCOLOGY | Facility: HOSPITAL | Age: 65
End: 2025-07-03
Payer: MEDICARE

## 2025-07-03 VITALS
TEMPERATURE: 97.3 F | SYSTOLIC BLOOD PRESSURE: 149 MMHG | HEIGHT: 66 IN | DIASTOLIC BLOOD PRESSURE: 54 MMHG | WEIGHT: 214.73 LBS | BODY MASS INDEX: 34.51 KG/M2 | RESPIRATION RATE: 20 BRPM | OXYGEN SATURATION: 97 % | HEART RATE: 62 BPM

## 2025-07-03 DIAGNOSIS — C50.412 MALIGNANT NEOPLASM OF UPPER-OUTER QUADRANT OF LEFT BREAST IN FEMALE, ESTROGEN RECEPTOR POSITIVE: ICD-10-CM

## 2025-07-03 DIAGNOSIS — C79.51 MALIGNANT NEOPLASM METASTATIC TO BONE: ICD-10-CM

## 2025-07-03 DIAGNOSIS — Z17.0 MALIGNANT NEOPLASM OF UPPER-OUTER QUADRANT OF LEFT BREAST IN FEMALE, ESTROGEN RECEPTOR POSITIVE: Primary | ICD-10-CM

## 2025-07-03 DIAGNOSIS — Z45.2 ENCOUNTER FOR ADJUSTMENT OR MANAGEMENT OF VASCULAR ACCESS DEVICE: Primary | ICD-10-CM

## 2025-07-03 DIAGNOSIS — Z17.0 MALIGNANT NEOPLASM OF UPPER-OUTER QUADRANT OF LEFT BREAST IN FEMALE, ESTROGEN RECEPTOR POSITIVE: ICD-10-CM

## 2025-07-03 DIAGNOSIS — C50.412 MALIGNANT NEOPLASM OF UPPER-OUTER QUADRANT OF LEFT BREAST IN FEMALE, ESTROGEN RECEPTOR POSITIVE: Primary | ICD-10-CM

## 2025-07-03 LAB
ALBUMIN SERPL-MCNC: 4.5 G/DL (ref 3.5–5.2)
ALBUMIN/GLOB SERPL: 1.7 G/DL
ALP SERPL-CCNC: 94 U/L (ref 39–117)
ALT SERPL W P-5'-P-CCNC: 11 U/L (ref 1–33)
ANION GAP SERPL CALCULATED.3IONS-SCNC: 9.8 MMOL/L (ref 5–15)
AST SERPL-CCNC: 15 U/L (ref 1–32)
BASOPHILS # BLD AUTO: 0.06 10*3/MM3 (ref 0–0.2)
BASOPHILS NFR BLD AUTO: 1.4 % (ref 0–1.5)
BILIRUB SERPL-MCNC: 0.4 MG/DL (ref 0–1.2)
BUN SERPL-MCNC: 17.9 MG/DL (ref 8–23)
BUN/CREAT SERPL: 21.1 (ref 7–25)
CALCIUM SPEC-SCNC: 8.9 MG/DL (ref 8.6–10.5)
CHLORIDE SERPL-SCNC: 103 MMOL/L (ref 98–107)
CO2 SERPL-SCNC: 27.2 MMOL/L (ref 22–29)
CREAT SERPL-MCNC: 0.85 MG/DL (ref 0.57–1)
DEPRECATED RDW RBC AUTO: 58.3 FL (ref 37–54)
EGFRCR SERPLBLD CKD-EPI 2021: 76.1 ML/MIN/1.73
EOSINOPHIL # BLD AUTO: 0.02 10*3/MM3 (ref 0–0.4)
EOSINOPHIL NFR BLD AUTO: 0.5 % (ref 0.3–6.2)
ERYTHROCYTE [DISTWIDTH] IN BLOOD BY AUTOMATED COUNT: 15.2 % (ref 12.3–15.4)
GLOBULIN UR ELPH-MCNC: 2.6 GM/DL
GLUCOSE SERPL-MCNC: 124 MG/DL (ref 65–99)
HCT VFR BLD AUTO: 32 % (ref 34–46.6)
HGB BLD-MCNC: 10.1 G/DL (ref 12–15.9)
IMM GRANULOCYTES # BLD AUTO: 0.02 10*3/MM3 (ref 0–0.05)
IMM GRANULOCYTES NFR BLD AUTO: 0.5 % (ref 0–0.5)
LYMPHOCYTES # BLD AUTO: 1.47 10*3/MM3 (ref 0.7–3.1)
LYMPHOCYTES NFR BLD AUTO: 34.3 % (ref 19.6–45.3)
MAGNESIUM SERPL-MCNC: 2.1 MG/DL (ref 1.6–2.4)
MCH RBC QN AUTO: 33.1 PG (ref 26.6–33)
MCHC RBC AUTO-ENTMCNC: 31.6 G/DL (ref 31.5–35.7)
MCV RBC AUTO: 104.9 FL (ref 79–97)
MONOCYTES # BLD AUTO: 0.37 10*3/MM3 (ref 0.1–0.9)
MONOCYTES NFR BLD AUTO: 8.6 % (ref 5–12)
NEUTROPHILS NFR BLD AUTO: 2.34 10*3/MM3 (ref 1.7–7)
NEUTROPHILS NFR BLD AUTO: 54.7 % (ref 42.7–76)
NRBC BLD AUTO-RTO: 0 /100 WBC (ref 0–0.2)
PHOSPHATE SERPL-MCNC: 3.6 MG/DL (ref 2.5–4.5)
PLATELET # BLD AUTO: 181 10*3/MM3 (ref 140–450)
PMV BLD AUTO: 10.2 FL (ref 6–12)
POTASSIUM SERPL-SCNC: 4.1 MMOL/L (ref 3.5–5.2)
PROT SERPL-MCNC: 7.1 G/DL (ref 6–8.5)
RBC # BLD AUTO: 3.05 10*6/MM3 (ref 3.77–5.28)
SODIUM SERPL-SCNC: 140 MMOL/L (ref 136–145)
WBC NRBC COR # BLD AUTO: 4.28 10*3/MM3 (ref 3.4–10.8)

## 2025-07-03 PROCEDURE — 84100 ASSAY OF PHOSPHORUS: CPT | Performed by: INTERNAL MEDICINE

## 2025-07-03 PROCEDURE — 25010000002 HEPARIN LOCK FLUSH PER 10 UNITS: Performed by: INTERNAL MEDICINE

## 2025-07-03 PROCEDURE — 85025 COMPLETE CBC W/AUTO DIFF WBC: CPT | Performed by: INTERNAL MEDICINE

## 2025-07-03 PROCEDURE — 36591 DRAW BLOOD OFF VENOUS DEVICE: CPT

## 2025-07-03 PROCEDURE — 80053 COMPREHEN METABOLIC PANEL: CPT | Performed by: INTERNAL MEDICINE

## 2025-07-03 PROCEDURE — 83735 ASSAY OF MAGNESIUM: CPT | Performed by: INTERNAL MEDICINE

## 2025-07-03 RX ORDER — SODIUM CHLORIDE 0.9 % (FLUSH) 0.9 %
20 SYRINGE (ML) INJECTION AS NEEDED
OUTPATIENT
Start: 2025-07-03

## 2025-07-03 RX ORDER — HEPARIN SODIUM (PORCINE) LOCK FLUSH IV SOLN 100 UNIT/ML 100 UNIT/ML
500 SOLUTION INTRAVENOUS AS NEEDED
Status: DISCONTINUED | OUTPATIENT
Start: 2025-07-03 | End: 2025-07-04 | Stop reason: HOSPADM

## 2025-07-03 RX ORDER — SODIUM CHLORIDE 0.9 % (FLUSH) 0.9 %
20 SYRINGE (ML) INJECTION AS NEEDED
Status: DISCONTINUED | OUTPATIENT
Start: 2025-07-03 | End: 2025-07-04 | Stop reason: HOSPADM

## 2025-07-03 RX ORDER — CHLORHEXIDINE GLUCONATE ORAL RINSE 1.2 MG/ML
SOLUTION DENTAL
COMMUNITY
Start: 2025-06-13

## 2025-07-03 RX ORDER — HEPARIN SODIUM (PORCINE) LOCK FLUSH IV SOLN 100 UNIT/ML 100 UNIT/ML
500 SOLUTION INTRAVENOUS AS NEEDED
OUTPATIENT
Start: 2025-07-03

## 2025-07-03 RX ADMIN — Medication 20 ML: at 13:36

## 2025-07-03 RX ADMIN — HEPARIN 500 UNITS: 100 SYRINGE at 13:36

## 2025-07-03 NOTE — ASSESSMENT & PLAN NOTE
Metastatic.  Hormone receptor positive.  Patient is on letrozole, ribociclib, denosumab for bony involvement.  She is tolerating her regimen.  Blood work is notable for mild anemia but improved compared to previous.  She does note a lot of fatigue but this is multifactorial including pain medications, sleep, depression.  I encouraged her to maintain adequate nutrition and fluid intake, exercise as tolerated to help with energy.  Restaging scan showed no clear evidence of new or worsening disease.  Good response to therapy thus far.  Continue letrozole daily.  Continue ribociclib 3 weeks out of 4.  I will see her back in 1 month for ongoing treatment lab work prior.

## 2025-07-03 NOTE — PROGRESS NOTES
Chief Complaint  Malignant neoplasm of upper-outer quadrant of left breast Haile San MD Patel, Saagar, MD    Subjective          Derek Keller presents to [facility] for ongoing treatment of her metastatic breast cancer.  She is on letrozole, ribociclib, denosumab for bony involvement.  She is compliant with the regimen.  She notes a lot of fatigue which has been a chronic issue.  She still sees psychiatry for depression and anxiety but feels like the has been doing okay.  She denies any masses or adenopathy.  She notes adequate appetite.  She can perform her ADLs.  She notes a new spot in the upper right tooth area and has a follow-up with dentistry next week.    Oncology/Hematology History   Malignant neoplasm of upper-outer quadrant of left breast in female, estrogen receptor positive   10/13/2022 Initial Diagnosis    Malignant neoplasm of left breast in female, estrogen receptor positive (HCC)     10/14/2022 -  Chemotherapy    OP BREAST Letrozole / Ribociclib     10/14/2022 Cancer Staged    Staging form: Breast, AJCC 8th Edition  - Clinical: Stage IV (cT1c, cN1, cM1, ER+, NM+, HER2-) - Signed by Donald Barker MD on 10/14/2022     10/28/2022 -  Chemotherapy    OP SUPPORTIVE Denosumab (Xgeva) Q28D     Malignant neoplasm metastatic to bone   10/14/2022 Initial Diagnosis    Bone metastases (HCC)     10/28/2022 -  Chemotherapy    OP SUPPORTIVE Denosumab (Xgeva) Q28D     Secondary malignant neoplasm of bone (Resolved)   11/14/2022 Initial Diagnosis    Secondary malignant neoplasm of bone (HCC)     11/17/2022 - 4/19/2023 Radiation    RADIATION THERAPY Treatment Details (11/14/2022 - 4/19/2023)  Site: Spine - Lumbar  Technique: 3D CRT  Goal: No goal specified  Planned Treatment Start Date: 11/17/2022         Current Outpatient Medications on File Prior to Visit   Medication Sig Dispense Refill    chlorhexidine (PERIDEX) 0.12 % solution swish and spit 15 mls BY  MOUTH TWICE DAILY for 30 seconds for 1 week      Morphine (MS CONTIN) 60 MG 12 hr tablet Take 1 tablet by mouth 2 (Two) Times a Day. 60 tablet 0    ondansetron (ZOFRAN) 8 MG tablet Take 1 tablet by mouth Every 8 (Eight) Hours As Needed for Nausea or Vomiting. 30 tablet 5    oxyCODONE-acetaminophen (PERCOCET)  MG per tablet Take 1 tablet by mouth Every 6 (Six) Hours As Needed for Moderate Pain. 60 tablet 0    ARIPiprazole (Abilify) 2 MG tablet Take 1 tablet by mouth Daily. 30 tablet 2    atorvastatin (LIPITOR) 20 MG tablet TAKE 1 TABLET BY MOUTH EVERY DAY 30 tablet 6    baclofen (LIORESAL) 20 MG tablet Take 1 tablet by mouth 3 (Three) Times a Day.      cyproheptadine (PERIACTIN) 4 MG tablet TAKE 1 TABLET BY MOUTH TWICE DAILY for itching      donepezil (ARICEPT) 10 MG tablet Take 1 tablet by mouth Daily.      DULoxetine (CYMBALTA) 30 MG capsule Take 1 capsule by mouth Daily. 90 capsule 1    DULoxetine (CYMBALTA) 60 MG capsule Take 1 capsule by mouth Daily. 90 capsule 1    fluticasone (FLONASE) 50 MCG/ACT nasal spray Administer 2 sprays into the nostril(s) as directed by provider Daily.      furosemide (LASIX) 40 MG tablet       gabapentin (NEURONTIN) 600 MG tablet TAKE 1 TABLET BY MOUTH AT BEDTIME (Patient taking differently: Take 0.5 tablets by mouth 2 (Two) Times a Day As Needed (pain).) 90 tablet 0    hydroCHLOROthiazide 12.5 MG tablet TAKE 1 TABLET BY MOUTH DAILY for swelling 90 tablet 1    letrozole (FEMARA) 2.5 MG tablet TAKE 1 TABLET BY MOUTH DAILY *for cancer * 90 tablet 1    levothyroxine (SYNTHROID, LEVOTHROID) 50 MCG tablet TAKE 1 TABLET BY MOUTH EVERY DAY 90 tablet 1    lidocaine (LIDODERM) 5 % Place 1 patch on the skin as directed by provider Daily As Needed for Moderate Pain or Severe Pain. Remove & Discard patch within 12 hours or as directed by MD      loratadine (CLARITIN) 10 MG tablet Take 1 tablet by mouth Daily.      LORazepam (ATIVAN) 0.5 MG tablet Take 1 tablet by mouth Daily.      losartan  (COZAAR) 50 MG tablet Take 1 tablet by mouth Daily.      methylPREDNISolone (MEDROL) 4 MG dose pack TAKE AS DIRECTED ON BLISTER PACK INSIDE BOX -TAKE WITH FOOD (Patient not taking: Reported on 5/22/2025)      mirtazapine (REMERON) 30 MG tablet Take 1 tablet by mouth Every Night. 90 tablet 1    montelukast (SINGULAIR) 10 MG tablet Take 1 tablet by mouth Daily.      naloxone (NARCAN) 4 MG/0.1ML nasal spray Call 911. Don't prime. [place] in 1 nostril for overdose. Repeat in 2-3 minutes in other nostril if no or minimal breathing/responsiveness. 2 each 0    nicotine (NICODERM CQ) 21 MG/24HR patch Place 1 patch on the skin as directed by provider Daily. 30 patch 3    oxybutynin XL (DITROPAN XL) 15 MG 24 hr tablet TAKE 1 TABLET BY MOUTH DAILY for bladder 30 tablet 1    polyethylene glycol (MIRALAX) 17 g packet Take 17 g by mouth Daily. 5 packet 0    potassium chloride (MICRO-K) 10 MEQ CR capsule TAKE 1 CAPSULE BY MOUTH EVERY TWELVE HOURS for potassium supplement 60 capsule 1    ribociclib succinate, 600 MG Dose, (KISQALI) 200 MG tablet therapy pack tablet Take 3 tablets by mouth Take As Directed. Take 3 tablets by mouth daily for 21 days then off 7 days on a 28 day cycle. (Patient not taking: Reported on 5/22/2025) 63 tablet 11    rOPINIRole (REQUIP) 3 MG tablet Take 1 tablet by mouth 2 (Two) Times a Day.      Senna-Time 8.6 MG tablet TAKE 1 TABLET BY MOUTH DAILY *for constipation* 30 tablet 3    SUMAtriptan (IMITREX) 100 MG tablet Take 1 tablet by mouth 1 (One) Time As Needed for Migraine.      traZODone (DESYREL) 150 MG tablet TAKE 1 TABLET BY MOUTH ONCE nightly 90 tablet 2     No current facility-administered medications on file prior to visit.       Allergies   Allergen Reactions    Azithromycin Itching    Erythromycin Itching     Past Medical History:   Diagnosis Date    Abdominal pain     OSTOMY SITE    Allergic rhinitis     Anemia     NO S/S    Anxiety     Arteriosclerosis     Coronary, follows with Dr. Núñez     Arthritis     Bone cancer     Bone metastases 10/14/2022    Bowen's disease     SKIN CANCER    Breast cancer     NO SURGERY WAS DONE DUE TO METS TO BONE/COLON/LYMPHNODE    Cancer related pain 10/13/2022    Depression     Disorder associated with Helicobacter species 10/12/2022    Dysphoric mood     Emphysema lung     Fatigue     Fibromyalgia     Frequent urination     NO S/S INFECTION    Herpes simplex vulvovaginitis 07/26/2018    History of colon polyps     History of IBS     History of kidney stones     Hyperlipidemia     Hypertension     Hypothyroidism     Insomnia     Lumbago     Mood disorder     Neck pain     R/T CANCER    Palpitations     last time a few months ago    Precordial pain     R/T SPINE CANCER    RLS (restless legs syndrome)     S/P Laparoscopic Hand-Assisted Colostomy Closure with End to End Anastomosis Open Parastomal Hernia Repair 12/08/2022    Sleep disturbance     SOB (shortness of breath)     at times at rest    SOBOE (shortness of breath on exertion)      Past Surgical History:   Procedure Laterality Date    BREAST BIOPSY Left     CARDIAC CATHETERIZATION Left 1959    CARDIAC CATHETERIZATION N/A 04/06/2018    Procedure: Coronary angiography;  Surgeon: Art Licea MD;  Location: [facility] INVASIVE LOCATION;  Service: Cardiovascular    CARDIAC CATHETERIZATION N/A 04/06/2018    Procedure: Left heart cath;  Surgeon: Art Licea MD;  Location: [facility] CATH INVASIVE LOCATION;  Service: Cardiovascular    CARDIAC CATHETERIZATION N/A 04/06/2018    Procedure: Left ventriculography;  Surgeon: Art Licea MD;  Location: [facility] INVASIVE LOCATION;  Service: Cardiovascular    CHOLECYSTECTOMY      COLON SURGERY      colostomy bag, and colostomy reversal    COLONOSCOPY  11/08/2006    COLONOSCOPY N/A 06/08/2023    Procedure: COLONOSCOPY;  Surgeon: Alfred Castañeda MD;  Location: [facility] ENDOSCOPY;  Service: General;  Laterality: N/A;  same as preop    EXPLORATORY  LAPAROTOMY N/A 12/06/2022    Procedure: LAPAROTOMY EXPLORATORY sigmoid resection hartmans procedure colostomy;  Surgeon: Alfred Castañeda MD;  Location: [facility] MAIN OR;  Service: General;  Laterality: N/A;    GANGLION CYST EXCISION Bilateral     HYSTERECTOMY  05/2005    ILEOSTOMY CLOSURE N/A 06/26/2023    Procedure: Laparoscopic Hand-Assisted Colostomy Closure with End to End Anastomosis;  Surgeon: Alfred Castañeda MD;  Location: [facility] MAIN OR;  Service: General;  Laterality: N/A;    LAPAROSCOPIC GASTRIC BANDING      BAND REMOVED 2020    PARASTOMAL HERNIA REPAIR N/A 06/26/2023    Procedure: Open Parastomal Hernia Repair;  Surgeon: Alfred Castañeda MD;  Location: [facility] MAIN OR;  Service: General;  Laterality: N/A;    TONSILLECTOMY      VENOUS ACCESS DEVICE (PORT) INSERTION N/A 01/09/2023    Procedure: INSERTION VENOUS ACCESS DEVICE;  Surgeon: Alfred Castañeda MD;  Location: [facility] MAIN OR;  Service: General;  Laterality: N/A;    VENTRAL HERNIA REPAIR N/A 7/3/2024    Procedure: VENTRAL / INCISIONAL HERNIA REPAIR LAPAROSCOPIC WITH DAVINCI ROBOT with mesh;  Surgeon: Alfred Castañeda MD;  Location: [facility] MAIN OR;  Service: Robotics - DaVinci;  Laterality: N/A;     Social History     Socioeconomic History    Marital status:    Tobacco Use    Smoking status: Every Day     Current packs/day: 1.00     Average packs/day: 1 pack/day for 51.0 years (51.0 ttl pk-yrs)     Types: Cigarettes     Start date: 1974     Last attempt to quit: 7/7/2024    Smokeless tobacco: Never    Tobacco comments:          Has not used tobacco for 28 days    Vaping Use    Vaping status: Never Used   Substance and Sexual Activity    Alcohol use: No    Drug use: Not Currently     Types: Marijuana     Comment: LAST USE 7-2-24    Sexual activity: Defer     Partners: Male     Birth control/protection: None     Family History   Problem Relation Age of Onset    Hypertension Mother     Rheum arthritis Mother     Heart  "disease Mother     Breast cancer Mother     Diabetes Father     Cancer Maternal Grandmother         colon    Colon cancer Maternal Grandmother     Aneurysm Paternal Grandfather     Diabetes Other     Fibromyalgia Other     Malig Hyperthermia Neg Hx        Objective   Physical Exam  Vitals reviewed. Exam conducted with a chaperone present.   Cardiovascular:      Rate and Rhythm: Normal rate and regular rhythm.      Heart sounds: Normal heart sounds. No murmur heard.     No gallop.   Pulmonary:      Effort: Pulmonary effort is normal.      Breath sounds: Normal breath sounds.   Abdominal:      General: Bowel sounds are normal.      Comments: Ventral hernia without incarceration   Lymphadenopathy:      Cervical: No cervical adenopathy.         Vitals:    07/03/25 1356   BP: 149/54   Pulse: 62   Resp: 20   Temp: 97.3 °F (36.3 °C)   TempSrc: Temporal   SpO2: 97%   Weight: 97.4 kg (214 lb 11.7 oz)   Height: 167.6 cm (65.98\")   PainSc: 8    PainLoc: Generalized     ECOG score: 1         PHQ-9 Total Score:                    Result Review :   The following data was reviewed by: Donald Barker MD on 07/03/2025:  Lab Results   Component Value Date    HGB 10.1 (L) 07/03/2025    HCT 32.0 (L) 07/03/2025    .9 (H) 07/03/2025     07/03/2025    WBC 4.28 07/03/2025    NEUTROABS 2.34 07/03/2025    LYMPHSABS 1.47 07/03/2025    MONOSABS 0.37 07/03/2025    EOSABS 0.02 07/03/2025    BASOSABS 0.06 07/03/2025     Lab Results   Component Value Date    GLUCOSE 124 (H) 07/03/2025    BUN 17.9 07/03/2025    CREATININE 0.85 07/03/2025     07/03/2025    K 4.1 07/03/2025     07/03/2025    CO2 27.2 07/03/2025    CALCIUM 8.9 07/03/2025    PROTEINTOT 7.1 07/03/2025    ALBUMIN 4.5 07/03/2025    BILITOT 0.4 07/03/2025    ALKPHOS 94 07/03/2025    AST 15 07/03/2025    ALT 11 07/03/2025     Lab Results   Component Value Date    MG 2.1 07/03/2025    PHOS 3.6 07/03/2025    FREET4 1.01 01/17/2022    TSH 1.470 11/12/2019     Lab " "Results   Component Value Date    IRON 28 (L) 05/29/2024    LABIRON 7 (L) 05/29/2024    TRANSFERRIN 279 05/29/2024    TIBC 416 05/29/2024     Lab Results   Component Value Date    FERRITIN 56.02 05/29/2024    TXLCNNQT19 390 04/23/2019    FOLATE 9.93 04/23/2019     No results found for: \"PSA\", \"CEA\", \"AFP\", \"\", \"\"    Data reviewed : Radiologic studies CT chest, abdomen, pelvis and bone scan reviewed      Assessment and Plan    Diagnoses and all orders for this visit:    1. Malignant neoplasm of upper-outer quadrant of left breast in female, estrogen receptor positive (Primary)  Assessment & Plan:  Metastatic.  Hormone receptor positive.  Patient is on letrozole, ribociclib, denosumab for bony involvement.  She is tolerating her regimen.  Blood work is notable for mild anemia but improved compared to previous.  She does note a lot of fatigue but this is multifactorial including pain medications, sleep, depression.  I encouraged her to maintain adequate nutrition and fluid intake, exercise as tolerated to help with energy.  Restaging scan showed no clear evidence of new or worsening disease.  Good response to therapy thus far.  Continue letrozole daily.  Continue ribociclib 3 weeks out of 4.  I will see her back in 1 month for ongoing treatment lab work prior.    Orders:  -     CBC and Differential; Future  -     Comprehensive metabolic panel; Future  -     Magnesium; Future  -     Phosphorus; Future  -     Lab Appointment Request; Future  -     ONCBCN INFUSION APPOINTMENT REQUEST 01; Future  -     Clinic Appointment Request; Future    2. Malignant neoplasm metastatic to bone  Assessment & Plan:  Patient is on denosumab.  Bone scan shows stable posttreatment changes.  There is increased activity in the mouth concerning for dental disease.  I will hold denosumab for now.  She reports that she has a dentist appointment next week and I encouraged her to keep that appointment.  Further recommendations once she " has been evaluated by dentistry.    Orders:  -     CBC and Differential; Future  -     Comprehensive metabolic panel; Future  -     Magnesium; Future  -     Phosphorus; Future  -     Lab Appointment Request; Future  -     ONCBCN INFUSION APPOINTMENT REQUEST 01; Future  -     Clinic Appointment Request; Future            Patient Follow Up: 1 month    Patient was given instructions and counseling regarding her condition or for health maintenance advice. Please see specific information pulled into the AVS if appropriate.     Donald Barker MD    7/3/2025

## 2025-07-03 NOTE — ASSESSMENT & PLAN NOTE
Patient is on denosumab.  Bone scan shows stable posttreatment changes.  There is increased activity in the mouth concerning for dental disease.  I will hold denosumab for now.  She reports that she has a dentist appointment next week and I encouraged her to keep that appointment.  Further recommendations once she has been evaluated by dentistry.

## 2025-07-07 ENCOUNTER — SPECIALTY PHARMACY (OUTPATIENT)
Dept: PHARMACY | Facility: HOSPITAL | Age: 65
End: 2025-07-07
Payer: MEDICARE

## 2025-07-07 DIAGNOSIS — G89.3 CANCER RELATED PAIN: ICD-10-CM

## 2025-07-07 RX ORDER — OXYCODONE AND ACETAMINOPHEN 10; 325 MG/1; MG/1
1 TABLET ORAL EVERY 6 HOURS PRN
Qty: 60 TABLET | Refills: 0 | Status: SHIPPED | OUTPATIENT
Start: 2025-07-07

## 2025-07-07 NOTE — TELEPHONE ENCOUNTER
Caller: JERONIMO GOKUL    Relationship: SELF    Best call back number:   Telephone Information:   Mobile [phone]         Requested Prescriptions:   Requested Prescriptions     Pending Prescriptions Disp Refills    oxyCODONE-acetaminophen (PERCOCET)  MG per tablet 60 tablet 0     Sig: Take 1 tablet by mouth Every 6 (Six) Hours As Needed for Moderate Pain.        Pharmacy where request should be sent: [facility], [facility], [place] - [address] - [phone] [facility] [phone] FX     Last office visit with prescribing clinician: 7/3/2025   Last telemedicine visit with prescribing clinician: Visit date not found   Next office visit with prescribing clinician: Visit date not found         Does the patient have less than a 3 day supply:  [x] Yes  [] No    Would you like a call back once the refill request has been completed: [] Yes [x] No    If the office needs to give you a call back, can they leave a voicemail: [] Yes [x] No

## 2025-07-07 NOTE — PROGRESS NOTES
Specialty Pharmacy Patient Management Program  Chart Review/Lab Monitoring     Derek Keller is a 64 y.o. female with L Lobular HR+/HER2- Breast Cancer who presented for lab check and Oncology provider appointment. [facility] reviewed labs and providers note to assess continued therapy appropriateness of Kisqali (ribociclib)    Oncology Interval History:  Evelyn Barker  Diagnosis: Stage IV bone (10/2022)  Biomarkers: ER+ 95%, NC+ 40%, HER2- 1+, Ki-67 15%     Current Tx: Kisqali (ribociclib) 600mg daily for 21 days on, 7 days off (10/22/22-now) + Letrozole 2.5mg daily + Xgeva [id number] (10/28/22-now)  Most Recent Imagin/3/25 CT & Bone Scan No evidence of progression of disease  Previous Tx: XRT Spine (22)    Fills at: [facility]  Last seen in person by Specialty Pharmacy: 10/5/23    4/1/25: Patient continues to tolerate therapy well. Her only complaint was cancer related pain likely secondary to her bone mets for which her Xgeva had been held d/t concern for ONJ - now resolved and cleared per dentistry to resume. No changes to plan.  7/3/25: Ms. Keller has no new complaints other than continued fatigue. Labs reviewed and appropriate for continuation of therapy. Xgeva on hold due to dental disease. She has a dentist appt next week.    Labs:  Lab Results   Component Value Date     2025    K 4.1 2025    CO2 27.2 2025     2025    BUN 17.9 2025    GLUCOSE 124 (H) 2025    CALCIUM 8.9 2025    CREATININE 0.85 2025    EGFRIFNONA 75 2019    BCR 21.1 2025    ANIONGAP 9.8 2025    AST 15 2025    ALT 11 2025    BILITOT 0.4 2025    ALKPHOS 94 2025     Lab Results   Component Value Date    WBC 4.28 2025    RBC 3.05 (L) 2025    HGB 10.1 (L) 2025    HCT 32.0 (L) 2025     2025    NEUTROABS 2.34 2025    LYMPHSABS 1.47  07/03/2025    MONOSABS 0.37 07/03/2025    EOSABS 0.02 07/03/2025    BASOSABS 0.06 07/03/2025     PLAN  Specialty pharmacy will continue to follow patient. Continue with current regimen as planned. Next Specialty Assessment due 9/26/25.    Suzette Luke, PharmD  Oncology Clinical Specialty Pharmacist   7/7/2025 10:42 EDT

## 2025-07-07 NOTE — PROGRESS NOTES
Specialty Pharmacy Patient Management Program  Oncology Refill Outreach      Derek is a 64 y.o. female contacted today regarding refills of her medication(s). [facility] will ship medications when ready to be dispensed.     Specialty medication(s) and dose(s) confirmed: Y  Other medications being refilled: N    Refill Questions      Flowsheet Row Most Recent Value   Changes to allergies? No   Changes to medications? No   New conditions or infections since last clinic visit No   Unplanned office visit, urgent care, ED, or hospital admission in the last 4 weeks  No   How does patient/caregiver feel medication is working? Very good   Financial problems or insurance changes  No   Since the previous refill, were any specialty medication doses or scheduled injections missed or delayed?  No   Does this patient require a clinical escalation to a pharmacist? No            Delivery Questions      Flowsheet Row Most Recent Value   Delivery method [employer]   Delivery address verified with patient/caregiver? Yes   Delivery address Prescription   Number of medications in delivery 1   Medication(s) being filled and delivered Ribociclib Succinate (KISQALI)   Doses left of specialty medications on her off week   Copay verified? Yes   Copay amount $0.00   Copay form of payment No copayment ($0)   Delivery Date Selection 07/08/25   Signature Required No   Do you consent to receive electronic handouts?  No                   Follow-up: 21 DAYS      Frieda Harper  Oncology Care Coordinator  7/7/2025  10:19 EDT

## 2025-07-11 ENCOUNTER — OFFICE VISIT (OUTPATIENT)
Dept: PSYCHIATRY | Facility: CLINIC | Age: 65
End: 2025-07-11
Payer: MEDICARE

## 2025-07-11 VITALS
OXYGEN SATURATION: 100 % | HEART RATE: 64 BPM | WEIGHT: 215 LBS | SYSTOLIC BLOOD PRESSURE: 109 MMHG | HEIGHT: 66 IN | BODY MASS INDEX: 34.55 KG/M2 | DIASTOLIC BLOOD PRESSURE: 46 MMHG

## 2025-07-11 DIAGNOSIS — F51.05 INSOMNIA DUE TO MENTAL CONDITION: ICD-10-CM

## 2025-07-11 DIAGNOSIS — F41.1 GENERALIZED ANXIETY DISORDER: ICD-10-CM

## 2025-07-11 DIAGNOSIS — F43.10 POST TRAUMATIC STRESS DISORDER (PTSD): ICD-10-CM

## 2025-07-11 DIAGNOSIS — F41.0 PANIC ATTACKS: ICD-10-CM

## 2025-07-11 DIAGNOSIS — R06.83 SNORING: ICD-10-CM

## 2025-07-11 DIAGNOSIS — Z63.4 BEREAVEMENT: Primary | ICD-10-CM

## 2025-07-11 DIAGNOSIS — G89.3 CANCER RELATED PAIN: ICD-10-CM

## 2025-07-11 DIAGNOSIS — F33.1 MAJOR DEPRESSIVE DISORDER, RECURRENT EPISODE, MODERATE: ICD-10-CM

## 2025-07-11 RX ORDER — MIRTAZAPINE 30 MG/1
30 TABLET, FILM COATED ORAL NIGHTLY
Qty: 90 TABLET | Refills: 1 | Status: SHIPPED | OUTPATIENT
Start: 2025-07-11

## 2025-07-11 RX ORDER — ARIPIPRAZOLE 2 MG/1
2 TABLET ORAL DAILY
Qty: 30 TABLET | Refills: 5 | Status: SHIPPED | OUTPATIENT
Start: 2025-07-11

## 2025-07-11 SDOH — SOCIAL STABILITY - SOCIAL INSECURITY: DISSAPEARANCE AND DEATH OF FAMILY MEMBER: Z63.4

## 2025-07-11 NOTE — PROGRESS NOTES
"Subjective   Derek Keller is a 64 y.o. female who presents today for initial evaluation     Referring Provider:  No referring provider defined for this encounter.    Chief Complaint:  depression    History of Present Illness:     Chart Review By Dates:  2025: onc x5; abnl cmp gluc 124, reassuring mg phos, abnl cbc hgb 10.1. Showing good response to onc therapy.  2025: onc x3, sleep med; abnl cmp gluc 113 co2 30.2; abnl cbc hgb 9.3, reassuring mg phos.  04/10/2025: ED for LLQ pain, onc x6, sleep med; abnl cmp c02 29.8 gluc 134 cbc; reassuring mg phos  Gen surg, onc x4; abnl cmp cbc gluc 154 co2 31  2024: received a call from Onc after pt appeared agitated during her visit after CBD vaping and smoking tobacco 12/3. Unknown x1. ED for constip ; abnl cmp, cbc, low phos.    VISITS/APPOINTMENTS (BELOW):    \"Derek\" and \"Jmaila\" her sister; \"Radha\" is her best friend  Significant metastatic disease  Dad  on 12/10, Mom  on  (both years ago)    2025:   In person interview:  \"Ok I guess.\"  Jamila not present  I don't think the abilify is strong enough. Tolerating it fine.  \"I'm not sure it is helping.\"  Discussed medical conditions; no new tumors, and some have shrunk  Will be 3 years (in August)  I used to be stuck in a hospital bed in the living room  I buy a lot of things, and I could sell them at the [employer], I just need a jamison (working on)  Discussed relationships  MDD: Depressed mood about the same  Grief: her two friends, appropriate  NATE: Worrying, on edge  Panic attacks: stable  Energy: low  Concentration: not at goal  Insomnia: initial, snoring  AIMS if on antipsychotic: na  Eating/Weight: 215, 211, 214, 204x2 lbs  Refills: y  Substances: Smoking. Not vaping; has a medical marijuana card but hasn't received it yet.  Therapy: n  Medication compliant: y  SE: n  No SI HI AVH.      2025:   In person interview:  \"I think I failed the sleep study.\"  Jamila:  She " "snores a LOT  Pt is right: her recording for her home sleep study was only 2.3 minutes  I just wake up as soon as I get to sleep  Also have RLS  Grieving her two friends  Worried that she may develop blood clots  Also in pain  Worried about the bone cancer  MDD: Depressed mood  Grief: her two friends  NATE: Worrying, on edge  Panic attacks: stable  Energy: low  Concentration: not at goal  Insomnia: initial, snoring  AIMS if on antipsychotic: na  Eating/Weight: 211, 214, 204x2 lbs  Refills: y  Substances: Smoking. Not vaping; has a medical marijuana card but hasn't received it yet.  Therapy: n  Medication compliant: y  SE: n  No SI HI AVH.      04/10/2025:   In person interview:  \"Health wise still having trouble with my stomach.\"  ROSITA was in the hospital, but he's fine. Had heart surgery.  \"I guess I'm doing fine.\"  Sleep study consult   Keeps in touch with last ex-, as well as her other 2 close friends, and neighbor  Mirtazapine helping with dep and anx  Pain is \"bad\" because she's been moving around a lot  Depressed due to grief not MDD  MDD: stable  Grief: her two friends, appropriate  NATE: improving  Panic attacks: stable  Energy: low  Concentration: not at goal  Insomnia: initial, snoring  AIMS if on antipsychotic: na  Eating/Weight: 214, 204x2 lbs  Refills: y  Substances: Smoking. Not vaping; has a medical marijuana card but hasn't received it yet.  Therapy: n  Medication compliant: y  SE: n  No SI HI AV.      2025:   In person interview:  \"I was in [place], having fun.\"  That's why I missed my last appointment  I have 4 very close friends. One  in August, the other  last week.  Mirtazapine? started  Keeps in touch with last ex-  Snores at night  MDD: fairly stable  Grief: her two friends  NATE: improving, still present  Panic attacks: stable  Energy: low  Concentration: not at goal  Insomnia: initial  AIMS if on antipsychotic: na  Eating/Weight: 204x2 lbs  Refills: " "y  Substances: Smoking. Not vaping; has a medical marijuana card but hasn't received it yet.  Therapy: n  Medication compliant: y  SE: n  No SI HI AVH.    ...    2024: INITIAL VISIT Chart review:     [place]: On Ativan, oxycodone, and morphine chronically. She also used to be on gabapentin.  Care Everywhere: several non behavioral health notes    Psychotropic medication chart review:  Present:  Mirtazapine 15 mg at night  Cymbalta 60 mg a day    Previously:  Trazodone 150 mg at night  Gabapentin 600 mg nightly  Abilify 5, 10, 15 mg a day  Wellbutrin  mg a day  Cymbalta 20, 30 mg a day  Lexapro 5, 20 mg a day  Lamictal 25 mg twice daily, 200 mg at night  Effexor 75, 150 mg a day    EKG: 2024: Rate 54, sinus, QTc 471  Procedures: none  Head imagin MRI of the brain: No acute, no evidence of metastatic disease.  Labs: 2024: Abnormal CMP CO2 31.3 chloride 97 low glucose 111; abnormal CBC hemoglobin 9.7 other abnormalities; CT of the abdomen and pelvis with contrast shows diffuse sclerotic osseous metastatic disease.  Initial Chart Review Notes: Referred to us in February by oncology.  Patient has metastatic breast cancer.  More anxiety and depression recently.    2024:   In person.  Interview:  His/Her Story: \"It's stage 4.\"  P17, G12  They thought they would be able to maintain and control it with my chemo, but it's spreading now.  \"It's scary. It's the fear of not knowing.\"  Denies anhedonia  Lives by herself  Best friend passed this year, Beth Neff,  of a blood clot in August. \"We were shocked.\"  I have other friends, Pepper and her BF   3x,  3x  No kids, lost a child to abruption at 9 mos  Sleeping? Maintenance insomnia q2 hours  Eating? stable  Energy? Down  Just started mirtazapine this week. May be helping her sleep.  Depression/Mood:  Depressed mood, hopelessness  \"I miss things the way they used to be.\"  poor energy, poor concentration, " insomnia.  Seasonal pattern: def  Severity: Moderate  Duration: years  Anxiety:  Uncontrolled worrying, muscle tension, fatigue, poor concentration, feeling on edge or restless, irritability, insomnia.  Severity: Moderate  Duration: years  PTSD: previously dx'd  avoidance, negative view of the world, hypervigilance.  Inciting event: losing her child at 9 mo (abruption)  Duration: many years  AIMS if on antipsychotic: na  Psych ROS: Positive for depression, anxiety.  Negative for psychosis and ebony.  ADHD: def  No SI HI AVH.  Medication compliant: y    Access to Firearms: yes, locked away    PHQ-9 Depression Screening  PHQ-9 Total Score:     Little interest or pleasure in doing things?     Feeling down, depressed, or hopeless?     PHQ-2 Total Score     Trouble falling or staying asleep, or sleeping too much?     Feeling tired or having little energy?     Poor appetite or overeating?     Feeling bad about yourself - or that you are a failure or have let yourself or your family down?     Trouble concentrating on things, such as reading the newspaper or watching television?     Moving or speaking so slowly that other people could have noticed? Or the opposite - being so fidgety or restless that you have been moving around a lot more than usual?     Thoughts that you would be better off dead, or of hurting yourself in some way?     PHQ-9 Total Score     If you checked off any problems, how difficult have these problems made it for you to do your work, take care of things at home, or get along with other people?              NATE-7       Past Surgical History:  Past Surgical History:   Procedure Laterality Date    BREAST BIOPSY Left     CARDIAC CATHETERIZATION Left 1959    CARDIAC CATHETERIZATION N/A 04/06/2018    Procedure: Coronary angiography;  Surgeon: Art Licea MD;  Location: [facility] CATH INVASIVE LOCATION;  Service: Cardiovascular    CARDIAC CATHETERIZATION N/A 04/06/2018    Procedure: Left heart  cath;  Surgeon: Art Licea MD;  Location: [facility] CATH INVASIVE LOCATION;  Service: Cardiovascular    CARDIAC CATHETERIZATION N/A 04/06/2018    Procedure: Left ventriculography;  Surgeon: Art Licea MD;  Location: [facility] CATH INVASIVE LOCATION;  Service: Cardiovascular    CHOLECYSTECTOMY      COLON SURGERY      colostomy bag, and colostomy reversal    COLONOSCOPY  11/08/2006    COLONOSCOPY N/A 06/08/2023    Procedure: COLONOSCOPY;  Surgeon: Alfred Castañeda MD;  Location: [facility] ENDOSCOPY;  Service: General;  Laterality: N/A;  same as preop    EXPLORATORY LAPAROTOMY N/A 12/06/2022    Procedure: LAPAROTOMY EXPLORATORY sigmoid resection hartmans procedure colostomy;  Surgeon: Alfred Castañeda MD;  Location: [facility] MAIN OR;  Service: General;  Laterality: N/A;    GANGLION CYST EXCISION Bilateral     HYSTERECTOMY  05/2005    ILEOSTOMY CLOSURE N/A 06/26/2023    Procedure: Laparoscopic Hand-Assisted Colostomy Closure with End to End Anastomosis;  Surgeon: Alfred Castañeda MD;  Location: [facility] MAIN OR;  Service: General;  Laterality: N/A;    LAPAROSCOPIC GASTRIC BANDING      BAND REMOVED 2020    PARASTOMAL HERNIA REPAIR N/A 06/26/2023    Procedure: Open Parastomal Hernia Repair;  Surgeon: Alfred Castañeda MD;  Location: [facility] MAIN OR;  Service: General;  Laterality: N/A;    TONSILLECTOMY      VENOUS ACCESS DEVICE (PORT) INSERTION N/A 01/09/2023    Procedure: INSERTION VENOUS ACCESS DEVICE;  Surgeon: Alfred Castañeda MD;  Location: [facility] MAIN OR;  Service: General;  Laterality: N/A;    VENTRAL HERNIA REPAIR N/A 7/3/2024    Procedure: VENTRAL / INCISIONAL HERNIA REPAIR LAPAROSCOPIC WITH DAVINCI ROBOT with mesh;  Surgeon: Alfred Castañeda MD;  Location: [facility] MAIN OR;  Service: Robotics - DaVinci;  Laterality: N/A;       Problem List:  Patient Active Problem List   Diagnosis    Hypertension    Hyperlipidemia    Allergic rhinitis    Hypothyroidism    Chronic pain  disorder    Anxiety    Monopolar depression    Malaise and fatigue    Tobacco abuse    Fibromyalgia    Vitamin B 12 deficiency    Primary osteoarthritis of left knee    Restless legs syndrome (RLS)    Insomnia    Chronic fatigue    Asthma    Body mass index (BMI) 26.0-26.9, adult    Degeneration of lumbar intervertebral disc    Hearing loss    Inappropriate diet and eating habits    Cervical spondylosis    Obesity (BMI 30-39.9)    Renal stone    Malignant neoplasm of upper-outer quadrant of left breast in female, estrogen receptor positive    Cancer related pain    Malignant neoplasm metastatic to bone    Peripheral edema    Peritonitis    Leukopenia    S/P Laparoscopic Hand-Assisted Colostomy Closure with End to End Anastomosis Open Parastomal Hernia Repair    Encounter for adjustment or management of vascular access device    Pulmonary emphysema    History of colon polyps    Colostomy in place    Anemia    Hypokalemia    Therapeutic opioid induced constipation    Incisional hernia, without obstruction or gangrene    Pain in lower jaw    Suspected sleep apnea    Snoring    Hypersomnia    Nocturia    PTSD (post-traumatic stress disorder)    Inadequate sleep hygiene       Allergy:   Allergies   Allergen Reactions    Azithromycin Itching    Erythromycin Itching        Discontinued Medications:  Medications Discontinued During This Encounter   Medication Reason    ARIPiprazole (Abilify) 2 MG tablet Reorder    mirtazapine (REMERON) 30 MG tablet Reorder             Current Medications:   Current Outpatient Medications   Medication Sig Dispense Refill    ARIPiprazole (Abilify) 2 MG tablet Take 1 tablet by mouth Daily. 30 tablet 5    atorvastatin (LIPITOR) 20 MG tablet TAKE 1 TABLET BY MOUTH EVERY DAY 30 tablet 6    baclofen (LIORESAL) 20 MG tablet Take 1 tablet by mouth 3 (Three) Times a Day.      cyproheptadine (PERIACTIN) 4 MG tablet TAKE 1 TABLET BY MOUTH TWICE DAILY for itching      donepezil (ARICEPT) 10 MG tablet  Take 1 tablet by mouth Daily.      DULoxetine (CYMBALTA) 30 MG capsule Take 1 capsule by mouth Daily. 90 capsule 1    DULoxetine (CYMBALTA) 60 MG capsule Take 1 capsule by mouth Daily. 90 capsule 1    fluticasone (FLONASE) 50 MCG/ACT nasal spray Administer 2 sprays into the nostril(s) as directed by provider Daily.      gabapentin (NEURONTIN) 600 MG tablet TAKE 1 TABLET BY MOUTH AT BEDTIME (Patient taking differently: Take 0.5 tablets by mouth 2 (Two) Times a Day As Needed (pain).) 90 tablet 0    hydroCHLOROthiazide 12.5 MG tablet TAKE 1 TABLET BY MOUTH DAILY for swelling 90 tablet 1    letrozole (FEMARA) 2.5 MG tablet TAKE 1 TABLET BY MOUTH DAILY *for cancer * 90 tablet 1    levothyroxine (SYNTHROID, LEVOTHROID) 50 MCG tablet TAKE 1 TABLET BY MOUTH EVERY DAY 90 tablet 1    loratadine (CLARITIN) 10 MG tablet Take 1 tablet by mouth Daily.      LORazepam (ATIVAN) 0.5 MG tablet Take 1 tablet by mouth Daily.      losartan (COZAAR) 50 MG tablet Take 1 tablet by mouth Daily.      mirtazapine (REMERON) 30 MG tablet Take 1 tablet by mouth Every Night. 90 tablet 1    montelukast (SINGULAIR) 10 MG tablet Take 1 tablet by mouth Daily.      Morphine (MS CONTIN) 60 MG 12 hr tablet Take 1 tablet by mouth 2 (Two) Times a Day. 60 tablet 0    nicotine (NICODERM CQ) 21 MG/24HR patch Place 1 patch on the skin as directed by provider Daily. 30 patch 3    ondansetron (ZOFRAN) 8 MG tablet Take 1 tablet by mouth Every 8 (Eight) Hours As Needed for Nausea or Vomiting. 30 tablet 5    oxybutynin XL (DITROPAN XL) 15 MG 24 hr tablet TAKE 1 TABLET BY MOUTH DAILY for bladder 30 tablet 1    oxyCODONE-acetaminophen (PERCOCET)  MG per tablet Take 1 tablet by mouth Every 6 (Six) Hours As Needed for Moderate Pain. 60 tablet 0    polyethylene glycol (MIRALAX) 17 g packet Take 17 g by mouth Daily. 5 packet 0    potassium chloride (MICRO-K) 10 MEQ CR capsule TAKE 1 CAPSULE BY MOUTH EVERY TWELVE HOURS for potassium supplement 60 capsule 1     ribociclib succinate, 600 MG Dose, (KISQALI) 200 MG tablet therapy pack tablet Take 3 tablets by mouth Take As Directed. Take 3 tablets by mouth daily for 21 days then off 7 days on a 28 day cycle. 63 tablet 11    rOPINIRole (REQUIP) 3 MG tablet Take 1 tablet by mouth 2 (Two) Times a Day.      SUMAtriptan (IMITREX) 100 MG tablet Take 1 tablet by mouth 1 (One) Time As Needed for Migraine.      traZODone (DESYREL) 150 MG tablet TAKE 1 TABLET BY MOUTH ONCE nightly 90 tablet 2    chlorhexidine (PERIDEX) 0.12 % solution swish and spit 15 mls BY MOUTH TWICE DAILY for 30 seconds for 1 week (Patient not taking: Reported on 7/11/2025)      furosemide (LASIX) 40 MG tablet  (Patient not taking: Reported on 7/11/2025)      lidocaine (LIDODERM) 5 % Place 1 patch on the skin as directed by provider Daily As Needed for Moderate Pain or Severe Pain. Remove & Discard patch within 12 hours or as directed by MD      methylPREDNISolone (MEDROL) 4 MG dose pack TAKE AS DIRECTED ON BLISTER PACK INSIDE BOX -TAKE WITH FOOD (Patient not taking: Reported on 7/11/2025)      naloxone (NARCAN) 4 MG/0.1ML nasal spray Call 911. Don't prime. [place] in 1 nostril for overdose. Repeat in 2-3 minutes in other nostril if no or minimal breathing/responsiveness. (Patient not taking: Reported on 7/11/2025) 2 each 0    Senna-Time 8.6 MG tablet TAKE 1 TABLET BY MOUTH DAILY *for constipation* (Patient not taking: Reported on 7/11/2025) 30 tablet 3     No current facility-administered medications for this visit.       Past Medical History:  Past Medical History:   Diagnosis Date    Abdominal pain     OSTOMY SITE    Allergic rhinitis     Anemia     NO S/S    Anxiety     Arteriosclerosis     Coronary, follows with Dr. Núñez    Arthritis     Bone cancer     Bone metastases 10/14/2022    Bowen's disease     SKIN CANCER    Breast cancer     NO SURGERY WAS DONE DUE TO METS TO BONE/COLON/LYMPHNODE    Cancer related pain 10/13/2022    Depression     Disorder  "associated with Helicobacter species 10/12/2022    Dysphoric mood     Emphysema lung     Fatigue     Fibromyalgia     Frequent urination     NO S/S INFECTION    Herpes simplex vulvovaginitis 2018    History of colon polyps     History of IBS     History of kidney stones     Hyperlipidemia     Hypertension     Hypothyroidism     Insomnia     Lumbago     Mood disorder     Neck pain     R/T CANCER    Palpitations     last time a few months ago    Precordial pain     R/T SPINE CANCER    RLS (restless legs syndrome)     S/P Laparoscopic Hand-Assisted Colostomy Closure with End to End Anastomosis Open Parastomal Hernia Repair 2022    Sleep disturbance     SOB (shortness of breath)     at times at rest    SOBOE (shortness of breath on exertion)        Past Psychiatric History:  Began Treatment: early   Diagnoses: MDD, NATE, panic attacks  Psychiatrist: multiple  Therapist: in the past  Admission History:    Late , Edith, admitted for medication changes    Medication Trials:    \"I think I've been on every medication\"    Recently stopped trazodone    Self Harm: Denies  Suicide Attempts:Denies   Psychosis, Anxiety, Depression: PPD    Substance Abuse History:   Types: former smoker; occasional cannabis use  Withdrawal Symptoms:Denies  Longest Period Sober:Not Applicable   AA: Not applicable     Social History:  Martial Status:   Employed:No  Kids:No  House:Lives in a house   History: Denies    Social History     Socioeconomic History    Marital status:    Tobacco Use    Smoking status: Every Day     Current packs/day: 1.00     Average packs/day: 1 pack/day for 51.0 years (51.0 ttl pk-yrs)     Types: Cigarettes     Start date:      Last attempt to quit: 2024    Smokeless tobacco: Never    Tobacco comments:          Has not used tobacco for 28 days    Vaping Use    Vaping status: Never Used   Substance and Sexual Activity    Alcohol use: No    Drug use: Not Currently    " " Types: Marijuana     Comment: LAST USE 7-2-24    Sexual activity: Defer     Partners: Male     Birth control/protection: None       Family History:   Suicide Attempts: Denies  Suicide Completions:Denies      Family History   Problem Relation Age of Onset    Hypertension Mother     Rheum arthritis Mother     Heart disease Mother     Breast cancer Mother     Diabetes Father     Cancer Maternal Grandmother         colon    Colon cancer Maternal Grandmother     Aneurysm Paternal Grandfather     Diabetes Other     Fibromyalgia Other     Malig Hyperthermia Neg Hx        Developmental History:     Childhood: Denies Abuse  High School:Completed  College: [employer]     Mental Status Exam  Appearance  : groomed, good eye contact, normal street clothes  Behavior  : pleasant and cooperative  Motor  : No abnormal  Speech  :normal rhythm, rate, volume, tone, not hyperverbal, not pressured, normal prosidy  Mood  : \"I can't tell a difference\"  Affect  : improved, euthymic, mood incongruent, good variability  Thought Content  : negative suicidal ideations, negative homicidal ideations, negative obsessions  Perceptions  : negative auditory hallucinations, negative visual hallucinations  Thought Process  : linear  Insight/Judgement  : Fair/fair  Cognition  : grossly intact  Attention   : intact        Review of Systems:  Review of Systems   Constitutional:  Positive for diaphoresis and fatigue.   HENT:  Negative for drooling.    Eyes:  Negative for visual disturbance.   Respiratory:  Positive for cough and shortness of breath.    Cardiovascular:  Positive for palpitations and leg swelling. Negative for chest pain.   Gastrointestinal:  Positive for nausea. Negative for vomiting.   Endocrine: Positive for cold intolerance and heat intolerance.   Genitourinary:  Positive for difficulty urinating.   Musculoskeletal:  Positive for joint swelling.   Allergic/Immunologic: Negative for immunocompromised state.   Neurological:  Positive for " "dizziness and numbness. Negative for seizures and speech difficulty.   Psychiatric/Behavioral:  Negative for agitation. The patient is nervous/anxious.        Physical Exam:  Physical Exam    Vital Signs:   /46   Pulse 64   Ht 167.6 cm (66\")   Wt 97.5 kg (215 lb)   SpO2 100%   BMI 34.70 kg/m²      Lab Results:   Hospital Outpatient Visit on 07/03/2025   Component Date Value Ref Range Status    Glucose 07/03/2025 124 (H)  65 - 99 mg/dL Final    BUN 07/03/2025 17.9  8.0 - 23.0 mg/dL Final    Creatinine 07/03/2025 0.85  0.57 - 1.00 mg/dL Final    Sodium 07/03/2025 140  136 - 145 mmol/L Final    Potassium 07/03/2025 4.1  3.5 - 5.2 mmol/L Final    Chloride 07/03/2025 103  98 - 107 mmol/L Final    CO2 07/03/2025 27.2  22.0 - 29.0 mmol/L Final    Calcium 07/03/2025 8.9  8.6 - 10.5 mg/dL Final    Total Protein 07/03/2025 7.1  6.0 - 8.5 g/dL Final    Albumin 07/03/2025 4.5  3.5 - 5.2 g/dL Final    ALT (SGPT) 07/03/2025 11  1 - 33 U/L Final    AST (SGOT) 07/03/2025 15  1 - 32 U/L Final    Alkaline Phosphatase 07/03/2025 94  39 - 117 U/L Final    Total Bilirubin 07/03/2025 0.4  0.0 - 1.2 mg/dL Final    Globulin 07/03/2025 2.6  gm/dL Final    A/G Ratio 07/03/2025 1.7  g/dL Final    BUN/Creatinine Ratio 07/03/2025 21.1  7.0 - 25.0 Final    Anion Gap 07/03/2025 9.8  5.0 - 15.0 mmol/L Final    eGFR 07/03/2025 76.1  >60.0 mL/min/1.73 Final    Magnesium 07/03/2025 2.1  1.6 - 2.4 mg/dL Final    Phosphorus 07/03/2025 3.6  2.5 - 4.5 mg/dL Final    WBC 07/03/2025 4.28  3.40 - 10.80 10*3/mm3 Final    RBC 07/03/2025 3.05 (L)  3.77 - 5.28 10*6/mm3 Final    Hemoglobin 07/03/2025 10.1 (L)  12.0 - 15.9 g/dL Final    Hematocrit 07/03/2025 32.0 (L)  34.0 - 46.6 % Final    MCV 07/03/2025 104.9 (H)  79.0 - 97.0 fL Final    MCH 07/03/2025 33.1 (H)  26.6 - 33.0 pg Final    MCHC 07/03/2025 31.6  31.5 - 35.7 g/dL Final    RDW 07/03/2025 15.2  12.3 - 15.4 % Final    RDW-SD 07/03/2025 58.3 (H)  37.0 - 54.0 fl Final    MPV 07/03/2025 10.2  " 6.0 - 12.0 fL Final    Platelets 07/03/2025 181  140 - 450 10*3/mm3 Final    Neutrophil % 07/03/2025 54.7  42.7 - 76.0 % Final    Lymphocyte % 07/03/2025 34.3  19.6 - 45.3 % Final    Monocyte % 07/03/2025 8.6  5.0 - 12.0 % Final    Eosinophil % 07/03/2025 0.5  0.3 - 6.2 % Final    Basophil % 07/03/2025 1.4  0.0 - 1.5 % Final    Immature Grans % 07/03/2025 0.5  0.0 - 0.5 % Final    Neutrophils, Absolute 07/03/2025 2.34  1.70 - 7.00 10*3/mm3 Final    Lymphocytes, Absolute 07/03/2025 1.47  0.70 - 3.10 10*3/mm3 Final    Monocytes, Absolute 07/03/2025 0.37  0.10 - 0.90 10*3/mm3 Final    Eosinophils, Absolute 07/03/2025 0.02  0.00 - 0.40 10*3/mm3 Final    Basophils, Absolute 07/03/2025 0.06  0.00 - 0.20 10*3/mm3 Final    Immature Grans, Absolute 07/03/2025 0.02  0.00 - 0.05 10*3/mm3 Final    nRBC 07/03/2025 0.0  0.0 - 0.2 /100 WBC Final   Hospital Outpatient Visit on 06/05/2025   Component Date Value Ref Range Status    Glucose 06/05/2025 99  65 - 99 mg/dL Final    BUN 06/05/2025 10.8  8.0 - 23.0 mg/dL Final    Creatinine 06/05/2025 0.91  0.57 - 1.00 mg/dL Final    Sodium 06/05/2025 143  136 - 145 mmol/L Final    Potassium 06/05/2025 4.1  3.5 - 5.2 mmol/L Final    Chloride 06/05/2025 105  98 - 107 mmol/L Final    CO2 06/05/2025 28.2  22.0 - 29.0 mmol/L Final    Calcium 06/05/2025 9.3  8.6 - 10.5 mg/dL Final    Total Protein 06/05/2025 7.2  6.0 - 8.5 g/dL Final    Albumin 06/05/2025 4.1  3.5 - 5.2 g/dL Final    ALT (SGPT) 06/05/2025 8  1 - 33 U/L Final    AST (SGOT) 06/05/2025 15  1 - 32 U/L Final    Alkaline Phosphatase 06/05/2025 96  39 - 117 U/L Final    Total Bilirubin 06/05/2025 0.2  0.0 - 1.2 mg/dL Final    Globulin 06/05/2025 3.1  gm/dL Final    A/G Ratio 06/05/2025 1.3  g/dL Final    BUN/Creatinine Ratio 06/05/2025 11.9  7.0 - 25.0 Final    Anion Gap 06/05/2025 9.8  5.0 - 15.0 mmol/L Final    eGFR 06/05/2025 70.2  >60.0 mL/min/1.73 Final    Magnesium 06/05/2025 1.9  1.6 - 2.4 mg/dL Final    Phosphorus 06/05/2025  5.7 (H)  2.5 - 4.5 mg/dL Final    WBC 06/05/2025 4.09  3.40 - 10.80 10*3/mm3 Final    RBC 06/05/2025 2.92 (L)  3.77 - 5.28 10*6/mm3 Final    Hemoglobin 06/05/2025 9.6 (L)  12.0 - 15.9 g/dL Final    Hematocrit 06/05/2025 31.3 (L)  34.0 - 46.6 % Final    MCV 06/05/2025 107.2 (H)  79.0 - 97.0 fL Final    MCH 06/05/2025 32.9  26.6 - 33.0 pg Final    MCHC 06/05/2025 30.7 (L)  31.5 - 35.7 g/dL Final    RDW 06/05/2025 15.8 (H)  12.3 - 15.4 % Final    RDW-SD 06/05/2025 61.7 (H)  37.0 - 54.0 fl Final    MPV 06/05/2025 10.1  6.0 - 12.0 fL Final    Platelets 06/05/2025 190  140 - 450 10*3/mm3 Final    Neutrophil % 06/05/2025 58.9  42.7 - 76.0 % Final    Lymphocyte % 06/05/2025 29.6  19.6 - 45.3 % Final    Monocyte % 06/05/2025 9.8  5.0 - 12.0 % Final    Eosinophil % 06/05/2025 0.5  0.3 - 6.2 % Final    Basophil % 06/05/2025 1.0  0.0 - 1.5 % Final    Immature Grans % 06/05/2025 0.2  0.0 - 0.5 % Final    Neutrophils, Absolute 06/05/2025 2.41  1.70 - 7.00 10*3/mm3 Final    Lymphocytes, Absolute 06/05/2025 1.21  0.70 - 3.10 10*3/mm3 Final    Monocytes, Absolute 06/05/2025 0.40  0.10 - 0.90 10*3/mm3 Final    Eosinophils, Absolute 06/05/2025 0.02  0.00 - 0.40 10*3/mm3 Final    Basophils, Absolute 06/05/2025 0.04  0.00 - 0.20 10*3/mm3 Final    Immature Grans, Absolute 06/05/2025 0.01  0.00 - 0.05 10*3/mm3 Final    nRBC 06/05/2025 0.0  0.0 - 0.2 /100 WBC Final    Anisocytosis 06/05/2025 Slight/1+  None Seen Final    Dacrocytes 06/05/2025 Slight/1+  None Seen Final    Hypochromia 06/05/2025 Slight/1+  None Seen Final    Macrocytes 06/05/2025 Slight/1+  None Seen Final    Poikilocytes 06/05/2025 Slight/1+  None Seen Final    WBC Morphology 06/05/2025 Normal  Normal Final    Platelet Estimate 06/05/2025 Adequate  Normal Final   Hospital Outpatient Visit on 04/29/2025   Component Date Value Ref Range Status    Glucose 04/29/2025 113 (H)  65 - 99 mg/dL Final    BUN 04/29/2025 16  8 - 23 mg/dL Final    Creatinine 04/29/2025 0.91  0.57 -  1.00 mg/dL Final    Sodium 04/29/2025 141  136 - 145 mmol/L Final    Potassium 04/29/2025 4.2  3.5 - 5.2 mmol/L Final    Chloride 04/29/2025 102  98 - 107 mmol/L Final    CO2 04/29/2025 30.2 (H)  22.0 - 29.0 mmol/L Final    Calcium 04/29/2025 9.0  8.6 - 10.5 mg/dL Final    Total Protein 04/29/2025 7.0  6.0 - 8.5 g/dL Final    Albumin 04/29/2025 4.2  3.5 - 5.2 g/dL Final    ALT (SGPT) 04/29/2025 8  1 - 33 U/L Final    AST (SGOT) 04/29/2025 15  1 - 32 U/L Final    Alkaline Phosphatase 04/29/2025 103  39 - 117 U/L Final    Total Bilirubin 04/29/2025 0.4  0.0 - 1.2 mg/dL Final    Globulin 04/29/2025 2.8  gm/dL Final    A/G Ratio 04/29/2025 1.5  g/dL Final    BUN/Creatinine Ratio 04/29/2025 17.6  7.0 - 25.0 Final    Anion Gap 04/29/2025 8.8  5.0 - 15.0 mmol/L Final    eGFR 04/29/2025 70.2  >60.0 mL/min/1.73 Final    Magnesium 04/29/2025 2.4  1.6 - 2.4 mg/dL Final    Phosphorus 04/29/2025 3.4  2.5 - 4.5 mg/dL Final    WBC 04/29/2025 4.63  3.40 - 10.80 10*3/mm3 Final    RBC 04/29/2025 2.87 (L)  3.77 - 5.28 10*6/mm3 Final    Hemoglobin 04/29/2025 9.3 (L)  12.0 - 15.9 g/dL Final    Hematocrit 04/29/2025 30.0 (L)  34.0 - 46.6 % Final    MCV 04/29/2025 104.5 (H)  79.0 - 97.0 fL Final    MCH 04/29/2025 32.4  26.6 - 33.0 pg Final    MCHC 04/29/2025 31.0 (L)  31.5 - 35.7 g/dL Final    RDW 04/29/2025 16.5 (H)  12.3 - 15.4 % Final    RDW-SD 04/29/2025 62.9 (H)  37.0 - 54.0 fl Final    MPV 04/29/2025 10.2  6.0 - 12.0 fL Final    Platelets 04/29/2025 228  140 - 450 10*3/mm3 Final    Neutrophil % 04/29/2025 73.5  42.7 - 76.0 % Final    Lymphocyte % 04/29/2025 19.2 (L)  19.6 - 45.3 % Final    Monocyte % 04/29/2025 5.8  5.0 - 12.0 % Final    Eosinophil % 04/29/2025 0.0 (L)  0.3 - 6.2 % Final    Basophil % 04/29/2025 0.9  0.0 - 1.5 % Final    Immature Grans % 04/29/2025 0.6 (H)  0.0 - 0.5 % Final    Neutrophils, Absolute 04/29/2025 3.40  1.70 - 7.00 10*3/mm3 Final    Lymphocytes, Absolute 04/29/2025 0.89  0.70 - 3.10 10*3/mm3 Final     Monocytes, Absolute 04/29/2025 0.27  0.10 - 0.90 10*3/mm3 Final    Eosinophils, Absolute 04/29/2025 0.00  0.00 - 0.40 10*3/mm3 Final    Basophils, Absolute 04/29/2025 0.04  0.00 - 0.20 10*3/mm3 Final    Immature Grans, Absolute 04/29/2025 0.03  0.00 - 0.05 10*3/mm3 Final    nRBC 04/29/2025 0.0  0.0 - 0.2 /100 WBC Final   Hospital Outpatient Visit on 04/01/2025   Component Date Value Ref Range Status    Glucose 04/01/2025 134 (H)  65 - 99 mg/dL Final    BUN 04/01/2025 20  8 - 23 mg/dL Final    Creatinine 04/01/2025 0.94  0.57 - 1.00 mg/dL Final    Sodium 04/01/2025 141  136 - 145 mmol/L Final    Potassium 04/01/2025 4.0  3.5 - 5.2 mmol/L Final    Chloride 04/01/2025 102  98 - 107 mmol/L Final    CO2 04/01/2025 29.8 (H)  22.0 - 29.0 mmol/L Final    Calcium 04/01/2025 8.7  8.6 - 10.5 mg/dL Final    Total Protein 04/01/2025 7.3  6.0 - 8.5 g/dL Final    Albumin 04/01/2025 4.0  3.5 - 5.2 g/dL Final    ALT (SGPT) 04/01/2025 10  1 - 33 U/L Final    AST (SGOT) 04/01/2025 14  1 - 32 U/L Final    Alkaline Phosphatase 04/01/2025 106  39 - 117 U/L Final    Total Bilirubin 04/01/2025 0.3  0.0 - 1.2 mg/dL Final    Globulin 04/01/2025 3.3  gm/dL Final    A/G Ratio 04/01/2025 1.2  g/dL Final    BUN/Creatinine Ratio 04/01/2025 21.3  7.0 - 25.0 Final    Anion Gap 04/01/2025 9.2  5.0 - 15.0 mmol/L Final    eGFR 04/01/2025 67.5  >60.0 mL/min/1.73 Final    Magnesium 04/01/2025 2.1  1.6 - 2.4 mg/dL Final    Phosphorus 04/01/2025 3.4  2.5 - 4.5 mg/dL Final    WBC 04/01/2025 4.55  3.40 - 10.80 10*3/mm3 Final    RBC 04/01/2025 2.98 (L)  3.77 - 5.28 10*6/mm3 Final    Hemoglobin 04/01/2025 9.6 (L)  12.0 - 15.9 g/dL Final    Hematocrit 04/01/2025 30.8 (L)  34.0 - 46.6 % Final    MCV 04/01/2025 103.4 (H)  79.0 - 97.0 fL Final    MCH 04/01/2025 32.2  26.6 - 33.0 pg Final    MCHC 04/01/2025 31.2 (L)  31.5 - 35.7 g/dL Final    RDW 04/01/2025 15.9 (H)  12.3 - 15.4 % Final    RDW-SD 04/01/2025 61.0 (H)  37.0 - 54.0 fl Final    MPV 04/01/2025 10.2   6.0 - 12.0 fL Final    Platelets 04/01/2025 226  140 - 450 10*3/mm3 Final    Neutrophil % 04/01/2025 62.8  42.7 - 76.0 % Final    Lymphocyte % 04/01/2025 29.5  19.6 - 45.3 % Final    Monocyte % 04/01/2025 5.5  5.0 - 12.0 % Final    Eosinophil % 04/01/2025 0.2 (L)  0.3 - 6.2 % Final    Basophil % 04/01/2025 1.8 (H)  0.0 - 1.5 % Final    Immature Grans % 04/01/2025 0.2  0.0 - 0.5 % Final    Neutrophils, Absolute 04/01/2025 2.86  1.70 - 7.00 10*3/mm3 Final    Lymphocytes, Absolute 04/01/2025 1.34  0.70 - 3.10 10*3/mm3 Final    Monocytes, Absolute 04/01/2025 0.25  0.10 - 0.90 10*3/mm3 Final    Eosinophils, Absolute 04/01/2025 0.01  0.00 - 0.40 10*3/mm3 Final    Basophils, Absolute 04/01/2025 0.08  0.00 - 0.20 10*3/mm3 Final    Immature Grans, Absolute 04/01/2025 0.01  0.00 - 0.05 10*3/mm3 Final    nRBC 04/01/2025 0.0  0.0 - 0.2 /100 WBC Final   Admission on 03/18/2025, Discharged on 03/19/2025   Component Date Value Ref Range Status    Glucose 03/18/2025 137 (H)  65 - 99 mg/dL Final    BUN 03/18/2025 15  8 - 23 mg/dL Final    Creatinine 03/18/2025 0.87  0.57 - 1.00 mg/dL Final    Sodium 03/18/2025 139  136 - 145 mmol/L Final    Potassium 03/18/2025 3.8  3.5 - 5.2 mmol/L Final    Chloride 03/18/2025 102  98 - 107 mmol/L Final    CO2 03/18/2025 26.3  22.0 - 29.0 mmol/L Final    Calcium 03/18/2025 9.3  8.6 - 10.5 mg/dL Final    Total Protein 03/18/2025 7.6  6.0 - 8.5 g/dL Final    Albumin 03/18/2025 4.2  3.5 - 5.2 g/dL Final    ALT (SGPT) 03/18/2025 9  1 - 33 U/L Final    AST (SGOT) 03/18/2025 13  1 - 32 U/L Final    Alkaline Phosphatase 03/18/2025 120 (H)  39 - 117 U/L Final    Total Bilirubin 03/18/2025 0.3  0.0 - 1.2 mg/dL Final    Globulin 03/18/2025 3.4  gm/dL Final    A/G Ratio 03/18/2025 1.2  g/dL Final    BUN/Creatinine Ratio 03/18/2025 17.2  7.0 - 25.0 Final    Anion Gap 03/18/2025 10.7  5.0 - 15.0 mmol/L Final    eGFR 03/18/2025 74.0  >60.0 mL/min/1.73 Final    Lipase 03/18/2025 18  13 - 60 U/L Final    Color, UA  03/18/2025 Yellow  Yellow, Straw Final    Appearance, UA 03/18/2025 Clear  Clear Final    pH, UA 03/18/2025 5.5  5.0 - 8.0 Final    Specific Gravity, UA 03/18/2025 1.022  1.005 - 1.030 Final    Glucose, UA 03/18/2025 Negative  Negative Final    Ketones, UA 03/18/2025 Negative  Negative Final    Bilirubin, UA 03/18/2025 Negative  Negative Final    Blood, UA 03/18/2025 Small (1+) (A)  Negative Final    Protein, UA 03/18/2025 Trace (A)  Negative Final    Leuk Esterase, UA 03/18/2025 Trace (A)  Negative Final    Nitrite, UA 03/18/2025 Negative  Negative Final    Urobilinogen, UA 03/18/2025 1.0 E.U./dL  0.2 - 1.0 E.U./dL Final    Lactate 03/18/2025 2.3 (C)  0.5 - 2.0 mmol/L Final    Extra Tube 03/18/2025 Hold for add-ons.   Final    Auto resulted.    Extra Tube 03/18/2025 hold for add-on   Final    Auto resulted    Extra Tube 03/18/2025 Hold for add-ons.   Final    Auto resulted.    Extra Tube 03/18/2025 Hold for add-ons.   Final    Auto resulted    WBC 03/18/2025 3.96  3.40 - 10.80 10*3/mm3 Final    RBC 03/18/2025 3.28 (L)  3.77 - 5.28 10*6/mm3 Final    Hemoglobin 03/18/2025 10.4 (L)  12.0 - 15.9 g/dL Final    Hematocrit 03/18/2025 34.2  34.0 - 46.6 % Final    MCV 03/18/2025 104.3 (H)  79.0 - 97.0 fL Final    MCH 03/18/2025 31.7  26.6 - 33.0 pg Final    MCHC 03/18/2025 30.4 (L)  31.5 - 35.7 g/dL Final    RDW 03/18/2025 16.1 (H)  12.3 - 15.4 % Final    RDW-SD 03/18/2025 62.8 (H)  37.0 - 54.0 fl Final    MPV 03/18/2025 10.6  6.0 - 12.0 fL Final    Platelets 03/18/2025 180  140 - 450 10*3/mm3 Final    Neutrophil % 03/18/2025 59.6  42.7 - 76.0 % Final    Lymphocyte % 03/18/2025 27.5  19.6 - 45.3 % Final    Monocyte % 03/18/2025 12.1 (H)  5.0 - 12.0 % Final    Eosinophil % 03/18/2025 0.0 (L)  0.3 - 6.2 % Final    Basophil % 03/18/2025 0.8  0.0 - 1.5 % Final    Immature Grans % 03/18/2025 0.0  0.0 - 0.5 % Final    Neutrophils, Absolute 03/18/2025 2.36  1.70 - 7.00 10*3/mm3 Final    Lymphocytes, Absolute 03/18/2025 1.09  0.70  - 3.10 10*3/mm3 Final    Monocytes, Absolute 03/18/2025 0.48  0.10 - 0.90 10*3/mm3 Final    Eosinophils, Absolute 03/18/2025 0.00  0.00 - 0.40 10*3/mm3 Final    Basophils, Absolute 03/18/2025 0.03  0.00 - 0.20 10*3/mm3 Final    Immature Grans, Absolute 03/18/2025 0.00  0.00 - 0.05 10*3/mm3 Final    nRBC 03/18/2025 0.0  0.0 - 0.2 /100 WBC Final    Lactate 03/18/2025 3.4 (C)  0.5 - 2.0 mmol/L Final    RBC, UA 03/18/2025 0-2  None Seen, 0-2 /HPF Final    WBC, UA 03/18/2025 0-2  None Seen, 0-2 /HPF Final    Bacteria, UA 03/18/2025 None Seen  None Seen /HPF Final    Squamous Epithelial Cells, UA 03/18/2025 0-2  None Seen, 0-2 /HPF Final    Hyaline Casts, UA 03/18/2025 None Seen  None Seen /LPF Final    Methodology 03/18/2025 Automated Microscopy   Final    Lactate 03/18/2025 3.2 (C)  0.5 - 2.0 mmol/L Final    Lactate 03/19/2025 1.8  0.5 - 2.0 mmol/L Final   Hospital Outpatient Visit on 03/04/2025   Component Date Value Ref Range Status    Glucose 03/04/2025 139 (H)  65 - 99 mg/dL Final    BUN 03/04/2025 19  8 - 23 mg/dL Final    Creatinine 03/04/2025 0.94  0.57 - 1.00 mg/dL Final    Sodium 03/04/2025 142  136 - 145 mmol/L Final    Potassium 03/04/2025 4.0  3.5 - 5.2 mmol/L Final    Chloride 03/04/2025 102  98 - 107 mmol/L Final    CO2 03/04/2025 31.6 (H)  22.0 - 29.0 mmol/L Final    Calcium 03/04/2025 9.7  8.6 - 10.5 mg/dL Final    Total Protein 03/04/2025 7.0  6.0 - 8.5 g/dL Final    Albumin 03/04/2025 3.9  3.5 - 5.2 g/dL Final    ALT (SGPT) 03/04/2025 10  1 - 33 U/L Final    AST (SGOT) 03/04/2025 11  1 - 32 U/L Final    Alkaline Phosphatase 03/04/2025 116  39 - 117 U/L Final    Total Bilirubin 03/04/2025 0.3  0.0 - 1.2 mg/dL Final    Globulin 03/04/2025 3.1  gm/dL Final    A/G Ratio 03/04/2025 1.3  g/dL Final    BUN/Creatinine Ratio 03/04/2025 20.2  7.0 - 25.0 Final    Anion Gap 03/04/2025 8.4  5.0 - 15.0 mmol/L Final    eGFR 03/04/2025 67.5  >60.0 mL/min/1.73 Final    Magnesium 03/04/2025 1.6  1.6 - 2.4 mg/dL Final     Phosphorus 03/04/2025 3.4  2.5 - 4.5 mg/dL Final    WBC 03/04/2025 4.12  3.40 - 10.80 10*3/mm3 Final    RBC 03/04/2025 2.99 (L)  3.77 - 5.28 10*6/mm3 Final    Hemoglobin 03/04/2025 9.6 (L)  12.0 - 15.9 g/dL Final    Hematocrit 03/04/2025 30.9 (L)  34.0 - 46.6 % Final    MCV 03/04/2025 103.3 (H)  79.0 - 97.0 fL Final    MCH 03/04/2025 32.1  26.6 - 33.0 pg Final    MCHC 03/04/2025 31.1 (L)  31.5 - 35.7 g/dL Final    RDW 03/04/2025 15.6 (H)  12.3 - 15.4 % Final    RDW-SD 03/04/2025 58.6 (H)  37.0 - 54.0 fl Final    MPV 03/04/2025 10.3  6.0 - 12.0 fL Final    Platelets 03/04/2025 223  140 - 450 10*3/mm3 Final    Neutrophil % 03/04/2025 64.3  42.7 - 76.0 % Final    Lymphocyte % 03/04/2025 27.4  19.6 - 45.3 % Final    Monocyte % 03/04/2025 6.1  5.0 - 12.0 % Final    Eosinophil % 03/04/2025 0.5  0.3 - 6.2 % Final    Basophil % 03/04/2025 1.2  0.0 - 1.5 % Final    Immature Grans % 03/04/2025 0.5  0.0 - 0.5 % Final    Neutrophils, Absolute 03/04/2025 2.65  1.70 - 7.00 10*3/mm3 Final    Lymphocytes, Absolute 03/04/2025 1.13  0.70 - 3.10 10*3/mm3 Final    Monocytes, Absolute 03/04/2025 0.25  0.10 - 0.90 10*3/mm3 Final    Eosinophils, Absolute 03/04/2025 0.02  0.00 - 0.40 10*3/mm3 Final    Basophils, Absolute 03/04/2025 0.05  0.00 - 0.20 10*3/mm3 Final    Immature Grans, Absolute 03/04/2025 0.02  0.00 - 0.05 10*3/mm3 Final    nRBC 03/04/2025 0.0  0.0 - 0.2 /100 WBC Final   Hospital Outpatient Visit on 02/06/2025   Component Date Value Ref Range Status    Glucose 02/06/2025 154 (H)  65 - 99 mg/dL Final    BUN 02/06/2025 17  8 - 23 mg/dL Final    Creatinine 02/06/2025 0.94  0.57 - 1.00 mg/dL Final    Sodium 02/06/2025 141  136 - 145 mmol/L Final    Potassium 02/06/2025 3.7  3.5 - 5.2 mmol/L Final    Chloride 02/06/2025 101  98 - 107 mmol/L Final    CO2 02/06/2025 31.0 (H)  22.0 - 29.0 mmol/L Final    Calcium 02/06/2025 9.4  8.6 - 10.5 mg/dL Final    Total Protein 02/06/2025 7.1  6.0 - 8.5 g/dL Final    Albumin 02/06/2025 4.0  3.5  - 5.2 g/dL Final    ALT (SGPT) 02/06/2025 6  1 - 33 U/L Final    AST (SGOT) 02/06/2025 14  1 - 32 U/L Final    Alkaline Phosphatase 02/06/2025 108  39 - 117 U/L Final    Total Bilirubin 02/06/2025 0.3  0.0 - 1.2 mg/dL Final    Globulin 02/06/2025 3.1  gm/dL Final    A/G Ratio 02/06/2025 1.3  g/dL Final    BUN/Creatinine Ratio 02/06/2025 18.1  7.0 - 25.0 Final    Anion Gap 02/06/2025 9.0  5.0 - 15.0 mmol/L Final    eGFR 02/06/2025 67.5  >60.0 mL/min/1.73 Final    Magnesium 02/06/2025 1.8  1.6 - 2.4 mg/dL Final    Phosphorus 02/06/2025 4.2  2.5 - 4.5 mg/dL Final    WBC 02/06/2025 4.59  3.40 - 10.80 10*3/mm3 Final    RBC 02/06/2025 3.07 (L)  3.77 - 5.28 10*6/mm3 Final    Hemoglobin 02/06/2025 9.8 (L)  12.0 - 15.9 g/dL Final    Hematocrit 02/06/2025 32.2 (L)  34.0 - 46.6 % Final    MCV 02/06/2025 104.9 (H)  79.0 - 97.0 fL Final    MCH 02/06/2025 31.9  26.6 - 33.0 pg Final    MCHC 02/06/2025 30.4 (L)  31.5 - 35.7 g/dL Final    RDW 02/06/2025 15.5 (H)  12.3 - 15.4 % Final    RDW-SD 02/06/2025 59.7 (H)  37.0 - 54.0 fl Final    MPV 02/06/2025 10.0  6.0 - 12.0 fL Final    Platelets 02/06/2025 210  140 - 450 10*3/mm3 Final    Neutrophil % 02/06/2025 63.2  42.7 - 76.0 % Final    Lymphocyte % 02/06/2025 30.7  19.6 - 45.3 % Final    Monocyte % 02/06/2025 3.7 (L)  5.0 - 12.0 % Final    Eosinophil % 02/06/2025 1.1  0.3 - 6.2 % Final    Basophil % 02/06/2025 1.1  0.0 - 1.5 % Final    Immature Grans % 02/06/2025 0.2  0.0 - 0.5 % Final    Neutrophils, Absolute 02/06/2025 2.90  1.70 - 7.00 10*3/mm3 Final    Lymphocytes, Absolute 02/06/2025 1.41  0.70 - 3.10 10*3/mm3 Final    Monocytes, Absolute 02/06/2025 0.17  0.10 - 0.90 10*3/mm3 Final    Eosinophils, Absolute 02/06/2025 0.05  0.00 - 0.40 10*3/mm3 Final    Basophils, Absolute 02/06/2025 0.05  0.00 - 0.20 10*3/mm3 Final    Immature Grans, Absolute 02/06/2025 0.01  0.00 - 0.05 10*3/mm3 Final    nRBC 02/06/2025 0.0  0.0 - 0.2 /100 WBC Final   Hospital Outpatient Visit on 02/03/2025    Component Date Value Ref Range Status    Glucose 02/03/2025 130 (H)  65 - 99 mg/dL Final    BUN 02/03/2025 16  8 - 23 mg/dL Final    Creatinine 02/03/2025 0.92  0.57 - 1.00 mg/dL Final    Sodium 02/03/2025 143  136 - 145 mmol/L Final    Potassium 02/03/2025 4.0  3.5 - 5.2 mmol/L Final    Chloride 02/03/2025 101  98 - 107 mmol/L Final    CO2 02/03/2025 34.2 (H)  22.0 - 29.0 mmol/L Final    Calcium 02/03/2025 9.8  8.6 - 10.5 mg/dL Final    Total Protein 02/03/2025 7.3  6.0 - 8.5 g/dL Final    Albumin 02/03/2025 4.0  3.5 - 5.2 g/dL Final    ALT (SGPT) 02/03/2025 7  1 - 33 U/L Final    AST (SGOT) 02/03/2025 12  1 - 32 U/L Final    Alkaline Phosphatase 02/03/2025 108  39 - 117 U/L Final    Total Bilirubin 02/03/2025 0.3  0.0 - 1.2 mg/dL Final    Globulin 02/03/2025 3.3  gm/dL Final    A/G Ratio 02/03/2025 1.2  g/dL Final    BUN/Creatinine Ratio 02/03/2025 17.4  7.0 - 25.0 Final    Anion Gap 02/03/2025 7.8  5.0 - 15.0 mmol/L Final    eGFR 02/03/2025 69.2  >60.0 mL/min/1.73 Final    Magnesium 02/03/2025 1.8  1.6 - 2.4 mg/dL Final    Phosphorus 02/03/2025 5.0 (H)  2.5 - 4.5 mg/dL Final    WBC 02/03/2025 5.06  3.40 - 10.80 10*3/mm3 Final    RBC 02/03/2025 3.06 (L)  3.77 - 5.28 10*6/mm3 Final    Hemoglobin 02/03/2025 9.7 (L)  12.0 - 15.9 g/dL Final    Hematocrit 02/03/2025 31.8 (L)  34.0 - 46.6 % Final    MCV 02/03/2025 103.9 (H)  79.0 - 97.0 fL Final    MCH 02/03/2025 31.7  26.6 - 33.0 pg Final    MCHC 02/03/2025 30.5 (L)  31.5 - 35.7 g/dL Final    RDW 02/03/2025 15.8 (H)  12.3 - 15.4 % Final    RDW-SD 02/03/2025 60.4 (H)  37.0 - 54.0 fl Final    MPV 02/03/2025 10.1  6.0 - 12.0 fL Final    Platelets 02/03/2025 277  140 - 450 10*3/mm3 Final    Neutrophil % 02/03/2025 63.9  42.7 - 76.0 % Final    Lymphocyte % 02/03/2025 29.6  19.6 - 45.3 % Final    Monocyte % 02/03/2025 4.7 (L)  5.0 - 12.0 % Final    Eosinophil % 02/03/2025 0.4  0.3 - 6.2 % Final    Basophil % 02/03/2025 1.0  0.0 - 1.5 % Final    Immature Grans % 02/03/2025  0.4  0.0 - 0.5 % Final    Neutrophils, Absolute 02/03/2025 3.23  1.70 - 7.00 10*3/mm3 Final    Lymphocytes, Absolute 02/03/2025 1.50  0.70 - 3.10 10*3/mm3 Final    Monocytes, Absolute 02/03/2025 0.24  0.10 - 0.90 10*3/mm3 Final    Eosinophils, Absolute 02/03/2025 0.02  0.00 - 0.40 10*3/mm3 Final    Basophils, Absolute 02/03/2025 0.05  0.00 - 0.20 10*3/mm3 Final    Immature Grans, Absolute 02/03/2025 0.02  0.00 - 0.05 10*3/mm3 Final    nRBC 02/03/2025 0.0  0.0 - 0.2 /100 WBC Final       EKG Results:  No orders to display       Imaging Results:  CT Chest With Contrast Diagnostic    Result Date: 10/29/2024  Impression: 1. Stable diffuse sclerotic osseous metastatic disease. 2. New bronchial wall thickening and endobronchial secretion in the right lower lobe likely secondary to bronchitis. Mild airspace consolidation in the right lower lobe may represent atelectasis or developing pneumonia. Electronically Signed: Floyd Leung MD  10/29/2024 10:48 AM EDT  Workstation ID: [id number]    CT Abdomen Pelvis With Contrast    Result Date: 10/29/2024  Impression: Stable exam. Diffuse sclerotic osseous metastatic disease. Electronically Signed: Floyd Leung MD  10/29/2024 10:31 AM EDT  Workstation ID: [id number]    NM Bone Scan Whole Body    Result Date: 10/22/2024  1.New sternal and right lower rib cage radiotracer uptake concerning for new sites of active metastatic disease. Electronically Signed: Donn Daigle MD  10/22/2024 4:25 PM EDT  Workstation ID: [id number]    CT Chest With Contrast Diagnostic    Result Date: 7/30/2024  Impression: Stable appearance of the innumerable osseous metastases. No new suspicious pulmonary nodule or mass. No evidence of new or additional metastatic disease in the chest. Coronary artery calcifications. Electronically Signed: Fox Schaeffer MD  7/30/2024 11:42 AM EDT  Workstation ID: [id number]    CT Abdomen Pelvis With Contrast    Result Date: 7/30/2024  Impression: 1. Extensive osteosclerotic  metastatic disease does not appear appreciably changed within the abdomen or pelvis. 2. No indication of soft tissue organ metastasis in the abdomen or pelvis. 3. Interval ventral abdominal hernia repair with small superficial soft tissue seroma. Electronically Signed: Aicha Bonilla MD  7/30/2024 11:35 AM EDT  Workstation ID: [id number]        Assessment & Plan   Diagnoses and all orders for this visit:    1. Bereavement (Primary)    2. Major depressive disorder, recurrent episode, moderate  -     ARIPiprazole (Abilify) 2 MG tablet; Take 1 tablet by mouth Daily.  Dispense: 30 tablet; Refill: 5  -     mirtazapine (REMERON) 30 MG tablet; Take 1 tablet by mouth Every Night.  Dispense: 90 tablet; Refill: 1    3. Generalized anxiety disorder  -     ARIPiprazole (Abilify) 2 MG tablet; Take 1 tablet by mouth Daily.  Dispense: 30 tablet; Refill: 5  -     mirtazapine (REMERON) 30 MG tablet; Take 1 tablet by mouth Every Night.  Dispense: 90 tablet; Refill: 1    4. Panic attacks  -     ARIPiprazole (Abilify) 2 MG tablet; Take 1 tablet by mouth Daily.  Dispense: 30 tablet; Refill: 5  -     mirtazapine (REMERON) 30 MG tablet; Take 1 tablet by mouth Every Night.  Dispense: 90 tablet; Refill: 1    5. Insomnia due to mental condition  -     ARIPiprazole (Abilify) 2 MG tablet; Take 1 tablet by mouth Daily.  Dispense: 30 tablet; Refill: 5  -     mirtazapine (REMERON) 30 MG tablet; Take 1 tablet by mouth Every Night.  Dispense: 90 tablet; Refill: 1    6. Post traumatic stress disorder (PTSD)  -     ARIPiprazole (Abilify) 2 MG tablet; Take 1 tablet by mouth Daily.  Dispense: 30 tablet; Refill: 5  -     mirtazapine (REMERON) 30 MG tablet; Take 1 tablet by mouth Every Night.  Dispense: 90 tablet; Refill: 1    7. Snoring    8. Cancer related pain        Visit Diagnoses:    ICD-10-CM ICD-9-CM   1. Bereavement  Z63.4 V62.82   2. Major depressive disorder, recurrent episode, moderate  F33.1 296.32   3. Generalized anxiety disorder  F41.1  300.02   4. Panic attacks  F41.0 300.01   5. Insomnia due to mental condition  F51.05 300.9     327.02   6. Post traumatic stress disorder (PTSD)  F43.10 309.81   7. Snoring  R06.83 786.09   8. Cancer related pain  G89.3 338.3     07/11/2025: Improving affect. No change to give it more time. Discussed relationships.    Acknowledged and normalized patient's thoughts, feelings, and concerns. Allowed patient to freely discuss and process issues, such as:  Anxiety and depression regarding medical conditions, pain.  Grieving the loss of a loved one, her two friends.  Anxiety regarding finances.  ... using Rogerian psychotherapeutic techniques including unconditional positive regard, reflective listening, and demonstrating clear empathy, with the goal of ameliorating symptoms and maintaining, restoring, or improving self-esteem, adaptive skills, and ego or psychological functions (Garry et al. 1991), the long-term goal of which is to develop a better, healthier perspective and help the patient bear their circumstances more easily.  Time (minutes) spent providing supportive psychotherapy: 19  (This time is exclusive to the therapy session and separate from the time spent on activities used to meet the criteria for the E/M service (history, exam, medical decision-making).)  Start: 10:46  Stop: 11:05  Functional status: mild impairment  Treatment plan: Medication management and supportive psychotherapy  Prognosis: good  Progress: grieving, insomnia; MDD, NATE improving  6w    05/22/2025: MDD, grief. Add abilify. Pt will likely go to sleep lab for her sleep study.    04/10/2025: WG, mirtazapine helping with MDD, NATE. If continued WG, stop and trial bupropion, which helped in the past. Follow for sleep consult.    02/27/2025: Sleep medicine for snoring, increase mirtazaine for NATE, MDD. Grieving the loss of 2 friends.    12/30/2024: Watch weight. Improving. Restart mirtazapine for MDD, NATE, insomnia.     11/12/2024: Increase  cymbalta for MDD. Pt just started on mirtazapine. Would want to find her a therapist. 6w    PLAN:  Safety: No acute safety concerns  Therapy:  recommended, but can't use mychart  Risk Assessment: Risk of self-harm acutely is moderate.  Risk factors include anxiety disorder, mood disorder, access to firearms, and recent psychosocial stressors (pandemic). Protective factors include no family history, no present SI, no history of suicide attempts or self-harm in the past, minimal AODA, healthcare seeking, future orientation, willingness to engage in care.  Risk of self-harm chronically is also moderate, but could be further elevated in the event of treatment noncompliance and/or AODA.  Meds:  CONTINUE abilify 2 mg qhs. Risks, benefits, alternatives discussed with patient including increased energy, exacerbation of irritability, akathisia, movement issues, GI upset, orthostatic hypotension, increased appetite, tardive dyskinesia.  Use care when operating vehicle, vessel, or machine. After discussion of these risks and benefits, the patient voiced understanding and agreed to proceed.  CONTINUE cymbalta 90 mg qday. Risks, benefits, alternatives discussed with patient including GI upset, nausea vomiting diarrhea, theoretical decrease of seizure threshold predisposing the patient to a slightly higher seizure risk, headaches, sexual dysfunction, serotonin syndrome, bleeding risk, increased suicidality in patients 24 years and younger, switching to ebony/hypomania.  Also constipation and urinary retention.  After discussion of these risks and benefits, the patient voiced understanding and agreed to proceed.  CONTINUE mirtazapine 30 mg qday. Risks, benefits, alternatives discussed with patient including GI upset, sedation, dizziness with falls risk, increased appetite.  Do not use before operating vehicle, vessel, or machine. After discussion of these risks and benefits, the patient voiced understanding and agreed to  proceed.  ALSO on trazodone 150 mg qhs. Risks, benefits, side effects discussed with patient including GI upset, sedation, dizziness/falls risk, grogginess the following day, prolongation of the QTc interval.  Do not use before operating vehicle, vessel, or machine. After discussion of these risks and benefits, the patient voiced understanding and agreed to proceed.    S/P:  Buspar did nothing  Abilify can't remember  Labs: none      Patient screened positive for depression based on a PHQ-9 score of 17 on 11/12/2024. Follow-up recommendations include: Prescribed antidepressant medication treatment and Suicide Risk Assessment performed.           TREATMENT PLAN/GOALS: Continue supportive psychotherapy efforts and medications as indicated. Treatment and medication options discussed during today's visit. Patient acknowledged and verbally consented to continue with current treatment plan and was educated on the importance of compliance with treatment and follow-up appointments.    MEDICATION ISSUES:  ALEXEI reviewed as expected.  Discussed medication options and treatment plan of prescribed medication as well as the risks, benefits, and side effects including potential falls, possible impaired driving and metabolic adversities among others. Patient is agreeable to call the office with any worsening of symptoms or onset of side effects. Patient is agreeable to call 911 or go to the nearest ER should he/she begin having SI/HI. No medication side effects or related complaints today.     MEDS ORDERED DURING VISIT:  New Medications Ordered This Visit   Medications    ARIPiprazole (Abilify) 2 MG tablet     Sig: Take 1 tablet by mouth Daily.     Dispense:  30 tablet     Refill:  5     Thank you for the help. Please call with questions: [phone].    mirtazapine (REMERON) 30 MG tablet     Sig: Take 1 tablet by mouth Every Night.     Dispense:  90 tablet     Refill:  1     Replaces 15 mg dose. Thank you for the help. Please  call with questions: [phone].       Return in about 6 weeks (around 8/22/2025).         This document has been electronically signed by Megha Jose MD  July 11, 2025 11:08 EDT    Dictated Utilizing Dragon Dictation: Part of this note may be an electronic transcription/translation of spoken language to printed text using the Dragon Dictation System.

## 2025-07-11 NOTE — TREATMENT PLAN
Anxiety:  3/10 progressing    Goals:  Patient will develop and implement behavioral and cognitive strategies to reduce anxiety and irrational fears, monthly, using Rogerian psychotherapy and CBT where appropriate.  Help patient explore past emotional issues in relation to present anxiety, monthly, until remission of symptoms, using Rogerian psychotherapy and CBT where appropriate.  Help patient develop an awareness of their cognitive and physical responses to anxiety, monthly, until remission of symptoms, using Rogerian psychotherapy and CBT where appropriate.          Depression:  2/10 progressing    Goals:  Patient will demonstrate the ability to initiate new constructive life skills outside of sessions on a consistent basis, monthly, using Rogerian psychotherapy and CBT where appropriate.  Assist patient in setting attainable activities of daily living goals, monthly, using Rogerian psychotherapy and CBT where appropriate.  Provide education about depression, monthly, using Rogerian psychotherapy and CBT where appropriate.  Assist patient in developing healthy coping strategies, monthly, using Rogerian psychotherapy and CBT where appropriate.    Rogerian psychotherapy and CBT will be used to help accomplish the above goals and manage depression and anxiety related to medical conditions, grief       I have discussed and reviewed this treatment plan with the patient.      Reviewed by Megha Jose MD   07/11/2025

## 2025-07-14 ENCOUNTER — TELEPHONE (OUTPATIENT)
Dept: ONCOLOGY | Facility: HOSPITAL | Age: 65
End: 2025-07-14
Payer: MEDICARE

## 2025-07-14 DIAGNOSIS — G89.3 CANCER RELATED PAIN: ICD-10-CM

## 2025-07-14 RX ORDER — MORPHINE SULFATE 30 MG/1
30 TABLET, FILM COATED, EXTENDED RELEASE ORAL 2 TIMES DAILY
Qty: 60 TABLET | Refills: 0 | Status: SHIPPED | OUTPATIENT
Start: 2025-07-14

## 2025-07-14 NOTE — TELEPHONE ENCOUNTER
Caller: Derek Keller    Relationship: Self    Best call back number: [phone]      What was the call regarding: PATIENT WANTED TO LOWER HER DOSE FOR THE MORPHINE TO LIKE 15 OR 30 MG, SHE STATES THE 60 MG IS TO STRONG

## 2025-07-22 DIAGNOSIS — G89.3 CANCER RELATED PAIN: ICD-10-CM

## 2025-07-22 DIAGNOSIS — C50.912 MALIGNANT NEOPLASM OF LEFT BREAST IN FEMALE, ESTROGEN RECEPTOR POSITIVE, UNSPECIFIED SITE OF BREAST: ICD-10-CM

## 2025-07-22 DIAGNOSIS — Z17.0 MALIGNANT NEOPLASM OF LEFT BREAST IN FEMALE, ESTROGEN RECEPTOR POSITIVE, UNSPECIFIED SITE OF BREAST: ICD-10-CM

## 2025-07-22 RX ORDER — ONDANSETRON 8 MG/1
8 TABLET, FILM COATED ORAL EVERY 8 HOURS PRN
Qty: 30 TABLET | Refills: 5 | Status: SHIPPED | OUTPATIENT
Start: 2025-07-22

## 2025-07-22 RX ORDER — OXYCODONE AND ACETAMINOPHEN 10; 325 MG/1; MG/1
1 TABLET ORAL EVERY 6 HOURS PRN
Qty: 60 TABLET | Refills: 0 | Status: SHIPPED | OUTPATIENT
Start: 2025-07-22

## 2025-07-22 NOTE — TELEPHONE ENCOUNTER
Caller: Derek Keller    Relationship: Self    Best call back number: [phone]    Requested Prescriptions:   Requested Prescriptions     Pending Prescriptions Disp Refills    oxyCODONE-acetaminophen (PERCOCET)  MG per tablet 60 tablet 0     Sig: Take 1 tablet by mouth Every 6 (Six) Hours As Needed for Moderate Pain.    ondansetron (ZOFRAN) 8 MG tablet 30 tablet 5     Sig: Take 1 tablet by mouth Every 8 (Eight) Hours As Needed for Nausea or Vomiting.        Pharmacy where request should be sent: [facility], [facility], [facility]  [facility], [place] - [address] - [phone]  - [phone] FX     Last office visit with prescribing clinician: 7/3/2025   Last telemedicine visit with prescribing clinician: Visit date not found   Next office visit with prescribing clinician: Visit date not found       Does the patient have less than a 3 day supply:  [x] Yes  [] No        Abby Benítez Rep   07/22/25 10:00 EDT

## 2025-07-23 ENCOUNTER — TELEPHONE (OUTPATIENT)
Dept: ONCOLOGY | Facility: HOSPITAL | Age: 65
End: 2025-07-23
Payer: MEDICARE

## 2025-07-23 NOTE — TELEPHONE ENCOUNTER
I CALLED PT TO SEE IF SHE HAD SEEN HER DENTIST OR A ORAL SURGEON RECENTLY. I HAD TO LEAVE HER A MESSAGE TO CALL ME BACK. THANKS

## 2025-07-26 DIAGNOSIS — R23.2 HOT FLASHES: ICD-10-CM

## 2025-07-30 RX ORDER — OXYBUTYNIN CHLORIDE 15 MG/1
TABLET, EXTENDED RELEASE ORAL
Qty: 30 TABLET | Refills: 1 | Status: SHIPPED | OUTPATIENT
Start: 2025-07-30

## 2025-07-30 RX ORDER — POTASSIUM CHLORIDE 750 MG/1
CAPSULE, EXTENDED RELEASE ORAL
Qty: 60 CAPSULE | Refills: 1 | Status: SHIPPED | OUTPATIENT
Start: 2025-07-30

## 2025-07-31 ENCOUNTER — TELEPHONE (OUTPATIENT)
Dept: ONCOLOGY | Facility: HOSPITAL | Age: 65
End: 2025-07-31
Payer: MEDICARE

## 2025-07-31 NOTE — TELEPHONE ENCOUNTER
SPOKE TO PATIENT, SARINA ELKINS TRIED TO REACH PATIENT ON 07/23/2025 IN REGARDS TO DENTAL WORK/SURGERY, SHE DID LEAVE PATIENT A MESSAGE AND PER DR DIANE ADVISEMENT WE PUSHED APPOINTMENTS TO END OF AUGUST PER REQUEST TO ALLOW TIME FOR PATIENT TO SEE DENTIST/HAVE PROCEDURES. PATIENT UNDERSTOOD AND VERBALLY CONFIRM APPOINTMENTS FOR AUGUST.

## 2025-07-31 NOTE — TELEPHONE ENCOUNTER
Caller: Derek Keller    Relationship: Self    Best call back number: [phone]      What was the call regarding: PATIENT WANTED TO KNOW WHY HER APPOINTMENT WAS CANCELLED TODAY AND NO ONE CONTACTED HER TO LET HER KNOW     PATIENT STILL WANTS TO COME TO HER APPOINTMENT TODAY

## 2025-08-04 ENCOUNTER — SPECIALTY PHARMACY (OUTPATIENT)
Dept: PHARMACY | Facility: HOSPITAL | Age: 65
End: 2025-08-04
Payer: MEDICARE

## 2025-08-06 DIAGNOSIS — G89.3 CANCER RELATED PAIN: ICD-10-CM

## 2025-08-06 RX ORDER — OXYCODONE AND ACETAMINOPHEN 10; 325 MG/1; MG/1
1 TABLET ORAL EVERY 6 HOURS PRN
Qty: 60 TABLET | Refills: 0 | Status: SHIPPED | OUTPATIENT
Start: 2025-08-06

## 2025-08-18 DIAGNOSIS — G89.3 CANCER RELATED PAIN: ICD-10-CM

## 2025-08-18 RX ORDER — OXYCODONE AND ACETAMINOPHEN 10; 325 MG/1; MG/1
1 TABLET ORAL EVERY 6 HOURS PRN
Qty: 60 TABLET | Refills: 0 | Status: SHIPPED | OUTPATIENT
Start: 2025-08-18

## 2025-08-22 ENCOUNTER — TELEPHONE (OUTPATIENT)
Dept: ONCOLOGY | Facility: HOSPITAL | Age: 65
End: 2025-08-22
Payer: MEDICARE

## 2025-08-22 ENCOUNTER — OFFICE VISIT (OUTPATIENT)
Dept: PSYCHIATRY | Facility: CLINIC | Age: 65
End: 2025-08-22
Payer: MEDICARE

## 2025-08-22 VITALS
DIASTOLIC BLOOD PRESSURE: 53 MMHG | OXYGEN SATURATION: 97 % | WEIGHT: 221 LBS | HEART RATE: 73 BPM | BODY MASS INDEX: 35.52 KG/M2 | SYSTOLIC BLOOD PRESSURE: 134 MMHG | HEIGHT: 66 IN

## 2025-08-22 DIAGNOSIS — R06.83 SNORING: ICD-10-CM

## 2025-08-22 DIAGNOSIS — F43.10 POST TRAUMATIC STRESS DISORDER (PTSD): ICD-10-CM

## 2025-08-22 DIAGNOSIS — F51.05 INSOMNIA DUE TO MENTAL CONDITION: ICD-10-CM

## 2025-08-22 DIAGNOSIS — F41.0 PANIC ATTACKS: ICD-10-CM

## 2025-08-22 DIAGNOSIS — G89.3 CANCER RELATED PAIN: ICD-10-CM

## 2025-08-22 DIAGNOSIS — F33.1 MAJOR DEPRESSIVE DISORDER, RECURRENT EPISODE, MODERATE: ICD-10-CM

## 2025-08-22 DIAGNOSIS — F41.1 GENERALIZED ANXIETY DISORDER: ICD-10-CM

## 2025-08-22 DIAGNOSIS — Z63.4 BEREAVEMENT: Primary | ICD-10-CM

## 2025-08-22 RX ORDER — NAPROXEN 500 MG/1
TABLET ORAL
COMMUNITY

## 2025-08-22 RX ORDER — VENLAFAXINE HYDROCHLORIDE 150 MG/1
CAPSULE, EXTENDED RELEASE ORAL
COMMUNITY
End: 2025-08-22

## 2025-08-22 RX ORDER — DULOXETIN HYDROCHLORIDE 30 MG/1
30 CAPSULE, DELAYED RELEASE ORAL DAILY
Qty: 90 CAPSULE | Refills: 1 | Status: SHIPPED | OUTPATIENT
Start: 2025-08-22

## 2025-08-22 RX ORDER — IBUPROFEN 600 MG/1
1 TABLET, FILM COATED ORAL 3 TIMES DAILY
COMMUNITY
Start: 2025-08-13

## 2025-08-22 RX ORDER — SOLIFENACIN SUCCINATE 10 MG/1
TABLET, FILM COATED ORAL
COMMUNITY

## 2025-08-22 RX ORDER — AMOXICILLIN 500 MG/1
TABLET, FILM COATED ORAL
COMMUNITY
Start: 2025-08-07

## 2025-08-22 RX ORDER — DULOXETIN HYDROCHLORIDE 60 MG/1
60 CAPSULE, DELAYED RELEASE ORAL DAILY
Qty: 90 CAPSULE | Refills: 1 | Status: SHIPPED | OUTPATIENT
Start: 2025-08-22

## 2025-08-22 RX ORDER — ARIPIPRAZOLE 5 MG/1
5 TABLET ORAL DAILY
Qty: 90 TABLET | Refills: 1 | Status: SHIPPED | OUTPATIENT
Start: 2025-08-22

## 2025-08-22 SDOH — SOCIAL STABILITY - SOCIAL INSECURITY: DISSAPEARANCE AND DEATH OF FAMILY MEMBER: Z63.4

## 2025-08-25 ENCOUNTER — SPECIALTY PHARMACY (OUTPATIENT)
Dept: PHARMACY | Facility: HOSPITAL | Age: 65
End: 2025-08-25
Payer: MEDICARE

## 2025-08-27 DIAGNOSIS — G89.3 CANCER RELATED PAIN: ICD-10-CM

## 2025-08-28 ENCOUNTER — HOSPITAL ENCOUNTER (OUTPATIENT)
Dept: ONCOLOGY | Facility: HOSPITAL | Age: 65
Discharge: HOME | End: 2025-08-28
Payer: MEDICARE

## 2025-08-28 ENCOUNTER — OFFICE VISIT (OUTPATIENT)
Dept: ONCOLOGY | Facility: HOSPITAL | Age: 65
End: 2025-08-28
Payer: MEDICARE

## 2025-08-28 ENCOUNTER — TELEPHONE (OUTPATIENT)
Dept: PSYCHIATRY | Facility: CLINIC | Age: 65
End: 2025-08-28
Payer: MEDICARE

## 2025-08-28 VITALS
BODY MASS INDEX: 36.03 KG/M2 | OXYGEN SATURATION: 99 % | TEMPERATURE: 97.1 F | WEIGHT: 224.21 LBS | RESPIRATION RATE: 18 BRPM | DIASTOLIC BLOOD PRESSURE: 66 MMHG | HEART RATE: 54 BPM | SYSTOLIC BLOOD PRESSURE: 155 MMHG | HEIGHT: 66 IN

## 2025-08-28 DIAGNOSIS — C50.412 MALIGNANT NEOPLASM OF UPPER-OUTER QUADRANT OF LEFT BREAST IN FEMALE, ESTROGEN RECEPTOR POSITIVE: ICD-10-CM

## 2025-08-28 DIAGNOSIS — Z17.0 MALIGNANT NEOPLASM OF UPPER-OUTER QUADRANT OF LEFT BREAST IN FEMALE, ESTROGEN RECEPTOR POSITIVE: ICD-10-CM

## 2025-08-28 DIAGNOSIS — G89.3 CANCER RELATED PAIN: ICD-10-CM

## 2025-08-28 DIAGNOSIS — C79.51 MALIGNANT NEOPLASM METASTATIC TO BONE: ICD-10-CM

## 2025-08-28 DIAGNOSIS — C79.51 MALIGNANT NEOPLASM METASTATIC TO BONE: Primary | ICD-10-CM

## 2025-08-28 DIAGNOSIS — Z45.2 ENCOUNTER FOR ADJUSTMENT OR MANAGEMENT OF VASCULAR ACCESS DEVICE: Primary | ICD-10-CM

## 2025-08-28 LAB
ALBUMIN SERPL-MCNC: 4.2 G/DL (ref 3.5–5.2)
ALBUMIN/GLOB SERPL: 1.5 G/DL
ALP SERPL-CCNC: 96 U/L (ref 39–117)
ALT SERPL W P-5'-P-CCNC: 12 U/L (ref 1–33)
ANION GAP SERPL CALCULATED.3IONS-SCNC: 7.4 MMOL/L (ref 5–15)
ANISOCYTOSIS BLD QL: NORMAL
AST SERPL-CCNC: 16 U/L (ref 1–32)
BASOPHILS # BLD AUTO: 0.05 10*3/MM3 (ref 0–0.2)
BASOPHILS NFR BLD AUTO: 1.2 % (ref 0–1.5)
BILIRUB SERPL-MCNC: 0.3 MG/DL (ref 0–1.2)
BUN SERPL-MCNC: 15.3 MG/DL (ref 8–23)
BUN/CREAT SERPL: 15.8 (ref 7–25)
CALCIUM SPEC-SCNC: 9.6 MG/DL (ref 8.6–10.5)
CHLORIDE SERPL-SCNC: 104 MMOL/L (ref 98–107)
CO2 SERPL-SCNC: 31.6 MMOL/L (ref 22–29)
CREAT SERPL-MCNC: 0.97 MG/DL (ref 0.57–1)
DACRYOCYTES BLD QL SMEAR: NORMAL
DEPRECATED RDW RBC AUTO: 59.1 FL (ref 37–54)
EGFRCR SERPLBLD CKD-EPI 2021: 65 ML/MIN/1.73
EOSINOPHIL # BLD AUTO: 0.05 10*3/MM3 (ref 0–0.4)
EOSINOPHIL NFR BLD AUTO: 1.2 % (ref 0.3–6.2)
ERYTHROCYTE [DISTWIDTH] IN BLOOD BY AUTOMATED COUNT: 15.3 % (ref 12.3–15.4)
GLOBULIN UR ELPH-MCNC: 2.8 GM/DL
GLUCOSE SERPL-MCNC: 126 MG/DL (ref 65–99)
HCT VFR BLD AUTO: 29.3 % (ref 34–46.6)
HGB BLD-MCNC: 9.2 G/DL (ref 12–15.9)
HYPOCHROMIA BLD QL: NORMAL
IMM GRANULOCYTES # BLD AUTO: 0.02 10*3/MM3 (ref 0–0.05)
IMM GRANULOCYTES NFR BLD AUTO: 0.5 % (ref 0–0.5)
LYMPHOCYTES # BLD AUTO: 1.43 10*3/MM3 (ref 0.7–3.1)
LYMPHOCYTES NFR BLD AUTO: 35.7 % (ref 19.6–45.3)
MACROCYTES BLD QL SMEAR: NORMAL
MAGNESIUM SERPL-MCNC: 2.1 MG/DL (ref 1.6–2.4)
MCH RBC QN AUTO: 33.5 PG (ref 26.6–33)
MCHC RBC AUTO-ENTMCNC: 31.4 G/DL (ref 31.5–35.7)
MCV RBC AUTO: 106.5 FL (ref 79–97)
MONOCYTES # BLD AUTO: 0.24 10*3/MM3 (ref 0.1–0.9)
MONOCYTES NFR BLD AUTO: 6 % (ref 5–12)
NEUTROPHILS NFR BLD AUTO: 2.22 10*3/MM3 (ref 1.7–7)
NEUTROPHILS NFR BLD AUTO: 55.4 % (ref 42.7–76)
NRBC BLD AUTO-RTO: 0 /100 WBC (ref 0–0.2)
PHOSPHATE SERPL-MCNC: 4.7 MG/DL (ref 2.5–4.5)
PLAT MORPH BLD: NORMAL
PLATELET # BLD AUTO: 163 10*3/MM3 (ref 140–450)
PMV BLD AUTO: 10.4 FL (ref 6–12)
POIKILOCYTOSIS BLD QL SMEAR: NORMAL
POTASSIUM SERPL-SCNC: 4.1 MMOL/L (ref 3.5–5.2)
PROT SERPL-MCNC: 7 G/DL (ref 6–8.5)
RBC # BLD AUTO: 2.75 10*6/MM3 (ref 3.77–5.28)
SODIUM SERPL-SCNC: 143 MMOL/L (ref 136–145)
WBC MORPH BLD: NORMAL
WBC NRBC COR # BLD AUTO: 4.01 10*3/MM3 (ref 3.4–10.8)

## 2025-08-28 PROCEDURE — 85025 COMPLETE CBC W/AUTO DIFF WBC: CPT | Performed by: INTERNAL MEDICINE

## 2025-08-28 PROCEDURE — 85007 BL SMEAR W/DIFF WBC COUNT: CPT | Performed by: INTERNAL MEDICINE

## 2025-08-28 PROCEDURE — 84100 ASSAY OF PHOSPHORUS: CPT | Performed by: INTERNAL MEDICINE

## 2025-08-28 PROCEDURE — 80053 COMPREHEN METABOLIC PANEL: CPT | Performed by: INTERNAL MEDICINE

## 2025-08-28 PROCEDURE — 83735 ASSAY OF MAGNESIUM: CPT | Performed by: INTERNAL MEDICINE

## 2025-08-28 PROCEDURE — 25010000002 HEPARIN LOCK FLUSH PER 10 UNITS: Performed by: INTERNAL MEDICINE

## 2025-08-28 RX ORDER — HEPARIN SODIUM (PORCINE) LOCK FLUSH IV SOLN 100 UNIT/ML 100 UNIT/ML
500 SOLUTION INTRAVENOUS AS NEEDED
OUTPATIENT
Start: 2025-08-28

## 2025-08-28 RX ORDER — HEPARIN SODIUM (PORCINE) LOCK FLUSH IV SOLN 100 UNIT/ML 100 UNIT/ML
500 SOLUTION INTRAVENOUS AS NEEDED
Status: DISCONTINUED | OUTPATIENT
Start: 2025-08-28 | End: 2025-08-30 | Stop reason: HOSPADM

## 2025-08-28 RX ORDER — SODIUM CHLORIDE 0.9 % (FLUSH) 0.9 %
20 SYRINGE (ML) INJECTION AS NEEDED
Status: DISCONTINUED | OUTPATIENT
Start: 2025-08-28 | End: 2025-08-30 | Stop reason: HOSPADM

## 2025-08-28 RX ORDER — VITAMIN B COMPLEX
CAPSULE ORAL DAILY
COMMUNITY

## 2025-08-28 RX ORDER — MORPHINE SULFATE 30 MG/1
30 TABLET, FILM COATED, EXTENDED RELEASE ORAL 2 TIMES DAILY
Qty: 60 TABLET | Refills: 0 | Status: SHIPPED | OUTPATIENT
Start: 2025-08-28

## 2025-08-28 RX ORDER — MAGNESIUM OXIDE 400 MG/1
400 TABLET ORAL DAILY
COMMUNITY

## 2025-08-28 RX ORDER — SODIUM CHLORIDE 0.9 % (FLUSH) 0.9 %
20 SYRINGE (ML) INJECTION AS NEEDED
OUTPATIENT
Start: 2025-08-28

## 2025-08-28 RX ADMIN — HEPARIN 500 UNITS: 100 SYRINGE at 09:54

## 2025-08-28 RX ADMIN — Medication 20 ML: at 09:53

## 2025-08-29 ENCOUNTER — SPECIALTY PHARMACY (OUTPATIENT)
Dept: PHARMACY | Facility: HOSPITAL | Age: 65
End: 2025-08-29
Payer: MEDICARE

## (undated) DEVICE — PENCL ES MEGADINE EZ/CLEAN BUTN W/HOLSTR 10FT

## (undated) DEVICE — STERILE POLYISOPRENE POWDER-FREE SURGICAL GLOVES: Brand: PROTEXIS

## (undated) DEVICE — SLV SCD KN/LEN ADJ EXPRSS BLENDED MD 1P/U

## (undated) DEVICE — VASCULAR ACCESS-LF: Brand: MEDLINE INDUSTRIES, INC.

## (undated) DEVICE — SOL IRRG H2O PL/BG 1000ML STRL

## (undated) DEVICE — LAPAROSCOPIC SMOKE EVACUATION SYSTEM ACTIVE AND PASSIVE: Brand: VALLEYLAB

## (undated) DEVICE — GLV SURG BIOGEL LTX PF 7 1/2

## (undated) DEVICE — GAUZE,PACKING STRIP,PLAIN,1/4"X5YD,STRL: Brand: CURAD

## (undated) DEVICE — SEAL

## (undated) DEVICE — CONN JET HYDRA H20 AUXILIARY DISP

## (undated) DEVICE — CATH DIAG IMPULSE FR4 5F 100CM

## (undated) DEVICE — TIP COVER ACCESSORY

## (undated) DEVICE — SYR LUERLOK 30CC

## (undated) DEVICE — 3M™ IOBAN™ 2 ANTIMICROBIAL INCISE DRAPE 6650EZ: Brand: IOBAN™ 2

## (undated) DEVICE — SUT ETHLN 2/0 FS 18IN 664H

## (undated) DEVICE — SOLIDIFIER LIQLOC PLS 1500CC BT

## (undated) DEVICE — DEV CONTRL TISS STRATAFIX SPIRAL MNCRYL PLS SH 3/0 9IN UD: Type: IMPLANTABLE DEVICE | Status: NON-FUNCTIONAL

## (undated) DEVICE — BLADELESS OBTURATOR: Brand: WECK VISTA

## (undated) DEVICE — DEV OPN LIGASURE CRV 180D 36MM 13.5CM  1P/U

## (undated) DEVICE — STERILE POLYISOPRENE POWDER-FREE SURGICAL GLOVES WITH EMOLLIENT COATING: Brand: PROTEXIS

## (undated) DEVICE — MAJOR-LF: Brand: MEDLINE INDUSTRIES, INC.

## (undated) DEVICE — GLV SURG SENSICARE PI LF PF 7.5 GRN STRL

## (undated) DEVICE — DRAPE,LAPAROTOMY,PCH,STERILE: Brand: MEDLINE

## (undated) DEVICE — SUT SILK 2/0 TIES 18IN A185H

## (undated) DEVICE — SYS CLOSE PORTII CARTR/THOMASN XL

## (undated) DEVICE — DECANTER: Brand: UNBRANDED

## (undated) DEVICE — GLIDESHEATH BASIC HYDROPHILIC COATED INTRODUCER SHEATH: Brand: GLIDESHEATH

## (undated) DEVICE — PROXIMATE RH ROTATING HEAD SKIN STAPLERS (35 WIDE) CONTAINS 35 STAINLESS STEEL STAPLES: Brand: PROXIMATE

## (undated) DEVICE — LAPAROVUE VISIBILITY SYSTEM LAPAROSCOPIC SOLUTIONS: Brand: LAPAROVUE

## (undated) DEVICE — ADHS SKIN PREMIERPRO EXOFIN TOPICAL HI/VISC .5ML

## (undated) DEVICE — TUBING, SUCTION, 1/4" X 10', STRAIGHT: Brand: MEDLINE

## (undated) DEVICE — Device

## (undated) DEVICE — JACKSON-PRATT 100CC BULB RESERVOIR: Brand: CARDINAL HEALTH

## (undated) DEVICE — SOL IRR H2O BTL 1000ML STRL

## (undated) DEVICE — TOTAL TRAY, 16FR 10ML SIL FOLEY, URN: Brand: MEDLINE

## (undated) DEVICE — INTENDED FOR TISSUE SEPARATION, AND OTHER PROCEDURES THAT REQUIRE A SHARP SURGICAL BLADE TO PUNCTURE OR CUT.: Brand: BARD-PARKER ® CARBON RIB-BACK BLADES

## (undated) DEVICE — GW EMR FIX EXCHG J STD .035 3MM 260CM

## (undated) DEVICE — ABSORBABLE WOUND CLOSURE DEVICE
Type: IMPLANTABLE DEVICE | Status: NON-FUNCTIONAL
Brand: SYNETURE

## (undated) DEVICE — GLV SURG SENSICARE SLT PF LF 7.5 STRL

## (undated) DEVICE — KT MANIFLD CARDIAC

## (undated) DEVICE — SUT PDS 1 XLH LP 99IN Z881G

## (undated) DEVICE — BLANKT WARM PACU MISTRAL/AIR PREM REFL A/ 85.8X50IN

## (undated) DEVICE — PENCL E/S SMOKEEVAC W/TELESCP CANN

## (undated) DEVICE — LINER SURG CANSTR SXN S/RIGD 1500CC

## (undated) DEVICE — DAVINCI-LF: Brand: MEDLINE INDUSTRIES, INC.

## (undated) DEVICE — ANTIBACTERIAL VIOLET BRAIDED (POLYGLACTIN 910), SYNTHETIC ABSORBABLE SUTURE: Brand: COATED VICRYL

## (undated) DEVICE — REDUCER: Brand: ENDOWRIST

## (undated) DEVICE — INTRO SHEATH ART/FEM ENGAGE .035 5F12CM

## (undated) DEVICE — CATH DIAG IMPULSE FL3.5 5F 100CM

## (undated) DEVICE — SUT SILK 3/0 SH CR8 18IN C013D

## (undated) DEVICE — SUT VIC PLS CTD BR 0 TIE 18IN VIL

## (undated) DEVICE — BLAKE SILICONE DRAIN, 19 FR ROUND, HUBLESS WITH 1/4" TROCAR: Brand: BLAKE

## (undated) DEVICE — SUT MNCRYL 4/0 PS2 18 IN

## (undated) DEVICE — ELECTRD BLD STD SS 3/32X6.5IN

## (undated) DEVICE — SUT PROLN 2/0 CT2 30IN 8411H

## (undated) DEVICE — CATH DIAG IMPULSE PIG 5F 100CM

## (undated) DEVICE — Device: Brand: DEFENDO AIR/WATER/SUCTION AND BIOPSY VALVE

## (undated) DEVICE — ELECTRD BLD EDGE COAT 3IN

## (undated) DEVICE — DRSNG WND BORDR/ADHS NONADHR/GZ LF 4X14IN STRL

## (undated) DEVICE — ARM DRAPE

## (undated) DEVICE — PK CATH CARD 40